# Patient Record
Sex: FEMALE | Race: WHITE | Employment: UNEMPLOYED | URBAN - METROPOLITAN AREA
[De-identification: names, ages, dates, MRNs, and addresses within clinical notes are randomized per-mention and may not be internally consistent; named-entity substitution may affect disease eponyms.]

---

## 2017-01-03 LAB
ALBUMIN SERPL-MCNC: 2.5 G/DL (ref 3.4–5)
ALP SERPL-CCNC: 83 U/L (ref 40–150)
ALT SERPL W P-5'-P-CCNC: 28 U/L (ref 0–50)
ANION GAP SERPL CALCULATED.3IONS-SCNC: 8 MMOL/L (ref 3–14)
ANISOCYTOSIS BLD QL SMEAR: SLIGHT
AST SERPL W P-5'-P-CCNC: 17 U/L (ref 0–45)
BASOPHILS # BLD AUTO: 0 10E9/L (ref 0–0.2)
BASOPHILS NFR BLD AUTO: 0 %
BILIRUB SERPL-MCNC: 0.2 MG/DL (ref 0.2–1.3)
BUN SERPL-MCNC: 21 MG/DL (ref 7–30)
BURR CELLS BLD QL SMEAR: SLIGHT
CALCIUM SERPL-MCNC: 8.7 MG/DL (ref 8.5–10.1)
CHLORIDE SERPL-SCNC: 108 MMOL/L (ref 94–109)
CO2 SERPL-SCNC: 27 MMOL/L (ref 20–32)
CREAT SERPL-MCNC: 0.94 MG/DL (ref 0.52–1.04)
DIFFERENTIAL METHOD BLD: ABNORMAL
EOSINOPHIL # BLD AUTO: 0.1 10E9/L (ref 0–0.7)
EOSINOPHIL NFR BLD AUTO: 0.9 %
ERYTHROCYTE [DISTWIDTH] IN BLOOD BY AUTOMATED COUNT: 17.8 % (ref 10–15)
GFR SERPL CREATININE-BSD FRML MDRD: 69 ML/MIN/1.7M2
GLUCOSE SERPL-MCNC: 63 MG/DL (ref 70–99)
HCT VFR BLD AUTO: 28.4 % (ref 35–47)
HGB BLD-MCNC: 8.9 G/DL (ref 11.7–15.7)
LYMPHOCYTES # BLD AUTO: 1.3 10E9/L (ref 0.8–5.3)
LYMPHOCYTES NFR BLD AUTO: 19.1 %
MACROCYTES BLD QL SMEAR: PRESENT
MCH RBC QN AUTO: 29.4 PG (ref 26.5–33)
MCHC RBC AUTO-ENTMCNC: 31.3 G/DL (ref 31.5–36.5)
MCV RBC AUTO: 94 FL (ref 78–100)
MICROCYTES BLD QL SMEAR: PRESENT
MONOCYTES # BLD AUTO: 0.7 10E9/L (ref 0–1.3)
MONOCYTES NFR BLD AUTO: 10.4 %
NEUTROPHILS # BLD AUTO: 4.7 10E9/L (ref 1.6–8.3)
NEUTROPHILS NFR BLD AUTO: 69.6 %
PLATELET # BLD AUTO: 480 10E9/L (ref 150–450)
PLATELET # BLD EST: ABNORMAL 10*3/UL
POIKILOCYTOSIS BLD QL SMEAR: SLIGHT
POTASSIUM SERPL-SCNC: 4.1 MMOL/L (ref 3.4–5.3)
PROT SERPL-MCNC: 6 G/DL (ref 6.8–8.8)
RBC # BLD AUTO: 3.03 10E12/L (ref 3.8–5.2)
RBC INCLUSIONS BLD: SLIGHT
SODIUM SERPL-SCNC: 143 MMOL/L (ref 133–144)
WBC # BLD AUTO: 6.7 10E9/L (ref 4–11)

## 2017-01-03 PROCEDURE — 80053 COMPREHEN METABOLIC PANEL: CPT | Performed by: INTERNAL MEDICINE

## 2017-01-03 PROCEDURE — 85025 COMPLETE CBC W/AUTO DIFF WBC: CPT | Performed by: INTERNAL MEDICINE

## 2017-01-04 ENCOUNTER — CARE COORDINATION (OUTPATIENT)
Dept: GASTROENTEROLOGY | Facility: CLINIC | Age: 32
End: 2017-01-04

## 2017-01-04 LAB
CMV DNA SPEC NAA+PROBE-ACNC: ABNORMAL [IU]/ML
CMV DNA SPEC NAA+PROBE-LOG#: <2.1 {LOG_IU}/ML
SPECIMEN SOURCE: ABNORMAL

## 2017-01-04 NOTE — PROGRESS NOTES
Thank you for the update. I am glad she is doing well. After three days at 50 mg she can decrease down to 40 mg and stay on that until I see her next week.    Fuentes Johnson MD    [facility]  Division of Gastroenterology, Hepatology and Nutrition

## 2017-01-04 NOTE — PROGRESS NOTES
Called patient in follow up per Dr. Johnson request.  Patient taking Prednisone 50 mg for 3 days then will go to Prednisone 40 and taper.  Had 1 formed stool yesterday and today. No blood noted. No abdominal pain.  Eating and drinking.  No nausea. Has appt with Dr. Johnson on January 9.   Will call if symptoms change.

## 2017-01-05 NOTE — PROGRESS NOTES
Called patient to confirm that Dr. Johnson said she can go down to 40 mg as previously discussed.

## 2017-01-09 ENCOUNTER — OFFICE VISIT (OUTPATIENT)
Dept: GASTROENTEROLOGY | Facility: CLINIC | Age: 32
End: 2017-01-09

## 2017-01-09 VITALS
BODY MASS INDEX: 22.08 KG/M2 | OXYGEN SATURATION: 98 % | HEART RATE: 96 BPM | WEIGHT: 120 LBS | DIASTOLIC BLOOD PRESSURE: 98 MMHG | SYSTOLIC BLOOD PRESSURE: 127 MMHG | HEIGHT: 62 IN | TEMPERATURE: 97.4 F

## 2017-01-09 DIAGNOSIS — K51.011 ULCERATIVE PANCOLITIS WITH RECTAL BLEEDING (H): Primary | ICD-10-CM

## 2017-01-09 DIAGNOSIS — R05.9 COUGH: ICD-10-CM

## 2017-01-09 DIAGNOSIS — F51.01 PRIMARY INSOMNIA: ICD-10-CM

## 2017-01-09 RX ORDER — ZOLPIDEM TARTRATE 5 MG/1
5 TABLET ORAL
Qty: 30 TABLET | Refills: 5 | Status: SHIPPED | OUTPATIENT
Start: 2017-01-09 | End: 2017-03-17

## 2017-01-09 RX ORDER — PREDNISONE 5 MG/1
TABLET ORAL
Qty: 235 TABLET | Refills: 0 | Status: SHIPPED
Start: 2017-01-09 | End: 2017-01-09 | Stop reason: DRUGHIGH

## 2017-01-09 RX ORDER — PREDNISONE 5 MG/1
TABLET ORAL
Qty: 250 TABLET | Refills: 0 | Status: SHIPPED
Start: 2017-01-09 | End: 2017-02-13 | Stop reason: DRUGHIGH

## 2017-01-09 ASSESSMENT — PAIN SCALES - GENERAL: PAINLEVEL: NO PAIN (0)

## 2017-01-09 NOTE — PROGRESS NOTES
OUTPATIENT GI FOLLOWUP VISIT       REASON FOR FOLLOWUP:  PSC-associated inflammatory bowel disease.      IBD HISTORY:      DRUG MONITORING:    -TPMT enzyme activity was 18.5 on 12/06/2016.    -6TGN/6MMPN  levels on 12/06/2016 is 203/257.   -Biologic concentrations have not been done yet.      HISTORY OF PRESENT ILLNESS:  Yarelis is a pleasant 31-year-old female who is here today to follow up for PSC-associated IBD.  I direct you to Cresencio Sierra's and my previous notes, as well as discharge summaries.  The patient has a very complicated history.  She is status post living donor transplant in 2015, and had been maintained on low-dose prednisone, tacrolimus and azathioprine at 50 mg.  She began having bloody diarrhea in early 2016, and had CMV colitis and enteritis x2 that was treated with IV ganciclovir.  She again had a flare of her colitis symptoms in November, and was admitted with severe colitis.  Biopsies were this time negative for CMV, and had no significant EBV components.  She did have associated mild CMV viremia and EBV viremia, but after extensive discussion with Pathology and Infectious Disease, her flare was felt not to be related to these viruses or PTLD.  The patient was treated with IV steroids, and then transitioned to oral steroids. She initially did well but in early December failed transition to PO steroids. She was re-hospitalized and given IV steroids again. She was transitioned to a higher dose of steroids (60 mg) and was discharged with vedolizumab infusion the day after discharge.  She was seen on 12/30 by Cresencio Sierra and was doing well, though her CRP did go from being normal back up into the 40s.  She is currently on prednisone 40 mg, and she has received her second injection of vedolizumab.      Today, the patient states that she continues to do well from a colitis standpoint.  She is having 1 formed bowel movement a day.  There is no blood.  She has no urgency.  She has no tenesmus.  She has  no nocturnal stools.  She has no abdominal pain whatsoever.  She has no fevers, chills or sweats.  She has no rashes, mouth sores or joint pains.  She has no changes in urinary symptoms.  She believes that her PICC line site is clean without any evidence of redness, purulent discharge or any evidence of infection.      The patient did have a cough over the holidays, and this was evaluated by Cresencio Sierra.  Her cough had occasionally been productive of sputum.  Since then, this cough has gotten much better.  She still has a residual mild cough, mainly in the mornings.  Occasionally, she does cough things up.  She did have a 2-view chest x-ray, which showed mainly clear lungs, but there was some interval increase in left retrocardiac patchy opacities that could represent atelectasis or infection.  As her cough is improving, no treatment was recommended.  Today, she believes the cough is again much better, but there is still a mild residual cough in the morning.  There is no dyspnea on exertion.  No fevers, chills or sweats.      REVIEW OF SYSTEMS:  A complete review of systems is performed.  Pertinent positives and negatives are as stated above in the HPI.  The remainder of a complete review of systems is unremarkable.      PAST MEDICAL HISTORY:     1.  PSC associated inflammatory bowel disease as discussed above.   2.  She has PSC and autoimmune hepatitis.  She is status post liver transplant.   3.  CMV colitis.   4.  Primary EBV.  For the EBV and CMV, she has followed with Dr. Nails of Infectious Disease.  She is not currently any antiretroviral therapy.   5.  History of C. diff. She has had a C. diff checked recently, and this was negative.      PAST SURGICAL HISTORY:  Status post living donor transplant in 11/2015.  She is also status post multiple ERCPs.      SOCIAL HISTORY:  She denies illicit drug use.  She denies tobacco.  No alcohol.      FAMILY HISTORY:  Mother with a history of dyslipidemia and  hypertension.  Maternal grandmother had hypertension.  Maternal grandmother with Alzheimer's.  Father with dyslipidemia.      PHYSICAL EXAMINATION:   VITAL SIGNS:  Pulse 96, blood pressure 127/98, weight is stable at 120.8, satting 98% on room air, height is 5 feet 2 inches.   GENERAL:  She is pleasant, in no acute distress.  She is smiling and is very pleasant upon interaction.   HEENT:  Head is atraumatic, normocephalic.  Sclerae are anicteric without injection.  Oropharynx is clear with moist mucous membranes, no lesions.   NECK:  Supple.  There is no lymphadenopathy, no thyromegaly.   HEART:  Normal rate, no murmurs.   LUNGS:  Mainly clear.  In the left lower posterior lung field, she has some mild inspiratory crackles with a pop.  No changes of egophony.   ABDOMEN:  Soft, nontender and nondistended, no rebound or guarding.  She has a well-healed surgical scar from her transplant.   EXTREMITIES:  No clubbing, cyanosis or edema.   SKIN:  No evidence of rash.   JOINTS:  No evidence of synovitis.   NEUROLOGIC:  Awake, alert and oriented x3 with no focal deficits.      LABORATORY DATA:  Reviewed in Epic.  Her creatinine is normal at 0.94.  Sodium, potassium and bicarb are all normal.  Her albumin remains mildly decreased at 2.5.  Her bilirubin 0.2, alkaline phosphatase is 83, ALT is 28 and AST is 17.  Glucose in the 60s.  White count is 6.7, hemoglobin 8.9, platelets are mildly increased at 480.  Her CRP was 47.8 on 12/30, and her ESR was 90.  Her CMV quant is positive under the detectable limit.        IMAGING:  Chest x-ray as reviewed above.      ASSESSMENT AND PLAN:  Yarelis Penny is a 31-year-old female with a very complicated past medical history including PSC with associated IBD, who developed cirrhosis, and is now status post living donor transplant in 2015.  She is seen in this following her severe flare of her probable UC that is associated with her PSC.     1.  Primary sclerosing cholangitis-associated IBD.   She likely has PSC-associated ulcerative colitis with a severe recent flare.  I am very pleased with how she is doing symptomatically on the prednisone and vedolizumab.  She is status post 2 infusions of vedolizumab.  She is having 1 bowel movement per day that is non-bloody, and there is absolutely no abdominal pain.  We could not be doing better symptomatically.  I am a little concerned that her CRP after discharge increased from normal up to 48.  However, this is not really congruent with her symptoms.  We do need to be diligent to make sure that there is no other infection or other source of inflammation going on.  I will repeat the CRP tomorrow with her regular maintenance labs that she gets while on the IV ganciclovir.  Last week, we did have her go up on the prednisone to 50 mg for a couple of days, but she is back down to 40 mg.  Starting this Thursday, we will have her taper slowly down by 5 mg every week.  Hopefully, this will give time for the vedolizumab to kick in.  The azathioprine has been held given the high-dose steroids, and the fact that she has an EBV viremia.  I will discuss with Dr. Enriquez what she would like to do regarding additional immunosuppression for her liver transplant and history of autoimmune hepatitis as we come down off the steroids.  Restarting the azathioprine could be considered, but, again, she has had a recent primary EBV episode, and has had persistent EBV levels.  We will taper the prednisone slowly back down to her chronic 5 mg per day dosing.     2.  Cough.  She still has a residual cough.  I am very happy with how much it has improved.  Because she did have some crackles in the left lower lung fields, we will get a repeat chest x-ray today just to keep an eye on that infiltrate to see whether it has progressed in any way.  It may just be related to atelectasis, but I want to make sure that there is not a developing pneumonia there.     3.  PCP prophylaxis.  The patient  will be continued on PCP prophylaxis as long as she is on these high-dose steroids, typically keeping her on the PCP prophylaxis a total of 3 months after the steroids are done.  She can take the double-strength Bactrim Monday, Wednesday and Friday.     4.  EBV viremia.  She will follow up with Dr. Nails in a couple of weeks.  She is status post 1 dose of rituximab earlier this year.  We are just monitoring this for now.     5.  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  No evidence of recent CMV colitis.  She has had low levels of CMV viremia, and she has continued on the IV ganciclovir.     6.  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  The high-dose steroids are helping with immunosuppression in the transplanted liver, as well as for the autoimmune hepatitis.  I will discuss with Dr. Enriquez what to do about an additional immunosuppressant as we taper off the steroids.  She will continue tacrolimus as she is supposed to.     7.  Health maintenance.  This was discussed briefly with the patient.  She is up-to-date on her immunizations.  She can take calcium and vitamin D.  She will need yearly surveillance with chromoendoscopy, and she should get yearly skin checks and a yearly Pap smear.      The patient is doing well, but if she does not continue to improve as we taper the steroids, we will have her follow up with surgery to consider colectomy.  She understands this well.      Thank you very much for the opportunity to take part in the care of this patient.  Please do not hesitate to call with questions.     ADDENDUM:    CXR unchanged - no progression or development of clear infiltrate. As she is doing well with only minimal residual cough I do not think she needs any abx. Will follow closely.    CRP normalized! Great news. Continue steroid taper. WBC elevated - may be related steroids - follow closely for symptoms of infection. I do also note plt again increased. Follow.     cc: Cresencio Sierra PA-C              Lupis Munoz MD          Colorectal Surgery                     Walker Nails MD          Infectious Disease                     Daniela Enriquez MD          Liver Transplantation         MATIAS PA MD             D: 2017 08:21   T: 2017 09:43   MT: CHOCO      Name:     GINA CABRERA   MRN:      [id number]        Account:      [id number]   :      1985           Service Date: 2017      Document: [id number]

## 2017-01-09 NOTE — PROGRESS NOTES
Note dictated. Job code [id number].    Fuentes Johnson MD    [facility]  Division of Gastroenterology, Hepatology and Nutrition    Answers for HPI/ROS submitted by the patient on 12/30/2016   General Symptoms: No  Skin Symptoms: No  HENT Symptoms: No  EYE SYMPTOMS: No  HEART SYMPTOMS: No  LUNG SYMPTOMS: No  INTESTINAL SYMPTOMS: No  URINARY SYMPTOMS: No  GYNECOLOGIC SYMPTOMS: No  BREAST SYMPTOMS: No  SKELETAL SYMPTOMS: No  BLOOD SYMPTOMS: No  NERVOUS SYSTEM SYMPTOMS: No  MENTAL HEALTH SYMPTOMS: No

## 2017-01-09 NOTE — NURSING NOTE
Printed after visit summary given to patient.  Follow up appt with Ms. Sierra.  Prescription for prednisone sent to [facility].  Hard copy of prescription given to patient for Ambien. Patient sent to imaging for chest xray.  Called [facility] to have crp drawn.  Talked to Gisella to have crp added on with labs when pt. Has infusion on the 10th.

## 2017-01-09 NOTE — NURSING NOTE
"Chief Complaint   Patient presents with     RECHECK       Filed Vitals:    01/09/17 0735   BP: 127/98   Pulse: 96   Temp: 97.4  F (36.3  C)   TempSrc: Oral   Height: 1.575 m (5' 2\")   Weight: 54.432 kg (120 lb)   SpO2: 98%       Body mass index is 21.94 kg/(m^2).                              "

## 2017-01-09 NOTE — Clinical Note
1/9/2017       RE: Yarelis Penny  [address] [facility] [place] [address]     Dear Colleague,    Thank you for referring your patient, Yarelis Penny, to the GASTROENTEROLOGY AND IBD at [facility]. Please see a copy of my visit note below.    OUTPATIENT GI FOLLOWUP VISIT       REASON FOR FOLLOWUP:  PSC-associated inflammatory bowel disease.      IBD HISTORY:      DRUG MONITORING:    -TPMT enzyme activity was 18.5 on 12/06/2016.    -6TGN/6MMPN  levels on 12/06/2016 is 203/257.   -Biologic concentrations have not been done yet.      HISTORY OF PRESENT ILLNESS:  Yarelis is a pleasant 31-year-old female who is here today to follow up for PSC-associated IBD.  I direct you to Cresencio Sierra's and my previous notes, as well as discharge summaries.  The patient has a very complicated history.  She is status post living donor transplant in 2015, and had been maintained on low-dose prednisone, tacrolimus and azathioprine at 50 mg.  She began having bloody diarrhea in early 2016, and had CMV colitis and enteritis x2 that was treated with IV ganciclovir.  She again had a flare of her colitis symptoms in November, and was admitted with severe colitis.  Biopsies were this time negative for CMV, and had no significant EBV components.  She did have associated mild CMV viremia and EBV viremia, but after extensive discussion with Pathology and Infectious Disease, her flare was felt not to be related to these viruses or PTLD.  The patient was treated with IV steroids, and then transitioned to oral steroids. She initially did well but in early December failed transition to PO steroids. She was re-hospitalized and given IV steroids again. She was transitioned to a higher dose of steroids (60 mg) and was discharged with vedolizumab infusion the day after discharge.  She was seen on 12/30 by Cresencio Sierra and was doing well, though her CRP did go from being normal back up into the 40s.  She is currently on  prednisone 40 mg, and she has received her second injection of vedolizumab.      Today, the patient states that she continues to do well from a colitis standpoint.  She is having 1 formed bowel movement a day.  There is no blood.  She has no urgency.  She has no tenesmus.  She has no nocturnal stools.  She has no abdominal pain whatsoever.  She has no fevers, chills or sweats.  She has no rashes, mouth sores or joint pains.  She has no changes in urinary symptoms.  She believes that her PICC line site is clean without any evidence of redness, purulent discharge or any evidence of infection.      The patient did have a cough over the holidays, and this was evaluated by Cresencio Sierra.  Her cough had occasionally been productive of sputum.  Since then, this cough has gotten much better.  She still has a residual mild cough, mainly in the mornings.  Occasionally, she does cough things up.  She did have a 2-view chest x-ray, which showed mainly clear lungs, but there was some interval increase in left retrocardiac patchy opacities that could represent atelectasis or infection.  As her cough is improving, no treatment was recommended.  Today, she believes the cough is again much better, but there is still a mild residual cough in the morning.  There is no dyspnea on exertion.  No fevers, chills or sweats.      REVIEW OF SYSTEMS:  A complete review of systems is performed.  Pertinent positives and negatives are as stated above in the HPI.  The remainder of a complete review of systems is unremarkable.      PAST MEDICAL HISTORY:     1.  PSC associated inflammatory bowel disease as discussed above.   2.  She has PSC and autoimmune hepatitis.  She is status post liver transplant.   3.  CMV colitis.   4.  Primary EBV.  For the EBV and CMV, she has followed with Dr. Nails of Infectious Disease.  She is not currently any antiretroviral therapy.   5.  History of C. diff. She has had a C. diff checked recently, and this was  negative.      PAST SURGICAL HISTORY:  Status post living donor transplant in 11/2015.  She is also status post multiple ERCPs.      SOCIAL HISTORY:  She denies illicit drug use.  She denies tobacco.  No alcohol.      FAMILY HISTORY:  Mother with a history of dyslipidemia and hypertension.  Maternal grandmother had hypertension.  Maternal grandmother with Alzheimer's.  Father with dyslipidemia.      PHYSICAL EXAMINATION:   VITAL SIGNS:  Pulse 96, blood pressure 127/98, weight is stable at 120.8, satting 98% on room air, height is 5 feet 2 inches.   GENERAL:  She is pleasant, in no acute distress.  She is smiling and is very pleasant upon interaction.   HEENT:  Head is atraumatic, normocephalic.  Sclerae are anicteric without injection.  Oropharynx is clear with moist mucous membranes, no lesions.   NECK:  Supple.  There is no lymphadenopathy, no thyromegaly.   HEART:  Normal rate, no murmurs.   LUNGS:  Mainly clear.  In the left lower posterior lung field, she has some mild inspiratory crackles with a pop.  No changes of egophony.   ABDOMEN:  Soft, nontender and nondistended, no rebound or guarding.  She has a well-healed surgical scar from her transplant.   EXTREMITIES:  No clubbing, cyanosis or edema.   SKIN:  No evidence of rash.   JOINTS:  No evidence of synovitis.   NEUROLOGIC:  Awake, alert and oriented x3 with no focal deficits.      LABORATORY DATA:  Reviewed in Epic.  Her creatinine is normal at 0.94.  Sodium, potassium and bicarb are all normal.  Her albumin remains mildly decreased at 2.5.  Her bilirubin 0.2, alkaline phosphatase is 83, ALT is 28 and AST is 17.  Glucose in the 60s.  White count is 6.7, hemoglobin 8.9, platelets are mildly increased at 480.  Her CRP was 47.8 on 12/30, and her ESR was 90.  Her CMV quant is positive under the detectable limit.        IMAGING:  Chest x-ray as reviewed above.      ASSESSMENT AND PLAN:  Yarelis Penny is a 31-year-old female with a very complicated past medical  history including PSC with associated IBD, who developed cirrhosis, and is now status post living donor transplant in 2015.  She is seen in this following her severe flare of her probable UC that is associated with her PSC.     1.  Primary sclerosing cholangitis-associated IBD.  She likely has PSC-associated ulcerative colitis with a severe recent flare.  I am very pleased with how she is doing symptomatically on the prednisone and vedolizumab.  She is status post 2 infusions of vedolizumab.  She is having 1 bowel movement per day that is non-bloody, and there is absolutely no abdominal pain.  We could not be doing better symptomatically.  I am a little concerned that her CRP after discharge increased from normal up to 48.  However, this is not really congruent with her symptoms.  We do need to be diligent to make sure that there is no other infection or other source of inflammation going on.  I will repeat the CRP tomorrow with her regular maintenance labs that she gets while on the IV ganciclovir.  Last week, we did have her go up on the prednisone to 50 mg for a couple of days, but she is back down to 40 mg.  Starting this Thursday, we will have her taper slowly down by 5 mg every week.  Hopefully, this will give time for the vedolizumab to kick in.  The azathioprine has been held given the high-dose steroids, and the fact that she has an EBV viremia.  I will discuss with Dr. Enriquez what she would like to do regarding additional immunosuppression for her liver transplant and history of autoimmune hepatitis as we come down off the steroids.  Restarting the azathioprine could be considered, but, again, she has had a recent primary EBV episode, and has had persistent EBV levels.  We will taper the prednisone slowly back down to her chronic 5 mg per day dosing.     2.  Cough.  She still has a residual cough.  I am very happy with how much it has improved.  Because she did have some crackles in the left lower lung  fields, we will get a repeat chest x-ray today just to keep an eye on that infiltrate to see whether it has progressed in any way.  It may just be related to atelectasis, but I want to make sure that there is not a developing pneumonia there.     3.  PCP prophylaxis.  The patient will be continued on PCP prophylaxis as long as she is on these high-dose steroids, typically keeping her on the PCP prophylaxis a total of 3 months after the steroids are done.  She can take the double-strength Bactrim Monday, Wednesday and Friday.     4.  EBV viremia.  She will follow up with Dr. Nails in a couple of weeks.  She is status post 1 dose of rituximab earlier this year.  We are just monitoring this for now.     5.  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  No evidence of recent CMV colitis.  She has had low levels of CMV viremia, and she has continued on the IV ganciclovir.     6.  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  The high-dose steroids are helping with immunosuppression in the transplanted liver, as well as for the autoimmune hepatitis.  I will discuss with Dr. Enriquez what to do about an additional immunosuppressant as we taper off the steroids.  She will continue tacrolimus as she is supposed to.     7.  Health maintenance.  This was discussed briefly with the patient.  She is up-to-date on her immunizations.  She can take calcium and vitamin D.  She will need yearly surveillance with chromoendoscopy, and she should get yearly skin checks and a yearly Pap smear.      The patient is doing well, but if she does not continue to improve as we taper the steroids, we will have her follow up with surgery to consider colectomy.  She understands this well.      Thank you very much for the opportunity to take part in the care of this patient.  Please do not hesitate to call with questions.     ADDENDUM:    CXR unchanged - no progression or development of clear infiltrate. As she is doing well with only minimal  residual cough I do not think she needs any abx. Will follow closely.    CRP normalized! Great news. Continue steroid taper. WBC elevated - may be related steroids - follow closely for symptoms of infection. I do also note plt again increased. Follow.     cc: DANISHA Love MD          Colorectal Surgery                     Walker Nails MD          Infectious Disease                     Daniela Enriquez MD          Liver Transplantation         MATIAS JOHNSON MD             D: 2017 08:21   T: 2017 09:43   MT:       Name:     GINA CABRERA   MRN:      [id number]        Account:      [id number]   :      1985           Service Date: 2017      Document: [id number]      Note dictated. Job code [id number].    Matias Johnson MD    [facility]  Division of Gastroenterology, Hepatology and Nutrition    Answers for HPI/ROS submitted by the patient on 2016   General Symptoms: No  Skin Symptoms: No  HENT Symptoms: No  EYE SYMPTOMS: No  HEART SYMPTOMS: No  LUNG SYMPTOMS: No  INTESTINAL SYMPTOMS: No  URINARY SYMPTOMS: No  GYNECOLOGIC SYMPTOMS: No  BREAST SYMPTOMS: No  SKELETAL SYMPTOMS: No  BLOOD SYMPTOMS: No  NERVOUS SYSTEM SYMPTOMS: No  MENTAL HEALTH SYMPTOMS: No

## 2017-01-09 NOTE — MR AVS SNAPSHOT
After Visit Summary   1/9/2017    Yarelis Penny    MRN: [id number]           Patient Information     Date Of Birth          1985        Visit Information        Provider Department      1/9/2017 7:30 AM Fuentes Johnson MD G[facility]ology [facility] IBD        Today's Diagnoses     Ulcerative pancolitis with rectal bleeding (H)    -  1     Primary insomnia         Cough           Care Instructions    1. Continue the vedolizumab infusions    2. Continue steroid taper. Starting this Thursday decreased by 5 mg every week. Stay at 5 mg daily when you get to this dose (for your transplant)    3. I will discuss with Dr. Enriquez and Dr. Nails what we will do with the azathioprine as we taper the steroids    4. Follow symptoms and keep us updated    5. Weekly labs while on the IV ganciclovir through infectious disease. I will add on a CRP to that lab tomorrow    6. Take ambien to help with sleeping while on prednisone    7. Take calcium 1200 mg and vitamin D 1000 units daily    8. Repeat chest X ray today    Follow up in 4 weeks with Cresencio or myself  Feb 13 check in time 845  Ms. Sierra   4th floor surgery clinic     Call with questions  For questions regarding your care Monday through Friday, contact the RN GI care coordinator,  Call Norma Walker at  [phone] option 3 and leave a voicemail. Your call will be  returned same day, or if consultation is needed with the provider, it may be following business day - or you may send a My Chart message.    For medication refills (prescribed by the GI clinic), contact your pharmacy.    For appointment rescheduling/cancellation, contact [phone]     After hours, or if you have an immediate GI concern and cannot wait for a return call, contact the GI Fellow at [phone] and select option #4.    Continue prednisone  40 mg x 7 days  Then 35 mg for 7 days   Then 30 mg  for 7 days  then 25 mg for 7 days  Then 20 mg  for 7 days  Then 15 mg  for 7  days  Then 10 mg  for 7 days  Then 5 mg for 7 days  Then 5 mg every other day then stop         Follow-ups after your visit        Your next 10 appointments already scheduled     Jan 09, 2017  8:30 AM   LAB with [facility]   [facility] ([facility])    [address] [place] [address]  1st [facility] [id number] [phone]           Patient must bring picture ID.  Patient should be prepared to give a urine specimen  Please do not eat 10-12 hours before your appointment if you are coming in fasting for labs on lipids, cholesterol, or glucose (sugar).  Pregnant women should follow their Care Team instructions. Water with medications is okay. Do not drink coffee or other fluids.   If you have concerns about taking  your medications, please ask at office or if scheduling via [facility], send a message by clicking on Secure Messaging, Message Your Care Team.            Jan 09, 2017  8:55 AM   (Arrive by 8:40 AM)   XR CHEST 2 VIEWS with UCXR1   [facility] ([facility])    [address] [place] [id number] [phone]           Please bring a list of your current medicines to your exam. (Include vitamins, minerals and over-thecounter medicines.) Leave your valuables at home.  Tell your doctor if there is a chance you may be pregnant.  You do not need to do anything special for this exam.            Jan 27, 2017  8:00 AM   Infusion 120 with UC SPEC INFUSION, UC 44 ATC   [facility] ([facility])    [address] [place] [address]  2nd Floor  [facility] [id number] [phone]            Feb 08, 2017  1:30 PM   (Arrive by 1:15 PM)   Return Visit with Walker Nails MD   [facility] ([facility])    [address] [place] [address]  3rd Floor  [facility] [id number] [phone]            Feb 08, 2017  2:30 PM    (Arrive by 2:15 PM)   Cystoscopy with Zarina Law MD   [facility]rologic [facility] ([facility])    [address] [place] [id number] [phone]            Feb 13, 2017  9:00 AM   (Arrive by 8:45 AM)   RETURN IRRITABLE BOWEL DISEASE with Cresencio Sierra PA-C   [facility]ology [facility] IBD ([facility])    [address] [place] [id number] [phone]            May 01, 2017  8:00 AM   (Arrive by 7:45 AM)   Return Visit with Fuentes Johnson MD   [facility]ology [facility] IBD ([facility])    [address] [place] [address] [id number] [phone]              Future tests that were ordered for you today     Open Future Orders        Priority Expected Expires Ordered    X-ray Chest 2 vws* Routine 1/9/2017 1/9/2018 1/9/2017    CRP inflammation Routine 1/10/2017 1/9/2018 1/9/2017            Who to contact     Please call your clinic at [phone] to:    Ask questions about your health    Make or cancel appointments    Discuss your medicines    Learn about your test results    Speak to your doctor   If you have compliments or concerns about an experience at your clinic, or if you wish to file a complaint, please contact [facility] Patient Relations at [phone] or email us at [email or web address]         Additional Information About Your Visit        [email or web address] Information     [employer] gives you secure access to your electronic health record. If you see a primary care provider, you can also send messages to your care team and make appointments. If you have questions, please call your primary care clinic.  If you do not have a primary care provider, please call [phone] and they will assist you.      [employer] is an electronic gateway that provides easy, online access to your medical records. With  "MyChart, you can request a clinic appointment, read your test results, renew a prescription or communicate with your care team.     To access your existing account, please contact your [facility] or call [phone] for assistance.        Care EveryWhere ID     This is your Care EveryWhere ID. This could be used by other organizations to access your [facility] medical records  [id number]        Your Vitals Were     Pulse Temperature Height BMI (Body Mass Index) Pulse Oximetry Last Period    96 97.4  F (36.3  C) (Oral) 1.575 m (5' 2\") 21.94 kg/m2 98% 12/14/2016       Blood Pressure from Last 3 Encounters:   01/09/17 127/98   12/30/16 122/77   12/30/16 120/94    Weight from Last 3 Encounters:   01/09/17 54.432 kg (120 lb)   12/30/16 54.613 kg (120 lb 6.4 oz)   12/16/16 54.568 kg (120 lb 4.8 oz)                 Today's Medication Changes          These changes are accurate as of: 1/9/17  8:14 AM.  If you have any questions, ask your nurse or doctor.               Start taking these medicines.        Dose/Directions    zolpidem 5 MG tablet   Commonly known as:  AMBIEN   Used for:  Primary insomnia   Started by:  Fuentes Johnson MD        Dose:  5 mg   Take 1 tablet (5 mg) by mouth nightly as needed for sleep   Quantity:  30 tablet   Refills:  5            Where to get your medicines      Some of these will need a paper prescription and others can be bought over the counter.  Ask your nurse if you have questions.     Bring a paper prescription for each of these medications    - zolpidem 5 MG tablet             Primary Care Provider Office Phone # Fax #    Jackson S Esteban [phone]       [place] [address] [address] [address]        Thank you!     Thank you for choosing GASTROENTEROLOGY AND IBD  for your care. Our goal is always to provide you with excellent care. Hearing back from our patients is one way we can continue to improve our " services. Please take a few minutes to complete the written survey that you may receive in the mail after your visit with us. Thank you!             Your Updated Medication List - Protect others around you: Learn how to safely use, store and throw away your medicines at www.disposemymeds.org.          This list is accurate as of: 1/9/17  8:14 AM.  Always use your most recent med list.                   Brand Name Dispense Instructions for use    ganciclovir 300 mg     300 mg    Inject 300 mg into the vein every 12 hours       GNP ASPIRIN LOW DOSE 81 MG EC tablet   Generic drug:  aspirin     30 tablet    Take 1 tablet (81 mg) by mouth daily       mesalamine 500 MG Cpcr CR capsule    PENTASA    240 capsule    Take 4 capsules (2,000 mg) by mouth 2 times daily       predniSONE 10 MG tablet    DELTASONE    92 tablet    Take 6 tabs (60mg) PO x 5 days  Then take 5 tabs (50mg) PO x 7 days  Then take 4 tabs (40mg) PO x 7 days Then take 3 tabs (30mg) PO x 7 days Then take 2 tabs (20mg) PO x 7 days Then take 1 tab (10mg) PO x 7 days Then take 1/2 tablet (5mg) PO x 7 days then DISCONTINUE  Then take       sulfamethoxazole-trimethoprim 800-160 MG per tablet    BACTRIM DS/SEPTRA DS    12 tablet    Take 1 tablet by mouth three times a week       * tacrolimus 1 MG capsule    PROGRAF - GENERIC EQUIVALENT    30 capsule    Take 2 capsules (2 mg) by mouth every evening       * tacrolimus 0.5 MG capsule    PROGRAF - GENERIC EQUIVALENT    150 capsule    Take 5 capsules (2.5 mg) by mouth every morning       ursodiol 250 MG tablet    ACTIGALL    270 tablet    Take 2 tabs (500 mg) in AM and 1 at night (250 mg)       zolpidem 5 MG tablet    AMBIEN    30 tablet    Take 1 tablet (5 mg) by mouth nightly as needed for sleep       * Notice:  This list has 2 medication(s) that are the same as other medications prescribed for you. Read the directions carefully, and ask your doctor or other care provider to review them with you.

## 2017-01-09 NOTE — PATIENT INSTRUCTIONS
1. Continue the vedolizumab infusions    2. Continue steroid taper. Starting this Thursday decreased by 5 mg every week. Stay at 5 mg daily when you get to this dose (for your transplant)    3. I will discuss with Dr. Enriquez and Dr. Nails what we will do with the azathioprine as we taper the steroids    4. Follow symptoms and keep us updated    5. Weekly labs while on the IV ganciclovir through infectious disease. I will add on a CRP to that lab tomorrow    6. Take ambien to help with sleeping while on prednisone    7. Take calcium 1200 mg and vitamin D 1000 units daily    8. Repeat chest X ray today    Follow up in 4 weeks with Cresencio or myself  Feb 13 check in time 845  Ms. Sierra   4th floor surgery clinic     Call with questions  For questions regarding your care Monday through Friday, contact the RN GI care coordinator,  Call Norma Walker at  [phone] option 3 and leave a voicemail. Your call will be  returned same day, or if consultation is needed with the provider, it may be following business day - or you may send a My Chart message.    For medication refills (prescribed by the GI clinic), contact your pharmacy.    For appointment rescheduling/cancellation, contact [phone]     After hours, or if you have an immediate GI concern and cannot wait for a return call, contact the GI Fellow at [phone] and select option #4.    Continue prednisone  40 mg x 7 days  Then 35 mg for 7 days   Then 30 mg  for 7 days  then 25 mg for 7 days  Then 20 mg  for 7 days  Then 15 mg  for 7 days  Then 10 mg  for 7 days  Then stay on 5 mg.

## 2017-01-10 ENCOUNTER — TELEPHONE (OUTPATIENT)
Dept: GASTROENTEROLOGY | Facility: CLINIC | Age: 32
End: 2017-01-10

## 2017-01-10 LAB
ALBUMIN SERPL-MCNC: 3 G/DL (ref 3.4–5)
ALP SERPL-CCNC: 68 U/L (ref 40–150)
ALT SERPL W P-5'-P-CCNC: 28 U/L (ref 0–50)
ANION GAP SERPL CALCULATED.3IONS-SCNC: 8 MMOL/L (ref 3–14)
ANISOCYTOSIS BLD QL SMEAR: SLIGHT
AST SERPL W P-5'-P-CCNC: 20 U/L (ref 0–45)
BASOPHILS # BLD AUTO: 0 10E9/L (ref 0–0.2)
BASOPHILS NFR BLD AUTO: 0 %
BILIRUB SERPL-MCNC: 0.3 MG/DL (ref 0.2–1.3)
BUN SERPL-MCNC: 22 MG/DL (ref 7–30)
CALCIUM SERPL-MCNC: 8.6 MG/DL (ref 8.5–10.1)
CHLORIDE SERPL-SCNC: 108 MMOL/L (ref 94–109)
CO2 SERPL-SCNC: 24 MMOL/L (ref 20–32)
CREAT SERPL-MCNC: 1.03 MG/DL (ref 0.52–1.04)
CRP SERPL-MCNC: <2.9 MG/L (ref 0–8)
DIFFERENTIAL METHOD BLD: ABNORMAL
EOSINOPHIL # BLD AUTO: 0 10E9/L (ref 0–0.7)
EOSINOPHIL NFR BLD AUTO: 0 %
ERYTHROCYTE [DISTWIDTH] IN BLOOD BY AUTOMATED COUNT: 17.3 % (ref 10–15)
GFR SERPL CREATININE-BSD FRML MDRD: 62 ML/MIN/1.7M2
GLUCOSE SERPL-MCNC: 90 MG/DL (ref 70–99)
HCT VFR BLD AUTO: 31.6 % (ref 35–47)
HGB BLD-MCNC: 9.5 G/DL (ref 11.7–15.7)
LYMPHOCYTES # BLD AUTO: 1 10E9/L (ref 0.8–5.3)
LYMPHOCYTES NFR BLD AUTO: 7.9 %
MCH RBC QN AUTO: 28.1 PG (ref 26.5–33)
MCHC RBC AUTO-ENTMCNC: 30.1 G/DL (ref 31.5–36.5)
MCV RBC AUTO: 94 FL (ref 78–100)
MONOCYTES # BLD AUTO: 0.6 10E9/L (ref 0–1.3)
MONOCYTES NFR BLD AUTO: 4.4 %
NEUTROPHILS # BLD AUTO: 11.3 10E9/L (ref 1.6–8.3)
NEUTROPHILS NFR BLD AUTO: 87.7 %
OVALOCYTES BLD QL SMEAR: SLIGHT
PLATELET # BLD AUTO: 618 10E9/L (ref 150–450)
PLATELET # BLD EST: ABNORMAL 10*3/UL
POIKILOCYTOSIS BLD QL SMEAR: ABNORMAL
POTASSIUM SERPL-SCNC: 4 MMOL/L (ref 3.4–5.3)
PROT SERPL-MCNC: 6.3 G/DL (ref 6.8–8.8)
RBC # BLD AUTO: 3.38 10E12/L (ref 3.8–5.2)
RBC INCLUSIONS BLD: SLIGHT
SODIUM SERPL-SCNC: 140 MMOL/L (ref 133–144)
WBC # BLD AUTO: 12.9 10E9/L (ref 4–11)

## 2017-01-10 PROCEDURE — 86140 C-REACTIVE PROTEIN: CPT | Performed by: INTERNAL MEDICINE

## 2017-01-10 PROCEDURE — 85025 COMPLETE CBC W/AUTO DIFF WBC: CPT | Performed by: INTERNAL MEDICINE

## 2017-01-10 PROCEDURE — 80053 COMPREHEN METABOLIC PANEL: CPT | Performed by: INTERNAL MEDICINE

## 2017-01-17 LAB
ALBUMIN SERPL-MCNC: 2.9 G/DL (ref 3.4–5)
ALP SERPL-CCNC: 57 U/L (ref 40–150)
ALT SERPL W P-5'-P-CCNC: 32 U/L (ref 0–50)
ANION GAP SERPL CALCULATED.3IONS-SCNC: 8 MMOL/L (ref 3–14)
AST SERPL W P-5'-P-CCNC: 21 U/L (ref 0–45)
BASOPHILS # BLD AUTO: 0 10E9/L (ref 0–0.2)
BASOPHILS NFR BLD AUTO: 0.2 %
BILIRUB SERPL-MCNC: 0.3 MG/DL (ref 0.2–1.3)
BUN SERPL-MCNC: 22 MG/DL (ref 7–30)
CALCIUM SERPL-MCNC: 8.6 MG/DL (ref 8.5–10.1)
CHLORIDE SERPL-SCNC: 112 MMOL/L (ref 94–109)
CO2 SERPL-SCNC: 24 MMOL/L (ref 20–32)
CREAT SERPL-MCNC: 0.86 MG/DL (ref 0.52–1.04)
DIFFERENTIAL METHOD BLD: ABNORMAL
EOSINOPHIL # BLD AUTO: 0.1 10E9/L (ref 0–0.7)
EOSINOPHIL NFR BLD AUTO: 0.5 %
ERYTHROCYTE [DISTWIDTH] IN BLOOD BY AUTOMATED COUNT: 16.8 % (ref 10–15)
GFR SERPL CREATININE-BSD FRML MDRD: 76 ML/MIN/1.7M2
GLUCOSE SERPL-MCNC: 73 MG/DL (ref 70–99)
HCT VFR BLD AUTO: 30.7 % (ref 35–47)
HGB BLD-MCNC: 9.3 G/DL (ref 11.7–15.7)
IMM GRANULOCYTES # BLD: 0.3 10E9/L (ref 0–0.4)
IMM GRANULOCYTES NFR BLD: 2.3 %
LYMPHOCYTES # BLD AUTO: 1.5 10E9/L (ref 0.8–5.3)
LYMPHOCYTES NFR BLD AUTO: 11.8 %
MCH RBC QN AUTO: 28.3 PG (ref 26.5–33)
MCHC RBC AUTO-ENTMCNC: 30.3 G/DL (ref 31.5–36.5)
MCV RBC AUTO: 93 FL (ref 78–100)
MONOCYTES # BLD AUTO: 1.2 10E9/L (ref 0–1.3)
MONOCYTES NFR BLD AUTO: 8.9 %
NEUTROPHILS # BLD AUTO: 9.9 10E9/L (ref 1.6–8.3)
NEUTROPHILS NFR BLD AUTO: 76.3 %
NRBC # BLD AUTO: 0 10*3/UL
NRBC BLD AUTO-RTO: 0 /100
PLATELET # BLD AUTO: 597 10E9/L (ref 150–450)
POTASSIUM SERPL-SCNC: 4.4 MMOL/L (ref 3.4–5.3)
PROT SERPL-MCNC: 5.8 G/DL (ref 6.8–8.8)
RBC # BLD AUTO: 3.29 10E12/L (ref 3.8–5.2)
SODIUM SERPL-SCNC: 144 MMOL/L (ref 133–144)
WBC # BLD AUTO: 13 10E9/L (ref 4–11)

## 2017-01-17 PROCEDURE — 80053 COMPREHEN METABOLIC PANEL: CPT | Performed by: INTERNAL MEDICINE

## 2017-01-17 PROCEDURE — 85025 COMPLETE CBC W/AUTO DIFF WBC: CPT | Performed by: INTERNAL MEDICINE

## 2017-01-18 ENCOUNTER — CARE COORDINATION (OUTPATIENT)
Dept: GASTROENTEROLOGY | Facility: CLINIC | Age: 32
End: 2017-01-18

## 2017-01-23 DIAGNOSIS — Z94.4 S/P LIVER TRANSPLANT (H): Primary | ICD-10-CM

## 2017-01-23 RX ORDER — TACROLIMUS 1 MG/1
2 CAPSULE ORAL 2 TIMES DAILY
Qty: 360 CAPSULE | Refills: 3 | Status: SHIPPED | OUTPATIENT
Start: 2017-01-23 | End: 2018-01-04

## 2017-01-23 RX ORDER — TACROLIMUS 0.5 MG/1
0.5 CAPSULE ORAL 2 TIMES DAILY
Qty: 180 CAPSULE | Refills: 3 | Status: SHIPPED | OUTPATIENT
Start: 2017-01-23 | End: 2018-01-04

## 2017-01-24 DIAGNOSIS — Z94.4 LIVER REPLACED BY TRANSPLANT (H): Primary | ICD-10-CM

## 2017-01-24 LAB
ALBUMIN SERPL-MCNC: 3.1 G/DL (ref 3.4–5)
ALP SERPL-CCNC: 55 U/L (ref 40–150)
ALT SERPL W P-5'-P-CCNC: 36 U/L (ref 0–50)
ANION GAP SERPL CALCULATED.3IONS-SCNC: 8 MMOL/L (ref 3–14)
AST SERPL W P-5'-P-CCNC: 27 U/L (ref 0–45)
BASOPHILS # BLD AUTO: 0 10E9/L (ref 0–0.2)
BASOPHILS NFR BLD AUTO: 0.3 %
BILIRUB SERPL-MCNC: 0.3 MG/DL (ref 0.2–1.3)
BUN SERPL-MCNC: 24 MG/DL (ref 7–30)
CALCIUM SERPL-MCNC: 8.6 MG/DL (ref 8.5–10.1)
CHLORIDE SERPL-SCNC: 108 MMOL/L (ref 94–109)
CO2 SERPL-SCNC: 25 MMOL/L (ref 20–32)
CREAT SERPL-MCNC: 1.1 MG/DL (ref 0.52–1.04)
DIFFERENTIAL METHOD BLD: ABNORMAL
EOSINOPHIL # BLD AUTO: 0.1 10E9/L (ref 0–0.7)
EOSINOPHIL NFR BLD AUTO: 1.3 %
ERYTHROCYTE [DISTWIDTH] IN BLOOD BY AUTOMATED COUNT: 16.4 % (ref 10–15)
GFR SERPL CREATININE-BSD FRML MDRD: 58 ML/MIN/1.7M2
GLUCOSE SERPL-MCNC: 65 MG/DL (ref 70–99)
HCT VFR BLD AUTO: 30.8 % (ref 35–47)
HGB BLD-MCNC: 9.3 G/DL (ref 11.7–15.7)
IMM GRANULOCYTES # BLD: 0.1 10E9/L (ref 0–0.4)
IMM GRANULOCYTES NFR BLD: 1.4 %
LYMPHOCYTES # BLD AUTO: 1.4 10E9/L (ref 0.8–5.3)
LYMPHOCYTES NFR BLD AUTO: 17.4 %
MCH RBC QN AUTO: 27.1 PG (ref 26.5–33)
MCHC RBC AUTO-ENTMCNC: 30.2 G/DL (ref 31.5–36.5)
MCV RBC AUTO: 90 FL (ref 78–100)
MONOCYTES # BLD AUTO: 0.9 10E9/L (ref 0–1.3)
MONOCYTES NFR BLD AUTO: 12 %
NEUTROPHILS # BLD AUTO: 5.3 10E9/L (ref 1.6–8.3)
NEUTROPHILS NFR BLD AUTO: 67.6 %
NRBC # BLD AUTO: 0 10*3/UL
NRBC BLD AUTO-RTO: 0 /100
PLATELET # BLD AUTO: 500 10E9/L (ref 150–450)
POTASSIUM SERPL-SCNC: 4.5 MMOL/L (ref 3.4–5.3)
PROT SERPL-MCNC: 6.2 G/DL (ref 6.8–8.8)
RBC # BLD AUTO: 3.43 10E12/L (ref 3.8–5.2)
SODIUM SERPL-SCNC: 141 MMOL/L (ref 133–144)
WBC # BLD AUTO: 7.8 10E9/L (ref 4–11)

## 2017-01-24 PROCEDURE — 85025 COMPLETE CBC W/AUTO DIFF WBC: CPT | Performed by: INTERNAL MEDICINE

## 2017-01-24 PROCEDURE — 80053 COMPREHEN METABOLIC PANEL: CPT | Performed by: INTERNAL MEDICINE

## 2017-01-24 RX ORDER — URSODIOL 250 MG/1
TABLET, FILM COATED ORAL
Qty: 270 TABLET | Refills: 3 | Status: SHIPPED | OUTPATIENT
Start: 2017-01-24 | End: 2018-02-01

## 2017-01-25 ENCOUNTER — TELEPHONE (OUTPATIENT)
Dept: INFECTIOUS DISEASES | Facility: CLINIC | Age: 32
End: 2017-01-25

## 2017-01-25 NOTE — TELEPHONE ENCOUNTER
----- Message from Walker Nails MD sent at 1/25/2017  9:23 AM CST -----  Regarding: RE: Increase Cr, reduce dose?  Mo Katz and Avis,    Let go ahead and stop the GCV treatment.  Please go back to monitoring weekly CMV PCRs.    Thank you,    Walker Nails  ----- Message -----     From: Gianna Sanchez, [facility]     Sent: 1/25/2017   9:18 AM       To: Avis Miranda RN, Walker Nails MD  Subject: Increase Cr, reduce dose?                        Yarelis Baron's Cr took a bump up to 1.1 on 1/24. This results in CLcr ~60. Per FPS renal dosing guidelines, CLcr 50-69, the recommended maintenance dose for GCV is 2.5mg/kg Q24 hours. Please let me know if you would like to make the change.      Thank you,   Gianna

## 2017-01-27 ENCOUNTER — INFUSION THERAPY VISIT (OUTPATIENT)
Dept: INFUSION THERAPY | Facility: CLINIC | Age: 32
End: 2017-01-27
Attending: INTERNAL MEDICINE
Payer: COMMERCIAL

## 2017-01-27 VITALS
SYSTOLIC BLOOD PRESSURE: 129 MMHG | TEMPERATURE: 98.1 F | DIASTOLIC BLOOD PRESSURE: 90 MMHG | OXYGEN SATURATION: 98 % | HEART RATE: 83 BPM | RESPIRATION RATE: 16 BRPM

## 2017-01-27 DIAGNOSIS — K51.00 ULCERATIVE PANCOLITIS WITHOUT COMPLICATION (H): ICD-10-CM

## 2017-01-27 DIAGNOSIS — K51.011 ULCERATIVE PANCOLITIS WITH RECTAL BLEEDING (H): Primary | ICD-10-CM

## 2017-01-27 LAB
ALBUMIN SERPL-MCNC: 3.3 G/DL (ref 3.4–5)
ALP SERPL-CCNC: 51 U/L (ref 40–150)
ALT SERPL W P-5'-P-CCNC: 42 U/L (ref 0–50)
ANISOCYTOSIS BLD QL SMEAR: SLIGHT
AST SERPL W P-5'-P-CCNC: 24 U/L (ref 0–45)
BASOPHILS # BLD AUTO: 0.1 10E9/L (ref 0–0.2)
BASOPHILS NFR BLD AUTO: 1.8 %
BILIRUB DIRECT SERPL-MCNC: <0.1 MG/DL (ref 0–0.2)
BILIRUB SERPL-MCNC: 0.4 MG/DL (ref 0.2–1.3)
BURR CELLS BLD QL SMEAR: SLIGHT
CRP SERPL-MCNC: <2.9 MG/L (ref 0–8)
DIFFERENTIAL METHOD BLD: ABNORMAL
EOSINOPHIL # BLD AUTO: 0 10E9/L (ref 0–0.7)
EOSINOPHIL NFR BLD AUTO: 0 %
ERYTHROCYTE [DISTWIDTH] IN BLOOD BY AUTOMATED COUNT: 16.2 % (ref 10–15)
ERYTHROCYTE [SEDIMENTATION RATE] IN BLOOD BY WESTERGREN METHOD: 16 MM/H (ref 0–20)
HCT VFR BLD AUTO: 30.9 % (ref 35–47)
HGB BLD-MCNC: 9.1 G/DL (ref 11.7–15.7)
LYMPHOCYTES # BLD AUTO: 1.3 10E9/L (ref 0.8–5.3)
LYMPHOCYTES NFR BLD AUTO: 21.2 %
MCH RBC QN AUTO: 26.1 PG (ref 26.5–33)
MCHC RBC AUTO-ENTMCNC: 29.4 G/DL (ref 31.5–36.5)
MCV RBC AUTO: 89 FL (ref 78–100)
METAMYELOCYTES # BLD: 0.1 10E9/L
METAMYELOCYTES NFR BLD MANUAL: 0.9 %
MONOCYTES # BLD AUTO: 0.3 10E9/L (ref 0–1.3)
MONOCYTES NFR BLD AUTO: 4.4 %
NEUTROPHILS # BLD AUTO: 4.3 10E9/L (ref 1.6–8.3)
NEUTROPHILS NFR BLD AUTO: 71.7 %
OVALOCYTES BLD QL SMEAR: SLIGHT
PLATELET # BLD AUTO: 461 10E9/L (ref 150–450)
POIKILOCYTOSIS BLD QL SMEAR: SLIGHT
PROT SERPL-MCNC: 6.2 G/DL (ref 6.8–8.8)
RBC # BLD AUTO: 3.48 10E12/L (ref 3.8–5.2)
RBC INCLUSIONS BLD: SLIGHT
WBC # BLD AUTO: 6 10E9/L (ref 4–11)

## 2017-01-27 PROCEDURE — 96365 THER/PROPH/DIAG IV INF INIT: CPT

## 2017-01-27 PROCEDURE — 80076 HEPATIC FUNCTION PANEL: CPT | Performed by: INTERNAL MEDICINE

## 2017-01-27 PROCEDURE — 86140 C-REACTIVE PROTEIN: CPT | Performed by: INTERNAL MEDICINE

## 2017-01-27 PROCEDURE — 96413 CHEMO IV INFUSION 1 HR: CPT

## 2017-01-27 PROCEDURE — 85652 RBC SED RATE AUTOMATED: CPT | Performed by: INTERNAL MEDICINE

## 2017-01-27 PROCEDURE — 25000128 H RX IP 250 OP 636: Mod: ZF | Performed by: INTERNAL MEDICINE

## 2017-01-27 PROCEDURE — 85025 COMPLETE CBC W/AUTO DIFF WBC: CPT | Performed by: INTERNAL MEDICINE

## 2017-01-27 RX ADMIN — VEDOLIZUMAB 300 MG: 300 INJECTION, POWDER, LYOPHILIZED, FOR SOLUTION INTRAVENOUS at 08:31

## 2017-01-27 NOTE — PATIENT INSTRUCTIONS
Dear Yarelis Penny    Thank you for choosing [facility] ([facility]) for your infusion.  The following information is a summary of our appointment as well as important reminders.      POST-INFUSION OF BIOLOGICAL MEDICATION:    Reviewed with patient.  Given biologic medication or medication hand-out. Inform patient if any fever, chills or signs of infection, new symptoms, abdominal pain, heart palpitations, shortness of breath, reaction, weakness, neurological changes, seek medical attention immediately and should not receive infusions. No live virus vaccines prior to or during treatment or up to 6 months post infusion. If the patient has an upcoming procedure or surgery, this should be discussed with the rheumatologist and surgeon or provider.    Additional information: you received your second dose of Entyvio today.       We look forward in seeing you on your next appointment here at [facility].  Please don t hesitate to call us at [phone] to reschedule any of your appointments or to speak with one of the [facility] registered nurses.  It was a pleasure taking care of you today.    Sincerely,  Alison Penny RN  [facility]  [address] [place] [address]  [address]  Phone:  [phone]      Vedolizumab Solution for injection  What is this medicine?  VEDOLIZUMAB (Ve cornelius JOSHUA you mab) is used to treat ulcerative colitis and Crohn's disease in adult patients.  This medicine may be used for other purposes; ask your health care provider or pharmacist if you have questions.  What should I tell my health care provider before I take this medicine?  They need to know if you have any of these conditions:    diabetes    hepatitis B or history of hepatitis B infection    HIV or AIDS    immune system problems    infection or history of infections    liver disease    recently received or scheduled to  receive a vaccine    scheduled to have surgery    tuberculosis, a positive skin test for tuberculosis or have recently been in close contact with someone who has tuberculosis    an unusual or allergic reaction to vedolizumab, other medicines, foods, dyes, or preservatives    pregnant or trying to get pregnant    breast-feeding  How should I use this medicine?  This medicine is for infusion into a vein. It is given by a health care professional in a hospital or clinic setting.  A special MedGuide will be given to you by the pharmacist with each prescription and refill. Be sure to read this information carefully each time.  Talk to your pediatrician regarding the use of this medicine in children. This medicine is not approved for use in children.  Overdosage: If you think you've taken too much of this medicine contact a poison control center or emergency room at once.  NOTE: This medicine is only for you. Do not share this medicine with others.  What if I miss a dose?  It is important not to miss your dose. Call your doctor or health care professional if you are unable to keep an appointment.  What may interact with this medicine?    steroid medicines like prednisone or cortisone    TNF-alpha inhibitors like natalizumab, adalimumab, and infliximab    vaccines  This list may not describe all possible interactions. Give your health care provider a list of all the medicines, herbs, non-prescription drugs, or dietary supplements you use. Also tell them if you smoke, drink alcohol, or use illegal drugs. Some items may interact with your medicine.  What should I watch for while using this medicine?  Your condition will be monitored carefully while you are receiving this medicine. Visit your doctor for regular check ups. Tell your doctor or healthcare professional if your symptoms do not start to get better or if they get worse.  Stay away from people who are sick. Call your doctor or health care professional for advice if  you get a fever, chills or sore throat, or other symptoms of a cold or flu. Do not treat yourself.  In some patients, this medicine may cause a serious brain infection that may cause death. If you have any problems seeing, thinking, speaking, walking, or standing, tell your doctor right away. If you cannot reach your doctor, get urgent medical care.  What side effects may I notice from receiving this medicine?  Side effects that you should report to your doctor or health care professional as soon as possible:    allergic reactions like skin rash, itching or hives, swelling of the face, lips, or tongue    breathing problems    changes in vision    chest pain    dark urine    depression, feelings of sadness    dizziness    general ill feeling or flu-like symptoms    irregular, missed, or painful menstrual periods    light-colored stools    loss of appetite, nausea    muscle weakness    problems with balance, talking, or walking    right upper belly pain    unusually weak or tired    yellowing of the eyes or skin  Side effects that usually do not require medical attention (Report these to your doctor or health care professional if they continue or are bothersome.):    aches, pains    headache    stomach upset    tiredness  This list may not describe all possible side effects. Call your doctor for medical advice about side effects. You may report side effects to FDA at 6-344-FDA-2673.  Where should I keep my medicine?  This drug is given in a hospital or clinic and will not be stored at home.  NOTE: This sheet is a summary. It may not cover all possible information. If you have questions about this medicine, talk to your doctor, pharmacist, or health care provider.  NOTE:This sheet is a summary. It may not cover all possible information. If you have questions about this medicine, talk to your doctor, pharmacist, or health care provider. Copyright  2016 Gold Standard

## 2017-01-27 NOTE — PROGRESS NOTES
"Nursing Note  Yarelis Penny [facility] for:   Chief Complaint   Patient presents with     Infusion     Entyvio     During today's [facility] appointment, orders from Dr. Johnson were completed.  Frequency: week 0, 2, 6, then every 8 weeks x 1 year; today is dose #3  Infusions to be given every 8 weeks as of today.          Progress note:  Patient identification verified by name and date of birth.  Assessment completed.  Vitals recorded in Doc Flowsheets.  Patient was provided with education regarding infusion and possible side effects.  Patient verbalized understanding.      needed: No  Premedications: were not ordered.  Infusion Rates: infusion given over approximately 30 minutes.  Approximate Infusion length:1 hours.   Labs: were drawn per orders.   Vascular access: PICC accessed today.  Treatment Conditions: Rheumatology Infusion Checklist:  PRIOR TO INFUSION OF BIOLOGICAL MEDICATIONS OR ANY OF THESE AS LISTED: Entyvio \".rheumbiologicalchecklist\"    Prior to Infusion of biological medications or any of these as listed:    1. Elevated temperature, fever, chills, productive cough or abnormal vital signs, night sweats, coughing up blood or sputum, no appetite or abnormal vital signs : NO    2. Open wounds or new incisions: NO    3. Recent hospitalization: NO    4.  Recent surgeries:  NO    5. Any upcoming surgeries or dental procedures?:NO    6. Any current or recent bouts of illness or infection? On any antibiotics? : NO    7. Any new, sudden or worsening abdominal pain :NO    8. Vaccination within 4 weeks? Patient or someone in the household is scheduled to receive vaccination? No live virus vaccines prior to or during treatment :NO    9. Any nervous system diseases [i.e. multiple sclerosis, Guillain-[place], seizures, neurological  changes]: NO    10. Pregnant or breast feeding; or plans on pregnancy in the future: NO    11. Signs " of worsening depression or suicidal ideations while taking benlysta:NO    12. New-onset medical symptoms: NO    13.  New cancer diagnosis or on chemotherapy or radiation NO    14.  Evaluate for any sign of active TB [Unexplained weight loss, Loss of appetite, Night sweats, Fever, Fatigue, Chills, Coughing for 3 weeks or longer, Hemoptysis (coughing up blood), Chest pain]: NO    **Note: If answered yes to any of the above, hold the infusion and contact ordering rheumatologist or on-call rheumatologist.     Reviewed with patient.  Given biologic medication or medication hand-out. Inform patient if any fever, chills or signs of infection, new symptoms, abdominal pain, heart palpitations, shortness of breath, reaction, weakness, neurological changes, seek medical attention immediately and should not receive infusions. No live virus vaccines prior to or during treatment or up to 6 months post infusion. If the patient has an upcoming procedure or surgery, this should be discussed with the rheumatologist and surgeon or provider.      Patient tolerated infusion: well.      Discharge Plan:   Follow up plan of care with: ongoing infusions at [facility].  Discharge instructions were reviewed with patient.  Patient/representative verbalized understanding of discharge instructions and all questions answered.  Patient discharged from [facility] in stable condition.    Alison Penny RN       Administrations This Visit     vedolizumab (ENTYVIO) 300 mg in NaCl 0.9 % 280 mL infusion     Admin Date Action Dose Route Administered By             01/27/2017 New Bag 300 mg Intravenous Alison Penny RN                              /90 mmHg  Pulse 83  Temp(Src) 98.1  F (36.7  C) (Oral)  Resp 16  SpO2 98%

## 2017-01-31 ENCOUNTER — PRE VISIT (OUTPATIENT)
Dept: UROLOGY | Facility: CLINIC | Age: 32
End: 2017-01-31

## 2017-02-07 ENCOUNTER — HOSPITAL ENCOUNTER (OUTPATIENT)
Facility: CLINIC | Age: 32
Setting detail: SPECIMEN
Discharge: HOME | End: 2017-02-07
Admitting: INTERNAL MEDICINE
Payer: COMMERCIAL

## 2017-02-08 ENCOUNTER — OFFICE VISIT (OUTPATIENT)
Dept: INFECTIOUS DISEASES | Facility: CLINIC | Age: 32
End: 2017-02-08
Attending: INTERNAL MEDICINE
Payer: COMMERCIAL

## 2017-02-08 ENCOUNTER — OFFICE VISIT (OUTPATIENT)
Dept: UROLOGY | Facility: CLINIC | Age: 32
End: 2017-02-08

## 2017-02-08 VITALS
SYSTOLIC BLOOD PRESSURE: 119 MMHG | WEIGHT: 121.7 LBS | DIASTOLIC BLOOD PRESSURE: 87 MMHG | TEMPERATURE: 98.5 F | HEART RATE: 60 BPM | BODY MASS INDEX: 22.39 KG/M2 | HEIGHT: 62 IN

## 2017-02-08 VITALS
WEIGHT: 121 LBS | HEART RATE: 96 BPM | BODY MASS INDEX: 22.13 KG/M2 | SYSTOLIC BLOOD PRESSURE: 119 MMHG | DIASTOLIC BLOOD PRESSURE: 86 MMHG

## 2017-02-08 DIAGNOSIS — D84.9 IMMUNOSUPPRESSED STATUS (H): ICD-10-CM

## 2017-02-08 DIAGNOSIS — B27.00 GAMMAHERPESVIRAL MONONUCLEOSIS WITHOUT COMPLICATION: Primary | ICD-10-CM

## 2017-02-08 DIAGNOSIS — B25.8 OTHER CYTOMEGALOVIRAL DISEASES (H): ICD-10-CM

## 2017-02-08 DIAGNOSIS — Z79.2 PROPHYLACTIC ANTIBIOTIC: ICD-10-CM

## 2017-02-08 DIAGNOSIS — Z53.9 ERRONEOUS ENCOUNTER--DISREGARD: Primary | ICD-10-CM

## 2017-02-08 LAB
CMV DNA SPEC NAA+PROBE-ACNC: NORMAL [IU]/ML
CMV DNA SPEC NAA+PROBE-LOG#: NORMAL {LOG_IU}/ML
SPECIMEN SOURCE: NORMAL

## 2017-02-08 PROCEDURE — 99215 OFFICE O/P EST HI 40 MIN: CPT | Mod: ZF

## 2017-02-08 RX ORDER — SULFAMETHOXAZOLE/TRIMETHOPRIM 800-160 MG
1 TABLET ORAL
Qty: 30 TABLET | Refills: 3 | Status: SHIPPED | OUTPATIENT
Start: 2017-02-08 | End: 2017-05-08

## 2017-02-08 ASSESSMENT — PAIN SCALES - GENERAL
PAINLEVEL: NO PAIN (0)
PAINLEVEL: NO PAIN (0)

## 2017-02-08 NOTE — NURSING NOTE
Patient left clinic prior to being seen by Dr Law. Patient was not prepped for cystoscopy. Patient had wanted to speak with provider prior to procedure     Molina HANEY

## 2017-02-08 NOTE — LETTER
2/8/2017       RE: Yarelis Penny  [address] [facility] [id number]     Dear Colleague,    Thank you for referring your patient, Yarelis Penny, to the [facility] AND INFECTIOUS DISEASES at [facility]. Please see a copy of my visit note below.      [facility]  Transplant Infectious Disease Progress Note     Patient:  Yarelis Penny, Date of birth 1985, Medical record number [id number]  Date of Visit:  02/14/2017  Consult requested by Dr. Enriquez for evaluation of EBV and CMV after liver Transplant         Assessment and Recommendations:   Recommendations:  - continue off CMV targeted thearpy  - pulled picc line today  - will continue to monitor CMV PCR and EBV PCR in blood monthly for a while.  - restarted bactrim prophylaxis, she had run out of pills    F/u as needed.      Assessment:  31 female w/ PSC/ UC, autoimmune hepatitis s/p LDRLT 6/2015 complicated by anastomotic biliary stricture (last stent removed 3/1/16) maintained on tacro/ azathioprine and pred. Hospitalized 3/29-4/2/16, then again 4/26-4/29/16.  Yarelis had syndromes that were consistent with both EBV-mononucleosis like, and CMV w/ gastritis and anal ulcerations.  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  Was easily controlled w/ IV GCV (did not want to use orals for concerns of absorption issues).  She has since completed a course of IV GCV and we stopped therapy about 2-3 weeks ago.  Seral CMV PCRs since are negative, and her stools are normal formed on IBD therapy.  We will pull the picc line today and check CMV PCR intermittently.    CMV D+/R-. 2nd episode of CMV GI disease since the transplant (1st one 12/2015 tz w/ GCV/ vGCV).  Colonosocopy  4/1/16 w/ few small ulcers at the ileocecal valve and mucosal ulcerations in a discontinuous pattern throughout the colon, most of the bx's performed were CMV immunostain positive.  Treated w/ vGCV and then IV GCV (due to swallowing problems) for a total of 5 weeks of therapy ending 5/35/16.       Last episode of CMV colitis she was not  viremic, which could be for two reasons, after the 1st CMV reactivation she is developing a partial immune response (R+ SOTs can have colitis w/out viremia, but not so much R- SOTs however at some point she should start to behave like an R+) or that she is at risk for underlying CMV GI disease given hx of PSC/ UC.       -EBV viremia:  Yarelis's initial syndrome was very consistent with mononucleosis (very severe throat pain, cervical LAD, fatigue, etc).  She has now recovered clinically but has rising viremia.  With the lack of current symptoms, we will continue to monitor the viral load.  CT c/a/p 4/2016 w/out radiographic signs for PTLD.  Some patients after transplant will have either high or low level persistent EBV viremia.  The high level group are at increased risk of developing PTLD, but not all will get PTLD.  The highest risk group is really the group where EBV is dynamically changing (ie increasing significantly).  At this point we will trend out the EBV PCR.  Immunosuppression was previously decreased, now however she is being treated for IBD.  The mononucleosis like syndrome and severe odynophagia are now a resolved issues.   1. The strength of EBV PCR monitoring in the blood is to detect PTLD at an early stage when decrease in immunosuppression might be of most benefit.  However, EBV viremia w/out PTLD can also have high level EBV viremia.  And, some patients will have high grade EBV viremia for a long time w/out PTLD, with this group being at an increase risk of PTLD.     2. Our new EBV PCR testing is 1 log more sensitive compared to the old test, thus the result of 500K  would be equivalent to 50K on the old test.  There are no exact cut off's of EBV viremia for which to pursue further tz modifications or evaluation of PTLD.  In her case it is very reasonable to further screen for PTLD with imaging as I have ordered.     3. A dose of rituximab will certainly help the EBV viremia and/or partially treat an early phase PTLD, but with the results of the CT c/a/p ordered today, I would like to try to add on some studies for EBV staining of the colon bx's to r/o GI PTLD.  At the very least, pending response to tz of CMV a repeat colonoscopy might be warranted.     -currently continuing to monitor EBV Viremia    Other ID issues:  -prophylaxis: bactrim  -serostatus: CMV D+/R-, EBV R+  -immunizations:  Up to date  -isolation:  Good had hygiene    Attestation:  I have reviewed today's vital signs, medications, labs and imaging.      Walker Nails,   Pager [phone]         History of Infectious Disease Illness:   This is a very pleasant 31 year ol female w/ PSC/ UC, autoimmune hepatitis s/p LDLT 6/2015 w/ anastomotic biliary stricture (last bili stent removed 3/1/16).  The transplant course was complicated by CMV syndrome w/ possible gastritis/ colitis (muscle aches, stomach pain and diarrhea at that time) 12/2015, CMV D+/R-, viremia peak at 7000 IU since remained <137 but detectable.  She completed a course of IV GCV (3 weeks) then course of vGCV tz.  Current immunosuppression tacro/ azathioprine and pred.      I initially saw Yarelis after hospitalization from 3/29-4/2/16, admitted w/ cough, fever, sore throat w/ nasal congestion, loose stool and prodrome of 3 weeks of fatigue, night sweats and fevers.  During that hospitalization a colonoscopy was performed on 4/1(reported few small ulcerations at the ileocecal valve and mucosal ulcerations in a discontinuous pattern), path + for CMV on immunostain on most areas throughout the colon that was biopsied, no evidence of active colitis.   Stool studies at the time were negative, including enteric PCR panel and c.diff.  EBV viremia went from 108K on 3/29 to 533K on 3/31 and she was given a dose of Rituximab.  Repeat EBV PCR of blood on 4/7/16 was 9197.      Early April Yarelis had an ongoing sore throat, feels very dry in the morning, ears aching.  Yesterday she developed diarrhea again, watery, 2-3 x's day w/ stomach pain.  No fevers, no weight loss.  She notes some sweats at night.  No cough, no mouth pain no joint pain.  Valcyte was started 4/6/2016, changed to IV GCV on 4/21/16.  SH: no pets at home, works at home, no recent known sick contacts.  On one week follow up Yarelis had  ongoing throat pain w/ trouble swallowing.  Often feels dry.  The odynophagia does not extend down her esophagus.  She has jaw and ear pain.  The diarrhea is improving, now off and on, Saturday she had formed stools followed by diarrhea two hours later.  No new rashes or joint pains.  No stomach pain.  She reports fevers up to 102.  No cough/ sob, vision changes or headaches.  Ultimately we were trying to manage Yarelis as an outpatient, escalating the oral/ topical throat analgesics and she was scheduled for ENT, but hospitalized from 4/26-4/29 failed outpt management with poor PO intake and ESTUARDO.  While in house, ENT evaluated Yarelis, flex endoscope w/ normal thick purulent drainage and crusting.  Throat swabs w/ S. Aureus and she was treated w/ 7 days of doxycycline.   Hurricane mouth spray for supportive care.    Mid-way through the hospitalization her mouth pain started to improve and has since resolved completely.  The topical analgesics dc'd a while back.  No oral pain, odynophagia.  She is eating normally, maintaining weight, good energy.  No fevers or chills.  No n/v/d or abdominal pain. Normal formed stools.  No vision changes or headaches. Since the last time I saw her she did have a cough, which is now improving, mostly dry. No sore throat or rhinorrhea. She also has  "returned from a 3 week trip to [place] traveling around, which went very well.     Yarelis was hospitalized 12/2016 w/ IBD flair treated w/ steroids and CMV Viremi up to 3000 IU.  We treated w/ IV GCV for a course that ended 2-3 weeks ago.  We wanted IV therapy in the setting of IBD flair and concern for absorption.  Since then Yarelis feels well.  No fevers or chills.  Good energy.  Her stool is formed.  No abdominal pain or cramps.  No n/v, cough/ sob, vision changes or headaches.  She feels well overall. Currently down to pred 10 mg daily and vedolizimab q 2 months, received 3 doses already.            Transplants:  6/18/2015 (Liver), Postoperative day:  607      Immunization History   Administered Date(s) Administered     HIB 04/24/2015     Hepatitis B 07/31/2000, 09/06/2000, 02/09/2001     Influenza (H1N1) 01/13/2010     Influenza (IIV3) 09/02/2010, 10/01/2010     Influenza Vaccine IM 3yrs+ 4 Valent IIV4 11/24/2016     Meningococcal (Menomune ) 06/02/2015     Pneumococcal (PCV 13) 05/11/2016     Pneumococcal 23 valent 04/24/2015     TD (ADULT, 7+) 12/01/2001     TDAP (BOOSTRIX AGES 10-64) 05/11/2016     Twinrix A/B 04/24/2015, 06/02/2015       Current Outpatient Prescriptions   Medication     sulfamethoxazole-trimethoprim (BACTRIM DS) 800-160 MG per tablet     ursodiol (ACTIGALL) 250 MG tablet     tacrolimus (PROGRAF - GENERIC EQUIVALENT) 0.5 MG capsule     tacrolimus (PROGRAF - GENERIC EQUIVALENT) 1 MG capsule     zolpidem (AMBIEN) 5 MG tablet     GNP ASPIRIN LOW DOSE 81 MG EC tablet     predniSONE (DELTASONE) 10 MG tablet     No current facility-administered medications for this visit.             Allergies:     Allergies   Allergen Reactions     Rifampin Other (See Comments)     Kidney failure     Penicillins      Hives, has tolerated amoxicillin            Physical Exam:   Vitals were reviewed.   /87  Pulse 60  Temp 98.5  F (36.9  C) (Oral)  Ht 1.575 m (5' 2\")  Wt 55.2 kg (121 lb 11.2 oz)  BMI 22.26 " kg/m2  Physical Examination:  GENERAL:  Thin female ambulating well, comfortable on room air.   HEENT:  Head is normocephalic, atraumatic   EYES:  Eyes have anicteric sclerae without conjunctival injection or stigmata of endocarditis.    ENT:  Oropharynx is moist without exudates or ulcers.  Tongue is midline.  Good dentition..   NECK:  Supple. No LAD or tenderness  LUNGS:  Clear to auscultation bilateral.   CARDIOVASCULAR:  Regular rate and rhythm with no murmurs, gallops or rubs.  ABDOMEN:  Normal bowel sounds, soft, non tender. No appreciable hepatosplenomegaly.  Surgical sites well healed.   SKIN:  No acute rashes.  No stigmata of endocarditis.  NEUROLOGIC:  Grossly nonfocal. Active x4 extremities  No CVA or spinal tenderness.         Laboratory Data:     CD4 counts    Absolute CD4   Date Value Ref Range Status   04/01/2016 386 (L) 441 - 2156 cells/uL Final     Inflammatory Markers    Recent Labs   Lab Test  02/13/17   1039  01/27/17   0812  01/10/17   0930  12/30/16   1010  12/15/16   0718  12/14/16   0700  12/13/16   0632  12/12/16   0600   12/09/16   2019  12/05/16   1232   11/23/16   0937  11/22/16   1821   03/18/09   1451   SED  67*  16   --   90*   --    --    --    --    --   84*  92*   --   78*  74*   --   >140*   CRP  29.0*  <2.9  <2.9  47.8*  6.8  5.6  12.0*  21.0*   < >  26.0*  24.0*   < >  190.0*  210.0*   < >  11.6*    < > = values in this interval not displayed.     Immune Globulin Studies  No lab results found.  Metabolic Studies       Recent Labs   Lab Test  01/24/17   1045  01/17/17   0815  01/10/17   0930  01/03/17   0830  12/30/16   1010  12/27/16   1515   12/14/16   0700  12/13/16   0632   12/05/16   1232   11/03/16   1035   NA  141  144  140  143  140  139   < >  141  142   < >  136   < >  140   POTASSIUM  4.5  4.4  4.0  4.1  3.8  4.6   < >  4.3  5.0   < >  4.5   < >  4.4   CHLORIDE  108  112*  108  108  109  104   < >  111*  112*   < >  102   < >  108   CO2  25  24  24  27  22  30   < >  24   22   < >  27   < >  25   ANIONGAP  8  8  8  8  9  5   < >  6  7   < >  6   < >  7   BUN  24  22  22  21  15  25   < >  16  15   < >  12   < >  14   CR  1.10*  0.86  1.03  0.94  0.79  0.90   < >  0.82  0.81   < >  1.12*   < >  0.94   GFRESTIMATED  58*  76  62  69  85  72   < >  81  82   < >  57*   < >  70   GLC  65*  73  90  63*  69*  136*   < >  99  126*   < >  94   < >  83   KERLINE  8.6  8.6  8.6  8.7  8.2*  8.8   < >  8.4*  8.1*   < >  9.0   < >  8.7   PHOS   --    --    --    --    --    --    --    --    --    --   2.0*   --   2.5   MAG   --    --    --    --    --    --    --   1.8  1.6   < >  1.7   < >  1.5*    < > = values in this interval not displayed.       Hepatic Studies    Recent Labs   Lab Test  02/13/17   1039  01/27/17   0812  01/24/17   1045  01/17/17   0815  01/10/17   0930  01/03/17   0830   BILITOTAL  0.3  0.4  0.3  0.3  0.3  0.2   ALKPHOS  93  51  55  57  68  83   ALBUMIN  3.2*  3.3*  3.1*  2.9*  3.0*  2.5*   AST  35  24  27  21  20  17   ALT  56*  42  36  32  28  28       Pancreatitis testing    Recent Labs   Lab Test  12/09/16 2019 11/23/16   0937  04/26/16   2223  03/01/16   0747  12/08/15   0708  09/08/15   0809  07/30/15   0815  06/26/15   0900  06/19/15   0340  06/17/15   0930   06/19/12   0748   AMYLASE   --    --   43  94  91  108  103  58  164*  104   --    --    LIPASE  224  84   --   209  188  237  223  143  818*   --    < >   --    TRIG   --    --    --    --    --    --    --    --    --    --    --   417*    < > = values in this interval not displayed.       Hematology Studies      Recent Labs   Lab Test  02/13/17   1039  01/27/17   0812  01/24/17   1045  01/17/17   0815  01/10/17   0930  01/03/17   0830   WBC  9.6  6.0  7.8  13.0*  12.9*  6.7   ANEU  4.2  4.3  5.3  9.9*  11.3*  4.7   ALYM  1.9  1.3  1.4  1.5  1.0  1.3   DEVANTE  2.2*  0.3  0.9  1.2  0.6  0.7   AEOS  0.2  0.0  0.1  0.1  0.0  0.1   HGB  9.7*  9.1*  9.3*  9.3*  9.5*  8.9*   HCT  32.4*  30.9*  30.8*  30.7*  31.6*  28.4*    MCV  84  89  90  93  94  94   PLT  675*  461*  500*  597*  618*  480*       Clotting Studies    Recent Labs   Lab Test  12/09/16   2019  11/22/16   1821  04/27/16   0602  03/29/16   1043   06/27/15   0325  06/25/15   2000  06/25/15   1558   INR  1.10  1.21*  1.28*  1.12   < >  1.96*  1.98*  2.06*   PTT   --    --   56*   --    --   39*  41*  47*    < > = values in this interval not displayed.       Microbiology:  Stool: 12/10/16: microsporidia, O&P, giardia ag, enteric panel negative, c.diff negative    BCX  3/30/16 negative   2/29/16 negative    UCX 3/29/16 negative    Virology:  CMV viral loads    2/7/17 undetectable  12/27/16-1/31/17 <137 but detectable  12/14/16 3300  12/10/16 3000  12/5/16 387  --------  7/11/16: 153  12/28/15: 7730, thereafter <137 but detectable mostly    EBV viral loads     3/29/16 108,278  3/31/16 533,618  4/7/16  9197  4/12 2609  4/14 682  5/9/16 negative  7/11/16 51,989    Hepatitis B Testing     Recent Labs   Lab Test  11/22/16   1821  06/17/15   0930  06/19/12   0748   HBSAB   --    --   0.0   HBCAB  Nonreactive  Nonreactive  Negative   HEPBANG  Nonreactive   --   Negative   HBCM   --   Nonreactive   A nonreactive result suggests lack of recent exposure to the virus in the   preceding 6 months.     --        Hepatitis C Testing     Hepatitis C Antibody   Date Value Ref Range Status   06/17/2015  NR Final    Nonreactive   Assay performance characteristics have not been established for newborns,   infants, and children     06/19/2012 Negative NEG Final       Respiratory Virus Testing    RSV Rapid Antigen Result   Date Value Ref Range Status   03/31/2016  NEG Final    Negative   Test results must be correlated with clinical data. If necessary, results   should be confirmed by a molecular assay or viral culture.         Serostatus:  CMV IgG Antibody   Date Value Ref Range Status   06/19/2012 <0.20  Negative for anti-CMV IgG U/mL Final     EBV VCA IgG Antibody   Date Value Ref Range Status    06/19/2012 >750.00  Positive, suggests immunologic exposure. U/mL Final       Imaging:  CXR: 1/9/17: Stable mild left basilar/retrocardiac linear opacities which may represent atelectasis or infection.    MR enterography: 1214/16  1. Diffuse colonic wall thickening and enhancement with loss of haustral pattern. Wall thickening and enhancement of the terminal ileum. Findings consistent with acute on chronic changes related to ulcerative colitis.  2. Stable 7.4 cm left adnexal cyst. A short interval follow-up ultrasound is recommended to help determine whether this represents a physiologic lesion.    CT c/a/p w/ contrast 4/14/16:   1. Postoperative changes of hepatic transplantation. 1a. Terminal ileum thickening and thickening of bowel wall in the sigmoid colon. PTLD could have this appearance.    2. Focal narrowing of the portal vein near the anastomosis. Further evaluation could be performed with ultrasound with Doppler.  3. Decrease in prominence of periaortic and aortocaval nodes since 7/9/2015.  4. Mild decrease in right lower lobe 6 mm nodule since 7/9/2015.    CT sinus 3/30/16: Overall mild paranasal sinus mucosal thickening. No evidence of acute sinusitis.      Again, thank you for allowing me to participate in the care of your patient.      Sincerely,    Walker Nails MD

## 2017-02-08 NOTE — MR AVS SNAPSHOT
After Visit Summary   2/8/2017    Yarelis Penny    MRN: [id number]           Patient Information     Date Of Birth          1985        Visit Information        Provider Department      2/8/2017 1:30 PM Walker Nails MD [facility] and Infectious Diseases        Today's Diagnoses     Gammaherpesviral mononucleosis without complication    -  1     Other cytomegaloviral diseases (H)         Prophylactic antibiotic           Care Instructions    You are doing great.  We will continue to check EBV in the blood every three months and one more CMV check in the blood.  Please do these tests at any [place] facility in March.  I will touch base with Dr. Johnson.  You do not need to schedule to see me again, but please call if I can be of assistance in the future or if you have any questions.         Follow-ups after your visit        Follow-up notes from your care team     Return if symptoms worsen or fail to improve.      Your next 10 appointments already scheduled     Feb 08, 2017  2:30 PM   (Arrive by 2:15 PM)   Cystoscopy with Zarina Law MD   [facility] Urology and Inst for Prostate and Urologic Cancers ([facility])    [address] [place] [address]  4th [facility] [address]   [phone]            Feb 13, 2017  9:00 AM   (Arrive by 8:45 AM)   RETURN IRRITABLE BOWEL DISEASE with Cresencio Sierra PA-C   [facility] Gastroenterology and IBD ([facility])    [address] [place] [address]   [phone]            Mar 24, 2017  8:00 AM   Infusion 120 with UC SPEC INFUSION, UC 44 ATC   [facility] Specialty and Procedure ([facility])    [address] [place] [address]   [phone]            May 01, 2017  8:00 AM   (Arrive by 7:45 AM)   Return Visit with Fuentes Johnson MD   [facility] Gastroenterology and IBD ([facility]  "Clinics and [facility] Center)    [address] [facility]  4th Floor  [facility] [address] [phone]              Future tests that were ordered for you today     Open Standing Orders        Priority Remaining Interval Expires Ordered    EBV DNA PCR Quantitative Whole Blood Routine 3/3 3 months 2/8/2018 2/8/2017          Open Future Orders        Priority Expected Expires Ordered    CMV DNA quantification Routine 3/1/2017 2/8/2018 2/8/2017            Who to contact     If you have questions or need follow up information about today's clinic visit or your schedule please contact [facility] AND INFECTIOUS DISEASES directly at [phone].  Normal or non-critical lab and imaging results will be communicated to you by [email or web address], letter or phone within 4 business days after the clinic has received the results. If you do not hear from us within 7 days, please contact the clinic through [email or web address] or phone. If you have a critical or abnormal lab result, we will notify you by phone as soon as possible.  Submit refill requests through [employer] or call your pharmacy and they will forward the refill request to us. Please allow 3 business days for your refill to be completed.          Additional Information About Your Visit        [email or web address] Information     [employer] gives you secure access to your electronic health record. If you see a primary care provider, you can also send messages to your care team and make appointments. If you have questions, please call your primary care clinic.  If you do not have a primary care provider, please call [phone] and they will assist you.        Care EveryWhere ID     This is your Care EveryWhere ID. This could be used by other organizations to access your [place] medical records  [id number]        Your Vitals Were     Pulse Temperature Height BMI (Body Mass Index)          60 98.5  F (36.9  C) (Oral) 1.575 m (5' 2\") 22.25 kg/m2         Blood Pressure from Last 3 Encounters: "   02/08/17 119/87   01/27/17 129/90   01/09/17 127/98    Weight from Last 3 Encounters:   02/08/17 55.203 kg (121 lb 11.2 oz)   01/09/17 54.432 kg (120 lb)   12/30/16 54.613 kg (120 lb 6.4 oz)                 Today's Medication Changes          These changes are accurate as of: 2/8/17  1:55 PM.  If you have any questions, ask your nurse or doctor.               Start taking these medicines.        Dose/Directions    sulfamethoxazole-trimethoprim 800-160 MG per tablet   Commonly known as:  BACTRIM DS   Used for:  Prophylactic antibiotic   Started by:  Walker Nails MD        Dose:  1 tablet   Take 1 tablet by mouth three times a week   Quantity:  30 tablet   Refills:  3         These medicines have changed or have updated prescriptions.        Dose/Directions    * predniSONE 10 MG tablet   Commonly known as:  DELTASONE   This may have changed:    - how much to take  - how to take this  - when to take this  - additional instructions   Used for:  Ulcerative colitis with other complication, unspecified location (H)   Changed by:  Agnieszka Ramires RN        Take 6 tabs (60mg) PO x 5 days  Then take 5 tabs (50mg) PO x 7 days  Then take 4 tabs (40mg) PO x 7 days Then take 3 tabs (30mg) PO x 7 days Then take 2 tabs (20mg) PO x 7 days Then take 1 tab (10mg) PO x 7 days Then take 1/2 tablet (5mg) PO x 7 days then DISCONTINUE  Then take   Quantity:  92 tablet   Refills:  0       * predniSONE 5 MG tablet   Commonly known as:  DELTASONE   This may have changed:  Another medication with the same name was changed. Make sure you understand how and when to take each.   Used for:  Ulcerative pancolitis with rectal bleeding (H)   Changed by:  Fuentes Johnson MD        Take 8 tabs daily (40 mg) for 1 week, then 7 tabs (35 mg) for 7 days, then 6 tabs (30 mg) for 7 days then 5 tabs (25 mg) for 7 days, then 4 tabs (20mg) for 7 days, then 3 tabs (15 mg) for 7 days, then 2 tabs (10mg) for 7 days then 1 tab daily    Quantity:  250 tablet   Refills:  0       * Notice:  This list has 2 medication(s) that are the same as other medications prescribed for you. Read the directions carefully, and ask your doctor or other care provider to review them with you.         Where to get your medicines      These medications were sent to [id number] [address] [facility] [address] [address]    Hours:  24-hours Phone:  [phone]    - sulfamethoxazole-trimethoprim 800-160 MG per tablet             Primary Care Provider Office Phone # Fax #    Jackson Orellana [phone]       [place] [address]  [address] [address]        Thank you!     Thank you for choosing [facility] AND INFECTIOUS DISEASES  for your care. Our goal is always to provide you with excellent care. Hearing back from our patients is one way we can continue to improve our services. Please take a few minutes to complete the written survey that you may receive in the mail after your visit with us. Thank you!             Your Updated Medication List - Protect others around you: Learn how to safely use, store and throw away your medicines at www.disposemymeds.org.          This list is accurate as of: 2/8/17  1:55 PM.  Always use your most recent med list.                   Brand Name Dispense Instructions for use    GNP ASPIRIN LOW DOSE 81 MG EC tablet   Generic drug:  aspirin     30 tablet    Take 1 tablet (81 mg) by mouth daily       * predniSONE 10 MG tablet    DELTASONE    92 tablet    Take 6 tabs (60mg) PO x 5 days  Then take 5 tabs (50mg) PO x 7 days  Then take 4 tabs (40mg) PO x 7 days Then take 3 tabs (30mg) PO x 7 days Then take 2 tabs (20mg) PO x 7 days Then take 1 tab (10mg) PO x 7 days Then take 1/2 tablet (5mg) PO x 7 days then DISCONTINUE  Then take       * predniSONE 5 MG tablet    DELTASONE    250 tablet    Take 8 tabs  daily (40 mg) for 1 week, then 7 tabs (35 mg) for 7 days, then 6 tabs (30 mg) for 7 days then 5 tabs (25 mg) for 7 days, then 4 tabs (20mg) for 7 days, then 3 tabs (15 mg) for 7 days, then 2 tabs (10mg) for 7 days then 1 tab daily       sulfamethoxazole-trimethoprim 800-160 MG per tablet    BACTRIM DS    30 tablet    Take 1 tablet by mouth three times a week       * tacrolimus 0.5 MG capsule    PROGRAF - GENERIC EQUIVALENT    180 capsule    Take 1 capsule (0.5 mg) by mouth 2 times daily Take with 1 mg capsule. Total dose 2.5 mg every 12 hours       * tacrolimus 1 MG capsule    PROGRAF - GENERIC EQUIVALENT    360 capsule    Take 2 capsules (2 mg) by mouth 2 times daily *total dose 2.5 mg BID. Take with 0.5 mg capsule       ursodiol 250 MG tablet    ACTIGALL    270 tablet    Take 2 tabs (500 mg) in AM, and 1 tab at night (250 mg)       zolpidem 5 MG tablet    AMBIEN    30 tablet    Take 1 tablet (5 mg) by mouth nightly as needed for sleep       * Notice:  This list has 4 medication(s) that are the same as other medications prescribed for you. Read the directions carefully, and ask your doctor or other care provider to review them with you.

## 2017-02-08 NOTE — LETTER
2/8/2017       RE: Yarelis Penny  [address] [facility] [address]     Dear Colleague,    Thank you for referring your patient, Yarelis Penny, to the [facility] UROLOGY AND INST FOR PROSTATE AND UROLOGIC CANCERS at [facility]. Please see a copy of my visit note below.      This encounter was opened in error. Please disregard.    Again, thank you for allowing me to participate in the care of your patient.      Sincerely,    Zarina Law MD

## 2017-02-08 NOTE — NURSING NOTE
PICC line pulled without complications, occlusive dressing and pressure wrap (coban) applied, site assessed within normal limits, no bleeding or infection observed, catheter intact, Pt left in supine position for 15 min following removal. Pt education given along with home care instructions. Pt left in care of self.

## 2017-02-08 NOTE — NURSING NOTE
"Chief Complaint   Patient presents with     RECHECK     Follow up per patient       Initial /87 mmHg  Pulse 60  Temp(Src) 98.5  F (36.9  C) (Oral)  Ht 1.575 m (5' 2\")  Wt 55.203 kg (121 lb 11.2 oz)  BMI 22.25 kg/m2 Estimated body mass index is 22.25 kg/(m^2) as calculated from the following:    Height as of this encounter: 1.575 m (5' 2\").    Weight as of this encounter: 55.203 kg (121 lb 11.2 oz).  Medication Reconciliation: complete  "

## 2017-02-08 NOTE — NURSING NOTE
Chief Complaint   Patient presents with     Cystoscopy     hematuria      Invasive Procedure Safety Checklist:    Procedure: cysto    Action: Complete sections and checkboxes as appropriate.    Pre-procedure:  1. Patient ID Verified with 2 identifiers (Nata and  or MRN) : YES    2. Procedure and site verified with patient/designee (when able) : YES    3. Accurate consent documentation in medical record : YES    4. H&P (or appropriate assessment) documented in medical record : NO  H&P must be up to 30 days prior to procedure an updated within 24 hours of                 Procedure as applicable.     5. Relevant diagnostic and radiology test results appropriately labeled and displayed as applicable : NO    6. Blood products, implants, devices, and/or special equipment available for the procedure as applicable : NO    7. Procedure site(s) marked with provider initials [Exclusions: none] : NO    8. Marking not required. Reason : Yes  Procedure does not require site marking    Time Out:     Time-Out performed immediately prior to starting procedure, including verbal and active participation of all team members addressing: YES    1. Correct patient identity.  2. Confirmed that the correct side and site are marked.  3. An accurate procedure to be done.  4. Agreement on the procedure to be done.  5. Correct patient position.  6. Relevant images and results are properly labeled and appropriately displayed.  7. The need to administer antibiotics or fluids for irrigation purposes during the procedure as applicable.  8. Safety precautions based on patient history or medication use.    During Procedure: Verification of correct person, site, and procedure occurs any time the responsibility for care of the patient is transferred to another member of the care team.    Molina HANEY

## 2017-02-08 NOTE — MR AVS SNAPSHOT
After Visit Summary   2/8/2017    Yarelis Penny    MRN: [id number]           Patient Information     Date Of Birth          1985        Visit Information        Provider Department      2/8/2017 2:30 PM Zarina Law MD [facility] Urology and Inst for Prostate and Urologic Cancers        Today's Diagnoses     ERRONEOUS ENCOUNTER--DISREGARD    -  1       Follow-ups after your visit        Your next 10 appointments already scheduled     Mar 24, 2017  8:00 AM CDT   Infusion 120 with UC SPEC INFUSION, UC 44 ATC   [facility] Specialty and Procedure ([facility])    [address] [place] [address]  2nd [facility] [address] [phone]            May 01, 2017  8:00 AM CDT   (Arrive by 7:45 AM)   Return Visit with Fuentes Johnson MD   [facility] Gastroenterology and IBD ([facility])    [address] [place] [address]  4th [facility] [address] [phone]              Who to contact     Please call your clinic at [phone] to:    Ask questions about your health    Make or cancel appointments    Discuss your medicines    Learn about your test results    Speak to your doctor   If you have compliments or concerns about an experience at your clinic, or if you wish to file a complaint, please contact [facility] Physicians Patient Relations at [phone] or email us at [email or web address]         Additional Information About Your Visit        MyChart Information     [employer]MD[employer]t gives you secure access to your electronic health record. If you see a primary care provider, you can also send messages to your care team and make appointments. If you have questions, please call your primary care clinic.  If you do not have a primary care provider, please call [phone] and they will assist you.      [employer] is an electronic gateway that provides easy, online access to your medical records. With  [employer], you can request a clinic appointment, read your test results, renew a prescription or communicate with your care team.     To access your existing account, please contact your [facility] or call [phone] for assistance.        Care EveryWhere ID     This is your Care EveryWhere ID. This could be used by other organizations to access your [place] medical records  [id number]        Your Vitals Were     Pulse BMI (Body Mass Index)                96 22.13 kg/m2           Blood Pressure from Last 3 Encounters:   02/13/17 121/86   02/08/17 119/86   02/08/17 119/87    Weight from Last 3 Encounters:   02/13/17 54.9 kg (121 lb)   02/08/17 54.9 kg (121 lb)   02/08/17 55.2 kg (121 lb 11.2 oz)              Today, you had the following     No orders found for display         Today's Medication Changes          These changes are accurate as of: 2/8/17 11:59 PM.  If you have any questions, ask your nurse or doctor.               Start taking these medicines.        Dose/Directions    sulfamethoxazole-trimethoprim 800-160 MG per tablet   Commonly known as:  BACTRIM DS   Used for:  Prophylactic antibiotic   Started by:  Walker Nails MD        Dose:  1 tablet   Take 1 tablet by mouth three times a week   Quantity:  30 tablet   Refills:  3         These medicines have changed or have updated prescriptions.        Dose/Directions    * predniSONE 10 MG tablet   Commonly known as:  DELTASONE   This may have changed:    - how much to take  - how to take this  - when to take this  - additional instructions   Used for:  Ulcerative colitis with other complication, unspecified location (H)   Changed by:  Agnieszka Ramires, JOSEFINA        Take 6 tabs (60mg) PO x 5 days  Then take 5 tabs (50mg) PO x 7 days  Then take 4 tabs (40mg) PO x 7 days Then take 3 tabs (30mg) PO x 7 days Then take 2 tabs (20mg) PO x 7 days Then take 1 tab (10mg) PO x 7 days Then take 1/2 tablet (5mg) PO x 7 days  then DISCONTINUE  Then take   Quantity:  92 tablet   Refills:  0       * predniSONE 5 MG tablet   Commonly known as:  DELTASONE   This may have changed:  Another medication with the same name was changed. Make sure you understand how and when to take each.   Used for:  Ulcerative pancolitis with rectal bleeding (H)   Changed by:  Fuentes Johnson MD        Take 8 tabs daily (40 mg) for 1 week, then 7 tabs (35 mg) for 7 days, then 6 tabs (30 mg) for 7 days then 5 tabs (25 mg) for 7 days, then 4 tabs (20mg) for 7 days, then 3 tabs (15 mg) for 7 days, then 2 tabs (10mg) for 7 days then 1 tab daily   Quantity:  250 tablet   Refills:  0       * Notice:  This list has 2 medication(s) that are the same as other medications prescribed for you. Read the directions carefully, and ask your doctor or other care provider to review them with you.         Where to get your medicines      These medications were sent to [email or web address] [address] [place] [address] AT [address] [address]    Hours:  24-hours Phone:  [phone]     sulfamethoxazole-trimethoprim 800-160 MG per tablet                Primary Care Provider Office Phone # Fax #    Jackson Orellana [phone]       [address]  [address] [address]        Thank you!     Thank you for choosing [facility] UROLOGY AND [facility] FOR PROSTATE AND UROLOGIC CANCERS  for your care. Our goal is always to provide you with excellent care. Hearing back from our patients is one way we can continue to improve our services. Please take a few minutes to complete the written survey that you may receive in the mail after your visit with us. Thank you!             Your Updated Medication List - Protect others around you: Learn how to safely use, store and throw away your medicines at www.disposemymeds.org.          This list is accurate as of: 2/8/17 11:59 PM.  Always  use your most recent med list.                   Brand Name Dispense Instructions for use    GNP ASPIRIN LOW DOSE 81 MG EC tablet   Generic drug:  aspirin     30 tablet    Take 1 tablet (81 mg) by mouth daily       * predniSONE 10 MG tablet    DELTASONE    92 tablet    Take 6 tabs (60mg) PO x 5 days  Then take 5 tabs (50mg) PO x 7 days  Then take 4 tabs (40mg) PO x 7 days Then take 3 tabs (30mg) PO x 7 days Then take 2 tabs (20mg) PO x 7 days Then take 1 tab (10mg) PO x 7 days Then take 1/2 tablet (5mg) PO x 7 days then DISCONTINUE  Then take       * predniSONE 5 MG tablet    DELTASONE    250 tablet    Take 8 tabs daily (40 mg) for 1 week, then 7 tabs (35 mg) for 7 days, then 6 tabs (30 mg) for 7 days then 5 tabs (25 mg) for 7 days, then 4 tabs (20mg) for 7 days, then 3 tabs (15 mg) for 7 days, then 2 tabs (10mg) for 7 days then 1 tab daily       sulfamethoxazole-trimethoprim 800-160 MG per tablet    BACTRIM DS    30 tablet    Take 1 tablet by mouth three times a week       * tacrolimus 0.5 MG capsule    PROGRAF - GENERIC EQUIVALENT    180 capsule    Take 1 capsule (0.5 mg) by mouth 2 times daily Take with 1 mg capsule. Total dose 2.5 mg every 12 hours       * tacrolimus 1 MG capsule    PROGRAF - GENERIC EQUIVALENT    360 capsule    Take 2 capsules (2 mg) by mouth 2 times daily *total dose 2.5 mg BID. Take with 0.5 mg capsule       ursodiol 250 MG tablet    ACTIGALL    270 tablet    Take 2 tabs (500 mg) in AM, and 1 tab at night (250 mg)       zolpidem 5 MG tablet    AMBIEN    30 tablet    Take 1 tablet (5 mg) by mouth nightly as needed for sleep       * Notice:  This list has 4 medication(s) that are the same as other medications prescribed for you. Read the directions carefully, and ask your doctor or other care provider to review them with you.

## 2017-02-08 NOTE — PATIENT INSTRUCTIONS
You are doing great.  We will continue to check EBV in the blood every three months and one more CMV check in the blood.  Please do these tests at any [place] facility in March.  I will touch base with Dr. Johnson.  You do not need to schedule to see me again, but please call if I can be of assistance in the future or if you have any questions.

## 2017-02-13 ENCOUNTER — OFFICE VISIT (OUTPATIENT)
Dept: GASTROENTEROLOGY | Facility: CLINIC | Age: 32
End: 2017-02-13

## 2017-02-13 VITALS
BODY MASS INDEX: 22.26 KG/M2 | DIASTOLIC BLOOD PRESSURE: 86 MMHG | WEIGHT: 121 LBS | SYSTOLIC BLOOD PRESSURE: 121 MMHG | HEART RATE: 93 BPM | HEIGHT: 62 IN

## 2017-02-13 DIAGNOSIS — K51.00 ULCERATIVE (CHRONIC) ENTEROCOLITIS, WITHOUT COMPLICATIONS (H): Primary | ICD-10-CM

## 2017-02-13 DIAGNOSIS — K51.00 ULCERATIVE (CHRONIC) ENTEROCOLITIS, WITHOUT COMPLICATIONS (H): ICD-10-CM

## 2017-02-13 LAB
ANISOCYTOSIS BLD QL SMEAR: SLIGHT
BASOPHILS # BLD AUTO: 0.1 10E9/L (ref 0–0.2)
BASOPHILS NFR BLD AUTO: 1 %
CRP SERPL-MCNC: 29 MG/L (ref 0–8)
DIFFERENTIAL METHOD BLD: ABNORMAL
EOSINOPHIL # BLD AUTO: 0.2 10E9/L (ref 0–0.7)
EOSINOPHIL NFR BLD AUTO: 2 %
ERYTHROCYTE [DISTWIDTH] IN BLOOD BY AUTOMATED COUNT: 16.7 % (ref 10–15)
ERYTHROCYTE [SEDIMENTATION RATE] IN BLOOD BY WESTERGREN METHOD: 67 MM/H (ref 0–20)
HCT VFR BLD AUTO: 32.4 % (ref 35–47)
HGB BLD-MCNC: 9.7 G/DL (ref 11.7–15.7)
HYPOCHROMIA BLD QL: PRESENT
LYMPHOCYTES # BLD AUTO: 1.9 10E9/L (ref 0.8–5.3)
LYMPHOCYTES NFR BLD AUTO: 20 %
MCH RBC QN AUTO: 25 PG (ref 26.5–33)
MCHC RBC AUTO-ENTMCNC: 29.9 G/DL (ref 31.5–36.5)
MCV RBC AUTO: 84 FL (ref 78–100)
MONOCYTES # BLD AUTO: 2.2 10E9/L (ref 0–1.3)
MONOCYTES NFR BLD AUTO: 23 %
NEUTROPHILS # BLD AUTO: 4.2 10E9/L (ref 1.6–8.3)
NEUTROPHILS NFR BLD AUTO: 44 %
NEUTS BAND # BLD AUTO: 1 10E9/L (ref 0–0.6)
NEUTS BAND NFR BLD MANUAL: 10 %
PLATELET # BLD AUTO: 675 10E9/L (ref 150–450)
POLYCHROMASIA BLD QL SMEAR: ABNORMAL
RBC # BLD AUTO: 3.88 10E12/L (ref 3.8–5.2)
WBC # BLD AUTO: 9.6 10E9/L (ref 4–11)

## 2017-02-13 PROCEDURE — 85652 RBC SED RATE AUTOMATED: CPT | Performed by: FAMILY MEDICINE

## 2017-02-13 PROCEDURE — 82306 VITAMIN D 25 HYDROXY: CPT | Performed by: FAMILY MEDICINE

## 2017-02-13 PROCEDURE — 85025 COMPLETE CBC W/AUTO DIFF WBC: CPT | Performed by: FAMILY MEDICINE

## 2017-02-13 PROCEDURE — 36415 COLL VENOUS BLD VENIPUNCTURE: CPT | Performed by: FAMILY MEDICINE

## 2017-02-13 PROCEDURE — 86140 C-REACTIVE PROTEIN: CPT | Performed by: FAMILY MEDICINE

## 2017-02-13 PROCEDURE — 80076 HEPATIC FUNCTION PANEL: CPT | Performed by: FAMILY MEDICINE

## 2017-02-13 ASSESSMENT — PAIN SCALES - GENERAL: PAINLEVEL: NO PAIN (0)

## 2017-02-13 NOTE — PROGRESS NOTES
IBD CLINIC VISIT     CC/REFERRING MD: Daniela Enriquez  REASON FOR FOLLOW UP: Ulcerative colitis     IBD HISTORY  Age at diagnosis: 18  Extent of disease: Colonoscopies over the years have varied with areas of involvement. Earliest colonoscopies in 2006 show right greater than left inflammation with some inflammation in the ileum as well.  Followup colonoscopy in 2007 showed mainly diffuse colonic inflammation.  Colonoscopies in 2015 again showed more chronic colitis with no significant ileal involvement.  Colonoscopies in 2016 again showed a right-sided and some ileal inflammation with ulceration, but biopsy showed CMV.  Most recently in November, she had a colonoscopy with severe inflammation from 20 cm from the anus all of the way to the ileum as far as could be evaluated.  Biopsies showed chronic active ileitis and chronic active colitis in every sample.  There was no evidence of CMV or PTLD. Rare SHENA-KACIE positive cells seen in ileum.  Current UC medications: Prednisone taper (currently taking 5 mg), Entivio, (previously on azathioprine but is currently on hold due to high doses of prednisone and EBV viremia).  Prior UC surgeries: None  Prior IBD Medications: Prednisone, Pentasa, Asacol    DRUG MONITORING  TPMT enzyme activity: (12/6/16) 18.5     6-TGN/6-MMPN levels: (12/6/16) 203/257      Biologic concentration: Has not yet been obtained. We will obtain a level just before next infusion.    DISEASE ASSESSMENT  Labs:  Lab Results   Component Value Date    ALBUMIN 3.3 01/27/2017     Recent Labs   Lab Test  01/27/17   0812  01/10/17   0930  12/30/16   1010   CRP  <2.9  <2.9  47.8*   SED  16   --   90*     Endoscopic assessment:   Enterography: (12/14/16) 1. Diffuse colonic wall thickening and enhancement with loss of  haustral pattern. Wall thickening and enhancement of the terminal  ileum. Findings consistent with acute on chronic changes related to  ulcerative colitis. 2. Stable 7.4 cm left adnexal cyst. A  short interval follow-up  ultrasound is recommended to help determine whether this represents a  physiologic lesion. Plans to have follow up with OBGYN and repeat U/S in 6 weeks.  Fecal calprotectin: --   C diff: Negative 12/10/16  Abad score: 3/9    ASSESSMENT/PLAN  Yarelis is a pleasant 31-year-old female with a complicated past medical history including primary sclerosing cholangitis associated inflammatory bowel disease, cirrhosis, status post living donor transplant in 2015 with recent severe flare and was hospitalized 12/2016.      1.  Primary sclerosing cholangitis associated inflammatory bowel disease.  She likely has PSC associated ulcerative colitis with severe recent flare.  The patient has continued to improve symptomatically on a prednisone taper 5 mg currently and a vedolizumab infusions every 8 weeks.  She has now had 3 infusions of vedolizumab.  Her next infusion is due 03/24/2017.  Her bowel habits have essentially returned to normal at this time. Laboratory studies that have been checked did show an increased bump in her CRP just prior to her visit with Dr. Johnson; however, this has normalized since this time.  The azathioprine has been held given the high-dose steroids and the fact that she has EBV viremia.  We will continue to hold this for now and continue to work with Dr. Enriquez regarding additional immunosuppression given her liver transplant and history of autoimmune hepatitis.  She has had a recent primary EBV episode and has had persistent EBV levels.    -- We will obtain labs today to include a CBC, LFTs and inflammatory markers and also check a vitamin D level.    -- Just before her next infusion on 03/24/2017, we will also obtain a vedolizumab level.  This will be her first infusion at every 8-week barbara.  We will assess dosage and drug interval at that time.     2.  Cough.  The patient has been evaluated for cough symptoms for the last 2 visits with chest x-rays showing no progression or  development of a clear infiltrate.  Symptoms have completely resolved at this time.  We will continue to monitor for any symptoms.     3.  PCP prophylaxis.  The patient will continue PCP prophylaxis given her recent high-dose steroids and keep her on this for a total of 3 months after the steroids have been completed.  This will be double strength Bactrim Monday, Wednesday, Friday.     4.  EBV viremia.  The patient follows with Dr. Nails.  Rituximab treatment has been completed.     5.  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  Her CMV levels were not detected that were last checked in 02/07/2017.  She has completed her IV ganciclovir regimen.      6.  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  The high-dose steroids were helping with immunosuppression in the transplanted liver, as well as with autoimmune hepatitis.  We will continue to work with Dr. Enriquez for additional immunosuppression and to taper off the steroids.  She will continue with tacrolimus as she is supposed to.     7.  Left adnexal mass noted on CT in 12/2016.  The patient was instructed to complete a followup ultrasound post-hospitalization but has not completed this yet.  Patient will schedule this to be completed.     8. IBD healthcare maintenance based on patients current medication:  Vedolizumab  Vaccinations:  -- Influenza (every year): Last given 2016  -- TdaP (every 10 years): Last given 2016  -- Pneumococcal Pneumonia (once then every 5 years): Last given 2016    One time confirmation of immunity or serologies:  -- Hepatitis A (serologies or immunizations): 2015  -- Hepatitis B (serologies or immunizations): 2015  -- Varicella: Not documented  -- MMR:Not documented  -- HPV (all aged 18-26): As indicated  -- Meningococcal meningitis (all patients at risk for meningitis): As indicated   -- Due to the immunosuppression in this patient, I would not advise administration of live vaccines such as varicella/VZV, intranasal influenza, MMR,  or yellow fever vaccine (if travelling).      Cancer Screening:  Colon cancer screening:  Given PSC type of colitis, colonoscopy every year is recommended.  Dysplasia screening is recommended fall 2017.    Cervical cancer screening: Annual due to immunosupression    Skin cancer screening: Annual visual exam of skin by dermatologist since patient is immunocompromised    Depression Screening:  -- Over the last month, have you felt down, depressed, or hopeless? No  -- Over the last month, have you felt little interest or pleasure doing things? No    Misc:  -- Avoid tobacco use  -- Avoid NSAIDs as there is potentially a 25% chance of causing an IBD flare      RTC 4-6 weeks    Thank you for this consultation.  It was a pleasure to participate in the care of this patient; please contact us with any further questions.      Cresencio Sierra PA-C  Division of Gastroenterology, Hepatology and Nutrition  [facility]    HPI:   Yarelis is a pleasant 31-year-old female here today for followup regarding PSC associated IBD.  Briefly, the patient's history includes history of inflammatory bowel disease, primary sclerosing cholangitis, autoimmune hepatitis and is status post living donor transplant in 2015.  Her extent of disease has varied with colonoscopies from right sided to more diffuse with variable ileal involvement.  More recent colonoscopies in 2016 have again showed more right-sided ileal involvement.  She was initially treated with mesalamine in 2007 and that required a steroid burst.  Over the years, she stopped IBD therapy and had largely quiescent disease.  She underwent a liver transplant in 2015, has been on Prograf, low-dose prednisone and azathioprine previously.  Her post-transplant course was complicated by CMV colitis x2 on 01/2016 and on 04/2016, and she was treated with antiviral therapy both times (IV ganciclovir).  Her post-transplant course was also complicated by primary EBV viremia and followed by the  Transplant Infectious Disease team.  She again had a flare of her colitis symptoms in 11/2016 and was admitted for severe colitis.  Biopsies at this time were negative for CMV and had no significant EBV component.  She did have associated mild CMV viremia and EBV viremia, but after extensive discussion with Pathology and Infectious Disease, her flare was not felt to be related to these viruses or PTLD.  The patient was treated with IV steroids and transitioned to oral steroids.  She initially did well, but in early December failed transition to p.o. steroids.  She was rehospitalized and given IV steroids again.  She was transitioned to a higher dose of steroids 60 mg and was discharged with vedolizumab infusion the day after discharge.  Since being discharged from the hospital, the patient has done quite well and continues on a prednisone taper.  She is currently on 5 mg daily and will continue on this dose.  She has completed her IV ganciclovir course.    Today, she is having 1 formed bowel movement per day.  She denies any blood in her stool or urgency.  She denies any tenesmus or nocturnal stools.  She did note that last week she began exercising more, kick boxing activities and noticed potential strain on her right side.  She notices that the side became more firm over time and is slightly sore to palpation but has not noticed any skin changes or discoloration of this area.  She does not have any abdominal pain at this time. She denies any fevers, chills or sweats.  Also of note, she has been previously evaluated over the last few visits for a cough with chest x-rays which have been stable.  Symptoms have completely resolved at this time. Next Vedolizumab infusion is 3/24/17 which will be her 4th infusion and first infusion at 8 week interval.  Patient denies nausea, vomiting, fever/chills, BRBPR, melena, tenesmus, urgency, hesitancy passing flatus, recent weight loss, join pain, skin changes, vision changes and  nightime stools    ROS:    No fevers or chills  No weight loss  No blurry vision, double vision or change in vision  No sore throat  No lymphadenopathy  No headache, paraesthesias, or weakness in a limb  No shortness of breath or wheezing  No chest pain or pressure  No arthralgias or myalgias  No rashes or skin changes  No odynophagia or dysphagia  No BRBPR, hematochezia, melena  No dysuria, frequency or urgency  No hot/cold intolerance or polyria  No anxiety or depression    Extra intestinal manifestations of IBD:  No uveitis/episcleritis  No aphthous ulcers   No arthritis   No erythema nodosum/pyoderma gangrenosum.     PERTINENT PAST MEDICAL HISTORY:  Past Medical History   Diagnosis Date     Autoimmune hepatitis (H) 2012     on steroid taper     Cholangitis      CKD (chronic kidney disease) 2012     biopsy 2012: TIN, patchy fibrosis     Esophageal varices (H) 9/08     banded     Heart murmur      History of blood transfusion      Primary sclerosing cholangitis      Ulcerative Colitis      f/b GI       PREVIOUS SURGERIES:  Past Surgical History   Procedure Laterality Date     Upper gi endoscopy       Colonoscopy  9/07     Colonoscopy N/A 2/26/2015     Procedure: COMBINED COLONOSCOPY, SINGLE OR MULTIPLE BIOPSY/POLYPECTOMY BY BIOPSY;  Surgeon: Mitchel Hoskins Chi, MD;  Location: [facility]     Esophagoscopy, gastroscopy, duodenoscopy (egd), combined N/A 2/26/2015     Procedure: COMBINED ESOPHAGOSCOPY, GASTROSCOPY, DUODENOSCOPY (EGD);  Surgeon: Mitchel Hoskins Chi, MD;  Location: [facility]     Picc insertion Right 2/27/2015     4fr SL Valved PICC, 36cm (1cm external) in the R medial brachial vein w/ tip in the low SVC.     Transplant liver recipient living unrelated N/A 6/18/2015     Procedure: TRANSPLANT LIVER RECIPIENT LIVING UNRELATED;  Surgeon: Tyree Smith MD;  Location: [facility]     Explore common bile duct N/A 6/25/2015     Procedure: EXPLORE COMMON BILE DUCT;  Surgeon: Tyree Smith MD;  Location: [facility]     Laparotomy  exploratory N/A 6/25/2015     Procedure: LAPAROTOMY EXPLORATORY;  Surgeon: Tyree Smith MD;  Location: [facility]     Endoscopic retrograde cholangiopancreatogram N/A 6/25/2015     Procedure: COMBINED ENDOSCOPIC RETROGRADE CHOLANGIOPANCREATOGRAPHY, PLACE TUBE/STENT;  Surgeon: Landon Quinones MD;  Location: [facility]     Endoscopic retrograde cholangiopancreatogram N/A 6/25/2015     Procedure: COMBINED ENDOSCOPIC RETROGRADE CHOLANGIOPANCREATOGRAPHY, PLACE TUBE/STENT;  Surgeon: Landon Quinones MD;  Location: [facility]     Endoscopic retrograde cholangiopancreatogram N/A 7/2/2015     Procedure: COMBINED ENDOSCOPIC RETROGRADE CHOLANGIOPANCREATOGRAPHY, PLACE TUBE/STENT;  Surgeon: Landon Quinones MD;  Location: [facility]     Endoscopic retrograde cholangiopancreatography, exchange tube/stent N/A 7/30/2015     Procedure: ENDOSCOPIC RETROGRADE CHOLANGIOPANCREATOGRAPHY, EXCHANGE TUBE/STENT;  Surgeon: Landon Quinones MD;  Location: [facility]     Endoscopic retrograde cholangiopancreatogram N/A 9/8/2015     Procedure: COMBINED ENDOSCOPIC RETROGRADE CHOLANGIOPANCREATOGRAPHY, REMOVE FOREIGN BODY OR STENT/TUBE;  Surgeon: Landon Quinones MD;  Location: [facility]     Endoscopic retrograde cholangiopancreatogram N/A 12/8/2015     Procedure: ENDOSCOPIC RETROGRADE CHOLANGIOPANCREATOGRAM;  Surgeon: Landon Quinones MD;  [facility]: [facility]     Colonoscopy N/A 1/12/2016     Procedure: COMBINED COLONOSCOPY, SINGLE OR MULTIPLE BIOPSY/POLYPECTOMY BY BIOPSY;  Surgeon: Rigo Valles MD;  Location: [facility]     Endoscopic retrograde cholangiopancreatogram N/A 3/1/2016     Procedure: COMBINED ENDOSCOPIC RETROGRADE CHOLANGIOPANCREATOGRAPHY, REMOVE FOREIGN BODY OR STENT/TUBE;  Surgeon: Landon Quinones MD;  [facility]     Colonoscopy N/A 4/1/2016     Procedure: COMBINED COLONOSCOPY, SINGLE OR MULTIPLE BIOPSY/POLYPECTOMY BY BIOPSY;  Surgeon: Kareem Solis MD;  Location: [facility]     Sigmoidoscopy flexible N/A  4/27/2016     Procedure: SIGMOIDOSCOPY FLEXIBLE;  Surgeon: Jose Nielson MD;  Location:  GI     ALLERGIES:     Allergies   Allergen Reactions     Rifampin Other (See Comments)     Kidney failure     Penicillins      Hives, has tolerated amoxicillin         PERTINENT MEDICATIONS:    Current Outpatient Prescriptions:      sulfamethoxazole-trimethoprim (BACTRIM DS) 800-160 MG per tablet, Take 1 tablet by mouth three times a week, Disp: 30 tablet, Rfl: 3     ursodiol (ACTIGALL) 250 MG tablet, Take 2 tabs (500 mg) in AM, and 1 tab at night (250 mg), Disp: 270 tablet, Rfl: 3     tacrolimus (PROGRAF - GENERIC EQUIVALENT) 0.5 MG capsule, Take 1 capsule (0.5 mg) by mouth 2 times daily Take with 1 mg capsule. Total dose 2.5 mg every 12 hours, Disp: 180 capsule, Rfl: 3     tacrolimus (PROGRAF - GENERIC EQUIVALENT) 1 MG capsule, Take 2 capsules (2 mg) by mouth 2 times daily *total dose 2.5 mg BID. Take with 0.5 mg capsule, Disp: 360 capsule, Rfl: 3     zolpidem (AMBIEN) 5 MG tablet, Take 1 tablet (5 mg) by mouth nightly as needed for sleep, Disp: 30 tablet, Rfl: 5     predniSONE (DELTASONE) 10 MG tablet, Take 6 tabs (60mg) PO x 5 days  Then take 5 tabs (50mg) PO x 7 days  Then take 4 tabs (40mg) PO x 7 days Then take 3 tabs (30mg) PO x 7 days Then take 2 tabs (20mg) PO x 7 days Then take 1 tab (10mg) PO x 7 days Then take 1/2 tablet (5mg) PO x 7 days then DISCONTINUE  Then take (Patient taking differently: Take 10 mg by mouth daily Take 6 tabs (60mg) PO x 5 days  Then take 5 tabs (50mg) PO x 7 days  Then take 4 tabs (40mg) PO x 7 days Then take 3 tabs (30mg) PO x 7 days Then take 2 tabs (20mg) PO x 7 days Then take 1 tab (10mg) PO x 7 days Then take 1/2 tablet (5mg) PO x 7 days then DISCONTINUE  Then take), Disp: 92 tablet, Rfl: 0     GNP ASPIRIN LOW DOSE 81 MG EC tablet, Take 1 tablet (81 mg) by mouth daily, Disp: 30 tablet, Rfl: 1    SOCIAL HISTORY:  Social History     Social History     Marital status:      Spouse  "name: N/A     Number of children: N/A     Years of education: N/A     Occupational History     Not on file.     Social History Main Topics     Smoking status: Never Smoker     Smokeless tobacco: Never Used     Alcohol use No     Drug use: No     Sexual activity: Not Currently      Comment: denies pregnancy     Other Topics Concern      Service No     Blood Transfusions No     Caffeine Concern No     Occupational Exposure No     Hobby Hazards No     Sleep Concern Yes     Stress Concern No     Weight Concern No     Special Diet No     Back Care No     Exercise No     Bike Helmet No     n/a     Seat Belt Yes     Self-Exams No     Social History Narrative       FAMILY HISTORY:  Family History   Problem Relation Age of Onset     Hypertension Mother      Lipids Mother      Hypertension Maternal Grandmother      Alzheimer Disease Maternal Grandmother      Lipids Father    Past/family/social history reviewed and no changes    PHYSICAL EXAMINATION:  Constitutional: aaox3, cooperative, pleasant, not dyspneic/diaphoretic, no acute distress  Vitals reviewed: /86 (BP Location: Left arm)  Pulse 93  Ht 1.575 m (5' 2\")  Wt 54.9 kg (121 lb)  BMI 22.13 kg/m2  Wt:   Wt Readings from Last 2 Encounters:   02/13/17 54.9 kg (121 lb)   02/08/17 54.9 kg (121 lb)      Eyes: Sclera anicteric/injected  Ears/nose/mouth/throat: Normal oropharynx without ulcers or exudate, mucus membranes moist, hearing intact  Neck: supple, thyroid normal size  CV: No edema  Respiratory: Unlabored breathing  Lymph: No axillary, submandibular, supraclavicular or inguinal lymphadenopathy  Abd: Surgical scar well healed across mid abdomen.  Able to palpate appreciable scar tissue on the right side of scar (nontender to touch and no discoloration noted), Nondistended, +bs, no hepatosplenomegaly, nontender, no peritoneal signs  Skin: warm, perfused, no jaundice  Psych: Normal affect  MSK: Normal gait      PERTINENT STUDIES:  Most recent CBC:  Recent " Labs   Lab Test  01/27/17   0812  01/24/17   1045   WBC  6.0  7.8   HGB  9.1*  9.3*   HCT  30.9*  30.8*   PLT  461*  500*     Most recent hepatic panel:  Recent Labs   Lab Test  01/27/17   0812  01/24/17   1045   ALT  42  36   AST  24  27     Most recent creatinine:  Recent Labs   Lab Test  01/24/17   1045  01/17/17   0815   CR  1.10*  0.86

## 2017-02-13 NOTE — LETTER
2/13/2017       RE: Yarelis Penny  [address] [facility] [id number]     Dear Colleague,    Thank you for referring your patient, Yarelis Penny, to the [facility] GASTROENTEROLOGY AND IBD at [facility]. Please see a copy of my visit note below.    IBD CLINIC VISIT     CC/REFERRING MD: Daniela Enriquez  REASON FOR FOLLOW UP: Ulcerative colitis     IBD HISTORY  Age at diagnosis: 18  Extent of disease: Colonoscopies over the years have varied with areas of involvement. Earliest colonoscopies in 2006 show right greater than left inflammation with some inflammation in the ileum as well.  Followup colonoscopy in 2007 showed mainly diffuse colonic inflammation.  Colonoscopies in 2015 again showed more chronic colitis with no significant ileal involvement.  Colonoscopies in 2016 again showed a right-sided and some ileal inflammation with ulceration, but biopsy showed CMV.  Most recently in November, she had a colonoscopy with severe inflammation from 20 cm from the anus all of the way to the ileum as far as could be evaluated.  Biopsies showed chronic active ileitis and chronic active colitis in every sample.  There was no evidence of CMV or PTLD. Rare SHENA-KACIE positive cells seen in ileum.  Current UC medications: Prednisone taper (currently taking 5 mg), Entivio, (previously on azathioprine but is currently on hold due to high doses of prednisone and EBV viremia).  Prior UC surgeries: None  Prior IBD Medications: Prednisone, Pentasa, Asacol    DRUG MONITORING  TPMT enzyme activity: (12/6/16) 18.5     6-TGN/6-MMPN levels: (12/6/16) 203/257      Biologic concentration: Has not yet been obtained. We will obtain a level just before next infusion.    DISEASE ASSESSMENT  Labs:  Lab Results   Component Value Date    ALBUMIN 3.3 01/27/2017     Recent Labs   Lab Test  01/27/17   0812  01/10/17   0930  12/30/16   1010   CRP  <2.9  <2.9  47.8*   SED  16   --   90*     Endoscopic  assessment:   Enterography: (12/14/16) 1. Diffuse colonic wall thickening and enhancement with loss of  haustral pattern. Wall thickening and enhancement of the terminal  ileum. Findings consistent with acute on chronic changes related to  ulcerative colitis. 2. Stable 7.4 cm left adnexal cyst. A short interval follow-up  ultrasound is recommended to help determine whether this represents a  physiologic lesion. Plans to have follow up with OBGYN and repeat U/S in 6 weeks.  Fecal calprotectin: --   C diff: Negative 12/10/16  Abad score: 3/9    ASSESSMENT/PLAN  Yarelis is a pleasant 31-year-old female with a complicated past medical history including primary sclerosing cholangitis associated inflammatory bowel disease, cirrhosis, status post living donor transplant in 2015 with recent severe flare and was hospitalized 12/2016.      1.  Primary sclerosing cholangitis associated inflammatory bowel disease.  She likely has PSC associated ulcerative colitis with severe recent flare.  The patient has continued to improve symptomatically on a prednisone taper 5 mg currently and a vedolizumab infusions every 8 weeks.  She has now had 3 infusions of vedolizumab.  Her next infusion is due 03/24/2017.  Her bowel habits have essentially returned to normal at this time. Laboratory studies that have been checked did show an increased bump in her CRP just prior to her visit with Dr. Johnson; however, this has normalized since this time.  The azathioprine has been held given the high-dose steroids and the fact that she has EBV viremia.  We will continue to hold this for now and continue to work with Dr. Enriquez regarding additional immunosuppression given her liver transplant and history of autoimmune hepatitis.  She has had a recent primary EBV episode and has had persistent EBV levels.    -- We will obtain labs today to include a CBC, LFTs and inflammatory markers and also check a vitamin D level.    -- Just before her next infusion  on 03/24/2017, we will also obtain a vedolizumab level.  This will be her first infusion at every 8-week barbara.  We will assess dosage and drug interval at that time.     2.  Cough.  The patient has been evaluated for cough symptoms for the last 2 visits with chest x-rays showing no progression or development of a clear infiltrate.  Symptoms have completely resolved at this time.  We will continue to monitor for any symptoms.     3.  PCP prophylaxis.  The patient will continue PCP prophylaxis given her recent high-dose steroids and keep her on this for a total of 3 months after the steroids have been completed.  This will be double strength Bactrim Monday, Wednesday, Friday.     4.  EBV viremia.  The patient follows with Dr. Nails.  Rituximab treatment has been completed.     5.  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  Her CMV levels were not detected that were last checked in 02/07/2017.  She has completed her IV ganciclovir regimen.      6.  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  The high-dose steroids were helping with immunosuppression in the transplanted liver, as well as with autoimmune hepatitis.  We will continue to work with Dr. Enriquez for additional immunosuppression and to taper off the steroids.  She will continue with tacrolimus as she is supposed to.     7.  Left adnexal mass noted on CT in 12/2016.  The patient was instructed to complete a followup ultrasound post-hospitalization but has not completed this yet.  Patient will schedule this to be completed.     8. IBD healthcare maintenance based on patients current medication:  Vedolizumab  Vaccinations:  -- Influenza (every year): Last given 2016  -- TdaP (every 10 years): Last given 2016  -- Pneumococcal Pneumonia (once then every 5 years): Last given 2016    One time confirmation of immunity or serologies:  -- Hepatitis A (serologies or immunizations): 2015  -- Hepatitis B (serologies or immunizations): 2015  -- Varicella: Not  documented  -- MMR:Not documented  -- HPV (all aged 18-26): As indicated  -- Meningococcal meningitis (all patients at risk for meningitis): As indicated   -- Due to the immunosuppression in this patient, I would not advise administration of live vaccines such as varicella/VZV, intranasal influenza, MMR, or yellow fever vaccine (if travelling).      Cancer Screening:  Colon cancer screening:  Given PSC type of colitis, colonoscopy every year is recommended.  Dysplasia screening is recommended fall 2017.    Cervical cancer screening: Annual due to immunosupression    Skin cancer screening: Annual visual exam of skin by dermatologist since patient is immunocompromised    Depression Screening:  -- Over the last month, have you felt down, depressed, or hopeless? No  -- Over the last month, have you felt little interest or pleasure doing things? No    Misc:  -- Avoid tobacco use  -- Avoid NSAIDs as there is potentially a 25% chance of causing an IBD flare      RTC 4-6 weeks    Thank you for this consultation.  It was a pleasure to participate in the care of this patient; please contact us with any further questions.      Cresencio Sierra PA-C  Division of Gastroenterology, Hepatology and Nutrition  [facility]    HPI:   Yarelis is a pleasant 31-year-old female here today for followup regarding PSC associated IBD.  Briefly, the patient's history includes history of inflammatory bowel disease, primary sclerosing cholangitis, autoimmune hepatitis and is status post living donor transplant in 2015.  Her extent of disease has varied with colonoscopies from right sided to more diffuse with variable ileal involvement.  More recent colonoscopies in 2016 have again showed more right-sided ileal involvement.  She was initially treated with mesalamine in 2007 and that required a steroid burst.  Over the years, she stopped IBD therapy and had largely quiescent disease.  She underwent a liver transplant in 2015, has been on  Prograf, low-dose prednisone and azathioprine previously.  Her post-transplant course was complicated by CMV colitis x2 on 01/2016 and on 04/2016, and she was treated with antiviral therapy both times (IV ganciclovir).  Her post-transplant course was also complicated by primary EBV viremia and followed by the Transplant Infectious Disease team.  She again had a flare of her colitis symptoms in 11/2016 and was admitted for severe colitis.  Biopsies at this time were negative for CMV and had no significant EBV component.  She did have associated mild CMV viremia and EBV viremia, but after extensive discussion with Pathology and Infectious Disease, her flare was not felt to be related to these viruses or PTLD.  The patient was treated with IV steroids and transitioned to oral steroids.  She initially did well, but in early December failed transition to p.o. steroids.  She was rehospitalized and given IV steroids again.  She was transitioned to a higher dose of steroids 60 mg and was discharged with vedolizumab infusion the day after discharge.  Since being discharged from the hospital, the patient has done quite well and continues on a prednisone taper.  She is currently on 5 mg daily and will continue on this dose.  She has completed her IV ganciclovir course.    Today, she is having 1 formed bowel movement per day.  She denies any blood in her stool or urgency.  She denies any tenesmus or nocturnal stools.  She did note that last week she began exercising more, kick boxing activities and noticed potential strain on her right side.  She notices that the side became more firm over time and is slightly sore to palpation but has not noticed any skin changes or discoloration of this area.  She does not have any abdominal pain at this time. She denies any fevers, chills or sweats.  Also of note, she has been previously evaluated over the last few visits for a cough with chest x-rays which have been stable.  Symptoms have  completely resolved at this time. Next Vedolizumab infusion is 3/24/17 which will be her 4th infusion and first infusion at 8 week interval.  Patient denies nausea, vomiting, fever/chills, BRBPR, melena, tenesmus, urgency, hesitancy passing flatus, recent weight loss, join pain, skin changes, vision changes and nightime stools    ROS:    No fevers or chills  No weight loss  No blurry vision, double vision or change in vision  No sore throat  No lymphadenopathy  No headache, paraesthesias, or weakness in a limb  No shortness of breath or wheezing  No chest pain or pressure  No arthralgias or myalgias  No rashes or skin changes  No odynophagia or dysphagia  No BRBPR, hematochezia, melena  No dysuria, frequency or urgency  No hot/cold intolerance or polyria  No anxiety or depression    Extra intestinal manifestations of IBD:  No uveitis/episcleritis  No aphthous ulcers   No arthritis   No erythema nodosum/pyoderma gangrenosum.     PERTINENT PAST MEDICAL HISTORY:  Past Medical History   Diagnosis Date     Autoimmune hepatitis (H) 2012     on steroid taper     Cholangitis      CKD (chronic kidney disease) 2012     biopsy 2012: TIN, patchy fibrosis     Esophageal varices (H) 9/08     banded     Heart murmur      History of blood transfusion      Primary sclerosing cholangitis      Ulcerative Colitis      f/b GI       PREVIOUS SURGERIES:  Past Surgical History   Procedure Laterality Date     Upper gi endoscopy       Colonoscopy  9/07     Colonoscopy N/A 2/26/2015     Procedure: COMBINED COLONOSCOPY, SINGLE OR MULTIPLE BIOPSY/POLYPECTOMY BY BIOPSY;  Surgeon: Mitchel Hoskins Chi, MD;  [facility]     Esophagoscopy, gastroscopy, duodenoscopy (egd), combined N/A 2/26/2015     Procedure: COMBINED ESOPHAGOSCOPY, GASTROSCOPY, DUODENOSCOPY (EGD);  Surgeon: Mitchel Hoskins Chi, MD;  [facility]     Picc insertion Right 2/27/2015     4fr SL Valved PICC, 36cm (1cm external) in the R medial brachial vein w/ tip in the low SVC.      Transplant liver recipient living unrelated N/A 6/18/2015     Procedure: TRANSPLANT LIVER RECIPIENT LIVING UNRELATED;  Surgeon: Tyree Smith MD;  [facility]     Explore common bile duct N/A 6/25/2015     Procedure: EXPLORE COMMON BILE DUCT;  Surgeon: Tyree Smith MD;  [facility]     Laparotomy exploratory N/A 6/25/2015     Procedure: LAPAROTOMY EXPLORATORY;  Surgeon: Tyree Smith MD;  [facility]     Endoscopic retrograde cholangiopancreatogram N/A 6/25/2015     Procedure: COMBINED ENDOSCOPIC RETROGRADE CHOLANGIOPANCREATOGRAPHY, PLACE TUBE/STENT;  Surgeon: Landon Quinones MD;  [facility]     Endoscopic retrograde cholangiopancreatogram N/A 6/25/2015     Procedure: COMBINED ENDOSCOPIC RETROGRADE CHOLANGIOPANCREATOGRAPHY, PLACE TUBE/STENT;  Surgeon: Landon Quinones MD;  [facility]     Endoscopic retrograde cholangiopancreatogram N/A 7/2/2015     Procedure: COMBINED ENDOSCOPIC RETROGRADE CHOLANGIOPANCREATOGRAPHY, PLACE TUBE/STENT;  Surgeon: Landon Quinones MD;  [facility]     Endoscopic retrograde cholangiopancreatography, exchange tube/stent N/A 7/30/2015     Procedure: ENDOSCOPIC RETROGRADE CHOLANGIOPANCREATOGRAPHY, EXCHANGE TUBE/STENT;  Surgeon: Landon Quinones MD;  [facility]     Endoscopic retrograde cholangiopancreatogram N/A 9/8/2015     Procedure: COMBINED ENDOSCOPIC RETROGRADE CHOLANGIOPANCREATOGRAPHY, REMOVE FOREIGN BODY OR STENT/TUBE;  Surgeon: Landon Quinones MD;  [facility]     Endoscopic retrograde cholangiopancreatogram N/A 12/8/2015     Procedure: ENDOSCOPIC RETROGRADE CHOLANGIOPANCREATOGRAM;  Surgeon: Landon Quinones MD;  [facility]     Colonoscopy N/A 1/12/2016     Procedure: COMBINED COLONOSCOPY, SINGLE OR MULTIPLE BIOPSY/POLYPECTOMY BY BIOPSY;  Surgeon: Rigo Valles MD;  [facility]     Endoscopic retrograde cholangiopancreatogram N/A 3/1/2016     Procedure: COMBINED ENDOSCOPIC  RETROGRADE CHOLANGIOPANCREATOGRAPHY, REMOVE FOREIGN BODY OR STENT/TUBE;  Surgeon: Landon Quinones MD;  [facility]     Colonoscopy N/A 4/1/2016     Procedure: COMBINED COLONOSCOPY, SINGLE OR MULTIPLE BIOPSY/POLYPECTOMY BY BIOPSY;  Surgeon: Kareem Solis MD;  [facility]     Sigmoidoscopy flexible N/A 4/27/2016     Procedure: SIGMOIDOSCOPY FLEXIBLE;  Surgeon: Jose Nielson MD;  [facility]     ALLERGIES:     Allergies   Allergen Reactions     Rifampin Other (See Comments)     Kidney failure     Penicillins      Hives, has tolerated amoxicillin         PERTINENT MEDICATIONS:    Current Outpatient Prescriptions:      sulfamethoxazole-trimethoprim (BACTRIM DS) 800-160 MG per tablet, Take 1 tablet by mouth three times a week, Disp: 30 tablet, Rfl: 3     ursodiol (ACTIGALL) 250 MG tablet, Take 2 tabs (500 mg) in AM, and 1 tab at night (250 mg), Disp: 270 tablet, Rfl: 3     tacrolimus (PROGRAF - GENERIC EQUIVALENT) 0.5 MG capsule, Take 1 capsule (0.5 mg) by mouth 2 times daily Take with 1 mg capsule. Total dose 2.5 mg every 12 hours, Disp: 180 capsule, Rfl: 3     tacrolimus (PROGRAF - GENERIC EQUIVALENT) 1 MG capsule, Take 2 capsules (2 mg) by mouth 2 times daily *total dose 2.5 mg BID. Take with 0.5 mg capsule, Disp: 360 capsule, Rfl: 3     zolpidem (AMBIEN) 5 MG tablet, Take 1 tablet (5 mg) by mouth nightly as needed for sleep, Disp: 30 tablet, Rfl: 5     predniSONE (DELTASONE) 10 MG tablet, Take 6 tabs (60mg) PO x 5 days  Then take 5 tabs (50mg) PO x 7 days  Then take 4 tabs (40mg) PO x 7 days Then take 3 tabs (30mg) PO x 7 days Then take 2 tabs (20mg) PO x 7 days Then take 1 tab (10mg) PO x 7 days Then take 1/2 tablet (5mg) PO x 7 days then DISCONTINUE  Then take (Patient taking differently: Take 10 mg by mouth daily Take 6 tabs (60mg) PO x 5 days  Then take 5 tabs (50mg) PO x 7 days  Then take 4 tabs (40mg) PO x 7 days Then take 3 tabs (30mg) PO x 7 days Then take 2 tabs (20mg) PO x 7 days  "Then take 1 tab (10mg) PO x 7 days Then take 1/2 tablet (5mg) PO x 7 days then DISCONTINUE  Then take), Disp: 92 tablet, Rfl: 0     GNP ASPIRIN LOW DOSE 81 MG EC tablet, Take 1 tablet (81 mg) by mouth daily, Disp: 30 tablet, Rfl: 1    SOCIAL HISTORY:  Social History     Social History     Marital status:      Spouse name: N/A     Number of children: N/A     Years of education: N/A     Occupational History     Not on file.     Social History Main Topics     Smoking status: Never Smoker     Smokeless tobacco: Never Used     Alcohol use No     Drug use: No     Sexual activity: Not Currently      Comment: denies pregnancy     Other Topics Concern      Service No     Blood Transfusions No     Caffeine Concern No     Occupational Exposure No     Hobby Hazards No     Sleep Concern Yes     Stress Concern No     Weight Concern No     Special Diet No     Back Care No     Exercise No     Bike Helmet No     n/a     Seat Belt Yes     Self-Exams No     Social History Narrative       FAMILY HISTORY:  Family History   Problem Relation Age of Onset     Hypertension Mother      Lipids Mother      Hypertension Maternal Grandmother      Alzheimer Disease Maternal Grandmother      Lipids Father    Past/family/social history reviewed and no changes    PHYSICAL EXAMINATION:  Constitutional: aaox3, cooperative, pleasant, not dyspneic/diaphoretic, no acute distress  Vitals reviewed: /86 (BP Location: Left arm)  Pulse 93  Ht 1.575 m (5' 2\")  Wt 54.9 kg (121 lb)  BMI 22.13 kg/m2  Wt:   Wt Readings from Last 2 Encounters:   02/13/17 54.9 kg (121 lb)   02/08/17 54.9 kg (121 lb)      Eyes: Sclera anicteric/injected  Ears/nose/mouth/throat: Normal oropharynx without ulcers or exudate, mucus membranes moist, hearing intact  Neck: supple, thyroid normal size  CV: No edema  Respiratory: Unlabored breathing  Lymph: No axillary, submandibular, supraclavicular or inguinal lymphadenopathy  Abd: Surgical scar well healed across mid " abdomen.  Able to palpate appreciable scar tissue on the right side of scar (nontender to touch and no discoloration noted), Nondistended, +bs, no hepatosplenomegaly, nontender, no peritoneal signs  Skin: warm, perfused, no jaundice  Psych: Normal affect  MSK: Normal gait      PERTINENT STUDIES:  Most recent CBC:  Recent Labs   Lab Test  01/27/17   0812  01/24/17   1045   WBC  6.0  7.8   HGB  9.1*  9.3*   HCT  30.9*  30.8*   PLT  461*  500*     Most recent hepatic panel:  Recent Labs   Lab Test  01/27/17   0812  01/24/17   1045   ALT  42  36   AST  24  27     Most recent creatinine:  Recent Labs   Lab Test  01/24/17   1045  01/17/17   0815   CR  1.10*  0.86

## 2017-02-13 NOTE — MR AVS SNAPSHOT
After Visit Summary   2/13/2017    Yarelis Penny    MRN: [id number]           Patient Information     Date Of Birth          1985        Visit Information        Provider Department      2/13/2017 9:00 AM Cresencio Sierra PA-C [facility] G[facility] and IBD        Today's Diagnoses     Ulcerative (chronic) enterocolitis, without complications (H)    -  1      Care Instructions    It was a pleasure taking care of you today.  I've included a brief summary of our discussion and care plan from today's visit below.  Please review this information with your primary care provider.  ______________________________________________________________________    My recommendations are summarized as follows:    -- Continue Prednisone 5 mg daily   -- Continue Vedolizumab infusions every 8 weeks  -- Labs today  -- Continue with PCP prophylaxis with Bactrim every Monday, Wednesday and Friday  -- Take calcium 1200 mg and vitamin D 1000 units daily  -- Need to schedule follow up repeat ultrasound to follow up on ovarian lesion noticed on CT while you were in the hospital.      Return to GI Clinic in 4 weeks to review your progress.    ______________________________________________________________________    Who do I call with any questions after my visit?  Please be in touch if there are any further questions that arise following today's visit.  There are multiple ways to contact your gastroenterology care team.        During business hours, you may reach a Gastroenterology nurse at [phone].       To schedule or reschedule an appointment, please call [phone].       You can always send a secure message through [employer].  [employer] messages are answered by your nurse or doctor typically within 24 hours.  Please allow extra time on weekends and holidays.        For urgent/emergent questions after business hours, you may reach the on-call GI Fellow by contacting the [facility] at [phone].     How will I get the results of any tests ordered?    You will receive all of your results.  If you have signed up for [employer], any tests ordered at your visit will be available to you after your physician reviews them.  Typically this takes 1-2 weeks.  If there are urgent results that require a change in your care plan, your physician or nurse will call you to discuss the next steps.      What is [employer]?  [employer] is a secure way for you to access all of your healthcare records from the [facility].  It is a web based computer program, so you can sign on to it from any location.  It also allows you to send secure messages to your care team.  I recommend signing up for [employer] access if you have not already done so and are comfortable with using a computer.      How to I schedule a follow-up visit?  If you did not schedule a follow-up visit today, please call [phone] to schedule a follow-up office visit.        Sincerely,    Cresencio Sierra PA-C  [facility]  Division of Gastroenterology              Follow-ups after your visit        Follow-up notes from your care team     Return in about 4 weeks (around 3/13/2017).      Your next 10 appointments already scheduled     Mar 24, 2017  8:00 AM CDT   Infusion 120 with UC SPEC INFUSION, UC 44 ATC   [facility] ([facility])    [address] [place] [id number] [phone]            May 01, 2017  8:00 AM CDT   (Arrive by 7:45 AM)   Return Visit with Fuentes Johnson MD   [facility] IBD ([facility])    [address] [place] [id number] [phone]              Future tests that were ordered for you today     Open Future Orders        Priority Expected Expires Ordered    CBC with platelets differential [CQA138] Routine 2/13/2017 4/14/2017 2/13/2017    Hepatic  "panel [LAB20] Routine 2/13/2017 4/14/2017 2/13/2017    CRP inflammation [NLJ7213] Routine 2/13/2017 4/14/2017 2/13/2017    Erythrocyte sedimentation rate auto [ZRV792] Routine 2/13/2017 4/14/2017 2/13/2017    Vitamin D Deficiency [XFY961] Routine 2/13/2017 4/14/2017 2/13/2017            Who to contact     Please call your clinic at [phone] to:    Ask questions about your health    Make or cancel appointments    Discuss your medicines    Learn about your test results    Speak to your doctor   If you have compliments or concerns about an experience at your clinic, or if you wish to file a complaint, please contact [facility] Patient Relations at [phone] or email us at [email or web address]         Additional Information About Your Visit        [employer] Information     [employer] gives you secure access to your electronic health record. If you see a primary care provider, you can also send messages to your care team and make appointments. If you have questions, please call your primary care clinic.  If you do not have a primary care provider, please call [phone] and they will assist you.      [employer] is an electronic gateway that provides easy, online access to your medical records. With [employer], you can request a clinic appointment, read your test results, renew a prescription or communicate with your care team.     To access your existing account, please contact your [facility] or call [phone] for assistance.        Care EveryWhere ID     This is your Care EveryWhere ID. This could be used by other organizations to access your [facility] medical records  [id number]        Your Vitals Were     Pulse Height BMI (Body Mass Index)             93 1.575 m (5' 2\") 22.13 kg/m2          Blood Pressure from Last 3 Encounters:   02/13/17 121/86   02/08/17 119/86   02/08/17 119/87    Weight from Last 3 Encounters:   02/13/17 54.9 kg (121 lb) "   02/08/17 54.9 kg (121 lb)   02/08/17 55.2 kg (121 lb 11.2 oz)                 Today's Medication Changes          These changes are accurate as of: 2/13/17 10:05 AM.  If you have any questions, ask your nurse or doctor.               These medicines have changed or have updated prescriptions.        Dose/Directions    predniSONE 10 MG tablet   Commonly known as:  DELTASONE   This may have changed:    - how much to take  - how to take this  - when to take this  - additional instructions  - Another medication with the same name was removed. Continue taking this medication, and follow the directions you see here.   Used for:  Ulcerative colitis with other complication, unspecified location (H)   Changed by:  Agnieszka Ramires RN        Take 6 tabs (60mg) PO x 5 days  Then take 5 tabs (50mg) PO x 7 days  Then take 4 tabs (40mg) PO x 7 days Then take 3 tabs (30mg) PO x 7 days Then take 2 tabs (20mg) PO x 7 days Then take 1 tab (10mg) PO x 7 days Then take 1/2 tablet (5mg) PO x 7 days then DISCONTINUE  Then take   Quantity:  92 tablet   Refills:  0                Primary Care Provider Office Phone # Fax #    [place] MARIANELA Orellana [phone]       [place] [address] [address] [address]        Thank you!     Thank you for choosing [facility]STROENTEROLOGY [facility] IBD  for your care. Our goal is always to provide you with excellent care. Hearing back from our patients is one way we can continue to improve our services. Please take a few minutes to complete the written survey that you may receive in the mail after your visit with us. Thank you!             Your Updated Medication List - Protect others around you: Learn how to safely use, store and throw away your medicines at www.disposemymeds.org.          This list is accurate as of: 2/13/17 10:05 AM.  Always use your most recent med list.                   Brand Name Dispense Instructions for use    GNP ASPIRIN LOW DOSE 81 MG EC  tablet   Generic drug:  aspirin     30 tablet    Take 1 tablet (81 mg) by mouth daily       predniSONE 10 MG tablet    DELTASONE    92 tablet    Take 6 tabs (60mg) PO x 5 days  Then take 5 tabs (50mg) PO x 7 days  Then take 4 tabs (40mg) PO x 7 days Then take 3 tabs (30mg) PO x 7 days Then take 2 tabs (20mg) PO x 7 days Then take 1 tab (10mg) PO x 7 days Then take 1/2 tablet (5mg) PO x 7 days then DISCONTINUE  Then take       sulfamethoxazole-trimethoprim 800-160 MG per tablet    BACTRIM DS    30 tablet    Take 1 tablet by mouth three times a week       * tacrolimus 0.5 MG capsule    PROGRAF - GENERIC EQUIVALENT    180 capsule    Take 1 capsule (0.5 mg) by mouth 2 times daily Take with 1 mg capsule. Total dose 2.5 mg every 12 hours       * tacrolimus 1 MG capsule    PROGRAF - GENERIC EQUIVALENT    360 capsule    Take 2 capsules (2 mg) by mouth 2 times daily *total dose 2.5 mg BID. Take with 0.5 mg capsule       ursodiol 250 MG tablet    ACTIGALL    270 tablet    Take 2 tabs (500 mg) in AM, and 1 tab at night (250 mg)       zolpidem 5 MG tablet    AMBIEN    30 tablet    Take 1 tablet (5 mg) by mouth nightly as needed for sleep       * Notice:  This list has 2 medication(s) that are the same as other medications prescribed for you. Read the directions carefully, and ask your doctor or other care provider to review them with you.

## 2017-02-13 NOTE — PATIENT INSTRUCTIONS
It was a pleasure taking care of you today.  I've included a brief summary of our discussion and care plan from today's visit below.  Please review this information with your primary care provider.  ______________________________________________________________________    My recommendations are summarized as follows:    -- Continue Prednisone 5 mg daily   -- Continue Vedolizumab infusions every 8 weeks  -- Labs today  -- Continue with PCP prophylaxis with Bactrim every Monday, Wednesday and Friday  -- Take calcium 1200 mg and vitamin D 1000 units daily  -- Need to schedule follow up repeat ultrasound to follow up on ovarian lesion noticed on CT while you were in the hospital.      Return to GI Clinic in 4 weeks to review your progress.    ______________________________________________________________________    Who do I call with any questions after my visit?  Please be in touch if there are any further questions that arise following today's visit.  There are multiple ways to contact your gastroenterology care team.        During business hours, you may reach a Gastroenterology nurse at [phone].       To schedule or reschedule an appointment, please call [phone].       You can always send a secure message through [employer].  [employer] messages are answered by your nurse or doctor typically within 24 hours.  Please allow extra time on weekends and holidays.        For urgent/emergent questions after business hours, you may reach the on-call GI Fellow by contacting the [facility]  at [phone].     How will I get the results of any tests ordered?    You will receive all of your results.  If you have signed up for [employer], any tests ordered at your visit will be available to you after your physician reviews them.  Typically this takes 1-2 weeks.  If there are urgent results that require a change in your care plan, your physician or nurse will call you to discuss the next steps.      What is  [employer]?  [employer] is a secure way for you to access all of your healthcare records from the [facility].  It is a web based computer program, so you can sign on to it from any location.  It also allows you to send secure messages to your care team.  I recommend signing up for [employer] access if you have not already done so and are comfortable with using a computer.      How to I schedule a follow-up visit?  If you did not schedule a follow-up visit today, please call [phone] to schedule a follow-up office visit.        Sincerely,    Cresencio Sierra PA-C  [facility]  Division of Gastroenterology

## 2017-02-13 NOTE — NURSING NOTE
"Chief Complaint   Patient presents with     RECHECK     feeling well , No Concerns on prednisone taper.       Vitals:    02/13/17 0901   BP: 121/86   BP Location: Left arm   Pulse: 93   Weight: 54.9 kg (121 lb)   Height: 1.575 m (5' 2\")       Body mass index is 22.13 kg/(m^2).                            "

## 2017-02-14 LAB
ALBUMIN SERPL-MCNC: 3.2 G/DL (ref 3.4–5)
ALP SERPL-CCNC: 93 U/L (ref 40–150)
ALT SERPL W P-5'-P-CCNC: 56 U/L (ref 0–50)
AST SERPL W P-5'-P-CCNC: 35 U/L (ref 0–45)
BILIRUB DIRECT SERPL-MCNC: <0.1 MG/DL (ref 0–0.2)
BILIRUB SERPL-MCNC: 0.3 MG/DL (ref 0.2–1.3)
DEPRECATED CALCIDIOL+CALCIFEROL SERPL-MC: 25 UG/L (ref 20–75)
PROT SERPL-MCNC: 7 G/DL (ref 6.8–8.8)

## 2017-02-14 NOTE — PROGRESS NOTES
[facility]  Transplant Infectious Disease Progress Note     Patient:  Yarelis Penny, Date of birth 1985, Medical record number [id number]  Date of Visit:  02/14/2017  Consult requested by Dr. Enriquez for evaluation of EBV and CMV after liver Transplant         Assessment and Recommendations:   Recommendations:  - continue off CMV targeted thearpy  - pulled picc line today  - will continue to monitor CMV PCR and EBV PCR in blood monthly for a while.  - restarted bactrim prophylaxis, she had run out of pills    F/u as needed.      Assessment:  31 female w/ PSC/ UC, autoimmune hepatitis s/p LDRLT 6/2015 complicated by anastomotic biliary stricture (last stent removed 3/1/16) maintained on tacro/ azathioprine and pred. Hospitalized 3/29-4/2/16, then again 4/26-4/29/16.  Yarelis had syndromes that were consistent with both EBV-mononucleosis like, and CMV w/ gastritis and anal ulcerations.  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  Was easily controlled w/ IV GCV (did not want to use orals for concerns of absorption issues).  She has since completed a course of IV GCV and we stopped therapy about 2-3 weeks ago.  Seral CMV PCRs since are negative, and her stools are normal formed on IBD therapy.  We will pull the picc line today and check CMV PCR intermittently.    CMV D+/R-. 2nd episode of CMV GI disease since the transplant (1st one 12/2015 tz w/ GCV/ vGCV).  Colonosocopy 4/1/16 w/ few small ulcers at the ileocecal valve and mucosal ulcerations in a discontinuous pattern throughout the colon, most of the bx's performed were CMV immunostain positive.  Treated w/ vGCV and then IV GCV (due to swallowing problems) for a total of 5 weeks of therapy ending 5/35/16.       Last  episode of CMV colitis she was not  viremic, which could be for two reasons, after the 1st CMV reactivation she is developing a partial immune response (R+ SOTs can have colitis w/out viremia, but not so much R- SOTs however at some point she should start to behave like an R+) or that she is at risk for underlying CMV GI disease given hx of PSC/ UC.       -EBV viremia:  Yarelis's initial syndrome was very consistent with mononucleosis (very severe throat pain, cervical LAD, fatigue, etc).  She has now recovered clinically but has rising viremia.  With the lack of current symptoms, we will continue to monitor the viral load.  CT c/a/p 4/2016 w/out radiographic signs for PTLD.  Some patients after transplant will have either high or low level persistent EBV viremia.  The high level group are at increased risk of developing PTLD, but not all will get PTLD.  The highest risk group is really the group where EBV is dynamically changing (ie increasing significantly).  At this point we will trend out the EBV PCR.  Immunosuppression was previously decreased, now however she is being treated for IBD.  The mononucleosis like syndrome and severe odynophagia are now a resolved issues.   1. The strength of EBV PCR monitoring in the blood is to detect PTLD at an early stage when decrease in immunosuppression might be of most benefit.  However, EBV viremia w/out PTLD can also have high level EBV viremia.  And, some patients will have high grade EBV viremia for a long time w/out PTLD, with this group being at an increase risk of PTLD.     2. Our new EBV PCR testing is 1 log more sensitive compared to the old test, thus the result of 500K would be equivalent to 50K on the old test.  There are no exact cut off's of EBV viremia for which to pursue further tz modifications or evaluation of PTLD.  In her case it is very reasonable to further screen for PTLD with imaging as I have ordered.     3. A dose of rituximab will certainly help the  EBV viremia and/or partially treat an early phase PTLD, but with the results of the CT c/a/p ordered today, I would like to try to add on some studies for EBV staining of the colon bx's to r/o GI PTLD.  At the very least, pending response to tz of CMV a repeat colonoscopy might be warranted.     -currently continuing to monitor EBV Viremia    Other ID issues:  -prophylaxis: bactrim  -serostatus: CMV D+/R-, EBV R+  -immunizations:  Up to date  -isolation:  Good had hygiene    Attestation:  I have reviewed today's vital signs, medications, labs and imaging.      Walker Nails,   Pager [phone]         History of Infectious Disease Illness:   This is a very pleasant 31 year ol female w/ PSC/ UC, autoimmune hepatitis s/p LDLT 6/2015 w/ anastomotic biliary stricture (last bili stent removed 3/1/16).  The transplant course was complicated by CMV syndrome w/ possible gastritis/ colitis (muscle aches, stomach pain and diarrhea at that time) 12/2015, CMV D+/R-, viremia peak at 7000 IU since remained <137 but detectable.  She completed a course of IV GCV (3 weeks) then course of vGCV tz.  Current immunosuppression tacro/ azathioprine and pred.      I initially saw Yarelis after hospitalization from 3/29-4/2/16, admitted w/ cough, fever, sore throat w/ nasal congestion, loose stool and prodrome of 3 weeks of fatigue, night sweats and fevers.  During that hospitalization a colonoscopy was performed on 4/1(reported few small ulcerations at the ileocecal valve and mucosal ulcerations in a discontinuous pattern), path + for CMV on immunostain on most areas throughout the colon that was biopsied, no evidence of active colitis.  Stool studies at the time were negative, including enteric PCR panel and c.diff.  EBV viremia went from 108K on 3/29 to 533K on 3/31 and she was given a dose of Rituximab.  Repeat EBV PCR of blood on 4/7/16 was 9197.      Early April Yarelis had an ongoing sore throat, feels very dry in the morning, ears  aching.  Yesterday she developed diarrhea again, watery, 2-3 x's day w/ stomach pain.  No fevers, no weight loss.  She notes some sweats at night.  No cough, no mouth pain no joint pain.  Valcyte was started 4/6/2016, changed to IV GCV on 4/21/16.  SH: no pets at home, works at home, no recent known sick contacts.  On one week follow up Yarelis had  ongoing throat pain w/ trouble swallowing.  Often feels dry.  The odynophagia does not extend down her esophagus.  She has jaw and ear pain.  The diarrhea is improving, now off and on, Saturday she had formed stools followed by diarrhea two hours later.  No new rashes or joint pains.  No stomach pain.  She reports fevers up to 102.  No cough/ sob, vision changes or headaches.  Ultimately we were trying to manage Yarelis as an outpatient, escalating the oral/ topical throat analgesics and she was scheduled for ENT, but hospitalized from 4/26-4/29 failed outpt management with poor PO intake and ESTUARDO.  While in house, ENT evaluated Yarelis, flex endoscope w/ normal thick purulent drainage and crusting.  Throat swabs w/ S. Aureus and she was treated w/ 7 days of doxycycline.   Hurricane mouth spray for supportive care.    Mid-way through the hospitalization her mouth pain started to improve and has since resolved completely.  The topical analgesics dc'd a while back.  No oral pain, odynophagia.  She is eating normally, maintaining weight, good energy.  No fevers or chills.  No n/v/d or abdominal pain. Normal formed stools.  No vision changes or headaches. Since the last time I saw her she did have a cough, which is now improving, mostly dry. No sore throat or rhinorrhea. She also has returned from a 3 week trip to [place] traveling around, which went very well.     Yarelis was hospitalized 12/2016 w/ IBD flair treated w/ steroids and CMV Viremi up to 3000 IU.  We treated w/ IV GCV for a course that ended 2-3 weeks ago.  We wanted IV therapy in the setting of IBD flair and concern for  "absorption.  Since then Yarelis feels well.  No fevers or chills.  Good energy.  Her stool is formed.  No abdominal pain or cramps.  No n/v, cough/ sob, vision changes or headaches.  She feels well overall. Currently down to pred 10 mg daily and vedolizimab q 2 months, received 3 doses already.            Transplants:  6/18/2015 (Liver), Postoperative day:  607      Immunization History   Administered Date(s) Administered     HIB 04/24/2015     Hepatitis B 07/31/2000, 09/06/2000, 02/09/2001     Influenza (H1N1) 01/13/2010     Influenza (IIV3) 09/02/2010, 10/01/2010     Influenza Vaccine IM 3yrs+ 4 Valent IIV4 11/24/2016     Meningococcal (Menomune ) 06/02/2015     Pneumococcal (PCV 13) 05/11/2016     Pneumococcal 23 valent 04/24/2015     TD (ADULT, 7+) 12/01/2001     TDAP (BOOSTRIX AGES 10-64) 05/11/2016     Twinrix A/B 04/24/2015, 06/02/2015       Current Outpatient Prescriptions   Medication     sulfamethoxazole-trimethoprim (BACTRIM DS) 800-160 MG per tablet     ursodiol (ACTIGALL) 250 MG tablet     tacrolimus (PROGRAF - GENERIC EQUIVALENT) 0.5 MG capsule     tacrolimus (PROGRAF - GENERIC EQUIVALENT) 1 MG capsule     zolpidem (AMBIEN) 5 MG tablet     GNP ASPIRIN LOW DOSE 81 MG EC tablet     predniSONE (DELTASONE) 10 MG tablet     No current facility-administered medications for this visit.             Allergies:     Allergies   Allergen Reactions     Rifampin Other (See Comments)     Kidney failure     Penicillins      Hives, has tolerated amoxicillin            Physical Exam:   Vitals were reviewed.   /87  Pulse 60  Temp 98.5  F (36.9  C) (Oral)  Ht 1.575 m (5' 2\")  Wt 55.2 kg (121 lb 11.2 oz)  BMI 22.26 kg/m2  Physical Examination:  GENERAL:  Thin female ambulating well, comfortable on room air.   HEENT:  Head is normocephalic, atraumatic   EYES:  Eyes have anicteric sclerae without conjunctival injection or stigmata of endocarditis.    ENT:  Oropharynx is moist without exudates or ulcers.  Tongue is midline. "  Good dentition..   NECK:  Supple. No LAD or tenderness  LUNGS:  Clear to auscultation bilateral.   CARDIOVASCULAR:  Regular rate and rhythm with no murmurs, gallops or rubs.  ABDOMEN:  Normal bowel sounds, soft, non tender. No appreciable hepatosplenomegaly.  Surgical sites well healed.   SKIN:  No acute rashes.  No stigmata of endocarditis.  NEUROLOGIC:  Grossly nonfocal. Active x4 extremities  No CVA or spinal tenderness.         Laboratory Data:     CD4 counts    Absolute CD4   Date Value Ref Range Status   04/01/2016 386 (L) 441 - 2156 cells/uL Final     Inflammatory Markers    Recent Labs   Lab Test  02/13/17   1039  01/27/17   0812  01/10/17   0930  12/30/16   1010  12/15/16   0718  12/14/16   0700  12/13/16   0632  12/12/16   0600   12/09/16   2019  12/05/16   1232   11/23/16   0937  11/22/16   1821   03/18/09   1451   SED  67*  16   --   90*   --    --    --    --    --   84*  92*   --   78*  74*   --   >140*   CRP  29.0*  <2.9  <2.9  47.8*  6.8  5.6  12.0*  21.0*   < >  26.0*  24.0*   < >  190.0*  210.0*   < >  11.6*    < > = values in this interval not displayed.     Immune Globulin Studies  No lab results found.  Metabolic Studies       Recent Labs   Lab Test  01/24/17   1045  01/17/17   0815  01/10/17   0930  01/03/17   0830  12/30/16   1010  12/27/16   1515   12/14/16   0700  12/13/16   0632   12/05/16   1232   11/03/16   1035   NA  141  144  140  143  140  139   < >  141  142   < >  136   < >  140   POTASSIUM  4.5  4.4  4.0  4.1  3.8  4.6   < >  4.3  5.0   < >  4.5   < >  4.4   CHLORIDE  108  112*  108  108  109  104   < >  111*  112*   < >  102   < >  108   CO2  25  24  24  27  22  30   < >  24  22   < >  27   < >  25   ANIONGAP  8  8  8  8  9  5   < >  6  7   < >  6   < >  7   BUN  24  22  22  21  15  25   < >  16  15   < >  12   < >  14   CR  1.10*  0.86  1.03  0.94  0.79  0.90   < >  0.82  0.81   < >  1.12*   < >  0.94   GFRESTIMATED  58*  76  62  69  85  72   < >  81  82   < >  57*   < >  70    GLC  65*  73  90  63*  69*  136*   < >  99  126*   < >  94   < >  83   KERLINE  8.6  8.6  8.6  8.7  8.2*  8.8   < >  8.4*  8.1*   < >  9.0   < >  8.7   PHOS   --    --    --    --    --    --    --    --    --    --   2.0*   --   2.5   MAG   --    --    --    --    --    --    --   1.8  1.6   < >  1.7   < >  1.5*    < > = values in this interval not displayed.       Hepatic Studies    Recent Labs   Lab Test  02/13/17   1039  01/27/17   0812 01/24/17   1045  01/17/17   0815  01/10/17   0930  01/03/17   0830   BILITOTAL  0.3  0.4  0.3  0.3  0.3  0.2   ALKPHOS  93  51  55  57  68  83   ALBUMIN  3.2*  3.3*  3.1*  2.9*  3.0*  2.5*   AST  35  24  27  21  20  17   ALT  56*  42  36  32  28  28       Pancreatitis testing    Recent Labs   Lab Test  12/09/16 2019 11/23/16   0937  04/26/16   2223  03/01/16   0747  12/08/15   0708  09/08/15   0809  07/30/15   0815  06/26/15   0900  06/19/15   0340  06/17/15   0930   06/19/12   0748   AMYLASE   --    --   43  94  91  108  103  58  164*  104   --    --    LIPASE  224  84   --   209  188  237  223  143  818*   --    < >   --    TRIG   --    --    --    --    --    --    --    --    --    --    --   417*    < > = values in this interval not displayed.       Hematology Studies      Recent Labs   Lab Test  02/13/17   1039  01/27/17   0812  01/24/17   1045  01/17/17   0815  01/10/17   0930  01/03/17   0830   WBC  9.6  6.0  7.8  13.0*  12.9*  6.7   ANEU  4.2  4.3  5.3  9.9*  11.3*  4.7   ALYM  1.9  1.3  1.4  1.5  1.0  1.3   DEVANTE  2.2*  0.3  0.9  1.2  0.6  0.7   AEOS  0.2  0.0  0.1  0.1  0.0  0.1   HGB  9.7*  9.1*  9.3*  9.3*  9.5*  8.9*   HCT  32.4*  30.9*  30.8*  30.7*  31.6*  28.4*   MCV  84  89  90  93  94  94   PLT  675*  461*  500*  597*  618*  480*       Clotting Studies    Recent Labs   Lab Test  12/09/16 2019 11/22/16   1821  04/27/16   0602  03/29/16   1043   06/27/15   0325  06/25/15   2000  06/25/15   1558   INR  1.10  1.21*  1.28*  1.12   < >  1.96*  1.98*  2.06*   PTT    --    --   56*   --    --   39*  41*  47*    < > = values in this interval not displayed.       Microbiology:  Stool: 12/10/16: microsporidia, O&P, giardia ag, enteric panel negative, c.diff negative    BCX  3/30/16 negative   2/29/16 negative    UCX 3/29/16 negative    Virology:  CMV viral loads    2/7/17 undetectable  12/27/16-1/31/17 <137 but detectable  12/14/16 3300  12/10/16 3000  12/5/16 387  --------  7/11/16: 153  12/28/15: 7730, thereafter <137 but detectable mostly    EBV viral loads     3/29/16 108,278  3/31/16 533,618  4/7/16  9197  4/12 2609  4/14 682  5/9/16 negative  7/11/16 51,989    Hepatitis B Testing     Recent Labs   Lab Test  11/22/16   1821  06/17/15   0930  06/19/12   0748   HBSAB   --    --   0.0   HBCAB  Nonreactive  Nonreactive  Negative   HEPBANG  Nonreactive   --   Negative   HBCM   --   Nonreactive   A nonreactive result suggests lack of recent exposure to the virus in the   preceding 6 months.     --        Hepatitis C Testing     Hepatitis C Antibody   Date Value Ref Range Status   06/17/2015  NR Final    Nonreactive   Assay performance characteristics have not been established for newborns,   infants, and children     06/19/2012 Negative NEG Final       Respiratory Virus Testing    RSV Rapid Antigen Result   Date Value Ref Range Status   03/31/2016  NEG Final    Negative   Test results must be correlated with clinical data. If necessary, results   should be confirmed by a molecular assay or viral culture.         Serostatus:  CMV IgG Antibody   Date Value Ref Range Status   06/19/2012 <0.20  Negative for anti-CMV IgG U/mL Final     EBV VCA IgG Antibody   Date Value Ref Range Status   06/19/2012 >750.00  Positive, suggests immunologic exposure. U/mL Final       Imaging:  CXR: 1/9/17: Stable mild left basilar/retrocardiac linear opacities which may represent atelectasis or infection.    MR enterography: 1214/16  1. Diffuse colonic wall thickening and enhancement with loss of haustral  pattern. Wall thickening and enhancement of the terminal ileum. Findings consistent with acute on chronic changes related to ulcerative colitis.  2. Stable 7.4 cm left adnexal cyst. A short interval follow-up ultrasound is recommended to help determine whether this represents a physiologic lesion.    CT c/a/p w/ contrast 4/14/16:   1. Postoperative changes of hepatic transplantation. 1a. Terminal ileum thickening and thickening of bowel wall in the sigmoid colon. PTLD could have this appearance.    2. Focal narrowing of the portal vein near the anastomosis. Further evaluation could be performed with ultrasound with Doppler.  3. Decrease in prominence of periaortic and aortocaval nodes since 7/9/2015.  4. Mild decrease in right lower lobe 6 mm nodule since 7/9/2015.    CT sinus 3/30/16: Overall mild paranasal sinus mucosal thickening. No evidence of acute sinusitis.

## 2017-02-15 ENCOUNTER — TELEPHONE (OUTPATIENT)
Dept: GASTROENTEROLOGY | Facility: CLINIC | Age: 32
End: 2017-02-15

## 2017-02-15 NOTE — TELEPHONE ENCOUNTER
Patient's recent lab work shows her CRP is up to 29 (previously had normalized) and ESR is 67 (also previously normalized).  Platelets continue to increase.  She is currently on 5mg prednisone, with plans to stay there. We will plan to repeat labs in one week, evaluate symptoms and possibly think about changing to q week infusions of vedolizumab.  Discussed with Dr. Johnson.      Cresencio Sierra PA-C  Division of Gastroenterology, Hepatology and Nutrition  [facility]

## 2017-02-15 NOTE — TELEPHONE ENCOUNTER
I agree. Follow symptoms and repeat labs. Consider going to q 4 week vedolizumab infusions.    Depending on labs next week will also consider a vedolizumab level 4 weeks after last infusion    Fuentes Johnson MD    [facility]  Division of Gastroenterology, Hepatology and Nutrition

## 2017-02-20 DIAGNOSIS — K51.00 ULCERATIVE (CHRONIC) ENTEROCOLITIS, WITHOUT COMPLICATIONS (H): ICD-10-CM

## 2017-02-20 DIAGNOSIS — Z94.4 LIVER REPLACED BY TRANSPLANT (H): ICD-10-CM

## 2017-02-20 LAB
ANISOCYTOSIS BLD QL SMEAR: SLIGHT
CRP SERPL-MCNC: 8.5 MG/L (ref 0–8)
DIFFERENTIAL METHOD BLD: ABNORMAL
EOSINOPHIL # BLD AUTO: 0.1 10E9/L (ref 0–0.7)
EOSINOPHIL NFR BLD AUTO: 1 %
ERYTHROCYTE [DISTWIDTH] IN BLOOD BY AUTOMATED COUNT: 16.9 % (ref 10–15)
ERYTHROCYTE [SEDIMENTATION RATE] IN BLOOD BY WESTERGREN METHOD: 41 MM/H (ref 0–20)
HCT VFR BLD AUTO: 33.8 % (ref 35–47)
HGB BLD-MCNC: 10.1 G/DL (ref 11.7–15.7)
HYPOCHROMIA BLD QL: PRESENT
LYMPHOCYTES # BLD AUTO: 1.9 10E9/L (ref 0.8–5.3)
LYMPHOCYTES NFR BLD AUTO: 22 %
MCH RBC QN AUTO: 24.5 PG (ref 26.5–33)
MCHC RBC AUTO-ENTMCNC: 29.9 G/DL (ref 31.5–36.5)
MCV RBC AUTO: 82 FL (ref 78–100)
MICROCYTES BLD QL SMEAR: PRESENT
MONOCYTES # BLD AUTO: 2.1 10E9/L (ref 0–1.3)
MONOCYTES NFR BLD AUTO: 24 %
NEUTROPHILS # BLD AUTO: 4.6 10E9/L (ref 1.6–8.3)
NEUTROPHILS NFR BLD AUTO: 53 %
PLATELET # BLD AUTO: 815 10E9/L (ref 150–450)
PLATELET # BLD EST: ABNORMAL 10*3/UL
RBC # BLD AUTO: 4.12 10E12/L (ref 3.8–5.2)
TACROLIMUS BLD-MCNC: 4.1 UG/L (ref 5–15)
TME LAST DOSE: ABNORMAL H
WBC # BLD AUTO: 8.7 10E9/L (ref 4–11)

## 2017-02-20 PROCEDURE — 86140 C-REACTIVE PROTEIN: CPT | Performed by: FAMILY MEDICINE

## 2017-02-20 PROCEDURE — 85025 COMPLETE CBC W/AUTO DIFF WBC: CPT | Performed by: FAMILY MEDICINE

## 2017-02-20 PROCEDURE — 80197 ASSAY OF TACROLIMUS: CPT | Performed by: FAMILY MEDICINE

## 2017-02-20 PROCEDURE — 36415 COLL VENOUS BLD VENIPUNCTURE: CPT | Performed by: FAMILY MEDICINE

## 2017-02-20 PROCEDURE — 80076 HEPATIC FUNCTION PANEL: CPT | Performed by: FAMILY MEDICINE

## 2017-02-20 PROCEDURE — 85652 RBC SED RATE AUTOMATED: CPT | Performed by: FAMILY MEDICINE

## 2017-02-21 LAB
ALBUMIN SERPL-MCNC: 3.4 G/DL (ref 3.4–5)
ALP SERPL-CCNC: 133 U/L (ref 40–150)
ALT SERPL W P-5'-P-CCNC: 71 U/L (ref 0–50)
AST SERPL W P-5'-P-CCNC: 50 U/L (ref 0–45)
BILIRUB DIRECT SERPL-MCNC: <0.1 MG/DL (ref 0–0.2)
BILIRUB SERPL-MCNC: 0.3 MG/DL (ref 0.2–1.3)
PROT SERPL-MCNC: 7.4 G/DL (ref 6.8–8.8)

## 2017-02-23 DIAGNOSIS — Z94.4 LIVER REPLACED BY TRANSPLANT (H): Primary | ICD-10-CM

## 2017-02-23 NOTE — PROGRESS NOTES
Pt's LFTs slightly increased. Per DR castillo pt to check CMV, EBV this week. , increase tacrolimus and repeat hepatic panel next week. Pt feels great; having some loose stools but nothing different. Pt verbalizes understanding.

## 2017-03-02 DIAGNOSIS — N94.9 ADNEXAL CYST: Primary | ICD-10-CM

## 2017-03-02 DIAGNOSIS — K51.00 ULCERATIVE PANCOLITIS WITHOUT COMPLICATION (H): ICD-10-CM

## 2017-03-02 DIAGNOSIS — Z94.4 LIVER REPLACED BY TRANSPLANT (H): ICD-10-CM

## 2017-03-02 PROCEDURE — 36415 COLL VENOUS BLD VENIPUNCTURE: CPT | Performed by: FAMILY MEDICINE

## 2017-03-02 PROCEDURE — 80076 HEPATIC FUNCTION PANEL: CPT | Performed by: FAMILY MEDICINE

## 2017-03-02 PROCEDURE — 80197 ASSAY OF TACROLIMUS: CPT | Performed by: FAMILY MEDICINE

## 2017-03-02 NOTE — PROGRESS NOTES
Pt called stating that she needed a follow up ultrasound from when she was inpatient, but nobody called to schedule. Pt transferred to transplant , but was unable to use the order. Reordered same order so patient could get scheduled.

## 2017-03-03 LAB
ALBUMIN SERPL-MCNC: 3.3 G/DL (ref 3.4–5)
ALP SERPL-CCNC: 255 U/L (ref 40–150)
ALT SERPL W P-5'-P-CCNC: 86 U/L (ref 0–50)
AST SERPL W P-5'-P-CCNC: 43 U/L (ref 0–45)
BILIRUB DIRECT SERPL-MCNC: 0.1 MG/DL (ref 0–0.2)
BILIRUB SERPL-MCNC: 0.3 MG/DL (ref 0.2–1.3)
PROT SERPL-MCNC: 7.6 G/DL (ref 6.8–8.8)
TACROLIMUS BLD-MCNC: 7 UG/L (ref 5–15)
TME LAST DOSE: NORMAL H

## 2017-03-08 ENCOUNTER — TELEPHONE (OUTPATIENT)
Dept: TRANSPLANT | Facility: CLINIC | Age: 32
End: 2017-03-08

## 2017-03-08 DIAGNOSIS — Z94.4 LIVER REPLACED BY TRANSPLANT (H): Primary | ICD-10-CM

## 2017-03-13 ENCOUNTER — HOSPITAL ENCOUNTER (OUTPATIENT)
Dept: MRI IMAGING | Facility: CLINIC | Age: 32
Discharge: HOME | End: 2017-03-13
Attending: INTERNAL MEDICINE | Admitting: INTERNAL MEDICINE
Payer: COMMERCIAL

## 2017-03-13 ENCOUNTER — TELEPHONE (OUTPATIENT)
Dept: TRANSPLANT | Facility: CLINIC | Age: 32
End: 2017-03-13

## 2017-03-13 DIAGNOSIS — Z94.4 LIVER TRANSPLANTED (H): ICD-10-CM

## 2017-03-13 DIAGNOSIS — K83.1 OBSTRUCTION OF BILE DUCT (H): Primary | ICD-10-CM

## 2017-03-13 DIAGNOSIS — Z94.4 LIVER REPLACED BY TRANSPLANT (H): ICD-10-CM

## 2017-03-13 PROCEDURE — 25500064 ZZH RX 255 OP 636: Performed by: INTERNAL MEDICINE

## 2017-03-13 PROCEDURE — A9581 GADOXETATE DISODIUM INJ: HCPCS | Performed by: INTERNAL MEDICINE

## 2017-03-13 PROCEDURE — 74183 MRI ABD W/O CNTR FLWD CNTR: CPT

## 2017-03-13 PROCEDURE — 25000128 H RX IP 250 OP 636: Performed by: INTERNAL MEDICINE

## 2017-03-13 RX ADMIN — GADOXETATE DISODIUM 6 ML: 181.43 INJECTION, SOLUTION INTRAVENOUS at 09:09

## 2017-03-13 RX ADMIN — SODIUM CHLORIDE 50 ML: 9 INJECTION, SOLUTION INTRAVENOUS at 09:09

## 2017-03-13 NOTE — TELEPHONE ENCOUNTER
Per Dr. Enriquez:    Notes Recorded by Daniela Enriquez MD on 3/13/2017 at 1:05 PM  Please arrange for ERCP ASAP. Aphos 255, Tbili 0.3  31 y F LDLT 6/18/15 for PSC/AIH and h/o biliary leak and stricture. Single anastomosis. Last ERCP 3/1/16 with Joni. MRCP now shows 1.5 cm biliary stricture and worsening intrahepatic biliary dilatation.  ------    Spoke to Yarelis, she is aware that she needs ERCP.  Order placed.

## 2017-03-14 ENCOUNTER — TELEPHONE (OUTPATIENT)
Dept: GASTROENTEROLOGY | Facility: CLINIC | Age: 32
End: 2017-03-14

## 2017-03-14 NOTE — TELEPHONE ENCOUNTER
Yarelis is informed that she is scheduled on 03/20/2017 at 3:35 PM with an arrival time of 1:35 PM.  She knows that it is taking place at the [facility] hospital.   She knows she will need a pre-op physical. She states that she had not have one recently but she goes to the doctor's at least once a month. I asked her if she can either get one done or have her PCP update one and send it to us. I gave her the fax number to send the pre-op physical to, [phone].  She knows she will need a  and someone to monitor her for 24 hours post procedure.  No procedure packet will be mailed as she was added on a few days to the procedure date.    SR 03/14/2017  [id number]

## 2017-03-20 ENCOUNTER — APPOINTMENT (OUTPATIENT)
Dept: GENERAL RADIOLOGY | Facility: CLINIC | Age: 32
End: 2017-03-20
Attending: INTERNAL MEDICINE
Payer: COMMERCIAL

## 2017-03-20 ENCOUNTER — SURGERY (OUTPATIENT)
Age: 32
End: 2017-03-20

## 2017-03-20 ENCOUNTER — ANESTHESIA (OUTPATIENT)
Dept: SURGERY | Facility: CLINIC | Age: 32
End: 2017-03-20
Payer: COMMERCIAL

## 2017-03-20 ENCOUNTER — ANESTHESIA EVENT (OUTPATIENT)
Dept: SURGERY | Facility: CLINIC | Age: 32
End: 2017-03-20
Payer: COMMERCIAL

## 2017-03-20 ENCOUNTER — HOSPITAL ENCOUNTER (OUTPATIENT)
Facility: CLINIC | Age: 32
Discharge: HOME | End: 2017-03-20
Attending: INTERNAL MEDICINE | Admitting: INTERNAL MEDICINE
Payer: COMMERCIAL

## 2017-03-20 ENCOUNTER — OFFICE VISIT (OUTPATIENT)
Dept: GASTROENTEROLOGY | Facility: CLINIC | Age: 32
End: 2017-03-20

## 2017-03-20 VITALS
OXYGEN SATURATION: 99 % | DIASTOLIC BLOOD PRESSURE: 97 MMHG | TEMPERATURE: 97.5 F | WEIGHT: 111.99 LBS | SYSTOLIC BLOOD PRESSURE: 133 MMHG | BODY MASS INDEX: 20.61 KG/M2 | RESPIRATION RATE: 16 BRPM | HEIGHT: 62 IN

## 2017-03-20 VITALS
OXYGEN SATURATION: 97 % | HEART RATE: 94 BPM | WEIGHT: 114 LBS | DIASTOLIC BLOOD PRESSURE: 84 MMHG | SYSTOLIC BLOOD PRESSURE: 120 MMHG | BODY MASS INDEX: 20.98 KG/M2 | HEIGHT: 62 IN

## 2017-03-20 DIAGNOSIS — K51.018 ULCERATIVE PANCOLITIS WITH OTHER COMPLICATION (H): Primary | ICD-10-CM

## 2017-03-20 DIAGNOSIS — K51.90 UC (ULCERATIVE COLITIS) (H): Primary | ICD-10-CM

## 2017-03-20 DIAGNOSIS — Z98.890 S/P ERCP: Primary | ICD-10-CM

## 2017-03-20 LAB
ALBUMIN SERPL-MCNC: 2.8 G/DL (ref 3.4–5)
ALP SERPL-CCNC: 328 U/L (ref 40–150)
ALT SERPL W P-5'-P-CCNC: 66 U/L (ref 0–50)
AMYLASE SERPL-CCNC: 94 U/L (ref 30–110)
ANION GAP SERPL CALCULATED.3IONS-SCNC: 9 MMOL/L (ref 3–14)
AST SERPL W P-5'-P-CCNC: 37 U/L (ref 0–45)
BILIRUB SERPL-MCNC: 0.3 MG/DL (ref 0.2–1.3)
BUN SERPL-MCNC: 16 MG/DL (ref 7–30)
CALCIUM SERPL-MCNC: 8.8 MG/DL (ref 8.5–10.1)
CHLORIDE SERPL-SCNC: 109 MMOL/L (ref 94–109)
CO2 SERPL-SCNC: 22 MMOL/L (ref 20–32)
CREAT SERPL-MCNC: 1.11 MG/DL (ref 0.52–1.04)
ERYTHROCYTE [DISTWIDTH] IN BLOOD BY AUTOMATED COUNT: 18.3 % (ref 10–15)
GFR SERPL CREATININE-BSD FRML MDRD: 57 ML/MIN/1.7M2
GLUCOSE SERPL-MCNC: 89 MG/DL (ref 70–99)
HCG UR QL: NEGATIVE
HCT VFR BLD AUTO: 33.2 % (ref 35–47)
HGB BLD-MCNC: 10.1 G/DL (ref 11.7–15.7)
INR PPP: 1.01 (ref 0.86–1.14)
LIPASE SERPL-CCNC: 139 U/L (ref 73–393)
MCH RBC QN AUTO: 22.9 PG (ref 26.5–33)
MCHC RBC AUTO-ENTMCNC: 30.4 G/DL (ref 31.5–36.5)
MCV RBC AUTO: 75 FL (ref 78–100)
PLATELET # BLD AUTO: 614 10E9/L (ref 150–450)
POTASSIUM SERPL-SCNC: 4.6 MMOL/L (ref 3.4–5.3)
PROT SERPL-MCNC: 7.9 G/DL (ref 6.8–8.8)
RBC # BLD AUTO: 4.42 10E12/L (ref 3.8–5.2)
SODIUM SERPL-SCNC: 140 MMOL/L (ref 133–144)
WBC # BLD AUTO: 15.2 10E9/L (ref 4–11)

## 2017-03-20 PROCEDURE — 36000061 ZZH SURGERY LEVEL 3 W FLUORO 1ST 30 MIN - UMMC: Performed by: INTERNAL MEDICINE

## 2017-03-20 PROCEDURE — 25000128 H RX IP 250 OP 636: Performed by: NURSE ANESTHETIST, CERTIFIED REGISTERED

## 2017-03-20 PROCEDURE — C1769 GUIDE WIRE: HCPCS | Performed by: INTERNAL MEDICINE

## 2017-03-20 PROCEDURE — 25000132 ZZH RX MED GY IP 250 OP 250 PS 637: Performed by: INTERNAL MEDICINE

## 2017-03-20 PROCEDURE — C1726 CATH, BAL DIL, NON-VASCULAR: HCPCS | Performed by: INTERNAL MEDICINE

## 2017-03-20 PROCEDURE — 37000008 ZZH ANESTHESIA TECHNICAL FEE, 1ST 30 MIN: Performed by: INTERNAL MEDICINE

## 2017-03-20 PROCEDURE — 40000277 XR SURGERY CARM FLUORO LESS THAN 5 MIN W STILLS: Mod: TC

## 2017-03-20 PROCEDURE — 83690 ASSAY OF LIPASE: CPT | Performed by: INTERNAL MEDICINE

## 2017-03-20 PROCEDURE — 25000125 ZZHC RX 250: Performed by: INTERNAL MEDICINE

## 2017-03-20 PROCEDURE — 25000125 ZZHC RX 250: Performed by: NURSE ANESTHETIST, CERTIFIED REGISTERED

## 2017-03-20 PROCEDURE — 81025 URINE PREGNANCY TEST: CPT | Performed by: ANESTHESIOLOGY

## 2017-03-20 PROCEDURE — 40000170 ZZH STATISTIC PRE-PROCEDURE ASSESSMENT II: Performed by: INTERNAL MEDICINE

## 2017-03-20 PROCEDURE — 36415 COLL VENOUS BLD VENIPUNCTURE: CPT | Performed by: INTERNAL MEDICINE

## 2017-03-20 PROCEDURE — 36000059 ZZH SURGERY LEVEL 3 EA 15 ADDTL MIN UMMC: Performed by: INTERNAL MEDICINE

## 2017-03-20 PROCEDURE — C1876 STENT, NON-COA/NON-COV W/DEL: HCPCS | Performed by: INTERNAL MEDICINE

## 2017-03-20 PROCEDURE — 71000027 ZZH RECOVERY PHASE 2 EACH 15 MINS: Performed by: INTERNAL MEDICINE

## 2017-03-20 PROCEDURE — 25800025 ZZH RX 258: Performed by: ANESTHESIOLOGY

## 2017-03-20 PROCEDURE — 85027 COMPLETE CBC AUTOMATED: CPT | Performed by: INTERNAL MEDICINE

## 2017-03-20 PROCEDURE — 27210794 ZZH OR GENERAL SUPPLY STERILE: Performed by: INTERNAL MEDICINE

## 2017-03-20 PROCEDURE — 25500064 ZZH RX 255 OP 636: Performed by: INTERNAL MEDICINE

## 2017-03-20 PROCEDURE — 25000565 ZZH ISOFLURANE, EA 15 MIN: Performed by: INTERNAL MEDICINE

## 2017-03-20 PROCEDURE — 82150 ASSAY OF AMYLASE: CPT | Performed by: INTERNAL MEDICINE

## 2017-03-20 PROCEDURE — 80053 COMPREHEN METABOLIC PANEL: CPT | Performed by: INTERNAL MEDICINE

## 2017-03-20 PROCEDURE — 37000009 ZZH ANESTHESIA TECHNICAL FEE, EACH ADDTL 15 MIN: Performed by: INTERNAL MEDICINE

## 2017-03-20 PROCEDURE — 85610 PROTHROMBIN TIME: CPT | Performed by: INTERNAL MEDICINE

## 2017-03-20 PROCEDURE — 71000012 ZZH RECOVERY PHASE 1 LEVEL 1 FIRST HR: Performed by: INTERNAL MEDICINE

## 2017-03-20 PROCEDURE — 25000128 H RX IP 250 OP 636: Performed by: ANESTHESIOLOGY

## 2017-03-20 DEVICE — STENT JOHLIN PANCREA WEDGE 10FRX20CM W/INTRO G26827
Type: IMPLANTABLE DEVICE | Site: PANCREATIC DUCT | Status: NON-FUNCTIONAL
Removed: 2017-05-15

## 2017-03-20 RX ORDER — ONDANSETRON 2 MG/ML
INJECTION INTRAMUSCULAR; INTRAVENOUS PRN
Status: DISCONTINUED | OUTPATIENT
Start: 2017-03-20 | End: 2017-03-20

## 2017-03-20 RX ORDER — ONDANSETRON 2 MG/ML
4 INJECTION INTRAMUSCULAR; INTRAVENOUS EVERY 30 MIN PRN
Status: DISCONTINUED | OUTPATIENT
Start: 2017-03-20 | End: 2017-03-20 | Stop reason: HOSPADM

## 2017-03-20 RX ORDER — DEXAMETHASONE SODIUM PHOSPHATE 4 MG/ML
INJECTION, SOLUTION INTRA-ARTICULAR; INTRALESIONAL; INTRAMUSCULAR; INTRAVENOUS; SOFT TISSUE PRN
Status: DISCONTINUED | OUTPATIENT
Start: 2017-03-20 | End: 2017-03-20

## 2017-03-20 RX ORDER — LIDOCAINE HYDROCHLORIDE 20 MG/ML
INJECTION, SOLUTION INFILTRATION; PERINEURAL PRN
Status: DISCONTINUED | OUTPATIENT
Start: 2017-03-20 | End: 2017-03-20

## 2017-03-20 RX ORDER — NALOXONE HYDROCHLORIDE 0.4 MG/ML
.1-.4 INJECTION, SOLUTION INTRAMUSCULAR; INTRAVENOUS; SUBCUTANEOUS
Status: DISCONTINUED | OUTPATIENT
Start: 2017-03-20 | End: 2017-03-20 | Stop reason: HOSPADM

## 2017-03-20 RX ORDER — ESMOLOL HYDROCHLORIDE 10 MG/ML
INJECTION INTRAVENOUS PRN
Status: DISCONTINUED | OUTPATIENT
Start: 2017-03-20 | End: 2017-03-20

## 2017-03-20 RX ORDER — PROPOFOL 10 MG/ML
INJECTION, EMULSION INTRAVENOUS PRN
Status: DISCONTINUED | OUTPATIENT
Start: 2017-03-20 | End: 2017-03-20

## 2017-03-20 RX ORDER — ONDANSETRON 4 MG/1
4 TABLET, ORALLY DISINTEGRATING ORAL EVERY 30 MIN PRN
Status: DISCONTINUED | OUTPATIENT
Start: 2017-03-20 | End: 2017-03-20 | Stop reason: HOSPADM

## 2017-03-20 RX ORDER — INDOMETHACIN 50 MG/1
SUPPOSITORY RECTAL PRN
Status: DISCONTINUED | OUTPATIENT
Start: 2017-03-20 | End: 2017-03-20 | Stop reason: HOSPADM

## 2017-03-20 RX ORDER — INDOMETHACIN 50 MG/1
100 SUPPOSITORY RECTAL
Status: DISCONTINUED | OUTPATIENT
Start: 2017-03-20 | End: 2017-03-20 | Stop reason: HOSPADM

## 2017-03-20 RX ORDER — FLUMAZENIL 0.1 MG/ML
0.2 INJECTION, SOLUTION INTRAVENOUS
Status: DISCONTINUED | OUTPATIENT
Start: 2017-03-20 | End: 2017-03-20 | Stop reason: HOSPADM

## 2017-03-20 RX ORDER — LEVOFLOXACIN 500 MG/1
500 TABLET, FILM COATED ORAL DAILY
Qty: 7 TABLET | Refills: 0 | Status: SHIPPED | OUTPATIENT
Start: 2017-03-20 | End: 2017-05-08

## 2017-03-20 RX ORDER — LEVOFLOXACIN 5 MG/ML
INJECTION, SOLUTION INTRAVENOUS PRN
Status: DISCONTINUED | OUTPATIENT
Start: 2017-03-20 | End: 2017-03-20

## 2017-03-20 RX ORDER — SODIUM CHLORIDE, SODIUM LACTATE, POTASSIUM CHLORIDE, CALCIUM CHLORIDE 600; 310; 30; 20 MG/100ML; MG/100ML; MG/100ML; MG/100ML
INJECTION, SOLUTION INTRAVENOUS CONTINUOUS
Status: DISCONTINUED | OUTPATIENT
Start: 2017-03-20 | End: 2017-03-20 | Stop reason: HOSPADM

## 2017-03-20 RX ORDER — HYDROMORPHONE HYDROCHLORIDE 1 MG/ML
.3-.5 INJECTION, SOLUTION INTRAMUSCULAR; INTRAVENOUS; SUBCUTANEOUS EVERY 5 MIN PRN
Status: DISCONTINUED | OUTPATIENT
Start: 2017-03-20 | End: 2017-03-20 | Stop reason: HOSPADM

## 2017-03-20 RX ORDER — IOPAMIDOL 510 MG/ML
INJECTION, SOLUTION INTRAVASCULAR PRN
Status: DISCONTINUED | OUTPATIENT
Start: 2017-03-20 | End: 2017-03-20 | Stop reason: HOSPADM

## 2017-03-20 RX ORDER — NEOSTIGMINE METHYLSULFATE 1 MG/ML
VIAL (ML) INJECTION PRN
Status: DISCONTINUED | OUTPATIENT
Start: 2017-03-20 | End: 2017-03-20

## 2017-03-20 RX ORDER — LIDOCAINE 40 MG/G
CREAM TOPICAL
Status: DISCONTINUED | OUTPATIENT
Start: 2017-03-20 | End: 2017-03-20 | Stop reason: HOSPADM

## 2017-03-20 RX ORDER — GLYCOPYRROLATE 0.2 MG/ML
INJECTION, SOLUTION INTRAMUSCULAR; INTRAVENOUS PRN
Status: DISCONTINUED | OUTPATIENT
Start: 2017-03-20 | End: 2017-03-20

## 2017-03-20 RX ORDER — FENTANYL CITRATE 50 UG/ML
INJECTION, SOLUTION INTRAMUSCULAR; INTRAVENOUS PRN
Status: DISCONTINUED | OUTPATIENT
Start: 2017-03-20 | End: 2017-03-20

## 2017-03-20 RX ORDER — FENTANYL CITRATE 50 UG/ML
25-50 INJECTION, SOLUTION INTRAMUSCULAR; INTRAVENOUS
Status: DISCONTINUED | OUTPATIENT
Start: 2017-03-20 | End: 2017-03-20 | Stop reason: HOSPADM

## 2017-03-20 RX ADMIN — LIDOCAINE HYDROCHLORIDE 70 MG: 20 INJECTION, SOLUTION INFILTRATION; PERINEURAL at 14:45

## 2017-03-20 RX ADMIN — FENTANYL CITRATE 50 MCG: 50 INJECTION, SOLUTION INTRAMUSCULAR; INTRAVENOUS at 14:45

## 2017-03-20 RX ADMIN — Medication 3 MG: at 15:48

## 2017-03-20 RX ADMIN — INDOMETHACIN 100 MG: 50 SUPPOSITORY RECTAL at 15:55

## 2017-03-20 RX ADMIN — GLYCOPYRROLATE 0.4 MG: 0.2 INJECTION, SOLUTION INTRAMUSCULAR; INTRAVENOUS at 15:48

## 2017-03-20 RX ADMIN — IOPAMIDOL 50 ML: 510 INJECTION, SOLUTION INTRAVASCULAR at 15:34

## 2017-03-20 RX ADMIN — HYDROMORPHONE HYDROCHLORIDE 0.3 MG: 10 INJECTION, SOLUTION INTRAMUSCULAR; INTRAVENOUS; SUBCUTANEOUS at 16:27

## 2017-03-20 RX ADMIN — MIDAZOLAM HYDROCHLORIDE 2 MG: 1 INJECTION, SOLUTION INTRAMUSCULAR; INTRAVENOUS at 14:37

## 2017-03-20 RX ADMIN — PHENYLEPHRINE HYDROCHLORIDE 100 MCG: 10 INJECTION, SOLUTION INTRAMUSCULAR; INTRAVENOUS; SUBCUTANEOUS at 15:03

## 2017-03-20 RX ADMIN — FENTANYL CITRATE 50 MCG: 50 INJECTION, SOLUTION INTRAMUSCULAR; INTRAVENOUS at 15:10

## 2017-03-20 RX ADMIN — DEXAMETHASONE SODIUM PHOSPHATE 8 MG: 4 INJECTION, SOLUTION INTRAMUSCULAR; INTRAVENOUS at 14:55

## 2017-03-20 RX ADMIN — ONDANSETRON 4 MG: 2 INJECTION INTRAMUSCULAR; INTRAVENOUS at 15:19

## 2017-03-20 RX ADMIN — ROCURONIUM BROMIDE 40 MG: 10 INJECTION INTRAVENOUS at 14:46

## 2017-03-20 RX ADMIN — ESMOLOL HYDROCHLORIDE 30 MG: 10 INJECTION, SOLUTION INTRAVENOUS at 15:32

## 2017-03-20 RX ADMIN — SIMETHICONE 2 ML: 63.3; 3.7 SOLUTION/ DROPS ORAL at 15:33

## 2017-03-20 RX ADMIN — LEVOFLOXACIN 500 MG: 5 INJECTION, SOLUTION INTRAVENOUS at 14:40

## 2017-03-20 RX ADMIN — SODIUM CHLORIDE, POTASSIUM CHLORIDE, SODIUM LACTATE AND CALCIUM CHLORIDE: 600; 310; 30; 20 INJECTION, SOLUTION INTRAVENOUS at 14:37

## 2017-03-20 RX ADMIN — PROPOFOL 120 MG: 10 INJECTION, EMULSION INTRAVENOUS at 14:45

## 2017-03-20 ASSESSMENT — PAIN SCALES - GENERAL: PAINLEVEL: NO PAIN (0)

## 2017-03-20 NOTE — PATIENT INSTRUCTIONS
It was a pleasure taking care of you today.  I've included a brief summary of our discussion and care plan from today's visit below.  Please review this information with your primary care provider.  ______________________________________________________________________    My recommendations are summarized as follows:    --  Continue with Entyvio infusions (next infusion is 3/24/17).   We will get an Entyvio level with next infusion.  -- Labs with next infusion.  -- Please call us if symptoms worsen and/or you develop blood in your stool.  -- At this time, not change in medications.    Return to GI Clinic on 5/1/17 months to review your progress.    ______________________________________________________________________    Who do I call with any questions after my visit?  Please be in touch if there are any further questions that arise following today's visit.  There are multiple ways to contact your gastroenterology care team.        During business hours, you may reach a Gastroenterology nurse at [phone].       To schedule or reschedule an appointment, please call [phone].       You can always send a secure message through [employer].  [employer] messages are answered by your nurse or doctor typically within 24 hours.  Please allow extra time on weekends and holidays.        For urgent/emergent questions after business hours, you may reach the on-call GI Fellow by contacting the [facility] at [phone].     How will I get the results of any tests ordered?    You will receive all of your results.  If you have signed up for [employer], any tests ordered at your visit will be available to you after your physician reviews them.  Typically this takes 1-2 weeks.  If there are urgent results that require a change in your care plan, your physician or nurse will call you to discuss the next steps.      What is [employer]?  [employer] is a secure way for you to access all of your healthcare records from the  [facility].  It is a web based computer program, so you can sign on to it from any location.  It also allows you to send secure messages to your care team.  I recommend signing up for [employer] access if you have not already done so and are comfortable with using a computer.      How to I schedule a follow-up visit?  If you did not schedule a follow-up visit today, please call [phone] to schedule a follow-up office visit.        Sincerely,    Cresencio Sierra PA-C  [facility]  Division of Gastroenterology

## 2017-03-20 NOTE — DISCHARGE INSTRUCTIONS
[facility], [place]  Same-Day Surgery   Adult Discharge Orders & Instructions     For 24 hours after surgery    1. Get plenty of rest.  A responsible adult must stay with you for at least 24 hours after you leave the hospital.   2. Do not drive or use heavy equipment.  If you have weakness or tingling, don't drive or use heavy equipment until this feeling goes away.  3. Do not drink alcohol.  4. Avoid strenuous or risky activities.  Ask for help when climbing stairs.   5. You may feel lightheaded.  IF so, sit for a few minutes before standing.  Have someone help you get up.   6. If you have nausea (feel sick to your stomach): Drink only clear liquids such as apple juice, ginger ale, broth or 7-Up.  Rest may also help.  Be sure to drink enough fluids.  Move to a regular diet as you feel able.  7. You may have a slight fever. Call the doctor if your fever is over 100 F (37.7 C) (taken under the tongue) or lasts longer than 24 hours.  8. You may have a dry mouth, a sore throat, muscle aches or trouble sleeping.  These should go away after 24 hours.  9. Do not make important or legal decisions.   Call your doctor for any of the followin.  Signs of infection (fever, growing tenderness at the surgery site, a large amount of drainage or bleeding, severe pain, foul-smelling drainage, redness, swelling).    2. It has been over 8 to 10 hours since surgery and you are still not able to urinate (pass water).    3.  Headache for over 24 hours.    To contact a doctor, call Carlos [phone] during clinic hours    or:        [phone] and ask for the resident on call for   GI/surgery (answered 24 hours a day)      Emergency Department:    [facility]: [phone]       (TTY for hearing impaired: [phone])    [facility]: [phone]       (TTY for hearing impaired: [phone])

## 2017-03-20 NOTE — LETTER
3/20/2017       RE: Yarelis Penny  [address] [facility] [address]     Dear Colleague,    Thank you for referring your patient, Yarelis Penny, to the [facility] GASTROENTEROLOGY AND IBD at [facility]. Please see a copy of my visit note below.    IBD CLINIC VISIT     CC/REFERRING MD: Daniela Enriquez  REASON FOR FOLLOW UP: Ulcerative colitis    IBD HISTORY  Age at diagnosis: 18  Extent of disease: Colonoscopies over the years have varied with areas of involvement. Earliest colonoscopies in 2006 show right greater than left inflammation with some inflammation in the ileum as well.  Followup colonoscopy in 2007 showed mainly diffuse colonic inflammation.  Colonoscopies in 2015 again showed more chronic colitis with no significant ileal involvement.  Colonoscopies in 2016 again showed a right-sided and some ileal inflammation with ulceration, but biopsy showed CMV.  Most recently in November, she had a colonoscopy with severe inflammation from 20 cm from the anus all of the way to the ileum as far as could be evaluated.  Biopsies showed chronic active ileitis and chronic active colitis in every sample.  There was no evidence of CMV or PTLD. Rare SHENA-KACIE positive cells seen in ileum.  Current UC medications: Prednisone 5 mg, Entivio, (previously on azathioprine but is currently on hold due to high doses of prednisone and EBV viremia).  Prior UC surgeries: None  Prior IBD Medications: Prednisone, Pentasa, Asacol     DRUG MONITORING  TPMT enzyme activity: (12/6/16) 18.5      6-TGN/6-MMPN levels: (12/6/16) 203/257       Biologic concentration: Has not yet been obtained. We will obtain a level just before next infusion     DISEASE ASSESSMENT  Labs:  Lab Results   Component Value Date    ALBUMIN 3.3 03/02/2017     Recent Labs   Lab Test  02/20/17   1129  02/13/17   1039   CRP  8.5*  29.0*   SED  41*  67*     Endoscopic assessment:   Enterography: (12/14/16) 1. Diffuse colonic  wall thickening and enhancement with loss of  haustral pattern. Wall thickening and enhancement of the terminal  ileum. Findings consistent with acute on chronic changes related to  ulcerative colitis. 2. Stable 7.4 cm left adnexal cyst. A short interval follow-up  ultrasound is recommended to help determine whether this represents a  physiologic lesion. Plans to have follow up with OBGYN and repeat U/S in 6 weeks.  Fecal calprotectin: --   C diff: Negative 12/10/16  Abad score: 3/9    ASSESSMENT/PLAN  Yarelis is a pleasant 31-year-old female with a complicated past medical history including primary sclerosing cholangitis associated inflammatory bowel disease, cirrhosis, status post living donor transplant in 2015 with recent severe flare and was hospitalized 12/2016.       1. Primary sclerosing cholangitis associated inflammatory bowel disease. She likely has PSC associated ulcerative colitis with severe flare in December 2016. The patient has continued to improve symptomatically on a prednisone 5 mg and vedolizumab infusions every 8 weeks. She has now had 3 infusions of vedolizumab. Her next infusion is due 03/24/2017. Over the last 2 weeks she has a mix of formed and loose stools (but still 1-2 per day and no blood).  The azathioprine has been held given the high-dose steroids and the fact that she has EBV viremia. We will continue to hold this for now and continue to work with Dr. Enriquez regarding additional immunosuppression given her liver transplant and history of autoimmune hepatitis. She has had a recent primary EBV episode and has had persistent EBV levels.   -- Labs with next infusion (CBC, LFTs and inflammatory markers)   -- Just before her next infusion on 03/24/2017, we will also obtain a vedolizumab level. This will be her first infusion at every 8-week barbara. We will assess dosage and drug interval at that time.      2. Cough. The patient has been evaluated for cough symptoms for the last 2 visits with  chest x-rays showing no progression or development of a clear infiltrate. Symptoms have completely resolved at this time. We will continue to monitor for any symptoms.      3. PCP prophylaxis. The patient will continue PCP prophylaxis given her recent high-dose steroids and keep her on this for a total of 3 months after the steroids have been completed. This will be double strength Bactrim Monday, Wednesday, Friday.      4. EBV viremia. The patient follows with Dr. Nails. Rituximab treatment has been completed.      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 Her CMV levels were not detected that were last checked in 02/07/2017. She has completed her IV ganciclovir regimen.      6. 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 Last ERCP 3/1/16 with Dr. Quinones.  MRP now shows 1.5 cm biliary stricture and worsening intrahepatic biliary dilation.  ERCP to be performed today per hepatology team.  We continue to work closely with Dr. Enriquez.  She continues on 5 mg prednisone and tacrolimus.      7. Left adnexal mass noted on CT in 12/2016. The patient was instructed to complete a followup ultrasound post-hospitalization. This was completed.  Significantly decreased in size, no further follow up required.     8. IBD healthcare maintenance based on patients current medication: Vedolizumab  Vaccinations:  -- Influenza (every year): Last given 2016  -- TdaP (every 10 years): Last given 2016  -- Pneumococcal Pneumonia (once then every 5 years): Last given 2016     One time confirmation of immunity or serologies:  -- Hepatitis A (serologies or immunizations): 2015  -- Hepatitis B (serologies or immunizations): 2015  -- Varicella: Not documented  -- MMR:Not documented  -- HPV (all aged 18-26): As indicated  -- Meningococcal meningitis (all patients at risk for meningitis): As indicated   -- Due to the immunosuppression in this patient, I would not advise administration of live vaccines such as varicella/VZV, intranasal  influenza, MMR, or yellow fever vaccine (if travelling).      Cancer Screening:  Colon cancer screening:  Given PSC type of colitis, colonoscopy every year is recommended. Dysplasia screening is recommended fall 2017.     Cervical cancer screening: Annual due to immunosupression     Skin cancer screening: Annual visual exam of skin by dermatologist since patient is immunocompromised     Depression Screening:  -- Over the last month, have you felt down, depressed, or hopeless? No  -- Over the last month, have you felt little interest or pleasure doing things? No     Misc:  -- Avoid tobacco use  -- Avoid NSAIDs as there is potentially a 25% chance of causing an IBD flare        RTC 5/1/17 with Dr. Johnson     Thank you for this consultation. It was a pleasure to participate in the care of this patient; please contact us with any further questions.      Cresenico Sierra PA-C  Division of Gastroenterology, Hepatology and Nutrition  [facility]    HPI:   Yarelis is a pleasant 31-year-old female here today for followup regarding PSC associated IBD. Briefly, the patient's history includes history of inflammatory bowel disease, primary sclerosing cholangitis, autoimmune hepatitis and is status post living donor transplant in 2015. Her extent of disease has varied with colonoscopies from right sided to more diffuse with variable ileal involvement. More recent colonoscopies in 2016 have again showed more right-sided ileal involvement. She was initially treated with mesalamine in 2007 and that required a steroid burst. Over the years, she stopped IBD therapy and had largely quiescent disease. She underwent a liver transplant in 2015, has been on Prograf, low-dose prednisone and azathioprine previously. Her post-transplant course was complicated by CMV colitis x2 on 01/2016 and on 04/2016, and she was treated with antiviral therapy both times (IV ganciclovir). Her post-transplant course was also complicated by primary EBV  viremia and followed by the Transplant Infectious Disease team. She again had a flare of her colitis symptoms in 11/2016 and was admitted for severe colitis. Biopsies at this time were negative for CMV and had no significant EBV component. She did have associated mild CMV viremia and EBV viremia, but after extensive discussion with Pathology and Infectious Disease, her flare was not felt to be related to these viruses or PTLD. The patient was treated with IV steroids and transitioned to oral steroids. She initially did well, but in early December failed transition to p.o. steroids. She was rehospitalized and given IV steroids again. She was transitioned to a higher dose of steroids 60 mg and was discharged with vedolizumab infusion the day after discharge. She is currently on 5 mg daily and will continue on this dose. She has completed her IV ganciclovir course.     Today, she is approaching her first 8 week interval of vedolizumab infusions (due 3/24/17).  Over the last 2 weeks she has had a mix of loose and formed stools, however still only 1-2 formed bowel movement per day. She denies any blood in her stool or urgency. She denies any tenesmus.  She has had one occurrence of a nocturnal stool (last night). She continues to slowly incorporate exercise in her daily routine.  She has some vague abdominal pain/ache, that is usually relieved by bowel movements or flatus.  She denies any fevers, chills or sweats, nausea, vomiting, BRBPR, melena, tenesmus, urgency, hesitancy passing flatus, joint pain, skin changes or vision changes.    ROS:    No fevers or chills  No weight loss  No blurry vision, double vision or change in vision  No sore throat  No lymphadenopathy  No headache, paraesthesias, or weakness in a limb  No shortness of breath or wheezing  No chest pain or pressure  No arthralgias or myalgias  No rashes or skin changes  No odynophagia or dysphagia  No BRBPR, hematochezia, melena  No dysuria, frequency or  urgency  No hot/cold intolerance or polyria  No anxiety or depression    Extra intestinal manifestations of IBD:  No uveitis/episcleritis  No aphthous ulcers   No arthritis   No erythema nodosum/pyoderma gangrenosum.     PERTINENT PAST MEDICAL HISTORY:  Past Medical History   Diagnosis Date     Autoimmune hepatitis (H) 2012     on steroid taper     Cholangitis      CKD (chronic kidney disease) 2012     biopsy 2012: TIN, patchy fibrosis     Esophageal varices (H) 9/08     banded     Heart murmur      History of blood transfusion      Primary sclerosing cholangitis      Ulcerative Colitis      f/b GI       PREVIOUS SURGERIES:  Past Surgical History   Procedure Laterality Date     Upper gi endoscopy       Colonoscopy  9/07     Colonoscopy N/A 2/26/2015     Procedure: COMBINED COLONOSCOPY, SINGLE OR MULTIPLE BIOPSY/POLYPECTOMY BY BIOPSY;  Surgeon: Mitchel Hoskins Chi, MD;  Location: [facility]     Esophagoscopy, gastroscopy, duodenoscopy (egd), combined N/A 2/26/2015     Procedure: COMBINED ESOPHAGOSCOPY, GASTROSCOPY, DUODENOSCOPY (EGD);  Surgeon: Mitchel Hoskins Chi, MD;  Location: [facility]     Picc insertion Right 2/27/2015     4fr SL Valved PICC, 36cm (1cm external) in the R medial brachial vein w/ tip in the low SVC.     Transplant liver recipient living unrelated N/A 6/18/2015     Procedure: TRANSPLANT LIVER RECIPIENT LIVING UNRELATED;  Surgeon: Tyree Smith MD;  Location: [facility]     Explore common bile duct N/A 6/25/2015     Procedure: EXPLORE COMMON BILE DUCT;  Surgeon: Tyree Smith MD;  Location: [facility]     Laparotomy exploratory N/A 6/25/2015     Procedure: LAPAROTOMY EXPLORATORY;  Surgeon: Tyree Smith MD;  Location: [facility]     Endoscopic retrograde cholangiopancreatogram N/A 6/25/2015     Procedure: COMBINED ENDOSCOPIC RETROGRADE CHOLANGIOPANCREATOGRAPHY, PLACE TUBE/STENT;  Surgeon: Landon Quinones MD;  [facility]     Endoscopic retrograde cholangiopancreatogram N/A 6/25/2015     Procedure: COMBINED  ENDOSCOPIC RETROGRADE CHOLANGIOPANCREATOGRAPHY, PLACE TUBE/STENT;  Surgeon: Landon Quinones MD;  [facility]     Endoscopic retrograde cholangiopancreatogram N/A 7/2/2015     Procedure: COMBINED ENDOSCOPIC RETROGRADE CHOLANGIOPANCREATOGRAPHY, PLACE TUBE/STENT;  Surgeon: Landon Quinones MD;  [facility]     Endoscopic retrograde cholangiopancreatography, exchange tube/stent N/A 7/30/2015     Procedure: ENDOSCOPIC RETROGRADE CHOLANGIOPANCREATOGRAPHY, EXCHANGE TUBE/STENT;  Surgeon: Landon Quinones MD;  [facility]     Endoscopic retrograde cholangiopancreatogram N/A 9/8/2015     Procedure: COMBINED ENDOSCOPIC RETROGRADE CHOLANGIOPANCREATOGRAPHY, REMOVE FOREIGN BODY OR STENT/TUBE;  Surgeon: Landon Quinones MD;  [facility]     Endoscopic retrograde cholangiopancreatogram N/A 12/8/2015     Procedure: ENDOSCOPIC RETROGRADE CHOLANGIOPANCREATOGRAM;  Surgeon: Landon Quinones MD;  [facility]     Colonoscopy N/A 1/12/2016     Procedure: COMBINED COLONOSCOPY, SINGLE OR MULTIPLE BIOPSY/POLYPECTOMY BY BIOPSY;  Surgeon: Rigo Valles MD;  Location: [facility]     Endoscopic retrograde cholangiopancreatogram N/A 3/1/2016     Procedure: COMBINED ENDOSCOPIC RETROGRADE CHOLANGIOPANCREATOGRAPHY, REMOVE FOREIGN BODY OR STENT/TUBE;  Surgeon: Landon Quinones MD;  [facility]     Colonoscopy N/A 4/1/2016     Procedure: COMBINED COLONOSCOPY, SINGLE OR MULTIPLE BIOPSY/POLYPECTOMY BY BIOPSY;  Surgeon: Kareem Solis MD;  Location: [facility]     Sigmoidoscopy flexible N/A 4/27/2016     Procedure: SIGMOIDOSCOPY FLEXIBLE;  Surgeon: Jose Nielson MD;  Location: [facility]       ALLERGIES:     Allergies   Allergen Reactions     Rifampin Other (See Comments)     Kidney failure     Penicillins      Hives, has tolerated amoxicillin         PERTINENT MEDICATIONS:    Current Outpatient Prescriptions:      sulfamethoxazole-trimethoprim (BACTRIM DS) 800-160 MG per tablet, Take 1 tablet  by mouth three times a week, Disp: 30 tablet, Rfl: 3     ursodiol (ACTIGALL) 250 MG tablet, Take 2 tabs (500 mg) in AM, and 1 tab at night (250 mg), Disp: 270 tablet, Rfl: 3     tacrolimus (PROGRAF - GENERIC EQUIVALENT) 0.5 MG capsule, Take 1 capsule (0.5 mg) by mouth 2 times daily Take with 1 mg capsule. Total dose 2.5 mg every 12 hours, Disp: 180 capsule, Rfl: 3     tacrolimus (PROGRAF - GENERIC EQUIVALENT) 1 MG capsule, Take 2 capsules (2 mg) by mouth 2 times daily *total dose 2.5 mg BID. Take with 0.5 mg capsule, Disp: 360 capsule, Rfl: 3     predniSONE (DELTASONE) 10 MG tablet, Take 6 tabs (60mg) PO x 5 days  Then take 5 tabs (50mg) PO x 7 days  Then take 4 tabs (40mg) PO x 7 days Then take 3 tabs (30mg) PO x 7 days Then take 2 tabs (20mg) PO x 7 days Then take 1 tab (10mg) PO x 7 days Then take 1/2 tablet (5mg) PO x 7 days then DISCONTINUE  Then take (Patient taking differently: Take 10 mg by mouth daily Take 6 tabs (60mg) PO x 5 days  Then take 5 tabs (50mg) PO x 7 days  Then take 4 tabs (40mg) PO x 7 days Then take 3 tabs (30mg) PO x 7 days Then take 2 tabs (20mg) PO x 7 days Then take 1 tab (10mg) PO x 7 days Then take 1/2 tablet (5mg) PO x 7 days then DISCONTINUE  Then take), Disp: 92 tablet, Rfl: 0     GNP ASPIRIN LOW DOSE 81 MG EC tablet, Take 1 tablet (81 mg) by mouth daily, Disp: 30 tablet, Rfl: 1    SOCIAL HISTORY:  Social History     Social History     Marital status:      Spouse name: N/A     Number of children: N/A     Years of education: N/A     Occupational History     Not on file.     Social History Main Topics     Smoking status: Never Smoker     Smokeless tobacco: Never Used     Alcohol use No     Drug use: No     Sexual activity: Not Currently      Comment: denies pregnancy     Other Topics Concern      Service No     Blood Transfusions No     Caffeine Concern No     Occupational Exposure No     Hobby Hazards No     Sleep Concern Yes     Stress Concern No     Weight Concern No  "    Special Diet No     Back Care No     Exercise No     Bike Helmet No     n/a     Seat Belt Yes     Self-Exams No     Social History Narrative       FAMILY HISTORY:  Family History   Problem Relation Age of Onset     Hypertension Mother      Lipids Mother      Hypertension Maternal Grandmother      Alzheimer Disease Maternal Grandmother      Lipids Father        Past/family/social history reviewed and no changes    PHYSICAL EXAMINATION:  Constitutional: aaox3, cooperative, pleasant, not dyspneic/diaphoretic, no acute distress  Vitals reviewed: /84  Pulse 94  Ht 1.575 m (5' 2\")  Wt 51.7 kg (114 lb)  LMP 02/01/2017  SpO2 97%  BMI 20.85 kg/m2  Wt:   Wt Readings from Last 2 Encounters:   02/13/17 54.9 kg (121 lb)   02/08/17 54.9 kg (121 lb)      Eyes: Sclera anicteric/injected  Ears/nose/mouth/throat: Normal oropharynx without ulcers or exudate, mucus membranes moist, hearing intact  Neck: supple, thyroid normal size  CV: No edema  Respiratory: Unlabored breathing  Lymph: No axillary, submandibular, supraclavicular or inguinal lymphadenopathy  Abd: Surgical abdominal scars well healed, nondistended, +bs, no hepatosplenomegaly, nontender, no peritoneal signs  Skin: warm, perfused, no jaundice  Psych: Normal affect  MSK: Normal gait      PERTINENT STUDIES:  Most recent CBC:  Recent Labs   Lab Test  02/20/17   1129  02/13/17   1039   WBC  8.7  9.6   HGB  10.1*  9.7*   HCT  33.8*  32.4*   PLT  815*  675*     Most recent hepatic panel:  Recent Labs   Lab Test  03/02/17   1110  02/20/17   1129   ALT  86*  71*   AST  43  50*     Most recent creatinine:  Recent Labs   Lab Test  01/24/17   1045  01/17/17   0815   CR  1.10*  0.86                 "

## 2017-03-20 NOTE — IP AVS SNAPSHOT
Same Day Surgery [address] [address]    Phone:  [phone]                                       After Visit Summary   3/20/2017    Yarelis Penny    MRN: [id number]           After Visit Summary Signature Page     I have received my discharge instructions, and my questions have been answered. I have discussed any challenges I see with this plan with the nurse or doctor.    ..........................................................................................................................................  Patient/Patient Representative Signature      ..........................................................................................................................................  Patient Representative Print Name and Relationship to Patient    ..................................................               ................................................  Date                                            Time    ..........................................................................................................................................  Reviewed by Signature/Title    ...................................................              ..............................................  Date                                                            Time

## 2017-03-20 NOTE — OR NURSING
Dr Guru Macias at bedside at 1625 she was notified that patient having some back pain and small amount of abdominal pain.  He stated he didn't expect her to have a lot of postop pain.  He stated when the pain was controlled with dilaudid and she went over to 3c to continue the pain assessment.  He stated if the pain started up again she would need to have the 2 hour postop labs drawn but if she remained comfortable she would not need to have the labs drawn.  He stated they hadn't done anything to start pancreatitis.

## 2017-03-20 NOTE — MR AVS SNAPSHOT
After Visit Summary   3/20/2017    Yarelis Penny    MRN: [id number]           Patient Information     Date Of Birth          1985        Visit Information        Provider Department      3/20/2017 9:00 AM Cresencio Sierra PA-C [facility] Gastroenterology and IBD         Follow-ups after your visit        Your next 10 appointments already scheduled     Mar 20, 2017   Procedure with Guru Brittany Macias MD   [facility], [place], Same Day Surgery (--)    [address]  [facility] [id number] [phone]            Mar 24, 2017  8:00 AM CDT   Infusion 120 with UC SPEC INFUSION, UC 44 ATC   [facility] [place] [facility] ([facility])    [address]  2nd [address]  [facility] [address] [phone]            May 01, 2017  8:00 AM CDT   (Arrive by 7:45 AM)   Return Visit with Fuentes Johnson MD   [facility] Gastroenterology and IBD ([facility])    [address]  4th [address]  [facility] [address] [phone]              Who to contact     Please call your clinic at [phone] to:    Ask questions about your health    Make or cancel appointments    Discuss your medicines    Learn about your test results    Speak to your doctor   If you have compliments or concerns about an experience at your clinic, or if you wish to file a complaint, please contact [facility] Patient Relations at [phone] or email us at [email or web address]         Additional Information About Your Visit        Ash[employer] Information     [employer] gives you secure access to your electronic health record. If you see a primary care provider, you can also send messages to your care team and make appointments. If you have questions, please call your primary care clinic.  If you do not have a primary care provider, please call [phone] and they will assist you.      [employer] is an  "electronic gateway that provides easy, online access to your medical records. With [employer], you can request a clinic appointment, read your test results, renew a prescription or communicate with your care team.     To access your existing account, please contact your [facility] or call [phone] for assistance.        Care EveryWhere ID     This is your Care EveryWhere ID. This could be used by other organizations to access your [place] medical records  [id number]        Your Vitals Were     Pulse Height Last Period Pulse Oximetry BMI (Body Mass Index)       94 1.575 m (5' 2\") 02/01/2017 97% 20.85 kg/m2        Blood Pressure from Last 3 Encounters:   03/20/17 120/84   02/13/17 121/86   02/08/17 119/86    Weight from Last 3 Encounters:   03/20/17 51.7 kg (114 lb)   02/13/17 54.9 kg (121 lb)   02/08/17 54.9 kg (121 lb)              Today, you had the following     No orders found for display         Today's Medication Changes          These changes are accurate as of: 3/20/17  9:34 AM.  If you have any questions, ask your nurse or doctor.               These medicines have changed or have updated prescriptions.        Dose/Directions    predniSONE 10 MG tablet   Commonly known as:  DELTASONE   This may have changed:    - how much to take  - how to take this  - when to take this  - additional instructions   Used for:  Ulcerative colitis with other complication, unspecified location (H)        Take 6 tabs (60mg) PO x 5 days  Then take 5 tabs (50mg) PO x 7 days  Then take 4 tabs (40mg) PO x 7 days Then take 3 tabs (30mg) PO x 7 days Then take 2 tabs (20mg) PO x 7 days Then take 1 tab (10mg) PO x 7 days Then take 1/2 tablet (5mg) PO x 7 days then DISCONTINUE  Then take   Quantity:  92 tablet   Refills:  0                Primary Care Provider Office Phone # Fax #    Jackson S Esteban [phone]       [place] [address] [address] [address]      "   Thank you!     Thank you for choosing [facility] GASTROENTEROLOGY AND IBD  for your care. Our goal is always to provide you with excellent care. Hearing back from our patients is one way we can continue to improve our services. Please take a few minutes to complete the written survey that you may receive in the mail after your visit with us. Thank you!             Your Updated Medication List - Protect others around you: Learn how to safely use, store and throw away your medicines at www.disposemymeds.org.          This list is accurate as of: 3/20/17  9:34 AM.  Always use your most recent med list.                   Brand Name Dispense Instructions for use    GNP ASPIRIN LOW DOSE 81 MG EC tablet   Generic drug:  aspirin     30 tablet    Take 1 tablet (81 mg) by mouth daily       predniSONE 10 MG tablet    DELTASONE    92 tablet    Take 6 tabs (60mg) PO x 5 days  Then take 5 tabs (50mg) PO x 7 days  Then take 4 tabs (40mg) PO x 7 days Then take 3 tabs (30mg) PO x 7 days Then take 2 tabs (20mg) PO x 7 days Then take 1 tab (10mg) PO x 7 days Then take 1/2 tablet (5mg) PO x 7 days then DISCONTINUE  Then take       sulfamethoxazole-trimethoprim 800-160 MG per tablet    BACTRIM DS    30 tablet    Take 1 tablet by mouth three times a week       * tacrolimus 0.5 MG capsule    PROGRAF - GENERIC EQUIVALENT    180 capsule    Take 1 capsule (0.5 mg) by mouth 2 times daily Take with 1 mg capsule. Total dose 2.5 mg every 12 hours       * tacrolimus 1 MG capsule    PROGRAF - GENERIC EQUIVALENT    360 capsule    Take 2 capsules (2 mg) by mouth 2 times daily *total dose 2.5 mg BID. Take with 0.5 mg capsule       ursodiol 250 MG tablet    ACTIGALL    270 tablet    Take 2 tabs (500 mg) in AM, and 1 tab at night (250 mg)       * Notice:  This list has 2 medication(s) that are the same as other medications prescribed for you. Read the directions carefully, and ask your doctor or other care provider to review them with you.

## 2017-03-20 NOTE — PROGRESS NOTES
IBD CLINIC VISIT     CC/REFERRING MD: Daniela Enriquez  REASON FOR FOLLOW UP: Ulcerative colitis    IBD HISTORY  Age at diagnosis: 18  Extent of disease: Colonoscopies over the years have varied with areas of involvement. Earliest colonoscopies in 2006 show right greater than left inflammation with some inflammation in the ileum as well.  Followup colonoscopy in 2007 showed mainly diffuse colonic inflammation.  Colonoscopies in 2015 again showed more chronic colitis with no significant ileal involvement.  Colonoscopies in 2016 again showed a right-sided and some ileal inflammation with ulceration, but biopsy showed CMV.  Most recently in November, she had a colonoscopy with severe inflammation from 20 cm from the anus all of the way to the ileum as far as could be evaluated.  Biopsies showed chronic active ileitis and chronic active colitis in every sample.  There was no evidence of CMV or PTLD. Rare SHENA-KACIE positive cells seen in ileum.  Current UC medications: Prednisone 5 mg, Entivio, (previously on azathioprine but is currently on hold due to high doses of prednisone and EBV viremia).  Prior UC surgeries: None  Prior IBD Medications: Prednisone, Pentasa, Asacol     DRUG MONITORING  TPMT enzyme activity: (12/6/16) 18.5      6-TGN/6-MMPN levels: (12/6/16) 203/257       Biologic concentration: Has not yet been obtained. We will obtain a level just before next infusion     DISEASE ASSESSMENT  Labs:  Lab Results   Component Value Date    ALBUMIN 3.3 03/02/2017     Recent Labs   Lab Test  02/20/17   1129  02/13/17   1039   CRP  8.5*  29.0*   SED  41*  67*     Endoscopic assessment:   Enterography: (12/14/16) 1. Diffuse colonic wall thickening and enhancement with loss of  haustral pattern. Wall thickening and enhancement of the terminal  ileum. Findings consistent with acute on chronic changes related to  ulcerative colitis. 2. Stable 7.4 cm left adnexal cyst. A short interval follow-up  ultrasound is recommended  to help determine whether this represents a  physiologic lesion. Plans to have follow up with OBGYN and repeat U/S in 6 weeks.  Fecal calprotectin: --   C diff: Negative 12/10/16  Abad score: 3/9    ASSESSMENT/PLAN  Yarelis is a pleasant 31-year-old female with a complicated past medical history including primary sclerosing cholangitis associated inflammatory bowel disease, cirrhosis, status post living donor transplant in 2015 with recent severe flare and was hospitalized 12/2016.       1. Primary sclerosing cholangitis associated inflammatory bowel disease. She likely has PSC associated ulcerative colitis with severe flare in December 2016. The patient has continued to improve symptomatically on a prednisone 5 mg and vedolizumab infusions every 8 weeks. She has now had 3 infusions of vedolizumab. Her next infusion is due 03/24/2017. Over the last 2 weeks she has a mix of formed and loose stools (but still 1-2 per day and no blood).  The azathioprine has been held given the high-dose steroids and the fact that she has EBV viremia. We will continue to hold this for now and continue to work with Dr. Enriquez regarding additional immunosuppression given her liver transplant and history of autoimmune hepatitis. She has had a recent primary EBV episode and has had persistent EBV levels.   -- Labs with next infusion (CBC, LFTs and inflammatory markers)   -- Just before her next infusion on 03/24/2017, we will also obtain a vedolizumab level. This will be her first infusion at every 8-week barbara. We will assess dosage and drug interval at that time.      2. Cough. The patient has been evaluated for cough symptoms for the last 2 visits with chest x-rays showing no progression or development of a clear infiltrate. Symptoms have completely resolved at this time. We will continue to monitor for any symptoms.      3. PCP prophylaxis. The patient will continue PCP prophylaxis given her recent high-dose steroids and keep her on this  for a total of 3 months after the steroids have been completed. This will be double strength Bactrim Monday, Wednesday, Friday.      4. EBV viremia. The patient follows with Dr. Nails. Rituximab treatment has been completed.      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 Her CMV levels were not detected that were last checked in 02/07/2017. She has completed her IV ganciclovir regimen.      6. 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 Last ERCP 3/1/16 with Dr. Quinones.  MRP now shows 1.5 cm biliary stricture and worsening intrahepatic biliary dilation.  ERCP to be performed today per hepatology team.  We continue to work closely with Dr. Enriquez.  She continues on 5 mg prednisone and tacrolimus.      7. Left adnexal mass noted on CT in 12/2016. The patient was instructed to complete a followup ultrasound post-hospitalization. This was completed.  Significantly decreased in size, no further follow up required.     8. IBD healthcare maintenance based on patients current medication: Vedolizumab  Vaccinations:  -- Influenza (every year): Last given 2016  -- TdaP (every 10 years): Last given 2016  -- Pneumococcal Pneumonia (once then every 5 years): Last given 2016     One time confirmation of immunity or serologies:  -- Hepatitis A (serologies or immunizations): 2015  -- Hepatitis B (serologies or immunizations): 2015  -- Varicella: Not documented  -- MMR:Not documented  -- HPV (all aged 18-26): As indicated  -- Meningococcal meningitis (all patients at risk for meningitis): As indicated   -- Due to the immunosuppression in this patient, I would not advise administration of live vaccines such as varicella/VZV, intranasal influenza, MMR, or yellow fever vaccine (if travelling).      Cancer Screening:  Colon cancer screening:  Given PSC type of colitis, colonoscopy every year is recommended. Dysplasia screening is recommended fall 2017.     Cervical cancer screening: Annual due to immunosupression     Skin  cancer screening: Annual visual exam of skin by dermatologist since patient is immunocompromised     Depression Screening:  -- Over the last month, have you felt down, depressed, or hopeless? No  -- Over the last month, have you felt little interest or pleasure doing things? No     Misc:  -- Avoid tobacco use  -- Avoid NSAIDs as there is potentially a 25% chance of causing an IBD flare        RTC 5/1/17 with Dr. Johnson     Thank you for this consultation. It was a pleasure to participate in the care of this patient; please contact us with any further questions.      Cresencio Sierra PA-C  Division of Gastroenterology, Hepatology and Nutrition  [facility]    HPI:   Yarelis is a pleasant 31-year-old female here today for followup regarding PSC associated IBD. Briefly, the patient's history includes history of inflammatory bowel disease, primary sclerosing cholangitis, autoimmune hepatitis and is status post living donor transplant in 2015. Her extent of disease has varied with colonoscopies from right sided to more diffuse with variable ileal involvement. More recent colonoscopies in 2016 have again showed more right-sided ileal involvement. She was initially treated with mesalamine in 2007 and that required a steroid burst. Over the years, she stopped IBD therapy and had largely quiescent disease. She underwent a liver transplant in 2015, has been on Prograf, low-dose prednisone and azathioprine previously. Her post-transplant course was complicated by CMV colitis x2 on 01/2016 and on 04/2016, and she was treated with antiviral therapy both times (IV ganciclovir). Her post-transplant course was also complicated by primary EBV viremia and followed by the Transplant Infectious Disease team. She again had a flare of her colitis symptoms in 11/2016 and was admitted for severe colitis. Biopsies at this time were negative for CMV and had no significant EBV component. She did have associated mild CMV viremia and EBV  viremia, but after extensive discussion with Pathology and Infectious Disease, her flare was not felt to be related to these viruses or PTLD. The patient was treated with IV steroids and transitioned to oral steroids. She initially did well, but in early December failed transition to p.o. steroids. She was rehospitalized and given IV steroids again. She was transitioned to a higher dose of steroids 60 mg and was discharged with vedolizumab infusion the day after discharge. She is currently on 5 mg daily and will continue on this dose. She has completed her IV ganciclovir course.     Today, she is approaching her first 8 week interval of vedolizumab infusions (due 3/24/17).  Over the last 2 weeks she has had a mix of loose and formed stools, however still only 1-2 formed bowel movement per day. She denies any blood in her stool or urgency. She denies any tenesmus.  She has had one occurrence of a nocturnal stool (last night). She continues to slowly incorporate exercise in her daily routine.  She has some vague abdominal pain/ache, that is usually relieved by bowel movements or flatus.  She denies any fevers, chills or sweats, nausea, vomiting, BRBPR, melena, tenesmus, urgency, hesitancy passing flatus, joint pain, skin changes or vision changes.    ROS:    No fevers or chills  No weight loss  No blurry vision, double vision or change in vision  No sore throat  No lymphadenopathy  No headache, paraesthesias, or weakness in a limb  No shortness of breath or wheezing  No chest pain or pressure  No arthralgias or myalgias  No rashes or skin changes  No odynophagia or dysphagia  No BRBPR, hematochezia, melena  No dysuria, frequency or urgency  No hot/cold intolerance or polyria  No anxiety or depression    Extra intestinal manifestations of IBD:  No uveitis/episcleritis  No aphthous ulcers   No arthritis   No erythema nodosum/pyoderma gangrenosum.     PERTINENT PAST MEDICAL HISTORY:  Past Medical History   Diagnosis Date      Autoimmune hepatitis (H) 2012     on steroid taper     Cholangitis      CKD (chronic kidney disease) 2012     biopsy 2012: TIN, patchy fibrosis     Esophageal varices (H) 9/08     banded     Heart murmur      History of blood transfusion      Primary sclerosing cholangitis      Ulcerative Colitis      f/b GI       PREVIOUS SURGERIES:  Past Surgical History   Procedure Laterality Date     Upper gi endoscopy       Colonoscopy  9/07     Colonoscopy N/A 2/26/2015     Procedure: COMBINED COLONOSCOPY, SINGLE OR MULTIPLE BIOPSY/POLYPECTOMY BY BIOPSY;  Surgeon: Mitchel Hoskins Chi, MD;  Location: [facility]     Esophagoscopy, gastroscopy, duodenoscopy (egd), combined N/A 2/26/2015     Procedure: COMBINED ESOPHAGOSCOPY, GASTROSCOPY, DUODENOSCOPY (EGD);  Surgeon: Mitchel Hoskins Chi, MD;  Location: [facility]     Picc insertion Right 2/27/2015     4fr SL Valved PICC, 36cm (1cm external) in the R medial brachial vein w/ tip in the low SVC.     Transplant liver recipient living unrelated N/A 6/18/2015     Procedure: TRANSPLANT LIVER RECIPIENT LIVING UNRELATED;  Surgeon: Tyree Smith MD;  Location: [facility]     Explore common bile duct N/A 6/25/2015     Procedure: EXPLORE COMMON BILE DUCT;  Surgeon: Tyree Smith MD;  [facility]: [facility]     Laparotomy exploratory N/A 6/25/2015     Procedure: LAPAROTOMY EXPLORATORY;  Surgeon: Tyree Smith MD;  [facility]     Endoscopic retrograde cholangiopancreatogram N/A 6/25/2015     Procedure: COMBINED ENDOSCOPIC RETROGRADE CHOLANGIOPANCREATOGRAPHY, PLACE TUBE/STENT;  Surgeon: Landon Quinones MD;  [facility]     Endoscopic retrograde cholangiopancreatogram N/A 6/25/2015     Procedure: COMBINED ENDOSCOPIC RETROGRADE CHOLANGIOPANCREATOGRAPHY, PLACE TUBE/STENT;  Surgeon: Landon Quinones MD;  [facility]     Endoscopic retrograde cholangiopancreatogram N/A 7/2/2015     Procedure: COMBINED ENDOSCOPIC RETROGRADE CHOLANGIOPANCREATOGRAPHY, PLACE TUBE/STENT;  Surgeon: Joni  Landon Rojas MD;  [facility]     Endoscopic retrograde cholangiopancreatography, exchange tube/stent N/A 7/30/2015     Procedure: ENDOSCOPIC RETROGRADE CHOLANGIOPANCREATOGRAPHY, EXCHANGE TUBE/STENT;  Surgeon: Landon Quinones MD;  [facility]     Endoscopic retrograde cholangiopancreatogram N/A 9/8/2015     Procedure: COMBINED ENDOSCOPIC RETROGRADE CHOLANGIOPANCREATOGRAPHY, REMOVE FOREIGN BODY OR STENT/TUBE;  Surgeon: Landon Quinones MD;  [facility]     Endoscopic retrograde cholangiopancreatogram N/A 12/8/2015     Procedure: ENDOSCOPIC RETROGRADE CHOLANGIOPANCREATOGRAM;  Surgeon: Landon Quinones MD;  [facility]     Colonoscopy N/A 1/12/2016     Procedure: COMBINED COLONOSCOPY, SINGLE OR MULTIPLE BIOPSY/POLYPECTOMY BY BIOPSY;  Surgeon: Rigo Valles MD;  [facility]     Endoscopic retrograde cholangiopancreatogram N/A 3/1/2016     Procedure: COMBINED ENDOSCOPIC RETROGRADE CHOLANGIOPANCREATOGRAPHY, REMOVE FOREIGN BODY OR STENT/TUBE;  Surgeon: Ladnon Quinones MD;  [facility]     Colonoscopy N/A 4/1/2016     Procedure: COMBINED COLONOSCOPY, SINGLE OR MULTIPLE BIOPSY/POLYPECTOMY BY BIOPSY;  Surgeon: Kareem Solis MD;  [facility]     Sigmoidoscopy flexible N/A 4/27/2016     Procedure: SIGMOIDOSCOPY FLEXIBLE;  Surgeon: Jose Nielson MD;  Location: [facility]       ALLERGIES:     Allergies   Allergen Reactions     Rifampin Other (See Comments)     Kidney failure     Penicillins      Hives, has tolerated amoxicillin         PERTINENT MEDICATIONS:    Current Outpatient Prescriptions:      sulfamethoxazole-trimethoprim (BACTRIM DS) 800-160 MG per tablet, Take 1 tablet by mouth three times a week, Disp: 30 tablet, Rfl: 3     ursodiol (ACTIGALL) 250 MG tablet, Take 2 tabs (500 mg) in AM, and 1 tab at night (250 mg), Disp: 270 tablet, Rfl: 3     tacrolimus (PROGRAF - GENERIC EQUIVALENT) 0.5 MG capsule, Take 1 capsule (0.5 mg) by mouth 2 times daily Take  with 1 mg capsule. Total dose 2.5 mg every 12 hours, Disp: 180 capsule, Rfl: 3     tacrolimus (PROGRAF - GENERIC EQUIVALENT) 1 MG capsule, Take 2 capsules (2 mg) by mouth 2 times daily *total dose 2.5 mg BID. Take with 0.5 mg capsule, Disp: 360 capsule, Rfl: 3     predniSONE (DELTASONE) 10 MG tablet, Take 6 tabs (60mg) PO x 5 days  Then take 5 tabs (50mg) PO x 7 days  Then take 4 tabs (40mg) PO x 7 days Then take 3 tabs (30mg) PO x 7 days Then take 2 tabs (20mg) PO x 7 days Then take 1 tab (10mg) PO x 7 days Then take 1/2 tablet (5mg) PO x 7 days then DISCONTINUE  Then take (Patient taking differently: Take 10 mg by mouth daily Take 6 tabs (60mg) PO x 5 days  Then take 5 tabs (50mg) PO x 7 days  Then take 4 tabs (40mg) PO x 7 days Then take 3 tabs (30mg) PO x 7 days Then take 2 tabs (20mg) PO x 7 days Then take 1 tab (10mg) PO x 7 days Then take 1/2 tablet (5mg) PO x 7 days then DISCONTINUE  Then take), Disp: 92 tablet, Rfl: 0     GNP ASPIRIN LOW DOSE 81 MG EC tablet, Take 1 tablet (81 mg) by mouth daily, Disp: 30 tablet, Rfl: 1    SOCIAL HISTORY:  Social History     Social History     Marital status:      Spouse name: N/A     Number of children: N/A     Years of education: N/A     Occupational History     Not on file.     Social History Main Topics     Smoking status: Never Smoker     Smokeless tobacco: Never Used     Alcohol use No     Drug use: No     Sexual activity: Not Currently      Comment: denies pregnancy     Other Topics Concern      Service No     Blood Transfusions No     Caffeine Concern No     Occupational Exposure No     Hobby Hazards No     Sleep Concern Yes     Stress Concern No     Weight Concern No     Special Diet No     Back Care No     Exercise No     Bike Helmet No     n/a     Seat Belt Yes     Self-Exams No     Social History Narrative       FAMILY HISTORY:  Family History   Problem Relation Age of Onset     Hypertension Mother      Lipids Mother      Hypertension Maternal  "Grandmother      Alzheimer Disease Maternal Grandmother      Lipids Father        Past/family/social history reviewed and no changes    PHYSICAL EXAMINATION:  Constitutional: aaox3, cooperative, pleasant, not dyspneic/diaphoretic, no acute distress  Vitals reviewed: /84  Pulse 94  Ht 1.575 m (5' 2\")  Wt 51.7 kg (114 lb)  LMP 02/01/2017  SpO2 97%  BMI 20.85 kg/m2  Wt:   Wt Readings from Last 2 Encounters:   02/13/17 54.9 kg (121 lb)   02/08/17 54.9 kg (121 lb)      Eyes: Sclera anicteric/injected  Ears/nose/mouth/throat: Normal oropharynx without ulcers or exudate, mucus membranes moist, hearing intact  Neck: supple, thyroid normal size  CV: No edema  Respiratory: Unlabored breathing  Lymph: No axillary, submandibular, supraclavicular or inguinal lymphadenopathy  Abd: Surgical abdominal scars well healed, nondistended, +bs, no hepatosplenomegaly, nontender, no peritoneal signs  Skin: warm, perfused, no jaundice  Psych: Normal affect  MSK: Normal gait      PERTINENT STUDIES:  Most recent CBC:  Recent Labs   Lab Test  02/20/17   1129  02/13/17   1039   WBC  8.7  9.6   HGB  10.1*  9.7*   HCT  33.8*  32.4*   PLT  815*  675*     Most recent hepatic panel:  Recent Labs   Lab Test  03/02/17   1110  02/20/17   1129   ALT  86*  71*   AST  43  50*     Most recent creatinine:  Recent Labs   Lab Test  01/24/17   1045  01/17/17   0815   CR  1.10*  0.86             "

## 2017-03-20 NOTE — ANESTHESIA POSTPROCEDURE EVALUATION
Patient: Yarelis Penny    Procedure(s):  Endoscopic Retrograde Cholangiopancreatogram with Ballon Dilation, Stent Placement - Wound Class: II-Clean Contaminated    Diagnosis:Bile duct leak   Diagnosis Additional Information: No value filed.    Anesthesia Type:  General    Note:  Anesthesia Post Evaluation    Patient location during evaluation: PACU  Patient participation: Able to fully participate in evaluation  Level of consciousness: awake and alert  Pain management: satisfactory to patient  Airway patency: patent  Cardiovascular status: blood pressure returned to baseline and hemodynamically stable  Respiratory status: room air  Hydration status: euvolemic  PONV: controlled     Anesthetic complications: None          Last vitals:  Vitals:    03/20/17 1630 03/20/17 1645 03/20/17 1700   BP: (!) 130/93 (!) 138/102 (!) 133/99   Resp: 14 14 14   Temp:   36.8  C (98.2  F)   SpO2: 100%           Electronically Signed By: Benjy Singh MD  March 20, 2017  5:13 PM

## 2017-03-20 NOTE — ANESTHESIA CARE TRANSFER NOTE
Patient: Yarelis Penny    Procedure(s):  Endoscopic Retrograde Cholangiopancreatogram with Ballon Dilation, Stent Placement - Wound Class: II-Clean Contaminated    Diagnosis: Bile duct leak   Diagnosis Additional Information: No value filed.    Anesthesia Type:   General     Note:  Airway :Face Mask  Patient transferred to:PACU  Comments: VSS on arrival, report to RN.       Vitals: (Last set prior to Anesthesia Care Transfer)    CRNA VITALS  3/20/2017 1524 - 3/20/2017 1603      3/20/2017             NIBP: (!)  135/100    Pulse: 95    Temp: 36.6  C (97.9  F)    SpO2: 100 %    EKG: Sinus rhythm                Electronically Signed By: KAYLENE Staley CRNA  March 20, 2017  4:03 PM

## 2017-03-20 NOTE — OR NURSING
Dr Macias is not in house to see pt before discharge. Pt has no questions to provider at this time.

## 2017-03-20 NOTE — IP AVS SNAPSHOT
MRN:[id number]                      After Visit Summary   3/20/2017    Yarelis Penny    MRN: [id number]           Thank you!     Thank you for choosing [facility] for your care. Our goal is always to provide you with excellent care. Hearing back from our patients is one way we can continue to improve our services. Please take a few minutes to complete the written survey that you may receive in the mail after you visit with us. Thank you!        Patient Information     Date Of Birth          1985        About your hospital stay     You were admitted on:  March 20, 2017 You last received care in the:  Same Day Surgery [facility]    You were discharged on:  March 20, 2017       Who to Call     For medical emergencies, please call 911.  For non-urgent questions about your medical care, please call your primary care provider or clinic, [phone]  For questions related to your surgery, please call your surgery clinic        Attending Provider     Provider Guru Brittany Wright MD Gastroenterology       Primary Care Provider Office Phone # Fax #    Jackson Orellana [phone]       [place] [address] YENIF[address]AR [address] [address]        After Care Instructions     Discharge Instructions       Resume pre procedure diet            Discharge Instructions       Restart home medications.            Discharge Instructions       Resume pre procedure diet            Discharge Instructions       Restart home medications.                  Your next 10 appointments already scheduled     Mar 24, 2017  8:00 AM CDT   Infusion 120 with UC SPEC INFUSION, UC 44 ATC   [facility] Specialty and Procedure ([facility])    [address] [address] [phone]            May 01, 2017  8:00 AM CDT   (Arrive by 7:45 AM)   Return Visit with Fuentes Johnson MD   [facility]  Gastroenterology and IBD ([facility] [place])    [address] [facility]  [address] [facility] [address] [phone]              Further instructions from your care team       [facility] [place]  Same-Day Surgery   Adult Discharge Orders & Instructions     For 24 hours after surgery    1. Get plenty of rest.  A responsible adult must stay with you for at least 24 hours after you leave the hospital.   2. Do not drive or use heavy equipment.  If you have weakness or tingling, don't drive or use heavy equipment until this feeling goes away.  3. Do not drink alcohol.  4. Avoid strenuous or risky activities.  Ask for help when climbing stairs.   5. You may feel lightheaded.  IF so, sit for a few minutes before standing.  Have someone help you get up.   6. If you have nausea (feel sick to your stomach): Drink only clear liquids such as apple juice, ginger ale, broth or 7-Up.  Rest may also help.  Be sure to drink enough fluids.  Move to a regular diet as you feel able.  7. You may have a slight fever. Call the doctor if your fever is over 100 F (37.7 C) (taken under the tongue) or lasts longer than 24 hours.  8. You may have a dry mouth, a sore throat, muscle aches or trouble sleeping.  These should go away after 24 hours.  9. Do not make important or legal decisions.   Call your doctor for any of the followin.  Signs of infection (fever, growing tenderness at the surgery site, a large amount of drainage or bleeding, severe pain, foul-smelling drainage, redness, swelling).    2. It has been over 8 to 10 hours since surgery and you are still not able to urinate (pass water).    3.  Headache for over 24 hours.    To contact a doctor, call Carlos [phone] during clinic hours    or:        [phone] and ask for the resident on call for   GI/surgery (answered 24 hours a day)      Emergency Department:    [facility]: [phone]       (TTY for  "hearing impaired: [phone])    [facility]: [phone]       (TTY for hearing impaired: [phone])            Pending Results     No orders found from 3/18/2017 to 3/21/2017.            Admission Information     Date & Time Provider Department Dept. Phone    3/20/2017 Guru Brittany Macias MD Same Day Surgery [facility] [place] [phone]      Your Vitals Were     Blood Pressure Temperature Respirations Height Weight Last Period    133/99 (BP Location: Right arm) 97.8  F (36.6  C) (Oral) 14 1.575 m (5' 2\") 50.8 kg (111 lb 15.9 oz) 02/01/2017    Pulse Oximetry BMI (Body Mass Index)                100% 20.48 kg/m2          MyChart Information     [employer] [facility] gives you secure access to your electronic health record. If you see a primary care provider, you can also send messages to your care team and make appointments. If you have questions, please call your primary care clinic.  If you do not have a primary care provider, please call [phone] and they will assist you.        Care EveryWhere ID     This is your Care EveryWhere ID. This could be used by other organizations to access your [facility] medical records  [id number]           Review of your medicines      UNREVIEWED medicines. Ask your doctor about these medicines        Dose / Directions    GNP ASPIRIN LOW DOSE 81 MG EC tablet   Used for:  Ulcerative colitis with other complication, unspecified location (H), Liver replaced by transplant (H)   Generic drug:  aspirin        Dose:  81 mg   Take 1 tablet (81 mg) by mouth daily   Quantity:  30 tablet   Refills:  1       predniSONE 10 MG tablet   Commonly known as:  DELTASONE   Used for:  Ulcerative colitis with other complication, unspecified location (H)        Take 6 tabs (60mg) PO x 5 days  Then take 5 tabs (50mg) PO x 7 days  Then take 4 tabs (40mg) PO x 7 days Then take 3 tabs (30mg) PO x 7 days Then take 2 tabs (20mg) PO x 7 days Then take 1 tab (10mg) PO x 7 days Then take 1/2 " tablet (5mg) PO x 7 days then DISCONTINUE  Then take   Quantity:  92 tablet   Refills:  0       sulfamethoxazole-trimethoprim 800-160 MG per tablet   Commonly known as:  BACTRIM DS   Used for:  Prophylactic antibiotic        Dose:  1 tablet   Take 1 tablet by mouth three times a week   Quantity:  30 tablet   Refills:  3       * tacrolimus 0.5 MG capsule   Commonly known as:  PROGRAF - GENERIC EQUIVALENT   Used for:  S/P liver transplant (H)        Dose:  0.5 mg   Take 1 capsule (0.5 mg) by mouth 2 times daily Take with 1 mg capsule. Total dose 2.5 mg every 12 hours   Quantity:  180 capsule   Refills:  3       * tacrolimus 1 MG capsule   Commonly known as:  PROGRAF - GENERIC EQUIVALENT   Used for:  S/P liver transplant (H)        Dose:  2 mg   Take 2 capsules (2 mg) by mouth 2 times daily *total dose 2.5 mg BID. Take with 0.5 mg capsule   Quantity:  360 capsule   Refills:  3       ursodiol 250 MG tablet   Commonly known as:  ACTIGALL   Used for:  Liver replaced by transplant (H)        Take 2 tabs (500 mg) in AM, and 1 tab at night (250 mg)   Quantity:  270 tablet   Refills:  3       * Notice:  This list has 2 medication(s) that are the same as other medications prescribed for you. Read the directions carefully, and ask your doctor or other care provider to review them with you.      START taking        Dose / Directions    levofloxacin 500 MG tablet   Commonly known as:  LEVAQUIN   Used for:  S/P ERCP        Dose:  500 mg   Take 1 tablet (500 mg) by mouth daily   Quantity:  7 tablet   Refills:  0            Where to get your medicines      These medications were sent to [facility] [place], [place] - [address] [facility]  [address] [facility] [address]     Phone:  [phone]     levofloxacin 500 MG tablet                Protect others around you: Learn how to safely use, store and throw away your medicines at www.disposemymeds.org.             Medication List: This is a list of all your  medications and when to take them. Check marks below indicate your daily home schedule. Keep this list as a reference.      Medications           Morning Afternoon Evening Bedtime As Needed    GNP ASPIRIN LOW DOSE 81 MG EC tablet   Take 1 tablet (81 mg) by mouth daily   Generic drug:  aspirin                                levofloxacin 500 MG tablet   Commonly known as:  LEVAQUIN   Take 1 tablet (500 mg) by mouth daily                                predniSONE 10 MG tablet   Commonly known as:  DELTASONE   Take 6 tabs (60mg) PO x 5 days  Then take 5 tabs (50mg) PO x 7 days  Then take 4 tabs (40mg) PO x 7 days Then take 3 tabs (30mg) PO x 7 days Then take 2 tabs (20mg) PO x 7 days Then take 1 tab (10mg) PO x 7 days Then take 1/2 tablet (5mg) PO x 7 days then DISCONTINUE  Then take                                sulfamethoxazole-trimethoprim 800-160 MG per tablet   Commonly known as:  BACTRIM DS   Take 1 tablet by mouth three times a week                                * tacrolimus 0.5 MG capsule   Commonly known as:  PROGRAF - GENERIC EQUIVALENT   Take 1 capsule (0.5 mg) by mouth 2 times daily Take with 1 mg capsule. Total dose 2.5 mg every 12 hours                                * tacrolimus 1 MG capsule   Commonly known as:  PROGRAF - GENERIC EQUIVALENT   Take 2 capsules (2 mg) by mouth 2 times daily *total dose 2.5 mg BID. Take with 0.5 mg capsule                                ursodiol 250 MG tablet   Commonly known as:  ACTIGALL   Take 2 tabs (500 mg) in AM, and 1 tab at night (250 mg)                                * Notice:  This list has 2 medication(s) that are the same as other medications prescribed for you. Read the directions carefully, and ask your doctor or other care provider to review them with you.

## 2017-03-20 NOTE — NURSING NOTE
Edited therapy plan for a one time order for vedo level to be drawn day of infusion prior to start.

## 2017-03-20 NOTE — H&P
[facility]  GASTROENTEROLOGY H&P NOTE  Yarelis Penny [id number]   03/20/2017    HPI  32 yo F with h/oPBC and AIH s/p L lobe live donor transplant presented with elevated ALP. Imaging done subsequently showed anastomotic stricture with dilated intrahepatics.   Currently feels fine, denies any symptoms of abdominal pain, nausea, vomiting. Appetite is good.       Past Medical History   Diagnosis Date     Autoimmune hepatitis (H) 2012     on steroid taper     Cholangitis      CKD (chronic kidney disease) 2012     biopsy 2012: TIN, patchy fibrosis     Esophageal varices (H) 9/08     banded     Heart murmur      History of blood transfusion      Primary sclerosing cholangitis      Ulcerative Colitis      f/b GI       Past Surgical History   Procedure Laterality Date     Upper gi endoscopy       Colonoscopy  9/07     Colonoscopy N/A 2/26/2015     Procedure: COMBINED COLONOSCOPY, SINGLE OR MULTIPLE BIOPSY/POLYPECTOMY BY BIOPSY;  Surgeon: Mitchel Hoskins Chi, MD;  Location: [facility]     Esophagoscopy, gastroscopy, duodenoscopy (egd), combined N/A 2/26/2015     Procedure: COMBINED ESOPHAGOSCOPY, GASTROSCOPY, DUODENOSCOPY (EGD);  Surgeon: Mitchel Hoskins Chi, MD;  Location: [facility]     Picc insertion Right 2/27/2015     4fr SL Valved PICC, 36cm (1cm external) in the R medial brachial vein w/ tip in the low SVC.     Transplant liver recipient living unrelated N/A 6/18/2015     Procedure: TRANSPLANT LIVER RECIPIENT LIVING UNRELATED;  Surgeon: Tyree Smith MD;  Location[facility]     Explore common bile duct N/A 6/25/2015     Procedure: EXPLORE COMMON BILE DUCT;  Surgeon: Tyree Smith MD;  [facility]     Laparotomy exploratory N/A 6/25/2015     Procedure: LAPAROTOMY EXPLORATORY;  Surgeon: Tyree Smith MD;  [facility]     Endoscopic retrograde cholangiopancreatogram N/A 6/25/2015     Procedure: COMBINED ENDOSCOPIC RETROGRADE CHOLANGIOPANCREATOGRAPHY, PLACE TUBE/STENT;  Surgeon: Landon Quinones MD;  Location:  [facility]     Endoscopic retrograde cholangiopancreatogram N/A 6/25/2015     Procedure: COMBINED ENDOSCOPIC RETROGRADE CHOLANGIOPANCREATOGRAPHY, PLACE TUBE/STENT;  Surgeon: Landon Quinones MD;  [facility]     Endoscopic retrograde cholangiopancreatogram N/A 7/2/2015     Procedure: COMBINED ENDOSCOPIC RETROGRADE CHOLANGIOPANCREATOGRAPHY, PLACE TUBE/STENT;  Surgeon: Landon Quinones MD;  [facility]     Endoscopic retrograde cholangiopancreatography, exchange tube/stent N/A 7/30/2015     Procedure: ENDOSCOPIC RETROGRADE CHOLANGIOPANCREATOGRAPHY, EXCHANGE TUBE/STENT;  Surgeon: Landon Quinones MD;  [facility]     Endoscopic retrograde cholangiopancreatogram N/A 9/8/2015     Procedure: COMBINED ENDOSCOPIC RETROGRADE CHOLANGIOPANCREATOGRAPHY, REMOVE FOREIGN BODY OR STENT/TUBE;  Surgeon: Landon Quinones MD;  [facility]     Endoscopic retrograde cholangiopancreatogram N/A 12/8/2015     Procedure: ENDOSCOPIC RETROGRADE CHOLANGIOPANCREATOGRAM;  Surgeon: Landon Quinones MD;  [facility]     Colonoscopy N/A 1/12/2016     Procedure: COMBINED COLONOSCOPY, SINGLE OR MULTIPLE BIOPSY/POLYPECTOMY BY BIOPSY;  Surgeon: Rigo Valles MD;  [facility]     Endoscopic retrograde cholangiopancreatogram N/A 3/1/2016     Procedure: COMBINED ENDOSCOPIC RETROGRADE CHOLANGIOPANCREATOGRAPHY, REMOVE FOREIGN BODY OR STENT/TUBE;  Surgeon: Landon Quinones MD;  [facility]     Colonoscopy N/A 4/1/2016     Procedure: COMBINED COLONOSCOPY, SINGLE OR MULTIPLE BIOPSY/POLYPECTOMY BY BIOPSY;  Surgeon: Kareem Solis MD;  [facility]     Sigmoidoscopy flexible N/A 4/27/2016     Procedure: SIGMOIDOSCOPY FLEXIBLE;  Surgeon: Jose Nielson MD;  Location: [facility]          Allergies   Allergen Reactions     Rifampin Other (See Comments)     Kidney failure     Penicillins      Hives, has tolerated amoxicillin           Current Facility-Administered Medications:      lidocaine 1 %  1 mL, 1 mL, Other, Q1H PRN, Susan Gonsalez MD     lidocaine (LMX4) kit, , Topical, Q1H PRN, Susan Gonsalez MD     sodium chloride (PF) 0.9% PF flush 3 mL, 3 mL, Intracatheter, Q1H PRN, Susan Gonsalez MD     sodium chloride (PF) 0.9% PF flush 3 mL, 3 mL, Intracatheter, Q8H, Susan Gonsalez MD     sodium chloride (PF) 0.9% PF flush 3 mL, 3 mL, Intravenous, q1 min prn, Susan Gonsalez MD     indomethacin (INDOCIN) 50 MG Suppository 100 mg, 100 mg, Rectal, Once PRN, Susan Gonsalez MD     lactated ringers infusion, , Intravenous, Continuous, Puana, Thien Lanier MD    Family History   Problem Relation Age of Onset     Hypertension Mother      Lipids Mother      Hypertension Maternal Grandmother      Alzheimer Disease Maternal Grandmother      Lipids Father        Social History     Social History     Marital status:      Spouse name: N/A     Number of children: N/A     Years of education: N/A     Occupational History     Not on file.     Social History Main Topics     Smoking status: Never Smoker     Smokeless tobacco: Never Used     Alcohol use No     Drug use: No     Sexual activity: Not Currently      Comment: denies pregnancy     Other Topics Concern      Service No     Blood Transfusions No     Caffeine Concern No     Occupational Exposure No     Hobby Hazards No     Sleep Concern Yes     Stress Concern No     Weight Concern No     Special Diet No     Back Care No     Exercise No     Bike Helmet No     n/a     Seat Belt Yes     Self-Exams No     Social History Narrative       Review Of Systems  Skin: negative  Eyes: negative  Ears/Nose/Throat: negative  Respiratory: No shortness of breath, dyspnea on exertion, cough, or hemoptysis  Cardiovascular: negative  Gastrointestinal: negative  Genitourinary: negative  Musculoskeletal: negative  Neurologic: negative  Psychiatric: negative  Hematologic/Lymphatic/Immunologic: negative  Endocrine: negative      OBJECTIVE:  VS: BP (!) 119/96  Temp  "98.3  F (36.8  C) (Oral)  Resp 16  Ht 1.575 m (5' 2\")  Wt 50.8 kg (111 lb 15.9 oz)  LMP 02/01/2017  Breastfeeding? No  BMI 20.48 kg/m2   GEN: A&Ox3, NAD, comfortable  CV:  RRR, no M/G/R  PULM:  CTA B/L  ABD: Normoactive bowel sounds, soft, ND, NT, no HSM      REVIEW OF LABORATORY, PATHOLOGY AND IMAGING RESULTS:  BMPNo lab results found in last 7 days.    CBC  Recent Labs  Lab 03/20/17  1306   WBC 15.2*   RBC 4.42   HGB 10.1*   HCT 33.2*   MCV 75*   MCH 22.9*   MCHC 30.4*   RDW 18.3*   *       INRNo lab results found in last 7 days.    LFTsNo lab results found in last 7 days.     PANCNo lab results found in last 7 days.    IMPRESSION:  Yarelis Penny is a 31 year old female w h/o PBC and AIH s/p live donor transplant presents with anastomotic stricture. Here to have ERCP.   Consent obtained.       Susan Gonsalez MD  Therapeutic Endoscopy Fellow  [phone]  "

## 2017-03-20 NOTE — NURSING NOTE
"Chief Complaint   Patient presents with     RECHECK       Vitals:    03/20/17 0910   BP: 120/84   Pulse: 94   SpO2: 97%   Weight: 51.7 kg (114 lb)   Height: 1.575 m (5' 2\")       Body mass index is 20.85 kg/(m^2).      WOUND EVALUATION:                      "

## 2017-03-20 NOTE — OR NURSING
Dr Jon notified at 1710 for signout.  He stated patient could transfer to phase 2.  Patient's  also called and instructed to call their [facility] on [place] to call the [facility] for patient's Levaquin postop prescription as her insurance won't cover it being filled here.

## 2017-03-20 NOTE — BRIEF OP NOTE
[facility] Brief Operative Note    Pre-operative diagnosis: Post transplant anastamotic stricture   Post-operative diagnosis * No post-op diagnosis entered *   Procedure: Procedure(s):  Endoscopic Retrograde Cholangiopancreatogram with Ballon Dilation, Stent Placement - Wound Class: II-Clean Contaminated   Surgeon: Guru Colleen MD       Estimated blood loss: None    Specimens: None   Findings: Selective biliary cannulation  Anastamotic stricture dilated to 6 mm  A 10 Fr by 20 cm Johlin stent and another 10 Fr by 16 cm Johlin stents were placed across the stricture    Recommendation  Repeat ERCP in 8 weeks  Will recommend levaquin 500 mg once daily for 1 week

## 2017-03-21 LAB — ERCP: NORMAL

## 2017-03-22 ENCOUNTER — CARE COORDINATION (OUTPATIENT)
Dept: GASTROENTEROLOGY | Facility: CLINIC | Age: 32
End: 2017-03-22

## 2017-03-22 DIAGNOSIS — K83.1 BILIARY ANASTOMOTIC STRICTURE (H): Primary | ICD-10-CM

## 2017-03-22 DIAGNOSIS — K91.89 BILIARY ANASTOMOTIC STRICTURE (H): Primary | ICD-10-CM

## 2017-03-22 NOTE — PROGRESS NOTES
"Post ERCP (3/20/2017) with Dr. Macias: Follow-up    Post procedure recommendations: Repeat ERCP in 8 weeks to re-evaluate anastamotic stricture for dilation and placement of stents as needed.    Patient states she is feeling \"ok\", denies N/V/fevers. Taking in PO with no issues. Amenable to repeat ERCP in 8 weeks, will expect a call with date and time.     Orders placed: ERCP and sent to scheduling.    Contact information verified for future questions/concerns.    Lyndsey HAMILTON RN Coordinator  Dr. Quinones, Dr. Huerta & Dr. Johnson  Pancreas~Biliary  [phone]          "

## 2017-03-24 ENCOUNTER — INFUSION THERAPY VISIT (OUTPATIENT)
Dept: INFUSION THERAPY | Facility: CLINIC | Age: 32
End: 2017-03-24
Attending: INTERNAL MEDICINE
Payer: COMMERCIAL

## 2017-03-24 VITALS
SYSTOLIC BLOOD PRESSURE: 102 MMHG | OXYGEN SATURATION: 100 % | HEART RATE: 100 BPM | DIASTOLIC BLOOD PRESSURE: 74 MMHG | BODY MASS INDEX: 20.01 KG/M2 | TEMPERATURE: 98 F | WEIGHT: 109.4 LBS | RESPIRATION RATE: 16 BRPM

## 2017-03-24 DIAGNOSIS — Z94.4 LIVER REPLACED BY TRANSPLANT (H): ICD-10-CM

## 2017-03-24 DIAGNOSIS — K51.018 ULCERATIVE PANCOLITIS WITH OTHER COMPLICATION (H): Primary | ICD-10-CM

## 2017-03-24 DIAGNOSIS — R19.7 DIARRHEA: ICD-10-CM

## 2017-03-24 LAB
ALBUMIN SERPL-MCNC: 2.6 G/DL (ref 3.4–5)
ALP SERPL-CCNC: 322 U/L (ref 40–150)
ALT SERPL W P-5'-P-CCNC: 72 U/L (ref 0–50)
ANION GAP SERPL CALCULATED.3IONS-SCNC: 10 MMOL/L (ref 3–14)
AST SERPL W P-5'-P-CCNC: 40 U/L (ref 0–45)
BILIRUB DIRECT SERPL-MCNC: 0.2 MG/DL (ref 0–0.2)
BILIRUB SERPL-MCNC: 0.4 MG/DL (ref 0.2–1.3)
BUN SERPL-MCNC: 13 MG/DL (ref 7–30)
CALCIUM SERPL-MCNC: 8.8 MG/DL (ref 8.5–10.1)
CHLORIDE SERPL-SCNC: 108 MMOL/L (ref 94–109)
CO2 SERPL-SCNC: 21 MMOL/L (ref 20–32)
CREAT SERPL-MCNC: 1.09 MG/DL (ref 0.52–1.04)
ERYTHROCYTE [DISTWIDTH] IN BLOOD BY AUTOMATED COUNT: 18.9 % (ref 10–15)
GFR SERPL CREATININE-BSD FRML MDRD: 58 ML/MIN/1.7M2
GLUCOSE SERPL-MCNC: 78 MG/DL (ref 70–99)
HCT VFR BLD AUTO: 35.6 % (ref 35–47)
HGB BLD-MCNC: 10.7 G/DL (ref 11.7–15.7)
MAGNESIUM SERPL-MCNC: 1.5 MG/DL (ref 1.6–2.3)
MCH RBC QN AUTO: 22.2 PG (ref 26.5–33)
MCHC RBC AUTO-ENTMCNC: 30.1 G/DL (ref 31.5–36.5)
MCV RBC AUTO: 74 FL (ref 78–100)
PHOSPHATE SERPL-MCNC: 3 MG/DL (ref 2.5–4.5)
PLATELET # BLD AUTO: 746 10E9/L (ref 150–450)
POTASSIUM SERPL-SCNC: 3.8 MMOL/L (ref 3.4–5.3)
PROT SERPL-MCNC: 7.8 G/DL (ref 6.8–8.8)
RBC # BLD AUTO: 4.82 10E12/L (ref 3.8–5.2)
SODIUM SERPL-SCNC: 139 MMOL/L (ref 133–144)
TACROLIMUS BLD-MCNC: 7 UG/L (ref 5–15)
TME LAST DOSE: NORMAL H
WBC # BLD AUTO: 15.5 10E9/L (ref 4–11)

## 2017-03-24 PROCEDURE — 80076 HEPATIC FUNCTION PANEL: CPT | Performed by: SURGERY

## 2017-03-24 PROCEDURE — 25000128 H RX IP 250 OP 636: Mod: ZF | Performed by: INTERNAL MEDICINE

## 2017-03-24 PROCEDURE — 83735 ASSAY OF MAGNESIUM: CPT | Performed by: SURGERY

## 2017-03-24 PROCEDURE — 84100 ASSAY OF PHOSPHORUS: CPT | Performed by: SURGERY

## 2017-03-24 PROCEDURE — 80048 BASIC METABOLIC PNL TOTAL CA: CPT | Performed by: SURGERY

## 2017-03-24 PROCEDURE — 80197 ASSAY OF TACROLIMUS: CPT | Performed by: SURGERY

## 2017-03-24 PROCEDURE — 96413 CHEMO IV INFUSION 1 HR: CPT

## 2017-03-24 PROCEDURE — 85027 COMPLETE CBC AUTOMATED: CPT | Performed by: SURGERY

## 2017-03-24 RX ADMIN — VEDOLIZUMAB 300 MG: 300 INJECTION, POWDER, LYOPHILIZED, FOR SOLUTION INTRAVENOUS at 08:34

## 2017-03-24 RX ADMIN — SODIUM CHLORIDE 50 ML: 9 INJECTION, SOLUTION INTRAVENOUS at 08:34

## 2017-03-24 NOTE — PATIENT INSTRUCTIONS
Dear Yarelis Penny    Thank you for choosing [facility] ([facility]) for your infusion.  The following information is a summary of our appointment as well as important reminders.      Additional information: Your infusion of Entyvio (vedolizumab).      We look forward in seeing you on your next appointment here at [facility].  Please don t hesitate to call us at [phone] to reschedule any of your appointments or to speak with one of the [facility] registered nurses.  It was a pleasure taking care of you today.    Sincerely,  Sharda Ariza, RN  [facility]  [facility]fusion [facility]  [address] [place] [address]  [address]  Phone:  [phone]    Vedolizumab Solution for injection  What is this medicine?  VEDOLIZUMAB (Ve cornelius JOSHUA you mab) is used to treat ulcerative colitis and Crohn's disease in adult patients.  This medicine may be used for other purposes; ask your health care provider or pharmacist if you have questions.  What should I tell my health care provider before I take this medicine?  They need to know if you have any of these conditions:    diabetes    hepatitis B or history of hepatitis B infection    HIV or AIDS    immune system problems    infection or history of infections    liver disease    recently received or scheduled to receive a vaccine    scheduled to have surgery    tuberculosis, a positive skin test for tuberculosis or have recently been in close contact with someone who has tuberculosis    an unusual or allergic reaction to vedolizumab, other medicines, foods, dyes, or preservatives    pregnant or trying to get pregnant    breast-feeding  How should I use this medicine?  This medicine is for infusion into a vein. It is given by a health care professional in a hospital or clinic setting.  A special MedGuide will be given to you by the pharmacist with each prescription and refill. Be sure to  read this information carefully each time.  Talk to your pediatrician regarding the use of this medicine in children. This medicine is not approved for use in children.  Overdosage: If you think you've taken too much of this medicine contact a poison control center or emergency room at once.  NOTE: This medicine is only for you. Do not share this medicine with others.  What if I miss a dose?  It is important not to miss your dose. Call your doctor or health care professional if you are unable to keep an appointment.  What may interact with this medicine?    steroid medicines like prednisone or cortisone    TNF-alpha inhibitors like natalizumab, adalimumab, and infliximab    vaccines  This list may not describe all possible interactions. Give your health care provider a list of all the medicines, herbs, non-prescription drugs, or dietary supplements you use. Also tell them if you smoke, drink alcohol, or use illegal drugs. Some items may interact with your medicine.  What should I watch for while using this medicine?  Your condition will be monitored carefully while you are receiving this medicine. Visit your doctor for regular check ups. Tell your doctor or healthcare professional if your symptoms do not start to get better or if they get worse.  Stay away from people who are sick. Call your doctor or health care professional for advice if you get a fever, chills or sore throat, or other symptoms of a cold or flu. Do not treat yourself.  In some patients, this medicine may cause a serious brain infection that may cause death. If you have any problems seeing, thinking, speaking, walking, or standing, tell your doctor right away. If you cannot reach your doctor, get urgent medical care.  What side effects may I notice from receiving this medicine?  Side effects that you should report to your doctor or health care professional as soon as possible:    allergic reactions like skin rash, itching or hives, swelling of the  face, lips, or tongue    breathing problems    changes in vision    chest pain    dark urine    depression, feelings of sadness    dizziness    general ill feeling or flu-like symptoms    irregular, missed, or painful menstrual periods    light-colored stools    loss of appetite, nausea    muscle weakness    problems with balance, talking, or walking    right upper belly pain    unusually weak or tired    yellowing of the eyes or skin  Side effects that usually do not require medical attention (Report these to your doctor or health care professional if they continue or are bothersome.):    aches, pains    headache    stomach upset    tiredness  This list may not describe all possible side effects. Call your doctor for medical advice about side effects. You may report side effects to FDA at 4-226-FDA-6150.  Where should I keep my medicine?  This drug is given in a hospital or clinic and will not be stored at home.  NOTE: This sheet is a summary. It may not cover all possible information. If you have questions about this medicine, talk to your doctor, pharmacist, or health care provider.  NOTE:This sheet is a summary. It may not cover all possible information. If you have questions about this medicine, talk to your doctor, pharmacist, or health care provider. Copyright  2016 Gold Standard

## 2017-03-24 NOTE — MR AVS SNAPSHOT
After Visit Summary   3/24/2017    Yarelis Penny    MRN: [id number]           Patient Information     Date Of Birth          1985        Visit Information        Provider Department      3/24/2017 8:00 AM [facility]; [facility] Specialty [facility] Procedure        Today's Diagnoses     Ulcerative pancolitis with other complication (H)    -  1    Liver replaced by transplant (H)        Diarrhea          Care Instructions    Dear Yarelis Penny    Thank you for choosing [facility] ([facility]) for your infusion.  The following information is a summary of our appointment as well as important reminders.      Additional information: Your infusion of Entyvio (vedolizumab).      We look forward in seeing you on your next appointment here at [facility].  Please don t hesitate to call us at [phone] to reschedule any of your appointments or to speak with one of the [facility] registered nurses.  It was a pleasure taking care of you today.    Sincerely,  Sharda Ariza RN  [facility]  [facility]fusion [facility]  [address] [place] [address]  [address]  Phone:  [phone]    Vedolizumab Solution for injection  What is this medicine?  VEDOLIZUMAB (Ve cornelius JOSHUA you mab) is used to treat ulcerative colitis and Crohn's disease in adult patients.  This medicine may be used for other purposes; ask your health care provider or pharmacist if you have questions.  What should I tell my health care provider before I take this medicine?  They need to know if you have any of these conditions:    diabetes    hepatitis B or history of hepatitis B infection    HIV or AIDS    immune system problems    infection or history of infections    liver disease    recently received or scheduled to receive a vaccine    scheduled to have surgery    tuberculosis, a positive skin test for tuberculosis or  have recently been in close contact with someone who has tuberculosis    an unusual or allergic reaction to vedolizumab, other medicines, foods, dyes, or preservatives    pregnant or trying to get pregnant    breast-feeding  How should I use this medicine?  This medicine is for infusion into a vein. It is given by a health care professional in a hospital or clinic setting.  A special MedGuide will be given to you by the pharmacist with each prescription and refill. Be sure to read this information carefully each time.  Talk to your pediatrician regarding the use of this medicine in children. This medicine is not approved for use in children.  Overdosage: If you think you've taken too much of this medicine contact a poison control center or emergency room at once.  NOTE: This medicine is only for you. Do not share this medicine with others.  What if I miss a dose?  It is important not to miss your dose. Call your doctor or health care professional if you are unable to keep an appointment.  What may interact with this medicine?    steroid medicines like prednisone or cortisone    TNF-alpha inhibitors like natalizumab, adalimumab, and infliximab    vaccines  This list may not describe all possible interactions. Give your health care provider a list of all the medicines, herbs, non-prescription drugs, or dietary supplements you use. Also tell them if you smoke, drink alcohol, or use illegal drugs. Some items may interact with your medicine.  What should I watch for while using this medicine?  Your condition will be monitored carefully while you are receiving this medicine. Visit your doctor for regular check ups. Tell your doctor or healthcare professional if your symptoms do not start to get better or if they get worse.  Stay away from people who are sick. Call your doctor or health care professional for advice if you get a fever, chills or sore throat, or other symptoms of a cold or flu. Do not treat yourself.  In  some patients, this medicine may cause a serious brain infection that may cause death. If you have any problems seeing, thinking, speaking, walking, or standing, tell your doctor right away. If you cannot reach your doctor, get urgent medical care.  What side effects may I notice from receiving this medicine?  Side effects that you should report to your doctor or health care professional as soon as possible:    allergic reactions like skin rash, itching or hives, swelling of the face, lips, or tongue    breathing problems    changes in vision    chest pain    dark urine    depression, feelings of sadness    dizziness    general ill feeling or flu-like symptoms    irregular, missed, or painful menstrual periods    light-colored stools    loss of appetite, nausea    muscle weakness    problems with balance, talking, or walking    right upper belly pain    unusually weak or tired    yellowing of the eyes or skin  Side effects that usually do not require medical attention (Report these to your doctor or health care professional if they continue or are bothersome.):    aches, pains    headache    stomach upset    tiredness  This list may not describe all possible side effects. Call your doctor for medical advice about side effects. You may report side effects to FDA at 6-734-FDA-9118.  Where should I keep my medicine?  This drug is given in a hospital or clinic and will not be stored at home.  NOTE: This sheet is a summary. It may not cover all possible information. If you have questions about this medicine, talk to your doctor, pharmacist, or health care provider.  NOTE:This sheet is a summary. It may not cover all possible information. If you have questions about this medicine, talk to your doctor, pharmacist, or health care provider. Copyright  2016 Gold Standard                  Follow-ups after your visit        Your next 10 appointments already scheduled     May 01, 2017  8:00 AM CDT   (Arrive by 7:45 AM)   Return  Visit with Fuentes Johnson MD   [facility] ([facility])    [address]  [address] [facility] [address] [phone]              Who to contact     If you have questions or need follow up information about today's clinic visit or your schedule please contact [facility] SPECIALTY AND PROCEDURE directly at [phone].  Normal or non-critical lab and imaging results will be communicated to you by [employer], letter or phone within 4 business days after the clinic has received the results. If you do not hear from us within 7 days, please contact the clinic through [employer] or phone. If you have a critical or abnormal lab result, we will notify you by phone as soon as possible.  Submit refill requests through [employer] or call your pharmacy and they will forward the refill request to us. Please allow 3 business days for your refill to be completed.          Additional Information About Your Visit        [employer]obo[employer] Information     [employer] gives you secure access to your electronic health record. If you see a primary care provider, you can also send messages to your care team and make appointments. If you have questions, please call your primary care clinic.  If you do not have a primary care provider, please call [phone] and they will assist you.        Care EveryWhere ID     This is your Care EveryWhere ID. This could be used by other organizations to access your [facility] medical records  [id number]        Your Vitals Were     Pulse Temperature Respirations Last Period Pulse Oximetry BMI (Body Mass Index)    112 98  F (36.7  C) (Oral) 16 02/01/2017 100% 20.01 kg/m2       Blood Pressure from Last 3 Encounters:   03/24/17 110/77   03/20/17 (!) 133/97   03/20/17 120/84    Weight from Last 3 Encounters:   03/24/17 49.6 kg (109 lb 6.4 oz)   03/20/17 50.8 kg (111 lb 15.9 oz)   03/20/17 51.7 kg (114 lb)              We Performed  the Following     Basic metabolic panel     CBC with platelets     CMV DNA quantification     Hepatic panel     Magnesium     Phosphorus     Tacrolimus level     Treatment Conditions          Today's Medication Changes          These changes are accurate as of: 3/24/17  9:06 AM.  If you have any questions, ask your nurse or doctor.               These medicines have changed or have updated prescriptions.        Dose/Directions    predniSONE 10 MG tablet   Commonly known as:  DELTASONE   This may have changed:    - how much to take  - how to take this  - when to take this  - additional instructions   Used for:  Ulcerative colitis with other complication, unspecified location (H)        Take 6 tabs (60mg) PO x 5 days  Then take 5 tabs (50mg) PO x 7 days  Then take 4 tabs (40mg) PO x 7 days Then take 3 tabs (30mg) PO x 7 days Then take 2 tabs (20mg) PO x 7 days Then take 1 tab (10mg) PO x 7 days Then take 1/2 tablet (5mg) PO x 7 days then DISCONTINUE  Then take   Quantity:  92 tablet   Refills:  0                Primary Care Provider Office Phone # Fax #    Jackson Orellana [phone]       [place] [address] YENIFER LAMA [address] [place] [id number]        Thank you!     Thank you for choosing [facility] SPECIALTY AND PROCEDURE  for your care. Our goal is always to provide you with excellent care. Hearing back from our patients is one way we can continue to improve our services. Please take a few minutes to complete the written survey that you may receive in the mail after your visit with us. Thank you!             Your Updated Medication List - Protect others around you: Learn how to safely use, store and throw away your medicines at www.disposemymeds.org.          This list is accurate as of: 3/24/17  9:06 AM.  Always use your most recent med list.                   Brand Name Dispense Instructions for use    GNP ASPIRIN LOW DOSE 81 MG EC tablet   Generic drug:  aspirin      30 tablet    Take 1 tablet (81 mg) by mouth daily       levofloxacin 500 MG tablet    LEVAQUIN    7 tablet    Take 1 tablet (500 mg) by mouth daily       predniSONE 10 MG tablet    DELTASONE    92 tablet    Take 6 tabs (60mg) PO x 5 days  Then take 5 tabs (50mg) PO x 7 days  Then take 4 tabs (40mg) PO x 7 days Then take 3 tabs (30mg) PO x 7 days Then take 2 tabs (20mg) PO x 7 days Then take 1 tab (10mg) PO x 7 days Then take 1/2 tablet (5mg) PO x 7 days then DISCONTINUE  Then take       sulfamethoxazole-trimethoprim 800-160 MG per tablet    BACTRIM DS    30 tablet    Take 1 tablet by mouth three times a week       * tacrolimus 0.5 MG capsule    PROGRAF - GENERIC EQUIVALENT    180 capsule    Take 1 capsule (0.5 mg) by mouth 2 times daily Take with 1 mg capsule. Total dose 2.5 mg every 12 hours       * tacrolimus 1 MG capsule    PROGRAF - GENERIC EQUIVALENT    360 capsule    Take 2 capsules (2 mg) by mouth 2 times daily *total dose 2.5 mg BID. Take with 0.5 mg capsule       ursodiol 250 MG tablet    ACTIGALL    270 tablet    Take 2 tabs (500 mg) in AM, and 1 tab at night (250 mg)       * Notice:  This list has 2 medication(s) that are the same as other medications prescribed for you. Read the directions carefully, and ask your doctor or other care provider to review them with you.

## 2017-03-24 NOTE — NURSING NOTE
"PRIOR TO INFUSION OF BIOLOGICAL MEDICATIONS OR ANY OF THESE AS LISTED: Benlysta (benlimumab) \".rheumbiologicalchecklist\"    Prior to Infusion of biological medications or any of these as listed:    1. Elevated temperature, fever, chills, productive cough or abnormal vital signs, night sweats, coughing up blood or sputum, no appetite or abnormal vital signs : NO    2. Open wounds or new incisions: NO    3. Recent hospitalization: NO    4.  Recent surgeries:  NO    5. Any upcoming surgeries or dental procedures?:NO    6. Any current or recent bouts of illness or infection? On any antibiotics? : NO    7. Any new, sudden or worsening abdominal pain :NO    8. Vaccination within 4 weeks? Patient or someone in the household is scheduled to receive vaccination? No live virus vaccines prior to or during treatment :NO    9. Any nervous system diseases [i.e. multiple sclerosis, Guillain-Newport, seizures, neurological  changes]: NO    10. Pregnant or breast feeding; or plans on pregnancy in the future: NO    11. Signs of worsening depression or suicidal ideations while taking benlysta:NO    12. New-onset medical symptoms: NO    13.  New cancer diagnosis or on chemotherapy or radiation NO    14.  Evaluate for any sign of active TB [Unexplained weight loss, Loss of appetite, Night sweats, Fever, Fatigue, Chills, Coughing for 3 weeks or longer, Hemoptysis (coughing up blood), Chest pain]: NO    **Note: If answered yes to any of the above, hold the infusion and contact ordering rheumatologist or on-call rheumatologist.   "

## 2017-03-25 LAB
CMV DNA SPEC NAA+PROBE-ACNC: 268 [IU]/ML
CMV DNA SPEC NAA+PROBE-LOG#: 2.4 {LOG_IU}/ML
SPECIMEN SOURCE: ABNORMAL

## 2017-03-28 ENCOUNTER — TELEPHONE (OUTPATIENT)
Dept: GASTROENTEROLOGY | Facility: CLINIC | Age: 32
End: 2017-03-28

## 2017-03-28 NOTE — TELEPHONE ENCOUNTER
Yarelis is informed that she is scheduled on 05/15/2017 at 7:45 AM with an arrival time of 5:45 AM.  She knows she needs a valid pre-op physical done within thirty days of the procedure.  She knows she will need a  and someone to monitor her for 24 hours post procedure.   All instructions will be mailed to her at her address listed in EPIC. It was verified during this call.    SR 03/28/2017  [id number]

## 2017-04-05 ENCOUNTER — CARE COORDINATION (OUTPATIENT)
Dept: GASTROENTEROLOGY | Facility: CLINIC | Age: 32
End: 2017-04-05

## 2017-04-05 ENCOUNTER — TELEPHONE (OUTPATIENT)
Dept: GASTROENTEROLOGY | Facility: CLINIC | Age: 32
End: 2017-04-05

## 2017-04-05 NOTE — PROGRESS NOTES
Pt have soft to mushy stools 1 to 2 stools a day.  No blood in stools.  No abdominal pain.  States she is feeling better since her clinic visit and last infusion.

## 2017-04-05 NOTE — PROGRESS NOTES
Returned pt voice message.  Pt had called [facility].  Pt wondering about her next infusion.   Pt had order for entyvio level to be drawn day of infusion prior to start of infusion. Not completed.  Will check with Dr. Johnson and Ms. Sierra to see if want to check level 4 weeks after her last infusion.  Also need update of symptoms.

## 2017-04-05 NOTE — PROGRESS NOTES
Yes. If this has not been done it should be done with upcoming infusion. Thank you for follow up.    Fuentes Johnson MD    [facility]  Division of Gastroenterology, Hepatology and Nutrition

## 2017-04-17 ENCOUNTER — CARE COORDINATION (OUTPATIENT)
Dept: GASTROENTEROLOGY | Facility: CLINIC | Age: 32
End: 2017-04-17

## 2017-04-17 DIAGNOSIS — K51.00 ULCERATIVE PANCOLITIS WITHOUT COMPLICATION (H): Primary | ICD-10-CM

## 2017-04-17 DIAGNOSIS — K51.90 UC (ULCERATIVE COLITIS) (H): Primary | ICD-10-CM

## 2017-04-17 NOTE — PROGRESS NOTES
Returned pt's voice mail in regard to follow up on stools and symptoms.  Left a message for pt to call back.

## 2017-04-17 NOTE — PROGRESS NOTES
Discussed with Ms. Sierra.  Will do labs and stools studies and vedo level this week.  Called pt and she is aware of the labs and stools studies. Form for vedo level sent to lab.

## 2017-04-18 DIAGNOSIS — K51.00 ULCERATIVE PANCOLITIS WITHOUT COMPLICATION (H): ICD-10-CM

## 2017-04-18 DIAGNOSIS — B27.00 GAMMAHERPESVIRAL MONONUCLEOSIS WITHOUT COMPLICATION: ICD-10-CM

## 2017-04-18 DIAGNOSIS — K51.90 UC (ULCERATIVE COLITIS) (H): ICD-10-CM

## 2017-04-18 DIAGNOSIS — B25.8 OTHER CYTOMEGALOVIRAL DISEASES (H): ICD-10-CM

## 2017-04-18 LAB
ALBUMIN SERPL-MCNC: 2.8 G/DL (ref 3.4–5)
ALP SERPL-CCNC: 301 U/L (ref 40–150)
ALT SERPL W P-5'-P-CCNC: 48 U/L (ref 0–50)
ANION GAP SERPL CALCULATED.3IONS-SCNC: 6 MMOL/L (ref 3–14)
AST SERPL W P-5'-P-CCNC: 34 U/L (ref 0–45)
BASOPHILS # BLD AUTO: 0.1 10E9/L (ref 0–0.2)
BASOPHILS NFR BLD AUTO: 0.8 %
BILIRUB DIRECT SERPL-MCNC: <0.1 MG/DL (ref 0–0.2)
BILIRUB SERPL-MCNC: 0.3 MG/DL (ref 0.2–1.3)
BUN SERPL-MCNC: 17 MG/DL (ref 7–30)
CALCIUM SERPL-MCNC: 9 MG/DL (ref 8.5–10.1)
CHLORIDE SERPL-SCNC: 109 MMOL/L (ref 94–109)
CMV DNA SPEC NAA+PROBE-ACNC: ABNORMAL [IU]/ML
CMV DNA SPEC NAA+PROBE-LOG#: <2.1 {LOG_IU}/ML
CO2 SERPL-SCNC: 23 MMOL/L (ref 20–32)
CREAT SERPL-MCNC: 1.13 MG/DL (ref 0.52–1.04)
CRP SERPL-MCNC: 17.7 MG/L (ref 0–8)
DIFFERENTIAL METHOD BLD: ABNORMAL
EOSINOPHIL # BLD AUTO: 1.9 10E9/L (ref 0–0.7)
EOSINOPHIL NFR BLD AUTO: 15 %
ERYTHROCYTE [DISTWIDTH] IN BLOOD BY AUTOMATED COUNT: 19.9 % (ref 10–15)
ERYTHROCYTE [SEDIMENTATION RATE] IN BLOOD BY WESTERGREN METHOD: 97 MM/H (ref 0–20)
GFR SERPL CREATININE-BSD FRML MDRD: 56 ML/MIN/1.7M2
GLUCOSE SERPL-MCNC: 83 MG/DL (ref 70–99)
HCT VFR BLD AUTO: 34.1 % (ref 35–47)
HGB BLD-MCNC: 10.4 G/DL (ref 11.7–15.7)
IMM GRANULOCYTES # BLD: 0.1 10E9/L (ref 0–0.4)
IMM GRANULOCYTES NFR BLD: 0.6 %
LYMPHOCYTES # BLD AUTO: 4.7 10E9/L (ref 0.8–5.3)
LYMPHOCYTES NFR BLD AUTO: 38.1 %
MCH RBC QN AUTO: 21.8 PG (ref 26.5–33)
MCHC RBC AUTO-ENTMCNC: 30.5 G/DL (ref 31.5–36.5)
MCV RBC AUTO: 72 FL (ref 78–100)
MISCELLANEOUS TEST: NORMAL
MONOCYTES # BLD AUTO: 1.6 10E9/L (ref 0–1.3)
MONOCYTES NFR BLD AUTO: 12.9 %
NEUTROPHILS # BLD AUTO: 4 10E9/L (ref 1.6–8.3)
NEUTROPHILS NFR BLD AUTO: 32.6 %
NRBC # BLD AUTO: 0 10*3/UL
NRBC BLD AUTO-RTO: 0 /100
PLATELET # BLD AUTO: 856 10E9/L (ref 150–450)
POTASSIUM SERPL-SCNC: 4.3 MMOL/L (ref 3.4–5.3)
PROT SERPL-MCNC: 8.7 G/DL (ref 6.8–8.8)
RBC # BLD AUTO: 4.77 10E12/L (ref 3.8–5.2)
SODIUM SERPL-SCNC: 138 MMOL/L (ref 133–144)
SPECIMEN SOURCE: ABNORMAL
WBC # BLD AUTO: 12.3 10E9/L (ref 4–11)

## 2017-04-19 DIAGNOSIS — K51.00 ULCERATIVE PANCOLITIS WITHOUT COMPLICATION (H): ICD-10-CM

## 2017-04-19 LAB
C DIFF TOX B STL QL: NORMAL
CAMPYLOBACTER GROUP BY NAT: NOT DETECTED
EBV DNA # SPEC NAA+PROBE: ABNORMAL {COPIES}/ML
EBV DNA SPEC NAA+PROBE-LOG#: 5.1 {LOG_COPIES}/ML
ENTERIC PATHOGEN COMMENT: NORMAL
NOROVIRUS I AND II BY NAT: NOT DETECTED
ROTAVIRUS A BY NAT: NOT DETECTED
SALMONELLA SPECIES BY NAT: NOT DETECTED
SHIGA TOXIN 1 GENE BY NAT: NOT DETECTED
SHIGA TOXIN 2 GENE BY NAT: NOT DETECTED
SHIGELLA SP+EIEC IPAH STL QL NAA+PROBE: NOT DETECTED
SPECIMEN SOURCE: NORMAL
VIBRIO GROUP BY NAT: NOT DETECTED
YERSINIA ENTEROCOLITICA BY NAT: NOT DETECTED

## 2017-04-20 LAB
G LAMBLIA AG STL QL IA: NORMAL
MICRO REPORT STATUS: NORMAL
MICRO REPORT STATUS: NORMAL
O+P STL MICRO: NORMAL
SPECIMEN SOURCE: NORMAL
SPECIMEN SOURCE: NORMAL

## 2017-04-21 LAB — CALPROTECTIN STL-MCNT: 870 UG/G

## 2017-04-23 LAB — LAB SCANNED RESULT: NORMAL

## 2017-04-26 ENCOUNTER — CARE COORDINATION (OUTPATIENT)
Dept: GASTROENTEROLOGY | Facility: CLINIC | Age: 32
End: 2017-04-26

## 2017-04-26 ENCOUNTER — TELEPHONE (OUTPATIENT)
Dept: GASTROENTEROLOGY | Facility: CLINIC | Age: 32
End: 2017-04-26

## 2017-04-26 NOTE — PROGRESS NOTES
Edited therapy plan to reflect interval change to every 4 weeks.  Also called speciality infusion to check on an earlier appt.  Left a message for pt with the message that switching to every 4 weeks and have called the infusion center to see if she can receive infusion this week.      Can we change Yarelis's Vedo infusion to q 4 weeks  She is at 4 weeks at this time, so could we get her scheduled for another infusion some time this week?   Vedo level is 34.1.

## 2017-04-26 NOTE — PROGRESS NOTES
Pt called back and earliest opening right now is Tuesday at 7 am for infusion. If speciality infusion has opening before this date will call her to schedule.  Pt has an opeinig with Dr. Johnson on Monday May 1.  Continues to have loose stools, no blood in stools, some cramping prior to bowel movements.

## 2017-04-30 ASSESSMENT — ENCOUNTER SYMPTOMS
RECTAL BLEEDING: 0
BLOOD IN STOOL: 0
HEARTBURN: 0
VOMITING: 0
JAUNDICE: 0
RECTAL PAIN: 0
CONSTIPATION: 0
NAUSEA: 0
BOWEL INCONTINENCE: 0
ABDOMINAL PAIN: 1
BLOATING: 0
DIARRHEA: 1

## 2017-05-01 ENCOUNTER — OFFICE VISIT (OUTPATIENT)
Dept: GASTROENTEROLOGY | Facility: CLINIC | Age: 32
End: 2017-05-01

## 2017-05-01 VITALS
OXYGEN SATURATION: 100 % | HEART RATE: 86 BPM | BODY MASS INDEX: 20.46 KG/M2 | WEIGHT: 111.2 LBS | SYSTOLIC BLOOD PRESSURE: 112 MMHG | DIASTOLIC BLOOD PRESSURE: 81 MMHG | TEMPERATURE: 98.6 F | HEIGHT: 62 IN

## 2017-05-01 DIAGNOSIS — K51.00 ULCERATIVE PANCOLITIS WITHOUT COMPLICATION (H): Primary | ICD-10-CM

## 2017-05-01 DIAGNOSIS — B27.00 EBV (EPSTEIN-BARR VIRUS) VIREMIA: ICD-10-CM

## 2017-05-01 DIAGNOSIS — D50.0 IRON DEFICIENCY ANEMIA DUE TO CHRONIC BLOOD LOSS: ICD-10-CM

## 2017-05-01 DIAGNOSIS — B25.9 CYTOMEGALOVIRUS (CMV) VIREMIA (H): ICD-10-CM

## 2017-05-01 DIAGNOSIS — K83.01 PSC (PRIMARY SCLEROSING CHOLANGITIS) (H): ICD-10-CM

## 2017-05-01 DIAGNOSIS — Z94.4 S/P LIVER TRANSPLANT (H): ICD-10-CM

## 2017-05-01 RX ORDER — ZOLPIDEM TARTRATE 5 MG/1
TABLET ORAL
Refills: 5 | COMMUNITY
Start: 2017-01-14 | End: 2017-09-13

## 2017-05-01 ASSESSMENT — PAIN SCALES - GENERAL: PAINLEVEL: NO PAIN (0)

## 2017-05-01 NOTE — LETTER
2017       RE: Yarelis Penny  [address] [facility] [address]     Dear Colleague,    Thank you for referring your patient, Yarelis Penny, to the [facility] GASTROENTEROLOGY AND IBD at [facility]. Please see a copy of my visit note below.    Note dictated. Job code [id number].    Fuentes Johnson MD    [facility]  Division of Gastroenterology, Hepatology and Nutrition    OUTPATIENT GI FOLLOWUP IBD CLINIC VISIT      PRIMARY CARE PROVIDER:  Dr. Jackson Orellana.      REASON FOR CONSULTATION:  PSC-associated IBD.      IBD HISTORY:   1.  Age at diagnosis:  18.   2.  Extend of disease:  Colonoscopies over the years have varied.  Most recently in 2017 she had extensive colitis from 20-25 cm from the anal verge all the way throughout the colon with only mild changes in the distal colon.  She also had ileal inflammation as well.  Biopsies of the ileum showed chronic active ileitis, and biopsy of the colon showed chronic active colitis.  There was no evidence of CMV or PTLD.  There were rare SHENA/KACIE-positive cells in the ileum felt to be not significant.   3.  Current UC medications:  Prednisone 5 mg, Entyvio 300 mg switching from every 2 months to every month.   4.  Prior UC surgeries:  None.   5.  Prior IBD medications:  Prednisone, Pentasa, Asacol and azathioprine (this was mainly given for her liver issues).      DRUG MONITORIN.  TPMT enzyme activity 2016 was 18.5.   2.  6TGN and 6MMPN levels 2016:  The 6TGN was 203, and the 6MMPN was 257.   3.  Biologic concentrations:  Her trough Entyvio level was 34.1 with no antibodies.      HISTORY OF PRESENT ILLNESS:  Yarelis is here today to follow up.  In general, she is feeling well.  However, over the last few weeks her stools have become more loose.  She is having about 2 loose stools per day.  There is really not much form left in them.  She is not having any blood.  She occasionally is  having some urgency, but is not having any tenesmus.  She is not having any nocturnal stools or any accidents.  She is not having any significant abdominal pain; however, she feels some rumbling in her stomach as things digest, and she does feel some cramping prior to a bowel movement that is relieved with passing a bowel movement.  She has no rashes, mouth sores or joint pain.  She has no fevers, chills or sweats.  She has lost a bit of weight since coming off of the prednisone; she was in the 120s, and now she is down to 111, which is near where she was prior to her flare last late fall.      In general, she is doing well and is pleased with how she is doing, but her energy level as a bit low.      She did have an ERCP by Dr. Guru Macias that did show an anastomotic stricture.  This was dilated, and a stent was placed.  She is due for another ERCP in a couple of weeks and needs a preop for this.  She completed a week of levofloxacin.      She is taking Bactrim 3 times a week for PCP prophylaxis.      She has not started azathioprine, and she is not sure if Dr. Enriquez wants her to do this.      REVIEW OF SYSTEMS:  A complete review of systems is performed.  Pertinent positives and negatives are as stated above.  The remainder of a complete review of systems is unremarkable.      PAST MEDICAL HISTORY:   1.  PSC-associated IBD, as above.   2.  PSC cirrhosis, status post liver transplant.   3.  History of CMV colitis.   4.  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  She has been given a dose of rituximab for this.   5.  History of C. diff.  She has had multiple C. diffs checked, and they have been negative.      PAST SURGICAL HISTORY:  Status post living donor transplant.  She is also status post multiple ERCPs, most recently on 03/20.      SOCIAL HISTORY:  She denies illicit drug use.  She is .  No alcohol or tobacco.      FAMILY HISTORY:  Mother with a history of dyslipidemia and hypertension.  Maternal  grandmother with hypertension.  Maternal grandmother with Alzheimer's.  Father with dyslipidemia.      PHYSICAL EXAMINATION:   VITAL SIGNS:  All reviewed and stable.  Weight is as outlined above.   GENERAL:  She is pleasant, in no acute distress.  She smiles and interacts appropriately.   HEENT:  Head is atraumatic, normocephalic.  Sclerae are anicteric without injection.  Oropharynx is clear with moist mucous membranes.   NECK:  Supple with no lymphadenopathy, no thyromegaly.   HEART:  Normal rate, no murmurs.   LUNGS:  Clear to auscultation.  I do not appreciate any rhonchi, rubs or wheezes.   ABDOMEN:  Soft, nontender and nondistended.  She has well-healed surgical scars.   EXTREMITIES:  No clubbing, cyanosis or edema.   SKIN:  No evidence of rash.   JOINTS:  No evidence of synovitis.   NEUROLOGIC:  Awake, alert and oriented x3 with no focal deficits.      RECOMMENDATIONS:  Reviewed in Epic.  Fecal calprotectin was elevated at 870.  Creatinine is 1.13, albumin is 2.8, total protein is 8.7, alkaline phosphatase is 301, ALT is 48 and AST is 34.  CRP is 17.7.  White blood cells 12.3, hemoglobin is 10.4, MCV is 72, and platelets are 856.  ESR is 97.  C. diff was negative.      ASSESSMENT AND PLAN:  Yarelis Penny is here today to follow up in the IBD Clinic.     1.  PSC-associated IBD.  In general, she has done much better after being on the Entyvio and finishing a steroid taper.  We have been able to taper her down to her baseline 5 mg of prednisone.  I am concerned that she continues to have smoldering inflammation.  Her stools have become looser, and she is having some urgency.  Her CRP has also bumped up, her ESR is elevated, and her platelets are rising as well.  Certainly, all those markers are nonspecific evidence of inflammation.  However, she does have a fecal calprotectin that is elevated as well indicating that a luminal source is contributing to this.  She has had recent stool studies negative for infection  (including c diff). To maximize her IBD treatment, we will move forward with changing her Entyvio to every 4 weeks.  She is due for an infusion tomorrow, and we will follow labs with that.  I am hopeful that as we maximize her vedolizumab, her inflammatory bowel disease will be optimized.  I will discuss with Dr. Enriquez about whether she would like to restart the azathioprine.  This needs to be weighed against her EBV viremia.     2.  Cough has resolved.     3.  PCP prophylaxis.  I do not think she needs the PCP prophylaxis.  She has been at her baseline 5 mg of steroids for at least 2 months.  This medication does put her at risk for recurrent C. diff, and so she will stop the Bactrim at this point.     4.  EBV viremia.  She follows with Dr. Nails for this.  Her EBV levels have been bouncing up and down.  They did increase to about 111,000 recently.  Again, this does put her at risk for PTLD (of which there has been no evidence up to this point).  We will send a message to Dr. Nails to see if she would like to do anything about this, but I anticipate she would like to continue monitoring.     5.  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  Her CMV levels were mildly detectable at the last check.  She has been treated with IV ganciclovir.  She will continue to follow with Dr. Nails. If symptoms worsen we may have to consider repeat endoscopy to look for further end-organ involvement.    6.  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  Her last ERCP was on 03/20/2017 with Dr. Guru Macias.  She is status post dilation and stent placement.  She is due for another ERCP in a couple of weeks.  We will help her arrange for a preop for that.  She is on the 5 mg of prednisone and tacrolimus b.i.d.  We will defer to Dr. Enriquez about whether she would like to restart the azathioprine.     7.  Left adnexal mass on CT in 12/2016.  She had a followup ultrasound, which showed improvement, and no  further followup is needed.     8.  IBD health care maintenance.  Please see Cresencio Sierra's note dated 2017.  I did not discuss this with her in depth today.      Thank you very much for the opportunity to take part in the care of this patient.  Please do not hesitate to call with questions.      cc: DANISHA Love MD Jaime Green, MD     D: 2017 08:52   T: 2017 09:35   MT: CHOCO      Name:     GINA CABRERA   MRN:      [id number]        Account:      [id number]   :      1985           Service Date: 2017      Document: [id number]

## 2017-05-01 NOTE — PROGRESS NOTES
OUTPATIENT GI FOLLOWUP IBD CLINIC VISIT      PRIMARY CARE PROVIDER:  Dr. Jackson Orellana.      REASON FOR CONSULTATION:  PSC-associated IBD.      IBD HISTORY:   1.  Age at diagnosis:  18.   2.  Extend of disease:  Colonoscopies over the years have varied.  Most recently in 2017 she had extensive colitis from 20-25 cm from the anal verge all the way throughout the colon with only mild changes in the distal colon.  She also had ileal inflammation as well.  Biopsies of the ileum showed chronic active ileitis, and biopsy of the colon showed chronic active colitis.  There was no evidence of CMV or PTLD.  There were rare SHENA/KACIE-positive cells in the ileum felt to be not significant.   3.  Current UC medications:  Prednisone 5 mg, Entyvio 300 mg switching from every 2 months to every month.   4.  Prior UC surgeries:  None.   5.  Prior IBD medications:  Prednisone, Pentasa, Asacol and azathioprine (this was mainly given for her liver issues).      DRUG MONITORIN.  TPMT enzyme activity 2016 was 18.5.   2.  6TGN and 6MMPN levels 2016:  The 6TGN was 203, and the 6MMPN was 257.   3.  Biologic concentrations:  Her trough Entyvio level was 34.1 with no antibodies.      HISTORY OF PRESENT ILLNESS:  Yarelis is here today to follow up.  In general, she is feeling well.  However, over the last few weeks her stools have become more loose.  She is having about 2 loose stools per day.  There is really not much form left in them.  She is not having any blood.  She occasionally is having some urgency, but is not having any tenesmus.  She is not having any nocturnal stools or any accidents.  She is not having any significant abdominal pain; however, she feels some rumbling in her stomach as things digest, and she does feel some cramping prior to a bowel movement that is relieved with passing a bowel movement.  She has no rashes, mouth sores or joint pain.  She has no fevers, chills or sweats.  She has lost a bit of weight  since coming off of the prednisone; she was in the 120s, and now she is down to 111, which is near where she was prior to her flare last late fall.      In general, she is doing well and is pleased with how she is doing, but her energy level as a bit low.      She did have an ERCP by Dr. Guru Macias that did show an anastomotic stricture.  This was dilated, and a stent was placed.  She is due for another ERCP in a couple of weeks and needs a preop for this.  She completed a week of levofloxacin.      She is taking Bactrim 3 times a week for PCP prophylaxis.      She has not started azathioprine, and she is not sure if Dr. Enriquez wants her to do this.      REVIEW OF SYSTEMS:  A complete review of systems is performed.  Pertinent positives and negatives are as stated above.  The remainder of a complete review of systems is unremarkable.      PAST MEDICAL HISTORY:   1.  PSC-associated IBD, as above.   2.  PSC cirrhosis, status post liver transplant.   3.  History of CMV colitis.   4.  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  She has been given a dose of rituximab for this.   5.  History of C. diff.  She has had multiple C. diffs checked, and they have been negative.      PAST SURGICAL HISTORY:  Status post living donor transplant.  She is also status post multiple ERCPs, most recently on 03/20.      SOCIAL HISTORY:  She denies illicit drug use.  She is .  No alcohol or tobacco.      FAMILY HISTORY:  Mother with a history of dyslipidemia and hypertension.  Maternal grandmother with hypertension.  Maternal grandmother with Alzheimer's.  Father with dyslipidemia.      PHYSICAL EXAMINATION:   VITAL SIGNS:  All reviewed and stable.  Weight is as outlined above.   GENERAL:  She is pleasant, in no acute distress.  She smiles and interacts appropriately.   HEENT:  Head is atraumatic, normocephalic.  Sclerae are anicteric without injection.  Oropharynx is clear with moist mucous membranes.   NECK:  Supple with no  lymphadenopathy, no thyromegaly.   HEART:  Normal rate, no murmurs.   LUNGS:  Clear to auscultation.  I do not appreciate any rhonchi, rubs or wheezes.   ABDOMEN:  Soft, nontender and nondistended.  She has well-healed surgical scars.   EXTREMITIES:  No clubbing, cyanosis or edema.   SKIN:  No evidence of rash.   JOINTS:  No evidence of synovitis.   NEUROLOGIC:  Awake, alert and oriented x3 with no focal deficits.      RECOMMENDATIONS:  Reviewed in Epic.  Fecal calprotectin was elevated at 870.  Creatinine is 1.13, albumin is 2.8, total protein is 8.7, alkaline phosphatase is 301, ALT is 48 and AST is 34.  CRP is 17.7.  White blood cells 12.3, hemoglobin is 10.4, MCV is 72, and platelets are 856.  ESR is 97.  C. diff was negative.      ASSESSMENT AND PLAN:  Yarelis Penny is here today to follow up in the IBD Clinic.     1.  PSC-associated IBD.  In general, she has done much better after being on the Entyvio and finishing a steroid taper.  We have been able to taper her down to her baseline 5 mg of prednisone.  I am concerned that she continues to have smoldering inflammation.  Her stools have become looser, and she is having some urgency.  Her CRP has also bumped up, her ESR is elevated, and her platelets are rising as well.  Certainly, all those markers are nonspecific evidence of inflammation.  However, she does have a fecal calprotectin that is elevated as well indicating that a luminal source is contributing to this.  She has had recent stool studies negative for infection (including c diff). To maximize her IBD treatment, we will move forward with changing her Entyvio to every 4 weeks.  She is due for an infusion tomorrow, and we will follow labs with that.  I am hopeful that as we maximize her vedolizumab, her inflammatory bowel disease will be optimized.  I will discuss with Dr. Enriquez about whether she would like to restart the azathioprine.  This needs to be weighed against her EBV viremia.     2.  Cough  has resolved.     3.  PCP prophylaxis.  I do not think she needs the PCP prophylaxis.  She has been at her baseline 5 mg of steroids for at least 2 months.  This medication does put her at risk for recurrent C. diff, and so she will stop the Bactrim at this point.     4.  EBV viremia.  She follows with Dr. Nails for this.  Her EBV levels have been bouncing up and down.  They did increase to about 111,000 recently.  Again, this does put her at risk for PTLD (of which there has been no evidence up to this point).  We will send a message to Dr. Nails to see if she would like to do anything about this, but I anticipate she would like to continue monitoring.     5.  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  Her CMV levels were mildly detectable at the last check.  She has been treated with IV ganciclovir.  She will continue to follow with Dr. Nails. If symptoms worsen we may have to consider repeat endoscopy to look for further end-organ involvement.    6.  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  Her last ERCP was on 03/20/2017 with Dr. Guru Macias.  She is status post dilation and stent placement.  She is due for another ERCP in a couple of weeks.  We will help her arrange for a preop for that.  She is on the 5 mg of prednisone and tacrolimus b.i.d.  We will defer to Dr. Enriquez about whether she would like to restart the azathioprine.     7.  Left adnexal mass on CT in 12/2016.  She had a followup ultrasound, which showed improvement, and no further followup is needed.     8.  IBD health care maintenance.  Please see Cresencio Sierra's note dated 03/20/2017.  I did not discuss this with her in depth today.      Thank you very much for the opportunity to take part in the care of this patient.  Please do not hesitate to call with questions.      cc: DANISHA Love MD Jaime Green, MD JUSTIN R. HOWARD, MD             D: 05/01/2017 08:52   T: 05/01/2017  09:35   MT:       Name:     GINA CABRERA   MRN:      [id number]        Account:      [id number]   :      1985           Service Date: 2017      Document: [id number]

## 2017-05-01 NOTE — MR AVS SNAPSHOT
After Visit Summary   5/1/2017    Yarelis Penny    MRN: [id number]           Patient Information     Date Of Birth          1985        Visit Information        Provider Department      5/1/2017 8:00 AM Fuentes Johnson MD [facility] Gastroenterology and IBD        Care Instructions    1. Keep up the great work!    2. We will help arrange pre-op appointment for your ERCP    3. You can stop the bactrim    4. We will check iron levels and if low give you iron infusions    5. We will follow labs and symptoms on monthly Entyvio (vedolizumab)    6. I will touch base with Dr. Enriquez and Dr. Nails about the EBV levels and if we are going to start azathioprine    Follow up in 2 months or sooner if needed        Follow-ups after your visit        Your next 10 appointments already scheduled     May 02, 2017  7:00 AM CDT   Infusion 120 with UC SPEC INFUSION, UC 51 ATC   [facility] ([facility])    [address]  2nd [address]  [facility] [id number] [phone]            May 15, 2017   Procedure with Guru Brittany Macias MD   [facility] [place], Same Day Surgery (--)    [address] [address] [phone]            Jun 07, 2017  8:00 AM CDT   (Arrive by 7:45 AM)   Return Visit with Walker Nails MD   [facility] Infectious Diseases ([facility])    [address]  3rd Floor  [facility] [address] [phone]            Jul 25, 2017  8:40 AM CDT   (Arrive by 8:25 AM)   RETURN IRRITABLE BOWEL DISEASE with Fuentes Johnson MD   [facility] Gastroenterology and IBD ([facility])    [address]  4th Floor  [facility] [id number] [phone]              Who to contact     Please call your clinic at [phone] to:    Ask questions about your health    Make or cancel appointments    Discuss your  "medicines    Learn about your test results    Speak to your doctor   If you have compliments or concerns about an experience at your clinic, or if you wish to file a complaint, please contact [facility] Patient Relations at [phone] or email us at [email or web address]         Additional Information About Your Visit        [employer] Information     [email or web address] gives you secure access to your electronic health record. If you see a primary care provider, you can also send messages to your care team and make appointments. If you have questions, please call your primary care clinic.  If you do not have a primary care provider, please call [phone] and they will assist you.      [employer] is an electronic gateway that provides easy, online access to your medical records. With [employer], you can request a clinic appointment, read your test results, renew a prescription or communicate with your care team.     To access your existing account, please contact your [facility] or call [phone] for assistance.        Care EveryWhere ID     This is your Care EveryWhere ID. This could be used by other organizations to access your [facility] medical records  [id number]        Your Vitals Were     Pulse Temperature Height Pulse Oximetry BMI (Body Mass Index)       86 98.6  F (37  C) (Oral) 1.575 m (5' 2\") 100% 20.34 kg/m2        Blood Pressure from Last 3 Encounters:   05/01/17 112/81   03/24/17 102/74   03/20/17 (!) 133/97    Weight from Last 3 Encounters:   05/01/17 50.4 kg (111 lb 3.2 oz)   03/24/17 49.6 kg (109 lb 6.4 oz)   03/20/17 50.8 kg (111 lb 15.9 oz)              Today, you had the following     No orders found for display         Today's Medication Changes          These changes are accurate as of: 5/1/17  8:34 AM.  If you have any questions, ask your nurse or doctor.               These medicines have changed or have updated prescriptions.        " Dose/Directions    predniSONE 10 MG tablet   Commonly known as:  DELTASONE   This may have changed:    - how much to take  - how to take this  - when to take this  - additional instructions   Used for:  Ulcerative colitis with other complication, unspecified location (H)        Take 6 tabs (60mg) PO x 5 days  Then take 5 tabs (50mg) PO x 7 days  Then take 4 tabs (40mg) PO x 7 days Then take 3 tabs (30mg) PO x 7 days Then take 2 tabs (20mg) PO x 7 days Then take 1 tab (10mg) PO x 7 days Then take 1/2 tablet (5mg) PO x 7 days then DISCONTINUE  Then take   Quantity:  92 tablet   Refills:  0                Primary Care Provider Office Phone # Fax #    Jackson Orellana [phone]       [place] [address] [address]  [facility] [address]        Thank you!     Thank you for choosing [facility] GASTROENTEROLOGY AND IBD  for your care. Our goal is always to provide you with excellent care. Hearing back from our patients is one way we can continue to improve our services. Please take a few minutes to complete the written survey that you may receive in the mail after your visit with us. Thank you!             Your Updated Medication List - Protect others around you: Learn how to safely use, store and throw away your medicines at www.disposemymeds.org.          This list is accurate as of: 5/1/17  8:34 AM.  Always use your most recent med list.                   Brand Name Dispense Instructions for use    GNP ASPIRIN LOW DOSE 81 MG EC tablet   Generic drug:  aspirin     30 tablet    Take 1 tablet (81 mg) by mouth daily       levofloxacin 500 MG tablet    LEVAQUIN    7 tablet    Take 1 tablet (500 mg) by mouth daily       predniSONE 10 MG tablet    DELTASONE    92 tablet    Take 6 tabs (60mg) PO x 5 days  Then take 5 tabs (50mg) PO x 7 days  Then take 4 tabs (40mg) PO x 7 days Then take 3 tabs (30mg) PO x 7 days Then take 2 tabs (20mg) PO x 7 days Then take 1 tab (10mg) PO x 7 days Then take 1/2 tablet  (5mg) PO x 7 days then DISCONTINUE  Then take       sulfamethoxazole-trimethoprim 800-160 MG per tablet    BACTRIM DS    30 tablet    Take 1 tablet by mouth three times a week       * tacrolimus 0.5 MG capsule    PROGRAF - GENERIC EQUIVALENT    180 capsule    Take 1 capsule (0.5 mg) by mouth 2 times daily Take with 1 mg capsule. Total dose 2.5 mg every 12 hours       * tacrolimus 1 MG capsule    PROGRAF - GENERIC EQUIVALENT    360 capsule    Take 2 capsules (2 mg) by mouth 2 times daily *total dose 2.5 mg BID. Take with 0.5 mg capsule       ursodiol 250 MG tablet    ACTIGALL    270 tablet    Take 2 tabs (500 mg) in AM, and 1 tab at night (250 mg)       zolpidem 5 MG tablet    AMBIEN     TK 1 T PO QD HS PRN FOR SLEEP       * Notice:  This list has 2 medication(s) that are the same as other medications prescribed for you. Read the directions carefully, and ask your doctor or other care provider to review them with you.

## 2017-05-01 NOTE — NURSING NOTE
"Chief Complaint   Patient presents with     Follow Up For     GI       Vitals:    05/01/17 0807   BP: 112/81   BP Location: Left arm   Patient Position: Chair   Cuff Size: Adult Small   Pulse: 86   Temp: 98.6  F (37  C)   TempSrc: Oral   SpO2: 100%   Weight: 111 lb 3.2 oz   Height: 5' 2\"       Body mass index is 20.34 kg/(m^2).    Sherri Page                          "

## 2017-05-01 NOTE — PATIENT INSTRUCTIONS
1. Keep up the great work!    2. We will help arrange pre-op appointment for your ERCP    3. You can stop the bactrim    4. We will check iron levels and if low give you iron infusions    5. We will follow labs and symptoms on monthly Entyvio (vedolizumab)    6. I will touch base with Dr. Enriquez and Dr. Nails about the EBV levels and if we are going to start azathioprine    Follow up in 2 months or sooner if needed

## 2017-05-01 NOTE — PROGRESS NOTES
Note dictated. Job code [id number].    Fuentes Johnson MD    [facility]  Division of Gastroenterology, Hepatology and Nutrition    Answers for HPI/ROS submitted by the patient on 4/30/2017   General Symptoms: No  Skin Symptoms: No  HENT Symptoms: No  EYE SYMPTOMS: No  HEART SYMPTOMS: No  LUNG SYMPTOMS: No  INTESTINAL SYMPTOMS: Yes  URINARY SYMPTOMS: No  GYNECOLOGIC SYMPTOMS: No  BREAST SYMPTOMS: No  SKELETAL SYMPTOMS: No  BLOOD SYMPTOMS: No  NERVOUS SYSTEM SYMPTOMS: No  MENTAL HEALTH SYMPTOMS: No  Heart burn or indigestion: No  Nausea: No  Vomiting: No  Abdominal pain: Yes  Bloating: No  Constipation: No  Diarrhea: Yes  Blood in stool: No  Black stools: No  Rectal or Anal pain: No  Fecal incontinence: No  Rectal bleeding: No  Yellowing of skin or eyes: No  Vomit with blood: No  Change in stools: No  Hemorrhoids: No

## 2017-05-02 ENCOUNTER — INFUSION THERAPY VISIT (OUTPATIENT)
Dept: INFUSION THERAPY | Facility: CLINIC | Age: 32
End: 2017-05-02
Attending: INTERNAL MEDICINE
Payer: COMMERCIAL

## 2017-05-02 VITALS
BODY MASS INDEX: 20.43 KG/M2 | WEIGHT: 111.7 LBS | HEART RATE: 92 BPM | DIASTOLIC BLOOD PRESSURE: 81 MMHG | RESPIRATION RATE: 16 BRPM | SYSTOLIC BLOOD PRESSURE: 123 MMHG | TEMPERATURE: 98.1 F

## 2017-05-02 DIAGNOSIS — B25.9 CMV COLITIS (H): ICD-10-CM

## 2017-05-02 DIAGNOSIS — K51.00 ULCERATIVE PANCOLITIS WITHOUT COMPLICATION (H): ICD-10-CM

## 2017-05-02 DIAGNOSIS — K52.9 COLITIS: Primary | ICD-10-CM

## 2017-05-02 DIAGNOSIS — K51.018 ULCERATIVE PANCOLITIS WITH OTHER COMPLICATION (H): ICD-10-CM

## 2017-05-02 DIAGNOSIS — B25.9 CYTOMEGALOVIRUS (CMV) VIREMIA (H): ICD-10-CM

## 2017-05-02 DIAGNOSIS — Z94.4 LIVER REPLACED BY TRANSPLANT (H): ICD-10-CM

## 2017-05-02 DIAGNOSIS — A08.39 CMV COLITIS (H): ICD-10-CM

## 2017-05-02 DIAGNOSIS — K51.90 UC (ULCERATIVE COLITIS) (H): ICD-10-CM

## 2017-05-02 LAB
ALBUMIN SERPL-MCNC: 2.4 G/DL (ref 3.4–5)
ALP SERPL-CCNC: 201 U/L (ref 40–150)
ALT SERPL W P-5'-P-CCNC: 32 U/L (ref 0–50)
ANION GAP SERPL CALCULATED.3IONS-SCNC: 8 MMOL/L (ref 3–14)
AST SERPL W P-5'-P-CCNC: 27 U/L (ref 0–45)
BASOPHILS # BLD AUTO: 0.1 10E9/L (ref 0–0.2)
BASOPHILS NFR BLD AUTO: 0.9 %
BILIRUB DIRECT SERPL-MCNC: 0.1 MG/DL (ref 0–0.2)
BILIRUB SERPL-MCNC: 0.4 MG/DL (ref 0.2–1.3)
BUN SERPL-MCNC: 17 MG/DL (ref 7–30)
CALCIUM SERPL-MCNC: 8.3 MG/DL (ref 8.5–10.1)
CHLORIDE SERPL-SCNC: 110 MMOL/L (ref 94–109)
CO2 SERPL-SCNC: 23 MMOL/L (ref 20–32)
CREAT SERPL-MCNC: 0.85 MG/DL (ref 0.52–1.04)
CRP SERPL-MCNC: 13.9 MG/L (ref 0–8)
DIFFERENTIAL METHOD BLD: ABNORMAL
EOSINOPHIL # BLD AUTO: 1.9 10E9/L (ref 0–0.7)
EOSINOPHIL NFR BLD AUTO: 14.6 %
ERYTHROCYTE [DISTWIDTH] IN BLOOD BY AUTOMATED COUNT: 19.9 % (ref 10–15)
ERYTHROCYTE [SEDIMENTATION RATE] IN BLOOD BY WESTERGREN METHOD: 110 MM/H (ref 0–20)
FERRITIN SERPL-MCNC: 6 NG/ML (ref 12–150)
GFR SERPL CREATININE-BSD FRML MDRD: 77 ML/MIN/1.7M2
GLUCOSE SERPL-MCNC: 74 MG/DL (ref 70–99)
HCT VFR BLD AUTO: 27.5 % (ref 35–47)
HGB BLD-MCNC: 8.3 G/DL (ref 11.7–15.7)
IMM GRANULOCYTES # BLD: 0.1 10E9/L (ref 0–0.4)
IMM GRANULOCYTES NFR BLD: 0.4 %
IRON SATN MFR SERPL: 4 % (ref 15–46)
IRON SERPL-MCNC: 12 UG/DL (ref 35–180)
LYMPHOCYTES # BLD AUTO: 3.9 10E9/L (ref 0.8–5.3)
LYMPHOCYTES NFR BLD AUTO: 29.6 %
MAGNESIUM SERPL-MCNC: 1.4 MG/DL (ref 1.6–2.3)
MCH RBC QN AUTO: 21.1 PG (ref 26.5–33)
MCHC RBC AUTO-ENTMCNC: 30.2 G/DL (ref 31.5–36.5)
MCV RBC AUTO: 70 FL (ref 78–100)
MONOCYTES # BLD AUTO: 1.7 10E9/L (ref 0–1.3)
MONOCYTES NFR BLD AUTO: 12.8 %
NEUTROPHILS # BLD AUTO: 5.5 10E9/L (ref 1.6–8.3)
NEUTROPHILS NFR BLD AUTO: 41.7 %
NRBC # BLD AUTO: 0 10*3/UL
NRBC BLD AUTO-RTO: 0 /100
PHOSPHATE SERPL-MCNC: 3.3 MG/DL (ref 2.5–4.5)
PLATELET # BLD AUTO: 854 10E9/L (ref 150–450)
POTASSIUM SERPL-SCNC: 4 MMOL/L (ref 3.4–5.3)
PROT SERPL-MCNC: 7.1 G/DL (ref 6.8–8.8)
RBC # BLD AUTO: 3.93 10E12/L (ref 3.8–5.2)
SODIUM SERPL-SCNC: 141 MMOL/L (ref 133–144)
TACROLIMUS BLD-MCNC: 5.5 UG/L (ref 5–15)
TIBC SERPL-MCNC: 322 UG/DL (ref 240–430)
TME LAST DOSE: NORMAL H
WBC # BLD AUTO: 13.2 10E9/L (ref 4–11)

## 2017-05-02 PROCEDURE — 85025 COMPLETE CBC W/AUTO DIFF WBC: CPT | Performed by: INTERNAL MEDICINE

## 2017-05-02 PROCEDURE — 86140 C-REACTIVE PROTEIN: CPT | Performed by: INTERNAL MEDICINE

## 2017-05-02 PROCEDURE — 84100 ASSAY OF PHOSPHORUS: CPT | Performed by: INTERNAL MEDICINE

## 2017-05-02 PROCEDURE — 80048 BASIC METABOLIC PNL TOTAL CA: CPT | Performed by: INTERNAL MEDICINE

## 2017-05-02 PROCEDURE — 82728 ASSAY OF FERRITIN: CPT | Performed by: INTERNAL MEDICINE

## 2017-05-02 PROCEDURE — 83550 IRON BINDING TEST: CPT | Performed by: INTERNAL MEDICINE

## 2017-05-02 PROCEDURE — 80076 HEPATIC FUNCTION PANEL: CPT | Performed by: INTERNAL MEDICINE

## 2017-05-02 PROCEDURE — 85652 RBC SED RATE AUTOMATED: CPT | Performed by: INTERNAL MEDICINE

## 2017-05-02 PROCEDURE — 83735 ASSAY OF MAGNESIUM: CPT | Performed by: INTERNAL MEDICINE

## 2017-05-02 PROCEDURE — 83540 ASSAY OF IRON: CPT | Performed by: INTERNAL MEDICINE

## 2017-05-02 PROCEDURE — 96413 CHEMO IV INFUSION 1 HR: CPT

## 2017-05-02 PROCEDURE — 25000128 H RX IP 250 OP 636: Mod: ZF | Performed by: PHYSICIAN ASSISTANT

## 2017-05-02 PROCEDURE — 80197 ASSAY OF TACROLIMUS: CPT | Performed by: SURGERY

## 2017-05-02 RX ADMIN — VEDOLIZUMAB 300 MG: 300 INJECTION, POWDER, LYOPHILIZED, FOR SOLUTION INTRAVENOUS at 07:39

## 2017-05-02 ASSESSMENT — PAIN SCALES - GENERAL: PAINLEVEL: NO PAIN (0)

## 2017-05-02 NOTE — MR AVS SNAPSHOT
After Visit Summary   5/2/2017    Yarelis Penny    MRN: [id number]           Patient Information     Date Of Birth          1985        Visit Information        Provider Department      5/2/2017 7:00 AM UC [facility] ATC; UC SPEC INFUSION [facility] Specialty and Procedure        Today's Diagnoses     Colitis    -  1    CMV colitis (H)        Ulcerative pancolitis with other complication (H)        Ulcerative pancolitis without complication (H)        Cytomegalovirus (CMV) viremia (H)        UC (ulcerative colitis) (H)        Liver replaced by transplant (H)          Care Instructions      Vedolizumab Solution for injection  What is this medicine?  VEDOLIZUMAB (Ve cornelius JOSHUA you mab) is used to treat ulcerative colitis and Crohn's disease in adult patients.  This medicine may be used for other purposes; ask your health care provider or pharmacist if you have questions.  What should I tell my health care provider before I take this medicine?  They need to know if you have any of these conditions:    diabetes    hepatitis B or history of hepatitis B infection    HIV or AIDS    immune system problems    infection or history of infections    liver disease    recently received or scheduled to receive a vaccine    scheduled to have surgery    tuberculosis, a positive skin test for tuberculosis or have recently been in close contact with someone who has tuberculosis    an unusual or allergic reaction to vedolizumab, other medicines, foods, dyes, or preservatives    pregnant or trying to get pregnant    breast-feeding  How should I use this medicine?  This medicine is for infusion into a vein. It is given by a health care professional in a hospital or clinic setting.  A special MedGuide will be given to you by the pharmacist with each prescription and refill. Be sure to read this information carefully each time.  Talk to your pediatrician regarding the use of this medicine in children. This  medicine is not approved for use in children.  Overdosage: If you think you've taken too much of this medicine contact a poison control center or emergency room at once.  NOTE: This medicine is only for you. Do not share this medicine with others.  What if I miss a dose?  It is important not to miss your dose. Call your doctor or health care professional if you are unable to keep an appointment.  What may interact with this medicine?    steroid medicines like prednisone or cortisone    TNF-alpha inhibitors like natalizumab, adalimumab, and infliximab    vaccines  This list may not describe all possible interactions. Give your health care provider a list of all the medicines, herbs, non-prescription drugs, or dietary supplements you use. Also tell them if you smoke, drink alcohol, or use illegal drugs. Some items may interact with your medicine.  What should I watch for while using this medicine?  Your condition will be monitored carefully while you are receiving this medicine. Visit your doctor for regular check ups. Tell your doctor or healthcare professional if your symptoms do not start to get better or if they get worse.  Stay away from people who are sick. Call your doctor or health care professional for advice if you get a fever, chills or sore throat, or other symptoms of a cold or flu. Do not treat yourself.  In some patients, this medicine may cause a serious brain infection that may cause death. If you have any problems seeing, thinking, speaking, walking, or standing, tell your doctor right away. If you cannot reach your doctor, get urgent medical care.  What side effects may I notice from receiving this medicine?  Side effects that you should report to your doctor or health care professional as soon as possible:    allergic reactions like skin rash, itching or hives, swelling of the face, lips, or tongue    breathing problems    changes in vision    chest pain    dark urine    depression, feelings of  sadness    dizziness    general ill feeling or flu-like symptoms    irregular, missed, or painful menstrual periods    light-colored stools    loss of appetite, nausea    muscle weakness    problems with balance, talking, or walking    right upper belly pain    unusually weak or tired    yellowing of the eyes or skin  Side effects that usually do not require medical attention (Report these to your doctor or health care professional if they continue or are bothersome.):    aches, pains    headache    stomach upset    tiredness  This list may not describe all possible side effects. Call your doctor for medical advice about side effects. You may report side effects to FDA at 0-509-AXC-5793.  Where should I keep my medicine?  This drug is given in a hospital or clinic and will not be stored at home.  NOTE: This sheet is a summary. It may not cover all possible information. If you have questions about this medicine, talk to your doctor, pharmacist, or health care provider.  NOTE:This sheet is a summary. It may not cover all possible information. If you have questions about this medicine, talk to your doctor, pharmacist, or health care provider. Copyright  2016 Gold Standard              Follow-ups after your visit        Your next 10 appointments already scheduled     May 08, 2017  8:30 AM CDT   (Arrive by 8:15 AM)   PAC EVALUATION with Paul Pac Nan 5   [facility] Preoperative Assessment [place] ([facility])    [address] [place] [address]   [phone]            May 08, 2017  9:30 AM CDT   (Arrive by 9:15 AM)   PAC Anesthesia Consult with Paul Pac Anesthesiologist   [facility] Preoperative Assessment [place] ([facility])    [address] [place] [address]   [phone]            May 08, 2017 10:00 AM CDT   (Arrive by 9:45 AM)   PAC RN ASSESSMENT with Paul Pac Rn   [facility] Preoperative Assessment [place] ([facility])    [address]  [address] [facility] [address]   [phone]            May 15, 2017   Procedure with Guru Brittany Macias MD   [facility] [place], Same Day Surgery (--)    [address]  [facility] [place] [address]   [phone]            Jun 07, 2017  8:00 AM CDT   (Arrive by 7:45 AM)   Return Visit with Walker Nails MD   [facility] and Infectious Diseases ([facility] [place])    [address]  [address] [facility] [address] [phone]            Jul 25, 2017  8:40 AM CDT   (Arrive by 8:25 AM)   RETURN IRRITABLE BOWEL DISEASE with Fuentes Johnson MD   [facility] Gastroenterology and IBD ([facility])    [address] [place] [address]  [address] [facility] [address] [phone]              Who to contact     If you have questions or need follow up information about today's clinic visit or your schedule please contact [facility] SPECIALTY AND PROCEDURE directly at [phone].  Normal or non-critical lab and imaging results will be communicated to you by [employer], letter or phone within 4 business days after the clinic has received the results. If you do not hear from us within 7 days, please contact the clinic through [employer] or phone. If you have a critical or abnormal lab result, we will notify you by phone as soon as possible.  Submit refill requests through "[employer]" or call your pharmacy and they will forward the refill request to us. Please allow 3 business days for your refill to be completed.          Additional Information About Your Visit        [employer] Medical Information     "[employer] gives you secure access to your electronic health record. If you see a primary care provider, you can also send messages to your care team and make appointments. If you have questions, please call your primary care clinic.  If you do not have a primary care provider, please call  [phone] and they will assist you.        Care EveryWhere ID     This is your Care EveryWhere ID. This could be used by other organizations to access your [place] medical records  [id number]        Your Vitals Were     Pulse Temperature Respirations BMI (Body Mass Index)          83 98.1  F (36.7  C) (Oral) 16 20.43 kg/m2         Blood Pressure from Last 3 Encounters:   05/02/17 112/79   05/01/17 112/81   03/24/17 102/74    Weight from Last 3 Encounters:   05/02/17 50.7 kg (111 lb 11.2 oz)   05/01/17 50.4 kg (111 lb 3.2 oz)   03/24/17 49.6 kg (109 lb 6.4 oz)              We Performed the Following     Basic metabolic panel     CBC with platelets differential     CMV DNA quantification     CRP inflammation     entyvio vedo level to be drawn day of entyvio infusion prior to start of infusion.  form delivered to [facility]: Laboratory Miscellaneous Order     Erythrocyte sedimentation rate auto     Ferritin     Hepatic panel     Iron and iron binding capacity     Magnesium     Phosphorus     Tacrolimus level     Treatment Conditions     Treatment Conditions          Today's Medication Changes          These changes are accurate as of: 5/2/17  7:41 AM.  If you have any questions, ask your nurse or doctor.               These medicines have changed or have updated prescriptions.        Dose/Directions    predniSONE 10 MG tablet   Commonly known as:  DELTASONE   This may have changed:    - how much to take  - how to take this  - when to take this  - additional instructions   Used for:  Ulcerative colitis with other complication, unspecified location (H)        Take 6 tabs (60mg) PO x 5 days  Then take 5 tabs (50mg) PO x 7 days  Then take 4 tabs (40mg) PO x 7 days Then take 3 tabs (30mg) PO x 7 days Then take 2 tabs (20mg) PO x 7 days Then take 1 tab (10mg) PO x 7 days Then take 1/2 tablet (5mg) PO x 7 days then DISCONTINUE  Then take   Quantity:  92 tablet   Refills:  0                Primary Care Provider Office Phone #  Fax #    [place] MARIANELA Orellana [phone]       [place] [address] [address] [address]        Thank you!     Thank you for choosing [facility] SPECIALTY AND PROCEDURE  for your care. Our goal is always to provide you with excellent care. Hearing back from our patients is one way we can continue to improve our services. Please take a few minutes to complete the written survey that you may receive in the mail after your visit with us. Thank you!             Your Updated Medication List - Protect others around you: Learn how to safely use, store and throw away your medicines at www.disposemymeds.org.          This list is accurate as of: 5/2/17  7:41 AM.  Always use your most recent med list.                   Brand Name Dispense Instructions for use    ENTYVIO IV      Inject 300 mg into the vein every 28 days       GNP ASPIRIN LOW DOSE 81 MG EC tablet   Generic drug:  aspirin     30 tablet    Take 1 tablet (81 mg) by mouth daily       levofloxacin 500 MG tablet    LEVAQUIN    7 tablet    Take 1 tablet (500 mg) by mouth daily       predniSONE 10 MG tablet    DELTASONE    92 tablet    Take 6 tabs (60mg) PO x 5 days  Then take 5 tabs (50mg) PO x 7 days  Then take 4 tabs (40mg) PO x 7 days Then take 3 tabs (30mg) PO x 7 days Then take 2 tabs (20mg) PO x 7 days Then take 1 tab (10mg) PO x 7 days Then take 1/2 tablet (5mg) PO x 7 days then DISCONTINUE  Then take       sulfamethoxazole-trimethoprim 800-160 MG per tablet    BACTRIM DS    30 tablet    Take 1 tablet by mouth three times a week       * tacrolimus 0.5 MG capsule    PROGRAF - GENERIC EQUIVALENT    180 capsule    Take 1 capsule (0.5 mg) by mouth 2 times daily Take with 1 mg capsule. Total dose 2.5 mg every 12 hours       * tacrolimus 1 MG capsule    PROGRAF - GENERIC EQUIVALENT    360 capsule    Take 2 capsules (2 mg) by mouth 2 times daily *total dose 2.5 mg BID. Take with 0.5 mg capsule       ursodiol  250 MG tablet    ACTIGALL    270 tablet    Take 2 tabs (500 mg) in AM, and 1 tab at night (250 mg)       zolpidem 5 MG tablet    AMBIEN     TK 1 T PO QD HS PRN FOR SLEEP       * Notice:  This list has 2 medication(s) that are the same as other medications prescribed for you. Read the directions carefully, and ask your doctor or other care provider to review them with you.

## 2017-05-02 NOTE — ANESTHESIA PREPROCEDURE EVALUATION
Anesthesia Evaluation     . Pt has had prior anesthetic. Type: General and MAC           ROS/MED HX    ENT/Pulmonary:  - neg pulmonary ROS     Neurologic:  - neg neurologic ROS     Cardiovascular:         METS/Exercise Tolerance:     Hematologic:         Musculoskeletal:  - neg musculoskeletal ROS       GI/Hepatic:         Renal/Genitourinary:         Endo:  - neg endo ROS       Psychiatric:  - neg psychiatric ROS       Infectious Disease:  - neg infectious disease ROS       Malignancy:      - no malignancy   Other:                     Physical Exam  Normal systems: cardiovascular, pulmonary and dental    Airway   Mallampati: II  TM distance: >3 FB  Neck ROM: full    Dental     Cardiovascular       Pulmonary                     Anesthesia Plan      History & Physical Review      ASA Status:  3 .    NPO Status:  > 8 hours    Plan for General with Intravenous and Propofol induction. Maintenance will be Balanced.           Postoperative Care  Postoperative pain management:  IV analgesics.      Consents  Anesthetic plan, risks, benefits and alternatives discussed with:  Patient.  Use of blood products discussed: Yes.   Use of blood products discussed with Patient.  Consented to blood products.  .                          .

## 2017-05-02 NOTE — PATIENT INSTRUCTIONS
Vedolizumab Solution for injection  What is this medicine?  VEDOLIZUMAB (Ve cornelius JOSHUA you mab) is used to treat ulcerative colitis and Crohn's disease in adult patients.  This medicine may be used for other purposes; ask your health care provider or pharmacist if you have questions.  What should I tell my health care provider before I take this medicine?  They need to know if you have any of these conditions:    diabetes    hepatitis B or history of hepatitis B infection    HIV or AIDS    immune system problems    infection or history of infections    liver disease    recently received or scheduled to receive a vaccine    scheduled to have surgery    tuberculosis, a positive skin test for tuberculosis or have recently been in close contact with someone who has tuberculosis    an unusual or allergic reaction to vedolizumab, other medicines, foods, dyes, or preservatives    pregnant or trying to get pregnant    breast-feeding  How should I use this medicine?  This medicine is for infusion into a vein. It is given by a health care professional in a hospital or clinic setting.  A special MedGuide will be given to you by the pharmacist with each prescription and refill. Be sure to read this information carefully each time.  Talk to your pediatrician regarding the use of this medicine in children. This medicine is not approved for use in children.  Overdosage: If you think you've taken too much of this medicine contact a poison control center or emergency room at once.  NOTE: This medicine is only for you. Do not share this medicine with others.  What if I miss a dose?  It is important not to miss your dose. Call your doctor or health care professional if you are unable to keep an appointment.  What may interact with this medicine?    steroid medicines like prednisone or cortisone    TNF-alpha inhibitors like natalizumab, adalimumab, and infliximab    vaccines  This list may not describe all possible interactions. Give  your health care provider a list of all the medicines, herbs, non-prescription drugs, or dietary supplements you use. Also tell them if you smoke, drink alcohol, or use illegal drugs. Some items may interact with your medicine.  What should I watch for while using this medicine?  Your condition will be monitored carefully while you are receiving this medicine. Visit your doctor for regular check ups. Tell your doctor or healthcare professional if your symptoms do not start to get better or if they get worse.  Stay away from people who are sick. Call your doctor or health care professional for advice if you get a fever, chills or sore throat, or other symptoms of a cold or flu. Do not treat yourself.  In some patients, this medicine may cause a serious brain infection that may cause death. If you have any problems seeing, thinking, speaking, walking, or standing, tell your doctor right away. If you cannot reach your doctor, get urgent medical care.  What side effects may I notice from receiving this medicine?  Side effects that you should report to your doctor or health care professional as soon as possible:    allergic reactions like skin rash, itching or hives, swelling of the face, lips, or tongue    breathing problems    changes in vision    chest pain    dark urine    depression, feelings of sadness    dizziness    general ill feeling or flu-like symptoms    irregular, missed, or painful menstrual periods    light-colored stools    loss of appetite, nausea    muscle weakness    problems with balance, talking, or walking    right upper belly pain    unusually weak or tired    yellowing of the eyes or skin  Side effects that usually do not require medical attention (Report these to your doctor or health care professional if they continue or are bothersome.):    aches, pains    headache    stomach upset    tiredness  This list may not describe all possible side effects. Call your doctor for medical advice about  side effects. You may report side effects to FDA at 4-530-OTT-0399.  Where should I keep my medicine?  This drug is given in a hospital or clinic and will not be stored at home.  NOTE: This sheet is a summary. It may not cover all possible information. If you have questions about this medicine, talk to your doctor, pharmacist, or health care provider.  NOTE:This sheet is a summary. It may not cover all possible information. If you have questions about this medicine, talk to your doctor, pharmacist, or health care provider. Copyright  2016 Gold Standard

## 2017-05-02 NOTE — PROGRESS NOTES
"Infusion nursing note:    Yarelis Penny presents today to [facility] for a entyvio infusion.  During today's [facility] appointment orders from Dr. Johnson were completed.  Dose # every 4 weeks increased from every 8 weeks.    Progress note:  ID verified by name and .  Assessment completed.  Vitals were stable throughout time in [facility].  verbal education given to patient/representative regarding infusion and possible side effects.  Patient/representative verbalized understanding.    PRIOR TO INFUSION OF BIOLOGICAL MEDICATIONS OR ANY OF THESE AS LISTED: entyvio infusion \".rheumbiologicalchecklist\"    Prior to Infusion of biological medications or any of these as listed:    1. Elevated temperature, fever, chills, productive cough or abnormal vital signs, night sweats, coughing up blood or sputum, no appetite or abnormal vital signs : NO    2. Open wounds or new incisions: NO    3. Recent hospitalization: NO    4. Any current or recent bouts of illness or infection? On any antibiotics? : NO    5. Any upcoming surgeries or dental procedures?:NO    6. Any new, sudden or worsening abdominal pain :NO    7. Vaccination within 4 weeks? Patient or someone in the household is scheduled to receive vaccination? No live virus vaccines prior to or during treatment :NO    8. Any nervous system diseases [i.e. multiple sclerosis, Guillain-[place], seizures, neurological  changes]: NO    9. New-onset medical symptoms: NO    10.  Evaluate for any sign of active TB [Unexplained weight loss, Loss of appetite, Night sweats, Fever, Fatigue, Chills, Coughing for 3 weeks or longer, Hemoptysis (coughing up blood), Chest pain]: NO    **Note: If answered yes to any of the above, hold the infusion and contact ordering rheumatologist or on-call rheumatologist.     /79  Pulse 83  Temp 98.1  F (36.7  C) (Oral)  Resp 16  Wt 50.7 kg (111 lb 11.2 oz)  BMI 20.43 kg/m2        There were no vitals taken for this visit.    Infusion given over approximately  30 " minutes     Discharge Plan:  Reviewed with patient.  Given biologic medication or medication hand-out. Inform patient if any fever, chills or signs of infection, new symptoms, abdominal pain, heart palpitations, shortness of breath, reaction, weakness, neurological changes, seek medical attention immediately and should not receive infusions. No live virus vaccines prior to or during treatment or up to 6 months post infusion. If the patient has an upcoming procedure or surgery, this should be discussed with the rheumatologist and surgeon or provider.    Discharge instructions were reviewed with patient Yes  Patient/representative verbalized understanding of discharge instructions and all questions answered Yes.    Discharged from [facility] in stable condition.    Dana Quintana

## 2017-05-03 LAB
CMV DNA SPEC NAA+PROBE-ACNC: ABNORMAL [IU]/ML
CMV DNA SPEC NAA+PROBE-LOG#: <2.1 {LOG_IU}/ML
MISCELLANEOUS TEST: NORMAL
SPECIMEN SOURCE: ABNORMAL

## 2017-05-05 ENCOUNTER — CARE COORDINATION (OUTPATIENT)
Dept: GASTROENTEROLOGY | Facility: CLINIC | Age: 32
End: 2017-05-05

## 2017-05-05 RX ORDER — ACETAMINOPHEN 325 MG/1
650 TABLET ORAL
Status: CANCELLED
Start: 2017-05-05

## 2017-05-05 RX ORDER — DIPHENHYDRAMINE HCL 25 MG
25 TABLET ORAL
Status: CANCELLED
Start: 2017-05-05

## 2017-05-08 ENCOUNTER — OFFICE VISIT (OUTPATIENT)
Dept: SURGERY | Facility: CLINIC | Age: 32
End: 2017-05-08

## 2017-05-08 ENCOUNTER — ALLIED HEALTH/NURSE VISIT (OUTPATIENT)
Dept: SURGERY | Facility: CLINIC | Age: 32
End: 2017-05-08

## 2017-05-08 ENCOUNTER — ANESTHESIA EVENT (OUTPATIENT)
Dept: SURGERY | Facility: CLINIC | Age: 32
End: 2017-05-08
Payer: COMMERCIAL

## 2017-05-08 VITALS
BODY MASS INDEX: 19.73 KG/M2 | WEIGHT: 107.2 LBS | RESPIRATION RATE: 18 BRPM | OXYGEN SATURATION: 98 % | DIASTOLIC BLOOD PRESSURE: 81 MMHG | HEART RATE: 90 BPM | SYSTOLIC BLOOD PRESSURE: 109 MMHG | TEMPERATURE: 98 F | HEIGHT: 62 IN

## 2017-05-08 DIAGNOSIS — Z01.818 PRE-OP EXAMINATION: Primary | ICD-10-CM

## 2017-05-08 NOTE — ANESTHESIA PREPROCEDURE EVALUATION
Anesthesia Evaluation     . Pt has had prior anesthetic. Type: General    No history of anesthetic complications          ROS/MED HX    ENT/Pulmonary:  - neg pulmonary ROS     Neurologic:  - neg neurologic ROS     Cardiovascular:     (+) ----. : . . . :. valvular problems/murmurs type: MR Powell Previous cardiac testing Echodate:10/2014results:date: results:ECG reviewed date:3/29/2016 results:SR 98 BPM date: results:          METS/Exercise Tolerance: Comment: Work out video at home.  Lift weights at gym.  Walks on nice days. >4 METS   Hematologic:     (+) Anemia, History of Transfusion no previous transfusion reaction -     (-) history of blood clots   Musculoskeletal:  - neg musculoskeletal ROS       GI/Hepatic:     (+) Inflammatory bowel disease (ulcerative colitis.  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   6/18/2015.   Renal/Genitourinary:     (+) chronic renal disease (CKD), Pt does not require dialysis, Pt has no history of transplant,       Endo:     (+) Chronic steroid usage for Post Transplant Immunosuppression Date most recently used: daily,.      Psychiatric:  - neg psychiatric ROS       Infectious Disease: Comment: H/o CMV viremia with h/o CMV colitis.  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        Malignancy:      - no malignancy   Other:    (+) C-spine cleared: N/A, no H/O Chronic Pain,no other significant disability                    Physical Exam  Normal systems: cardiovascular, pulmonary and dental    Airway   Mallampati: I  TM distance: >3 FB  Neck ROM: full    Dental     Cardiovascular   Rhythm and rate: regular and normal      Pulmonary    breath sounds clear to auscultation    Other findings: Lab Results      Component                Value               Date                      WBC                      13.2                05/02/2017            Lab Results       Component                Value               Date                      RBC                      3.93                05/02/2017            Lab Results      Component                Value               Date                      HGB                      8.3                 05/02/2017            Lab Results      Component                Value               Date                      HCT                      27.5                05/02/2017            Lab Results      Component                Value               Date                      MCV                      70                  05/02/2017            Lab Results      Component                Value               Date                      MCH                      21.1                05/02/2017            Lab Results      Component                Value               Date                      MCHC                     30.2                05/02/2017            Lab Results      Component                Value               Date                      RDW                      19.9                05/02/2017            Lab Results      Component                Value               Date                      PLT                      854                 05/02/2017              Last Basic Metabolic Panel:  Lab Results      Component                Value               Date                      NA                       141                 05/02/2017             Lab Results      Component                Value               Date                      POTASSIUM                4.0                 05/02/2017            Lab Results      Component                Value               Date                      CHLORIDE                 110                 05/02/2017            Lab Results      Component                Value               Date                      KERLINE                      8.3                 05/02/2017            Lab Results      Component                Value               Date                       CO2                      23                  05/02/2017            Lab Results      Component                Value               Date                      BUN                      17                  05/02/2017            Lab Results      Component                Value               Date                      CR                       0.85                05/02/2017            Lab Results      Component                Value               Date                      GLC                      74                  05/02/2017                Echocardiogram 10/2014  CONCLUSION:    1.  Normal LV size, thickness and systolic function, EF 65%.    2.  No gross regional wall motion abnormalities identified.    3.  Normal RV size and function.    4.  Mild biatrial dilatation.    5.  Multiple mitral valve regurgitant jets; probably moderate mitral     regurgitation.    6.  Moderate to severe tricuspid regurgitation.      Left Ventricular Ejection Fraction: 65 %            PAC Discussion and Assessment    ASA Classification: 3  Case is suitable for: [place]  Anesthetic techniques and relevant risks discussed: GA  Invasive monitoring and risk discussed:   Types:   Possibility and Risk of blood transfusion discussed:   NPO instructions given:   Additional anesthetic preparation and risks discussed:   Needs early admission to pre-op area:   Other:     PAC Resident/NP Anesthesia Assessment:  Yarelis Penny is a 32 year old female scheduled for Endoscopic Retrograde Cholangiopancreatogram with Dr. Macias on 5/15/2017 at [facility] under general anesthesia.  Ms. 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  The stricture was dilated with stent placement.  She presents to PAC clinic today and denies any chest pain, dyspnea, sore  throat, cough, fever, dysuria, or hematuria.  She reports she hasn't had a lot of change in her bowel habits. She would like to proceed with above procedure.    PAC referral for risk assessment and optimization of anesthesia with comorbid conditions of:  H/o murmur; s/p liver transplant for PSC and autoimmune hepatitis; Ulcerative colitis; EBV viremia; h/o CMV viremia with h/o CMV colitis; h/o anemia; h/o esophageal varices; steroid use/immunosuppression; h/o blood transfusion and CKD.     Cardiology - RCRI : No serious cardiac risks.  0.4 % risk of major adverse cardiac event. METS >4.  History of murmur>>>moderte MR, moderate-severe TR ,  EF 65% on echo from 10/2014.  Denies any cardiac symptoms.  Hold ASA for 7 days prior to procedure.  Pulmonary - no smoking history.  Denies pulmonary history or symptoms.  Hematology - Anemia, taking iron replacement and reports will be starting iron infusions on 5/12/2017.  CBC from 5/5/2017:  Hgb 8.3; WBC trending down, 13.2; Plts 854.  History of blood transfusion in the past.  GI - Followed closely in IBD clinic by Cresencio Sierra and GI clinic, Dr. Johnson for ulcerative colitis.  Patient had ulcerative colitis flare this past December.  She was treated with prednisone and vedolizumab infusions.  She continues on Prednisone 5 mg daily and vedolizumab infusions every 4 weeks.  Please consider stress dose steroids.   See Dr. Johnson's note and Cresencio Sierra's most recent note for further information.  Hepatic - s/p liver transplant 6/18/2015 for PSC and autoimmune hepatitis. Take immunosuppression as prescribed DOS. Azathioprine has been held given high-dose steroid for UC flare in December and EBV viremia.   Followed closely by Dr. Enriquez.        Esophageal varices - Grade I and small (<5 mm) esophageal varices in the lower 1/3 of esophagus on last endoscopy.  Infectious disease - Recent primary EBV episode with Rituximab treatment completed.   Followed closely by Dr. Nails.        CMV viremia and h/o CMV colitis.  CMV levels mildly detectable at last check.  She completed IV ganciclovir regimen. Dr. Nails follows closely.  Renal - history of CKD: Cr. 0.85, GFR 77      She has the following specific operative considerations:   - Anesthesia considerations:  Refer to PAC assessment in anesthesia records  - VTE risk:  0.26%  - TAVARES risk= low risk  - Risk of PONV score = 2.  If > 2, anti-emetic intervention recommended.    Arrival time, NPO, shower and medication instructions provided by nursing staff today.  Preparing For Your Surgery handout given.  Patient was discussed with Dr Castro. I spent 20 minutes face to face with patient, assessing, examining, and educating.      Reviewed and Signed by PAC Mid-Level Provider/Resident  Mid-Level Provider/Resident: Halie MAURICE CNP  Date: 5/8/2017  Time: 9:18    Attending Anesthesiologist Anesthesia Assessment:  32 year old for ERCP in ongoing management of anastomotic stricture following liver transplant tfor sclerosing cholangitis and autoimmune hepatitis. Chart reviewed, patient seen and evaluated; agree with above assessment. Patient is doing well re liver function, other than stricture. Patient has had multiple ERCPs, thinks she does best with no gas, if gas used, would opt for sevo as she had one awakening with a lot of airway irritation (most anesthetics were sevo, but one was is, so that may be the culprit). Often has pain after, depending on the size of stents required.    Patient is appropriate for the planned procedure without further workup or medical management change. The final anesthesia plan will be determined by the physician anesthesiologist caring for the patient on the day of surgery.      Reviewed and Signed by PAC Anesthesiologist  Anesthesiologist: sarthak  Date: 5/8/2017  Time:   Pass/Fail: Pass  Disposition:     PAC Pharmacist Assessment:        Pharmacist:   Date:   Time:      Anesthesia Plan      History & Physical  Review  History and physical reviewed and following examination; no interval change.    ASA Status:  3 .    NPO Status:  > 2 hours    Plan for General and ETT with Intravenous induction. Maintenance will be TIVA.    PONV prophylaxis:  Ondansetron (or other 5HT-3)  Additional equipment: 2nd IV      Postoperative Care  Postoperative pain management:  IV analgesics.      Consents  Anesthetic plan, risks, benefits and alternatives discussed with:  Patient.  Use of blood products discussed: No .   .                          .

## 2017-05-08 NOTE — PATIENT INSTRUCTIONS
Preparing for Your Surgery      Name:  Yarelis Penny   MRN:  [id number]   :  1985   Today's Date:  2017     Arriving for surgery:  Surgery date:  5/15/17  Surgery time:  7:45 am  Arrival time:  5:45 am    Please come to:   [facility] [address]  [address] [place]  [address]    -   parking is available in front of the hospital from 5:15 am to 8:00 pm    -  Stop at the Information Desk in the lobby    -   Inform the information person that you are here for surgery. An escort to 3c will be provided. If you would not like an escort, please proceed to 3C on the 3rd floor. [phone]   -  Bring your ID and insurance card.    What can I eat or drink?  -  You may have solid food or milk products until 8 hours prior to your surgery. (11:45 pm 17)  -  You may have water, apple juice, BLACK coffee (NO creamer or nondairy creamer), or 7up/Sprite until 2 hours prior to your surgery. (5:45 am)    Which medicines can I take?  -Do not bring your own medications to the hospital.        -  Follow Clinic instructions regarding Ibuprofen. If no instructions given, NO Ibuprofen the day prior to surgery.        -  Hold Aspirin 7 days prior to surgery.    -  Please take these medications the morning of surgery:  Tacrolimus, Ursodiol, Prednisone,  Acetaminophen (Tylenol) if needed    How do I prepare myself?  -  Take two showers: one the night before surgery; and one the morning of surgery.         Use Scrubcare or Hibiclens to wash from neck down.  You may use your own shampoo and conditioner. No other hair products.   -  Do NOT use lotion, powder, deodorant, or antiperspirant the day of your surgery.  -  Do NOT wear any makeup, fingernail polish or jewelry.    Questions or Concerns:  If you have questions or concerns, please call the  Preoperative Assessment Center, Monday-Friday 7AM-7PM:  [phone]      AFTER YOUR SURGERY  Breathing exercises   Breathing exercises  help you recover faster. Take deep breaths and let the air out slowly. This will:     Help you wake up after surgery.    Help prevent complications like pneumonia.  Preventing complications will help you go home sooner.   Nausea and vomiting   You may feel sick to your stomach after surgery; if so, let your nurse know.    Pain control:  After surgery, you may have pain. Our goal is to help you manage your pain. Pain medicine will help you feel comfortable enough to do activities that will help you heal.  These activities may include breathing exercises, walking and physical therapy.   To help your health care team treat your pain we will ask: 1) If you have pain  2) where it is located 3) describe your pain in your words  Methods of pain control include medications given by mouth, vein or by nerve block for some surgeries.  Sequential Compression Device (SCD) or Pneumo Boots:  You may need to wear SCD S on your legs or feet. These are wraps connected to a machine that pumps in air and releases it. The repeated pumping helps prevent blood clots from forming.

## 2017-05-08 NOTE — MR AVS SNAPSHOT
After Visit Summary   2017    Yarelis Penny    MRN: [id number]           Patient Information     Date Of Birth          1985        Visit Information        Provider Department      2017 10:00 AM Rn, [facility]        Care Instructions    Preparing for Your Surgery      Name:  Yarelis Penny   MRN:  [id number]   :  1985   Today's Date:  2017     Arriving for surgery:  Surgery date:  5/15/17  Surgery time:  7:45 am  Arrival time:  5:45 am    Please come to:   [facility] [address]  [address] [place]  [address]    -   parking is available in front of the hospital from 5:15 am to 8:00 pm    -  Stop at the Information Desk in the lobby    -   Inform the information person that you are here for surgery. An escort to 3c will be provided. If you would not like an escort, please proceed to 3C on the 3rd floor. [phone]   -  Bring your ID and insurance card.    What can I eat or drink?  -  You may have solid food or milk products until 8 hours prior to your surgery. (11:45 pm 17)  -  You may have water, apple juice, BLACK coffee (NO creamer or nondairy creamer), or 7up/Sprite until 2 hours prior to your surgery. (5:45 am)    Which medicines can I take?  -Do not bring your own medications to the hospital.        -  Follow Clinic instructions regarding Ibuprofen. If no instructions given, NO Ibuprofen the day prior to surgery.        -  Hold Aspirin 7 days prior to surgery.    -  Please take these medications the morning of surgery:  Tacrolimus, Ursodiol, Prednisone,  Acetaminophen (Tylenol) if needed    How do I prepare myself?  -  Take two showers: one the night before surgery; and one the morning of surgery.         Use Scrubcare or Hibiclens to wash from neck down.  You may use your own shampoo and conditioner. No other hair products.   -  Do NOT use lotion, powder, deodorant, or antiperspirant  the day of your surgery.  -  Do NOT wear any makeup, fingernail polish or jewelry.    Questions or Concerns:  If you have questions or concerns, please call the  Preoperative Assessment Center, Monday-Friday 7AM-7PM:  [phone]      AFTER YOUR SURGERY  Breathing exercises   Breathing exercises help you recover faster. Take deep breaths and let the air out slowly. This will:     Help you wake up after surgery.    Help prevent complications like pneumonia.  Preventing complications will help you go home sooner.   Nausea and vomiting   You may feel sick to your stomach after surgery; if so, let your nurse know.    Pain control:  After surgery, you may have pain. Our goal is to help you manage your pain. Pain medicine will help you feel comfortable enough to do activities that will help you heal.  These activities may include breathing exercises, walking and physical therapy.   To help your health care team treat your pain we will ask: 1) If you have pain  2) where it is located 3) describe your pain in your words  Methods of pain control include medications given by mouth, vein or by nerve block for some surgeries.  Sequential Compression Device (SCD) or Pneumo Boots:  You may need to wear SCD S on your legs or feet. These are wraps connected to a machine that pumps in air and releases it. The repeated pumping helps prevent blood clots from forming.                 Follow-ups after your visit        Your next 10 appointments already scheduled     May 08, 2017 10:00 AM CDT   (Arrive by 9:45 AM)   PAC RN ASSESSMENT with Paul Pac Rn   [facility] [place] [facility] Preoperative [facility] ([facility])    [address] [place] [address]  4th [facility] [id number] [phone]            May 12, 2017  1:00 PM CDT   Infusion 60 with PAUL SPEC INFUSION, UC 41 ATC   [facility] [place] [facility] Specialty and Procedure ([facility])    [address] [place] [address]  [id number] [phone]            May 15, 2017   Procedure with Guru Brittany Macias MD   [facility] [place], Same Day Surgery (--)    [address] [facility] [address] [phone]            May 17, 2017  2:00 PM CDT   Infusion 60 with UC SPEC INFUSION, UC 42 ATC   [facility] Specialty and Procedure ([facility])    [address] [facility]  2nd [facility] [id number] [phone]            May 22, 2017 11:00 AM CDT   Infusion 60 with UC SPEC INFUSION, UC 47 ATC   [facility] Specialty and Procedure ([facility])    [address] [id number] [phone]            May 25, 2017  8:00 AM CDT   Infusion 60 with UC SPEC INFUSION, UC 51 ATC   [facility] Specialty and Procedure ([facility])    [address] [address] [phone]              Who to contact     Please call your clinic at [phone] to:    Ask questions about your health    Make or cancel appointments    Discuss your medicines    Learn about your test results    Speak to your doctor   If you have compliments or concerns about an experience at your clinic, or if you wish to file a complaint, please contact [facility] Patient Relations at [phone] or email us at [email or web address]         Additional Information About Your Visit        [employer] Information     [employer] gives you secure access to your electronic health record. If you see a primary care provider, you can also send messages to your care team and make appointments. If you have questions, please call your primary care clinic.  If you do not have a primary care provider, please call [phone] and they will assist you.      [employer] is an electronic gateway that provides easy, online access to your medical  records. With [employer], you can request a clinic appointment, read your test results, renew a prescription or communicate with your care team.     To access your existing account, please contact your [facility] or call [phone] for assistance.        Care EveryWhere ID     This is your Care EveryWhere ID. This could be used by other organizations to access your [place] medical records  [id number]         Blood Pressure from Last 3 Encounters:   05/08/17 109/81   05/02/17 123/81   05/01/17 112/81    Weight from Last 3 Encounters:   05/08/17 48.6 kg (107 lb 3.2 oz)   05/02/17 50.7 kg (111 lb 11.2 oz)   05/01/17 50.4 kg (111 lb 3.2 oz)              Today, you had the following     No orders found for display         Today's Medication Changes          These changes are accurate as of: 5/8/17  9:40 AM.  If you have any questions, ask your nurse or doctor.               These medicines have changed or have updated prescriptions.        Dose/Directions    predniSONE 10 MG tablet   Commonly known as:  DELTASONE   This may have changed:    - how much to take  - how to take this  - when to take this  - additional instructions   Used for:  Ulcerative colitis with other complication, unspecified location (H)        Take 6 tabs (60mg) PO x 5 days  Then take 5 tabs (50mg) PO x 7 days  Then take 4 tabs (40mg) PO x 7 days Then take 3 tabs (30mg) PO x 7 days Then take 2 tabs (20mg) PO x 7 days Then take 1 tab (10mg) PO x 7 days Then take 1/2 tablet (5mg) PO x 7 days then DISCONTINUE  Then take   Quantity:  92 tablet   Refills:  0                Primary Care Provider Office Phone # Fax #    [place] MARIANELA Orellana [phone]       [place] [address] YENIFER SPEAR [address] [address]        Thank you!     Thank you for choosing [facility]  for your care. Our goal is always to provide you with excellent care. Hearing back from our patients is one way  we can continue to improve our services. Please take a few minutes to complete the written survey that you may receive in the mail after your visit with us. Thank you!             Your Updated Medication List - Protect others around you: Learn how to safely use, store and throw away your medicines at www.disposemymeds.org.          This list is accurate as of: 5/8/17  9:40 AM.  Always use your most recent med list.                   Brand Name Dispense Instructions for use    ENTYVIO IV      Inject 300 mg into the vein every 28 days       GNP ASPIRIN LOW DOSE 81 MG EC tablet   Generic drug:  aspirin     30 tablet    Take 1 tablet (81 mg) by mouth daily       predniSONE 10 MG tablet    DELTASONE    92 tablet    Take 6 tabs (60mg) PO x 5 days  Then take 5 tabs (50mg) PO x 7 days  Then take 4 tabs (40mg) PO x 7 days Then take 3 tabs (30mg) PO x 7 days Then take 2 tabs (20mg) PO x 7 days Then take 1 tab (10mg) PO x 7 days Then take 1/2 tablet (5mg) PO x 7 days then DISCONTINUE  Then take       * tacrolimus 0.5 MG capsule    PROGRAF - GENERIC EQUIVALENT    180 capsule    Take 1 capsule (0.5 mg) by mouth 2 times daily Take with 1 mg capsule. Total dose 2.5 mg every 12 hours       * tacrolimus 1 MG capsule    PROGRAF - GENERIC EQUIVALENT    360 capsule    Take 2 capsules (2 mg) by mouth 2 times daily *total dose 2.5 mg BID. Take with 0.5 mg capsule       ursodiol 250 MG tablet    ACTIGALL    270 tablet    Take 2 tabs (500 mg) in AM, and 1 tab at night (250 mg)       zolpidem 5 MG tablet    AMBIEN     TK 1 T PO QD HS PRN FOR SLEEP       * Notice:  This list has 2 medication(s) that are the same as other medications prescribed for you. Read the directions carefully, and ask your doctor or other care provider to review them with you.

## 2017-05-09 LAB — LAB SCANNED RESULT: NORMAL

## 2017-05-09 NOTE — H&P
Pre-Operative H & P     CC:  Preoperative exam to assess for increased cardiopulmonary risk while undergoing surgery and anesthesia.    Date of Encounter: 5/9/2017  Primary Care Physician:  Jackson Orellana  Yarelis Penny is a 32 year old female who presents for pre-operative H & P in preparation for scheduled for Endoscopic Retrograde Cholangiopancreatogram with Dr. Macias on 5/15/2017 at [facility] under general anesthesia.  Ms. 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   She has undergone multiple ERCP's with most recent 3/20/2017 and was found to have post-transplant anastomotic stricture.  The stricture was dilated with stent placement.  She presents to PAC clinic today and denies any chest pain, dyspnea, sore throat, cough, fever, dysuria, or hematuria.  She reports she hasn't had a lot of change in her bowel habits. She would like to proceed with above procedure.    PAC referral for risk assessment and optimization of anesthesia with comorbid conditions of:  H/o murmur; s/p liver transplant for PSC and autoimmune hepatitis; Ulcerative colitis; EBV viremia; h/o CMV viremia with h/o CMV colitis; h/o anemia; h/o esophageal varices; steroid use/immunosuppression; h/o blood transfusion and CKD.          History is obtained from the patient and electronic health record.     Past Medical History  Past Medical History:   Diagnosis Date     Autoimmune hepatitis (H) 2012    on steroid taper     Cholangitis      CKD (chronic kidney disease) 2012    biopsy 2012: TIN, patchy fibrosis     Esophageal varices (H) 9/08    banded     Heart murmur      History of blood transfusion      Primary sclerosing cholangitis      Ulcerative Colitis     f/b GI       Past Surgical History  Past Surgical History:   Procedure Laterality Date     COLONOSCOPY  9/07     COLONOSCOPY N/A 2/26/2015    Procedure: COMBINED COLONOSCOPY,  SINGLE OR MULTIPLE BIOPSY/POLYPECTOMY BY BIOPSY;  Surgeon: Mitchel Hoskins Chi, MD;  Location: [facility]     COLONOSCOPY N/A 1/12/2016    Procedure: COMBINED COLONOSCOPY, SINGLE OR MULTIPLE BIOPSY/POLYPECTOMY BY BIOPSY;  Surgeon: Rigo Valles MD;  Location: [facility]     COLONOSCOPY N/A 4/1/2016    Procedure: COMBINED COLONOSCOPY, SINGLE OR MULTIPLE BIOPSY/POLYPECTOMY BY BIOPSY;  Surgeon: Kareem Solis MD;  Location: [facility]     ENDOSCOPIC RETROGRADE CHOLANGIOPANCREATOGRAM N/A 6/25/2015    Procedure: COMBINED ENDOSCOPIC RETROGRADE CHOLANGIOPANCREATOGRAPHY, PLACE TUBE/STENT;  Surgeon: Landon Quinones MD;  [facility]     ENDOSCOPIC RETROGRADE CHOLANGIOPANCREATOGRAM N/A 6/25/2015    Procedure: COMBINED ENDOSCOPIC RETROGRADE CHOLANGIOPANCREATOGRAPHY, PLACE TUBE/STENT;  Surgeon: Landon Quinones MD;  [facility]     ENDOSCOPIC RETROGRADE CHOLANGIOPANCREATOGRAM N/A 7/2/2015    Procedure: COMBINED ENDOSCOPIC RETROGRADE CHOLANGIOPANCREATOGRAPHY, PLACE TUBE/STENT;  Surgeon: Landon Quinones MD;  [facility]     ENDOSCOPIC RETROGRADE CHOLANGIOPANCREATOGRAM N/A 9/8/2015    Procedure: COMBINED ENDOSCOPIC RETROGRADE CHOLANGIOPANCREATOGRAPHY, REMOVE FOREIGN BODY OR STENT/TUBE;  Surgeon: Landon Quinones MD;  [facility]     ENDOSCOPIC RETROGRADE CHOLANGIOPANCREATOGRAM N/A 12/8/2015    Procedure: ENDOSCOPIC RETROGRADE CHOLANGIOPANCREATOGRAM;  Surgeon: Landon Quinones MD;  [facility]     ENDOSCOPIC RETROGRADE CHOLANGIOPANCREATOGRAM N/A 3/1/2016    Procedure: COMBINED ENDOSCOPIC RETROGRADE CHOLANGIOPANCREATOGRAPHY, REMOVE FOREIGN BODY OR STENT/TUBE;  Surgeon: Landon Quinones MD;  [facility]     ENDOSCOPIC RETROGRADE CHOLANGIOPANCREATOGRAM N/A 3/20/2017    Procedure: COMBINED ENDOSCOPIC RETROGRADE CHOLANGIOPANCREATOGRAPHY, PLACE TUBE/STENT;  Endoscopic Retrograde Cholangiopancreatogram with Ballon Dilation, Stent Placement;  Surgeon: Guru Brittany Macias  MD Genna;  [facility]     ENDOSCOPIC RETROGRADE CHOLANGIOPANCREATOGRAPHY, EXCHANGE TUBE/STENT N/A 7/30/2015    Procedure: ENDOSCOPIC RETROGRADE CHOLANGIOPANCREATOGRAPHY, EXCHANGE TUBE/STENT;  Surgeon: Landon Quinones MD;  [facility]     ESOPHAGOSCOPY, GASTROSCOPY, DUODENOSCOPY (EGD), COMBINED N/A 2/26/2015    Procedure: COMBINED ESOPHAGOSCOPY, GASTROSCOPY, DUODENOSCOPY (EGD);  Surgeon: Mitchel Hoskins Chi, MD;  [facility]     EXPLORE COMMON BILE DUCT N/A 6/25/2015    Procedure: EXPLORE COMMON BILE DUCT;  Surgeon: Tyree Smith MD;  [facility]     LAPAROTOMY EXPLORATORY N/A 6/25/2015    Procedure: LAPAROTOMY EXPLORATORY;  Surgeon: Tyree Smith MD;  [facility]     PICC INSERTION Right 2/27/2015    4fr SL Valved PICC, 36cm (1cm external) in the R medial brachial vein w/ tip in the low SVC.     SIGMOIDOSCOPY FLEXIBLE N/A 4/27/2016    Procedure: SIGMOIDOSCOPY FLEXIBLE;  Surgeon: Jose Nielson MD;  [facility]     TRANSPLANT LIVER RECIPIENT LIVING UNRELATED N/A 6/18/2015    Procedure: TRANSPLANT LIVER RECIPIENT LIVING UNRELATED;  Surgeon: Tyree Smith MD;  [facility]     UPPER GI ENDOSCOPY         Hx of Blood transfusions/reactions: yes, without reaction      Hx of abnormal bleeding or anti-platelet use: ASA will hold for 7 days prior to procedure.    Menstrual history: No LMP recorded. Patient is not currently having periods (Reason: Irregular Periods).    Steroid use in the last year: yes, prednisone 5 mg PO QD    Personal or FH with difficulty with Anesthesia:  Denies.      Prior to Admission Medications  Current Outpatient Prescriptions   Medication Sig Dispense Refill     Vedolizumab (ENTYVIO IV) Inject 300 mg into the vein every 28 days       zolpidem (AMBIEN) 5 MG tablet TK 1 T PO QD HS PRN FOR SLEEP  5     ursodiol (ACTIGALL) 250 MG tablet Take 2 tabs (500 mg) in AM, and 1 tab at night (250 mg) 270 tablet 3     tacrolimus (PROGRAF - GENERIC EQUIVALENT) 0.5  MG capsule Take 1 capsule (0.5 mg) by mouth 2 times daily Take with 1 mg capsule. Total dose 2.5 mg every 12 hours 180 capsule 3     predniSONE (DELTASONE) 10 MG tablet Take 6 tabs (60mg) PO x 5 days   Then take 5 tabs (50mg) PO x 7 days   Then take 4 tabs (40mg) PO x 7 days  Then take 3 tabs (30mg) PO x 7 days  Then take 2 tabs (20mg) PO x 7 days  Then take 1 tab (10mg) PO x 7 days  Then take 1/2 tablet (5mg) PO x 7 days then DISCONTINUE   Then take (Patient taking differently: Take 10 mg by mouth daily Take 6 tabs (60mg) PO x 5 days   Then take 5 tabs (50mg) PO x 7 days   Then take 4 tabs (40mg) PO x 7 days  Then take 3 tabs (30mg) PO x 7 days  Then take 2 tabs (20mg) PO x 7 days  Then take 1 tab (10mg) PO x 7 days  Then take 1/2 tablet (5mg) PO x 7 days then DISCONTINUE   Then take) 92 tablet 0     GNP ASPIRIN LOW DOSE 81 MG EC tablet Take 1 tablet (81 mg) by mouth daily 30 tablet 1     tacrolimus (PROGRAF - GENERIC EQUIVALENT) 1 MG capsule Take 2 capsules (2 mg) by mouth 2 times daily *total dose 2.5 mg BID. Take with 0.5 mg capsule 360 capsule 3       Allergies  Allergies   Allergen Reactions     Rifampin Other (See Comments)     Kidney failure     Penicillins      Hives, has tolerated amoxicillin         Social History  Social History     Social History     Marital status:      Spouse name: Ceferino     Number of children: 0     Years of education: N/A     Occupational History     unemployed      Social History Main Topics     Smoking status: Never Smoker     Smokeless tobacco: Never Used     Alcohol use No      Comment: Rare     Drug use: No     Sexual activity: Not Currently     Partners: Male      Comment: denies pregnancy     Other Topics Concern      Service No     Blood Transfusions No     Caffeine Concern No     Occupational Exposure No     Hobby Hazards No     Sleep Concern Yes     Stress Concern No     Weight Concern No     Special Diet No     Back Care No     Exercise No     Bike Helmet No  "    n/a     Seat Belt Yes     Self-Exams No     Social History Narrative       Family History  Family History   Problem Relation Age of Onset     Hypertension Mother      Lipids Mother      Hypertension Maternal Grandmother      Alzheimer Disease Maternal Grandmother      Lipids Father      ROS/MED HX    ENT/Pulmonary:  - neg pulmonary ROS     Neurologic:  - neg neurologic ROS     Cardiovascular:     (+) ----. : . . . :. valvular problems/murmurs type: MR . Previous cardiac testing Echodate:10/2014results:date: results:ECG reviewed date:3/29/2016 results:SR 98 BPM date: results:          METS/Exercise Tolerance: Comment: Work out video at home.  Lift weights at gym.  Walks on nice days. >4 METS   Hematologic:     (+) Anemia, History of Transfusion no previous transfusion reaction -     (-) history of blood clots   Musculoskeletal:  - neg musculoskeletal ROS       GI/Hepatic:     (+) Inflammatory bowel disease (ulcerative colitis.  ), hepatitis (autoimmune) liver disease (s/p liver transplant for PSC and autoimmune hepatitis.   6/18/2015),       Renal/Genitourinary:     (+) chronic renal disease (CKD), Pt does not require dialysis, Pt has no history of transplant,       Endo:     (+) Chronic steroid usage for Post Transplant Immunosuppression Date most recently used: daily,.      Psychiatric:  - neg psychiatric ROS       Infectious Disease: Comment: H/o CMV viremia with h/o CMV colitis.  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        Malignancy:      - no malignancy   Other:    (+) C-spine cleared: N/A, no H/O Chronic Pain,no other significant disability      Yarelis Penny is a 32 year old female     Temp: 98  F (36.7  C) Temp src: Oral BP: 109/81 Pulse: 90   Resp: 18 SpO2: 98 %         107 lbs 3.2 oz  5' 2\"   Body mass index is 19.61 kg/(m^2).       Physical Exam  Constitutional: Awake, alert, cooperative, no apparent distress, and appears stated " age.  Eyes: Pupils equal, round and reactive to light, extra ocular muscles intact, sclera clear, conjunctiva normal.  HENT: Normocephalic, oral pharynx with moist mucus membranes, good dentition. No goiter appreciated.   Respiratory: Clear to auscultation bilaterally, no crackles or wheezing.  Cardiovascular: Regular rate and rhythm, normal S1 and S2, and no murmur noted.  Carotids +2, no bruits. No edema. Palpable pulses to radial  DP and PT arteries.   GI: Normal bowel sounds, soft, non-distended, non-tender, no masses palpated, no hepatosplenomegaly.  Surgical scars: well-healed transverse scar upper abdomen.  Well-healed umbilicus scar.  Lymph/Hematologic: No cervical lymphadenopathy and no supraclavicular lymphadenopathy.  Genitourinary:  na  Skin: Warm and dry.  No rashes at anticipated surgical site.   Musculoskeletal: Full ROM of neck. There is no redness, warmth, or swelling of the joints. Gross motor strength is normal.    Neurologic: Awake, alert, oriented to name, place and time. Cranial nerves II-XII are grossly intact. Gait is normal.   Neuropsychiatric: Calm, cooperative. Normal affect.     Labs: (personally reviewed)  Lab Results   Component Value Date    WBC 13.2 05/02/2017     Lab Results   Component Value Date    RBC 3.93 05/02/2017     Lab Results   Component Value Date    HGB 8.3 05/02/2017     Lab Results   Component Value Date    HCT 27.5 05/02/2017     Lab Results   Component Value Date    MCV 70 05/02/2017     Lab Results   Component Value Date    MCH 21.1 05/02/2017     Lab Results   Component Value Date    MCHC 30.2 05/02/2017     Lab Results   Component Value Date    RDW 19.9 05/02/2017     Lab Results   Component Value Date     05/02/2017       Last Basic Metabolic Panel:  Lab Results   Component Value Date     05/02/2017      Lab Results   Component Value Date    POTASSIUM 4.0 05/02/2017     Lab Results   Component Value Date    CHLORIDE 110 05/02/2017     Lab Results    Component Value Date    KERLINE 8.3 05/02/2017     Lab Results   Component Value Date    CO2 23 05/02/2017     Lab Results   Component Value Date    BUN 17 05/02/2017     Lab Results   Component Value Date    CR 0.85 05/02/2017     Lab Results   Component Value Date    GLC 74 05/02/2017     Procedures  EKG:  Does not meet ACC/AHA criteria.     Echocardiogram 10/2014  CONCLUSION:    1.  Normal LV size, thickness and systolic function, EF 65%.    2.  No gross regional wall motion abnormalities identified.    3.  Normal RV size and function.    4.  Mild biatrial dilatation.    5.  Multiple mitral valve regurgitant jets; probably moderate mitral     regurgitation.    6.  Moderate to severe tricuspid regurgitation.      Left Ventricular Ejection Fraction: 65 %       ASSESSMENT and PLAN  Yarelis Penny is a 32 year old female scheduled to undergo Endoscopic Retrograde Cholangiopancreatogram with Dr. Macias on 5/15/2017 at [facility] under general anesthesia.    Cardiology - RCRI : No serious cardiac risks.  0.4 % risk of major adverse cardiac event. METS >4.  History of murmur>>>moderte MR and moderate-severe TR with EF 65% on echo from 10/2014.  Denies any cardiac symptoms.  Hold ASA for 7 days prior to procedure.  Pulmonary - no smoking history.  Denies pulmonary history or symptoms.  Hematology - Anemia, taking iron replacement and reports will be starting iron infusions on 5/12/2017.  CBC from 5/5/2017:  Hgb 8.3; WBC trending down, 13.2; Plts 854.  History of blood transfusion in the past.  GI - Followed closely in IBD clinic by [facility] and GI clinic, Dr. Johnson for ulcerative colitis.  Patient had ulcerative colitis flare this past December.  She was treated with prednisone and vedolizumab infusions.  She continues on Prednisone 5 mg daily and vedolizumab infusions every 4 weeks.  Consider stress dose steroids.   See Dr. Johnson's and [facility]'s most recent notes  for further information.        Esophageal varices - Grade I and small (<5 mm) esophageal varices in the lower 1/3 of esophagus on last endoscopy.  Hepatic - s/p liver transplant 6/18/2015 for PSC and autoimmune hepatitis. Take immunosuppression as prescribed DOS. Azathioprine has been held given high-dose steroid for UC flare in December and EBV viremia.   Followed closely by Dr. Enriquez.  Infectious disease - Recent primary EBV episode with Rituximab treatment completed.   Followed closely by Dr. Nails.       CMV viremia and h/o CMV colitis.  CMV levels mildly detectable at last check.  She completed IV ganciclovir regimen. Dr. Nails follows closely.  Renal - history of CKD: Cr. 0.85, GFR 77      She has the following specific operative considerations:   - Anesthesia considerations:  Refer to PAC assessment in anesthesia records  - VTE risk:  0.26%  - TAVARES risk= low risk  - Risk of PONV score = 2.  If > 2, anti-emetic intervention recommended.    Arrival time, NPO, shower and medication instructions provided by nursing staff today.  Preparing For Your Surgery handout given.  Patient was discussed with Dr Castro. I spent 20 minutes face to face with patient, assessing, examining, and educating.        KAYLENE Crabtree CNP  Preoperative Assessment Center  [facility]  Phone: [phone]  Fax: [phone]

## 2017-05-12 ENCOUNTER — INFUSION THERAPY VISIT (OUTPATIENT)
Dept: INFUSION THERAPY | Facility: CLINIC | Age: 32
End: 2017-05-12
Attending: INTERNAL MEDICINE
Payer: COMMERCIAL

## 2017-05-12 VITALS
WEIGHT: 107.1 LBS | BODY MASS INDEX: 19.59 KG/M2 | SYSTOLIC BLOOD PRESSURE: 109 MMHG | DIASTOLIC BLOOD PRESSURE: 77 MMHG | HEART RATE: 95 BPM | TEMPERATURE: 98.8 F

## 2017-05-12 DIAGNOSIS — A08.39 CMV COLITIS (H): ICD-10-CM

## 2017-05-12 DIAGNOSIS — D50.8 OTHER IRON DEFICIENCY ANEMIA: ICD-10-CM

## 2017-05-12 DIAGNOSIS — B25.9 CMV COLITIS (H): ICD-10-CM

## 2017-05-12 DIAGNOSIS — E86.0 DEHYDRATION: Primary | ICD-10-CM

## 2017-05-12 PROCEDURE — 96365 THER/PROPH/DIAG IV INF INIT: CPT

## 2017-05-12 PROCEDURE — 25000128 H RX IP 250 OP 636: Mod: ZF | Performed by: INTERNAL MEDICINE

## 2017-05-12 RX ORDER — DIPHENHYDRAMINE HCL 25 MG
25 TABLET ORAL
Status: CANCELLED
Start: 2017-05-13

## 2017-05-12 RX ORDER — ACETAMINOPHEN 325 MG/1
650 TABLET ORAL
Status: CANCELLED
Start: 2017-05-13

## 2017-05-12 RX ADMIN — IRON SUCROSE 200 MG: 20 INJECTION, SOLUTION INTRAVENOUS at 13:23

## 2017-05-12 RX ADMIN — SODIUM CHLORIDE 50 ML: 9 INJECTION, SOLUTION INTRAVENOUS at 13:23

## 2017-05-12 NOTE — PROGRESS NOTES
Nursing Note  Yarelis Penny presents [facility] for:   Chief Complaint   Patient presents with     Infusion     venofer     During today's [facility] appointment, orders from Dr. Johnson were completed.  Frequency: 5 doses, 1st of 5 today.    Progress note:  Patient identification verified by name and date of birth.  Assessment completed.  Vitals recorded in Doc Flowsheets.  Patient was provided with education regarding infusion and possible side effects.  Patient verbalized understanding.      needed: No  Premedications: historically patient has not taken pre-medications prior to infusion.  Infusion Rates: infusion given over approximately 20 minutes.  Approximate Infusion length:1 hours.   Labs: were not ordered for this appointment.  Vascular access: peripheral IV placed today.  Treatment Conditions: patient denies fever, chills, signs of infection, recent illness, on antibiotics, productive cough or elevated temperature.  Patient tolerated infusion: well.    Discharge Plan:   Follow up plan of care with: ongoing infusions at [facility].  Discharge instructions were reviewed with patient.  Patient/representative verbalized understanding of discharge instructions and all questions answered.  Patient discharged from [facility] in stable condition.    Sharda Ariza RN    Administrations This Visit     0.9% sodium chloride BOLUS     Admin Date Action Dose Route Administered By             05/12/2017 New Bag 50 mL Intravenous Sharda Ariza RN                    iron sucrose (VENOFER) 200 mg in NaCl 0.9 % 100 mL intermittent infusion     Admin Date Action Dose Rate Route Administered By          05/12/2017 New Bag 200 mg 480 mL/hr Intravenous Sharda Ariza RN                         /80  Pulse 103  Temp 98.8  F (37.1  C)  Wt 48.6 kg (107 lb 1.6 oz)  BMI 19.59  kg/m2

## 2017-05-12 NOTE — MR AVS SNAPSHOT
After Visit Summary   5/12/2017    Yarelis Penny    MRN: [id number]           Patient Information     Date Of Birth          1985        Visit Information        Provider Department      5/12/2017 1:00 PM UC 41 ATC; UC SPEC INFUSION [facility] [place] Procedure        Today's Diagnoses     Dehydration    -  1    CMV colitis (H)        Other iron deficiency anemia          Care Instructions    Dear Yarelis Penny    Thank you for choosing [facility] ([facility]) for your infusion.  The following information is a summary of our appointment as well as important reminders.      Additional information: Your infusion of 200 mg venofer (iron sucrose) today.      We look forward in seeing you on your next appointment here at [facility].  Please don t hesitate to call us at [phone] to reschedule any of your appointments or to speak with one of the [facility] registered nurses.  It was a pleasure taking care of you today.    Sincerely,  Sharda Ariza RN  [facility]  Specialty Infusion & Procedure Center  [address] [place] [address]  [address]  Phone:  [phone]    Patient Education    Iron Sucrose Chewable tablet    Iron Sucrose Solution for injection  Iron Sucrose Solution for injection  What is this medicine?  IRON SUCROSE (AHY priyanka ASCENCION krohs) is an iron complex. Iron is used to make healthy red blood cells, which carry oxygen and nutrients throughout the body. This medicine is used to treat iron deficiency anemia in people with chronic kidney disease.  This medicine may be used for other purposes; ask your health care provider or pharmacist if you have questions.  What should I tell my health care provider before I take this medicine?  They need to know if you have any of these conditions:    anemia not caused by low iron levels    heart disease    high levels of iron in the  blood    kidney disease    liver disease    an unusual or allergic reaction to iron, other medicines, foods, dyes, or preservatives    pregnant or trying to get pregnant    breast-feeding  How should I use this medicine?  This medicine is for infusion into a vein. It is given by a health care professional in a hospital or clinic setting.  Talk to your pediatrician regarding the use of this medicine in children. While this drug may be prescribed for children as young as 2 years for selected conditions, precautions do apply.  Overdosage: If you think you have taken too much of this medicine contact a poison control center or emergency room at once.  NOTE: This medicine is only for you. Do not share this medicine with others.  What if I miss a dose?  It is important not to miss your dose. Call your doctor or health care professional if you are unable to keep an appointment.  What may interact with this medicine?  Do not take this medicine with any of the following medications:    deferoxamine    dimercaprol    other iron products  This medicine may also interact with the following medications:    chloramphenicol    deferasirox  This list may not describe all possible interactions. Give your health care provider a list of all the medicines, herbs, non-prescription drugs, or dietary supplements you use. Also tell them if you smoke, drink alcohol, or use illegal drugs. Some items may interact with your medicine.  What should I watch for while using this medicine?  Visit your doctor or healthcare professional regularly. Tell your doctor or healthcare professional if your symptoms do not start to get better or if they get worse. You may need blood work done while you are taking this medicine.  You may need to follow a special diet. Talk to your doctor. Foods that contain iron include: whole grains/cereals, dried fruits, beans, or peas, leafy green vegetables, and organ meats (liver, kidney).  What side effects may I notice  from receiving this medicine?  Side effects that you should report to your doctor or health care professional as soon as possible:    allergic reactions like skin rash, itching or hives, swelling of the face, lips, or tongue    breathing problems    changes in blood pressure    cough    fast, irregular heartbeat    feeling faint or lightheaded, falls    fever or chills    flushing, sweating, or hot feelings    joint or muscle aches/pains    seizures    swelling of the ankles or feet    unusually weak or tired  Side effects that usually do not require medical attention (report to your doctor or health care professional if they continue or are bothersome):    diarrhea    feeling achy    headache    irritation at site where injected    nausea, vomiting    stomach upset    tiredness  This list may not describe all possible side effects. Call your doctor for medical advice about side effects. You may report side effects to FDA at 2-074-FDA-6506.  Where should I keep my medicine?  This drug is given in a hospital or clinic and will not be stored at home.  NOTE:This sheet is a summary. It may not cover all possible information. If you have questions about this medicine, talk to your doctor, pharmacist, or health care provider. Copyright  2016 Gold Standard                Follow-ups after your visit        Your next 10 appointments already scheduled     May 15, 2017   Procedure with Guru Brittany Macias MD   [facility] [place], Same Day Surgery (--)    [address] [facility] [address]   [phone]            May 17, 2017  2:00 PM CDT   Infusion 60 with UC SPEC INFUSION, UC 42 ATC   [facility] Specialty and Procedure ([facility])    [address] [facility]  2nd Floor  [facility] [address] [phone]            May 22, 2017 11:00 AM CDT   Infusion 60 with UC SPEC INFUSION, UC 47 ATC   [facility] Specialty and Procedure ([facility]  [facility] [place])    [address] [place] [address]  2nd [facility] [address]   [phone]            May 25, 2017  8:00 AM CDT   Infusion 60 with UC SPEC INFUSION, UC 51 ATC   [facility] Specialty and Procedure ([facility])    [address] [place] [address]  2nd [facility] [address]   [phone]            May 30, 2017  1:00 PM CDT   Infusion 180 with UC SPEC INFUSION, UC 42 ATC   [facility] Specialty and Procedure ([facility])    [address] [place] [address]  2nd [facility] [address]   [phone]            Jun 07, 2017  8:00 AM CDT   (Arrive by 7:45 AM)   Return Visit with Walker Nails MD   [facility] and Infectious Diseases ([facility])    [address] [place] [address]  3rd [facility] [address]   [phone]              Who to contact     If you have questions or need follow up information about today's clinic visit or your schedule please contact [facility] SPECIALTY AND PROCEDURE directly at [phone].  Normal or non-critical lab and imaging results will be communicated to you by LiveOfficehart, letter or phone within 4 business days after the clinic has received the results. If you do not hear from us within 7 days, please contact the clinic through LiveOfficehart or phone. If you have a critical or abnormal lab result, we will notify you by phone as soon as possible.  Submit refill requests through kajeet or call your pharmacy and they will forward the refill request to us. Please allow 3 business days for your refill to be completed.          Additional Information About Your Visit        LiveOfficehart Information     kajeet gives you secure access to your electronic health record. If you see a primary care provider, you can also send messages to your care team and make appointments. If you have questions, please call your  primary care clinic.  If you do not have a primary care provider, please call [phone] and they will assist you.        Care EveryWhere ID     This is your Care EveryWhere ID. This could be used by other organizations to access your [place] medical records  [id number]        Your Vitals Were     Pulse Temperature BMI (Body Mass Index)             103 98.8  F (37.1  C) 19.59 kg/m2          Blood Pressure from Last 3 Encounters:   05/12/17 112/80   05/08/17 109/81   05/02/17 123/81    Weight from Last 3 Encounters:   05/12/17 48.6 kg (107 lb 1.6 oz)   05/08/17 48.6 kg (107 lb 3.2 oz)   05/02/17 50.7 kg (111 lb 11.2 oz)              Today, you had the following     No orders found for display         Today's Medication Changes          These changes are accurate as of: 5/12/17  1:33 PM.  If you have any questions, ask your nurse or doctor.               These medicines have changed or have updated prescriptions.        Dose/Directions    predniSONE 10 MG tablet   Commonly known as:  DELTASONE   This may have changed:    - how much to take  - how to take this  - when to take this  - additional instructions   Used for:  Ulcerative colitis with other complication, unspecified location (H)        Take 6 tabs (60mg) PO x 5 days  Then take 5 tabs (50mg) PO x 7 days  Then take 4 tabs (40mg) PO x 7 days Then take 3 tabs (30mg) PO x 7 days Then take 2 tabs (20mg) PO x 7 days Then take 1 tab (10mg) PO x 7 days Then take 1/2 tablet (5mg) PO x 7 days then DISCONTINUE  Then take   Quantity:  92 tablet   Refills:  0                Primary Care Provider Office Phone # Fax #    [place] MARIANELA Orellana [phone]       [place] [address] YENIFER LAMA [address] [place] [address]        Thank you!     Thank you for choosing [facility] SPECIALTY AND PROCEDURE  for your care. Our goal is always to provide you with excellent care. Hearing back from our patients is one way we can continue to  improve our services. Please take a few minutes to complete the written survey that you may receive in the mail after your visit with us. Thank you!             Your Updated Medication List - Protect others around you: Learn how to safely use, store and throw away your medicines at www.disposemymeds.org.          This list is accurate as of: 5/12/17  1:33 PM.  Always use your most recent med list.                   Brand Name Dispense Instructions for use    ENTYVIO IV      Inject 300 mg into the vein every 28 days       GNP ASPIRIN LOW DOSE 81 MG EC tablet   Generic drug:  aspirin     30 tablet    Take 1 tablet (81 mg) by mouth daily       predniSONE 10 MG tablet    DELTASONE    92 tablet    Take 6 tabs (60mg) PO x 5 days  Then take 5 tabs (50mg) PO x 7 days  Then take 4 tabs (40mg) PO x 7 days Then take 3 tabs (30mg) PO x 7 days Then take 2 tabs (20mg) PO x 7 days Then take 1 tab (10mg) PO x 7 days Then take 1/2 tablet (5mg) PO x 7 days then DISCONTINUE  Then take       * tacrolimus 0.5 MG capsule    PROGRAF - GENERIC EQUIVALENT    180 capsule    Take 1 capsule (0.5 mg) by mouth 2 times daily Take with 1 mg capsule. Total dose 2.5 mg every 12 hours       * tacrolimus 1 MG capsule    PROGRAF - GENERIC EQUIVALENT    360 capsule    Take 2 capsules (2 mg) by mouth 2 times daily *total dose 2.5 mg BID. Take with 0.5 mg capsule       ursodiol 250 MG tablet    ACTIGALL    270 tablet    Take 2 tabs (500 mg) in AM, and 1 tab at night (250 mg)       zolpidem 5 MG tablet    AMBIEN     TK 1 T PO QD HS PRN FOR SLEEP       * Notice:  This list has 2 medication(s) that are the same as other medications prescribed for you. Read the directions carefully, and ask your doctor or other care provider to review them with you.

## 2017-05-12 NOTE — PATIENT INSTRUCTIONS
Dear Yarelis Penny    Thank you for choosing [facility] ([facility]) for your infusion.  The following information is a summary of our appointment as well as important reminders.      Additional information: Your infusion of 200 mg venofer (iron sucrose) today.      We look forward in seeing you on your next appointment here at [facility].  Please don t hesitate to call us at [phone] to reschedule any of your appointments or to speak with one of the [facility] registered nurses.  It was a pleasure taking care of you today.    Sincerely,  Sharda Ariza, RN  [facility]  Specialty Infusion [facility] Procedure Center  [address] [place] [address]  [address]  Phone:  [phone]    Patient Education    Iron Sucrose Chewable tablet    Iron Sucrose Solution for injection  Iron Sucrose Solution for injection  What is this medicine?  IRON SUCROSE (AHY priyanka ASCENCION krohs) is an iron complex. Iron is used to make healthy red blood cells, which carry oxygen and nutrients throughout the body. This medicine is used to treat iron deficiency anemia in people with chronic kidney disease.  This medicine may be used for other purposes; ask your health care provider or pharmacist if you have questions.  What should I tell my health care provider before I take this medicine?  They need to know if you have any of these conditions:    anemia not caused by low iron levels    heart disease    high levels of iron in the blood    kidney disease    liver disease    an unusual or allergic reaction to iron, other medicines, foods, dyes, or preservatives    pregnant or trying to get pregnant    breast-feeding  How should I use this medicine?  This medicine is for infusion into a vein. It is given by a health care professional in a hospital or clinic setting.  Talk to your pediatrician regarding the use of this medicine in children. While this drug may be prescribed for  children as young as 2 years for selected conditions, precautions do apply.  Overdosage: If you think you have taken too much of this medicine contact a poison control center or emergency room at once.  NOTE: This medicine is only for you. Do not share this medicine with others.  What if I miss a dose?  It is important not to miss your dose. Call your doctor or health care professional if you are unable to keep an appointment.  What may interact with this medicine?  Do not take this medicine with any of the following medications:    deferoxamine    dimercaprol    other iron products  This medicine may also interact with the following medications:    chloramphenicol    deferasirox  This list may not describe all possible interactions. Give your health care provider a list of all the medicines, herbs, non-prescription drugs, or dietary supplements you use. Also tell them if you smoke, drink alcohol, or use illegal drugs. Some items may interact with your medicine.  What should I watch for while using this medicine?  Visit your doctor or healthcare professional regularly. Tell your doctor or healthcare professional if your symptoms do not start to get better or if they get worse. You may need blood work done while you are taking this medicine.  You may need to follow a special diet. Talk to your doctor. Foods that contain iron include: whole grains/cereals, dried fruits, beans, or peas, leafy green vegetables, and organ meats (liver, kidney).  What side effects may I notice from receiving this medicine?  Side effects that you should report to your doctor or health care professional as soon as possible:    allergic reactions like skin rash, itching or hives, swelling of the face, lips, or tongue    breathing problems    changes in blood pressure    cough    fast, irregular heartbeat    feeling faint or lightheaded, falls    fever or chills    flushing, sweating, or hot feelings    joint or muscle  aches/pains    seizures    swelling of the ankles or feet    unusually weak or tired  Side effects that usually do not require medical attention (report to your doctor or health care professional if they continue or are bothersome):    diarrhea    feeling achy    headache    irritation at site where injected    nausea, vomiting    stomach upset    tiredness  This list may not describe all possible side effects. Call your doctor for medical advice about side effects. You may report side effects to FDA at 2-012-GXC-5139.  Where should I keep my medicine?  This drug is given in a hospital or clinic and will not be stored at home.  NOTE:This sheet is a summary. It may not cover all possible information. If you have questions about this medicine, talk to your doctor, pharmacist, or health care provider. Copyright  2016 Gold Standard

## 2017-05-15 ENCOUNTER — ANESTHESIA (OUTPATIENT)
Dept: SURGERY | Facility: CLINIC | Age: 32
End: 2017-05-15
Payer: COMMERCIAL

## 2017-05-15 ENCOUNTER — HOSPITAL ENCOUNTER (OUTPATIENT)
Facility: CLINIC | Age: 32
Discharge: HOME | End: 2017-05-15
Attending: INTERNAL MEDICINE | Admitting: INTERNAL MEDICINE
Payer: COMMERCIAL

## 2017-05-15 ENCOUNTER — SURGERY (OUTPATIENT)
Age: 32
End: 2017-05-15

## 2017-05-15 ENCOUNTER — APPOINTMENT (OUTPATIENT)
Dept: GENERAL RADIOLOGY | Facility: CLINIC | Age: 32
End: 2017-05-15
Attending: INTERNAL MEDICINE
Payer: COMMERCIAL

## 2017-05-15 VITALS
HEART RATE: 89 BPM | OXYGEN SATURATION: 100 % | WEIGHT: 107.36 LBS | BODY MASS INDEX: 19.76 KG/M2 | DIASTOLIC BLOOD PRESSURE: 98 MMHG | RESPIRATION RATE: 16 BRPM | SYSTOLIC BLOOD PRESSURE: 129 MMHG | TEMPERATURE: 98.4 F | HEIGHT: 62 IN

## 2017-05-15 LAB
ALBUMIN SERPL-MCNC: 2.7 G/DL (ref 3.4–5)
ALP SERPL-CCNC: 186 U/L (ref 40–150)
ALT SERPL W P-5'-P-CCNC: 25 U/L (ref 0–50)
AMYLASE SERPL-CCNC: 90 U/L (ref 30–110)
ANION GAP SERPL CALCULATED.3IONS-SCNC: 6 MMOL/L (ref 3–14)
AST SERPL W P-5'-P-CCNC: 20 U/L (ref 0–45)
BILIRUB SERPL-MCNC: 0.3 MG/DL (ref 0.2–1.3)
BUN SERPL-MCNC: 14 MG/DL (ref 7–30)
CALCIUM SERPL-MCNC: 8.9 MG/DL (ref 8.5–10.1)
CHLORIDE SERPL-SCNC: 109 MMOL/L (ref 94–109)
CO2 SERPL-SCNC: 24 MMOL/L (ref 20–32)
CREAT SERPL-MCNC: 0.98 MG/DL (ref 0.52–1.04)
ERCP: NORMAL
ERYTHROCYTE [DISTWIDTH] IN BLOOD BY AUTOMATED COUNT: 27.1 % (ref 10–15)
GFR SERPL CREATININE-BSD FRML MDRD: 66 ML/MIN/1.7M2
GLUCOSE SERPL-MCNC: 78 MG/DL (ref 70–99)
HCG UR QL: NEGATIVE
HCT VFR BLD AUTO: 33.2 % (ref 35–47)
HGB BLD-MCNC: 9.7 G/DL (ref 11.7–15.7)
INR PPP: 0.96 (ref 0.86–1.14)
LIPASE SERPL-CCNC: 204 U/L (ref 73–393)
MCH RBC QN AUTO: 22.7 PG (ref 26.5–33)
MCHC RBC AUTO-ENTMCNC: 29.2 G/DL (ref 31.5–36.5)
MCV RBC AUTO: 78 FL (ref 78–100)
PLATELET # BLD AUTO: 679 10E9/L (ref 150–450)
POTASSIUM SERPL-SCNC: 4.3 MMOL/L (ref 3.4–5.3)
PROT SERPL-MCNC: 7.7 G/DL (ref 6.8–8.8)
RBC # BLD AUTO: 4.27 10E12/L (ref 3.8–5.2)
SODIUM SERPL-SCNC: 140 MMOL/L (ref 133–144)
WBC # BLD AUTO: 11.5 10E9/L (ref 4–11)

## 2017-05-15 PROCEDURE — 37000009 ZZH ANESTHESIA TECHNICAL FEE, EACH ADDTL 15 MIN: Performed by: INTERNAL MEDICINE

## 2017-05-15 PROCEDURE — 25000128 H RX IP 250 OP 636: Performed by: NURSE ANESTHETIST, CERTIFIED REGISTERED

## 2017-05-15 PROCEDURE — 25000132 ZZH RX MED GY IP 250 OP 250 PS 637: Performed by: INTERNAL MEDICINE

## 2017-05-15 PROCEDURE — 25000566 ZZH SEVOFLURANE, EA 15 MIN: Performed by: INTERNAL MEDICINE

## 2017-05-15 PROCEDURE — P9041 ALBUMIN (HUMAN),5%, 50ML: HCPCS | Performed by: NURSE ANESTHETIST, CERTIFIED REGISTERED

## 2017-05-15 PROCEDURE — 25000128 H RX IP 250 OP 636

## 2017-05-15 PROCEDURE — 36000053 ZZH SURGERY LEVEL 2 EA 15 ADDTL MIN - UMMC: Performed by: INTERNAL MEDICINE

## 2017-05-15 PROCEDURE — 83690 ASSAY OF LIPASE: CPT | Performed by: INTERNAL MEDICINE

## 2017-05-15 PROCEDURE — 37000008 ZZH ANESTHESIA TECHNICAL FEE, 1ST 30 MIN: Performed by: INTERNAL MEDICINE

## 2017-05-15 PROCEDURE — 71000027 ZZH RECOVERY PHASE 2 EACH 15 MINS: Performed by: INTERNAL MEDICINE

## 2017-05-15 PROCEDURE — 25800025 ZZH RX 258: Performed by: NURSE ANESTHETIST, CERTIFIED REGISTERED

## 2017-05-15 PROCEDURE — 82150 ASSAY OF AMYLASE: CPT | Performed by: INTERNAL MEDICINE

## 2017-05-15 PROCEDURE — 85027 COMPLETE CBC AUTOMATED: CPT | Performed by: INTERNAL MEDICINE

## 2017-05-15 PROCEDURE — 27210995 ZZH RX 272: Performed by: INTERNAL MEDICINE

## 2017-05-15 PROCEDURE — 80053 COMPREHEN METABOLIC PANEL: CPT | Performed by: INTERNAL MEDICINE

## 2017-05-15 PROCEDURE — 40000280 XR SURGERY CARM FLUORO GREATER THAN 5 MIN: Mod: TC

## 2017-05-15 PROCEDURE — 25000125 ZZHC RX 250: Performed by: NURSE ANESTHETIST, CERTIFIED REGISTERED

## 2017-05-15 PROCEDURE — 27210794 ZZH OR GENERAL SUPPLY STERILE: Performed by: INTERNAL MEDICINE

## 2017-05-15 PROCEDURE — C1769 GUIDE WIRE: HCPCS | Performed by: INTERNAL MEDICINE

## 2017-05-15 PROCEDURE — C1726 CATH, BAL DIL, NON-VASCULAR: HCPCS | Performed by: INTERNAL MEDICINE

## 2017-05-15 PROCEDURE — 71000014 ZZH RECOVERY PHASE 1 LEVEL 2 FIRST HR: Performed by: INTERNAL MEDICINE

## 2017-05-15 PROCEDURE — 85610 PROTHROMBIN TIME: CPT | Performed by: INTERNAL MEDICINE

## 2017-05-15 PROCEDURE — 36415 COLL VENOUS BLD VENIPUNCTURE: CPT | Performed by: INTERNAL MEDICINE

## 2017-05-15 PROCEDURE — C9399 UNCLASSIFIED DRUGS OR BIOLOG: HCPCS | Performed by: NURSE ANESTHETIST, CERTIFIED REGISTERED

## 2017-05-15 PROCEDURE — 40000170 ZZH STATISTIC PRE-PROCEDURE ASSESSMENT II: Performed by: INTERNAL MEDICINE

## 2017-05-15 PROCEDURE — 36000055 ZZH SURGERY LEVEL 2 W FLUORO 1ST 30 MIN - UMMC: Performed by: INTERNAL MEDICINE

## 2017-05-15 PROCEDURE — C1877 STENT, NON-COAT/COV W/O DEL: HCPCS | Performed by: INTERNAL MEDICINE

## 2017-05-15 PROCEDURE — 25000125 ZZHC RX 250: Performed by: INTERNAL MEDICINE

## 2017-05-15 PROCEDURE — 81025 URINE PREGNANCY TEST: CPT | Performed by: ANESTHESIOLOGY

## 2017-05-15 DEVICE — STENT FREEMAN PANCREA FLEX 7FRX7CM SGL PIGTAIL
Type: IMPLANTABLE DEVICE | Site: BILE DUCT | Status: NON-FUNCTIONAL
Removed: 2018-05-07

## 2017-05-15 RX ORDER — LIDOCAINE 40 MG/G
CREAM TOPICAL
Status: DISCONTINUED | OUTPATIENT
Start: 2017-05-15 | End: 2017-05-15 | Stop reason: HOSPADM

## 2017-05-15 RX ORDER — ONDANSETRON 2 MG/ML
4 INJECTION INTRAMUSCULAR; INTRAVENOUS EVERY 30 MIN PRN
Status: DISCONTINUED | OUTPATIENT
Start: 2017-05-15 | End: 2017-05-15 | Stop reason: HOSPADM

## 2017-05-15 RX ORDER — SODIUM CHLORIDE, SODIUM LACTATE, POTASSIUM CHLORIDE, CALCIUM CHLORIDE 600; 310; 30; 20 MG/100ML; MG/100ML; MG/100ML; MG/100ML
INJECTION, SOLUTION INTRAVENOUS CONTINUOUS PRN
Status: DISCONTINUED | OUTPATIENT
Start: 2017-05-15 | End: 2017-05-15

## 2017-05-15 RX ORDER — PROPOFOL 10 MG/ML
INJECTION, EMULSION INTRAVENOUS PRN
Status: DISCONTINUED | OUTPATIENT
Start: 2017-05-15 | End: 2017-05-15

## 2017-05-15 RX ORDER — ALBUMIN HUMAN 5 %
INTRAVENOUS SOLUTION INTRAVENOUS PRN
Status: DISCONTINUED | OUTPATIENT
Start: 2017-05-15 | End: 2017-05-15

## 2017-05-15 RX ORDER — SODIUM CHLORIDE, SODIUM LACTATE, POTASSIUM CHLORIDE, CALCIUM CHLORIDE 600; 310; 30; 20 MG/100ML; MG/100ML; MG/100ML; MG/100ML
INJECTION, SOLUTION INTRAVENOUS CONTINUOUS
Status: DISCONTINUED | OUTPATIENT
Start: 2017-05-15 | End: 2017-05-15 | Stop reason: HOSPADM

## 2017-05-15 RX ORDER — ONDANSETRON 4 MG/1
4 TABLET, ORALLY DISINTEGRATING ORAL EVERY 30 MIN PRN
Status: DISCONTINUED | OUTPATIENT
Start: 2017-05-15 | End: 2017-05-15 | Stop reason: HOSPADM

## 2017-05-15 RX ORDER — MEPERIDINE HYDROCHLORIDE 25 MG/ML
12.5 INJECTION INTRAMUSCULAR; INTRAVENOUS; SUBCUTANEOUS
Status: DISCONTINUED | OUTPATIENT
Start: 2017-05-15 | End: 2017-05-15 | Stop reason: HOSPADM

## 2017-05-15 RX ORDER — LEVOFLOXACIN 5 MG/ML
INJECTION, SOLUTION INTRAVENOUS PRN
Status: DISCONTINUED | OUTPATIENT
Start: 2017-05-15 | End: 2017-05-15

## 2017-05-15 RX ORDER — INDOMETHACIN 50 MG/1
100 SUPPOSITORY RECTAL
Status: COMPLETED | OUTPATIENT
Start: 2017-05-15 | End: 2017-05-15

## 2017-05-15 RX ORDER — FENTANYL CITRATE 50 UG/ML
25-50 INJECTION, SOLUTION INTRAMUSCULAR; INTRAVENOUS
Status: DISCONTINUED | OUTPATIENT
Start: 2017-05-15 | End: 2017-05-15 | Stop reason: HOSPADM

## 2017-05-15 RX ORDER — FLUMAZENIL 0.1 MG/ML
0.2 INJECTION, SOLUTION INTRAVENOUS
Status: DISCONTINUED | OUTPATIENT
Start: 2017-05-15 | End: 2017-05-15 | Stop reason: HOSPADM

## 2017-05-15 RX ORDER — NALOXONE HYDROCHLORIDE 0.4 MG/ML
.1-.4 INJECTION, SOLUTION INTRAMUSCULAR; INTRAVENOUS; SUBCUTANEOUS
Status: DISCONTINUED | OUTPATIENT
Start: 2017-05-15 | End: 2017-05-15 | Stop reason: HOSPADM

## 2017-05-15 RX ORDER — FENTANYL CITRATE 50 UG/ML
25-50 INJECTION, SOLUTION INTRAMUSCULAR; INTRAVENOUS EVERY 5 MIN PRN
Status: DISCONTINUED | OUTPATIENT
Start: 2017-05-15 | End: 2017-05-15 | Stop reason: HOSPADM

## 2017-05-15 RX ORDER — FENTANYL CITRATE 50 UG/ML
INJECTION, SOLUTION INTRAMUSCULAR; INTRAVENOUS PRN
Status: DISCONTINUED | OUTPATIENT
Start: 2017-05-15 | End: 2017-05-15

## 2017-05-15 RX ORDER — LIDOCAINE HYDROCHLORIDE 20 MG/ML
INJECTION, SOLUTION INFILTRATION; PERINEURAL PRN
Status: DISCONTINUED | OUTPATIENT
Start: 2017-05-15 | End: 2017-05-15

## 2017-05-15 RX ORDER — DEXAMETHASONE SODIUM PHOSPHATE 4 MG/ML
INJECTION, SOLUTION INTRA-ARTICULAR; INTRALESIONAL; INTRAMUSCULAR; INTRAVENOUS; SOFT TISSUE PRN
Status: DISCONTINUED | OUTPATIENT
Start: 2017-05-15 | End: 2017-05-15

## 2017-05-15 RX ORDER — ONDANSETRON 2 MG/ML
INJECTION INTRAMUSCULAR; INTRAVENOUS PRN
Status: DISCONTINUED | OUTPATIENT
Start: 2017-05-15 | End: 2017-05-15

## 2017-05-15 RX ADMIN — PHENYLEPHRINE HYDROCHLORIDE 100 MCG: 10 INJECTION, SOLUTION INTRAMUSCULAR; INTRAVENOUS; SUBCUTANEOUS at 08:15

## 2017-05-15 RX ADMIN — MIDAZOLAM HYDROCHLORIDE 1 MG: 1 INJECTION, SOLUTION INTRAMUSCULAR; INTRAVENOUS at 07:55

## 2017-05-15 RX ADMIN — ONDANSETRON 4 MG: 2 INJECTION INTRAMUSCULAR; INTRAVENOUS at 08:33

## 2017-05-15 RX ADMIN — LIDOCAINE HYDROCHLORIDE 100 MG: 20 INJECTION, SOLUTION INFILTRATION; PERINEURAL at 08:04

## 2017-05-15 RX ADMIN — SIMETHICONE 2 ML: 63.3; 3.7 SOLUTION/ DROPS ORAL at 08:27

## 2017-05-15 RX ADMIN — LEVOFLOXACIN 500 MG: 5 INJECTION, SOLUTION INTRAVENOUS at 08:14

## 2017-05-15 RX ADMIN — PHENYLEPHRINE HYDROCHLORIDE 100 MCG: 10 INJECTION, SOLUTION INTRAMUSCULAR; INTRAVENOUS; SUBCUTANEOUS at 08:29

## 2017-05-15 RX ADMIN — FENTANYL CITRATE 50 MCG: 50 INJECTION, SOLUTION INTRAMUSCULAR; INTRAVENOUS at 08:04

## 2017-05-15 RX ADMIN — ROCURONIUM BROMIDE 30 MG: 10 INJECTION INTRAVENOUS at 08:04

## 2017-05-15 RX ADMIN — SODIUM CHLORIDE, POTASSIUM CHLORIDE, SODIUM LACTATE AND CALCIUM CHLORIDE: 600; 310; 30; 20 INJECTION, SOLUTION INTRAVENOUS at 07:55

## 2017-05-15 RX ADMIN — INDOMETHACIN 100 MG: 50 SUPPOSITORY RECTAL at 08:53

## 2017-05-15 RX ADMIN — ALBUMIN HUMAN 250 ML: 50 SOLUTION INTRAVENOUS at 08:41

## 2017-05-15 RX ADMIN — SUGAMMADEX 100 MG: 100 INJECTION, SOLUTION INTRAVENOUS at 08:42

## 2017-05-15 RX ADMIN — DEXAMETHASONE SODIUM PHOSPHATE 4 MG: 4 INJECTION, SOLUTION INTRA-ARTICULAR; INTRALESIONAL; INTRAMUSCULAR; INTRAVENOUS; SOFT TISSUE at 08:25

## 2017-05-15 RX ADMIN — PROPOFOL 120 MG: 10 INJECTION, EMULSION INTRAVENOUS at 08:04

## 2017-05-15 RX ADMIN — WATER 100 ML: 100 IRRIGANT IRRIGATION at 08:27

## 2017-05-15 RX ADMIN — MIDAZOLAM HYDROCHLORIDE 1 MG: 1 INJECTION, SOLUTION INTRAMUSCULAR; INTRAVENOUS at 07:56

## 2017-05-15 NOTE — OR NURSING
Dr. Lucio (anesthesia) at bedside. Says he will write sign out for patient- ok to transfer to phase II.

## 2017-05-15 NOTE — ANESTHESIA CARE TRANSFER NOTE
Patient: Yarelis Penny    Procedure(s):  Endoscopic Retrograde Cholangiopancreatogram with biliary stent exchange and balloon dilation - Wound Class: II-Clean Contaminated    Diagnosis: Biliary Stricture   Diagnosis Additional Information: No value filed.    Anesthesia Type:   General, ETT     Note:  Airway :Face Mask  Patient transferred to:PACU  Comments: VSS.  Report given to RN.      Vitals: (Last set prior to Anesthesia Care Transfer)    CRNA VITALS  5/15/2017 0825 - 5/15/2017 0859      5/15/2017             Resp Rate (observed): (!)  1    Resp Rate (set): 10                Electronically Signed By: KAYLENE Rodriguez CRNA  May 15, 2017  8:59 AM

## 2017-05-15 NOTE — ANESTHESIA POSTPROCEDURE EVALUATION
Patient: Yarelis Penny    Procedure(s):  Endoscopic Retrograde Cholangiopancreatogram with biliary stent exchange and balloon dilation - Wound Class: II-Clean Contaminated    Diagnosis:Biliary Stricture   Diagnosis Additional Information: No value filed.    Anesthesia Type:  General, ETT    Note:  Anesthesia Post Evaluation    Patient location during evaluation: PACU  Patient participation: Able to fully participate in evaluation  Level of consciousness: awake and alert  Pain management: adequate  Airway patency: patent  Cardiovascular status: acceptable  Respiratory status: acceptable  Hydration status: acceptable  PONV: none     Anesthetic complications: None          Last vitals:  Vitals:    05/15/17 0950 05/15/17 1006 05/15/17 1018   BP: (!) 141/99 (!) 126/95 (!) 129/98   Pulse:      Resp: 16 16 16   Temp: 36.6  C (97.8  F) 36.4  C (97.5  F) 36.9  C (98.4  F)   SpO2: 99% 98% 100%         Electronically Signed By: Frank Lucio MD  May 15, 2017  10:32 AM

## 2017-05-15 NOTE — IP AVS SNAPSHOT
MRN:[id number]                      After Visit Summary   5/15/2017    Yarelis Penny    MRN: [id number]           Thank you!     Thank you for choosing [facility] for your care. Our goal is always to provide you with excellent care. Hearing back from our patients is one way we can continue to improve our services. Please take a few minutes to complete the written survey that you may receive in the mail after you visit with us. Thank you!        Patient Information     Date Of Birth          1985        About your hospital stay     You were admitted on:  May 15, 2017 You last received care in the:  Post Anesthesia Care Unit [facility]    You were discharged on:  May 15, 2017       Who to Call     For medical emergencies, please call 911.  For non-urgent questions about your medical care, please call your primary care provider or clinic, [phone]  For questions related to your surgery, please call your surgery clinic        Attending Provider     Provider Guru Brittany Wright MD Gastroenterology       Primary Care Provider Office Phone # Fax #    Jackson Orellana [phone]       [place] [address] YENIFER SPEAR [address] [address]        After Care Instructions     Discharge Instructions       Resume pre procedure diet            Discharge Instructions       Restart home medications.                  Your next 10 appointments already scheduled     May 17, 2017  2:00 PM CDT   Infusion 60 with UC SPEC INFUSION, UC 42 ATC   [facility] Procedure ([facility])    [address] [place] [address] [phone]            May 22, 2017 11:00 AM CDT   Infusion 60 with UC SPEC INFUSION, UC 47 ATC   [facility] ([facility])    [address] [place] [address]    [phone]            May 25, 2017  8:00 AM CDT   Infusion 60 with UC SPEC INFUSION, UC 51 ATC   [facility] Specialty and Procedure ([facility])    [address]  [address] [facility] [address]   [phone]            May 30, 2017  1:00 PM CDT   Infusion 180 with UC SPEC INFUSION, UC 42 ATC   [facility] Specialty [facility] Procedure ([facility])    [address] [place] [address]  [address] [facility] [address]   [phone]            Jun 07, 2017  8:00 AM CDT   (Arrive by 7:45 AM)   Return Visit with Walker Nails MD   [facility] and Infectious Diseases ([facility])    [address] [place] [address]  [address] [facility] [address]   [phone]            Jul 25, 2017  8:40 AM CDT   (Arrive by 8:25 AM)   RETURN IRRITABLE BOWEL DISEASE with Fuentes Johnson MD   [facility] Gastroenterology and IBD ([facility])    [address] [place] [address]  [address] [facility] [address]   [phone]              Further instructions from your care team         *Sips of clear liquids if tolerated; advance to low-fat liquids until AM. Then advance as tolerated.  NO CAFFEINE for 48 hours  *Take it easy when you get home.  Remember, same day surgery DOES NOT MEAN SAME DAY RECOVERY!  Healing is a gradual process.  You will need some time to recover - you may be more tired than you realize at first.  Rest and relax for at least the first 24 hours at home.  You'll feel better and heal faster if you take good care of yourself.  [facility] [place]  Same-Day Surgery   Adult Discharge Orders & Instructions     For 24 hours after surgery    1. Get plenty of rest.  A responsible adult must stay with you for at least 24 hours after you leave the hospital.   2. Do not drive or use heavy equipment.  If you have weakness or tingling, don't  drive or use heavy equipment until this feeling goes away.  3. Do not drink alcohol.  4. Avoid strenuous or risky activities.  Ask for help when climbing stairs.   5. You may feel lightheaded.  IF so, sit for a few minutes before standing.  Have someone help you get up.   6. If you have nausea (feel sick to your stomach): Drink only clear liquids such as apple juice, ginger ale, broth or 7-Up.  Rest may also help.  Be sure to drink enough fluids.  Move to a regular diet as you feel able.  7. You may have a slight fever. Call the doctor if your fever is over 100.5  F (37.7 C) (taken under the tongue) or lasts longer than 24 hours.  8. You may have a dry mouth, a sore throat, muscle aches or trouble sleeping.  These should go away after 24 hours.  9. Do not make important or legal decisions.   Call your doctor for any of the followin.  Signs of infection (fever, growing tenderness at the surgery site, a large amount of drainage or bleeding, severe pain, foul-smelling drainage, redness, swelling).    2. It has been over 8 to 10 hours since surgery and you are still not able to urinate (pass water).    3.  Headache for over 24 hours.    To contact a doctor, call Dr. Macias at the Gastroenterology Clinic @ [phone] or:        [phone] and ask for the resident on call for Gastroenterology (answered 24 hours a day)      Emergency Department:    [facility]: [phone]       (TTY for hearing impaired: [phone])          Additional Information     If you use hormonal birth control (such as the pill, patch, ring or implants): You'll need a second form of birth control for 7 days (condoms, a diaphragm or contraceptive foam). While in the hospital, you received a medicine called Bridion. Your normal birth control will not work as well for a week after taking this medicine.          Pending Results     No orders found from 2017 to 2017.            Admission Information     Date & Time  "Provider Department Dept. Phone    5/15/2017 Guru Brittany Macias MD Post Anesthesia Care Unit [facility] [phone]      Your Vitals Were     Blood Pressure Pulse Temperature Respirations Height Weight    141/99 89 97.8  F (36.6  C) 16 1.575 m (5' 2.01\") 48.7 kg (107 lb 5.8 oz)    Pulse Oximetry BMI (Body Mass Index)                99% 19.63 kg/m2          anchor.travel Information     anchor.travel gives you secure access to your electronic health record. If you see a primary care provider, you can also send messages to your care team and make appointments. If you have questions, please call your primary care clinic.  If you do not have a primary care provider, please call [phone] and they will assist you.        Care EveryWhere ID     This is your Care EveryWhere ID. This could be used by other organizations to access your [facility] medical records  [id number]           Review of your medicines      UNREVIEWED medicines. Ask your doctor about these medicines        Dose / Directions    ENTYVIO IV        Dose:  300 mg   Inject 300 mg into the vein every 28 days   Refills:  0       GNP ASPIRIN LOW DOSE 81 MG EC tablet   Used for:  Ulcerative colitis with other complication, unspecified location (H), Liver replaced by transplant (H)   Generic drug:  aspirin        Dose:  81 mg   Take 1 tablet (81 mg) by mouth daily   Quantity:  30 tablet   Refills:  1       predniSONE 10 MG tablet   Commonly known as:  DELTASONE   Used for:  Ulcerative colitis with other complication, unspecified location (H)        Take 6 tabs (60mg) PO x 5 days  Then take 5 tabs (50mg) PO x 7 days  Then take 4 tabs (40mg) PO x 7 days Then take 3 tabs (30mg) PO x 7 days Then take 2 tabs (20mg) PO x 7 days Then take 1 tab (10mg) PO x 7 days Then take 1/2 tablet (5mg) PO x 7 days then DISCONTINUE  Then take   Quantity:  92 tablet   Refills:  0       * tacrolimus 0.5 MG capsule   Commonly known as:  PROGRAF - GENERIC EQUIVALENT   Used " for:  S/P liver transplant (H)        Dose:  0.5 mg   Take 1 capsule (0.5 mg) by mouth 2 times daily Take with 1 mg capsule. Total dose 2.5 mg every 12 hours   Quantity:  180 capsule   Refills:  3       * tacrolimus 1 MG capsule   Commonly known as:  PROGRAF - GENERIC EQUIVALENT   Used for:  S/P liver transplant (H)        Dose:  2 mg   Take 2 capsules (2 mg) by mouth 2 times daily *total dose 2.5 mg BID. Take with 0.5 mg capsule   Quantity:  360 capsule   Refills:  3       ursodiol 250 MG tablet   Commonly known as:  ACTIGALL   Used for:  Liver replaced by transplant (H)        Take 2 tabs (500 mg) in AM, and 1 tab at night (250 mg)   Quantity:  270 tablet   Refills:  3       zolpidem 5 MG tablet   Commonly known as:  AMBIEN        TK 1 T PO QD HS PRN FOR SLEEP   Refills:  5       * Notice:  This list has 2 medication(s) that are the same as other medications prescribed for you. Read the directions carefully, and ask your doctor or other care provider to review them with you.             Protect others around you: Learn how to safely use, store and throw away your medicines at www.disposemymeds.org.             Medication List: This is a list of all your medications and when to take them. Check marks below indicate your daily home schedule. Keep this list as a reference.      Medications           Morning Afternoon Evening Bedtime As Needed    ENTYVIO IV   Inject 300 mg into the vein every 28 days                                GNP ASPIRIN LOW DOSE 81 MG EC tablet   Take 1 tablet (81 mg) by mouth daily   Generic drug:  aspirin                                predniSONE 10 MG tablet   Commonly known as:  DELTASONE   Take 6 tabs (60mg) PO x 5 days  Then take 5 tabs (50mg) PO x 7 days  Then take 4 tabs (40mg) PO x 7 days Then take 3 tabs (30mg) PO x 7 days Then take 2 tabs (20mg) PO x 7 days Then take 1 tab (10mg) PO x 7 days Then take 1/2 tablet (5mg) PO x 7 days then DISCONTINUE  Then take                                 * tacrolimus 0.5 MG capsule   Commonly known as:  PROGRAF - GENERIC EQUIVALENT   Take 1 capsule (0.5 mg) by mouth 2 times daily Take with 1 mg capsule. Total dose 2.5 mg every 12 hours                                * tacrolimus 1 MG capsule   Commonly known as:  PROGRAF - GENERIC EQUIVALENT   Take 2 capsules (2 mg) by mouth 2 times daily *total dose 2.5 mg BID. Take with 0.5 mg capsule                                ursodiol 250 MG tablet   Commonly known as:  ACTIGALL   Take 2 tabs (500 mg) in AM, and 1 tab at night (250 mg)                                zolpidem 5 MG tablet   Commonly known as:  AMBIEN   TK 1 T PO QD HS PRN FOR SLEEP                                * Notice:  This list has 2 medication(s) that are the same as other medications prescribed for you. Read the directions carefully, and ask your doctor or other care provider to review them with you.

## 2017-05-15 NOTE — DISCHARGE INSTRUCTIONS
*Sips of clear liquids if tolerated; advance to low-fat liquids until AM. Then advance as tolerated.  NO CAFFEINE for 48 hours  *Take it easy when you get home.  Remember, same day surgery DOES NOT MEAN SAME DAY RECOVERY!  Healing is a gradual process.  You will need some time to recover - you may be more tired than you realize at first.  Rest and relax for at least the first 24 hours at home.  You'll feel better and heal faster if you take good care of yourself.  [facility], [place]  Same-Day Surgery   Adult Discharge Orders & Instructions     For 24 hours after surgery    1. Get plenty of rest.  A responsible adult must stay with you for at least 24 hours after you leave the hospital.   2. Do not drive or use heavy equipment.  If you have weakness or tingling, don't drive or use heavy equipment until this feeling goes away.  3. Do not drink alcohol.  4. Avoid strenuous or risky activities.  Ask for help when climbing stairs.   5. You may feel lightheaded.  IF so, sit for a few minutes before standing.  Have someone help you get up.   6. If you have nausea (feel sick to your stomach): Drink only clear liquids such as apple juice, ginger ale, broth or 7-Up.  Rest may also help.  Be sure to drink enough fluids.  Move to a regular diet as you feel able.  7. You may have a slight fever. Call the doctor if your fever is over 100.5  F (37.7 C) (taken under the tongue) or lasts longer than 24 hours.  8. You may have a dry mouth, a sore throat, muscle aches or trouble sleeping.  These should go away after 24 hours.  9. Do not make important or legal decisions.   Call your doctor for any of the followin.  Signs of infection (fever, growing tenderness at the surgery site, a large amount of drainage or bleeding, severe pain, foul-smelling drainage, redness, swelling).    2. It has been over 8 to 10 hours since surgery and you are still not able to urinate (pass water).    3.  Headache for  over 24 hours.    To contact a doctor, call Dr. Macias at the Gastroenterology Clinic @ [phone] or:        [phone] and ask for the resident on call for Gastroenterology (answered 24 hours a day)      Emergency Department:    [facility]: [phone]       (TTY for hearing impaired: [phone])

## 2017-05-15 NOTE — BRIEF OP NOTE
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   Post-operative diagnosis * No post-op diagnosis entered *   Procedure: Procedure(s):  Endoscopic Retrograde Cholangiopancreatogram with biliary stent exchange and balloon dilation - Wound Class: II-Clean Contaminated   Surgeon: Guru Colleen MD       Estimated blood loss: None    Specimens: None   Findings: Previous biliary stents were removed  Selective biliary cannulation and stenmt related sludge was removed  Anastamosis dilated to 8 mm  No further stricture seen and 7 fr by 7 cm self ejecting stent was placed in bile duct    Recommendations  AXR in 4 weeks  LFTs in 4 weeks  Will anticipate no further ERCPs

## 2017-05-15 NOTE — IP AVS SNAPSHOT
Post Anesthesia Care Unit [address] [address]    Phone:  [phone]                                       After Visit Summary   5/15/2017    Yarelis Penny    MRN: [id number]           After Visit Summary Signature Page     I have received my discharge instructions, and my questions have been answered. I have discussed any challenges I see with this plan with the nurse or doctor.    ..........................................................................................................................................  Patient/Patient Representative Signature      ..........................................................................................................................................  Patient Representative Print Name and Relationship to Patient    ..................................................               ................................................  Date                                            Time    ..........................................................................................................................................  Reviewed by Signature/Title    ...................................................              ..............................................  Date                                                            Time

## 2017-05-15 NOTE — OR NURSING
Patient remains with elevated dystolic blood pressures, discussed with Dr. Lucio, no intervention, okay for discharge.

## 2017-05-17 ENCOUNTER — CARE COORDINATION (OUTPATIENT)
Dept: GASTROENTEROLOGY | Facility: CLINIC | Age: 32
End: 2017-05-17

## 2017-05-17 ENCOUNTER — INFUSION THERAPY VISIT (OUTPATIENT)
Dept: INFUSION THERAPY | Facility: CLINIC | Age: 32
End: 2017-05-17
Attending: INTERNAL MEDICINE
Payer: COMMERCIAL

## 2017-05-17 VITALS — TEMPERATURE: 98.1 F | SYSTOLIC BLOOD PRESSURE: 108 MMHG | OXYGEN SATURATION: 96 % | DIASTOLIC BLOOD PRESSURE: 80 MMHG

## 2017-05-17 DIAGNOSIS — A08.39 CMV COLITIS (H): ICD-10-CM

## 2017-05-17 DIAGNOSIS — D50.8 OTHER IRON DEFICIENCY ANEMIA: ICD-10-CM

## 2017-05-17 DIAGNOSIS — B25.9 CMV COLITIS (H): ICD-10-CM

## 2017-05-17 DIAGNOSIS — E86.0 DEHYDRATION: Primary | ICD-10-CM

## 2017-05-17 DIAGNOSIS — Z46.89 ENCOUNTER FOR REMOVAL OF BILIARY STENT: Primary | ICD-10-CM

## 2017-05-17 PROCEDURE — 96365 THER/PROPH/DIAG IV INF INIT: CPT

## 2017-05-17 PROCEDURE — 25000128 H RX IP 250 OP 636: Mod: ZF | Performed by: INTERNAL MEDICINE

## 2017-05-17 RX ORDER — DIPHENHYDRAMINE HCL 25 MG
25 TABLET ORAL
Status: CANCELLED
Start: 2017-05-18

## 2017-05-17 RX ORDER — ACETAMINOPHEN 325 MG/1
650 TABLET ORAL
Status: CANCELLED
Start: 2017-05-18

## 2017-05-17 RX ADMIN — IRON SUCROSE 200 MG: 20 INJECTION, SOLUTION INTRAVENOUS at 14:39

## 2017-05-17 NOTE — PATIENT INSTRUCTIONS
Dear Yarelis Penny    Thank you for choosing [facility] ([facility] for your infusion.  The following information is a summary of our appointment as well as important reminders.        Patient Education    Iron Sucrose Chewable tablet    Iron Sucrose Solution for injection  Iron Sucrose Solution for injection  What is this medicine?  IRON SUCROSE (RITO pierreohs) is an iron complex. Iron is used to make healthy red blood cells, which carry oxygen and nutrients throughout the body. This medicine is used to treat iron deficiency anemia in people with chronic kidney disease.  This medicine may be used for other purposes; ask your health care provider or pharmacist if you have questions.  What should I tell my health care provider before I take this medicine?  They need to know if you have any of these conditions:    anemia not caused by low iron levels    heart disease    high levels of iron in the blood    kidney disease    liver disease    an unusual or allergic reaction to iron, other medicines, foods, dyes, or preservatives    pregnant or trying to get pregnant    breast-feeding  How should I use this medicine?  This medicine is for infusion into a vein. It is given by a health care professional in a hospital or clinic setting.  Talk to your pediatrician regarding the use of this medicine in children. While this drug may be prescribed for children as young as 2 years for selected conditions, precautions do apply.  Overdosage: If you think you have taken too much of this medicine contact a poison control center or emergency room at once.  NOTE: This medicine is only for you. Do not share this medicine with others.  What if I miss a dose?  It is important not to miss your dose. Call your doctor or health care professional if you are unable to keep an appointment.  What may interact with this medicine?  Do not take this medicine with any of the following  medications:    deferoxamine    dimercaprol    other iron products  This medicine may also interact with the following medications:    chloramphenicol    deferasirox  This list may not describe all possible interactions. Give your health care provider a list of all the medicines, herbs, non-prescription drugs, or dietary supplements you use. Also tell them if you smoke, drink alcohol, or use illegal drugs. Some items may interact with your medicine.  What should I watch for while using this medicine?  Visit your doctor or healthcare professional regularly. Tell your doctor or healthcare professional if your symptoms do not start to get better or if they get worse. You may need blood work done while you are taking this medicine.  You may need to follow a special diet. Talk to your doctor. Foods that contain iron include: whole grains/cereals, dried fruits, beans, or peas, leafy green vegetables, and organ meats (liver, kidney).  What side effects may I notice from receiving this medicine?  Side effects that you should report to your doctor or health care professional as soon as possible:    allergic reactions like skin rash, itching or hives, swelling of the face, lips, or tongue    breathing problems    changes in blood pressure    cough    fast, irregular heartbeat    feeling faint or lightheaded, falls    fever or chills    flushing, sweating, or hot feelings    joint or muscle aches/pains    seizures    swelling of the ankles or feet    unusually weak or tired  Side effects that usually do not require medical attention (report to your doctor or health care professional if they continue or are bothersome):    diarrhea    feeling achy    headache    irritation at site where injected    nausea, vomiting    stomach upset    tiredness  This list may not describe all possible side effects. Call your doctor for medical advice about side effects. You may report side effects to FDA at 5-584-FDA-5175.  Where should I keep  my medicine?  This drug is given in a hospital or clinic and will not be stored at home.  NOTE:This sheet is a summary. It may not cover all possible information. If you have questions about this medicine, talk to your doctor, pharmacist, or health care provider. Copyright  2016 Gold Standard          Additional information: you received your infusion of Venofer 200 mg via IV today.       We look forward in seeing you on your next appointment here at [facility].  Please don t hesitate to call us at [phone] to reschedule any of your appointments or to speak with one of the [facility] registered nurses.  It was a pleasure taking care of you today.    Sincerely,  Alison Penny RN  [facility]  [facility]  [address] [place] [address]  [id number]  Phone:  [phone]

## 2017-05-17 NOTE — MR AVS SNAPSHOT
After Visit Summary   5/17/2017    Yarelis Penny    MRN: [id number]           Patient Information     Date Of Birth          1985        Visit Information        Provider Department      5/17/2017 2:00 PM UC 42 ATC; UC SPEC INFUSION [facility] Specialty [facility] Procedure        Today's Diagnoses     Dehydration    -  1    CMV colitis (H)        Other iron deficiency anemia          Care Instructions    Dear Yarelis Penny    Thank you for choosing [facility] ([facility]) for your infusion.  The following information is a summary of our appointment as well as important reminders.        Patient Education    Iron Sucrose Chewable tablet    Iron Sucrose Solution for injection  Iron Sucrose Solution for injection  What is this medicine?  IRON SUCROSE (AHY priyanka ASCENCION krohs) is an iron complex. Iron is used to make healthy red blood cells, which carry oxygen and nutrients throughout the body. This medicine is used to treat iron deficiency anemia in people with chronic kidney disease.  This medicine may be used for other purposes; ask your health care provider or pharmacist if you have questions.  What should I tell my health care provider before I take this medicine?  They need to know if you have any of these conditions:    anemia not caused by low iron levels    heart disease    high levels of iron in the blood    kidney disease    liver disease    an unusual or allergic reaction to iron, other medicines, foods, dyes, or preservatives    pregnant or trying to get pregnant    breast-feeding  How should I use this medicine?  This medicine is for infusion into a vein. It is given by a health care professional in a hospital or clinic setting.  Talk to your pediatrician regarding the use of this medicine in children. While this drug may be prescribed for children as young as 2 years for selected conditions, precautions do  apply.  Overdosage: If you think you have taken too much of this medicine contact a poison control center or emergency room at once.  NOTE: This medicine is only for you. Do not share this medicine with others.  What if I miss a dose?  It is important not to miss your dose. Call your doctor or health care professional if you are unable to keep an appointment.  What may interact with this medicine?  Do not take this medicine with any of the following medications:    deferoxamine    dimercaprol    other iron products  This medicine may also interact with the following medications:    chloramphenicol    deferasirox  This list may not describe all possible interactions. Give your health care provider a list of all the medicines, herbs, non-prescription drugs, or dietary supplements you use. Also tell them if you smoke, drink alcohol, or use illegal drugs. Some items may interact with your medicine.  What should I watch for while using this medicine?  Visit your doctor or healthcare professional regularly. Tell your doctor or healthcare professional if your symptoms do not start to get better or if they get worse. You may need blood work done while you are taking this medicine.  You may need to follow a special diet. Talk to your doctor. Foods that contain iron include: whole grains/cereals, dried fruits, beans, or peas, leafy green vegetables, and organ meats (liver, kidney).  What side effects may I notice from receiving this medicine?  Side effects that you should report to your doctor or health care professional as soon as possible:    allergic reactions like skin rash, itching or hives, swelling of the face, lips, or tongue    breathing problems    changes in blood pressure    cough    fast, irregular heartbeat    feeling faint or lightheaded, falls    fever or chills    flushing, sweating, or hot feelings    joint or muscle aches/pains    seizures    swelling of the ankles or feet    unusually weak or tired  Side  effects that usually do not require medical attention (report to your doctor or health care professional if they continue or are bothersome):    diarrhea    feeling achy    headache    irritation at site where injected    nausea, vomiting    stomach upset    tiredness  This list may not describe all possible side effects. Call your doctor for medical advice about side effects. You may report side effects to FDA at 1-077-OUA-8271.  Where should I keep my medicine?  This drug is given in a hospital or clinic and will not be stored at home.  NOTE:This sheet is a summary. It may not cover all possible information. If you have questions about this medicine, talk to your doctor, pharmacist, or health care provider. Copyright  2016 Gold Standard          Additional information: you received your infusion of Venofer 200 mg via IV today.       We look forward in seeing you on your next appointment here at [facility].  Please don t hesitate to call us at [phone] to reschedule any of your appointments or to speak with one of the [facility] registered nurses.  It was a pleasure taking care of you today.    Sincerely,  Alison Penny RN  [facility]  Specialty Infusion [facility] Procedure Center  [address] [place] [address]  [address]  Phone:  [phone]          Follow-ups after your visit        Your next 10 appointments already scheduled     May 22, 2017 11:00 AM CDT   Infusion 60 with UC SPEC INFUSION, UC 47 ATC   [facility] [place] Specialty and Procedure ([facility])    [address] [place] [address] [phone]            May 25, 2017  8:00 AM CDT   Infusion 60 with UC SPEC INFUSION, UC 51 ATC   [facility] [place] Specialty and Procedure ([facility])    [address] [place] [address] [phone]            May 30, 2017  1:00 PM CDT   Infusion 180 with UC  SPEC INFUSION, UC 42 ATC   [facility] Specialty and Procedure ([facility])    [address]  2nd Floor  [facility] [address] [phone]            Jun 07, 2017  8:00 AM CDT   (Arrive by 7:45 AM)   Return Visit with Walker Nails MD   [facility] ([facility])    [address]  3rd Floor  [facility] [address] [phone]            Jul 25, 2017  8:40 AM CDT   (Arrive by 8:25 AM)   RETURN IRRITABLE BOWEL DISEASE with Fuentes Johnson MD   [facility] ([facility])    [address]  4th Floor  [facility] [id number] [phone]              Future tests that were ordered for you today     Open Future Orders        Priority Expected Expires Ordered    XR Abdomen 2 Views Routine 6/3/2017 8/15/2017 5/17/2017            Who to contact     If you have questions or need follow up information about today's clinic visit or your schedule please contact [facility] SPECIALTY AND PROCEDURE directly at [phone].  Normal or non-critical lab and imaging results will be communicated to you by [employer], letter or phone within 4 business days after the clinic has received the results. If you do not hear from us within 7 days, please contact the clinic through [employer] or phone. If you have a critical or abnormal lab result, we will notify you by phone as soon as possible.  Submit refill requests through [employer] or call your pharmacy and they will forward the refill request to us. Please allow 3 business days for your refill to be completed.          Additional Information About Your Visit        [employer] Information     [employer] gives you secure access to your electronic health record. If you see a primary care provider, you can also send messages to your care team and make appointments. If you have  questions, please call your primary care clinic.  If you do not have a primary care provider, please call [phone] and they will assist you.        Care EveryWhere ID     This is your Care EveryWhere ID. This could be used by other organizations to access your [facility] medical records  [id number]        Your Vitals Were     Temperature Pulse Oximetry                98.1  F (36.7  C) (Tympanic) 96%           Blood Pressure from Last 3 Encounters:   05/17/17 101/67   05/15/17 (!) 129/98   05/12/17 109/77    Weight from Last 3 Encounters:   05/15/17 48.7 kg (107 lb 5.8 oz)   05/12/17 48.6 kg (107 lb 1.6 oz)   05/08/17 48.6 kg (107 lb 3.2 oz)              Today, you had the following     No orders found for display         Today's Medication Changes          These changes are accurate as of: 5/17/17  2:53 PM.  If you have any questions, ask your nurse or doctor.               These medicines have changed or have updated prescriptions.        Dose/Directions    predniSONE 10 MG tablet   Commonly known as:  DELTASONE   This may have changed:    - how much to take  - how to take this  - when to take this  - additional instructions   Used for:  Ulcerative colitis with other complication, unspecified location (H)        Take 6 tabs (60mg) PO x 5 days  Then take 5 tabs (50mg) PO x 7 days  Then take 4 tabs (40mg) PO x 7 days Then take 3 tabs (30mg) PO x 7 days Then take 2 tabs (20mg) PO x 7 days Then take 1 tab (10mg) PO x 7 days Then take 1/2 tablet (5mg) PO x 7 days then DISCONTINUE  Then take   Quantity:  92 tablet   Refills:  0                Primary Care Provider Office Phone # Fax #    [place] MARIANELA Orellana [phone]       [place] [address] YENIFER LAMA [address] [place] [id number]        Thank you!     Thank you for choosing [facility] SPECIALTY AND PROCEDURE  for your care. Our goal is always to provide you with excellent care. Hearing back from our patients is one way we  can continue to improve our services. Please take a few minutes to complete the written survey that you may receive in the mail after your visit with us. Thank you!             Your Updated Medication List - Protect others around you: Learn how to safely use, store and throw away your medicines at www.disposemymeds.org.          This list is accurate as of: 5/17/17  2:53 PM.  Always use your most recent med list.                   Brand Name Dispense Instructions for use    ENTYVIO IV      Inject 300 mg into the vein every 28 days       GNP ASPIRIN LOW DOSE 81 MG EC tablet   Generic drug:  aspirin     30 tablet    Take 1 tablet (81 mg) by mouth daily       predniSONE 10 MG tablet    DELTASONE    92 tablet    Take 6 tabs (60mg) PO x 5 days  Then take 5 tabs (50mg) PO x 7 days  Then take 4 tabs (40mg) PO x 7 days Then take 3 tabs (30mg) PO x 7 days Then take 2 tabs (20mg) PO x 7 days Then take 1 tab (10mg) PO x 7 days Then take 1/2 tablet (5mg) PO x 7 days then DISCONTINUE  Then take       * tacrolimus 0.5 MG capsule    PROGRAF - GENERIC EQUIVALENT    180 capsule    Take 1 capsule (0.5 mg) by mouth 2 times daily Take with 1 mg capsule. Total dose 2.5 mg every 12 hours       * tacrolimus 1 MG capsule    PROGRAF - GENERIC EQUIVALENT    360 capsule    Take 2 capsules (2 mg) by mouth 2 times daily *total dose 2.5 mg BID. Take with 0.5 mg capsule       ursodiol 250 MG tablet    ACTIGALL    270 tablet    Take 2 tabs (500 mg) in AM, and 1 tab at night (250 mg)       zolpidem 5 MG tablet    AMBIEN     TK 1 T PO QD HS PRN FOR SLEEP       * Notice:  This list has 2 medication(s) that are the same as other medications prescribed for you. Read the directions carefully, and ask your doctor or other care provider to review them with you.

## 2017-05-17 NOTE — PROGRESS NOTES
"Post ERCP (5/15/2017) with Dr. Macias: Follow-up    Post procedure recommendations: Confirm spontaneous stent passage by performing a flat and upright abdominal x-ray in 4 weeks.     Patient states she is feeling \"good\". Denies nausea, vomiting and fevers. Denies pain, able to take in PO with no issues. She is amenable to x-ray in 4 weeks and will call to schedule it.     Orders placed: x-ray due June 12th     Contact information verified for future questions/concerns.    Lyndsey HAMILTON RN Coordinator  Dr. Quinones, Dr. Huerta & Dr. Johnson  Pancreas~Biliary  [phone]          "

## 2017-05-17 NOTE — PROGRESS NOTES
Nursing Note  Yarelis Penny [facility] for:   Chief Complaint   Patient presents with     Infusion     venofer     During today's [facility] appointment, orders from Dr. Johnson were completed.  Frequency: today is dose 2 of 5 total.    Progress note:  Patient identification verified by name and date of birth.  Assessment completed.  Vitals recorded in Doc Flowsheets.  Patient was provided with education regarding infusion and possible side effects.  Patient verbalized understanding.      needed: No  Premedications: pt declined.  Infusion Rates: infusion given over approximately 20 minutes.  Approximate Infusion length:20 minutes.   Labs: were not ordered for this appointment.  Vascular access: peripheral IV placed today.  Treatment Conditions: non-applicable.  Patient tolerated infusion: well.          Discharge Plan:   Follow up plan of care with: ongoing infusions at [facility].  Discharge instructions were reviewed with patient.  Patient/representative verbalized understanding of discharge instructions and all questions answered.  Patient discharged from [facility] in stable condition.    Alison Penny RN    Administrations This Visit     iron sucrose (VENOFER) 200 mg in NaCl 0.9 % 100 mL intermittent infusion     Admin Date Action Dose Rate Route Administered By          05/17/2017 New Bag 200 mg 480 mL/hr Intravenous Alison Penny RN                         /67  Temp 98.1  F (36.7  C) (Tympanic)  SpO2 96%

## 2017-05-22 ENCOUNTER — INFUSION THERAPY VISIT (OUTPATIENT)
Dept: INFUSION THERAPY | Facility: CLINIC | Age: 32
End: 2017-05-22
Attending: INTERNAL MEDICINE
Payer: COMMERCIAL

## 2017-05-22 VITALS
TEMPERATURE: 98.4 F | SYSTOLIC BLOOD PRESSURE: 108 MMHG | OXYGEN SATURATION: 97 % | DIASTOLIC BLOOD PRESSURE: 76 MMHG | RESPIRATION RATE: 16 BRPM

## 2017-05-22 DIAGNOSIS — D50.8 OTHER IRON DEFICIENCY ANEMIA: ICD-10-CM

## 2017-05-22 DIAGNOSIS — B25.9 CMV COLITIS (H): ICD-10-CM

## 2017-05-22 DIAGNOSIS — E86.0 DEHYDRATION: Primary | ICD-10-CM

## 2017-05-22 DIAGNOSIS — A08.39 CMV COLITIS (H): ICD-10-CM

## 2017-05-22 PROCEDURE — 25000128 H RX IP 250 OP 636: Mod: ZF | Performed by: INTERNAL MEDICINE

## 2017-05-22 PROCEDURE — 96365 THER/PROPH/DIAG IV INF INIT: CPT

## 2017-05-22 RX ORDER — ACETAMINOPHEN 325 MG/1
650 TABLET ORAL
Status: CANCELLED
Start: 2017-05-23

## 2017-05-22 RX ORDER — DIPHENHYDRAMINE HCL 25 MG
25 TABLET ORAL
Status: CANCELLED
Start: 2017-05-23

## 2017-05-22 RX ADMIN — IRON SUCROSE 200 MG: 20 INJECTION, SOLUTION INTRAVENOUS at 11:38

## 2017-05-22 NOTE — PATIENT INSTRUCTIONS
Dear Yarelis Penny    Thank you for choosing [facility] ([facility]) for your infusion.  The following information is a summary of our appointment as well as important reminders.        Patient Education    Iron Sucrose Chewable tablet    Iron Sucrose Solution for injection  Iron Sucrose Solution for injection  What is this medicine?  IRON SUCROSE (RITO pierreohs) is an iron complex. Iron is used to make healthy red blood cells, which carry oxygen and nutrients throughout the body. This medicine is used to treat iron deficiency anemia in people with chronic kidney disease.  This medicine may be used for other purposes; ask your health care provider or pharmacist if you have questions.  What should I tell my health care provider before I take this medicine?  They need to know if you have any of these conditions:    anemia not caused by low iron levels    heart disease    high levels of iron in the blood    kidney disease    liver disease    an unusual or allergic reaction to iron, other medicines, foods, dyes, or preservatives    pregnant or trying to get pregnant    breast-feeding  How should I use this medicine?  This medicine is for infusion into a vein. It is given by a health care professional in a hospital or clinic setting.  Talk to your pediatrician regarding the use of this medicine in children. While this drug may be prescribed for children as young as 2 years for selected conditions, precautions do apply.  Overdosage: If you think you have taken too much of this medicine contact a poison control center or emergency room at once.  NOTE: This medicine is only for you. Do not share this medicine with others.  What if I miss a dose?  It is important not to miss your dose. Call your doctor or health care professional if you are unable to keep an appointment.  What may interact with this medicine?  Do not take this medicine with any of the following  medications:    deferoxamine    dimercaprol    other iron products  This medicine may also interact with the following medications:    chloramphenicol    deferasirox  This list may not describe all possible interactions. Give your health care provider a list of all the medicines, herbs, non-prescription drugs, or dietary supplements you use. Also tell them if you smoke, drink alcohol, or use illegal drugs. Some items may interact with your medicine.  What should I watch for while using this medicine?  Visit your doctor or healthcare professional regularly. Tell your doctor or healthcare professional if your symptoms do not start to get better or if they get worse. You may need blood work done while you are taking this medicine.  You may need to follow a special diet. Talk to your doctor. Foods that contain iron include: whole grains/cereals, dried fruits, beans, or peas, leafy green vegetables, and organ meats (liver, kidney).  What side effects may I notice from receiving this medicine?  Side effects that you should report to your doctor or health care professional as soon as possible:    allergic reactions like skin rash, itching or hives, swelling of the face, lips, or tongue    breathing problems    changes in blood pressure    cough    fast, irregular heartbeat    feeling faint or lightheaded, falls    fever or chills    flushing, sweating, or hot feelings    joint or muscle aches/pains    seizures    swelling of the ankles or feet    unusually weak or tired  Side effects that usually do not require medical attention (report to your doctor or health care professional if they continue or are bothersome):    diarrhea    feeling achy    headache    irritation at site where injected    nausea, vomiting    stomach upset    tiredness  This list may not describe all possible side effects. Call your doctor for medical advice about side effects. You may report side effects to FDA at 3-468-FDA-5142.  Where should I keep  my medicine?  This drug is given in a hospital or clinic and will not be stored at home.  NOTE:This sheet is a summary. It may not cover all possible information. If you have questions about this medicine, talk to your doctor, pharmacist, or health care provider. Copyright  2016 Gold Standard          Additional information: you received your infusion of Venofer 200 mg via IV today.       We look forward in seeing you on your next appointment here at [facility].  Please don t hesitate to call us at [phone] to reschedule any of your appointments or to speak with one of the [facility] registered nurses.  It was a pleasure taking care of you today.    Sincerely,  Alison Penny RN  [facility]  Specialty Infusion & Procedure Center  [address] [place] [address]  [address]  Phone:  [phone]

## 2017-05-22 NOTE — PROGRESS NOTES
Nursing Note  Yarelis Penny presents today to [facility] for:   Chief Complaint   Patient presents with     Infusion     venofer     During today's [facility] appointment, orders from Dr. Johnson were completed.  Frequency: today is dose 3 of 5 total.    Progress note:  Patient identification verified by name and date of birth.  Assessment completed.  Vitals recorded in Doc Flowsheets.  Patient was provided with education regarding infusion and possible side effects.  Patient verbalized understanding.      needed: No  Premedications: pt declined.  Infusion Rates: infusion given over approximately 20 minutes.  Approximate Infusion length:20 minutes.   Labs: were not ordered for this appointment.  Vascular access: peripheral IV placed today.  Treatment Conditions: non-applicable.  Patient tolerated infusion: well.          Discharge Plan:   Follow up plan of care with: ongoing infusions at [facility].  Discharge instructions were reviewed with patient.  Patient/representative verbalized understanding of discharge instructions and all questions answered.  Patient discharged from [facility] in stable condition.    Alison Penny RN    Administrations This Visit     iron sucrose (VENOFER) 200 mg in NaCl 0.9 % 100 mL intermittent infusion     Admin Date Action Dose Rate Route Administered By          05/22/2017 New Bag 200 mg 480 mL/hr Intravenous Alison Penny RN                         /74 (BP Location: Left arm)  Temp 98.4  F (36.9  C) (Oral)  Resp 16  SpO2 97%

## 2017-05-22 NOTE — MR AVS SNAPSHOT
After Visit Summary   5/22/2017    Yarelis Penny    MRN: [id number]           Patient Information     Date Of Birth          1985        Visit Information        Provider Department      5/22/2017 11:00 AM UC 47 ATC; UC SPEC INFUSION [facility] Specialty and Procedure        Today's Diagnoses     Dehydration    -  1    CMV colitis (H)        Other iron deficiency anemia          Care Instructions    Dear Yarelis Penny    Thank you for choosing [facility] Special[facility] ([facility]) for your infusion.  The following information is a summary of our appointment as well as important reminders.        Patient Education    Iron Sucrose Chewable tablet    Iron Sucrose Solution for injection  Iron Sucrose Solution for injection  What is this medicine?  IRON SUCROSE (AHY priyanka ASCENCION krohs) is an iron complex. Iron is used to make healthy red blood cells, which carry oxygen and nutrients throughout the body. This medicine is used to treat iron deficiency anemia in people with chronic kidney disease.  This medicine may be used for other purposes; ask your health care provider or pharmacist if you have questions.  What should I tell my health care provider before I take this medicine?  They need to know if you have any of these conditions:    anemia not caused by low iron levels    heart disease    high levels of iron in the blood    kidney disease    liver disease    an unusual or allergic reaction to iron, other medicines, foods, dyes, or preservatives    pregnant or trying to get pregnant    breast-feeding  How should I use this medicine?  This medicine is for infusion into a vein. It is given by a health care professional in a hospital or clinic setting.  Talk to your pediatrician regarding the use of this medicine in children. While this drug may be prescribed for children as young as 2 years for selected conditions, precautions do  apply.  Overdosage: If you think you have taken too much of this medicine contact a poison control center or emergency room at once.  NOTE: This medicine is only for you. Do not share this medicine with others.  What if I miss a dose?  It is important not to miss your dose. Call your doctor or health care professional if you are unable to keep an appointment.  What may interact with this medicine?  Do not take this medicine with any of the following medications:    deferoxamine    dimercaprol    other iron products  This medicine may also interact with the following medications:    chloramphenicol    deferasirox  This list may not describe all possible interactions. Give your health care provider a list of all the medicines, herbs, non-prescription drugs, or dietary supplements you use. Also tell them if you smoke, drink alcohol, or use illegal drugs. Some items may interact with your medicine.  What should I watch for while using this medicine?  Visit your doctor or healthcare professional regularly. Tell your doctor or healthcare professional if your symptoms do not start to get better or if they get worse. You may need blood work done while you are taking this medicine.  You may need to follow a special diet. Talk to your doctor. Foods that contain iron include: whole grains/cereals, dried fruits, beans, or peas, leafy green vegetables, and organ meats (liver, kidney).  What side effects may I notice from receiving this medicine?  Side effects that you should report to your doctor or health care professional as soon as possible:    allergic reactions like skin rash, itching or hives, swelling of the face, lips, or tongue    breathing problems    changes in blood pressure    cough    fast, irregular heartbeat    feeling faint or lightheaded, falls    fever or chills    flushing, sweating, or hot feelings    joint or muscle aches/pains    seizures    swelling of the ankles or feet    unusually weak or tired  Side  effects that usually do not require medical attention (report to your doctor or health care professional if they continue or are bothersome):    diarrhea    feeling achy    headache    irritation at site where injected    nausea, vomiting    stomach upset    tiredness  This list may not describe all possible side effects. Call your doctor for medical advice about side effects. You may report side effects to FDA at 9-017-IWJ-9746.  Where should I keep my medicine?  This drug is given in a hospital or clinic and will not be stored at home.  NOTE:This sheet is a summary. It may not cover all possible information. If you have questions about this medicine, talk to your doctor, pharmacist, or health care provider. Copyright  2016 Gold Standard          Additional information: you received your infusion of Venofer 200 mg via IV today.       We look forward in seeing you on your next appointment here at [facility] [place].  Please don t hesitate to call us at [phone] to reschedule any of your appointments or to speak with one of the [facility] [place] registered nurses.  It was a pleasure taking care of you today.    Sincerely,  Alison Penny RN  [facility]  Specialty Infusion & Procedure Center  [address] [place] [address]  Phone:  [phone]          Follow-ups after your visit        Your next 10 appointments already scheduled     May 25, 2017  8:00 AM CDT   Infusion 60 with UC SPEC INFUSION, UC 51 ATC   [facility] [place] Specialty and Procedure ([facility])    [address] [place] [address] [phone]            May 30, 2017  1:00 PM CDT   Infusion 180 with UC SPEC INFUSION, UC 42 ATC   [facility] [place] Specialty and Procedure ([facility])    [address] [place] [address] [phone]            Jun 07, 2017  8:00 AM CDT   (Arrive by 7:45 AM)    Return Visit with Walker Nails MD   [facility] and Infectious Diseases ([email or web address] [place])    [address]  3rd Floor  [facility] [address] [phone]            Aug 28, 2017  7:30 AM CDT   (Arrive by 7:15 AM)   RETURN IRRITABLE BOWEL DISEASE with Fuentes Johnson MD   [facility]astroenterology and IBD ([place] [facility])    [address]  4th Floor  [facility] [address] [phone]              Who to contact     If you have questions or need follow up information about today's clinic visit or your schedule please contact [facility] SPECIALTY AND PROCEDURE directly at [phone].  Normal or non-critical lab and imaging results will be communicated to you by 46elkshart, letter or phone within 4 business days after the clinic has received the results. If you do not hear from us within 7 days, please contact the clinic through MyChart or phone. If you have a critical or abnormal lab result, we will notify you by phone as soon as possible.  Submit refill requests through Iceotope or call your pharmacy and they will forward the refill request to us. Please allow 3 business days for your refill to be completed.          Additional Information About Your Visit        [employer] Information     Iceotope gives you secure access to your electronic health record. If you see a primary care provider, you can also send messages to your care team and make appointments. If you have questions, please call your primary care clinic.  If you do not have a primary care provider, please call [phone] and they will assist you.        Care EveryWhere ID     This is your Care EveryWhere ID. This could be used by other organizations to access your [place] medical records  [id number]        Your Vitals Were     Temperature Respirations Pulse Oximetry             98.4  F (36.9  C) (Oral) 16 97%          Blood Pressure  from Last 3 Encounters:   05/22/17 108/76   05/17/17 108/80   05/15/17 (!) 129/98    Weight from Last 3 Encounters:   05/15/17 48.7 kg (107 lb 5.8 oz)   05/12/17 48.6 kg (107 lb 1.6 oz)   05/08/17 48.6 kg (107 lb 3.2 oz)              Today, you had the following     No orders found for display         Today's Medication Changes          These changes are accurate as of: 5/22/17 12:23 PM.  If you have any questions, ask your nurse or doctor.               These medicines have changed or have updated prescriptions.        Dose/Directions    predniSONE 10 MG tablet   Commonly known as:  DELTASONE   This may have changed:    - how much to take  - how to take this  - when to take this  - additional instructions   Used for:  Ulcerative colitis with other complication, unspecified location (H)        Take 6 tabs (60mg) PO x 5 days  Then take 5 tabs (50mg) PO x 7 days  Then take 4 tabs (40mg) PO x 7 days Then take 3 tabs (30mg) PO x 7 days Then take 2 tabs (20mg) PO x 7 days Then take 1 tab (10mg) PO x 7 days Then take 1/2 tablet (5mg) PO x 7 days then DISCONTINUE  Then take   Quantity:  92 tablet   Refills:  0                Primary Care Provider Office Phone # Fax #    Jackson S Esteban [phone]       [place] [address]  [address] [address]        Thank you!     Thank you for choosing [facility] SPECIALTY AND PROCEDURE  for your care. Our goal is always to provide you with excellent care. Hearing back from our patients is one way we can continue to improve our services. Please take a few minutes to complete the written survey that you may receive in the mail after your visit with us. Thank you!             Your Updated Medication List - Protect others around you: Learn how to safely use, store and throw away your medicines at www.disposemymeds.org.          This list is accurate as of: 5/22/17 12:23 PM.  Always use your most recent med list.                    Brand Name Dispense Instructions for use    ENTYVIO IV      Inject 300 mg into the vein every 28 days       GNP ASPIRIN LOW DOSE 81 MG EC tablet   Generic drug:  aspirin     30 tablet    Take 1 tablet (81 mg) by mouth daily       predniSONE 10 MG tablet    DELTASONE    92 tablet    Take 6 tabs (60mg) PO x 5 days  Then take 5 tabs (50mg) PO x 7 days  Then take 4 tabs (40mg) PO x 7 days Then take 3 tabs (30mg) PO x 7 days Then take 2 tabs (20mg) PO x 7 days Then take 1 tab (10mg) PO x 7 days Then take 1/2 tablet (5mg) PO x 7 days then DISCONTINUE  Then take       * tacrolimus 0.5 MG capsule    PROGRAF - GENERIC EQUIVALENT    180 capsule    Take 1 capsule (0.5 mg) by mouth 2 times daily Take with 1 mg capsule. Total dose 2.5 mg every 12 hours       * tacrolimus 1 MG capsule    PROGRAF - GENERIC EQUIVALENT    360 capsule    Take 2 capsules (2 mg) by mouth 2 times daily *total dose 2.5 mg BID. Take with 0.5 mg capsule       ursodiol 250 MG tablet    ACTIGALL    270 tablet    Take 2 tabs (500 mg) in AM, and 1 tab at night (250 mg)       zolpidem 5 MG tablet    AMBIEN     TK 1 T PO QD HS PRN FOR SLEEP       * Notice:  This list has 2 medication(s) that are the same as other medications prescribed for you. Read the directions carefully, and ask your doctor or other care provider to review them with you.

## 2017-05-25 ENCOUNTER — INFUSION THERAPY VISIT (OUTPATIENT)
Dept: INFUSION THERAPY | Facility: CLINIC | Age: 32
End: 2017-05-25
Attending: INTERNAL MEDICINE
Payer: COMMERCIAL

## 2017-05-25 VITALS — TEMPERATURE: 97.5 F | SYSTOLIC BLOOD PRESSURE: 104 MMHG | DIASTOLIC BLOOD PRESSURE: 74 MMHG | OXYGEN SATURATION: 100 %

## 2017-05-25 DIAGNOSIS — D50.8 OTHER IRON DEFICIENCY ANEMIA: ICD-10-CM

## 2017-05-25 DIAGNOSIS — B25.9 CMV COLITIS (H): ICD-10-CM

## 2017-05-25 DIAGNOSIS — E86.0 DEHYDRATION: Primary | ICD-10-CM

## 2017-05-25 DIAGNOSIS — A08.39 CMV COLITIS (H): ICD-10-CM

## 2017-05-25 PROCEDURE — 96365 THER/PROPH/DIAG IV INF INIT: CPT

## 2017-05-25 PROCEDURE — 25000128 H RX IP 250 OP 636: Mod: ZF | Performed by: INTERNAL MEDICINE

## 2017-05-25 RX ORDER — DIPHENHYDRAMINE HCL 25 MG
25 TABLET ORAL
Status: CANCELLED
Start: 2017-05-25

## 2017-05-25 RX ORDER — ACETAMINOPHEN 325 MG/1
650 TABLET ORAL
Status: CANCELLED
Start: 2017-05-25

## 2017-05-25 RX ADMIN — SODIUM CHLORIDE 50 ML: 9 INJECTION, SOLUTION INTRAVENOUS at 08:09

## 2017-05-25 RX ADMIN — IRON SUCROSE 200 MG: 20 INJECTION, SOLUTION INTRAVENOUS at 08:09

## 2017-05-25 NOTE — PROGRESS NOTES
Nursing Note  Yarelis Penny presents today to [facility] Infusion and Procedure Center for: No chief complaint on file.    During today's [facility] Infusion [facility] appointment, orders from Dr. Fuentes Johnson were completed.  Frequency: today is dose 4 of 5 total.    Progress note:  Patient identification verified by name and date of birth.  Assessment completed.  Vitals recorded in Doc Flowsheets.  Patient was provided with education regarding infusion and possible side effects.  Patient verbalized understanding.      needed: No  Premedications: historically patient has not taken pre-medications prior to infusion.  Infusion Rates: infusion given over approximately 20 minutes.  Approximate Infusion length:1 hours.   Labs: were not ordered for this appointment.  Vascular access: peripheral IV placed today.  Treatment Conditions: patient denies fever, chills, signs of infection, recent illness, on antibiotics, productive cough or elevated temperature.  Patient tolerated infusion: well.    Discharge Plan:   Follow up plan of care with: ongoing infusions at [facility] Infusion [facility] Procedure [facility].  Discharge instructions were reviewed with patient.  Patient/representative verbalized understanding of discharge instructions and all questions answered.  Patient discharged from [facility] Infusion [facility] Procedure Center in stable condition.    Sharda Ariza RN    Administrations This Visit     0.9% sodium chloride BOLUS     Admin Date Action Dose Route Administered By             05/25/2017 New Bag 50 mL Intravenous Sharda Ariza RN                    iron sucrose (VENOFER) 200 mg in NaCl 0.9 % 100 mL intermittent infusion     Admin Date Action Dose Rate Route Administered By          05/25/2017 New Bag 200 mg 480 mL/hr Intravenous Sharda Ariza RN                         /74  Temp 97.5  F (36.4  C) (Tympanic)  SpO2 100%

## 2017-05-25 NOTE — MR AVS SNAPSHOT
After Visit Summary   5/25/2017    Yarelis Penny    MRN: [id number]           Patient Information     Date Of Birth          1985        Visit Information        Provider Department      5/25/2017 8:00 AM UC 51 ATC; UC SPEC INFUSION [facility] Specialty and Procedure        Today's Diagnoses     Dehydration    -  1    CMV colitis (H)        Other iron deficiency anemia           Follow-ups after your visit        Your next 10 appointments already scheduled     May 30, 2017  1:00 PM CDT   Infusion 180 with UC SPEC INFUSION, UC 42 ATC   [facility] Specialty and Procedure ([facility])    [address] [place] [address]  2nd Floor  [facility] [id number] [phone]            Jun 07, 2017  8:00 AM CDT   (Arrive by 7:45 AM)   Return Visit with Walker Nails MD   [facility] and Infectious Diseases ([facility])    [address] [place] [address]  3rd Floor  [facility] [id number] [phone]            Aug 28, 2017  7:30 AM CDT   (Arrive by 7:15 AM)   RETURN IRRITABLE BOWEL DISEASE with Fuentes Johnson MD   [facility] Gastroenterology and IBD ([facility])    [address] [place] [address]  4th Floor  [facility] [id number] [phone]              Who to contact     If you have questions or need follow up information about today's clinic visit or your schedule please contact [facility] SPECIALTY AND PROCEDURE directly at [phone].  Normal or non-critical lab and imaging results will be communicated to you by MyChart, letter or phone within 4 business days after the clinic has received the results. If you do not hear from us within 7 days, please contact the clinic through MyChart or phone. If you have a critical or abnormal lab result, we will notify you by phone as soon as possible.  Submit refill requests through Agenda or call your pharmacy  and they will forward the refill request to us. Please allow 3 business days for your refill to be completed.          Additional Information About Your Visit        [employer] Information     [employer] gives you secure access to your electronic health record. If you see a primary care provider, you can also send messages to your care team and make appointments. If you have questions, please call your primary care clinic.  If you do not have a primary care provider, please call [phone] and they will assist you.        Care EveryWhere ID     This is your Care EveryWhere ID. This could be used by other organizations to access your [place] medical records  [id number]        Your Vitals Were     Temperature Pulse Oximetry                97.5  F (36.4  C) (Tympanic) 100%           Blood Pressure from Last 3 Encounters:   05/25/17 104/74   05/22/17 108/76   05/17/17 108/80    Weight from Last 3 Encounters:   05/15/17 48.7 kg (107 lb 5.8 oz)   05/12/17 48.6 kg (107 lb 1.6 oz)   05/08/17 48.6 kg (107 lb 3.2 oz)              Today, you had the following     No orders found for display         Today's Medication Changes          These changes are accurate as of: 5/25/17  8:31 AM.  If you have any questions, ask your nurse or doctor.               These medicines have changed or have updated prescriptions.        Dose/Directions    predniSONE 10 MG tablet   Commonly known as:  DELTASONE   This may have changed:    - how much to take  - how to take this  - when to take this  - additional instructions   Used for:  Ulcerative colitis with other complication, unspecified location (H)        Take 6 tabs (60mg) PO x 5 days  Then take 5 tabs (50mg) PO x 7 days  Then take 4 tabs (40mg) PO x 7 days Then take 3 tabs (30mg) PO x 7 days Then take 2 tabs (20mg) PO x 7 days Then take 1 tab (10mg) PO x 7 days Then take 1/2 tablet (5mg) PO x 7 days then DISCONTINUE  Then take   Quantity:  92 tablet   Refills:  0                Primary Care  Provider Office Phone # Fax #    Jacksonmira Orellana [phone]       [place] [address] YENIF[address]AMA [address] [place] [address]        Thank you!     Thank you for choosing [facility] SPECIALTY AND PROCEDURE  for your care. Our goal is always to provide you with excellent care. Hearing back from our patients is one way we can continue to improve our services. Please take a few minutes to complete the written survey that you may receive in the mail after your visit with us. Thank you!             Your Updated Medication List - Protect others around you: Learn how to safely use, store and throw away your medicines at www.disposemymeds.org.          This list is accurate as of: 5/25/17  8:31 AM.  Always use your most recent med list.                   Brand Name Dispense Instructions for use    ENTYVIO IV      Inject 300 mg into the vein every 28 days       GNP ASPIRIN LOW DOSE 81 MG EC tablet   Generic drug:  aspirin     30 tablet    Take 1 tablet (81 mg) by mouth daily       predniSONE 10 MG tablet    DELTASONE    92 tablet    Take 6 tabs (60mg) PO x 5 days  Then take 5 tabs (50mg) PO x 7 days  Then take 4 tabs (40mg) PO x 7 days Then take 3 tabs (30mg) PO x 7 days Then take 2 tabs (20mg) PO x 7 days Then take 1 tab (10mg) PO x 7 days Then take 1/2 tablet (5mg) PO x 7 days then DISCONTINUE  Then take       * tacrolimus 0.5 MG capsule    PROGRAF - GENERIC EQUIVALENT    180 capsule    Take 1 capsule (0.5 mg) by mouth 2 times daily Take with 1 mg capsule. Total dose 2.5 mg every 12 hours       * tacrolimus 1 MG capsule    PROGRAF - GENERIC EQUIVALENT    360 capsule    Take 2 capsules (2 mg) by mouth 2 times daily *total dose 2.5 mg BID. Take with 0.5 mg capsule       ursodiol 250 MG tablet    ACTIGALL    270 tablet    Take 2 tabs (500 mg) in AM, and 1 tab at night (250 mg)       zolpidem 5 MG tablet    AMBIEN     TK 1 T PO QD HS PRN FOR SLEEP       * Notice:  This list has 2  medication(s) that are the same as other medications prescribed for you. Read the directions carefully, and ask your doctor or other care provider to review them with you.

## 2017-05-30 ENCOUNTER — INFUSION THERAPY VISIT (OUTPATIENT)
Dept: INFUSION THERAPY | Facility: CLINIC | Age: 32
End: 2017-05-30
Attending: INTERNAL MEDICINE
Payer: COMMERCIAL

## 2017-05-30 VITALS
DIASTOLIC BLOOD PRESSURE: 73 MMHG | RESPIRATION RATE: 16 BRPM | BODY MASS INDEX: 19.36 KG/M2 | SYSTOLIC BLOOD PRESSURE: 101 MMHG | OXYGEN SATURATION: 100 % | TEMPERATURE: 97.7 F | WEIGHT: 105.9 LBS | HEART RATE: 77 BPM

## 2017-05-30 DIAGNOSIS — D50.8 OTHER IRON DEFICIENCY ANEMIA: ICD-10-CM

## 2017-05-30 DIAGNOSIS — E86.0 DEHYDRATION: ICD-10-CM

## 2017-05-30 DIAGNOSIS — B27.09 GAMMAHERPESVIRAL MONONUCLEOSIS WITH OTHER COMPLICATIONS: ICD-10-CM

## 2017-05-30 DIAGNOSIS — K52.9 COLITIS: Primary | ICD-10-CM

## 2017-05-30 DIAGNOSIS — A08.39 CMV COLITIS (H): ICD-10-CM

## 2017-05-30 DIAGNOSIS — K51.018 ULCERATIVE PANCOLITIS WITH OTHER COMPLICATION (H): ICD-10-CM

## 2017-05-30 DIAGNOSIS — B25.9 CMV COLITIS (H): ICD-10-CM

## 2017-05-30 DIAGNOSIS — Z94.4 LIVER REPLACED BY TRANSPLANT (H): ICD-10-CM

## 2017-05-30 DIAGNOSIS — B25.8 OTHER CYTOMEGALOVIRAL DISEASES (H): ICD-10-CM

## 2017-05-30 LAB
ALBUMIN SERPL-MCNC: 2.9 G/DL (ref 3.4–5)
ALP SERPL-CCNC: 170 U/L (ref 40–150)
ALT SERPL W P-5'-P-CCNC: 25 U/L (ref 0–50)
ANION GAP SERPL CALCULATED.3IONS-SCNC: 7 MMOL/L (ref 3–14)
AST SERPL W P-5'-P-CCNC: 24 U/L (ref 0–45)
BILIRUB DIRECT SERPL-MCNC: <0.1 MG/DL (ref 0–0.2)
BILIRUB SERPL-MCNC: 0.3 MG/DL (ref 0.2–1.3)
BUN SERPL-MCNC: 13 MG/DL (ref 7–30)
CALCIUM SERPL-MCNC: 8.6 MG/DL (ref 8.5–10.1)
CHLORIDE SERPL-SCNC: 106 MMOL/L (ref 94–109)
CO2 SERPL-SCNC: 24 MMOL/L (ref 20–32)
CREAT SERPL-MCNC: 0.99 MG/DL (ref 0.52–1.04)
ERYTHROCYTE [DISTWIDTH] IN BLOOD BY AUTOMATED COUNT: 27.9 % (ref 10–15)
GFR SERPL CREATININE-BSD FRML MDRD: 65 ML/MIN/1.7M2
GLUCOSE SERPL-MCNC: 83 MG/DL (ref 70–99)
HCT VFR BLD AUTO: 35.9 % (ref 35–47)
HGB BLD-MCNC: 10.9 G/DL (ref 11.7–15.7)
MAGNESIUM SERPL-MCNC: 1.8 MG/DL (ref 1.6–2.3)
MCH RBC QN AUTO: 24.7 PG (ref 26.5–33)
MCHC RBC AUTO-ENTMCNC: 30.4 G/DL (ref 31.5–36.5)
MCV RBC AUTO: 81 FL (ref 78–100)
PHOSPHATE SERPL-MCNC: 2.1 MG/DL (ref 2.5–4.5)
PLATELET # BLD AUTO: 745 10E9/L (ref 150–450)
POTASSIUM SERPL-SCNC: 4.2 MMOL/L (ref 3.4–5.3)
PROT SERPL-MCNC: 7.8 G/DL (ref 6.8–8.8)
RBC # BLD AUTO: 4.42 10E12/L (ref 3.8–5.2)
SODIUM SERPL-SCNC: 138 MMOL/L (ref 133–144)
WBC # BLD AUTO: 13.4 10E9/L (ref 4–11)

## 2017-05-30 PROCEDURE — 85027 COMPLETE CBC AUTOMATED: CPT | Performed by: SURGERY

## 2017-05-30 PROCEDURE — 80076 HEPATIC FUNCTION PANEL: CPT | Performed by: SURGERY

## 2017-05-30 PROCEDURE — 96413 CHEMO IV INFUSION 1 HR: CPT

## 2017-05-30 PROCEDURE — 25000128 H RX IP 250 OP 636: Mod: ZF | Performed by: PHYSICIAN ASSISTANT

## 2017-05-30 PROCEDURE — 96367 TX/PROPH/DG ADDL SEQ IV INF: CPT

## 2017-05-30 PROCEDURE — 87799 DETECT AGENT NOS DNA QUANT: CPT | Performed by: SURGERY

## 2017-05-30 PROCEDURE — 80048 BASIC METABOLIC PNL TOTAL CA: CPT | Performed by: SURGERY

## 2017-05-30 PROCEDURE — 84100 ASSAY OF PHOSPHORUS: CPT | Performed by: SURGERY

## 2017-05-30 PROCEDURE — 83735 ASSAY OF MAGNESIUM: CPT | Performed by: SURGERY

## 2017-05-30 PROCEDURE — 25000128 H RX IP 250 OP 636: Mod: ZF | Performed by: INTERNAL MEDICINE

## 2017-05-30 RX ORDER — ACETAMINOPHEN 325 MG/1
650 TABLET ORAL
Status: CANCELLED
Start: 2017-05-30

## 2017-05-30 RX ORDER — DIPHENHYDRAMINE HCL 25 MG
25 TABLET ORAL
Status: CANCELLED
Start: 2017-05-30

## 2017-05-30 RX ADMIN — VEDOLIZUMAB 300 MG: 300 INJECTION, POWDER, LYOPHILIZED, FOR SOLUTION INTRAVENOUS at 13:44

## 2017-05-30 RX ADMIN — IRON SUCROSE 200 MG: 20 INJECTION, SOLUTION INTRAVENOUS at 13:20

## 2017-05-30 NOTE — MR AVS SNAPSHOT
After Visit Summary   5/30/2017    Yarelis Penny    MRN: [id number]           Patient Information     Date Of Birth          1985        Visit Information        Provider Department      5/30/2017 1:00 PM [facility]; [facility] [place] [facility]        Today's Diagnoses     Colitis    -  1    CMV colitis (H)        Ulcerative pancolitis with other complication (H)        Dehydration        Other iron deficiency anemia        Liver replaced by transplant (H)        Gammaherpesviral mononucleosis with other complications        Other cytomegaloviral diseases (H)          Care Instructions    Dear Yarelis Penny    Thank you for choosing [facility] ([facility]) for your infusion.  The following information is a summary of our appointment as well as important reminders.          Additional information: POST-INFUSION OF BIOLOGICAL MEDICATION:    Reviewed with patient.  Given biologic medication or medication hand-out. Inform patient if any fever, chills or signs of infection, new symptoms, abdominal pain, heart palpitations, shortness of breath, reaction, weakness, neurological changes, seek medical attention immediately and should not receive infusions. No live virus vaccines prior to or during treatment or up to 6 months post infusion. If the patient has an upcoming procedure or surgery, this should be discussed with the rheumatologist and surgeon or provider.        We look forward in seeing you on your next appointment here at [facility].  Please don t hesitate to call us at [phone] to reschedule any of your appointments or to speak with one of the [facility] registered nurses.  It was a pleasure taking care of you today.    Sincerely,  Cecy Esteves RN  [facility]  [facility]  [address] [place] [address]  [address]  Phone:  [phone]            Follow-ups after your visit        Your next 10 appointments already scheduled     Jun 07, 2017  8:00 AM CDT   (Arrive by 7:45 AM)   Return Visit with Walker Nails MD   [facility] and Infectious Diseases ([facility])    [address]  3rd [facility] [id number] [phone]            Aug 28, 2017  7:30 AM CDT   (Arrive by 7:15 AM)   RETURN IRRITABLE BOWEL DISEASE with Fuentes Johnson MD   [facility] and IBD ([facility])    [address] [place] [address]  4th [facility] [address] [phone]              Who to contact     If you have questions or need follow up information about today's clinic visit or your schedule please contact [facility] SPECIAL[facility] AND PROCEDURE directly at [phone].  Normal or non-critical lab and imaging results will be communicated to you by MyChart, letter or phone within 4 business days after the clinic has received the results. If you do not hear from us within 7 days, please contact the clinic through [employer] or phone. If you have a critical or abnormal lab result, we will notify you by phone as soon as possible.  Submit refill requests through [employer] or call your pharmacy and they will forward the refill request to us. Please allow 3 business days for your refill to be completed.          Additional Information About Your Visit        MyChart Information     [employer] gives you secure access to your electronic health record. If you see a primary care provider, you can also send messages to your care team and make appointments. If you have questions, please call your primary care clinic.  If you do not have a primary care provider, please call [phone] and they will assist you.        Care EveryWhere ID     This is your Care EveryWhere ID. This could be used by other organizations to access your [facility]  records  [id number]        Your Vitals Were     Pulse Temperature Respirations Pulse Oximetry BMI (Body Mass Index)       94 97.7  F (36.5  C) (Oral) 16 100% 19.36 kg/m2        Blood Pressure from Last 3 Encounters:   05/30/17 111/81   05/25/17 104/74   05/22/17 108/76    Weight from Last 3 Encounters:   05/30/17 48 kg (105 lb 14.4 oz)   05/15/17 48.7 kg (107 lb 5.8 oz)   05/12/17 48.6 kg (107 lb 1.6 oz)              We Performed the Following     Basic metabolic panel     CBC with platelets     CMV DNA quantification     EBV DNA PCR Quantitative Whole Blood     Hepatic panel     Magnesium     Phosphorus          Today's Medication Changes          These changes are accurate as of: 5/30/17  2:00 PM.  If you have any questions, ask your nurse or doctor.               These medicines have changed or have updated prescriptions.        Dose/Directions    predniSONE 10 MG tablet   Commonly known as:  DELTASONE   This may have changed:    - how much to take  - how to take this  - when to take this  - additional instructions   Used for:  Ulcerative colitis with other complication, unspecified location (H)        Take 6 tabs (60mg) PO x 5 days  Then take 5 tabs (50mg) PO x 7 days  Then take 4 tabs (40mg) PO x 7 days Then take 3 tabs (30mg) PO x 7 days Then take 2 tabs (20mg) PO x 7 days Then take 1 tab (10mg) PO x 7 days Then take 1/2 tablet (5mg) PO x 7 days then DISCONTINUE  Then take   Quantity:  92 tablet   Refills:  0                Primary Care Provider Office Phone # Fax #    [place] MARIANELA Orellana [phone]       [facility] [address] [place] [id number]        Thank you!     Thank you for choosing [facility] SPECIAL[facility] PROCEDURE  for your care. Our goal is always to provide you with excellent care. Hearing back from our patients is one way we can continue to improve our services. Please take a few minutes to complete the written survey that you may  receive in the mail after your visit with us. Thank you!             Your Updated Medication List - Protect others around you: Learn how to safely use, store and throw away your medicines at www.disposemymeds.org.          This list is accurate as of: 5/30/17  2:00 PM.  Always use your most recent med list.                   Brand Name Dispense Instructions for use    ENTYVIO IV      Inject 300 mg into the vein every 28 days       GNP ASPIRIN LOW DOSE 81 MG EC tablet   Generic drug:  aspirin     30 tablet    Take 1 tablet (81 mg) by mouth daily       predniSONE 10 MG tablet    DELTASONE    92 tablet    Take 6 tabs (60mg) PO x 5 days  Then take 5 tabs (50mg) PO x 7 days  Then take 4 tabs (40mg) PO x 7 days Then take 3 tabs (30mg) PO x 7 days Then take 2 tabs (20mg) PO x 7 days Then take 1 tab (10mg) PO x 7 days Then take 1/2 tablet (5mg) PO x 7 days then DISCONTINUE  Then take       * tacrolimus 0.5 MG capsule    PROGRAF - GENERIC EQUIVALENT    180 capsule    Take 1 capsule (0.5 mg) by mouth 2 times daily Take with 1 mg capsule. Total dose 2.5 mg every 12 hours       * tacrolimus 1 MG capsule    PROGRAF - GENERIC EQUIVALENT    360 capsule    Take 2 capsules (2 mg) by mouth 2 times daily *total dose 2.5 mg BID. Take with 0.5 mg capsule       ursodiol 250 MG tablet    ACTIGALL    270 tablet    Take 2 tabs (500 mg) in AM, and 1 tab at night (250 mg)       zolpidem 5 MG tablet    AMBIEN     TK 1 T PO QD HS PRN FOR SLEEP       * Notice:  This list has 2 medication(s) that are the same as other medications prescribed for you. Read the directions carefully, and ask your doctor or other care provider to review them with you.

## 2017-05-30 NOTE — PROGRESS NOTES
Nursing Note  Yarelis Penny [facility] for:   Chief Complaint   Patient presents with     Infusion     Entyvio/ venofer     During today's [facility] appointment, orders from Dr. Johnson were completed.  Frequency: Venofer today is dose 5 of 5 total. Entyvio every 4 weeks.    Progress note:  Patient identification verified by name and date of birth.  Assessment completed.  Vitals recorded in Doc Flowsheets.  Patient was provided with education regarding infusion and possible side effects.  Patient verbalized understanding.      needed: No  Premedications: pt declined.  Infusion Rates of Venofer: infusion given over approximately 20 minutes.  Entyvio over 30 minutes.   Labs: were not ordered for this appointment.  Vascular access: peripheral IV placed today.  Treatment Conditions: non-applicable.  Patient tolerated infusion: well.          Discharge Plan:   Follow up plan of care with: ongoing infusions at [facility].  Discharge instructions were reviewed with patient.  Patient/representative verbalized understanding of discharge instructions and all questions answered.  Patient discharged from [facility] in stable condition.        Cecy Esteves RN    Administrations This Visit     iron sucrose (VENOFER) 200 mg in NaCl 0.9 % 100 mL intermittent infusion     Admin Date Action Dose Rate Route Administered By          05/30/2017 New Bag 200 mg 480 mL/hr Intravenous Cecy Esteves RN                   vedolizumab (ENTYVIO) 300 mg in NaCl 0.9 % 280 mL infusion     Admin Date Action Dose Rate Route Administered By          05/30/2017 New Bag 300 mg 560 mL/hr Intravenous Cecy Esteves RN                         /81  Pulse 94  Temp 97.7  F (36.5  C) (Oral)  Resp 16  Wt 48 kg (105 lb 14.4 oz)  SpO2 100%  BMI 19.36 kg/m2

## 2017-05-30 NOTE — PATIENT INSTRUCTIONS
Dear Yarelis Penny    Thank you for choosing [facility] ([facility]) for your infusion.  The following information is a summary of our appointment as well as important reminders.          Additional information: POST-INFUSION OF BIOLOGICAL MEDICATION:    Reviewed with patient.  Given biologic medication or medication hand-out. Inform patient if any fever, chills or signs of infection, new symptoms, abdominal pain, heart palpitations, shortness of breath, reaction, weakness, neurological changes, seek medical attention immediately and should not receive infusions. No live virus vaccines prior to or during treatment or up to 6 months post infusion. If the patient has an upcoming procedure or surgery, this should be discussed with the rheumatologist and surgeon or provider.        We look forward in seeing you on your next appointment here at [facility].  Please don t hesitate to call us at [phone] to reschedule any of your appointments or to speak with one of the [facility] registered nurses.  It was a pleasure taking care of you today.    Sincerely,  Cecy Esteves RN  [facility]  [facility]  [address] [place] [address]  [address]  Phone:  [phone]

## 2017-06-02 LAB
EBV DNA # SPEC NAA+PROBE: ABNORMAL {COPIES}/ML
EBV DNA SPEC NAA+PROBE-LOG#: 5.2 {LOG_COPIES}/ML

## 2017-06-06 ASSESSMENT — ENCOUNTER SYMPTOMS
BOWEL INCONTINENCE: 0
RECTAL PAIN: 0
NAUSEA: 0
ABDOMINAL PAIN: 1
JAUNDICE: 0
CONSTIPATION: 0
BLOATING: 1
VOMITING: 0
RECTAL BLEEDING: 0
DIARRHEA: 1
HEARTBURN: 0
BLOOD IN STOOL: 1

## 2017-06-07 ENCOUNTER — OFFICE VISIT (OUTPATIENT)
Dept: INFECTIOUS DISEASES | Facility: CLINIC | Age: 32
End: 2017-06-07
Attending: INTERNAL MEDICINE
Payer: COMMERCIAL

## 2017-06-07 ENCOUNTER — CARE COORDINATION (OUTPATIENT)
Dept: GASTROENTEROLOGY | Facility: CLINIC | Age: 32
End: 2017-06-07

## 2017-06-07 VITALS
DIASTOLIC BLOOD PRESSURE: 76 MMHG | WEIGHT: 101.6 LBS | OXYGEN SATURATION: 98 % | SYSTOLIC BLOOD PRESSURE: 109 MMHG | BODY MASS INDEX: 18.58 KG/M2 | TEMPERATURE: 98 F | HEART RATE: 80 BPM

## 2017-06-07 DIAGNOSIS — K51.00 ULCERATIVE PANCOLITIS (H): Primary | ICD-10-CM

## 2017-06-07 DIAGNOSIS — B27.00 GAMMAHERPESVIRAL MONONUCLEOSIS WITHOUT COMPLICATION: Primary | ICD-10-CM

## 2017-06-07 DIAGNOSIS — B25.8 OTHER CYTOMEGALOVIRAL DISEASES (H): ICD-10-CM

## 2017-06-07 DIAGNOSIS — Z79.2 PROPHYLACTIC ANTIBIOTIC: ICD-10-CM

## 2017-06-07 DIAGNOSIS — D84.9 IMMUNOSUPPRESSION (H): ICD-10-CM

## 2017-06-07 DIAGNOSIS — Z94.4 LIVER TRANSPLANT RECIPIENT (H): ICD-10-CM

## 2017-06-07 PROCEDURE — 99212 OFFICE O/P EST SF 10 MIN: CPT | Mod: ZF

## 2017-06-07 ASSESSMENT — PAIN SCALES - GENERAL: PAINLEVEL: NO PAIN (0)

## 2017-06-07 NOTE — PROGRESS NOTES
Thank you for the update. Let's get labs and stool studies. I'd like to give her more time on the vedolizumab every to see if this helps. We may need to increase prednisone back up again.    Let's start with labs and stool studies.    Will then decide if we need to change anything else and if we need a flexible sigmoidoscopy (I note her EBV has crept up as well).    Fuentes Johnson MD    [facility]  Division of Gastroenterology, Hepatology and Nutrition

## 2017-06-07 NOTE — LETTER
6/7/2017      RE: Yarelis Penny  [address] [facility] [address]         [facility]  Transplant Infectious Disease Progress Note     Patient:  Yarelis Penny, Date of birth 1985, Medical record number [id number]  Date of Visit:  06/07/2017  Consult requested by Dr. Enriquez for evaluation of EBV and CMV after liver Transplant         Assessment and Recommendations:   Recommendations:  - with rising EBV viremia, likely 2/2 ongoing immunosuppression and IBD therapy, recommend CT c/a/p screening every 6 months for now.  Next scan due then in August-September.   - CMV PCRs can be checked every 1-2 months, and EBV PCR every 2 months (ordered).   - continues on bactrim propylaxis.     F/U in 6 months and will plan to repeat CT scans just prior to that visit (ordered).       Assessment:  31 female w/ PSC/ UC, autoimmune hepatitis s/p LDRLT 6/2015 complicated by anastomotic biliary stricture (last stent removed 3/1/16) maintained on tacro/ azathioprine and pred. Hospitalized 3/29-4/2/16, then again 4/26-4/29/16.  Yarelis had syndromes that were consistent with both EBV-mononucleosis like, and CMV w/ gastritis and anal ulcerations.  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  New medications started for the IBD, vedolizumab, which is improving the IBD symptoms.     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  Was easily controlled w/ IV GCV (did not want to use orals for concerns of absorption issues).  She has since completed a course of IV GCV and we stopped therapy the end of January 2017.  Seral CMV PCRs since are negative, and her stools are normal formed on IBD therapy.     CMV D+/R-. 2nd episode of CMV GI disease since the transplant (1st one 12/2015 tz w/ GCV/ vGCV).  Colonosocopy 4/1/16 w/ few small ulcers at  the ileocecal valve and mucosal ulcerations in a discontinuous pattern throughout the colon, most of the bx's performed were CMV immunostain positive.  Treated w/ vGCV and then IV GCV (due to swallowing problems) for a total of 5 weeks of therapy ending 5/35/16.       Last episode of CMV colitis she was not  viremic, which could be for two reasons, after the 1st CMV reactivation she is developing a partial immune response (R+ SOTs can have colitis w/out viremia, but not so much R- SOTs however at some point she should start to behave like an R+) or that she is at risk for underlying CMV GI disease given hx of PSC/ UC.       -EBV viremia:  -currently continuing to monitor EBV Viremia and as it continues to rise in the setting of immunosuppression, which is likely to be ongoing, will scan q6 months to screen for PTLD.  Will possibly consider rituxan in the meantime.  Yarelis does not have any B symptoms currently to suggest PTLD.  The last round of GI Bx's 11/2016 were not suggestive of PTLD.     Yarelis's initial syndrome was very consistent with mononucleosis (very severe throat pain, cervical LAD, fatigue, etc).  She has now recovered clinically but has rising viremia.  With the lack of current symptoms, we will continue to monitor the viral load.  CT c/a/p 4/2016 w/out radiographic signs for PTLD.  Some patients after transplant will have either high or low level persistent EBV viremia.  The high level group are at increased risk of developing PTLD, but not all will get PTLD.  The highest risk group is really the group where EBV is dynamically changing (ie increasing significantly).  At this point we will trend out the EBV PCR.  Immunosuppression was previously decreased, now however she is being treated for IBD.  The mononucleosis like syndrome and severe odynophagia are now a resolved issues.   1. The strength of EBV PCR monitoring in the blood is to detect PTLD at an early stage when decrease in immunosuppression might  be of most benefit.  However, EBV viremia w/out PTLD can also have high level EBV viremia.  And, some patients will have high grade EBV viremia for a long time w/out PTLD, with this group being at an increase risk of PTLD.     2. Our new EBV PCR testing is 1 log more sensitive compared to the old test, thus the result of 500K would be equivalent to 50K on the old test.  There are no exact cut off's of EBV viremia for which to pursue further tz modifications or evaluation of PTLD.  In her case it is very reasonable to further screen for PTLD with imaging as I have ordered.     3. A dose of rituximab will certainly help the EBV viremia and/or partially treat an early phase PTLD, but with the results of the CT c/a/p ordered today, I would like to try to add on some studies for EBV staining of the colon bx's to r/o GI PTLD.  At the very least, pending response to tz of CMV a repeat colonoscopy might be warranted.      Other ID issues:  -prophylaxis: bactrim  -serostatus: CMV D+/R-, EBV R+  -immunizations:  Up to date  -isolation:  Good had hygiene    Attestation:  I have reviewed today's vital signs, medications, labs and imaging.      Walker Nails,   Pager [phone]         History of Infectious Disease Illness:   This is a very pleasant 31 year ol female w/ PSC/ UC, autoimmune hepatitis s/p LDLT 6/2015 w/ anastomotic biliary stricture (last bili stent removed 3/1/16).  The transplant course was complicated by CMV syndrome w/ possible gastritis/ colitis (muscle aches, stomach pain and diarrhea at that time) 12/2015, CMV D+/R-, viremia peak at 7000 IU since remained <137 but detectable.  She completed a course of IV GCV (3 weeks) then course of vGCV tz.  Current immunosuppression tacro/ azathioprine and pred.      I initially saw Yarelis after hospitalization from 3/29-4/2/16, admitted w/ cough, fever, sore throat w/ nasal congestion, loose stool and prodrome of 3 weeks of fatigue, night sweats and fevers.  During  that hospitalization a colonoscopy was performed on 4/1(reported few small ulcerations at the ileocecal valve and mucosal ulcerations in a discontinuous pattern), path + for CMV on immunostain on most areas throughout the colon that was biopsied, no evidence of active colitis.  Stool studies at the time were negative, including enteric PCR panel and c.diff.  EBV viremia went from 108K on 3/29 to 533K on 3/31 and she was given a dose of Rituximab.  Repeat EBV PCR of blood on 4/7/16 was 9197.      Early April Yarelis had an ongoing sore throat, feels very dry in the morning, ears aching.  Yesterday she developed diarrhea again, watery, 2-3 x's day w/ stomach pain.  No fevers, no weight loss.  She notes some sweats at night.  No cough, no mouth pain no joint pain.  Valcyte was started 4/6/2016, changed to IV GCV on 4/21/16.  SH: no pets at home, works at home, no recent known sick contacts.  On one week follow up Yarelis had  ongoing throat pain w/ trouble swallowing.  Often feels dry.  The odynophagia does not extend down her esophagus.  She has jaw and ear pain.  The diarrhea is improving, now off and on, Saturday she had formed stools followed by diarrhea two hours later.  No new rashes or joint pains.  No stomach pain.  She reports fevers up to 102.  No cough/ sob, vision changes or headaches.  Ultimately we were trying to manage Yarelis as an outpatient, escalating the oral/ topical throat analgesics and she was scheduled for ENT, but hospitalized from 4/26-4/29 failed outpt management with poor PO intake and ESTUARDO.  While in house, ENT evaluated Yarelis, flex endoscope w/ normal thick purulent drainage and crusting.  Throat swabs w/ S. Aureus and she was treated w/ 7 days of doxycycline.   Hurricane mouth spray for supportive care.    Mid-way through the hospitalization her mouth pain started to improve and has since resolved completely.  The topical analgesics dc'd a while back.  No oral pain, odynophagia.  She is eating  normally, maintaining weight, good energy.  No fevers or chills.  No n/v/d or abdominal pain. Normal formed stools.  No vision changes or headaches. Since the last time I saw her she did have a cough, which is now improving, mostly dry. No sore throat or rhinorrhea. She also has returned from a 3 week trip to [place] traveling around, which went very well.     Yarelis was hospitalized 12/2016 w/ IBD flair treated w/ steroids and CMV Viremi up to 3000 IU.  We treated w/ IV GCV for a course that ended Jan 2017..  We wanted IV therapy in the setting of IBD flair and concern for absorption.  Since my last visit, Yarelis seems to be doing well overall.  ERCP 5/5/17, currently on entyvio every two weeks then monthly for the past few months.  CT c/a/p w/ contrast w/ cyst like lesions in the left adenxa and u/s performed 3/2017.  Clinically the diarrhea improved after initiation of entyvio was stooling 6x's per day.  Pair prior to Bm w/ a little blood.  No n/v, fevers chills, cough / sob, night sweats.  She has good energy that improves w/ Iron infusions. No new rashes, vision changes or headaches.             Transplants:  6/18/2015 (Liver), Postoperative day:  720      Immunization History   Administered Date(s) Administered     HIB 04/24/2015     Hepatitis B 07/31/2000, 09/06/2000, 02/09/2001     Influenza (H1N1) 01/13/2010     Influenza (IIV3) 09/02/2010, 10/01/2010     Influenza Vaccine IM 3yrs+ 4 Valent IIV4 11/24/2016     Meningococcal (Menomune ) 06/02/2015     Pneumococcal (PCV 13) 05/11/2016     Pneumococcal 23 valent 04/24/2015     TD (ADULT, 7+) 12/01/2001     TDAP Vaccine (Boostrix) 05/11/2016     Twinrix A/B 04/24/2015, 06/02/2015       Current Outpatient Prescriptions   Medication     Vedolizumab (ENTYVIO IV)     zolpidem (AMBIEN) 5 MG tablet     ursodiol (ACTIGALL) 250 MG tablet     tacrolimus (PROGRAF - GENERIC EQUIVALENT) 0.5 MG capsule     tacrolimus (PROGRAF - GENERIC EQUIVALENT) 1 MG capsule     predniSONE  (DELTASONE) 10 MG tablet     GNP ASPIRIN LOW DOSE 81 MG EC tablet     No current facility-administered medications for this visit.             Allergies:     Allergies   Allergen Reactions     Rifampin Other (See Comments)     Kidney failure     Penicillins      Hives, has tolerated amoxicillin            Physical Exam:   Vitals were reviewed.   /76 (BP Location: Left arm, Patient Position: Chair, Cuff Size: Adult Regular)  Pulse 80  Temp 98  F (36.7  C) (Oral)  Wt 46.1 kg (101 lb 9.6 oz)  SpO2 98%  BMI 18.58 kg/m2  Physical Examination:  GENERAL:  Thin female ambulating well, comfortable on room air.   HEENT:  Head is normocephalic, atraumatic   EYES:  Eyes have anicteric sclerae without conjunctival injection or stigmata of endocarditis.    ENT:  Oropharynx is moist without exudates or ulcers.  Tongue is midline.  Good dentition..   NECK:  Supple. No LAD or tenderness  LUNGS:  Clear to auscultation bilateral.   CARDIOVASCULAR:  Regular rate and rhythm with no murmurs, gallops or rubs.  ABDOMEN:  Normal bowel sounds, soft, non tender. No appreciable hepatosplenomegaly.  Surgical sites well healed.   SKIN:  No acute rashes.  No stigmata of endocarditis.  NEUROLOGIC:  Grossly nonfocal. Active x4 extremities  No CVA or spinal tenderness.         Laboratory Data:     CD4 counts    Absolute CD4   Date Value Ref Range Status   04/01/2016 386 (L) 441 - 2156 cells/uL Final     Inflammatory Markers    Recent Labs   Lab Test  05/02/17   0730  04/18/17   0748  02/20/17   1129  02/13/17   1039  01/27/17   0812  01/10/17   0930  12/30/16   1010  12/15/16   0718   12/09/16   2019  12/05/16   1232   SED  110*  97*  41*  67*  16   --   90*   --    --   84*  92*   CRP  13.9*  17.7*  8.5*  29.0*  <2.9  <2.9  47.8*  6.8   < >  26.0*  24.0*    < > = values in this interval not displayed.     Immune Globulin Studies  No lab results found.  Metabolic Studies       Recent Labs   Lab Test  05/30/17   1315  05/15/17   0718   05/02/17   0730  04/18/17   0748  03/24/17   0824  03/20/17   1306   NA  138  140  141  138  139  140   POTASSIUM  4.2  4.3  4.0  4.3  3.8  4.6   CHLORIDE  106  109  110*  109  108  109   CO2  24  24  23  23  21  22   ANIONGAP  7  6  8  6  10  9   BUN  13  14  17  17  13  16   CR  0.99  0.98  0.85  1.13*  1.09*  1.11*   GFRESTIMATED  65  66  77  56*  58*  57*   GLC  83  78  74  83  78  89   KERLINE  8.6  8.9  8.3*  9.0  8.8  8.8   PHOS  2.1*   --   3.3   --   3.0   --    MAG  1.8   --   1.4*   --   1.5*   --        Hepatic Studies    Recent Labs   Lab Test  05/30/17   1315  05/15/17   0718  05/02/17   0730  04/18/17   0748  03/24/17   0824  03/20/17   1306   BILITOTAL  0.3  0.3  0.4  0.3  0.4  0.3   ALKPHOS  170*  186*  201*  301*  322*  328*   ALBUMIN  2.9*  2.7*  2.4*  2.8*  2.6*  2.8*   AST  24  20  27  34  40  37   ALT  25  25  32  48  72*  66*       Pancreatitis testing    Recent Labs   Lab Test  05/15/17   0718  03/20/17   1306  12/09/16   2019  11/23/16   0937  04/26/16   2223  03/01/16   0747  12/08/15   0708  09/08/15   0809  07/30/15   0815  06/26/15   0900   06/19/12   0748   AMYLASE  90  94   --    --   43  94  91  108  103  58   < >   --    LIPASE  204  139  224  84   --   209  188  237  223  143   < >   --    TRIG   --    --    --    --    --    --    --    --    --    --    --   417*    < > = values in this interval not displayed.       Hematology Studies      Recent Labs   Lab Test  05/30/17   1315  05/15/17   0718  05/02/17   0730  04/18/17   0748  03/24/17   0824  03/20/17   1306  02/20/17   1129  02/13/17   1039  01/27/17   0812  01/24/17   1045   WBC  13.4*  11.5*  13.2*  12.3*  15.5*  15.2*  8.7  9.6  6.0  7.8   ANEU   --    --   5.5  4.0   --    --   4.6  4.2  4.3  5.3   ALYM   --    --   3.9  4.7   --    --   1.9  1.9  1.3  1.4   DEVANTE   --    --   1.7*  1.6*   --    --   2.1*  2.2*  0.3  0.9   AEOS   --    --   1.9*  1.9*   --    --   0.1  0.2  0.0  0.1   HGB  10.9*  9.7*  8.3*  10.4*  10.7*   10.1*  10.1*  9.7*  9.1*  9.3*   HCT  35.9  33.2*  27.5*  34.1*  35.6  33.2*  33.8*  32.4*  30.9*  30.8*   MCV  81  78  70*  72*  74*  75*  82  84  89  90   PLT  745*  679*  854*  856*  746*  614*  815*  675*  461*  500*       Clotting Studies    Recent Labs   Lab Test  05/15/17   0718  03/20/17   1306  12/09/16 2019 11/22/16   1821  04/27/16   0602   06/27/15   0325  06/25/15   2000  06/25/15   1558   INR  0.96  1.01  1.10  1.21*  1.28*   < >  1.96*  1.98*  2.06*   PTT   --    --    --    --   56*   --   39*  41*  47*    < > = values in this interval not displayed.       Microbiology:  Stool: 6/8/16 c.diff negative, enteric panel negative   4/19/17: giardia ag negative, O&P negative, c.diff negative enteric panel negative.    12/10/16: microsporidia, O&P, giardia ag, enteric panel negative, c.diff negative    BCX  12/11/16 blood AFB Cx negative   3/30/16 negative   2/29/16 negative    UCX 3/29/16 negative    Virology:  CMV viral loads    5/20/17: <137 but detectable  5/2/17: <137 but detectable  4/18/17: <137 but detectable  3/24/17: 268  2/7/17 undetectable  12/27/16-1/31/17 <137 but detectable  12/14/16 3300  12/10/16 3000  12/5/16 387  --------  7/11/16: 153  12/28/15: 7730, thereafter <137 but detectable mostly    EBV viral loads     5/30/17: 1.5 million  4/18/17: 752834  12/10/16: 11213  12/5/16: 68064  11/3/16: 84714  9/7/16: 63925  8/9/16: 29011  7/18/16: 11241  5/9/16 undetected  4/2016: 3000  3/29/16 108,278  3/31/16 533,618  4/7/16  9197  4/12 2609  4/14 682  5/9/16 negative  7/11/16 51,989    Hepatitis B Testing     Recent Labs   Lab Test  11/22/16   1821  06/17/15   0930  06/19/12   0748   HBSAB   --    --   0.0   HBCAB  Nonreactive  Nonreactive  Negative   HEPBANG  Nonreactive   --   Negative   HBCM   --   Nonreactive   A nonreactive result suggests lack of recent exposure to the virus in the   preceding 6 months.     --        Hepatitis C Testing     Hepatitis C Antibody   Date Value Ref Range  Status   06/17/2015  NR Final    Nonreactive   Assay performance characteristics have not been established for newborns,   infants, and children     06/19/2012 Negative NEG Final       Respiratory Virus Testing    RSV Rapid Antigen Result   Date Value Ref Range Status   03/31/2016  NEG Final    Negative   Test results must be correlated with clinical data. If necessary, results   should be confirmed by a molecular assay or viral culture.         Serostatus:  CMV IgG Antibody   Date Value Ref Range Status   06/19/2012 <0.20  Negative for anti-CMV IgG U/mL Final     EBV VCA IgG Antibody   Date Value Ref Range Status   06/19/2012 >750.00  Positive, suggests immunologic exposure. U/mL Final       Imaging:  AXR: 6/22/17:   1. No biliary stent is visualized.  2. Nonobstructive bowel gas pattern.    MRCP: 3/13/17:   1. Postoperative changes of partial liver transplantation with a 1.5 cm biliary anastomotic stricture and worsening intrahepatic biliary dilatation.  2. Heterogeneous enhancement of the liver suggesting hepatic inflammation.  3. Small cystic lesions in the pancreas measuring up to 6 mm, not substantially changed. Continued attention on follow-up is recommended.    MR enterography: 12/14/16  1. Diffuse colonic wall thickening and enhancement with loss of haustral pattern. Wall thickening and enhancement of the terminal ileum. Findings consistent with acute on chronic changes related to ulcerative colitis.  2. Stable 7.4 cm left adnexal cyst. A short interval follow-up ultrasound is recommended to help determine whether this represents a physiologic lesion.    CT c/a/p w/ contrast 4/14/16:   1. Postoperative changes of hepatic transplantation. 1a. Terminal ileum thickening and thickening of bowel wall in the sigmoid colon. PTLD could have this appearance.    2. Focal narrowing of the portal vein near the anastomosis. Further evaluation could be performed with ultrasound with Doppler.  3. Decrease in prominence of  periaortic and aortocaval nodes since 7/9/2015.  4. Mild decrease in right lower lobe 6 mm nodule since 7/9/2015.    CT sinus 3/30/16: Overall mild paranasal sinus mucosal thickening. No evidence of acute sinusitis.      Walker Nails MD

## 2017-06-07 NOTE — MR AVS SNAPSHOT
After Visit Summary   6/7/2017    Yarelis Penny    MRN: [id number]           Patient Information     Date Of Birth          1985        Visit Information        Provider Department      6/7/2017 8:00 AM Walker Nails MD [facility] and Infectious Diseases        Today's Diagnoses     Gammaherpesviral mononucleosis without complication    -  1      Care Instructions    We will monitor the EBV virus in the blood monthly with your blood work.  If you develop fevers, chills, weight loss, fatigue or swelling such as your neck or abdomen please give us a call.  I will see you again in 6 months and we will likely order repeat CT scans for that time too.           Follow-ups after your visit        Follow-up notes from your care team     Return in about 6 months (around 12/7/2017).      Your next 10 appointments already scheduled     Jun 27, 2017  8:00 AM CDT   Infusion 180 with UC SPEC INFUSION, UC 51 ATC   [facility] Special[facility] ([facility])    [address] [place] [address]  2nd Floor  [facility] [address]   [phone]            Aug 28, 2017  7:30 AM CDT   (Arrive by 7:15 AM)   RETURN IRRITABLE BOWEL DISEASE with Fuentes Johnson MD   [facility] Gastroenterology and IBD ([facility])    [address] [place] [address]  4th Floor  [facility] [address]   [phone]              Future tests that were ordered for you today     Open Standing Orders        Priority Remaining Interval Expires Ordered    EBV DNA PCR Quantitative Whole Blood Routine 12/12 monthly 6/7/2018 6/7/2017          Open Future Orders        Priority Expected Expires Ordered    CT Chest w/o contrast Routine 12/1/2017 6/7/2018 6/7/2017    CT Abdomen Pelvis w/o & w Contrast Routine 12/1/2017 6/7/2018 6/7/2017            Who to contact     If you have questions or need follow up information about today's clinic visit or your schedule  please contact [facility] AND INFECTIOUS DISEASES directly at [phone].  Normal or non-critical lab and imaging results will be communicated to you by MyChart, letter or phone within 4 business days after the clinic has received the results. If you do not hear from us within 7 days, please contact the clinic through RemoteRealityhart or phone. If you have a critical or abnormal lab result, we will notify you by phone as soon as possible.  Submit refill requests through Brainly or call your pharmacy and they will forward the refill request to us. Please allow 3 business days for your refill to be completed.          Additional Information About Your Visit        RemoteRealityharHard Candy Cases Information     Brainly gives you secure access to your electronic health record. If you see a primary care provider, you can also send messages to your care team and make appointments. If you have questions, please call your primary care clinic.  If you do not have a primary care provider, please call [phone] and they will assist you.        Care EveryWhere ID     This is your Care EveryWhere ID. This could be used by other organizations to access your [place] medical records  [id number]        Your Vitals Were     Pulse Temperature Pulse Oximetry BMI (Body Mass Index)          80 98  F (36.7  C) (Oral) 98% 18.58 kg/m2         Blood Pressure from Last 3 Encounters:   06/07/17 109/76   05/30/17 101/73   05/25/17 104/74    Weight from Last 3 Encounters:   06/07/17 46.1 kg (101 lb 9.6 oz)   05/30/17 48 kg (105 lb 14.4 oz)   05/15/17 48.7 kg (107 lb 5.8 oz)                 Today's Medication Changes          These changes are accurate as of: 6/7/17  8:27 AM.  If you have any questions, ask your nurse or doctor.               These medicines have changed or have updated prescriptions.        Dose/Directions    predniSONE 10 MG tablet   Commonly known as:  DELTASONE   This may have changed:    - how much to take  - how to take this  - when  to take this  - additional instructions   Used for:  Ulcerative colitis with other complication, unspecified location (H)        Take 6 tabs (60mg) PO x 5 days  Then take 5 tabs (50mg) PO x 7 days  Then take 4 tabs (40mg) PO x 7 days Then take 3 tabs (30mg) PO x 7 days Then take 2 tabs (20mg) PO x 7 days Then take 1 tab (10mg) PO x 7 days Then take 1/2 tablet (5mg) PO x 7 days then DISCONTINUE  Then take   Quantity:  92 tablet   Refills:  0                Primary Care Provider Office Phone # Fax #    Jackson Orellana [phone]       [place] [address] [address] [address]        Thank you!     Thank you for choosing [facility] AND INFECTIOUS DISEASES  for your care. Our goal is always to provide you with excellent care. Hearing back from our patients is one way we can continue to improve our services. Please take a few minutes to complete the written survey that you may receive in the mail after your visit with us. Thank you!             Your Updated Medication List - Protect others around you: Learn how to safely use, store and throw away your medicines at www.disposemymeds.org.          This list is accurate as of: 6/7/17  8:27 AM.  Always use your most recent med list.                   Brand Name Dispense Instructions for use    ENTYVIO IV      Inject 300 mg into the vein every 28 days       GNP ASPIRIN LOW DOSE 81 MG EC tablet   Generic drug:  aspirin     30 tablet    Take 1 tablet (81 mg) by mouth daily       predniSONE 10 MG tablet    DELTASONE    92 tablet    Take 6 tabs (60mg) PO x 5 days  Then take 5 tabs (50mg) PO x 7 days  Then take 4 tabs (40mg) PO x 7 days Then take 3 tabs (30mg) PO x 7 days Then take 2 tabs (20mg) PO x 7 days Then take 1 tab (10mg) PO x 7 days Then take 1/2 tablet (5mg) PO x 7 days then DISCONTINUE  Then take       * tacrolimus 0.5 MG capsule    PROGRAF - GENERIC EQUIVALENT    180 capsule    Take 1 capsule (0.5 mg) by mouth 2  times daily Take with 1 mg capsule. Total dose 2.5 mg every 12 hours       * tacrolimus 1 MG capsule    PROGRAF - GENERIC EQUIVALENT    360 capsule    Take 2 capsules (2 mg) by mouth 2 times daily *total dose 2.5 mg BID. Take with 0.5 mg capsule       ursodiol 250 MG tablet    ACTIGALL    270 tablet    Take 2 tabs (500 mg) in AM, and 1 tab at night (250 mg)       zolpidem 5 MG tablet    AMBIEN     TK 1 T PO QD HS PRN FOR SLEEP       * Notice:  This list has 2 medication(s) that are the same as other medications prescribed for you. Read the directions carefully, and ask your doctor or other care provider to review them with you.

## 2017-06-07 NOTE — NURSING NOTE
"Chief Complaint   Patient presents with     RECHECK     Infection follow up       Initial /76 (BP Location: Left arm, Patient Position: Chair, Cuff Size: Adult Regular)  Pulse 80  Temp 98  F (36.7  C) (Oral)  Wt 46.1 kg (101 lb 9.6 oz)  SpO2 98%  BMI 18.58 kg/m2 Estimated body mass index is 18.58 kg/(m^2) as calculated from the following:    Height as of 5/15/17: 1.575 m (5' 2.01\").    Weight as of this encounter: 46.1 kg (101 lb 9.6 oz).  Medication Reconciliation: complete   HARDIK BROWNING CMA      "

## 2017-06-07 NOTE — PATIENT INSTRUCTIONS
We will monitor the EBV virus in the blood monthly with your blood work.  If you develop fevers, chills, weight loss, fatigue or swelling such as your neck or abdomen please give us a call.  I will see you again in 6 months and we will likely order repeat CT scans for that time too.

## 2017-06-07 NOTE — PROGRESS NOTES
Called pt back and let he know Dr. Johnson wants labs and stool studies.  Will send her a list on my chart.

## 2017-06-07 NOTE — PROGRESS NOTES
Pt called to update us on her symptoms.  Has had an increase of stools to  6 a day and this has been going on for 2 weeks or so.  Is diarrhea to loose stools.  Notes some blood in stools in am and about 4 stools in am and then 2 in pm. Some urgency and cramping that is relieved after bowel movement.  Denies any abd paon.  No nausea. Able to eat and drink.  Completed the ercp.  Let her know I would route her note to Dr. Johnson for further recommendations. Seen by Dr. Nails today.

## 2017-06-08 DIAGNOSIS — K51.00 ULCERATIVE PANCOLITIS (H): ICD-10-CM

## 2017-06-08 DIAGNOSIS — Z94.4 LIVER REPLACED BY TRANSPLANT (H): Primary | ICD-10-CM

## 2017-06-08 DIAGNOSIS — Z94.4 LIVER REPLACED BY TRANSPLANT (H): ICD-10-CM

## 2017-06-08 LAB
ALBUMIN SERPL-MCNC: 2.8 G/DL (ref 3.4–5)
ALP SERPL-CCNC: 147 U/L (ref 40–150)
ALT SERPL W P-5'-P-CCNC: 18 U/L (ref 0–50)
AST SERPL W P-5'-P-CCNC: 14 U/L (ref 0–45)
BASOPHILS # BLD AUTO: 0.1 10E9/L (ref 0–0.2)
BASOPHILS NFR BLD AUTO: 0.9 %
BILIRUB DIRECT SERPL-MCNC: <0.1 MG/DL (ref 0–0.2)
BILIRUB SERPL-MCNC: 0.2 MG/DL (ref 0.2–1.3)
C DIFF TOX B STL QL: NORMAL
CRP SERPL-MCNC: 9.9 MG/L (ref 0–8)
DIFFERENTIAL METHOD BLD: ABNORMAL
EOSINOPHIL # BLD AUTO: 1.3 10E9/L (ref 0–0.7)
EOSINOPHIL NFR BLD AUTO: 13.8 %
ERYTHROCYTE [DISTWIDTH] IN BLOOD BY AUTOMATED COUNT: 26.1 % (ref 10–15)
ERYTHROCYTE [SEDIMENTATION RATE] IN BLOOD BY WESTERGREN METHOD: 43 MM/H (ref 0–20)
HCT VFR BLD AUTO: 36 % (ref 35–47)
HGB BLD-MCNC: 11.3 G/DL (ref 11.7–15.7)
LYMPHOCYTES # BLD AUTO: 3.5 10E9/L (ref 0.8–5.3)
LYMPHOCYTES NFR BLD AUTO: 37.6 %
MCH RBC QN AUTO: 26 PG (ref 26.5–33)
MCHC RBC AUTO-ENTMCNC: 31.4 G/DL (ref 31.5–36.5)
MCV RBC AUTO: 83 FL (ref 78–100)
MONOCYTES # BLD AUTO: 1.2 10E9/L (ref 0–1.3)
MONOCYTES NFR BLD AUTO: 12.8 %
NEUTROPHILS # BLD AUTO: 3.3 10E9/L (ref 1.6–8.3)
NEUTROPHILS NFR BLD AUTO: 34.9 %
PLATELET # BLD AUTO: 698 10E9/L (ref 150–450)
PROT SERPL-MCNC: 7.5 G/DL (ref 6.8–8.8)
RBC # BLD AUTO: 4.35 10E12/L (ref 3.8–5.2)
SPECIMEN SOURCE: NORMAL
TACROLIMUS BLD-MCNC: 5.6 UG/L (ref 5–15)
TME LAST DOSE: NORMAL H
WBC # BLD AUTO: 9.4 10E9/L (ref 4–11)

## 2017-06-08 PROCEDURE — 87493 C DIFF AMPLIFIED PROBE: CPT | Mod: 59 | Performed by: FAMILY MEDICINE

## 2017-06-08 PROCEDURE — 87506 IADNA-DNA/RNA PROBE TQ 6-11: CPT | Performed by: FAMILY MEDICINE

## 2017-06-08 PROCEDURE — 36415 COLL VENOUS BLD VENIPUNCTURE: CPT | Performed by: FAMILY MEDICINE

## 2017-06-08 PROCEDURE — 85025 COMPLETE CBC W/AUTO DIFF WBC: CPT | Performed by: FAMILY MEDICINE

## 2017-06-08 PROCEDURE — 86140 C-REACTIVE PROTEIN: CPT | Performed by: FAMILY MEDICINE

## 2017-06-08 PROCEDURE — 80197 ASSAY OF TACROLIMUS: CPT | Performed by: FAMILY MEDICINE

## 2017-06-08 PROCEDURE — 85652 RBC SED RATE AUTOMATED: CPT | Performed by: FAMILY MEDICINE

## 2017-06-08 PROCEDURE — 80076 HEPATIC FUNCTION PANEL: CPT | Performed by: FAMILY MEDICINE

## 2017-06-09 LAB
CAMPYLOBACTER GROUP BY NAT: NOT DETECTED
ENTERIC PATHOGEN COMMENT: NORMAL
NOROVIRUS I AND II BY NAT: NOT DETECTED
ROTAVIRUS A BY NAT: NOT DETECTED
SALMONELLA SPECIES BY NAT: NOT DETECTED
SHIGA TOXIN 1 GENE BY NAT: NOT DETECTED
SHIGA TOXIN 2 GENE BY NAT: NOT DETECTED
SHIGELLA SP+EIEC IPAH STL QL NAA+PROBE: NOT DETECTED
VIBRIO GROUP BY NAT: NOT DETECTED
YERSINIA ENTEROCOLITICA BY NAT: NOT DETECTED

## 2017-06-16 ENCOUNTER — CARE COORDINATION (OUTPATIENT)
Dept: GASTROENTEROLOGY | Facility: CLINIC | Age: 32
End: 2017-06-16

## 2017-06-16 NOTE — PROGRESS NOTES
Called Yarelis with reminder that she is due for her xrays for stent placement verification.   Verbalized understanding.  Contact information given to [facility] radiology to schedule.    Luz Marina Manuel LPN  Advanced Endoscopy~ Panc/Bili  Dr. Quinones, Dr. Macias & Dr. Huerta  [phone]

## 2017-06-20 ENCOUNTER — CARE COORDINATION (OUTPATIENT)
Dept: GASTROENTEROLOGY | Facility: CLINIC | Age: 32
End: 2017-06-20

## 2017-06-20 NOTE — PROGRESS NOTES
Pt and called said that one of her labs the dna was not covered by her insurance.  Wanted  a letter to explain why the lab was necessary.  Called pt back to let her know the labs were not ordered by our clinic and were ordered by Dr. Bansal.  Pt will contact that clinic.

## 2017-06-27 ENCOUNTER — INFUSION THERAPY VISIT (OUTPATIENT)
Dept: INFUSION THERAPY | Facility: CLINIC | Age: 32
End: 2017-06-27
Attending: INTERNAL MEDICINE
Payer: COMMERCIAL

## 2017-06-27 VITALS
TEMPERATURE: 98.6 F | SYSTOLIC BLOOD PRESSURE: 106 MMHG | OXYGEN SATURATION: 100 % | DIASTOLIC BLOOD PRESSURE: 73 MMHG | BODY MASS INDEX: 19.18 KG/M2 | WEIGHT: 104.9 LBS

## 2017-06-27 DIAGNOSIS — B25.9 CMV COLITIS (H): ICD-10-CM

## 2017-06-27 DIAGNOSIS — E86.0 DEHYDRATION: Primary | ICD-10-CM

## 2017-06-27 DIAGNOSIS — Z94.4 LIVER REPLACED BY TRANSPLANT (H): ICD-10-CM

## 2017-06-27 DIAGNOSIS — A08.39 CMV COLITIS (H): ICD-10-CM

## 2017-06-27 DIAGNOSIS — K51.00 ULCERATIVE PANCOLITIS WITHOUT COMPLICATION (H): ICD-10-CM

## 2017-06-27 LAB
ALBUMIN SERPL-MCNC: 2.4 G/DL (ref 3.4–5)
ALP SERPL-CCNC: 108 U/L (ref 40–150)
ALT SERPL W P-5'-P-CCNC: 22 U/L (ref 0–50)
AST SERPL W P-5'-P-CCNC: 12 U/L (ref 0–45)
BASOPHILS # BLD AUTO: 0.1 10E9/L (ref 0–0.2)
BASOPHILS NFR BLD AUTO: 0.5 %
BILIRUB DIRECT SERPL-MCNC: <0.1 MG/DL (ref 0–0.2)
BILIRUB SERPL-MCNC: 0.2 MG/DL (ref 0.2–1.3)
CRP SERPL-MCNC: 8.2 MG/L (ref 0–8)
DIFFERENTIAL METHOD BLD: ABNORMAL
EOSINOPHIL # BLD AUTO: 1.2 10E9/L (ref 0–0.7)
EOSINOPHIL NFR BLD AUTO: 9.8 %
ERYTHROCYTE [DISTWIDTH] IN BLOOD BY AUTOMATED COUNT: 22.4 % (ref 10–15)
ERYTHROCYTE [SEDIMENTATION RATE] IN BLOOD BY WESTERGREN METHOD: 86 MM/H (ref 0–20)
HCT VFR BLD AUTO: 31.6 % (ref 35–47)
HGB BLD-MCNC: 9.7 G/DL (ref 11.7–15.7)
IMM GRANULOCYTES # BLD: 0.1 10E9/L (ref 0–0.4)
IMM GRANULOCYTES NFR BLD: 0.6 %
LYMPHOCYTES # BLD AUTO: 3.2 10E9/L (ref 0.8–5.3)
LYMPHOCYTES NFR BLD AUTO: 26.1 %
MCH RBC QN AUTO: 25.9 PG (ref 26.5–33)
MCHC RBC AUTO-ENTMCNC: 30.7 G/DL (ref 31.5–36.5)
MCV RBC AUTO: 84 FL (ref 78–100)
MONOCYTES # BLD AUTO: 1.4 10E9/L (ref 0–1.3)
MONOCYTES NFR BLD AUTO: 11.1 %
NEUTROPHILS # BLD AUTO: 6.4 10E9/L (ref 1.6–8.3)
NEUTROPHILS NFR BLD AUTO: 51.9 %
NRBC # BLD AUTO: 0 10*3/UL
NRBC BLD AUTO-RTO: 0 /100
PLATELET # BLD AUTO: 697 10E9/L (ref 150–450)
PROT SERPL-MCNC: 6.8 G/DL (ref 6.8–8.8)
RBC # BLD AUTO: 3.75 10E12/L (ref 3.8–5.2)
WBC # BLD AUTO: 12.4 10E9/L (ref 4–11)

## 2017-06-27 PROCEDURE — 85652 RBC SED RATE AUTOMATED: CPT | Performed by: SURGERY

## 2017-06-27 PROCEDURE — 25000128 H RX IP 250 OP 636: Mod: ZF | Performed by: INTERNAL MEDICINE

## 2017-06-27 PROCEDURE — 36415 COLL VENOUS BLD VENIPUNCTURE: CPT | Performed by: SURGERY

## 2017-06-27 PROCEDURE — 96413 CHEMO IV INFUSION 1 HR: CPT

## 2017-06-27 PROCEDURE — 80076 HEPATIC FUNCTION PANEL: CPT | Performed by: SURGERY

## 2017-06-27 PROCEDURE — 86140 C-REACTIVE PROTEIN: CPT | Performed by: SURGERY

## 2017-06-27 PROCEDURE — 85025 COMPLETE CBC W/AUTO DIFF WBC: CPT | Performed by: SURGERY

## 2017-06-27 RX ADMIN — VEDOLIZUMAB 300 MG: 300 INJECTION, POWDER, LYOPHILIZED, FOR SOLUTION INTRAVENOUS at 08:35

## 2017-06-27 NOTE — MR AVS SNAPSHOT
After Visit Summary   6/27/2017    Yarelis Penny    MRN: [id number]           Patient Information     Date Of Birth          1985        Visit Information        Provider Department      6/27/2017 8:00 AM UC 51 ATC; UC SPEC INFUSION [facility] Specialty and Procedure        Today's Diagnoses     Dehydration    -  1    CMV colitis (H)        Liver replaced by transplant (H)        Ulcerative pancolitis without complication (H)          Care Instructions      Vedolizumab Solution for injection  What is this medicine?  VEDOLIZUMAB (Ve ashli LEWIS you mab) is used to treat ulcerative colitis and Crohn's disease in adult patients.  This medicine may be used for other purposes; ask your health care provider or pharmacist if you have questions.  What should I tell my health care provider before I take this medicine?  They need to know if you have any of these conditions:    diabetes    hepatitis B or history of hepatitis B infection    HIV or AIDS    immune system problems    infection or history of infections    liver disease    recently received or scheduled to receive a vaccine    scheduled to have surgery    tuberculosis, a positive skin test for tuberculosis or have recently been in close contact with someone who has tuberculosis    an unusual or allergic reaction to vedolizumab, other medicines, foods, dyes, or preservatives    pregnant or trying to get pregnant    breast-feeding  How should I use this medicine?  This medicine is for infusion into a vein. It is given by a health care professional in a hospital or clinic setting.  A special MedGuide will be given to you by the pharmacist with each prescription and refill. Be sure to read this information carefully each time.  Talk to your pediatrician regarding the use of this medicine in children. This medicine is not approved for use in children.  Overdosage: If you think you've taken too much of this medicine contact a poison  control center or emergency room at once.  NOTE: This medicine is only for you. Do not share this medicine with others.  What if I miss a dose?  It is important not to miss your dose. Call your doctor or health care professional if you are unable to keep an appointment.  What may interact with this medicine?    steroid medicines like prednisone or cortisone    TNF-alpha inhibitors like natalizumab, adalimumab, and infliximab    vaccines  This list may not describe all possible interactions. Give your health care provider a list of all the medicines, herbs, non-prescription drugs, or dietary supplements you use. Also tell them if you smoke, drink alcohol, or use illegal drugs. Some items may interact with your medicine.  What should I watch for while using this medicine?  Your condition will be monitored carefully while you are receiving this medicine. Visit your doctor for regular check ups. Tell your doctor or healthcare professional if your symptoms do not start to get better or if they get worse.  Stay away from people who are sick. Call your doctor or health care professional for advice if you get a fever, chills or sore throat, or other symptoms of a cold or flu. Do not treat yourself.  In some patients, this medicine may cause a serious brain infection that may cause death. If you have any problems seeing, thinking, speaking, walking, or standing, tell your doctor right away. If you cannot reach your doctor, get urgent medical care.  What side effects may I notice from receiving this medicine?  Side effects that you should report to your doctor or health care professional as soon as possible:    allergic reactions like skin rash, itching or hives, swelling of the face, lips, or tongue    breathing problems    changes in vision    chest pain    dark urine    depression, feelings of sadness    dizziness    general ill feeling or flu-like symptoms    irregular, missed, or painful menstrual  periods    light-colored stools    loss of appetite, nausea    muscle weakness    problems with balance, talking, or walking    right upper belly pain    unusually weak or tired    yellowing of the eyes or skin  Side effects that usually do not require medical attention (Report these to your doctor or health care professional if they continue or are bothersome.):    aches, pains    headache    stomach upset    tiredness  This list may not describe all possible side effects. Call your doctor for medical advice about side effects. You may report side effects to FDA at 8-101-OYS-5063.  Where should I keep my medicine?  This drug is given in a hospital or clinic and will not be stored at home.  NOTE: This sheet is a summary. It may not cover all possible information. If you have questions about this medicine, talk to your doctor, pharmacist, or health care provider.  NOTE:This sheet is a summary. It may not cover all possible information. If you have questions about this medicine, talk to your doctor, pharmacist, or health care provider. Copyright  2016 Gold Standard                Follow-ups after your visit        Your next 10 appointments already scheduled     Jul 25, 2017  8:00 AM CDT   Infusion 180 with UC SPEC INFUSION, UC 50 ATC   [facility] Specialty and Procedure ([facility])    [address] [place] [address] [phone]            Aug 28, 2017  7:30 AM CDT   (Arrive by 7:15 AM)   RETURN IRRITABLE BOWEL DISEASE with Fuentes Johnson MD   [facility] Gastroenterology and IBD ([facility])    [address] [place] [address] [phone]              Who to contact     If you have questions or need follow up information about today's clinic visit or your schedule please contact [facility] SPECIALTY AND PROCEDURE directly at [phone].  Normal or  non-critical lab and imaging results will be communicated to you by Surge Performance Traininghart, letter or phone within 4 business days after the clinic has received the results. If you do not hear from us within 7 days, please contact the clinic through Vyatta or phone. If you have a critical or abnormal lab result, we will notify you by phone as soon as possible.  Submit refill requests through Vyatta or call your pharmacy and they will forward the refill request to us. Please allow 3 business days for your refill to be completed.          Additional Information About Your Visit        Vyatta Information     Vyatta gives you secure access to your electronic health record. If you see a primary care provider, you can also send messages to your care team and make appointments. If you have questions, please call your primary care clinic.  If you do not have a primary care provider, please call [phone] and they will assist you.        Care EveryWhere ID     This is your Care EveryWhere ID. This could be used by other organizations to access your [place] medical records  [id number]        Your Vitals Were     Temperature Pulse Oximetry BMI (Body Mass Index)             98.6  F (37  C) (Oral) 100% 19.18 kg/m2          Blood Pressure from Last 3 Encounters:   06/27/17 106/73   06/07/17 109/76   05/30/17 101/73    Weight from Last 3 Encounters:   06/27/17 47.6 kg (104 lb 14.4 oz)   06/07/17 46.1 kg (101 lb 9.6 oz)   05/30/17 48 kg (105 lb 14.4 oz)              We Performed the Following     CBC with platelets differential     CRP inflammation     Erythrocyte sedimentation rate auto     Hepatic panel          Today's Medication Changes          These changes are accurate as of: 6/27/17  9:59 AM.  If you have any questions, ask your nurse or doctor.               These medicines have changed or have updated prescriptions.        Dose/Directions    predniSONE 10 MG tablet   Commonly known as:  DELTASONE   This may have changed:    -  how much to take  - how to take this  - when to take this  - additional instructions   Used for:  Ulcerative colitis with other complication, unspecified location (H)        Take 6 tabs (60mg) PO x 5 days  Then take 5 tabs (50mg) PO x 7 days  Then take 4 tabs (40mg) PO x 7 days Then take 3 tabs (30mg) PO x 7 days Then take 2 tabs (20mg) PO x 7 days Then take 1 tab (10mg) PO x 7 days Then take 1/2 tablet (5mg) PO x 7 days then DISCONTINUE  Then take   Quantity:  92 tablet   Refills:  0                Primary Care Provider Office Phone # Fax #    Jackson Orellana [phone]       [facility] [address] [address]        Equal Access to Services     ERI CHAKRABORTY : Hadii venancio esquivelo Soyari, waaxda luqadaha, qaybta kaalmada adeegyada, mani melo . So [facility] [phone].    ATENCIÓN: Si habla español, tiene a borja disposición servicios gratuitos de asistencia lingüística. [facility] [phone].    We comply with applicable federal civil rights laws and [place] laws. We do not discriminate on the basis of race, color, national origin, age, disability sex, sexual orientation or gender identity.            Thank you!     Thank you for choosing [facility] SPECIALTY AND PROCEDURE  for your care. Our goal is always to provide you with excellent care. Hearing back from our patients is one way we can continue to improve our services. Please take a few minutes to complete the written survey that you may receive in the mail after your visit with us. Thank you!             Your Updated Medication List - Protect others around you: Learn how to safely use, store and throw away your medicines at www.disposemymeds.org.          This list is accurate as of: 6/27/17  9:59 AM.  Always use your most recent med list.                   Brand Name Dispense Instructions for use Diagnosis    ENTYVIO IV      Inject 300 mg into the vein every 28  days        GNP ASPIRIN LOW DOSE 81 MG EC tablet   Generic drug:  aspirin     30 tablet    Take 1 tablet (81 mg) by mouth daily    Ulcerative colitis with other complication, unspecified location (H), Liver replaced by transplant (H)       predniSONE 10 MG tablet    DELTASONE    92 tablet    Take 6 tabs (60mg) PO x 5 days  Then take 5 tabs (50mg) PO x 7 days  Then take 4 tabs (40mg) PO x 7 days Then take 3 tabs (30mg) PO x 7 days Then take 2 tabs (20mg) PO x 7 days Then take 1 tab (10mg) PO x 7 days Then take 1/2 tablet (5mg) PO x 7 days then DISCONTINUE  Then take    Ulcerative colitis with other complication, unspecified location (H)       * tacrolimus 0.5 MG capsule    PROGRAF - GENERIC EQUIVALENT    180 capsule    Take 1 capsule (0.5 mg) by mouth 2 times daily Take with 1 mg capsule. Total dose 2.5 mg every 12 hours    S/P liver transplant (H)       * tacrolimus 1 MG capsule    PROGRAF - GENERIC EQUIVALENT    360 capsule    Take 2 capsules (2 mg) by mouth 2 times daily *total dose 2.5 mg BID. Take with 0.5 mg capsule    S/P liver transplant (H)       ursodiol 250 MG tablet    ACTIGALL    270 tablet    Take 2 tabs (500 mg) in AM, and 1 tab at night (250 mg)    Liver replaced by transplant (H)       zolpidem 5 MG tablet    AMBIEN     TK 1 T PO QD HS PRN FOR SLEEP        * Notice:  This list has 2 medication(s) that are the same as other medications prescribed for you. Read the directions carefully, and ask your doctor or other care provider to review them with you.

## 2017-06-27 NOTE — PATIENT INSTRUCTIONS
Vedolizumab Solution for injection  What is this medicine?  VEDOLIZUMAB (Ve cornelius JOSHUA you mab) is used to treat ulcerative colitis and Crohn's disease in adult patients.  This medicine may be used for other purposes; ask your health care provider or pharmacist if you have questions.  What should I tell my health care provider before I take this medicine?  They need to know if you have any of these conditions:    diabetes    hepatitis B or history of hepatitis B infection    HIV or AIDS    immune system problems    infection or history of infections    liver disease    recently received or scheduled to receive a vaccine    scheduled to have surgery    tuberculosis, a positive skin test for tuberculosis or have recently been in close contact with someone who has tuberculosis    an unusual or allergic reaction to vedolizumab, other medicines, foods, dyes, or preservatives    pregnant or trying to get pregnant    breast-feeding  How should I use this medicine?  This medicine is for infusion into a vein. It is given by a health care professional in a hospital or clinic setting.  A special MedGuide will be given to you by the pharmacist with each prescription and refill. Be sure to read this information carefully each time.  Talk to your pediatrician regarding the use of this medicine in children. This medicine is not approved for use in children.  Overdosage: If you think you've taken too much of this medicine contact a poison control center or emergency room at once.  NOTE: This medicine is only for you. Do not share this medicine with others.  What if I miss a dose?  It is important not to miss your dose. Call your doctor or health care professional if you are unable to keep an appointment.  What may interact with this medicine?    steroid medicines like prednisone or cortisone    TNF-alpha inhibitors like natalizumab, adalimumab, and infliximab    vaccines  This list may not describe all possible interactions. Give  your health care provider a list of all the medicines, herbs, non-prescription drugs, or dietary supplements you use. Also tell them if you smoke, drink alcohol, or use illegal drugs. Some items may interact with your medicine.  What should I watch for while using this medicine?  Your condition will be monitored carefully while you are receiving this medicine. Visit your doctor for regular check ups. Tell your doctor or healthcare professional if your symptoms do not start to get better or if they get worse.  Stay away from people who are sick. Call your doctor or health care professional for advice if you get a fever, chills or sore throat, or other symptoms of a cold or flu. Do not treat yourself.  In some patients, this medicine may cause a serious brain infection that may cause death. If you have any problems seeing, thinking, speaking, walking, or standing, tell your doctor right away. If you cannot reach your doctor, get urgent medical care.  What side effects may I notice from receiving this medicine?  Side effects that you should report to your doctor or health care professional as soon as possible:    allergic reactions like skin rash, itching or hives, swelling of the face, lips, or tongue    breathing problems    changes in vision    chest pain    dark urine    depression, feelings of sadness    dizziness    general ill feeling or flu-like symptoms    irregular, missed, or painful menstrual periods    light-colored stools    loss of appetite, nausea    muscle weakness    problems with balance, talking, or walking    right upper belly pain    unusually weak or tired    yellowing of the eyes or skin  Side effects that usually do not require medical attention (Report these to your doctor or health care professional if they continue or are bothersome.):    aches, pains    headache    stomach upset    tiredness  This list may not describe all possible side effects. Call your doctor for medical advice about  side effects. You may report side effects to FDA at 7-575-XIY-1230.  Where should I keep my medicine?  This drug is given in a hospital or clinic and will not be stored at home.  NOTE: This sheet is a summary. It may not cover all possible information. If you have questions about this medicine, talk to your doctor, pharmacist, or health care provider.  NOTE:This sheet is a summary. It may not cover all possible information. If you have questions about this medicine, talk to your doctor, pharmacist, or health care provider. Copyright  2016 [employer] Standard

## 2017-06-27 NOTE — PROGRESS NOTES
"Infusion Nursing Note  Yarelis Penny presents today to [facility] for:   Chief Complaint   Patient presents with     Infusion     Entyvio     During today's [facility] appointment, orders from Dr. Johnson were completed.  Frequency: q 4 weeks    Progress note:   Patient identification verified by name and date of birth.  Assessment completed.  Vitals recorded in Doc Flowsheets.  Patient was provided with education regarding infusion and possible side effects.  Patient verbalized understanding.     Premedications: were not ordered.   Infusion Rates: infusion given over approximately 30 mins.  Labs: were drawn per orders. Pt declined drawing viral labs due to \"issues with insurance coverage.\"  Vascular access: peripheral IV placed today.  Treatment Conditions: ~~~ NOTE: If the patient answers yes to any of the questions below, hold the infusion and contact ordering provider or on-call provider.    1. Have you recently had an elevated temperature, fever, chills, productive cough, coughing for 3 weeks or longer or hemoptysis,  abnormal vital signs, night sweats,  chest pain or have you noticed a decrease in your appetite, unexplained weight loss or fatigue? No  2. Do you have any open wounds or new incisions? No  3. Do you have any recent or upcoming hospitalizations, surgeries or dental procedures? No  4. Do you currently have or recently have had any signs of illness or infection or are you on any antibiotics? No  5. Have you had any new, sudden or worsening abdominal pain? No  6. Have you or anyone in your household received a live vaccination in the past 4 weeks? Please note:  No live vaccines while on biologic/chemotherapy until 6 months after the last treatment.  Patient can receive the flu vaccine (shot only) and the pneumovax.  It is optimal for the patient to get these vaccines mid cycle, but they can be given at any time as long as it is not on the day of " the infusion. No  7. Have you recently been diagnosed with any new nervous system diseases (ie. Multiple sclerosis, Guillain [place], seizures, neurological changes) or cancer diagnosis? Are you on any form of radiation or chemotherapy? No  8. Are you pregnant or breast feeding or do you have plans of pregnancy in the future? No  9. Have you been having any signs of worsening depression or suicidal ideations?  (benlysta only) N/A  10. Have there been any other new onset medical symptoms? No    Patient tolerated infusion: well    Discharge Plan:   Follow up plan of care with: ongoing infusions at [facility]. and primary medical doctor.  Discharge instructions were reviewed with patient.  Patient/representative verbalized understanding of discharge instructions and all questions answered.  Patient discharged from [facility] in stable condition.    Melanie Coon RN       Administrations This Visit     vedolizumab (ENTYVIO) 300 mg in NaCl 0.9 % 280 mL infusion     Admin Date Action Dose Rate Route Administered By          06/27/2017 New Bag 300 mg 560 mL/hr Intravenous Melanie Coon RN                           /65  Temp 98.6  F (37  C) (Oral)  Wt 47.6 kg (104 lb 14.4 oz)  SpO2 100%  BMI 19.18 kg/m2

## 2017-06-28 ENCOUNTER — CARE COORDINATION (OUTPATIENT)
Dept: GASTROENTEROLOGY | Facility: CLINIC | Age: 32
End: 2017-06-28

## 2017-06-28 DIAGNOSIS — K52.9 INFLAMMATORY BOWEL DISEASE: ICD-10-CM

## 2017-06-28 DIAGNOSIS — K83.01 PSC (PRIMARY SCLEROSING CHOLANGITIS) (H): ICD-10-CM

## 2017-06-28 DIAGNOSIS — D84.9 IMMUNOSUPPRESSION (H): Primary | ICD-10-CM

## 2017-06-28 NOTE — PROGRESS NOTES
Returned voice mail. Diarrhea stools 6 times a day.  This has not changed since March. Feels this has been going on since the vedo was every 8 weeks.     No blood.  Cramping until after bowel movement. Taking 5 mg prednisone for awhile. Pt okay with her prednisone increasing.  Will route Dr. Johnson.

## 2017-06-29 ENCOUNTER — CARE COORDINATION (OUTPATIENT)
Dept: GASTROENTEROLOGY | Facility: CLINIC | Age: 32
End: 2017-06-29

## 2017-06-29 DIAGNOSIS — K52.9 COLITIS: Primary | ICD-10-CM

## 2017-06-29 RX ORDER — SULFAMETHOXAZOLE/TRIMETHOPRIM 800-160 MG
1 TABLET ORAL
Qty: 90 TABLET | Refills: 3 | Status: SHIPPED | OUTPATIENT
Start: 2017-06-30 | End: 2018-11-07

## 2017-06-29 RX ORDER — PREDNISONE 5 MG/1
TABLET ORAL
Qty: 228 TABLET | Refills: 0 | Status: SHIPPED | OUTPATIENT
Start: 2017-06-29 | End: 2017-09-13

## 2017-06-29 NOTE — PROGRESS NOTES
Called pt to discuss the plan for prednisone, colonoscopy and bactrim.  Left her a voice mail message.  Also sent a my chart message.  Faxed the prednisone order to pharmacy      Prednisone 40 mg for 1 week, then 30 mg for 1 week, then 20 mg for 1 week, then 15 mg for 1 week, then 10 mg for one week, then down to 5 mg daily     I'd also like to schedule a colonoscopy with myself at [facility] sometime in mid August.     She will also need to restart the bactrim DS tablet three times weekly (Monday, Wednesday, Friday). I have ordered this and sent to pharmacy on [address]. This is for PCP prophylaxis (to help prevent infection).     Fuentes Johnson MD    [facility]  Division of Gastroenterology, Hepatology and Nutrition

## 2017-06-29 NOTE — PROGRESS NOTES
Thank you for the update. Let's give her some prednisone to see if this can help the newer more frequent (q 4 week) vedolizumab schedule get her into remission.    Prednisone 40 mg for 1 week, then 30 mg for 1 week, then 20 mg for 1 week, then 15 mg for 1 week, then 10 mg for one week, then down to 5 mg daily    I'd also like to schedule a colonoscopy with myself at [facility] sometime in mid August.    She will also need to restart the bactrim DS tablet three times weekly (Monday, Wednesday, Friday). I have ordered this and sent to pharmacy on [address]. This is for PCP prophylaxis (to help prevent infection).    Fuentes Johnson MD    [facility]  Division of Gastroenterology, Hepatology and Nutrition

## 2017-07-06 ENCOUNTER — CARE COORDINATION (OUTPATIENT)
Dept: GASTROENTEROLOGY | Facility: CLINIC | Age: 32
End: 2017-07-06

## 2017-07-06 NOTE — PROGRESS NOTES
Stent follow-up for ERCP 5/15/2017 with Dr. Macias    Images reviewed by provider:     No stent is seen. No further endoscopy     Called patient and advise of above. Verbalized understanding.     Lyndsey HAMILTON RN Coordinator  Dr. Quinones, Dr. Huerta & Dr. Macias   [facility]  [phone] #4

## 2017-07-20 ENCOUNTER — CARE COORDINATION (OUTPATIENT)
Dept: GASTROENTEROLOGY | Facility: CLINIC | Age: 32
End: 2017-07-20

## 2017-07-20 NOTE — PROGRESS NOTES
Called infusion prior to push for urgent appeal.  Stressing that infusion is due on the 25th.  Not to best interest of pt that infusion appt is delayed.  Letter of necessity and asked to check for a peer to peer be requested.

## 2017-07-25 ENCOUNTER — INFUSION THERAPY VISIT (OUTPATIENT)
Dept: INFUSION THERAPY | Facility: CLINIC | Age: 32
End: 2017-07-25
Attending: INTERNAL MEDICINE
Payer: COMMERCIAL

## 2017-07-25 VITALS
TEMPERATURE: 98.5 F | BODY MASS INDEX: 19.6 KG/M2 | HEART RATE: 73 BPM | SYSTOLIC BLOOD PRESSURE: 105 MMHG | OXYGEN SATURATION: 100 % | WEIGHT: 107.2 LBS | DIASTOLIC BLOOD PRESSURE: 78 MMHG

## 2017-07-25 DIAGNOSIS — A08.39 CMV COLITIS (H): ICD-10-CM

## 2017-07-25 DIAGNOSIS — E86.0 DEHYDRATION: Primary | ICD-10-CM

## 2017-07-25 DIAGNOSIS — K51.00 ULCERATIVE PANCOLITIS WITHOUT COMPLICATION (H): ICD-10-CM

## 2017-07-25 DIAGNOSIS — B27.00 GAMMAHERPESVIRAL MONONUCLEOSIS WITHOUT COMPLICATION: ICD-10-CM

## 2017-07-25 DIAGNOSIS — Z94.4 LIVER REPLACED BY TRANSPLANT (H): ICD-10-CM

## 2017-07-25 DIAGNOSIS — B25.9 CMV COLITIS (H): ICD-10-CM

## 2017-07-25 DIAGNOSIS — B25.9 CYTOMEGALOVIRUS (CMV) VIREMIA (H): ICD-10-CM

## 2017-07-25 LAB
ALBUMIN SERPL-MCNC: 2.9 G/DL (ref 3.4–5)
ALP SERPL-CCNC: 110 U/L (ref 40–150)
ALT SERPL W P-5'-P-CCNC: 28 U/L (ref 0–50)
ANION GAP SERPL CALCULATED.3IONS-SCNC: 7 MMOL/L (ref 3–14)
AST SERPL W P-5'-P-CCNC: 16 U/L (ref 0–45)
BASOPHILS # BLD AUTO: 0 10E9/L (ref 0–0.2)
BASOPHILS NFR BLD AUTO: 0.3 %
BILIRUB DIRECT SERPL-MCNC: <0.1 MG/DL (ref 0–0.2)
BILIRUB SERPL-MCNC: 0.3 MG/DL (ref 0.2–1.3)
BUN SERPL-MCNC: 22 MG/DL (ref 7–30)
CALCIUM SERPL-MCNC: 8.8 MG/DL (ref 8.5–10.1)
CHLORIDE SERPL-SCNC: 110 MMOL/L (ref 94–109)
CO2 SERPL-SCNC: 22 MMOL/L (ref 20–32)
CREAT SERPL-MCNC: 1.04 MG/DL (ref 0.52–1.04)
CRP SERPL-MCNC: <2.9 MG/L (ref 0–8)
DIFFERENTIAL METHOD BLD: ABNORMAL
EOSINOPHIL # BLD AUTO: 0.4 10E9/L (ref 0–0.7)
EOSINOPHIL NFR BLD AUTO: 3 %
ERYTHROCYTE [DISTWIDTH] IN BLOOD BY AUTOMATED COUNT: 16.9 % (ref 10–15)
ERYTHROCYTE [SEDIMENTATION RATE] IN BLOOD BY WESTERGREN METHOD: 74 MM/H (ref 0–20)
GFR SERPL CREATININE-BSD FRML MDRD: 61 ML/MIN/1.7M2
GLUCOSE SERPL-MCNC: 78 MG/DL (ref 70–99)
HCT VFR BLD AUTO: 28.2 % (ref 35–47)
HGB BLD-MCNC: 8.6 G/DL (ref 11.7–15.7)
IMM GRANULOCYTES # BLD: 0.1 10E9/L (ref 0–0.4)
IMM GRANULOCYTES NFR BLD: 0.5 %
LYMPHOCYTES # BLD AUTO: 3.9 10E9/L (ref 0.8–5.3)
LYMPHOCYTES NFR BLD AUTO: 29.2 %
MCH RBC QN AUTO: 24.4 PG (ref 26.5–33)
MCHC RBC AUTO-ENTMCNC: 30.5 G/DL (ref 31.5–36.5)
MCV RBC AUTO: 80 FL (ref 78–100)
MONOCYTES # BLD AUTO: 1 10E9/L (ref 0–1.3)
MONOCYTES NFR BLD AUTO: 7.7 %
NEUTROPHILS # BLD AUTO: 7.9 10E9/L (ref 1.6–8.3)
NEUTROPHILS NFR BLD AUTO: 59.3 %
NRBC # BLD AUTO: 0 10*3/UL
NRBC BLD AUTO-RTO: 0 /100
PLATELET # BLD AUTO: 787 10E9/L (ref 150–450)
POTASSIUM SERPL-SCNC: 4.1 MMOL/L (ref 3.4–5.3)
PROT SERPL-MCNC: 7.1 G/DL (ref 6.8–8.8)
RBC # BLD AUTO: 3.53 10E12/L (ref 3.8–5.2)
SODIUM SERPL-SCNC: 139 MMOL/L (ref 133–144)
TACROLIMUS BLD-MCNC: 4.6 UG/L (ref 5–15)
TME LAST DOSE: ABNORMAL H
WBC # BLD AUTO: 13.3 10E9/L (ref 4–11)

## 2017-07-25 PROCEDURE — 87799 DETECT AGENT NOS DNA QUANT: CPT | Performed by: INTERNAL MEDICINE

## 2017-07-25 PROCEDURE — 96413 CHEMO IV INFUSION 1 HR: CPT

## 2017-07-25 PROCEDURE — 25000128 H RX IP 250 OP 636: Mod: ZF | Performed by: INTERNAL MEDICINE

## 2017-07-25 PROCEDURE — 80048 BASIC METABOLIC PNL TOTAL CA: CPT | Performed by: INTERNAL MEDICINE

## 2017-07-25 PROCEDURE — 85652 RBC SED RATE AUTOMATED: CPT | Performed by: INTERNAL MEDICINE

## 2017-07-25 PROCEDURE — 85025 COMPLETE CBC W/AUTO DIFF WBC: CPT | Performed by: INTERNAL MEDICINE

## 2017-07-25 PROCEDURE — 86140 C-REACTIVE PROTEIN: CPT | Performed by: INTERNAL MEDICINE

## 2017-07-25 PROCEDURE — 80076 HEPATIC FUNCTION PANEL: CPT | Performed by: INTERNAL MEDICINE

## 2017-07-25 PROCEDURE — 80197 ASSAY OF TACROLIMUS: CPT | Performed by: SURGERY

## 2017-07-25 RX ADMIN — VEDOLIZUMAB 300 MG: 300 INJECTION, POWDER, LYOPHILIZED, FOR SOLUTION INTRAVENOUS at 08:23

## 2017-07-25 NOTE — PATIENT INSTRUCTIONS
Vedolizumab Solution for injection  What is this medicine?  VEDOLIZUMAB (Ve cornelius JOSHUA you mab) is used to treat ulcerative colitis and Crohn's disease in adult patients.  This medicine may be used for other purposes; ask your health care provider or pharmacist if you have questions.  What should I tell my health care provider before I take this medicine?  They need to know if you have any of these conditions:    diabetes    hepatitis B or history of hepatitis B infection    HIV or AIDS    immune system problems    infection or history of infections    liver disease    recently received or scheduled to receive a vaccine    scheduled to have surgery    tuberculosis, a positive skin test for tuberculosis or have recently been in close contact with someone who has tuberculosis    an unusual or allergic reaction to vedolizumab, other medicines, foods, dyes, or preservatives    pregnant or trying to get pregnant    breast-feeding  How should I use this medicine?  This medicine is for infusion into a vein. It is given by a health care professional in a hospital or clinic setting.  A special MedGuide will be given to you by the pharmacist with each prescription and refill. Be sure to read this information carefully each time.  Talk to your pediatrician regarding the use of this medicine in children. This medicine is not approved for use in children.  Overdosage: If you think you've taken too much of this medicine contact a poison control center or emergency room at once.  NOTE: This medicine is only for you. Do not share this medicine with others.  What if I miss a dose?  It is important not to miss your dose. Call your doctor or health care professional if you are unable to keep an appointment.  What may interact with this medicine?    steroid medicines like prednisone or cortisone    TNF-alpha inhibitors like natalizumab, adalimumab, and infliximab    vaccines  This list may not describe all possible interactions. Give  your health care provider a list of all the medicines, herbs, non-prescription drugs, or dietary supplements you use. Also tell them if you smoke, drink alcohol, or use illegal drugs. Some items may interact with your medicine.  What should I watch for while using this medicine?  Your condition will be monitored carefully while you are receiving this medicine. Visit your doctor for regular check ups. Tell your doctor or healthcare professional if your symptoms do not start to get better or if they get worse.  Stay away from people who are sick. Call your doctor or health care professional for advice if you get a fever, chills or sore throat, or other symptoms of a cold or flu. Do not treat yourself.  In some patients, this medicine may cause a serious brain infection that may cause death. If you have any problems seeing, thinking, speaking, walking, or standing, tell your doctor right away. If you cannot reach your doctor, get urgent medical care.  What side effects may I notice from receiving this medicine?  Side effects that you should report to your doctor or health care professional as soon as possible:    allergic reactions like skin rash, itching or hives, swelling of the face, lips, or tongue    breathing problems    changes in vision    chest pain    dark urine    depression, feelings of sadness    dizziness    general ill feeling or flu-like symptoms    irregular, missed, or painful menstrual periods    light-colored stools    loss of appetite, nausea    muscle weakness    problems with balance, talking, or walking    right upper belly pain    unusually weak or tired    yellowing of the eyes or skin  Side effects that usually do not require medical attention (Report these to your doctor or health care professional if they continue or are bothersome.):    aches, pains    headache    stomach upset    tiredness  This list may not describe all possible side effects. Call your doctor for medical advice about  side effects. You may report side effects to FDA at 2-619-IUT-8235.  Where should I keep my medicine?  This drug is given in a hospital or clinic and will not be stored at home.  NOTE: This sheet is a summary. It may not cover all possible information. If you have questions about this medicine, talk to your doctor, pharmacist, or health care provider.  NOTE:This sheet is a summary. It may not cover all possible information. If you have questions about this medicine, talk to your doctor, pharmacist, or health care provider. Copyright  2016 [employer] Standard

## 2017-07-25 NOTE — PROGRESS NOTES
Infusion Nursing Note  Yarelis Penny presents today to [facility] for:   Chief Complaint   Patient presents with     Infusion     Entyvio     During today's [facility] appointment, orders from Dr. Johnson were completed.  Frequency: every 4 weeks    Progress note:  Patient identification verified by name and date of birth.  Assessment completed.  Vitals recorded in Doc Flowsheets.  Patient was provided with education regarding infusion and possible side effects.  Patient verbalized understanding.     Premedications: were not ordered.  Infusion Rates: infusion given over approximately 30 mins.  Labs: were drawn per orders.   Vascular access: peripheral IV placed today.  Treatment Conditions: ~~~ NOTE: If the patient answers yes to any of the questions below, hold the infusion and contact ordering provider or on-call provider.    1. Have you recently had an elevated temperature, fever, chills, productive cough, coughing for 3 weeks or longer or hemoptysis,  abnormal vital signs, night sweats,  chest pain or have you noticed a decrease in your appetite, unexplained weight loss or fatigue? No  2. Do you have any open wounds or new incisions? No  3. Do you have any recent or upcoming hospitalizations, surgeries or dental procedures? No  4. Do you currently have or recently have had any signs of illness or infection or are you on any antibiotics? No  5. Have you had any new, sudden or worsening abdominal pain? No  6. Have you or anyone in your household received a live vaccination in the past 4 weeks? Please note:  No live vaccines while on biologic/chemotherapy until 6 months after the last treatment.  Patient can receive the flu vaccine (shot only) and the pneumovax.  It is optimal for the patient to get these vaccines mid cycle, but they can be given at any time as long as it is not on the day of the infusion. No  7. Have you recently been diagnosed with any new  nervous system diseases (ie. Multiple sclerosis, Guillain [place], seizures, neurological changes) or cancer diagnosis? Are you on any form of radiation or chemotherapy? No  8. Are you pregnant or breast feeding or do you have plans of pregnancy in the future? No  9. Have you been having any signs of worsening depression or suicidal ideations?  (benlysta only) No  10. Have there been any other new onset medical symptoms? No    Patient tolerated infusion: well    Discharge Plan:   Follow up plan of care with: ongoing infusions at [facility]. and primary medical doctor.  Discharge instructions were reviewed with patient.  Patient/representative verbalized understanding of discharge instructions and all questions answered.  Patient discharged from [facility] in stable condition.    Melanie Coon RN     Administrations This Visit     vedolizumab (ENTYVIO) 300 mg in NaCl 0.9 % 280 mL infusion     Admin Date Action Dose Rate Route Administered By          07/25/2017 New Bag 300 mg 560 mL/hr Intravenous Melanie Coon, RN                           /78  Pulse 73  Temp 98.5  F (36.9  C) (Oral)  Wt 48.6 kg (107 lb 3.2 oz)  SpO2 100%  BMI 19.6 kg/m2

## 2017-07-25 NOTE — MR AVS SNAPSHOT
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  VEDOLIZUMAB (Ve cornelius JOSHUA you mab) is used to treat ulcerative colitis and Crohn's disease in adult patients.  This medicine may be used for other purposes; ask your health care provider or pharmacist if you have questions.  What should I tell my health care provider before I take this medicine?  They need to know if you have any of these conditions:    diabetes    hepatitis B or history of hepatitis B infection    HIV or AIDS    immune system problems    infection or history of infections    liver disease    recently received or scheduled to receive a vaccine    scheduled to have surgery    tuberculosis, a positive skin test for tuberculosis or have recently been in close contact with someone who has tuberculosis    an unusual or allergic reaction to vedolizumab, other medicines, foods, dyes, or preservatives    pregnant or trying to get pregnant    breast-feeding  How should I use this medicine?  This medicine is for infusion into a vein. It is given by a health care professional in a hospital or clinic setting.  A special MedGuide will be given to you by the pharmacist with each prescription and refill. Be sure to read this information carefully each time.  Talk to your pediatrician regarding the use of this medicine in children. This medicine is not approved for  use in children.  Overdosage: If you think you've taken too much of this medicine contact a poison control center or emergency room at once.  NOTE: This medicine is only for you. Do not share this medicine with others.  What if I miss a dose?  It is important not to miss your dose. Call your doctor or health care professional if you are unable to keep an appointment.  What may interact with this medicine?    steroid medicines like prednisone or cortisone    TNF-alpha inhibitors like natalizumab, adalimumab, and infliximab    vaccines  This list may not describe all possible interactions. Give your health care provider a list of all the medicines, herbs, non-prescription drugs, or dietary supplements you use. Also tell them if you smoke, drink alcohol, or use illegal drugs. Some items may interact with your medicine.  What should I watch for while using this medicine?  Your condition will be monitored carefully while you are receiving this medicine. Visit your doctor for regular check ups. Tell your doctor or healthcare professional if your symptoms do not start to get better or if they get worse.  Stay away from people who are sick. Call your doctor or health care professional for advice if you get a fever, chills or sore throat, or other symptoms of a cold or flu. Do not treat yourself.  In some patients, this medicine may cause a serious brain infection that may cause death. If you have any problems seeing, thinking, speaking, walking, or standing, tell your doctor right away. If you cannot reach your doctor, get urgent medical care.  What side effects may I notice from receiving this medicine?  Side effects that you should report to your doctor or health care professional as soon as possible:    allergic reactions like skin rash, itching or hives, swelling of the face, lips, or tongue    breathing problems    changes in vision    chest pain    dark urine    depression, feelings of sadness    dizziness    general  ill feeling or flu-like symptoms    irregular, missed, or painful menstrual periods    light-colored stools    loss of appetite, nausea    muscle weakness    problems with balance, talking, or walking    right upper belly pain    unusually weak or tired    yellowing of the eyes or skin  Side effects that usually do not require medical attention (Report these to your doctor or health care professional if they continue or are bothersome.):    aches, pains    headache    stomach upset    tiredness  This list may not describe all possible side effects. Call your doctor for medical advice about side effects. You may report side effects to FDA at 6-002-YJC-8674.  Where should I keep my medicine?  This drug is given in a hospital or clinic and will not be stored at home.  NOTE: This sheet is a summary. It may not cover all possible information. If you have questions about this medicine, talk to your doctor, pharmacist, or health care provider.  NOTE:This sheet is a summary. It may not cover all possible information. If you have questions about this medicine, talk to your doctor, pharmacist, or health care provider. Copyright  2016 [employer] Standard                Follow-ups after your visit        Your next 10 appointments already scheduled     Aug 28, 2017  7:30 AM CDT   (Arrive by 7:15 AM)   RETURN INFLAMMATORY BOWEL DISEASE with Feuntes Johnson MD   [facility] ([facility])    [address] [place] [address]  4th Floor  [facility] [address]   [phone]            Aug 29, 2017  7:00 AM CDT   Infusion 180 with UC SPEC INFUSION, UC 51 ATC   [facility] Special[facility] Procedure ([facility])    [address]  2nd Floor  [facility] [address]   [phone]            Sep 05, 2017   Procedure with Fuentes Johnson MD   [facility] [place], Endoscopy ([facility])    [address] [address]   [phone]           The [place] campus is located on the corner of [address] and [address] on the [address]. It is easily accessible from virtually any point in the [place] [place] area, via [address] and [address].            Dec 06, 2017  8:00 AM CST   (Arrive by 7:45 AM)   Return Visit with Walker Nails MD   [facility] and Infectious Diseases ([facility])    [address]  3rd Floor  [facility] [id number] [phone]              Who to contact     If you have questions or need follow up information about today's clinic visit or your schedule please contact [facility] [place] SPECIALTY AND PROCEDURE directly at [phone].  Normal or non-critical lab and imaging results will be communicated to you by AppSlingrhart, letter or phone within 4 business days after the clinic has received the results. If you do not hear from us within 7 days, please contact the clinic through AppSlingrhart or phone. If you have a critical or abnormal lab result, we will notify you by phone as soon as possible.  Submit refill requests through SlideShare or call your pharmacy and they will forward the refill request to us. Please allow 3 business days for your refill to be completed.          Additional Information About Your Visit        SlideShare Information     SlideShare gives you secure access to your electronic health record. If you see a primary care provider, you can also send messages to your care team and make appointments. If you have questions, please call your primary care clinic.  If you do not have a primary care provider, please call [phone] and they will assist you.        Care EveryWhere ID     This is your Care EveryWhere ID. This could be used by other organizations to access your [place] medical records  [id number]        Your Vitals Were     Pulse Temperature Pulse Oximetry BMI (Body  Mass Index)          73 98.5  F (36.9  C) (Oral) 100% 19.6 kg/m2         Blood Pressure from Last 3 Encounters:   07/25/17 105/78   06/27/17 106/73   06/07/17 109/76    Weight from Last 3 Encounters:   07/25/17 48.6 kg (107 lb 3.2 oz)   06/27/17 47.6 kg (104 lb 14.4 oz)   06/07/17 46.1 kg (101 lb 9.6 oz)              We Performed the Following     Basic metabolic panel     CBC with platelets differential     CMV DNA quantification     CRP inflammation     EBV DNA PCR Quantitative Whole Blood     Erythrocyte sedimentation rate auto     Hepatic panel     Tacrolimus level          Today's Medication Changes          These changes are accurate as of: 7/25/17  9:01 AM.  If you have any questions, ask your nurse or doctor.               These medicines have changed or have updated prescriptions.        Dose/Directions    * predniSONE 10 MG tablet   Commonly known as:  DELTASONE   This may have changed:    - how much to take  - how to take this  - when to take this  - additional instructions   Used for:  Ulcerative colitis with other complication, unspecified location (H)        Take 6 tabs (60mg) PO x 5 days  Then take 5 tabs (50mg) PO x 7 days  Then take 4 tabs (40mg) PO x 7 days Then take 3 tabs (30mg) PO x 7 days Then take 2 tabs (20mg) PO x 7 days Then take 1 tab (10mg) PO x 7 days Then take 1/2 tablet (5mg) PO x 7 days then DISCONTINUE  Then take   Quantity:  92 tablet   Refills:  0       * predniSONE 5 MG tablet   Commonly known as:  DELTASONE   This may have changed:  Another medication with the same name was changed. Make sure you understand how and when to take each.   Used for:  Colitis        Take 8 tabs daily (40 mg) for 1 week, then 6 tabs  (30 mg) daily for 1 week, then 4  Tabs (20mg) daily for 1 week ,then 3 tabs (15mg) daily  for 1 week, 2 tabs (10 mg) daily   for one week then stay on 1 tab ( 5 mg ) daily   Quantity:  228 tablet   Refills:  0       * Notice:  This list has 2 medication(s) that are the  same as other medications prescribed for you. Read the directions carefully, and ask your doctor or other care provider to review them with you.             Primary Care Provider Office Phone # Fax #    Jackson Orellana [phone]       [facility] [address]  [facility] [address]        Equal Access to Services     OSITO CHAKRABORTY : Hadii aad ku hadasho Soomaali, waaxda luqadaha, qaybta kaalmada adeegyada, waxay idiin hayaan adeeg kharash lajoseen . So [facility] [phone].    ATENCIÓN: Si habla español, tiene a borja disposición servicios gratuitos de asistencia lingüística. Clyde al [phone].    We comply with applicable federal civil rights laws and [place] laws. We do not discriminate on the basis of race, color, national origin, age, disability sex, sexual orientation or gender identity.            Thank you!     Thank you for choosing [facility] SPECIALTY AND PROCEDURE  for your care. Our goal is always to provide you with excellent care. Hearing back from our patients is one way we can continue to improve our services. Please take a few minutes to complete the written survey that you may receive in the mail after your visit with us. Thank you!             Your Updated Medication List - Protect others around you: Learn how to safely use, store and throw away your medicines at www.disposemymeds.org.          This list is accurate as of: 7/25/17  9:01 AM.  Always use your most recent med list.                   Brand Name Dispense Instructions for use Diagnosis    ENTYVIO IV      Inject 300 mg into the vein every 28 days        GNP ASPIRIN LOW DOSE 81 MG EC tablet   Generic drug:  aspirin     30 tablet    Take 1 tablet (81 mg) by mouth daily    Ulcerative colitis with other complication, unspecified location (H), Liver replaced by transplant (H)       * predniSONE 10 MG tablet    DELTASONE    92 tablet    Take 6 tabs (60mg) PO x 5 days  Then take 5 tabs  (50mg) PO x 7 days  Then take 4 tabs (40mg) PO x 7 days Then take 3 tabs (30mg) PO x 7 days Then take 2 tabs (20mg) PO x 7 days Then take 1 tab (10mg) PO x 7 days Then take 1/2 tablet (5mg) PO x 7 days then DISCONTINUE  Then take    Ulcerative colitis with other complication, unspecified location (H)       * predniSONE 5 MG tablet    DELTASONE    228 tablet    Take 8 tabs daily (40 mg) for 1 week, then 6 tabs  (30 mg) daily for 1 week, then 4  Tabs (20mg) daily for 1 week ,then 3 tabs (15mg) daily  for 1 week, 2 tabs (10 mg) daily   for one week then stay on 1 tab ( 5 mg ) daily    Colitis       sulfamethoxazole-trimethoprim 800-160 MG per tablet    BACTRIM DS/SEPTRA DS    90 tablet    Take 1 tablet by mouth three times a week Monday, Wednesday, Friday    Immunosuppression (H), Inflammatory bowel disease, PSC (primary sclerosing cholangitis)       * tacrolimus 0.5 MG capsule    PROGRAF - GENERIC EQUIVALENT    180 capsule    Take 1 capsule (0.5 mg) by mouth 2 times daily Take with 1 mg capsule. Total dose 2.5 mg every 12 hours    S/P liver transplant (H)       * tacrolimus 1 MG capsule    PROGRAF - GENERIC EQUIVALENT    360 capsule    Take 2 capsules (2 mg) by mouth 2 times daily *total dose 2.5 mg BID. Take with 0.5 mg capsule    S/P liver transplant (H)       ursodiol 250 MG tablet    ACTIGALL    270 tablet    Take 2 tabs (500 mg) in AM, and 1 tab at night (250 mg)    Liver replaced by transplant (H)       zolpidem 5 MG tablet    AMBIEN     TK 1 T PO QD HS PRN FOR SLEEP        * Notice:  This list has 4 medication(s) that are the same as other medications prescribed for you. Read the directions carefully, and ask your doctor or other care provider to review them with you.

## 2017-07-26 LAB
CMV DNA SPEC NAA+PROBE-ACNC: 196 [IU]/ML
CMV DNA SPEC NAA+PROBE-LOG#: 2.3 {LOG_IU}/ML
EBV DNA # SPEC NAA+PROBE: ABNORMAL {COPIES}/ML
EBV DNA SPEC NAA+PROBE-LOG#: 5.5 {LOG_COPIES}/ML
SPECIMEN SOURCE: ABNORMAL

## 2017-07-27 ENCOUNTER — CARE COORDINATION (OUTPATIENT)
Dept: GASTROENTEROLOGY | Facility: CLINIC | Age: 32
End: 2017-07-27

## 2017-07-27 NOTE — PROGRESS NOTES
Called pt per request from Dr. Johnson to discuss symptoms and also to see if pt would like to move up her colonoscopy. Pt has tapered to 20 mg daily.  States that she is doing great.  Having one to two formed stools.  No abdominal pain.  Pt is not interested in moving up her colonoscopy.  Will change her clinic visit to after her colonoscopy appt. Pt will keep us posted if symptoms increase as she tapers off her prednisone.

## 2017-07-29 ENCOUNTER — HEALTH MAINTENANCE LETTER (OUTPATIENT)
Age: 32
End: 2017-07-29

## 2017-08-28 ENCOUNTER — TELEPHONE (OUTPATIENT)
Dept: GASTROENTEROLOGY | Facility: CLINIC | Age: 32
End: 2017-08-28

## 2017-08-28 DIAGNOSIS — Z12.11 ENCOUNTER FOR SCREENING COLONOSCOPY: Primary | ICD-10-CM

## 2017-08-28 NOTE — TELEPHONE ENCOUNTER
Patient scheduled for Colonoscopy    Indication for procedure. Colitis    Referring Provider. Fuentes Johnson MD    ? Not Needed    Arrival time verified? Yes, 1400    Facility location verified? Yes, [address]    Instructions given regarding prep and procedure    Prep Type Golytely    Are you taking any anticoagulants or blood thinners? Aspirin    Instructions given? Stopped    Electronic implanted devices? No    Pre procedure teaching completed? Yes    Transportation from procedure?     H&P / Pre op physical completed? N/A

## 2017-08-29 ENCOUNTER — INFUSION THERAPY VISIT (OUTPATIENT)
Dept: INFUSION THERAPY | Facility: CLINIC | Age: 32
End: 2017-08-29
Attending: INTERNAL MEDICINE
Payer: COMMERCIAL

## 2017-08-29 ENCOUNTER — CARE COORDINATION (OUTPATIENT)
Dept: GASTROENTEROLOGY | Facility: CLINIC | Age: 32
End: 2017-08-29

## 2017-08-29 ENCOUNTER — MYC MEDICAL ADVICE (OUTPATIENT)
Dept: GASTROENTEROLOGY | Facility: CLINIC | Age: 32
End: 2017-08-29

## 2017-08-29 VITALS
WEIGHT: 108.2 LBS | HEART RATE: 69 BPM | DIASTOLIC BLOOD PRESSURE: 77 MMHG | SYSTOLIC BLOOD PRESSURE: 111 MMHG | RESPIRATION RATE: 16 BRPM | TEMPERATURE: 98.5 F | BODY MASS INDEX: 19.78 KG/M2

## 2017-08-29 DIAGNOSIS — K51.00 ULCERATIVE PANCOLITIS WITHOUT COMPLICATION (H): ICD-10-CM

## 2017-08-29 DIAGNOSIS — B25.9 CMV COLITIS (H): ICD-10-CM

## 2017-08-29 DIAGNOSIS — B25.9 CYTOMEGALOVIRUS (CMV) VIREMIA (H): ICD-10-CM

## 2017-08-29 DIAGNOSIS — E86.0 DEHYDRATION: Primary | ICD-10-CM

## 2017-08-29 DIAGNOSIS — A08.39 CMV COLITIS (H): ICD-10-CM

## 2017-08-29 PROBLEM — D50.9 ANEMIA, IRON DEFICIENCY: Status: ACTIVE | Noted: 2017-08-29

## 2017-08-29 LAB
ALBUMIN SERPL-MCNC: 3 G/DL (ref 3.4–5)
ALP SERPL-CCNC: 89 U/L (ref 40–150)
ALT SERPL W P-5'-P-CCNC: 36 U/L (ref 0–50)
AST SERPL W P-5'-P-CCNC: 17 U/L (ref 0–45)
BASOPHILS # BLD AUTO: 0.1 10E9/L (ref 0–0.2)
BASOPHILS NFR BLD AUTO: 0.7 %
BILIRUB DIRECT SERPL-MCNC: 0.1 MG/DL (ref 0–0.2)
BILIRUB SERPL-MCNC: 0.3 MG/DL (ref 0.2–1.3)
CMV DNA SPEC NAA+PROBE-ACNC: NORMAL [IU]/ML
CMV DNA SPEC NAA+PROBE-LOG#: NORMAL {LOG_IU}/ML
CRP SERPL-MCNC: <2.9 MG/L (ref 0–8)
DIFFERENTIAL METHOD BLD: ABNORMAL
EOSINOPHIL # BLD AUTO: 0.8 10E9/L (ref 0–0.7)
EOSINOPHIL NFR BLD AUTO: 6.7 %
ERYTHROCYTE [DISTWIDTH] IN BLOOD BY AUTOMATED COUNT: 16.7 % (ref 10–15)
ERYTHROCYTE [SEDIMENTATION RATE] IN BLOOD BY WESTERGREN METHOD: 49 MM/H (ref 0–20)
HCT VFR BLD AUTO: 26.9 % (ref 35–47)
HGB BLD-MCNC: 7.8 G/DL (ref 11.7–15.7)
IMM GRANULOCYTES # BLD: 0 10E9/L (ref 0–0.4)
IMM GRANULOCYTES NFR BLD: 0.3 %
LYMPHOCYTES # BLD AUTO: 3.6 10E9/L (ref 0.8–5.3)
LYMPHOCYTES NFR BLD AUTO: 29.5 %
MCH RBC QN AUTO: 20.9 PG (ref 26.5–33)
MCHC RBC AUTO-ENTMCNC: 29 G/DL (ref 31.5–36.5)
MCV RBC AUTO: 72 FL (ref 78–100)
MONOCYTES # BLD AUTO: 1.3 10E9/L (ref 0–1.3)
MONOCYTES NFR BLD AUTO: 10.8 %
NEUTROPHILS # BLD AUTO: 6.3 10E9/L (ref 1.6–8.3)
NEUTROPHILS NFR BLD AUTO: 52 %
NRBC # BLD AUTO: 0 10*3/UL
NRBC BLD AUTO-RTO: 0 /100
PLATELET # BLD AUTO: 738 10E9/L (ref 150–450)
PROT SERPL-MCNC: 7.4 G/DL (ref 6.8–8.8)
RBC # BLD AUTO: 3.73 10E12/L (ref 3.8–5.2)
SPECIMEN SOURCE: NORMAL
WBC # BLD AUTO: 12.1 10E9/L (ref 4–11)

## 2017-08-29 PROCEDURE — 85025 COMPLETE CBC W/AUTO DIFF WBC: CPT | Performed by: INTERNAL MEDICINE

## 2017-08-29 PROCEDURE — 25000128 H RX IP 250 OP 636: Mod: ZF | Performed by: INTERNAL MEDICINE

## 2017-08-29 PROCEDURE — 85652 RBC SED RATE AUTOMATED: CPT | Performed by: INTERNAL MEDICINE

## 2017-08-29 PROCEDURE — 86140 C-REACTIVE PROTEIN: CPT | Performed by: INTERNAL MEDICINE

## 2017-08-29 PROCEDURE — 96413 CHEMO IV INFUSION 1 HR: CPT

## 2017-08-29 PROCEDURE — 80076 HEPATIC FUNCTION PANEL: CPT | Performed by: INTERNAL MEDICINE

## 2017-08-29 RX ORDER — DIPHENHYDRAMINE HCL 25 MG
25 TABLET ORAL
Status: CANCELLED
Start: 2017-08-29

## 2017-08-29 RX ORDER — ACETAMINOPHEN 325 MG/1
650 TABLET ORAL
Status: CANCELLED
Start: 2017-08-29

## 2017-08-29 RX ADMIN — VEDOLIZUMAB 300 MG: 300 INJECTION, POWDER, LYOPHILIZED, FOR SOLUTION INTRAVENOUS at 07:51

## 2017-08-29 ASSESSMENT — PAIN SCALES - GENERAL: PAINLEVEL: NO PAIN (0)

## 2017-08-29 NOTE — PROGRESS NOTES
Returned voice mail to discuss colonoscopy alternatives.  Let her know she can do the miralax  and gatorade prep.  Left my name and number.

## 2017-08-29 NOTE — PROGRESS NOTES

## 2017-08-29 NOTE — MR AVS SNAPSHOT
After Visit Summary   8/29/2017    Yarelis Penny    MRN: [id number]           Patient Information     Date Of Birth          1985        Visit Information        Provider Department      8/29/2017 7:00 AM UC 49 ATC; UC SPEC INFUSION [facility] Specialty and Procedure        Today's Diagnoses     Dehydration    -  1    CMV colitis (H)        Cytomegalovirus (CMV) viremia (H)        Ulcerative pancolitis without complication (H)           Follow-ups after your visit        Your next 10 appointments already scheduled     Sep 20, 2017  8:00 AM CDT   Infusion 60 with UC SPEC INFUSION, UC 44 ATC   [facility] Specialty and Procedure ([facility])    [address]  2nd Floor  [facility] [address]   [phone]            Sep 22, 2017 10:00 AM CDT   Infusion 60 with UC SPEC INFUSION, UC 51 ATC   [facility] Specialty and Procedure ([facility])    [address]  2nd Floor  [facility] [address]   [phone]            Sep 25, 2017  9:00 AM CDT   Infusion 180 with UC SPEC INFUSION, UC 48 ATC   [facility] Specialty and Procedure ([facility])    [address]  2nd Floor  [facility] [address]   [phone]            Sep 27, 2017 10:00 AM CDT   Infusion 60 with UC SPEC INFUSION, UC 51 ATC   [facility] Special[facility] Procedure ([facility])    [address]  2nd Floor  [facility] [address]   [phone]            Dec 06, 2017  8:00 AM CST   (Arrive by 7:45 AM)   Return Visit with Walker Nials MD   [facility] and Infectious Diseases ([facility])    [address]  3rd Floor  [facility] [address]   [phone]            Dec 18, 2017  7:30 AM CST   (Arrive by 7:15 AM)   RETURN INFLAMMATORY BOWEL  DISEASE with Cresencio Sierra PA-C   [facility] ([facility])    [address]  4th Floor  [facility] [address] [phone]              Who to contact     If you have questions or need follow up information about today's clinic visit or your schedule please contact [facility] SPECIALTY AND PROCEDURE directly at [phone].  Normal or non-critical lab and imaging results will be communicated to you by [employer], letter or phone within 4 business days after the clinic has received the results. If you do not hear from us within 7 days, please contact the clinic through [employer] or phone. If you have a critical or abnormal lab result, we will notify you by phone as soon as possible.  Submit refill requests through [email or web address] or call your pharmacy and they will forward the refill request to us. Please allow 3 business days for your refill to be completed.          Additional Information About Your Visit        [email or web address] Information     [email or web address] gives you secure access to your electronic health record. If you see a primary care provider, you can also send messages to your care team and make appointments. If you have questions, please call your primary care clinic.  If you do not have a primary care provider, please call [phone] and they will assist you.        Care EveryWhere ID     This is your Care EveryWhere ID. This could be used by other organizations to access your [facility] medical records  [id number]        Your Vitals Were     Pulse Temperature Respirations BMI (Body Mass Index)          69 98.5  F (36.9  C) (Oral) 16 19.78 kg/m2         Blood Pressure from Last 3 Encounters:   09/18/17 116/88   09/13/17 111/52   09/13/17 116/81    Weight from Last 3 Encounters:   09/18/17 49 kg (108 lb 1.6 oz)   09/13/17 49.2 kg (108 lb 8 oz)   08/29/17 49.1 kg (108 lb 3.2 oz)              We Performed the Following     CBC with  platelets differential     CMV DNA quantification     CRP inflammation     Erythrocyte sedimentation rate auto     Hepatic panel          Today's Medication Changes          These changes are accurate as of: 8/29/17 11:59 PM.  If you have any questions, ask your nurse or doctor.               These medicines have changed or have updated prescriptions.        Dose/Directions    * predniSONE 10 MG tablet   Commonly known as:  DELTASONE   This may have changed:    - how much to take  - how to take this  - when to take this  - additional instructions   Used for:  Ulcerative colitis with other complication, unspecified location (H)   Changed by:  Agnieszka Ramires RN        Take 6 tabs (60mg) PO x 5 days  Then take 5 tabs (50mg) PO x 7 days  Then take 4 tabs (40mg) PO x 7 days Then take 3 tabs (30mg) PO x 7 days Then take 2 tabs (20mg) PO x 7 days Then take 1 tab (10mg) PO x 7 days Then take 1/2 tablet (5mg) PO x 7 days then DISCONTINUE  Then take   Quantity:  92 tablet   Refills:  0       * predniSONE 5 MG tablet   Commonly known as:  DELTASONE   This may have changed:  Another medication with the same name was changed. Make sure you understand how and when to take each.   Used for:  Colitis   Changed by:  Norma Walker, RN        Take 8 tabs daily (40 mg) for 1 week, then 6 tabs  (30 mg) daily for 1 week, then 4  Tabs (20mg) daily for 1 week ,then 3 tabs (15mg) daily  for 1 week, 2 tabs (10 mg) daily   for one week then stay on 1 tab ( 5 mg ) daily   Quantity:  228 tablet   Refills:  0       * Notice:  This list has 2 medication(s) that are the same as other medications prescribed for you. Read the directions carefully, and ask your doctor or other care provider to review them with you.             Primary Care Provider Office Phone # Fax #    Jackson Orellana [phone]       [place] [address] YENIFER LAMA [address] [place] [address]        Equal Access to Services     ERI CHAKRABORTY AH: Yandel  venancio Palma, waaxda luqadaha, qaybta kaalmada pantera, mani josin hayaavanessa sotoshannon marytriny laAbrahamlupe blake. So [facility] [phone].    ATENCIÓN: Si habla lyndon, tiene a borja disposición servicios gratuitos de asistencia lingüística. Llame al [phone].    We comply with applicable federal civil rights laws and [place] laws. We do not discriminate on the basis of race, color, national origin, age, disability sex, sexual orientation or gender identity.            Thank you!     Thank you for choosing [facility] SPECIALTY AND PROCEDURE  for your care. Our goal is always to provide you with excellent care. Hearing back from our patients is one way we can continue to improve our services. Please take a few minutes to complete the written survey that you may receive in the mail after your visit with us. Thank you!             Your Updated Medication List - Protect others around you: Learn how to safely use, store and throw away your medicines at www.disposemymeds.org.          This list is accurate as of: 8/29/17 11:59 PM.  Always use your most recent med list.                   Brand Name Dispense Instructions for use Diagnosis    ENTYVIO IV      Inject 300 mg into the vein every 28 days        GNP ASPIRIN LOW DOSE 81 MG EC tablet   Generic drug:  aspirin     30 tablet    Take 1 tablet (81 mg) by mouth daily    Ulcerative colitis with other complication, unspecified location (H), Liver replaced by transplant (H)       * predniSONE 10 MG tablet    DELTASONE    92 tablet    Take 6 tabs (60mg) PO x 5 days  Then take 5 tabs (50mg) PO x 7 days  Then take 4 tabs (40mg) PO x 7 days Then take 3 tabs (30mg) PO x 7 days Then take 2 tabs (20mg) PO x 7 days Then take 1 tab (10mg) PO x 7 days Then take 1/2 tablet (5mg) PO x 7 days then DISCONTINUE  Then take    Ulcerative colitis with other complication, unspecified location (H)       * predniSONE 5 MG tablet    DELTASONE    228 tablet    Take 8 tabs  daily (40 mg) for 1 week, then 6 tabs  (30 mg) daily for 1 week, then 4  Tabs (20mg) daily for 1 week ,then 3 tabs (15mg) daily  for 1 week, 2 tabs (10 mg) daily   for one week then stay on 1 tab ( 5 mg ) daily    Colitis       sulfamethoxazole-trimethoprim 800-160 MG per tablet    BACTRIM DS/SEPTRA DS    90 tablet    Take 1 tablet by mouth three times a week Monday, Wednesday, Friday    Immunosuppression (H), Inflammatory bowel disease, PSC (primary sclerosing cholangitis)       * tacrolimus 0.5 MG capsule    GENERIC EQUIVALENT    180 capsule    Take 1 capsule (0.5 mg) by mouth 2 times daily Take with 1 mg capsule. Total dose 2.5 mg every 12 hours    S/P liver transplant (H)       * tacrolimus 1 MG capsule    GENERIC EQUIVALENT    360 capsule    Take 2 capsules (2 mg) by mouth 2 times daily *total dose 2.5 mg BID. Take with 0.5 mg capsule    S/P liver transplant (H)       ursodiol 250 MG tablet    ACTIGALL    270 tablet    Take 2 tabs (500 mg) in AM, and 1 tab at night (250 mg)    Liver replaced by transplant (H)       * Notice:  This list has 4 medication(s) that are the same as other medications prescribed for you. Read the directions carefully, and ask your doctor or other care provider to review them with you.

## 2017-09-05 ENCOUNTER — SURGERY (OUTPATIENT)
Age: 32
End: 2017-09-05

## 2017-09-05 ENCOUNTER — HOSPITAL ENCOUNTER (OUTPATIENT)
Facility: CLINIC | Age: 32
Discharge: HOME | End: 2017-09-05
Attending: INTERNAL MEDICINE | Admitting: INTERNAL MEDICINE
Payer: COMMERCIAL

## 2017-09-05 VITALS
RESPIRATION RATE: 22 BRPM | OXYGEN SATURATION: 100 % | SYSTOLIC BLOOD PRESSURE: 101 MMHG | HEART RATE: 97 BPM | DIASTOLIC BLOOD PRESSURE: 76 MMHG

## 2017-09-05 LAB — COLONOSCOPY: NORMAL

## 2017-09-05 PROCEDURE — 99152 MOD SED SAME PHYS/QHP 5/>YRS: CPT | Performed by: INTERNAL MEDICINE

## 2017-09-05 PROCEDURE — 45380 COLONOSCOPY AND BIOPSY: CPT | Performed by: INTERNAL MEDICINE

## 2017-09-05 PROCEDURE — 88184 FLOWCYTOMETRY/ TC 1 MARKER: CPT | Mod: 91 | Performed by: INTERNAL MEDICINE

## 2017-09-05 PROCEDURE — 88305 TISSUE EXAM BY PATHOLOGIST: CPT | Performed by: INTERNAL MEDICINE

## 2017-09-05 PROCEDURE — 99153 MOD SED SAME PHYS/QHP EA: CPT | Performed by: INTERNAL MEDICINE

## 2017-09-05 PROCEDURE — 40001004 ZZHCL STATISTIC FLOW INT 9-15 ABY TC 88188: Performed by: INTERNAL MEDICINE

## 2017-09-05 PROCEDURE — G0500 MOD SEDAT ENDO SERVICE >5YRS: HCPCS | Performed by: INTERNAL MEDICINE

## 2017-09-05 PROCEDURE — 88365 INSITU HYBRIDIZATION (FISH): CPT | Performed by: INTERNAL MEDICINE

## 2017-09-05 PROCEDURE — 25000128 H RX IP 250 OP 636: Performed by: INTERNAL MEDICINE

## 2017-09-05 PROCEDURE — 88185 FLOWCYTOMETRY/TC ADD-ON: CPT | Performed by: INTERNAL MEDICINE

## 2017-09-05 PROCEDURE — 88342 IMHCHEM/IMCYTCHM 1ST ANTB: CPT | Performed by: INTERNAL MEDICINE

## 2017-09-05 RX ORDER — DIPHENHYDRAMINE HYDROCHLORIDE 50 MG/ML
INJECTION INTRAMUSCULAR; INTRAVENOUS PRN
Status: DISCONTINUED | OUTPATIENT
Start: 2017-09-05 | End: 2017-09-05 | Stop reason: HOSPADM

## 2017-09-05 RX ORDER — FENTANYL CITRATE 50 UG/ML
INJECTION, SOLUTION INTRAMUSCULAR; INTRAVENOUS PRN
Status: DISCONTINUED | OUTPATIENT
Start: 2017-09-05 | End: 2017-09-05 | Stop reason: HOSPADM

## 2017-09-05 RX ADMIN — FENTANYL CITRATE 50 MCG: 50 INJECTION, SOLUTION INTRAMUSCULAR; INTRAVENOUS at 14:41

## 2017-09-05 RX ADMIN — FENTANYL CITRATE 50 MCG: 50 INJECTION, SOLUTION INTRAMUSCULAR; INTRAVENOUS at 14:40

## 2017-09-05 RX ADMIN — DIPHENHYDRAMINE HYDROCHLORIDE 50 MG: 50 INJECTION, SOLUTION INTRAMUSCULAR; INTRAVENOUS at 14:37

## 2017-09-05 RX ADMIN — MIDAZOLAM HYDROCHLORIDE 2 MG: 1 INJECTION, SOLUTION INTRAMUSCULAR; INTRAVENOUS at 14:40

## 2017-09-05 NOTE — IP AVS SNAPSHOT
[facility] [place], Endoscopy    [address] [facility] [address]    Phone:  [phone]                                       After Visit Summary   9/5/2017    Yarelis Penny    MRN: [id number]           After Visit Summary Signature Page     I have received my discharge instructions, and my questions have been answered. I have discussed any challenges I see with this plan with the nurse or doctor.    ..........................................................................................................................................  Patient/Patient Representative Signature      ..........................................................................................................................................  Patient Representative Print Name and Relationship to Patient    ..................................................               ................................................  Date                                            Time    ..........................................................................................................................................  Reviewed by Signature/Title    ...................................................              ..............................................  Date                                                            Time

## 2017-09-05 NOTE — OR NURSING
Procedure: Colonoscopy with biopsies   Sedation: Conscious  O2: 2L  Tolerated Procedure: Well  Patient returned to recovery room in stable condition with JOSEFINA Saba RN

## 2017-09-05 NOTE — IP AVS SNAPSHOT
MRN:[id number]                      After Visit Summary   9/5/2017    Yarelis Penny    MRN: [id number]           Thank you!     Thank you for choosing [facility] for your care. Our goal is always to provide you with excellent care. Hearing back from our patients is one way we can continue to improve our services. Please take a few minutes to complete the written survey that you may receive in the mail after you visit with us. Thank you!        Patient Information     Date Of Birth          1985        About your hospital stay     You were admitted on:  September 5, 2017 You last received care in the:  [facility], Endoscopy    You were discharged on:  September 5, 2017       Who to Call     For medical emergencies, please call 911.  For non-urgent questions about your medical care, please call your primary care provider or clinic, [phone]  For questions related to your surgery, please call your surgery clinic        Attending Provider     Provider Specialty    Fuentes Johnson MD Gastroenterology       Primary Care Provider Office Phone # Fax #    Jackson Orellana [phone]      Your next 10 appointments already scheduled     Sep 13, 2017  7:30 AM CDT   (Arrive by 7:15 AM)   RETURN INFLAMMATORY BOWEL DISEASE with Fuentes Johnson MD   [facility] ([facility])    [address] [place] [address]  [facility] [id number] [phone]            Sep 18, 2017  8:00 AM CDT   Infusion 60 with UC SPEC INFUSION, UC 44 ATC   [facility] Specialty and Procedure ([facility])    [address] [place] [id number] [phone]            Sep 20, 2017  8:00 AM CDT   Infusion 60 with UC SPEC INFUSION, UC 44 ATC   [facility] Specialty and Procedure ([facility])    [address] [place] [address]  [facility] [address]   [phone]            Sep 22, 2017 10:00 AM CDT   Infusion 60 with UC SPEC INFUSION, UC 51 ATC   [facility] Specialty [facility] Procedure ([facility])    [address] [address]   [phone]            Sep 25, 2017  9:00 AM CDT   Infusion 60 with UC SPEC INFUSION, UC 51 ATC   [facility] Specialty [facility] Procedure ([facility])    [address] [address]   [phone]            Sep 26, 2017  2:00 PM CDT   Infusion 180 with UC SPEC INFUSION   [facility] Special[facility] Procedure ([facility])    [address] [place] [address]   [phone]              Further instructions from your care team       Discharge Instructions after Colonoscopy  or Sigmoidoscopy    Today you had a __x__ Colonoscopy ____ Sigmoidoscopy    Activity and Diet  You were given medicine for pain. You may be dizzy or sleepy.  For 24 hours:    Do not drive or use heavy equipment.    Do not make important decisions.    Do not drink any alcohol.  You may return to your normal diet and medicines.    Discomfort    Air was placed in your colon during the exam in order to see it. Walking helps to pass the air.    You may take Tylenol (acetaminophen) for pain unless your doctor has told you not to.  Do not take aspirin or ibuprofen (Advil, Motrin, or other anti-inflammatory  drugs) for _3____ days.    Follow-up  __x__ We took small tissue samples or polyps to study. Your doctor will call you with the results  within two weeks.    When to call:    Call right away if you have:    Unusual pain in belly or chest pain not relieved with passing air.    More than 1 to 2 Tablespoons of bleeding from your rectum.    Fever above 100.6  F (37.5  C).    If you have severe pain, bleeding, or shortness  of breath, go to an emergency room.    If you have questions, call:  Monday to Friday, 7 a.m. to 4:30 p.m.  Endoscopy: [phone] (We may have to call you back)    After hours  Hospital: [phone] (Ask for the GI fellow on call)    Pending Results     No orders found from 9/3/2017 to 9/6/2017.            Admission Information     Date & Time Provider Department Dept. Phone    9/5/2017 Fuentes Johnson MD [facility] [place], Endoscopy [phone]      Your Vitals Were     Blood Pressure Pulse Respirations Last Period Pulse Oximetry       103/78 97 18 08/30/2017 (Approximate) 100%       MyChart Information     Intent gives you secure access to your electronic health record. If you see a primary care provider, you can also send messages to your care team and make appointments. If you have questions, please call your primary care clinic.  If you do not have a primary care provider, please call [phone] and they will assist you.        Care EveryWhere ID     This is your Care EveryWhere ID. This could be used by other organizations to access your [place] medical records  [id number]        Equal Access to Services     ERI CHAKRABORTY : Hadii venancio hart Soyari, waaxda luqadaha, qaybta kaalmada adefarhanda, mani lozano. So [facility] [phone].    ATENCIÓN: Si habla español, tiene a borja disposición servicios gratuitos de asistencia lingüística. Llame al [phone].    We comply with applicable federal civil rights laws and [place] laws. We do not discriminate on the basis of race, color, national origin, age, disability sex, sexual orientation or gender identity.               Review of your medicines      UNREVIEWED medicines. Ask your doctor about these medicines        Dose / Directions    ENTYVIO IV        Dose:  300 mg   Inject 300 mg into the vein every 28 days   Refills:  0       GNP ASPIRIN LOW DOSE 81 MG EC tablet   Used for:  Ulcerative colitis with other  complication, unspecified location (H), Liver replaced by transplant (H)   Generic drug:  aspirin        Dose:  81 mg   Take 1 tablet (81 mg) by mouth daily   Quantity:  30 tablet   Refills:  1       * predniSONE 10 MG tablet   Commonly known as:  DELTASONE   Used for:  Ulcerative colitis with other complication, unspecified location (H)        Take 6 tabs (60mg) PO x 5 days  Then take 5 tabs (50mg) PO x 7 days  Then take 4 tabs (40mg) PO x 7 days Then take 3 tabs (30mg) PO x 7 days Then take 2 tabs (20mg) PO x 7 days Then take 1 tab (10mg) PO x 7 days Then take 1/2 tablet (5mg) PO x 7 days then DISCONTINUE  Then take   Quantity:  92 tablet   Refills:  0       * predniSONE 5 MG tablet   Commonly known as:  DELTASONE   Used for:  Colitis        Take 8 tabs daily (40 mg) for 1 week, then 6 tabs  (30 mg) daily for 1 week, then 4  Tabs (20mg) daily for 1 week ,then 3 tabs (15mg) daily  for 1 week, 2 tabs (10 mg) daily   for one week then stay on 1 tab ( 5 mg ) daily   Quantity:  228 tablet   Refills:  0       sulfamethoxazole-trimethoprim 800-160 MG per tablet   Commonly known as:  BACTRIM DS/SEPTRA DS   Used for:  Immunosuppression (H), Inflammatory bowel disease, PSC (primary sclerosing cholangitis)        Dose:  1 tablet   Take 1 tablet by mouth three times a week Monday, Wednesday, Friday   Quantity:  90 tablet   Refills:  3       * tacrolimus 0.5 MG capsule   Commonly known as:  GENERIC EQUIVALENT   Used for:  S/P liver transplant (H)        Dose:  0.5 mg   Take 1 capsule (0.5 mg) by mouth 2 times daily Take with 1 mg capsule. Total dose 2.5 mg every 12 hours   Quantity:  180 capsule   Refills:  3       * tacrolimus 1 MG capsule   Commonly known as:  GENERIC EQUIVALENT   Used for:  S/P liver transplant (H)        Dose:  2 mg   Take 2 capsules (2 mg) by mouth 2 times daily *total dose 2.5 mg BID. Take with 0.5 mg capsule   Quantity:  360 capsule   Refills:  3       ursodiol 250 MG tablet   Commonly known as:   ACTIGALL   Used for:  Liver replaced by transplant (H)        Take 2 tabs (500 mg) in AM, and 1 tab at night (250 mg)   Quantity:  270 tablet   Refills:  3       zolpidem 5 MG tablet   Commonly known as:  AMBIEN        TK 1 T PO QD HS PRN FOR SLEEP   Refills:  5       * Notice:  This list has 4 medication(s) that are the same as other medications prescribed for you. Read the directions carefully, and ask your doctor or other care provider to review them with you.             Protect others around you: Learn how to safely use, store and throw away your medicines at www.disposemymeds.org.             Medication List: This is a list of all your medications and when to take them. Check marks below indicate your daily home schedule. Keep this list as a reference.      Medications           Morning Afternoon Evening Bedtime As Needed    ENTYVIO IV   Inject 300 mg into the vein every 28 days                                GNP ASPIRIN LOW DOSE 81 MG EC tablet   Take 1 tablet (81 mg) by mouth daily   Generic drug:  aspirin                                * predniSONE 10 MG tablet   Commonly known as:  DELTASONE   Take 6 tabs (60mg) PO x 5 days  Then take 5 tabs (50mg) PO x 7 days  Then take 4 tabs (40mg) PO x 7 days Then take 3 tabs (30mg) PO x 7 days Then take 2 tabs (20mg) PO x 7 days Then take 1 tab (10mg) PO x 7 days Then take 1/2 tablet (5mg) PO x 7 days then DISCONTINUE  Then take                                * predniSONE 5 MG tablet   Commonly known as:  DELTASONE   Take 8 tabs daily (40 mg) for 1 week, then 6 tabs  (30 mg) daily for 1 week, then 4  Tabs (20mg) daily for 1 week ,then 3 tabs (15mg) daily  for 1 week, 2 tabs (10 mg) daily   for one week then stay on 1 tab ( 5 mg ) daily                                sulfamethoxazole-trimethoprim 800-160 MG per tablet   Commonly known as:  BACTRIM DS/SEPTRA DS   Take 1 tablet by mouth three times a week Monday, Wednesday, Friday                                *  tacrolimus 0.5 MG capsule   Commonly known as:  GENERIC EQUIVALENT   Take 1 capsule (0.5 mg) by mouth 2 times daily Take with 1 mg capsule. Total dose 2.5 mg every 12 hours                                * tacrolimus 1 MG capsule   Commonly known as:  GENERIC EQUIVALENT   Take 2 capsules (2 mg) by mouth 2 times daily *total dose 2.5 mg BID. Take with 0.5 mg capsule                                ursodiol 250 MG tablet   Commonly known as:  ACTIGALL   Take 2 tabs (500 mg) in AM, and 1 tab at night (250 mg)                                zolpidem 5 MG tablet   Commonly known as:  AMBIEN   TK 1 T PO QD HS PRN FOR SLEEP                                * Notice:  This list has 4 medication(s) that are the same as other medications prescribed for you. Read the directions carefully, and ask your doctor or other care provider to review them with you.

## 2017-09-05 NOTE — DISCHARGE INSTRUCTIONS
Discharge Instructions after Colonoscopy  or Sigmoidoscopy    Today you had a __x__ Colonoscopy ____ Sigmoidoscopy    Activity and Diet  You were given medicine for pain. You may be dizzy or sleepy.  For 24 hours:    Do not drive or use heavy equipment.    Do not make important decisions.    Do not drink any alcohol.  You may return to your normal diet and medicines.    Discomfort    Air was placed in your colon during the exam in order to see it. Walking helps to pass the air.    You may take Tylenol (acetaminophen) for pain unless your doctor has told you not to.  Do not take aspirin or ibuprofen (Advil, Motrin, or other anti-inflammatory  drugs) for _3____ days.    Follow-up  __x__ We took small tissue samples or polyps to study. Your doctor will call you with the results  within two weeks.    When to call:    Call right away if you have:    Unusual pain in belly or chest pain not relieved with passing air.    More than 1 to 2 Tablespoons of bleeding from your rectum.    Fever above 100.6  F (37.5  C).    If you have severe pain, bleeding, or shortness of breath, go to an emergency room.    If you have questions, call:  Monday to Friday, 7 a.m. to 4:30 p.m.  Endoscopy: [phone] (We may have to call you back)    After hours  Hospital: [phone] (Ask for the GI fellow on call)

## 2017-09-07 LAB
COPATH REPORT: NORMAL
COPATH REPORT: NORMAL

## 2017-09-08 LAB — COPATH REPORT: NORMAL

## 2017-09-12 ASSESSMENT — ENCOUNTER SYMPTOMS
BLOATING: 0
DIARRHEA: 1
HEARTBURN: 0
JAUNDICE: 0
VOMITING: 0
NAUSEA: 0
RECTAL BLEEDING: 0
RECTAL PAIN: 0
ABDOMINAL PAIN: 0
CONSTIPATION: 0
BOWEL INCONTINENCE: 0
BLOOD IN STOOL: 0

## 2017-09-13 ENCOUNTER — OFFICE VISIT (OUTPATIENT)
Dept: GASTROENTEROLOGY | Facility: CLINIC | Age: 32
End: 2017-09-13

## 2017-09-13 ENCOUNTER — INFUSION THERAPY VISIT (OUTPATIENT)
Dept: INFUSION THERAPY | Facility: CLINIC | Age: 32
End: 2017-09-13
Attending: FAMILY MEDICINE
Payer: COMMERCIAL

## 2017-09-13 VITALS
SYSTOLIC BLOOD PRESSURE: 116 MMHG | DIASTOLIC BLOOD PRESSURE: 81 MMHG | TEMPERATURE: 97.5 F | BODY MASS INDEX: 19.84 KG/M2 | HEART RATE: 78 BPM | WEIGHT: 108.5 LBS | OXYGEN SATURATION: 100 %

## 2017-09-13 VITALS
SYSTOLIC BLOOD PRESSURE: 111 MMHG | HEART RATE: 76 BPM | RESPIRATION RATE: 14 BRPM | DIASTOLIC BLOOD PRESSURE: 52 MMHG | TEMPERATURE: 98.9 F

## 2017-09-13 DIAGNOSIS — K83.01 PSC (PRIMARY SCLEROSING CHOLANGITIS) (H): ICD-10-CM

## 2017-09-13 DIAGNOSIS — Z94.4 S/P LIVER TRANSPLANT (H): ICD-10-CM

## 2017-09-13 DIAGNOSIS — D50.9 ANEMIA, IRON DEFICIENCY: Primary | ICD-10-CM

## 2017-09-13 DIAGNOSIS — K51.018 ULCERATIVE PANCOLITIS WITH OTHER COMPLICATION (H): Primary | ICD-10-CM

## 2017-09-13 PROCEDURE — 96365 THER/PROPH/DIAG IV INF INIT: CPT

## 2017-09-13 PROCEDURE — 25000128 H RX IP 250 OP 636: Performed by: INTERNAL MEDICINE

## 2017-09-13 RX ORDER — ACETAMINOPHEN 325 MG/1
650 TABLET ORAL
Status: CANCELLED
Start: 2017-09-13

## 2017-09-13 RX ORDER — PREDNISONE 5 MG/1
TABLET ORAL
Qty: 225 TABLET | Refills: 11 | Status: SHIPPED | OUTPATIENT
Start: 2017-09-13 | End: 2017-12-05

## 2017-09-13 RX ORDER — DIPHENHYDRAMINE HCL 25 MG
25 TABLET ORAL
Status: CANCELLED
Start: 2017-09-13

## 2017-09-13 RX ORDER — ZOLPIDEM TARTRATE 5 MG/1
5 TABLET ORAL
Qty: 60 TABLET | Refills: 5 | Status: SHIPPED | OUTPATIENT
Start: 2017-09-13 | End: 2018-03-29

## 2017-09-13 RX ADMIN — IRON SUCROSE 200 MG: 20 INJECTION, SOLUTION INTRAVENOUS at 14:19

## 2017-09-13 RX ADMIN — SODIUM CHLORIDE 250 ML: 9 INJECTION, SOLUTION INTRAVENOUS at 14:18

## 2017-09-13 ASSESSMENT — PAIN SCALES - GENERAL
PAINLEVEL: NO PAIN (0)
PAINLEVEL: NO PAIN (0)

## 2017-09-13 NOTE — PROGRESS NOTES
OUTPATIENT GI FOLLOWUP IBD CLINIC VISIT       PRIMARY CARE PROVIDER:  Dr. Jackson Orellana.       REASON FOR CONSULTATION:  PSC-associated IBD.       IBD HISTORY:   1.  Age at diagnosis:  18.   2.  Extend of disease:  Colonoscopies over the years have varied.    -Colonoscopy in 2017 she had extensive colitis from 20-25 cm from the anal verge all the way throughout the colon with only mild changes in the distal colon.  She also had ileal inflammation as well.  Biopsies of the ileum showed chronic active ileitis, and biopsy of the colon showed chronic active colitis.  There was no evidence of CMV or PTLD.  There were rare SHENA/KACIE-positive cells in the ileum felt to be not significant.   -Colonoscopy 2017 showed harper 2 inflammation in right colon and mao 1 in left colon with relative rectal sparing. Patulous IC valve with some backwash ileitis. Biopsies negative for lymphoma, CMV and EBV. Biopsies with mild chronic active ileitis and mild chronic active colitis throughout (including rectum).  3.  Current UC medications:  Prednisone 10 mg, Entyvio 300 mg IV q month.   4.  Prior UC surgeries:  None.   5.  Prior IBD medications:    - multiple rounds of prednisone  - Pentasa  - Asacol   - Azathioprine (this was mainly given for her liver issues at a lower dose).       DRUG MONITORIN.  TPMT enzyme activity 2016 was 18.5.   2.  6TGN and 6MMPN levels 2016:  The 6TGN was 203, and the 6MMPN was 257.   3.  Biologic concentrations:  Her trough Entyvio level was 34.1 with no antibodies.       HISTORY OF PRESENT ILLNESS:    Yarelis is here today to follow up recent colonoscopy (see above).    Overall she has done well. She did have another steroid taper over the summer and is down to 10 mg of prednisone (baseline is 5 mg for liver transplant). Prior to prep for the colonoscopy she was having 1 formed stool per day.     Since the colonoscopy she has had 2-3 mushy stools per day. No blood and no tenesmus or abdominal  pain. No fevers. No mouth sores, eye pain, eye redness, joint pains. No changes in urination      REVIEW OF SYSTEMS:  A complete review of systems is performed.  Pertinent positives and negatives are as stated above.  The remainder of a complete review of systems is unremarkable.       PAST MEDICAL HISTORY:   1.  PSC-associated IBD, as above.   2.  PSC cirrhosis, status post liver transplant.   3.  History of CMV colitis.   4.  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  She has been given a dose of rituximab for this.   5.  History of C. diff.  She has had multiple C. diffs checked, and they have been negative.       PAST SURGICAL HISTORY:  Status post living donor transplant.  She is also status post multiple ERCPs, most recently 5/2017.       SOCIAL HISTORY:  Twin denies illicit drug use.  She is .  No alcohol or tobacco.       FAMILY HISTORY:  Reviewed. Mother with a history of dyslipidemia and hypertension.  Maternal grandmother with hypertension.  Maternal grandmother with Alzheimer's.  Father with dyslipidemia.       PHYSICAL EXAMINATION:   /81  Pulse 78  Temp 97.5  F (36.4  C) (Oral)  Wt 49.2 kg (108 lb 8 oz)  LMP 08/30/2017 (Approximate)  SpO2 100%  BMI 19.84 kg/m2  GENERAL:  She is pleasant, in no acute distress.  She smiles and interacts appropriately.   HEENT:  Head is atraumatic, normocephalic.  Sclerae are anicteric without injection.  Oropharynx is clear with moist mucous membranes.   NECK:  Supple with no lymphadenopathy, no thyromegaly.   HEART:  Normal rate, no murmurs.   LUNGS:  Clear to auscultation.  I do not appreciate any rhonchi, rubs or wheezes.   ABDOMEN:  Soft, nontender and nondistended.  She has well-healed surgical scars.   EXTREMITIES:  No clubbing, cyanosis or edema.   SKIN:  No evidence of rash.   JOINTS:  No evidence of synovitis.   NEUROLOGIC:  Awake, alert and oriented x3 with no focal deficits.       RECOMMENDATIONS:  Reviewed in Epic.       ASSESSMENT AND  PLAN:  Yarelis Penny is here today to follow up in the IBD Clinic.      1.  PSC-associated IBD.  She has classic distrubution fo IBS associated with PSC with patulous IC valve and backwash ileitis and relative rectal sparing. She is definitely doing better since December on the vedolizumab but she still has mucosal inflammation and we have not been able to reliably keep the prednisone at her 5 mg. vedolizumab can take longer to reach its full effect but she has now been on it for 9 months (3-4 months at q monthly dosing). We have a couple options. 1) give the vedolizumab a maximum of 3 more months to work. We can try Uceris in the meantime if we need some more bridging - vedolizumab may take a little more time to kick in but I think it is unlikely to get us to mucosal healing if it has not already. 2) Switch the vedolizumab to infliximab. Infliximab is one of the best medications to treat refractory ulcerative colitis. However, this is an anti-TNF and will provide additional systemic immunosuppression. She is already on tacrolimus and low dose prednisone and has continuous EBV viremia. Risk of immunosuppression will need to be balanced with benefits of the medication. 3) Colectomy - this will treat the IBD and also remove the risk of colon cancer. She would like to avoid surgery for now. I will discuss the options with Dr. Nails and Dr. Enriquez and present her case at our IBD conference. A plan will then be decided on. In the meantime she can taper her prednisone down to 5 mg. She will let us know if her symptoms flare.     2.  PCP prophylaxis.  Continue bactrim until her prednisone is at 5 mg.     3.  EBV viremia.  Have increased to 388961. No evidence of PTLD on biopsies. She did receive Rituxan last year. Will review with Dr. Nails.     4.  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  CMV levels most recently undetectable and biopsies from last 2 colonoscopies show no CMV.     5.  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  Her last ERCP was in 5/2017 with Dr. Guru Macias.  She is status post dilation and self ejecting stent placement.  AXR confirmed stent passed. LFTs normal.    6. Iron deficiency anemia - repeat iron infusions. Maximize treatment of IBD.     7.  IBD health care maintenance.  Please see Cresencio Sierra's note dated 03/20/2017.  I did not discuss this with her in depth today.       Thank you very much for the opportunity to take part in the care of this patient.  Please do not hesitate to call with questions.     Fuentes Johnson MD    [facility]  Division of Gastroenterology, Hepatology and Nutrition    Answers for HPI/ROS submitted by the patient on 9/12/2017   General Symptoms: No  Skin Symptoms: No  HENT Symptoms: No  EYE SYMPTOMS: No  HEART SYMPTOMS: No  LUNG SYMPTOMS: No  INTESTINAL SYMPTOMS: Yes  URINARY SYMPTOMS: No  GYNECOLOGIC SYMPTOMS: No  BREAST SYMPTOMS: No  SKELETAL SYMPTOMS: No  BLOOD SYMPTOMS: No  NERVOUS SYSTEM SYMPTOMS: No  MENTAL HEALTH SYMPTOMS: No  Heart burn or indigestion: No  Nausea: No  Vomiting: No  Abdominal pain: No  Bloating: No  Constipation: No  Diarrhea: Yes  Blood in stool: No  Black stools: No  Rectal or Anal pain: No  Fecal incontinence: No  Rectal bleeding: No  Yellowing of skin or eyes: No  Vomit with blood: No  Change in stools: No  Hemorrhoids: No

## 2017-09-13 NOTE — NURSING NOTE
Chief Complaint   Patient presents with     RECHECK     f/u       Vitals:    09/13/17 0736   BP: 116/81   Pulse: 78   Temp: 97.5  F (36.4  C)   TempSrc: Oral   SpO2: 100%   Weight: 108 lb 8 oz       Body mass index is 19.84 kg/(m^2).      Fuentes RUSSELL

## 2017-09-13 NOTE — PATIENT INSTRUCTIONS
Good to see you again today    I will discuss the option of infliximab (Remicade) with Dr. Nails and Dr. Enriquez. I will also discuss your case at our monthly IBD conference. I will update you on our recommendations.    Continue prednisone 10 mg x 5 more days and then go down to 5 mg. If your loose stools get worse let us know and we can either bump up the prednisone or give a trial of the Uceris (localized steroid)    Continue the bactrim for now    Continue vedolizumab infusions for now    Follow up with Cresencio Sierra in 3 months      Call with questions    For questions regarding your care Monday through Friday, contact the RN GI care coordinator,  Call   [phone] option 3 . Your call will be  returned same day, or if consultation is needed with the provider, it may be following business day - or you may send a My Chart message.    For medication refills (prescribed by the GI clinic), contact your pharmacy.    For appointment rescheduling/cancellation, contact [phone]     After hours, or if you have an immediate GI concern and cannot wait for a return call, contact the GI Fellow at [phone] and select option #4.     Thanks Norma Walker RN Care Coordinator for Dr. Johnson   Phone   [phone]

## 2017-09-13 NOTE — LETTER
2017       RE: Yarelis Penny  [address] [facility] [address]     Dear Colleague,    Thank you for referring your patient, Yarelis Penny, to the [facility]INIC at [facility]. Please see a copy of my visit note below.    OUTPATIENT GI FOLLOWUP IBD CLINIC VISIT       PRIMARY CARE PROVIDER:  Dr. Jackson Orellana.       REASON FOR CONSULTATION:  PSC-associated IBD.       IBD HISTORY:   1.  Age at diagnosis:  18.   2.  Extend of disease:  Colonoscopies over the years have varied.    -Colonoscopy in 2017 she had extensive colitis from 20-25 cm from the anal verge all the way throughout the colon with only mild changes in the distal colon.  She also had ileal inflammation as well.  Biopsies of the ileum showed chronic active ileitis, and biopsy of the colon showed chronic active colitis.  There was no evidence of CMV or PTLD.  There were rare SHENA/KACIE-positive cells in the ileum felt to be not significant.   -Colonoscopy 2017 showed harper 2 inflammation in right colon and mao 1 in left colon with relative rectal sparing. Patulous IC valve with some backwash ileitis. Biopsies negative for lymphoma, CMV and EBV. Biopsies with mild chronic active ileitis and mild chronic active colitis throughout (including rectum).  3.  Current UC medications:  Prednisone  10 mg, Entyvio 300 mg IV q month.   4.  Prior UC surgeries:  None.   5.  Prior IBD medications:    - multiple rounds of prednisone  - Pentasa  - Asacol   - Azathioprine (this was mainly given for her liver issues at a lower dose).       DRUG MONITORIN.  TPMT enzyme activity 2016 was 18.5.   2.  6TGN and 6MMPN levels 2016:  The 6TGN was 203, and the 6MMPN was 257.   3.  Biologic concentrations:  Her trough Entyvio level was 34.1 with no antibodies.       HISTORY OF PRESENT ILLNESS:    Yarelis is here today to follow up recent colonoscopy (see above).    Overall she has done well. She did  have another steroid taper over the summer and is down to 10 mg of prednisone (baseline is 5 mg for liver transplant). Prior to prep for the colonoscopy she was having 1 formed stool per day.     Since the colonoscopy she has had 2-3 mushy stools per day. No blood and no tenesmus or abdominal pain. No fevers. No mouth sores, eye pain, eye redness, joint pains. No changes in urination      REVIEW OF SYSTEMS:  A complete review of systems is performed.  Pertinent positives and negatives are as stated above.  The remainder of a complete review of systems is unremarkable.       PAST MEDICAL HISTORY:   1.  PSC-associated IBD, as above.   2.  PSC cirrhosis, status post liver transplant.   3.  History of CMV colitis.   4.  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  She has been given a dose of rituximab for this.   5.  History of C. diff.  She has had multiple C. diffs checked, and they have been negative.       PAST SURGICAL HISTORY:  Status post living donor transplant.  She is also status post multiple ERCPs, most recently 5/2017.       SOCIAL HISTORY:  S Edwardhe denies illicit drug use.  She is .  No alcohol or tobacco.       FAMILY HISTORY:   Reviewed. Mother with a history of dyslipidemia and hypertension.  Maternal grandmother with hypertension.  Maternal grandmother with Alzheimer's.  Father with dyslipidemia.       PHYSICAL EXAMINATION:   /81  Pulse 78  Temp 97.5  F (36.4  C) (Oral)  Wt 49.2 kg (108 lb 8 oz)  LMP 08/30/2017 (Approximate)  SpO2 100%  BMI 19.84 kg/m2  GENERAL:  She is pleasant, in no acute distress.  She smiles and interacts appropriately.   HEENT:  Head is atraumatic, normocephalic.  Sclerae are anicteric without injection.  Oropharynx is clear with moist mucous membranes.   NECK:  Supple with no lymphadenopathy, no thyromegaly.   HEART:  Normal rate, no murmurs.   LUNGS:  Clear to auscultation.  I do not appreciate any rhonchi, rubs or wheezes.   ABDOMEN:  Soft, nontender and  nondistended.  She has well-healed surgical scars.   EXTREMITIES:  No clubbing, cyanosis or edema.   SKIN:  No evidence of rash.   JOINTS:  No evidence of synovitis.   NEUROLOGIC:  Awake, alert and oriented x3 with no focal deficits.       RECOMMENDATIONS:  Reviewed in Epic.       ASSESSMENT AND PLAN:  Yarelis Penny is here today to follow up in the IBD Clinic.      1.  PSC-associated IBD.   She has classic distrubution fo IBS associated with PSC with patulous IC valve and backwash ileitis and relative rectal sparing. She is definitely doing better since December on the vedolizumab but she still has mucosal inflammation and we have not been able to reliably keep the prednisone at her 5 mg. vedolizumab can take longer to reach its full effect but she has now been on it for 9 months (3-4 months at q monthly dosing). We have a couple options. 1) give the vedolizumab a maximum of 3 more months to work. We can try Uceris in the meantime if we need some more bridging - vedolizumab may take a little more time to kick in but I think it is unlikely to get us to mucosal healing if it has not already. 2) Switch the vedolizumab to infliximab. Infliximab is one of the best medications to treat refractory ulcerative colitis. However, this is an anti-TNF and will provide additional systemic immunosuppression. She is already on tacrolimus and low dose prednisone and has continuous EBV viremia. Risk of immunosuppression will need to be balanced with benefits of the medication. 3) Colectomy - this will treat the IBD and also remove the risk of colon cancer. She would like to avoid surgery for now. I will discuss the options with Dr. Nails and Dr. Enriquez and present her case at our IBD conference. A plan will then be decided on. In the meantime she can taper her prednisone down to 5 mg. She will let us know if her symptoms flare.     2.  PCP prophylaxis.   Continue bactrim until her prednisone is at 5 mg.     3.  EBV viremia.   Have  increased to [id number]. No evidence of PTLD on biopsies. She did receive Rituxan last year. Will review with Dr. Nails.     4.  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   CMV levels most recently undetectable and biopsies from last 2 colonoscopies show no CMV.     5.  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  Her last ERCP was in 5/2017 with Dr. Guru Macias.  She is status post dilation and self ejecting stent placement.   AXR confirmed stent passed. LFTs normal.    6. Iron deficiency anemia - repeat iron infusions. Maximize treatment of IBD.     7.  IBD health care maintenance.  Please see Cresencio Sierra's note dated 03/20/2017.  I did not discuss this with her in depth today.       Thank you very much for the opportunity to take part in the care of this patient.  Please do not hesitate to call with questions.       Fuentes Johnson MD    [facility]  Division of Gastroenterology, Hepatology and Nutrition

## 2017-09-13 NOTE — NURSING NOTE
Printed after visit summary given to pt along with verbal instructions.  Refill of Prednisone and Ambien. Pt given literature for Remicade. Has follow up appt.

## 2017-09-13 NOTE — MR AVS SNAPSHOT
After Visit Summary   9/13/2017    Yarelis Penny    MRN: [id number]           Patient Information     Date Of Birth          1985        Visit Information        Provider Department      9/13/2017 2:00 PM  INFUSION CHAIR 13 [facility]        Today's Diagnoses     Anemia, iron deficiency    -  1       Follow-ups after your visit        Follow-up notes from your care team     Return if symptoms worsen or fail to improve.      Your next 10 appointments already scheduled     Sep 18, 2017  8:00 AM CDT   Infusion 60 with UC SPEC INFUSION, UC 44 ATC   [facility] ([facility])    [address] [address]   [phone]            Sep 20, 2017  8:00 AM CDT   Infusion 60 with UC SPEC INFUSION, UC 44 ATC   [facility] ([facility])    [address] [place] [address]   [phone]            Sep 22, 2017 10:00 AM CDT   Infusion 60 with UC SPEC INFUSION, UC 51 ATC   [facility] ([facility])    [address] [address]   [phone]            Sep 25, 2017  9:00 AM CDT   Infusion 60 with UC SPEC INFUSION, UC 51 ATC   [facility] ([facility])    [address] [address]   [phone]            Sep 26, 2017  2:00 PM CDT   Infusion 180 with UC SPEC INFUSION, UC 46 ATC   [facility] ([facility])    [address] [place] [address]   [phone]              Who to contact     If you have questions or need follow up information about today's clinic visit or your schedule  please contact [facility] directly at [phone].  Normal or non-critical lab and imaging results will be communicated to you by [employer], letter or phone within 4 business days after the clinic has received the results. If you do not hear from us within 7 days, please contact the clinic through [employer] or phone. If you have a critical or abnormal lab result, we will notify you by phone as soon as possible.  Submit refill requests through [employer] or call your pharmacy and they will forward the refill request to us. Please allow 3 business days for your refill to be completed.          Additional Information About Your Visit        [employer] Information     [employer] gives you secure access to your electronic health record. If you see a primary care provider, you can also send messages to your care team and make appointments. If you have questions, please call your primary care clinic.  If you do not have a primary care provider, please call [phone] and they will assist you.        Care EveryWhere ID     This is your Care EveryWhere ID. This could be used by other organizations to access your [facility] medical records  [id number]        Your Vitals Were     Pulse Temperature Respirations Last Period          76 98.9  F (37.2  C) (Oral) 14 08/30/2017 (Approximate)         Blood Pressure from Last 3 Encounters:   09/13/17 111/52   09/13/17 116/81   09/05/17 101/76    Weight from Last 3 Encounters:   09/13/17 49.2 kg (108 lb 8 oz)   08/29/17 49.1 kg (108 lb 3.2 oz)   07/25/17 48.6 kg (107 lb 3.2 oz)              Today, you had the following     No orders found for display         Today's Medication Changes          These changes are accurate as of: 9/13/17  2:51 PM.  If you have any questions, ask your nurse or doctor.               These medicines have changed or have updated prescriptions.        Dose/Directions    predniSONE 5 MG tablet   Commonly known as:  DELTASONE   This may  have changed:    - medication strength  - additional instructions  - Another medication with the same name was removed. Continue taking this medication, and follow the directions you see here.   Changed by:  Fuentes Johnson MD        Take 2 tab (10mg) daily  for 5 days then take one tab (5mg) daily   Quantity:  225 tablet   Refills:  11       zolpidem 5 MG tablet   Commonly known as:  AMBIEN   This may have changed:  See the new instructions.   Changed by:  Fuentes Johnson MD        Dose:  5 mg   Take 1 tablet (5 mg) by mouth nightly as needed for sleep   Quantity:  60 tablet   Refills:  5            Where to get your medicines      These medications were sent to [facility] [address] [place] [address]    Hours:  24-hours Phone:  [phone]     predniSONE 5 MG tablet         Some of these will need a paper prescription and others can be bought over the counter.  Ask your nurse if you have questions.     Bring a paper prescription for each of these medications     zolpidem 5 MG tablet                Primary Care Provider Office Phone # Fax #    Jacksonmira Orellana [phone]       [place] [address] YENIFER SPEAR [address] [address]        Equal Access to Services     ERI CHAKRABORTY AH: Hadii venancio hsieh hadasho Soomaali, waaxda luqadaha, qaybta kaalmada adeegyada, mani lopez haylupe lozano. So [facility] [phone].    ATENCIÓN: Si habla español, tiene a borja disposición servicios gratuitos de asistencia lingüística. [facility] [phone].    We comply with applicable federal civil rights laws and [place] laws. We do not discriminate on the basis of race, color, national origin, age, disability sex, sexual orientation or gender identity.            Thank you!     Thank you for choosing [facility]  for your care. Our goal is always to  provide you with excellent care. Hearing back from our patients is one way we can continue to improve our services. Please take a few minutes to complete the written survey that you may receive in the mail after your visit with us. Thank you!             Your Updated Medication List - Protect others around you: Learn how to safely use, store and throw away your medicines at www.disposemymeds.org.          This list is accurate as of: 9/13/17  2:51 PM.  Always use your most recent med list.                   Brand Name Dispense Instructions for use Diagnosis    ENTYVIO IV      Inject 300 mg into the vein every 28 days        GNP ASPIRIN LOW DOSE 81 MG EC tablet   Generic drug:  aspirin     30 tablet    Take 1 tablet (81 mg) by mouth daily    Ulcerative colitis with other complication, unspecified location (H), Liver replaced by transplant (H)       predniSONE 5 MG tablet    DELTASONE    225 tablet    Take 2 tab (10mg) daily  for 5 days then take one tab (5mg) daily        sulfamethoxazole-trimethoprim 800-160 MG per tablet    BACTRIM DS/SEPTRA DS    90 tablet    Take 1 tablet by mouth three times a week Monday, Wednesday, Friday    Immunosuppression (H), Inflammatory bowel disease, PSC (primary sclerosing cholangitis)       * tacrolimus 0.5 MG capsule    GENERIC EQUIVALENT    180 capsule    Take 1 capsule (0.5 mg) by mouth 2 times daily Take with 1 mg capsule. Total dose 2.5 mg every 12 hours    S/P liver transplant (H)       * tacrolimus 1 MG capsule    GENERIC EQUIVALENT    360 capsule    Take 2 capsules (2 mg) by mouth 2 times daily *total dose 2.5 mg BID. Take with 0.5 mg capsule    S/P liver transplant (H)       ursodiol 250 MG tablet    ACTIGALL    270 tablet    Take 2 tabs (500 mg) in AM, and 1 tab at night (250 mg)    Liver replaced by transplant (H)       zolpidem 5 MG tablet    AMBIEN    60 tablet    Take 1 tablet (5 mg) by mouth nightly as needed for sleep        * Notice:  This list has 2 medication(s)  that are the same as other medications prescribed for you. Read the directions carefully, and ask your doctor or other care provider to review them with you.

## 2017-09-13 NOTE — MR AVS SNAPSHOT
After Visit Summary   9/13/2017    Yarelis Penny    MRN: [id number]           Patient Information     Date Of Birth          1985        Visit Information        Provider Department      9/13/2017 7:30 AM Fuentes Johnson MD [facility]        Today's Diagnoses     C. difficile colitis    -  1      Care Instructions    Good to see you again today    I will discuss the option of infliximab (Remicade) with Dr. Nails and Dr. Enriquez. I will also discuss your case at our monthly IBD conference. I will update you on our recommendations.    Continue prednisone 10 mg x 5 more days and then go down to 5 mg. If your loose stools get worse let us know and we can either bump up the prednisone or give a trial of the Uceris (localized steroid)    Continue the bactrim for now    Continue vedolizumab infusions for now    Follow up with Cresencio Sierra in 3 months      Call with questions    For questions regarding your care Monday through Friday, contact the RN GI care coordinator,  Call   [phone] option 3 . Your call will be  returned same day, or if consultation is needed with the provider, it may be following business day - or you may send a My Chart message.    For medication refills (prescribed by the GI clinic), contact your pharmacy.    For appointment rescheduling/cancellation, contact [phone]     After hours, or if you have an immediate GI concern and cannot wait for a return call, contact the GI Fellow at [phone] and select option #4.     Thanks Norma Walker RN Care Coordinator for Dr. Johnson   Phone   [phone]               Follow-ups after your visit        Your next 10 appointments already scheduled     Sep 13, 2017  2:00 PM CDT   Level 1 with  INFUSION CHAIR 13   [facility] ([facility])    [facility]  [address]  [facility] [address] [phone]             Sep 18, 2017  8:00 AM CDT   Infusion 60 with UC SPEC INFUSION, UC 44 ATC   [facility] ([facility])    [address]  2nd [address]  [facility] [address] [phone]            Sep 20, 2017  8:00 AM CDT   Infusion 60 with UC SPEC INFUSION, UC 44 ATC   [facility] ([facility])    [address]  2nd Floor  [facility] [address] [phone]            Sep 22, 2017 10:00 AM CDT   Infusion 60 with UC SPEC INFUSION, UC 51 ATC   [facility] ([facility])    [address]  [address] [facility] [address] [phone]            Sep 25, 2017  9:00 AM CDT   Infusion 60 with UC SPEC INFUSION, UC 51 ATC   [facility] ([facility])    [address]  [address] [facility] [address] [phone]            Sep 26, 2017  2:00 PM CDT   Infusion 180 with UC SPEC INFUSION   [facility] ([facility])    [address]  [address] [facility] [id number] [phone]              Who to contact     Please call your clinic at [phone] to:    Ask questions about your health    Make or cancel appointments    Discuss your medicines    Learn about your test results    Speak to your doctor   If you have compliments or concerns about an experience at your clinic, or if you wish to file a complaint, please contact [facility] Patient Relations at [phone] or email us at [email or web address]         Additional Information About Your Visit        [employer] Information     [employer] gives you secure access to your electronic health record. If you see a primary care provider, you can also send  messages to your care team and make appointments. If you have questions, please call your primary care clinic.  If you do not have a primary care provider, please call [phone] and they will assist you.      [email or web address] is an electronic gateway that provides easy, online access to your medical records. With [email or web address], you can request a clinic appointment, read your test results, renew a prescription or communicate with your care team.     To access your existing account, please contact your [facility] or call [phone] for assistance.        Care EveryWhere ID     This is your Care EveryWhere ID. This could be used by other organizations to access your [place] medical records  [id number]        Your Vitals Were     Pulse Temperature Last Period Pulse Oximetry BMI (Body Mass Index)       78 97.5  F (36.4  C) (Oral) 08/30/2017 (Approximate) 100% 19.84 kg/m2        Blood Pressure from Last 3 Encounters:   09/13/17 116/81   09/05/17 101/76   08/29/17 111/77    Weight from Last 3 Encounters:   09/13/17 49.2 kg (108 lb 8 oz)   08/29/17 49.1 kg (108 lb 3.2 oz)   07/25/17 48.6 kg (107 lb 3.2 oz)              Today, you had the following     No orders found for display         Today's Medication Changes          These changes are accurate as of: 9/13/17  8:21 AM.  If you have any questions, ask your nurse or doctor.               These medicines have changed or have updated prescriptions.        Dose/Directions    predniSONE 5 MG tablet   Commonly known as:  DELTASONE   This may have changed:    - medication strength  - additional instructions  - Another medication with the same name was removed. Continue taking this medication, and follow the directions you see here.   Used for:  C. difficile colitis   Changed by:  Fuentes Johnson MD        Take 2 tab (10mg) daily  for 5 days then take one tab (5mg) daily   Quantity:  225 tablet   Refills:  11       zolpidem 5 MG tablet    Commonly known as:  AMBIEN   This may have changed:  See the new instructions.   Used for:  C. difficile colitis   Changed by:  Fuentes Johnson MD        Dose:  5 mg   Take 1 tablet (5 mg) by mouth nightly as needed for sleep   Quantity:  60 tablet   Refills:  5            Where to get your medicines      These medications were sent to [facility] [address] AT [address] [address]    Hours:  24-hours Phone:  [phone]     predniSONE 5 MG tablet         Some of these will need a paper prescription and others can be bought over the counter.  Ask your nurse if you have questions.     Bring a paper prescription for each of these medications     zolpidem 5 MG tablet                Primary Care Provider Office Phone # Fax #    Jackson Orellana [phone]       [place] [address] [address] [address]        Equal Access to Services     [facility]: Hadii aad ku hadasho Soomaali, waaxda luqadaha, qaybta kaalmada adeegyada, waxay idiin hayaan adeeg bossman melo . So [facility] [phone].    ATENCIÓN: Si habla español, tiene a borja disposición servicios gratuitos de asistencia lingüística. [facility] [phone].    We comply with applicable federal civil rights laws and [place] laws. We do not discriminate on the basis of race, color, national origin, age, disability sex, sexual orientation or gender identity.            Thank you!     Thank you for choosing [facility]  for your care. Our goal is always to provide you with excellent care. Hearing back from our patients is one way we can continue to improve our services. Please take a few minutes to complete the written survey that you may receive in the mail after your visit with us. Thank you!             Your Updated Medication List - Protect others around you: Learn how to safely use, store  and throw away your medicines at www.disposemymeds.org.          This list is accurate as of: 9/13/17  8:21 AM.  Always use your most recent med list.                   Brand Name Dispense Instructions for use Diagnosis    ENTYVIO IV      Inject 300 mg into the vein every 28 days        GNP ASPIRIN LOW DOSE 81 MG EC tablet   Generic drug:  aspirin     30 tablet    Take 1 tablet (81 mg) by mouth daily    Ulcerative colitis with other complication, unspecified location (H), Liver replaced by transplant (H)       predniSONE 5 MG tablet    DELTASONE    225 tablet    Take 2 tab (10mg) daily  for 5 days then take one tab (5mg) daily    C. difficile colitis       sulfamethoxazole-trimethoprim 800-160 MG per tablet    BACTRIM DS/SEPTRA DS    90 tablet    Take 1 tablet by mouth three times a week Monday, Wednesday, Friday    Immunosuppression (H), Inflammatory bowel disease, PSC (primary sclerosing cholangitis)       * tacrolimus 0.5 MG capsule    GENERIC EQUIVALENT    180 capsule    Take 1 capsule (0.5 mg) by mouth 2 times daily Take with 1 mg capsule. Total dose 2.5 mg every 12 hours    S/P liver transplant (H)       * tacrolimus 1 MG capsule    GENERIC EQUIVALENT    360 capsule    Take 2 capsules (2 mg) by mouth 2 times daily *total dose 2.5 mg BID. Take with 0.5 mg capsule    S/P liver transplant (H)       ursodiol 250 MG tablet    ACTIGALL    270 tablet    Take 2 tabs (500 mg) in AM, and 1 tab at night (250 mg)    Liver replaced by transplant (H)       zolpidem 5 MG tablet    AMBIEN    60 tablet    Take 1 tablet (5 mg) by mouth nightly as needed for sleep    C. difficile colitis       * Notice:  This list has 2 medication(s) that are the same as other medications prescribed for you. Read the directions carefully, and ask your doctor or other care provider to review them with you.

## 2017-09-13 NOTE — PROGRESS NOTES
Infusion Nursing Note:  Yarelis Penny presents today for Venofer.    Patient seen by provider today: No   present during visit today: Not Applicable.    Note: N/A.    Intravenous Access:  Peripheral IV placed.    Treatment Conditions:  Not Applicable.      Post Infusion Assessment:  Patient tolerated infusion without incident.  Blood return noted pre and post infusion.  Site patent and intact, free from redness, edema or discomfort.  No evidence of extravasations.  Access discontinued per protocol.    Discharge Plan:   Discharge instructions reviewed with: Patient.  Patient and/or family verbalized understanding of discharge instructions and all questions answered.  Copy of AVS reviewed with patient and/or family.  Patient will return prn for next appointment.  Patient discharged in stable condition accompanied by: self.  Departure Mode: Ambulatory.    Justina Romero RN

## 2017-09-18 ENCOUNTER — INFUSION THERAPY VISIT (OUTPATIENT)
Dept: INFUSION THERAPY | Facility: CLINIC | Age: 32
End: 2017-09-18
Attending: INTERNAL MEDICINE
Payer: COMMERCIAL

## 2017-09-18 VITALS
DIASTOLIC BLOOD PRESSURE: 88 MMHG | OXYGEN SATURATION: 100 % | HEART RATE: 86 BPM | TEMPERATURE: 98.5 F | BODY MASS INDEX: 19.77 KG/M2 | SYSTOLIC BLOOD PRESSURE: 116 MMHG | WEIGHT: 108.1 LBS

## 2017-09-18 DIAGNOSIS — D50.9 ANEMIA, IRON DEFICIENCY: Primary | ICD-10-CM

## 2017-09-18 PROCEDURE — 96365 THER/PROPH/DIAG IV INF INIT: CPT

## 2017-09-18 PROCEDURE — 25000128 H RX IP 250 OP 636: Mod: ZF | Performed by: INTERNAL MEDICINE

## 2017-09-18 RX ORDER — ACETAMINOPHEN 325 MG/1
650 TABLET ORAL
Status: CANCELLED
Start: 2017-09-18

## 2017-09-18 RX ORDER — DIPHENHYDRAMINE HCL 25 MG
25 TABLET ORAL
Status: CANCELLED
Start: 2017-09-18

## 2017-09-18 RX ADMIN — IRON SUCROSE 200 MG: 20 INJECTION, SOLUTION INTRAVENOUS at 08:15

## 2017-09-18 NOTE — MR AVS SNAPSHOT
After Visit Summary   9/18/2017    Yarelis Penny    MRN: [id number]           Patient Information     Date Of Birth          1985        Visit Information        Provider Department      9/18/2017 8:00 AM UC 44 ATC; UC SPEC INFUSION [facility]        Today's Diagnoses     Anemia, iron deficiency    -  1       Follow-ups after your visit        Your next 10 appointments already scheduled     Sep 20, 2017  8:00 AM CDT   Infusion 60 with UC SPEC INFUSION, UC 44 ATC   [facility] ([facility])    [address] [address]   [phone]            Sep 22, 2017 10:00 AM CDT   Infusion 60 with UC SPEC INFUSION, UC 51 ATC   [facility] ([facility])    [address] [place] [address] [phone]            Sep 25, 2017  9:00 AM CDT   Infusion 60 with UC SPEC INFUSION, UC 51 ATC   [facility] ([facility])    [address] [address] [phone]            Sep 26, 2017  2:00 PM CDT   Infusion 180 with UC SPEC INFUSION, UC 46 ATC   [facility] ([facility])    [address] [address] [phone]            Sep 27, 2017 10:00 AM CDT   Infusion 60 with UC SPEC INFUSION, UC 51 ATC   [facility] ([facility])    [address] [place] [address] [phone]              Who to contact     If you have questions or need follow up information about today's clinic visit or your schedule please contact [facility] SPECIALTY AND  PROCEDURE directly at [phone].  Normal or non-critical lab and imaging results will be communicated to you by MyChart, letter or phone within 4 business days after the clinic has received the results. If you do not hear from us within 7 days, please contact the clinic through [employer] or phone. If you have a critical or abnormal lab result, we will notify you by phone as soon as possible.  Submit refill requests through [employer] or call your pharmacy and they will forward the refill request to us. Please allow 3 business days for your refill to be completed.          Additional Information About Your Visit        [employer] Information     [employer] gives you secure access to your electronic health record. If you see a primary care provider, you can also send messages to your care team and make appointments. If you have questions, please call your primary care clinic.  If you do not have a primary care provider, please call [phone] and they will assist you.        Care EveryWhere ID     This is your Care EveryWhere ID. This could be used by other organizations to access your [place] medical records  [id number]        Your Vitals Were     Pulse Temperature Last Period Pulse Oximetry BMI (Body Mass Index)       86 98.5  F (36.9  C) (Oral) 08/30/2017 (Approximate) 100% 19.77 kg/m2        Blood Pressure from Last 3 Encounters:   09/18/17 116/88   09/13/17 111/52   09/13/17 116/81    Weight from Last 3 Encounters:   09/18/17 49 kg (108 lb 1.6 oz)   09/13/17 49.2 kg (108 lb 8 oz)   08/29/17 49.1 kg (108 lb 3.2 oz)              Today, you had the following     No orders found for display       Primary Care Provider Office Phone # Fax #    Jackson S Esteban [phone]       [facility] [address] [place] [address]        Equal Access to Services     ERI CHAKRABORTY : Yandel hart Soyari, waaxda luqadaha, qaybta kaalmada adexiomara, mani lozano.  So [facility] [phone].    ATENCIÓN: Si lilibeth haney, tiene a borja disposición servicios gratuitos de asistencia lingüística. [facility]de grewal [phone].    We comply with applicable federal civil rights laws and [place] laws. We do not discriminate on the basis of race, color, national origin, age, disability sex, sexual orientation or gender identity.            Thank you!     Thank you for choosing [facility] SPECIALTY AND PROCEDURE  for your care. Our goal is always to provide you with excellent care. Hearing back from our patients is one way we can continue to improve our services. Please take a few minutes to complete the written survey that you may receive in the mail after your visit with us. Thank you!             Your Updated Medication List - Protect others around you: Learn how to safely use, store and throw away your medicines at www.disposemymeds.org.          This list is accurate as of: 9/18/17  8:47 AM.  Always use your most recent med list.                   Brand Name Dispense Instructions for use Diagnosis    ENTYVIO IV      Inject 300 mg into the vein every 28 days        GNP ASPIRIN LOW DOSE 81 MG EC tablet   Generic drug:  aspirin     30 tablet    Take 1 tablet (81 mg) by mouth daily    Ulcerative colitis with other complication, unspecified location (H), Liver replaced by transplant (H)       predniSONE 5 MG tablet    DELTASONE    225 tablet    Take 2 tab (10mg) daily  for 5 days then take one tab (5mg) daily        sulfamethoxazole-trimethoprim 800-160 MG per tablet    BACTRIM DS/SEPTRA DS    90 tablet    Take 1 tablet by mouth three times a week Monday, Wednesday, Friday    Immunosuppression (H), Inflammatory bowel disease, PSC (primary sclerosing cholangitis)       * tacrolimus 0.5 MG capsule    GENERIC EQUIVALENT    180 capsule    Take 1 capsule (0.5 mg) by mouth 2 times daily Take with 1 mg capsule. Total dose 2.5 mg every 12 hours    S/P liver transplant (H)       *  tacrolimus 1 MG capsule    GENERIC EQUIVALENT    360 capsule    Take 2 capsules (2 mg) by mouth 2 times daily *total dose 2.5 mg BID. Take with 0.5 mg capsule    S/P liver transplant (H)       ursodiol 250 MG tablet    ACTIGALL    270 tablet    Take 2 tabs (500 mg) in AM, and 1 tab at night (250 mg)    Liver replaced by transplant (H)       zolpidem 5 MG tablet    AMBIEN    60 tablet    Take 1 tablet (5 mg) by mouth nightly as needed for sleep        * Notice:  This list has 2 medication(s) that are the same as other medications prescribed for you. Read the directions carefully, and ask your doctor or other care provider to review them with you.

## 2017-09-18 NOTE — PROGRESS NOTES
Nursing Note  [facility]is [facility]:   Chief Complaint   Patient presents with     Infusion     venofer     During [facility]'s [facility] appointment, orders from Fuentes Johnson were completed.  Frequency: today is dose 2 of 5 total.    Progress note:  Patient identification verified by name and date of birth.  Assessment completed.  Vitals recorded in Doc Flowsheets.  Patient was provided with education regarding infusion and possible side effects.  Patient verbalized understanding.      needed: No  Premedications: were not ordered.  Infusion Rates: infusion given over approximately 15 minutes.  Vascular access: peripheral IV placed today.  Treatment Conditions: non-applicable.  Patient tolerated infusion: well.      Discharge Plan:   Follow up plan of care with: ongoing infusions at [facility].  Discharge instructions were reviewed with patient.  Patient/representative verbalized understanding of discharge instructions and all questions answered.  Patient discharged from [facility] in stable condition.    Virgie Weems RN    Administrations This Visit     iron sucrose (VENOFER) 200 mg in NaCl 0.9 % 100 mL intermittent infusion     Admin Date Action Dose Rate Route Administered By          09/18/2017 New Bag 200 mg 480 mL/hr Intravenous Virgie Weems RN                         /88  Pulse 86  Temp 98.5  F (36.9  C) (Oral)  Wt 49 kg (108 lb 1.6 oz)  LMP 08/30/2017 (Approximate)  SpO2 100%  BMI 19.77 kg/m2

## 2017-09-20 ENCOUNTER — INFUSION THERAPY VISIT (OUTPATIENT)
Dept: INFUSION THERAPY | Facility: CLINIC | Age: 32
End: 2017-09-20
Attending: INTERNAL MEDICINE
Payer: COMMERCIAL

## 2017-09-20 VITALS
SYSTOLIC BLOOD PRESSURE: 104 MMHG | OXYGEN SATURATION: 99 % | DIASTOLIC BLOOD PRESSURE: 79 MMHG | TEMPERATURE: 98.8 F | RESPIRATION RATE: 16 BRPM

## 2017-09-20 DIAGNOSIS — D50.9 ANEMIA, IRON DEFICIENCY: Primary | ICD-10-CM

## 2017-09-20 PROCEDURE — 25000128 H RX IP 250 OP 636: Mod: ZF | Performed by: INTERNAL MEDICINE

## 2017-09-20 PROCEDURE — 96365 THER/PROPH/DIAG IV INF INIT: CPT

## 2017-09-20 RX ORDER — ACETAMINOPHEN 325 MG/1
650 TABLET ORAL
Status: CANCELLED
Start: 2017-09-20

## 2017-09-20 RX ORDER — DIPHENHYDRAMINE HCL 25 MG
25 TABLET ORAL
Status: CANCELLED
Start: 2017-09-20

## 2017-09-20 RX ADMIN — IRON SUCROSE 200 MG: 20 INJECTION, SOLUTION INTRAVENOUS at 08:09

## 2017-09-20 NOTE — PROGRESS NOTES
Nursing Note  Yarelis Penny [facility] for: No chief complaint on file.    During today's [facility] appointment, orders from Fuentes Johnson were completed.  Frequency: today is dose 3 of 5 total.    Progress note:  Patient identification verified by name and date of birth.  Assessment completed.  Vitals recorded in Doc Flowsheets.  Patient was provided with education regarding infusion and possible side effects.  Patient verbalized understanding.      needed: No  Premedications: were not ordered.  Infusion Rates: infusion given over approximately 15 minutes.  Labs: were not ordered for this appointment.  Vascular access: peripheral IV placed today.  Treatment Conditions: non-applicable.  Patient tolerated infusion: well.    Discharge Plan:   Follow up plan of care with: ongoing infusions at [facility].  Discharge instructions were reviewed with patient.  Patient/representative verbalized understanding of discharge instructions and all questions answered.  Patient discharged from [facility] in stable condition.    Virgie Weems RN    Administrations This Visit     iron sucrose (VENOFER) 200 mg in NaCl 0.9 % 100 mL intermittent infusion     Admin Date Action Dose Rate Route Administered By          09/20/2017 New Bag 200 mg 480 mL/hr Intravenous Virgie Weems RN                         /79 (BP Location: Left arm)  Temp 98.8  F (37.1  C) (Oral)  Resp 16  LMP 08/30/2017 (Approximate)  SpO2 99%

## 2017-09-20 NOTE — MR AVS SNAPSHOT
After Visit Summary   9/20/2017    Yarelis Penny    MRN: [id number]           Patient Information     Date Of Birth          1985        Visit Information        Provider Department      9/20/2017 8:00 AM UC 44 ATC; UC SPEC INFUSION [facility] Procedure        Today's Diagnoses     Anemia, iron deficiency    -  1       Follow-ups after your visit        Your next 10 appointments already scheduled     Sep 25, 2017  9:00 AM CDT   Infusion 180 with UC SPEC INFUSION, UC 48 ATC   [facility] Procedure ([facility])    [address] [place] [address]  [address] [facility] [id number] [phone]            Sep 27, 2017 10:00 AM CDT   Infusion 60 with UC SPEC INFUSION, UC 51 ATC   [facility] Procedure ([facility])    [address] [place] [address]  [address] [facility] [id number] [phone]            Dec 06, 2017  8:00 AM CST   (Arrive by 7:45 AM)   Return Visit with Walker Nails MD   [facility] and Infectious Diseases ([facility])    [address] [place] [address]  [address] [facility] [id number] [phone]            Dec 18, 2017  7:30 AM CST   (Arrive by 7:15 AM)   RETURN INFLAMMATORY BOWEL DISEASE with Cresencio Sierra PA-C   [facility] ([facility])    [address] [place] [address]  [address] [facility] [id number] [phone]              Who to contact     If you have questions or need follow up information about today's clinic visit or your schedule please contact [facility] SPECIALTY AND PROCEDURE directly at [phone].  Normal or non-critical lab and imaging results will be communicated to you by MyChart, letter or phone within 4 business days after the clinic has received the results. If you do not hear from us within 7  days, please contact the clinic through Flinto or phone. If you have a critical or abnormal lab result, we will notify you by phone as soon as possible.  Submit refill requests through Flinto or call your pharmacy and they will forward the refill request to us. Please allow 3 business days for your refill to be completed.          Additional Information About Your Visit        HappyBoxharAppwiz Information     Flinto gives you secure access to your electronic health record. If you see a primary care provider, you can also send messages to your care team and make appointments. If you have questions, please call your primary care clinic.  If you do not have a primary care provider, please call [phone] and they will assist you.        Care EveryWhere ID     This is your Care EveryWhere ID. This could be used by other organizations to access your [place] medical records  [id number]        Your Vitals Were     Temperature Respirations Last Period Pulse Oximetry          98.8  F (37.1  C) (Oral) 16 08/30/2017 (Approximate) 99%         Blood Pressure from Last 3 Encounters:   09/20/17 104/79   09/18/17 116/88   09/13/17 111/52    Weight from Last 3 Encounters:   09/18/17 49 kg (108 lb 1.6 oz)   09/13/17 49.2 kg (108 lb 8 oz)   08/29/17 49.1 kg (108 lb 3.2 oz)              Today, you had the following     No orders found for display       Primary Care Provider Office Phone # Fax #    Jackson S Esteban [phone]       [place] [address] [address] [facility] [address]  [facility] [address]        Equal Access to Services     ERI CHAKRABORTY : Hadii aad ku hadasho Soomaali, waaxda luqadaha, qaybta kaalmada adeegyada, mani lozano. So [facility] [phone].    ATENCIÓN: Si habla español, tiene a borja disposición servicios gratuitos de asistencia lingüística. Llame al [phone].    We comply with applicable federal civil rights laws and [place] laws. We do not discriminate on the basis of race,  color, national origin, age, disability sex, sexual orientation or gender identity.            Thank you!     Thank you for choosing [facility] SPECIALTY AND PROCEDURE  for your care. Our goal is always to provide you with excellent care. Hearing back from our patients is one way we can continue to improve our services. Please take a few minutes to complete the written survey that you may receive in the mail after your visit with us. Thank you!             Your Updated Medication List - Protect others around you: Learn how to safely use, store and throw away your medicines at www.disposemymeds.org.          This list is accurate as of: 9/20/17  4:44 PM.  Always use your most recent med list.                   Brand Name Dispense Instructions for use Diagnosis    ENTYVIO IV      Inject 300 mg into the vein every 28 days        GNP ASPIRIN LOW DOSE 81 MG EC tablet   Generic drug:  aspirin     30 tablet    Take 1 tablet (81 mg) by mouth daily    Ulcerative colitis with other complication, unspecified location (H), Liver replaced by transplant (H)       predniSONE 5 MG tablet    DELTASONE    225 tablet    Take 2 tab (10mg) daily  for 5 days then take one tab (5mg) daily        sulfamethoxazole-trimethoprim 800-160 MG per tablet    BACTRIM DS/SEPTRA DS    90 tablet    Take 1 tablet by mouth three times a week Monday, Wednesday, Friday    Immunosuppression (H), Inflammatory bowel disease, PSC (primary sclerosing cholangitis)       * tacrolimus 0.5 MG capsule    GENERIC EQUIVALENT    180 capsule    Take 1 capsule (0.5 mg) by mouth 2 times daily Take with 1 mg capsule. Total dose 2.5 mg every 12 hours    S/P liver transplant (H)       * tacrolimus 1 MG capsule    GENERIC EQUIVALENT    360 capsule    Take 2 capsules (2 mg) by mouth 2 times daily *total dose 2.5 mg BID. Take with 0.5 mg capsule    S/P liver transplant (H)       ursodiol 250 MG tablet    ACTIGALL    270 tablet    Take 2 tabs (500 mg) in  AM, and 1 tab at night (250 mg)    Liver replaced by transplant (H)       zolpidem 5 MG tablet    AMBIEN    60 tablet    Take 1 tablet (5 mg) by mouth nightly as needed for sleep        * Notice:  This list has 2 medication(s) that are the same as other medications prescribed for you. Read the directions carefully, and ask your doctor or other care provider to review them with you.

## 2017-09-25 ENCOUNTER — INFUSION THERAPY VISIT (OUTPATIENT)
Dept: INFUSION THERAPY | Facility: CLINIC | Age: 32
End: 2017-09-25
Attending: INTERNAL MEDICINE
Payer: COMMERCIAL

## 2017-09-25 VITALS
HEART RATE: 94 BPM | SYSTOLIC BLOOD PRESSURE: 104 MMHG | RESPIRATION RATE: 16 BRPM | TEMPERATURE: 98.2 F | OXYGEN SATURATION: 96 % | DIASTOLIC BLOOD PRESSURE: 73 MMHG

## 2017-09-25 DIAGNOSIS — B27.00 GAMMAHERPESVIRAL MONONUCLEOSIS WITHOUT COMPLICATION: ICD-10-CM

## 2017-09-25 DIAGNOSIS — K51.00 ULCERATIVE PANCOLITIS WITHOUT COMPLICATION (H): ICD-10-CM

## 2017-09-25 DIAGNOSIS — A08.39 CMV COLITIS (H): ICD-10-CM

## 2017-09-25 DIAGNOSIS — E86.0 DEHYDRATION: ICD-10-CM

## 2017-09-25 DIAGNOSIS — Z94.4 LIVER REPLACED BY TRANSPLANT (H): ICD-10-CM

## 2017-09-25 DIAGNOSIS — D50.9 ANEMIA, IRON DEFICIENCY: Primary | ICD-10-CM

## 2017-09-25 DIAGNOSIS — B25.9 CMV COLITIS (H): ICD-10-CM

## 2017-09-25 LAB
ALBUMIN SERPL-MCNC: 3 G/DL (ref 3.4–5)
ALP SERPL-CCNC: 195 U/L (ref 40–150)
ALT SERPL W P-5'-P-CCNC: 28 U/L (ref 0–50)
ANION GAP SERPL CALCULATED.3IONS-SCNC: 6 MMOL/L (ref 3–14)
AST SERPL W P-5'-P-CCNC: 20 U/L (ref 0–45)
BASOPHILS # BLD AUTO: 0.1 10E9/L (ref 0–0.2)
BASOPHILS NFR BLD AUTO: 1 %
BILIRUB DIRECT SERPL-MCNC: <0.1 MG/DL (ref 0–0.2)
BILIRUB SERPL-MCNC: 0.3 MG/DL (ref 0.2–1.3)
BUN SERPL-MCNC: 20 MG/DL (ref 7–30)
CALCIUM SERPL-MCNC: 8.4 MG/DL (ref 8.5–10.1)
CHLORIDE SERPL-SCNC: 108 MMOL/L (ref 94–109)
CO2 SERPL-SCNC: 25 MMOL/L (ref 20–32)
CREAT SERPL-MCNC: 1.11 MG/DL (ref 0.52–1.04)
CRP SERPL-MCNC: 14.1 MG/L (ref 0–8)
DIFFERENTIAL METHOD BLD: ABNORMAL
EOSINOPHIL # BLD AUTO: 1 10E9/L (ref 0–0.7)
EOSINOPHIL NFR BLD AUTO: 8.5 %
ERYTHROCYTE [DISTWIDTH] IN BLOOD BY AUTOMATED COUNT: 26.2 % (ref 10–15)
ERYTHROCYTE [SEDIMENTATION RATE] IN BLOOD BY WESTERGREN METHOD: 97 MM/H (ref 0–20)
GFR SERPL CREATININE-BSD FRML MDRD: 57 ML/MIN/1.7M2
GLUCOSE SERPL-MCNC: 82 MG/DL (ref 70–99)
HCT VFR BLD AUTO: 34.3 % (ref 35–47)
HGB BLD-MCNC: 10 G/DL (ref 11.7–15.7)
IMM GRANULOCYTES # BLD: 0.1 10E9/L (ref 0–0.4)
IMM GRANULOCYTES NFR BLD: 0.4 %
LYMPHOCYTES # BLD AUTO: 3.6 10E9/L (ref 0.8–5.3)
LYMPHOCYTES NFR BLD AUTO: 29.5 %
MAGNESIUM SERPL-MCNC: 1.7 MG/DL (ref 1.6–2.3)
MCH RBC QN AUTO: 21.2 PG (ref 26.5–33)
MCHC RBC AUTO-ENTMCNC: 29.2 G/DL (ref 31.5–36.5)
MCV RBC AUTO: 73 FL (ref 78–100)
MONOCYTES # BLD AUTO: 2 10E9/L (ref 0–1.3)
MONOCYTES NFR BLD AUTO: 16.1 %
NEUTROPHILS # BLD AUTO: 5.5 10E9/L (ref 1.6–8.3)
NEUTROPHILS NFR BLD AUTO: 44.5 %
NRBC # BLD AUTO: 0 10*3/UL
NRBC BLD AUTO-RTO: 0 /100
PHOSPHATE SERPL-MCNC: 2.8 MG/DL (ref 2.5–4.5)
PLATELET # BLD AUTO: 851 10E9/L (ref 150–450)
POTASSIUM SERPL-SCNC: 3.6 MMOL/L (ref 3.4–5.3)
PROT SERPL-MCNC: 8.2 G/DL (ref 6.8–8.8)
RBC # BLD AUTO: 4.72 10E12/L (ref 3.8–5.2)
SODIUM SERPL-SCNC: 138 MMOL/L (ref 133–144)
TACROLIMUS BLD-MCNC: 5.1 UG/L (ref 5–15)
TME LAST DOSE: NORMAL H
WBC # BLD AUTO: 12.3 10E9/L (ref 4–11)

## 2017-09-25 PROCEDURE — 85652 RBC SED RATE AUTOMATED: CPT | Performed by: INTERNAL MEDICINE

## 2017-09-25 PROCEDURE — 83735 ASSAY OF MAGNESIUM: CPT | Performed by: INTERNAL MEDICINE

## 2017-09-25 PROCEDURE — 80048 BASIC METABOLIC PNL TOTAL CA: CPT | Performed by: INTERNAL MEDICINE

## 2017-09-25 PROCEDURE — 86140 C-REACTIVE PROTEIN: CPT | Performed by: INTERNAL MEDICINE

## 2017-09-25 PROCEDURE — 80076 HEPATIC FUNCTION PANEL: CPT | Performed by: INTERNAL MEDICINE

## 2017-09-25 PROCEDURE — 96367 TX/PROPH/DG ADDL SEQ IV INF: CPT

## 2017-09-25 PROCEDURE — 84100 ASSAY OF PHOSPHORUS: CPT | Performed by: INTERNAL MEDICINE

## 2017-09-25 PROCEDURE — 85025 COMPLETE CBC W/AUTO DIFF WBC: CPT | Performed by: INTERNAL MEDICINE

## 2017-09-25 PROCEDURE — 96413 CHEMO IV INFUSION 1 HR: CPT

## 2017-09-25 PROCEDURE — 25000128 H RX IP 250 OP 636: Mod: ZF | Performed by: INTERNAL MEDICINE

## 2017-09-25 PROCEDURE — 80197 ASSAY OF TACROLIMUS: CPT | Performed by: SURGERY

## 2017-09-25 PROCEDURE — 87799 DETECT AGENT NOS DNA QUANT: CPT | Performed by: INTERNAL MEDICINE

## 2017-09-25 RX ORDER — DIPHENHYDRAMINE HCL 25 MG
25 TABLET ORAL
Status: CANCELLED
Start: 2017-09-25

## 2017-09-25 RX ORDER — ACETAMINOPHEN 325 MG/1
650 TABLET ORAL
Status: CANCELLED
Start: 2017-09-25

## 2017-09-25 RX ADMIN — VEDOLIZUMAB 300 MG: 300 INJECTION, POWDER, LYOPHILIZED, FOR SOLUTION INTRAVENOUS at 09:52

## 2017-09-25 RX ADMIN — IRON SUCROSE 200 MG: 20 INJECTION, SOLUTION INTRAVENOUS at 09:17

## 2017-09-25 NOTE — PROGRESS NOTES
Nursing Note  [facility]:   Chief Complaint   Patient presents with     Infusion     Venofer and Entyvio     During [facility]'s [facility] appointment, orders from Fuentes Johnson were completed.  Frequency: today is dose 1 of 5 total for Venofer and monthly for Entyvio    Progress note:  Patient identification verified by name and date of birth.  Assessment completed.  Vitals recorded in Doc Flowsheets.  Patient was provided with education regarding infusion and possible side effects.  Patient verbalized understanding.      needed: No  Premedications: were not ordered.  Infusion Rates: infusion given over approximately 15 minutes. Then 30 minutes for Entyvio  Labs: were ordered for this appointment.  Vascular access: peripheral IV placed today.  Treatment Conditions: non-applicable.  Patient tolerated infusion: well.    Discharge Plan:   Follow up plan of care with: ongoing infusions at [facility].  Discharge instructions were reviewed with patient.  Patient/representative verbalized understanding of discharge instructions and all questions answered.  Patient discharged from [facility] in stable condition.    Vilma Kent RN    Administrations This Visit     iron sucrose (VENOFER) 200 mg in NaCl 0.9 % 100 mL intermittent infusion     Admin Date Action Dose Rate Route Administered By          09/25/2017 New Bag 200 mg 480 mL/hr Intravenous Vilma Kent RN                   vedolizumab (ENTYVIO) 300 mg in NaCl 0.9 % 280 mL infusion     Admin Date Action Dose Rate Route Administered By          09/25/2017 New Bag 300 mg 560 mL/hr Intravenous Vilma Kent RN                         /73  Pulse 94  Temp 98.2  F (36.8  C) (Oral)  Resp 16  LMP 08/30/2017 (Approximate)  SpO2 96%

## 2017-09-25 NOTE — MR AVS SNAPSHOT
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        Liver replaced by transplant (H)           Follow-ups after your visit        Your next 10 appointments already scheduled     Sep 27, 2017 10:00 AM CDT   Infusion 60 with UC SPEC INFUSION, UC 51 ATC   [facility] Specialty and Procedure ([facility])    [address]  2nd Floor  [facility] [address] [phone]            Dec 06, 2017  8:00 AM CST   (Arrive by 7:45 AM)   Return Visit with Walker Nails MD   [facility] and Infectious Diseases ([facility])    [address] [place] [address]  3rd Floor  [facility] [address] [phone]            Dec 18, 2017  7:30 AM CST   (Arrive by 7:15 AM)   RETURN INFLAMMATORY BOWEL DISEASE with Cresencio Sierra PA-C   [facility] ([facility])    [address] [place] [address]  4th Floor  [facility] [address] [phone]              Who to contact     If you have questions or need follow up information about today's clinic visit or your schedule please contact [facility] SPECIALTY AND PROCEDURE directly at [phone].  Normal or non-critical lab and imaging results will be communicated to you by MyChart, letter or phone within 4 business days after the clinic has received the results. If you do not hear from us within 7 days, please contact the clinic through MyChart or phone. If you  have a critical or abnormal lab result, we will notify you by phone as soon as possible.  Submit refill requests through Hometapper or call your pharmacy and they will forward the refill request to us. Please allow 3 business days for your refill to be completed.          Additional Information About Your Visit        Compressushart Information     Hometapper gives you secure access to your electronic health record. If you see a primary care provider, you can also send messages to your care team and make appointments. If you have questions, please call your primary care clinic.  If you do not have a primary care provider, please call [phone] and they will assist you.        Care EveryWhere ID     This is your Care EveryWhere ID. This could be used by other organizations to access your [place] medical records  [id number]        Your Vitals Were     Pulse Temperature Respirations Last Period Pulse Oximetry       94 98.2  F (36.8  C) (Oral) 16 08/30/2017 (Approximate) 96%        Blood Pressure from Last 3 Encounters:   09/25/17 104/73   09/20/17 104/79   09/18/17 116/88    Weight from Last 3 Encounters:   09/18/17 49 kg (108 lb 1.6 oz)   09/13/17 49.2 kg (108 lb 8 oz)   08/29/17 49.1 kg (108 lb 3.2 oz)              We Performed the Following     Basic metabolic panel     CBC with platelets differential     CRP inflammation     EBV DNA PCR Quantitative Whole Blood     Erythrocyte sedimentation rate auto     Hepatic panel     Magnesium     Phosphorus     Tacrolimus level        Primary Care Provider Office Phone # Fax #    Jacksonvanessa Orellana [phone]       [place] [address] [address] [address]        Equal Access to Services     [facility]: Hadii aad jori hadasho Sotamiaali, waaxda luqadaha, qaybta kaalmada adexiomara, mani melo . So [facility] [phone].    ATENCIÓN: Si habla español, tiene a borja disposición servicios gratuitos de asistencia lingüística.  Clyde grewal [phone].    We comply with applicable federal civil rights laws and [place] laws. We do not discriminate on the basis of race, color, national origin, age, disability sex, sexual orientation or gender identity.            Thank you!     Thank you for choosing [facility] SPECIALTY AND PROCEDURE  for your care. Our goal is always to provide you with excellent care. Hearing back from our patients is one way we can continue to improve our services. Please take a few minutes to complete the written survey that you may receive in the mail after your visit with us. Thank you!             Your Updated Medication List - Protect others around you: Learn how to safely use, store and throw away your medicines at www.disposemymeds.org.          This list is accurate as of: 9/25/17  2:55 PM.  Always use your most recent med list.                   Brand Name Dispense Instructions for use Diagnosis    ENTYVIO IV      Inject 300 mg into the vein every 28 days        GNP ASPIRIN LOW DOSE 81 MG EC tablet   Generic drug:  aspirin     30 tablet    Take 1 tablet (81 mg) by mouth daily    Ulcerative colitis with other complication, unspecified location (H), Liver replaced by transplant (H)       predniSONE 5 MG tablet    DELTASONE    225 tablet    Take 2 tab (10mg) daily  for 5 days then take one tab (5mg) daily        sulfamethoxazole-trimethoprim 800-160 MG per tablet    BACTRIM DS/SEPTRA DS    90 tablet    Take 1 tablet by mouth three times a week Monday, Wednesday, Friday    Immunosuppression (H), Inflammatory bowel disease, PSC (primary sclerosing cholangitis)       * tacrolimus 0.5 MG capsule    GENERIC EQUIVALENT    180 capsule    Take 1 capsule (0.5 mg) by mouth 2 times daily Take with 1 mg capsule. Total dose 2.5 mg every 12 hours    S/P liver transplant (H)       * tacrolimus 1 MG capsule    GENERIC EQUIVALENT    360 capsule    Take 2 capsules (2 mg) by mouth 2 times daily *total dose 2.5  mg BID. Take with 0.5 mg capsule    S/P liver transplant (H)       ursodiol 250 MG tablet    ACTIGALL    270 tablet    Take 2 tabs (500 mg) in AM, and 1 tab at night (250 mg)    Liver replaced by transplant (H)       zolpidem 5 MG tablet    AMBIEN    60 tablet    Take 1 tablet (5 mg) by mouth nightly as needed for sleep        * Notice:  This list has 2 medication(s) that are the same as other medications prescribed for you. Read the directions carefully, and ask your doctor or other care provider to review them with you.

## 2017-09-26 ENCOUNTER — CARE COORDINATION (OUTPATIENT)
Dept: GASTROENTEROLOGY | Facility: CLINIC | Age: 32
End: 2017-09-26

## 2017-09-26 DIAGNOSIS — K51.90 UC (ULCERATIVE COLITIS) (H): Primary | ICD-10-CM

## 2017-09-26 LAB
EBV DNA # SPEC NAA+PROBE: ABNORMAL {COPIES}/ML
EBV DNA SPEC NAA+PROBE-LOG#: 5 {LOG_COPIES}/ML

## 2017-09-26 RX ORDER — PREDNISONE 5 MG/1
TABLET ORAL
Qty: 225 TABLET | Refills: 0 | Status: SHIPPED | OUTPATIENT
Start: 2017-09-26 | End: 2017-12-05

## 2017-09-26 NOTE — PROGRESS NOTES
Returned voice mail from patient. Pt has been having 4 to 6 loose stools since Thursday and some urgency. Denies any blood in stools or abdominal pain. Pt stated that Dr. Johnson had discussed at  last clinic visit that if symptoms increase with the decrease in prednisone tat another medication could be added.  Discussed with Dr. Johnson. Pt will go back up to Prednisone 30 mg for 7 days, and then taper.  Prescription sent. Also will try to prior uceris.  Dr. Johnson will confer with  about possibility of switching to Remicade. Pt aware of the new plan and in agreement.

## 2017-09-27 ENCOUNTER — INFUSION THERAPY VISIT (OUTPATIENT)
Dept: INFUSION THERAPY | Facility: CLINIC | Age: 32
End: 2017-09-27
Attending: INTERNAL MEDICINE
Payer: COMMERCIAL

## 2017-09-27 VITALS
DIASTOLIC BLOOD PRESSURE: 73 MMHG | OXYGEN SATURATION: 99 % | TEMPERATURE: 97.7 F | RESPIRATION RATE: 16 BRPM | SYSTOLIC BLOOD PRESSURE: 104 MMHG | HEART RATE: 96 BPM

## 2017-09-27 DIAGNOSIS — D50.9 ANEMIA, IRON DEFICIENCY: Primary | ICD-10-CM

## 2017-09-27 PROCEDURE — 25000128 H RX IP 250 OP 636: Mod: ZF | Performed by: INTERNAL MEDICINE

## 2017-09-27 PROCEDURE — 96365 THER/PROPH/DIAG IV INF INIT: CPT

## 2017-09-27 RX ORDER — ACETAMINOPHEN 325 MG/1
650 TABLET ORAL
Status: CANCELLED
Start: 2017-09-27

## 2017-09-27 RX ORDER — DIPHENHYDRAMINE HCL 25 MG
25 TABLET ORAL
Status: CANCELLED
Start: 2017-09-27

## 2017-09-27 RX ADMIN — IRON SUCROSE 200 MG: 20 INJECTION, SOLUTION INTRAVENOUS at 10:22

## 2017-09-27 ASSESSMENT — PAIN SCALES - GENERAL: PAINLEVEL: NO PAIN (0)

## 2017-09-27 NOTE — PATIENT INSTRUCTIONS
Patient Education    Iron Sucrose Chewable tablet    Iron Sucrose Solution for injection  Iron Sucrose Solution for injection  What is this medicine?  IRON SUCROSE (RITO fry) is an iron complex. Iron is used to make healthy red blood cells, which carry oxygen and nutrients throughout the body. This medicine is used to treat iron deficiency anemia in people with chronic kidney disease.  This medicine may be used for other purposes; ask your health care provider or pharmacist if you have questions.  What should I tell my health care provider before I take this medicine?  They need to know if you have any of these conditions:    anemia not caused by low iron levels    heart disease    high levels of iron in the blood    kidney disease    liver disease    an unusual or allergic reaction to iron, other medicines, foods, dyes, or preservatives    pregnant or trying to get pregnant    breast-feeding  How should I use this medicine?  This medicine is for infusion into a vein. It is given by a health care professional in a hospital or clinic setting.  Talk to your pediatrician regarding the use of this medicine in children. While this drug may be prescribed for children as young as 2 years for selected conditions, precautions do apply.  Overdosage: If you think you have taken too much of this medicine contact a poison control center or emergency room at once.  NOTE: This medicine is only for you. Do not share this medicine with others.  What if I miss a dose?  It is important not to miss your dose. Call your doctor or health care professional if you are unable to keep an appointment.  What may interact with this medicine?  Do not take this medicine with any of the following medications:    deferoxamine    dimercaprol    other iron products  This medicine may also interact with the following medications:    chloramphenicol    deferasirox  This list may not describe all possible interactions. Give your health care  provider a list of all the medicines, herbs, non-prescription drugs, or dietary supplements you use. Also tell them if you smoke, drink alcohol, or use illegal drugs. Some items may interact with your medicine.  What should I watch for while using this medicine?  Visit your doctor or healthcare professional regularly. Tell your doctor or healthcare professional if your symptoms do not start to get better or if they get worse. You may need blood work done while you are taking this medicine.  You may need to follow a special diet. Talk to your doctor. Foods that contain iron include: whole grains/cereals, dried fruits, beans, or peas, leafy green vegetables, and organ meats (liver, kidney).  What side effects may I notice from receiving this medicine?  Side effects that you should report to your doctor or health care professional as soon as possible:    allergic reactions like skin rash, itching or hives, swelling of the face, lips, or tongue    breathing problems    changes in blood pressure    cough    fast, irregular heartbeat    feeling faint or lightheaded, falls    fever or chills    flushing, sweating, or hot feelings    joint or muscle aches/pains    seizures    swelling of the ankles or feet    unusually weak or tired  Side effects that usually do not require medical attention (report to your doctor or health care professional if they continue or are bothersome):    diarrhea    feeling achy    headache    irritation at site where injected    nausea, vomiting    stomach upset    tiredness  This list may not describe all possible side effects. Call your doctor for medical advice about side effects. You may report side effects to FDA at 4-109-FDA-6743.  Where should I keep my medicine?  This drug is given in a hospital or clinic and will not be stored at home.  NOTE:This sheet is a summary. It may not cover all possible information. If you have questions about this medicine, talk to your doctor, pharmacist, or  health care provider. Copyright  2016 Gold Standard

## 2017-09-27 NOTE — PROGRESS NOTES
Infusion Nursing Note:  Yarelis Penny presents today to [facility] for a venofer infusion.  During today's [facility] appointment orders from Dr. Johnson were completed.  Frequency: 5 of 5 treatments every other day    Progress note:  ID verified by name and .  Assessment completed. Vitals were stable throughout time in [facility]. Patient education regarding infusion and possible side effects provided.  Patient verbalized understanding. yes    Premedications: were not ordered.    Infusion Rates: Infusion given over approximately 20 minutes.     Labs were not ordered for this appointment.    Vascular access: Peripheral IV placed today.    Patient tolerated infusion well.    Discharge Plan:   Follow up plan of care with: MD messaged to let pt know when to get labs drawn again.  Discharge instructions were reviewed with patient.  Patient/representative verbalized understanding of discharge instructions and all questions answered.    [facility] in [facility] condition.    Dana Quintana RN    Administrations This Visit     iron sucrose (VENOFER) 200 mg in NaCl 0.9 % 100 mL intermittent infusion     Admin Date Action Dose Rate Route Administered By          2017 New Bag 200 mg 480 mL/hr Intravenous Dana Quintana RN                         /75  Pulse 97  Temp 97.6  F (36.4  C) (Oral)  Resp 16  LMP 2017 (Approximate)

## 2017-09-27 NOTE — MR AVS SNAPSHOT
After Visit Summary   9/27/2017    Yarelis Penny    MRN: [id number]           Patient Information     Date Of Birth          1985        Visit Information        Provider Department      9/27/2017 10:00 AM  51 ATC;  [facility]ty [facility] Procedure        Today's Diagnoses     Anemia, iron deficiency    -  1      Care Instructions      Patient Education    Iron Sucrose Chewable tablet    Iron Sucrose Solution for injection  Iron Sucrose Solution for injection  What is this medicine?  IRON SUCROSE (AHY priyanka ASCENCION krohs) is an iron complex. Iron is used to make healthy red blood cells, which carry oxygen and nutrients throughout the body. This medicine is used to treat iron deficiency anemia in people with chronic kidney disease.  This medicine may be used for other purposes; ask your health care provider or pharmacist if you have questions.  What should I tell my health care provider before I take this medicine?  They need to know if you have any of these conditions:    anemia not caused by low iron levels    heart disease    high levels of iron in the blood    kidney disease    liver disease    an unusual or allergic reaction to iron, other medicines, foods, dyes, or preservatives    pregnant or trying to get pregnant    breast-feeding  How should I use this medicine?  This medicine is for infusion into a vein. It is given by a health care professional in a hospital or clinic setting.  Talk to your pediatrician regarding the use of this medicine in children. While this drug may be prescribed for children as young as 2 years for selected conditions, precautions do apply.  Overdosage: If you think you have taken too much of this medicine contact a poison control center or emergency room at once.  NOTE: This medicine is only for you. Do not share this medicine with others.  What if I miss a dose?  It is important not to miss your dose. Call your doctor or health  care professional if you are unable to keep an appointment.  What may interact with this medicine?  Do not take this medicine with any of the following medications:    deferoxamine    dimercaprol    other iron products  This medicine may also interact with the following medications:    chloramphenicol    deferasirox  This list may not describe all possible interactions. Give your health care provider a list of all the medicines, herbs, non-prescription drugs, or dietary supplements you use. Also tell them if you smoke, drink alcohol, or use illegal drugs. Some items may interact with your medicine.  What should I watch for while using this medicine?  Visit your doctor or healthcare professional regularly. Tell your doctor or healthcare professional if your symptoms do not start to get better or if they get worse. You may need blood work done while you are taking this medicine.  You may need to follow a special diet. Talk to your doctor. Foods that contain iron include: whole grains/cereals, dried fruits, beans, or peas, leafy green vegetables, and organ meats (liver, kidney).  What side effects may I notice from receiving this medicine?  Side effects that you should report to your doctor or health care professional as soon as possible:    allergic reactions like skin rash, itching or hives, swelling of the face, lips, or tongue    breathing problems    changes in blood pressure    cough    fast, irregular heartbeat    feeling faint or lightheaded, falls    fever or chills    flushing, sweating, or hot feelings    joint or muscle aches/pains    seizures    swelling of the ankles or feet    unusually weak or tired  Side effects that usually do not require medical attention (report to your doctor or health care professional if they continue or are bothersome):    diarrhea    feeling achy    headache    irritation at site where injected    nausea, vomiting    stomach upset    tiredness  This list may not describe all  possible side effects. Call your doctor for medical advice about side effects. You may report side effects to FDA at 5-895-RPU-4145.  Where should I keep my medicine?  This drug is given in a hospital or clinic and will not be stored at home.  NOTE:This sheet is a summary. It may not cover all possible information. If you have questions about this medicine, talk to your doctor, pharmacist, or health care provider. Copyright  2016 Gold Standard                Follow-ups after your visit        Your next 10 appointments already scheduled     Dec 06, 2017  8:00 AM CST   (Arrive by 7:45 AM)   Return Visit with Walker Nails MD   [facility] and Infectious Diseases ([facility])    [address] [place] [address]  3rd [facility] [address] [phone]            Dec 18, 2017  7:30 AM CST   (Arrive by 7:15 AM)   RETURN INFLAMMATORY BOWEL DISEASE with Cresencio Sierra PA-C   [facility] ([facility])    [address] [place] [address]  4th [facility] [address] [phone]              Who to contact     If you have questions or need follow up information about today's clinic visit or your schedule please contact [facility] SPECIALTY AND PROCEDURE directly at [phone].  Normal or non-critical lab and imaging results will be communicated to you by Sokolinhart, letter or phone within 4 business days after the clinic has received the results. If you do not hear from us within 7 days, please contact the clinic through Sokolinhart or phone. If you have a critical or abnormal lab result, we will notify you by phone as soon as possible.  Submit refill requests through Pipefish or call your pharmacy and they will forward the refill request to us. Please allow 3 business days for your refill to be completed.          Additional Information About Your Visit        Pipe[employer] Information     [employer] gives you secure  access to your electronic health record. If you see a primary care provider, you can also send messages to your care team and make appointments. If you have questions, please call your primary care clinic.  If you do not have a primary care provider, please call [phone] and they will assist you.        Care EveryWhere ID     This is your Care EveryWhere ID. This could be used by other organizations to access your [place] medical records  [id number]        Your Vitals Were     Pulse Temperature Respirations Last Period          97 97.6  F (36.4  C) (Oral) 16 08/30/2017 (Approximate)         Blood Pressure from Last 3 Encounters:   09/27/17 105/75   09/25/17 104/73   09/20/17 104/79    Weight from Last 3 Encounters:   09/18/17 49 kg (108 lb 1.6 oz)   09/13/17 49.2 kg (108 lb 8 oz)   08/29/17 49.1 kg (108 lb 3.2 oz)              Today, you had the following     No orders found for display       Primary Care Provider Office Phone # Fax #    Jackson Orellana [phone]       [facility] [address]  [facility] [address]        Equal Access to Services     ERI CHAKRABORTY AH: Hadii aad ku hadasho Soomaali, waaxda luqadaha, qaybta kaalmada adeegyada, mani lozano. So [facility] [phone].    ATENCIÓN: Si habla español, tiene a borja disposición servicios gratuitos de asistencia lingüística. Llame al [phone].    We comply with applicable federal civil rights laws and [place] laws. We do not discriminate on the basis of race, color, national origin, age, disability sex, sexual orientation or gender identity.            Thank you!     Thank you for choosing [facility] SPECIALTY AND PROCEDURE  for your care. Our goal is always to provide you with excellent care. Hearing back from our patients is one way we can continue to improve our services. Please take a few minutes to complete the written survey that you may receive in the mail after  your visit with us. Thank you!             Your Updated Medication List - Protect others around you: Learn how to safely use, store and throw away your medicines at www.disposemymeds.org.          This list is accurate as of: 9/27/17 10:25 AM.  Always use your most recent med list.                   Brand Name Dispense Instructions for use Diagnosis    budesonide 9 MG 24 hr tablet    UCERIS    30 tablet    Take 1 tablet (9 mg) by mouth every morning    UC (ulcerative colitis) (H)       ENTYVIO IV      Inject 300 mg into the vein every 28 days        GNP ASPIRIN LOW DOSE 81 MG EC tablet   Generic drug:  aspirin     30 tablet    Take 1 tablet (81 mg) by mouth daily    Ulcerative colitis with other complication, unspecified location (H), Liver replaced by transplant (H)       * predniSONE 5 MG tablet    DELTASONE    225 tablet    Take 2 tab (10mg) daily  for 5 days then take one tab (5mg) daily        * predniSONE 5 MG tablet    DELTASONE    225 tablet    Take 6 tabs ( 30mg) daily for 7 days, take 5 tabs (25mg) daily for 7 days, then 4 tab (20mg) daily  for 7 days, then 3 tabs (15mg) daily  for 7 days the 2 tabs (10mg) daily for 7 days then stay at 5 mg daily    UC (ulcerative colitis) (H)       sulfamethoxazole-trimethoprim 800-160 MG per tablet    BACTRIM DS/SEPTRA DS    90 tablet    Take 1 tablet by mouth three times a week Monday, Wednesday, Friday    Immunosuppression (H), Inflammatory bowel disease, PSC (primary sclerosing cholangitis)       * tacrolimus 0.5 MG capsule    GENERIC EQUIVALENT    180 capsule    Take 1 capsule (0.5 mg) by mouth 2 times daily Take with 1 mg capsule. Total dose 2.5 mg every 12 hours    S/P liver transplant (H)       * tacrolimus 1 MG capsule    GENERIC EQUIVALENT    360 capsule    Take 2 capsules (2 mg) by mouth 2 times daily *total dose 2.5 mg BID. Take with 0.5 mg capsule    S/P liver transplant (H)       ursodiol 250 MG tablet    ACTIGALL    270 tablet    Take 2 tabs (500 mg) in AM,  and 1 tab at night (250 mg)    Liver replaced by transplant (H)       zolpidem 5 MG tablet    AMBIEN    60 tablet    Take 1 tablet (5 mg) by mouth nightly as needed for sleep        * Notice:  This list has 4 medication(s) that are the same as other medications prescribed for you. Read the directions carefully, and ask your doctor or other care provider to review them with you.

## 2017-09-28 ENCOUNTER — CARE COORDINATION (OUTPATIENT)
Dept: GASTROENTEROLOGY | Facility: CLINIC | Age: 32
End: 2017-09-28

## 2017-10-03 ENCOUNTER — CARE COORDINATION (OUTPATIENT)
Dept: GASTROENTEROLOGY | Facility: CLINIC | Age: 32
End: 2017-10-03

## 2017-10-03 DIAGNOSIS — K52.9 COLITIS: Primary | ICD-10-CM

## 2017-10-03 NOTE — PROGRESS NOTES
Edited therapy plan to add entyvio level to be drawn with next infusion around October 23.  Form completed and tubed [place] [facility] to be sent to [facility] send outs.

## 2017-10-04 ENCOUNTER — CARE COORDINATION (OUTPATIENT)
Dept: GASTROENTEROLOGY | Facility: CLINIC | Age: 32
End: 2017-10-04

## 2017-10-04 DIAGNOSIS — K52.9 COLITIS: Primary | ICD-10-CM

## 2017-10-04 DIAGNOSIS — D50.9 ANEMIA, IRON DEFICIENCY: ICD-10-CM

## 2017-10-04 NOTE — PROGRESS NOTES
Added iron labs with the next entyvio infusion to be drawn day of entyvio prior to start of infusion.

## 2017-10-25 ENCOUNTER — HOSPITAL ENCOUNTER (OUTPATIENT)
Facility: CLINIC | Age: 32
Setting detail: SPECIMEN
Discharge: HOME | End: 2017-10-25
Attending: INTERNAL MEDICINE | Admitting: INTERNAL MEDICINE
Payer: COMMERCIAL

## 2017-10-25 ENCOUNTER — INFUSION THERAPY VISIT (OUTPATIENT)
Dept: INFUSION THERAPY | Facility: CLINIC | Age: 32
End: 2017-10-25
Attending: FAMILY MEDICINE
Payer: COMMERCIAL

## 2017-10-25 VITALS
OXYGEN SATURATION: 98 % | SYSTOLIC BLOOD PRESSURE: 101 MMHG | HEART RATE: 86 BPM | TEMPERATURE: 99.1 F | DIASTOLIC BLOOD PRESSURE: 70 MMHG | RESPIRATION RATE: 20 BRPM

## 2017-10-25 DIAGNOSIS — D50.9 ANEMIA, IRON DEFICIENCY: ICD-10-CM

## 2017-10-25 DIAGNOSIS — E86.0 DEHYDRATION: Primary | ICD-10-CM

## 2017-10-25 DIAGNOSIS — A08.39 CMV COLITIS (H): ICD-10-CM

## 2017-10-25 DIAGNOSIS — B25.9 CMV COLITIS (H): ICD-10-CM

## 2017-10-25 DIAGNOSIS — K51.00 ULCERATIVE PANCOLITIS WITHOUT COMPLICATION (H): ICD-10-CM

## 2017-10-25 DIAGNOSIS — B27.00 GAMMAHERPESVIRAL MONONUCLEOSIS WITHOUT COMPLICATION: ICD-10-CM

## 2017-10-25 DIAGNOSIS — K52.9 COLITIS: ICD-10-CM

## 2017-10-25 LAB
ALBUMIN SERPL-MCNC: 2.9 G/DL (ref 3.4–5)
ALP SERPL-CCNC: 112 U/L (ref 40–150)
ALT SERPL W P-5'-P-CCNC: 34 U/L (ref 0–50)
AST SERPL W P-5'-P-CCNC: 14 U/L (ref 0–45)
BASOPHILS # BLD AUTO: 0 10E9/L (ref 0–0.2)
BASOPHILS NFR BLD AUTO: 0.1 %
BILIRUB DIRECT SERPL-MCNC: <0.1 MG/DL (ref 0–0.2)
BILIRUB SERPL-MCNC: 0.3 MG/DL (ref 0.2–1.3)
CRP SERPL-MCNC: 4 MG/L (ref 0–8)
DIFFERENTIAL METHOD BLD: ABNORMAL
EOSINOPHIL # BLD AUTO: 0 10E9/L (ref 0–0.7)
EOSINOPHIL NFR BLD AUTO: 0.3 %
ERYTHROCYTE [DISTWIDTH] IN BLOOD BY AUTOMATED COUNT: ABNORMAL % (ref 10–15)
ERYTHROCYTE [SEDIMENTATION RATE] IN BLOOD BY WESTERGREN METHOD: 50 MM/H (ref 0–20)
FERRITIN SERPL-MCNC: 28 NG/ML (ref 12–150)
HCT VFR BLD AUTO: 32.6 % (ref 35–47)
HGB BLD-MCNC: 10 G/DL (ref 11.7–15.7)
IMM GRANULOCYTES # BLD: 0.1 10E9/L (ref 0–0.4)
IMM GRANULOCYTES NFR BLD: 0.5 %
IRON SATN MFR SERPL: 6 % (ref 15–46)
IRON SERPL-MCNC: 19 UG/DL (ref 35–180)
LYMPHOCYTES # BLD AUTO: 0.8 10E9/L (ref 0.8–5.3)
LYMPHOCYTES NFR BLD AUTO: 8.1 %
MCH RBC QN AUTO: 23.8 PG (ref 26.5–33)
MCHC RBC AUTO-ENTMCNC: 30.7 G/DL (ref 31.5–36.5)
MCV RBC AUTO: 77 FL (ref 78–100)
MISCELLANEOUS TEST: NORMAL
MONOCYTES # BLD AUTO: 0.1 10E9/L (ref 0–1.3)
MONOCYTES NFR BLD AUTO: 1.4 %
NEUTROPHILS # BLD AUTO: 8.8 10E9/L (ref 1.6–8.3)
NEUTROPHILS NFR BLD AUTO: 89.6 %
NRBC # BLD AUTO: 0 10*3/UL
NRBC BLD AUTO-RTO: 0 /100
PLATELET # BLD AUTO: 669 10E9/L (ref 150–450)
PROT SERPL-MCNC: 7.5 G/DL (ref 6.8–8.8)
RBC # BLD AUTO: 4.21 10E12/L (ref 3.8–5.2)
TIBC SERPL-MCNC: 294 UG/DL (ref 240–430)
WBC # BLD AUTO: 9.9 10E9/L (ref 4–11)

## 2017-10-25 PROCEDURE — 85652 RBC SED RATE AUTOMATED: CPT | Performed by: INTERNAL MEDICINE

## 2017-10-25 PROCEDURE — 83540 ASSAY OF IRON: CPT | Performed by: INTERNAL MEDICINE

## 2017-10-25 PROCEDURE — 83550 IRON BINDING TEST: CPT | Performed by: INTERNAL MEDICINE

## 2017-10-25 PROCEDURE — 80076 HEPATIC FUNCTION PANEL: CPT | Performed by: INTERNAL MEDICINE

## 2017-10-25 PROCEDURE — 85025 COMPLETE CBC W/AUTO DIFF WBC: CPT | Performed by: INTERNAL MEDICINE

## 2017-10-25 PROCEDURE — 25000128 H RX IP 250 OP 636: Performed by: INTERNAL MEDICINE

## 2017-10-25 PROCEDURE — 96413 CHEMO IV INFUSION 1 HR: CPT

## 2017-10-25 PROCEDURE — 82728 ASSAY OF FERRITIN: CPT | Performed by: INTERNAL MEDICINE

## 2017-10-25 PROCEDURE — 86140 C-REACTIVE PROTEIN: CPT | Performed by: INTERNAL MEDICINE

## 2017-10-25 PROCEDURE — 87799 DETECT AGENT NOS DNA QUANT: CPT | Performed by: INTERNAL MEDICINE

## 2017-10-25 RX ADMIN — SODIUM CHLORIDE 250 ML: 9 INJECTION, SOLUTION INTRAVENOUS at 14:04

## 2017-10-25 RX ADMIN — VEDOLIZUMAB 300 MG: 300 INJECTION, POWDER, LYOPHILIZED, FOR SOLUTION INTRAVENOUS at 14:03

## 2017-10-25 ASSESSMENT — PAIN SCALES - GENERAL: PAINLEVEL: NO PAIN (0)

## 2017-10-25 NOTE — PROGRESS NOTES
Infusion Nursing Note:  Yarelis Penny presents today for [employer].    Patient seen by provider today: No   present during visit today: Not Applicable.    Note: N/A.    Intravenous Access:  Peripheral IV placed.    Treatment Conditions:  Not Applicable.      Post Infusion Assessment:  Patient tolerated infusion without incident.  Blood return noted pre and post infusion.  Site patent and intact, free from redness, edema or discomfort.  No evidence of extravasations.  Access discontinued per protocol.    Discharge Plan:   Patient declined prescription refills.  Discharge instructions reviewed with: Patient.  Patient verbalized understanding of discharge instructions and all questions answered.  AVS to patient via OWM.  Patient will return 12/18/17 for next appointment.   Patient discharged in stable condition accompanied by: self.  Departure Mode: Ambulatory.    Yelena Lew RN

## 2017-10-25 NOTE — MR AVS SNAPSHOT
After Visit Summary   10/25/2017    Yarelis Penny    MRN: [id number]           Patient Information     Date Of Birth          1985        Visit Information        Provider Department      10/25/2017 1:30 PM  INFUSION CHAIR 18 [facility]        Today's Diagnoses     Dehydration    -  1    CMV colitis (H)        Colitis        Anemia, iron deficiency        Ulcerative pancolitis without complication (H)        Gammaherpesviral mononucleosis without complication           Follow-ups after your visit        Your next 10 appointments already scheduled     Dec 06, 2017  8:00 AM CST   (Arrive by 7:45 AM)   Return Visit with Walker Nails MD   [facility] and Infectious Diseases ([facility])    [address]  3rd Floor  [facility] [address] [phone]            Dec 18, 2017  7:30 AM CST   (Arrive by 7:15 AM)   RETURN INFLAMMATORY BOWEL DISEASE with Cresencio Sierra PA-C   [facility] ([facility])    [address] [place] [address]  4th [facility] [address] [phone]              Who to contact     If you have questions or need follow up information about today's clinic visit or your schedule please contact [facility] directly at [phone].  Normal or non-critical lab and imaging results will be communicated to you by MyChart, letter or phone within 4 business days after the clinic has received the results. If you do not hear from us within 7 days, please contact the clinic through MyChart or phone. If you have a critical or abnormal lab result, we will notify you by phone as soon as possible.  Submit refill requests through Kyruus or call your pharmacy and they will forward the refill request to us. Please allow 3 business days for your refill to be completed.          Additional Information About Your Visit         [employer] Information     [employer] gives you secure access to your electronic health record. If you see a primary care provider, you can also send messages to your care team and make appointments. If you have questions, please call your primary care clinic.  If you do not have a primary care provider, please call [phone] and they will assist you.        Care EveryWhere ID     This is your Care EveryWhere ID. This could be used by other organizations to access your [place] medical records  [id number]        Your Vitals Were     Pulse Temperature Respirations Pulse Oximetry          86 99.1  F (37.3  C) (Oral) 20 98%         Blood Pressure from Last 3 Encounters:   10/25/17 101/70   09/27/17 104/73   09/25/17 104/73    Weight from Last 3 Encounters:   09/18/17 49 kg (108 lb 1.6 oz)   09/13/17 49.2 kg (108 lb 8 oz)   08/29/17 49.1 kg (108 lb 3.2 oz)              We Performed the Following     CBC with platelets differential     CRP inflammation     CT Abdomen Pelvis w/o & w Contrast     EBV DNA PCR Quantitative Whole Blood     entyvio level to [facility] lab: Laboratory Miscellaneous Order     Erythrocyte sedimentation rate auto     Ferritin     Hepatic panel     Iron and iron binding capacity     Send outs misc test        Primary Care Provider Office Phone # Fax #    Jackson Orellana [phone]       [place] [address]  [facility] [address]        Equal Access to Services     ERI CHAKRABORTY : Hadii aad ku hadasho Soomaali, waaxda luqadaha, qaybta kaalmada adeegyada, mani melo . So [facility] [phone].    ATENCIÓN: Si habla español, tiene a borja disposición servicios gratuitos de asistencia lingüística. Llame al [phone].    We comply with applicable federal civil rights laws and [place] laws. We do not discriminate on the basis of race, color, national origin, age, disability, sex, sexual orientation, or gender identity.            Thank you!      Thank you for choosing [facility] C[facility]  for your care. Our goal is always to provide you with excellent care. Hearing back from our patients is one way we can continue to improve our services. Please take a few minutes to complete the written survey that you may receive in the mail after your visit with us. Thank you!             Your Updated Medication List - Protect others around you: Learn how to safely use, store and throw away your medicines at www.disposemymeds.org.          This list is accurate as of: 10/25/17  2:54 PM.  Always use your most recent med list.                   Brand Name Dispense Instructions for use Diagnosis    budesonide 9 MG 24 hr tablet    UCERIS    30 tablet    Take 1 tablet (9 mg) by mouth every morning    UC (ulcerative colitis) (H)       ENTYVIO IV      Inject 300 mg into the vein every 28 days        GNP ASPIRIN LOW DOSE 81 MG EC tablet   Generic drug:  aspirin     30 tablet    Take 1 tablet (81 mg) by mouth daily    Ulcerative colitis with other complication, unspecified location (H), Liver replaced by transplant (H)       * predniSONE 5 MG tablet    DELTASONE    225 tablet    Take 2 tab (10mg) daily  for 5 days then take one tab (5mg) daily        * predniSONE 5 MG tablet    DELTASONE    225 tablet    Take 6 tabs ( 30mg) daily for 7 days, take 5 tabs (25mg) daily for 7 days, then 4 tab (20mg) daily  for 7 days, then 3 tabs (15mg) daily  for 7 days the 2 tabs (10mg) daily for 7 days then stay at 5 mg daily    UC (ulcerative colitis) (H)       sulfamethoxazole-trimethoprim 800-160 MG per tablet    BACTRIM DS/SEPTRA DS    90 tablet    Take 1 tablet by mouth three times a week Monday, Wednesday, Friday    Immunosuppression (H), Inflammatory bowel disease, PSC (primary sclerosing cholangitis)       * tacrolimus 0.5 MG capsule    GENERIC EQUIVALENT    180 capsule    Take 1 capsule (0.5 mg) by mouth 2 times daily Take with 1 mg capsule. Total dose 2.5 mg  every 12 hours    S/P liver transplant (H)       * tacrolimus 1 MG capsule    GENERIC EQUIVALENT    360 capsule    Take 2 capsules (2 mg) by mouth 2 times daily *total dose 2.5 mg BID. Take with 0.5 mg capsule    S/P liver transplant (H)       ursodiol 250 MG tablet    ACTIGALL    270 tablet    Take 2 tabs (500 mg) in AM, and 1 tab at night (250 mg)    Liver replaced by transplant (H)       zolpidem 5 MG tablet    AMBIEN    60 tablet    Take 1 tablet (5 mg) by mouth nightly as needed for sleep        * Notice:  This list has 4 medication(s) that are the same as other medications prescribed for you. Read the directions carefully, and ask your doctor or other care provider to review them with you.

## 2017-10-27 LAB
EBV DNA # SPEC NAA+PROBE: ABNORMAL {COPIES}/ML
EBV DNA SPEC NAA+PROBE-LOG#: 4.9 {LOG_COPIES}/ML

## 2017-11-01 LAB — LAB SCANNED RESULT: NORMAL

## 2017-11-07 ENCOUNTER — CARE COORDINATION (OUTPATIENT)
Dept: GASTROENTEROLOGY | Facility: CLINIC | Age: 32
End: 2017-11-07

## 2017-11-07 DIAGNOSIS — K51.90 UC (ULCERATIVE COLITIS) (H): Primary | ICD-10-CM

## 2017-11-07 DIAGNOSIS — Z94.4 LIVER REPLACED BY TRANSPLANT (H): ICD-10-CM

## 2017-11-07 RX ORDER — PREDNISONE 5 MG/1
5 TABLET ORAL DAILY
Qty: 225 TABLET | Refills: 3 | Status: SHIPPED | OUTPATIENT
Start: 2017-11-07 | End: 2018-11-26

## 2017-11-07 NOTE — PROGRESS NOTES
Called pt and offered her an appt on November 28.  Pt would like her  to be at clinic visit and he is out of town for work.  Now scheduled with Dr. Carney on December 5 at 8 am  to discuss surgical options if necessary. Refilled prednisone 5 mg.   Pt had discussed her entyvio level with Dr. Johnson and the need to at least discuss surgical options earlier today.   Pt is wondering if she will continue on entyvio every 4 weeks.  Said I thought so but will double check with Dr. Johnson and get back to her.  Pt will continue on prednisone 5mg daily and new rx sent.

## 2017-11-22 ENCOUNTER — INFUSION THERAPY VISIT (OUTPATIENT)
Dept: INFUSION THERAPY | Facility: CLINIC | Age: 32
End: 2017-11-22
Attending: INTERNAL MEDICINE
Payer: COMMERCIAL

## 2017-11-22 VITALS
DIASTOLIC BLOOD PRESSURE: 75 MMHG | BODY MASS INDEX: 19.86 KG/M2 | OXYGEN SATURATION: 98 % | WEIGHT: 108.6 LBS | TEMPERATURE: 98.5 F | SYSTOLIC BLOOD PRESSURE: 108 MMHG

## 2017-11-22 DIAGNOSIS — B25.9 CYTOMEGALOVIRUS (CMV) VIREMIA (H): ICD-10-CM

## 2017-11-22 DIAGNOSIS — B25.9 CMV COLITIS (H): ICD-10-CM

## 2017-11-22 DIAGNOSIS — E86.0 DEHYDRATION: ICD-10-CM

## 2017-11-22 DIAGNOSIS — K52.9 COLITIS: Primary | ICD-10-CM

## 2017-11-22 DIAGNOSIS — B27.00 GAMMAHERPESVIRAL MONONUCLEOSIS WITHOUT COMPLICATION: ICD-10-CM

## 2017-11-22 DIAGNOSIS — Z94.4 LIVER REPLACED BY TRANSPLANT (H): ICD-10-CM

## 2017-11-22 DIAGNOSIS — A08.39 CMV COLITIS (H): ICD-10-CM

## 2017-11-22 LAB
ALBUMIN SERPL-MCNC: 3.1 G/DL (ref 3.4–5)
ALP SERPL-CCNC: 122 U/L (ref 40–150)
ALT SERPL W P-5'-P-CCNC: 28 U/L (ref 0–50)
ANION GAP SERPL CALCULATED.3IONS-SCNC: 8 MMOL/L (ref 3–14)
AST SERPL W P-5'-P-CCNC: 18 U/L (ref 0–45)
BILIRUB DIRECT SERPL-MCNC: <0.1 MG/DL (ref 0–0.2)
BILIRUB SERPL-MCNC: 0.1 MG/DL (ref 0.2–1.3)
BUN SERPL-MCNC: 27 MG/DL (ref 7–30)
CALCIUM SERPL-MCNC: 8.9 MG/DL (ref 8.5–10.1)
CHLORIDE SERPL-SCNC: 104 MMOL/L (ref 94–109)
CO2 SERPL-SCNC: 23 MMOL/L (ref 20–32)
CREAT SERPL-MCNC: 0.9 MG/DL (ref 0.52–1.04)
ERYTHROCYTE [DISTWIDTH] IN BLOOD BY AUTOMATED COUNT: 20.9 % (ref 10–15)
GFR SERPL CREATININE-BSD FRML MDRD: 72 ML/MIN/1.7M2
GLUCOSE SERPL-MCNC: 172 MG/DL (ref 70–99)
HCT VFR BLD AUTO: 31.9 % (ref 35–47)
HGB BLD-MCNC: 9.4 G/DL (ref 11.7–15.7)
MAGNESIUM SERPL-MCNC: 1.6 MG/DL (ref 1.6–2.3)
MCH RBC QN AUTO: 22.1 PG (ref 26.5–33)
MCHC RBC AUTO-ENTMCNC: 29.5 G/DL (ref 31.5–36.5)
MCV RBC AUTO: 75 FL (ref 78–100)
PHOSPHATE SERPL-MCNC: 2.3 MG/DL (ref 2.5–4.5)
PLATELET # BLD AUTO: 837 10E9/L (ref 150–450)
POTASSIUM SERPL-SCNC: 4.3 MMOL/L (ref 3.4–5.3)
PROT SERPL-MCNC: 7.9 G/DL (ref 6.8–8.8)
RBC # BLD AUTO: 4.26 10E12/L (ref 3.8–5.2)
SODIUM SERPL-SCNC: 135 MMOL/L (ref 133–144)
WBC # BLD AUTO: 14.4 10E9/L (ref 4–11)

## 2017-11-22 PROCEDURE — 85027 COMPLETE CBC AUTOMATED: CPT | Performed by: INTERNAL MEDICINE

## 2017-11-22 PROCEDURE — 80076 HEPATIC FUNCTION PANEL: CPT | Performed by: INTERNAL MEDICINE

## 2017-11-22 PROCEDURE — 80048 BASIC METABOLIC PNL TOTAL CA: CPT | Performed by: INTERNAL MEDICINE

## 2017-11-22 PROCEDURE — 96413 CHEMO IV INFUSION 1 HR: CPT

## 2017-11-22 PROCEDURE — 87799 DETECT AGENT NOS DNA QUANT: CPT | Performed by: INTERNAL MEDICINE

## 2017-11-22 PROCEDURE — 83735 ASSAY OF MAGNESIUM: CPT | Performed by: INTERNAL MEDICINE

## 2017-11-22 PROCEDURE — 84100 ASSAY OF PHOSPHORUS: CPT | Performed by: INTERNAL MEDICINE

## 2017-11-22 PROCEDURE — 25000128 H RX IP 250 OP 636: Mod: ZF | Performed by: INTERNAL MEDICINE

## 2017-11-22 RX ADMIN — VEDOLIZUMAB 300 MG: 300 INJECTION, POWDER, LYOPHILIZED, FOR SOLUTION INTRAVENOUS at 16:30

## 2017-11-22 NOTE — PATIENT INSTRUCTIONS
Vedolizumab Solution for injection  What is this medicine?  VEDOLIZUMAB (Ve cornelius JOSHUA you mab) is used to treat ulcerative colitis and Crohn's disease in adult patients.  This medicine may be used for other purposes; ask your health care provider or pharmacist if you have questions.  What should I tell my health care provider before I take this medicine?  They need to know if you have any of these conditions:    diabetes    hepatitis B or history of hepatitis B infection    HIV or AIDS    immune system problems    infection or history of infections    liver disease    recently received or scheduled to receive a vaccine    scheduled to have surgery    tuberculosis, a positive skin test for tuberculosis or have recently been in close contact with someone who has tuberculosis    an unusual or allergic reaction to vedolizumab, other medicines, foods, dyes, or preservatives    pregnant or trying to get pregnant    breast-feeding  How should I use this medicine?  This medicine is for infusion into a vein. It is given by a health care professional in a hospital or clinic setting.  A special MedGuide will be given to you by the pharmacist with each prescription and refill. Be sure to read this information carefully each time.  Talk to your pediatrician regarding the use of this medicine in children. This medicine is not approved for use in children.  Overdosage: If you think you've taken too much of this medicine contact a poison control center or emergency room at once.  NOTE: This medicine is only for you. Do not share this medicine with others.  What if I miss a dose?  It is important not to miss your dose. Call your doctor or health care professional if you are unable to keep an appointment.  What may interact with this medicine?    steroid medicines like prednisone or cortisone    TNF-alpha inhibitors like natalizumab, adalimumab, and infliximab    vaccines  This list may not describe all possible interactions. Give  your health care provider a list of all the medicines, herbs, non-prescription drugs, or dietary supplements you use. Also tell them if you smoke, drink alcohol, or use illegal drugs. Some items may interact with your medicine.  What should I watch for while using this medicine?  Your condition will be monitored carefully while you are receiving this medicine. Visit your doctor for regular check ups. Tell your doctor or healthcare professional if your symptoms do not start to get better or if they get worse.  Stay away from people who are sick. Call your doctor or health care professional for advice if you get a fever, chills or sore throat, or other symptoms of a cold or flu. Do not treat yourself.  In some patients, this medicine may cause a serious brain infection that may cause death. If you have any problems seeing, thinking, speaking, walking, or standing, tell your doctor right away. If you cannot reach your doctor, get urgent medical care.  What side effects may I notice from receiving this medicine?  Side effects that you should report to your doctor or health care professional as soon as possible:    allergic reactions like skin rash, itching or hives, swelling of the face, lips, or tongue    breathing problems    changes in vision    chest pain    dark urine    depression, feelings of sadness    dizziness    general ill feeling or flu-like symptoms    irregular, missed, or painful menstrual periods    light-colored stools    loss of appetite, nausea    muscle weakness    problems with balance, talking, or walking    right upper belly pain    unusually weak or tired    yellowing of the eyes or skin  Side effects that usually do not require medical attention (Report these to your doctor or health care professional if they continue or are bothersome.):    aches, pains    headache    stomach upset    tiredness  This list may not describe all possible side effects. Call your doctor for medical advice about  side effects. You may report side effects to FDA at 8-313-ZZV-6771.  Where should I keep my medicine?  This drug is given in a hospital or clinic and will not be stored at home.  NOTE: This sheet is a summary. It may not cover all possible information. If you have questions about this medicine, talk to your doctor, pharmacist, or health care provider.  NOTE:This sheet is a summary. It may not cover all possible information. If you have questions about this medicine, talk to your doctor, pharmacist, or health care provider. Copyright  2016 Gold Standard

## 2017-11-22 NOTE — PROGRESS NOTES
Infusion Nursing Note  Yarelis Penny presents [facility] for:   Chief Complaint   Patient presents with     Infusion     Entyvio     During today's [facility] appointment, orders from Dr. Johnson were completed.  Frequency: every 4 weeks    Progress note:  Patient identification verified by name and date of birth.  Assessment completed.  Vitals recorded in Doc Flowsheets.  Patient was provided with education regarding infusion and possible side effects.  Patient verbalized understanding.     Premedications: were not ordered.  Infusion Rates: infusion given over approximately 30 mins  .  Labs: were drawn per orders.   Vascular access: peripheral IV placed today.  Treatment Conditions: ~~~ NOTE: If the patient answers yes to any of the questions below, hold the infusion and contact ordering provider or on-call provider.    1. Have you recently had an elevated temperature, fever, chills, productive cough, coughing for 3 weeks or longer or hemoptysis,  abnormal vital signs, night sweats,  chest pain or have you noticed a decrease in your appetite, unexplained weight loss or fatigue? No  2. Do you have any open wounds or new incisions? No  3. Do you have any recent or upcoming hospitalizations, surgeries or dental procedures? No  4. Do you currently have or recently have had any signs of illness or infection or are you on any antibiotics? No  5. Have you had any new, sudden or worsening abdominal pain? No  6. Have you or anyone in your household received a live vaccination in the past 4 weeks? Please note:  No live vaccines while on biologic/chemotherapy until 6 months after the last treatment.  Patient can receive the flu vaccine (shot only) and the pneumovax.  It is optimal for the patient to get these vaccines mid cycle, but they can be given at any time as long as it is not on the day of the infusion. No  7. Have you recently been diagnosed with any new  nervous system diseases (ie. Multiple sclerosis, Guillain [place], seizures, neurological changes) or cancer diagnosis? Are you on any form of radiation or chemotherapy? No  8. Are you pregnant or breast feeding or do you have plans of pregnancy in the future? No  9. Have you been having any signs of worsening depression or suicidal ideations?  (benlysta only) n/a  10. Have there been any other new onset medical symptoms? No    Patient tolerated infusion: well    Discharge Plan:   Follow up plan of care with: ongoing infusions at [facility].  Discharge instructions were reviewed with patient.  Patient/representative verbalized understanding of discharge instructions and all questions answered.  Patient discharged from [facility] in stable condition.    Melanie Coon RN          Administrations This Visit     vedolizumab (ENTYVIO) 300 mg in NaCl 0.9 % 280 mL infusion     Admin Date Action Dose Rate Route Administered By          11/22/2017 New Bag 300 mg 560 mL/hr Intravenous Melanie Coon, JOSEFINA                           /75  Temp 98.5  F (36.9  C) (Oral)  Wt 49.3 kg (108 lb 9.6 oz)  SpO2 98%  BMI 19.86 kg/m2

## 2017-11-22 NOTE — MR AVS SNAPSHOT
After Visit Summary   11/22/2017    Yarelis Penny    MRN: [id number]           Patient Information     Date Of Birth          1985        Visit Information        Provider Department      11/22/2017 4:00 PM UC 41 ATC; UC SPEC INFUSION [facility]        Today's Diagnoses     Colitis    -  1    Dehydration        CMV colitis (H)        Gammaherpesviral mononucleosis without complication        Cytomegalovirus (CMV) viremia (H)        Liver replaced by transplant (H)          Care Instructions      Vedolizumab Solution for injection  What is this medicine?  VEDOLIZUMAB (Ve cornelius JOSHUA you mab) is used to treat ulcerative colitis and Crohn's disease in adult patients.  This medicine may be used for other purposes; ask your health care provider or pharmacist if you have questions.  What should I tell my health care provider before I take this medicine?  They need to know if you have any of these conditions:    diabetes    hepatitis B or history of hepatitis B infection    HIV or AIDS    immune system problems    infection or history of infections    liver disease    recently received or scheduled to receive a vaccine    scheduled to have surgery    tuberculosis, a positive skin test for tuberculosis or have recently been in close contact with someone who has tuberculosis    an unusual or allergic reaction to vedolizumab, other medicines, foods, dyes, or preservatives    pregnant or trying to get pregnant    breast-feeding  How should I use this medicine?  This medicine is for infusion into a vein. It is given by a health care professional in a hospital or clinic setting.  A special MedGuide will be given to you by the pharmacist with each prescription and refill. Be sure to read this information carefully each time.  Talk to your pediatrician regarding the use of this medicine in children. This medicine is not approved for use in children.  Overdosage: If you  think you've taken too much of this medicine contact a poison control center or emergency room at once.  NOTE: This medicine is only for you. Do not share this medicine with others.  What if I miss a dose?  It is important not to miss your dose. Call your doctor or health care professional if you are unable to keep an appointment.  What may interact with this medicine?    steroid medicines like prednisone or cortisone    TNF-alpha inhibitors like natalizumab, adalimumab, and infliximab    vaccines  This list may not describe all possible interactions. Give your health care provider a list of all the medicines, herbs, non-prescription drugs, or dietary supplements you use. Also tell them if you smoke, drink alcohol, or use illegal drugs. Some items may interact with your medicine.  What should I watch for while using this medicine?  Your condition will be monitored carefully while you are receiving this medicine. Visit your doctor for regular check ups. Tell your doctor or healthcare professional if your symptoms do not start to get better or if they get worse.  Stay away from people who are sick. Call your doctor or health care professional for advice if you get a fever, chills or sore throat, or other symptoms of a cold or flu. Do not treat yourself.  In some patients, this medicine may cause a serious brain infection that may cause death. If you have any problems seeing, thinking, speaking, walking, or standing, tell your doctor right away. If you cannot reach your doctor, get urgent medical care.  What side effects may I notice from receiving this medicine?  Side effects that you should report to your doctor or health care professional as soon as possible:    allergic reactions like skin rash, itching or hives, swelling of the face, lips, or tongue    breathing problems    changes in vision    chest pain    dark urine    depression, feelings of sadness    dizziness    general ill feeling or flu-like  symptoms    irregular, missed, or painful menstrual periods    light-colored stools    loss of appetite, nausea    muscle weakness    problems with balance, talking, or walking    right upper belly pain    unusually weak or tired    yellowing of the eyes or skin  Side effects that usually do not require medical attention (Report these to your doctor or health care professional if they continue or are bothersome.):    aches, pains    headache    stomach upset    tiredness  This list may not describe all possible side effects. Call your doctor for medical advice about side effects. You may report side effects to FDA at 8-204-GPS-5628.  Where should I keep my medicine?  This drug is given in a hospital or clinic and will not be stored at home.  NOTE: This sheet is a summary. It may not cover all possible information. If you have questions about this medicine, talk to your doctor, pharmacist, or health care provider.  NOTE:This sheet is a summary. It may not cover all possible information. If you have questions about this medicine, talk to your doctor, pharmacist, or health care provider. Copyright  2016 [employer] Standard                Follow-ups after your visit        Your next 10 appointments already scheduled     Dec 05, 2017  8:00 AM CST   (Arrive by 7:45 AM)   New Patient Visit with Krzysztof Carney MD   [facility] ([facility])    [address]  [address] [facility] [address] [phone]            Dec 06, 2017  8:00 AM CST   (Arrive by 7:45 AM)   Return Visit with Walker Nails MD   [facility] ([facility])    [address]  [address] [facility] [address]   [phone]            Dec 18, 2017  7:30 AM CST   (Arrive by 7:15 AM)   RETURN INFLAMMATORY BOWEL DISEASE with Cresencio Sierra PA-C   [facility] ([facility])    [address]  4th [address]  [facility] [id number] [phone]            Dec 20, 2017  1:00 PM CST   Infusion 180 with UC SPEC INFUSION, UC 41 ATC   [facility] ([facility])    [address]  2nd [address]  [facility] [id number] [phone]              Who to contact     If you have questions or need follow up information about today's clinic visit or your schedule please contact [facility] directly at [phone].  Normal or non-critical lab and imaging results will be communicated to you by Osmopurehart, letter or phone within 4 business days after the clinic has received the results. If you do not hear from us within 7 days, please contact the clinic through [employer] or phone. If you have a critical or abnormal lab result, we will notify you by phone as soon as possible.  Submit refill requests through TapBookAuthor or call your pharmacy and they will forward the refill request to us. Please allow 3 business days for your refill to be completed.          Additional Information About Your Visit        Osmopurehart Information     TapBookAuthor gives you secure access to your electronic health record. If you see a primary care provider, you can also send messages to your care team and make appointments. If you have questions, please call your primary care clinic.  If you do not have a primary care provider, please call [phone] and they will assist you.        Care EveryWhere ID     This is your Care EveryWhere ID. This could be used by other organizations to access your [place] medical records  [id number]        Your Vitals Were     Temperature Pulse Oximetry BMI (Body Mass Index)             98.5  F (36.9  C) (Oral) 98% 19.86 kg/m2          Blood Pressure from Last 3 Encounters:   11/22/17 108/75   10/25/17 101/70   09/27/17 104/73    Weight from Last 3 Encounters:   11/22/17 49.3 kg (108 lb  9.6 oz)   09/18/17 49 kg (108 lb 1.6 oz)   09/13/17 49.2 kg (108 lb 8 oz)              We Performed the Following     Basic metabolic panel     CBC with platelets     CMV DNA quantification     EBV DNA PCR Quantitative Whole Blood     Hepatic panel     Magnesium     Phosphorus        Primary Care Provider Office Phone # Fax #    Jackson Orellana [phone]       [facility] [address]  [facility] [address]        Equal Access to Services     OSITO CHAKRABORTY : Hadii aad ku hadasho Soomaali, waaxda luqadaha, qaybta kaalmada adeegyada, waxay idiin hayaan adeeg kharash la'aan . So [facility] [phone].    ATENCIÓN: Si habla español, tiene a borja disposición servicios gratuitos de asistencia lingüística. [facility] [phone].    We comply with applicable federal civil rights laws and [place] laws. We do not discriminate on the basis of race, color, national origin, age, disability, sex, sexual orientation, or gender identity.            Thank you!     Thank you for choosing [facility]  for your care. Our goal is always to provide you with excellent care. Hearing back from our patients is one way we can continue to improve our services. Please take a few minutes to complete the written survey that you may receive in the mail after your visit with us. Thank you!             Your Updated Medication List - Protect others around you: Learn how to safely use, store and throw away your medicines at www.disposemymeds.org.          This list is accurate as of: 11/22/17  5:09 PM.  Always use your most recent med list.                   Brand Name Dispense Instructions for use Diagnosis    budesonide 9 MG 24 hr tablet    UCERIS    30 tablet    Take 1 tablet (9 mg) by mouth every morning    UC (ulcerative colitis) (H)       ENTYVIO IV      Inject 300 mg into the vein every 28 days        GNP ASPIRIN LOW DOSE 81 MG EC tablet   Generic drug:  aspirin     30  tablet    Take 1 tablet (81 mg) by mouth daily    Ulcerative colitis with other complication, unspecified location (H), Liver replaced by transplant (H)       * predniSONE 5 MG tablet    DELTASONE    225 tablet    Take 2 tab (10mg) daily  for 5 days then take one tab (5mg) daily        * predniSONE 5 MG tablet    DELTASONE    225 tablet    Take 6 tabs ( 30mg) daily for 7 days, take 5 tabs (25mg) daily for 7 days, then 4 tab (20mg) daily  for 7 days, then 3 tabs (15mg) daily  for 7 days the 2 tabs (10mg) daily for 7 days then stay at 5 mg daily    UC (ulcerative colitis) (H)       * predniSONE 5 MG tablet    DELTASONE    225 tablet    Take 1 tablet (5 mg) by mouth daily    UC (ulcerative colitis) (H), Liver replaced by transplant (H)       sulfamethoxazole-trimethoprim 800-160 MG per tablet    BACTRIM DS/SEPTRA DS    90 tablet    Take 1 tablet by mouth three times a week Monday, Wednesday, Friday    Immunosuppression (H), Inflammatory bowel disease, PSC (primary sclerosing cholangitis)       * tacrolimus 0.5 MG capsule    GENERIC EQUIVALENT    180 capsule    Take 1 capsule (0.5 mg) by mouth 2 times daily Take with 1 mg capsule. Total dose 2.5 mg every 12 hours    S/P liver transplant (H)       * tacrolimus 1 MG capsule    GENERIC EQUIVALENT    360 capsule    Take 2 capsules (2 mg) by mouth 2 times daily *total dose 2.5 mg BID. Take with 0.5 mg capsule    S/P liver transplant (H)       ursodiol 250 MG tablet    ACTIGALL    270 tablet    Take 2 tabs (500 mg) in AM, and 1 tab at night (250 mg)    Liver replaced by transplant (H)       zolpidem 5 MG tablet    AMBIEN    60 tablet    Take 1 tablet (5 mg) by mouth nightly as needed for sleep        * Notice:  This list has 5 medication(s) that are the same as other medications prescribed for you. Read the directions carefully, and ask your doctor or other care provider to review them with you.

## 2017-11-28 LAB
EBV DNA # SPEC NAA+PROBE: ABNORMAL {COPIES}/ML
EBV DNA SPEC NAA+PROBE-LOG#: 5.1 {LOG_COPIES}/ML

## 2017-11-30 NOTE — TELEPHONE ENCOUNTER
APPT INFO    Date /Time: 12/05/17   Reason for Appt: UC-Surgery Consult   Ref Provider/Clinic: Dr Johnson [facility]   Are there internal records? If yes, list: Yes  See Above   Patient Contact (Y/N) & Call Details: No referred   Action: Reviewed records; Records are in EPIC     OUTSIDE RECORDS CHECKLIST     CLINIC NAME COMMENTS REC (x) IMG (x)

## 2017-12-04 ASSESSMENT — ENCOUNTER SYMPTOMS
NAUSEA: 0
CONSTIPATION: 0
BLOATING: 0
BLOATING: 0
ABDOMINAL PAIN: 0
JAUNDICE: 0
DIARRHEA: 1
BOWEL INCONTINENCE: 0
RECTAL PAIN: 0
NAUSEA: 0
NAUSEA: 0
HEARTBURN: 0
BLOATING: 0
DIARRHEA: 1
HEARTBURN: 0
JAUNDICE: 0
ABDOMINAL PAIN: 0
RECTAL PAIN: 0
CONSTIPATION: 0
JAUNDICE: 0
VOMITING: 0
DIARRHEA: 1
BOWEL INCONTINENCE: 0
HEARTBURN: 0
BLOOD IN STOOL: 0
RECTAL PAIN: 0
VOMITING: 0
BLOOD IN STOOL: 0
CONSTIPATION: 0
BLOOD IN STOOL: 0
ABDOMINAL PAIN: 0
VOMITING: 0
BOWEL INCONTINENCE: 0

## 2017-12-05 ENCOUNTER — PRE VISIT (OUTPATIENT)
Dept: SURGERY | Facility: CLINIC | Age: 32
End: 2017-12-05

## 2017-12-05 ENCOUNTER — OFFICE VISIT (OUTPATIENT)
Dept: SURGERY | Facility: CLINIC | Age: 32
End: 2017-12-05

## 2017-12-05 ENCOUNTER — TELEPHONE (OUTPATIENT)
Dept: INFECTIOUS DISEASES | Facility: CLINIC | Age: 32
End: 2017-12-05

## 2017-12-05 VITALS
BODY MASS INDEX: 20 KG/M2 | TEMPERATURE: 98.5 F | HEIGHT: 62 IN | HEART RATE: 61 BPM | SYSTOLIC BLOOD PRESSURE: 121 MMHG | OXYGEN SATURATION: 93 % | DIASTOLIC BLOOD PRESSURE: 83 MMHG | WEIGHT: 108.7 LBS

## 2017-12-05 DIAGNOSIS — Z94.4 LIVER REPLACED BY TRANSPLANT (H): ICD-10-CM

## 2017-12-05 DIAGNOSIS — K83.09 SCLEROSING CHOLANGITIS (H): ICD-10-CM

## 2017-12-05 DIAGNOSIS — K51.00 ULCERATIVE PANCOLITIS WITHOUT COMPLICATION (H): Primary | ICD-10-CM

## 2017-12-05 ASSESSMENT — PAIN SCALES - GENERAL: PAINLEVEL: NO PAIN (0)

## 2017-12-05 NOTE — TELEPHONE ENCOUNTER
Pt asking to speak w/ ID nurse. Saying her insurance telling her appeal for denied CMV test not received. Please advise at [phone].

## 2017-12-05 NOTE — NURSING NOTE
"Chief Complaint   Patient presents with     Clinic Care Coordination - Initial     Pre op discussion       Vitals:    12/05/17 0808   BP: 121/83   Pulse: 61   Temp: 98.5  F (36.9  C)   SpO2: 93%   Weight: 49.3 kg (108 lb 11.2 oz)   Height: 1.575 m (5' 2\")       Body mass index is 19.88 kg/(m^2).      Maureen Boyce, CMA                          "

## 2017-12-05 NOTE — LETTER
2017       RE: Yarelis Penny  [address] [facility] [address]     Dear Colleague,    Thank you for referring your patient, Yarelis Penny, to the [facility] COLON AND RECTAL SURGERY at [facility]. Please see a copy of my visit note below.    Colon and Rectal Surgery Clinic Note      RE: Yarelis Penny  : 1985  BLAKE: 2017      Yarelis is a very pleasant 32-year-old woman who presents today to discuss possible surgery for her ulcerative colitis.  She is seen today with her  Ceferino.  Her IBD treatment is under the care of Dr. Fuentes Johnson, and her hepatologist is Dr. Daniela Enriquez.  She was diagnosed with colitis 14 years ago, at which time she was noted to have chronic active ileitis.  She has sclerosing cholangitis and is status post living donor liver transplant.  Ceferino was the liver donor.  She is maintained on tacrolimus and 5 mg daily of prednisone for her liver, which is reportedly the planned baseline immunosuppression for rejection.  Yarelis is currently asymptomatic and has 1 bowel movement daily, though this has required being on 10 mg of prednisone daily.  She is also maintained on vedolizumab.  Her most recent colonoscopy in 2017 showed Abad class II inflammation of the right colon, Abad class I inflammation in the left colon, and relative rectal sparing.  Biopsies showed mild chronic active ileitis and colitis.  There was no evidence of lymphoma, CMV or EBV.  A single small granuloma was noted in the terminal ileum.  Yarelis is maintained on iron infusions for iron deficiency anemia.    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    We had a detailed discussion regarding surgery for colitis and the various surgical options.  It appears from the chart that she is felt to have ulcerative colitis, though with the presence of terminal ileitis, possibly  backwash, and a solitary small granuloma, the possibility of Crohn's disease certainly exists.  I explained the implications of this including that we would not plan to construct an ileal pouch if Crohn's disease was highly suspected, and the possibility that a diagnosis of unsuspected Crohn's disease could emerge with attendant small bowel complications.  I reviewed our ileoanal pouch booklet in detail and we discussed the options of total proctocolectomy with and without J-pouch.  We discussed the rationale for surgery with respect to minimizing her medications, which would include getting her prednisone down to the 5 mg level for her transplant.  We also discussed the risk of developing cancer in the face of PSC and the rationale to remove the colon as a prophylactic measure.  We had a detailed discussion about the full range of potential functional outcomes related to the ileoanal pouch, the need for a temporary diverting stoma with the pouch, and potential stomal complications with both temporary and permanent stomas.  We discussed specific approach related complications such as anastomotic leak and pouch vaginal fistula, and we discussed the risk of being unable to create a pouch despite an intention to do so in a small percentage of patients and the fact that a percentage of patients give up their approaches due to functional issues or complications.  We discussed the presence of a small residual rim of rectum that is still at risk for developing cancer, though in general this risk is considered to be small given the very small amount of retained rectal mucosa.  We discussed the full range of medical and surgical complications associated with major abdominopelvic surgery.  I explained that I would plan a laparoscopic approach with an open pelvic dissection, though there are at least potentially anatomic difficulties related to her liver transplant that would require conversion to an open procedure.  We  discussed the risks of pelvic surgery with respect to bladder or sexual dysfunction due to nerve injury or altered anatomy, and we specifically discussed the risk of diminished fertility following pelvic dissection and colitis patients.    At the present time, Yarelis states that she is asymptomatic and she is not interested in immediate surgery.  She will continue her medical therapy under the care of Dr. Johnson, and will see me again if she is getting closer to a point where she wants to have or need surgery.      If we are getting closer to surgery, her case should certainly be re-discussed at IBD conference and her path reviewed, and I will certainly keep Juanis Johnson and Mina in the loop.    I answered all of Yarelis and Ceferino's questions to their stated satisfaction.  They expressed her understanding and agreement with the proposed plan.    Total time 45 minutes.  Greater than half the time was spent counseling.    Social history: Yarelis is  and seen today with Ceferino.  She is a homemaker.  She is  but is potentially interested in childbearing in the future.      For details of past medical history, surgical history, family history, medications, allergies, and review of systems, please see details below.    Medical history:  Past Medical History:   Diagnosis Date     Autoimmune hepatitis (H) 2012    on steroid taper     Cholangitis      CKD (chronic kidney disease) 2012    biopsy 2012: TIN, patchy fibrosis     Esophageal varices (H)     banded     Heart murmur      History of blood transfusion      Primary sclerosing cholangitis      Ulcerative Colitis     f/b GI       Surgical history:  Past Surgical History:   Procedure Laterality Date     COLONOSCOPY       COLONOSCOPY N/A 2015    Procedure: COMBINED COLONOSCOPY, SINGLE OR MULTIPLE BIOPSY/POLYPECTOMY BY BIOPSY;  Surgeon: Mitchel Hoskins Chi, MD;  Location:  GI     COLONOSCOPY N/A 2016    Procedure: COMBINED COLONOSCOPY, SINGLE OR MULTIPLE  BIOPSY/POLYPECTOMY BY BIOPSY;  Surgeon: Rigo Valles MD;  [facility]     COLONOSCOPY N/A 4/1/2016    Procedure: COMBINED COLONOSCOPY, SINGLE OR MULTIPLE BIOPSY/POLYPECTOMY BY BIOPSY;  Surgeon: Kareem Solis MD;  [facility]     COLONOSCOPY N/A 9/5/2017    Procedure: COMBINED COLONOSCOPY, SINGLE OR MULTIPLE BIOPSY/POLYPECTOMY BY BIOPSY;  Colonoscopy;  Surgeon: Fuentes Johnson MD;  [facility]     ENDOSCOPIC RETROGRADE CHOLANGIOPANCREATOGRAM N/A 6/25/2015    Procedure: COMBINED ENDOSCOPIC RETROGRADE CHOLANGIOPANCREATOGRAPHY, PLACE TUBE/STENT;  Surgeon: Landon Quinones MD;  [facility]     ENDOSCOPIC RETROGRADE CHOLANGIOPANCREATOGRAM N/A 6/25/2015    Procedure: COMBINED ENDOSCOPIC RETROGRADE CHOLANGIOPANCREATOGRAPHY, PLACE TUBE/STENT;  Surgeon: Landon Quinones MD;  [facility]     ENDOSCOPIC RETROGRADE CHOLANGIOPANCREATOGRAM N/A 7/2/2015    Procedure: COMBINED ENDOSCOPIC RETROGRADE CHOLANGIOPANCREATOGRAPHY, PLACE TUBE/STENT;  Surgeon: Landon Quinones MD;  [facility]     ENDOSCOPIC RETROGRADE CHOLANGIOPANCREATOGRAM N/A 9/8/2015    Procedure: COMBINED ENDOSCOPIC RETROGRADE CHOLANGIOPANCREATOGRAPHY, REMOVE FOREIGN BODY OR STENT/TUBE;  Surgeon: Landon Quinones MD;  [facility]     ENDOSCOPIC RETROGRADE CHOLANGIOPANCREATOGRAM N/A 12/8/2015    Procedure: ENDOSCOPIC RETROGRADE CHOLANGIOPANCREATOGRAM;  Surgeon: Landon Quinones MD;  [facility]     ENDOSCOPIC RETROGRADE CHOLANGIOPANCREATOGRAM N/A 3/1/2016    Procedure: COMBINED ENDOSCOPIC RETROGRADE CHOLANGIOPANCREATOGRAPHY, REMOVE FOREIGN BODY OR STENT/TUBE;  Surgeon: Landon Quinones MD;  [facility]     ENDOSCOPIC RETROGRADE CHOLANGIOPANCREATOGRAM N/A 3/20/2017    Procedure: COMBINED ENDOSCOPIC RETROGRADE CHOLANGIOPANCREATOGRAPHY, PLACE TUBE/STENT;  Endoscopic Retrograde Cholangiopancreatogram with Ballon Dilation, Stent Placement;  Surgeon: Guru Brtitany Macias  MD Genna;  [facility]     ENDOSCOPIC RETROGRADE CHOLANGIOPANCREATOGRAPHY, EXCHANGE TUBE/STENT N/A 7/30/2015    Procedure: ENDOSCOPIC RETROGRADE CHOLANGIOPANCREATOGRAPHY, EXCHANGE TUBE/STENT;  Surgeon: Landon Quinones MD;  [facility]     ENDOSCOPIC RETROGRADE CHOLANGIOPANCREATOGRAPHY, EXCHANGE TUBE/STENT N/A 5/15/2017    Procedure: ENDOSCOPIC RETROGRADE CHOLANGIOPANCREATOGRAPHY, EXCHANGE TUBE/STENT;  Endoscopic Retrograde Cholangiopancreatogram with biliary stent exchange and balloon dilation;  Surgeon: Guru Brittany Macias MD;  [facility]     ESOPHAGOSCOPY, GASTROSCOPY, DUODENOSCOPY (EGD), COMBINED N/A 2/26/2015    Procedure: COMBINED ESOPHAGOSCOPY, GASTROSCOPY, DUODENOSCOPY (EGD);  Surgeon: Mitchel Hoskins Chi, MD;  [facility]     EXPLORE COMMON BILE DUCT N/A 6/25/2015    Procedure: EXPLORE COMMON BILE DUCT;  Surgeon: Tyree Smith MD;  [facility]     LAPAROTOMY EXPLORATORY N/A 6/25/2015    Procedure: LAPAROTOMY EXPLORATORY;  Surgeon: Tyree Smith MD;  [facility]     PICC INSERTION Right 2/27/2015    4fr SL Valved PICC, 36cm (1cm external) in the R medial brachial vein w/ tip in the low SVC.     SIGMOIDOSCOPY FLEXIBLE N/A 4/27/2016    Procedure: SIGMOIDOSCOPY FLEXIBLE;  Surgeon: Jose Nielson MD;  [facility]     TRANSPLANT LIVER RECIPIENT LIVING UNRELATED N/A 6/18/2015    Procedure: TRANSPLANT LIVER RECIPIENT LIVING UNRELATED;  Surgeon: Tyree Smith MD;  [facility]     UPPER GI ENDOSCOPY         Family history:  Family History   Problem Relation Age of Onset     Hypertension Mother      Lipids Mother      Hypertension Maternal Grandmother      Alzheimer Disease Maternal Grandmother      Lipids Father        Medications:  Current Outpatient Prescriptions   Medication Sig Dispense Refill     predniSONE (DELTASONE) 5 MG tablet Take 1 tablet (5 mg) by mouth daily 225 tablet 3     budesonide (UCERIS) 9 MG 24 hr tablet Take 1 tablet (9 mg) by  "mouth every morning 30 tablet 3     zolpidem (AMBIEN) 5 MG tablet Take 1 tablet (5 mg) by mouth nightly as needed for sleep 60 tablet 5     sulfamethoxazole-trimethoprim (BACTRIM DS/SEPTRA DS) 800-160 MG per tablet Take 1 tablet by mouth three times a week Monday, Wednesday, Friday 90 tablet 3     Vedolizumab (ENTYVIO IV) Inject 300 mg into the vein every 28 days       ursodiol (ACTIGALL) 250 MG tablet Take 2 tabs (500 mg) in AM, and 1 tab at night (250 mg) 270 tablet 3     tacrolimus (PROGRAF - GENERIC EQUIVALENT) 0.5 MG capsule Take 1 capsule (0.5 mg) by mouth 2 times daily Take with 1 mg capsule. Total dose 2.5 mg every 12 hours 180 capsule 3     tacrolimus (PROGRAF - GENERIC EQUIVALENT) 1 MG capsule Take 2 capsules (2 mg) by mouth 2 times daily *total dose 2.5 mg BID. Take with 0.5 mg capsule 360 capsule 3     GNP ASPIRIN LOW DOSE 81 MG EC tablet Take 1 tablet (81 mg) by mouth daily 30 tablet 1     Allergies:  The patientis allergic to rifampin and penicillins.    Social history:  Social History   Substance Use Topics     Smoking status: Never Smoker     Smokeless tobacco: Never Used     Alcohol use No      Comment: Rare     Marital status: .    Review of Systems:  Nursing Notes:   Sheila Boyce CMA  12/5/2017  8:11 AM  Signed  Chief Complaint   Patient presents with     Clinic Care Coordination - Initial     Pre op discussion       Vitals:    12/05/17 0808   BP: 121/83   Pulse: 61   Temp: 98.5  F (36.9  C)   SpO2: 93%   Weight: 49.3 kg (108 lb 11.2 oz)   Height: 1.575 m (5' 2\")       Body mass index is 19.88 kg/(m^2).      SAGE Bernstein MD   Professor and Chief  Division of Colon and Rectal Surgery  [facility]      Referring Provider:  Fuentes Johnson MD  [address] [place] [id number]     Primary Care Provider:  Jackson Orellana    Again, thank you for allowing me to participate in the care " of your patient.      Sincerely,    Krzysztof Carney MD

## 2017-12-05 NOTE — PROGRESS NOTES
Colon and Rectal Surgery Clinic Note      RE: Yarelis Penny  : 1985  BLAKE: 2017      Yarelis is a very pleasant 32-year-old woman who presents today to discuss possible surgery for her ulcerative colitis.  She is seen today with her  Ceferino.  Her IBD treatment is under the care of Dr. Fuentes Johnson, and her hepatologist is Dr. Daniela Enriquez.  She was diagnosed with colitis 14 years ago, at which time she was noted to have chronic active ileitis.  She has sclerosing cholangitis and is status post living donor liver transplant.  Ceferino was the liver donor.  She is maintained on tacrolimus and 5 mg daily of prednisone for her liver, which is reportedly the planned baseline immunosuppression for rejection.  Yarelis is currently asymptomatic and has 1 bowel movement daily, though this has required being on 10 mg of prednisone daily.  She is also maintained on vedolizumab.  Her most recent colonoscopy in 2017 showed Abad class II inflammation of the right colon, Abad class I inflammation in the left colon, and relative rectal sparing.  Biopsies showed mild chronic active ileitis and colitis.  There was no evidence of lymphoma, CMV or EBV.  A single small granuloma was noted in the terminal ileum.  Yarelis is maintained on iron infusions for iron deficiency anemia.    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    We had a detailed discussion regarding surgery for colitis and the various surgical options.  It appears from the chart that she is felt to have ulcerative colitis, though with the presence of terminal ileitis, possibly backwash, and a solitary small granuloma, the possibility of Crohn's disease certainly exists.  I explained the implications of this including that we would not plan to construct an ileal pouch if Crohn's disease was highly suspected, and the possibility that a diagnosis of unsuspected Crohn's  disease could emerge with attendant small bowel complications.  I reviewed our ileoanal pouch booklet in detail and we discussed the options of total proctocolectomy with and without J-pouch.  We discussed the rationale for surgery with respect to minimizing her medications, which would include getting her prednisone down to the 5 mg level for her transplant.  We also discussed the risk of developing cancer in the face of PSC and the rationale to remove the colon as a prophylactic measure.  We had a detailed discussion about the full range of potential functional outcomes related to the ileoanal pouch, the need for a temporary diverting stoma with the pouch, and potential stomal complications with both temporary and permanent stomas.  We discussed specific approach related complications such as anastomotic leak and pouch vaginal fistula, and we discussed the risk of being unable to create a pouch despite an intention to do so in a small percentage of patients and the fact that a percentage of patients give up their approaches due to functional issues or complications.  We discussed the presence of a small residual rim of rectum that is still at risk for developing cancer, though in general this risk is considered to be small given the very small amount of retained rectal mucosa.  We discussed the full range of medical and surgical complications associated with major abdominopelvic surgery.  I explained that I would plan a laparoscopic approach with an open pelvic dissection, though there are at least potentially anatomic difficulties related to her liver transplant that would require conversion to an open procedure.  We discussed the risks of pelvic surgery with respect to bladder or sexual dysfunction due to nerve injury or altered anatomy, and we specifically discussed the risk of diminished fertility following pelvic dissection and colitis patients.    At the present time, Yarelis states that she is asymptomatic and  she is not interested in immediate surgery.  She will continue her medical therapy under the care of Dr. Johnson, and will see me again if she is getting closer to a point where she wants to have or need surgery.      If we are getting closer to surgery, her case should certainly be re-discussed at IBD conference and her path reviewed, and I will certainly keep Juanis Johnson and Mina in the loop.    I answered all of Yarelis and Ceferino's questions to their stated satisfaction.  They expressed her understanding and agreement with the proposed plan.    Total time 45 minutes.  Greater than half the time was spent counseling.    Social history: Yarelis is  and seen today with Ceferino.  She is a homemaker.  She is  but is potentially interested in childbearing in the future.      For details of past medical history, surgical history, family history, medications, allergies, and review of systems, please see details below.    Medical history:  Past Medical History:   Diagnosis Date     Autoimmune hepatitis (H)     on steroid taper     Cholangitis      CKD (chronic kidney disease)     biopsy : TIN, patchy fibrosis     Esophageal varices (H)     banded     Heart murmur      History of blood transfusion      Primary sclerosing cholangitis      Ulcerative Colitis     f/b GI       Surgical history:  Past Surgical History:   Procedure Laterality Date     COLONOSCOPY       COLONOSCOPY N/A 2015    Procedure: COMBINED COLONOSCOPY, SINGLE OR MULTIPLE BIOPSY/POLYPECTOMY BY BIOPSY;  Surgeon: Mitchel Hoskins Chi, MD;  [facility]     COLONOSCOPY N/A 2016    Procedure: COMBINED COLONOSCOPY, SINGLE OR MULTIPLE BIOPSY/POLYPECTOMY BY BIOPSY;  Surgeon: Rigo Valles MD;  [facility]     COLONOSCOPY N/A 2016    Procedure: COMBINED COLONOSCOPY, SINGLE OR MULTIPLE BIOPSY/POLYPECTOMY BY BIOPSY;  Surgeon: Kareem Solis MD;  [facility]     COLONOSCOPY N/A 2017    Procedure: COMBINED  COLONOSCOPY, SINGLE OR MULTIPLE BIOPSY/POLYPECTOMY BY BIOPSY;  Colonoscopy;  Surgeon: Fuentes Johnson MD;  [facility]     ENDOSCOPIC RETROGRADE CHOLANGIOPANCREATOGRAM N/A 6/25/2015    Procedure: COMBINED ENDOSCOPIC RETROGRADE CHOLANGIOPANCREATOGRAPHY, PLACE TUBE/STENT;  Surgeon: Landon Quinones MD;  [facility]     ENDOSCOPIC RETROGRADE CHOLANGIOPANCREATOGRAM N/A 6/25/2015    Procedure: COMBINED ENDOSCOPIC RETROGRADE CHOLANGIOPANCREATOGRAPHY, PLACE TUBE/STENT;  Surgeon: Landon Quinones MD;  [facility]     ENDOSCOPIC RETROGRADE CHOLANGIOPANCREATOGRAM N/A 7/2/2015    Procedure: COMBINED ENDOSCOPIC RETROGRADE CHOLANGIOPANCREATOGRAPHY, PLACE TUBE/STENT;  Surgeon: Landon Quinones MD;  [facility]     ENDOSCOPIC RETROGRADE CHOLANGIOPANCREATOGRAM N/A 9/8/2015    Procedure: COMBINED ENDOSCOPIC RETROGRADE CHOLANGIOPANCREATOGRAPHY, REMOVE FOREIGN BODY OR STENT/TUBE;  Surgeon: Landon Quinones MD;  [facility]     ENDOSCOPIC RETROGRADE CHOLANGIOPANCREATOGRAM N/A 12/8/2015    Procedure: ENDOSCOPIC RETROGRADE CHOLANGIOPANCREATOGRAM;  Surgeon: Landon Quinones MD;  [facility]     ENDOSCOPIC RETROGRADE CHOLANGIOPANCREATOGRAM N/A 3/1/2016    Procedure: COMBINED ENDOSCOPIC RETROGRADE CHOLANGIOPANCREATOGRAPHY, REMOVE FOREIGN BODY OR STENT/TUBE;  Surgeon: Landon Quinones MD;  [facility]     ENDOSCOPIC RETROGRADE CHOLANGIOPANCREATOGRAM N/A 3/20/2017    Procedure: COMBINED ENDOSCOPIC RETROGRADE CHOLANGIOPANCREATOGRAPHY, PLACE TUBE/STENT;  Endoscopic Retrograde Cholangiopancreatogram with Ballon Dilation, Stent Placement;  Surgeon: Guru Brittany Macias MD;  [facility]     ENDOSCOPIC RETROGRADE CHOLANGIOPANCREATOGRAPHY, EXCHANGE TUBE/STENT N/A 7/30/2015    Procedure: ENDOSCOPIC RETROGRADE CHOLANGIOPANCREATOGRAPHY, EXCHANGE TUBE/STENT;  Surgeon: Landon Quinones MD;  [facility]     ENDOSCOPIC RETROGRADE  CHOLANGIOPANCREATOGRAPHY, EXCHANGE TUBE/STENT N/A 5/15/2017    Procedure: ENDOSCOPIC RETROGRADE CHOLANGIOPANCREATOGRAPHY, EXCHANGE TUBE/STENT;  Endoscopic Retrograde Cholangiopancreatogram with biliary stent exchange and balloon dilation;  Surgeon: Guru Brittany Macias MD;  [facility]     ESOPHAGOSCOPY, GASTROSCOPY, DUODENOSCOPY (EGD), COMBINED N/A 2/26/2015    Procedure: COMBINED ESOPHAGOSCOPY, GASTROSCOPY, DUODENOSCOPY (EGD);  Surgeon: Mitchel Hoskins Chi, MD;  [facility]     EXPLORE COMMON BILE DUCT N/A 6/25/2015    Procedure: EXPLORE COMMON BILE DUCT;  Surgeon: Tyree Smith MD;  [facility]     LAPAROTOMY EXPLORATORY N/A 6/25/2015    Procedure: LAPAROTOMY EXPLORATORY;  Surgeon: Tyree Smith MD;  [facility]     PICC INSERTION Right 2/27/2015    4fr SL Valved PICC, 36cm (1cm external) in the R medial brachial vein w/ tip in the low SVC.     SIGMOIDOSCOPY FLEXIBLE N/A 4/27/2016    Procedure: SIGMOIDOSCOPY FLEXIBLE;  Surgeon: Jose Nielson MD;  [facility]     TRANSPLANT LIVER RECIPIENT LIVING UNRELATED N/A 6/18/2015    Procedure: TRANSPLANT LIVER RECIPIENT LIVING UNRELATED;  Surgeon: Tyree Smith MD;  [facility]     UPPER GI ENDOSCOPY         Family history:  Family History   Problem Relation Age of Onset     Hypertension Mother      Lipids Mother      Hypertension Maternal Grandmother      Alzheimer Disease Maternal Grandmother      Lipids Father        Medications:  Current Outpatient Prescriptions   Medication Sig Dispense Refill     predniSONE (DELTASONE) 5 MG tablet Take 1 tablet (5 mg) by mouth daily 225 tablet 3     budesonide (UCERIS) 9 MG 24 hr tablet Take 1 tablet (9 mg) by mouth every morning 30 tablet 3     zolpidem (AMBIEN) 5 MG tablet Take 1 tablet (5 mg) by mouth nightly as needed for sleep 60 tablet 5     sulfamethoxazole-trimethoprim (BACTRIM DS/SEPTRA DS) 800-160 MG per tablet Take 1 tablet by mouth three times a week Monday, Wednesday,  "Friday 90 tablet 3     Vedolizumab (ENTYVIO IV) Inject 300 mg into the vein every 28 days       ursodiol (ACTIGALL) 250 MG tablet Take 2 tabs (500 mg) in AM, and 1 tab at night (250 mg) 270 tablet 3     tacrolimus (PROGRAF - GENERIC EQUIVALENT) 0.5 MG capsule Take 1 capsule (0.5 mg) by mouth 2 times daily Take with 1 mg capsule. Total dose 2.5 mg every 12 hours 180 capsule 3     tacrolimus (PROGRAF - GENERIC EQUIVALENT) 1 MG capsule Take 2 capsules (2 mg) by mouth 2 times daily *total dose 2.5 mg BID. Take with 0.5 mg capsule 360 capsule 3     GNP ASPIRIN LOW DOSE 81 MG EC tablet Take 1 tablet (81 mg) by mouth daily 30 tablet 1     Allergies:  The patientis allergic to rifampin and penicillins.    Social history:  Social History   Substance Use Topics     Smoking status: Never Smoker     Smokeless tobacco: Never Used     Alcohol use No      Comment: Rare     Marital status: .    Review of Systems:  Nursing Notes:   Sheila Boyce CMA  12/5/2017  8:11 AM  Signed  Chief Complaint   Patient presents with     Clinic Care Coordination - Initial     Pre op discussion       Vitals:    12/05/17 0808   BP: 121/83   Pulse: 61   Temp: 98.5  F (36.9  C)   SpO2: 93%   Weight: 49.3 kg (108 lb 11.2 oz)   Height: 1.575 m (5' 2\")       Body mass index is 19.88 kg/(m^2).      SAGE Bernstein MD   Professor and Chief  Division of Colon and Rectal Surgery  [facility]      Referring Provider:  Fuentes oJhnson MD  [address] [place] [address] [address]  [place] [address]     Primary Care Provider:  Jackson Orellana  "

## 2017-12-05 NOTE — MR AVS SNAPSHOT
After Visit Summary   12/5/2017    Yarelis Penny    MRN: [id number]           Patient Information     Date Of Birth          1985        Visit Information        Provider Department      12/5/2017 8:00 AM Krzysztof Carney MD [facility]        Today's Diagnoses     Ulcerative pancolitis without complication (H)    -  1    Sclerosing cholangitis        Liver replaced by transplant (H)           Follow-ups after your visit        Your next 10 appointments already scheduled     Dec 06, 2017  8:00 AM CST   (Arrive by 7:45 AM)   Return Visit with Walker Nails MD   [facility] ([facility])    [address]  3rd Floor  [facility] [id number] [phone]            Dec 18, 2017  7:30 AM CST   (Arrive by 7:15 AM)   RETURN INFLAMMATORY BOWEL DISEASE with Cresencio Sierra PA-C   [facility] ([facility])    [address] [place] [address]  [address] [facility] [id number] [phone]            Dec 20, 2017  1:00 PM CST   Infusion 180 with UC SPEC INFUSION, UC 41 ATC   [facility] ([facility])    [address]  2nd Floor  [facility] [id number] [phone]              Who to contact     Please call your clinic at [phone] to:    Ask questions about your health    Make or cancel appointments    Discuss your medicines    Learn about your test results    Speak to your doctor   If you have compliments or concerns about an experience at your clinic, or if you wish to file a complaint, please contact [facility] Patient Relations at [phone] or email us at [email or web address]         Additional Information About Your Visit        MyChart Information     [employer] gives you secure access to your electronic health record. If you see a primary  "care provider, you can also send messages to your care team and make appointments. If you have questions, please call your primary care clinic.  If you do not have a primary care provider, please call [phone] and they will assist you.      [employer] is an electronic gateway that provides easy, online access to your medical records. With [employer], you can request a clinic appointment, read your test results, renew a prescription or communicate with your care team.     To access your existing account, please contact your [facility] or call [phone] for assistance.        Care EveryWhere ID     This is your Care EveryWhere ID. This could be used by other organizations to access your Jean medical records  [id number]        Your Vitals Were     Pulse Temperature Height Pulse Oximetry BMI (Body Mass Index)       61 98.5  F (36.9  C) 5' 2\" 93% 19.88 kg/m2        Blood Pressure from Last 3 Encounters:   12/05/17 121/83   11/22/17 108/75   10/25/17 101/70    Weight from Last 3 Encounters:   12/05/17 108 lb 11.2 oz   11/22/17 108 lb 9.6 oz   09/18/17 108 lb 1.6 oz              Today, you had the following     No orders found for display       Primary Care Provider Office Phone # Fax #    Jackson Orellana [phone]       [place] [address] [address] [facility] [address]  [facility] [address]        Equal Access to Services     [facility]: Hadii aad ku hadasho Soomaali, waaxda luqadaha, qaybta kaalmada adeegyada, mani melo . So [facility] [place] [phone].    ATENCIÓN: Si habla español, tiene a borja disposición servicios gratuitos de asistencia lingüística. Clyde al [phone].    We comply with applicable federal civil rights laws and [place] laws. We do not discriminate on the basis of race, color, national origin, age, disability, sex, sexual orientation, or gender identity.            Thank you!     Thank you for choosing [facility]  for your care. Our goal is always to provide you with excellent care. Hearing back from our patients is one way we can continue to improve our services. Please take a few minutes to complete the written survey that you may receive in the mail after your visit with us. Thank you!             Your Updated Medication List - Protect others around you: Learn how to safely use, store and throw away your medicines at www.disposemymeds.org.          This list is accurate as of: 12/5/17 12:41 PM.  Always use your most recent med list.                   Brand Name Dispense Instructions for use Diagnosis    budesonide 9 MG 24 hr tablet    UCERIS    30 tablet    Take 1 tablet (9 mg) by mouth every morning    UC (ulcerative colitis) (H)       ENTYVIO IV      Inject 300 mg into the vein every 28 days        GNP ASPIRIN LOW DOSE 81 MG EC tablet   Generic drug:  aspirin     30 tablet    Take 1 tablet (81 mg) by mouth daily    Ulcerative colitis with other complication, unspecified location (H), Liver replaced by transplant (H)       predniSONE 5 MG tablet    DELTASONE    225 tablet    Take 1 tablet (5 mg) by mouth daily    UC (ulcerative colitis) (H), Liver replaced by transplant (H)       sulfamethoxazole-trimethoprim 800-160 MG per tablet    BACTRIM DS/SEPTRA DS    90 tablet    Take 1 tablet by mouth three times a week Monday, Wednesday, Friday    Immunosuppression (H), Inflammatory bowel disease, PSC (primary sclerosing cholangitis)       * tacrolimus 0.5 MG capsule    GENERIC EQUIVALENT    180 capsule    Take 1 capsule (0.5 mg) by mouth 2 times daily Take with 1 mg capsule. Total dose 2.5 mg every 12 hours    S/P liver transplant (H)       * tacrolimus 1 MG capsule    GENERIC EQUIVALENT    360 capsule    Take 2 capsules (2 mg) by mouth 2 times daily *total dose 2.5 mg BID. Take with 0.5 mg capsule    S/P liver transplant (H)       ursodiol 250 MG tablet    ACTIGALL    270 tablet    Take 2 tabs (500 mg) in AM, and 1 tab  at night (250 mg)    Liver replaced by transplant (H)       zolpidem 5 MG tablet    AMBIEN    60 tablet    Take 1 tablet (5 mg) by mouth nightly as needed for sleep        * Notice:  This list has 2 medication(s) that are the same as other medications prescribed for you. Read the directions carefully, and ask your doctor or other care provider to review them with you.

## 2017-12-06 ENCOUNTER — OFFICE VISIT (OUTPATIENT)
Dept: INFECTIOUS DISEASES | Facility: CLINIC | Age: 32
End: 2017-12-06
Attending: INTERNAL MEDICINE
Payer: COMMERCIAL

## 2017-12-06 VITALS
OXYGEN SATURATION: 100 % | RESPIRATION RATE: 16 BRPM | TEMPERATURE: 98.6 F | HEIGHT: 62 IN | WEIGHT: 107.3 LBS | DIASTOLIC BLOOD PRESSURE: 73 MMHG | SYSTOLIC BLOOD PRESSURE: 111 MMHG | BODY MASS INDEX: 19.75 KG/M2 | HEART RATE: 91 BPM

## 2017-12-06 DIAGNOSIS — Z79.2 PROPHYLACTIC ANTIBIOTIC: ICD-10-CM

## 2017-12-06 DIAGNOSIS — B27.00 EPSTEIN-BARR VIRUS VIREMIA: Primary | ICD-10-CM

## 2017-12-06 DIAGNOSIS — B25.8 OTHER CYTOMEGALOVIRAL DISEASES (H): ICD-10-CM

## 2017-12-06 DIAGNOSIS — Z94.4 LIVER TRANSPLANT RECIPIENT (H): ICD-10-CM

## 2017-12-06 DIAGNOSIS — D84.9 IMMUNOCOMPROMISED PATIENT (H): ICD-10-CM

## 2017-12-06 PROCEDURE — 99213 OFFICE O/P EST LOW 20 MIN: CPT | Mod: ZF

## 2017-12-06 ASSESSMENT — PAIN SCALES - GENERAL: PAINLEVEL: NO PAIN (0)

## 2017-12-06 NOTE — NURSING NOTE
"Chief Complaint   Patient presents with     RECHECK     EBV and CMV after liver transplant       Initial /73 (BP Location: Right arm, Patient Position: Sitting, Cuff Size: Adult Small)  Pulse 91  Temp 98.6  F (37  C) (Oral)  Resp 16  Ht 1.575 m (5' 2.01\")  Wt 48.7 kg (107 lb 4.8 oz)  SpO2 100%  BMI 19.62 kg/m2 Estimated body mass index is 19.62 kg/(m^2) as calculated from the following:    Height as of this encounter: 1.575 m (5' 2.01\").    Weight as of this encounter: 48.7 kg (107 lb 4.8 oz).  Medication Reconciliation: complete     Shana Rothman [facility]  12/6/2017 8:09 AM        "

## 2017-12-06 NOTE — PATIENT INSTRUCTIONS
We will arrange for a CT chest abdomen and pelvis within the next few weeks.  We will plan to continue to check the EBV virus in your blood but space it out to checking it every three months.  I will be in touch with Dr. Johnson.  I would like to see you again in one year.

## 2017-12-06 NOTE — LETTER
12/6/2017      RE: Yarelis Penny  [address] [facility] [id number]         [facility]  Transplant Infectious Disease Progress Note     Patient:  Yarelis Penny, Date of birth 1985, Medical record number [id number]  Date of Visit:  12/06/2017  Consult requested by Dr. Enriquez for evaluation of EBV and CMV after liver Transplant         Assessment and Recommendations:   Recommendations:  - EBV PCR blood q 3 months  - CT c/a/p w/ contrast screen for PLTD in the next 1-2 weeks.   - continues on bactrim propylaxis.     F/u 1 year.   Please do not hesitate to contact me with questions.     Assessment:  32 female w/ PSC/ UC, autoimmune hepatitis s/p LDRLT 6/2015 complicated by anastomotic biliary stricture (last stent removed 3/1/16) maintained on tacro/ azathioprine and pred. Hospitalized 3/29-4/2/16, then again 4/26-4/29/16.  Yarelis had syndromes that were consistent with both EBV-mononucleosis like, and CMV w/ gastritis and anal ulcerations.  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  New medications started for the IBD, vedolizumab, which is improving the IBD symptoms.  I have had discussions w/ Dr. Johnson re the possible need for total colectomy in the future.     ID issues:   -EBV viremia: The viremia remains elevated but at a stable level of around 200,000 or about 4.9-5.5 log.  She is at high risk for PTLD with immunosuppression and IBD as above, but currently does not have any B symptoms.  Her stools are now controlled on Vedolizmab.  There is probably some mild fluctuation of the EBV levels depending on how far away from the Vedolizmab infusion the blood is drawn.  Not to mention that the internal variation of the test itself is up to 0.5 log viral load.  With that said, I we need to screen further for PTLD with CT c/a/p at least yearly, which she would be due now.     Yarelis's initial syndrome was very consistent with  mononucleosis (very severe throat pain, cervical LAD, fatigue, etc).  She has now recovered clinically but has rising viremia.  With the lack of current symptoms, we will continue to monitor the viral load.  CT c/a/p 4/2016 w/out radiographic signs for PTLD.  Some patients after transplant will have either high or low level persistent EBV viremia.  The high level group are at increased risk of developing PTLD, but not all will get PTLD.  The highest risk group is really the group where EBV is dynamically changing (ie increasing significantly).  At this point we will trend out the EBV PCR.  Immunosuppression was previously decreased, now however she is being treated for IBD.  The mononucleosis like syndrome and severe odynophagia are now a resolved issues.   1. The strength of EBV PCR monitoring in the blood is to detect PTLD at an early stage when decrease in immunosuppression might be of most benefit.  However, EBV viremia w/out PTLD can also have high level EBV viremia.  And, some patients will have high grade EBV viremia for a long time w/out PTLD, with this group being at an increase risk of PTLD.     2. A dose of rituximab will certainly help the EBV viremia and/or partially treat an early phase PTLD.  Prior ongoing discussions b/w teams we all favor not adding this to the mix of the above immunosuppression.  Rather a colectomy would probably be the next step if needed to control the IBD.     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 This last episode (12/2016) was a viremia up to 3000 IU in the setting of steroid burst.  Was easily controlled w/ IV GCV (did not want to use orals for concerns of absorption issues).  She has since completed a course of IV GCV and we stopped therapy the end of January 2017.  Seral CMV PCRs since are negative, and her stools are normal formed on IBD therapy.     CMV D+/R-. 2nd episode of CMV GI disease since the transplant (1st  one 12/2015 tz w/ GCV/ vGCV).  Colonosocopy 4/1/16 w/ few small ulcers at the ileocecal valve and mucosal ulcerations in a discontinuous pattern throughout the colon, most of the bx's performed were CMV immunostain positive.  Treated w/ vGCV and then IV GCV (due to swallowing problems) for a total of 5 weeks of therapy ending 5/35/16.       Last episode of CMV colitis she was not  viremic, which could be for two reasons, after the 1st CMV reactivation she is developing a partial immune response (R+ SOTs can have colitis w/out viremia, but not so much R- SOTs however at some point she should start to behave like an R+) or that she is at risk for underlying CMV GI disease given hx of PSC/ UC.       Other ID issues:  -prophylaxis: bactrim  -serostatus: CMV D+/R-, EBV R+  -immunizations:  Up to date  -isolation:  Good had hygiene    Attestation:  I have reviewed today's vital signs, medications, labs and imaging.      Walker Nails,   Pager [phone]         History of Infectious Disease Illness:   This is a very pleasant 32 year ol female w/ PSC/ UC, autoimmune hepatitis s/p LDLT 6/2015 w/ anastomotic biliary stricture (last bili stent removed 3/1/16).  The transplant course was complicated by CMV syndrome w/ possible gastritis/ colitis (muscle aches, stomach pain and diarrhea at that time) 12/2015, CMV D+/R-, viremia peak at 7000 IU .  She completed a course of IV GCV (3 weeks) then course of vGCV tz.      I initially saw Yarelis after hospitalization from 3/29-4/2/16, admitted w/ cough, fever, sore throat w/ nasal congestion, loose stool and prodrome of 3 weeks of fatigue, night sweats and fevers.  During that hospitalization a colonoscopy was performed on 4/1(reported few small ulcerations at the ileocecal valve and mucosal ulcerations in a discontinuous pattern), path + for CMV on immunostain on most areas throughout the colon that was biopsied, no evidence of active colitis.  Stool studies at the time were  negative, including enteric PCR panel and c.diff.  EBV viremia went from 108K on 3/29 to 533K on 3/31 and she was given a dose of Rituximab.  Repeat EBV PCR of blood on 4/7/16 was 9197.      Early April 2016 Yarelis had an ongoing sore throat, feels very dry in the morning, ears aching.  Yesterday she developed diarrhea again, watery, 2-3 x's day w/ stomach pain.  No fevers, no weight loss.  She notes some sweats at night.  No cough, no mouth pain no joint pain.  Valcyte was started 4/6/2016, changed to IV GCV on 4/21/16.  SH: no pets at home, works at home, no recent known sick contacts.  On one week follow up Yarelis had  ongoing throat pain w/ trouble swallowing.  Often feels dry.  The odynophagia does not extend down her esophagus.  She has jaw and ear pain.  She reports fevers up to 102.  No cough/ sob, vision changes or headaches.  Ultimately we were trying to manage Yarelis as an outpatient, escalating the oral/ topical throat analgesics and she was scheduled for ENT, but hospitalized from 4/26-4/29 failed outpt management with poor PO intake and ESTUARDO.  While in house, ENT evaluated Yarelis, flex endoscope w/ normal thick purulent drainage and crusting.  Throat swabs w/ S. Aureus and she was treated w/ 7 days of doxycycline.   Hurricane mouth spray for supportive care.  Mid-way through the hospitalization her mouth pain started to improve and has since resolved completely.  The topical analgesics dc'd a while back.  No oral pain, odynophagia.  She is eating normally, maintaining weight, good energy.      Yarelis was hospitalized 12/2016 w/ IBD flair treated w/ steroids and CMV Viremia up to 3000 IU.  We treated w/ IV GCV for a course that ended Jan 2017. We wanted IV therapy in the setting of IBD flair and concern for absorption.      Since I last saw Yarelis, I have been in communication with Fuentes Johnson, her GI physician re the ongoing EBV viremia and the next steps for immunosuppression to control the IBD.  Dr. Johnson outlined  three options in his note, and currently is following through with the 1st option of giving the vedolizumab some more time to work along with Ucenis and pred 5 mg.  One of the options we discussed was rituximab or total colectomy.  Dr. Johnson is trying to leave the colectomy as a last needed resort for obvious reasons.  Currently Yarelis report only having one BM per day and is formed.  No n/v or abdominal pain.  No fevers, chills, night sweats, cough / sob, mouth pain, sinus pain, vision changes or headaches.  No chest pain.  Good energy.  After Thanksgiving she developed a mild URI which is now almost resolved. Sometimes she wakes up with a sore / dry throat.         Transplants:  6/18/2015 (Liver), Postoperative day:  902      Immunization History   Administered Date(s) Administered     HIB 04/24/2015     HepB 07/31/2000, 09/06/2000, 02/09/2001     Influenza (H1N1) 01/13/2010     Influenza (IIV3) PF 09/02/2010, 10/01/2010     Influenza Vaccine IM 3yrs+ 4 Valent IIV4 11/24/2016     Meningococcal (Menomune ) 06/02/2015     Pneumococcal (PCV 13) 05/11/2016     Pneumococcal 23 valent 04/24/2015     TD (ADULT, 7+) 12/01/2001     TDAP Vaccine (Boostrix) 05/11/2016     Twinrix A/B 04/24/2015, 06/02/2015       Current Outpatient Prescriptions   Medication     predniSONE (DELTASONE) 5 MG tablet     budesonide (UCERIS) 9 MG 24 hr tablet     zolpidem (AMBIEN) 5 MG tablet     sulfamethoxazole-trimethoprim (BACTRIM DS/SEPTRA DS) 800-160 MG per tablet     Vedolizumab (ENTYVIO IV)     ursodiol (ACTIGALL) 250 MG tablet     tacrolimus (PROGRAF - GENERIC EQUIVALENT) 0.5 MG capsule     tacrolimus (PROGRAF - GENERIC EQUIVALENT) 1 MG capsule     GNP ASPIRIN LOW DOSE 81 MG EC tablet     No current facility-administered medications for this visit.             Allergies:     Allergies   Allergen Reactions     Rifampin Other (See Comments)     Kidney failure     Penicillins      Hives, has tolerated amoxicillin            Physical Exam:  "  Vitals were reviewed.   /73 (BP Location: Right arm, Patient Position: Sitting, Cuff Size: Adult Small)  Pulse 91  Temp 98.6  F (37  C) (Oral)  Resp 16  Ht 1.575 m (5' 2.01\")  Wt 48.7 kg (107 lb 4.8 oz)  SpO2 100%  BMI 19.62 kg/m2  Physical Examination:  GENERAL:  Thin female ambulating well, comfortable on room air.   HEENT:  Head is normocephalic, atraumatic   EYES:  Eyes have anicteric sclerae without conjunctival injection or stigmata of endocarditis.    ENT:  Oropharynx is moist without exudates or ulcers.  Tongue is midline.  Good dentition..   NECK:  Supple. No LAD or tenderness  LUNGS:  Clear to auscultation bilateral.   CARDIOVASCULAR:  Regular rate and rhythm with no murmurs, gallops or rubs.  ABDOMEN:  Normal bowel sounds, soft, non tender. No appreciable hepatosplenomegaly.  Surgical sites well healed.   SKIN:  No acute rashes.  No stigmata of endocarditis.  NEUROLOGIC:  Grossly nonfocal. Active x4 extremities  No CVA or spinal tenderness.         Laboratory Data:     CD4 counts    Absolute CD4   Date Value Ref Range Status   04/01/2016 386 (L) 441 - 2156 cells/uL Final     Inflammatory Markers    Recent Labs   Lab Test  10/25/17   1400  09/25/17   0900  08/29/17   0735  07/25/17   0805  06/27/17   0818  06/08/17   0912  05/02/17   0730  04/18/17   0748   SED  50*  97*  49*  74*  86*  43*  110*  97*   CRP  4.0  14.1*  <2.9  <2.9  8.2*  9.9*  13.9*  17.7*     Immune Globulin Studies  No lab results found.  Metabolic Studies       Recent Labs   Lab Test  11/22/17   1612  09/25/17   0900  07/25/17   0805  05/30/17   1315  05/15/17   0718  05/02/17   0730   NA  135  138  139  138  140  141   POTASSIUM  4.3  3.6  4.1  4.2  4.3  4.0   CHLORIDE  104  108  110*  106  109  110*   CO2  23  25  22  24  24  23   ANIONGAP  8  6  7  7  6  8   BUN  27  20  22  13  14  17   CR  0.90  1.11*  1.04  0.99  0.98  0.85   GFRESTIMATED  72  57*  61  65  66  77   GLC  172*  82  78  83  78  74   KERLINE  8.9  8.4*  8.8 "  8.6  8.9  8.3*   PHOS  2.3*  2.8   --   2.1*   --   3.3   MAG  1.6  1.7   --   1.8   --   1.4*       Hepatic Studies    Recent Labs   Lab Test  11/22/17   1612  10/25/17   1400  09/25/17   0900 08/29/17   0735  07/25/17   0805  06/27/17   0818   BILITOTAL  0.1*  0.3  0.3  0.3  0.3  0.2   ALKPHOS  122  112  195*  89  110  108   ALBUMIN  3.1*  2.9*  3.0*  3.0*  2.9*  2.4*   AST  18  14  20  17  16  12   ALT  28  34  28  36  28  22       Pancreatitis testing    Recent Labs   Lab Test  05/15/17   0718  03/20/17   1306  12/09/16   2019  11/23/16   0937  04/26/16   2223  03/01/16   0747  12/08/15   0708  09/08/15   0809  07/30/15   0815  06/26/15   0900   06/19/12   0748   AMYLASE  90  94   --    --   43  94  91  108  103  58   < >   --    LIPASE  204  139  224  84   --   209  188  237  223  143   < >   --    TRIG   --    --    --    --    --    --    --    --    --    --    --   417*    < > = values in this interval not displayed.       Hematology Studies      Recent Labs   Lab Test  11/22/17   1612  10/25/17   1400  09/25/17   0900  08/29/17   0735  07/25/17   0805  06/27/17   0818  06/08/17   0912   WBC  14.4*  9.9  12.3*  12.1*  13.3*  12.4*  9.4   ANEU   --   8.8*  5.5  6.3  7.9  6.4  3.3   ALYM   --   0.8  3.6  3.6  3.9  3.2  3.5   DEVANTE   --   0.1  2.0*  1.3  1.0  1.4*  1.2   AEOS   --   0.0  1.0*  0.8*  0.4  1.2*  1.3*   HGB  9.4*  10.0*  10.0*  7.8*  8.6*  9.7*  11.3*   HCT  31.9*  32.6*  34.3*  26.9*  28.2*  31.6*  36.0   MCV  75*  77*  73*  72*  80  84  83   PLT  837*  669*  851*  738*  787*  697*  698*       Clotting Studies    Recent Labs   Lab Test  05/15/17   0718  03/20/17   1306  12/09/16 2019 11/22/16   1821  04/27/16   0602   06/27/15   0325  06/25/15   2000  06/25/15   1558   INR  0.96  1.01  1.10  1.21*  1.28*   < >  1.96*  1.98*  2.06*   PTT   --    --    --    --   56*   --   39*  41*  47*    < > = values in this interval not displayed.       Microbiology:  Stool: 6/8/16 c.diff negative, enteric  panel negative   4/19/17: giardia ag negative, O&P negative, c.diff negative enteric panel negative.    12/10/16: microsporidia, O&P, giardia ag, enteric panel negative, c.diff negative    BCX  12/11/16 blood AFB Cx negative   3/30/16 negative   2/29/16 negative    UCX 3/29/16 negative    Virology:  CMV viral loads    11/22/17 not detected  8/29/17: not detected  7/25/17: 196  5/30/17: <137 but detectable  5/2/17: <137 but detectable  4/18/17: <137 but detectable  3/24/17: 268  2/7/17 undetectable  12/27/16-1/31/17 <137 but detectable  12/14/16 3300  12/10/16 3000  12/5/16 387  --------  7/11/16: 153  12/28/15: 7730, thereafter <137 but detectable mostly    EBV viral loads     11/22/17: 776590 5.1 log  10/25/17: 80294  4.9 log  9/25/17: 590517  5.0 log  7/25/17: 483775  5.5 log  5/30/17: 814157  5.2 log  4/18/17: 326856  5.1 log  12/10/16: 49697  12/5/16: 99415  11/3/16: 31589  9/7/16: 67539  8/9/16: 58074  7/18/16: 66503  5/9/16 undetected  4/2016: 3000  3/29/16 108,278  3/31/16 533,618  4/7/16  9197  4/12 2609  4/14 682  5/9/16 negative  7/11/16 51,989    Hepatitis B Testing     Recent Labs   Lab Test  11/22/16   1821  06/17/15   0930  06/19/12   0748   HBSAB   --    --   0.0   HBCAB  Nonreactive  Nonreactive  Negative   HEPBANG  Nonreactive   --   Negative   HBCM   --   Nonreactive   A nonreactive result suggests lack of recent exposure to the virus in the   preceding 6 months.     --        Hepatitis C Testing     Hepatitis C Antibody   Date Value Ref Range Status   06/17/2015  NR Final    Nonreactive   Assay performance characteristics have not been established for newborns,   infants, and children     06/19/2012 Negative NEG Final       Respiratory Virus Testing    RSV Rapid Antigen Result   Date Value Ref Range Status   03/31/2016  NEG Final    Negative   Test results must be correlated with clinical data. If necessary, results   should be confirmed by a molecular assay or viral culture.          Serostatus:  CMV IgG Antibody   Date Value Ref Range Status   06/19/2012 <0.20  Negative for anti-CMV IgG U/mL Final     EBV VCA IgG Antibody   Date Value Ref Range Status   06/19/2012 >750.00  Positive, suggests immunologic exposure. U/mL Final       Imaging:  AXR: 6/22/17:   1. No biliary stent is visualized.  2. Nonobstructive bowel gas pattern.    MRCP: 3/13/17:   1. Postoperative changes of partial liver transplantation with a 1.5 cm biliary anastomotic stricture and worsening intrahepatic biliary dilatation.  2. Heterogeneous enhancement of the liver suggesting hepatic inflammation.  3. Small cystic lesions in the pancreas measuring up to 6 mm, not substantially changed. Continued attention on follow-up is recommended.    MR enterography: 12/14/16  1. Diffuse colonic wall thickening and enhancement with loss of haustral pattern. Wall thickening and enhancement of the terminal ileum. Findings consistent with acute on chronic changes related to ulcerative colitis.  2. Stable 7.4 cm left adnexal cyst. A short interval follow-up ultrasound is recommended to help determine whether this represents a physiologic lesion.    CT c/a/p w/ contrast 4/14/16:   1. Postoperative changes of hepatic transplantation. 1a. Terminal ileum thickening and thickening of bowel wall in the sigmoid colon. PTLD could have this appearance.    2. Focal narrowing of the portal vein near the anastomosis. Further evaluation could be performed with ultrasound with Doppler.  3. Decrease in prominence of periaortic and aortocaval nodes since 7/9/2015.  4. Mild decrease in right lower lobe 6 mm nodule since 7/9/2015.    CT sinus 3/30/16: Overall mild paranasal sinus mucosal thickening. No evidence of acute sinusitis.    Walker Nails MD

## 2017-12-06 NOTE — MR AVS SNAPSHOT
After Visit Summary   12/6/2017    Yarelis Penny    MRN: [id number]           Patient Information     Date Of Birth          1985        Visit Information        Provider Department      12/6/2017 8:00 AM Walker Nails MD [facility]        Today's Diagnoses     Jun-Barr virus viremia    -  1      Care Instructions    We will arrange for a CT chest abdomen and pelvis within the next few weeks.  We will plan to continue to check the EBV virus in your blood but space it out to checking it every three months.  I will be in touch with Dr. Johnson.  I would like to see you again in one year.           Follow-ups after your visit        Follow-up notes from your care team     Return in about 1 year (around 12/6/2018).      Your next 10 appointments already scheduled     Dec 18, 2017  7:30 AM CST   (Arrive by 7:15 AM)   RETURN INFLAMMATORY BOWEL DISEASE with Cresencio Sierra PA-C   [facility] ([facility])    [address] [place] [address]  4th Floor  [facility] [id number] [phone]            Dec 20, 2017  1:00 PM CST   Infusion 180 with UC SPEC INFUSION, UC 41 ATC   [facility] ([facility])    [address] [place] [address]  2nd Floor  [facility] [id number] [phone]              Future tests that were ordered for you today     Open Standing Orders        Priority Remaining Interval Expires Ordered    EBV DNA PCR Quantitative Whole Blood Routine 5/5 q3 months 12/6/2018 12/6/2017          Open Future Orders        Priority Expected Expires Ordered    CT Chest w/o contrast Routine 12/6/2017 12/6/2018 12/6/2017    CT Abdomen w & w/o contrast Routine  1/5/2018 12/6/2017            Who to contact     If you have questions or need follow up information about today's clinic visit or your schedule please contact [facility] AND  "INFECTIOUS DISEASES directly at [phone].  Normal or non-critical lab and imaging results will be communicated to you by MyChart, letter or phone within 4 business days after the clinic has received the results. If you do not hear from us within 7 days, please contact the clinic through Signicathart or phone. If you have a critical or abnormal lab result, we will notify you by phone as soon as possible.  Submit refill requests through Zapya or call your pharmacy and they will forward the refill request to us. Please allow 3 business days for your refill to be completed.          Additional Information About Your Visit        SignicatharMy Digital Shield Information     [employer] gives you secure access to your electronic health record. If you see a primary care provider, you can also send messages to your care team and make appointments. If you have questions, please call your primary care clinic.  If you do not have a primary care provider, please call [phone] and they will assist you.        Care EveryWhere ID     This is your Care EveryWhere ID. This could be used by other organizations to access your [place] medical records  [id number]        Your Vitals Were     Pulse Temperature Respirations Height Pulse Oximetry BMI (Body Mass Index)    91 98.6  F (37  C) (Oral) 16 1.575 m (5' 2.01\") 100% 19.62 kg/m2       Blood Pressure from Last 3 Encounters:   12/06/17 111/73   12/05/17 121/83   11/22/17 108/75    Weight from Last 3 Encounters:   12/06/17 48.7 kg (107 lb 4.8 oz)   12/05/17 49.3 kg (108 lb 11.2 oz)   11/22/17 49.3 kg (108 lb 9.6 oz)               Primary Care Provider Office Phone # Fax #    [place] MARIANELA Orellana [phone]       [place] [address] YENIFER SPEAR [address] [address]        Equal Access to Services     OSITO CHAKRABORTY : Yandel hart Soyari, waaxda luqadaha, qaybta kaalmada adeshannonyajackie, mani lozano. [facility] [phone].    ATENCIÓN: Si lilibeth haney, " tiene a borja disposición servicios gratuitos de asistencia lingüística. Clyde grewal [phone].    We comply with applicable federal civil rights laws and [place] laws. We do not discriminate on the basis of race, color, national origin, age, disability, sex, sexual orientation, or gender identity.            Thank you!     Thank you for choosing [facility] INFECTIOUS DISEASES  for your care. Our goal is always to provide you with excellent care. Hearing back from our patients is one way we can continue to improve our services. Please take a few minutes to complete the written survey that you may receive in the mail after your visit with us. Thank you!             Your Updated Medication List - Protect others around you: Learn how to safely use, store and throw away your medicines at www.disposemymeds.org.          This list is accurate as of: 12/6/17  8:34 AM.  Always use your most recent med list.                   Brand Name Dispense Instructions for use Diagnosis    budesonide 9 MG 24 hr tablet    UCERIS    30 tablet    Take 1 tablet (9 mg) by mouth every morning    UC (ulcerative colitis) (H)       ENTYVIO IV      Inject 300 mg into the vein every 28 days        GNP ASPIRIN LOW DOSE 81 MG EC tablet   Generic drug:  aspirin     30 tablet    Take 1 tablet (81 mg) by mouth daily    Ulcerative colitis with other complication, unspecified location (H), Liver replaced by transplant (H)       predniSONE 5 MG tablet    DELTASONE    225 tablet    Take 1 tablet (5 mg) by mouth daily    UC (ulcerative colitis) (H), Liver replaced by transplant (H)       sulfamethoxazole-trimethoprim 800-160 MG per tablet    BACTRIM DS/SEPTRA DS    90 tablet    Take 1 tablet by mouth three times a week Monday, Wednesday, Friday    Immunosuppression (H), Inflammatory bowel disease, PSC (primary sclerosing cholangitis)       * tacrolimus 0.5 MG capsule    GENERIC EQUIVALENT    180 capsule    Take 1 capsule (0.5 mg) by mouth  2 times daily Take with 1 mg capsule. Total dose 2.5 mg every 12 hours    S/P liver transplant (H)       * tacrolimus 1 MG capsule    GENERIC EQUIVALENT    360 capsule    Take 2 capsules (2 mg) by mouth 2 times daily *total dose 2.5 mg BID. Take with 0.5 mg capsule    S/P liver transplant (H)       ursodiol 250 MG tablet    ACTIGALL    270 tablet    Take 2 tabs (500 mg) in AM, and 1 tab at night (250 mg)    Liver replaced by transplant (H)       zolpidem 5 MG tablet    AMBIEN    60 tablet    Take 1 tablet (5 mg) by mouth nightly as needed for sleep        * Notice:  This list has 2 medication(s) that are the same as other medications prescribed for you. Read the directions carefully, and ask your doctor or other care provider to review them with you.

## 2017-12-06 NOTE — PROGRESS NOTES
[facility]  Transplant Infectious Disease Progress Note     Patient:  Yarelis Penny, Date of birth 1985, Medical record number [id number]  Date of Visit:  12/06/2017  Consult requested by Dr. Enriquez for evaluation of EBV and CMV after liver Transplant         Assessment and Recommendations:   Recommendations:  - EBV PCR blood q 3 months  - CT c/a/p w/ contrast screen for PLTD in the next 1-2 weeks.   - continues on bactrim propylaxis.     F/u 1 year.   Please do not hesitate to contact me with questions.     Assessment:  32 female w/ PSC/ UC, autoimmune hepatitis s/p LDRLT 6/2015 complicated by anastomotic biliary stricture (last stent removed 3/1/16) maintained on tacro/ azathioprine and pred. Hospitalized 3/29-4/2/16, then again 4/26-4/29/16.  Yarelis had syndromes that were consistent with both EBV-mononucleosis like, and CMV w/ gastritis and anal ulcerations.  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  New medications started for the IBD, vedolizumab, which is improving the IBD symptoms.  I have had discussions w/ Dr. Johnson re the possible need for total colectomy in the future.     ID issues:   -EBV viremia: The viremia remains elevated but at a stable level of around 200,000 or about 4.9-5.5 log.  She is at high risk for PTLD with immunosuppression and IBD as above, but currently does not have any B symptoms.  Her stools are now controlled on Vedolizmab.  There is probably some mild fluctuation of the EBV levels depending on how far away from the Vedolizmab infusion the blood is drawn.  Not to mention that the internal variation of the test itself is up to 0.5 log viral load.  With that said, I we need to screen further for PTLD with CT c/a/p at least yearly, which she would be due now.     Yarelis's initial syndrome was very consistent with mononucleosis (very severe throat pain, cervical LAD, fatigue, etc).  She has now  recovered clinically but has rising viremia.  With the lack of current symptoms, we will continue to monitor the viral load.  CT c/a/p 4/2016 w/out radiographic signs for PTLD.  Some patients after transplant will have either high or low level persistent EBV viremia.  The high level group are at increased risk of developing PTLD, but not all will get PTLD.  The highest risk group is really the group where EBV is dynamically changing (ie increasing significantly).  At this point we will trend out the EBV PCR.  Immunosuppression was previously decreased, now however she is being treated for IBD.  The mononucleosis like syndrome and severe odynophagia are now a resolved issues.   1. The strength of EBV PCR monitoring in the blood is to detect PTLD at an early stage when decrease in immunosuppression might be of most benefit.  However, EBV viremia w/out PTLD can also have high level EBV viremia.  And, some patients will have high grade EBV viremia for a long time w/out PTLD, with this group being at an increase risk of PTLD.     2. A dose of rituximab will certainly help the EBV viremia and/or partially treat an early phase PTLD.  Prior ongoing discussions b/w teams we all favor not adding this to the mix of the above immunosuppression.  Rather a colectomy would probably be the next step if needed to control the IBD.     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 This last episode (12/2016) was a viremia up to 3000 IU in the setting of steroid burst.  Was easily controlled w/ IV GCV (did not want to use orals for concerns of absorption issues).  She has since completed a course of IV GCV and we stopped therapy the end of January 2017.  Seral CMV PCRs since are negative, and her stools are normal formed on IBD therapy.     CMV D+/R-. 2nd episode of CMV GI disease since the transplant (1st one 12/2015 tz w/ GCV/ vGCV).  Colonosocopy 4/1/16 w/ few small ulcers at the  ileocecal valve and mucosal ulcerations in a discontinuous pattern throughout the colon, most of the bx's performed were CMV immunostain positive.  Treated w/ vGCV and then IV GCV (due to swallowing problems) for a total of 5 weeks of therapy ending 5/35/16.       Last episode of CMV colitis she was not  viremic, which could be for two reasons, after the 1st CMV reactivation she is developing a partial immune response (R+ SOTs can have colitis w/out viremia, but not so much R- SOTs however at some point she should start to behave like an R+) or that she is at risk for underlying CMV GI disease given hx of PSC/ UC.       Other ID issues:  -prophylaxis: bactrim  -serostatus: CMV D+/R-, EBV R+  -immunizations:  Up to date  -isolation:  Good had hygiene    Attestation:  I have reviewed today's vital signs, medications, labs and imaging.      Walker Nails,   Pager [phone]         History of Infectious Disease Illness:   This is a very pleasant 32 year ol female w/ PSC/ UC, autoimmune hepatitis s/p LDLT 6/2015 w/ anastomotic biliary stricture (last bili stent removed 3/1/16).  The transplant course was complicated by CMV syndrome w/ possible gastritis/ colitis (muscle aches, stomach pain and diarrhea at that time) 12/2015, CMV D+/R-, viremia peak at 7000 IU .  She completed a course of IV GCV (3 weeks) then course of vGCV tz.      I initially saw Yarelis after hospitalization from 3/29-4/2/16, admitted w/ cough, fever, sore throat w/ nasal congestion, loose stool and prodrome of 3 weeks of fatigue, night sweats and fevers.  During that hospitalization a colonoscopy was performed on 4/1(reported few small ulcerations at the ileocecal valve and mucosal ulcerations in a discontinuous pattern), path + for CMV on immunostain on most areas throughout the colon that was biopsied, no evidence of active colitis.  Stool studies at the time were negative, including enteric PCR panel and c.diff.  EBV viremia went from 108K on  3/29 to 533K on 3/31 and she was given a dose of Rituximab.  Repeat EBV PCR of blood on 4/7/16 was 9197.      Early April 2016 Yarelis had an ongoing sore throat, feels very dry in the morning, ears aching.  Yesterday she developed diarrhea again, watery, 2-3 x's day w/ stomach pain.  No fevers, no weight loss.  She notes some sweats at night.  No cough, no mouth pain no joint pain.  Valcyte was started 4/6/2016, changed to IV GCV on 4/21/16.  SH: no pets at home, works at home, no recent known sick contacts.  On one week follow up Yarelis had  ongoing throat pain w/ trouble swallowing.  Often feels dry.  The odynophagia does not extend down her esophagus.  She has jaw and ear pain.  She reports fevers up to 102.  No cough/ sob, vision changes or headaches.  Ultimately we were trying to manage Yarelis as an outpatient, escalating the oral/ topical throat analgesics and she was scheduled for ENT, but hospitalized from 4/26-4/29 failed outpt management with poor PO intake and ESTUARDO.  While in house, ENT evaluated Yarelis, flex endoscope w/ normal thick purulent drainage and crusting.  Throat swabs w/ S. Aureus and she was treated w/ 7 days of doxycycline.   Hurricane mouth spray for supportive care.  Mid-way through the hospitalization her mouth pain started to improve and has since resolved completely.  The topical analgesics dc'd a while back.  No oral pain, odynophagia.  She is eating normally, maintaining weight, good energy.      Yarelis was hospitalized 12/2016 w/ IBD flair treated w/ steroids and CMV Viremia up to 3000 IU.  We treated w/ IV GCV for a course that ended Jan 2017. We wanted IV therapy in the setting of IBD flair and concern for absorption.      Since I last saw Yarelis, I have been in communication with Fuentes Johnson, her GI physician re the ongoing EBV viremia and the next steps for immunosuppression to control the IBD.  Dr. Johnson outlined three options in his note, and currently is following through with the 1st option  of giving the vedolizumab some more time to work along with Ucenis and pred 5 mg.  One of the options we discussed was rituximab or total colectomy.  Dr. Johnson is trying to leave the colectomy as a last needed resort for obvious reasons.  Currently Yarelis report only having one BM per day and is formed.  No n/v or abdominal pain.  No fevers, chills, night sweats, cough / sob, mouth pain, sinus pain, vision changes or headaches.  No chest pain.  Good energy.  After Thanksgiving she developed a mild URI which is now almost resolved. Sometimes she wakes up with a sore / dry throat.         Transplants:  6/18/2015 (Liver), Postoperative day:  902      Immunization History   Administered Date(s) Administered     HIB 04/24/2015     HepB 07/31/2000, 09/06/2000, 02/09/2001     Influenza (H1N1) 01/13/2010     Influenza (IIV3) PF 09/02/2010, 10/01/2010     Influenza Vaccine IM 3yrs+ 4 Valent IIV4 11/24/2016     Meningococcal (Menomune ) 06/02/2015     Pneumococcal (PCV 13) 05/11/2016     Pneumococcal 23 valent 04/24/2015     TD (ADULT, 7+) 12/01/2001     TDAP Vaccine (Boostrix) 05/11/2016     Twinrix A/B 04/24/2015, 06/02/2015       Current Outpatient Prescriptions   Medication     predniSONE (DELTASONE) 5 MG tablet     budesonide (UCERIS) 9 MG 24 hr tablet     zolpidem (AMBIEN) 5 MG tablet     sulfamethoxazole-trimethoprim (BACTRIM DS/SEPTRA DS) 800-160 MG per tablet     Vedolizumab (ENTYVIO IV)     ursodiol (ACTIGALL) 250 MG tablet     tacrolimus (PROGRAF - GENERIC EQUIVALENT) 0.5 MG capsule     tacrolimus (PROGRAF - GENERIC EQUIVALENT) 1 MG capsule     GNP ASPIRIN LOW DOSE 81 MG EC tablet     No current facility-administered medications for this visit.             Allergies:     Allergies   Allergen Reactions     Rifampin Other (See Comments)     Kidney failure     Penicillins      Hives, has tolerated amoxicillin            Physical Exam:   Vitals were reviewed.   /73 (BP Location: Right arm, Patient Position:  "Sitting, Cuff Size: Adult Small)  Pulse 91  Temp 98.6  F (37  C) (Oral)  Resp 16  Ht 1.575 m (5' 2.01\")  Wt 48.7 kg (107 lb 4.8 oz)  SpO2 100%  BMI 19.62 kg/m2  Physical Examination:  GENERAL:  Thin female ambulating well, comfortable on room air.   HEENT:  Head is normocephalic, atraumatic   EYES:  Eyes have anicteric sclerae without conjunctival injection or stigmata of endocarditis.    ENT:  Oropharynx is moist without exudates or ulcers.  Tongue is midline.  Good dentition..   NECK:  Supple. No LAD or tenderness  LUNGS:  Clear to auscultation bilateral.   CARDIOVASCULAR:  Regular rate and rhythm with no murmurs, gallops or rubs.  ABDOMEN:  Normal bowel sounds, soft, non tender. No appreciable hepatosplenomegaly.  Surgical sites well healed.   SKIN:  No acute rashes.  No stigmata of endocarditis.  NEUROLOGIC:  Grossly nonfocal. Active x4 extremities  No CVA or spinal tenderness.         Laboratory Data:     CD4 counts    Absolute CD4   Date Value Ref Range Status   04/01/2016 386 (L) 441 - 2156 cells/uL Final     Inflammatory Markers    Recent Labs   Lab Test  10/25/17   1400  09/25/17   0900  08/29/17   0735  07/25/17   0805  06/27/17   0818  06/08/17   0912  05/02/17   0730  04/18/17   0748   SED  50*  97*  49*  74*  86*  43*  110*  97*   CRP  4.0  14.1*  <2.9  <2.9  8.2*  9.9*  13.9*  17.7*     Immune Globulin Studies  No lab results found.  Metabolic Studies       Recent Labs   Lab Test  11/22/17   1612  09/25/17   0900  07/25/17   0805  05/30/17   1315  05/15/17   0718  05/02/17   0730   NA  135  138  139  138  140  141   POTASSIUM  4.3  3.6  4.1  4.2  4.3  4.0   CHLORIDE  104  108  110*  106  109  110*   CO2  23  25  22  24  24  23   ANIONGAP  8  6  7  7  6  8   BUN  27  20  22  13  14  17   CR  0.90  1.11*  1.04  0.99  0.98  0.85   GFRESTIMATED  72  57*  61  65  66  77   GLC  172*  82  78  83  78  74   KERLINE  8.9  8.4*  8.8  8.6  8.9  8.3*   PHOS  2.3*  2.8   --   2.1*   --   3.3   MAG  1.6  1.7   --  "  1.8   --   1.4*       Hepatic Studies    Recent Labs   Lab Test  11/22/17   1612  10/25/17   1400  09/25/17   0900 08/29/17   0735  07/25/17   0805  06/27/17   0818   BILITOTAL  0.1*  0.3  0.3  0.3  0.3  0.2   ALKPHOS  122  112  195*  89  110  108   ALBUMIN  3.1*  2.9*  3.0*  3.0*  2.9*  2.4*   AST  18  14  20  17  16  12   ALT  28  34  28  36  28  22       Pancreatitis testing    Recent Labs   Lab Test  05/15/17   0718  03/20/17   1306  12/09/16   2019  11/23/16   0937  04/26/16   2223  03/01/16   0747  12/08/15   0708  09/08/15   0809  07/30/15   0815  06/26/15   0900   06/19/12   0748   AMYLASE  90  94   --    --   43  94  91  108  103  58   < >   --    LIPASE  204  139  224  84   --   209  188  237  223  143   < >   --    TRIG   --    --    --    --    --    --    --    --    --    --    --   417*    < > = values in this interval not displayed.       Hematology Studies      Recent Labs   Lab Test  11/22/17   1612  10/25/17   1400  09/25/17   0900 08/29/17   0735  07/25/17   0805  06/27/17   0818  06/08/17   0912   WBC  14.4*  9.9  12.3*  12.1*  13.3*  12.4*  9.4   ANEU   --   8.8*  5.5  6.3  7.9  6.4  3.3   ALYM   --   0.8  3.6  3.6  3.9  3.2  3.5   DEVANTE   --   0.1  2.0*  1.3  1.0  1.4*  1.2   AEOS   --   0.0  1.0*  0.8*  0.4  1.2*  1.3*   HGB  9.4*  10.0*  10.0*  7.8*  8.6*  9.7*  11.3*   HCT  31.9*  32.6*  34.3*  26.9*  28.2*  31.6*  36.0   MCV  75*  77*  73*  72*  80  84  83   PLT  837*  669*  851*  738*  787*  697*  698*       Clotting Studies    Recent Labs   Lab Test  05/15/17   0718  03/20/17   1306  12/09/16 2019 11/22/16   1821  04/27/16   0602   06/27/15   0325  06/25/15   2000  06/25/15   1558   INR  0.96  1.01  1.10  1.21*  1.28*   < >  1.96*  1.98*  2.06*   PTT   --    --    --    --   56*   --   39*  41*  47*    < > = values in this interval not displayed.       Microbiology:  Stool: 6/8/16 c.diff negative, enteric panel negative   4/19/17: giardia ag negative, O&P negative, c.diff negative  enteric panel negative.    12/10/16: microsporidia, O&P, giardia ag, enteric panel negative, c.diff negative    BCX  12/11/16 blood AFB Cx negative   3/30/16 negative   2/29/16 negative    UCX 3/29/16 negative    Virology:  CMV viral loads    11/22/17 not detected  8/29/17: not detected  7/25/17: 196  5/30/17: <137 but detectable  5/2/17: <137 but detectable  4/18/17: <137 but detectable  3/24/17: 268  2/7/17 undetectable  12/27/16-1/31/17 <137 but detectable  12/14/16 3300  12/10/16 3000  12/5/16 387  --------  7/11/16: 153  12/28/15: 7730, thereafter <137 but detectable mostly    EBV viral loads     11/22/17: 042740 5.1 log  10/25/17: 77943  4.9 log  9/25/17: 144953  5.0 log  7/25/17: 353248  5.5 log  5/30/17: 927410  5.2 log  4/18/17: 975531  5.1 log  12/10/16: 40733  12/5/16: 48877  11/3/16: 50487  9/7/16: 05760  8/9/16: 46606  7/18/16: 82320  5/9/16 undetected  4/2016: 3000  3/29/16 108,278  3/31/16 533,618  4/7/16  9197  4/12 2609  4/14 682  5/9/16 negative  7/11/16 51,989    Hepatitis B Testing     Recent Labs   Lab Test  11/22/16   1821  06/17/15   0930  06/19/12   0748   HBSAB   --    --   0.0   HBCAB  Nonreactive  Nonreactive  Negative   HEPBANG  Nonreactive   --   Negative   HBCM   --   Nonreactive   A nonreactive result suggests lack of recent exposure to the virus in the   preceding 6 months.     --        Hepatitis C Testing     Hepatitis C Antibody   Date Value Ref Range Status   06/17/2015  NR Final    Nonreactive   Assay performance characteristics have not been established for newborns,   infants, and children     06/19/2012 Negative NEG Final       Respiratory Virus Testing    RSV Rapid Antigen Result   Date Value Ref Range Status   03/31/2016  NEG Final    Negative   Test results must be correlated with clinical data. If necessary, results   should be confirmed by a molecular assay or viral culture.         Serostatus:  CMV IgG Antibody   Date Value Ref Range Status   06/19/2012 <0.20  Negative  for anti-CMV IgG U/mL Final     EBV VCA IgG Antibody   Date Value Ref Range Status   06/19/2012 >750.00  Positive, suggests immunologic exposure. U/mL Final       Imaging:  AXR: 6/22/17:   1. No biliary stent is visualized.  2. Nonobstructive bowel gas pattern.    MRCP: 3/13/17:   1. Postoperative changes of partial liver transplantation with a 1.5 cm biliary anastomotic stricture and worsening intrahepatic biliary dilatation.  2. Heterogeneous enhancement of the liver suggesting hepatic inflammation.  3. Small cystic lesions in the pancreas measuring up to 6 mm, not substantially changed. Continued attention on follow-up is recommended.    MR enterography: 12/14/16  1. Diffuse colonic wall thickening and enhancement with loss of haustral pattern. Wall thickening and enhancement of the terminal ileum. Findings consistent with acute on chronic changes related to ulcerative colitis.  2. Stable 7.4 cm left adnexal cyst. A short interval follow-up ultrasound is recommended to help determine whether this represents a physiologic lesion.    CT c/a/p w/ contrast 4/14/16:   1. Postoperative changes of hepatic transplantation. 1a. Terminal ileum thickening and thickening of bowel wall in the sigmoid colon. PTLD could have this appearance.    2. Focal narrowing of the portal vein near the anastomosis. Further evaluation could be performed with ultrasound with Doppler.  3. Decrease in prominence of periaortic and aortocaval nodes since 7/9/2015.  4. Mild decrease in right lower lobe 6 mm nodule since 7/9/2015.    CT sinus 3/30/16: Overall mild paranasal sinus mucosal thickening. No evidence of acute sinusitis.

## 2017-12-15 ENCOUNTER — TELEPHONE (OUTPATIENT)
Dept: GASTROENTEROLOGY | Facility: CLINIC | Age: 32
End: 2017-12-15

## 2017-12-18 ENCOUNTER — OFFICE VISIT (OUTPATIENT)
Dept: GASTROENTEROLOGY | Facility: CLINIC | Age: 32
End: 2017-12-18
Payer: COMMERCIAL

## 2017-12-18 VITALS
HEART RATE: 72 BPM | SYSTOLIC BLOOD PRESSURE: 116 MMHG | BODY MASS INDEX: 20.3 KG/M2 | OXYGEN SATURATION: 100 % | WEIGHT: 110.3 LBS | HEIGHT: 62 IN | TEMPERATURE: 98.6 F | DIASTOLIC BLOOD PRESSURE: 83 MMHG

## 2017-12-18 DIAGNOSIS — K52.9 INFLAMMATORY BOWEL DISEASE: Primary | ICD-10-CM

## 2017-12-18 ASSESSMENT — PAIN SCALES - GENERAL: PAINLEVEL: NO PAIN (0)

## 2017-12-18 NOTE — PROGRESS NOTES
IBD CLINIC VISIT     CC/REFERRING MD: Daniela Enriquez  REASON FOR FOLLOW UP: PSC associated Ulcerative colitis    IBD HISTORY  Age at diagnosis: 18    Extent of disease: Colonoscopies over the years have varied with areas of involvement. Earliest colonoscopies in 2006 show right greater than left inflammation with some inflammation in the ileum as well.    Followup colonoscopy in 2007 showed mainly diffuse colonic inflammation.    Colonoscopies in 2015 again showed more chronic colitis with no significant ileal involvement.    Colonoscopies in 3/2016 again showed a right-sided and some ileal inflammation with ulceration, but biopsy showed CMV.    Colonoscopy 11/2016 showed severe inflammation from 20 cm from the anus all of the way to the ileum as far as could be evaluated.  Biopsies showed chronic active ileitis and chronic active colitis in every sample.  There was no evidence of CMV or PTLD. Rare SHENA-KACIE positive cells seen in ileum.  Colonoscopy 9/2017 showed Abad 2 inflammation in the right colon and abad one in the left colon with relative rectal sparing.  Patulous IC valve with some backwash ileitis.  Biopsies negative for lymphoma, CMP and EBV biopsies with mild  chronic active ileitis and mild chronic active colitis throughout, including rectum.    Current UC medications: Vedolizumab 400mg pq4 weeks, prednisone 5 mg  Prior UC surgeries: None  Prior IBD Medications:   Multiple rounds of prednisone  Pentasa   Asacol  Azathioprine (mainly given for liver issues at lower dose)     DRUG MONITORING  TPMT enzyme activity: (12/6/16) 18.5      6-TGN/6-MMPN levels: (12/6/16) 203/257       Biologic concentration:   4/18/17 Vedolizumab level 34.1, no ATI  5/2/17 Vedolizumab level 3.9, no ATI (decreased interval to q4 week dosing)  10/25/17 Vedolizumab level 11, no ATI (no change in therapy-- met with surgeon to discuss what surgical options might look like)       DISEASE ASSESSMENT  Labs:  Lab Results   Component  Value Date    ALBUMIN 3.3 03/02/2017     Recent Labs   Lab Test  10/25/17   1400  09/25/17   0900   CRP  4.0  14.1*   SED  50*  97*     Endoscopic assessment: Colonoscopy 9/2017 showed Abad 2 inflammation in the right colon and abad one in the left colon with relative rectal sparing.  Patulous IC valve with some backwash ileitis.  Biopsies negative for lymphoma, CMP and EBV biopsies with mild  chronic active ileitis and mild chronic active colitis throughout, including rectum.  Enterography: (12/14/16) 1. Diffuse colonic wall thickening and enhancement with loss of  haustral pattern. Wall thickening and enhancement of the terminal  ileum. Findings consistent with acute on chronic changes related to  ulcerative colitis. 2. Stable 7.4 cm left adnexal cyst. A short interval follow-up  ultrasound is recommended to help determine whether this represents a  physiologic lesion. Plans to have follow up with OBGYN and repeat U/S in 6 weeks.  Fecal calprotectin: --   C diff: Negative 12/10/16  Abad score: 3/9 (12/18/17)    ASSESSMENT/PLAN  Yarelis is a pleasant 32-year-old female with a complicated past medical history including primary sclerosing cholangitis associated inflammatory bowel disease, cirrhosis, status post living donor transplant in 2015 with recent severe flare and was hospitalized 12/2016.       1. Primary sclerosing cholangitis associated inflammatory bowel disease.  Pattern of disease is very consistent with PSC associated ulcerative colitis with most recent severe flare in December 2016.  Since this time we have worked hard to get patient into adequate remission.  Despite maximum dose of vedolizumab, she still has active inflammation as noted above.  Since this time she has been on both low-dose prednisone and Uceris.  Patient feels that since September she has had improvement in symptoms with number of bowel movements and improved energy in general.  Since patient had some return of symptoms after having a  colonoscopy I think it would be reasonable to track a fecal calprotectin for now to see if this correlates with symptomatic improvement.  There is of course concern given persistent inflammation despite maximum dose of vedolizumab, and patient has met with our colorectal team to discuss surgical options, as this would likely be our next step if needed.  Since patient has had such great symptomatic improvement she is hesitant to pursue this route.    -- Continue Vedolizumab every 4 weeks  -- Continue Uceris x 1 more month for total of 3 months, then every other day for one week then stop  -- Fecal calprotectin to be obtained and trend (last one was in April and was >800 while disease was not under good control). Pending this level, consider repeating endoscopic assessment for mucosal healing given improvement in symptoms. If we are unable to achieve mucosal healing with this regimen, likely will need to explore surgical options.     2. PCP prophylaxis. Continue at this time.  Patient also follows with ID. Consider discontinuation after discontinuing Uceris.      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 The patient follows with Dr. Nails. Rituximab treatment has been completed.       4. 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 Her CMV levels were not detected that were last checked in 02/07/2017. She has completed her IV ganciclovir regimen.      5. 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 Last ERCP 5/2017 with Dr. Guru Macias. She is status post dilation and self injecting stent placement.  AXR confirmed stent passed. LFTs normal.       6. IBD healthcare maintenance based on patients current medication: Vedolizumab  Vaccinations:  -- Influenza (every year): Last given 2016  -- TdaP (every 10 years): Last given 2016  -- Pneumococcal Pneumonia (once then every 5 years): Last given 2016     One time confirmation of immunity or serologies:  -- Hepatitis A (serologies or immunizations): 2015  --  Hepatitis B (serologies or immunizations): 2015  -- Varicella: Not documented  -- MMR:Not documented  -- HPV (all aged 18-26): As indicated  -- Meningococcal meningitis (all patients at risk for meningitis): As indicated   -- Due to the immunosuppression in this patient, I would not advise administration of live vaccines such as varicella/VZV, intranasal influenza, MMR, or yellow fever vaccine (if travelling).      Cancer Screening:  Colon cancer screening:  Given PSC type of colitis, colonoscopy every year is recommended. Dysplasia screening is recommended fall 2017.     Cervical cancer screening: Annual due to immunosupression     Skin cancer screening: Annual visual exam of skin by dermatologist since patient is immunocompromised     Depression Screening:  -- Over the last month, have you felt down, depressed, or hopeless? No  -- Over the last month, have you felt little interest or pleasure doing things? No     Misc:  -- Avoid tobacco use  -- Avoid NSAIDs as there is potentially a 25% chance of causing an IBD flare     RTC 3 months with Dr. Johnson     Thank you for this consultation. It was a pleasure to participate in the care of this patient; please contact us with any further questions.      Cresencio Sierra PA-C  Division of Gastroenterology, Hepatology and Nutrition  [facility]    HPI:   Yarelis is a pleasant 31-year-old female here today for followup regarding PSC associated IBD. Briefly, the patient's history includes history of inflammatory bowel disease, primary sclerosing cholangitis, autoimmune hepatitis and is status post living donor transplant in 2015. Her extent of disease has varied with colonoscopies from right sided to more diffuse with variable ileal involvement. More recent colonoscopies in 2016 have again showed more right-sided ileal involvement. She was initially treated with mesalamine in 2007 and that required a steroid burst. Over the years, she stopped IBD therapy and had largely  quiescent disease. She underwent a liver transplant in 2015, has been on Prograf, low-dose prednisone and azathioprine previously. Her post-transplant course was complicated by CMV colitis x2 on 01/2016 and on 04/2016, and she was treated with antiviral therapy both times (IV ganciclovir). Her post-transplant course was also complicated by primary EBV viremia and followed by the Transplant Infectious Disease team. She again had a flare of her colitis symptoms in 11/2016 and was admitted for severe colitis. Biopsies at this time were negative for CMV and had no significant EBV component. She did have associated mild CMV viremia and EBV viremia, but after extensive discussion with Pathology and Infectious Disease, her flare was not felt to be related to these viruses or PTLD. The patient was treated with IV steroids and transitioned to oral steroids. She initially did well, but in early December failed transition to p.o. steroids. She was rehospitalized and given IV steroids again. She was transitioned to a higher dose of steroids 60 mg and was discharged with vedolizumab infusion the day after discharge. She completed her IV ganciclovir course.     Today, continues with treatment with vedolizumab now every 4 weeks.  Her interval for infusions was changed due to low level as documented above.  Even with the every 4 week dosing, she still shows low drug levels, but no antibodies. She is down to 5mg of prednisone now.  Due to persisent inflammation on colonoscopy in 9/2017, she was also started on Uceris which she also continues at this time. She had a brief period of more soft mushy stools after this colonoscopy, but this resolved.    She feels that even since 9/2017, this is the best she has ever felt.  She has 1 BM per day, formed without blood.  No urgency or tenesmus. No rashes, joint pain, eye redness/pain, or mouth sores.  She feels she has identified a few trigger foods to include red meat, spicy foods and  popcorn.  Energy is much improved.    ROS:    No fevers or chills  No weight loss  No blurry vision, double vision or change in vision  No sore throat  No lymphadenopathy  No headache, paraesthesias, or weakness in a limb  No shortness of breath or wheezing  No chest pain or pressure  No arthralgias or myalgias  No rashes or skin changes  No odynophagia or dysphagia  No BRBPR, hematochezia, melena  No dysuria, frequency or urgency  No hot/cold intolerance or polyria  No anxiety or depression    Extra intestinal manifestations of IBD:  No uveitis/episcleritis  No aphthous ulcers   No arthritis   No erythema nodosum/pyoderma gangrenosum.     PERTINENT PAST MEDICAL HISTORY:  Past Medical History:   Diagnosis Date     Autoimmune hepatitis (H) 2012    on steroid taper     Cholangitis      CKD (chronic kidney disease) 2012    biopsy 2012: TIN, patchy fibrosis     Esophageal varices (H) 9/08    banded     Heart murmur      History of blood transfusion      Primary sclerosing cholangitis      Ulcerative Colitis     f/b GI       PREVIOUS SURGERIES:  Past Surgical History:   Procedure Laterality Date     COLONOSCOPY  9/07     COLONOSCOPY N/A 2/26/2015    Procedure: COMBINED COLONOSCOPY, SINGLE OR MULTIPLE BIOPSY/POLYPECTOMY BY BIOPSY;  Surgeon: Mitchel Hoskins Chi, MD;  Location:  GI     COLONOSCOPY N/A 1/12/2016    Procedure: COMBINED COLONOSCOPY, SINGLE OR MULTIPLE BIOPSY/POLYPECTOMY BY BIOPSY;  Surgeon: Rigo Valles MD;  [facility]     COLONOSCOPY N/A 4/1/2016    Procedure: COMBINED COLONOSCOPY, SINGLE OR MULTIPLE BIOPSY/POLYPECTOMY BY BIOPSY;  Surgeon: Kareem Solis MD;  Location:  GI     COLONOSCOPY N/A 9/5/2017    Procedure: COMBINED COLONOSCOPY, SINGLE OR MULTIPLE BIOPSY/POLYPECTOMY BY BIOPSY;  Colonoscopy;  Surgeon: Fuentes Johnson MD;  Location:  GI     ENDOSCOPIC RETROGRADE CHOLANGIOPANCREATOGRAM N/A 6/25/2015    Procedure: COMBINED ENDOSCOPIC RETROGRADE CHOLANGIOPANCREATOGRAPHY,  PLACE TUBE/STENT;  Surgeon: Landon Quinones MD;  [facility]     ENDOSCOPIC RETROGRADE CHOLANGIOPANCREATOGRAM N/A 6/25/2015    Procedure: COMBINED ENDOSCOPIC RETROGRADE CHOLANGIOPANCREATOGRAPHY, PLACE TUBE/STENT;  Surgeon: Landon Quinones MD;  [facility]     ENDOSCOPIC RETROGRADE CHOLANGIOPANCREATOGRAM N/A 7/2/2015    Procedure: COMBINED ENDOSCOPIC RETROGRADE CHOLANGIOPANCREATOGRAPHY, PLACE TUBE/STENT;  Surgeon: Landon Quinones MD;  [facility]     ENDOSCOPIC RETROGRADE CHOLANGIOPANCREATOGRAM N/A 9/8/2015    Procedure: COMBINED ENDOSCOPIC RETROGRADE CHOLANGIOPANCREATOGRAPHY, REMOVE FOREIGN BODY OR STENT/TUBE;  Surgeon: Landon Quinones MD;  [facility]     ENDOSCOPIC RETROGRADE CHOLANGIOPANCREATOGRAM N/A 12/8/2015    Procedure: ENDOSCOPIC RETROGRADE CHOLANGIOPANCREATOGRAM;  Surgeon: Landon Quinones MD;  [facility]     ENDOSCOPIC RETROGRADE CHOLANGIOPANCREATOGRAM N/A 3/1/2016    Procedure: COMBINED ENDOSCOPIC RETROGRADE CHOLANGIOPANCREATOGRAPHY, REMOVE FOREIGN BODY OR STENT/TUBE;  Surgeon: Landon Quinones MD;  [facility]     ENDOSCOPIC RETROGRADE CHOLANGIOPANCREATOGRAM N/A 3/20/2017    Procedure: COMBINED ENDOSCOPIC RETROGRADE CHOLANGIOPANCREATOGRAPHY, PLACE TUBE/STENT;  Endoscopic Retrograde Cholangiopancreatogram with Ballon Dilation, Stent Placement;  Surgeon: Guru Brittany Macias MD;  [facility]     ENDOSCOPIC RETROGRADE CHOLANGIOPANCREATOGRAPHY, EXCHANGE TUBE/STENT N/A 7/30/2015    Procedure: ENDOSCOPIC RETROGRADE CHOLANGIOPANCREATOGRAPHY, EXCHANGE TUBE/STENT;  Surgeon: Landon Quinones MD;  [facility]     ENDOSCOPIC RETROGRADE CHOLANGIOPANCREATOGRAPHY, EXCHANGE TUBE/STENT N/A 5/15/2017    Procedure: ENDOSCOPIC RETROGRADE CHOLANGIOPANCREATOGRAPHY, EXCHANGE TUBE/STENT;  Endoscopic Retrograde Cholangiopancreatogram with biliary stent exchange and balloon dilation;  Surgeon: Guru Brittany Macias MD;   [facility]     ESOPHAGOSCOPY, GASTROSCOPY, DUODENOSCOPY (EGD), COMBINED N/A 2/26/2015    Procedure: COMBINED ESOPHAGOSCOPY, GASTROSCOPY, DUODENOSCOPY (EGD);  Surgeon: Mitchel Hoskins Chi, MD;  [facility]     EXPLORE COMMON BILE DUCT N/A 6/25/2015    Procedure: EXPLORE COMMON BILE DUCT;  Surgeon: Tyree Smith MD;  [facility]     LAPAROTOMY EXPLORATORY N/A 6/25/2015    Procedure: LAPAROTOMY EXPLORATORY;  Surgeon: Tyree Smith MD;  [facility]     PICC INSERTION Right 2/27/2015    4fr SL Valved PICC, 36cm (1cm external) in the R medial brachial vein w/ tip in the low SVC.     SIGMOIDOSCOPY FLEXIBLE N/A 4/27/2016    Procedure: SIGMOIDOSCOPY FLEXIBLE;  Surgeon: Jose Nielson MD;  [facility]     TRANSPLANT LIVER RECIPIENT LIVING UNRELATED N/A 6/18/2015    Procedure: TRANSPLANT LIVER RECIPIENT LIVING UNRELATED;  Surgeon: Tyree Smith MD;  [facility]     UPPER GI ENDOSCOPY         ALLERGIES:     Allergies   Allergen Reactions     Rifampin Other (See Comments)     Kidney failure     Penicillins      Hives, has tolerated amoxicillin         PERTINENT MEDICATIONS:    Current Outpatient Prescriptions:      predniSONE (DELTASONE) 5 MG tablet, Take 1 tablet (5 mg) by mouth daily, Disp: 225 tablet, Rfl: 3     budesonide (UCERIS) 9 MG 24 hr tablet, Take 1 tablet (9 mg) by mouth every morning, Disp: 30 tablet, Rfl: 3     zolpidem (AMBIEN) 5 MG tablet, Take 1 tablet (5 mg) by mouth nightly as needed for sleep, Disp: 60 tablet, Rfl: 5     sulfamethoxazole-trimethoprim (BACTRIM DS/SEPTRA DS) 800-160 MG per tablet, Take 1 tablet by mouth three times a week Monday, Wednesday, Friday, Disp: 90 tablet, Rfl: 3     Vedolizumab (ENTYVIO IV), Inject 300 mg into the vein every 28 days, Disp: , Rfl:      ursodiol (ACTIGALL) 250 MG tablet, Take 2 tabs (500 mg) in AM, and 1 tab at night (250 mg), Disp: 270 tablet, Rfl: 3     tacrolimus (PROGRAF - GENERIC EQUIVALENT) 0.5 MG capsule, Take 1 capsule (0.5 mg)  "by mouth 2 times daily Take with 1 mg capsule. Total dose 2.5 mg every 12 hours, Disp: 180 capsule, Rfl: 3     tacrolimus (PROGRAF - GENERIC EQUIVALENT) 1 MG capsule, Take 2 capsules (2 mg) by mouth 2 times daily *total dose 2.5 mg BID. Take with 0.5 mg capsule, Disp: 360 capsule, Rfl: 3     GNP ASPIRIN LOW DOSE 81 MG EC tablet, Take 1 tablet (81 mg) by mouth daily, Disp: 30 tablet, Rfl: 1    SOCIAL HISTORY:  Social History     Social History     Marital status:      Spouse name: Ceferino     Number of children: 0     Years of education: N/A     Occupational History     unemployed      Social History Main Topics     Smoking status: Never Smoker     Smokeless tobacco: Never Used     Alcohol use 0.0 oz/week     0 Standard drinks or equivalent per week      Comment: Rare     Drug use: No     Sexual activity: Not Currently     Partners: Male      Comment: denies pregnancy     Other Topics Concern      Service No     Blood Transfusions No     Caffeine Concern No     Occupational Exposure No     Hobby Hazards No     Sleep Concern Yes     Stress Concern No     Weight Concern No     Special Diet No     Back Care No     Exercise No     Bike Helmet No     n/a     Seat Belt Yes     Self-Exams No     Social History Narrative       FAMILY HISTORY:  Family History   Problem Relation Age of Onset     Hypertension Mother      Lipids Mother      Hypertension Maternal Grandmother      Alzheimer Disease Maternal Grandmother      Lipids Father        Past/family/social history reviewed and no changes    PHYSICAL EXAMINATION:  Constitutional: aaox3, cooperative, pleasant, not dyspneic/diaphoretic, no acute distress  Vitals reviewed: /83 (BP Location: Left arm, Patient Position: Chair, Cuff Size: Adult Regular)  Pulse 72  Temp 98.6  F (37  C)  Ht 1.575 m (5' 2.01\")  Wt 50 kg (110 lb 4.8 oz)  SpO2 100%  BMI 20.17 kg/m2  Wt:   Wt Readings from Last 2 Encounters:   12/18/17 50 kg (110 lb 4.8 oz)   12/06/17 48.7 kg " (107 lb 4.8 oz)      Eyes: Sclera anicteric/injected  Ears/nose/mouth/throat: Normal oropharynx without ulcers or exudate, mucus membranes moist, hearing intact  Neck: supple, thyroid normal size  CV: No edema  Respiratory: Unlabored breathing  Lymph: No axillary, submandibular, supraclavicular or inguinal lymphadenopathy  Abd: Surgical abdominal scars well healed, nondistended, +bs, no hepatosplenomegaly, nontender, no peritoneal signs  Skin: warm, perfused, no jaundice  Psych: Normal affect  MSK: Normal gait      PERTINENT STUDIES:  Most recent CBC:  Recent Labs   Lab Test  11/22/17   1612  10/25/17   1400   WBC  14.4*  9.9   HGB  9.4*  10.0*   HCT  31.9*  32.6*   PLT  837*  669*     Most recent hepatic panel:  Recent Labs   Lab Test  11/22/17   1612  10/25/17   1400   ALT  28  34   AST  18  14     Most recent creatinine:  Recent Labs   Lab Test  11/22/17 1612 09/25/17   0900   CR  0.90  1.11*               Answers for HPI/ROS submitted by the patient on 12/4/2017   General Symptoms: No  Skin Symptoms: No  HENT Symptoms: No  EYE SYMPTOMS: No  HEART SYMPTOMS: No  LUNG SYMPTOMS: No  INTESTINAL SYMPTOMS: Yes  URINARY SYMPTOMS: No  GYNECOLOGIC SYMPTOMS: No  BREAST SYMPTOMS: No  SKELETAL SYMPTOMS: No  BLOOD SYMPTOMS: No  NERVOUS SYSTEM SYMPTOMS: No  MENTAL HEALTH SYMPTOMS: No  Heart burn or indigestion: No  Nausea: No  Vomiting: No  Abdominal pain: No  Bloating: No  Constipation: No  Diarrhea: Yes  Blood in stool: No  Black stools: No  Rectal or Anal pain: No  Fecal incontinence: No  Yellowing of skin or eyes: No  Vomit with blood: No  Change in stools: Yes

## 2017-12-18 NOTE — NURSING NOTE
"No chief complaint on file.      Vitals:    12/18/17 0732   BP: 116/83   BP Location: Left arm   Patient Position: Chair   Cuff Size: Adult Regular   Pulse: 72   Temp: 98.6  F (37  C)   SpO2: 100%   Weight: 110 lb 4.8 oz   Height: 5' 2.01\"       Body mass index is 20.17 kg/(m^2).      Kamini Jewell                          "

## 2017-12-18 NOTE — LETTER
12/18/2017       RE: Yarelis Penny  [address] [facility] [address]     Dear Colleague,    Thank you for referring your patient, Yarelis Penny, to the [facility] GASTROENTEROLOGY AND IBD CLINIC at [facility]. Please see a copy of my visit note below.    IBD CLINIC VISIT     CC/REFERRING MD: Daniela Enriquez  REASON FOR FOLLOW UP: PSC associated Ulcerative colitis    IBD HISTORY  Age at diagnosis: 18    Extent of disease: Colonoscopies over the years have varied with areas of involvement. Earliest colonoscopies in 2006 show right greater than left inflammation with some inflammation in the ileum as well.    Followup colonoscopy in 2007 showed mainly diffuse colonic inflammation.    Colonoscopies in 2015 again showed more chronic colitis with no significant ileal involvement.    Colonoscopies in 3/2016 again showed a right-sided and some ileal inflammation with ulceration, but biopsy showed CMV.    Colonoscopy 11/2016 showed severe inflammation from 20 cm from the anus all of the way to the ileum as far as could be evaluated.  Biopsies showed chronic active ileitis and chronic active colitis in every sample.  There was no evidence of CMV or PTLD. Rare SHENA-KACIE positive cells seen in ileum.  Colonoscopy 9/2017 showed Abad 2 inflammation in the right colon and abad one in the left colon with relative rectal sparing.  Patulous IC valve with some backwash ileitis.  Biopsies negative for lymphoma, CMP and EBV biopsies with mild  chronic active ileitis and mild chronic active colitis throughout, including rectum.    Current UC medications: Vedolizumab 400mg pq4 weeks, prednisone 5 mg  Prior UC surgeries: None  Prior IBD Medications:   Multiple rounds of prednisone  Pentasa   Asacol  Azathioprine (mainly given for liver issues at lower dose)     DRUG MONITORING  TPMT enzyme activity: (12/6/16) 18.5      6-TGN/6-MMPN levels: (12/6/16) 203/257       Biologic concentration:   4/18/17  Vedolizumab level 34.1, no ATI  5/2/17 Vedolizumab level 3.9, no ATI (decreased interval to q4 week dosing)  10/25/17 Vedolizumab level 11, no ATI (no change in therapy-- met with surgeon to discuss what surgical options might look like)       DISEASE ASSESSMENT  Labs:  Lab Results   Component Value Date    ALBUMIN 3.3 03/02/2017     Recent Labs   Lab Test  10/25/17   1400  09/25/17   0900   CRP  4.0  14.1*   SED  50*  97*     Endoscopic assessment: Colonoscopy 9/2017 showed Abad 2 inflammation in the right colon and abad one in the left colon with relative rectal sparing.  Patulous IC valve with some backwash ileitis.  Biopsies negative for lymphoma, CMP and EBV biopsies with mild  chronic active ileitis and mild chronic active colitis throughout, including rectum.  Enterography: (12/14/16) 1. Diffuse colonic wall thickening and enhancement with loss of  haustral pattern. Wall thickening and enhancement of the terminal  ileum. Findings consistent with acute on chronic changes related to  ulcerative colitis. 2. Stable 7.4 cm left adnexal cyst. A short interval follow-up  ultrasound is recommended to help determine whether this represents a  physiologic lesion. Plans to have follow up with OBGYN and repeat U/S in 6 weeks.  Fecal calprotectin: --   C diff: Negative 12/10/16  Abad score: 3/9 (12/18/17)    ASSESSMENT/PLAN  Yarelis is a pleasant 32-year-old female with a complicated past medical history including primary sclerosing cholangitis associated inflammatory bowel disease, cirrhosis, status post living donor transplant in 2015 with recent severe flare and was hospitalized 12/2016.       1. Primary sclerosing cholangitis associated inflammatory bowel disease.  Pattern of disease is very consistent with PSC associated ulcerative colitis with most recent severe flare in December 2016.  Since this time we have worked hard to get patient into adequate remission.  Despite maximum dose of vedolizumab, she still has active  inflammation as noted above.  Since this time she has been on both low-dose prednisone and Uceris.  Patient feels that since September she has had improvement in symptoms with number of bowel movements and improved energy in general.  Since patient had some return of symptoms after having a colonoscopy I think it would be reasonable to track a fecal calprotectin for now to see if this correlates with symptomatic improvement.  There is of course concern given persistent inflammation despite maximum dose of vedolizumab, and patient has met with our colorectal team to discuss surgical options, as this would likely be our next step if needed.  Since patient has had such great symptomatic improvement she is hesitant to pursue this route.    -- Continue Vedolizumab every 4 weeks  -- Continue Uceris x 1 more month for total of 3 months, then every other day for one week then stop  -- Fecal calprotectin to be obtained and trend (last one was in April and was >800 while disease was not under good control). Pending this level, consider repeating endoscopic assessment for mucosal healing given improvement in symptoms. If we are unable to achieve mucosal healing with this regimen, likely will need to explore surgical options.     2. PCP prophylaxis. Continue at this time.  Patient also follows with ID. Consider discontinuation after discontinuing Uceris.      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 The patient follows with Dr. Nails. Rituximab treatment has been completed.       4. 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 Her CMV levels were not detected that were last checked in 02/07/2017. She has completed her IV ganciclovir regimen.      5. 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 Last ERCP 5/2017 with Dr. Guru Macias. She is status post dilation and self injecting stent placement.  AXR confirmed stent passed. LFTs normal.       6. IBD healthcare maintenance based on patients current medication:  Vedolizumab  Vaccinations:  -- Influenza (every year): Last given 2016  -- TdaP (every 10 years): Last given 2016  -- Pneumococcal Pneumonia (once then every 5 years): Last given 2016     One time confirmation of immunity or serologies:  -- Hepatitis A (serologies or immunizations): 2015  -- Hepatitis B (serologies or immunizations): 2015  -- Varicella: Not documented  -- MMR:Not documented  -- HPV (all aged 18-26): As indicated  -- Meningococcal meningitis (all patients at risk for meningitis): As indicated   -- Due to the immunosuppression in this patient, I would not advise administration of live vaccines such as varicella/VZV, intranasal influenza, MMR, or yellow fever vaccine (if travelling).      Cancer Screening:  Colon cancer screening:  Given PSC type of colitis, colonoscopy every year is recommended. Dysplasia screening is recommended fall 2017.     Cervical cancer screening: Annual due to immunosupression     Skin cancer screening: Annual visual exam of skin by dermatologist since patient is immunocompromised     Depression Screening:  -- Over the last month, have you felt down, depressed, or hopeless? No  -- Over the last month, have you felt little interest or pleasure doing things? No     Misc:  -- Avoid tobacco use  -- Avoid NSAIDs as there is potentially a 25% chance of causing an IBD flare     RTC 3 months with Dr. Johnson     Thank you for this consultation. It was a pleasure to participate in the care of this patient; please contact us with any further questions.      Cresencio Sierra PA-C  Division of Gastroenterology, Hepatology and Nutrition  [facility]    HPI:   Yarelis is a pleasant 31-year-old female here today for followup regarding PSC associated IBD. Briefly, the patient's history includes history of inflammatory bowel disease, primary sclerosing cholangitis, autoimmune hepatitis and is status post living donor transplant in 2015. Her extent of disease has varied with colonoscopies  from right sided to more diffuse with variable ileal involvement. More recent colonoscopies in 2016 have again showed more right-sided ileal involvement. She was initially treated with mesalamine in 2007 and that required a steroid burst. Over the years, she stopped IBD therapy and had largely quiescent disease. She underwent a liver transplant in 2015, has been on Prograf, low-dose prednisone and azathioprine previously. Her post-transplant course was complicated by CMV colitis x2 on 01/2016 and on 04/2016, and she was treated with antiviral therapy both times (IV ganciclovir). Her post-transplant course was also complicated by primary EBV viremia and followed by the Transplant Infectious Disease team. She again had a flare of her colitis symptoms in 11/2016 and was admitted for severe colitis. Biopsies at this time were negative for CMV and had no significant EBV component. She did have associated mild CMV viremia and EBV viremia, but after extensive discussion with Pathology and Infectious Disease, her flare was not felt to be related to these viruses or PTLD. The patient was treated with IV steroids and transitioned to oral steroids. She initially did well, but in early December failed transition to p.o. steroids. She was rehospitalized and given IV steroids again. She was transitioned to a higher dose of steroids 60 mg and was discharged with vedolizumab infusion the day after discharge. She completed her IV ganciclovir course.     Today, continues with treatment with vedolizumab now every 4 weeks.  Her interval for infusions was changed due to low level as documented above.  Even with the every 4 week dosing, she still shows low drug levels, but no antibodies. She is down to 5mg of prednisone now.  Due to persisent inflammation on colonoscopy in 9/2017, she was also started on Uceris which she also continues at this time. She had a brief period of more soft mushy stools after this colonoscopy, but this  resolved.    She feels that even since 9/2017, this is the best she has ever felt.  She has 1 BM per day, formed without blood.  No urgency or tenesmus. No rashes, joint pain, eye redness/pain, or mouth sores.  She feels she has identified a few trigger foods to include red meat, spicy foods and popcorn.  Energy is much improved.    ROS:    No fevers or chills  No weight loss  No blurry vision, double vision or change in vision  No sore throat  No lymphadenopathy  No headache, paraesthesias, or weakness in a limb  No shortness of breath or wheezing  No chest pain or pressure  No arthralgias or myalgias  No rashes or skin changes  No odynophagia or dysphagia  No BRBPR, hematochezia, melena  No dysuria, frequency or urgency  No hot/cold intolerance or polyria  No anxiety or depression    Extra intestinal manifestations of IBD:  No uveitis/episcleritis  No aphthous ulcers   No arthritis   No erythema nodosum/pyoderma gangrenosum.     PERTINENT PAST MEDICAL HISTORY:  Past Medical History:   Diagnosis Date     Autoimmune hepatitis (H) 2012    on steroid taper     Cholangitis      CKD (chronic kidney disease) 2012    biopsy 2012: TIN, patchy fibrosis     Esophageal varices (H) 9/08    banded     Heart murmur      History of blood transfusion      Primary sclerosing cholangitis      Ulcerative Colitis     f/b GI       PREVIOUS SURGERIES:  Past Surgical History:   Procedure Laterality Date     COLONOSCOPY  9/07     COLONOSCOPY N/A 2/26/2015    Procedure: COMBINED COLONOSCOPY, SINGLE OR MULTIPLE BIOPSY/POLYPECTOMY BY BIOPSY;  Surgeon: Mitchel Hoskins Chi, MD;  Location:  GI     COLONOSCOPY N/A 1/12/2016    Procedure: COMBINED COLONOSCOPY, SINGLE OR MULTIPLE BIOPSY/POLYPECTOMY BY BIOPSY;  Surgeon: Rigo Valles MD;  [facility]: [facility]     COLONOSCOPY N/A 4/1/2016    Procedure: COMBINED COLONOSCOPY, SINGLE OR MULTIPLE BIOPSY/POLYPECTOMY BY BIOPSY;  Surgeon: Kareem Solis MD;  Location:  GI     COLONOSCOPY N/A  9/5/2017    Procedure: COMBINED COLONOSCOPY, SINGLE OR MULTIPLE BIOPSY/POLYPECTOMY BY BIOPSY;  Colonoscopy;  Surgeon: Fuentes Johnson MD;  [facility]     ENDOSCOPIC RETROGRADE CHOLANGIOPANCREATOGRAM N/A 6/25/2015    Procedure: COMBINED ENDOSCOPIC RETROGRADE CHOLANGIOPANCREATOGRAPHY, PLACE TUBE/STENT;  Surgeon: Landon Quinones MD;  [facility]     ENDOSCOPIC RETROGRADE CHOLANGIOPANCREATOGRAM N/A 6/25/2015    Procedure: COMBINED ENDOSCOPIC RETROGRADE CHOLANGIOPANCREATOGRAPHY, PLACE TUBE/STENT;  Surgeon: Landon Quinones MD;  [facility]     ENDOSCOPIC RETROGRADE CHOLANGIOPANCREATOGRAM N/A 7/2/2015    Procedure: COMBINED ENDOSCOPIC RETROGRADE CHOLANGIOPANCREATOGRAPHY, PLACE TUBE/STENT;  Surgeon: Landon Quinones MD;  [facility]     ENDOSCOPIC RETROGRADE CHOLANGIOPANCREATOGRAM N/A 9/8/2015    Procedure: COMBINED ENDOSCOPIC RETROGRADE CHOLANGIOPANCREATOGRAPHY, REMOVE FOREIGN BODY OR STENT/TUBE;  Surgeon: Landon Qiunones MD;  [facility]     ENDOSCOPIC RETROGRADE CHOLANGIOPANCREATOGRAM N/A 12/8/2015    Procedure: ENDOSCOPIC RETROGRADE CHOLANGIOPANCREATOGRAM;  Surgeon: Landon Quinones MD;  [facility]     ENDOSCOPIC RETROGRADE CHOLANGIOPANCREATOGRAM N/A 3/1/2016    Procedure: COMBINED ENDOSCOPIC RETROGRADE CHOLANGIOPANCREATOGRAPHY, REMOVE FOREIGN BODY OR STENT/TUBE;  Surgeon: Landon Quinones MD;  [facility]     ENDOSCOPIC RETROGRADE CHOLANGIOPANCREATOGRAM N/A 3/20/2017    Procedure: COMBINED ENDOSCOPIC RETROGRADE CHOLANGIOPANCREATOGRAPHY, PLACE TUBE/STENT;  Endoscopic Retrograde Cholangiopancreatogram with Ballon Dilation, Stent Placement;  Surgeon: Guru Brittany Macias MD;  [facility]     ENDOSCOPIC RETROGRADE CHOLANGIOPANCREATOGRAPHY, EXCHANGE TUBE/STENT N/A 7/30/2015    Procedure: ENDOSCOPIC RETROGRADE CHOLANGIOPANCREATOGRAPHY, EXCHANGE TUBE/STENT;  Surgeon: Landon Quinones MD;  [facility]     ENDOSCOPIC  RETROGRADE CHOLANGIOPANCREATOGRAPHY, EXCHANGE TUBE/STENT N/A 5/15/2017    Procedure: ENDOSCOPIC RETROGRADE CHOLANGIOPANCREATOGRAPHY, EXCHANGE TUBE/STENT;  Endoscopic Retrograde Cholangiopancreatogram with biliary stent exchange and balloon dilation;  Surgeon: Guru Brittany Macias MD;  [facility]     ESOPHAGOSCOPY, GASTROSCOPY, DUODENOSCOPY (EGD), COMBINED N/A 2/26/2015    Procedure: COMBINED ESOPHAGOSCOPY, GASTROSCOPY, DUODENOSCOPY (EGD);  Surgeon: Mitchel Hoskins Chi, MD;  [facility]     EXPLORE COMMON BILE DUCT N/A 6/25/2015    Procedure: EXPLORE COMMON BILE DUCT;  Surgeon: Tyree Smith MD;  [facility]     LAPAROTOMY EXPLORATORY N/A 6/25/2015    Procedure: LAPAROTOMY EXPLORATORY;  Surgeon: Tyree Smith MD;  [facility]     PICC INSERTION Right 2/27/2015    4fr SL Valved PICC, 36cm (1cm external) in the R medial brachial vein w/ tip in the low SVC.     SIGMOIDOSCOPY FLEXIBLE N/A 4/27/2016    Procedure: SIGMOIDOSCOPY FLEXIBLE;  Surgeon: Jose Nielson MD;  [facility]     TRANSPLANT LIVER RECIPIENT LIVING UNRELATED N/A 6/18/2015    Procedure: TRANSPLANT LIVER RECIPIENT LIVING UNRELATED;  Surgeon: Tyree Smith MD;  [facility]     UPPER GI ENDOSCOPY         ALLERGIES:     Allergies   Allergen Reactions     Rifampin Other (See Comments)     Kidney failure     Penicillins      Hives, has tolerated amoxicillin         PERTINENT MEDICATIONS:    Current Outpatient Prescriptions:      predniSONE (DELTASONE) 5 MG tablet, Take 1 tablet (5 mg) by mouth daily, Disp: 225 tablet, Rfl: 3     budesonide (UCERIS) 9 MG 24 hr tablet, Take 1 tablet (9 mg) by mouth every morning, Disp: 30 tablet, Rfl: 3     zolpidem (AMBIEN) 5 MG tablet, Take 1 tablet (5 mg) by mouth nightly as needed for sleep, Disp: 60 tablet, Rfl: 5     sulfamethoxazole-trimethoprim (BACTRIM DS/SEPTRA DS) 800-160 MG per tablet, Take 1 tablet by mouth three times a week Monday, Wednesday, Friday, Disp: 90 tablet,  Rfl: 3     Vedolizumab (ENTYVIO IV), Inject 300 mg into the vein every 28 days, Disp: , Rfl:      ursodiol (ACTIGALL) 250 MG tablet, Take 2 tabs (500 mg) in AM, and 1 tab at night (250 mg), Disp: 270 tablet, Rfl: 3     tacrolimus (PROGRAF - GENERIC EQUIVALENT) 0.5 MG capsule, Take 1 capsule (0.5 mg) by mouth 2 times daily Take with 1 mg capsule. Total dose 2.5 mg every 12 hours, Disp: 180 capsule, Rfl: 3     tacrolimus (PROGRAF - GENERIC EQUIVALENT) 1 MG capsule, Take 2 capsules (2 mg) by mouth 2 times daily *total dose 2.5 mg BID. Take with 0.5 mg capsule, Disp: 360 capsule, Rfl: 3     GNP ASPIRIN LOW DOSE 81 MG EC tablet, Take 1 tablet (81 mg) by mouth daily, Disp: 30 tablet, Rfl: 1    SOCIAL HISTORY:  Social History     Social History     Marital status:      Spouse name: Ceferino     Number of children: 0     Years of education: N/A     Occupational History     unemployed      Social History Main Topics     Smoking status: Never Smoker     Smokeless tobacco: Never Used     Alcohol use 0.0 oz/week     0 Standard drinks or equivalent per week      Comment: Rare     Drug use: No     Sexual activity: Not Currently     Partners: Male      Comment: denies pregnancy     Other Topics Concern      Service No     Blood Transfusions No     Caffeine Concern No     Occupational Exposure No     Hobby Hazards No     Sleep Concern Yes     Stress Concern No     Weight Concern No     Special Diet No     Back Care No     Exercise No     Bike Helmet No     n/a     Seat Belt Yes     Self-Exams No     Social History Narrative       FAMILY HISTORY:  Family History   Problem Relation Age of Onset     Hypertension Mother      Lipids Mother      Hypertension Maternal Grandmother      Alzheimer Disease Maternal Grandmother      Lipids Father        Past/family/social history reviewed and no changes    PHYSICAL EXAMINATION:  Constitutional: aaox3, cooperative, pleasant, not dyspneic/diaphoretic, no acute distress  Vitals  "reviewed: /83 (BP Location: Left arm, Patient Position: Chair, Cuff Size: Adult Regular)  Pulse 72  Temp 98.6  F (37  C)  Ht 1.575 m (5' 2.01\")  Wt 50 kg (110 lb 4.8 oz)  SpO2 100%  BMI 20.17 kg/m2  Wt:   Wt Readings from Last 2 Encounters:   12/18/17 50 kg (110 lb 4.8 oz)   12/06/17 48.7 kg (107 lb 4.8 oz)      Eyes: Sclera anicteric/injected  Ears/nose/mouth/throat: Normal oropharynx without ulcers or exudate, mucus membranes moist, hearing intact  Neck: supple, thyroid normal size  CV: No edema  Respiratory: Unlabored breathing  Lymph: No axillary, submandibular, supraclavicular or inguinal lymphadenopathy  Abd: Surgical abdominal scars well healed, nondistended, +bs, no hepatosplenomegaly, nontender, no peritoneal signs  Skin: warm, perfused, no jaundice  Psych: Normal affect  MSK: Normal gait      PERTINENT STUDIES:  Most recent CBC:  Recent Labs   Lab Test  11/22/17   1612  10/25/17   1400   WBC  14.4*  9.9   HGB  9.4*  10.0*   HCT  31.9*  32.6*   PLT  837*  669*     Most recent hepatic panel:  Recent Labs   Lab Test  11/22/17   1612  10/25/17   1400   ALT  28  34   AST  18  14     Most recent creatinine:  Recent Labs   Lab Test  11/22/17 1612 09/25/17   0900   CR  0.90  1.11*               Answers for HPI/ROS submitted by the patient on 12/4/2017   General Symptoms: No  Skin Symptoms: No  HENT Symptoms: No  EYE SYMPTOMS: No  HEART SYMPTOMS: No  LUNG SYMPTOMS: No  INTESTINAL SYMPTOMS: Yes  URINARY SYMPTOMS: No  GYNECOLOGIC SYMPTOMS: No  BREAST SYMPTOMS: No  SKELETAL SYMPTOMS: No  BLOOD SYMPTOMS: No  NERVOUS SYSTEM SYMPTOMS: No  MENTAL HEALTH SYMPTOMS: No  Heart burn or indigestion: No  Nausea: No  Vomiting: No  Abdominal pain: No  Bloating: No  Constipation: No  Diarrhea: Yes  Blood in stool: No  Black stools: No  Rectal or Anal pain: No  Fecal incontinence: No  Yellowing of skin or eyes: No  Vomit with blood: No  Change in stools: Yes      Again, thank you for allowing me to participate in the " care of your patient.      Sincerely,    Cresencio Sierra PA-C

## 2017-12-18 NOTE — PATIENT INSTRUCTIONS
It was a pleasure taking care of you today.  I've included a brief summary of our discussion and care plan from today's visit below.  Please review this information with your primary care provider.  ______________________________________________________________________    My recommendations are summarized as follows:    -- Continue Entyvio every 4 weeks  -- Labs with every infusion   -- Next endoscopic assessment: TBD  -- Patient with IBD we recommend supplementation vitamin D 1000 units daily and calcium 500 mg twice daily.  -- Vaccines/immunizations to be updated: up to date  -- Yearly Dermatology visit for skin check while on immunosuppressive therapy  -- Yearly pap smear while on immunosuppressive therapy    Return to GI Clinic in 3 months to review your progress.    ______________________________________________________________________    Who do I call with any questions after my visit?  Please be in touch if there are any further questions that arise following today's visit.  There are multiple ways to contact your gastroenterology care team.        During business hours, you may reach a Gastroenterology nurse at [phone], option [phone].       To schedule or reschedule an appointment, please call [phone].       You can always send a secure message through [employer].  [employer] messages are answered by your nurse or doctor typically within 24 hours.  Please allow extra time on weekends and holidays.        For urgent/emergent questions after business hours, you may reach the on-call GI Fellow by contacting the [facility] at [phone].     How will I get the results of any tests ordered?    You will receive all of your results.  If you have signed up for [employer], any tests ordered at your visit will be available to you after your physician reviews them.  Typically this takes 1-2 weeks.  If there are urgent results that require a change in your care plan, your physician or nurse will call  you to discuss the next steps.      What is [employer]?  [employer] is a secure way for you to access all of your healthcare records from the [facility].  It is a web based computer program, so you can sign on to it from any location.  It also allows you to send secure messages to your care team.  I recommend signing up for [employer] access if you have not already done so and are comfortable with using a computer.      How to I schedule a follow-up visit?  If you did not schedule a follow-up visit today, please call [phone] to schedule a follow-up office visit.        Sincerely,    Cresencio Sierra PA-C  [facility]  Division of Gastroenterology

## 2017-12-20 ENCOUNTER — INFUSION THERAPY VISIT (OUTPATIENT)
Dept: INFUSION THERAPY | Facility: CLINIC | Age: 32
End: 2017-12-20
Attending: INTERNAL MEDICINE
Payer: COMMERCIAL

## 2017-12-20 VITALS
DIASTOLIC BLOOD PRESSURE: 81 MMHG | OXYGEN SATURATION: 99 % | WEIGHT: 111.7 LBS | RESPIRATION RATE: 18 BRPM | BODY MASS INDEX: 20.42 KG/M2 | SYSTOLIC BLOOD PRESSURE: 116 MMHG | HEART RATE: 88 BPM | TEMPERATURE: 98.8 F

## 2017-12-20 DIAGNOSIS — A08.39 CMV COLITIS (H): ICD-10-CM

## 2017-12-20 DIAGNOSIS — E86.0 DEHYDRATION: Primary | ICD-10-CM

## 2017-12-20 DIAGNOSIS — Z94.4 LIVER REPLACED BY TRANSPLANT (H): ICD-10-CM

## 2017-12-20 DIAGNOSIS — B25.9 CMV COLITIS (H): ICD-10-CM

## 2017-12-20 LAB
ALBUMIN SERPL-MCNC: 3 G/DL (ref 3.4–5)
ALP SERPL-CCNC: 102 U/L (ref 40–150)
ALT SERPL W P-5'-P-CCNC: 38 U/L (ref 0–50)
ANION GAP SERPL CALCULATED.3IONS-SCNC: 9 MMOL/L (ref 3–14)
AST SERPL W P-5'-P-CCNC: 20 U/L (ref 0–45)
BILIRUB DIRECT SERPL-MCNC: <0.1 MG/DL (ref 0–0.2)
BILIRUB SERPL-MCNC: 0.2 MG/DL (ref 0.2–1.3)
BUN SERPL-MCNC: 27 MG/DL (ref 7–30)
CALCIUM SERPL-MCNC: 8.5 MG/DL (ref 8.5–10.1)
CHLORIDE SERPL-SCNC: 107 MMOL/L (ref 94–109)
CO2 SERPL-SCNC: 22 MMOL/L (ref 20–32)
CREAT SERPL-MCNC: 0.9 MG/DL (ref 0.52–1.04)
ERYTHROCYTE [DISTWIDTH] IN BLOOD BY AUTOMATED COUNT: 17.5 % (ref 10–15)
GFR SERPL CREATININE-BSD FRML MDRD: 72 ML/MIN/1.7M2
GLUCOSE SERPL-MCNC: 108 MG/DL (ref 70–99)
HCT VFR BLD AUTO: 28.1 % (ref 35–47)
HGB BLD-MCNC: 8.2 G/DL (ref 11.7–15.7)
MAGNESIUM SERPL-MCNC: 1.6 MG/DL (ref 1.6–2.3)
MCH RBC QN AUTO: 21 PG (ref 26.5–33)
MCHC RBC AUTO-ENTMCNC: 29.2 G/DL (ref 31.5–36.5)
MCV RBC AUTO: 72 FL (ref 78–100)
PHOSPHATE SERPL-MCNC: 1.9 MG/DL (ref 2.5–4.5)
PLATELET # BLD AUTO: 949 10E9/L (ref 150–450)
POTASSIUM SERPL-SCNC: 4.5 MMOL/L (ref 3.4–5.3)
PROT SERPL-MCNC: 7.8 G/DL (ref 6.8–8.8)
RBC # BLD AUTO: 3.9 10E12/L (ref 3.8–5.2)
SODIUM SERPL-SCNC: 138 MMOL/L (ref 133–144)
WBC # BLD AUTO: 13.4 10E9/L (ref 4–11)

## 2017-12-20 PROCEDURE — 85027 COMPLETE CBC AUTOMATED: CPT | Performed by: SURGERY

## 2017-12-20 PROCEDURE — 96413 CHEMO IV INFUSION 1 HR: CPT

## 2017-12-20 PROCEDURE — 83735 ASSAY OF MAGNESIUM: CPT | Performed by: SURGERY

## 2017-12-20 PROCEDURE — 80076 HEPATIC FUNCTION PANEL: CPT | Performed by: SURGERY

## 2017-12-20 PROCEDURE — 80048 BASIC METABOLIC PNL TOTAL CA: CPT | Performed by: SURGERY

## 2017-12-20 PROCEDURE — 84100 ASSAY OF PHOSPHORUS: CPT | Performed by: SURGERY

## 2017-12-20 PROCEDURE — 25000128 H RX IP 250 OP 636: Mod: ZF | Performed by: INTERNAL MEDICINE

## 2017-12-20 RX ADMIN — VEDOLIZUMAB 300 MG: 300 INJECTION, POWDER, LYOPHILIZED, FOR SOLUTION INTRAVENOUS at 13:38

## 2017-12-20 NOTE — MR AVS SNAPSHOT
After Visit Summary   12/20/2017    Yarelis Penny    MRN: [id number]           Patient Information     Date Of Birth          1985        Visit Information        Provider Department      12/20/2017 1:00 PM UC 41 ATC; UC SPEC INFUSION [facility]        Today's Diagnoses     Dehydration    -  1    CMV colitis (H)        Liver replaced by transplant (H)          Care Instructions    Dear Yarelis Penny    Thank you for choosing [facility] ([facility]) for your infusion.  The following information is a summary of our appointment as well as important reminders.      Additional information: Today you had Entyvio 300 mg.    EDUCATION POST BIOLOGICAL/CHEMOTHERAPY INFUSION  Call the triage nurse at your clinic or seek medical attention if you have chills and/or temperature greater than or equal to 100.5, uncontrolled nausea/vomiting, diarrhea, constipation, dizziness, shortness of breath, chest pain, heart palpitations, weakness or any other new or concerning symptoms, questions or concerns.  You can not have any live virus vaccines prior to or during treatment or up to 6 months post infusion.  If you have an upcoming surgery, medical procedure or dental procedure during treatment, this should be discussed with your ordering physician and your surgeon/dentist.  If you are having any concerning symptom, if you are unsure if you should get your next infusion or wish to speak to a provider before your next infusion, please call your care coordinator or triage nurse at your clinic to notify them so we can adequately serve you.    We look forward in seeing you on your next appointment here at [facility].  Please don t hesitate to call us at [phone] to reschedule any of your appointments or to speak with one of the [facility] registered nurses.  It was a pleasure taking care of you today.    Sincerely,    [facility]  [place] [facility]  [facility]  [address] [place] [address]  [address]  Phone:  [phone]          Follow-ups after your visit        Your next 10 appointments already scheduled     Dec 22, 2017 10:20 AM CST   (Arrive by 10:05 AM)   CT CHEST W/O CONTRAST with UCCT2   [facility] CT ([facility])    [address] [place] [address] [phone]           Please bring any scans or X-rays taken at other hospitals, if similar tests were done. Also bring a list of your medicines, including vitamins, minerals and over-the-counter drugs. It is safest to leave personal items at home.  Be sure to tell your doctor:   If you have any allergies.   If there s any chance you are pregnant.   If you are breastfeeding.   If you have any special needs.  You do not need to do anything special to prepare.  Please wear loose clothing, such as a sweat suit or jogging clothes. Avoid snaps, zippers and other metal. We may ask you to undress and put on a hospital gown.            Dec 22, 2017 10:40 AM CST   (Arrive by 10:25 AM)   CT ABDOMEN PELVIS W/O & W CONTRAST with UCCT2   [facility] CT ([facility])    [address] [address] [phone]           Please bring any scans or X-rays taken at other hospitals, if similar tests were done. Also bring a list of your medicines, including vitamins, minerals and over-the-counter drugs. It is safest to leave personal items at home.  Be sure to tell your doctor:   If you have any allergies.   If there s any chance you are pregnant.   If you are breastfeeding.   If you have any special needs.  You may have contrast for this exam. To prepare:   Do not eat or drink for 2 hours before your exam. If you need to take medicine, you may take it with small sips of water. (We may ask you to take liquid medicine as well.)   The  day before your exam, drink extra fluids at least six 8-ounce glasses (unless your doctor tells you to restrict your fluids).  Patients over 70 or patients with diabetes or kidney problems:   If you haven t had a blood test (creatinine test) within the last 30 days, go to your clinic or Diagnostic Imaging Department for this test.  If you have diabetes:   If your kidney function is normal, continue taking your metformin (Avandamet, Glucophage, Glucovance, Metaglip) on the day of your exam.   If your kidney function is abnormal, wait 48 hours before restarting this medicine.  You will have oral contrast for this exam:   You will drink the contrast at home. Get this from your clinic or Diagnostic Imaging Department. Please follow the directions given.  Please wear loose clothing, such as a sweat suit or jogging clothes. Avoid snaps, zippers and other metal. We may ask you to undress and put on a hospital gown.  If you have any questions, please call the Imaging Department where you will have your exam.            Jan 18, 2018  9:00 AM CST   Infusion 180 with UC SPEC INFUSION, UC 47 ATC   [facility] Specialty and Procedure ([facility])    [address] [place] [address]   [phone]            Feb 15, 2018  9:00 AM CST   Infusion 180 with UC SPEC INFUSION, UC 47 ATC   [facility] Specialty and Procedure ([facility])    [address] [place] [address]   [phone]            Mar 15, 2018  9:00 AM CDT   Infusion 180 with UC SPEC INFUSION, UC 47 ATC   [facility] Specialty and Procedure ([facility])    [address] [place] [address]   [phone]            Dec 05, 2018  8:00 AM CST   (Arrive by 7:45 AM)   Return Visit with Joya Lopez MD   [facility] and Infectious  Diseases ([facility])    [address] [facility]  3rd Floor  [facility] [address] [phone]              Who to contact     If you have questions or need follow up information about today's clinic visit or your schedule please contact [facility] [place] PROCED[facility] directly at [phone].  Normal or non-critical lab and imaging results will be communicated to you by MyChart, letter or phone within 4 business days after the clinic has received the results. If you do not hear from us within 7 days, please contact the clinic through [employer] or phone. If you have a critical or abnormal lab result, we will notify you by phone as soon as possible.  Submit refill requests through [employer] or call your pharmacy and they will forward the refill request to us. Please allow 3 business days for your refill to be completed.          Additional Information About Your Visit        [employer] Information     [employer] gives you secure access to your electronic health record. If you see a primary care provider, you can also send messages to your care team and make appointments. If you have questions, please call your primary care clinic.  If you do not have a primary care provider, please call [phone] and they will assist you.        Care EveryWhere ID     This is your Care EveryWhere ID. This could be used by other organizations to access your [place] medical records  [id number]        Your Vitals Were     Pulse Temperature Respirations Pulse Oximetry BMI (Body Mass Index)       88 98.8  F (37.1  C) 18 99% 20.42 kg/m2        Blood Pressure from Last 3 Encounters:   12/20/17 116/81   12/18/17 116/83   12/06/17 111/73    Weight from Last 3 Encounters:   12/20/17 50.7 kg (111 lb 11.2 oz)   12/18/17 50 kg (110 lb 4.8 oz)   12/06/17 48.7 kg (107 lb 4.8 oz)              We Performed the Following     Basic metabolic panel     CBC with platelets     Hepatic panel      Magnesium     Phosphorus        Primary Care Provider Office Phone # Fax #    Jacksonmira Orellana [phone]       [place] [address] YENIFER LAMA [address] [place] [address]        Equal Access to Services     ERI CHAKRABORTY : Yandel hsieh hailey Soyari, waaxda luqadaha, qaybta kaalmada adeegyada, mani muñoz brittanyshannon keita kameron lozano. So [facility] [phone].    ATENCIÓN: Si habla español, tiene a borja disposición servicios gratuitos de asistencia lingüística. [facility] [phone].    We comply with applicable federal civil rights laws and [place] laws. We do not discriminate on the basis of race, color, national origin, age, disability, sex, sexual orientation, or gender identity.            Thank you!     Thank you for choosing [facility] SPECIALTY AND PROCEDURE  for your care. Our goal is always to provide you with excellent care. Hearing back from our patients is one way we can continue to improve our services. Please take a few minutes to complete the written survey that you may receive in the mail after your visit with us. Thank you!             Your Updated Medication List - Protect others around you: Learn how to safely use, store and throw away your medicines at www.disposemymeds.org.          This list is accurate as of: 12/20/17  2:15 PM.  Always use your most recent med list.                   Brand Name Dispense Instructions for use Diagnosis    budesonide 9 MG 24 hr tablet    UCERIS    30 tablet    Take 1 tablet (9 mg) by mouth every morning    UC (ulcerative colitis) (H)       ENTYVIO IV      Inject 300 mg into the vein every 28 days        GNP ASPIRIN LOW DOSE 81 MG EC tablet   Generic drug:  aspirin     30 tablet    Take 1 tablet (81 mg) by mouth daily    Ulcerative colitis with other complication, unspecified location (H), Liver replaced by transplant (H)       predniSONE 5 MG tablet    DELTASONE    225 tablet    Take 1 tablet (5 mg) by mouth daily     UC (ulcerative colitis) (H), Liver replaced by transplant (H)       sulfamethoxazole-trimethoprim 800-160 MG per tablet    BACTRIM DS/SEPTRA DS    90 tablet    Take 1 tablet by mouth three times a week Monday, Wednesday, Friday    Immunosuppression (H), Inflammatory bowel disease, PSC (primary sclerosing cholangitis)       * tacrolimus 0.5 MG capsule    GENERIC EQUIVALENT    180 capsule    Take 1 capsule (0.5 mg) by mouth 2 times daily Take with 1 mg capsule. Total dose 2.5 mg every 12 hours    S/P liver transplant (H)       * tacrolimus 1 MG capsule    GENERIC EQUIVALENT    360 capsule    Take 2 capsules (2 mg) by mouth 2 times daily *total dose 2.5 mg BID. Take with 0.5 mg capsule    S/P liver transplant (H)       ursodiol 250 MG tablet    ACTIGALL    270 tablet    Take 2 tabs (500 mg) in AM, and 1 tab at night (250 mg)    Liver replaced by transplant (H)       zolpidem 5 MG tablet    AMBIEN    60 tablet    Take 1 tablet (5 mg) by mouth nightly as needed for sleep        * Notice:  This list has 2 medication(s) that are the same as other medications prescribed for you. Read the directions carefully, and ask your doctor or other care provider to review them with you.

## 2017-12-20 NOTE — PROGRESS NOTES
Nursing Note  [facility]arelis Penny [facility] for:   Chief Complaint   Patient presents with     Infusion     Entyvio     During today's [facility] appointment, orders from Dr. Johnson were completed.  Frequency:  Every 4 weeks    Progress note:  Patient identification verified by name and date of birth.  Assessment completed.  Vitals recorded in Doc Flowsheets.  Patient was provided with education regarding infusion and possible side effects.  Patient verbalized understanding.      needed: No  Premedications: were not ordered.  Infusion Rates: infusion given over approximately 30 mins.  Approximate Infusion length:1 hours.   Labs: were drawn per orders.   Vascular access: peripheral IV placed today.  Treatment Conditions: see below  Patient tolerated infusion: well.    ~~~ NOTE: If the patient answers yes to any of the questions below, hold the infusion and contact ordering provider or on-call provider.    1. Have you recently had an elevated temperature, fever, chills, productive cough, coughing for 3 weeks or longer or hemoptysis,  abnormal vital signs, night sweats,  chest pain or have you noticed a decrease in your appetite, unexplained weight loss or fatigue? No  2. Do you have any open wounds or new incisions? No  3. Do you have any recent or upcoming hospitalizations, surgeries or dental procedures? No  4. Do you currently have or recently have had any signs of illness or infection or are you on any antibiotics? No  5. Have you had any new, sudden or worsening abdominal pain? No  6. Have you or anyone in your household received a live vaccination in the past 4 weeks? Please note:  No live vaccines while on biologic/chemotherapy until 6 months after the last treatment.  Patient can receive the flu vaccine (shot only) and the pneumovax.  It is optimal for the patient to get these vaccines mid cycle, but they can be given at any time  as long as it is not on the day of the infusion. No  7. Have you recently been diagnosed with any new nervous system diseases (ie. Multiple sclerosis, Guillain [place], seizures, neurological changes) or cancer diagnosis? Are you on any form of radiation or chemotherapy? No  8. Are you pregnant or breast feeding or do you have plans of pregnancy in the future? No  9. Have you been having any signs of worsening depression or suicidal ideations?  (benlysta only) No  10. Have there been any other new onset medical symptoms? No    Discharge Plan:   Follow up plan of care with: ongoing infusions at [facility].  Discharge instructions were reviewed with patient.  Patient/representative verbalized understanding of discharge instructions and all questions answered.  Patient discharged from [facility] in stable condition.    GAUTAM BLAIR RN        /76  Pulse 90  Temp 98.8  F (37.1  C)  Resp 18  Wt 50.7 kg (111 lb 11.2 oz)  SpO2 99%  BMI 20.42 kg/m2

## 2017-12-20 NOTE — PATIENT INSTRUCTIONS
Dear Yarelis Penny    Thank you for choosing [facility] ([facility]) for your infusion.  The following information is a summary of our appointment as well as important reminders.      Additional information: Today you had Entyvio 300 mg.    EDUCATION POST BIOLOGICAL/CHEMOTHERAPY INFUSION  Call the triage nurse at your clinic or seek medical attention if you have chills and/or temperature greater than or equal to 100.5, uncontrolled nausea/vomiting, diarrhea, constipation, dizziness, shortness of breath, chest pain, heart palpitations, weakness or any other new or concerning symptoms, questions or concerns.  You can not have any live virus vaccines prior to or during treatment or up to 6 months post infusion.  If you have an upcoming surgery, medical procedure or dental procedure during treatment, this should be discussed with your ordering physician and your surgeon/dentist.  If you are having any concerning symptom, if you are unsure if you should get your next infusion or wish to speak to a provider before your next infusion, please call your care coordinator or triage nurse at your clinic to notify them so we can adequately serve you.    We look forward in seeing you on your next appointment here at [facility].  Please don t hesitate to call us at [phone] to reschedule any of your appointments or to speak with one of the [facility] registered nurses.  It was a pleasure taking care of you today.    Sincerely,    [facility]  Specialty Infusion [facility] Procedure [facility]  [address] [place] [address]  [address]  Phone:  [phone]

## 2018-01-04 ENCOUNTER — TELEPHONE (OUTPATIENT)
Dept: TRANSPLANT | Facility: CLINIC | Age: 33
End: 2018-01-04

## 2018-01-04 DIAGNOSIS — Z94.4 S/P LIVER TRANSPLANT (H): ICD-10-CM

## 2018-01-04 RX ORDER — TACROLIMUS 0.5 MG/1
0.5 CAPSULE ORAL 2 TIMES DAILY
Qty: 180 CAPSULE | Refills: 3 | Status: SHIPPED | OUTPATIENT
Start: 2018-01-04 | End: 2018-02-28

## 2018-01-04 RX ORDER — TACROLIMUS 0.5 MG/1
0.5 CAPSULE ORAL 2 TIMES DAILY
Qty: 60 CAPSULE | Refills: 0 | Status: SHIPPED | OUTPATIENT
Start: 2018-01-04 | End: 2018-01-04

## 2018-01-04 RX ORDER — TACROLIMUS 1 MG/1
2 CAPSULE ORAL 2 TIMES DAILY
Qty: 120 CAPSULE | Refills: 0 | Status: SHIPPED | OUTPATIENT
Start: 2018-01-04 | End: 2018-01-04

## 2018-01-04 RX ORDER — TACROLIMUS 1 MG/1
2 CAPSULE ORAL 2 TIMES DAILY
Qty: 360 CAPSULE | Refills: 3 | Status: SHIPPED | OUTPATIENT
Start: 2018-01-04 | End: 2018-02-28

## 2018-01-04 NOTE — TELEPHONE ENCOUNTER
Pt called to say that she needs tacrolimus tonight, but patient's insurance will not fill with past pharmacy and [facility], but will not be able to get med. Patient prefers to fill at our [place] pharmacy for one month and then [facility] scripts [facility].

## 2018-01-18 ENCOUNTER — INFUSION THERAPY VISIT (OUTPATIENT)
Dept: INFUSION THERAPY | Facility: CLINIC | Age: 33
End: 2018-01-18
Attending: INTERNAL MEDICINE
Payer: COMMERCIAL

## 2018-01-18 VITALS
SYSTOLIC BLOOD PRESSURE: 120 MMHG | OXYGEN SATURATION: 98 % | TEMPERATURE: 98.5 F | RESPIRATION RATE: 16 BRPM | DIASTOLIC BLOOD PRESSURE: 82 MMHG | HEART RATE: 85 BPM

## 2018-01-18 DIAGNOSIS — Z94.4 LIVER REPLACED BY TRANSPLANT (H): ICD-10-CM

## 2018-01-18 DIAGNOSIS — B25.9 CMV COLITIS (H): ICD-10-CM

## 2018-01-18 DIAGNOSIS — E86.0 DEHYDRATION: Primary | ICD-10-CM

## 2018-01-18 DIAGNOSIS — A08.39 CMV COLITIS (H): ICD-10-CM

## 2018-01-18 LAB
ALBUMIN SERPL-MCNC: 2.6 G/DL (ref 3.4–5)
ALP SERPL-CCNC: 88 U/L (ref 40–150)
ALT SERPL W P-5'-P-CCNC: 30 U/L (ref 0–50)
ANION GAP SERPL CALCULATED.3IONS-SCNC: 8 MMOL/L (ref 3–14)
AST SERPL W P-5'-P-CCNC: 14 U/L (ref 0–45)
BILIRUB DIRECT SERPL-MCNC: <0.1 MG/DL (ref 0–0.2)
BILIRUB SERPL-MCNC: 0.3 MG/DL (ref 0.2–1.3)
BUN SERPL-MCNC: 18 MG/DL (ref 7–30)
CALCIUM SERPL-MCNC: 7.9 MG/DL (ref 8.5–10.1)
CHLORIDE SERPL-SCNC: 111 MMOL/L (ref 94–109)
CO2 SERPL-SCNC: 22 MMOL/L (ref 20–32)
CREAT SERPL-MCNC: 0.95 MG/DL (ref 0.52–1.04)
ERYTHROCYTE [DISTWIDTH] IN BLOOD BY AUTOMATED COUNT: 21.6 % (ref 10–15)
GFR SERPL CREATININE-BSD FRML MDRD: 68 ML/MIN/1.7M2
GLUCOSE SERPL-MCNC: 78 MG/DL (ref 70–99)
HCT VFR BLD AUTO: 30 % (ref 35–47)
HGB BLD-MCNC: 8.6 G/DL (ref 11.7–15.7)
MAGNESIUM SERPL-MCNC: 1.2 MG/DL (ref 1.6–2.3)
MCH RBC QN AUTO: 20.4 PG (ref 26.5–33)
MCHC RBC AUTO-ENTMCNC: 28.7 G/DL (ref 31.5–36.5)
MCV RBC AUTO: 71 FL (ref 78–100)
PHOSPHATE SERPL-MCNC: 3 MG/DL (ref 2.5–4.5)
PLATELET # BLD AUTO: 1220 10E9/L (ref 150–450)
POTASSIUM SERPL-SCNC: 3.8 MMOL/L (ref 3.4–5.3)
PROT SERPL-MCNC: 6.7 G/DL (ref 6.8–8.8)
RBC # BLD AUTO: 4.21 10E12/L (ref 3.8–5.2)
SODIUM SERPL-SCNC: 141 MMOL/L (ref 133–144)
TACROLIMUS BLD-MCNC: 5.5 UG/L (ref 5–15)
TME LAST DOSE: NORMAL H
WBC # BLD AUTO: 15.4 10E9/L (ref 4–11)

## 2018-01-18 PROCEDURE — 80048 BASIC METABOLIC PNL TOTAL CA: CPT | Performed by: SURGERY

## 2018-01-18 PROCEDURE — 83735 ASSAY OF MAGNESIUM: CPT | Performed by: SURGERY

## 2018-01-18 PROCEDURE — 80197 ASSAY OF TACROLIMUS: CPT | Performed by: SURGERY

## 2018-01-18 PROCEDURE — 84100 ASSAY OF PHOSPHORUS: CPT | Performed by: SURGERY

## 2018-01-18 PROCEDURE — 96413 CHEMO IV INFUSION 1 HR: CPT

## 2018-01-18 PROCEDURE — 85027 COMPLETE CBC AUTOMATED: CPT | Performed by: SURGERY

## 2018-01-18 PROCEDURE — 25000128 H RX IP 250 OP 636: Mod: ZF | Performed by: INTERNAL MEDICINE

## 2018-01-18 PROCEDURE — 80076 HEPATIC FUNCTION PANEL: CPT | Performed by: SURGERY

## 2018-01-18 RX ADMIN — VEDOLIZUMAB 300 MG: 300 INJECTION, POWDER, LYOPHILIZED, FOR SOLUTION INTRAVENOUS at 09:55

## 2018-01-18 NOTE — MR AVS SNAPSHOT
After Visit Summary   1/18/2018    Yarelis Penny    MRN: [id number]           Patient Information     Date Of Birth          1985        Visit Information        Provider Department      1/18/2018 9:00 AM UC 47 ATC; UC SPEC INFUSION [facility] Special[facility] Procedure        Today's Diagnoses     Dehydration    -  1    CMV colitis (H)        Liver replaced by transplant (H)          Care Instructions    Dear Yarelis Penny    Thank you for choosing [facility] ([facility]) for your infusion.  The following information is a summary of our appointment as well as important reminders.      EDUCATION POST BIOLOGICAL/CHEMOTHERAPY INFUSION  Call the triage nurse at your clinic or seek medical attention if you have chills and/or temperature greater than or equal to 100.5, uncontrolled nausea/vomiting, diarrhea, constipation, dizziness, shortness of breath, chest pain, heart palpitations, weakness or any other new or concerning symptoms, questions or concerns.  You can not have any live virus vaccines prior to or during treatment or up to 6 months post infusion.  If you have an upcoming surgery, medical procedure or dental procedure during treatment, this should be discussed with your ordering physician and your surgeon/dentist.  If you are having any concerning symptom, if you are unsure if you should get your next infusion or wish to speak to a provider before your next infusion, please call your care coordinator or triage nurse at your clinic to notify them so we can adequately serve you.      Additional information: you received an infusion of Entyvio via IV today.       We look forward in seeing you on your next appointment here at [facility].  Please don t hesitate to call us at [phone] to reschedule any of your appointments or to speak with one of the [facility] registered nurses.  It was a pleasure taking care of you  today.    Sincerely,    [facility]  Specialty Infusion & Procedure Center  [address] [place] [address]  [address]  Phone:  [phone]      Vedolizumab Solution for injection  What is this medicine?  VEDOLIZUMAB (Ve cornelius JOSHUA you mab) is used to treat ulcerative colitis and Crohn's disease in adult patients.  This medicine may be used for other purposes; ask your health care provider or pharmacist if you have questions.  What should I tell my health care provider before I take this medicine?  They need to know if you have any of these conditions:    diabetes    hepatitis B or history of hepatitis B infection    HIV or AIDS    immune system problems    infection or history of infections    liver disease    recently received or scheduled to receive a vaccine    scheduled to have surgery    tuberculosis, a positive skin test for tuberculosis or have recently been in close contact with someone who has tuberculosis    an unusual or allergic reaction to vedolizumab, other medicines, foods, dyes, or preservatives    pregnant or trying to get pregnant    breast-feeding  How should I use this medicine?  This medicine is for infusion into a vein. It is given by a health care professional in a hospital or clinic setting.  A special MedGuide will be given to you by the pharmacist with each prescription and refill. Be sure to read this information carefully each time.  Talk to your pediatrician regarding the use of this medicine in children. This medicine is not approved for use in children.  Overdosage: If you think you've taken too much of this medicine contact a poison control center or emergency room at once.  NOTE: This medicine is only for you. Do not share this medicine with others.  What if I miss a dose?  It is important not to miss your dose. Call your doctor or health care professional if you are unable to keep an appointment.  What may interact with this medicine?    steroid  medicines like prednisone or cortisone    TNF-alpha inhibitors like natalizumab, adalimumab, and infliximab    vaccines  This list may not describe all possible interactions. Give your health care provider a list of all the medicines, herbs, non-prescription drugs, or dietary supplements you use. Also tell them if you smoke, drink alcohol, or use illegal drugs. Some items may interact with your medicine.  What should I watch for while using this medicine?  Your condition will be monitored carefully while you are receiving this medicine. Visit your doctor for regular check ups. Tell your doctor or healthcare professional if your symptoms do not start to get better or if they get worse.  Stay away from people who are sick. Call your doctor or health care professional for advice if you get a fever, chills or sore throat, or other symptoms of a cold or flu. Do not treat yourself.  In some patients, this medicine may cause a serious brain infection that may cause death. If you have any problems seeing, thinking, speaking, walking, or standing, tell your doctor right away. If you cannot reach your doctor, get urgent medical care.  What side effects may I notice from receiving this medicine?  Side effects that you should report to your doctor or health care professional as soon as possible:    allergic reactions like skin rash, itching or hives, swelling of the face, lips, or tongue    breathing problems    changes in vision    chest pain    dark urine    depression, feelings of sadness    dizziness    general ill feeling or flu-like symptoms    irregular, missed, or painful menstrual periods    light-colored stools    loss of appetite, nausea    muscle weakness    problems with balance, talking, or walking    right upper belly pain    unusually weak or tired    yellowing of the eyes or skin  Side effects that usually do not require medical attention (Report these to your doctor or health care professional if they continue  or are bothersome.):    aches, pains    headache    stomach upset    tiredness  This list may not describe all possible side effects. Call your doctor for medical advice about side effects. You may report side effects to FDA at 1-402-CSL-0907.  Where should I keep my medicine?  This drug is given in a hospital or clinic and will not be stored at home.  NOTE: This sheet is a summary. It may not cover all possible information. If you have questions about this medicine, talk to your doctor, pharmacist, or health care provider.  NOTE:This sheet is a summary. It may not cover all possible information. If you have questions about this medicine, talk to your doctor, pharmacist, or health care provider. Copyright  2016 [employer] Standard                  Follow-ups after your visit        Your next 10 appointments already scheduled     Feb 15, 2018  9:00 AM CST   Infusion 180 with UC SPEC INFUSION, UC 47 ATC   [facility] Specialty and Procedure ([facility])    [address]  [address]  [facility] [address] [phone]            Mar 15, 2018  9:00 AM CDT   Infusion 180 with UC SPEC INFUSION, UC 47 ATC   [facility] Specialty and Procedure ([facility])    [address]  [address]  [facility] [address] [phone]            Dec 05, 2018  8:00 AM CST   (Arrive by 7:45 AM)   Return Visit with Joya Lopez MD   [facility] and Infectious Diseases ([facility])    [address]  [address]  [facility] [id number] [phone]              Who to contact     If you have questions or need follow up information about today's clinic visit or your schedule please contact [facility] SPECIALTY AND PROCEDURE directly at [phone].  Normal or non-critical lab and imaging results will be communicated to you by MyChart, letter or phone  within 4 business days after the clinic has received the results. If you do not hear from us within 7 days, please contact the clinic through [employer] or phone. If you have a critical or abnormal lab result, we will notify you by phone as soon as possible.  Submit refill requests through [employer] or call your pharmacy and they will forward the refill request to us. Please allow 3 business days for your refill to be completed.          Additional Information About Your Visit        [employer] Information     [employer] gives you secure access to your electronic health record. If you see a primary care provider, you can also send messages to your care team and make appointments. If you have questions, please call your primary care clinic.  If you do not have a primary care provider, please call [phone] and they will assist you.        Care EveryWhere ID     This is your Care EveryWhere ID. This could be used by other organizations to access your [facility] medical records  [id number]        Your Vitals Were     Pulse Temperature Respirations Pulse Oximetry          85 98.5  F (36.9  C) (Oral) 16 98%         Blood Pressure from Last 3 Encounters:   01/18/18 120/82   12/20/17 116/81   12/18/17 116/83    Weight from Last 3 Encounters:   12/20/17 50.7 kg (111 lb 11.2 oz)   12/18/17 50 kg (110 lb 4.8 oz)   12/06/17 48.7 kg (107 lb 4.8 oz)              We Performed the Following     Basic metabolic panel     CBC with platelets     Hepatic panel     Magnesium     Phosphorus     Tacrolimus level        Primary Care Provider Office Phone # Fax #    Jacksonmira Orellana [phone]       [facility] [address] [facility] [address]  [facility] [address]        Equal Access to Services     [facility]: Hadii venancio Palma, waaxda luqadaha, qaybta kaalmada pantera, mani lozano. So [facility] [phone].    ATENCIÓN: Si habla español, tiene a borja disposición servicios gratuitos de  asistencia lingüística. Clyde al [phone].    We comply with applicable federal civil rights laws and [place] laws. We do not discriminate on the basis of race, color, national origin, age, disability, sex, sexual orientation, or gender identity.            Thank you!     Thank you for choosing [facility] SPECIALTY AND PROCEDURE  for your care. Our goal is always to provide you with excellent care. Hearing back from our patients is one way we can continue to improve our services. Please take a few minutes to complete the written survey that you may receive in the mail after your visit with us. Thank you!             Your Updated Medication List - Protect others around you: Learn how to safely use, store and throw away your medicines at www.disposemymeds.org.          This list is accurate as of: 1/18/18 10:40 AM.  Always use your most recent med list.                   Brand Name Dispense Instructions for use Diagnosis    budesonide 9 MG 24 hr tablet    UCERIS    30 tablet    Take 1 tablet (9 mg) by mouth every morning    UC (ulcerative colitis) (H)       ENTYVIO IV      Inject 300 mg into the vein every 28 days        GNP ASPIRIN LOW DOSE 81 MG EC tablet   Generic drug:  aspirin     30 tablet    Take 1 tablet (81 mg) by mouth daily    Ulcerative colitis with other complication, unspecified location (H), Liver replaced by transplant (H)       predniSONE 5 MG tablet    DELTASONE    225 tablet    Take 1 tablet (5 mg) by mouth daily    UC (ulcerative colitis) (H), Liver replaced by transplant (H)       sulfamethoxazole-trimethoprim 800-160 MG per tablet    BACTRIM DS/SEPTRA DS    90 tablet    Take 1 tablet by mouth three times a week Monday, Wednesday, Friday    Immunosuppression (H), Inflammatory bowel disease, PSC (primary sclerosing cholangitis)       * tacrolimus 1 MG capsule    GENERIC EQUIVALENT    360 capsule    Take 2 capsules (2 mg) by mouth 2 times daily *total dose 2.5 mg BID.  Take with 0.5 mg capsule    S/P liver transplant (H)       * tacrolimus 0.5 MG capsule    GENERIC EQUIVALENT    180 capsule    Take 1 capsule (0.5 mg) by mouth 2 times daily Take with 1 mg capsule. Total dose 2.5 mg every 12 hours    S/P liver transplant (H)       ursodiol 250 MG tablet    ACTIGALL    270 tablet    Take 2 tabs (500 mg) in AM, and 1 tab at night (250 mg)    Liver replaced by transplant (H)       zolpidem 5 MG tablet    AMBIEN    60 tablet    Take 1 tablet (5 mg) by mouth nightly as needed for sleep        * Notice:  This list has 2 medication(s) that are the same as other medications prescribed for you. Read the directions carefully, and ask your doctor or other care provider to review them with you.

## 2018-01-18 NOTE — PATIENT INSTRUCTIONS
Dear Yarelis Penny    Thank you for choosing [facility] Specialty Infusion and Procedure Center ([facility]) for your infusion.  The following information is a summary of our appointment as well as important reminders.      EDUCATION POST BIOLOGICAL/CHEMOTHERAPY INFUSION  Call the triage nurse at your clinic or seek medical attention if you have chills and/or temperature greater than or equal to 100.5, uncontrolled nausea/vomiting, diarrhea, constipation, dizziness, shortness of breath, chest pain, heart palpitations, weakness or any other new or concerning symptoms, questions or concerns.  You can not have any live virus vaccines prior to or during treatment or up to 6 months post infusion.  If you have an upcoming surgery, medical procedure or dental procedure during treatment, this should be discussed with your ordering physician and your surgeon/dentist.  If you are having any concerning symptom, if you are unsure if you should get your next infusion or wish to speak to a provider before your next infusion, please call your care coordinator or triage nurse at your clinic to notify them so we can adequately serve you.      Additional information: you received an infusion of Entyvio via IV today.       We look forward in seeing you on your next appointment here at [facility].  Please don t hesitate to call us at [phone] to reschedule any of your appointments or to speak with one of the [facility] registered nurses.  It was a pleasure taking care of you today.    Sincerely,    [facility]  Specialty Infusion & Procedure Center  [address] [place] [address]  [address]  Phone:  [phone]      Vedolizumab Solution for injection  What is this medicine?  VEDOLIZUMAB (Ve cornelius JOSHUA you mab) is used to treat ulcerative colitis and Crohn's disease in adult patients.  This medicine may be used for other purposes; ask your health care provider or pharmacist if you have  questions.  What should I tell my health care provider before I take this medicine?  They need to know if you have any of these conditions:    diabetes    hepatitis B or history of hepatitis B infection    HIV or AIDS    immune system problems    infection or history of infections    liver disease    recently received or scheduled to receive a vaccine    scheduled to have surgery    tuberculosis, a positive skin test for tuberculosis or have recently been in close contact with someone who has tuberculosis    an unusual or allergic reaction to vedolizumab, other medicines, foods, dyes, or preservatives    pregnant or trying to get pregnant    breast-feeding  How should I use this medicine?  This medicine is for infusion into a vein. It is given by a health care professional in a hospital or clinic setting.  A special MedGuide will be given to you by the pharmacist with each prescription and refill. Be sure to read this information carefully each time.  Talk to your pediatrician regarding the use of this medicine in children. This medicine is not approved for use in children.  Overdosage: If you think you've taken too much of this medicine contact a poison control center or emergency room at once.  NOTE: This medicine is only for you. Do not share this medicine with others.  What if I miss a dose?  It is important not to miss your dose. Call your doctor or health care professional if you are unable to keep an appointment.  What may interact with this medicine?    steroid medicines like prednisone or cortisone    TNF-alpha inhibitors like natalizumab, adalimumab, and infliximab    vaccines  This list may not describe all possible interactions. Give your health care provider a list of all the medicines, herbs, non-prescription drugs, or dietary supplements you use. Also tell them if you smoke, drink alcohol, or use illegal drugs. Some items may interact with your medicine.  What should I watch for while using this  medicine?  Your condition will be monitored carefully while you are receiving this medicine. Visit your doctor for regular check ups. Tell your doctor or healthcare professional if your symptoms do not start to get better or if they get worse.  Stay away from people who are sick. Call your doctor or health care professional for advice if you get a fever, chills or sore throat, or other symptoms of a cold or flu. Do not treat yourself.  In some patients, this medicine may cause a serious brain infection that may cause death. If you have any problems seeing, thinking, speaking, walking, or standing, tell your doctor right away. If you cannot reach your doctor, get urgent medical care.  What side effects may I notice from receiving this medicine?  Side effects that you should report to your doctor or health care professional as soon as possible:    allergic reactions like skin rash, itching or hives, swelling of the face, lips, or tongue    breathing problems    changes in vision    chest pain    dark urine    depression, feelings of sadness    dizziness    general ill feeling or flu-like symptoms    irregular, missed, or painful menstrual periods    light-colored stools    loss of appetite, nausea    muscle weakness    problems with balance, talking, or walking    right upper belly pain    unusually weak or tired    yellowing of the eyes or skin  Side effects that usually do not require medical attention (Report these to your doctor or health care professional if they continue or are bothersome.):    aches, pains    headache    stomach upset    tiredness  This list may not describe all possible side effects. Call your doctor for medical advice about side effects. You may report side effects to FDA at 7-487-FDA-2558.  Where should I keep my medicine?  This drug is given in a hospital or clinic and will not be stored at home.  NOTE: This sheet is a summary. It may not cover all possible information. If you have  questions about this medicine, talk to your doctor, pharmacist, or health care provider.  NOTE:This sheet is a summary. It may not cover all possible information. If you have questions about this medicine, talk to your doctor, pharmacist, or health care provider. Copyright  2016 Gold Standard

## 2018-01-18 NOTE — PROGRESS NOTES
~~~ NOTE: If the patient answers yes to any of the questions below, hold the infusion and contact ordering provider or on-call provider.    1. Have you recently had an elevated temperature, fever, chills, productive cough, coughing for 3 weeks or longer or hemoptysis,  abnormal vital signs, night sweats,  chest pain or have you noticed a decrease in your appetite, unexplained weight loss or fatigue? No  2. Do you have any open wounds or new incisions? No  3. Do you have any recent or upcoming hospitalizations, surgeries or dental procedures? No  4. Do you currently have or recently have had any signs of illness or infection or are you on any antibiotics? No  5. Have you had any new, sudden or worsening abdominal pain? No  6. Have you or anyone in your household received a live vaccination in the past 4 weeks? Please note:  No live vaccines while on biologic/chemotherapy until 6 months after the last treatment.  Patient can receive the flu vaccine (shot only) and the pneumovax.  It is optimal for the patient to get these vaccines mid cycle, but they can be given at any time as long as it is not on the day of the infusion. No  7. Have you recently been diagnosed with any new nervous system diseases (ie. Multiple sclerosis, Guillain [place], seizures, neurological changes) or cancer diagnosis? Are you on any form of radiation or chemotherapy? No  8. Are you pregnant or breast feeding or do you have plans of pregnancy in the future? No  9. Have you been having any signs of worsening depression or suicidal ideations?  (benlysta only) No  10. Have there been any other new onset medical symptoms? No      Nursing Note  [facility]is Penny [facility]:   Chief Complaint   Patient presents with     Infusion     Entyvio     During today's [facility] appointment, orders from Dr. Johnson were completed.  Frequency: monthly    Lab called with critical platelet  value of 1220. Dr. Johnson paged. Authorization received to proceed with infusion. Dr. Johnson will continue to monitor lab values. Confirmed pt taking daily ASA. Verbal education provided r/t emergent s/sx of blood clots/DVT. Pt verbalized understanding.     Progress note:  Patient identification verified by name and date of birth.  Assessment completed.  Vitals recorded in Doc Flowsheets.  Patient was provided with education regarding infusion and possible side effects.  Patient verbalized understanding.      needed: No  Premedications: were not ordered.  Infusion Rates: infusion given over approximately 30 minutes.  Approximate Infusion length:30 minutes.   Labs: were drawn per orders.   Vascular access: peripheral IV placed today.  Treatment Conditions: patient denies fever, chills, signs of infection, recent illness, on antibiotics, productive cough or elevated temperature.  Patient tolerated infusion: well.        Discharge Plan:   Follow up plan of care with: ongoing infusions at [facility].  Discharge instructions were reviewed with patient.  Patient/representative verbalized understanding of discharge instructions and all questions answered.  Patient discharged from [facility] in stable condition.    Alison Penny RN       Administrations This Visit     vedolizumab (ENTYVIO) 300 mg in NaCl 0.9 % 280 mL infusion     Admin Date Action Dose Rate Route Administered By          01/18/2018 New Bag 300 mg 560 mL/hr Intravenous Alison Penny RN                           /82  Pulse 85  Temp 98.5  F (36.9  C) (Oral)  Resp 16  SpO2 98%

## 2018-01-28 ENCOUNTER — OFFICE VISIT (OUTPATIENT)
Dept: URGENT CARE | Facility: URGENT CARE | Age: 33
End: 2018-01-28
Payer: COMMERCIAL

## 2018-01-28 VITALS
DIASTOLIC BLOOD PRESSURE: 72 MMHG | HEIGHT: 63 IN | BODY MASS INDEX: 19.63 KG/M2 | WEIGHT: 110.8 LBS | SYSTOLIC BLOOD PRESSURE: 115 MMHG | TEMPERATURE: 98.3 F | HEART RATE: 87 BPM | OXYGEN SATURATION: 98 %

## 2018-01-28 DIAGNOSIS — B02.9 HERPES ZOSTER WITHOUT COMPLICATION: Primary | ICD-10-CM

## 2018-01-28 PROCEDURE — 99213 OFFICE O/P EST LOW 20 MIN: CPT | Performed by: STUDENT IN AN ORGANIZED HEALTH CARE EDUCATION/TRAINING PROGRAM

## 2018-01-28 RX ORDER — DIAPER,BRIEF,INFANT-TODD,DISP
EACH MISCELLANEOUS
Qty: 30 G | Refills: 0 | Status: SHIPPED | OUTPATIENT
Start: 2018-01-28 | End: 2018-02-11

## 2018-01-28 RX ORDER — VALACYCLOVIR HYDROCHLORIDE 1 G/1
1000 TABLET, FILM COATED ORAL 3 TIMES DAILY
Qty: 21 TABLET | Refills: 0 | Status: SHIPPED | OUTPATIENT
Start: 2018-01-28 | End: 2018-03-29

## 2018-01-28 NOTE — MR AVS SNAPSHOT
After Visit Summary   1/28/2018    Yarelis Penny    MRN: [id number]           Patient Information     Date Of Birth          1985        Visit Information        Provider Department      1/28/2018 3:05 PM Emma Frankel DO [facility]        Today's Diagnoses     Herpes zoster without complication    -  1      Care Instructions    Valtrex 1000 mg three times a day for the next 7 days  Hydrocortisone cream to the area as needed.  Ibuprofen for pain    See Primary provider if this is not improving, or worsening.    Shingles  Shingles is a viral infection caused by the same virus as chicken pox. Anyone who has had chicken pox may get shingles later in life. The virus stays in the body, but remains dormant (asleep). Shingles often occurs in older persons or persons with lowered immunity. But it can affect anyone at any age.  Shingles starts as a tingling patch of skin on one side of the body. Small, painful blisters may then appear. The rash does not spread to the rest of the body.  Exposure to shingles cannot cause shingles. However, it can cause chicken pox in anyone who has not had chicken pox or has not been vaccinated. The contagious period ends when all blisters have crusted over (generally about 2 weeks after the illness begins).  After the blisters heal, the affected skin may be sensitive or painful for months (neuralgia). This often gradually goes away.  A shingles vaccine is available. This can help prevent shingles or make it less painful. It is generally recommended for adults over the age of 60 who have had chicken pox in the past, but who have never had shingles. Adults over 60 who have had neither chicken pox nor shingles can prevent both diseases with the chicken pox vaccine. Ask your healthcare provider about these vaccines.  Home care    Medicines may be prescribed to help relieve pain. Take these medicines as directed. Ask your healthcare provider or  pharmacist before using over-the-counter medicines for helping treat pain and itching.    In certain cases, antiviral medicines may be prescribed to reduce pain, shorten the illness, and prevent neuralgia. Take these medicines as directed.    Compresses made from a solution of cool water mixed with cornstarch or baking soda may help relieve pain and itching.     Gently wash skin daily with soap and water to help prevent infection.  Be certain to rinse off all of the soap, which can be irritating.    Trim fingernails and try not to scratch. Scratching the sores may leave scars.    Stay home from work or school until all blisters have formed a crust and you are no longer contagious.  Follow-up care  Follow up with your healthcare provider or as directed by our staff.  When to seek medical advice    Fever of 100.4 F (38 C) or higher, or as directed by your healthcare provider    Affected skin is on the face or neck and any of the following occur:    Headache    Eye pain    Changes in vision    Sores near the eye    Weakness of facial muscles    Pain, redness, or swelling of a joint    Signs of skin infection: colored drainage from the sores, warmth, increasing redness, or increasing pain  Date Last Reviewed: 9/25/2015 2000-2017 [employer] [address] [place] [address]. All rights reserved. This information is not intended as a substitute for professional medical care. Always follow your healthcare professional's instructions.                Follow-ups after your visit        Your next 10 appointments already scheduled     Feb 15, 2018  9:00 AM CST   Infusion 180 with UC SPEC INFUSION, UC [facility] Specialty and Procedure ([facility])    [address]  [address]  [facility] [address] [phone]            Mar 15, 2018  9:00 AM CDT   Infusion 180 with UC SPEC INFUSION, UC 47 [facility]  "Specialty and Procedure ([facility])    [address]  [address]  [facility] [id number] [phone]            Dec 05, 2018  8:00 AM CST   (Arrive by 7:45 AM)   Return Visit with Joya Lopez MD   [facility] and Infectious Diseases ([facility])    [address]  [address]  [facility] [id number] [phone]              Who to contact     If you have questions or need follow up information about today's clinic visit or your schedule please contact [facility] directly at [phone].  Normal or non-critical lab and imaging results will be communicated to you by [employer]oom[employer]art, letter or phone within 4 business days after the clinic has received the results. If you do not hear from us within 7 days, please contact the clinic through [employer] or phone. If you have a critical or abnormal lab result, we will notify you by phone as soon as possible.  Submit refill requests through [employer] or call your pharmacy and they will forward the refill request to us. Please allow 3 business days for your refill to be completed.          Additional Information About Your Visit        MyChart Information     [employer] gives you secure access to your electronic health record. If you see a primary care provider, you can also send messages to your care team and make appointments. If you have questions, please call your primary care clinic.  If you do not have a primary care provider, please call [phone] and they will assist you.        Care EveryWhere ID     This is your Care EveryWhere ID. This could be used by other organizations to access your [place] medical records  [id number]        Your Vitals Were     Pulse Temperature Height Last Period Pulse Oximetry Breastfeeding?    87 98.3  F (36.8  C) (Oral) 5' 3\" (1.6 m) 01/09/2018 98% No    BMI (Body Mass Index)                   19.63 kg/m2            Blood Pressure " from Last 3 Encounters:   01/28/18 115/72   01/18/18 120/82   12/20/17 116/81    Weight from Last 3 Encounters:   01/28/18 110 lb 12.8 oz (50.3 kg)   12/20/17 111 lb 11.2 oz (50.7 kg)   12/18/17 110 lb 4.8 oz (50 kg)              Today, you had the following     No orders found for display         Today's Medication Changes          These changes are accurate as of 1/28/18  4:24 PM.  If you have any questions, ask your nurse or doctor.               Start taking these medicines.        Dose/Directions    hydrocortisone 1 % ointment   Used for:  Herpes zoster without complication   Started by:  Emma Frankel DO        Apply sparingly to affected area three times daily for 14 days.   Quantity:  30 g   Refills:  0       valACYclovir 1000 mg tablet   Commonly known as:  VALTREX   Used for:  Herpes zoster without complication   Started by:  Emma rFankel DO        Dose:  1000 mg   Take 1 tablet (1,000 mg) by mouth 3 times daily for 7 days   Quantity:  21 tablet   Refills:  0            Where to get your medicines      These medications were sent to [facility] [address] [place] [address] AT [address] [address]     Phone:  [phone]     hydrocortisone 1 % ointment    valACYclovir 1000 mg tablet                Primary Care Provider Office Phone # Fax #    Jackson Orellana [phone]       [place] [address] YENIFER LAMA [address] [place] [address]        Equal Access to Services     [facility]: Hadii venancio hart Soyari, waaxda luqadaha, qaybta kaalmada adeegadenike, mani lozano. So [facility] [phone].    ATENCIÓN: Si habla español, tiene a borja disposición servicios gratuitos de asistencia lingüística. Clyde al [phone].    We comply with applicable federal civil rights laws and [place] laws. We do not discriminate on the basis of race, color, national origin, age,  disability, sex, sexual orientation, or gender identity.            Thank you!     Thank you for choosing [facility]  for your care. Our goal is always to provide you with excellent care. Hearing back from our patients is one way we can continue to improve our services. Please take a few minutes to complete the written survey that you may receive in the mail after your visit with us. Thank you!             Your Updated Medication List - Protect others around you: Learn how to safely use, store and throw away your medicines at www.disposemymeds.org.          This list is accurate as of 1/28/18  4:24 PM.  Always use your most recent med list.                   Brand Name Dispense Instructions for use Diagnosis    budesonide 9 MG 24 hr tablet    UCERIS    30 tablet    Take 1 tablet (9 mg) by mouth every morning    UC (ulcerative colitis) (H)       ENTYVIO IV      Inject 300 mg into the vein every 28 days        GNP ASPIRIN LOW DOSE 81 MG EC tablet   Generic drug:  aspirin     30 tablet    Take 1 tablet (81 mg) by mouth daily    Ulcerative colitis with other complication, unspecified location (H), Liver replaced by transplant (H)       hydrocortisone 1 % ointment     30 g    Apply sparingly to affected area three times daily for 14 days.    Herpes zoster without complication       predniSONE 5 MG tablet    DELTASONE    225 tablet    Take 1 tablet (5 mg) by mouth daily    UC (ulcerative colitis) (H), Liver replaced by transplant (H)       sulfamethoxazole-trimethoprim 800-160 MG per tablet    BACTRIM DS/SEPTRA DS    90 tablet    Take 1 tablet by mouth three times a week Monday, Wednesday, Friday    Immunosuppression (H), Inflammatory bowel disease, PSC (primary sclerosing cholangitis)       * tacrolimus 1 MG capsule    GENERIC EQUIVALENT    360 capsule    Take 2 capsules (2 mg) by mouth 2 times daily *total dose 2.5 mg BID. Take with 0.5 mg capsule    S/P liver transplant (H)       * tacrolimus  0.5 MG capsule    GENERIC EQUIVALENT    180 capsule    Take 1 capsule (0.5 mg) by mouth 2 times daily Take with 1 mg capsule. Total dose 2.5 mg every 12 hours    S/P liver transplant (H)       ursodiol 250 MG tablet    ACTIGALL    270 tablet    Take 2 tabs (500 mg) in AM, and 1 tab at night (250 mg)    Liver replaced by transplant (H)       valACYclovir 1000 mg tablet    VALTREX    21 tablet    Take 1 tablet (1,000 mg) by mouth 3 times daily for 7 days    Herpes zoster without complication       zolpidem 5 MG tablet    AMBIEN    60 tablet    Take 1 tablet (5 mg) by mouth nightly as needed for sleep        * Notice:  This list has 2 medication(s) that are the same as other medications prescribed for you. Read the directions carefully, and ask your doctor or other care provider to review them with you.

## 2018-01-28 NOTE — PATIENT INSTRUCTIONS
Valtrex 1000 mg three times a day for the next 7 days  Hydrocortisone cream to the area as needed.  Ibuprofen for pain    See Primary provider if this is not improving, or worsening.    Shingles  Shingles is a viral infection caused by the same virus as chicken pox. Anyone who has had chicken pox may get shingles later in life. The virus stays in the body, but remains dormant (asleep). Shingles often occurs in older persons or persons with lowered immunity. But it can affect anyone at any age.  Shingles starts as a tingling patch of skin on one side of the body. Small, painful blisters may then appear. The rash does not spread to the rest of the body.  Exposure to shingles cannot cause shingles. However, it can cause chicken pox in anyone who has not had chicken pox or has not been vaccinated. The contagious period ends when all blisters have crusted over (generally about 2 weeks after the illness begins).  After the blisters heal, the affected skin may be sensitive or painful for months (neuralgia). This often gradually goes away.  A shingles vaccine is available. This can help prevent shingles or make it less painful. It is generally recommended for adults over the age of 60 who have had chicken pox in the past, but who have never had shingles. Adults over 60 who have had neither chicken pox nor shingles can prevent both diseases with the chicken pox vaccine. Ask your healthcare provider about these vaccines.  Home care    Medicines may be prescribed to help relieve pain. Take these medicines as directed. Ask your healthcare provider or pharmacist before using over-the-counter medicines for helping treat pain and itching.    In certain cases, antiviral medicines may be prescribed to reduce pain, shorten the illness, and prevent neuralgia. Take these medicines as directed.    Compresses made from a solution of cool water mixed with cornstarch or baking soda may help relieve pain and itching.     Gently wash  skin daily with soap and water to help prevent infection.  Be certain to rinse off all of the soap, which can be irritating.    Trim fingernails and try not to scratch. Scratching the sores may leave scars.    Stay home from work or school until all blisters have formed a crust and you are no longer contagious.  Follow-up care  Follow up with your healthcare provider or as directed by our staff.  When to seek medical advice    Fever of 100.4 F (38 C) or higher, or as directed by your healthcare provider    Affected skin is on the face or neck and any of the following occur:    Headache    Eye pain    Changes in vision    Sores near the eye    Weakness of facial muscles    Pain, redness, or swelling of a joint    Signs of skin infection: colored drainage from the sores, warmth, increasing redness, or increasing pain  Date Last Reviewed: 9/25/2015 2000-2017 [employer]. [address] [place] [address], [place] [address]. All rights reserved. This information is not intended as a substitute for professional medical care. Always follow your healthcare professional's instructions.

## 2018-01-28 NOTE — PROGRESS NOTES
Subjective:   Yarelis Penny is a 32 year old female who presents for   Chief Complaint   Patient presents with     Urgent Care     Derm Problem     pt states that there is rash on the bottom area (butt) on the left side and it been going for five days. pt said it hurts when she touchs the rash.      Patient presents with a rash that started on the left side of her lower back.  She first noticed a red spot on Wednesday, and has noted that it is red around her low back since that time she notes that it is painful, but does not itch.  She has not noticed any blisters, but noticed a red spot had come to a head like a pimple.  She has not no new lotions, creams, ointments or detergents.  There are no other family members who have similar rash.  She has tried cortisone cream and lavender oil without much improvement or relief.  She does not believe that she has had a rash like this in the past before.  She denies any fever, chills, nausea, vomiting or diarrhea.  No known sick contacts.    Of note, she is 31 months out from a liver transplant, and a long-leg medication changes in her normal regimen.  She recently started a probiotic, but this could be she stopped taking his probiotic and noted no improvement in her rash.  She does have a history of having shingles over 10 years ago, but notes that it did not present in this way.    PMHX/PSHX/MEDS/ALLERGIES/SHX/FHX reviewed and updated in Epic.    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 Possible due to medication. Diflucan discontinued.       C. difficile diarrhea 07/09/2015     Priority: Medium     Immunosuppressed status (H) 07/09/2015     Priority: Medium     Anemia 07/09/2015     Priority: Medium     Liver transplant recipient (H) 06/25/2015     Priority: Medium     S/P liver transplant (H) 06/18/2015     Priority: Medium     SBP (spontaneous bacterial peritonitis) (H) 02/24/2015     Priority: Medium     Autoimmune hepatitis (H)      Priority: Medium     on steroid taper       CKD (chronic kidney disease)      Priority: Medium     biopsy 2012: TIN, patchy fibrosis       Abnormal INR 05/24/2012     Priority: Medium     CARDIOVASCULAR SCREENING; LDL GOAL LESS THAN 160 10/31/2010     Priority: Medium     Ulcerative colitis (H) 03/26/2005     Priority: Medium     Problem list name updated by automated process. Provider to review       Current Outpatient Prescriptions   Medication     valACYclovir (VALTREX) 1000 mg tablet     hydrocortisone 1 % ointment     tacrolimus (GENERIC EQUIVALENT) 1 MG capsule     tacrolimus (GENERIC EQUIVALENT) 0.5 MG capsule     predniSONE (DELTASONE) 5 MG tablet     budesonide (UCERIS) 9 MG 24 hr tablet     zolpidem (AMBIEN) 5 MG tablet     sulfamethoxazole-trimethoprim (BACTRIM DS/SEPTRA DS) 800-160 MG per tablet     Vedolizumab (ENTYVIO IV)     ursodiol (ACTIGALL) 250 MG tablet     GNP ASPIRIN LOW DOSE 81 MG EC tablet     No current facility-administered medications for this visit.      ROS:  As above per HPI    Objective:   /72 (BP Location: Left arm, Patient Position: Chair, Cuff Size: Adult  "Regular)  Pulse 87  Temp 98.3  F (36.8  C) (Oral)  Ht 5' 3\" (1.6 m)  Wt 110 lb 12.8 oz (50.3 kg)  LMP 01/09/2018  SpO2 98%  Breastfeeding? No  BMI 19.63 kg/m2, Body mass index is 19.63 kg/(m^2).  Gen:  NAD, well-nourished, sitting in chair comfortably  HEENT: EOMI, PERRL sclera anicteric, Head normocephalic, PERRL; nares patent; oropharynx pink and moist  Neck: trachea midline, supple  CV:  RRR  - no murmurs, rubs, or gallups  Pulm:  CTAB, no wheezes/rales/rhonchi, no increased work of breathing   Skin: erythematous and raised rash on the left lower side that is distributed in the L1 dermatome; 1 healing scabbed papule that is not draining, no other blisters noted; no other skin lesions or rashes   Psych: Euthymic, linear thoughts, normal rate of speech    Results for orders placed or performed in visit on 01/18/18   CBC with platelets   Result Value Ref Range    WBC 15.4 (H) 4.0 - 11.0 10e9/L    RBC Count 4.21 3.8 - 5.2 10e12/L    Hemoglobin 8.6 (L) 11.7 - 15.7 g/dL    Hematocrit 30.0 (L) 35.0 - 47.0 %    MCV 71 (L) 78 - 100 fl    MCH 20.4 (L) 26.5 - 33.0 pg    MCHC 28.7 (L) 31.5 - 36.5 g/dL    RDW 21.6 (H) 10.0 - 15.0 %    Platelet Count 1220 (HH) 150 - 450 10e9/L   Basic metabolic panel   Result Value Ref Range    Sodium 141 133 - 144 mmol/L    Potassium 3.8 3.4 - 5.3 mmol/L    Chloride 111 (H) 94 - 109 mmol/L    Carbon Dioxide 22 20 - 32 mmol/L    Anion Gap 8 3 - 14 mmol/L    Glucose 78 70 - 99 mg/dL    Urea Nitrogen 18 7 - 30 mg/dL    Creatinine 0.95 0.52 - 1.04 mg/dL    GFR Estimate 68 >60 mL/min/1.7m2    GFR Estimate If Black 82 >60 mL/min/1.7m2    Calcium 7.9 (L) 8.5 - 10.1 mg/dL   Hepatic panel   Result Value Ref Range    Bilirubin Direct <0.1 0.0 - 0.2 mg/dL    Bilirubin Total 0.3 0.2 - 1.3 mg/dL    Albumin 2.6 (L) 3.4 - 5.0 g/dL    Protein Total 6.7 (L) 6.8 - 8.8 g/dL    Alkaline Phosphatase 88 40 - 150 U/L    ALT 30 0 - 50 U/L    AST 14 0 - 45 U/L   Phosphorus   Result Value Ref Range    " Phosphorus 3.0 2.5 - 4.5 mg/dL   Magnesium   Result Value Ref Range    Magnesium 1.2 (L) 1.6 - 2.3 mg/dL   Tacrolimus level   Result Value Ref Range    Tacrolimus Last Dose Not Provided     Tacrolimus Level 5.5 5.0 - 15.0 ug/L     Assessment & Plan:   Yarelis Penny, 32 year old female who presents with:  Yarelis was seen today for urgent care and derm problem.    Diagnoses and all orders for this visit:    Herpes zoster without complication  -     valACYclovir (VALTREX) 1000 mg tablet; Take 1 tablet (1,000 mg) by mouth 3 times daily for 7 days  -     hydrocortisone 1 % ointment; Apply sparingly to affected area three times daily for 14 days.    Given her immunosuppression and distribution of her rash in a single dermatome unilaterally, treated for Shingles.  Also given hydrocortisone cream to use as needed.  Declined any pain medications are this time, as she feel this is tolerable.  Discussed that she should see her PCP if this rash worsens, or is not improving with valtrex.     Emma Frankel DO PGY2   URGENT CARE [place]    Options for treatment and/or follow-up care were reviewed with the patient. Yarelis Penny and/or legal guardian was engaged and actively involved in the decision making process. Patient/guardian verbalized understanding of the options discussed and was satisfied with the final plan.

## 2018-01-28 NOTE — NURSING NOTE
"Chief Complaint   Patient presents with     Urgent Care     Derm Problem     pt states that there is rash on the bottom area (butt) on the left side and it been going for five days. pt said it hurts when she touchs the rash.       Initial /72 (BP Location: Left arm, Patient Position: Chair, Cuff Size: Adult Regular)  Pulse 87  Temp 98.3  F (36.8  C) (Oral)  Ht 5' 3\" (1.6 m)  Wt 110 lb 12.8 oz (50.3 kg)  LMP 01/09/2018  SpO2 98%  Breastfeeding? No  BMI 19.63 kg/m2 Estimated body mass index is 19.63 kg/(m^2) as calculated from the following:    Height as of this encounter: 5' 3\" (1.6 m).    Weight as of this encounter: 110 lb 12.8 oz (50.3 kg).  Medication Reconciliation: complete   SMA Mukul    "

## 2018-02-01 DIAGNOSIS — Z94.4 LIVER REPLACED BY TRANSPLANT (H): ICD-10-CM

## 2018-02-01 RX ORDER — URSODIOL 250 MG/1
TABLET, FILM COATED ORAL
Qty: 270 TABLET | Refills: 3 | Status: SHIPPED | OUTPATIENT
Start: 2018-02-01 | End: 2019-01-24

## 2018-02-15 ENCOUNTER — INFUSION THERAPY VISIT (OUTPATIENT)
Dept: INFUSION THERAPY | Facility: CLINIC | Age: 33
End: 2018-02-15
Attending: INTERNAL MEDICINE
Payer: COMMERCIAL

## 2018-02-15 VITALS
SYSTOLIC BLOOD PRESSURE: 117 MMHG | RESPIRATION RATE: 17 BRPM | TEMPERATURE: 98.6 F | DIASTOLIC BLOOD PRESSURE: 80 MMHG | BODY MASS INDEX: 19.66 KG/M2 | OXYGEN SATURATION: 100 % | WEIGHT: 111 LBS | HEART RATE: 86 BPM

## 2018-02-15 DIAGNOSIS — Z94.4 LIVER REPLACED BY TRANSPLANT (H): ICD-10-CM

## 2018-02-15 DIAGNOSIS — B25.9 CMV COLITIS (H): ICD-10-CM

## 2018-02-15 DIAGNOSIS — A08.39 CMV COLITIS (H): ICD-10-CM

## 2018-02-15 DIAGNOSIS — B27.00 EPSTEIN-BARR VIRUS VIREMIA: ICD-10-CM

## 2018-02-15 DIAGNOSIS — K51.00 ULCERATIVE PANCOLITIS WITHOUT COMPLICATION (H): Primary | ICD-10-CM

## 2018-02-15 DIAGNOSIS — E86.0 DEHYDRATION: ICD-10-CM

## 2018-02-15 LAB
BASOPHILS # BLD AUTO: 0.1 10E9/L (ref 0–0.2)
BASOPHILS NFR BLD AUTO: 1.1 %
CRP SERPL-MCNC: <2.9 MG/L (ref 0–8)
DIFFERENTIAL METHOD BLD: ABNORMAL
EOSINOPHIL # BLD AUTO: 0.6 10E9/L (ref 0–0.7)
EOSINOPHIL NFR BLD AUTO: 5.6 %
ERYTHROCYTE [DISTWIDTH] IN BLOOD BY AUTOMATED COUNT: 31.4 % (ref 10–15)
ERYTHROCYTE [SEDIMENTATION RATE] IN BLOOD BY WESTERGREN METHOD: 23 MM/H (ref 0–20)
HCT VFR BLD AUTO: 40.3 % (ref 35–47)
HGB BLD-MCNC: 12.3 G/DL (ref 11.7–15.7)
IMM GRANULOCYTES # BLD: 0 10E9/L (ref 0–0.4)
IMM GRANULOCYTES NFR BLD: 0.4 %
LYMPHOCYTES # BLD AUTO: 3.4 10E9/L (ref 0.8–5.3)
LYMPHOCYTES NFR BLD AUTO: 30.7 %
MCH RBC QN AUTO: 24.1 PG (ref 26.5–33)
MCHC RBC AUTO-ENTMCNC: 30.5 G/DL (ref 31.5–36.5)
MCV RBC AUTO: 79 FL (ref 78–100)
MONOCYTES # BLD AUTO: 0.9 10E9/L (ref 0–1.3)
MONOCYTES NFR BLD AUTO: 8.3 %
NEUTROPHILS # BLD AUTO: 6 10E9/L (ref 1.6–8.3)
NEUTROPHILS NFR BLD AUTO: 53.9 %
NRBC # BLD AUTO: 0 10*3/UL
NRBC BLD AUTO-RTO: 0 /100
PLATELET # BLD AUTO: 600 10E9/L (ref 150–450)
RBC # BLD AUTO: 5.1 10E12/L (ref 3.8–5.2)
WBC # BLD AUTO: 11.1 10E9/L (ref 4–11)

## 2018-02-15 PROCEDURE — 85652 RBC SED RATE AUTOMATED: CPT | Performed by: INTERNAL MEDICINE

## 2018-02-15 PROCEDURE — 85025 COMPLETE CBC W/AUTO DIFF WBC: CPT | Performed by: INTERNAL MEDICINE

## 2018-02-15 PROCEDURE — 25000128 H RX IP 250 OP 636: Mod: ZF | Performed by: INTERNAL MEDICINE

## 2018-02-15 PROCEDURE — 96413 CHEMO IV INFUSION 1 HR: CPT

## 2018-02-15 PROCEDURE — 86140 C-REACTIVE PROTEIN: CPT | Performed by: INTERNAL MEDICINE

## 2018-02-15 PROCEDURE — 87799 DETECT AGENT NOS DNA QUANT: CPT | Performed by: INTERNAL MEDICINE

## 2018-02-15 RX ADMIN — VEDOLIZUMAB 300 MG: 300 INJECTION, POWDER, LYOPHILIZED, FOR SOLUTION INTRAVENOUS at 09:44

## 2018-02-15 ASSESSMENT — PAIN SCALES - GENERAL: PAINLEVEL: NO PAIN (0)

## 2018-02-15 NOTE — PROGRESS NOTES
Nursing Note  Yarelis Penny [facility] for:   Chief Complaint   Patient presents with     Infusion     Entyvio     During today's [facility] appointment, orders from Dr Johnson were completed.  Frequency: monthly    Progress note:  Patient identification verified by name and date of birth.  Assessment completed.  Vitals recorded in Doc Flowsheets.  Patient was provided with education regarding infusion and possible side effects.  Patient verbalized understanding.      needed: No  Premedications: were not ordered.  Infusion Rates: 560 ml/hr.  Approximate Infusion length:30 minutes.   Labs: were drawn per orders.   Vascular access: peripheral IV placed today.  Treatment Conditions:   ~~~ NOTE: If the patient answers yes to any of the questions below, hold the infusion and contact ordering provider or on-call provider.    1. Have you recently had an elevated temperature, fever, chills, productive cough, coughing for 3 weeks or longer or hemoptysis,  abnormal vital signs, night sweats,  chest pain or have you noticed a decrease in your appetite, unexplained weight loss or fatigue? No  2. Do you have any open wounds or new incisions? No  3. Do you have any recent or upcoming hospitalizations, surgeries or dental procedures? No  4. Do you currently have or recently have had any signs of illness or infection or are you on any antibiotics? No  5. Have you had any new, sudden or worsening abdominal pain? No  6. Have you or anyone in your household received a live vaccination in the past 4 weeks? Please note:  No live vaccines while on biologic/chemotherapy until 6 months after the last treatment.  Patient can receive the flu vaccine (shot only) and the pneumovax.  It is optimal for the patient to get these vaccines mid cycle, but they can be given at any time as long as it is not on the day of the infusion. No  7. Have you recently been  diagnosed with any new nervous system diseases (ie. Multiple sclerosis, Guillain [place], seizures, neurological changes) or cancer diagnosis? Are you on any form of radiation or chemotherapy? No  8. Are you pregnant or breast feeding or do you have plans of pregnancy in the future? No  9. Have you been having any signs of worsening depression or suicidal ideations?  (benlysta only) No  10. Have there been any other new onset medical symptoms? No    Patient tolerated infusion: well.    Drug Waste Record? No     Discharge Plan:   Follow up plan of care with: ongoing infusions at [facility].  Discharge instructions were reviewed with patient.  Patient/representative verbalized understanding of discharge instructions and all questions answered.  Patient discharged from [facility] in stable condition.    TOM QUIGLEY RN        /80 (BP Location: Right arm, Patient Position: Semi-Smith's, Cuff Size: Adult Regular)  Pulse 86  Temp 98.6  F (37  C) (Oral)  Resp 17  Wt 50.3 kg (111 lb)  SpO2 100%  BMI 19.66 kg/m2

## 2018-02-15 NOTE — PATIENT INSTRUCTIONS
Dear Yarelis Penny    Thank you for choosing [facility] ([facility]) for your infusion.  The following information is a summary of our appointment as well as important reminders.      Please refer to your hospital discharge instructions for details on home care services, future appointments, phone numbers, and diet/activity levels.    Additional information: You received Entyvio today. You will return in 1 month for your next infusion.      We look forward in seeing you on your next appointment here at [facility].  Please don t hesitate to call us at [phone] to reschedule any of your appointments or to speak with one of the [facility] registered nurses.  It was a pleasure taking care of you today.    Sincerely,    [facility]  [facility]  [address] [place] [address]  [address]  Phone:  [phone]    EDUCATION POST BIOLOGICAL/CHEMOTHERAPY INFUSION  Call the triage nurse at your clinic or seek medical attention if you have chills and/or temperature greater than or equal to 100.5, uncontrolled nausea/vomiting, diarrhea, constipation, dizziness, shortness of breath, chest pain, heart palpitations, weakness or any other new or concerning symptoms, questions or concerns.  You can not have any live virus vaccines prior to or during treatment or up to 6 months post infusion.  If you have an upcoming surgery, medical procedure or dental procedure during treatment, this should be discussed with your ordering physician and your surgeon/dentist.  If you are having any concerning symptom, if you are unsure if you should get your next infusion or wish to speak to a provider before your next infusion, please call your care coordinator or triage nurse at your clinic to notify them so we can adequately serve you.

## 2018-02-15 NOTE — MR AVS SNAPSHOT
After Visit Summary   2/15/2018    Yarelis Penny    MRN: [id number]           Patient Information     Date Of Birth          1985        Visit Information        Provider Department      2/15/2018 9:00 AM UC 47 ATC; UC SPEC [facility] Special[facility] Procedure        Today's Diagnoses     Ulcerative pancolitis without complication (H)    -  1    Dehydration        CMV colitis (H)        Jun-Barr virus viremia        Liver replaced by transplant (H)          Care Instructions    Dear Yarelis Penny    Thank you for choosing [facility] ([facility]) for your infusion.  The following information is a summary of our appointment as well as important reminders.      Please refer to your hospital discharge instructions for details on home care services, future appointments, phone numbers, and diet/activity levels.    Additional information: You received Entyvio today. You will return in 1 month for your next infusion.      We look forward in seeing you on your next appointment here at [facility].  Please don t hesitate to call us at [phone] to reschedule any of your appointments or to speak with one of the [facility] registered nurses.  It was a pleasure taking care of you today.    Sincerely,    [facility]  [facility]  [address] [place] [address]  [address]  Phone:  [phone]    EDUCATION POST BIOLOGICAL/CHEMOTHERAPY INFUSION  Call the triage nurse at your clinic or seek medical attention if you have chills and/or temperature greater than or equal to 100.5, uncontrolled nausea/vomiting, diarrhea, constipation, dizziness, shortness of breath, chest pain, heart palpitations, weakness or any other new or concerning symptoms, questions or concerns.  You can not have any live virus vaccines prior to or during treatment or up to 6 months post infusion.  If you have  an upcoming surgery, medical procedure or dental procedure during treatment, this should be discussed with your ordering physician and your surgeon/dentist.  If you are having any concerning symptom, if you are unsure if you should get your next infusion or wish to speak to a provider before your next infusion, please call your care coordinator or triage nurse at your clinic to notify them so we can adequately serve you.          Follow-ups after your visit        Your next 10 appointments already scheduled     Mar 15, 2018  9:00 AM CDT   Infusion 180 with UC SPEC INFUSION, UC 47 ATC   [facility] Specialty and Procedure ([facility])    [address]  [address]  [facility] [address]   [phone]            Apr 12, 2018  8:00 AM CDT   Infusion 180 with UC SPEC INFUSION, UC 44 ATC   [facility] Specialty and Procedure ([facility])    [address]  [address]  [place] [facility] [address]   [phone]            May 10, 2018  9:00 AM CDT   Infusion 180 with UC SPEC INFUSION, UC 47 ATC   [facility] ([facility])    [address]  [address]  [facility] [address]   [phone]            Jun 07, 2018  9:00 AM CDT   Infusion 180 with UC SPEC INFUSION, UC 47 ATC   [facility] ([facility])    [address]  [address]  [place] [facility] [address]   [phone]            Dec 05, 2018  8:00 AM CST   (Arrive by 7:45 AM)   Return Visit with Joya Lopez MD   [facility] ([facility])    [address]  [address]  [facility] [address] [phone]              Who to contact     If you have questions or need follow up information about today's clinic visit or your schedule  please contact [facility] directly at [phone].  Normal or non-critical lab and imaging results will be communicated to you by MyChart, letter or phone within 4 business days after the clinic has received the results. If you do not hear from us within 7 days, please contact the clinic through Teliportmehart or phone. If you have a critical or abnormal lab result, we will notify you by phone as soon as possible.  Submit refill requests through Next Points or call your pharmacy and they will forward the refill request to us. Please allow 3 business days for your refill to be completed.          Additional Information About Your Visit        Next Points Information     Next Points gives you secure access to your electronic health record. If you see a primary care provider, you can also send messages to your care team and make appointments. If you have questions, please call your primary care clinic.  If you do not have a primary care provider, please call [phone] and they will assist you.        Care EveryWhere ID     This is your Care EveryWhere ID. This could be used by other organizations to access your [place] medical records  [id number]        Your Vitals Were     Pulse Temperature Respirations Pulse Oximetry BMI (Body Mass Index)       86 98.6  F (37  C) (Oral) 17 100% 19.66 kg/m2        Blood Pressure from Last 3 Encounters:   02/15/18 117/80   01/28/18 115/72   01/18/18 120/82    Weight from Last 3 Encounters:   02/15/18 50.3 kg (111 lb)   01/28/18 50.3 kg (110 lb 12.8 oz)   12/20/17 50.7 kg (111 lb 11.2 oz)              We Performed the Following     CBC with platelets differential     CBC with platelets     CRP inflammation     EBV DNA PCR Quantitative Whole Blood     Erythrocyte sedimentation rate auto        Primary Care Provider Office Phone # Fax #    [place] MARIANELA Packerdarius [phone]       [facility] [address] [place] [address]         Equal Access to Services     [facility] : Hadii aad ku hadrenettayves Avivaali, wamableda luqadaha, qaybta kadiamondmani begum . So [facility] [phone].    ATENCIÓN: Si habla lyndon, tiene a borja disposición servicios gratuitos de asistencia lingüística. Antonyame al [phone].    We comply with applicable federal civil rights laws and [place] laws. We do not discriminate on the basis of race, color, national origin, age, disability, sex, sexual orientation, or gender identity.            Thank you!     Thank you for choosing [facility] SPECIAL[facility] PROCEDURE  for your care. Our goal is always to provide you with excellent care. Hearing back from our patients is one way we can continue to improve our services. Please take a few minutes to complete the written survey that you may receive in the mail after your visit with us. Thank you!             Your Updated Medication List - Protect others around you: Learn how to safely use, store and throw away your medicines at www.disposemymeds.org.          This list is accurate as of 2/15/18 10:26 AM.  Always use your most recent med list.                   Brand Name Dispense Instructions for use Diagnosis    budesonide 9 MG 24 hr tablet    UCERIS    30 tablet    Take 1 tablet (9 mg) by mouth every morning    UC (ulcerative colitis) (H)       ENTYVIO IV      Inject 300 mg into the vein every 28 days        GNP ASPIRIN LOW DOSE 81 MG EC tablet   Generic drug:  aspirin     30 tablet    Take 1 tablet (81 mg) by mouth daily    Ulcerative colitis with other complication, unspecified location (H), Liver replaced by transplant (H)       predniSONE 5 MG tablet    DELTASONE    225 tablet    Take 1 tablet (5 mg) by mouth daily    UC (ulcerative colitis) (H), Liver replaced by transplant (H)       sulfamethoxazole-trimethoprim 800-160 MG per tablet    BACTRIM DS/SEPTRA DS    90 tablet    Take 1 tablet by mouth three times a  week Monday, Wednesday, Friday    Immunosuppression (H), Inflammatory bowel disease, PSC (primary sclerosing cholangitis)       * tacrolimus 1 MG capsule    GENERIC EQUIVALENT    360 capsule    Take 2 capsules (2 mg) by mouth 2 times daily *total dose 2.5 mg BID. Take with 0.5 mg capsule    S/P liver transplant (H)       * tacrolimus 0.5 MG capsule    GENERIC EQUIVALENT    180 capsule    Take 1 capsule (0.5 mg) by mouth 2 times daily Take with 1 mg capsule. Total dose 2.5 mg every 12 hours    S/P liver transplant (H)       ursodiol 250 MG tablet    ACTIGALL    270 tablet    Take 2 tabs (500 mg) in AM, and 1 tab at night (250 mg)    Liver replaced by transplant (H)       valACYclovir 1000 mg tablet    VALTREX    21 tablet    Take 1 tablet (1,000 mg) by mouth 3 times daily for 7 days    Herpes zoster without complication       zolpidem 5 MG tablet    AMBIEN    60 tablet    Take 1 tablet (5 mg) by mouth nightly as needed for sleep        * Notice:  This list has 2 medication(s) that are the same as other medications prescribed for you. Read the directions carefully, and ask your doctor or other care provider to review them with you.

## 2018-02-16 LAB
CMV DNA SPEC NAA+PROBE-ACNC: NORMAL [IU]/ML
CMV DNA SPEC NAA+PROBE-LOG#: NORMAL {LOG_IU}/ML
EBV DNA # SPEC NAA+PROBE: ABNORMAL {COPIES}/ML
EBV DNA SPEC NAA+PROBE-LOG#: 5 {LOG_COPIES}/ML
SPECIMEN SOURCE: NORMAL

## 2018-02-20 DIAGNOSIS — K51.90 UC (ULCERATIVE COLITIS) (H): ICD-10-CM

## 2018-02-28 DIAGNOSIS — Z94.4 S/P LIVER TRANSPLANT (H): ICD-10-CM

## 2018-02-28 RX ORDER — TACROLIMUS 0.5 MG/1
0.5 CAPSULE ORAL 2 TIMES DAILY
Qty: 180 CAPSULE | Refills: 3 | Status: SHIPPED | OUTPATIENT
Start: 2018-02-28 | End: 2019-02-20

## 2018-02-28 RX ORDER — TACROLIMUS 1 MG/1
2 CAPSULE ORAL 2 TIMES DAILY
Qty: 120 CAPSULE | Refills: 11 | Status: SHIPPED | OUTPATIENT
Start: 2018-02-28 | End: 2019-02-20

## 2018-03-15 ENCOUNTER — INFUSION THERAPY VISIT (OUTPATIENT)
Dept: INFUSION THERAPY | Facility: CLINIC | Age: 33
End: 2018-03-15
Attending: INTERNAL MEDICINE
Payer: COMMERCIAL

## 2018-03-15 ENCOUNTER — TELEPHONE (OUTPATIENT)
Dept: TRANSPLANT | Facility: CLINIC | Age: 33
End: 2018-03-15

## 2018-03-15 VITALS
OXYGEN SATURATION: 98 % | TEMPERATURE: 97.5 F | DIASTOLIC BLOOD PRESSURE: 81 MMHG | RESPIRATION RATE: 18 BRPM | WEIGHT: 108.2 LBS | SYSTOLIC BLOOD PRESSURE: 124 MMHG | BODY MASS INDEX: 19.17 KG/M2

## 2018-03-15 DIAGNOSIS — E86.0 DEHYDRATION: ICD-10-CM

## 2018-03-15 DIAGNOSIS — A08.39 CMV COLITIS (H): ICD-10-CM

## 2018-03-15 DIAGNOSIS — K52.9 COLITIS: Primary | ICD-10-CM

## 2018-03-15 DIAGNOSIS — B25.9 CMV COLITIS (H): ICD-10-CM

## 2018-03-15 DIAGNOSIS — Z94.4 LIVER REPLACED BY TRANSPLANT (H): Primary | ICD-10-CM

## 2018-03-15 DIAGNOSIS — Z94.4 LIVER REPLACED BY TRANSPLANT (H): ICD-10-CM

## 2018-03-15 LAB
ALBUMIN SERPL-MCNC: 3.2 G/DL (ref 3.4–5)
ALP SERPL-CCNC: 226 U/L (ref 40–150)
ALT SERPL W P-5'-P-CCNC: 85 U/L (ref 0–50)
ANION GAP SERPL CALCULATED.3IONS-SCNC: 6 MMOL/L (ref 3–14)
AST SERPL W P-5'-P-CCNC: 40 U/L (ref 0–45)
BILIRUB DIRECT SERPL-MCNC: 0.1 MG/DL (ref 0–0.2)
BILIRUB SERPL-MCNC: 0.2 MG/DL (ref 0.2–1.3)
BUN SERPL-MCNC: 33 MG/DL (ref 7–30)
CALCIUM SERPL-MCNC: 9.1 MG/DL (ref 8.5–10.1)
CHLORIDE SERPL-SCNC: 107 MMOL/L (ref 94–109)
CO2 SERPL-SCNC: 22 MMOL/L (ref 20–32)
CREAT SERPL-MCNC: 0.9 MG/DL (ref 0.52–1.04)
CRP SERPL-MCNC: 12 MG/L (ref 0–8)
ERYTHROCYTE [DISTWIDTH] IN BLOOD BY AUTOMATED COUNT: 25.8 % (ref 10–15)
ERYTHROCYTE [SEDIMENTATION RATE] IN BLOOD BY WESTERGREN METHOD: 52 MM/H (ref 0–20)
GFR SERPL CREATININE-BSD FRML MDRD: 72 ML/MIN/1.7M2
GLUCOSE SERPL-MCNC: 119 MG/DL (ref 70–99)
HCT VFR BLD AUTO: 43.8 % (ref 35–47)
HGB BLD-MCNC: 13.8 G/DL (ref 11.7–15.7)
MAGNESIUM SERPL-MCNC: 1.6 MG/DL (ref 1.6–2.3)
MCH RBC QN AUTO: 25.6 PG (ref 26.5–33)
MCHC RBC AUTO-ENTMCNC: 31.5 G/DL (ref 31.5–36.5)
MCV RBC AUTO: 81 FL (ref 78–100)
PHOSPHATE SERPL-MCNC: 2.6 MG/DL (ref 2.5–4.5)
PLATELET # BLD AUTO: 580 10E9/L (ref 150–450)
POTASSIUM SERPL-SCNC: 4.2 MMOL/L (ref 3.4–5.3)
PROT SERPL-MCNC: 8.1 G/DL (ref 6.8–8.8)
RBC # BLD AUTO: 5.39 10E12/L (ref 3.8–5.2)
SODIUM SERPL-SCNC: 136 MMOL/L (ref 133–144)
TACROLIMUS BLD-MCNC: 6.3 UG/L (ref 5–15)
TME LAST DOSE: NORMAL H
WBC # BLD AUTO: 9.2 10E9/L (ref 4–11)

## 2018-03-15 PROCEDURE — 80076 HEPATIC FUNCTION PANEL: CPT | Performed by: SURGERY

## 2018-03-15 PROCEDURE — 85027 COMPLETE CBC AUTOMATED: CPT | Performed by: SURGERY

## 2018-03-15 PROCEDURE — 86140 C-REACTIVE PROTEIN: CPT | Performed by: SURGERY

## 2018-03-15 PROCEDURE — 85652 RBC SED RATE AUTOMATED: CPT | Performed by: SURGERY

## 2018-03-15 PROCEDURE — 84100 ASSAY OF PHOSPHORUS: CPT | Performed by: SURGERY

## 2018-03-15 PROCEDURE — 80048 BASIC METABOLIC PNL TOTAL CA: CPT | Performed by: SURGERY

## 2018-03-15 PROCEDURE — 83735 ASSAY OF MAGNESIUM: CPT | Performed by: SURGERY

## 2018-03-15 PROCEDURE — 25000128 H RX IP 250 OP 636: Mod: ZF | Performed by: INTERNAL MEDICINE

## 2018-03-15 PROCEDURE — 80197 ASSAY OF TACROLIMUS: CPT | Performed by: SURGERY

## 2018-03-15 PROCEDURE — 96413 CHEMO IV INFUSION 1 HR: CPT

## 2018-03-15 RX ADMIN — VEDOLIZUMAB 300 MG: 300 INJECTION, POWDER, LYOPHILIZED, FOR SOLUTION INTRAVENOUS at 09:45

## 2018-03-15 NOTE — PATIENT INSTRUCTIONS
Dear Yarelis Penny    Thank you for choosing [facility] ([facility]) for your infusion.  The following information is a summary of our appointment as well as important reminders.      Please refer to your hospital discharge instructions for details on home care services, future appointments, phone numbers, and diet/activity levels.    Additional information: You received Entyvio today. You will return in 1 month for your next infusion.      EDUCATION POST BIOLOGICAL/CHEMOTHERAPY INFUSION  Call the triage nurse at your clinic or seek medical attention if you have chills and/or temperature greater than or equal to 100.5, uncontrolled nausea/vomiting, diarrhea, constipation, dizziness, shortness of breath, chest pain, heart palpitations, weakness or any other new or concerning symptoms, questions or concerns.  You can not have any live virus vaccines prior to or during treatment or up to 6 months post infusion.  If you have an upcoming surgery, medical procedure or dental procedure during treatment, this should be discussed with your ordering physician and your surgeon/dentist.  If you are having any concerning symptom, if you are unsure if you should get your next infusion or wish to speak to a provider before your next infusion, please call your care coordinator or triage nurse at your clinic to notify them so we can adequately serve you.    We look forward in seeing you on your next appointment here at [facility].  Please don t hesitate to call us at [phone] to reschedule any of your appointments or to speak with one of the [facility] registered nurses.  It was a pleasure taking care of you today.    Sincerely,    [facility]  [facility]  [address] [place] [address]  [address]  Phone:  [phone]    EDUCATION POST BIOLOGICAL/CHEMOTHERAPY INFUSION  Call the triage nurse at your clinic or seek medical  attention if you have chills and/or temperature greater than or equal to 100.5, uncontrolled nausea/vomiting, diarrhea, constipation, dizziness, shortness of breath, chest pain, heart palpitations, weakness or any other new or concerning symptoms, questions or concerns.  You can not have any live virus vaccines prior to or during treatment or up to 6 months post infusion.  If you have an upcoming surgery, medical procedure or dental procedure during treatment, this should be discussed with your ordering physician and your surgeon/dentist.  If you are having any concerning symptom, if you are unsure if you should get your next infusion or wish to speak to a provider before your next infusion, please call your care coordinator or triage nurse at your clinic to notify them so we can adequately serve you.

## 2018-03-15 NOTE — MR AVS SNAPSHOT
After Visit Summary   3/15/2018    Yarelis Penny    MRN: [id number]           Patient Information     Date Of Birth          1985        Visit Information        Provider Department      3/15/2018 9:00 AM UC 47 ATC; UC SPEC INFUSION [facility] Specialty [facility] Procedure        Today's Diagnoses     Colitis    -  1    Liver replaced by transplant (H)        Dehydration        CMV colitis (H)          Care Instructions    Dear Yarelis Penny    Thank you for choosing [facility] ([facility]) for your infusion.  The following information is a summary of our appointment as well as important reminders.      Please refer to your hospital discharge instructions for details on home care services, future appointments, phone numbers, and diet/activity levels.    Additional information: You received Entyvio today. You will return in 1 month for your next infusion.      EDUCATION POST BIOLOGICAL/CHEMOTHERAPY INFUSION  Call the triage nurse at your clinic or seek medical attention if you have chills and/or temperature greater than or equal to 100.5, uncontrolled nausea/vomiting, diarrhea, constipation, dizziness, shortness of breath, chest pain, heart palpitations, weakness or any other new or concerning symptoms, questions or concerns.  You can not have any live virus vaccines prior to or during treatment or up to 6 months post infusion.  If you have an upcoming surgery, medical procedure or dental procedure during treatment, this should be discussed with your ordering physician and your surgeon/dentist.  If you are having any concerning symptom, if you are unsure if you should get your next infusion or wish to speak to a provider before your next infusion, please call your care coordinator or triage nurse at your clinic to notify them so we can adequately serve you.    We look forward in seeing you on your next appointment here at [facility].   Please don t hesitate to call us at [phone] to reschedule any of your appointments or to speak with one of the [facility] registered nurses.  It was a pleasure taking care of you today.    Sincerely,    [facility]  Specialty Infusion & Procedure Center  [address] [place] [address]  [id number]  Phone:  [phone]    EDUCATION POST BIOLOGICAL/CHEMOTHERAPY INFUSION  Call the triage nurse at your clinic or seek medical attention if you have chills and/or temperature greater than or equal to 100.5, uncontrolled nausea/vomiting, diarrhea, constipation, dizziness, shortness of breath, chest pain, heart palpitations, weakness or any other new or concerning symptoms, questions or concerns.  You can not have any live virus vaccines prior to or during treatment or up to 6 months post infusion.  If you have an upcoming surgery, medical procedure or dental procedure during treatment, this should be discussed with your ordering physician and your surgeon/dentist.  If you are having any concerning symptom, if you are unsure if you should get your next infusion or wish to speak to a provider before your next infusion, please call your care coordinator or triage nurse at your clinic to notify them so we can adequately serve you.          Follow-ups after your visit        Your next 10 appointments already scheduled     Apr 12, 2018  8:00 AM CDT   Infusion 180 with UC SPEC INFUSION, UC 44 ATC   [facility] Specialty and Procedure ([facility])    [address] [place] [address] [phone]            May 10, 2018  9:00 AM CDT   Infusion 180 with UC SPEC INFUSION, UC 47 ATC   [facility] Specialty and Procedure ([facility])    [address] [place] [address]  [address] [place] [address] [phone]            Jun 07, 2018  9:00 AM CDT   Infusion 180 with UC SPEC INFUSION, UC  47 ATC   [facility] Specialty and Procedure ([facility])    [address]  [address]  [facility] [address] [phone]            Dec 05, 2018  8:00 AM CST   (Arrive by 7:45 AM)   Return Visit with Joya Lopez MD   [facility] and Infectious Diseases ([facility])    [address]  [address]  [facility] [address] [phone]              Who to contact     If you have questions or need follow up information about today's clinic visit or your schedule please contact [facility] SPECIALTY AND PROCEDURE directly at [phone].  Normal or non-critical lab and imaging results will be communicated to you by [employer], letter or phone within 4 business days after the clinic has received the results. If you do not hear from us within 7 days, please contact the clinic through [employer] or phone. If you have a critical or abnormal lab result, we will notify you by phone as soon as possible.  Submit refill requests through [employer] or call your pharmacy and they will forward the refill request to us. Please allow 3 business days for your refill to be completed.          Additional Information About Your Visit        [employer] Information     [employer] gives you secure access to your electronic health record. If you see a primary care provider, you can also send messages to your care team and make appointments. If you have questions, please call your primary care clinic.  If you do not have a primary care provider, please call [phone] and they will assist you.        Care EveryWhere ID     This is your Care EveryWhere ID. This could be used by other organizations to access your [place] medical records  [id number]        Your Vitals Were     Temperature Respirations Pulse Oximetry BMI (Body Mass Index)          97.5  F (36.4  C) (Oral) 18 98% 19.17 kg/m2         Blood Pressure from  Last 3 Encounters:   03/15/18 122/77   02/15/18 117/80   01/28/18 115/72    Weight from Last 3 Encounters:   03/15/18 49.1 kg (108 lb 3.2 oz)   02/15/18 50.3 kg (111 lb)   01/28/18 50.3 kg (110 lb 12.8 oz)              We Performed the Following     Basic metabolic panel     CBC with platelets     Hepatic panel     Magnesium     Phosphorus     Tacrolimus level        Primary Care Provider Office Phone # Fax #    Jackson Orellana [phone]       [facility] [address]  [facility] [address]        Equal Access to Services     [facility]: Hadii venancio Palma, waaxda jeanine, qaybta kaalmada pantera, mani melo . So [facility] [phone].    ATENCIÓN: Si habla español, tiene a borja disposición servicios gratuitos de asistencia lingüística. [facility] [phone].    We comply with applicable federal civil rights laws and [place] laws. We do not discriminate on the basis of race, color, national origin, age, disability, sex, sexual orientation, or gender identity.            Thank you!     Thank you for choosing [facility] SPECIALTY AND PROCEDURE  for your care. Our goal is always to provide you with excellent care. Hearing back from our patients is one way we can continue to improve our services. Please take a few minutes to complete the written survey that you may receive in the mail after your visit with us. Thank you!             Your Updated Medication List - Protect others around you: Learn how to safely use, store and throw away your medicines at www.disposemymeds.org.          This list is accurate as of 3/15/18  9:18 AM.  Always use your most recent med list.                   Brand Name Dispense Instructions for use Diagnosis    budesonide 9 MG 24 hr tablet    UCERIS    30 tablet    Take 1 tablet (9 mg) by mouth every morning    UC (ulcerative colitis) (H)       ENTYVIO IV      Inject 300 mg into the vein every  28 days        GNP ASPIRIN LOW DOSE 81 MG EC tablet   Generic drug:  aspirin     30 tablet    Take 1 tablet (81 mg) by mouth daily    Ulcerative colitis with other complication, unspecified location (H), Liver replaced by transplant (H)       predniSONE 5 MG tablet    DELTASONE    225 tablet    Take 1 tablet (5 mg) by mouth daily    UC (ulcerative colitis) (H), Liver replaced by transplant (H)       sulfamethoxazole-trimethoprim 800-160 MG per tablet    BACTRIM DS/SEPTRA DS    90 tablet    Take 1 tablet by mouth three times a week Monday, Wednesday, Friday    Immunosuppression (H), Inflammatory bowel disease, PSC (primary sclerosing cholangitis)       * tacrolimus 1 MG capsule    GENERIC EQUIVALENT    120 capsule    Take 2 capsules (2 mg) by mouth 2 times daily *total dose 2.5 mg twice daily    S/P liver transplant (H)       * tacrolimus 0.5 MG capsule    GENERIC EQUIVALENT    180 capsule    Take 1 capsule (0.5 mg) by mouth 2 times daily Take with 1 mg capsule. Total dose 2.5 mg every 12 hours    S/P liver transplant (H)       ursodiol 250 MG tablet    ACTIGALL    270 tablet    Take 2 tabs (500 mg) in AM, and 1 tab at night (250 mg)    Liver replaced by transplant (H)       valACYclovir 1000 mg tablet    VALTREX    21 tablet    Take 1 tablet (1,000 mg) by mouth 3 times daily for 7 days    Herpes zoster without complication       zolpidem 5 MG tablet    AMBIEN    60 tablet    Take 1 tablet (5 mg) by mouth nightly as needed for sleep        * Notice:  This list has 2 medication(s) that are the same as other medications prescribed for you. Read the directions carefully, and ask your doctor or other care provider to review them with you.

## 2018-03-15 NOTE — PROGRESS NOTES
Nursing Note  [facility]is Penny [facility] for:   Chief Complaint   Patient presents with     Infusion     entyvio for colitis     During today's [facility] appointment, orders from Dr Johnson were completed.  Frequency: monthly    Progress note:  Patient identification verified by name and date of birth.  Assessment completed.  Vitals recorded in Doc Flowsheets.  Patient was provided with education regarding infusion and possible side effects.  Patient verbalized understanding.      needed: No  Premedications: were not ordered.  Infusion Rates: 560 ml/hr.  Approximate Infusion length:30 minutes.   Labs: were drawn per orders + transplant labs. Message sent to coordinator letting them know transplant labs were drawn. Patient given a copy of her labs.   Vascular access: peripheral IV placed today.  Treatment Conditions:   ~~~ NOTE: If the patient answers yes to any of the questions below, hold the infusion and contact ordering provider or on-call provider.    1. Have you recently had an elevated temperature, fever, chills, productive cough, coughing for 3 weeks or longer or hemoptysis,  abnormal vital signs, night sweats,  chest pain or have you noticed a decrease in your appetite, unexplained weight loss or fatigue? No  2. Do you have any open wounds or new incisions? No  3. Do you have any recent or upcoming hospitalizations, surgeries or dental procedures? No  4. Do you currently have or recently have had any signs of illness or infection or are you on any antibiotics? No  5. Have you had any new, sudden or worsening abdominal pain? No  6. Have you or anyone in your household received a live vaccination in the past 4 weeks? Please note:  No live vaccines while on biologic/chemotherapy until 6 months after the last treatment.  Patient can receive the flu vaccine (shot only) and the pneumovax.  It is optimal for the patient to get these  vaccines mid cycle, but they can be given at any time as long as it is not on the day of the infusion. No  7. Have you recently been diagnosed with any new nervous system diseases (ie. Multiple sclerosis, Guillain [place], seizures, neurological changes) or cancer diagnosis? Are you on any form of radiation or chemotherapy? No  8. Are you pregnant or breast feeding or do you have plans of pregnancy in the future? No  9. Have you been having any signs of worsening depression or suicidal ideations?  (benlysta only) No  10. Have there been any other new onset medical symptoms? No    Patient tolerated infusion: well.    Drug Waste Record? No     Discharge Plan:   Follow up plan of care with: ongoing infusions at [facility].  Discharge instructions were reviewed with patient.  Patient/representative verbalized understanding of discharge instructions and all questions answered.  Patient discharged from [facility] in stable condition.    Adelia Mae RN        /81  Temp 97.5  F (36.4  C) (Oral)  Resp 18  Wt 49.1 kg (108 lb 3.2 oz)  SpO2 98%  BMI 19.17 kg/m2

## 2018-03-16 ENCOUNTER — TELEPHONE (OUTPATIENT)
Dept: TRANSPLANT | Facility: CLINIC | Age: 33
End: 2018-03-16

## 2018-03-16 NOTE — TELEPHONE ENCOUNTER
March 16, 2018: I spoke with Yarelis who is willing to come for her MRCP on Thursday, 3/22 at 3pm (at the Hospital). I reminded her of the six-hour fasting requirement.    Milka Martina  Transplant   [phone]    Message  Received: Today       Agnieszka Ramires, RN  [email or web address]       Can you please set Yarelis up with MRCP sooner rather than later.           Telephone  3/15/2018       Agnieszka Ramires, RN [facility] Solid Organ Transplant Encounter Summary       Diagnosis       Liver Replaced By Transplant (h) (Primary)              Orders Signed This Encounter (2)      Hepatic panel        MRI Abdomen w & w/o contrast mrcp

## 2018-03-16 NOTE — TELEPHONE ENCOUNTER
Spoke with patient. Patient will repeat labs next week and understands need to schedule for MRCP.  to contact patient.

## 2018-03-19 ENCOUNTER — TELEPHONE (OUTPATIENT)
Dept: TRANSPLANT | Facility: CLINIC | Age: 33
End: 2018-03-19

## 2018-03-19 NOTE — TELEPHONE ENCOUNTER
Discussed with patient that I will discuss with Dr Johnson as she prefers, but we usually will await MRCP results. She may not need an ERCP.

## 2018-03-19 NOTE — TELEPHONE ENCOUNTER
Yarelis called in regards to her MRI scheduled this Thursday 3/22 at 3:00pm. She would like to also have an ERCP scheduled that day if possible. Please call back to discuss.

## 2018-03-21 ENCOUNTER — RADIANT APPOINTMENT (OUTPATIENT)
Dept: MRI IMAGING | Facility: CLINIC | Age: 33
End: 2018-03-21
Attending: INTERNAL MEDICINE
Payer: COMMERCIAL

## 2018-03-21 DIAGNOSIS — Z94.4 LIVER REPLACED BY TRANSPLANT (H): ICD-10-CM

## 2018-03-21 RX ORDER — GADOBUTROL 604.72 MG/ML
7.5 INJECTION INTRAVENOUS ONCE
Status: DISCONTINUED | OUTPATIENT
Start: 2018-03-21 | End: 2018-03-21 | Stop reason: CLARIF

## 2018-03-21 NOTE — DISCHARGE INSTRUCTIONS
MRI Contrast Discharge Instructions    The IV contrast you received today will pass out of your body in your  urine. This will happen in the next 24 hours. You will not feel this process.  Your urine will not change color.    Drink at least 4 extra glasses of water or juice today (unless your doctor  has restricted your fluids). This reduces the stress on your kidneys.  You may take your regular medicines.    If you are on dialysis: It is best to have dialysis today.    If you have a reaction: Most reactions happen right away. If you have  any new symptoms after leaving the hospital (such as hives or swelling),  call your hospital at the correct number below. Or call your family doctor.  If you have breathing distress or wheezing, call 911.    Special instructions: ***    I have read and understand the above information.    Signature:______________________________________ Date:___________    Staff:__________________________________________ Date:___________     Time:__________    [place] [facility] Departments:    ___[place]: [phone]  ___[facility]: [phone]  ___[place]: [phone] ___[facility]: [phone]  ___[facility]: [phone]  ___[facility]: [phone]  ___[phone]  ___[facility]: [phone]  ___[phone]

## 2018-03-28 ENCOUNTER — TELEPHONE (OUTPATIENT)
Dept: TRANSPLANT | Facility: CLINIC | Age: 33
End: 2018-03-28

## 2018-03-29 ENCOUNTER — ALLIED HEALTH/NURSE VISIT (OUTPATIENT)
Dept: SURGERY | Facility: CLINIC | Age: 33
End: 2018-03-29
Payer: COMMERCIAL

## 2018-03-29 ENCOUNTER — APPOINTMENT (OUTPATIENT)
Dept: SURGERY | Facility: CLINIC | Age: 33
End: 2018-03-29
Payer: COMMERCIAL

## 2018-03-29 ENCOUNTER — ANESTHESIA EVENT (OUTPATIENT)
Dept: SURGERY | Facility: CLINIC | Age: 33
End: 2018-03-29
Payer: COMMERCIAL

## 2018-03-29 ENCOUNTER — OFFICE VISIT (OUTPATIENT)
Dept: SURGERY | Facility: CLINIC | Age: 33
End: 2018-03-29
Payer: COMMERCIAL

## 2018-03-29 VITALS
DIASTOLIC BLOOD PRESSURE: 83 MMHG | SYSTOLIC BLOOD PRESSURE: 126 MMHG | HEART RATE: 70 BPM | TEMPERATURE: 98.1 F | OXYGEN SATURATION: 98 % | RESPIRATION RATE: 16 BRPM | WEIGHT: 110.6 LBS | HEIGHT: 63 IN | BODY MASS INDEX: 19.6 KG/M2

## 2018-03-29 DIAGNOSIS — Z01.818 PRE-OPERATIVE GENERAL PHYSICAL EXAMINATION: Primary | ICD-10-CM

## 2018-03-29 DIAGNOSIS — R89.9 ABNORMAL LABORATORY TEST: ICD-10-CM

## 2018-03-29 RX ORDER — CALCIUM CARBONATE 500(1250)
1 TABLET ORAL EVERY EVENING
COMMUNITY

## 2018-03-29 RX ORDER — MULTIPLE VITAMINS W/ MINERALS TAB 9MG-400MCG
1 TAB ORAL EVERY MORNING
COMMUNITY
End: 2021-05-21

## 2018-03-29 NOTE — MR AVS SNAPSHOT
After Visit Summary   3/29/2018    Yarelis Penny    MRN: [id number]           Patient Information     Date Of Birth          1985        Visit Information        Provider Department      3/29/2018 3:30 PM Rn, [facility]        Care Instructions    Preparing for Your Surgery      Name:  Yarelis Penny   MRN:  [id number]   :  1985   Today's Date:  3/29/2018     Arriving for surgery:  Surgery date:    Arrival time:  7:55AM  Please come to:       [facility] [address]  [address] [place]  [address]    -  [employer] parking is available in front of the hospital from 5:15 am to 8:00 pm    -  Stop at the Information Desk in the lobby    -   Inform the information person that you are here for surgery. An escort to 3c will be provided. If you would not like an escort, please proceed to 3C on the 3rd floor. [phone]     What can I eat or drink?  -  You may have solid food or milk products until 8 hours prior to your surgery--do not eat food after midnight on the night before surgery   -  You may have water, apple juice or 7up/Sprite until 2 hours prior to your surgery--OK to have CLEAR liquids until 7:55AM on the day of surgery     Which medicines can I take?  -  Do NOT take these medications in the morning, the day of surgery:  Do not take aspirin for 5 days prior to procedure     -  Please take these medications the day of surgery:  Do take you morning medications on the day of this procedure     How do I prepare myself?  -  Take two showers: one the night before surgery; and one the morning of surgery.         Use Scrubcare or Hibiclens to wash from neck down.  You may use your own shampoo and conditioner. No other hair products.   -  Do NOT use lotion, powder, deodorant, or antiperspirant the day of your surgery.  -  Do NOT wear any makeup, fingernail polish or jewelry.  -Do not bring your own medications to the  hospital, except for inhalers and eye drops.  -  Bring your ID and insurance card.    Questions or Concerns:  If you have questions or concerns regarding the day of surgery, please call the Preoperative Assessment Center (PAC), Monday-Friday 7AM-7PM:  [phone].  After surgery please call your surgeons office.             AFTER YOUR SURGERY  Breathing exercises   Breathing exercises help you recover faster. Take deep breaths and let the air out slowly. This will:     Help you wake up after surgery.    Help prevent complications like pneumonia.  Preventing complications will help you go home sooner.   We may give you a breathing device (incentive spirometer) to encourage you to breathe deeply.   Nausea and vomiting   You may feel sick to your stomach after surgery; if so, let your nurse know.    Pain control:  After surgery, you may have pain. Our goal is to help you manage your pain. Pain medicine will help you feel comfortable enough to do activities that will help you heal.  These activities may include breathing exercises, walking and physical therapy.   To help your health care team treat your pain we will ask: 1) If you have pain  2) where it is located 3) describe your pain in your words  Methods of pain control include medications given by mouth, vein or by nerve block for some surgeries.  We may give you a pain control pump that will:  1) Deliver the medicine through a tube placed in your vein  2) Control the amount of medicine you receive  3) Allow you to push a button to deliver a dose of pain medicine  Sequential Compression Device (SCD) or Pneumo Boots:  You may need to wear SCD S on your legs or feet. These are wraps connected to a machine that pumps in air and releases it. The repeated pumping helps prevent blood clots from forming.           Follow-ups after your visit        Your next 10 appointments already scheduled     Mar 29, 2018  3:50 PM CDT   (Arrive by 3:35 PM)   PAC Anesthesia Consult  with Uc Pac Anesthesiologist   [facility] ([facility])    [address]  4th Floor  [facility] [id number] [phone]            Mar 29, 2018  4:00 PM CDT   LAB with UC LAB   [facility] Lab ([facility])    [address]  1st Floor  [facility] [id number] [phone]           Please do not eat 10-12 hours before your appointment if you are coming in fasting for labs on lipids, cholesterol, or glucose (sugar). This does not apply to pregnant women. Water, hot tea and black coffee (with nothing added) are okay. Do not drink other fluids, diet soda or chew gum.            Apr 02, 2018   Procedure with Guru Brittany Macias MD   [facility], [place], Same Day Surgery (--)    [address] [address] [phone]            Apr 12, 2018  8:00 AM CDT   Infusion 180 with UC SPEC INFUSION, UC 44 ATC   [facility] Specialty [facility] Procedure ([facility])    [address]  [address]  [facility] [id number] [phone]            May 10, 2018  9:00 AM CDT   Infusion 180 with UC SPEC INFUSION, UC 47 ATC   [facility] ([facility])    [address]  [address]  [facility] [id number] [phone]            Jun 07, 2018  9:00 AM CDT   Infusion 180 with UC SPEC INFUSION, UC 47 ATC   [facility] Procedure ([facility])    [address]  [address]  [facility] [address] [phone]            Dec 05, 2018  8:00 AM CST   (Arrive by 7:45 AM)   Return Visit with Joya Lopez MD   [facility] and Infectious Diseases ([facility])    [address]  [address]  [facility] [id number] [phone]              Who to contact     Please call your  clinic at [phone] to:    Ask questions about your health    Make or cancel appointments    Discuss your medicines    Learn about your test results    Speak to your doctor            Additional Information About Your Visit        [employer] Information     [employer] gives you secure access to your electronic health record. If you see a primary care provider, you can also send messages to your care team and make appointments. If you have questions, please call your primary care clinic.  If you do not have a primary care provider, please call [phone] and they will assist you.      [employer] is an electronic gateway that provides easy, online access to your medical records. With [employer], you can request a clinic appointment, read your test results, renew a prescription or communicate with your care team.     To access your existing account, please contact your [facility] or call [phone] for assistance.        Care EveryWhere ID     This is your Care EveryWhere ID. This could be used by other organizations to access your [place] medical records  [id number]         Blood Pressure from Last 3 Encounters:   03/29/18 126/83   03/15/18 124/81   02/15/18 117/80    Weight from Last 3 Encounters:   03/29/18 50.2 kg (110 lb 9.6 oz)   03/15/18 49.1 kg (108 lb 3.2 oz)   02/15/18 50.3 kg (111 lb)              Today, you had the following     No orders found for display         Today's Medication Changes          These changes are accurate as of 3/29/18  3:38 PM.  If you have any questions, ask your nurse or doctor.               These medicines have changed or have updated prescriptions.        Dose/Directions    GNP ASPIRIN LOW DOSE 81 MG EC tablet   This may have changed:  when to take this   Used for:  Ulcerative colitis with other complication, unspecified location (H), Liver replaced by transplant (H)   Generic drug:  aspirin        Dose:  81 mg   Take 1 tablet (81 mg) by mouth daily    Quantity:  30 tablet   Refills:  1       predniSONE 5 MG tablet   Commonly known as:  DELTASONE   This may have changed:  when to take this   Used for:  UC (ulcerative colitis) (H), Liver replaced by transplant (H)        Dose:  5 mg   Take 1 tablet (5 mg) by mouth daily   Quantity:  225 tablet   Refills:  3       * tacrolimus 1 MG capsule   Commonly known as:  GENERIC EQUIVALENT   This may have changed:    - how much to take  - additional instructions   Used for:  S/P liver transplant (H)        Dose:  2 mg   Take 2 capsules (2 mg) by mouth 2 times daily *total dose 2.5 mg twice daily   Quantity:  120 capsule   Refills:  11       * tacrolimus 0.5 MG capsule   Commonly known as:  GENERIC EQUIVALENT   This may have changed:    - how much to take  - additional instructions   Used for:  S/P liver transplant (H)        Dose:  0.5 mg   Take 1 capsule (0.5 mg) by mouth 2 times daily Take with 1 mg capsule. Total dose 2.5 mg every 12 hours   Quantity:  180 capsule   Refills:  3       * Notice:  This list has 2 medication(s) that are the same as other medications prescribed for you. Read the directions carefully, and ask your doctor or other care provider to review them with you.             Primary Care Provider Office Phone # Fax #    Jackson Orellana [phone]       [place] [address] [address]AR [address] [address]        Equal Access to Services     ERI CHAKRABORTY AH: Hadii venancio hart Soyari, waaxda luqadaha, qaybta kaalmada adeegyada, mani melo . So [facility] [phone].    ATENCIÓN: Si habla español, tiene a borja disposición servicios gratuitos de asistencia lingüística. Llame al [phone].    We comply with applicable federal civil rights laws and [place] laws. We do not discriminate on the basis of race, color, national origin, age, disability, sex, sexual orientation, or gender identity.            Thank you!     Thank you for choosing [facility]  for your care. Our goal is always to provide you with excellent care. Hearing back from our patients is one way we can continue to improve our services. Please take a few minutes to complete the written survey that you may receive in the mail after your visit with us. Thank you!             Your Updated Medication List - Protect others around you: Learn how to safely use, store and throw away your medicines at www.disposemymeds.org.          This list is accurate as of 3/29/18  3:38 PM.  Always use your most recent med list.                   Brand Name Dispense Instructions for use Diagnosis    budesonide 9 MG 24 hr tablet    UCERIS    30 tablet    Take 1 tablet (9 mg) by mouth every morning    UC (ulcerative colitis) (H)       calcium carbonate 1250 MG tablet    OS-KERLINE 500 mg [place]. Ca     Take 1 tablet by mouth every evening        ENTYVIO IV      Inject 300 mg into the vein every 28 days        GNP ASPIRIN LOW DOSE 81 MG EC tablet   Generic drug:  aspirin     30 tablet    Take 1 tablet (81 mg) by mouth daily    Ulcerative colitis with other complication, unspecified location (H), Liver replaced by transplant (H)       Multi-vitamin Tabs tablet      Take 1 tablet by mouth every morning        predniSONE 5 MG tablet    DELTASONE    225 tablet    Take 1 tablet (5 mg) by mouth daily    UC (ulcerative colitis) (H), Liver replaced by transplant (H)       sulfamethoxazole-trimethoprim 800-160 MG per tablet    BACTRIM DS/SEPTRA DS    90 tablet    Take 1 tablet by mouth three times a week Monday, Wednesday, Friday    Immunosuppression (H), Inflammatory bowel disease, PSC (primary sclerosing cholangitis)       * tacrolimus 1 MG capsule    GENERIC EQUIVALENT    120 capsule    Take 2 capsules (2 mg) by mouth 2 times daily *total dose 2.5 mg twice daily    S/P liver transplant (H)       * tacrolimus 0.5 MG capsule    GENERIC EQUIVALENT    180 capsule    Take 1 capsule (0.5 mg) by mouth 2 times  daily Take with 1 mg capsule. Total dose 2.5 mg every 12 hours    S/P liver transplant (H)       ursodiol 250 MG tablet    ACTIGALL    270 tablet    Take 2 tabs (500 mg) in AM, and 1 tab at night (250 mg)    Liver replaced by transplant (H)       VITAMIN D (CHOLECALCIFEROL) PO      Take by mouth every evening        * Notice:  This list has 2 medication(s) that are the same as other medications prescribed for you. Read the directions carefully, and ask your doctor or other care provider to review them with you.

## 2018-03-29 NOTE — PATIENT INSTRUCTIONS
Preparing for Your Surgery      Name:  Yarelis Penny   MRN:  [id number]   :  1985   Today's Date:  3/29/2018     Arriving for surgery:  Surgery date:    Arrival time:  7:55AM  Please come to:       [facility] [address]  [address] [place]  [address]    -   parking is available in front of the hospital from 5:15 am to 8:00 pm    -  Stop at the Information Desk in the lobby    -   Inform the information person that you are here for surgery. An escort to 3c will be provided. If you would not like an escort, please proceed to 3C on the 3rd floor. [phone]     What can I eat or drink?  -  You may have solid food or milk products until 8 hours prior to your surgery--do not eat food after midnight on the night before surgery   -  You may have water, apple juice or 7up/Sprite until 2 hours prior to your surgery--OK to have CLEAR liquids until 7:55AM on the day of surgery     Which medicines can I take?  -  Do NOT take these medications in the morning, the day of surgery:  Do not take aspirin for 5 days prior to procedure     -  Please take these medications the day of surgery:  Do take your morning medications on the day of this procedure     How do I prepare myself?  -  Take two showers: one the night before surgery; and one the morning of surgery.         Use Scrubcare or Hibiclens to wash from neck down.  You may use your own shampoo and conditioner. No other hair products.   -  Do NOT use lotion, powder, deodorant, or antiperspirant the day of your surgery.  -  Do NOT wear any makeup, fingernail polish or jewelry.  -Do not bring your own medications to the hospital, except for inhalers and eye drops.  -  Bring your ID and insurance card.    Questions or Concerns:  If you have questions or concerns regarding the day of surgery, please call the Preoperative Assessment Center (PAC), Monday-Friday 7AM-7PM:  [phone].  After surgery please call your surgeons  office.             AFTER YOUR SURGERY  Breathing exercises   Breathing exercises help you recover faster. Take deep breaths and let the air out slowly. This will:     Help you wake up after surgery.    Help prevent complications like pneumonia.  Preventing complications will help you go home sooner.   We may give you a breathing device (incentive spirometer) to encourage you to breathe deeply.   Nausea and vomiting   You may feel sick to your stomach after surgery; if so, let your nurse know.    Pain control:  After surgery, you may have pain. Our goal is to help you manage your pain. Pain medicine will help you feel comfortable enough to do activities that will help you heal.  These activities may include breathing exercises, walking and physical therapy.   To help your health care team treat your pain we will ask: 1) If you have pain  2) where it is located 3) describe your pain in your words  Methods of pain control include medications given by mouth, vein or by nerve block for some surgeries.  We may give you a pain control pump that will:  1) Deliver the medicine through a tube placed in your vein  2) Control the amount of medicine you receive  3) Allow you to push a button to deliver a dose of pain medicine  Sequential Compression Device (SCD) or Pneumo Boots:  You may need to wear SCD S on your legs or feet. These are wraps connected to a machine that pumps in air and releases it. The repeated pumping helps prevent blood clots from forming.

## 2018-03-29 NOTE — ANESTHESIA PREPROCEDURE EVALUATION
Anesthesia Evaluation     . Pt has had prior anesthetic. Type: General and MAC    No history of anesthetic complications          ROS/MED HX    ENT/Pulmonary: Comment: 05/15/17; 0806; Mask Ventilation: Easy; Ease of Intubation: Easy; Airway Size: 6.5;  Cuffed;  Oral;  Blade Type: Cohen;  Blade Size: 2;  Place by: kale;  Insertion Attempts: 1;  Secured at (cm)to lip: 20 cm;  Breath Sounds: Equal, clear and bilateral;  End Tidal CO2: Present;  Dentition: Intact;  Grade View of Cords: 1 - neg pulmonary ROS    (-) asthma   Neurologic:  - neg neurologic ROS     Cardiovascular: Comment: TTE 10/2014  Nl LVEF 65%  RV nl  Mild bilateral atrial dilation  Mod MR  Mod to severe TR    (+) Dyslipidemia, ----. Taking blood thinners Pt has not received instructions: . . . :. valvular problems/murmurs type: MR . Previous cardiac testing Echodate:10/2014results:date: results:ECG reviewed date:3/29/2016 results:SR 98 BPM date: results:          METS/Exercise Tolerance: Comment: Work out video at home.  Lift weights at gym.  Walks on nice days. 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      Psychiatric:  - neg psychiatric ROS       Infectious Disease: Comment: H/o CMV viremia with h/o CMV colitis.    Shingles 2-3 months ago; resolving after course of acyclovir        Malignancy:      - no malignancy   Other:    (+) C-spine cleared: N/A, no H/O Chronic Pain,no other  significant disability                              PAC Discussion and Assessment    ASA Classification: 3  Case is suitable for:   Anesthetic techniques and relevant risks discussed: GA  Invasive monitoring and risk discussed: No  Types:   Possibility and Risk of blood transfusion discussed:   NPO instructions given:   Additional anesthetic preparation and risks discussed:   Needs early admission to pre-op area:   Other:     PAC Resident/NP Anesthesia Assessment:  Yarelis Penny is a 32 year old female scheduled for Endoscopic Retrograde Cholangiopancreatogram with Dr. Macias on 4/2/18, in diagnosis of elevated alk phos, ALT and CRP as well as abnormal findings on MRCP.  Ms. 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  PAC referral for risk assessment and optimization of anesthesia with comorbid conditions of:   liver transplant recipient, Ulcerative colitis; h/o CMV viremia with h/o CMV colitis; h/o anemia; steroid use/immunosuppression; h/o blood transfusion and CKD.   Pre-operative considerations include:  1.) Cardiac: Functional status independent. Exercise tolerance > 4 METS.   RCRI : No serious cardiac risks.  0.4 % risk of major adverse cardiac event.   History of murmur>>10/2014 ECHO: moderate MR ; Mild biatrial dilatation; Multiple mitral valve regurgitant jets; probably moderate MR;  EF 65% No RWMA  Denies any cardiac symptoms.   Hold ASA for 5 days prior to procedure.  2.) Pulmonary - no smoking history.  Denies pulmonary hx, sx or meds.  TAVARES risks: low  3.) Hematology - Anemia, taking iron replacement -CBC 3/15/18 HGB 13. History of blood transfusion > 5 years ago.  4.) GI - Ulcerative colitis (UC) associated with primary sclerosing cholangitis (PSC). ulcerative colitis flare this past December 2016, treated with prednisone and vedolizumab infusions.   Liver transplant 6/2015 for PSC and autoimmune hepatitis. Hx esophageal varices/ banding.  "Tacrolimus and  Prednisone 5 mg for post-transplant immunosuppression.  Hx CMV viremia and CMV colitis-  -followed by Dr. Nails.    Followed by Cresencio Sierra and Dr. Johnson for IBD and Dr. Nails for ID. There are notes from 12/2018 in the  EMR  Consider stress dose steroids.     5.) Renal - history of CKD related to medication: Cr 0.90, GFR 72.  ANesthesia: Patient has had multiple ERCPs, please see Dr. Castro's note from last ERCP....which reflects: \" thinks she does best with no gas, if gas used, would opt for sevo as she had one awakening with a lot of airway irritation (most anesthetics were sevo, but one was is, so that may be the culprit). Often has pain after, depending on the size of stents required\".  Discussed with but not seen by Dr. Castro.    Reviewed and Signed by PAC Mid-Level Provider/Resident  Mid-Level Provider/Resident: DANISHA Mccall  Date: 3/30/18  Time: 4:25 PM    Attending Anesthesiologist Anesthesia Assessment:  32 year old for ERCP in diagnosis of elevated alk phos, CRP in the setting of liver tranplant 2015 (sclerosing cholangitis, hepatitis). As noted, moderate MR.     Patient/case discussed with DOTTY. No need to see patient. Patient is appropriate for the planned procedure without further work-up or medical management.      Reviewed and Signed by PAC Anesthesiologist  Anesthesiologist: sarthak  Date: 3/29/2018  Time:   Pass/Fail: Pass  Disposition:     PAC Pharmacist Assessment:        Pharmacist:   Date:   Time:      Anesthesia Plan      History & Physical Review  History and physical reviewed and following examination; no interval change.    ASA Status:  3 .        Plan for General and ETT with Intravenous and Propofol induction. Maintenance will be Balanced.    PONV prophylaxis:  Ondansetron (or other 5HT-3) and Dexamethasone or Solumedrol       Postoperative Care  Postoperative pain management:  Multi-modal analgesia.      Consents                          .  "

## 2018-03-29 NOTE — H&P
Pre-Operative H & P     CC:  Preoperative exam to assess for increased cardiopulmonary risk while undergoing surgery and anesthesia.    Date of Encounter: 3/29/2018  Primary Care Physician:  Jackson Orellana  Yarelis Penny is a 32 year old female who presents for pre-operative H & P in preparation for Endoscopic Retrograde Cholangiopancreatogram with Dr. Macias on 4/2/18, at [facility]. Ms. 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 She is followed in [facility] GI and ID clinic.   She has had an increase in labs: elevated alk phos, ALT and CRP as well as abnormal findings on MRCP.    History is obtained from the patient. She is in her usual state of health and denies fever, chills, rash, dysuria, diarrhea, joint pain, abdominal pain.    Past Medical History  Past Medical History:   Diagnosis Date     Autoimmune hepatitis (H) 2012     Primary sclerosing cholangitis      CKD (chronic kidney disease) 2012    biopsy 2012: TIN, patchy fibrosis     Esophageal varices (H) prior to liver transplant 9/08    banded    Liver transplant recipient     Mitral regurgitation     Tricuspid regurgitation      Heart murmur      History of blood transfusion      Ulcerative Colitis     f/b GI       Past Surgical History  Past Surgical History:   Procedure Laterality Date     COLONOSCOPY  9/07     COLONOSCOPY N/A 2/26/2015    Procedure: COMBINED COLONOSCOPY, SINGLE OR MULTIPLE BIOPSY/POLYPECTOMY BY BIOPSY;  Surgeon: Mitchel Hoskins Chi, MD;  Location:  GI     COLONOSCOPY N/A 1/12/2016    Procedure: COMBINED COLONOSCOPY, SINGLE OR MULTIPLE BIOPSY/POLYPECTOMY BY BIOPSY;  Surgeon: Rigo Valles MD;  Location:  GI     COLONOSCOPY N/A 4/1/2016    Procedure: COMBINED COLONOSCOPY, SINGLE OR MULTIPLE BIOPSY/POLYPECTOMY BY BIOPSY;  Surgeon: Kareem Solis MD;  Location:  GI     COLONOSCOPY N/A 9/5/2017    Procedure:  COMBINED COLONOSCOPY, SINGLE OR MULTIPLE BIOPSY/POLYPECTOMY BY BIOPSY;  Colonoscopy;  Surgeon: Fuentes Johnson MD;  Location: [facility]     ENDOSCOPIC RETROGRADE CHOLANGIOPANCREATOGRAM N/A 6/25/2015    Procedure: COMBINED ENDOSCOPIC RETROGRADE CHOLANGIOPANCREATOGRAPHY, PLACE TUBE/STENT;  Surgeon: Landon Quinones MD;  Location: [facility] OR     ENDOSCOPIC RETROGRADE CHOLANGIOPANCREATOGRAM N/A 6/25/2015    Procedure: COMBINED ENDOSCOPIC RETROGRADE CHOLANGIOPANCREATOGRAPHY, PLACE TUBE/STENT;  Surgeon: Landon Quinones MD;  Location: [facility]     ENDOSCOPIC RETROGRADE CHOLANGIOPANCREATOGRAM N/A 7/2/2015    Procedure: COMBINED ENDOSCOPIC RETROGRADE CHOLANGIOPANCREATOGRAPHY, PLACE TUBE/STENT;  Surgeon: Landon Quinones MD;  [facility]     ENDOSCOPIC RETROGRADE CHOLANGIOPANCREATOGRAM N/A 9/8/2015    Procedure: COMBINED ENDOSCOPIC RETROGRADE CHOLANGIOPANCREATOGRAPHY, REMOVE FOREIGN BODY OR STENT/TUBE;  Surgeon: Landon Quinones MD;  [facility]     ENDOSCOPIC RETROGRADE CHOLANGIOPANCREATOGRAM N/A 12/8/2015    Procedure: ENDOSCOPIC RETROGRADE CHOLANGIOPANCREATOGRAM;  Surgeon: Landon Quinones MD;  [facility]     ENDOSCOPIC RETROGRADE CHOLANGIOPANCREATOGRAM N/A 3/1/2016    Procedure: COMBINED ENDOSCOPIC RETROGRADE CHOLANGIOPANCREATOGRAPHY, REMOVE FOREIGN BODY OR STENT/TUBE;  Surgeon: Landon Quinones MD;  [facility]     ENDOSCOPIC RETROGRADE CHOLANGIOPANCREATOGRAM N/A 3/20/2017    Procedure: COMBINED ENDOSCOPIC RETROGRADE CHOLANGIOPANCREATOGRAPHY, PLACE TUBE/STENT;  Endoscopic Retrograde Cholangiopancreatogram with Ballon Dilation, Stent Placement;  Surgeon: Guru Brittany Macias MD;  [facility]     ENDOSCOPIC RETROGRADE CHOLANGIOPANCREATOGRAPHY, EXCHANGE TUBE/STENT N/A 7/30/2015    Procedure: ENDOSCOPIC RETROGRADE CHOLANGIOPANCREATOGRAPHY, EXCHANGE TUBE/STENT;  Surgeon: Landon Quinones MD;  [facility]     ENDOSCOPIC RETROGRADE  CHOLANGIOPANCREATOGRAPHY, EXCHANGE TUBE/STENT N/A 5/15/2017    Procedure: ENDOSCOPIC RETROGRADE CHOLANGIOPANCREATOGRAPHY, EXCHANGE TUBE/STENT;  Endoscopic Retrograde Cholangiopancreatogram with biliary stent exchange and balloon dilation;  Surgeon: Guru Brittany Macias MD;  [facility]     ESOPHAGOSCOPY, GASTROSCOPY, DUODENOSCOPY (EGD), COMBINED N/A 2/26/2015    Procedure: COMBINED ESOPHAGOSCOPY, GASTROSCOPY, DUODENOSCOPY (EGD);  Surgeon: Mitchel Hoskins Chi, MD;  Location: [facility]     EXPLORE COMMON BILE DUCT N/A 6/25/2015    Procedure: EXPLORE COMMON BILE DUCT;  Surgeon: Tyree Smith MD;  [facility]     LAPAROTOMY EXPLORATORY N/A 6/25/2015    Procedure: LAPAROTOMY EXPLORATORY;  Surgeon: Tyree Smith MD;  [facility]     PICC INSERTION Right 2/27/2015    4fr SL Valved PICC, 36cm (1cm external) in the R medial brachial vein w/ tip in the low SVC.     SIGMOIDOSCOPY FLEXIBLE N/A 4/27/2016    Procedure: SIGMOIDOSCOPY FLEXIBLE;  Surgeon: Jose Nielson MD;  Location: [facility]     TRANSPLANT LIVER RECIPIENT LIVING UNRELATED N/A 6/18/2015    Procedure: TRANSPLANT LIVER RECIPIENT LIVING UNRELATED;  Surgeon: Tyree Smith MD;  Location: [facility] OR     UPPER GI ENDOSCOPY         Hx of Blood transfusions/reactions: yes, several times, none in past 5 years     Hx of abnormal bleeding or anti-platelet use: on ASA    Menstrual history: No LMP recorded. Patient is not currently having periods (Reason: Irregular Periods).: LMP 2/17/18    Steroid use in the last year: yes chronic use    Personal or FH with difficulty with Anesthesia:  no    Prior to Admission Medications  Current Outpatient Prescriptions   Medication Sig Dispense Refill     calcium carbonate (OS-KERLINE 500 MG [place]. [place]) 1250 MG tablet Take 1 tablet by mouth every evening       VITAMIN D, CHOLECALCIFEROL, PO Take by mouth every evening       multivitamin, therapeutic with minerals (MULTI-VITAMIN) TABS tablet Take 1 tablet by mouth every  morning       tacrolimus (GENERIC EQUIVALENT) 1 MG capsule Take 2 capsules (2 mg) by mouth 2 times daily *total dose 2.5 mg twice daily (Patient taking differently: Take 2.5 mg by mouth 2 times daily *total dose 2.5 mg twice daily) 120 capsule 11     tacrolimus (GENERIC EQUIVALENT) 0.5 MG capsule Take 1 capsule (0.5 mg) by mouth 2 times daily Take with 1 mg capsule. Total dose 2.5 mg every 12 hours (Patient taking differently: Take 2.5 mg by mouth 2 times daily Take with 1 mg capsule. Total dose 2.5 mg every 12 hours) 180 capsule 3     budesonide (UCERIS) 9 MG 24 hr tablet Take 1 tablet (9 mg) by mouth every morning 30 tablet 3     ursodiol (ACTIGALL) 250 MG tablet Take 2 tabs (500 mg) in AM, and 1 tab at night (250 mg) 270 tablet 3     predniSONE (DELTASONE) 5 MG tablet Take 1 tablet (5 mg) by mouth daily (Patient taking differently: Take 5 mg by mouth every morning ) 225 tablet 3     sulfamethoxazole-trimethoprim (BACTRIM DS/SEPTRA DS) 800-160 MG per tablet Take 1 tablet by mouth three times a week Monday, Wednesday, Friday 90 tablet 3     Vedolizumab (ENTYVIO IV) Inject 300 mg into the vein every 28 days       GNP ASPIRIN LOW DOSE 81 MG EC tablet Take 1 tablet (81 mg) by mouth daily (Patient taking differently: Take 81 mg by mouth every morning ) 30 tablet 1       Allergies  Allergies   Allergen Reactions     Rifampin Other (See Comments)     Kidney failure     Penicillins      Hives, has tolerated amoxicillin         Social History  Social History     Social History     Marital status:      Spouse name: Ceferino     Number of children: 0     Occupational History     unemployed      Social History Main Topics     Smoking status: Never Smoker     Smokeless tobacco: Never Used     Alcohol use 0.0 oz/week     0 Standard drinks or equivalent per week      Comment: Rare     Drug use: No     Sexual activity: Not Currently     Partners: Male      Comment: denies pregnancy       Social History Narrative   Lives with  "her     Family History  Family History   Problem Relation Age of Onset     Hypertension, atrial fibrillation, TAVARES Mother      Lipids Mother      Hypertension Maternal Grandmother      Alzheimer Disease Maternal Grandmother      Lipids Father          ROS/MED HX    ENT/Pulmonary:  - neg pulmonary ROS     Neurologic:  - neg neurologic ROS     Cardiovascular:     (+) Dyslipidemia  Taking blood thinners Pt has not received instructions   valvular problems/murmurs type: MR , TR   Previous cardiac testing Echo 2012 and 2014   METS/Exercise Tolerance:  >4 METS   Hematologic:     (+) Anemia, History of Transfusion no previous transfusion reaction -      Musculoskeletal:  - neg musculoskeletal ROS       GI/Hepatic: Comment: PSC and autoimmune hepatits    (+) hepatitis type Other, liver disease, Other GI/Hepatic Liver transplant June 2015      Renal/Genitourinary: Comment: Drug related    (+) Pt has no history of transplant,       Endo:     (+) Chronic steroid usage for Post Transplant Immunosuppression .      Psychiatric:  - neg psychiatric ROS       Infectious Disease:   (+) Other Infectious Disease C. 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   Temp: 98.1  F (36.7  C) Temp src: Oral BP: 126/83 Pulse: 70   Resp: 16 SpO2: 98 %         110 lbs 9.6 oz  5' 3\"   Body mass index is 19.59 kg/(m^2).       Physical Exam  Constitutional: Awake, alert, cooperative, no apparent distress, and appears stated age.  Eyes: Pupils equal, round and reactive to light, extra ocular muscles intact, sclera clear, conjunctiva normal.  HENT: Normocephalic, oral pharynx with moist mucus membranes, good dentition. No goiter appreciated.   Respiratory: Clear to auscultation bilaterally, no crackles or " wheezing.  Cardiovascular: Regular rate and rhythm, normal S1 and S2. Murmur is not appreciate on exam today.  Carotids +2, no bruits. No LE edema. Palpable pulses to radial arteries.   GI: Normal bowel sounds, large well healed scar over the entire upper abdomen. soft, non-distended, non-tender, no masses palpated.   Lymph/Hematologic: No cervical lymphadenopathy and no supraclavicular lymphadenopathy.  Genitourinary:  deferred  Skin: Warm and dry.     Musculoskeletal: Full ROM of neck. There is no redness, warmth, or swelling of the joints.    Neurologic: Awake, alert, oriented to name, place and time. Cranial nerves II-XII are grossly intact. Gait is normal.   Neuropsychiatric: Calm, cooperative. Normal affect.     Labs: (personally reviewed)  CBC RESULTS:   Recent Labs   Lab Test  03/15/18   0910   WBC  9.2   RBC  5.39*   HGB  13.8   HCT  43.8   MCV  81   MCH  25.6*   MCHC  31.5   RDW  25.8*   PLT  580*     Last Basic Metabolic Panel:  Lab Results   Component Value Date     03/15/2018      Lab Results   Component Value Date    POTASSIUM 4.2 03/15/2018     Lab Results   Component Value Date    CHLORIDE 107 03/15/2018     Lab Results   Component Value Date    KERLINE 9.1 03/15/2018     Lab Results   Component Value Date    CO2 22 03/15/2018     Lab Results   Component Value Date    BUN 33 03/15/2018     Lab Results   Component Value Date    CR 0.90 03/15/2018     Lab Results   Component Value Date     03/15/2018       EKG:  3/29/16 SR @ 98  Cardiac echo reviewed from: 10/2014 ECHO: moderate MR ; Mild biatrial dilatation; Multiple mitral valve regurgitant jets; probably moderate MR;  EF 65% No RWMA      ASSESSMENT and PLAN  Yarelis ePnny is a 32 year old female scheduled to undergo Endoscopic Retrograde Cholangiopancreatogram with Dr. Macias on 4/2/18, in diagnosis of elevated alk phos, ALT and CRP as well as abnormal findings on MRCP.  Ms. Penny is status post liver transplant for primary  sclerosing cholangitis and autoimmune hepatitis.    Pre-operative considerations include:  1.) Cardiac: Functional status independent. Exercise tolerance > 4 METS.   RCRI : No serious cardiac risks.  0.4 % risk of major adverse cardiac event.   History of murmur>>Denies any cardiac symptoms.   Hold ASA for 5 days prior to procedure.    2.) Pulmonary - no smoking history.  Denies pulmonary hx, sx or meds.  TAVARES risks: low    3.) Hematology - Anemia, taking iron replacement -CBC 3/15/18 HGB 13. History of blood transfusion > 5 years ago.    4.) GI - Ulcerative colitis (UC) associated with primary sclerosing cholangitis (PSC). ulcerative colitis flare this past December 2016, treated with prednisone and vedolizumab infusions.   Liver transplant 6/2015 for PSC and autoimmune hepatitis. Hx esophageal varices/ banding. Tacrolimus and  Prednisone 5 mg for post-transplant immunosuppression.  Hx CMV viremia and CMV colitis-  -followed by Dr. Nails.    Followed by Cresencio Sierra and Dr. Johnson for IBD and Dr. Nails for ID. There are notes from 12/2018 in the  EMR  Consider stress dose steroids.       5.) Renal - history of CKD related to medication: Cr 0.90, GFR 72.    Patient was discussed with Dr Castro. Patient is optimized and is acceptable candidate for the proposed procedure.  No further diagnostic evaluation is needed.     CHINO Dennis  Preoperative Assessment Center  [facility]  Clinic and [facility] Center  Phone: [phone]  Fax: [phone]

## 2018-04-02 ENCOUNTER — APPOINTMENT (OUTPATIENT)
Dept: GENERAL RADIOLOGY | Facility: CLINIC | Age: 33
End: 2018-04-02
Attending: INTERNAL MEDICINE
Payer: COMMERCIAL

## 2018-04-02 ENCOUNTER — ANESTHESIA (OUTPATIENT)
Dept: SURGERY | Facility: CLINIC | Age: 33
End: 2018-04-02
Payer: COMMERCIAL

## 2018-04-02 ENCOUNTER — HOSPITAL ENCOUNTER (OUTPATIENT)
Facility: CLINIC | Age: 33
Discharge: HOME | End: 2018-04-02
Attending: INTERNAL MEDICINE | Admitting: INTERNAL MEDICINE
Payer: COMMERCIAL

## 2018-04-02 VITALS
TEMPERATURE: 98 F | HEIGHT: 63 IN | OXYGEN SATURATION: 99 % | HEART RATE: 66 BPM | DIASTOLIC BLOOD PRESSURE: 97 MMHG | BODY MASS INDEX: 19.3 KG/M2 | WEIGHT: 108.91 LBS | SYSTOLIC BLOOD PRESSURE: 133 MMHG | RESPIRATION RATE: 16 BRPM

## 2018-04-02 DIAGNOSIS — K83.1 BILIARY ANASTOMOTIC STRICTURE (H): Primary | ICD-10-CM

## 2018-04-02 DIAGNOSIS — K91.89 BILIARY ANASTOMOTIC STRICTURE (H): Primary | ICD-10-CM

## 2018-04-02 LAB
ALBUMIN SERPL-MCNC: 3 G/DL (ref 3.4–5)
ALP SERPL-CCNC: 238 U/L (ref 40–150)
ALT SERPL W P-5'-P-CCNC: 78 U/L (ref 0–50)
AMYLASE SERPL-CCNC: 143 U/L (ref 30–110)
ANION GAP SERPL CALCULATED.3IONS-SCNC: 6 MMOL/L (ref 3–14)
AST SERPL W P-5'-P-CCNC: 39 U/L (ref 0–45)
B-HCG SERPL-ACNC: 3 IU/L (ref 0–5)
BILIRUB SERPL-MCNC: 0.2 MG/DL (ref 0.2–1.3)
BUN SERPL-MCNC: 21 MG/DL (ref 7–30)
CALCIUM SERPL-MCNC: 8.9 MG/DL (ref 8.5–10.1)
CHLORIDE SERPL-SCNC: 108 MMOL/L (ref 94–109)
CO2 SERPL-SCNC: 24 MMOL/L (ref 20–32)
CREAT SERPL-MCNC: 1 MG/DL (ref 0.52–1.04)
ERCP: NORMAL
ERYTHROCYTE [DISTWIDTH] IN BLOOD BY AUTOMATED COUNT: 23.1 % (ref 10–15)
GFR SERPL CREATININE-BSD FRML MDRD: 64 ML/MIN/1.7M2
GLUCOSE BLDC GLUCOMTR-MCNC: 84 MG/DL (ref 70–99)
GLUCOSE SERPL-MCNC: 79 MG/DL (ref 70–99)
HCT VFR BLD AUTO: 46 % (ref 35–47)
HGB BLD-MCNC: 14.6 G/DL (ref 11.7–15.7)
LIPASE SERPL-CCNC: 255 U/L (ref 73–393)
MCH RBC QN AUTO: 27.1 PG (ref 26.5–33)
MCHC RBC AUTO-ENTMCNC: 31.7 G/DL (ref 31.5–36.5)
MCV RBC AUTO: 86 FL (ref 78–100)
PLATELET # BLD AUTO: 510 10E9/L (ref 150–450)
POTASSIUM SERPL-SCNC: 4.2 MMOL/L (ref 3.4–5.3)
PROT SERPL-MCNC: 8.2 G/DL (ref 6.8–8.8)
RBC # BLD AUTO: 5.38 10E12/L (ref 3.8–5.2)
SODIUM SERPL-SCNC: 138 MMOL/L (ref 133–144)
WBC # BLD AUTO: 13.4 10E9/L (ref 4–11)

## 2018-04-02 PROCEDURE — 40000169 ZZH STATISTIC PRE-PROCEDURE ASSESSMENT I: Performed by: INTERNAL MEDICINE

## 2018-04-02 PROCEDURE — 36415 COLL VENOUS BLD VENIPUNCTURE: CPT | Performed by: INTERNAL MEDICINE

## 2018-04-02 PROCEDURE — 80053 COMPREHEN METABOLIC PANEL: CPT | Performed by: INTERNAL MEDICINE

## 2018-04-02 PROCEDURE — 25000128 H RX IP 250 OP 636: Performed by: NURSE ANESTHETIST, CERTIFIED REGISTERED

## 2018-04-02 PROCEDURE — C1726 CATH, BAL DIL, NON-VASCULAR: HCPCS | Performed by: INTERNAL MEDICINE

## 2018-04-02 PROCEDURE — 83690 ASSAY OF LIPASE: CPT | Performed by: INTERNAL MEDICINE

## 2018-04-02 PROCEDURE — 36000061 ZZH SURGERY LEVEL 3 W FLUORO 1ST 30 MIN - UMMC: Performed by: INTERNAL MEDICINE

## 2018-04-02 PROCEDURE — 84702 CHORIONIC GONADOTROPIN TEST: CPT | Performed by: INTERNAL MEDICINE

## 2018-04-02 PROCEDURE — 25000125 ZZHC RX 250: Performed by: INTERNAL MEDICINE

## 2018-04-02 PROCEDURE — C1876 STENT, NON-COA/NON-COV W/DEL: HCPCS | Performed by: INTERNAL MEDICINE

## 2018-04-02 PROCEDURE — 25000566 ZZH SEVOFLURANE, EA 15 MIN: Performed by: INTERNAL MEDICINE

## 2018-04-02 PROCEDURE — 25000128 H RX IP 250 OP 636: Performed by: STUDENT IN AN ORGANIZED HEALTH CARE EDUCATION/TRAINING PROGRAM

## 2018-04-02 PROCEDURE — 27210995 ZZH RX 272: Performed by: INTERNAL MEDICINE

## 2018-04-02 PROCEDURE — 82962 GLUCOSE BLOOD TEST: CPT

## 2018-04-02 PROCEDURE — C9399 UNCLASSIFIED DRUGS OR BIOLOG: HCPCS | Performed by: STUDENT IN AN ORGANIZED HEALTH CARE EDUCATION/TRAINING PROGRAM

## 2018-04-02 PROCEDURE — 25500064 ZZH RX 255 OP 636: Performed by: INTERNAL MEDICINE

## 2018-04-02 PROCEDURE — 71000027 ZZH RECOVERY PHASE 2 EACH 15 MINS: Performed by: INTERNAL MEDICINE

## 2018-04-02 PROCEDURE — 40000277 XR SURGERY CARM FLUORO LESS THAN 5 MIN W STILLS: Mod: TC

## 2018-04-02 PROCEDURE — 82150 ASSAY OF AMYLASE: CPT | Performed by: INTERNAL MEDICINE

## 2018-04-02 PROCEDURE — 37000009 ZZH ANESTHESIA TECHNICAL FEE, EACH ADDTL 15 MIN: Performed by: INTERNAL MEDICINE

## 2018-04-02 PROCEDURE — 37000008 ZZH ANESTHESIA TECHNICAL FEE, 1ST 30 MIN: Performed by: INTERNAL MEDICINE

## 2018-04-02 PROCEDURE — 27210794 ZZH OR GENERAL SUPPLY STERILE: Performed by: INTERNAL MEDICINE

## 2018-04-02 PROCEDURE — C1769 GUIDE WIRE: HCPCS | Performed by: INTERNAL MEDICINE

## 2018-04-02 PROCEDURE — 85027 COMPLETE CBC AUTOMATED: CPT | Performed by: INTERNAL MEDICINE

## 2018-04-02 PROCEDURE — 36000059 ZZH SURGERY LEVEL 3 EA 15 ADDTL MIN UMMC: Performed by: INTERNAL MEDICINE

## 2018-04-02 PROCEDURE — 71000012 ZZH RECOVERY PHASE 1 LEVEL 1 FIRST HR: Performed by: INTERNAL MEDICINE

## 2018-04-02 DEVICE — STENT JOHLIN PANCREA WEDGE 10FRX20CM W/INTRO G26827
Type: IMPLANTABLE DEVICE | Site: BILE DUCT | Status: NON-FUNCTIONAL
Removed: 2018-05-07

## 2018-04-02 RX ORDER — IOPAMIDOL 510 MG/ML
INJECTION, SOLUTION INTRAVASCULAR PRN
Status: DISCONTINUED | OUTPATIENT
Start: 2018-04-02 | End: 2018-04-02 | Stop reason: HOSPADM

## 2018-04-02 RX ORDER — ONDANSETRON 2 MG/ML
4 INJECTION INTRAMUSCULAR; INTRAVENOUS EVERY 30 MIN PRN
Status: DISCONTINUED | OUTPATIENT
Start: 2018-04-02 | End: 2018-04-02 | Stop reason: HOSPADM

## 2018-04-02 RX ORDER — PROPOFOL 10 MG/ML
INJECTION, EMULSION INTRAVENOUS PRN
Status: DISCONTINUED | OUTPATIENT
Start: 2018-04-02 | End: 2018-04-02

## 2018-04-02 RX ORDER — FENTANYL CITRATE 50 UG/ML
INJECTION, SOLUTION INTRAMUSCULAR; INTRAVENOUS PRN
Status: DISCONTINUED | OUTPATIENT
Start: 2018-04-02 | End: 2018-04-02

## 2018-04-02 RX ORDER — LEVOFLOXACIN 500 MG/1
500 TABLET, FILM COATED ORAL DAILY
Qty: 7 TABLET | Refills: 0 | Status: SHIPPED | OUTPATIENT
Start: 2018-04-02 | End: 2018-04-02

## 2018-04-02 RX ORDER — INDOMETHACIN 50 MG/1
100 SUPPOSITORY RECTAL
Status: DISCONTINUED | OUTPATIENT
Start: 2018-04-02 | End: 2018-04-02 | Stop reason: HOSPADM

## 2018-04-02 RX ORDER — SODIUM CHLORIDE, SODIUM LACTATE, POTASSIUM CHLORIDE, CALCIUM CHLORIDE 600; 310; 30; 20 MG/100ML; MG/100ML; MG/100ML; MG/100ML
INJECTION, SOLUTION INTRAVENOUS CONTINUOUS
Status: DISCONTINUED | OUTPATIENT
Start: 2018-04-02 | End: 2018-04-02 | Stop reason: HOSPADM

## 2018-04-02 RX ORDER — NALOXONE HYDROCHLORIDE 0.4 MG/ML
.1-.4 INJECTION, SOLUTION INTRAMUSCULAR; INTRAVENOUS; SUBCUTANEOUS
Status: DISCONTINUED | OUTPATIENT
Start: 2018-04-02 | End: 2018-04-02 | Stop reason: HOSPADM

## 2018-04-02 RX ORDER — FENTANYL CITRATE 50 UG/ML
25-50 INJECTION, SOLUTION INTRAMUSCULAR; INTRAVENOUS
Status: DISCONTINUED | OUTPATIENT
Start: 2018-04-02 | End: 2018-04-02 | Stop reason: HOSPADM

## 2018-04-02 RX ORDER — LEVOFLOXACIN 500 MG/1
500 TABLET, FILM COATED ORAL DAILY
Qty: 7 TABLET | Refills: 0 | Status: SHIPPED | OUTPATIENT
Start: 2018-04-02 | End: 2018-05-08

## 2018-04-02 RX ORDER — MEPERIDINE HYDROCHLORIDE 50 MG/ML
12.5 INJECTION INTRAMUSCULAR; INTRAVENOUS; SUBCUTANEOUS
Status: DISCONTINUED | OUTPATIENT
Start: 2018-04-02 | End: 2018-04-02 | Stop reason: HOSPADM

## 2018-04-02 RX ORDER — ONDANSETRON 4 MG/1
4 TABLET, ORALLY DISINTEGRATING ORAL EVERY 30 MIN PRN
Status: DISCONTINUED | OUTPATIENT
Start: 2018-04-02 | End: 2018-04-02 | Stop reason: HOSPADM

## 2018-04-02 RX ORDER — LIDOCAINE 40 MG/G
CREAM TOPICAL
Status: DISCONTINUED | OUTPATIENT
Start: 2018-04-02 | End: 2018-04-02 | Stop reason: HOSPADM

## 2018-04-02 RX ORDER — SODIUM CHLORIDE, SODIUM LACTATE, POTASSIUM CHLORIDE, CALCIUM CHLORIDE 600; 310; 30; 20 MG/100ML; MG/100ML; MG/100ML; MG/100ML
INJECTION, SOLUTION INTRAVENOUS CONTINUOUS PRN
Status: DISCONTINUED | OUTPATIENT
Start: 2018-04-02 | End: 2018-04-02

## 2018-04-02 RX ORDER — DEXAMETHASONE SODIUM PHOSPHATE 4 MG/ML
INJECTION, SOLUTION INTRA-ARTICULAR; INTRALESIONAL; INTRAMUSCULAR; INTRAVENOUS; SOFT TISSUE PRN
Status: DISCONTINUED | OUTPATIENT
Start: 2018-04-02 | End: 2018-04-02

## 2018-04-02 RX ORDER — FLUMAZENIL 0.1 MG/ML
0.2 INJECTION, SOLUTION INTRAVENOUS
Status: DISCONTINUED | OUTPATIENT
Start: 2018-04-02 | End: 2018-04-02 | Stop reason: HOSPADM

## 2018-04-02 RX ORDER — LEVOFLOXACIN 5 MG/ML
INJECTION, SOLUTION INTRAVENOUS PRN
Status: DISCONTINUED | OUTPATIENT
Start: 2018-04-02 | End: 2018-04-02

## 2018-04-02 RX ORDER — ONDANSETRON 2 MG/ML
INJECTION INTRAMUSCULAR; INTRAVENOUS PRN
Status: DISCONTINUED | OUTPATIENT
Start: 2018-04-02 | End: 2018-04-02

## 2018-04-02 RX ADMIN — SODIUM CHLORIDE, POTASSIUM CHLORIDE, SODIUM LACTATE AND CALCIUM CHLORIDE: 600; 310; 30; 20 INJECTION, SOLUTION INTRAVENOUS at 09:20

## 2018-04-02 RX ADMIN — PROPOFOL 160 MG: 10 INJECTION, EMULSION INTRAVENOUS at 09:28

## 2018-04-02 RX ADMIN — SUGAMMADEX 200 MG: 100 INJECTION, SOLUTION INTRAVENOUS at 10:18

## 2018-04-02 RX ADMIN — LEVOFLOXACIN 500 MG: 5 INJECTION, SOLUTION INTRAVENOUS at 09:47

## 2018-04-02 RX ADMIN — MIDAZOLAM 2 MG: 1 INJECTION INTRAMUSCULAR; INTRAVENOUS at 09:23

## 2018-04-02 RX ADMIN — DEXAMETHASONE SODIUM PHOSPHATE 4 MG: 4 INJECTION, SOLUTION INTRA-ARTICULAR; INTRALESIONAL; INTRAMUSCULAR; INTRAVENOUS; SOFT TISSUE at 09:40

## 2018-04-02 RX ADMIN — ROCURONIUM BROMIDE 50 MG: 10 INJECTION INTRAVENOUS at 09:28

## 2018-04-02 RX ADMIN — ONDANSETRON 4 MG: 2 INJECTION INTRAMUSCULAR; INTRAVENOUS at 10:17

## 2018-04-02 RX ADMIN — FENTANYL CITRATE 50 MCG: 50 INJECTION, SOLUTION INTRAMUSCULAR; INTRAVENOUS at 09:54

## 2018-04-02 RX ADMIN — FENTANYL CITRATE 100 MCG: 50 INJECTION, SOLUTION INTRAMUSCULAR; INTRAVENOUS at 09:28

## 2018-04-02 NOTE — OR NURSING
Writer spoke with Dr. Cueto, he stated he will come to PACU to see pt, and then will place sign out.

## 2018-04-02 NOTE — BRIEF OP NOTE
[facility], [place]  Gastroenterology Brief Operative Note    Pre-operative diagnosis: Biliary stricture   Post-operative diagnosis Same   Procedure: ERCP   Surgeon: Guru Colleen MD   Assistants(s): Bradford Sánchez MD   Anesthesia: General endotracheal anesthesia   Estimated blood loss: Minimal    Total IV fluids: (See anesthesia record)   Blood transfusion: No transfusion was given during surgery   Total urine output: (See anesthesia record)   Drains: None   Specimens: None   Implants: 10 Fr x 17.5 cm Johlin in right posterior bile duct   Findings: Cholangiogram showed an anastomotic stricture. Balloon sweeping did not yielding anything. Stricture was dilated with a 6 mm balloon. A 10 Fr Johlin was placed across the stricture into the right posterior bile duct.   Complications: None   Condition: Stable   Comments:      Recommendations:         See dictated procedure report for full details (found in chart review under 'Procedures')    - Observe in Same Day for possible discharge  - Discharge home if minimal or no pain   - Levofloxacin 500 mg x 7 days  - Repeat ERCP in 4-6 weeks   Bradford Sánchez MD  [phone]

## 2018-04-02 NOTE — DISCHARGE INSTRUCTIONS
..[facility], [place]  Same-Day Surgery   Adult Discharge Orders & Instructions     For 24 hours after surgery    1. Get plenty of rest.  A responsible adult must stay with you for at least 24 hours after you leave the hospital.   2. Do not drive or use heavy equipment.  If you have weakness or tingling, don't drive or use heavy equipment until this feeling goes away.  3. Do not drink alcohol.  4. Avoid strenuous or risky activities.  Ask for help when climbing stairs.   5. You may feel lightheaded.  IF so, sit for a few minutes before standing.  Have someone help you get up.   6. If you have nausea (feel sick to your stomach): Drink only clear liquids such as apple juice, ginger ale, broth or 7-Up.  Rest may also help.  Be sure to drink enough fluids.  Move to a regular diet as you feel able.  7. You may have a slight fever. Call the doctor if your fever is over 100 F (37.7 C) (taken under the tongue) or lasts longer than 24 hours.  8. You may have a dry mouth, a sore throat, muscle aches or trouble sleeping.  These should go away after 24 hours.  9. Do not make important or legal decisions.   Call your doctor for any of the followin.  Signs of infection (fever, growing tenderness at the surgery site, a large amount of drainage or bleeding, severe pain, foul-smelling drainage, redness, swelling).    2. It has been over 8 to 10 hours since surgery and you are still not able to urinate (pass water).    3.  Headache for over 24 hours.    4.  Numbness, tingling or weakness the day after surgery (if you had spinal anesthesia).  To contact a doctor, call ________________________________________ or:        [phone] and ask for the resident on call for   ______________________________________________ (answered 24 hours a day)      Emergency Department:    [facility]: [phone]       (TTY for hearing impaired: [phone])    [facility]: [phone]       (TTY for  hearing impaired: [phone])

## 2018-04-02 NOTE — IP AVS SNAPSHOT
Post Anesthesia Care Unit [address] [address]    Phone:  [phone]                                       After Visit Summary   4/2/2018    Yarelis Penny    MRN: [id number]           After Visit Summary Signature Page     I have received my discharge instructions, and my questions have been answered. I have discussed any challenges I see with this plan with the nurse or doctor.    ..........................................................................................................................................  Patient/Patient Representative Signature      ..........................................................................................................................................  Patient Representative Print Name and Relationship to Patient    ..................................................               ................................................  Date                                            Time    ..........................................................................................................................................  Reviewed by Signature/Title    ...................................................              ..............................................  Date                                                            Time

## 2018-04-02 NOTE — ANESTHESIA POSTPROCEDURE EVALUATION
Patient: Yarelis Penny    Procedure(s):  Endoscopic Retrograde Cholangiopancreatogram with biliary dilation and stent placement - Wound Class: II-Clean Contaminated    Diagnosis:Abnormal MRCP Test   Diagnosis Additional Information: No value filed.    Anesthesia Type:  General, ETT    Note:  Anesthesia Post Evaluation    Patient location during evaluation: PACU  Patient participation: Able to fully participate in evaluation  Level of consciousness: awake and alert  Pain management: adequate  Airway patency: patent  Cardiovascular status: acceptable  Respiratory status: acceptable  Hydration status: acceptable  PONV: none     Anesthetic complications: None          Last vitals:  Vitals:    04/02/18 1115 04/02/18 1129 04/02/18 1151   BP: (!) 133/98 (!) 124/94 (!) 133/97   Pulse:  66    Resp: 16 16 16   Temp: 36.4  C (97.5  F) 36.5  C (97.7  F) 36.7  C (98  F)   SpO2: 99% 100%          Electronically Signed By: Hung Cueto MD  April 2, 2018  12:03 PM

## 2018-04-02 NOTE — IP AVS SNAPSHOT
MRN:[id number]                      After Visit Summary   4/2/2018    Yarelis Penny    MRN: [id number]           Thank you!     Thank you for choosing [facility] for your care. Our goal is always to provide you with excellent care. Hearing back from our patients is one way we can continue to improve our services. Please take a few minutes to complete the written survey that you may receive in the mail after you visit with us. Thank you!        Patient Information     Date Of Birth          1985        About your hospital stay     You were admitted on:  April 2, 2018 You last received care in the:  Post Anesthesia Care Unit [facility]    You were discharged on:  April 2, 2018       Who to Call     For medical emergencies, please call 911.  For non-urgent questions about your medical care, please call your primary care provider or clinic, [phone]  For questions related to your surgery, please call your surgery clinic        Attending Provider     Provider Guru Brittany Wright MD Gastroenterology       Primary Care Provider Office Phone # Fax #    Jackson Orellana [phone]      After Care Instructions     Discharge Instructions       Resume pre procedure diet            Discharge Instructions       Restart home medications.                  Your next 10 appointments already scheduled     Apr 12, 2018  8:00 AM CDT   Infusion 180 with UC SPEC INFUSION, UC 44 ATC   [facility] ([facility])    [address] [place] [address]  [address] [place] [address] [id number] [phone]            May 10, 2018  9:00 AM CDT   Infusion 180 with UC SPEC INFUSION, UC 47 ATC   [facility] ([facility])    [address] [place] [address]  [address] [place] [id number] [phone]            Jun 07, 2018  9:00 AM CDT   Infusion 180 with UC  SPEC INFUSION, UC 47 ATC   [facility] [place] [facility] ([facility] [place])    [address]  [address]  [facility] [id number] [phone]            Dec 05, 2018  8:00 AM CST   (Arrive by 7:45 AM)   Return Visit with Joya Lopez MD   [facility] ([facility] [place])    [address]  [address]  [facility] [id number] [phone]              Further instructions from your care team       ..[facility]  Same-Day Surgery   Adult Discharge Orders & Instructions     For 24 hours after surgery    1. Get plenty of rest.  A responsible adult must stay with you for at least 24 hours after you leave the hospital.   2. Do not drive or use heavy equipment.  If you have weakness or tingling, don't drive or use heavy equipment until this feeling goes away.  3. Do not drink alcohol.  4. Avoid strenuous or risky activities.  Ask for help when climbing stairs.   5. You may feel lightheaded.  IF so, sit for a few minutes before standing.  Have someone help you get up.   6. If you have nausea (feel sick to your stomach): Drink only clear liquids such as apple juice, ginger ale, broth or 7-Up.  Rest may also help.  Be sure to drink enough fluids.  Move to a regular diet as you feel able.  7. You may have a slight fever. Call the doctor if your fever is over 100 F (37.7 C) (taken under the tongue) or lasts longer than 24 hours.  8. You may have a dry mouth, a sore throat, muscle aches or trouble sleeping.  These should go away after 24 hours.  9. Do not make important or legal decisions.   Call your doctor for any of the followin.  Signs of infection (fever, growing tenderness at the surgery site, a large amount of drainage or bleeding, severe pain, foul-smelling drainage, redness, swelling).    2. It has been over 8 to 10 hours since surgery and you are  "still not able to urinate (pass water).    3.  Headache for over 24 hours.    4.  Numbness, tingling or weakness the day after surgery (if you had spinal anesthesia).  To contact a doctor, call ________________________________________ or:        [phone] and ask for the resident on call for   ______________________________________________ (answered 24 hours a day)      Emergency Department:    [facility]: [phone]       (TTY for hearing impaired: [phone])    [facility]: [phone]       (TTY for hearing impaired: [phone])        Additional Information     If you use hormonal birth control (such as the pill, patch, ring or implants): You'll need a second form of birth control for 7 days (condoms, a diaphragm or contraceptive foam). While in the hospital, you received a medicine called Bridion. Your normal birth control will not work as well for a week after taking this medicine.          Pending Results     No orders found from 3/31/2018 to 4/3/2018.            Admission Information     Date & Time Provider Department Dept. Phone    4/2/2018 Guru Brittany Macias MD Post Anesthesia Care Unit [facility] [phone]      Your Vitals Were     Blood Pressure Temperature Respirations Height Weight Pulse Oximetry    133/98 97.5  F (36.4  C) 16 1.6 m (5' 3\") 49.4 kg (108 lb 14.5 oz) 99%    BMI (Body Mass Index)                   19.29 kg/m2           MyChart Information     [facility] gives you secure access to your electronic health record. If you see a primary care provider, you can also send messages to your care team and make appointments. If you have questions, please call your primary care clinic.  If you do not have a primary care provider, please call [phone] and they will assist you.        Care EveryWhere ID     This is your Care EveryWhere ID. This could be used by other organizations to access your [facility] medical records  [id number]        Equal Access " to Services     [facility] : Hadii venancio Palma, wanitin solorzano, qarodrigo mott, mani lozano. So [facility] [phone].    ATENCIÓN: Si habla lyndon, tiene a borja disposición servicios gratuitos de asistencia lingüística. Llame al [phone].    We comply with applicable federal civil rights laws and [place] laws. We do not discriminate on the basis of race, color, national origin, age, disability, sex, sexual orientation, or gender identity.               Review of your medicines      START taking        Dose / Directions    levofloxacin 500 MG tablet   Commonly known as:  LEVAQUIN   Used for:  Biliary anastomotic stricture        Dose:  500 mg   Take 1 tablet (500 mg) by mouth daily   Quantity:  7 tablet   Refills:  0         CONTINUE these medicines which may have CHANGED, or have new prescriptions. If we are uncertain of the size of tablets/capsules you have at home, strength may be listed as something that might have changed.        Dose / Directions    GNP ASPIRIN LOW DOSE 81 MG EC tablet   This may have changed:  when to take this   Used for:  Ulcerative colitis with other complication, unspecified location (H), Liver replaced by transplant (H)   Generic drug:  aspirin        Dose:  81 mg   Take 1 tablet (81 mg) by mouth daily   Quantity:  30 tablet   Refills:  1       predniSONE 5 MG tablet   Commonly known as:  DELTASONE   This may have changed:  when to take this   Used for:  UC (ulcerative colitis) (H), Liver replaced by transplant (H)        Dose:  5 mg   Take 1 tablet (5 mg) by mouth daily   Quantity:  225 tablet   Refills:  3       * tacrolimus 1 MG capsule   Commonly known as:  GENERIC EQUIVALENT   This may have changed:    - how much to take  - additional instructions   Used for:  S/P liver transplant (H)        Dose:  2 mg   Take 2 capsules (2 mg) by mouth 2 times daily *total dose 2.5 mg twice daily   Quantity:  120 capsule   Refills:  11        * tacrolimus 0.5 MG capsule   Commonly known as:  GENERIC EQUIVALENT   This may have changed:    - how much to take  - additional instructions   Used for:  S/P liver transplant (H)        Dose:  0.5 mg   Take 1 capsule (0.5 mg) by mouth 2 times daily Take with 1 mg capsule. Total dose 2.5 mg every 12 hours   Quantity:  180 capsule   Refills:  3       * Notice:  This list has 2 medication(s) that are the same as other medications prescribed for you. Read the directions carefully, and ask your doctor or other care provider to review them with you.      CONTINUE these medicines which have NOT CHANGED        Dose / Directions    budesonide 9 MG 24 hr tablet   Commonly known as:  UCERIS   Used for:  UC (ulcerative colitis) (H)        Dose:  9 mg   Take 1 tablet (9 mg) by mouth every morning   Quantity:  30 tablet   Refills:  3       calcium carbonate 1250 MG tablet   Commonly known as:  OS-KERLINE 500 mg [place]. Ca        Dose:  1 tablet   Take 1 tablet by mouth every evening   Refills:  0       ENTYVIO IV        Dose:  300 mg   Inject 300 mg into the vein every 28 days   Refills:  0       Multi-vitamin Tabs tablet        Dose:  1 tablet   Take 1 tablet by mouth every morning   Refills:  0       sulfamethoxazole-trimethoprim 800-160 MG per tablet   Commonly known as:  BACTRIM DS/SEPTRA DS   Used for:  Immunosuppression (H), Inflammatory bowel disease, PSC (primary sclerosing cholangitis)        Dose:  1 tablet   Take 1 tablet by mouth three times a week Monday, Wednesday, Friday   Quantity:  90 tablet   Refills:  3       ursodiol 250 MG tablet   Commonly known as:  ACTIGALL   Used for:  Liver replaced by transplant (H)        Take 2 tabs (500 mg) in AM, and 1 tab at night (250 mg)   Quantity:  270 tablet   Refills:  3       VITAMIN D (CHOLECALCIFEROL) PO        Take by mouth every evening   Refills:  0            Where to get your medicines      These medications were sent to [facility] [address] [place], [place] - [address]  [address], [facility] [address]     Phone:  [phone]     levofloxacin 500 MG tablet                Protect others around you: Learn how to safely use, store and throw away your medicines at www.disposemymeds.org.        ANTIBIOTIC INSTRUCTION     You've Been Prescribed an Antibiotic - Now What?  Your healthcare team thinks that you or your loved one might have an infection. Some infections can be treated with antibiotics, which are powerful, life-saving drugs. Like all medications, antibiotics have side effects and should only be used when necessary. There are some important things you should know about your antibiotic treatment.      Your healthcare team may run tests before you start taking an antibiotic.    Your team may take samples (e.g., from your blood, urine or other areas) to run tests to look for bacteria. These test can be important to determine if you need an antibiotic at all and, if you do, which antibiotic will work best.      Within a few days, your healthcare team might change or even stop your antibiotic.    Your team may start you on an antibiotic while they are working to find out what is making you sick.    Your team might change your antibiotic because test results show that a different antibiotic would be better to treat your infection.    In some cases, once your team has more information, they learn that you do not need an antibiotic at all. They may find out that you don't have an infection, or that the antibiotic you're taking won't work against your infection. For example, an infection caused by a virus can't be treated with antibiotics. Staying on an antibiotic when you don't need it is more likely to be harmful than helpful.      You may experience side effects from your antibiotic.    Like all medications, antibiotics have side effects. Some of these can be serious.    Let you healthcare team know if you have any known  allergies when you are admitted to the hospital.    One significant side effect of nearly all antibiotics is the risk of severe and sometimes deadly diarrhea caused by Clostridium difficile (C. Difficile). This occurs when a person takes antibiotics because some good germs are destroyed. Antibiotic use allows C. diificile to take over, putting patients at high risk for this serious infection.    As a patient or caregiver, it is important to understand your or your loved one's antibiotic treatment. It is especially important for caregivers to speak up when patients can't speak for themselves. Here are some important questions to ask your healthcare team.    What infection is this antibiotic treating and how do you know I have that infection?    What side effects might occur from this antibiotic?    How long will I need to take this antibiotic?    Is it safe to take this antibiotic with other medications or supplements (e.g., vitamins) that I am taking?     Are there any special directions I need to know about taking this antibiotic? For example, should I take it with food?    How will I be monitored to know whether my infection is responding to the antibiotic?    What tests may help to make sure the right antibiotic is prescribed for me?      Information provided by:  www.cdc.gov/getsmart  U.S. Department of Health and Human Services  Centers for disease Control and Prevention  National Center for Emerging and Zoonotic Infectious Diseases  Division of Healthcare Quality Promotion             Medication List: This is a list of all your medications and when to take them. Check marks below indicate your daily home schedule. Keep this list as a reference.      Medications           Morning Afternoon Evening Bedtime As Needed    budesonide 9 MG 24 hr tablet   Commonly known as:  UCERIS   Take 1 tablet (9 mg) by mouth every morning                                calcium carbonate 1250 MG tablet   Commonly known as:  OS-KERLINE  500 mg [place]. Ca   Take 1 tablet by mouth every evening                                ENTYVIO IV   Inject 300 mg into the vein every 28 days                                GNP ASPIRIN LOW DOSE 81 MG EC tablet   Take 1 tablet (81 mg) by mouth daily   Generic drug:  aspirin                                levofloxacin 500 MG tablet   Commonly known as:  LEVAQUIN   Take 1 tablet (500 mg) by mouth daily                                Multi-vitamin Tabs tablet   Take 1 tablet by mouth every morning                                predniSONE 5 MG tablet   Commonly known as:  DELTASONE   Take 1 tablet (5 mg) by mouth daily                                sulfamethoxazole-trimethoprim 800-160 MG per tablet   Commonly known as:  BACTRIM DS/SEPTRA DS   Take 1 tablet by mouth three times a week Monday, Wednesday, Friday                                * tacrolimus 1 MG capsule   Commonly known as:  GENERIC EQUIVALENT   Take 2 capsules (2 mg) by mouth 2 times daily *total dose 2.5 mg twice daily                                * tacrolimus 0.5 MG capsule   Commonly known as:  GENERIC EQUIVALENT   Take 1 capsule (0.5 mg) by mouth 2 times daily Take with 1 mg capsule. Total dose 2.5 mg every 12 hours                                ursodiol 250 MG tablet   Commonly known as:  ACTIGALL   Take 2 tabs (500 mg) in AM, and 1 tab at night (250 mg)                                VITAMIN D (CHOLECALCIFEROL) PO   Take by mouth every evening                                * Notice:  This list has 2 medication(s) that are the same as other medications prescribed for you. Read the directions carefully, and ask your doctor or other care provider to review them with you.

## 2018-04-02 NOTE — ANESTHESIA CARE TRANSFER NOTE
Patient: Yarelis Penny    Procedure(s):  Endoscopic Retrograde Cholangiopancreatogram with biliary dilation and stent placement - Wound Class: II-Clean Contaminated    Diagnosis: Abnormal MRCP Test   Diagnosis Additional Information: No value filed.    Anesthesia Type:   General, ETT     Note:  Airway :Face Mask  Patient transferred to:PACU  Comments: Transferred to PACU, report given to RN.  VSS.  Comfortable.  Handoff Report: Identifed the Patient, Identified the Reponsible Provider, Reviewed the pertinent medical history, Discussed the surgical course, Reviewed Intra-OP anesthesia mangement and issues during anesthesia, Set expectations for post-procedure period and Allowed opportunity for questions and acknowledgement of understanding      Vitals: (Last set prior to Anesthesia Care Transfer)    CRNA VITALS  4/2/2018 1000 - 4/2/2018 1036      4/2/2018             Pulse: 98    Ht Rate: 98    SpO2: 99 %    Resp Rate (observed): (!)  2                Electronically Signed By: Edmund Saxena MD  April 2, 2018  10:36 AM

## 2018-04-05 ENCOUNTER — CARE COORDINATION (OUTPATIENT)
Dept: GASTROENTEROLOGY | Facility: CLINIC | Age: 33
End: 2018-04-05

## 2018-04-05 DIAGNOSIS — K83.1 BILIARY STRICTURE (H): Primary | ICD-10-CM

## 2018-04-05 NOTE — PROGRESS NOTES
Post ERCP (4/2/2018) with Dr. Macias: Follow-up    Post procedure recommendations:  - Repeat ERCP in 4-6 weeks to re-evaluate anastomotic biliary stricture and for possible placement of more stents     Patient states: Patient states she has mild abdominal aches but no real pain. Denies pain, nausea or emesis, fever or chills, or jaundice. Patient states she is back to tolerating a regular diet with no difficulties.     Orders placed: ERCP    Contact information verified for future questions/concerns.    Em ANTHONY RN Coordinator  Dr. Macias  [facility]  [phone]

## 2018-04-12 ENCOUNTER — CARE COORDINATION (OUTPATIENT)
Dept: GASTROENTEROLOGY | Facility: CLINIC | Age: 33
End: 2018-04-12

## 2018-04-12 ENCOUNTER — INFUSION THERAPY VISIT (OUTPATIENT)
Dept: INFUSION THERAPY | Facility: CLINIC | Age: 33
End: 2018-04-12
Attending: INTERNAL MEDICINE
Payer: COMMERCIAL

## 2018-04-12 VITALS
SYSTOLIC BLOOD PRESSURE: 108 MMHG | DIASTOLIC BLOOD PRESSURE: 74 MMHG | WEIGHT: 105.7 LBS | TEMPERATURE: 98.2 F | RESPIRATION RATE: 16 BRPM | OXYGEN SATURATION: 100 % | BODY MASS INDEX: 18.72 KG/M2 | HEART RATE: 81 BPM

## 2018-04-12 DIAGNOSIS — A08.39 CMV COLITIS (H): ICD-10-CM

## 2018-04-12 DIAGNOSIS — B25.9 CMV COLITIS (H): ICD-10-CM

## 2018-04-12 DIAGNOSIS — E86.0 DEHYDRATION: Primary | ICD-10-CM

## 2018-04-12 DIAGNOSIS — Z94.4 LIVER REPLACED BY TRANSPLANT (H): ICD-10-CM

## 2018-04-12 LAB
ALBUMIN SERPL-MCNC: 2.8 G/DL (ref 3.4–5)
ALP SERPL-CCNC: 290 U/L (ref 40–150)
ALT SERPL W P-5'-P-CCNC: 76 U/L (ref 0–50)
ANION GAP SERPL CALCULATED.3IONS-SCNC: 8 MMOL/L (ref 3–14)
AST SERPL W P-5'-P-CCNC: 32 U/L (ref 0–45)
BASOPHILS # BLD AUTO: 0.1 10E9/L (ref 0–0.2)
BASOPHILS NFR BLD AUTO: 0.7 %
BILIRUB DIRECT SERPL-MCNC: <0.1 MG/DL (ref 0–0.2)
BILIRUB SERPL-MCNC: 0.2 MG/DL (ref 0.2–1.3)
BUN SERPL-MCNC: 23 MG/DL (ref 7–30)
CALCIUM SERPL-MCNC: 9.3 MG/DL (ref 8.5–10.1)
CHLORIDE SERPL-SCNC: 107 MMOL/L (ref 94–109)
CO2 SERPL-SCNC: 24 MMOL/L (ref 20–32)
CREAT SERPL-MCNC: 0.87 MG/DL (ref 0.52–1.04)
CRP SERPL-MCNC: 10.6 MG/L (ref 0–8)
DIFFERENTIAL METHOD BLD: ABNORMAL
EOSINOPHIL # BLD AUTO: 0.7 10E9/L (ref 0–0.7)
EOSINOPHIL NFR BLD AUTO: 5 %
ERYTHROCYTE [DISTWIDTH] IN BLOOD BY AUTOMATED COUNT: 20.3 % (ref 10–15)
ERYTHROCYTE [SEDIMENTATION RATE] IN BLOOD BY WESTERGREN METHOD: 41 MM/H (ref 0–20)
GFR SERPL CREATININE-BSD FRML MDRD: 75 ML/MIN/1.7M2
GLUCOSE SERPL-MCNC: 78 MG/DL (ref 70–99)
HCT VFR BLD AUTO: 42.2 % (ref 35–47)
HGB BLD-MCNC: 14.1 G/DL (ref 11.7–15.7)
IMM GRANULOCYTES # BLD: 0.1 10E9/L (ref 0–0.4)
IMM GRANULOCYTES NFR BLD: 0.5 %
LYMPHOCYTES # BLD AUTO: 4 10E9/L (ref 0.8–5.3)
LYMPHOCYTES NFR BLD AUTO: 27.2 %
MAGNESIUM SERPL-MCNC: 1.2 MG/DL (ref 1.6–2.3)
MCH RBC QN AUTO: 27.3 PG (ref 26.5–33)
MCHC RBC AUTO-ENTMCNC: 33.4 G/DL (ref 31.5–36.5)
MCV RBC AUTO: 82 FL (ref 78–100)
MONOCYTES # BLD AUTO: 1.2 10E9/L (ref 0–1.3)
MONOCYTES NFR BLD AUTO: 8 %
NEUTROPHILS # BLD AUTO: 8.7 10E9/L (ref 1.6–8.3)
NEUTROPHILS NFR BLD AUTO: 58.6 %
NRBC # BLD AUTO: 0 10*3/UL
NRBC BLD AUTO-RTO: 0 /100
PHOSPHATE SERPL-MCNC: 3.9 MG/DL (ref 2.5–4.5)
PLATELET # BLD AUTO: 590 10E9/L (ref 150–450)
POTASSIUM SERPL-SCNC: 4 MMOL/L (ref 3.4–5.3)
PROT SERPL-MCNC: 7.8 G/DL (ref 6.8–8.8)
RBC # BLD AUTO: 5.16 10E12/L (ref 3.8–5.2)
SODIUM SERPL-SCNC: 140 MMOL/L (ref 133–144)
TACROLIMUS BLD-MCNC: 6.2 UG/L (ref 5–15)
TME LAST DOSE: NORMAL H
WBC # BLD AUTO: 14.8 10E9/L (ref 4–11)

## 2018-04-12 PROCEDURE — 80076 HEPATIC FUNCTION PANEL: CPT | Performed by: INTERNAL MEDICINE

## 2018-04-12 PROCEDURE — 40000141 ZZH STATISTIC PERIPHERAL IV START W/O US GUIDANCE: Mod: ZF

## 2018-04-12 PROCEDURE — 80197 ASSAY OF TACROLIMUS: CPT | Performed by: SURGERY

## 2018-04-12 PROCEDURE — 85025 COMPLETE CBC W/AUTO DIFF WBC: CPT | Performed by: INTERNAL MEDICINE

## 2018-04-12 PROCEDURE — 80048 BASIC METABOLIC PNL TOTAL CA: CPT | Performed by: INTERNAL MEDICINE

## 2018-04-12 PROCEDURE — 83735 ASSAY OF MAGNESIUM: CPT | Performed by: INTERNAL MEDICINE

## 2018-04-12 PROCEDURE — 96413 CHEMO IV INFUSION 1 HR: CPT

## 2018-04-12 PROCEDURE — 85652 RBC SED RATE AUTOMATED: CPT | Performed by: INTERNAL MEDICINE

## 2018-04-12 PROCEDURE — 84100 ASSAY OF PHOSPHORUS: CPT | Performed by: INTERNAL MEDICINE

## 2018-04-12 PROCEDURE — 86140 C-REACTIVE PROTEIN: CPT | Performed by: INTERNAL MEDICINE

## 2018-04-12 PROCEDURE — 25000128 H RX IP 250 OP 636: Mod: ZF | Performed by: INTERNAL MEDICINE

## 2018-04-12 RX ADMIN — VEDOLIZUMAB 300 MG: 300 INJECTION, POWDER, LYOPHILIZED, FOR SOLUTION INTRAVENOUS at 08:49

## 2018-04-12 NOTE — PROGRESS NOTES
Called pt her in basket request.  Pt now scheduled with Dr. Johnson On June 5 at 730.  Pt will call to schedule an appt with Dr. Enriquez.        Yarelis does not have an appt scheduled for the future. She needs to see me or Cresencio.     She should also see Dr. Enriquez of hepatology in the next 2-3 months

## 2018-04-12 NOTE — MR AVS SNAPSHOT
After Visit Summary   4/12/2018    Yarelis Penny    MRN: [id number]           Patient Information     Date Of Birth          1985        Visit Information        Provider Department      4/12/2018 8:00 AM UC 44 ATC; UC SPEC INFUSION [facility]        Today's Diagnoses     Dehydration    -  1    CMV colitis (H)        Liver replaced by transplant (H)          Care Instructions    Dear Yarelis Penny    Thank you for choosing [facility] ([facility]) for your infusion.  The following information is a summary of our appointment as well as important reminders.      POST-INFUSION OF BIOLOGICAL MEDICATION:    Reviewed with patient.  Given biologic medication or medication hand-out. Inform patient if any fever, chills or signs of infection, new symptoms, abdominal pain, heart palpitations, shortness of breath, reaction, weakness, neurological changes, seek medical attention immediately and should not receive infusions. No live virus vaccines prior to or during treatment or up to 6 months post infusion. If the patient has an upcoming procedure or surgery, this should be discussed with the rheumatologist and surgeon or provider.    We look forward in seeing you on your next appointment here at [facility].  Please don t hesitate to call us at [phone] to reschedule any of your appointments or to speak with one of the [facility] registered nurses.  It was a pleasure taking care of you today.    Sincerely,    [facility]  [address] [place] [address]  [id number]  Phone:  [phone]            Follow-ups after your visit        Your next 10 appointments already scheduled     May 10, 2018  9:00 AM CDT   Infusion 180 with UC SPEC INFUSION, UC 47 ATC   [facility] ([facility])     [address]  [address]  [facility] [address] [phone]            Jun 07, 2018  9:00 AM CDT   Infusion 180 with UC SPEC INFUSION, UC 47 ATC   [facility] ([facility])    [address]  [address]  [facility] [address] [phone]            Dec 05, 2018  8:00 AM CST   (Arrive by 7:45 AM)   Return Visit with Joya Lopez MD   [facility] ([facility])    [address]  [address]  [facility] [address] [phone]              Who to contact     If you have questions or need follow up information about today's clinic visit or your schedule please contact [facility] directly at [phone].  Normal or non-critical lab and imaging results will be communicated to you by MyChart, letter or phone within 4 business days after the clinic has received the results. If you do not hear from us within 7 days, please contact the clinic through [employer] or phone. If you have a critical or abnormal lab result, we will notify you by phone as soon as possible.  Submit refill requests through [employer] or call your pharmacy and they will forward the refill request to us. Please allow 3 business days for your refill to be completed.          Additional Information About Your Visit        MyChart Information     [employer] gives you secure access to your electronic health record. If you see a primary care provider, you can also send messages to your care team and make appointments. If you have questions, please call your primary care clinic.  If you do not have a primary care provider, please call [phone] and they will assist you.        Care EveryWhere ID     This is your Care EveryWhere ID. This could be used by other organizations to access your [facility] medical records  [id number]        Your Vitals  Were     Pulse Temperature Respirations Pulse Oximetry BMI (Body Mass Index)       81 98.2  F (36.8  C) (Oral) 16 100% 18.72 kg/m2        Blood Pressure from Last 3 Encounters:   04/12/18 108/74   04/02/18 (!) 133/97   03/29/18 126/83    Weight from Last 3 Encounters:   04/12/18 47.9 kg (105 lb 11.2 oz)   04/02/18 49.4 kg (108 lb 14.5 oz)   03/29/18 50.2 kg (110 lb 9.6 oz)              We Performed the Following     Basic metabolic panel     CBC with platelets differential     CRP inflammation     Erythrocyte sedimentation rate auto     Hepatic panel     Magnesium     Phosphorus     Tacrolimus level          Today's Medication Changes          These changes are accurate as of 4/12/18  9:55 AM.  If you have any questions, ask your nurse or doctor.               These medicines have changed or have updated prescriptions.        Dose/Directions    GNP ASPIRIN LOW DOSE 81 MG EC tablet   This may have changed:  when to take this   Used for:  Ulcerative colitis with other complication, unspecified location (H), Liver replaced by transplant (H)   Generic drug:  aspirin        Dose:  81 mg   Take 1 tablet (81 mg) by mouth daily   Quantity:  30 tablet   Refills:  1       predniSONE 5 MG tablet   Commonly known as:  DELTASONE   This may have changed:  when to take this   Used for:  UC (ulcerative colitis) (H), Liver replaced by transplant (H)        Dose:  5 mg   Take 1 tablet (5 mg) by mouth daily   Quantity:  225 tablet   Refills:  3       * tacrolimus 1 MG capsule   Commonly known as:  GENERIC EQUIVALENT   This may have changed:    - how much to take  - additional instructions   Used for:  S/P liver transplant (H)        Dose:  2 mg   Take 2 capsules (2 mg) by mouth 2 times daily *total dose 2.5 mg twice daily   Quantity:  120 capsule   Refills:  11       * tacrolimus 0.5 MG capsule   Commonly known as:  GENERIC EQUIVALENT   This may have changed:    - how much to take  - additional instructions   Used for:  S/P liver  transplant (H)        Dose:  0.5 mg   Take 1 capsule (0.5 mg) by mouth 2 times daily Take with 1 mg capsule. Total dose 2.5 mg every 12 hours   Quantity:  180 capsule   Refills:  3       * Notice:  This list has 2 medication(s) that are the same as other medications prescribed for you. Read the directions carefully, and ask your doctor or other care provider to review them with you.             Primary Care Provider Office Phone # Fax #    Jackson Orellana [phone]       [facility] [address]  [facility] [address]        Equal Access to Services     [facility]: Hadii venancio hsieh hadasho Soyari, waaxda luqadaha, qaybta kaalmada pantera, mani melo . So [facility] [phone].    ATENCIÓN: Si habla español, tiene a borja disposición servicios gratuitos de asistencia lingüística. [facility] [phone].    We comply with applicable federal civil rights laws and [place] laws. We do not discriminate on the basis of race, color, national origin, age, disability, sex, sexual orientation, or gender identity.            Thank you!     Thank you for choosing [facility] SPECIAL[facility] PROCEDURE  for your care. Our goal is always to provide you with excellent care. Hearing back from our patients is one way we can continue to improve our services. Please take a few minutes to complete the written survey that you may receive in the mail after your visit with us. Thank you!             Your Updated Medication List - Protect others around you: Learn how to safely use, store and throw away your medicines at www.disposemymeds.org.          This list is accurate as of 4/12/18  9:55 AM.  Always use your most recent med list.                   Brand Name Dispense Instructions for use Diagnosis    budesonide 9 MG 24 hr tablet    UCERIS    30 tablet    Take 1 tablet (9 mg) by mouth every morning    UC (ulcerative colitis) (H)       calcium carbonate 1250  MG tablet    OS-KERLINE 500 mg [place]. Ca     Take 1 tablet by mouth every evening        ENTYVIO IV      Inject 300 mg into the vein every 28 days        GNP ASPIRIN LOW DOSE 81 MG EC tablet   Generic drug:  aspirin     30 tablet    Take 1 tablet (81 mg) by mouth daily    Ulcerative colitis with other complication, unspecified location (H), Liver replaced by transplant (H)       levofloxacin 500 MG tablet    LEVAQUIN    7 tablet    Take 1 tablet (500 mg) by mouth daily    Biliary anastomotic stricture       Multi-vitamin Tabs tablet      Take 1 tablet by mouth every morning        predniSONE 5 MG tablet    DELTASONE    225 tablet    Take 1 tablet (5 mg) by mouth daily    UC (ulcerative colitis) (H), Liver replaced by transplant (H)       sulfamethoxazole-trimethoprim 800-160 MG per tablet    BACTRIM DS/SEPTRA DS    90 tablet    Take 1 tablet by mouth three times a week Monday, Wednesday, Friday    Immunosuppression (H), Inflammatory bowel disease, PSC (primary sclerosing cholangitis)       * tacrolimus 1 MG capsule    GENERIC EQUIVALENT    120 capsule    Take 2 capsules (2 mg) by mouth 2 times daily *total dose 2.5 mg twice daily    S/P liver transplant (H)       * tacrolimus 0.5 MG capsule    GENERIC EQUIVALENT    180 capsule    Take 1 capsule (0.5 mg) by mouth 2 times daily Take with 1 mg capsule. Total dose 2.5 mg every 12 hours    S/P liver transplant (H)       ursodiol 250 MG tablet    ACTIGALL    270 tablet    Take 2 tabs (500 mg) in AM, and 1 tab at night (250 mg)    Liver replaced by transplant (H)       VITAMIN D (CHOLECALCIFEROL) PO      Take by mouth every evening        * Notice:  This list has 2 medication(s) that are the same as other medications prescribed for you. Read the directions carefully, and ask your doctor or other care provider to review them with you.

## 2018-04-12 NOTE — PROGRESS NOTES

## 2018-04-12 NOTE — PROGRESS NOTES
Nursing Note  [facility]is Penny [facility] for:   Chief Complaint   Patient presents with     Infusion     Entyvio     During today's [facility] appointment, orders from Dr. Fuentes Johnson were completed.  Frequency: monthly    Progress note:  Patient identification verified by name and date of birth.  Assessment completed.  Vitals recorded in Doc Flowsheets.  Patient was provided with education regarding infusion and possible side effects.  Patient verbalized understanding.      needed: No  Premedications: were not ordered.  Infusion Rates: infusion given over approximately 30 minutes.  Labs: were drawn per orders.   Vascular access: peripheral IV was placed at vascular access in [facility].  Treatment Conditions: Rheumatology checklist completed by Ashely Mckenna RN   Patient tolerated infusion: well.        Discharge Plan:   Follow up plan of care with: ongoing infusions at [facility].  Discharge instructions were reviewed with patient. Declined printed AVS.   Patient/representative verbalized understanding of discharge instructions and all questions answered.  Patient discharged from [facility] in stable condition.    Priti Recio RN    Administrations This Visit     vedolizumab (ENTYVIO) 300 mg in sodium chloride 0.9 % 280 mL infusion     Admin Date Action Dose Rate Route Administered By          04/12/2018 New Bag 300 mg 560 mL/hr Intravenous Sharda Ariza RN                         /67  Pulse 81  Temp 98.2  F (36.8  C) (Oral)  Resp 16  Wt 47.9 kg (105 lb 11.2 oz)  SpO2 100%  BMI 18.72 kg/m2

## 2018-04-16 ENCOUNTER — TELEPHONE (OUTPATIENT)
Dept: TRANSPLANT | Facility: CLINIC | Age: 33
End: 2018-04-16

## 2018-04-16 DIAGNOSIS — Z94.4 LIVER REPLACED BY TRANSPLANT (H): Primary | ICD-10-CM

## 2018-04-16 NOTE — TELEPHONE ENCOUNTER
Left message for patient that per Dr Johnson that he would like her to have an abd xray to confirm stent placement. If pt's labs remain elevated and stents are in good position patient will need a liver biopsy. Will await pt to call back.

## 2018-04-16 NOTE — TELEPHONE ENCOUNTER
Spoke with patient regarding plan for repeat hepatic panel and abd xray. Patient is leaving for vacation on Saturday for one week. Pt will call to try and schedule sooner.

## 2018-04-17 ENCOUNTER — TELEPHONE (OUTPATIENT)
Dept: GASTROENTEROLOGY | Facility: CLINIC | Age: 33
End: 2018-04-17

## 2018-04-17 NOTE — TELEPHONE ENCOUNTER
Yarelis is informed that she is scheduled on 05/07/2018 at 1005 AM with an arrival time of 805 AM for an ERCP procedure with Dr. Johnson.  Patient instructed to get a pre-op done prior to her procedure, she knows to arrange for a  and also someone to monitor her post procedure for at least 24 hrs.  All instructions will be sent to pt via zhiwo per her request.    SR 04/17/2018 @ 208 p

## 2018-04-18 ENCOUNTER — RADIANT APPOINTMENT (OUTPATIENT)
Dept: GENERAL RADIOLOGY | Facility: CLINIC | Age: 33
End: 2018-04-18
Attending: INTERNAL MEDICINE
Payer: COMMERCIAL

## 2018-04-18 DIAGNOSIS — Z94.4 LIVER REPLACED BY TRANSPLANT (H): ICD-10-CM

## 2018-04-19 DIAGNOSIS — Z94.4 LIVER REPLACED BY TRANSPLANT (H): ICD-10-CM

## 2018-04-19 PROCEDURE — 36415 COLL VENOUS BLD VENIPUNCTURE: CPT | Performed by: FAMILY MEDICINE

## 2018-04-19 PROCEDURE — 80076 HEPATIC FUNCTION PANEL: CPT | Performed by: FAMILY MEDICINE

## 2018-04-20 ENCOUNTER — TELEPHONE (OUTPATIENT)
Dept: TRANSPLANT | Facility: CLINIC | Age: 33
End: 2018-04-20

## 2018-04-20 DIAGNOSIS — Z94.4 LIVER REPLACED BY TRANSPLANT (H): Primary | ICD-10-CM

## 2018-04-20 LAB
ALBUMIN SERPL-MCNC: 2.9 G/DL (ref 3.4–5)
ALP SERPL-CCNC: 203 U/L (ref 40–150)
ALT SERPL W P-5'-P-CCNC: 52 U/L (ref 0–50)
AST SERPL W P-5'-P-CCNC: 33 U/L (ref 0–45)
BILIRUB DIRECT SERPL-MCNC: 0.1 MG/DL (ref 0–0.2)
BILIRUB SERPL-MCNC: 0.2 MG/DL (ref 0.2–1.3)
PROT SERPL-MCNC: 7.6 G/DL (ref 6.8–8.8)

## 2018-04-20 NOTE — TELEPHONE ENCOUNTER
Reviewed labs with DR Enriquez. Pt does not need a liver biopsy at this time. Pt to repeat labs when she returns from [place].

## 2018-05-02 DIAGNOSIS — Z94.4 LIVER REPLACED BY TRANSPLANT (H): ICD-10-CM

## 2018-05-02 LAB
ALBUMIN SERPL-MCNC: 2.7 G/DL (ref 3.4–5)
ALP SERPL-CCNC: 224 U/L (ref 40–150)
ALT SERPL W P-5'-P-CCNC: 50 U/L (ref 0–50)
AST SERPL W P-5'-P-CCNC: 28 U/L (ref 0–45)
BILIRUB DIRECT SERPL-MCNC: <0.1 MG/DL (ref 0–0.2)
BILIRUB SERPL-MCNC: 0.3 MG/DL (ref 0.2–1.3)
PROT SERPL-MCNC: 7.7 G/DL (ref 6.8–8.8)

## 2018-05-02 PROCEDURE — 36415 COLL VENOUS BLD VENIPUNCTURE: CPT | Performed by: FAMILY MEDICINE

## 2018-05-02 PROCEDURE — 80076 HEPATIC FUNCTION PANEL: CPT | Performed by: FAMILY MEDICINE

## 2018-05-07 ENCOUNTER — HOSPITAL ENCOUNTER (OUTPATIENT)
Facility: CLINIC | Age: 33
Discharge: HOME | End: 2018-05-07
Attending: INTERNAL MEDICINE | Admitting: INTERNAL MEDICINE
Payer: COMMERCIAL

## 2018-05-07 ENCOUNTER — ANESTHESIA (OUTPATIENT)
Dept: SURGERY | Facility: CLINIC | Age: 33
End: 2018-05-07
Payer: COMMERCIAL

## 2018-05-07 ENCOUNTER — APPOINTMENT (OUTPATIENT)
Dept: GENERAL RADIOLOGY | Facility: CLINIC | Age: 33
End: 2018-05-07
Attending: INTERNAL MEDICINE
Payer: COMMERCIAL

## 2018-05-07 ENCOUNTER — ANESTHESIA EVENT (OUTPATIENT)
Dept: SURGERY | Facility: CLINIC | Age: 33
End: 2018-05-07
Payer: COMMERCIAL

## 2018-05-07 VITALS
OXYGEN SATURATION: 98 % | TEMPERATURE: 97.7 F | WEIGHT: 107.36 LBS | SYSTOLIC BLOOD PRESSURE: 127 MMHG | RESPIRATION RATE: 16 BRPM | HEIGHT: 63 IN | DIASTOLIC BLOOD PRESSURE: 89 MMHG | BODY MASS INDEX: 19.02 KG/M2 | HEART RATE: 60 BPM

## 2018-05-07 DIAGNOSIS — K91.89 BILIARY ANASTOMOTIC STRICTURE (H): Primary | ICD-10-CM

## 2018-05-07 DIAGNOSIS — K83.1 BILIARY ANASTOMOTIC STRICTURE (H): Primary | ICD-10-CM

## 2018-05-07 LAB
ALBUMIN SERPL-MCNC: 3.1 G/DL (ref 3.4–5)
ALP SERPL-CCNC: 225 U/L (ref 40–150)
ALT SERPL W P-5'-P-CCNC: 44 U/L (ref 0–50)
AMYLASE SERPL-CCNC: 131 U/L (ref 30–110)
ANION GAP SERPL CALCULATED.3IONS-SCNC: 7 MMOL/L (ref 3–14)
AST SERPL W P-5'-P-CCNC: 26 U/L (ref 0–45)
BILIRUB SERPL-MCNC: 0.3 MG/DL (ref 0.2–1.3)
BUN SERPL-MCNC: 22 MG/DL (ref 7–30)
CALCIUM SERPL-MCNC: 9 MG/DL (ref 8.5–10.1)
CHLORIDE SERPL-SCNC: 105 MMOL/L (ref 94–109)
CO2 SERPL-SCNC: 25 MMOL/L (ref 20–32)
CREAT SERPL-MCNC: 0.99 MG/DL (ref 0.52–1.04)
ERCP: NORMAL
ERYTHROCYTE [DISTWIDTH] IN BLOOD BY AUTOMATED COUNT: 18.5 % (ref 10–15)
GFR SERPL CREATININE-BSD FRML MDRD: 65 ML/MIN/1.7M2
GLUCOSE BLDC GLUCOMTR-MCNC: 81 MG/DL (ref 70–99)
GLUCOSE SERPL-MCNC: 80 MG/DL (ref 70–99)
HCG SERPL QL: NEGATIVE
HCT VFR BLD AUTO: 44.3 % (ref 35–47)
HGB BLD-MCNC: 14.2 G/DL (ref 11.7–15.7)
LIPASE SERPL-CCNC: 244 U/L (ref 73–393)
MCH RBC QN AUTO: 28.1 PG (ref 26.5–33)
MCHC RBC AUTO-ENTMCNC: 32.1 G/DL (ref 31.5–36.5)
MCV RBC AUTO: 88 FL (ref 78–100)
PLATELET # BLD AUTO: 560 10E9/L (ref 150–450)
POTASSIUM SERPL-SCNC: 4 MMOL/L (ref 3.4–5.3)
PROT SERPL-MCNC: 8.5 G/DL (ref 6.8–8.8)
RBC # BLD AUTO: 5.05 10E12/L (ref 3.8–5.2)
SODIUM SERPL-SCNC: 137 MMOL/L (ref 133–144)
WBC # BLD AUTO: 12.6 10E9/L (ref 4–11)

## 2018-05-07 PROCEDURE — C1876 STENT, NON-COA/NON-COV W/DEL: HCPCS | Performed by: INTERNAL MEDICINE

## 2018-05-07 PROCEDURE — C1726 CATH, BAL DIL, NON-VASCULAR: HCPCS | Performed by: INTERNAL MEDICINE

## 2018-05-07 PROCEDURE — 82962 GLUCOSE BLOOD TEST: CPT

## 2018-05-07 PROCEDURE — 27210995 ZZH RX 272: Performed by: INTERNAL MEDICINE

## 2018-05-07 PROCEDURE — 80053 COMPREHEN METABOLIC PANEL: CPT | Performed by: INTERNAL MEDICINE

## 2018-05-07 PROCEDURE — 36000059 ZZH SURGERY LEVEL 3 EA 15 ADDTL MIN UMMC: Performed by: INTERNAL MEDICINE

## 2018-05-07 PROCEDURE — 40000170 ZZH STATISTIC PRE-PROCEDURE ASSESSMENT II: Performed by: INTERNAL MEDICINE

## 2018-05-07 PROCEDURE — 25000125 ZZHC RX 250: Performed by: INTERNAL MEDICINE

## 2018-05-07 PROCEDURE — 25000128 H RX IP 250 OP 636: Performed by: NURSE ANESTHETIST, CERTIFIED REGISTERED

## 2018-05-07 PROCEDURE — 85027 COMPLETE CBC AUTOMATED: CPT | Performed by: INTERNAL MEDICINE

## 2018-05-07 PROCEDURE — 82150 ASSAY OF AMYLASE: CPT | Performed by: INTERNAL MEDICINE

## 2018-05-07 PROCEDURE — C1769 GUIDE WIRE: HCPCS | Performed by: INTERNAL MEDICINE

## 2018-05-07 PROCEDURE — 37000009 ZZH ANESTHESIA TECHNICAL FEE, EACH ADDTL 15 MIN: Performed by: INTERNAL MEDICINE

## 2018-05-07 PROCEDURE — 25000565 ZZH ISOFLURANE, EA 15 MIN: Performed by: INTERNAL MEDICINE

## 2018-05-07 PROCEDURE — 37000008 ZZH ANESTHESIA TECHNICAL FEE, 1ST 30 MIN: Performed by: INTERNAL MEDICINE

## 2018-05-07 PROCEDURE — 36000061 ZZH SURGERY LEVEL 3 W FLUORO 1ST 30 MIN - UMMC: Performed by: INTERNAL MEDICINE

## 2018-05-07 PROCEDURE — 71000027 ZZH RECOVERY PHASE 2 EACH 15 MINS: Performed by: INTERNAL MEDICINE

## 2018-05-07 PROCEDURE — C9399 UNCLASSIFIED DRUGS OR BIOLOG: HCPCS | Performed by: NURSE ANESTHETIST, CERTIFIED REGISTERED

## 2018-05-07 PROCEDURE — 84703 CHORIONIC GONADOTROPIN ASSAY: CPT | Performed by: INTERNAL MEDICINE

## 2018-05-07 PROCEDURE — 71000014 ZZH RECOVERY PHASE 1 LEVEL 2 FIRST HR: Performed by: INTERNAL MEDICINE

## 2018-05-07 PROCEDURE — 27210794 ZZH OR GENERAL SUPPLY STERILE: Performed by: INTERNAL MEDICINE

## 2018-05-07 PROCEDURE — 36415 COLL VENOUS BLD VENIPUNCTURE: CPT | Performed by: INTERNAL MEDICINE

## 2018-05-07 PROCEDURE — 40000279 XR SURGERY CARM FLUORO GREATER THAN 5 MIN W STILLS: Mod: TC

## 2018-05-07 PROCEDURE — 25500064 ZZH RX 255 OP 636: Performed by: INTERNAL MEDICINE

## 2018-05-07 PROCEDURE — 83690 ASSAY OF LIPASE: CPT | Performed by: INTERNAL MEDICINE

## 2018-05-07 DEVICE — STENT JOHLIN PANCREA WEDGE 08.5FRX20CM WINTRO G26831
Type: IMPLANTABLE DEVICE | Site: BILE DUCT | Status: NON-FUNCTIONAL
Removed: 2018-06-25

## 2018-05-07 RX ORDER — SODIUM CHLORIDE, SODIUM LACTATE, POTASSIUM CHLORIDE, CALCIUM CHLORIDE 600; 310; 30; 20 MG/100ML; MG/100ML; MG/100ML; MG/100ML
INJECTION, SOLUTION INTRAVENOUS CONTINUOUS
Status: DISCONTINUED | OUTPATIENT
Start: 2018-05-07 | End: 2018-05-07 | Stop reason: HOSPADM

## 2018-05-07 RX ORDER — HYDRALAZINE HYDROCHLORIDE 20 MG/ML
2.5-5 INJECTION INTRAMUSCULAR; INTRAVENOUS EVERY 10 MIN PRN
Status: DISCONTINUED | OUTPATIENT
Start: 2018-05-07 | End: 2018-05-07 | Stop reason: HOSPADM

## 2018-05-07 RX ORDER — LEVOFLOXACIN 5 MG/ML
INJECTION, SOLUTION INTRAVENOUS PRN
Status: DISCONTINUED | OUTPATIENT
Start: 2018-05-07 | End: 2018-05-07

## 2018-05-07 RX ORDER — IOPAMIDOL 510 MG/ML
INJECTION, SOLUTION INTRAVASCULAR PRN
Status: DISCONTINUED | OUTPATIENT
Start: 2018-05-07 | End: 2018-05-07 | Stop reason: HOSPADM

## 2018-05-07 RX ORDER — LABETALOL HYDROCHLORIDE 5 MG/ML
10 INJECTION, SOLUTION INTRAVENOUS
Status: DISCONTINUED | OUTPATIENT
Start: 2018-05-07 | End: 2018-05-07 | Stop reason: HOSPADM

## 2018-05-07 RX ORDER — LIDOCAINE 40 MG/G
CREAM TOPICAL
Status: DISCONTINUED | OUTPATIENT
Start: 2018-05-07 | End: 2018-05-07 | Stop reason: HOSPADM

## 2018-05-07 RX ORDER — SODIUM CHLORIDE, SODIUM LACTATE, POTASSIUM CHLORIDE, CALCIUM CHLORIDE 600; 310; 30; 20 MG/100ML; MG/100ML; MG/100ML; MG/100ML
INJECTION, SOLUTION INTRAVENOUS CONTINUOUS PRN
Status: DISCONTINUED | OUTPATIENT
Start: 2018-05-07 | End: 2018-05-07

## 2018-05-07 RX ORDER — FENTANYL CITRATE 50 UG/ML
INJECTION, SOLUTION INTRAMUSCULAR; INTRAVENOUS PRN
Status: DISCONTINUED | OUTPATIENT
Start: 2018-05-07 | End: 2018-05-07

## 2018-05-07 RX ORDER — FLUMAZENIL 0.1 MG/ML
0.2 INJECTION, SOLUTION INTRAVENOUS
Status: DISCONTINUED | OUTPATIENT
Start: 2018-05-07 | End: 2018-05-07 | Stop reason: HOSPADM

## 2018-05-07 RX ORDER — HYDROMORPHONE HYDROCHLORIDE 1 MG/ML
.3-.5 INJECTION, SOLUTION INTRAMUSCULAR; INTRAVENOUS; SUBCUTANEOUS EVERY 5 MIN PRN
Status: DISCONTINUED | OUTPATIENT
Start: 2018-05-07 | End: 2018-05-07 | Stop reason: HOSPADM

## 2018-05-07 RX ORDER — NALOXONE HYDROCHLORIDE 0.4 MG/ML
.1-.4 INJECTION, SOLUTION INTRAMUSCULAR; INTRAVENOUS; SUBCUTANEOUS
Status: DISCONTINUED | OUTPATIENT
Start: 2018-05-07 | End: 2018-05-07

## 2018-05-07 RX ORDER — FENTANYL CITRATE 50 UG/ML
25-50 INJECTION, SOLUTION INTRAMUSCULAR; INTRAVENOUS
Status: DISCONTINUED | OUTPATIENT
Start: 2018-05-07 | End: 2018-05-07 | Stop reason: HOSPADM

## 2018-05-07 RX ORDER — LEVOFLOXACIN 500 MG/1
500 TABLET, FILM COATED ORAL DAILY
Qty: 7 TABLET | Refills: 0 | Status: SHIPPED | OUTPATIENT
Start: 2018-05-07 | End: 2018-05-07

## 2018-05-07 RX ORDER — PROPOFOL 10 MG/ML
INJECTION, EMULSION INTRAVENOUS PRN
Status: DISCONTINUED | OUTPATIENT
Start: 2018-05-07 | End: 2018-05-07

## 2018-05-07 RX ORDER — NALOXONE HYDROCHLORIDE 0.4 MG/ML
.1-.4 INJECTION, SOLUTION INTRAMUSCULAR; INTRAVENOUS; SUBCUTANEOUS
Status: DISCONTINUED | OUTPATIENT
Start: 2018-05-07 | End: 2018-05-07 | Stop reason: HOSPADM

## 2018-05-07 RX ORDER — DEXAMETHASONE SODIUM PHOSPHATE 4 MG/ML
INJECTION, SOLUTION INTRA-ARTICULAR; INTRALESIONAL; INTRAMUSCULAR; INTRAVENOUS; SOFT TISSUE PRN
Status: DISCONTINUED | OUTPATIENT
Start: 2018-05-07 | End: 2018-05-07

## 2018-05-07 RX ORDER — ONDANSETRON 2 MG/ML
INJECTION INTRAMUSCULAR; INTRAVENOUS PRN
Status: DISCONTINUED | OUTPATIENT
Start: 2018-05-07 | End: 2018-05-07

## 2018-05-07 RX ORDER — INDOMETHACIN 50 MG/1
100 SUPPOSITORY RECTAL
Status: COMPLETED | OUTPATIENT
Start: 2018-05-07 | End: 2018-05-07

## 2018-05-07 RX ORDER — ONDANSETRON 4 MG/1
4 TABLET, ORALLY DISINTEGRATING ORAL EVERY 30 MIN PRN
Status: DISCONTINUED | OUTPATIENT
Start: 2018-05-07 | End: 2018-05-07 | Stop reason: HOSPADM

## 2018-05-07 RX ORDER — ONDANSETRON 2 MG/ML
4 INJECTION INTRAMUSCULAR; INTRAVENOUS EVERY 30 MIN PRN
Status: DISCONTINUED | OUTPATIENT
Start: 2018-05-07 | End: 2018-05-07 | Stop reason: HOSPADM

## 2018-05-07 RX ADMIN — FENTANYL CITRATE 50 MCG: 50 INJECTION, SOLUTION INTRAMUSCULAR; INTRAVENOUS at 11:36

## 2018-05-07 RX ADMIN — SUGAMMADEX 120 MG: 100 INJECTION, SOLUTION INTRAVENOUS at 11:48

## 2018-05-07 RX ADMIN — DEXAMETHASONE SODIUM PHOSPHATE 6 MG: 4 INJECTION, SOLUTION INTRA-ARTICULAR; INTRALESIONAL; INTRAMUSCULAR; INTRAVENOUS; SOFT TISSUE at 11:21

## 2018-05-07 RX ADMIN — PROPOFOL 130 MG: 10 INJECTION, EMULSION INTRAVENOUS at 10:59

## 2018-05-07 RX ADMIN — SODIUM CHLORIDE, POTASSIUM CHLORIDE, SODIUM LACTATE AND CALCIUM CHLORIDE: 600; 310; 30; 20 INJECTION, SOLUTION INTRAVENOUS at 10:48

## 2018-05-07 RX ADMIN — ONDANSETRON 4 MG: 2 INJECTION INTRAMUSCULAR; INTRAVENOUS at 11:21

## 2018-05-07 RX ADMIN — MIDAZOLAM 1 MG: 1 INJECTION INTRAMUSCULAR; INTRAVENOUS at 10:57

## 2018-05-07 RX ADMIN — MIDAZOLAM 2 MG: 1 INJECTION INTRAMUSCULAR; INTRAVENOUS at 10:48

## 2018-05-07 RX ADMIN — FENTANYL CITRATE 50 MCG: 50 INJECTION, SOLUTION INTRAMUSCULAR; INTRAVENOUS at 10:59

## 2018-05-07 RX ADMIN — ROCURONIUM BROMIDE 30 MG: 10 INJECTION INTRAVENOUS at 10:59

## 2018-05-07 RX ADMIN — LEVOFLOXACIN 500 MG: 5 INJECTION, SOLUTION INTRAVENOUS at 11:10

## 2018-05-07 NOTE — ANESTHESIA CARE TRANSFER NOTE
Patient: Yarelis Penny    Procedure(s):  Endoscopic Retrograde Cholangiopancreatogram ballon dilation stent exchange - Wound Class: II-Clean Contaminated    Diagnosis: Biliary Stricture   Diagnosis Additional Information: No value filed.    Anesthesia Type:   General, ETT     Note:  Airway :Room Air  Patient transferred to:PACU  Comments: Patient oxygenating and ventilating well on room air.  Patient GUEVARA, following commands, and denies pain.  Report given to RN and VSS. Handoff Report: Identifed the Patient, Identified the Reponsible Provider, Reviewed the pertinent medical history, Discussed the surgical course, Reviewed Intra-OP anesthesia mangement and issues during anesthesia, Set expectations for post-procedure period and Allowed opportunity for questions and acknowledgement of understanding      Vitals: (Last set prior to Anesthesia Care Transfer)    CRNA VITALS  5/7/2018 1127 - 5/7/2018 1203      5/7/2018             NIBP: 113/86    Ht Rate: 76    Temp: 36.6  C (97.8  F)    SpO2: 95 %    Resp Rate (observed): 16    EKG: Sinus rhythm                Electronically Signed By: KAYLENE Ayala CRNA  May 7, 2018  12:03 PM

## 2018-05-07 NOTE — ANESTHESIA PREPROCEDURE EVALUATION
Anesthesia Evaluation     . Pt has had prior anesthetic. Type: General and MAC    No history of anesthetic complications          ROS/MED HX    ENT/Pulmonary: Comment: 05/15/17; 0806; Mask Ventilation: Easy; Ease of Intubation: Easy; Airway Size: 6.5;  Cuffed;  Oral;  Blade Type: Cohen;  Blade Size: 2;  Place by: kale;  Insertion Attempts: 1;  Secured at (cm)to lip: 20 cm;  Breath Sounds: Equal, clear and bilateral;  End Tidal CO2: Present;  Dentition: Intact;  Grade View of Cords: 1 - neg pulmonary ROS    (-) asthma   Neurologic:  - neg neurologic ROS     Cardiovascular:     (+) Dyslipidemia, ----. Pt has not received instructions: . . . :. valvular problems/murmurs type: MR . Previous cardiac testing Echodate:10/2014results:date: results:ECG reviewed date:3/29/2016 results:SR 98 BPM date: results:          METS/Exercise Tolerance: Comment: Work out video at home.  Lift weights at gym.  Walks on nice days. 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      Psychiatric:  - neg psychiatric ROS       Infectious Disease: Comment: H/o CMV viremia with h/o CMV colitis.            Malignancy:      - no malignancy   Other:    (+) C-spine cleared: N/A, no H/O Chronic Pain,no other significant disability                    Physical Exam  Normal systems: cardiovascular, pulmonary and dental    Airway   Mallampati: I  TM distance: >3 FB  Neck ROM: full    Dental      Cardiovascular   Rhythm and rate: regular and normal      Pulmonary                     Anesthesia Plan      History & Physical Review  History and physical reviewed and following examination; no interval change.    ASA Status:  3 .    NPO Status:  > 8 hours    Plan for General and ETT with Intravenous induction. Maintenance will be Balanced.    PONV prophylaxis:  Ondansetron (or other 5HT-3) and Dexamethasone or Solumedrol       Postoperative Care  Postoperative pain management:  IV analgesics.      Consents  Anesthetic plan, risks, benefits and alternatives discussed with:  Patient..                          .

## 2018-05-07 NOTE — OR NURSING
Dr. Sánchez at the bedside to speak to patient and family.  Patient was approved for discharge to home.

## 2018-05-07 NOTE — IP AVS SNAPSHOT
MRN:[id number]                      After Visit Summary   5/7/2018    Yarelis Penny    MRN: [id number]           Thank you!     Thank you for choosing [facility] for your care. Our goal is always to provide you with excellent care. Hearing back from our patients is one way we can continue to improve our services. Please take a few minutes to complete the written survey that you may receive in the mail after you visit with us. Thank you!        Patient Information     Date Of Birth          1985        About your hospital stay     You were admitted on:  May 7, 2018 You last received care in the:  Post Anesthesia Care Unit [facility]    You were discharged on:  May 7, 2018       Who to Call     For medical emergencies, please call 911.  For non-urgent questions about your medical care, please call your primary care provider or clinic, None  For questions related to your surgery, please call your surgery clinic        Attending Provider     Provider Specialty    Guru Brittany Macias MD Gastroenterology       Primary Care Provider Fax #    Physician No Ref-Primary [phone]      After Care Instructions     Discharge Instructions       Resume pre procedure diet            Discharge Instructions       Restart home medications.                  Your next 10 appointments already scheduled     May 10, 2018  9:00 AM CDT   Infusion 180 with UC SPEC INFUSION, UC 47 ATC   [facility] [place] [facility] ([facility])    [address]  [address]  [place] [address] [phone]            Jun 05, 2018  7:30 AM CDT   (Arrive by 7:15 AM)   Return Visit with Fuentes Johnson MD   [facility] ([facility])    [address] [place] [address]  4th Floor  [facility] [address]   [phone]            Jun 07, 2018  9:00 AM CDT   Infusion 180 with UC SPEC INFUSION, UC 47 ATC    [facility] Special[facility] ([facility])    [address]  [address]  [facility] [place] [id number] [phone]            Dec 05, 2018  8:00 AM CST   (Arrive by 7:45 AM)   Return Visit with Joya Lopez MD   [facility] ([place] [facility])    [address]  [address]  [facility] [place] [id number] [phone]              Further instructions from your care team       [facility]  Same-Day Surgery   Adult Discharge Orders & Instructions     For 24 hours after surgery    1. Get plenty of rest.  A responsible adult must stay with you for at least 24 hours after you leave the hospital.   2. Do not drive or use heavy equipment.  If you have weakness or tingling, don't drive or use heavy equipment until this feeling goes away.  3. Do not drink alcohol.  4. Avoid strenuous or risky activities.  Ask for help when climbing stairs.   5. You may feel lightheaded.  IF so, sit for a few minutes before standing.  Have someone help you get up.   6. If you have nausea (feel sick to your stomach): Drink only clear liquids such as apple juice, ginger ale, broth or 7-Up.  Rest may also help.  Be sure to drink enough fluids.  Move to a regular diet as you feel able.  7. You may have a slight fever. Call the doctor if your fever is over 100 F (37.7 C) (taken under the tongue) or lasts longer than 24 hours.  8. You may have a dry mouth, a sore throat, muscle aches or trouble sleeping.  These should go away after 24 hours.  9. Do not make important or legal decisions.   Call your doctor for any of the followin.  Signs of infection (fever, growing tenderness at the surgery site, a large amount of drainage or bleeding, severe pain, foul-smelling drainage, redness, swelling).    2. It has been over 8 to 10 hours since surgery and you are still not able to urinate  "(pass water).    3.  Headache for over 24 hours.           To contact a doctor, call Dr. Guru Macias @ [phone] or:    x   [phone] and ask for the resident on call for Gastroenterology (answered 24 hours a day)  x   Emergency Department:    [facility]: [phone]       (TTY for hearing impaired: [phone])          Additional Information     If you use hormonal birth control (such as the pill, patch, ring or implants): You'll need a second form of birth control for 7 days (condoms, a diaphragm or contraceptive foam). While in the hospital, you received a medicine called Bridion. Your normal birth control will not work as well for a week after taking this medicine.          Pending Results     No orders found from 5/5/2018 to 5/8/2018.            Admission Information     Date & Time Provider Department Dept. Phone    5/7/2018 Guru Brittany Macias MD Post Anesthesia Care Unit [facility] [phone]      Your Vitals Were     Blood Pressure Pulse Temperature Respirations Height Weight    126/99 75 98  F (36.7  C) (Oral) 16 1.6 m (5' 3\") 48.7 kg (107 lb 5.8 oz)    Pulse Oximetry BMI (Body Mass Index)                97% 19.02 kg/m2          [facility] Information     [employer] gives you secure access to your electronic health record. If you see a primary care provider, you can also send messages to your care team and make appointments. If you have questions, please call your primary care clinic.  If you do not have a primary care provider, please call [phone] and they will assist you.        Care EveryWhere ID     This is your Care EveryWhere ID. This could be used by other organizations to access your [place] medical records  [id number]        Equal Access to Services     ERI CHAKRABORTY : Yandel Palma, ann solorzano, mani moreira. So [facility] [phone].    ATENCIÓN: Si sawyerla español, tiene a borja " disposición servicios gratuitos de asistencia lingüística. Clyde grewal [phone].    We comply with applicable federal civil rights laws and [place] laws. We do not discriminate on the basis of race, color, national origin, age, disability, sex, sexual orientation, or gender identity.               Review of your medicines      CONTINUE these medicines which may have CHANGED, or have new prescriptions. If we are uncertain of the size of tablets/capsules you have at home, strength may be listed as something that might have changed.        Dose / Directions    GNP ASPIRIN LOW DOSE 81 MG EC tablet   This may have changed:  when to take this   Used for:  Ulcerative colitis with other complication, unspecified location (H), Liver replaced by transplant (H)   Generic drug:  aspirin        Dose:  81 mg   Take 1 tablet (81 mg) by mouth daily   Quantity:  30 tablet   Refills:  1       * levofloxacin 500 MG tablet   Commonly known as:  LEVAQUIN   This may have changed:  Another medication with the same name was added. Make sure you understand how and when to take each.   Used for:  Biliary anastomotic stricture        Dose:  500 mg   Take 1 tablet (500 mg) by mouth daily   Quantity:  7 tablet   Refills:  0       * levofloxacin 500 MG tablet   Commonly known as:  LEVAQUIN   This may have changed:  You were already taking a medication with the same name, and this prescription was added. Make sure you understand how and when to take each.   Used for:  Biliary anastomotic stricture        Dose:  500 mg   Take 1 tablet (500 mg) by mouth daily   Quantity:  7 tablet   Refills:  0       predniSONE 5 MG tablet   Commonly known as:  DELTASONE   This may have changed:  when to take this   Used for:  UC (ulcerative colitis) (H), Liver replaced by transplant (H)        Dose:  5 mg   Take 1 tablet (5 mg) by mouth daily   Quantity:  225 tablet   Refills:  3       * tacrolimus 1 MG capsule   Commonly known as:  GENERIC EQUIVALENT   This  may have changed:    - how much to take  - additional instructions   Used for:  S/P liver transplant (H)        Dose:  2 mg   Take 2 capsules (2 mg) by mouth 2 times daily *total dose 2.5 mg twice daily   Quantity:  120 capsule   Refills:  11       * tacrolimus 0.5 MG capsule   Commonly known as:  GENERIC EQUIVALENT   This may have changed:    - how much to take  - additional instructions   Used for:  S/P liver transplant (H)        Dose:  0.5 mg   Take 1 capsule (0.5 mg) by mouth 2 times daily Take with 1 mg capsule. Total dose 2.5 mg every 12 hours   Quantity:  180 capsule   Refills:  3       * Notice:  This list has 4 medication(s) that are the same as other medications prescribed for you. Read the directions carefully, and ask your doctor or other care provider to review them with you.      CONTINUE these medicines which have NOT CHANGED        Dose / Directions    budesonide 9 MG 24 hr tablet   Commonly known as:  UCERIS   Used for:  UC (ulcerative colitis) (H)        Dose:  9 mg   Take 1 tablet (9 mg) by mouth every morning   Quantity:  30 tablet   Refills:  3       calcium carbonate 500 tablet   Commonly known as:  OS-KERLINE 500 mg [place]. Ca        Dose:  1 tablet   Take 1 tablet by mouth every evening   Refills:  0       ENTYVIO IV        Dose:  300 mg   Inject 300 mg into the vein every 28 days   Refills:  0       Multi-vitamin Tabs tablet        Dose:  1 tablet   Take 1 tablet by mouth every morning   Refills:  0       sulfamethoxazole-trimethoprim 800-160 MG per tablet   Commonly known as:  BACTRIM DS/SEPTRA DS   Used for:  Immunosuppression (H), Inflammatory bowel disease, PSC (primary sclerosing cholangitis)        Dose:  1 tablet   Take 1 tablet by mouth three times a week Monday, Wednesday, Friday   Quantity:  90 tablet   Refills:  3       ursodiol 250 MG tablet   Commonly known as:  ACTIGALL   Used for:  Liver replaced by transplant (H)        Take 2 tabs (500 mg) in AM, and 1 tab at night (250 mg)    Quantity:  270 tablet   Refills:  3       VITAMIN D (CHOLECALCIFEROL) PO        Take by mouth every evening   Refills:  0            Where to get your medicines      These medications were sent to [facility] - [place], [place] - [address]  [address] [address]     Phone:  [phone]     levofloxacin 500 MG tablet                Protect others around you: Learn how to safely use, store and throw away your medicines at www.disposemymeds.org.        ANTIBIOTIC INSTRUCTION     You've Been Prescribed an Antibiotic - Now What?  Your healthcare team thinks that you or your loved one might have an infection. Some infections can be treated with antibiotics, which are powerful, life-saving drugs. Like all medications, antibiotics have side effects and should only be used when necessary. There are some important things you should know about your antibiotic treatment.      Your healthcare team may run tests before you start taking an antibiotic.    Your team may take samples (e.g., from your blood, urine or other areas) to run tests to look for bacteria. These test can be important to determine if you need an antibiotic at all and, if you do, which antibiotic will work best.      Within a few days, your healthcare team might change or even stop your antibiotic.    Your team may start you on an antibiotic while they are working to find out what is making you sick.    Your team might change your antibiotic because test results show that a different antibiotic would be better to treat your infection.    In some cases, once your team has more information, they learn that you do not need an antibiotic at all. They may find out that you don't have an infection, or that the antibiotic you're taking won't work against your infection. For example, an infection caused by a virus can't be treated with antibiotics. Staying on an antibiotic when you don't need it is more likely to be  harmful than helpful.      You may experience side effects from your antibiotic.    Like all medications, antibiotics have side effects. Some of these can be serious.    Let you healthcare team know if you have any known allergies when you are admitted to the hospital.    One significant side effect of nearly all antibiotics is the risk of severe and sometimes deadly diarrhea caused by Clostridium difficile (C. Difficile). This occurs when a person takes antibiotics because some good germs are destroyed. Antibiotic use allows C. diificile to take over, putting patients at high risk for this serious infection.    As a patient or caregiver, it is important to understand your or your loved one's antibiotic treatment. It is especially important for caregivers to speak up when patients can't speak for themselves. Here are some important questions to ask your healthcare team.    What infection is this antibiotic treating and how do you know I have that infection?    What side effects might occur from this antibiotic?    How long will I need to take this antibiotic?    Is it safe to take this antibiotic with other medications or supplements (e.g., vitamins) that I am taking?     Are there any special directions I need to know about taking this antibiotic? For example, should I take it with food?    How will I be monitored to know whether my infection is responding to the antibiotic?    What tests may help to make sure the right antibiotic is prescribed for me?      Information provided by:  www.cdc.gov/getsmart  U.S. Department of Health and Human Services  Centers for disease Control and Prevention  National Center for Emerging and Zoonotic Infectious Diseases  Division of Healthcare Quality Promotion             Medication List: This is a list of all your medications and when to take them. Check marks below indicate your daily home schedule. Keep this list as a reference.      Medications           Morning Afternoon  Evening Bedtime As Needed    budesonide 9 MG 24 hr tablet   Commonly known as:  UCERIS   Take 1 tablet (9 mg) by mouth every morning                                calcium carbonate 500 tablet   Commonly known as:  OS-KERLINE 500 mg [place]. Ca   Take 1 tablet by mouth every evening                                ENTYVIO IV   Inject 300 mg into the vein every 28 days                                GNP ASPIRIN LOW DOSE 81 MG EC tablet   Take 1 tablet (81 mg) by mouth daily   Generic drug:  aspirin                                * levofloxacin 500 MG tablet   Commonly known as:  LEVAQUIN   Take 1 tablet (500 mg) by mouth daily                                * levofloxacin 500 MG tablet   Commonly known as:  LEVAQUIN   Take 1 tablet (500 mg) by mouth daily                                Multi-vitamin Tabs tablet   Take 1 tablet by mouth every morning                                predniSONE 5 MG tablet   Commonly known as:  DELTASONE   Take 1 tablet (5 mg) by mouth daily                                sulfamethoxazole-trimethoprim 800-160 MG per tablet   Commonly known as:  BACTRIM DS/SEPTRA DS   Take 1 tablet by mouth three times a week Monday, Wednesday, Friday                                * tacrolimus 1 MG capsule   Commonly known as:  GENERIC EQUIVALENT   Take 2 capsules (2 mg) by mouth 2 times daily *total dose 2.5 mg twice daily                                * tacrolimus 0.5 MG capsule   Commonly known as:  GENERIC EQUIVALENT   Take 1 capsule (0.5 mg) by mouth 2 times daily Take with 1 mg capsule. Total dose 2.5 mg every 12 hours                                ursodiol 250 MG tablet   Commonly known as:  ACTIGALL   Take 2 tabs (500 mg) in AM, and 1 tab at night (250 mg)                                VITAMIN D (CHOLECALCIFEROL) PO   Take by mouth every evening                                * Notice:  This list has 4 medication(s) that are the same as other medications prescribed for you. Read the directions  carefully, and ask your doctor or other care provider to review them with you.

## 2018-05-07 NOTE — IP AVS SNAPSHOT
Post Anesthesia Care Unit [address] [address]    Phone:  [phone]                                       After Visit Summary   5/7/2018    Yarelis Penny    MRN: [id number]           After Visit Summary Signature Page     I have received my discharge instructions, and my questions have been answered. I have discussed any challenges I see with this plan with the nurse or doctor.    ..........................................................................................................................................  Patient/Patient Representative Signature      ..........................................................................................................................................  Patient Representative Print Name and Relationship to Patient    ..................................................               ................................................  Date                                            Time    ..........................................................................................................................................  Reviewed by Signature/Title    ...................................................              ..............................................  Date                                                            Time

## 2018-05-07 NOTE — OR NURSING
Attempted IV access per pt instructions, unable to access IV at site pt requested pt became angry and asked for another staff member to start IV, requested JOSEFINA Moscoso assist with pt IV access

## 2018-05-07 NOTE — DISCHARGE INSTRUCTIONS
[facility]  Same-Day Surgery   Adult Discharge Orders & Instructions     For 24 hours after surgery    1. Get plenty of rest.  A responsible adult must stay with you for at least 24 hours after you leave the hospital.   2. Do not drive or use heavy equipment.  If you have weakness or tingling, don't drive or use heavy equipment until this feeling goes away.  3. Do not drink alcohol.  4. Avoid strenuous or risky activities.  Ask for help when climbing stairs.   5. You may feel lightheaded.  IF so, sit for a few minutes before standing.  Have someone help you get up.   6. If you have nausea (feel sick to your stomach): Drink only clear liquids such as apple juice, ginger ale, broth or 7-Up.  Rest may also help.  Be sure to drink enough fluids.  Move to a regular diet as you feel able.  7. You may have a slight fever. Call the doctor if your fever is over 100 F (37.7 C) (taken under the tongue) or lasts longer than 24 hours.  8. You may have a dry mouth, a sore throat, muscle aches or trouble sleeping.  These should go away after 24 hours.  9. Do not make important or legal decisions.   Call your doctor for any of the followin.  Signs of infection (fever, growing tenderness at the surgery site, a large amount of drainage or bleeding, severe pain, foul-smelling drainage, redness, swelling).    2. It has been over 8 to 10 hours since surgery and you are still not able to urinate (pass water).    3.  Headache for over 24 hours.           To contact a doctor, call Dr. Guru Macias @ [phone] or:    x   [phone] and ask for the resident on call for Gastroenterology (answered 24 hours a day)  x   Emergency Department:    [facility]: [phone]       (TTY for hearing impaired: [phone])

## 2018-05-07 NOTE — ANESTHESIA POSTPROCEDURE EVALUATION
Patient: Yarelis Penny    Procedure(s):  Endoscopic Retrograde Cholangiopancreatogram ballon dilation stent exchange - Wound Class: II-Clean Contaminated    Diagnosis:Biliary Stricture   Diagnosis Additional Information: No value filed.    Anesthesia Type:  General, ETT    Note:  Anesthesia Post Evaluation    Patient location during evaluation: PACU  Patient participation: Able to fully participate in evaluation  Level of consciousness: awake and alert  Pain management: adequate  Airway patency: patent  Cardiovascular status: acceptable  Respiratory status: acceptable  Hydration status: acceptable  PONV: none     Anesthetic complications: None          Last vitals:  Vitals:    05/07/18 0945 05/07/18 1200   BP: 120/88 113/86   Pulse: 75    Resp: 16 16   Temp: 36.6  C (97.9  F) 36.6  C (97.8  F)   SpO2: 100% 96%         Electronically Signed By: Huma Vidal MD  May 7, 2018  12:28 PM

## 2018-05-07 NOTE — BRIEF OP NOTE
[facility] [place]  Gastroenterology Brief Operative Note    Pre-operative diagnosis: Biliary stricture   Post-operative diagnosis Same   Procedure: ERCP   Surgeon: Guru Colleen MD   Assistants(s): Bradford Sánchez MD   Anesthesia: General endotracheal anesthesia   Estimated blood loss: Minimal    Total IV fluids: (See anesthesia record)   Blood transfusion: No transfusion was given during surgery   Total urine output: (See anesthesia record)   Drains: None   Specimens: None   Implants: Two 8.5 Fr (17 cm and 18 cm) Johlin in right main bile duct and left main   Findings: Cholangiogram showed an anastomotic stricture. Balloon sweeping did not yielding anything. Stricture was dilated with a 6 mm balloon. Two 8.5 Fr Johlin was placed across the stricture into the right and left bile ducts.   Complications: None   Condition: Stable   Comments:      Recommendations:         See dictated procedure report for full details (found in chart review under 'Procedures')    - Observe in Same Day for possible discharge  - Discharge home if minimal or no pain   - Levofloxacin 500 mg x 7 days  - Repeat ERCP in 6 weeks   Bradford Sánchez MD  [phone]

## 2018-05-07 NOTE — H&P
[facility]  GASTROENTEROLOGY H&P NOTE  Yarelis Penny [id number]   05/07/2018    HPI  32 yo F with h/oPBC and AIH s/p L lobe live donor transplant presented with elevated ALP. Imaging done subsequently showed anastomotic stricture with dilated intrahepatics. S/p ERCP with biliary stent placement.  Currently feels fine, denies any symptoms of abdominal pain, nausea, vomiting. Appetite is good.       Past Medical History:   Diagnosis Date     Autoimmune hepatitis (H) 2012    on steroid taper     Cholangitis      CKD (chronic kidney disease) 2012    biopsy 2012: TIN, patchy fibrosis     Esophageal varices (H) 9/08    banded     Heart murmur      History of blood transfusion      Primary sclerosing cholangitis      Ulcerative Colitis     f/b GI       Past Surgical History:   Procedure Laterality Date     COLONOSCOPY  9/07     COLONOSCOPY N/A 2/26/2015    Procedure: COMBINED COLONOSCOPY, SINGLE OR MULTIPLE BIOPSY/POLYPECTOMY BY BIOPSY;  Surgeon: Mitchel Hoskins Chi, MD;  Location:  GI     COLONOSCOPY N/A 1/12/2016    Procedure: COMBINED COLONOSCOPY, SINGLE OR MULTIPLE BIOPSY/POLYPECTOMY BY BIOPSY;  Surgeon: Rigo Valles MD;  Location:  GI     COLONOSCOPY N/A 4/1/2016    Procedure: COMBINED COLONOSCOPY, SINGLE OR MULTIPLE BIOPSY/POLYPECTOMY BY BIOPSY;  Surgeon: Kareem Solis MD;  Location:  GI     COLONOSCOPY N/A 9/5/2017    Procedure: COMBINED COLONOSCOPY, SINGLE OR MULTIPLE BIOPSY/POLYPECTOMY BY BIOPSY;  Colonoscopy;  Surgeon: Fuentes Johnson MD;  Location:  GI     ENDOSCOPIC RETROGRADE CHOLANGIOPANCREATOGRAM N/A 6/25/2015    Procedure: COMBINED ENDOSCOPIC RETROGRADE CHOLANGIOPANCREATOGRAPHY, PLACE TUBE/STENT;  Surgeon: Landon Quinones MD;  Location:  OR     ENDOSCOPIC RETROGRADE CHOLANGIOPANCREATOGRAM N/A 6/25/2015    Procedure: COMBINED ENDOSCOPIC RETROGRADE CHOLANGIOPANCREATOGRAPHY, PLACE TUBE/STENT;  Surgeon: Landon Quinones MD;  Location:  OR     ENDOSCOPIC  RETROGRADE CHOLANGIOPANCREATOGRAM N/A 7/2/2015    Procedure: COMBINED ENDOSCOPIC RETROGRADE CHOLANGIOPANCREATOGRAPHY, PLACE TUBE/STENT;  Surgeon: Landon Quinones MD;  [facility]     ENDOSCOPIC RETROGRADE CHOLANGIOPANCREATOGRAM N/A 9/8/2015    Procedure: COMBINED ENDOSCOPIC RETROGRADE CHOLANGIOPANCREATOGRAPHY, REMOVE FOREIGN BODY OR STENT/TUBE;  Surgeon: Landon Quinones MD;  [facility]     ENDOSCOPIC RETROGRADE CHOLANGIOPANCREATOGRAM N/A 12/8/2015    Procedure: ENDOSCOPIC RETROGRADE CHOLANGIOPANCREATOGRAM;  Surgeon: Landon Quinones MD;  [facility]     ENDOSCOPIC RETROGRADE CHOLANGIOPANCREATOGRAM N/A 3/1/2016    Procedure: COMBINED ENDOSCOPIC RETROGRADE CHOLANGIOPANCREATOGRAPHY, REMOVE FOREIGN BODY OR STENT/TUBE;  Surgeon: Landon Quinones MD;  [facility]     ENDOSCOPIC RETROGRADE CHOLANGIOPANCREATOGRAM N/A 3/20/2017    Procedure: COMBINED ENDOSCOPIC RETROGRADE CHOLANGIOPANCREATOGRAPHY, PLACE TUBE/STENT;  Endoscopic Retrograde Cholangiopancreatogram with Ballon Dilation, Stent Placement;  Surgeon: Guru Brittany Macias MD;  [facility]     ENDOSCOPIC RETROGRADE CHOLANGIOPANCREATOGRAM N/A 4/2/2018    Procedure: COMBINED ENDOSCOPIC RETROGRADE CHOLANGIOPANCREATOGRAPHY, PLACE TUBE/STENT;  Endoscopic Retrograde Cholangiopancreatogram with biliary dilation and stent placement;  Surgeon: Guru Brittany Macias MD;  [facility]     ENDOSCOPIC RETROGRADE CHOLANGIOPANCREATOGRAPHY, EXCHANGE TUBE/STENT N/A 7/30/2015    Procedure: ENDOSCOPIC RETROGRADE CHOLANGIOPANCREATOGRAPHY, EXCHANGE TUBE/STENT;  Surgeon: Landon Quinones MD;  [facility]     ENDOSCOPIC RETROGRADE CHOLANGIOPANCREATOGRAPHY, EXCHANGE TUBE/STENT N/A 5/15/2017    Procedure: ENDOSCOPIC RETROGRADE CHOLANGIOPANCREATOGRAPHY, EXCHANGE TUBE/STENT;  Endoscopic Retrograde Cholangiopancreatogram with biliary stent exchange and balloon dilation;  Surgeon: Guru Brittany Macias  MD Genna;  [facility]     ESOPHAGOSCOPY, GASTROSCOPY, DUODENOSCOPY (EGD), COMBINED N/A 2/26/2015    Procedure: COMBINED ESOPHAGOSCOPY, GASTROSCOPY, DUODENOSCOPY (EGD);  Surgeon: Mitchel Hoskins Chi, MD;  [facility]     EXPLORE COMMON BILE DUCT N/A 6/25/2015    Procedure: EXPLORE COMMON BILE DUCT;  Surgeon: Tyree Smith MD;  [facility]     LAPAROTOMY EXPLORATORY N/A 6/25/2015    Procedure: LAPAROTOMY EXPLORATORY;  Surgeon: Tyree Smith MD;  [facility]     PICC INSERTION Right 2/27/2015    4fr SL Valved PICC, 36cm (1cm external) in the R medial brachial vein w/ tip in the low SVC.     SIGMOIDOSCOPY FLEXIBLE N/A 4/27/2016    Procedure: SIGMOIDOSCOPY FLEXIBLE;  Surgeon: Jose Nielson MD;  [facility]: [facility]     TRANSPLANT LIVER RECIPIENT LIVING UNRELATED N/A 6/18/2015    Procedure: TRANSPLANT LIVER RECIPIENT LIVING UNRELATED;  Surgeon: Tyree Smith MD;  [facility]     UPPER GI ENDOSCOPY            Allergies   Allergen Reactions     Rifampin Other (See Comments)     Kidney failure     Penicillins      Hives, has tolerated amoxicillin           Current Facility-Administered Medications:      indomethacin (INDOCIN) 50 MG Suppository 100 mg, 100 mg, Rectal, Once PRN, Bradford Sánchez MD     iopamidol (ISOVUE-250) solution, , , PRN, Guru Brtitany Macias MD, 30 mL at 05/07/18 1109     lidocaine (LMX4) kit, , Topical, Q1H PRN, Bradford Sánchez MD     lidocaine 1 % 1 mL, 1 mL, Other, Q1H PRN, Bradford Sánchez MD     simethicone 133mg/2mL oral suspension, , , PRN, Guru Brittany Macias MD, 133 mg at 05/07/18 1108     sodium chloride (PF) 0.9% PF flush 3 mL, 3 mL, Intracatheter, Q1H PRN, Bradford Sánchez MD     sodium chloride (PF) 0.9% PF flush 3 mL, 3 mL, Intracatheter, Q8H, Bradford Sánchez MD     sodium chloride (PF) 0.9% PF flush 3 mL, 3 mL, Intravenous, q1 min prn, Bradford Sánchez MD     sterile water (bottle) irrigation, , , PRN, Guru Brittany Macias  "MD Genna, 100 mL at 05/07/18 1109    Family History   Problem Relation Age of Onset     Hypertension Mother      Lipids Mother      Hypertension Maternal Grandmother      Alzheimer Disease Maternal Grandmother      Lipids Father        Social History     Social History     Marital status:      Spouse name: Ceferino     Number of children: 0     Years of education: N/A     Occupational History     unemployed      Social History Main Topics     Smoking status: Never Smoker     Smokeless tobacco: Never Used     Alcohol use 0.0 oz/week     0 Standard drinks or equivalent per week      Comment: Rare     Drug use: No     Sexual activity: Not Currently     Partners: Male      Comment: denies pregnancy     Other Topics Concern      Service No     Blood Transfusions No     Caffeine Concern No     Occupational Exposure No     Hobby Hazards No     Sleep Concern Yes     Stress Concern No     Weight Concern No     Special Diet No     Back Care No     Exercise No     Bike Helmet No     n/a     Seat Belt Yes     Self-Exams No     Social History Narrative       Review Of Systems  Skin: negative  Eyes: negative  Ears/Nose/Throat: negative  Respiratory: No shortness of breath, dyspnea on exertion, cough, or hemoptysis  Cardiovascular: negative  Gastrointestinal: negative  Genitourinary: negative  Musculoskeletal: negative  Neurologic: negative  Psychiatric: negative  Hematologic/Lymphatic/Immunologic: negative  Endocrine: negative      OBJECTIVE:  VS: /88 (BP Location: Right arm)  Pulse 75  Temp 97.9  F (36.6  C) (Oral)  Resp 16  Ht 1.6 m (5' 3\")  Wt 48.7 kg (107 lb 5.8 oz)  LMP   SpO2 100%  BMI 19.02 kg/m2   GEN: A&Ox3, NAD, comfortable  CV:  RRR, no M/G/R  PULM:  CTA B/L  ABD: Normoactive bowel sounds, soft, ND, NT, no HSM      REVIEW OF LABORATORY, PATHOLOGY AND IMAGING RESULTS:  BMPNo lab results found in last 7 days.    CBC    Recent Labs  Lab 05/07/18  1002   WBC 12.6*   RBC 5.05   HGB 14.2   HCT " 44.3   MCV 88   MCH 28.1   MCHC 32.1   RDW 18.5*   *       INRNo lab results found in last 7 days.    LFTs    Recent Labs  Lab 05/02/18  0918   ALKPHOS 224*   AST 28   ALT 50   BILITOTAL 0.3   PROTTOTAL 7.7   ALBUMIN 2.7*        PANCNo lab results found in last 7 days.    IMPRESSION:  Yarelis Penny is a 33 year old female w h/o PBC and AIH s/p live donor transplant presents with anastomotic stricture. Here to have repeat ERCP.       Bradford Sánchez MD  Therapeutic Endoscopy Fellow  [phone]

## 2018-05-08 ENCOUNTER — TELEPHONE (OUTPATIENT)
Dept: TRANSPLANT | Facility: CLINIC | Age: 33
End: 2018-05-08

## 2018-05-08 DIAGNOSIS — K91.89 BILIARY ANASTOMOTIC STRICTURE (H): ICD-10-CM

## 2018-05-08 DIAGNOSIS — K83.1 BILIARY ANASTOMOTIC STRICTURE (H): ICD-10-CM

## 2018-05-08 RX ORDER — LEVOFLOXACIN 500 MG/1
500 TABLET, FILM COATED ORAL DAILY
Qty: 7 TABLET | Refills: 0 | Status: ON HOLD | OUTPATIENT
Start: 2018-05-08 | End: 2018-05-31

## 2018-05-08 NOTE — TELEPHONE ENCOUNTER
Pt called to state that her script never made it for levaquin. Resent script to yesi as she requested.   Pt is feeling well, denies any fevers and will repeat labs in one week.

## 2018-05-09 ENCOUNTER — CARE COORDINATION (OUTPATIENT)
Dept: GASTROENTEROLOGY | Facility: CLINIC | Age: 33
End: 2018-05-09

## 2018-05-09 DIAGNOSIS — K83.1 BILIARY STRICTURE (H): Primary | ICD-10-CM

## 2018-05-09 DIAGNOSIS — A08.39 CMV COLITIS (H): Primary | ICD-10-CM

## 2018-05-09 DIAGNOSIS — B25.9 CMV COLITIS (H): Primary | ICD-10-CM

## 2018-05-09 NOTE — PROGRESS NOTES
Post ERCp (5/7/2018) with Dr. Macias: Follow-up    Post procedure recommendations: - Discharge patient to home (ambulatory).   - Will recommend to start Levaquin (levofloxacin) 500 mg PO daily for 1 week.   - Will recommend to recheck LFTs in 1 week at the liver transplant clinic. If LFTs are persistently elevated, will consider non-biliary causes as well   - Repeat ERCP in 6 weeks to reevaluate biliary stricture and potentially upsize stents if need be.     Patient states: Patient states she is feeling good. Patient denies any severe abdominal pain, nausea or emesis, fever or chills. Patient is tolerating a regular diet. Patient was instructed to call with any fever or chills, severe abdominal pain, nausea or emesis, or if she notices any jaundice color to her skin.     Orders placed: ERCP and sent to scheduling. LFT's patient was encouraged to go into [facility] closest [facility] to have her labs check next week, patient stated understanding of this plan.    Contact information verified for future questions/concerns. Patent's questions and concerns were addressed to their stated satisfaction. Patient is in agreement with this new plan of care.     Em ANTHONY RN Coordinator  Dr. Macias  [facility]  [phone]

## 2018-05-09 NOTE — PROGRESS NOTES
Entyvio therapy plan . New order for entyvio  and standing lab orders To be drawn every other infusion prior to start of infusion

## 2018-05-10 ENCOUNTER — INFUSION THERAPY VISIT (OUTPATIENT)
Dept: INFUSION THERAPY | Facility: CLINIC | Age: 33
End: 2018-05-10
Attending: INTERNAL MEDICINE
Payer: COMMERCIAL

## 2018-05-10 VITALS
SYSTOLIC BLOOD PRESSURE: 113 MMHG | BODY MASS INDEX: 18.87 KG/M2 | WEIGHT: 106.5 LBS | DIASTOLIC BLOOD PRESSURE: 72 MMHG | TEMPERATURE: 96.1 F | OXYGEN SATURATION: 98 % | HEART RATE: 71 BPM | RESPIRATION RATE: 16 BRPM

## 2018-05-10 DIAGNOSIS — B25.9 CMV COLITIS (H): ICD-10-CM

## 2018-05-10 DIAGNOSIS — E86.0 DEHYDRATION: Primary | ICD-10-CM

## 2018-05-10 DIAGNOSIS — D50.8 OTHER IRON DEFICIENCY ANEMIA: ICD-10-CM

## 2018-05-10 DIAGNOSIS — A08.39 CMV COLITIS (H): ICD-10-CM

## 2018-05-10 LAB
ALBUMIN SERPL-MCNC: 2.8 G/DL (ref 3.4–5)
ALP SERPL-CCNC: 196 U/L (ref 40–150)
ALT SERPL W P-5'-P-CCNC: 49 U/L (ref 0–50)
AST SERPL W P-5'-P-CCNC: 38 U/L (ref 0–45)
BASOPHILS # BLD AUTO: 0.1 10E9/L (ref 0–0.2)
BASOPHILS NFR BLD AUTO: 1 %
BILIRUB DIRECT SERPL-MCNC: <0.1 MG/DL (ref 0–0.2)
BILIRUB SERPL-MCNC: 0.4 MG/DL (ref 0.2–1.3)
CRP SERPL-MCNC: 6.5 MG/L (ref 0–8)
DIFFERENTIAL METHOD BLD: ABNORMAL
EOSINOPHIL # BLD AUTO: 1.4 10E9/L (ref 0–0.7)
EOSINOPHIL NFR BLD AUTO: 11.4 %
ERYTHROCYTE [DISTWIDTH] IN BLOOD BY AUTOMATED COUNT: 17.7 % (ref 10–15)
ERYTHROCYTE [SEDIMENTATION RATE] IN BLOOD BY WESTERGREN METHOD: 50 MM/H (ref 0–20)
HCT VFR BLD AUTO: 42.2 % (ref 35–47)
HGB BLD-MCNC: 13.7 G/DL (ref 11.7–15.7)
IMM GRANULOCYTES # BLD: 0.1 10E9/L (ref 0–0.4)
IMM GRANULOCYTES NFR BLD: 0.4 %
LYMPHOCYTES # BLD AUTO: 4.9 10E9/L (ref 0.8–5.3)
LYMPHOCYTES NFR BLD AUTO: 38.5 %
MCH RBC QN AUTO: 27.7 PG (ref 26.5–33)
MCHC RBC AUTO-ENTMCNC: 32.5 G/DL (ref 31.5–36.5)
MCV RBC AUTO: 85 FL (ref 78–100)
MONOCYTES # BLD AUTO: 1.4 10E9/L (ref 0–1.3)
MONOCYTES NFR BLD AUTO: 11.1 %
NEUTROPHILS # BLD AUTO: 4.8 10E9/L (ref 1.6–8.3)
NEUTROPHILS NFR BLD AUTO: 37.6 %
NRBC # BLD AUTO: 0 10*3/UL
NRBC BLD AUTO-RTO: 0 /100
PLATELET # BLD AUTO: 610 10E9/L (ref 150–450)
PROT SERPL-MCNC: 7.8 G/DL (ref 6.8–8.8)
RBC # BLD AUTO: 4.94 10E12/L (ref 3.8–5.2)
WBC # BLD AUTO: 12.7 10E9/L (ref 4–11)

## 2018-05-10 PROCEDURE — 85025 COMPLETE CBC W/AUTO DIFF WBC: CPT | Performed by: INTERNAL MEDICINE

## 2018-05-10 PROCEDURE — 96413 CHEMO IV INFUSION 1 HR: CPT

## 2018-05-10 PROCEDURE — 86140 C-REACTIVE PROTEIN: CPT | Performed by: INTERNAL MEDICINE

## 2018-05-10 PROCEDURE — 80076 HEPATIC FUNCTION PANEL: CPT | Performed by: INTERNAL MEDICINE

## 2018-05-10 PROCEDURE — 85652 RBC SED RATE AUTOMATED: CPT | Performed by: INTERNAL MEDICINE

## 2018-05-10 PROCEDURE — 25000128 H RX IP 250 OP 636: Mod: ZF | Performed by: INTERNAL MEDICINE

## 2018-05-10 RX ADMIN — SODIUM CHLORIDE 50 ML: 9 INJECTION, SOLUTION INTRAVENOUS at 10:03

## 2018-05-10 RX ADMIN — VEDOLIZUMAB 300 MG: 300 INJECTION, POWDER, LYOPHILIZED, FOR SOLUTION INTRAVENOUS at 10:03

## 2018-05-10 NOTE — PROGRESS NOTES
Nursing Note  Yarelis Penny [facility]:   Chief Complaint   Patient presents with     Infusion     Entyvio (vedolizumab)     During today's [facility] appointment, orders from Dr. Johnson were completed.  Frequency: monthly    Progress note:  Patient identification verified by name and date of birth.  Assessment completed.  Vitals recorded in Doc Flowsheets.  Patient was provided with education regarding infusion and possible side effects.  Patient verbalized understanding.      needed: No  Premedications: were not ordered.  Infusion Rates: 560 ml/hr for 30 minutes  Approximate appointment length:2 hours. (med took 1 hour to make)  Labs: were drawn per orders.   Vascular access: peripheral IV placed today.  Treatment Conditions: patient denies fever, chills, signs of infection, recent illness, on antibiotics, productive cough or elevated temperature.  Patient tolerated infusion: well.    Drug Waste Record? No     Discharge Plan:   Follow up plan of care with: ongoing infusions at [facility].  Discharge instructions were reviewed with patient.  Patient/representative verbalized understanding of discharge instructions and all questions answered.  Patient discharged from [facility] in stable condition.    Sharda Ariza RN    Administrations This Visit     0.9% sodium chloride BOLUS     Admin Date Action Dose Route Administered By             05/10/2018 New Bag 50 mL Intravenous Sharda Ariza RN                    vedolizumab (ENTYVIO) 300 mg in sodium chloride 0.9 % 280 mL infusion     Admin Date Action Dose Rate Route Administered By          05/10/2018 New Bag 300 mg 560 mL/hr Intravenous Sharda Ariza RN                         /73  Pulse 75  Temp 96.1  F (35.6  C) (Oral)  Resp 16  Wt 48.3 kg (106 lb 8 oz)  SpO2 98%  BMI 18.87  "kg/m2            PRIOR TO INFUSION OF BIOLOGICAL MEDICATIONS OR ANY OF THESE AS LISTED:  \".rheumbiologicalchecklist\"    Prior to Infusion of biological medications or any of these as listed:    1. Elevated temperature, fever, chills, productive cough or abnormal vital signs, night sweats, coughing up blood or sputum, no appetite or abnormal vital signs : NO    2. Open wounds or new incisions: NO    3. Recent hospitalization: NO    4.  Recent surgeries:  NO    5. Any upcoming surgeries or dental procedures?:NO    6. Any current or recent bouts of illness or infection? On any antibiotics? : YES, Dr. Johnson aware and ok to infuse entyvio.    7. Any new, sudden or worsening abdominal pain :NO    8. Vaccination within 4 weeks? Patient or someone in the household is scheduled to receive vaccination? No live virus vaccines prior to or during treatment :NO    9. Any nervous system diseases [i.e. multiple sclerosis, Guillain-[place], seizures, neurological  changes]: NO    10. Pregnant or breast feeding; or plans on pregnancy in the future: NO    11. Signs of worsening depression or suicidal ideations while taking benlysta:NO    12. New-onset medical symptoms: NO    13.  New cancer diagnosis or on chemotherapy or radiation NO    14.  Evaluate for any sign of active TB [Unexplained weight loss, Loss of appetite, Night sweats, Fever, Fatigue, Chills, Coughing for 3 weeks or longer, Hemoptysis (coughing up blood), Chest pain]: NO    **Note: If answered yes to any of the above, hold the infusion and contact ordering rheumatologist or on-call rheumatologist.   "

## 2018-05-10 NOTE — MR AVS SNAPSHOT
After Visit Summary   5/10/2018    Yarelis Penny    MRN: [id number]           Patient Information     Date Of Birth          1985        Visit Information        Provider Department      5/10/2018 9:00 AM UC 47 ATC; UC SPEC INFUSION [facility]        Today's Diagnoses     Dehydration    -  1    CMV colitis (H)        Other iron deficiency anemia           Follow-ups after your visit        Your next 10 appointments already scheduled     Jun 05, 2018  7:30 AM CDT   (Arrive by 7:15 AM)   Return Visit with Fuentes Johnson MD   [facility] ([facility])    [address]  4th Floor  [place] [address]   [phone]            Jun 07, 2018  9:00 AM CDT   Infusion 180 with UC SPEC INFUSION, UC 47 ATC   [facility] ([facility])    [address]  [address]  [place] [address] [phone]            Jul 09, 2018 10:30 AM CDT   Infusion 180 with UC SPEC INFUSION, UC 48 ATC   [facility] ([facility])    [address]  [address]  [place] [address] [phone]            Aug 06, 2018  9:00 AM CDT   Infusion 180 with UC SPEC INFUSION, UC 47 ATC   [facility] ([facility])    [address]  [address]  [place] [address] [phone]            Dec 05, 2018  8:00 AM CST   (Arrive by 7:45 AM)   Return Visit with Joya Lopez MD   [facility] ([facility])    [address]  [address]  [place] [address] [phone]              Future tests that were ordered for you today     Open Standing Orders        Priority Remaining Interval Expires Ordered     Notify Physician Routine [id number] PRN  5/10/2018    CBC with platelets differential Routine 8/8 5/9/2019 5/9/2018    CRP inflammation Routine 8/8 5/9/2019 5/9/2018    Erythrocyte sedimentation rate auto Routine 8/8 5/9/2019 5/9/2018    Hepatic panel Routine 8/8 5/9/2019 5/9/2018          Open Future Orders        Priority Expected Expires Ordered    Hepatic panel Routine  5/9/2019 5/9/2018            Who to contact     If you have questions or need follow up information about today's clinic visit or your schedule please contact [facility] directly at [phone].  Normal or non-critical lab and imaging results will be communicated to you by vogogohart, letter or phone within 4 business days after the clinic has received the results. If you do not hear from us within 7 days, please contact the clinic through Misticomt or phone. If you have a critical or abnormal lab result, we will notify you by phone as soon as possible.  Submit refill requests through Beatrobo or call your pharmacy and they will forward the refill request to us. Please allow 3 business days for your refill to be completed.          Additional Information About Your Visit        vogogoharTier 1 Performance Information     Beatrobo gives you secure access to your electronic health record. If you see a primary care provider, you can also send messages to your care team and make appointments. If you have questions, please call your primary care clinic.  If you do not have a primary care provider, please call [phone] and they will assist you.        Care EveryWhere ID     This is your Care EveryWhere ID. This could be used by other organizations to access your [place] medical records  [id number]        Your Vitals Were     Pulse Temperature Respirations Pulse Oximetry BMI (Body Mass Index)       71 96.1  F (35.6  C) (Oral) 16 98% 18.87 kg/m2        Blood Pressure from Last 3 Encounters:   05/10/18 113/72   05/07/18  127/89   04/12/18 108/74    Weight from Last 3 Encounters:   05/10/18 48.3 kg (106 lb 8 oz)   05/07/18 48.7 kg (107 lb 5.8 oz)   04/12/18 47.9 kg (105 lb 11.2 oz)              We Performed the Following     CBC with platelets differential     CRP inflammation     Erythrocyte sedimentation rate auto     Hepatic panel          Today's Medication Changes          These changes are accurate as of 5/10/18  5:08 PM.  If you have any questions, ask your nurse or doctor.               These medicines have changed or have updated prescriptions.        Dose/Directions    GNP ASPIRIN LOW DOSE 81 MG EC tablet   This may have changed:  when to take this   Used for:  Ulcerative colitis with other complication, unspecified location (H), Liver replaced by transplant (H)   Generic drug:  aspirin        Dose:  81 mg   Take 1 tablet (81 mg) by mouth daily   Quantity:  30 tablet   Refills:  1       predniSONE 5 MG tablet   Commonly known as:  DELTASONE   This may have changed:  when to take this   Used for:  UC (ulcerative colitis) (H), Liver replaced by transplant (H)        Dose:  5 mg   Take 1 tablet (5 mg) by mouth daily   Quantity:  225 tablet   Refills:  3       * tacrolimus 1 MG capsule   Commonly known as:  GENERIC EQUIVALENT   This may have changed:    - how much to take  - additional instructions   Used for:  S/P liver transplant (H)        Dose:  2 mg   Take 2 capsules (2 mg) by mouth 2 times daily *total dose 2.5 mg twice daily   Quantity:  120 capsule   Refills:  11       * tacrolimus 0.5 MG capsule   Commonly known as:  GENERIC EQUIVALENT   This may have changed:    - how much to take  - additional instructions   Used for:  S/P liver transplant (H)        Dose:  0.5 mg   Take 1 capsule (0.5 mg) by mouth 2 times daily Take with 1 mg capsule. Total dose 2.5 mg every 12 hours   Quantity:  180 capsule   Refills:  3       * Notice:  This list has 2 medication(s) that are the same as other medications prescribed for you. Read  the directions carefully, and ask your doctor or other care provider to review them with you.             Primary Care Provider Fax #    Physician No Ref-Primary [phone]       No address on file        Equal Access to Services     ERI CHAKRABORTY : Hadii aad ku hadrenettayves Palma, josiejackie contrerasmarileeha, malu mott, mani muñoz brittanyshannon keita lajoseevanessa lozano. So [facility] [phone].    ATENCIÓN: Si habla español, tiene a borja disposición servicios gratuitos de asistencia lingüística. Llame al [phone].    We comply with applicable federal civil rights laws and [place] laws. We do not discriminate on the basis of race, color, national origin, age, disability, sex, sexual orientation, or gender identity.            Thank you!     Thank you for choosing [facility]  for your care. Our goal is always to provide you with excellent care. Hearing back from our patients is one way we can continue to improve our services. Please take a few minutes to complete the written survey that you may receive in the mail after your visit with us. Thank you!             Your Updated Medication List - Protect others around you: Learn how to safely use, store and throw away your medicines at www.disposemymeds.org.          This list is accurate as of 5/10/18  5:08 PM.  Always use your most recent med list.                   Brand Name Dispense Instructions for use Diagnosis    budesonide 9 MG 24 hr tablet    UCERIS    30 tablet    Take 1 tablet (9 mg) by mouth every morning    UC (ulcerative colitis) (H)       calcium carbonate 500 MG tablet    OS-KERLINE 500 mg [place]     Take 1 tablet by mouth every evening        ENTYVIO IV      Inject 300 mg into the vein every 28 days        GNP ASPIRIN LOW DOSE 81 MG EC tablet   Generic drug:  aspirin     30 tablet    Take 1 tablet (81 mg) by mouth daily    Ulcerative colitis with other complication, unspecified location (H), Liver replaced by  transplant (H)       levofloxacin 500 MG tablet    LEVAQUIN    7 tablet    Take 1 tablet (500 mg) by mouth daily    Biliary anastomotic stricture       Multi-vitamin Tabs tablet      Take 1 tablet by mouth every morning        predniSONE 5 MG tablet    DELTASONE    225 tablet    Take 1 tablet (5 mg) by mouth daily    UC (ulcerative colitis) (H), Liver replaced by transplant (H)       sulfamethoxazole-trimethoprim 800-160 MG per tablet    BACTRIM DS/SEPTRA DS    90 tablet    Take 1 tablet by mouth three times a week Monday, Wednesday, Friday    Immunosuppression (H), Inflammatory bowel disease, PSC (primary sclerosing cholangitis)       * tacrolimus 1 MG capsule    GENERIC EQUIVALENT    120 capsule    Take 2 capsules (2 mg) by mouth 2 times daily *total dose 2.5 mg twice daily    S/P liver transplant (H)       * tacrolimus 0.5 MG capsule    GENERIC EQUIVALENT    180 capsule    Take 1 capsule (0.5 mg) by mouth 2 times daily Take with 1 mg capsule. Total dose 2.5 mg every 12 hours    S/P liver transplant (H)       ursodiol 250 MG tablet    ACTIGALL    270 tablet    Take 2 tabs (500 mg) in AM, and 1 tab at night (250 mg)    Liver replaced by transplant (H)       VITAMIN D (CHOLECALCIFEROL) PO      Take by mouth every evening        * Notice:  This list has 2 medication(s) that are the same as other medications prescribed for you. Read the directions carefully, and ask your doctor or other care provider to review them with you.

## 2018-05-18 DIAGNOSIS — K83.1 BILIARY STRICTURE (H): ICD-10-CM

## 2018-05-18 LAB
ALBUMIN SERPL-MCNC: 2.8 G/DL (ref 3.4–5)
ALP SERPL-CCNC: 203 U/L (ref 40–150)
ALT SERPL W P-5'-P-CCNC: 45 U/L (ref 0–50)
AST SERPL W P-5'-P-CCNC: 23 U/L (ref 0–45)
BILIRUB DIRECT SERPL-MCNC: 0.1 MG/DL (ref 0–0.2)
BILIRUB SERPL-MCNC: 0.3 MG/DL (ref 0.2–1.3)
PROT SERPL-MCNC: 7.8 G/DL (ref 6.8–8.8)

## 2018-05-18 PROCEDURE — 36415 COLL VENOUS BLD VENIPUNCTURE: CPT | Performed by: FAMILY MEDICINE

## 2018-05-18 PROCEDURE — 80076 HEPATIC FUNCTION PANEL: CPT | Performed by: FAMILY MEDICINE

## 2018-05-22 ENCOUNTER — TELEPHONE (OUTPATIENT)
Dept: TRANSPLANT | Facility: CLINIC | Age: 33
End: 2018-05-22

## 2018-05-22 DIAGNOSIS — R74.8 ELEVATED ALKALINE PHOSPHATASE LEVEL: ICD-10-CM

## 2018-05-22 DIAGNOSIS — Z94.4 LIVER REPLACED BY TRANSPLANT (H): Primary | ICD-10-CM

## 2018-05-22 NOTE — TELEPHONE ENCOUNTER
Discussed with patient that per Dr castillo after reviewing labs that she recommends a liver biopsy. Yarelis is agreeable with plan. Once set up liver biopsy discussed stopping aspirin.

## 2018-05-23 ENCOUNTER — TELEPHONE (OUTPATIENT)
Dept: GASTROENTEROLOGY | Facility: CLINIC | Age: 33
End: 2018-05-23

## 2018-05-23 ENCOUNTER — HOSPITAL ENCOUNTER (OUTPATIENT)
Facility: AMBULATORY SURGERY CENTER | Age: 33
End: 2018-05-23
Attending: PHYSICIAN ASSISTANT
Payer: COMMERCIAL

## 2018-05-23 NOTE — TELEPHONE ENCOUNTER
Yarelis is scheduled for ERCP with Dr. Johnson, all instructions sent to her Mychart.   Patient was instructed that she needs a pre-op physical.  I offered to get her scheduled with PAC but pt declined, she will get this scheduled herself.   All instructions sent to [facility].    SR 05/23/2018 @ 953 A

## 2018-05-29 ENCOUNTER — TELEPHONE (OUTPATIENT)
Dept: INTERVENTIONAL RADIOLOGY/VASCULAR | Facility: CLINIC | Age: 33
End: 2018-05-29

## 2018-05-31 ENCOUNTER — APPOINTMENT (OUTPATIENT)
Dept: MEDSURG UNIT | Facility: CLINIC | Age: 33
End: 2018-05-31
Attending: INTERNAL MEDICINE
Payer: COMMERCIAL

## 2018-05-31 ENCOUNTER — APPOINTMENT (OUTPATIENT)
Dept: INTERVENTIONAL RADIOLOGY/VASCULAR | Facility: CLINIC | Age: 33
End: 2018-05-31
Attending: INTERNAL MEDICINE
Payer: COMMERCIAL

## 2018-05-31 ENCOUNTER — HOSPITAL ENCOUNTER (OUTPATIENT)
Facility: CLINIC | Age: 33
Discharge: HOME | End: 2018-05-31
Attending: INTERNAL MEDICINE | Admitting: INTERNAL MEDICINE
Payer: COMMERCIAL

## 2018-05-31 VITALS
SYSTOLIC BLOOD PRESSURE: 96 MMHG | RESPIRATION RATE: 16 BRPM | WEIGHT: 106 LBS | DIASTOLIC BLOOD PRESSURE: 57 MMHG | BODY MASS INDEX: 18.78 KG/M2 | HEART RATE: 90 BPM | OXYGEN SATURATION: 96 % | HEIGHT: 63 IN | TEMPERATURE: 98.1 F

## 2018-05-31 DIAGNOSIS — R74.8 ELEVATED ALKALINE PHOSPHATASE LEVEL: ICD-10-CM

## 2018-05-31 DIAGNOSIS — Z94.4 LIVER REPLACED BY TRANSPLANT (H): ICD-10-CM

## 2018-05-31 LAB
B-HCG SERPL-ACNC: 2 IU/L (ref 0–5)
BASOPHILS # BLD AUTO: 0.1 10E9/L (ref 0–0.2)
BASOPHILS NFR BLD AUTO: 0.7 %
DIFFERENTIAL METHOD BLD: ABNORMAL
EOSINOPHIL # BLD AUTO: 1.7 10E9/L (ref 0–0.7)
EOSINOPHIL NFR BLD AUTO: 12.4 %
ERYTHROCYTE [DISTWIDTH] IN BLOOD BY AUTOMATED COUNT: 16.3 % (ref 10–15)
HCT VFR BLD AUTO: 37.1 % (ref 35–47)
HGB BLD-MCNC: 12.2 G/DL (ref 11.7–15.7)
IMM GRANULOCYTES # BLD: 0.1 10E9/L (ref 0–0.4)
IMM GRANULOCYTES NFR BLD: 0.4 %
INR PPP: 0.95 (ref 0.86–1.14)
LYMPHOCYTES # BLD AUTO: 5.3 10E9/L (ref 0.8–5.3)
LYMPHOCYTES NFR BLD AUTO: 38.5 %
MCH RBC QN AUTO: 28.6 PG (ref 26.5–33)
MCHC RBC AUTO-ENTMCNC: 32.9 G/DL (ref 31.5–36.5)
MCV RBC AUTO: 87 FL (ref 78–100)
MONOCYTES # BLD AUTO: 1.8 10E9/L (ref 0–1.3)
MONOCYTES NFR BLD AUTO: 13 %
NEUTROPHILS # BLD AUTO: 4.8 10E9/L (ref 1.6–8.3)
NEUTROPHILS NFR BLD AUTO: 35 %
NRBC # BLD AUTO: 0 10*3/UL
NRBC BLD AUTO-RTO: 0 /100
PLATELET # BLD AUTO: 573 10E9/L (ref 150–450)
PLATELET # BLD EST: ABNORMAL 10*3/UL
RBC # BLD AUTO: 4.27 10E12/L (ref 3.8–5.2)
WBC # BLD AUTO: 13.7 10E9/L (ref 4–11)

## 2018-05-31 PROCEDURE — 88313 SPECIAL STAINS GROUP 2: CPT | Performed by: INTERNAL MEDICINE

## 2018-05-31 PROCEDURE — 88307 TISSUE EXAM BY PATHOLOGIST: CPT | Performed by: INTERNAL MEDICINE

## 2018-05-31 PROCEDURE — 99152 MOD SED SAME PHYS/QHP 5/>YRS: CPT

## 2018-05-31 PROCEDURE — 85610 PROTHROMBIN TIME: CPT | Performed by: RADIOLOGY

## 2018-05-31 PROCEDURE — 40000168 ZZH STATISTIC PP CARE STAGE 3

## 2018-05-31 PROCEDURE — 25000128 H RX IP 250 OP 636: Performed by: RADIOLOGY

## 2018-05-31 PROCEDURE — 85025 COMPLETE CBC W/AUTO DIFF WBC: CPT | Performed by: RADIOLOGY

## 2018-05-31 PROCEDURE — 25000125 ZZHC RX 250: Performed by: RADIOLOGY

## 2018-05-31 PROCEDURE — 25000128 H RX IP 250 OP 636: Performed by: PHYSICIAN ASSISTANT

## 2018-05-31 PROCEDURE — 27210903 IR LIVER BIOPSY PERCUTANEOUS

## 2018-05-31 PROCEDURE — 84702 CHORIONIC GONADOTROPIN TEST: CPT | Performed by: INTERNAL MEDICINE

## 2018-05-31 RX ORDER — SODIUM CHLORIDE 9 MG/ML
INJECTION, SOLUTION INTRAVENOUS CONTINUOUS
Status: DISCONTINUED | OUTPATIENT
Start: 2018-05-31 | End: 2018-05-31 | Stop reason: HOSPADM

## 2018-05-31 RX ORDER — LIDOCAINE 40 MG/G
CREAM TOPICAL
Status: DISCONTINUED | OUTPATIENT
Start: 2018-05-31 | End: 2018-05-31 | Stop reason: HOSPADM

## 2018-05-31 RX ORDER — FENTANYL CITRATE 50 UG/ML
25-50 INJECTION, SOLUTION INTRAMUSCULAR; INTRAVENOUS EVERY 5 MIN PRN
Status: DISCONTINUED | OUTPATIENT
Start: 2018-05-31 | End: 2018-05-31 | Stop reason: HOSPADM

## 2018-05-31 RX ORDER — NALOXONE HYDROCHLORIDE 0.4 MG/ML
.1-.4 INJECTION, SOLUTION INTRAMUSCULAR; INTRAVENOUS; SUBCUTANEOUS
Status: DISCONTINUED | OUTPATIENT
Start: 2018-05-31 | End: 2018-05-31 | Stop reason: HOSPADM

## 2018-05-31 RX ORDER — FLUMAZENIL 0.1 MG/ML
0.2 INJECTION, SOLUTION INTRAVENOUS
Status: DISCONTINUED | OUTPATIENT
Start: 2018-05-31 | End: 2018-05-31 | Stop reason: HOSPADM

## 2018-05-31 RX ADMIN — FENTANYL CITRATE 100 MCG: 50 INJECTION INTRAMUSCULAR; INTRAVENOUS at 11:05

## 2018-05-31 RX ADMIN — SODIUM CHLORIDE: 9 INJECTION, SOLUTION INTRAVENOUS at 08:40

## 2018-05-31 RX ADMIN — LIDOCAINE HYDROCHLORIDE 30 ML: 10 INJECTION, SOLUTION EPIDURAL; INFILTRATION; INTRACAUDAL; PERINEURAL at 11:06

## 2018-05-31 RX ADMIN — MIDAZOLAM 2 MG: 1 INJECTION INTRAMUSCULAR; INTRAVENOUS at 11:06

## 2018-05-31 NOTE — PROGRESS NOTES
Arrived from home for a liver biopsy.  VSS.  Denies pain.  PIV placed and labs sent.  Needs consent.  Denies pregnancy secondary to no sexual activity.

## 2018-05-31 NOTE — PROGRESS NOTES
Interventional Radiology Pre-Procedure Sedation Assessment   Time of Assessment: 10:03 AM    Expected Level: Moderate Sedation    Indication: Sedation is required for the following type of Procedure: Biopsy    Sedation and procedural consent: Risks, benefits and alternatives were discussed with Patient    PO Intake: Appropriately NPO for procedure    ASA Class: Class 2 - MILD SYSTEMIC DISEASE, NO ACUTE PROBLEMS, NO FUNCTIONAL LIMITATIONS.    Mallampati: Grade 1:  Soft palate, uvula, tonsillar pillars, and posterior pharyngeal wall visible    Lungs: Lungs Clear with good breath sounds bilaterally    Heart: Normal heart sounds and rate    History and physical reviewed and no updates needed. I have reviewed the lab findings, diagnostic data, medications, and the plan for sedation. I have determined this patient to be an appropriate candidate for the planned sedation and procedure and have reassessed the patient IMMEDIATELY PRIOR to sedation and procedure.    Osmar Boyce MD

## 2018-05-31 NOTE — PROCEDURES
Interventional Radiology Brief Post Procedure Note    Procedure: US guided liver Tx biopsy    Proceduralist: Be Barker MD    Assistant: Fady Neal MD    Time Out: Prior to the start of the procedure and with procedural staff participation, I verbally confirmed the patient s identity using two indicators, relevant allergies, that the procedure was appropriate and matched the consent or emergent situation, and that the correct equipment/implants were available. Immediately prior to starting the procedure I conducted the Time Out with the procedural staff and re-confirmed the patient s name, procedure, and site/side. (The Joint Commission universal protocol was followed.)  Yes        Sedation: IR Nurse Monitored Care   Post Procedure Summary:  Prior to the start of the procedure and with procedural staff participation, I verbally confirmed the patient s identity using two indicators, relevant allergies, that the procedure was appropriate and matched the consent or emergent situation, and that the correct equipment/implants were available. Immediately prior to starting the procedure I conducted the Time Out with the procedural staff and re-confirmed the patient s name, procedure, and site/side. (The Joint Commission universal protocol was followed.)  Yes       Sedatives: Fentanyl and Midazolam (Versed)    Vital signs, airway and pulse oximetry were monitored and remained stable throughout the procedure and sedation was maintained until the procedure was complete.  The patient was monitored by staff until sedation discharge criteria were met.    Patient tolerance: Patient tolerated the procedure well with no immediate complications.    Time of sedation in minutes: 20 minutes from beginning to end of physician one to one monitoring.          Findings: 4 cores taken    Estimated Blood Loss: Minimal    Fluoroscopy Time:  minute(s)    SPECIMENS: Core needle biopsy specimens sent for pathological  analysis    Complications: 1. None     Condition: Stable    Plan: Bedrest for 4 hours. Results pending.     Comments: See dictated procedure note for full details.    Fady Neal MD

## 2018-05-31 NOTE — IR NOTE
Patient Name: Yarelis Penny  Medical Record Number: [id number]  Today's Date: 5/31/2018    Procedure: percutaneous liver biopsy  Proceduralist: Osmar Boyce MD; Fady Neal MD; Be Barker MD    Sedation start time: 1035  Sedation end time: 1050  Sedation medications administered: Fent 100mcg, versed 2mg   Total sedation time: 15 minutes    Procedure start time: 1035  Puncture time: 1040  Procedure end time: 111    Report given to: JOSEFINA Dyson       Other Notes: Pt arrived to IR room 7 from . Consent reviewed. Pt denies any questions or concerns regarding procedure. Pt positioned supine and monitored per protocol. Target tissue identified. (4) tissue samples placed in formulin and sent to pathology. Dressing applied. Dressing to RUQ clean, dry, flat. Upon departure- alert, oriented, calm. Respirations eupneic on room air.     Pt tolerated procedure without any noted complications. Pt transferred back to .

## 2018-05-31 NOTE — DISCHARGE INSTRUCTIONS
[facility]    Interventional Radiology  Patient Instructions Following Biopsy of Liver    AFTER YOU GO HOME  ? If you were given sedation DO NOT drive or operate machinery at home or at work for at least 24 hours  ? DO relax and take it easy for 48 hours, no strenuous activity for 24 hours  ? DO drink plenty of fluids  ? DO resume your regular diet, unless otherwise instructed by your Primary Physician  ? Keep the dressing dry and in place for 24 hours.  ? DO NOT SMOKE FOR AT LEAST 24 HOURS, if you have been given any medications that were to help you relax or sedate you during your procedure  ? DO NOT drink alcoholic beverages the day of your procedure  ? DO NOT do any strenuous exercise or lifting (> 10 lbs) for at least 7 days following your procedure  ? DO NOT take a bath or shower for at least 12 hours following your procedure  ? Remove dressing after shower the next day. Replace with Band aid for 2 days.  Never leave a wet dressing in place.  ? DO NOT make any important or legal decisions for 24 hours following your procedure  ? There should be minimum drainage from the biopsy site    CALL THE PHYSICIAN IF:  ? You start bleeding from the procedure site.  If you do start to bleed from that site, lie down flat and hold pressure on the site for a minimum of 10 minutes.  Your physician will tell you if you need to return to the hospital  ? You develop nausea or vomiting  ? You have excessive swelling, redness, or tenderness at the site  ? You have drainage that looks like it is infected.  ? You experience severe pain  ? You develop hives or a rash or unexplained itching  ? You develop shortness of breath  ? You develop a temperature of 101 degrees F or greater  ? You develop chest pain or cough up blood, lightheadedness or fainting        [facility] INTERVENTIONAL RADIOLOGY DEPARTMENT  Procedure Physician:  Dr. Barker                                 Date of procedure: May 31, 2018  Telephone  Numbers: [phone] Monday-Friday 8:00 am to 4:30 pm  [phone] After 4:30 pm Monday-Friday, Weekends & Holidays.   Ask for the Interventional Radiologist on call.  Someone is on call 24 hrs/day  [facility] toll free number: [phone] Monday-Friday 8:00 am to 4:30 pm  [facility] Emergency Dept: [phone]

## 2018-05-31 NOTE — PROGRESS NOTES
Pt has returned to unit 2a s/p transplanted liver biopsy. RUQ site CDI, site soft, flat, scant amount of bloody drainage on bandage. Pt alert, VSS. Denies pain. Family at bedside.

## 2018-05-31 NOTE — IP AVS SNAPSHOT
MRN:[id number]                      After Visit Summary   5/31/2018    Yarelis Penny    MRN: [id number]           Visit Information        Department      5/31/2018  8:00 AM [address] [facility] [place]          Review of your medicines      UNREVIEWED medicines. Ask your doctor about these medicines        Dose / Directions    budesonide 9 MG 24 hr tablet   Commonly known as:  UCERIS   Used for:  UC (ulcerative colitis) (H)        Dose:  9 mg   Take 1 tablet (9 mg) by mouth every morning   Quantity:  30 tablet   Refills:  3       calcium carbonate 500 MG tablet   Commonly known as:  OS-KERLINE 500 mg [place]. Ca        Dose:  1 tablet   Take 1 tablet by mouth every evening   Refills:  0       ENTYVIO IV        Dose:  300 mg   Inject 300 mg into the vein every 28 days   Refills:  0       GNP ASPIRIN LOW DOSE 81 MG EC tablet   Used for:  Ulcerative colitis with other complication, unspecified location (H), Liver replaced by transplant (H)   Generic drug:  aspirin        Dose:  81 mg   Take 1 tablet (81 mg) by mouth daily   Quantity:  30 tablet   Refills:  1       Multi-vitamin Tabs tablet        Dose:  1 tablet   Take 1 tablet by mouth every morning   Refills:  0       predniSONE 5 MG tablet   Commonly known as:  DELTASONE   Used for:  UC (ulcerative colitis) (H), Liver replaced by transplant (H)        Dose:  5 mg   Take 1 tablet (5 mg) by mouth daily   Quantity:  225 tablet   Refills:  3       sulfamethoxazole-trimethoprim 800-160 MG per tablet   Commonly known as:  BACTRIM DS/SEPTRA DS   Used for:  Immunosuppression (H), Inflammatory bowel disease, PSC (primary sclerosing cholangitis)        Dose:  1 tablet   Take 1 tablet by mouth three times a week Monday, Wednesday, Friday   Quantity:  90 tablet   Refills:  3       * tacrolimus 1 MG capsule   Commonly known as:  GENERIC EQUIVALENT   Used for:  S/P liver transplant (H)        Dose:  2 mg   Take 2 capsules (2 mg) by mouth 2 times daily *total dose 2.5 mg twice  daily   Quantity:  120 capsule   Refills:  11       * tacrolimus 0.5 MG capsule   Commonly known as:  GENERIC EQUIVALENT   Used for:  S/P liver transplant (H)        Dose:  0.5 mg   Take 1 capsule (0.5 mg) by mouth 2 times daily Take with 1 mg capsule. Total dose 2.5 mg every 12 hours   Quantity:  180 capsule   Refills:  3       ursodiol 250 MG tablet   Commonly known as:  ACTIGALL   Used for:  Liver replaced by transplant (H)        Take 2 tabs (500 mg) in AM, and 1 tab at night (250 mg)   Quantity:  270 tablet   Refills:  3       VITAMIN D (CHOLECALCIFEROL) PO        Take by mouth every evening   Refills:  0       * Notice:  This list has 2 medication(s) that are the same as other medications prescribed for you. Read the directions carefully, and ask your doctor or other care provider to review them with you.             Protect others around you: Learn how to safely use, store and throw away your medicines at www.disposemymeds.org.         Follow-ups after your visit        Your next 10 appointments already scheduled     Jun 05, 2018  7:30 AM CDT   (Arrive by 7:15 AM)   Return Visit with Fuentes Johnson MD   [facility] ([facility])    [address]  4th Floor  [place] [address] [phone]            Jun 07, 2018  9:00 AM CDT   Infusion 180 with UC SPEC INFUSION, UC 47 ATC   [facility] Specialty and Procedure ([facility])    [address]  [address]  [place] [address] [phone]            Jun 25, 2018   Procedure with Guru Brittany Macias MD   [facility] [place], Same Day Surgery (--)    [address] [address]   [phone]            Jul 09, 2018 10:30 AM CDT   Infusion 180 with UC SPEC INFUSION, UC 48 ATC   [facility] Procedure ([facility])    [address]  [address]  [place] [facility] [address]   [phone]            Aug 06, 2018  9:00 AM CDT   Infusion 180 with UC SPEC INFUSION, UC 47 ATC   [facility] Specialty and Procedure ([facility])    [address]  [address]  [place] [facility] [place] [address]   [phone]            Dec 05, 2018  8:00 AM CST   (Arrive by 7:45 AM)   Return Visit with Joya Lopez MD   [facility] and Infectious Diseases ([facility])    [address]  [address]  [place] [address]   [phone]               Care Instructions        Further instructions from your care team       [facility]    Interventional Radiology  Patient Instructions Following Biopsy of Liver    AFTER YOU GO HOME  ? If you were given sedation DO NOT drive or operate machinery at home or at work for at least 24 hours  ? DO relax and take it easy for 48 hours, no strenuous activity for 24 hours  ? DO drink plenty of fluids  ? DO resume your regular diet, unless otherwise instructed by your Primary Physician  ? Keep the dressing dry and in place for 24 hours.  ? DO NOT SMOKE FOR AT LEAST 24 HOURS, if you have been given any medications that were to help you relax or sedate you during your procedure  ? DO NOT drink alcoholic beverages the day of your procedure  ? DO NOT do any strenuous exercise or lifting (> 10 lbs) for at least 7 days following your procedure  ? DO NOT take a bath or shower for at least 12 hours following your procedure  ? Remove dressing after shower the next day. Replace with Band aid for 2 days.  Never leave a wet dressing in place.  ? DO NOT make any important or legal decisions for 24 hours following your procedure  ? There should be minimum drainage from the biopsy site    CALL THE PHYSICIAN IF:  ? You start bleeding from the procedure site.  If you do start to bleed from that site, lie down flat and hold pressure on the site  "for a minimum of 10 minutes.  Your physician will tell you if you need to return to the hospital  ? You develop nausea or vomiting  ? You have excessive swelling, redness, or tenderness at the site  ? You have drainage that looks like it is infected.  ? You experience severe pain  ? You develop hives or a rash or unexplained itching  ? You develop shortness of breath  ? You develop a temperature of 101 degrees F or greater  ? You develop chest pain or cough up blood, lightheadedness or fainting        [facility] INTERVENTIONAL RADIOLOGY DEPARTMENT  Procedure Physician:  Dr. Barker                                 Date of procedure: May 31, 2018  Telephone Numbers: [phone] Monday-Friday 8:00 am to 4:30 pm  [phone] After 4:30 pm Monday-Friday, Weekends & Holidays.   Ask for the Interventional Radiologist on call.  Someone is on call 24 hrs/day  [facility] toll free number: [phone] Monday-Friday 8:00 am to 4:30 pm  [facility] Emergency Dept: [phone]           Additional Information About Your Visit        [facility] Information     [facility] gives you secure access to your electronic health record. If you see a primary care provider, you can also send messages to your care team and make appointments. If you have questions, please call your primary care clinic.  If you do not have a primary care provider, please call [phone] and they will assist you.        Care EveryWhere ID     This is your Care EveryWhere ID. This could be used by other organizations to access your [facility] medical records  [id number]        Your Vitals Were     Blood Pressure Pulse Temperature Respirations Height Weight    97/61 (BP Location: Left arm) 90 98.1  F (36.7  C) (Oral) 16 1.6 m (5' 3\") 48.1 kg (106 lb)    Pulse Oximetry BMI (Body Mass Index)                98% 18.78 kg/m2           Primary Care Provider Fax #    Physician No Ref-Primary [phone]      Equal Access to Services     ERI CHAKRABORTY AH: Yandel Palma, " wamableda dianeadaha, qaybta kaarian mott, mani josjovani baughaavanessa ah. So [facility] [phone].    ATENCIÓN: Si lilibeth haney, tiene a borja disposición servicios gratuitos de asistencia lingüística. Clyde al [phone].    We comply with applicable federal civil rights laws and [place] laws. We do not discriminate on the basis of race, color, national origin, age, disability, sex, sexual orientation, or gender identity.            Thank you!     Thank you for choosing [facility] for your care. Our goal is always to provide you with excellent care. Hearing back from our patients is one way we can continue to improve our services. Please take a few minutes to complete the written survey that you may receive in the mail after you visit with us. Thank you!             Medication List: This is a list of all your medications and when to take them. Check marks below indicate your daily home schedule. Keep this list as a reference.      Medications           Morning Afternoon Evening Bedtime As Needed    budesonide 9 MG 24 hr tablet   Commonly known as:  UCERIS   Take 1 tablet (9 mg) by mouth every morning                                calcium carbonate 500 MG tablet   Commonly known as:  OS-KERLINE 500 mg [place]. Ca   Take 1 tablet by mouth every evening                                ENTYVIO IV   Inject 300 mg into the vein every 28 days                                GNP ASPIRIN LOW DOSE 81 MG EC tablet   Take 1 tablet (81 mg) by mouth daily   Generic drug:  aspirin                                Multi-vitamin Tabs tablet   Take 1 tablet by mouth every morning                                predniSONE 5 MG tablet   Commonly known as:  DELTASONE   Take 1 tablet (5 mg) by mouth daily                                sulfamethoxazole-trimethoprim 800-160 MG per tablet   Commonly known as:  BACTRIM DS/SEPTRA DS   Take 1 tablet by mouth three times a week Monday, Wednesday, Friday                                *  tacrolimus 1 MG capsule   Commonly known as:  GENERIC EQUIVALENT   Take 2 capsules (2 mg) by mouth 2 times daily *total dose 2.5 mg twice daily                                * tacrolimus 0.5 MG capsule   Commonly known as:  GENERIC EQUIVALENT   Take 1 capsule (0.5 mg) by mouth 2 times daily Take with 1 mg capsule. Total dose 2.5 mg every 12 hours                                ursodiol 250 MG tablet   Commonly known as:  ACTIGALL   Take 2 tabs (500 mg) in AM, and 1 tab at night (250 mg)                                VITAMIN D (CHOLECALCIFEROL) PO   Take by mouth every evening                                * Notice:  This list has 2 medication(s) that are the same as other medications prescribed for you. Read the directions carefully, and ask your doctor or other care provider to review them with you.

## 2018-05-31 NOTE — PROGRESS NOTES
Tolerated bedrest without problems.  Tolerated food, fluids, urination and ambulation all without issues.  Since return from IR, has denied pain.  RUQ biopsy site CDI.  Reviewed discharge instructions with patient.  PIV removed.  Will discharge to home with .

## 2018-05-31 NOTE — IP AVS SNAPSHOT
[address] [address]                                       After Visit Summary   5/31/2018    Yarelis Penny    MRN: [id number]           After Visit Summary Signature Page     I have received my discharge instructions, and my questions have been answered. I have discussed any challenges I see with this plan with the nurse or doctor.    ..........................................................................................................................................  Patient/Patient Representative Signature      ..........................................................................................................................................  Patient Representative Print Name and Relationship to Patient    ..................................................               ................................................  Date                                            Time    ..........................................................................................................................................  Reviewed by Signature/Title    ...................................................              ..............................................  Date                                                            Time

## 2018-06-01 LAB — COPATH REPORT: NORMAL

## 2018-06-04 ENCOUNTER — CARE COORDINATION (OUTPATIENT)
Dept: GASTROENTEROLOGY | Facility: CLINIC | Age: 33
End: 2018-06-04

## 2018-06-04 ENCOUNTER — TELEPHONE (OUTPATIENT)
Dept: GASTROENTEROLOGY | Facility: CLINIC | Age: 33
End: 2018-06-04

## 2018-06-04 NOTE — PROGRESS NOTES
Called pt to let her know that need to  her appt on June 5 at 845 due to Dr. Johnson has previous appt.  Pt in agreement.

## 2018-06-05 ENCOUNTER — CARE COORDINATION (OUTPATIENT)
Dept: GASTROENTEROLOGY | Facility: CLINIC | Age: 33
End: 2018-06-05

## 2018-06-05 ENCOUNTER — OFFICE VISIT (OUTPATIENT)
Dept: GASTROENTEROLOGY | Facility: CLINIC | Age: 33
End: 2018-06-05
Payer: COMMERCIAL

## 2018-06-05 VITALS
HEIGHT: 60 IN | SYSTOLIC BLOOD PRESSURE: 110 MMHG | WEIGHT: 106.5 LBS | OXYGEN SATURATION: 99 % | HEART RATE: 80 BPM | TEMPERATURE: 98.7 F | BODY MASS INDEX: 20.91 KG/M2 | DIASTOLIC BLOOD PRESSURE: 86 MMHG

## 2018-06-05 DIAGNOSIS — K51.00 ULCERATIVE PANCOLITIS WITHOUT COMPLICATION (H): Primary | ICD-10-CM

## 2018-06-05 DIAGNOSIS — K83.01 PSC (PRIMARY SCLEROSING CHOLANGITIS) (H): ICD-10-CM

## 2018-06-05 ASSESSMENT — PAIN SCALES - GENERAL: PAINLEVEL: NO PAIN (0)

## 2018-06-05 NOTE — NURSING NOTE
Chief Complaint   Patient presents with     Consult     per pt follow up       Vitals:    06/05/18 0847   BP: 110/86   Pulse: 80   Temp: 98.7  F (37.1  C)   TempSrc: Oral   SpO2: 99%   Weight: 48.3 kg (106 lb 8 oz)   Height: 1.524 m (5')       Body mass index is 20.8 kg/(m^2).      Lupis ZHU LPN

## 2018-06-05 NOTE — MR AVS SNAPSHOT
After Visit Summary   6/5/2018    Yarelis Penny    MRN: [id number]           Patient Information     Date Of Birth          1985        Visit Information        Provider Department      6/5/2018 8:45 AM Fuentes Johnson MD [facility]        Today's Diagnoses     Ulcerative pancolitis without complication (H)    -  1    PSC (primary sclerosing cholangitis)          Care Instructions    Keep up the good work!    Continue current vedolizumab every 4 weeks    Stop Uceris altogether    Schedule chromoendoscopy in September - needs 1 hour visit and needs MAC    Follow up after colonoscopy    Call with questions          Follow-ups after your visit        Your next 10 appointments already scheduled     Jun 07, 2018  9:00 AM CDT   Infusion 180 with UC SPEC INFUSION, UC 47 ATC   [facility] ([facility])    [address]  [address]  [facility] [address] [phone]            Jun 25, 2018   Procedure with Guru Brittany Macias MD   [facility] [place], Same Day Surgery (--)    [address] [address] [phone]            Jul 09, 2018 10:30 AM CDT   Infusion 180 with UC SPEC INFUSION, UC 48 ATC   [facility] ([facility])    [address]  [address]  [facility] [address] [phone]            Aug 06, 2018  9:00 AM CDT   Infusion 180 with UC SPEC INFUSION, UC 47 ATC   [facility] ([facility])    [address]  [address]  [facility] [address] [phone]            Dec 05, 2018  8:00 AM CST   (Arrive by 7:45 AM)   Return Visit with Joya Lopez MD   [facility] Infect[facility] Diseases ([facility])    [address] [place] [address]  [address]  [facility]  [id number] [phone]              Who to contact     Please call your clinic at [phone] to:    Ask questions about your health    Make or cancel appointments    Discuss your medicines    Learn about your test results    Speak to your doctor            Additional Information About Your Visit        [employer] Information     [employer] gives you secure access to your electronic health record. If you see a primary care provider, you can also send messages to your care team and make appointments. If you have questions, please call your primary care clinic.  If you do not have a primary care provider, please call [phone] and they will assist you.      [employer] is an electronic gateway that provides easy, online access to your medical records. With [employer], you can request a clinic appointment, read your test results, renew a prescription or communicate with your care team.     To access your existing account, please contact your [facility] or call [phone] for assistance.        Care EveryWhere ID     This is your Care EveryWhere ID. This could be used by other organizations to access your [facility] [place] medical records  [id number]        Your Vitals Were     Pulse Temperature Height Pulse Oximetry BMI (Body Mass Index)       80 98.7  F (37.1  C) (Oral) 1.524 m (5') 99% 20.8 kg/m2        Blood Pressure from Last 3 Encounters:   06/05/18 110/86   05/31/18 96/57   05/10/18 113/72    Weight from Last 3 Encounters:   06/05/18 48.3 kg (106 lb 8 oz)   05/31/18 48.1 kg (106 lb)   05/10/18 48.3 kg (106 lb 8 oz)              Today, you had the following     No orders found for display         Today's Medication Changes          These changes are accurate as of 6/5/18  9:31 AM.  If you have any questions, ask your nurse or doctor.               These medicines have changed or have updated prescriptions.        Dose/Directions    GNP ASPIRIN LOW DOSE 81 MG EC tablet   This may have changed:   when to take this   Used for:  Ulcerative colitis with other complication, unspecified location (H), Liver replaced by transplant (H)   Generic drug:  aspirin        Dose:  81 mg   Take 1 tablet (81 mg) by mouth daily   Quantity:  30 tablet   Refills:  1       predniSONE 5 MG tablet   Commonly known as:  DELTASONE   This may have changed:  when to take this   Used for:  UC (ulcerative colitis) (H), Liver replaced by transplant (H)        Dose:  5 mg   Take 1 tablet (5 mg) by mouth daily   Quantity:  225 tablet   Refills:  3       * tacrolimus 1 MG capsule   Commonly known as:  GENERIC EQUIVALENT   This may have changed:    - how much to take  - additional instructions   Used for:  S/P liver transplant (H)        Dose:  2 mg   Take 2 capsules (2 mg) by mouth 2 times daily *total dose 2.5 mg twice daily   Quantity:  120 capsule   Refills:  11       * tacrolimus 0.5 MG capsule   Commonly known as:  GENERIC EQUIVALENT   This may have changed:    - how much to take  - additional instructions   Used for:  S/P liver transplant (H)        Dose:  0.5 mg   Take 1 capsule (0.5 mg) by mouth 2 times daily Take with 1 mg capsule. Total dose 2.5 mg every 12 hours   Quantity:  180 capsule   Refills:  3       * Notice:  This list has 2 medication(s) that are the same as other medications prescribed for you. Read the directions carefully, and ask your doctor or other care provider to review them with you.             Primary Care Provider Fax #    Physician No Ref-Primary [phone]       No address on file        Equal Access to Services     ERI CHAKRABORTY AH: Hadii venancio Palma, ann solorzano, qaybta kaalmamani begum . [facility] [phone].    ATENCIÓN: Si habla español, tiene a borja disposición servicios gratuitos de asistencia lingüística. Llame al [phone].    We comply with applicable federal civil rights laws and [place] laws. We do not discriminate on the basis of  race, color, national origin, age, disability, sex, sexual orientation, or gender identity.            Thank you!     Thank you for choosing [facility]  for your care. Our goal is always to provide you with excellent care. Hearing back from our patients is one way we can continue to improve our services. Please take a few minutes to complete the written survey that you may receive in the mail after your visit with us. Thank you!             Your Updated Medication List - Protect others around you: Learn how to safely use, store and throw away your medicines at www.disposemymeds.org.          This list is accurate as of 6/5/18  9:31 AM.  Always use your most recent med list.                   Brand Name Dispense Instructions for use Diagnosis    budesonide 9 MG 24 hr tablet    UCERIS    30 tablet    Take 1 tablet (9 mg) by mouth every morning    UC (ulcerative colitis) (H)       calcium carbonate 500 MG tablet    OS-KERLINE 500 mg [place]. [place]     Take 1 tablet by mouth every evening        ENTYVIO IV      Inject 300 mg into the vein every 28 days        GNP ASPIRIN LOW DOSE 81 MG EC tablet   Generic drug:  aspirin     30 tablet    Take 1 tablet (81 mg) by mouth daily    Ulcerative colitis with other complication, unspecified location (H), Liver replaced by transplant (H)       Multi-vitamin Tabs tablet      Take 1 tablet by mouth every morning        predniSONE 5 MG tablet    DELTASONE    225 tablet    Take 1 tablet (5 mg) by mouth daily    UC (ulcerative colitis) (H), Liver replaced by transplant (H)       sulfamethoxazole-trimethoprim 800-160 MG per tablet    BACTRIM DS/SEPTRA DS    90 tablet    Take 1 tablet by mouth three times a week Monday, Wednesday, Friday    Immunosuppression (H), Inflammatory bowel disease, PSC (primary sclerosing cholangitis)       * tacrolimus 1 MG capsule    GENERIC EQUIVALENT    120 capsule    Take 2 capsules (2 mg) by mouth 2 times daily *total dose 2.5 mg twice  daily    S/P liver transplant (H)       * tacrolimus 0.5 MG capsule    GENERIC EQUIVALENT    180 capsule    Take 1 capsule (0.5 mg) by mouth 2 times daily Take with 1 mg capsule. Total dose 2.5 mg every 12 hours    S/P liver transplant (H)       ursodiol 250 MG tablet    ACTIGALL    270 tablet    Take 2 tabs (500 mg) in AM, and 1 tab at night (250 mg)    Liver replaced by transplant (H)       VITAMIN D (CHOLECALCIFEROL) PO      Take by mouth every evening        * Notice:  This list has 2 medication(s) that are the same as other medications prescribed for you. Read the directions carefully, and ask your doctor or other care provider to review them with you.

## 2018-06-05 NOTE — PATIENT INSTRUCTIONS
Keep up the good work!    Continue current vedolizumab every 4 weeks    Stop Uceris altogether    Schedule chromoendoscopy in September - needs 1 hour visit and needs MAC    Follow up after colonoscopy    Call with questions

## 2018-06-05 NOTE — PROGRESS NOTES
IBD CLINIC VISIT      CC/REFERRING MD: Daniela Enriquez    REASON FOR FOLLOW UP: PSC associated Ulcerative colitis     IBD HISTORY  Age at diagnosis: 18     Extent of disease: Colonoscopies over the years have varied with areas of involvement. Earliest colonoscopies in 2006 show right greater than left inflammation with some inflammation in the ileum as well.    Followup colonoscopy in 2007 showed mainly diffuse colonic inflammation.    Colonoscopies in 2015 again showed more chronic colitis with no significant ileal involvement.    Colonoscopies in 3/2016 again showed a right-sided and some ileal inflammation with ulceration, but biopsy showed CMV.    Colonoscopy 11/2016 showed severe inflammation from 20 cm from the anus all of the way to the ileum as far as could be evaluated.  Biopsies showed chronic active ileitis and chronic active colitis in every sample.  There was no evidence of CMV or PTLD. Rare SHENA-KACIE positive cells seen in ileum.  Colonoscopy 9/2017 showed Abad 2 inflammation in the right colon and abad one in the left colon with relative rectal sparing.  Patulous IC valve with some backwash ileitis.  Biopsies negative for lymphoma, CMP and EBV biopsies with mild  chronic active ileitis and mild chronic active colitis throughout, including rectum.     Current UC medications: Vedolizumab 400mg pq4 weeks, prednisone 5 mg  Prior UC surgeries: None  Prior IBD Medications:   Multiple rounds of prednisone  Pentasa   Asacol  Azathioprine (mainly given for liver issues at lower dose) '  Uceris - stopped March 2018     DRUG MONITORING  TPMT enzyme activity: (12/6/16) 18.5      6-TGN/6-MMPN levels: (12/6/16) 203/257       Biologic concentration:   4/18/17 Vedolizumab level 34.1, no ATI  5/2/17 Vedolizumab level 3.9, no ATI (decreased interval to q4 week dosing)  10/25/17 Vedolizumab level 11, no ATI (no change in therapy-- met with surgeon to discuss what surgical options might look like)        DISEASE  ASSESSMENT  Labs:CRP 6.5 and sed rate 50  Endoscopic assessment: Colonoscopy 9/2017 showed Abad 2 inflammation in the right colon and abad one in the left colon with relative rectal sparing.  Patulous IC valve with some backwash ileitis.  Biopsies negative for lymphoma, CMP and EBV biopsies with mild  chronic active ileitis and mild chronic active colitis throughout, including rectum.  Enterography: (12/14/16) 1. Diffuse colonic wall thickening and enhancement with loss of  haustral pattern. Wall thickening and enhancement of the terminal  ileum. Findings consistent with acute on chronic changes related to  ulcerative colitis. 2. Stable 7.4 cm left adnexal cyst. A short interval follow-up  ultrasound is recommended to help determine whether this represents a  physiologic lesion. Plans to have follow up with OBGYN and repeat U/S in 6 weeks.  Fecal calprotectin: --   C diff: Negative 12/10/16  Abad score: 0/9 (6/5/2018)     ASSESSMENT/PLAN  Yarelis is a pleasant 33-year-old female with a complicated past medical history including primary sclerosing cholangitis associated inflammatory bowel disease, cirrhosis, status post living donor transplant in 2015 with severe flare and was hospitalized 12/2016.       1. Primary sclerosing cholangitis associated inflammatory bowel disease. Overall doing well symptomatically but we have yet to confirm endoscopic remission. Will continue vedoliumab 300 mg q 4 weeks. Levels have been lower than desired but we are at the highest vedo dose possible. She is due for surveillance colonoscopy in September so we will see how things look then. If still significant inflammation then we need to change therapy. I still believe the best option is colectomy. However, she wants to avoid this at all costs. I believe risk of anti-TNF given her current immunosuppression, history of rituximab, and CMV/EBV. Tofacitinib can be considered.  -continue vedo q 4 weeks  -continue surveillance labs  -colonoscopy  (schedule 1 hour for chromoendoscopy in case mucosal healing is seen)   -stop Uceris completely      2. PCP prophylaxis. I think she can stop this now that she is off Uceris. Will defer to ID      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 The patient follows with Dr. Nails. Rituximab treatment has been completed.        4. 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 Her CMV levels were not detected that were last checked in 02/07/2017. She has completed her IV ganciclovir regimen.       5. 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 Last ERCP 5/2018 with Dr. Guru Macias. Another scheduled June. Has evidence of chronic bile obstruction on liver bx.      6. IBD healthcare maintenance based on patients current medication: Vedolizumab  Vaccinations:  -- Influenza (every year): UTD 9780-7081  -- TdaP (every 10 years): Last given 2016  -- Pneumococcal Pneumonia (once then every 5 years): Last given 2016      One time confirmation of immunity or serologies:  -- Hepatitis A (serologies or immunizations): 2015  -- Hepatitis B (serologies or immunizations): 2015  -- Varicella: Not documented  -- MMR:Not documented  -- HPV (all aged 18-26): As indicated  -- Meningococcal meningitis (all patients at risk for meningitis): As indicated   -- Due to the immunosuppression in this patient, I would not advise administration of live vaccines such as varicella/VZV, intranasal influenza, MMR, or yellow fever vaccine (if travelling).       Cancer Screening:  Colon cancer screening:  Given PSC type of colitis, colonoscopy every year is recommended. Dysplasia screening is recommended fall 2018.      Cervical cancer screening: Annual due to immunosupression      Skin cancer screening: Annual visual exam of skin by dermatologist since patient is immunocompromised      Depression Screening:  -- Over the last month, have you felt down, depressed, or hopeless? No  -- Over the last month, have you felt little interest or pleasure  doing things? No      Misc:  -- Avoid tobacco use  -- Avoid NSAIDs as there is potentially a 25% chance of causing an IBD flare      RTC after colonoscopy      Thank you for this consultation. It was a pleasure to participate in the care of this patient; please contact us with any further questions.       Fuentes Johnson MD    [facility]  Division of Gastroenterology, Hepatology and Nutrition       Today: Doing very well. 1BM per day. No blood. Formed. No abd pain. No EIM.    She stopped the Uceris in about March. She spread her tablets out to taper. She still takes one about every 10 days.    No fevers/chills/sweats.     Weight is stable.     ROS:    No fevers or chills  No weight loss  No blurry vision, double vision or change in vision  No sore throat  No lymphadenopathy  No headache, paraesthesias, or weakness in a limb  No shortness of breath or wheezing  No chest pain or pressure  No arthralgias or myalgias  No rashes or skin changes  No odynophagia or dysphagia  No BRBPR, hematochezia, melena  No dysuria, frequency or urgency  No hot/cold intolerance or polyria  No anxiety or depression     Extra intestinal manifestations of IBD:  No uveitis/episcleritis  No aphthous ulcers   No arthritis   No erythema nodosum/pyoderma gangrenosum.      PERTINENT PAST MEDICAL HISTORY:   Past Medical History         Past Medical History:   Diagnosis Date     Autoimmune hepatitis (H) 2012     on steroid taper     Cholangitis       CKD (chronic kidney disease) 2012     biopsy 2012: TIN, patchy fibrosis     Esophageal varices (H) 9/08     banded     Heart murmur       History of blood transfusion       Primary sclerosing cholangitis       Ulcerative Colitis       f/b GI            PREVIOUS SURGERIES:   Past Surgical History    Past Surgical History:   Procedure Laterality Date     COLONOSCOPY   9/07     COLONOSCOPY N/A 2/26/2015     Procedure: COMBINED COLONOSCOPY, SINGLE OR MULTIPLE  BIOPSY/POLYPECTOMY BY BIOPSY;  Surgeon: Mitchel Hoskins Chi, MD;  [facility]: [facility]     COLONOSCOPY N/A 1/12/2016     Procedure: COMBINED COLONOSCOPY, SINGLE OR MULTIPLE BIOPSY/POLYPECTOMY BY BIOPSY;  Surgeon: Rigo Valles MD;  [facility]     COLONOSCOPY N/A 4/1/2016     Procedure: COMBINED COLONOSCOPY, SINGLE OR MULTIPLE BIOPSY/POLYPECTOMY BY BIOPSY;  Surgeon: Kareem Solis MD;  [facility]     COLONOSCOPY N/A 9/5/2017     Procedure: COMBINED COLONOSCOPY, SINGLE OR MULTIPLE BIOPSY/POLYPECTOMY BY BIOPSY;  Colonoscopy;  Surgeon: Fuentes Johnson MD;  Location[facility]     ENDOSCOPIC RETROGRADE CHOLANGIOPANCREATOGRAM N/A 6/25/2015     Procedure: COMBINED ENDOSCOPIC RETROGRADE CHOLANGIOPANCREATOGRAPHY, PLACE TUBE/STENT;  Surgeon: Landon Quinones MD;  [facility]     ENDOSCOPIC RETROGRADE CHOLANGIOPANCREATOGRAM N/A 6/25/2015     Procedure: COMBINED ENDOSCOPIC RETROGRADE CHOLANGIOPANCREATOGRAPHY, PLACE TUBE/STENT;  Surgeon: Landon Quinones MD;  [facility]     ENDOSCOPIC RETROGRADE CHOLANGIOPANCREATOGRAM N/A 7/2/2015     Procedure: COMBINED ENDOSCOPIC RETROGRADE CHOLANGIOPANCREATOGRAPHY, PLACE TUBE/STENT;  Surgeon: Landon Quinones MD;  [facility]     ENDOSCOPIC RETROGRADE CHOLANGIOPANCREATOGRAM N/A 9/8/2015     Procedure: COMBINED ENDOSCOPIC RETROGRADE CHOLANGIOPANCREATOGRAPHY, REMOVE FOREIGN BODY OR STENT/TUBE;  Surgeon: Landon Quinones MD;  [facility]     ENDOSCOPIC RETROGRADE CHOLANGIOPANCREATOGRAM N/A 12/8/2015     Procedure: ENDOSCOPIC RETROGRADE CHOLANGIOPANCREATOGRAM;  Surgeon: Landon Quinones MD;  [facility]     ENDOSCOPIC RETROGRADE CHOLANGIOPANCREATOGRAM N/A 3/1/2016     Procedure: COMBINED ENDOSCOPIC RETROGRADE CHOLANGIOPANCREATOGRAPHY, REMOVE FOREIGN BODY OR STENT/TUBE;  Surgeon: Landon Quinones MD;  [facility]     ENDOSCOPIC RETROGRADE CHOLANGIOPANCREATOGRAM N/A 3/20/2017     Procedure: COMBINED ENDOSCOPIC  RETROGRADE CHOLANGIOPANCREATOGRAPHY, PLACE TUBE/STENT;  Endoscopic Retrograde Cholangiopancreatogram with Ballon Dilation, Stent Placement;  Surgeon: Guru Brittany Macias MD;  [facility]     ENDOSCOPIC RETROGRADE CHOLANGIOPANCREATOGRAPHY, EXCHANGE TUBE/STENT N/A 7/30/2015     Procedure: ENDOSCOPIC RETROGRADE CHOLANGIOPANCREATOGRAPHY, EXCHANGE TUBE/STENT;  Surgeon: Landon Quinones MD;  [facility]     ENDOSCOPIC RETROGRADE CHOLANGIOPANCREATOGRAPHY, EXCHANGE TUBE/STENT N/A 5/15/2017     Procedure: ENDOSCOPIC RETROGRADE CHOLANGIOPANCREATOGRAPHY, EXCHANGE TUBE/STENT;  Endoscopic Retrograde Cholangiopancreatogram with biliary stent exchange and balloon dilation;  Surgeon: Guru Brittany Macias MD;  [facility]     ESOPHAGOSCOPY, GASTROSCOPY, DUODENOSCOPY (EGD), COMBINED N/A 2/26/2015     Procedure: COMBINED ESOPHAGOSCOPY, GASTROSCOPY, DUODENOSCOPY (EGD);  Surgeon: Mitchel Hoskins Chi, MD;  [facility]     EXPLORE COMMON BILE DUCT N/A 6/25/2015     Procedure: EXPLORE COMMON BILE DUCT;  Surgeon: Tyree Smith MD;  [facility]     LAPAROTOMY EXPLORATORY N/A 6/25/2015     Procedure: LAPAROTOMY EXPLORATORY;  Surgeon: Tyree Smith MD;  [facility]     PICC INSERTION Right 2/27/2015     4fr SL Valved PICC, 36cm (1cm external) in the R medial brachial vein w/ tip in the low SVC.     SIGMOIDOSCOPY FLEXIBLE N/A 4/27/2016     Procedure: SIGMOIDOSCOPY FLEXIBLE;  Surgeon: Jose Nielson MD;  [facility]     TRANSPLANT LIVER RECIPIENT LIVING UNRELATED N/A 6/18/2015     Procedure: TRANSPLANT LIVER RECIPIENT LIVING UNRELATED;  Surgeon: Tyree Smith MD;  [facility]     UPPER GI ENDOSCOPY                ALLERGIES:            Allergies   Allergen Reactions     Rifampin Other (See Comments)       Kidney failure     Penicillins         Hives, has tolerated amoxicillin         PERTINENT MEDICATIONS:     Current Outpatient Prescriptions   Medication      budesonide (UCERIS) 9 MG 24 hr tablet     calcium carbonate (OS-KERLINE 500 MG [place]. [place]) 1250 MG tablet     GNP ASPIRIN LOW DOSE 81 MG EC tablet     multivitamin, therapeutic with minerals (MULTI-VITAMIN) TABS tablet     predniSONE (DELTASONE) 5 MG tablet     sulfamethoxazole-trimethoprim (BACTRIM DS/SEPTRA DS) 800-160 MG per tablet     tacrolimus (GENERIC EQUIVALENT) 0.5 MG capsule     tacrolimus (GENERIC EQUIVALENT) 1 MG capsule     ursodiol (ACTIGALL) 250 MG tablet     Vedolizumab (ENTYVIO IV)     VITAMIN D, CHOLECALCIFEROL, PO     No current facility-administered medications for this visit.        SOCIAL HISTORY:   Social History    Social History            Social History     Marital status:        Spouse name: Ceferino     Number of children: 0     Years of education: N/A           Occupational History     unemployed                Social History Main Topics     Smoking status: Never Smoker     Smokeless tobacco: Never Used     Alcohol use 0.0 oz/week        0 Standard drinks or equivalent per week          Comment: Rare     Drug use: No     Sexual activity: Not Currently       Partners: Male         Comment: denies pregnancy            Other Topics Concern      Service No     Blood Transfusions No     Caffeine Concern No     Occupational Exposure No     Hobby Hazards No     Sleep Concern Yes     Stress Concern No     Weight Concern No     Special Diet No     Back Care No     Exercise No     Bike Helmet No       n/a     Seat Belt Yes     Self-Exams No      Social History Narrative            FAMILY HISTORY:   Family History          Family History   Problem Relation Age of Onset     Hypertension Mother       Lipids Mother       Hypertension Maternal Grandmother       Alzheimer Disease Maternal Grandmother       Lipids Father              Past/family/social history reviewed and no changes     PHYSICAL EXAMINATION:  Constitutional: aaox3, cooperative, pleasant, not dyspneic/diaphoretic, no acute  distress  /86  Pulse 80  Temp 98.7  F (37.1  C) (Oral)  Ht 1.524 m (5')  Wt 48.3 kg (106 lb 8 oz)  SpO2 99%  BMI 20.8 kg/m2  Eyes: Sclera anicteric/injected  Ears/nose/mouth/throat: Normal oropharynx without ulcers or exudate, mucus membranes moist, hearing intact  Neck: supple, thyroid normal size  CV: No edema  Respiratory: Unlabored breathing  Lymph: No axillary, submandibular, supraclavicular or inguinal lymphadenopathy  Abd: Surgical abdominal scars well healed, nondistended, +bs, no hepatosplenomegaly, nontender, no peritoneal signs  Skin: warm, perfused, no jaundice  Psych: Normal affect  MSK: Normal gait        PERTINENT STUDIES:  Reviewed  Answers for HPI/ROS submitted by the patient on 6/5/2018   General Symptoms: No  Skin Symptoms: No  HENT Symptoms: No  EYE SYMPTOMS: No  HEART SYMPTOMS: No  LUNG SYMPTOMS: No  INTESTINAL SYMPTOMS: No  URINARY SYMPTOMS: No  GYNECOLOGIC SYMPTOMS: No  BREAST SYMPTOMS: No  SKELETAL SYMPTOMS: No  BLOOD SYMPTOMS: No  NERVOUS SYSTEM SYMPTOMS: No  MENTAL HEALTH SYMPTOMS: No

## 2018-06-05 NOTE — LETTER
6/5/2018     RE: Yarelis Penny  [address] [facility] [address]     Dear Colleague,    Thank you for referring your patient, Yarelis Penny, to the [facility] IBD [facility]INIC at [facility]. Please see a copy of my visit note below.    IBD CLINIC VISIT      CC/REFERRING MD: Daniela Enriquez    REASON FOR FOLLOW UP: PSC associated Ulcerative colitis     IBD HISTORY  Age at diagnosis: 18     Extent of disease: Colonoscopies over the years have varied with areas of involvement. Earliest colonoscopies in 2006 show right greater than left inflammation with some inflammation in the ileum as well.    Followup colonoscopy in 2007 showed mainly diffuse colonic inflammation.    Colonoscopies in 2015 again showed more chronic colitis with no significant ileal involvement.    Colonoscopies in 3/2016 again showed a right-sided and some ileal inflammation with ulceration, but biopsy showed CMV.    Colonoscopy 11/2016 showed severe inflammation from 20 cm from the anus all of the way to the ileum as far as could be evaluated.  Biopsies showed chronic active ileitis and chronic active colitis in every sample.  There was no evidence of CMV or PTLD. Rare SHENA-KACIE positive cells seen in ileum.  Colonoscopy 9/2017 showed Abad 2 inflammation in the right colon and abad one in the left colon with relative rectal sparing.  Patulous IC valve with some backwash ileitis.  Biopsies negative for lymphoma, CMP and EBV biopsies with mild  chronic active ileitis and mild chronic active colitis throughout, including rectum.     Current UC medications: Vedolizumab 400mg pq4 weeks, prednisone 5 mg  Prior UC surgeries: None  Prior IBD Medications:   Multiple rounds of prednisone  Pentasa   Asacol  Azathioprine (mainly given for liver issues at lower dose)  '  Uceris - stopped March 2018     DRUG MONITORING  TPMT enzyme activity: (12/6/16) 18.5      6-TGN/6-MMPN levels: (12/6/16) 203/257        Biologic concentration:   4/18/17 Vedolizumab level 34.1, no ATI  5/2/17 Vedolizumab level 3.9, no ATI (decreased interval to q4 week dosing)  10/25/17 Vedolizumab level 11, no ATI (no change in therapy-- met with surgeon to discuss what surgical options might look like)        DISEASE ASSESSMENT  Labs:CRP 6.5 and sed rate 50  Endoscopic assessment: Colonoscopy 9/2017 showed Abad 2 inflammation in the right colon and abad one in the left colon with relative rectal sparing.  Patulous IC valve with some backwash ileitis.  Biopsies negative for lymphoma, CMP and EBV biopsies with mild  chronic active ileitis and mild chronic active colitis throughout, including rectum.  Enterography: (12/14/16) 1. Diffuse colonic wall thickening and enhancement with loss of  haustral pattern. Wall thickening and enhancement of the terminal  ileum. Findings consistent with acute on chronic changes related to  ulcerative colitis. 2. Stable 7.4 cm left adnexal cyst. A short interval follow-up  ultrasound is recommended to help determine whether this represents a  physiologic lesion. Plans to have follow up with OBGYN and repeat U/S in 6 weeks.  Fecal calprotectin: --   C diff: Negative 12/10/16  Abad score: 0/9 (6/5/2018)     ASSESSMENT/PLAN  Yarelis is a pleasant 33-year-old female with a complicated past medical history including primary sclerosing cholangitis associated inflammatory bowel disease, cirrhosis, status post living donor transplant in 2015 with severe flare and was hospitalized 12/2016.       1. Primary sclerosing cholangitis associated inflammatory bowel disease. Overall doing well symptomatically but we have yet to confirm endoscopic remission. Will continue vedoliumab 300 mg q 4 weeks. Levels have been lower than desired but we are at the highest vedo dose possible. She is due for surveillance colonoscopy in September so we will see how things look then. If still significant inflammation then we need to change therapy.  I still believe the best option is colectomy. However, she wants to avoid this at all costs. I believe risk of anti-TNF given her current immunosuppression, history of rituximab, and CMV/EBV. Tofacitinib can be considered.  -continue vedo q 4 weeks  -continue surveillance labs  -colonoscopy (schedule 1 hour for chromoendoscopy in case mucosal healing is seen)   -stop Uceris completely      2. PCP prophylaxis. I think she can stop this now that she is off Uceris. Will defer to ID      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 The patient follows with Dr. Nails. Rituximab treatment has been completed.        4. 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 Her CMV levels were not detected that were last checked in 02/07/2017. She has completed her IV ganciclovir regimen.       5. 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 Last ERCP 5/2018 with Dr. Guru Macias. Another scheduled June. Has evidence of chronic bile obstruction on liver bx.      6. IBD healthcare maintenance based on patients current medication: Vedolizumab  Vaccinations:  -- Influenza (every year): UTD 2841-7990  -- TdaP (every 10 years): Last given 2016  -- Pneumococcal Pneumonia (once then every 5 years): Last given 2016      One time confirmation of immunity or serologies:  -- Hepatitis A (serologies or immunizations): 2015  -- Hepatitis B (serologies or immunizations): 2015  -- Varicella: Not documented  -- MMR:Not documented  -- HPV (all aged 18-26): As indicated  -- Meningococcal meningitis (all patients at risk for meningitis): As indicated   -- Due to the immunosuppression in this patient, I would not advise administration of live vaccines such as varicella/VZV, intranasal influenza, MMR, or yellow fever vaccine (if travelling).       Cancer Screening:  Colon cancer screening:  Given PSC type of colitis, colonoscopy every year is recommended. Dysplasia screening is recommended fall 2018.      Cervical cancer screening: Annual due  to immunosupression      Skin cancer screening: Annual visual exam of skin by dermatologist since patient is immunocompromised      Depression Screening:  -- Over the last month, have you felt down, depressed, or hopeless? No  -- Over the last month, have you felt little interest or pleasure doing things? No      Misc:  -- Avoid tobacco use  -- Avoid NSAIDs as there is potentially a 25% chance of causing an IBD flare      RTC after colonoscopy      Thank you for this consultation. It was a pleasure to participate in the care of this patient; please contact us with any further questions.       Fuentes Johnson MD    [facility]  Division of Gastroenterology, Hepatology and Nutrition       Today: Doing very well. 1BM per day. No blood. Formed. No abd pain. No EIM.    She stopped the Uceris in about March. She spread her tablets out to taper. She still takes one about every 10 days.    No fevers/chills/sweats.     Weight is stable.     ROS:    No fevers or chills  No weight loss  No blurry vision, double vision or change in vision  No sore throat  No lymphadenopathy  No headache, paraesthesias, or weakness in a limb  No shortness of breath or wheezing  No chest pain or pressure  No arthralgias or myalgias  No rashes or skin changes  No odynophagia or dysphagia  No BRBPR, hematochezia, melena  No dysuria, frequency or urgency  No hot/cold intolerance or polyria  No anxiety or depression     Extra intestinal manifestations of IBD:  No uveitis/episcleritis  No aphthous ulcers   No arthritis   No erythema nodosum/pyoderma gangrenosum.      PERTINENT PAST MEDICAL HISTORY:   Past Medical History         Past Medical History:   Diagnosis Date     Autoimmune hepatitis (H) 2012     on steroid taper     Cholangitis       CKD (chronic kidney disease) 2012     biopsy 2012: TIN, patchy fibrosis     Esophageal varices (H) 9/08     banded     Heart murmur       History of blood transfusion        Primary sclerosing cholangitis       Ulcerative Colitis       f/b GI            PREVIOUS SURGERIES:   Past Surgical History          Past Surgical History:   Procedure Laterality Date     COLONOSCOPY   9/07     COLONOSCOPY N/A 2/26/2015     Procedure: COMBINED COLONOSCOPY, SINGLE OR MULTIPLE BIOPSY/POLYPECTOMY BY BIOPSY;  Surgeon: Mitchel Hoskins Chi, MD;  [facility]     COLONOSCOPY N/A 1/12/2016     Procedure: COMBINED COLONOSCOPY, SINGLE OR MULTIPLE BIOPSY/POLYPECTOMY BY BIOPSY;  Surgeon: Rigo Vallse MD;  [facility]     COLONOSCOPY N/A 4/1/2016     Procedure: COMBINED COLONOSCOPY, SINGLE OR MULTIPLE BIOPSY/POLYPECTOMY BY BIOPSY;  Surgeon: Kareem Solis MD;  [facility]     COLONOSCOPY N/A 9/5/2017     Procedure: COMBINED COLONOSCOPY, SINGLE OR MULTIPLE BIOPSY/POLYPECTOMY BY BIOPSY;  Colonoscopy;  Surgeon: Fuentes Johnson MD;  [facility]     ENDOSCOPIC RETROGRADE CHOLANGIOPANCREATOGRAM N/A 6/25/2015     Procedure: COMBINED ENDOSCOPIC RETROGRADE CHOLANGIOPANCREATOGRAPHY, PLACE TUBE/STENT;  Surgeon: Landon Quinones MD;  [facility]     ENDOSCOPIC RETROGRADE CHOLANGIOPANCREATOGRAM N/A 6/25/2015     Procedure: COMBINED ENDOSCOPIC RETROGRADE CHOLANGIOPANCREATOGRAPHY, PLACE TUBE/STENT;  Surgeon: Landon Quinones MD;  [facility]     ENDOSCOPIC RETROGRADE CHOLANGIOPANCREATOGRAM N/A 7/2/2015     Procedure: COMBINED ENDOSCOPIC RETROGRADE CHOLANGIOPANCREATOGRAPHY, PLACE TUBE/STENT;  Surgeon: Landon Quinones MD;  [facility]     ENDOSCOPIC RETROGRADE CHOLANGIOPANCREATOGRAM N/A 9/8/2015     Procedure: COMBINED ENDOSCOPIC RETROGRADE CHOLANGIOPANCREATOGRAPHY, REMOVE FOREIGN BODY OR STENT/TUBE;  Surgeon: Landon Quinones MD;  [facility]     ENDOSCOPIC RETROGRADE CHOLANGIOPANCREATOGRAM N/A 12/8/2015     Procedure: ENDOSCOPIC RETROGRADE CHOLANGIOPANCREATOGRAM;  Surgeon: Landon Quinones MD;  [facility]     ENDOSCOPIC RETROGRADE  CHOLANGIOPANCREATOGRAM N/A 3/1/2016     Procedure: COMBINED ENDOSCOPIC RETROGRADE CHOLANGIOPANCREATOGRAPHY, REMOVE FOREIGN BODY OR STENT/TUBE;  Surgeon: Landon Quinones MD;  [facility]     ENDOSCOPIC RETROGRADE CHOLANGIOPANCREATOGRAM N/A 3/20/2017     Procedure: COMBINED ENDOSCOPIC RETROGRADE CHOLANGIOPANCREATOGRAPHY, PLACE TUBE/STENT;  Endoscopic Retrograde Cholangiopancreatogram with Ballon Dilation, Stent Placement;  Surgeon: Guru Brittany Macias MD;  [facility]     ENDOSCOPIC RETROGRADE CHOLANGIOPANCREATOGRAPHY, EXCHANGE TUBE/STENT N/A 7/30/2015     Procedure: ENDOSCOPIC RETROGRADE CHOLANGIOPANCREATOGRAPHY, EXCHANGE TUBE/STENT;  Surgeon: Landon Quinones MD;  [facility]     ENDOSCOPIC RETROGRADE CHOLANGIOPANCREATOGRAPHY, EXCHANGE TUBE/STENT N/A 5/15/2017     Procedure: ENDOSCOPIC RETROGRADE CHOLANGIOPANCREATOGRAPHY, EXCHANGE TUBE/STENT;  Endoscopic Retrograde Cholangiopancreatogram with biliary stent exchange and balloon dilation;  Surgeon: Guru Brittany Macias MD;  [facility]     ESOPHAGOSCOPY, GASTROSCOPY, DUODENOSCOPY (EGD), COMBINED N/A 2/26/2015     Procedure: COMBINED ESOPHAGOSCOPY, GASTROSCOPY, DUODENOSCOPY (EGD);  Surgeon: Mitchel Hoskins Chi, MD;  [facility]     EXPLORE COMMON BILE DUCT N/A 6/25/2015     Procedure: EXPLORE COMMON BILE DUCT;  Surgeon: Tyree Smith MD;  [facility]     LAPAROTOMY EXPLORATORY N/A 6/25/2015     Procedure: LAPAROTOMY EXPLORATORY;  Surgeon: Tyree Smith MD;  [facility]     PICC INSERTION Right 2/27/2015     4fr SL Valved PICC, 36cm (1cm external) in the R medial brachial vein w/ tip in the low SVC.     SIGMOIDOSCOPY FLEXIBLE N/A 4/27/2016     Procedure: SIGMOIDOSCOPY FLEXIBLE;  Surgeon: Jose Nielson MD;  [facility]     TRANSPLANT LIVER RECIPIENT LIVING UNRELATED N/A 6/18/2015     Procedure: TRANSPLANT LIVER RECIPIENT LIVING UNRELATED;  Surgeon: Tyree Smith MD;  [facility]      UPPER GI ENDOSCOPY                ALLERGIES:            Allergies   Allergen Reactions     Rifampin Other (See Comments)       Kidney failure     Penicillins         Hives, has tolerated amoxicillin         PERTINENT MEDICATIONS:     Current Outpatient Prescriptions   Medication     budesonide (UCERIS) 9 MG 24 hr tablet     calcium carbonate (OS-KERLINE 500 MG [place]. [place]) 1250 MG tablet     GNP ASPIRIN LOW DOSE 81 MG EC tablet     multivitamin, therapeutic with minerals (MULTI-VITAMIN) TABS tablet     predniSONE (DELTASONE) 5 MG tablet     sulfamethoxazole-trimethoprim (BACTRIM DS/SEPTRA DS) 800-160 MG per tablet     tacrolimus (GENERIC EQUIVALENT) 0.5 MG capsule     tacrolimus (GENERIC EQUIVALENT) 1 MG capsule     ursodiol (ACTIGALL) 250 MG tablet     Vedolizumab (ENTYVIO IV)     VITAMIN D, CHOLECALCIFEROL, PO     No current facility-administered medications for this visit.        SOCIAL HISTORY:   Social History    Social History            Social History     Marital status:        Spouse name: Ceferino     Number of children: 0     Years of education: N/A           Occupational History     unemployed                Social History Main Topics     Smoking status: Never Smoker     Smokeless tobacco: Never Used     Alcohol use 0.0 oz/week        0 Standard drinks or equivalent per week          Comment: Rare     Drug use: No     Sexual activity: Not Currently       Partners: Male         Comment: denies pregnancy            Other Topics Concern      Service No     Blood Transfusions No     Caffeine Concern No     Occupational Exposure No     Hobby Hazards No     Sleep Concern Yes     Stress Concern No     Weight Concern No     Special Diet No     Back Care No     Exercise No     Bike Helmet No       n/a     Seat Belt Yes     Self-Exams No      Social History Narrative            FAMILY HISTORY:   Family History          Family History   Problem Relation Age of Onset     Hypertension Mother       Lipids  Mother       Hypertension Maternal Grandmother       Alzheimer Disease Maternal Grandmother       Lipids Father              Past/family/social history reviewed and no changes     PHYSICAL EXAMINATION:  Constitutional: aaox3, cooperative, pleasant, not dyspneic/diaphoretic, no acute distress  /86  Pulse 80  Temp 98.7  F (37.1  C) (Oral)  Ht 1.524 m (5')  Wt 48.3 kg (106 lb 8 oz)  SpO2 99%  BMI 20.8 kg/m2  Eyes: Sclera anicteric/injected  Ears/nose/mouth/throat: Normal oropharynx without ulcers or exudate, mucus membranes moist, hearing intact  Neck: supple, thyroid normal size  CV: No edema  Respiratory: Unlabored breathing  Lymph: No axillary, submandibular, supraclavicular or inguinal lymphadenopathy  Abd: Surgical abdominal scars well healed, nondistended, +bs, no hepatosplenomegaly, nontender, no peritoneal signs  Skin: warm, perfused, no jaundice  Psych: Normal affect  MSK: Normal gait        PERTINENT STUDIES:  Reviewed  Answers for HPI/ROS submitted by the patient on 6/5/2018   General Symptoms: No  Skin Symptoms: No  HENT Symptoms: No  EYE SYMPTOMS: No  HEART SYMPTOMS: No  LUNG SYMPTOMS: No  INTESTINAL SYMPTOMS: No  URINARY SYMPTOMS: No  GYNECOLOGIC SYMPTOMS: No  BREAST SYMPTOMS: No  SKELETAL SYMPTOMS: No  BLOOD SYMPTOMS: No  NERVOUS SYSTEM SYMPTOMS: No  MENTAL HEALTH SYMPTOMS: No    Again, thank you for allowing me to participate in the care of your patient.      Sincerely,    Fuentes Johnson MD

## 2018-06-07 ENCOUNTER — INFUSION THERAPY VISIT (OUTPATIENT)
Dept: INFUSION THERAPY | Facility: CLINIC | Age: 33
End: 2018-06-07
Attending: INTERNAL MEDICINE
Payer: COMMERCIAL

## 2018-06-07 VITALS
SYSTOLIC BLOOD PRESSURE: 104 MMHG | HEART RATE: 85 BPM | OXYGEN SATURATION: 100 % | BODY MASS INDEX: 20.98 KG/M2 | RESPIRATION RATE: 16 BRPM | DIASTOLIC BLOOD PRESSURE: 79 MMHG | TEMPERATURE: 98.1 F | WEIGHT: 107.4 LBS

## 2018-06-07 DIAGNOSIS — E86.0 DEHYDRATION: Primary | ICD-10-CM

## 2018-06-07 DIAGNOSIS — K91.89 BILIARY ANASTOMOTIC STRICTURE (H): ICD-10-CM

## 2018-06-07 DIAGNOSIS — K83.1 BILIARY ANASTOMOTIC STRICTURE (H): ICD-10-CM

## 2018-06-07 DIAGNOSIS — D50.8 OTHER IRON DEFICIENCY ANEMIA: ICD-10-CM

## 2018-06-07 DIAGNOSIS — A08.39 CMV COLITIS (H): ICD-10-CM

## 2018-06-07 DIAGNOSIS — B25.9 CMV COLITIS (H): ICD-10-CM

## 2018-06-07 LAB
ALBUMIN SERPL-MCNC: 2.9 G/DL (ref 3.4–5)
ALP SERPL-CCNC: 169 U/L (ref 40–150)
ALT SERPL W P-5'-P-CCNC: 44 U/L (ref 0–50)
AST SERPL W P-5'-P-CCNC: 35 U/L (ref 0–45)
BILIRUB DIRECT SERPL-MCNC: 0.1 MG/DL (ref 0–0.2)
BILIRUB SERPL-MCNC: 0.4 MG/DL (ref 0.2–1.3)
PROT SERPL-MCNC: 7.9 G/DL (ref 6.8–8.8)

## 2018-06-07 PROCEDURE — 80076 HEPATIC FUNCTION PANEL: CPT | Performed by: INTERNAL MEDICINE

## 2018-06-07 PROCEDURE — 25000128 H RX IP 250 OP 636: Mod: ZF | Performed by: INTERNAL MEDICINE

## 2018-06-07 PROCEDURE — 96413 CHEMO IV INFUSION 1 HR: CPT

## 2018-06-07 RX ADMIN — VEDOLIZUMAB 300 MG: 300 INJECTION, POWDER, LYOPHILIZED, FOR SOLUTION INTRAVENOUS at 09:34

## 2018-06-07 NOTE — MR AVS SNAPSHOT
After Visit Summary   6/7/2018    Yarelis Penny    MRN: [id number]           Patient Information     Date Of Birth          1985        Visit Information        Provider Department      6/7/2018 9:00 AM UC 47 ATC; UC SPEC INFUSION [facility]        Today's Diagnoses     Dehydration    -  1    CMV colitis (H)        Other iron deficiency anemia        Biliary anastomotic stricture           Follow-ups after your visit        Your next 10 appointments already scheduled     Jun 25, 2018   Procedure with Guru Brittany Macias MD   [facility] [place], Same Day Surgery (--)    [address] [address]   [phone]            Jul 09, 2018 10:30 AM CDT   Infusion 180 with UC SPEC INFUSION, UC 48 ATC   [facility] ([facility] [place])    [address]  [address]  [facility] [id number] [phone]            Aug 06, 2018  9:00 AM CDT   Infusion 180 with UC SPEC INFUSION, UC 47 ATC   [facility] ([facility] [place])    [address]  [address]  [facility] [id number] [phone]            Sep 19, 2018  7:30 AM CDT   Colonoscopy with Fuentes Johnson MD   [facility] ([facility])    [address]  [address]  [facility] [id number] [phone]            Dec 05, 2018  8:00 AM CST   (Arrive by 7:45 AM)   Return Visit with Joya Lopez MD   [facility] ([facility])    [address]  [address]  [facility] [id number] [phone]              Who to contact     If you have questions or need follow up information about today's clinic visit or your schedule please contact [facility] directly at [phone].  Normal  or non-critical lab and imaging results will be communicated to you by [facility], letter or phone within 4 business days after the clinic has received the results. If you do not hear from us within 7 days, please contact the clinic through [facility] or phone. If you have a critical or abnormal lab result, we will notify you by phone as soon as possible.  Submit refill requests through [facility] or call your pharmacy and they will forward the refill request to us. Please allow 3 business days for your refill to be completed.          Additional Information About Your Visit        [facility] Information     [facility] gives you secure access to your electronic health record. If you see a primary care provider, you can also send messages to your care team and make appointments. If you have questions, please call your primary care clinic.  If you do not have a primary care provider, please call [phone] and they will assist you.        Care EveryWhere ID     This is your Care EveryWhere ID. This could be used by other organizations to access your [place] medical records  [id number]        Your Vitals Were     Pulse Temperature Respirations Pulse Oximetry BMI (Body Mass Index)       85 98.1  F (36.7  C) (Oral) 16 100% 20.98 kg/m2        Blood Pressure from Last 3 Encounters:   06/07/18 104/79   06/05/18 110/86   05/31/18 96/57    Weight from Last 3 Encounters:   06/07/18 48.7 kg (107 lb 6.4 oz)   06/05/18 48.3 kg (106 lb 8 oz)   05/31/18 48.1 kg (106 lb)              We Performed the Following     Hepatic panel          Today's Medication Changes          These changes are accurate as of 6/7/18 10:19 AM.  If you have any questions, ask your nurse or doctor.               These medicines have changed or have updated prescriptions.        Dose/Directions    GNP ASPIRIN LOW DOSE 81 MG EC tablet   This may have changed:  when to take this   Used for:  Ulcerative colitis with other complication, unspecified location (H), Liver  replaced by transplant (H)   Generic drug:  aspirin        Dose:  81 mg   Take 1 tablet (81 mg) by mouth daily   Quantity:  30 tablet   Refills:  1       predniSONE 5 MG tablet   Commonly known as:  DELTASONE   This may have changed:  when to take this   Used for:  UC (ulcerative colitis) (H), Liver replaced by transplant (H)        Dose:  5 mg   Take 1 tablet (5 mg) by mouth daily   Quantity:  225 tablet   Refills:  3       * tacrolimus 1 MG capsule   Commonly known as:  GENERIC EQUIVALENT   This may have changed:    - how much to take  - additional instructions   Used for:  S/P liver transplant (H)        Dose:  2 mg   Take 2 capsules (2 mg) by mouth 2 times daily *total dose 2.5 mg twice daily   Quantity:  120 capsule   Refills:  11       * tacrolimus 0.5 MG capsule   Commonly known as:  GENERIC EQUIVALENT   This may have changed:    - how much to take  - additional instructions   Used for:  S/P liver transplant (H)        Dose:  0.5 mg   Take 1 capsule (0.5 mg) by mouth 2 times daily Take with 1 mg capsule. Total dose 2.5 mg every 12 hours   Quantity:  180 capsule   Refills:  3       * Notice:  This list has 2 medication(s) that are the same as other medications prescribed for you. Read the directions carefully, and ask your doctor or other care provider to review them with you.             Primary Care Provider Fax #    Physician No Ref-Primary [phone]       No address on file        Equal Access to Services     ERI CHAKRABORTY : Hadii venancio Palma, wamableda luqadaha, qaybta kaalmajackie mott, mani lozano. So [facility] [phone].    ATENCIÓN: Si habla español, tiene a borja disposición servicios gratuitos de asistencia lingüística. Llame al [phone].    We comply with applicable federal civil rights laws and [place] laws. We do not discriminate on the basis of race, color, national origin, age, disability, sex, sexual orientation, or gender identity.            Thank  you!     Thank you for choosing [facility]  for your care. Our goal is always to provide you with excellent care. Hearing back from our patients is one way we can continue to improve our services. Please take a few minutes to complete the written survey that you may receive in the mail after your visit with us. Thank you!             Your Updated Medication List - Protect others around you: Learn how to safely use, store and throw away your medicines at www.disposemymeds.org.          This list is accurate as of 6/7/18 10:19 AM.  Always use your most recent med list.                   Brand Name Dispense Instructions for use Diagnosis    budesonide 9 MG 24 hr tablet    UCERIS    30 tablet    Take 1 tablet (9 mg) by mouth every morning    UC (ulcerative colitis) (H)       calcium carbonate 500 MG tablet    OS-KERLINE 500 mg [place]. Ca     Take 1 tablet by mouth every evening        ENTYVIO IV      Inject 300 mg into the vein every 28 days        GNP ASPIRIN LOW DOSE 81 MG EC tablet   Generic drug:  aspirin     30 tablet    Take 1 tablet (81 mg) by mouth daily    Ulcerative colitis with other complication, unspecified location (H), Liver replaced by transplant (H)       Multi-vitamin Tabs tablet      Take 1 tablet by mouth every morning        predniSONE 5 MG tablet    DELTASONE    225 tablet    Take 1 tablet (5 mg) by mouth daily    UC (ulcerative colitis) (H), Liver replaced by transplant (H)       sulfamethoxazole-trimethoprim 800-160 MG per tablet    BACTRIM DS/SEPTRA DS    90 tablet    Take 1 tablet by mouth three times a week Monday, Wednesday, Friday    Immunosuppression (H), Inflammatory bowel disease, PSC (primary sclerosing cholangitis)       * tacrolimus 1 MG capsule    GENERIC EQUIVALENT    120 capsule    Take 2 capsules (2 mg) by mouth 2 times daily *total dose 2.5 mg twice daily    S/P liver transplant (H)       * tacrolimus 0.5 MG capsule    GENERIC EQUIVALENT     180 capsule    Take 1 capsule (0.5 mg) by mouth 2 times daily Take with 1 mg capsule. Total dose 2.5 mg every 12 hours    S/P liver transplant (H)       ursodiol 250 MG tablet    ACTIGALL    270 tablet    Take 2 tabs (500 mg) in AM, and 1 tab at night (250 mg)    Liver replaced by transplant (H)       VITAMIN D (CHOLECALCIFEROL) PO      Take by mouth every evening        * Notice:  This list has 2 medication(s) that are the same as other medications prescribed for you. Read the directions carefully, and ask your doctor or other care provider to review them with you.

## 2018-06-07 NOTE — PROGRESS NOTES
Infusion Nursing Note  Yarelis Penny presents today to [facility] for:   Chief Complaint   Patient presents with     Infusion     Entyvio     During today's [facility] appointment, orders from Dr. Johnson were completed.  Frequency: every 4 weeks    Progress note:  Patient identification verified by name and date of birth.  Assessment completed.  Vitals recorded in Doc Flowsheets.  Patient was provided with education regarding infusion and possible side effects.  Patient verbalized understanding.     Premedications: were not ordered.  Infusion Rates: infusion given over approximately 30 mins.  Labs: labs ordered every other infusion, therefore not due today. Pt request hep panel from Dr. Enriquez to be drawn today.   Vascular access: peripheral IV placed today.  Treatment Conditions: Biologic/Chemo Checklist ~~~ NOTE: If the patient answers yes to any of the questions below, hold the infusion and contact ordering provider or on-call provider.    1. Have you recently had an elevated temperature, fever, chills, productive cough, coughing for 3 weeks or longer or hemoptysis,  abnormal vital signs, night sweats,  chest pain or have you noticed a decrease in your appetite, unexplained weight loss or fatigue? No  2. Do you have any open wounds or new incisions? No  3. Do you have any recent or upcoming hospitalizations, surgeries or dental procedures? No  4. Do you currently have or recently have had any signs of illness or infection or are you on any antibiotics? No  5. Have you had any new, sudden or worsening abdominal pain? No  6. Have you or anyone in your household received a live vaccination in the past 4 weeks? Please note:  No live vaccines while on biologic/chemotherapy until 6 months after the last treatment.  Patient can receive the flu vaccine (shot only) and the pneumovax.  It is optimal for the patient to get these vaccines mid cycle, but they can be  given at any time as long as it is not on the day of the infusion. No  7. Have you recently been diagnosed with any new nervous system diseases (ie. Multiple sclerosis, Guillain [place], seizures, neurological changes) or cancer diagnosis? Are you on any form of radiation or chemotherapy? No  8. Are you pregnant or breast feeding or do you have plans of pregnancy in the future? No  9. Have you been having any signs of worsening depression or suicidal ideations?  (benlysta only) No  10. Have there been any other new onset medical symptoms? No    Patient tolerated infusion: well    Discharge Plan:   Follow up plan of care with: ongoing infusions at [facility]. and primary medical doctor.  Discharge instructions were reviewed with patient.  Patient/representative verbalized understanding of discharge instructions and all questions answered.  Patient discharged from [facility] in stable condition.    Melanie Coon RN      Administrations This Visit     vedolizumab (ENTYVIO) 300 mg in sodium chloride 0.9 % 280 mL infusion     Admin Date Action Dose Rate Route Administered By          06/07/2018 New Bag 300 mg 560 mL/hr Intravenous Melanie Coon RN                         Wt (P) 48.7 kg (107 lb 6.4 oz)  BMI (P) 20.98 kg/m2

## 2018-06-15 ENCOUNTER — TELEPHONE (OUTPATIENT)
Dept: GASTROENTEROLOGY | Facility: CLINIC | Age: 33
End: 2018-06-15

## 2018-06-15 NOTE — TELEPHONE ENCOUNTER
Health Call Center    Phone Message    May a detailed message be left on voicemail: yes    Reason for Call: Other: Susan is [employer] from [employer]- is calling to follow up with Dr. Johnson/care team to discus patients status now of how she is doing. Please advise    Action Taken: Message routed to:  Adult Clinics: Gastroenterology (GI) p [phone]

## 2018-06-15 NOTE — TELEPHONE ENCOUNTER
Will route to the [place] GI team that is working with her.    Shana Downey RN, BSN, PHN  [facility]  GI/Gen Surg/Hepatology Care Coordinator

## 2018-06-19 ENCOUNTER — OFFICE VISIT (OUTPATIENT)
Dept: GASTROENTEROLOGY | Facility: CLINIC | Age: 33
End: 2018-06-19
Attending: INTERNAL MEDICINE
Payer: COMMERCIAL

## 2018-06-19 VITALS
SYSTOLIC BLOOD PRESSURE: 105 MMHG | BODY MASS INDEX: 20.85 KG/M2 | WEIGHT: 106.2 LBS | TEMPERATURE: 98.5 F | RESPIRATION RATE: 18 BRPM | HEART RATE: 80 BPM | DIASTOLIC BLOOD PRESSURE: 75 MMHG | HEIGHT: 60 IN

## 2018-06-19 DIAGNOSIS — Z94.4 S/P LIVER TRANSPLANT (H): Primary | ICD-10-CM

## 2018-06-19 PROCEDURE — G0463 HOSPITAL OUTPT CLINIC VISIT: HCPCS | Mod: ZF

## 2018-06-19 ASSESSMENT — PAIN SCALES - GENERAL: PAINLEVEL: NO PAIN (0)

## 2018-06-19 NOTE — NURSING NOTE
Chief Complaint   Patient presents with     RECHECK     Post liver tx follow up       /75  Pulse 80  Temp 98.5  F (36.9  C) (Oral)  Resp 18  Ht 1.524 m (5')  Wt 48.2 kg (106 lb 3.2 oz)  BMI 20.74 kg/m2    HARDIK BROWNING CMA

## 2018-06-19 NOTE — PROGRESS NOTES
[facility]  LIVER TRANSPLANT CLINIC    A/P  33 year old female s/p LDLT  6/18/15 for PCS/autoimmune hepatitis 6/18/15    Ongoing issues with biliary stricture. Recnet bx indicated iliary obstruction. ERCP Monday.    UC: Not quiescent. On vedo, pred 5, tac. Resistant to colectomy    LT: graft function is good. Continue tac    Reproductive: wants to become pregnant. We discussed this at length today. She is not really stable and I worry about her immune stability. I told her I would discuss with her other care providers.    Daniela Enriquez MD  Hepatology/ Liver Transplant  [facility]  ===================================================================  PCP:      SUBJECTIVE  33 year old female s/p LDLT  For PSC/AIH. She wants to see if she could get pregnant. She and her  are currently using a condom. She has regular menstrual periods.     She has UC and is on vedo for about 1.5 years.. She next has colonoscopy in September. Last colonoscopy was hard on her with diarrhea for about a month. She thinks the vedo is helping. She says Dr. Johnson is pushing for colectomy. She says he tells her that it is going to get worse. She says the last time she considers that she had a flare was last fall, after the colonoscopy. She is not on budesonide any longer. It was a 3 month course and she has been off for about 2 months. She is having 1 formed stool per day.    CMV colitis, EBV viremia and UC.  Biliary stricture; Last ERCP 5/7/18. Liver tests did not improve: bx showed biliary stricture. ERCP scheduled for this Monday.    IS: Prograf. Pred 5  LABS: Up to date. Aphos 169. Tbili 0.4.  REJECTION: None  BILIARY ISSUES: BIle duct leak and stricture. Last ERCP  5/7/18.  STENT:Out  KIDNEY FUNCTION:   Creatinine   Date Value Ref Range Status   05/07/2018 0.99 0.52 - 1.04 mg/dL Final     BP: Good. No meds.        SOC: She is not working by choice initially. Now cannot get enough medical stability to get a  job. She would like to be a .  ROS: 10 point ROS neg other than the symptoms noted above in the HPI.    OBJECTIVE  Vitals: /75  Pulse 80  Temp 98.5  F (36.9  C) (Oral)  Resp 18  Ht 1.524 m (5')  Wt 48.2 kg (106 lb 3.2 oz)  BMI 20.74 kg/m2  BMI= Body mass index is 20.74 kg/(m^2).   GENERAL:  Very pleasant, well-appearing, in no acute distress.    HEENT:  No icterus, no oral lesions.    LYMPH:  No supraclavicular or cervical lymphadenopathy.    CARDIOVASCULAR:  Regular rate and rhythm.    CHEST:  Lungs are clear.    ABDOMEN:  Bowel sounds are present.  Abdomen is soft, nontender, nondistended.  Scar is well healed.      EXTREMITIES:  No edema.    SKIN:  No rash.    NEUROLOGIC:  Speech is fluent and clear.  No asterixis or tremor.      Creatinine   Date Value Ref Range Status   05/07/2018 0.99 0.52 - 1.04 mg/dL Final   ]   Lab Results   Component Value Date    BILITOTAL 0.4 06/07/2018    BILITOTAL 0.3 05/18/2018    BILITOTAL 0.4 05/10/2018    BILITOTAL 0.3 05/07/2018    BILITOTAL 0.3 05/02/2018      Lab Results   Component Value Date    ALT 44 06/07/2018      Lab Results   Component Value Date    ALBUMIN 2.9 06/07/2018       Hemoglobin   Date Value Ref Range Status   05/31/2018 12.2 11.7 - 15.7 g/dL Final   ]    Current Outpatient Prescriptions   Medication     budesonide (UCERIS) 9 MG 24 hr tablet     calcium carbonate (OS-KERLINE 500 MG [place]. [place]) 1250 MG tablet     GNP ASPIRIN LOW DOSE 81 MG EC tablet     multivitamin, therapeutic with minerals (MULTI-VITAMIN) TABS tablet     predniSONE (DELTASONE) 5 MG tablet     sulfamethoxazole-trimethoprim (BACTRIM DS/SEPTRA DS) 800-160 MG per tablet     tacrolimus (GENERIC EQUIVALENT) 0.5 MG capsule     tacrolimus (GENERIC EQUIVALENT) 1 MG capsule     ursodiol (ACTIGALL) 250 MG tablet     Vedolizumab (ENTYVIO IV)     VITAMIN D, CHOLECALCIFEROL, PO     No current facility-administered medications for this visit.      .

## 2018-06-19 NOTE — MR AVS SNAPSHOT
After Visit Summary   6/19/2018    Yarelis Penny    MRN: [id number]           Patient Information     Date Of Birth          1985        Visit Information        Provider Department      6/19/2018 11:00 AM Daniela Enriquez MD [facility] Hepatology        Today's Diagnoses     S/P liver transplant (H)    -  1       Follow-ups after your visit        Follow-up notes from your care team     Return in about 6 months (around 12/19/2018).      Your next 10 appointments already scheduled     Jun 25, 2018   Procedure with Guru Brittany Macias MD   [facility], [place], Same Day Surgery (--)    [address] [address]   [phone]            Jul 09, 2018 10:30 AM CDT   Infusion 180 with UC SPEC INFUSION, UC 48 ATC   [facility] Specialty [facility] Procedure ([facility] [place])    [address]  [address]  [facility] [id number] [phone]            Aug 06, 2018  9:00 AM CDT   Infusion 180 with UC SPEC INFUSION, UC 47 ATC   [facility] Procedure ([facility] [place])    [address]  [address]  [place] [id number] [phone]            Sep 19, 2018  7:30 AM CDT   Colonoscopy with Fuentes Johnson MD   [facility] ([facility])    [address]  [address]  [facility] [id number] [phone]            Dec 05, 2018  8:00 AM CST   (Arrive by 7:45 AM)   Return Visit with Joya Lopez MD   [facility] and Infectious Diseases ([facility] [place])    [address]  [address]  [place] [id number] [phone]              Who to contact     If you have questions or need follow up information about today's clinic visit or your schedule please contact [facility] HEPATOLOGY directly at [phone].  Normal or non-critical lab and imaging results will be communicated to  you by MyChart, letter or phone within 4 business days after the clinic has received the results. If you do not hear from us within 7 days, please contact the clinic through [employer] or phone. If you have a critical or abnormal lab result, we will notify you by phone as soon as possible.  Submit refill requests through [employer] or call your pharmacy and they will forward the refill request to us. Please allow 3 business days for your refill to be completed.          Additional Information About Your Visit        [employer] Information     [employer] gives you secure access to your electronic health record. If you see a primary care provider, you can also send messages to your care team and make appointments. If you have questions, please call your primary care clinic.  If you do not have a primary care provider, please call [phone] and they will assist you.        Care EveryWhere ID     This is your Care EveryWhere ID. This could be used by other organizations to access your [place] medical records  [id number]        Your Vitals Were     Pulse Temperature Respirations Height BMI (Body Mass Index)       80 98.5  F (36.9  C) (Oral) 18 1.524 m (5') 20.74 kg/m2        Blood Pressure from Last 3 Encounters:   06/19/18 105/75   06/07/18 104/79   06/05/18 110/86    Weight from Last 3 Encounters:   06/19/18 48.2 kg (106 lb 3.2 oz)   06/07/18 48.7 kg (107 lb 6.4 oz)   06/05/18 48.3 kg (106 lb 8 oz)              Today, you had the following     No orders found for display         Today's Medication Changes          These changes are accurate as of 6/19/18  4:29 PM.  If you have any questions, ask your nurse or doctor.               These medicines have changed or have updated prescriptions.        Dose/Directions    GNP ASPIRIN LOW DOSE 81 MG EC tablet   This may have changed:  when to take this   Used for:  Ulcerative colitis with other complication, unspecified location (H), Liver replaced by transplant (H)   Generic drug:   aspirin        Dose:  81 mg   Take 1 tablet (81 mg) by mouth daily   Quantity:  30 tablet   Refills:  1       predniSONE 5 MG tablet   Commonly known as:  DELTASONE   This may have changed:  when to take this   Used for:  UC (ulcerative colitis) (H), Liver replaced by transplant (H)        Dose:  5 mg   Take 1 tablet (5 mg) by mouth daily   Quantity:  225 tablet   Refills:  3       * tacrolimus 1 MG capsule   Commonly known as:  GENERIC EQUIVALENT   This may have changed:    - how much to take  - additional instructions   Used for:  S/P liver transplant (H)        Dose:  2 mg   Take 2 capsules (2 mg) by mouth 2 times daily *total dose 2.5 mg twice daily   Quantity:  120 capsule   Refills:  11       * tacrolimus 0.5 MG capsule   Commonly known as:  GENERIC EQUIVALENT   This may have changed:    - how much to take  - additional instructions   Used for:  S/P liver transplant (H)        Dose:  0.5 mg   Take 1 capsule (0.5 mg) by mouth 2 times daily Take with 1 mg capsule. Total dose 2.5 mg every 12 hours   Quantity:  180 capsule   Refills:  3       * Notice:  This list has 2 medication(s) that are the same as other medications prescribed for you. Read the directions carefully, and ask your doctor or other care provider to review them with you.             Primary Care Provider Fax #    Physician No Ref-Primary [phone]       No address on file        Equal Access to Services     ERI CHAKRABORTY : Yandel Palma, waaxda luqadaha, qaybta kaalmada adeegyada, mani lozano. So [facility] [phone].    ATENCIÓN: Si habla español, tiene a borja disposición servicios gratuitos de asistencia lingüística. Llame al [phone].    We comply with applicable federal civil rights laws and [place] laws. We do not discriminate on the basis of race, color, national origin, age, disability, sex, sexual orientation, or gender identity.            Thank you!     Thank you for choosing [facility]  for your care. Our goal is always to provide you with excellent care. Hearing back from our patients is one way we can continue to improve our services. Please take a few minutes to complete the written survey that you may receive in the mail after your visit with us. Thank you!             Your Updated Medication List - Protect others around you: Learn how to safely use, store and throw away your medicines at www.disposemymeds.org.          This list is accurate as of 6/19/18  4:29 PM.  Always use your most recent med list.                   Brand Name Dispense Instructions for use Diagnosis    budesonide 9 MG 24 hr tablet    UCERIS    30 tablet    Take 1 tablet (9 mg) by mouth every morning    UC (ulcerative colitis) (H)       calcium carbonate 500 MG tablet    OS-KERLINE 500 mg [place]. Ca     Take 1 tablet by mouth every evening        ENTYVIO IV      Inject 300 mg into the vein every 28 days        GNP ASPIRIN LOW DOSE 81 MG EC tablet   Generic drug:  aspirin     30 tablet    Take 1 tablet (81 mg) by mouth daily    Ulcerative colitis with other complication, unspecified location (H), Liver replaced by transplant (H)       Multi-vitamin Tabs tablet      Take 1 tablet by mouth every morning        predniSONE 5 MG tablet    DELTASONE    225 tablet    Take 1 tablet (5 mg) by mouth daily    UC (ulcerative colitis) (H), Liver replaced by transplant (H)       sulfamethoxazole-trimethoprim 800-160 MG per tablet    BACTRIM DS/SEPTRA DS    90 tablet    Take 1 tablet by mouth three times a week Monday, Wednesday, Friday    Immunosuppression (H), Inflammatory bowel disease, PSC (primary sclerosing cholangitis)       * tacrolimus 1 MG capsule    GENERIC EQUIVALENT    120 capsule    Take 2 capsules (2 mg) by mouth 2 times daily *total dose 2.5 mg twice daily    S/P liver transplant (H)       * tacrolimus 0.5 MG capsule    GENERIC EQUIVALENT    180 capsule    Take 1 capsule (0.5 mg) by mouth 2 times daily Take with 1 mg  capsule. Total dose 2.5 mg every 12 hours    S/P liver transplant (H)       ursodiol 250 MG tablet    ACTIGALL    270 tablet    Take 2 tabs (500 mg) in AM, and 1 tab at night (250 mg)    Liver replaced by transplant (H)       VITAMIN D (CHOLECALCIFEROL) PO      Take by mouth every evening        * Notice:  This list has 2 medication(s) that are the same as other medications prescribed for you. Read the directions carefully, and ask your doctor or other care provider to review them with you.

## 2018-06-19 NOTE — LETTER
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 Recnet bx indicated iliary obstruction. ERCP Monday.    UC: Not quiescent. On vedo, pred 5, tac. Resistant to colectomy    LT: graft function is good. Continue tac    Reproductive: wants to become pregnant. We discussed this at length today. She is not really stable and I worry about her immune stability. I told her I would discuss with her other care providers.    Daniela Enriquez MD  Hepatology/ Liver Transplant  [facility]  ===================================================================  PCP:      SUBJECTIVE  33 year old female s/p LDLT  For PSC/AIH. She wants to see if she could get pregnant. She and her  are currently using a condom. She has regular menstrual periods.     She has UC and is on vedo for about 1.5 years.. She next has colonoscopy in September. Last colonoscopy was hard on her with diarrhea for about a month. She thinks the vedo is helping. She says Dr. Johnson is pushing for colectomy. She says he tells her that it is going to get worse. She says the last time she considers that she had a flare was last fall, after the colonoscopy. She is not on budesonide any longer. It was a 3 month course and she has been off for about 2 months. She is having 1 formed stool per day.    CMV colitis, EBV viremia and UC.  Biliary stricture; Last ERCP 5/7/18. Liver tests did not improve: bx showed biliary stricture. ERCP scheduled for this Monday.    IS: Prograf. Pred 5  LABS: Up to date. Aphos 169. Tbili 0.4.  REJECTION: None  BILIARY ISSUES: BIle duct leak and stricture. Last ERCP  5/7/18.  STENT:Out  KIDNEY FUNCTION:   Creatinine   Date Value Ref Range Status   05/07/2018 0.99 0.52 - 1.04 mg/dL Final     BP: Good. No meds.        SOC: She is  not working by choice initially. Now cannot get enough medical stability to get a job. She would like to be a .  ROS: 10 point ROS neg other than the symptoms noted above in the HPI.    OBJECTIVE  Vitals: /75  Pulse 80  Temp 98.5  F (36.9  C) (Oral)  Resp 18  Ht 1.524 m (5')  Wt 48.2 kg (106 lb 3.2 oz)  BMI 20.74 kg/m2  BMI= Body mass index is 20.74 kg/(m^2).   GENERAL:  Very pleasant, well-appearing, in no acute distress.    HEENT:  No icterus, no oral lesions.    LYMPH:  No supraclavicular or cervical lymphadenopathy.    CARDIOVASCULAR:  Regular rate and rhythm.    CHEST:  Lungs are clear.    ABDOMEN:  Bowel sounds are present.  Abdomen is soft, nontender, nondistended.  Scar is well healed.      EXTREMITIES:  No edema.    SKIN:  No rash.    NEUROLOGIC:  Speech is fluent and clear.  No asterixis or tremor.      Creatinine   Date Value Ref Range Status   05/07/2018 0.99 0.52 - 1.04 mg/dL Final   ]   Lab Results   Component Value Date    BILITOTAL 0.4 06/07/2018    BILITOTAL 0.3 05/18/2018    BILITOTAL 0.4 05/10/2018    BILITOTAL 0.3 05/07/2018    BILITOTAL 0.3 05/02/2018      Lab Results   Component Value Date    ALT 44 06/07/2018      Lab Results   Component Value Date    ALBUMIN 2.9 06/07/2018       Hemoglobin   Date Value Ref Range Status   05/31/2018 12.2 11.7 - 15.7 g/dL Final   ]    Current Outpatient Prescriptions   Medication     budesonide (UCERIS) 9 MG 24 hr tablet     calcium carbonate (OS-KERLINE 500 MG [place]. [place]) 1250 MG tablet     GNP ASPIRIN LOW DOSE 81 MG EC tablet     multivitamin, therapeutic with minerals (MULTI-VITAMIN) TABS tablet     predniSONE (DELTASONE) 5 MG tablet     sulfamethoxazole-trimethoprim (BACTRIM DS/SEPTRA DS) 800-160 MG per tablet     tacrolimus (GENERIC EQUIVALENT) 0.5 MG capsule     tacrolimus (GENERIC EQUIVALENT) 1 MG capsule     ursodiol (ACTIGALL) 250 MG tablet     Vedolizumab (ENTYVIO IV)     VITAMIN D, CHOLECALCIFEROL, PO     No current  facility-administered medications for this visit.      .       Daniela Enriquez MD

## 2018-06-25 ENCOUNTER — SURGERY (OUTPATIENT)
Age: 33
End: 2018-06-25

## 2018-06-25 ENCOUNTER — APPOINTMENT (OUTPATIENT)
Dept: GENERAL RADIOLOGY | Facility: CLINIC | Age: 33
End: 2018-06-25
Attending: INTERNAL MEDICINE
Payer: COMMERCIAL

## 2018-06-25 ENCOUNTER — ANESTHESIA (OUTPATIENT)
Dept: SURGERY | Facility: CLINIC | Age: 33
End: 2018-06-25
Payer: COMMERCIAL

## 2018-06-25 ENCOUNTER — ANESTHESIA EVENT (OUTPATIENT)
Dept: SURGERY | Facility: CLINIC | Age: 33
End: 2018-06-25
Payer: COMMERCIAL

## 2018-06-25 ENCOUNTER — HOSPITAL ENCOUNTER (OUTPATIENT)
Facility: CLINIC | Age: 33
Discharge: HOME | End: 2018-06-25
Attending: INTERNAL MEDICINE | Admitting: INTERNAL MEDICINE
Payer: COMMERCIAL

## 2018-06-25 VITALS
OXYGEN SATURATION: 99 % | WEIGHT: 108.03 LBS | BODY MASS INDEX: 19.14 KG/M2 | SYSTOLIC BLOOD PRESSURE: 131 MMHG | HEIGHT: 63 IN | RESPIRATION RATE: 18 BRPM | TEMPERATURE: 97.7 F | DIASTOLIC BLOOD PRESSURE: 99 MMHG

## 2018-06-25 LAB
ALBUMIN SERPL-MCNC: 2.8 G/DL (ref 3.4–5)
ALP SERPL-CCNC: 215 U/L (ref 40–150)
ALT SERPL W P-5'-P-CCNC: 39 U/L (ref 0–50)
AMYLASE SERPL-CCNC: 131 U/L (ref 30–110)
ANION GAP SERPL CALCULATED.3IONS-SCNC: 7 MMOL/L (ref 3–14)
AST SERPL W P-5'-P-CCNC: 30 U/L (ref 0–45)
BILIRUB SERPL-MCNC: 0.3 MG/DL (ref 0.2–1.3)
BUN SERPL-MCNC: 22 MG/DL (ref 7–30)
CALCIUM SERPL-MCNC: 8.8 MG/DL (ref 8.5–10.1)
CHLORIDE SERPL-SCNC: 107 MMOL/L (ref 94–109)
CO2 SERPL-SCNC: 23 MMOL/L (ref 20–32)
CREAT SERPL-MCNC: 1 MG/DL (ref 0.52–1.04)
ERCP: NORMAL
ERYTHROCYTE [DISTWIDTH] IN BLOOD BY AUTOMATED COUNT: 15.1 % (ref 10–15)
GFR SERPL CREATININE-BSD FRML MDRD: 64 ML/MIN/1.7M2
GLUCOSE BLDC GLUCOMTR-MCNC: 106 MG/DL (ref 70–99)
GLUCOSE BLDC GLUCOMTR-MCNC: 65 MG/DL (ref 70–99)
GLUCOSE SERPL-MCNC: 79 MG/DL (ref 70–99)
HCG UR QL: NEGATIVE
HCT VFR BLD AUTO: 38.6 % (ref 35–47)
HGB BLD-MCNC: 12.1 G/DL (ref 11.7–15.7)
LIPASE SERPL-CCNC: 289 U/L (ref 73–393)
MCH RBC QN AUTO: 29.2 PG (ref 26.5–33)
MCHC RBC AUTO-ENTMCNC: 31.3 G/DL (ref 31.5–36.5)
MCV RBC AUTO: 93 FL (ref 78–100)
PLATELET # BLD AUTO: 595 10E9/L (ref 150–450)
POTASSIUM SERPL-SCNC: 4.2 MMOL/L (ref 3.4–5.3)
PROT SERPL-MCNC: 7.6 G/DL (ref 6.8–8.8)
RBC # BLD AUTO: 4.15 10E12/L (ref 3.8–5.2)
SODIUM SERPL-SCNC: 137 MMOL/L (ref 133–144)
WBC # BLD AUTO: 11.6 10E9/L (ref 4–11)

## 2018-06-25 PROCEDURE — 25000566 ZZH SEVOFLURANE, EA 15 MIN: Performed by: INTERNAL MEDICINE

## 2018-06-25 PROCEDURE — 25500064 ZZH RX 255 OP 636: Performed by: INTERNAL MEDICINE

## 2018-06-25 PROCEDURE — 82962 GLUCOSE BLOOD TEST: CPT

## 2018-06-25 PROCEDURE — 36000053 ZZH SURGERY LEVEL 2 EA 15 ADDTL MIN - UMMC: Performed by: INTERNAL MEDICINE

## 2018-06-25 PROCEDURE — 36000055 ZZH SURGERY LEVEL 2 W FLUORO 1ST 30 MIN - UMMC: Performed by: INTERNAL MEDICINE

## 2018-06-25 PROCEDURE — 37000009 ZZH ANESTHESIA TECHNICAL FEE, EACH ADDTL 15 MIN: Performed by: INTERNAL MEDICINE

## 2018-06-25 PROCEDURE — C1877 STENT, NON-COAT/COV W/O DEL: HCPCS | Performed by: INTERNAL MEDICINE

## 2018-06-25 PROCEDURE — 25000128 H RX IP 250 OP 636: Performed by: NURSE ANESTHETIST, CERTIFIED REGISTERED

## 2018-06-25 PROCEDURE — C1769 GUIDE WIRE: HCPCS | Performed by: INTERNAL MEDICINE

## 2018-06-25 PROCEDURE — 36415 COLL VENOUS BLD VENIPUNCTURE: CPT | Performed by: INTERNAL MEDICINE

## 2018-06-25 PROCEDURE — 27210995 ZZH RX 272: Performed by: INTERNAL MEDICINE

## 2018-06-25 PROCEDURE — C1726 CATH, BAL DIL, NON-VASCULAR: HCPCS | Performed by: INTERNAL MEDICINE

## 2018-06-25 PROCEDURE — 71000014 ZZH RECOVERY PHASE 1 LEVEL 2 FIRST HR: Performed by: INTERNAL MEDICINE

## 2018-06-25 PROCEDURE — 40000170 ZZH STATISTIC PRE-PROCEDURE ASSESSMENT II: Performed by: INTERNAL MEDICINE

## 2018-06-25 PROCEDURE — C1876 STENT, NON-COA/NON-COV W/DEL: HCPCS | Performed by: INTERNAL MEDICINE

## 2018-06-25 PROCEDURE — C9399 UNCLASSIFIED DRUGS OR BIOLOG: HCPCS | Performed by: NURSE ANESTHETIST, CERTIFIED REGISTERED

## 2018-06-25 PROCEDURE — 83690 ASSAY OF LIPASE: CPT | Performed by: INTERNAL MEDICINE

## 2018-06-25 PROCEDURE — 80053 COMPREHEN METABOLIC PANEL: CPT | Performed by: INTERNAL MEDICINE

## 2018-06-25 PROCEDURE — 37000008 ZZH ANESTHESIA TECHNICAL FEE, 1ST 30 MIN: Performed by: INTERNAL MEDICINE

## 2018-06-25 PROCEDURE — 25000125 ZZHC RX 250: Performed by: INTERNAL MEDICINE

## 2018-06-25 PROCEDURE — 81025 URINE PREGNANCY TEST: CPT | Performed by: ANESTHESIOLOGY

## 2018-06-25 PROCEDURE — 40000279 XR SURGERY CARM FLUORO GREATER THAN 5 MIN W STILLS: Mod: TC

## 2018-06-25 PROCEDURE — 82150 ASSAY OF AMYLASE: CPT | Performed by: INTERNAL MEDICINE

## 2018-06-25 PROCEDURE — 85027 COMPLETE CBC AUTOMATED: CPT | Performed by: INTERNAL MEDICINE

## 2018-06-25 PROCEDURE — 71000027 ZZH RECOVERY PHASE 2 EACH 15 MINS: Performed by: INTERNAL MEDICINE

## 2018-06-25 PROCEDURE — 27210794 ZZH OR GENERAL SUPPLY STERILE: Performed by: INTERNAL MEDICINE

## 2018-06-25 DEVICE — STENT JOHLIN PANCREA WEDGE 10FRX20CM W/INTRO G26827
Type: IMPLANTABLE DEVICE | Site: BILE DUCT | Status: NON-FUNCTIONAL
Removed: 2018-07-30

## 2018-06-25 RX ORDER — LEVOFLOXACIN 5 MG/ML
INJECTION, SOLUTION INTRAVENOUS PRN
Status: DISCONTINUED | OUTPATIENT
Start: 2018-06-25 | End: 2018-06-25

## 2018-06-25 RX ORDER — PROPOFOL 10 MG/ML
INJECTION, EMULSION INTRAVENOUS PRN
Status: DISCONTINUED | OUTPATIENT
Start: 2018-06-25 | End: 2018-06-25

## 2018-06-25 RX ORDER — IOPAMIDOL 510 MG/ML
INJECTION, SOLUTION INTRAVASCULAR PRN
Status: DISCONTINUED | OUTPATIENT
Start: 2018-06-25 | End: 2018-06-25 | Stop reason: HOSPADM

## 2018-06-25 RX ORDER — SODIUM CHLORIDE, SODIUM LACTATE, POTASSIUM CHLORIDE, CALCIUM CHLORIDE 600; 310; 30; 20 MG/100ML; MG/100ML; MG/100ML; MG/100ML
INJECTION, SOLUTION INTRAVENOUS CONTINUOUS
Status: DISCONTINUED | OUTPATIENT
Start: 2018-06-25 | End: 2018-06-25 | Stop reason: HOSPADM

## 2018-06-25 RX ORDER — LIDOCAINE 40 MG/G
CREAM TOPICAL
Status: DISCONTINUED | OUTPATIENT
Start: 2018-06-25 | End: 2018-06-25 | Stop reason: HOSPADM

## 2018-06-25 RX ORDER — ONDANSETRON 2 MG/ML
INJECTION INTRAMUSCULAR; INTRAVENOUS PRN
Status: DISCONTINUED | OUTPATIENT
Start: 2018-06-25 | End: 2018-06-25

## 2018-06-25 RX ORDER — DEXAMETHASONE SODIUM PHOSPHATE 4 MG/ML
INJECTION, SOLUTION INTRA-ARTICULAR; INTRALESIONAL; INTRAMUSCULAR; INTRAVENOUS; SOFT TISSUE PRN
Status: DISCONTINUED | OUTPATIENT
Start: 2018-06-25 | End: 2018-06-25

## 2018-06-25 RX ORDER — FLUMAZENIL 0.1 MG/ML
0.2 INJECTION, SOLUTION INTRAVENOUS
Status: DISCONTINUED | OUTPATIENT
Start: 2018-06-25 | End: 2018-06-25 | Stop reason: HOSPADM

## 2018-06-25 RX ORDER — SODIUM CHLORIDE, SODIUM LACTATE, POTASSIUM CHLORIDE, CALCIUM CHLORIDE 600; 310; 30; 20 MG/100ML; MG/100ML; MG/100ML; MG/100ML
INJECTION, SOLUTION INTRAVENOUS CONTINUOUS PRN
Status: DISCONTINUED | OUTPATIENT
Start: 2018-06-25 | End: 2018-06-25

## 2018-06-25 RX ORDER — FENTANYL CITRATE 50 UG/ML
25-50 INJECTION, SOLUTION INTRAMUSCULAR; INTRAVENOUS
Status: DISCONTINUED | OUTPATIENT
Start: 2018-06-25 | End: 2018-06-25 | Stop reason: HOSPADM

## 2018-06-25 RX ORDER — HYDROMORPHONE HYDROCHLORIDE 1 MG/ML
.3-.5 INJECTION, SOLUTION INTRAMUSCULAR; INTRAVENOUS; SUBCUTANEOUS EVERY 10 MIN PRN
Status: DISCONTINUED | OUTPATIENT
Start: 2018-06-25 | End: 2018-06-25 | Stop reason: HOSPADM

## 2018-06-25 RX ORDER — ONDANSETRON 4 MG/1
4 TABLET, ORALLY DISINTEGRATING ORAL EVERY 30 MIN PRN
Status: DISCONTINUED | OUTPATIENT
Start: 2018-06-25 | End: 2018-06-25 | Stop reason: HOSPADM

## 2018-06-25 RX ORDER — ONDANSETRON 2 MG/ML
4 INJECTION INTRAMUSCULAR; INTRAVENOUS EVERY 30 MIN PRN
Status: DISCONTINUED | OUTPATIENT
Start: 2018-06-25 | End: 2018-06-25 | Stop reason: HOSPADM

## 2018-06-25 RX ORDER — INDOMETHACIN 50 MG/1
100 SUPPOSITORY RECTAL
Status: COMPLETED | OUTPATIENT
Start: 2018-06-25 | End: 2018-06-25

## 2018-06-25 RX ORDER — NALOXONE HYDROCHLORIDE 0.4 MG/ML
.1-.4 INJECTION, SOLUTION INTRAMUSCULAR; INTRAVENOUS; SUBCUTANEOUS
Status: DISCONTINUED | OUTPATIENT
Start: 2018-06-25 | End: 2018-06-25 | Stop reason: HOSPADM

## 2018-06-25 RX ORDER — FENTANYL CITRATE 50 UG/ML
INJECTION, SOLUTION INTRAMUSCULAR; INTRAVENOUS PRN
Status: DISCONTINUED | OUTPATIENT
Start: 2018-06-25 | End: 2018-06-25

## 2018-06-25 RX ORDER — MEPERIDINE HYDROCHLORIDE 50 MG/ML
12.5 INJECTION INTRAMUSCULAR; INTRAVENOUS; SUBCUTANEOUS
Status: DISCONTINUED | OUTPATIENT
Start: 2018-06-25 | End: 2018-06-25 | Stop reason: HOSPADM

## 2018-06-25 RX ADMIN — FENTANYL CITRATE 50 MCG: 50 INJECTION, SOLUTION INTRAMUSCULAR; INTRAVENOUS at 07:45

## 2018-06-25 RX ADMIN — IOPAMIDOL 40 ML: 510 INJECTION, SOLUTION INTRAVASCULAR at 08:14

## 2018-06-25 RX ADMIN — ROCURONIUM BROMIDE 40 MG: 10 INJECTION INTRAVENOUS at 07:48

## 2018-06-25 RX ADMIN — MIDAZOLAM 2 MG: 1 INJECTION INTRAMUSCULAR; INTRAVENOUS at 07:31

## 2018-06-25 RX ADMIN — WATER 100 ML: 100 IRRIGANT IRRIGATION at 08:14

## 2018-06-25 RX ADMIN — SIMETHICONE 133 MG: 63.3; 3.7 SOLUTION/ DROPS ORAL at 08:14

## 2018-06-25 RX ADMIN — Medication 40 MG: at 07:45

## 2018-06-25 RX ADMIN — PROPOFOL 100 MG: 10 INJECTION, EMULSION INTRAVENOUS at 07:45

## 2018-06-25 RX ADMIN — ONDANSETRON 4 MG: 2 INJECTION INTRAMUSCULAR; INTRAVENOUS at 07:58

## 2018-06-25 RX ADMIN — LEVOFLOXACIN 500 MG: 5 INJECTION, SOLUTION INTRAVENOUS at 07:59

## 2018-06-25 RX ADMIN — FENTANYL CITRATE 25 MCG: 50 INJECTION, SOLUTION INTRAMUSCULAR; INTRAVENOUS at 08:52

## 2018-06-25 RX ADMIN — DEXAMETHASONE SODIUM PHOSPHATE 4 MG: 4 INJECTION, SOLUTION INTRA-ARTICULAR; INTRALESIONAL; INTRAMUSCULAR; INTRAVENOUS; SOFT TISSUE at 07:58

## 2018-06-25 RX ADMIN — SUGAMMADEX 100 MG: 100 INJECTION, SOLUTION INTRAVENOUS at 09:00

## 2018-06-25 RX ADMIN — INDOMETHACIN 100 MG: 50 SUPPOSITORY RECTAL at 08:58

## 2018-06-25 RX ADMIN — SODIUM CHLORIDE, POTASSIUM CHLORIDE, SODIUM LACTATE AND CALCIUM CHLORIDE: 600; 310; 30; 20 INJECTION, SOLUTION INTRAVENOUS at 07:31

## 2018-06-25 RX ADMIN — FENTANYL CITRATE 25 MCG: 50 INJECTION, SOLUTION INTRAMUSCULAR; INTRAVENOUS at 08:10

## 2018-06-25 ASSESSMENT — PAIN DESCRIPTION - DESCRIPTORS
DESCRIPTORS: SORE
DESCRIPTORS: SORE

## 2018-06-25 NOTE — ANESTHESIA CARE TRANSFER NOTE
Patient: Yarelis Penny    Procedure(s):  Endoscopic Retrograde Cholangiopancreatogram with biliary dilation, stone removal and stent exchange - Wound Class: II-Clean Contaminated    Diagnosis: Bile Duct Stricture   Diagnosis Additional Information: No value filed.    Anesthesia Type:   General, ETT     Note:  Airway :Face Mask  Patient transferred to:PACU  Comments: VSS.  Report given to RN.Handoff Report: Identifed the Patient, Identified the Reponsible Provider, Reviewed the pertinent medical history, Discussed the surgical course, Reviewed Intra-OP anesthesia mangement and issues during anesthesia, Set expectations for post-procedure period and Allowed opportunity for questions and acknowledgement of understanding      Vitals: (Last set prior to Anesthesia Care Transfer)    CRNA VITALS  6/25/2018 0838 - 6/25/2018 0913      6/25/2018             Resp Rate (observed): (!)  3                Electronically Signed By: KAYLENE Rodriguez CRNA  June 25, 2018  9:13 AM

## 2018-06-25 NOTE — IP AVS SNAPSHOT
Post Anesthesia Care Unit [address] [address]    Phone:  [phone]                                       After Visit Summary   6/25/2018    Yarelis Penny    MRN: [id number]           After Visit Summary Signature Page     I have received my discharge instructions, and my questions have been answered. I have discussed any challenges I see with this plan with the nurse or doctor.    ..........................................................................................................................................  Patient/Patient Representative Signature      ..........................................................................................................................................  Patient Representative Print Name and Relationship to Patient    ..................................................               ................................................  Date                                            Time    ..........................................................................................................................................  Reviewed by Signature/Title    ...................................................              ..............................................  Date                                                            Time

## 2018-06-25 NOTE — ANESTHESIA PREPROCEDURE EVALUATION
ANESTHESIA PREOP EVALUATION    NPO Status: more then 6 hours    Procedure: Endoscopic Retrograde Cholangiopancreatogram     HPI: Bile Duct Stricture     PMHx/PSHx/ROS:  PAST MEDICAL HISTORY:   Past Medical History:   Diagnosis Date     Autoimmune hepatitis (H) 2012    on steroid taper     Cholangitis      CKD (chronic kidney disease) 2012    biopsy 2012: TIN, patchy fibrosis     Esophageal varices (H) 9/08    banded     Heart murmur      History of blood transfusion      Primary sclerosing cholangitis      Ulcerative Colitis     f/b GI       PAST SURGICAL HISTORY:   Past Surgical History:   Procedure Laterality Date     COLONOSCOPY  9/07     COLONOSCOPY N/A 2/26/2015    Procedure: COMBINED COLONOSCOPY, SINGLE OR MULTIPLE BIOPSY/POLYPECTOMY BY BIOPSY;  Surgeon: Mitchel Hoskins Chi, MD;  Location:  GI     COLONOSCOPY N/A 1/12/2016    Procedure: COMBINED COLONOSCOPY, SINGLE OR MULTIPLE BIOPSY/POLYPECTOMY BY BIOPSY;  Surgeon: Rigo Valles MD;  Location:  GI     COLONOSCOPY N/A 4/1/2016    Procedure: COMBINED COLONOSCOPY, SINGLE OR MULTIPLE BIOPSY/POLYPECTOMY BY BIOPSY;  Surgeon: Kareem Solis MD;  Location:  GI     COLONOSCOPY N/A 9/5/2017    Procedure: COMBINED COLONOSCOPY, SINGLE OR MULTIPLE BIOPSY/POLYPECTOMY BY BIOPSY;  Colonoscopy;  Surgeon: Fuentes Johnson MD;  Location:  GI     ENDOSCOPIC RETROGRADE CHOLANGIOPANCREATOGRAM N/A 6/25/2015    Procedure: COMBINED ENDOSCOPIC RETROGRADE CHOLANGIOPANCREATOGRAPHY, PLACE TUBE/STENT;  Surgeon: Landon Quinones MD;  Location:  OR     ENDOSCOPIC RETROGRADE CHOLANGIOPANCREATOGRAM N/A 6/25/2015    Procedure: COMBINED ENDOSCOPIC RETROGRADE CHOLANGIOPANCREATOGRAPHY, PLACE TUBE/STENT;  Surgeon: Landon Quinones MD;  Location:  OR     ENDOSCOPIC RETROGRADE CHOLANGIOPANCREATOGRAM N/A 7/2/2015    Procedure: COMBINED ENDOSCOPIC RETROGRADE CHOLANGIOPANCREATOGRAPHY, PLACE TUBE/STENT;  Surgeon: Landon Quinones MD;  Location:   OR     ENDOSCOPIC RETROGRADE CHOLANGIOPANCREATOGRAM N/A 9/8/2015    Procedure: COMBINED ENDOSCOPIC RETROGRADE CHOLANGIOPANCREATOGRAPHY, REMOVE FOREIGN BODY OR STENT/TUBE;  Surgeon: Landon Quinones MD;  [facility]     ENDOSCOPIC RETROGRADE CHOLANGIOPANCREATOGRAM N/A 12/8/2015    Procedure: ENDOSCOPIC RETROGRADE CHOLANGIOPANCREATOGRAM;  Surgeon: Landon Quinones MD;  [facility]     ENDOSCOPIC RETROGRADE CHOLANGIOPANCREATOGRAM N/A 3/1/2016    Procedure: COMBINED ENDOSCOPIC RETROGRADE CHOLANGIOPANCREATOGRAPHY, REMOVE FOREIGN BODY OR STENT/TUBE;  Surgeon: Landon Quinones MD;  [facility]     ENDOSCOPIC RETROGRADE CHOLANGIOPANCREATOGRAM N/A 3/20/2017    Procedure: COMBINED ENDOSCOPIC RETROGRADE CHOLANGIOPANCREATOGRAPHY, PLACE TUBE/STENT;  Endoscopic Retrograde Cholangiopancreatogram with Ballon Dilation, Stent Placement;  Surgeon: Guru Brittany Macias MD;  [facility]     ENDOSCOPIC RETROGRADE CHOLANGIOPANCREATOGRAM N/A 4/2/2018    Procedure: COMBINED ENDOSCOPIC RETROGRADE CHOLANGIOPANCREATOGRAPHY, PLACE TUBE/STENT;  Endoscopic Retrograde Cholangiopancreatogram with biliary dilation and stent placement;  Surgeon: Guru Brittany Macias MD;  [facility]     ENDOSCOPIC RETROGRADE CHOLANGIOPANCREATOGRAM N/A 5/7/2018    Procedure: ENDOSCOPIC RETROGRADE CHOLANGIOPANCREATOGRAM;  Endoscopic Retrograde Cholangiopancreatogram ballon dilation stent exchange;  Surgeon: Guru Brittany Macias MD;  [facility]     ENDOSCOPIC RETROGRADE CHOLANGIOPANCREATOGRAPHY, EXCHANGE TUBE/STENT N/A 7/30/2015    Procedure: ENDOSCOPIC RETROGRADE CHOLANGIOPANCREATOGRAPHY, EXCHANGE TUBE/STENT;  Surgeon: Landon Quinones MD;  [facility]     ENDOSCOPIC RETROGRADE CHOLANGIOPANCREATOGRAPHY, EXCHANGE TUBE/STENT N/A 5/15/2017    Procedure: ENDOSCOPIC RETROGRADE CHOLANGIOPANCREATOGRAPHY, EXCHANGE TUBE/STENT;  Endoscopic Retrograde Cholangiopancreatogram with  biliary stent exchange and balloon dilation;  Surgeon: Guru Brittany Macias MD;  [facility]     ESOPHAGOSCOPY, GASTROSCOPY, DUODENOSCOPY (EGD), COMBINED N/A 2/26/2015    Procedure: COMBINED ESOPHAGOSCOPY, GASTROSCOPY, DUODENOSCOPY (EGD);  Surgeon: Mitchel Hoskins Chi, MD;  [facility]     EXPLORE COMMON BILE DUCT N/A 6/25/2015    Procedure: EXPLORE COMMON BILE DUCT;  Surgeon: Tyree Smith MD;  [facility]     LAPAROTOMY EXPLORATORY N/A 6/25/2015    Procedure: LAPAROTOMY EXPLORATORY;  Surgeon: Tyree Smith MD;  [facility]     PICC INSERTION Right 2/27/2015    4fr SL Valved PICC, 36cm (1cm external) in the R medial brachial vein w/ tip in the low SVC.     SIGMOIDOSCOPY FLEXIBLE N/A 4/27/2016    Procedure: SIGMOIDOSCOPY FLEXIBLE;  Surgeon: Jose Nielson MD;  Location: [facility]     TRANSPLANT LIVER RECIPIENT LIVING UNRELATED N/A 6/18/2015    Procedure: TRANSPLANT LIVER RECIPIENT LIVING UNRELATED;  Surgeon: Tyree Smith MD;  Location[facility]     UPPER GI ENDOSCOPY         FAMILY HISTORY:   Family History   Problem Relation Age of Onset     Hypertension Mother      Lipids Mother      Hypertension Maternal Grandmother      Alzheimer Disease Maternal Grandmother      Lipids Father          Past Anes Hx: No personal or family h/o anesthesia problems      Allergies:   Allergies   Allergen Reactions     Rifampin Other (See Comments)     Kidney failure     Penicillins      Hives, has tolerated amoxicillin         Meds:   Prescriptions Prior to Admission   Medication Sig Dispense Refill Last Dose     calcium carbonate (OS-KERLINE 500 MG [place]. [place]) 1250 MG tablet Take 1 tablet by mouth every evening   6/24/2018 at 1830     GNP ASPIRIN LOW DOSE 81 MG EC tablet Take 1 tablet (81 mg) by mouth daily (Patient taking differently: Take 81 mg by mouth every morning ) 30 tablet 1 Past Week at Unknown time     multivitamin, therapeutic with minerals (MULTI-VITAMIN) TABS tablet Take 1 tablet by mouth  every morning   6/24/2018 at 1830     predniSONE (DELTASONE) 5 MG tablet Take 1 tablet (5 mg) by mouth daily (Patient taking differently: Take 5 mg by mouth every morning ) 225 tablet 3 6/24/2018 at 1000     sulfamethoxazole-trimethoprim (BACTRIM DS/SEPTRA DS) 800-160 MG per tablet Take 1 tablet by mouth three times a week Monday, Wednesday, Friday 90 tablet 3 Past Week at Unknown time     tacrolimus (GENERIC EQUIVALENT) 0.5 MG capsule Take 1 capsule (0.5 mg) by mouth 2 times daily Take with 1 mg capsule. Total dose 2.5 mg every 12 hours (Patient taking differently: Take 2.5 mg by mouth 2 times daily Take with 1 mg capsule. Total dose 2.5 mg every 12 hours) 180 capsule 3 6/24/2018 at 2200     tacrolimus (GENERIC EQUIVALENT) 1 MG capsule Take 2 capsules (2 mg) by mouth 2 times daily *total dose 2.5 mg twice daily (Patient taking differently: Take 2.5 mg by mouth 2 times daily *total dose 2.5 mg twice daily) 120 capsule 11 6/24/2018 at 2200     ursodiol (ACTIGALL) 250 MG tablet Take 2 tabs (500 mg) in AM, and 1 tab at night (250 mg) 270 tablet 3 6/24/2018 at 2200     Vedolizumab (ENTYVIO IV) Inject 300 mg into the vein every 28 days   Past Month at Unknown time     VITAMIN D, CHOLECALCIFEROL, PO Take by mouth every evening   6/24/2018 at 1830     budesonide (UCERIS) 9 MG 24 hr tablet Take 1 tablet (9 mg) by mouth every morning (Patient not taking: Reported on 6/19/2018) 30 tablet 3 Unknown at Unknown time       No current outpatient prescriptions on file.       Physical Exam:  VS: T 98.2, P Data Unavailable, /86, R 18, SpO2 100%   MP1, TM distance >3FB, mouth opening adequate, full ROM, intact dentition.        BMP:  Lab Results   Component Value Date     05/07/2018      Lab Results   Component Value Date    POTASSIUM 4.0 05/07/2018     Lab Results   Component Value Date    CHLORIDE 105 05/07/2018     Lab Results   Component Value Date    KERLINE 9.0 05/07/2018     Lab Results   Component Value Date    CO2 25  05/07/2018     Lab Results   Component Value Date    BUN 22 05/07/2018     Lab Results   Component Value Date    CR 0.99 05/07/2018     Lab Results   Component Value Date    GLC 80 05/07/2018        CBC:  Lab Results   Component Value Date    WBC 13.7 05/31/2018     Lab Results   Component Value Date    HGB 12.2 05/31/2018     Lab Results   Component Value Date    HCT 37.1 05/31/2018     Lab Results   Component Value Date     05/31/2018        Coags/Type and Screen  Lab Results   Component Value Date    INR 0.95 05/31/2018     No results found for: PT  Type and Screen:                       Anesthesia Plan      History & Physical Review  History and physical reviewed and following examination; no interval change.    ASA Status:  2 .    NPO Status:  > 8 hours    Plan for General and ETT with Intravenous induction. Maintenance will be Balanced.    PONV prophylaxis:  Ondansetron (or other 5HT-3)       Postoperative Care      Consents  Anesthetic plan, risks, benefits and alternatives discussed with:  Patient..            Nikkie Baez MD    6/25/2018  7:25 AM                .

## 2018-06-25 NOTE — IP AVS SNAPSHOT
MRN:[id number]                      After Visit Summary   6/25/2018    Yarelis Penny    MRN: [id number]           Thank you!     Thank you for choosing [facility] for your care. Our goal is always to provide you with excellent care. Hearing back from our patients is one way we can continue to improve our services. Please take a few minutes to complete the written survey that you may receive in the mail after you visit with us. Thank you!        Patient Information     Date Of Birth          1985        About your hospital stay     You were admitted on:  June 25, 2018 You last received care in the:  Post Anesthesia Care Unit [facility]    You were discharged on:  June 25, 2018       Who to Call     For medical emergencies, please call 911.  For non-urgent questions about your medical care, please call your primary care provider or clinic, None  For questions related to your surgery, please call your surgery clinic        Attending Provider     Provider Guru Brittany Wright MD Gastroenterology       Primary Care Provider Fax #    Physician No Ref-Primary [phone]      After Care Instructions     Discharge Instructions       Resume pre procedure diet            Discharge Instructions       Restart home medications.                  Your next 10 appointments already scheduled     Jul 09, 2018 10:30 AM CDT   Infusion 180 with UC SPEC INFUSION, UC 48 ATC   [facility] ([facility])    [address] [place] [address]  [address]  [facility] [address]   [phone]            Aug 06, 2018  9:00 AM CDT   Infusion 180 with UC SPEC INFUSION, UC 47 ATC   [facility] ([facility])    [address] [place] [address]  [address]  [facility] [address]   [phone]            Sep 19, 2018  7:30 AM CDT   Colonoscopy with Fuentes Johnson MD    [facility] ([facility])    [address]  [address]  [facility] [address]   [phone]            Dec 05, 2018  8:00 AM CST   (Arrive by 7:45 AM)   Return Visit with Joya Lopez MD   [facility] ([facility])    [address]  [address]  [facility] [id number] [phone]              Further instructions from your care team       [facility]  Same-Day Surgery   Adult Discharge Orders & Instructions     For 24 hours after surgery    1. Get plenty of rest.  A responsible adult must stay with you for at least 24 hours after you leave the hospital.   2. Do not drive or use heavy equipment.  If you have weakness or tingling, don't drive or use heavy equipment until this feeling goes away.  3. Do not drink alcohol.  4. Avoid strenuous or risky activities.  Ask for help when climbing stairs.   5. You may feel lightheaded.  IF so, sit for a few minutes before standing.  Have someone help you get up.   6. If you have nausea (feel sick to your stomach): Drink only clear liquids such as apple juice, ginger ale, broth or 7-Up.  Rest may also help.  Be sure to drink enough fluids.  Move to a regular diet as you feel able.  7. You may have a slight fever. Call the doctor if your fever is over 100 F (37.7 C) (taken under the tongue) or lasts longer than 24 hours.  8. You may have a dry mouth, a sore throat, muscle aches or trouble sleeping.  These should go away after 24 hours.  9. Do not make important or legal decisions.   Call your doctor for any of the followin.  Signs of infection (fever, growing tenderness at the surgery site, a large amount of drainage or bleeding, severe pain, foul-smelling drainage, redness, swelling).    2. It has been over 8 to 10 hours since surgery and you are still not able to urinate (pass water).    3.  Headache for over 24  "hours.    4.  Numbness, tingling or weakness the day after surgery (if you had spinal anesthesia).  To contact a doctor, call  Dr. Guru Macias @ [phone] or:        [phone] and ask for the resident on call for Gastroenterolgy  (answered 24 hours a day)      Emergency Department:    [facility]: [phone]            Additional Information     If you use hormonal birth control (such as the pill, patch, ring or implants): You'll need a second form of birth control for 7 days (condoms, a diaphragm or contraceptive foam). While in the hospital, you received a medicine called Bridion. Your normal birth control will not work as well for a week after taking this medicine.          Pending Results     No orders found from 6/23/2018 to 6/26/2018.            Admission Information     Date & Time Provider Department Dept. Phone    6/25/2018 Guru Brittany Macias MD Post Anesthesia Care Unit [facility] [phone]      Your Vitals Were     Blood Pressure Temperature Respirations Height Weight Last Period    126/96 96.2  F (35.7  C) (Axillary) 16 1.6 m (5' 3\") 49 kg (108 lb 0.4 oz) 06/20/2018    Pulse Oximetry BMI (Body Mass Index)                98% 19.14 kg/m2          [employer] Information     [employer] [place] gives you secure access to your electronic health record. If you see a primary care provider, you can also send messages to your care team and make appointments. If you have questions, please call your primary care clinic.  If you do not have a primary care provider, please call [phone] and they will assist you.        Care EveryWhere ID     This is your Care EveryWhere ID. This could be used by other organizations to access your [facility] medical records  [id number]        Equal Access to Services     ERI CHAKRABORTY : Yandel Palma, ann solorzano, mani moreira. So [facility] [phone].    ATENCIÓN: Si habla " español, tiene a borja disposición servicios gratuitos de asistencia lingüística. Clyde grewal [phone].    We comply with applicable federal civil rights laws and [place] laws. We do not discriminate on the basis of race, color, national origin, age, disability, sex, sexual orientation, or gender identity.               Review of your medicines      UNREVIEWED medicines. Ask your doctor about these medicines        Dose / Directions    budesonide 9 MG 24 hr tablet   Commonly known as:  UCERIS   Used for:  UC (ulcerative colitis) (H)        Dose:  9 mg   Take 1 tablet (9 mg) by mouth every morning   Quantity:  30 tablet   Refills:  3       calcium carbonate 500 MG tablet   Commonly known as:  OS-KERLINE 500 mg [place]. [place]        Dose:  1 tablet   Take 1 tablet by mouth every evening   Refills:  0       ENTYVIO IV        Dose:  300 mg   Inject 300 mg into the vein every 28 days   Refills:  0       GNP ASPIRIN LOW DOSE 81 MG EC tablet   Used for:  Ulcerative colitis with other complication, unspecified location (H), Liver replaced by transplant (H)   Generic drug:  aspirin        Dose:  81 mg   Take 1 tablet (81 mg) by mouth daily   Quantity:  30 tablet   Refills:  1       Multi-vitamin Tabs tablet        Dose:  1 tablet   Take 1 tablet by mouth every morning   Refills:  0       predniSONE 5 MG tablet   Commonly known as:  DELTASONE   Used for:  UC (ulcerative colitis) (H), Liver replaced by transplant (H)        Dose:  5 mg   Take 1 tablet (5 mg) by mouth daily   Quantity:  225 tablet   Refills:  3       sulfamethoxazole-trimethoprim 800-160 MG per tablet   Commonly known as:  BACTRIM DS/SEPTRA DS   Used for:  Immunosuppression (H), Inflammatory bowel disease, PSC (primary sclerosing cholangitis)        Dose:  1 tablet   Take 1 tablet by mouth three times a week Monday, Wednesday, Friday   Quantity:  90 tablet   Refills:  3       * tacrolimus 1 MG capsule   Commonly known as:  GENERIC EQUIVALENT   Used for:  S/P liver  transplant (H)        Dose:  2 mg   Take 2 capsules (2 mg) by mouth 2 times daily *total dose 2.5 mg twice daily   Quantity:  120 capsule   Refills:  11       * tacrolimus 0.5 MG capsule   Commonly known as:  GENERIC EQUIVALENT   Used for:  S/P liver transplant (H)        Dose:  0.5 mg   Take 1 capsule (0.5 mg) by mouth 2 times daily Take with 1 mg capsule. Total dose 2.5 mg every 12 hours   Quantity:  180 capsule   Refills:  3       ursodiol 250 MG tablet   Commonly known as:  ACTIGALL   Used for:  Liver replaced by transplant (H)        Take 2 tabs (500 mg) in AM, and 1 tab at night (250 mg)   Quantity:  270 tablet   Refills:  3       VITAMIN D (CHOLECALCIFEROL) PO        Take by mouth every evening   Refills:  0       * Notice:  This list has 2 medication(s) that are the same as other medications prescribed for you. Read the directions carefully, and ask your doctor or other care provider to review them with you.             Protect others around you: Learn how to safely use, store and throw away your medicines at www.disposemymeds.org.             Medication List: This is a list of all your medications and when to take them. Check marks below indicate your daily home schedule. Keep this list as a reference.      Medications           Morning Afternoon Evening Bedtime As Needed    budesonide 9 MG 24 hr tablet   Commonly known as:  UCERIS   Take 1 tablet (9 mg) by mouth every morning                                calcium carbonate 500 MG tablet   Commonly known as:  OS-KERLINE 500 mg [place]. Ca   Take 1 tablet by mouth every evening                                ENTYVIO IV   Inject 300 mg into the vein every 28 days                                GNP ASPIRIN LOW DOSE 81 MG EC tablet   Take 1 tablet (81 mg) by mouth daily   Generic drug:  aspirin                                Multi-vitamin Tabs tablet   Take 1 tablet by mouth every morning                                predniSONE 5 MG tablet   Commonly known as:   DELTASONE   Take 1 tablet (5 mg) by mouth daily                                sulfamethoxazole-trimethoprim 800-160 MG per tablet   Commonly known as:  BACTRIM DS/SEPTRA DS   Take 1 tablet by mouth three times a week Monday, Wednesday, Friday                                * tacrolimus 1 MG capsule   Commonly known as:  GENERIC EQUIVALENT   Take 2 capsules (2 mg) by mouth 2 times daily *total dose 2.5 mg twice daily                                * tacrolimus 0.5 MG capsule   Commonly known as:  GENERIC EQUIVALENT   Take 1 capsule (0.5 mg) by mouth 2 times daily Take with 1 mg capsule. Total dose 2.5 mg every 12 hours                                ursodiol 250 MG tablet   Commonly known as:  ACTIGALL   Take 2 tabs (500 mg) in AM, and 1 tab at night (250 mg)                                VITAMIN D (CHOLECALCIFEROL) PO   Take by mouth every evening                                * Notice:  This list has 2 medication(s) that are the same as other medications prescribed for you. Read the directions carefully, and ask your doctor or other care provider to review them with you.

## 2018-06-25 NOTE — OR NURSING
Dr. ARLEN Macias here to see Pt.  He spoke with her regarding OR procedure and findings.  He does not need to see her again prior to phase 2 discharge. Awaiting Dr. Baez to see Pt for anesthesia sign out.

## 2018-06-25 NOTE — BRIEF OP NOTE
ERCP 06/25/2018  7:37 AM [facility], operated by [facility], [address] [place] [address] [phone]     Endoscopy Department   _______________________________________________________________________________   Patient Name: Yarelis Penny             Procedure Date: 6/25/2018 7:37 AM   MRN: [id number]                       Account Number: [id number]   YOB: 1985              Admit Type: Outpatient   Age: 33                               Room: OR   Gender: Female                        Note Status: Finalized   Attending MD: Guru Macias ,     Total Sedation Time:   _______________________________________________________________________________       Procedure:           ERCP   Indications:         Stent change, Post liver transplant assessment                        33 year old white female with history of PSC and AIH s/p                        left lobe living donor liver transplant from  on                        6/18/15 with single anastomosis which was complicated                        byÂ bile leak needing re-exploration and multiple ERCPs                        until 3/2016 for anastomotic stricture. She did well for                        1 year and is currently on vedolizumab for IBD. Her LFTs                        again started increasing and MRCP showed recurrence of                        1.5 cm anastamotic stricture with intrahepatic dilation.                        She underwent ERCP again on 3/2017 with placement of two                        10 Fr Johlin stents across the anastamotic stricture                        with removal on 5/2017. She again did well for almost a                        year but started having increasing LFTs with MRCP                        showing recurrent biliary stricture. S/p ERCP on 4/2/18                        with placement of a 10 Fr Johlin stent. During last ERCP                        on 5/7, two 8.5 Fr stents were  placed across the                        anastamosis.Her alkaline phosphatase is still mildly                        elevated and liver biopsy showed low grade biliary tract                        obstruction. ERCP to re-evaluate anastamotic stricture                        with plans to place more stents as needed   Providers:           Guru Macias   Referring MD:        Daniela Enriquez MD   Medicines:           General Anesthesia, Levaquin 500 mg IV , Indomethacin                        100 mg rectal   Complications:       No immediate complications.   _______________________________________________________________________________   Procedure:           Pre-Anesthesia Assessment:                        - Prior to the procedure, a History and Physical was                        performed, and patient medications and allergies were                        reviewed. The patient is competent. The risks and                        benefits of the procedure and the sedation options and                        risks were discussed with the patient. All questions                        were answered and informed consent was obtained. Patient                        identification and proposed procedure were verified by                        the physician and the nurse in the pre-procedure area.                        Mental Status Examination: alert and oriented. Airway                        Examination: normal oropharyngeal airway and neck                        mobility. Respiratory Examination: clear to                        auscultation. CV Examination: normal. Prophylactic                        Antibiotics: The patient requires prophylactic                        antibiotics for the planned ERCP in an obstructed bile                        duct. The patient received antibiotic therapy before the                        procedure. Prior Anticoagulants: The patient has taken                        no previous  anticoagulant or antiplatelet agents. ASA                        Grade Assessment: III - A patient with severe systemic                        disease. After reviewing the risks and benefits, the                        patient was deemed in satisfactory condition to undergo                        the procedure. The anesthesia plan was to use general                        anesthesia. Immediately prior to administration of                        medications, the patient was re-assessed for adequacy to                        receive sedatives. The heart rate, respiratory rate,                        oxygen saturations, blood pressure, adequacy of                        pulmonary ventilation, and response to care were                        monitored throughout the procedure. The physical status                        of the patient was re-assessed after the procedure.                        After obtaining informed consent, the scope was passed                        under direct vision. Throughout the procedure, the                        patient's blood pressure, pulse, and oxygen saturations                        were monitored continuously. The Duodenoscope was                        introduced through the mouth, and used to inject                        contrast into and used to inject contrast into the bile                        duct. The ERCP was accomplished without difficulty. The                        patient tolerated the procedure well.                                                                                     Findings:        Two biliary stent were visible on the  film. The scope was advanced        to a normal major papilla in the descending duodenum. Examination of the        pharynx, larynx and associated structures, and upper GI tract was        normal. Two plastic biliary stents originating in the biliary tree were        emerging from the major papilla. The stents were visibly  patent. A        biliary sphincterotomy had been performed. The sphincterotomy appeared        open. Two stents were removed from the biliary tree using a rat-toothed        forceps and snare. The bile duct was deeply cannulated with the        short-nosed traction sphincterotome. Contrast was injected. I personally        interpreted the bile duct images. There was brisk flow of contrast        through the ducts. Image quality was excellent. Contrast extended to the        main bile duct. The right intrahepatic branches and post-transplant        anastomosis contained a single mild localized stenosis. A 0.025 inch x        270 cm straight Visiglide wire was passed into the biliary tree. The        biliary tree was swept with a 12 mm balloon starting at the left        intrahepatic duct(s) and right intrahepatic duct(s). All stones were        removed. Dilation of the common bile duct with an 8 mm balloon dilator        was successful. One 7 Fr by 18 cm plastic biliary stent with a single        external pigtail and a single internal flap was placed 16 cm into the        common bile duct. Clear fluid flowed through the stent. The stent was in        good position. One 10 Fr by 18 cm temporary plastic biliary stent with        no external flaps and no internal flaps was placed 18 cm into the common        bile duct. Bile flowed through the stent. The stent was in good position.                                                                                     Impression:          - Previously well positioned biliary stents were removed                        - Prior biliary endoscopic sphincterotomy appeared open                        and selective biliary cannulation was performed.                        - Persistent mild stricture was found in the                        post-transplant anastomosis at the level of the post                        transplant clips and another mild stricture was                         identfied in the right anterior duct just above the                        bifurcation                        - Both stricture was dilated to 8 mm and a stone was                        extracted                        - One 7 Fr x 18 cm single pigtailed plastic Zimmon stent                        was placed into the right anterior intrahepatic duct                        across tbe stricture in a transpapillary manner.                        - One 10 Fr x 18 cm plastic Johlin stent was placed into                        the left intrahepatic duct across the anastamotic                        stricture in a transpapillary manner.   Recommendation:      - Observe patient in same day observation unit for                        possible discharge same day.                        - Will recommend to recheck LFTs in 1 week at the liver                        transplant clinic.                        -Repeat ERCP in 4-6 weeks to reevaluate biliary                        stricture and potentially upsize stents if need be.

## 2018-06-25 NOTE — DISCHARGE INSTRUCTIONS
[facility]  Same-Day Surgery   Adult Discharge Orders & Instructions     For 24 hours after surgery    1. Get plenty of rest.  A responsible adult must stay with you for at least 24 hours after you leave the hospital.   2. Do not drive or use heavy equipment.  If you have weakness or tingling, don't drive or use heavy equipment until this feeling goes away.  3. Do not drink alcohol.  4. Avoid strenuous or risky activities.  Ask for help when climbing stairs.   5. You may feel lightheaded.  IF so, sit for a few minutes before standing.  Have someone help you get up.   6. If you have nausea (feel sick to your stomach): Drink only clear liquids such as apple juice, ginger ale, broth or 7-Up.  Rest may also help.  Be sure to drink enough fluids.  Move to a regular diet as you feel able.  7. You may have a slight fever. Call the doctor if your fever is over 100 F (37.7 C) (taken under the tongue) or lasts longer than 24 hours.  8. You may have a dry mouth, a sore throat, muscle aches or trouble sleeping.  These should go away after 24 hours.  9. Do not make important or legal decisions.   Call your doctor for any of the followin.  Signs of infection (fever, growing tenderness at the surgery site, a large amount of drainage or bleeding, severe pain, foul-smelling drainage, redness, swelling).    2. It has been over 8 to 10 hours since surgery and you are still not able to urinate (pass water).    3.  Headache for over 24 hours.    4.  Numbness, tingling or weakness the day after surgery (if you had spinal anesthesia).  To contact a doctor, call  Dr. Guru Macias @ [phone] or:        [phone] and ask for the resident on call for Gastroenterolgy  (answered 24 hours a day)      Emergency Department:    [facility]: [phone]

## 2018-06-25 NOTE — ANESTHESIA POSTPROCEDURE EVALUATION
Patient: Yarelis Penny    Procedure(s):  Endoscopic Retrograde Cholangiopancreatogram with biliary dilation, stone removal and stent exchange - Wound Class: II-Clean Contaminated    Diagnosis:Bile Duct Stricture   Diagnosis Additional Information: No value filed.    Anesthesia Type:  General, ETT    Note:  Anesthesia Post Evaluation    Patient location during evaluation: PACU  Patient participation: Able to fully participate in evaluation  Level of consciousness: awake and sleepy but conscious  Pain management: adequate  Airway patency: patent  Cardiovascular status: acceptable  Respiratory status: acceptable  Hydration status: acceptable  PONV: none     Anesthetic complications: None          Last vitals:  Vitals:    06/25/18 0632   BP: 126/86   Resp: 18   Temp: 36.8  C (98.2  F)   SpO2: 100%         Electronically Signed By: Nikkie Baez MD  June 25, 2018  8:59 AM

## 2018-06-25 NOTE — ANESTHESIA POSTPROCEDURE EVALUATION
Patient: Yarelis Penny    Procedure(s):  Endoscopic Retrograde Cholangiopancreatogram with biliary dilation, stone removal and stent exchange - Wound Class: II-Clean Contaminated    Diagnosis:Bile Duct Stricture   Diagnosis Additional Information: No value filed.    Anesthesia Type:  General, ETT    Note:  Anesthesia Post Evaluation    Patient location during evaluation: Bedside  Patient participation: Able to fully participate in evaluation  Level of consciousness: awake  Pain management: adequate  Airway patency: patent  Cardiovascular status: acceptable  Respiratory status: acceptable  Hydration status: acceptable  PONV: none     Anesthetic complications: None          Last vitals:  Vitals:    06/25/18 0930 06/25/18 0945 06/25/18 1000   BP: (!) 118/94 (!) 134/101 (!) 126/96   Resp: 16 20 16   Temp:   35.7  C (96.2  F)   SpO2: 98% 99% 98%         Electronically Signed By: Nikkie Baez MD  June 25, 2018  10:12 AM

## 2018-06-28 ENCOUNTER — CARE COORDINATION (OUTPATIENT)
Dept: GASTROENTEROLOGY | Facility: CLINIC | Age: 33
End: 2018-06-28

## 2018-06-28 DIAGNOSIS — Z46.59 ENCOUNTER FOR PANCREATIC DUCT STENT EXCHANGE: Primary | ICD-10-CM

## 2018-06-28 NOTE — PROGRESS NOTES
Post ERCP (6/25/2018) with Dr. Macias: Follow-up    Post procedure recommendations: - Observe patient in same day observation unit for possible discharge same day.   - Will recommend to recheck LFTs in 1 week at the liver transplant clinic.   -Repeat ERCP in 4-6 weeks to reevaluate biliary stricture and potentially upsize stents if need be.     Patient states: Patient states she is feeling good, has slight pain but is getting better. Patient denies any severe abdominal pain, nausea or emesis, fever or chills. Patient is tolerating a regular diet. Patient was instructed to call with any fever greater than 101 or chills, severe abdominal pain, nausea or vomiting, unable to tolerate a diet, dark urine, signs of dehydration, or if she notices any jaundice color to her skin.     Orders placed: ERCP and sent to scheduling. Labs.     Contact information verified for future questions/concerns. Patent's questions and concerns were addressed to their stated satisfaction. Patient is in agreement with this new plan of care.     Em ANTHONY RN Coordinator  Dr. Macias  Advanced Endoscopy  [phone]

## 2018-07-09 ENCOUNTER — INFUSION THERAPY VISIT (OUTPATIENT)
Dept: INFUSION THERAPY | Facility: CLINIC | Age: 33
End: 2018-07-09
Attending: INTERNAL MEDICINE
Payer: COMMERCIAL

## 2018-07-09 VITALS
TEMPERATURE: 98.6 F | OXYGEN SATURATION: 99 % | HEART RATE: 76 BPM | DIASTOLIC BLOOD PRESSURE: 86 MMHG | RESPIRATION RATE: 16 BRPM | SYSTOLIC BLOOD PRESSURE: 117 MMHG

## 2018-07-09 DIAGNOSIS — A08.39 CMV COLITIS (H): ICD-10-CM

## 2018-07-09 DIAGNOSIS — B27.00 EPSTEIN-BARR VIRUS VIREMIA: ICD-10-CM

## 2018-07-09 DIAGNOSIS — E86.0 DEHYDRATION: Primary | ICD-10-CM

## 2018-07-09 DIAGNOSIS — B25.9 CMV COLITIS (H): ICD-10-CM

## 2018-07-09 DIAGNOSIS — D50.8 OTHER IRON DEFICIENCY ANEMIA: ICD-10-CM

## 2018-07-09 LAB
ALBUMIN SERPL-MCNC: 2.8 G/DL (ref 3.4–5)
ALP SERPL-CCNC: 214 U/L (ref 40–150)
ALT SERPL W P-5'-P-CCNC: 43 U/L (ref 0–50)
AST SERPL W P-5'-P-CCNC: 33 U/L (ref 0–45)
BASOPHILS # BLD AUTO: 0.1 10E9/L (ref 0–0.2)
BASOPHILS NFR BLD AUTO: 0.8 %
BILIRUB DIRECT SERPL-MCNC: 0.1 MG/DL (ref 0–0.2)
BILIRUB SERPL-MCNC: 0.2 MG/DL (ref 0.2–1.3)
CRP SERPL-MCNC: <2.9 MG/L (ref 0–8)
DIFFERENTIAL METHOD BLD: ABNORMAL
EOSINOPHIL # BLD AUTO: 1.4 10E9/L (ref 0–0.7)
EOSINOPHIL NFR BLD AUTO: 10.8 %
ERYTHROCYTE [DISTWIDTH] IN BLOOD BY AUTOMATED COUNT: 14.1 % (ref 10–15)
ERYTHROCYTE [SEDIMENTATION RATE] IN BLOOD BY WESTERGREN METHOD: 67 MM/H (ref 0–20)
HCT VFR BLD AUTO: 37.4 % (ref 35–47)
HGB BLD-MCNC: 11.8 G/DL (ref 11.7–15.7)
IMM GRANULOCYTES # BLD: 0.1 10E9/L (ref 0–0.4)
IMM GRANULOCYTES NFR BLD: 0.5 %
LYMPHOCYTES # BLD AUTO: 3 10E9/L (ref 0.8–5.3)
LYMPHOCYTES NFR BLD AUTO: 23 %
MCH RBC QN AUTO: 28.7 PG (ref 26.5–33)
MCHC RBC AUTO-ENTMCNC: 31.6 G/DL (ref 31.5–36.5)
MCV RBC AUTO: 91 FL (ref 78–100)
MONOCYTES # BLD AUTO: 0.9 10E9/L (ref 0–1.3)
MONOCYTES NFR BLD AUTO: 7.1 %
NEUTROPHILS # BLD AUTO: 7.5 10E9/L (ref 1.6–8.3)
NEUTROPHILS NFR BLD AUTO: 57.8 %
NRBC # BLD AUTO: 0 10*3/UL
NRBC BLD AUTO-RTO: 0 /100
PLATELET # BLD AUTO: 632 10E9/L (ref 150–450)
PROT SERPL-MCNC: 7.9 G/DL (ref 6.8–8.8)
RBC # BLD AUTO: 4.11 10E12/L (ref 3.8–5.2)
WBC # BLD AUTO: 13 10E9/L (ref 4–11)

## 2018-07-09 PROCEDURE — 86140 C-REACTIVE PROTEIN: CPT | Performed by: INTERNAL MEDICINE

## 2018-07-09 PROCEDURE — 85025 COMPLETE CBC W/AUTO DIFF WBC: CPT | Performed by: INTERNAL MEDICINE

## 2018-07-09 PROCEDURE — 25000128 H RX IP 250 OP 636: Mod: ZF | Performed by: INTERNAL MEDICINE

## 2018-07-09 PROCEDURE — 96413 CHEMO IV INFUSION 1 HR: CPT

## 2018-07-09 PROCEDURE — 85652 RBC SED RATE AUTOMATED: CPT | Performed by: INTERNAL MEDICINE

## 2018-07-09 PROCEDURE — 87799 DETECT AGENT NOS DNA QUANT: CPT | Performed by: INTERNAL MEDICINE

## 2018-07-09 PROCEDURE — 80076 HEPATIC FUNCTION PANEL: CPT | Performed by: INTERNAL MEDICINE

## 2018-07-09 RX ADMIN — VEDOLIZUMAB 300 MG: 300 INJECTION, POWDER, LYOPHILIZED, FOR SOLUTION INTRAVENOUS at 11:05

## 2018-07-09 NOTE — MR AVS SNAPSHOT
After Visit Summary   7/9/2018    Yarelis Penny    MRN: [id number]           Patient Information     Date Of Birth          1985        Visit Information        Provider Department      7/9/2018 10:30 AM UC 48 ATC; UC SPEC INFUSION [facility] Specialty and Procedure        Today's Diagnoses     Dehydration    -  1    CMV colitis (H)        Other iron deficiency anemia        Jun-Barr virus viremia           Follow-ups after your visit        Your next 10 appointments already scheduled     Aug 06, 2018  9:00 AM CDT   Infusion 180 with UC SPEC INFUSION, UC 47 ATC   [facility] Specialty and Procedure ([facility])    [address]  [address]  [facility] [address]   [phone]            Sep 04, 2018  8:00 AM CDT   Infusion 180 with UC SPEC INFUSION, UC 51 ATC   [facility] Specialty and Procedure ([facility])    [address] [place] [address]  [address]  [facility] [address]   [phone]            Sep 19, 2018  7:30 AM CDT   Colonoscopy with Fuentes Johnson MD   [facility] ([facility])    [address] [place] [address]  [address]  [facility] [address]   [phone]            Oct 02, 2018  8:00 AM CDT   Infusion 180 with UC SPEC INFUSION, UC 51 ATC   [facility] Specialty and Procedure ([facility])    [address]  [address]  [facility] [address]   [phone]            Dec 05, 2018  8:00 AM CST   (Arrive by 7:45 AM)   Return Visit with Joya Lopez MD   [facility] and Infectious Diseases ([facility])    [address] [place] [address]  [address]  [facility] [address]   [phone]            Dec 18, 2018 10:30 AM CST   Lab with UC LAB   [facility] ([facility])    [address] [place] [address]  Floor  [facility] [id number] [phone]            Dec 18, 2018 11:30 AM CST   (Arrive by 11:15 AM)   Return Liver Transplant with Daniela Enriquez MD   [facility] Hepatology ([facility])    [address]  [address]  [facility] [id number] [phone]              Future tests that were ordered for you today     Open Standing Orders        Priority Remaining Interval Expires Ordered    Notify Physician Routine [id number] PRN  7/9/2018            Who to contact     If you have questions or need follow up information about today's clinic visit or your schedule please contact [facility] SPECIALTY AND PROCEDURE directly at [phone].  Normal or non-critical lab and imaging results will be communicated to you by CyberSettlehart, letter or phone within 4 business days after the clinic has received the results. If you do not hear from us within 7 days, please contact the clinic through Kybaliont or phone. If you have a critical or abnormal lab result, we will notify you by phone as soon as possible.  Submit refill requests through Topio or call your pharmacy and they will forward the refill request to us. Please allow 3 business days for your refill to be completed.          Additional Information About Your Visit        Topio Information     Topio gives you secure access to your electronic health record. If you see a primary care provider, you can also send messages to your care team and make appointments. If you have questions, please call your primary care clinic.  If you do not have a primary care provider, please call [phone] and they will assist you.        Care EveryWhere ID     This is your Care EveryWhere ID. This could be used by other organizations to access your [facility] medical records  [id number]        Your Vitals Were     Pulse Temperature Respirations Last Period Pulse Oximetry       76 98.6  F (37  C) 16 06/20/2018 99%         Blood Pressure from Last 3 Encounters:   07/09/18 117/86   06/25/18 (!) 131/99   06/19/18 105/75    Weight from Last 3 Encounters:   06/25/18 49 kg (108 lb 0.4 oz)   06/19/18 48.2 kg (106 lb 3.2 oz)   06/07/18 48.7 kg (107 lb 6.4 oz)              We Performed the Following     CBC with platelets differential     CRP inflammation     EBV DNA PCR Quantitative Whole Blood     Erythrocyte sedimentation rate auto     Hepatic panel          Today's Medication Changes          These changes are accurate as of 7/9/18 12:24 PM.  If you have any questions, ask your nurse or doctor.               These medicines have changed or have updated prescriptions.        Dose/Directions    GNP ASPIRIN LOW DOSE 81 MG EC tablet   This may have changed:  when to take this   Used for:  Ulcerative colitis with other complication, unspecified location (H), Liver replaced by transplant (H)   Generic drug:  aspirin        Dose:  81 mg   Take 1 tablet (81 mg) by mouth daily   Quantity:  30 tablet   Refills:  1       predniSONE 5 MG tablet   Commonly known as:  DELTASONE   This may have changed:  when to take this   Used for:  UC (ulcerative colitis) (H), Liver replaced by transplant (H)        Dose:  5 mg   Take 1 tablet (5 mg) by mouth daily   Quantity:  225 tablet   Refills:  3       * tacrolimus 1 MG capsule   Commonly known as:  GENERIC EQUIVALENT   This may have changed:    - how much to take  - additional instructions   Used for:  S/P liver transplant (H)        Dose:  2 mg   Take 2 capsules (2 mg) by mouth 2 times daily *total dose 2.5 mg twice daily   Quantity:  120 capsule   Refills:  11       * tacrolimus 0.5 MG capsule   Commonly known as:  GENERIC EQUIVALENT   This may have changed:    - how much to take  - additional instructions   Used for:  S/P liver transplant (H)        Dose:  0.5 mg   Take 1 capsule (0.5 mg) by mouth 2 times daily Take with 1 mg capsule. Total dose 2.5 mg every 12 hours   Quantity:  180 capsule    Refills:  3       * Notice:  This list has 2 medication(s) that are the same as other medications prescribed for you. Read the directions carefully, and ask your doctor or other care provider to review them with you.             Primary Care Provider Fax #    Physician No Ref-Primary [phone]       No address on file        Equal Access to Services     KENYATTAOSITO MYLENE : Hadii aad ku hadrenettayves Sotamiaali, waaxda luqadaha, qaybta kaalmada pantera, mani hernandezashjuan melo . So [facility] [phone].    ATENCIÓN: Si habla español, tiene a borja disposición servicios gratuitos de asistencia lingüística. [facility] [phone].    We comply with applicable federal civil rights laws and [place] laws. We do not discriminate on the basis of race, color, national origin, age, disability, sex, sexual orientation, or gender identity.            Thank you!     Thank you for choosing [facility] SPECIALTY AND PROCEDURE  for your care. Our goal is always to provide you with excellent care. Hearing back from our patients is one way we can continue to improve our services. Please take a few minutes to complete the written survey that you may receive in the mail after your visit with us. Thank you!             Your Updated Medication List - Protect others around you: Learn how to safely use, store and throw away your medicines at www.disposemymeds.org.          This list is accurate as of 7/9/18 12:24 PM.  Always use your most recent med list.                   Brand Name Dispense Instructions for use Diagnosis    budesonide 9 MG 24 hr tablet    UCERIS    30 tablet    Take 1 tablet (9 mg) by mouth every morning    UC (ulcerative colitis) (H)       calcium carbonate 500 MG tablet    OS-KERLINE 500 mg [place]. Ca     Take 1 tablet by mouth every evening        ENTYVIO IV      Inject 300 mg into the vein every 28 days        GNP ASPIRIN LOW DOSE 81 MG EC tablet   Generic drug:  aspirin     30 tablet    Take 1  tablet (81 mg) by mouth daily    Ulcerative colitis with other complication, unspecified location (H), Liver replaced by transplant (H)       Multi-vitamin Tabs tablet      Take 1 tablet by mouth every morning        predniSONE 5 MG tablet    DELTASONE    225 tablet    Take 1 tablet (5 mg) by mouth daily    UC (ulcerative colitis) (H), Liver replaced by transplant (H)       sulfamethoxazole-trimethoprim 800-160 MG per tablet    BACTRIM DS/SEPTRA DS    90 tablet    Take 1 tablet by mouth three times a week Monday, Wednesday, Friday    Immunosuppression (H), Inflammatory bowel disease, PSC (primary sclerosing cholangitis)       * tacrolimus 1 MG capsule    GENERIC EQUIVALENT    120 capsule    Take 2 capsules (2 mg) by mouth 2 times daily *total dose 2.5 mg twice daily    S/P liver transplant (H)       * tacrolimus 0.5 MG capsule    GENERIC EQUIVALENT    180 capsule    Take 1 capsule (0.5 mg) by mouth 2 times daily Take with 1 mg capsule. Total dose 2.5 mg every 12 hours    S/P liver transplant (H)       ursodiol 250 MG tablet    ACTIGALL    270 tablet    Take 2 tabs (500 mg) in AM, and 1 tab at night (250 mg)    Liver replaced by transplant (H)       VITAMIN D (CHOLECALCIFEROL) PO      Take by mouth every evening        * Notice:  This list has 2 medication(s) that are the same as other medications prescribed for you. Read the directions carefully, and ask your doctor or other care provider to review them with you.

## 2018-07-10 LAB
EBV DNA # SPEC NAA+PROBE: ABNORMAL {COPIES}/ML
EBV DNA SPEC NAA+PROBE-LOG#: 4.7 {LOG_COPIES}/ML

## 2018-07-30 ENCOUNTER — ANESTHESIA EVENT (OUTPATIENT)
Dept: SURGERY | Facility: CLINIC | Age: 33
End: 2018-07-30
Payer: COMMERCIAL

## 2018-07-30 ENCOUNTER — APPOINTMENT (OUTPATIENT)
Dept: GENERAL RADIOLOGY | Facility: CLINIC | Age: 33
End: 2018-07-30
Attending: INTERNAL MEDICINE
Payer: COMMERCIAL

## 2018-07-30 ENCOUNTER — HOSPITAL ENCOUNTER (OUTPATIENT)
Facility: CLINIC | Age: 33
Discharge: HOME | End: 2018-07-30
Attending: INTERNAL MEDICINE | Admitting: INTERNAL MEDICINE
Payer: COMMERCIAL

## 2018-07-30 ENCOUNTER — SURGERY (OUTPATIENT)
Age: 33
End: 2018-07-30

## 2018-07-30 ENCOUNTER — ANESTHESIA (OUTPATIENT)
Dept: SURGERY | Facility: CLINIC | Age: 33
End: 2018-07-30
Payer: COMMERCIAL

## 2018-07-30 VITALS
DIASTOLIC BLOOD PRESSURE: 91 MMHG | WEIGHT: 112.88 LBS | RESPIRATION RATE: 16 BRPM | HEIGHT: 62 IN | OXYGEN SATURATION: 100 % | BODY MASS INDEX: 20.77 KG/M2 | SYSTOLIC BLOOD PRESSURE: 119 MMHG | TEMPERATURE: 97.9 F | HEART RATE: 63 BPM

## 2018-07-30 LAB
ALBUMIN SERPL-MCNC: 3 G/DL (ref 3.4–5)
ALP SERPL-CCNC: 164 U/L (ref 40–150)
ALT SERPL W P-5'-P-CCNC: 40 U/L (ref 0–50)
AMYLASE SERPL-CCNC: 141 U/L (ref 30–110)
ANION GAP SERPL CALCULATED.3IONS-SCNC: 5 MMOL/L (ref 3–14)
AST SERPL W P-5'-P-CCNC: 30 U/L (ref 0–45)
BILIRUB SERPL-MCNC: 0.2 MG/DL (ref 0.2–1.3)
BUN SERPL-MCNC: 18 MG/DL (ref 7–30)
CALCIUM SERPL-MCNC: 8.7 MG/DL (ref 8.5–10.1)
CHLORIDE SERPL-SCNC: 107 MMOL/L (ref 94–109)
CO2 SERPL-SCNC: 25 MMOL/L (ref 20–32)
CREAT SERPL-MCNC: 1.01 MG/DL (ref 0.52–1.04)
ERYTHROCYTE [DISTWIDTH] IN BLOOD BY AUTOMATED COUNT: 14.1 % (ref 10–15)
GFR SERPL CREATININE-BSD FRML MDRD: 63 ML/MIN/1.7M2
GLUCOSE BLDC GLUCOMTR-MCNC: 81 MG/DL (ref 70–99)
GLUCOSE SERPL-MCNC: 77 MG/DL (ref 70–99)
HCG UR QL: NEGATIVE
HCT VFR BLD AUTO: 41.3 % (ref 35–47)
HGB BLD-MCNC: 12.9 G/DL (ref 11.7–15.7)
LIPASE SERPL-CCNC: 342 U/L (ref 73–393)
MCH RBC QN AUTO: 28.6 PG (ref 26.5–33)
MCHC RBC AUTO-ENTMCNC: 31.2 G/DL (ref 31.5–36.5)
MCV RBC AUTO: 92 FL (ref 78–100)
PLATELET # BLD AUTO: 544 10E9/L (ref 150–450)
POTASSIUM SERPL-SCNC: 4.1 MMOL/L (ref 3.4–5.3)
PROT SERPL-MCNC: 7.7 G/DL (ref 6.8–8.8)
RBC # BLD AUTO: 4.51 10E12/L (ref 3.8–5.2)
SODIUM SERPL-SCNC: 138 MMOL/L (ref 133–144)
WBC # BLD AUTO: 11.9 10E9/L (ref 4–11)

## 2018-07-30 PROCEDURE — 36000055 ZZH SURGERY LEVEL 2 W FLUORO 1ST 30 MIN - UMMC: Performed by: INTERNAL MEDICINE

## 2018-07-30 PROCEDURE — 85027 COMPLETE CBC AUTOMATED: CPT | Performed by: INTERNAL MEDICINE

## 2018-07-30 PROCEDURE — 25000128 H RX IP 250 OP 636: Performed by: ANESTHESIOLOGY

## 2018-07-30 PROCEDURE — C1876 STENT, NON-COA/NON-COV W/DEL: HCPCS | Performed by: INTERNAL MEDICINE

## 2018-07-30 PROCEDURE — 82150 ASSAY OF AMYLASE: CPT | Performed by: INTERNAL MEDICINE

## 2018-07-30 PROCEDURE — 25000125 ZZHC RX 250: Performed by: STUDENT IN AN ORGANIZED HEALTH CARE EDUCATION/TRAINING PROGRAM

## 2018-07-30 PROCEDURE — 37000008 ZZH ANESTHESIA TECHNICAL FEE, 1ST 30 MIN: Performed by: INTERNAL MEDICINE

## 2018-07-30 PROCEDURE — 36000053 ZZH SURGERY LEVEL 2 EA 15 ADDTL MIN - UMMC: Performed by: INTERNAL MEDICINE

## 2018-07-30 PROCEDURE — 27210995 ZZH RX 272: Performed by: INTERNAL MEDICINE

## 2018-07-30 PROCEDURE — C9399 UNCLASSIFIED DRUGS OR BIOLOG: HCPCS | Performed by: NURSE ANESTHETIST, CERTIFIED REGISTERED

## 2018-07-30 PROCEDURE — 25000125 ZZHC RX 250: Performed by: INTERNAL MEDICINE

## 2018-07-30 PROCEDURE — 37000009 ZZH ANESTHESIA TECHNICAL FEE, EACH ADDTL 15 MIN: Performed by: INTERNAL MEDICINE

## 2018-07-30 PROCEDURE — 82962 GLUCOSE BLOOD TEST: CPT

## 2018-07-30 PROCEDURE — 25000128 H RX IP 250 OP 636: Performed by: NURSE ANESTHETIST, CERTIFIED REGISTERED

## 2018-07-30 PROCEDURE — 81025 URINE PREGNANCY TEST: CPT | Performed by: ANESTHESIOLOGY

## 2018-07-30 PROCEDURE — 25000566 ZZH SEVOFLURANE, EA 15 MIN: Performed by: INTERNAL MEDICINE

## 2018-07-30 PROCEDURE — C1769 GUIDE WIRE: HCPCS | Performed by: INTERNAL MEDICINE

## 2018-07-30 PROCEDURE — 71000027 ZZH RECOVERY PHASE 2 EACH 15 MINS: Performed by: INTERNAL MEDICINE

## 2018-07-30 PROCEDURE — 25500064 ZZH RX 255 OP 636: Performed by: INTERNAL MEDICINE

## 2018-07-30 PROCEDURE — 83690 ASSAY OF LIPASE: CPT | Performed by: INTERNAL MEDICINE

## 2018-07-30 PROCEDURE — 40000280 XR SURGERY CARM FLUORO GREATER THAN 5 MIN: Mod: TC

## 2018-07-30 PROCEDURE — C1726 CATH, BAL DIL, NON-VASCULAR: HCPCS | Performed by: INTERNAL MEDICINE

## 2018-07-30 PROCEDURE — 25000128 H RX IP 250 OP 636: Performed by: STUDENT IN AN ORGANIZED HEALTH CARE EDUCATION/TRAINING PROGRAM

## 2018-07-30 PROCEDURE — 80053 COMPREHEN METABOLIC PANEL: CPT | Performed by: INTERNAL MEDICINE

## 2018-07-30 PROCEDURE — 27210794 ZZH OR GENERAL SUPPLY STERILE: Performed by: INTERNAL MEDICINE

## 2018-07-30 PROCEDURE — 71000014 ZZH RECOVERY PHASE 1 LEVEL 2 FIRST HR: Performed by: INTERNAL MEDICINE

## 2018-07-30 PROCEDURE — 40000170 ZZH STATISTIC PRE-PROCEDURE ASSESSMENT II: Performed by: INTERNAL MEDICINE

## 2018-07-30 PROCEDURE — 36415 COLL VENOUS BLD VENIPUNCTURE: CPT | Performed by: INTERNAL MEDICINE

## 2018-07-30 DEVICE — STENT JOHLIN PANCREA WEDGE 10FRX20CM W/INTRO G26827
Type: IMPLANTABLE DEVICE | Site: PANCREAS | Status: NON-FUNCTIONAL
Removed: 2018-09-26

## 2018-07-30 RX ORDER — DEXAMETHASONE SODIUM PHOSPHATE 4 MG/ML
INJECTION, SOLUTION INTRA-ARTICULAR; INTRALESIONAL; INTRAMUSCULAR; INTRAVENOUS; SOFT TISSUE PRN
Status: DISCONTINUED | OUTPATIENT
Start: 2018-07-30 | End: 2018-07-30

## 2018-07-30 RX ORDER — FENTANYL CITRATE 50 UG/ML
25-50 INJECTION, SOLUTION INTRAMUSCULAR; INTRAVENOUS EVERY 5 MIN PRN
Status: DISCONTINUED | OUTPATIENT
Start: 2018-07-30 | End: 2018-07-30 | Stop reason: HOSPADM

## 2018-07-30 RX ORDER — LIDOCAINE 40 MG/G
CREAM TOPICAL
Status: DISCONTINUED | OUTPATIENT
Start: 2018-07-30 | End: 2018-07-30 | Stop reason: HOSPADM

## 2018-07-30 RX ORDER — NALOXONE HYDROCHLORIDE 0.4 MG/ML
.1-.4 INJECTION, SOLUTION INTRAMUSCULAR; INTRAVENOUS; SUBCUTANEOUS
Status: DISCONTINUED | OUTPATIENT
Start: 2018-07-30 | End: 2018-07-30 | Stop reason: HOSPADM

## 2018-07-30 RX ORDER — MEPERIDINE HYDROCHLORIDE 50 MG/ML
12.5 INJECTION INTRAMUSCULAR; INTRAVENOUS; SUBCUTANEOUS
Status: DISCONTINUED | OUTPATIENT
Start: 2018-07-30 | End: 2018-07-30 | Stop reason: HOSPADM

## 2018-07-30 RX ORDER — LIDOCAINE HYDROCHLORIDE 20 MG/ML
INJECTION, SOLUTION INFILTRATION; PERINEURAL PRN
Status: DISCONTINUED | OUTPATIENT
Start: 2018-07-30 | End: 2018-07-30

## 2018-07-30 RX ORDER — IOPAMIDOL 510 MG/ML
INJECTION, SOLUTION INTRAVASCULAR PRN
Status: DISCONTINUED | OUTPATIENT
Start: 2018-07-30 | End: 2018-07-30 | Stop reason: HOSPADM

## 2018-07-30 RX ORDER — ONDANSETRON 2 MG/ML
INJECTION INTRAMUSCULAR; INTRAVENOUS PRN
Status: DISCONTINUED | OUTPATIENT
Start: 2018-07-30 | End: 2018-07-30

## 2018-07-30 RX ORDER — INDOMETHACIN 50 MG/1
100 SUPPOSITORY RECTAL
Status: COMPLETED | OUTPATIENT
Start: 2018-07-30 | End: 2018-07-30

## 2018-07-30 RX ORDER — ONDANSETRON 4 MG/1
4 TABLET, ORALLY DISINTEGRATING ORAL EVERY 30 MIN PRN
Status: DISCONTINUED | OUTPATIENT
Start: 2018-07-30 | End: 2018-07-30 | Stop reason: HOSPADM

## 2018-07-30 RX ORDER — SODIUM CHLORIDE, SODIUM LACTATE, POTASSIUM CHLORIDE, CALCIUM CHLORIDE 600; 310; 30; 20 MG/100ML; MG/100ML; MG/100ML; MG/100ML
INJECTION, SOLUTION INTRAVENOUS CONTINUOUS PRN
Status: DISCONTINUED | OUTPATIENT
Start: 2018-07-30 | End: 2018-07-30

## 2018-07-30 RX ORDER — ONDANSETRON 2 MG/ML
4 INJECTION INTRAMUSCULAR; INTRAVENOUS EVERY 30 MIN PRN
Status: DISCONTINUED | OUTPATIENT
Start: 2018-07-30 | End: 2018-07-30 | Stop reason: HOSPADM

## 2018-07-30 RX ORDER — HYDRALAZINE HYDROCHLORIDE 20 MG/ML
2.5 INJECTION INTRAMUSCULAR; INTRAVENOUS ONCE
Status: COMPLETED | OUTPATIENT
Start: 2018-07-30 | End: 2018-07-30

## 2018-07-30 RX ORDER — PROPOFOL 10 MG/ML
INJECTION, EMULSION INTRAVENOUS PRN
Status: DISCONTINUED | OUTPATIENT
Start: 2018-07-30 | End: 2018-07-30

## 2018-07-30 RX ORDER — HYDROMORPHONE HYDROCHLORIDE 1 MG/ML
.3-.5 INJECTION, SOLUTION INTRAMUSCULAR; INTRAVENOUS; SUBCUTANEOUS EVERY 10 MIN PRN
Status: DISCONTINUED | OUTPATIENT
Start: 2018-07-30 | End: 2018-07-30 | Stop reason: HOSPADM

## 2018-07-30 RX ORDER — FLUMAZENIL 0.1 MG/ML
0.2 INJECTION, SOLUTION INTRAVENOUS
Status: DISCONTINUED | OUTPATIENT
Start: 2018-07-30 | End: 2018-07-30 | Stop reason: HOSPADM

## 2018-07-30 RX ORDER — FENTANYL CITRATE 50 UG/ML
INJECTION, SOLUTION INTRAMUSCULAR; INTRAVENOUS PRN
Status: DISCONTINUED | OUTPATIENT
Start: 2018-07-30 | End: 2018-07-30

## 2018-07-30 RX ORDER — SODIUM CHLORIDE, SODIUM LACTATE, POTASSIUM CHLORIDE, CALCIUM CHLORIDE 600; 310; 30; 20 MG/100ML; MG/100ML; MG/100ML; MG/100ML
INJECTION, SOLUTION INTRAVENOUS CONTINUOUS
Status: DISCONTINUED | OUTPATIENT
Start: 2018-07-30 | End: 2018-07-30 | Stop reason: HOSPADM

## 2018-07-30 RX ORDER — LEVOFLOXACIN 5 MG/ML
INJECTION, SOLUTION INTRAVENOUS PRN
Status: DISCONTINUED | OUTPATIENT
Start: 2018-07-30 | End: 2018-07-30

## 2018-07-30 RX ADMIN — LEVOFLOXACIN 500 MG: 5 INJECTION, SOLUTION INTRAVENOUS at 08:15

## 2018-07-30 RX ADMIN — MIDAZOLAM 2 MG: 1 INJECTION INTRAMUSCULAR; INTRAVENOUS at 08:00

## 2018-07-30 RX ADMIN — FENTANYL CITRATE 100 MCG: 50 INJECTION, SOLUTION INTRAMUSCULAR; INTRAVENOUS at 08:01

## 2018-07-30 RX ADMIN — INDOMETHACIN 100 MG: 50 SUPPOSITORY RECTAL at 08:59

## 2018-07-30 RX ADMIN — IOPAMIDOL 50 ML: 510 INJECTION, SOLUTION INTRAVASCULAR at 08:37

## 2018-07-30 RX ADMIN — HYDRALAZINE HYDROCHLORIDE 2.5 MG: 20 INJECTION INTRAMUSCULAR; INTRAVENOUS at 11:10

## 2018-07-30 RX ADMIN — GLUCAGON HYDROCHLORIDE 0.4 MG: KIT at 08:52

## 2018-07-30 RX ADMIN — LIDOCAINE HYDROCHLORIDE 60 MG: 20 INJECTION, SOLUTION INFILTRATION; PERINEURAL at 08:00

## 2018-07-30 RX ADMIN — WATER 100 ML: 100 IRRIGANT IRRIGATION at 08:37

## 2018-07-30 RX ADMIN — ROCURONIUM BROMIDE 30 MG: 10 INJECTION INTRAVENOUS at 08:01

## 2018-07-30 RX ADMIN — DEXAMETHASONE SODIUM PHOSPHATE 8 MG: 4 INJECTION, SOLUTION INTRA-ARTICULAR; INTRALESIONAL; INTRAMUSCULAR; INTRAVENOUS; SOFT TISSUE at 08:00

## 2018-07-30 RX ADMIN — PROPOFOL 90 MG: 10 INJECTION, EMULSION INTRAVENOUS at 08:00

## 2018-07-30 RX ADMIN — SUGAMMADEX 200 MG: 100 INJECTION, SOLUTION INTRAVENOUS at 09:03

## 2018-07-30 RX ADMIN — ROCURONIUM BROMIDE 10 MG: 10 INJECTION INTRAVENOUS at 08:35

## 2018-07-30 RX ADMIN — ROCURONIUM BROMIDE 10 MG: 10 INJECTION INTRAVENOUS at 08:27

## 2018-07-30 RX ADMIN — SIMETHICONE 133 MG: 63.3; 3.7 SOLUTION/ DROPS ORAL at 08:37

## 2018-07-30 RX ADMIN — SODIUM CHLORIDE, POTASSIUM CHLORIDE, SODIUM LACTATE AND CALCIUM CHLORIDE: 600; 310; 30; 20 INJECTION, SOLUTION INTRAVENOUS at 07:52

## 2018-07-30 RX ADMIN — ONDANSETRON 4 MG: 2 INJECTION INTRAMUSCULAR; INTRAVENOUS at 09:00

## 2018-07-30 NOTE — PROGRESS NOTES
2.5 mg of Hydralazine given with good result. (see MAR). Dr. Haro spoke with Dr. Garrett about hypertension. If pressure gets worse, they will keep her over night. If improves, she can DC and she needs to call her transplant coordinator and her regular GI MD for an appt sooner then her scheduled appt. to eval/treat hypertension. Pt understood recommendation and it was included in her D/C instructions.

## 2018-07-30 NOTE — OR NURSING
Pre procedure care completed. Dr. Macias and Dr. Kraus saw pt at bedside. Blood drawn and sent to lab for chemistry.

## 2018-07-30 NOTE — ANESTHESIA CARE TRANSFER NOTE
Patient: Yarelis Penny    Procedure(s):  Endoscopic Retrograde Cholangiopancreatogram with Stent Exchange with dilation - Wound Class: II-Clean Contaminated    Diagnosis: Routine Stent Exchange   Diagnosis Additional Information: No value filed.    Anesthesia Type:   General     Note:    Patient transferred to:PACU  Comments: Pt remains stable, monitors on alarms in place, report to PACU RN, no complicationsHandoff Report: Identifed the Patient, Identified the Reponsible Provider, Reviewed the pertinent medical history, Discussed the surgical course, Reviewed Intra-OP anesthesia mangement and issues during anesthesia, Set expectations for post-procedure period and Allowed opportunity for questions and acknowledgement of understanding      Vitals: (Last set prior to Anesthesia Care Transfer)    CRNA VITALS  7/30/2018 0838 - 7/30/2018 0914      7/30/2018             Resp Rate (observed): 14                Electronically Signed By: KAYLENE Waterman CRNA  July 30, 2018  9:14 AM

## 2018-07-30 NOTE — BRIEF OP NOTE
[facility], [place]  Gastroenterology Brief Operative Note    Pre-operative diagnosis: Biliary stenosis   Post-operative diagnosis Same   Procedure: ERCP with stent replacement    Surgeon: Dr Macias    Assistants(s): Serafin Gibbons MD   Anesthesia: General endotracheal anesthesia   Estimated blood loss: Minimal    Total IV fluids: (See anesthesia record)   Blood transfusion: No transfusion was given during surgery   Total urine output: (See anesthesia record)   Drains: None   Specimens: None   Implants: Two 10 Fr Johlin stents    Findings:  - Previously placed plastic stents removed.   - A severe stricture at the site of site of anastomosis and extending up to the left hepatic duct.   - Dilated to 8 mm with a balloon.   - Swept small amount of sludge.   - Two 10 Fr Johlin stents were placed.     Complications: None   Condition: Stable   Comments:      Recommendations:         See dictated procedure report for full details (found in chart review under 'Procedures')    - Observe in Same Day for possible discharge  - Discharge home if minimal or no pain  - Repeat ERCP with Dr Macias in 2 months.   - Findings were discussed with the patient and the family immediately after the procedure.      Serafin Gibbons MD  Advanced Endoscopy Fellow  Pager: [phone]

## 2018-07-30 NOTE — ANESTHESIA POSTPROCEDURE EVALUATION
Patient: Yarelis Penny    Procedure(s):  Endoscopic Retrograde Cholangiopancreatogram with Stent Exchange with dilation - Wound Class: II-Clean Contaminated    Diagnosis:Routine Stent Exchange   Diagnosis Additional Information: No value filed.    Anesthesia Type:  General    Note:  Anesthesia Post Evaluation    Patient location during evaluation: PACU  Patient participation: Able to fully participate in evaluation  Level of consciousness: awake and alert  Pain management: adequate  Airway patency: patent  Cardiovascular status: acceptable  Respiratory status: acceptable  Hydration status: acceptable  PONV: none             Last vitals:  Vitals:    07/30/18 0625   BP: (!) 133/100   Pulse: 63   Resp: 14   Temp: 36.7  C (98.1  F)   SpO2: 100%         Electronically Signed By: Zuleyka Kraus MD  July 30, 2018  9:15 AM

## 2018-07-30 NOTE — DISCHARGE INSTRUCTIONS
[facility]  Same-Day Surgery   Adult Discharge Orders & Instructions     For 24 hours after surgery    1. Get plenty of rest.  A responsible adult must stay with you for at least 24 hours after you leave the hospital.   2. Do not drive or use heavy equipment.  If you have weakness or tingling, don't drive or use heavy equipment until this feeling goes away.  3. Do not drink alcohol.  4. Avoid strenuous or risky activities.  Ask for help when climbing stairs.   5. You may feel lightheaded.  IF so, sit for a few minutes before standing.  Have someone help you get up.   6. If you have nausea (feel sick to your stomach): Drink only clear liquids such as apple juice, ginger ale, broth or 7-Up.  Rest may also help.  Be sure to drink enough fluids.  Move to a regular diet as you feel able.  7. You may have a slight fever. Call the doctor if your fever is over 100 F (37.7 C) (taken under the tongue) or lasts longer than 24 hours.  8. You may have a dry mouth, a sore throat, muscle aches or trouble sleeping.  These should go away after 24 hours.  9. Do not make important or legal decisions.   Call your doctor for any of the followin.  Signs of infection (fever, growing tenderness at the surgery site, a large amount of drainage or bleeding, severe pain, foul-smelling drainage, redness, swelling).    2. It has been over 8 to 10 hours since surgery and you are still not able to urinate (pass water).        To contact a doctor, call Dr. Guru Macias @ [phone] or:    X   [phone] and ask for the resident on call for Gastroenterology (answered 24 hours a day)  X   Emergency Department:    [facility]: [phone]       (TTY for hearing impaired: [phone])

## 2018-07-30 NOTE — ANESTHESIA PREPROCEDURE EVALUATION
Anesthesia Evaluation     . Pt has had prior anesthetic.     No history of anesthetic complications          ROS/MED HX    ENT/Pulmonary:       Neurologic:  - neg neurologic ROS     Cardiovascular:     (+) ----. : . . . :. valvular problems/murmurs .       METS/Exercise Tolerance:     Hematologic:  - neg hematologic  ROS       Musculoskeletal:  - neg musculoskeletal ROS       GI/Hepatic:     (+) hiatal hernia, hepatitis liver disease, Other GI/Hepatic ulcerative colitis      Renal/Genitourinary:     (+) chronic renal disease, Pt does not require dialysis, Pt has no history of transplant,       Endo:     (+) Chronic steroid usage for IB Disorder .      Psychiatric:         Infectious Disease:  - neg infectious disease ROS       Malignancy:      - no malignancy   Other:    (+) No chance of pregnancy C-spine cleared: N/A, no H/O Chronic Pain,no other significant disability                    Physical Exam  Normal systems: pulmonary and dental    Airway   Mallampati: II  TM distance: >3 FB  Neck ROM: full    Dental     Cardiovascular   Rhythm and rate: regular and normal  (+) murmur       Pulmonary                     Anesthesia Plan      History & Physical Review      ASA Status:  3 .    NPO Status:  > 8 hours    Plan for General with Propofol induction. Maintenance will be Inhalation.      Additional equipment: Videolaryngoscope      Postoperative Care      Consents                          .

## 2018-07-30 NOTE — IP AVS SNAPSHOT
Post Anesthesia Care Unit [address] [address]    Phone:  [phone]                                       After Visit Summary   7/30/2018    Yarelis Penny    MRN: [id number]           After Visit Summary Signature Page     I have received my discharge instructions, and my questions have been answered. I have discussed any challenges I see with this plan with the nurse or doctor.    ..........................................................................................................................................  Patient/Patient Representative Signature      ..........................................................................................................................................  Patient Representative Print Name and Relationship to Patient    ..................................................               ................................................  Date                                            Time    ..........................................................................................................................................  Reviewed by Signature/Title    ...................................................              ..............................................  Date                                                            Time           10-Nov-2017 16:35

## 2018-07-30 NOTE — PROGRESS NOTES
Patient is hypertensive with Diastolic pressures above 100.  I have treated with 2.5 mg of hydralizine and called transplant surgery fellow for further management for hypertension.  They will come and see patient to determine if needs to be admitted to control blood pressure or may be sent home.

## 2018-07-30 NOTE — IP AVS SNAPSHOT
MRN:[id number]                      After Visit Summary   7/30/2018    Yarelis Penny    MRN: [id number]           Thank you!     Thank you for choosing [facility] for your care. Our goal is always to provide you with excellent care. Hearing back from our patients is one way we can continue to improve our services. Please take a few minutes to complete the written survey that you may receive in the mail after you visit with us. Thank you!        Patient Information     Date Of Birth          1985        About your hospital stay     You were admitted on:  July 30, 2018 You last received care in the:  Post Anesthesia Care Unit [facility]    You were discharged on:  July 30, 2018       Who to Call     For medical emergencies, please call 911.  For non-urgent questions about your medical care, please call your primary care provider or clinic, None  For questions related to your surgery, please call your surgery clinic        Attending Provider     Provider Specialty    Guru Brittany Macias MD Gastroenterology       Primary Care Provider Fax #    Physician No Ref-Primary [phone]      After Care Instructions     Discharge Instructions       Resume pre procedure diet            Discharge Instructions       Restart home medications.                  Your next 10 appointments already scheduled     Aug 06, 2018  9:00 AM CDT   Infusion 180 with UC SPEC INFUSION, UC 47 ATC   [facility] ([facility])    [address]  [address]  [facility] [address] [phone]            Aug 14, 2018 12:00 PM CDT   (Arrive by 11:45 AM)   New Patient Visit with Sophy Mckeon MD   [facility] ([facility])    [address] [place] [address]  4th Floor  [facility] [address]   [phone]            Sep 04, 2018  8:00 AM CDT   Infusion 180 with UC SPEC INFUSION, UC 51  ATC   [facility] Specialty [facility] Procedure ([facility])    [address]  [address]  [place] [facility] [address]   [phone]            Sep 19, 2018  7:30 AM CDT   Colonoscopy with Fuentes Johnson MD   [facility] ([facility])    [address]  [address]  [place] [facility] [address]   [phone]            Oct 02, 2018  8:00 AM CDT   Infusion 180 with UC SPEC INFUSION, UC 51 ATC   [facility] Special[facility] Procedure ([facility])    [address]  [address]  [place] [facility] [place] [place] [address]   [phone]            Dec 05, 2018  8:00 AM CST   (Arrive by 7:45 AM)   Return Visit with Joya Lopez MD   [facility] ([facility])    [address]  [address]  [place] [address]   [phone]            Dec 18, 2018 10:30 AM CST   Lab with UC LAB   [facility] ([facility])    [address]  1st Floor  [place] [facility] [place] [address]   [phone]              Further instructions from your care team       [facility]  Same-Day Surgery   Adult Discharge Orders & Instructions     For 24 hours after surgery    1. Get plenty of rest.  A responsible adult must stay with you for at least 24 hours after you leave the hospital.   2. Do not drive or use heavy equipment.  If you have weakness or tingling, don't drive or use heavy equipment until this feeling goes away.  3. Do not drink alcohol.  4. Avoid strenuous or risky activities.  Ask for help when climbing stairs.   5. You may feel lightheaded.  IF so, sit for a few minutes before standing.  Have someone help you get up.   6. If you have nausea (feel sick to your stomach): Drink only clear liquids such as apple juice, ginger ale, broth or 7-Up.  Rest may also help.  Be sure to drink  "enough fluids.  Move to a regular diet as you feel able.  7. You may have a slight fever. Call the doctor if your fever is over 100 F (37.7 C) (taken under the tongue) or lasts longer than 24 hours.  8. You may have a dry mouth, a sore throat, muscle aches or trouble sleeping.  These should go away after 24 hours.  9. Do not make important or legal decisions.   Call your doctor for any of the followin.  Signs of infection (fever, growing tenderness at the surgery site, a large amount of drainage or bleeding, severe pain, foul-smelling drainage, redness, swelling).    2. It has been over 8 to 10 hours since surgery and you are still not able to urinate (pass water).        To contact a doctor, call Dr. Guru Macias @ [phone] or:    X   [phone] and ask for the resident on call for Gastroenterology (answered 24 hours a day)  X   Emergency Department:    [facility]: [phone]       (TTY for hearing impaired: [phone])            Additional Information     If you use hormonal birth control (such as the pill, patch, ring or implants): You'll need a second form of birth control for 7 days (condoms, a diaphragm or contraceptive foam). While in the hospital, you received a medicine called Bridion. Your normal birth control will not work as well for a week after taking this medicine.          Pending Results     No orders found from 2018 to 2018.            Admission Information     Date & Time Provider Department Dept. Phone    2018 Guru Brittany Macias MD Post Anesthesia Care Unit [facility] [phone]      Your Vitals Were     Blood Pressure Pulse Temperature Respirations Height Weight    129/99 63 97.8  F (36.6  C) (Axillary) 14 1.575 m (5' 2\") 51.2 kg (112 lb 14 oz)    Last Period Pulse Oximetry BMI (Body Mass Index)             2018 99% 20.65 kg/m2         [facility] Information     [employer] gives you secure access to your electronic health " record. If you see a primary care provider, you can also send messages to your care team and make appointments. If you have questions, please call your primary care clinic.  If you do not have a primary care provider, please call [phone] and they will assist you.        Care EveryWhere ID     This is your Care EveryWhere ID. This could be used by other organizations to access your [facility] medical records  [id number]        Equal Access to Services     ERI CHAKRABORTY : Hadnabeel hart Soyari, waaxda lubeckyadaha, qaybta kaalmada pantera, mani hernandezashjuan lozano. So [facility] [phone].    ATENCIÓN: Si lilibeth lyndon, tiene a borja disposición servicios gratuitos de asistencia lingüística. Llame al [phone].    We comply with applicable federal civil rights laws and [place] laws. We do not discriminate on the basis of race, color, national origin, age, disability, sex, sexual orientation, or gender identity.               Review of your medicines      CONTINUE these medicines which may have CHANGED, or have new prescriptions. If we are uncertain of the size of tablets/capsules you have at home, strength may be listed as something that might have changed.        Dose / Directions    GNP ASPIRIN LOW DOSE 81 MG EC tablet   This may have changed:  when to take this   Used for:  Ulcerative colitis with other complication, unspecified location (H), Liver replaced by transplant (H)   Generic drug:  aspirin        Dose:  81 mg   Take 1 tablet (81 mg) by mouth daily   Quantity:  30 tablet   Refills:  1       predniSONE 5 MG tablet   Commonly known as:  DELTASONE   This may have changed:  when to take this   Used for:  UC (ulcerative colitis) (H), Liver replaced by transplant (H)        Dose:  5 mg   Take 1 tablet (5 mg) by mouth daily   Quantity:  225 tablet   Refills:  3       * tacrolimus 1 MG capsule   Commonly known as:  GENERIC EQUIVALENT   This may have changed:    - how much to take  -  additional instructions   Used for:  S/P liver transplant (H)        Dose:  2 mg   Take 2 capsules (2 mg) by mouth 2 times daily *total dose 2.5 mg twice daily   Quantity:  120 capsule   Refills:  11       * tacrolimus 0.5 MG capsule   Commonly known as:  GENERIC EQUIVALENT   This may have changed:    - how much to take  - additional instructions   Used for:  S/P liver transplant (H)        Dose:  0.5 mg   Take 1 capsule (0.5 mg) by mouth 2 times daily Take with 1 mg capsule. Total dose 2.5 mg every 12 hours   Quantity:  180 capsule   Refills:  3       * Notice:  This list has 2 medication(s) that are the same as other medications prescribed for you. Read the directions carefully, and ask your doctor or other care provider to review them with you.      CONTINUE these medicines which have NOT CHANGED        Dose / Directions    budesonide 9 MG 24 hr tablet   Commonly known as:  UCERIS   Used for:  UC (ulcerative colitis) (H)        Dose:  9 mg   Take 1 tablet (9 mg) by mouth every morning   Quantity:  30 tablet   Refills:  3       calcium carbonate 500 MG tablet   Commonly known as:  OS-KERLINE 500 mg [place]. [place]        Dose:  1 tablet   Take 1 tablet by mouth every evening   Refills:  0       ENTYVIO IV        Dose:  300 mg   Inject 300 mg into the vein every 28 days   Refills:  0       Multi-vitamin Tabs tablet        Dose:  1 tablet   Take 1 tablet by mouth every morning   Refills:  0       sulfamethoxazole-trimethoprim 800-160 MG per tablet   Commonly known as:  BACTRIM DS/SEPTRA DS   Used for:  Immunosuppression (H), Inflammatory bowel disease, PSC (primary sclerosing cholangitis)        Dose:  1 tablet   Take 1 tablet by mouth three times a week Monday, Wednesday, Friday   Quantity:  90 tablet   Refills:  3       ursodiol 250 MG tablet   Commonly known as:  ACTIGALL   Used for:  Liver replaced by transplant (H)        Take 2 tabs (500 mg) in AM, and 1 tab at night (250 mg)   Quantity:  270 tablet   Refills:  3        VITAMIN D (CHOLECALCIFEROL) PO        Take by mouth every evening   Refills:  0                Protect others around you: Learn how to safely use, store and throw away your medicines at www.disposemymeds.org.             Medication List: This is a list of all your medications and when to take them. Check marks below indicate your daily home schedule. Keep this list as a reference.      Medications           Morning Afternoon Evening Bedtime As Needed    budesonide 9 MG 24 hr tablet   Commonly known as:  UCERIS   Take 1 tablet (9 mg) by mouth every morning                                calcium carbonate 500 MG tablet   Commonly known as:  OS-KERLINE 500 mg [place]. [place]   Take 1 tablet by mouth every evening                                ENTYVIO IV   Inject 300 mg into the vein every 28 days                                GNP ASPIRIN LOW DOSE 81 MG EC tablet   Take 1 tablet (81 mg) by mouth daily   Generic drug:  aspirin                                Multi-vitamin Tabs tablet   Take 1 tablet by mouth every morning                                predniSONE 5 MG tablet   Commonly known as:  DELTASONE   Take 1 tablet (5 mg) by mouth daily                                sulfamethoxazole-trimethoprim 800-160 MG per tablet   Commonly known as:  BACTRIM DS/SEPTRA DS   Take 1 tablet by mouth three times a week Monday, Wednesday, Friday                                * tacrolimus 1 MG capsule   Commonly known as:  GENERIC EQUIVALENT   Take 2 capsules (2 mg) by mouth 2 times daily *total dose 2.5 mg twice daily                                * tacrolimus 0.5 MG capsule   Commonly known as:  GENERIC EQUIVALENT   Take 1 capsule (0.5 mg) by mouth 2 times daily Take with 1 mg capsule. Total dose 2.5 mg every 12 hours                                ursodiol 250 MG tablet   Commonly known as:  ACTIGALL   Take 2 tabs (500 mg) in AM, and 1 tab at night (250 mg)                                VITAMIN D (CHOLECALCIFEROL) PO   Take by mouth  every evening                                * Notice:  This list has 2 medication(s) that are the same as other medications prescribed for you. Read the directions carefully, and ask your doctor or other care provider to review them with you.

## 2018-07-31 ENCOUNTER — CARE COORDINATION (OUTPATIENT)
Dept: GASTROENTEROLOGY | Facility: CLINIC | Age: 33
End: 2018-07-31

## 2018-07-31 DIAGNOSIS — K83.1 COMMON BILE DUCT STRICTURE (H): Primary | ICD-10-CM

## 2018-07-31 NOTE — PROGRESS NOTES
Post ERCP (7/30/2018) with Dr. Macias: Follow-up    Post procedure recommendations: - Observe patient in same day observation unit for possible discharge same day.   - Repeat ERCP in 2 months for evaluation of the stricture and possible stent exchange with Dr Macias.   - Follow up liver chemistries in 2 weeks by liver transplant team.   - Findings were discussed with the patient and her family immediately after the procedure.     Patient states: Patient states she is feeling fine. Patient denies any severe abdominal pain, nausea or emesis, fever or chills. Patient is tolerating a regular diet. Patient was instructed to call with any fever greater than 101 or chills, severe abdominal pain, nausea or vomiting, unable to tolerate a diet, dark urine, signs of dehydration, or if she notices any jaundice color to her skin. Patient's states her blood pressure is 110/75 today.    Orders placed: ERCP and sent to scheduling.     Contact information verified for future questions/concerns. Patent's questions and concerns were addressed to their stated satisfaction. Patient is in agreement with this new plan of care.     Em ANTHONY RN Coordinator  Dr. Macias  Advanced Endoscopy  [phone]

## 2018-08-06 ENCOUNTER — INFUSION THERAPY VISIT (OUTPATIENT)
Dept: INFUSION THERAPY | Facility: CLINIC | Age: 33
End: 2018-08-06
Attending: INTERNAL MEDICINE
Payer: COMMERCIAL

## 2018-08-06 VITALS
HEART RATE: 63 BPM | RESPIRATION RATE: 16 BRPM | OXYGEN SATURATION: 99 % | SYSTOLIC BLOOD PRESSURE: 110 MMHG | TEMPERATURE: 97.9 F | DIASTOLIC BLOOD PRESSURE: 81 MMHG

## 2018-08-06 DIAGNOSIS — B25.9 CMV COLITIS (H): ICD-10-CM

## 2018-08-06 DIAGNOSIS — E86.0 DEHYDRATION: Primary | ICD-10-CM

## 2018-08-06 DIAGNOSIS — A08.39 CMV COLITIS (H): ICD-10-CM

## 2018-08-06 DIAGNOSIS — D50.8 OTHER IRON DEFICIENCY ANEMIA: ICD-10-CM

## 2018-08-06 PROCEDURE — 25000128 H RX IP 250 OP 636: Mod: ZF | Performed by: INTERNAL MEDICINE

## 2018-08-06 PROCEDURE — 96365 THER/PROPH/DIAG IV INF INIT: CPT

## 2018-08-06 RX ADMIN — VEDOLIZUMAB 300 MG: 300 INJECTION, POWDER, LYOPHILIZED, FOR SOLUTION INTRAVENOUS at 09:36

## 2018-08-06 NOTE — PROGRESS NOTES
Nursing Note  Yarelis Penny [facility] for:   Chief Complaint   Patient presents with     Infusion     Entyvio     During [facility]'s [facility] appointment, orders from Dr. Fuentes Johnson were completed.  Frequency: monthly    Progress note:  Patient identification verified by name and date of birth.  Assessment completed.  Vitals recorded in Doc Flowsheets.  Patient was provided with education regarding infusion and possible side effects.  Patient verbalized understanding.      needed: No  Premedications: were not ordered.  Infusion Rates: 560 ml/hr.  Approximate Infusion length:30 minutes.   Labs: were not ordered for this appointment. Will be drawn at next appt.  Vascular access: peripheral IV placed today.  Treatment Conditions:   ~~~ NOTE: If the patient answers yes to any of the questions below, hold the infusion and contact ordering provider or on-call provider.    1. Have you recently had an elevated temperature, fever, chills, productive cough, coughing for 3 weeks or longer or hemoptysis,  abnormal vital signs, night sweats,  chest pain or have you noticed a decrease in your appetite, unexplained weight loss or fatigue? No  2. Do you have any open wounds or new incisions? No  3. Do you have any recent or upcoming hospitalizations, surgeries or dental procedures? No  4. Do you currently have or recently have had any signs of illness or infection or are you on any antibiotics? No  5. Have you had any new, sudden or worsening abdominal pain? No  6. Have you or anyone in your household received a live vaccination in the past 4 weeks? Please note:  No live vaccines while on biologic/chemotherapy until 6 months after the last treatment.  Patient can receive the flu vaccine (shot only) and the pneumovax.  It is optimal for the patient to get these vaccines mid cycle, but they can be given at any time as long as it is not on the day  of the infusion. No  7. Have you recently been diagnosed with any new nervous system diseases (ie. Multiple sclerosis, Guillain Chapin, seizures, neurological changes) or cancer diagnosis? Are you on any form of radiation or chemotherapy? No  8. Are you pregnant or breast feeding or do you have plans of pregnancy in the future? No  9. Have you been having any signs of worsening depression or suicidal ideations?  (benlysta only) No  10. Have there been any other new onset medical symptoms? No    Patient tolerated infusion: well.    Drug Waste Record? No     Discharge Plan:   Follow up plan of care with: ongoing infusions at [facility].  Discharge instructions were reviewed with patient.  Patient/representative verbalized understanding of discharge instructions and all questions answered.  Patient discharged from [facility] in stable condition.    Pt declined printed AVS.    Avis Gregg RN       Administrations This Visit     vedolizumab (ENTYVIO) 300 mg in sodium chloride 0.9 % 280 mL infusion     Admin Date Action Dose Rate Route Administered By          08/06/2018 New Bag 300 mg 560 mL/hr Intravenous Avis Gregg RN                           /82  Pulse 77  Temp 97.9  F (36.6  C) (Oral)  Resp 16  LMP 07/28/2018  SpO2 99%    EDUCATION POST BIOLOGICAL/CHEMOTHERAPY INFUSION  Call the triage nurse at your clinic or seek medical attention if you have chills and/or temperature greater than or equal to 100.5, uncontrolled nausea/vomiting, diarrhea, constipation, dizziness, shortness of breath, chest pain, heart palpitations, weakness or any other new or concerning symptoms, questions or concerns.  You can not have any live virus vaccines prior to or during treatment or up to 6 months post infusion.  If you have an upcoming surgery, medical procedure or dental procedure during treatment, this should be discussed with your ordering physician and  your surgeon/dentist.  If you are having any concerning symptom, if you are unsure if you should get your next infusion or wish to speak to a provider before your next infusion, please call your care coordinator or triage nurse at your clinic to notify them so we can adequately serve you.

## 2018-08-06 NOTE — MR AVS SNAPSHOT
After Visit Summary   8/6/2018    Yarelis Penny    MRN: [id number]           Patient Information     Date Of Birth          1985        Visit Information        Provider Department      8/6/2018 9:00 AM UC 47 ATC; UC SPEC INFUSION [facility] Specialty [facility] Procedure        Today's Diagnoses     Dehydration    -  1    CMV colitis (H)        Other iron deficiency anemia          Care Instructions    Dear Yarelis Penny    Thank you for choosing [facility] ([facility]) for your Entyvio infusion.  The following information is a summary of our appointment as well as important reminders.      We look forward in seeing you on your next appointment here at [facility].  Please don t hesitate to call us at [phone] to reschedule any of your appointments or to speak with one of the [facility] registered nurses.  It was a pleasure taking care of you today.    Sincerely,    [facility]  [facility]  [address] [place]  [address]  Phone:  [phone]            Follow-ups after your visit        Your next 10 appointments already scheduled     Aug 14, 2018 12:00 PM CDT   (Arrive by 11:45 AM)   New Patient Visit with Sophy Mckeon MD   [facility] ([facility])    [address]  4th Floor  [facility] [place] [facility] [address] [phone]            Sep 04, 2018  8:00 AM CDT   Infusion 180 with UC SPEC INFUSION, UC 51 ATC   [facility] Specialty and Procedure ([facility])    [address]  [address]  [facility] [place] [facility] [id number] [phone]            Sep 19, 2018  7:30 AM CDT   Colonoscopy with Fuentes Johnson MD   [facility] ([facility])    [address]  [address]  [facility] [id number] [phone]             Sep 26, 2018   Procedure with Guru Brittany Macias MD   [facility] [place], Same Day Surgery (--)    [address]  [place] [address] [phone]            Oct 02, 2018  8:00 AM CDT   Infusion 180 with UC SPEC INFUSION, UC 51 ATC   [facility] Specialty and Procedure ([facility])    [address]  [address]  [facility] [place] [facility] [address] [phone]            Dec 05, 2018  8:00 AM CST   (Arrive by 7:45 AM)   Return Visit with Joya Lopez MD   [facility] and Infectious Diseases ([facility])    [address]  [address]  [facility] [place] [facility] [address] [phone]            Dec 18, 2018 10:30 AM CST   Lab with UC LAB   [facility] ([facility])    [address]  1st Floor  [facility] [place] [facility] [address] [phone]            Dec 18, 2018 11:30 AM CST   (Arrive by 11:15 AM)   Return Liver Transplant with Daniela Enriquez MD   [facility] Hepatology ([facility])    [address]  [address]  [facility] [place] [facility] [address] [phone]              Future tests that were ordered for you today     Open Standing Orders        Priority Remaining Interval Expires Ordered    Notify Physician Routine [id number] PRN  8/6/2018            Who to contact     If you have questions or need follow up information about today's clinic visit or your schedule please contact [facility] SPECIALTY AND PROCEDURE directly at [phone].  Normal or non-critical lab and imaging results will be communicated to you by MyChart, letter or phone within 4 business days after the clinic has received the results. If you do not hear from us within 7 days, please contact the clinic through MyChart or phone. If you have a critical or abnormal lab result, we will notify you by phone as soon as possible.  Submit refill requests through  [employer] or call your pharmacy and they will forward the refill request to us. Please allow 3 business days for your refill to be completed.          Additional Information About Your Visit        [employer] Information     [employer] gives you secure access to your electronic health record. If you see a primary care provider, you can also send messages to your care team and make appointments. If you have questions, please call your primary care clinic.  If you do not have a primary care provider, please call [phone] and they will assist you.        Care EveryWhere ID     This is your Care EveryWhere ID. This could be used by other organizations to access your [facility] medical records  [id number]        Your Vitals Were     Pulse Temperature Respirations Last Period Pulse Oximetry       63 97.9  F (36.6  C) (Oral) 16 07/28/2018 99%        Blood Pressure from Last 3 Encounters:   08/06/18 110/81   07/30/18 (!) 119/91   07/09/18 117/86    Weight from Last 3 Encounters:   07/30/18 51.2 kg (112 lb 14 oz)   06/25/18 49 kg (108 lb 0.4 oz)   06/19/18 48.2 kg (106 lb 3.2 oz)              Today, you had the following     No orders found for display         Today's Medication Changes          These changes are accurate as of 8/6/18 10:14 AM.  If you have any questions, ask your nurse or doctor.               These medicines have changed or have updated prescriptions.        Dose/Directions    GNP ASPIRIN LOW DOSE 81 MG EC tablet   This may have changed:  when to take this   Used for:  Ulcerative colitis with other complication, unspecified location (H), Liver replaced by transplant (H)   Generic drug:  aspirin        Dose:  81 mg   Take 1 tablet (81 mg) by mouth daily   Quantity:  30 tablet   Refills:  1       predniSONE 5 MG tablet   Commonly known as:  DELTASONE   This may have changed:  when to take this   Used for:  UC (ulcerative colitis) (H), Liver replaced by transplant (H)        Dose:  5 mg   Take 1 tablet (5 mg) by  mouth daily   Quantity:  225 tablet   Refills:  3       * tacrolimus 1 MG capsule   Commonly known as:  GENERIC EQUIVALENT   This may have changed:    - how much to take  - additional instructions   Used for:  S/P liver transplant (H)        Dose:  2 mg   Take 2 capsules (2 mg) by mouth 2 times daily *total dose 2.5 mg twice daily   Quantity:  120 capsule   Refills:  11       * tacrolimus 0.5 MG capsule   Commonly known as:  GENERIC EQUIVALENT   This may have changed:    - how much to take  - additional instructions   Used for:  S/P liver transplant (H)        Dose:  0.5 mg   Take 1 capsule (0.5 mg) by mouth 2 times daily Take with 1 mg capsule. Total dose 2.5 mg every 12 hours   Quantity:  180 capsule   Refills:  3       * Notice:  This list has 2 medication(s) that are the same as other medications prescribed for you. Read the directions carefully, and ask your doctor or other care provider to review them with you.             Primary Care Provider Fax #    Physician No Ref-Primary [phone]       No address on file        Equal Access to Services     ERI CHAKRABORTY : Yandel Palma, ann solorzano, qarodrigo mott, mani melo . So [facility] [phone].    ATENCIÓN: Si habla español, tiene a borja disposición servicios gratuitos de asistencia lingüística. Llame al [phone].    We comply with applicable federal civil rights laws and [place] laws. We do not discriminate on the basis of race, color, national origin, age, disability, sex, sexual orientation, or gender identity.            Thank you!     Thank you for choosing [facility] SPECIALTY AND PROCEDURE  for your care. Our goal is always to provide you with excellent care. Hearing back from our patients is one way we can continue to improve our services. Please take a few minutes to complete the written survey that you may receive in the mail after your visit with us. Thank you!              Your Updated Medication List - Protect others around you: Learn how to safely use, store and throw away your medicines at www.disposemymeds.org.          This list is accurate as of 8/6/18 10:14 AM.  Always use your most recent med list.                   Brand Name Dispense Instructions for use Diagnosis    budesonide 9 MG 24 hr tablet    UCERIS    30 tablet    Take 1 tablet (9 mg) by mouth every morning    UC (ulcerative colitis) (H)       calcium carbonate 500 MG tablet    OS-KERLINE 500 mg [place]. Ca     Take 1 tablet by mouth every evening        ENTYVIO IV      Inject 300 mg into the vein every 28 days        GNP ASPIRIN LOW DOSE 81 MG EC tablet   Generic drug:  aspirin     30 tablet    Take 1 tablet (81 mg) by mouth daily    Ulcerative colitis with other complication, unspecified location (H), Liver replaced by transplant (H)       Multi-vitamin Tabs tablet      Take 1 tablet by mouth every morning        predniSONE 5 MG tablet    DELTASONE    225 tablet    Take 1 tablet (5 mg) by mouth daily    UC (ulcerative colitis) (H), Liver replaced by transplant (H)       sulfamethoxazole-trimethoprim 800-160 MG per tablet    BACTRIM DS/SEPTRA DS    90 tablet    Take 1 tablet by mouth three times a week Monday, Wednesday, Friday    Immunosuppression (H), Inflammatory bowel disease, PSC (primary sclerosing cholangitis)       * tacrolimus 1 MG capsule    GENERIC EQUIVALENT    120 capsule    Take 2 capsules (2 mg) by mouth 2 times daily *total dose 2.5 mg twice daily    S/P liver transplant (H)       * tacrolimus 0.5 MG capsule    GENERIC EQUIVALENT    180 capsule    Take 1 capsule (0.5 mg) by mouth 2 times daily Take with 1 mg capsule. Total dose 2.5 mg every 12 hours    S/P liver transplant (H)       ursodiol 250 MG tablet    ACTIGALL    270 tablet    Take 2 tabs (500 mg) in AM, and 1 tab at night (250 mg)    Liver replaced by transplant (H)       VITAMIN D (CHOLECALCIFEROL) PO      Take by mouth every evening         * Notice:  This list has 2 medication(s) that are the same as other medications prescribed for you. Read the directions carefully, and ask your doctor or other care provider to review them with you.

## 2018-08-12 LAB — ERCP: NORMAL

## 2018-08-13 ENCOUNTER — TELEPHONE (OUTPATIENT)
Dept: GASTROENTEROLOGY | Facility: CLINIC | Age: 33
End: 2018-08-13

## 2018-08-14 ENCOUNTER — OFFICE VISIT (OUTPATIENT)
Dept: GASTROENTEROLOGY | Facility: CLINIC | Age: 33
End: 2018-08-14
Payer: COMMERCIAL

## 2018-08-14 DIAGNOSIS — K83.01 PSC (PRIMARY SCLEROSING CHOLANGITIS) (H): Primary | ICD-10-CM

## 2018-08-14 DIAGNOSIS — K51.018 ULCERATIVE PANCOLITIS WITH OTHER COMPLICATION (H): ICD-10-CM

## 2018-08-14 NOTE — MR AVS SNAPSHOT
After Visit Summary   8/14/2018    Yarelis Penny    MRN: [id number]           Patient Information     Date Of Birth          1985        Visit Information        Provider Department      8/14/2018 12:00 PM Sophy Mckeon MD [facility]         Follow-ups after your visit        Follow-up notes from your care team     Return if symptoms worsen or fail to improve.      Your next 10 appointments already scheduled     Sep 04, 2018  8:00 AM CDT   Infusion 180 with UC SPEC INFUSION, UC 51 ATC   [facility] Specialty and Procedure ([facility])    [address]  [address]  [facility] [facility] [address]   [phone]            Sep 19, 2018  7:30 AM CDT   Colonoscopy with Fuentes Johnson MD   [facility] ([facility])    [address]  [address]  [facility] [address] [phone]            Sep 26, 2018   Procedure with Guru Brittany Macias MD   [facility] [place], Same Day Surgery (--)    [address] [address] [phone]            Oct 02, 2018  8:00 AM CDT   Infusion 180 with UC SPEC INFUSION, UC 51 ATC   [facility] Specialty and Procedure ([facility])    [address]  [address]  [place] [facility] [address]   [phone]            Dec 05, 2018  8:00 AM CST   (Arrive by 7:45 AM)   Return Visit with Joya Lopez MD   [facility] and Infectious Diseases ([facility])    [address]  [address]  [place] [address]   [phone]            Dec 18, 2018 10:30 AM CST   Lab with UC LAB   [facility] ([facility])    [address]  1st [address]  [facility] [facility] [address]   [phone]            Dec 18, 2018 11:30 AM CST   (Arrive by 11:15 AM)   Return Liver Transplant with Daniela Enriquez MD    [facility] Hepatology ([facility])    [address]  [address]  [facility] [address] [phone]              Who to contact     Please call your clinic at [phone] to:    Ask questions about your health    Make or cancel appointments    Discuss your medicines    Learn about your test results    Speak to your doctor            Additional Information About Your Visit        [employer] Information     [employer] gives you secure access to your electronic health record. If you see a primary care provider, you can also send messages to your care team and make appointments. If you have questions, please call your primary care clinic.  If you do not have a primary care provider, please call [phone] and they will assist you.      [employer] is an electronic gateway that provides easy, online access to your medical records. With [employer], you can request a clinic appointment, read your test results, renew a prescription or communicate with your care team.     To access your existing account, please contact your [facility] or call [phone] for assistance.        Care EveryWhere ID     This is your Care EveryWhere ID. This could be used by other organizations to access your [place] medical records  [id number]        Your Vitals Were     Last Period                   07/28/2018            Blood Pressure from Last 3 Encounters:   08/06/18 110/81   07/30/18 (!) 119/91   07/09/18 117/86    Weight from Last 3 Encounters:   07/30/18 51.2 kg (112 lb 14 oz)   06/25/18 49 kg (108 lb 0.4 oz)   06/19/18 48.2 kg (106 lb 3.2 oz)              Today, you had the following     No orders found for display         Today's Medication Changes          These changes are accurate as of 8/14/18  1:10 PM.  If you have any questions, ask your nurse or doctor.               These medicines have changed or have updated prescriptions.        Dose/Directions    GNP ASPIRIN LOW  DOSE 81 MG EC tablet   This may have changed:  when to take this   Used for:  Ulcerative colitis with other complication, unspecified location (H), Liver replaced by transplant (H)   Generic drug:  aspirin        Dose:  81 mg   Take 1 tablet (81 mg) by mouth daily   Quantity:  30 tablet   Refills:  1       predniSONE 5 MG tablet   Commonly known as:  DELTASONE   This may have changed:  when to take this   Used for:  UC (ulcerative colitis) (H), Liver replaced by transplant (H)        Dose:  5 mg   Take 1 tablet (5 mg) by mouth daily   Quantity:  225 tablet   Refills:  3       * tacrolimus 1 MG capsule   Commonly known as:  GENERIC EQUIVALENT   This may have changed:    - how much to take  - additional instructions   Used for:  S/P liver transplant (H)        Dose:  2 mg   Take 2 capsules (2 mg) by mouth 2 times daily *total dose 2.5 mg twice daily   Quantity:  120 capsule   Refills:  11       * tacrolimus 0.5 MG capsule   Commonly known as:  GENERIC EQUIVALENT   This may have changed:    - how much to take  - additional instructions   Used for:  S/P liver transplant (H)        Dose:  0.5 mg   Take 1 capsule (0.5 mg) by mouth 2 times daily Take with 1 mg capsule. Total dose 2.5 mg every 12 hours   Quantity:  180 capsule   Refills:  3       * Notice:  This list has 2 medication(s) that are the same as other medications prescribed for you. Read the directions carefully, and ask your doctor or other care provider to review them with you.             Primary Care Provider Fax #    Physician No Ref-Primary [phone]       No address on file        Equal Access to Services     ERI CHAKRABORTY AH: Yandel Palma, waaxda luroxana, qaybta kaalmada pantera, mani lozano. So [facility] [phone].    ATENCIÓN: Si habla español, tiene a borja disposición servicios gratuitos de asistencia lingüística. Llame al [phone].    We comply with applicable federal civil rights laws and [place]  laws. We do not discriminate on the basis of race, color, national origin, age, disability, sex, sexual orientation, or gender identity.            Thank you!     Thank you for choosing [facility]  for your care. Our goal is always to provide you with excellent care. Hearing back from our patients is one way we can continue to improve our services. Please take a few minutes to complete the written survey that you may receive in the mail after your visit with us. Thank you!             Your Updated Medication List - Protect others around you: Learn how to safely use, store and throw away your medicines at www.disposemymeds.org.          This list is accurate as of 8/14/18  1:10 PM.  Always use your most recent med list.                   Brand Name Dispense Instructions for use Diagnosis    budesonide 9 MG 24 hr tablet    UCERIS    30 tablet    Take 1 tablet (9 mg) by mouth every morning    UC (ulcerative colitis) (H)       calcium carbonate 500 MG tablet    OS-KERLINE 500 mg [place]. Ca     Take 1 tablet by mouth every evening        ENTYVIO IV      Inject 300 mg into the vein every 28 days        GNP ASPIRIN LOW DOSE 81 MG EC tablet   Generic drug:  aspirin     30 tablet    Take 1 tablet (81 mg) by mouth daily    Ulcerative colitis with other complication, unspecified location (H), Liver replaced by transplant (H)       Multi-vitamin Tabs tablet      Take 1 tablet by mouth every morning        predniSONE 5 MG tablet    DELTASONE    225 tablet    Take 1 tablet (5 mg) by mouth daily    UC (ulcerative colitis) (H), Liver replaced by transplant (H)       sulfamethoxazole-trimethoprim 800-160 MG per tablet    BACTRIM DS/SEPTRA DS    90 tablet    Take 1 tablet by mouth three times a week Monday, Wednesday, Friday    Immunosuppression (H), Inflammatory bowel disease, PSC (primary sclerosing cholangitis)       * tacrolimus 1 MG capsule    GENERIC EQUIVALENT    120 capsule    Take 2 capsules (2 mg) by  mouth 2 times daily *total dose 2.5 mg twice daily    S/P liver transplant (H)       * tacrolimus 0.5 MG capsule    GENERIC EQUIVALENT    180 capsule    Take 1 capsule (0.5 mg) by mouth 2 times daily Take with 1 mg capsule. Total dose 2.5 mg every 12 hours    S/P liver transplant (H)       ursodiol 250 MG tablet    ACTIGALL    270 tablet    Take 2 tabs (500 mg) in AM, and 1 tab at night (250 mg)    Liver replaced by transplant (H)       VITAMIN D (CHOLECALCIFEROL) PO      Take by mouth every evening        * Notice:  This list has 2 medication(s) that are the same as other medications prescribed for you. Read the directions carefully, and ask your doctor or other care provider to review them with you.

## 2018-08-14 NOTE — LETTER
8/14/2018       RE: Yarelis Penny  [address] [facility] [address]     Dear Colleague,    Thank you for referring your patient, Yarelis Penny, to the [facility] GASTROENTEROLOG[facility] AND IBD [facility]INIC at [facility]. Please see a copy of my visit note below.    [facility] UC NEW       PATIENT: Yarelis Penny    MRN: [id number]    Date of Birth 1985    Tel: [phone] (home)     PCP: No Ref-Primary, Physician     HPI: Ms. Penny is a 33 year old year old female here for preconception counseling in the setting of PSC associated IBD treated with vedolizumab.  She also has PSC/AIH and is status post living donor liver transplant 2015, currently on tacrolimus and prednisone, complicated by anastomotic bile leak with anastomotic strictures status post multiple ERCPs.  Dr. Fuentes Johnson is managing her IBD, Dr. Daniela Enriquez is managing her liver transplant and Dr. Guru Macias is managing her biliary stricture.    Has been doing relatively well as of recently while on vedolizumab.  Required hospitalization in 2016 for a severe flare.  She has been on maximum dose of vedolizumab but still had active inflammation, somewhat discordant from clinical symptoms, and has been on Uceris until spring of this year, in addition to chronic low-dose prednisone for liver transplant.    Noteworthy diet history- healthy diet in general    UC history  Age at diagnosis: 18  Macroscopic extent of disease varied over the years, most recently ileocolonic involvement with rectal sparing    Current UC symptoms  Bowel frequency in day 1 formed brown bowel movement per day  Bowel frequency in night none  Urgency of defecation none  Blood in stool none  General well being 1 = slightly below average  Extracolonic features (multiple select) PSC/AIH    Constitutional symptoms:  Fever NO  Weight loss NO    Other GI symptoms present none    Total number of IBD surgeries (except perianal):  none    Remaining bowel: entire   Small bowel entire  Ileocecal valve present Yes-patulous at last colonoscopy with suggestion of backwash ileitis  Current Pouch  NO  Current Stoma  NO    Current IBD Medications:  -Vedolizumab 400 mg every 4 weeks, prednisone 5 mg daily    Past IBD Medications: Prednisone, Pentasa, Asacol, azathioprine (mainly for liver disease), Uceris- stopped March 2018    Past Medical History:   Diagnosis Date     Autoimmune hepatitis (H) 2012    on steroid taper     Cholangitis      CKD (chronic kidney disease) 2012    biopsy 2012: TIN, patchy fibrosis     Esophageal varices (H) 9/08    banded     Heart murmur      History of blood transfusion      Primary sclerosing cholangitis      Ulcerative Colitis     f/b GI      Past Surgical History:   Procedure Laterality Date     COLONOSCOPY  9/07     COLONOSCOPY N/A 2/26/2015    Procedure: COMBINED COLONOSCOPY, SINGLE OR MULTIPLE BIOPSY/POLYPECTOMY BY BIOPSY;  Surgeon: Mitchel Hoskins Chi, MD;  [facility]     COLONOSCOPY N/A 1/12/2016    Procedure: COMBINED COLONOSCOPY, SINGLE OR MULTIPLE BIOPSY/POLYPECTOMY BY BIOPSY;  Surgeon: Rigo Valles MD;  [facility]     COLONOSCOPY N/A 4/1/2016    Procedure: COMBINED COLONOSCOPY, SINGLE OR MULTIPLE BIOPSY/POLYPECTOMY BY BIOPSY;  Surgeon: Kareem Solis MD;  [facility]     COLONOSCOPY N/A 9/5/2017    Procedure: COMBINED COLONOSCOPY, SINGLE OR MULTIPLE BIOPSY/POLYPECTOMY BY BIOPSY;  Colonoscopy;  Surgeon: Fuentes Johnson MD;  [facility]     ENDOSCOPIC RETROGRADE CHOLANGIOPANCREATOGRAM N/A 6/25/2015    Procedure: COMBINED ENDOSCOPIC RETROGRADE CHOLANGIOPANCREATOGRAPHY, PLACE TUBE/STENT;  Surgeon: Landon Quinones MD;  [facility]     ENDOSCOPIC RETROGRADE CHOLANGIOPANCREATOGRAM N/A 6/25/2015    Procedure: COMBINED ENDOSCOPIC RETROGRADE CHOLANGIOPANCREATOGRAPHY, PLACE TUBE/STENT;  Surgeon: Landon Quinones MD;  [facility]     ENDOSCOPIC RETROGRADE  CHOLANGIOPANCREATOGRAM N/A 7/2/2015    Procedure: COMBINED ENDOSCOPIC RETROGRADE CHOLANGIOPANCREATOGRAPHY, PLACE TUBE/STENT;  Surgeon: Landon Quinones MD;  [facility]     ENDOSCOPIC RETROGRADE CHOLANGIOPANCREATOGRAM N/A 9/8/2015    Procedure: COMBINED ENDOSCOPIC RETROGRADE CHOLANGIOPANCREATOGRAPHY, REMOVE FOREIGN BODY OR STENT/TUBE;  Surgeon: Landon Quinones MD;  [facility]     ENDOSCOPIC RETROGRADE CHOLANGIOPANCREATOGRAM N/A 12/8/2015    Procedure: ENDOSCOPIC RETROGRADE CHOLANGIOPANCREATOGRAM;  Surgeon: Landon Quinones MD;  [facility]     ENDOSCOPIC RETROGRADE CHOLANGIOPANCREATOGRAM N/A 3/1/2016    Procedure: COMBINED ENDOSCOPIC RETROGRADE CHOLANGIOPANCREATOGRAPHY, REMOVE FOREIGN BODY OR STENT/TUBE;  Surgeon: Landon Quinones MD;  [facility]     ENDOSCOPIC RETROGRADE CHOLANGIOPANCREATOGRAM N/A 3/20/2017    Procedure: COMBINED ENDOSCOPIC RETROGRADE CHOLANGIOPANCREATOGRAPHY, PLACE TUBE/STENT;  Endoscopic Retrograde Cholangiopancreatogram with Ballon Dilation, Stent Placement;  Surgeon: Guru Brittany Macias MD;  [facility]     ENDOSCOPIC RETROGRADE CHOLANGIOPANCREATOGRAM N/A 4/2/2018    Procedure: COMBINED ENDOSCOPIC RETROGRADE CHOLANGIOPANCREATOGRAPHY, PLACE TUBE/STENT;  Endoscopic Retrograde Cholangiopancreatogram with biliary dilation and stent placement;  Surgeon: Guru Brittany Macias MD;  [facility]     ENDOSCOPIC RETROGRADE CHOLANGIOPANCREATOGRAM N/A 5/7/2018    Procedure: ENDOSCOPIC RETROGRADE CHOLANGIOPANCREATOGRAM;  Endoscopic Retrograde Cholangiopancreatogram ballon dilation stent exchange;  Surgeon: Guru Brittany Macias MD;  [facility]     ENDOSCOPIC RETROGRADE CHOLANGIOPANCREATOGRAPHY, EXCHANGE TUBE/STENT N/A 7/30/2015    Procedure: ENDOSCOPIC RETROGRADE CHOLANGIOPANCREATOGRAPHY, EXCHANGE TUBE/STENT;  Surgeon: Landon Quinones MD;  [facility]     ENDOSCOPIC RETROGRADE  CHOLANGIOPANCREATOGRAPHY, EXCHANGE TUBE/STENT N/A 5/15/2017    Procedure: ENDOSCOPIC RETROGRADE CHOLANGIOPANCREATOGRAPHY, EXCHANGE TUBE/STENT;  Endoscopic Retrograde Cholangiopancreatogram with biliary stent exchange and balloon dilation;  Surgeon: Guru Brittany Macias MD;  [facility]     ENDOSCOPIC RETROGRADE CHOLANGIOPANCREATOGRAPHY, EXCHANGE TUBE/STENT N/A 6/25/2018    Procedure: ENDOSCOPIC RETROGRADE CHOLANGIOPANCREATOGRAPHY, EXCHANGE TUBE/STENT;  Endoscopic Retrograde Cholangiopancreatogram with biliary dilation, stone removal and stent exchange;  Surgeon: Guru Brittany Macias MD;  [facility]     ENDOSCOPIC RETROGRADE CHOLANGIOPANCREATOGRAPHY, EXCHANGE TUBE/STENT N/A 7/30/2018    Procedure: ENDOSCOPIC RETROGRADE CHOLANGIOPANCREATOGRAPHY, EXCHANGE TUBE/STENT;  Endoscopic Retrograde Cholangiopancreatogram with Stent Exchange with dilation;  Surgeon: Guru Brittany Macias MD;  [facility]     ESOPHAGOSCOPY, GASTROSCOPY, DUODENOSCOPY (EGD), COMBINED N/A 2/26/2015    Procedure: COMBINED ESOPHAGOSCOPY, GASTROSCOPY, DUODENOSCOPY (EGD);  Surgeon: Mitchel Hoskins Chi, MD;  [facility]     EXPLORE COMMON BILE DUCT N/A 6/25/2015    Procedure: EXPLORE COMMON BILE DUCT;  Surgeon: Tyree Smith MD;  [facility]     LAPAROTOMY EXPLORATORY N/A 6/25/2015    Procedure: LAPAROTOMY EXPLORATORY;  Surgeon: Tyree Smith MD;  [facility]     PICC INSERTION Right 2/27/2015    4fr SL Valved PICC, 36cm (1cm external) in the R medial brachial vein w/ tip in the low SVC.     SIGMOIDOSCOPY FLEXIBLE N/A 4/27/2016    Procedure: SIGMOIDOSCOPY FLEXIBLE;  Surgeon: Jose Nielson MD;  [facility]     TRANSPLANT LIVER RECIPIENT LIVING UNRELATED N/A 6/18/2015    Procedure: TRANSPLANT LIVER RECIPIENT LIVING UNRELATED;  Surgeon: Tyree Smith MD;  [facility]     UPPER GI ENDOSCOPY       Social History   Substance Use Topics     Smoking status: Never Smoker      Smokeless tobacco: Never Used     Alcohol use 0.0 oz/week     0 Standard drinks or equivalent per week      Comment: Every other month has 1 drink     Family History   Problem Relation Age of Onset     Hypertension Mother      Lipids Mother      Hypertension Maternal Grandmother      Alzheimer Disease Maternal Grandmother      Lipids Father      Allergies   Allergen Reactions     Rifampin Other (See Comments)     Kidney failure     Penicillins      Hives, has tolerated amoxicillin        Outpatient Encounter Prescriptions as of 8/14/2018   Medication Sig Dispense Refill     budesonide (UCERIS) 9 MG 24 hr tablet Take 1 tablet (9 mg) by mouth every morning (Patient not taking: Reported on 6/19/2018) 30 tablet 3     calcium carbonate (OS-KERLINE 500 MG [place]. [place]) 1250 MG tablet Take 1 tablet by mouth every evening       GNP ASPIRIN LOW DOSE 81 MG EC tablet Take 1 tablet (81 mg) by mouth daily (Patient taking differently: Take 81 mg by mouth every morning ) 30 tablet 1     multivitamin, therapeutic with minerals (MULTI-VITAMIN) TABS tablet Take 1 tablet by mouth every morning       predniSONE (DELTASONE) 5 MG tablet Take 1 tablet (5 mg) by mouth daily (Patient taking differently: Take 5 mg by mouth every morning ) 225 tablet 3     sulfamethoxazole-trimethoprim (BACTRIM DS/SEPTRA DS) 800-160 MG per tablet Take 1 tablet by mouth three times a week Monday, Wednesday, Friday 90 tablet 3     tacrolimus (GENERIC EQUIVALENT) 0.5 MG capsule Take 1 capsule (0.5 mg) by mouth 2 times daily Take with 1 mg capsule. Total dose 2.5 mg every 12 hours (Patient taking differently: Take 2.5 mg by mouth 2 times daily Take with 1 mg capsule. Total dose 2.5 mg every 12 hours) 180 capsule 3     tacrolimus (GENERIC EQUIVALENT) 1 MG capsule Take 2 capsules (2 mg) by mouth 2 times daily *total dose 2.5 mg twice daily (Patient taking differently: Take 2.5 mg by mouth 2 times daily *total dose 2.5 mg twice daily) 120 capsule 11     ursodiol (ACTIGALL)  250 MG tablet Take 2 tabs (500 mg) in AM, and 1 tab at night (250 mg) 270 tablet 3     Vedolizumab (ENTYVIO IV) Inject 300 mg into the vein every 28 days       VITAMIN D, CHOLECALCIFEROL, PO Take by mouth every evening       No facility-administered encounter medications on file as of 8/14/2018.       NSAID  NO    Review of Systems  Complete 10 System ROS performed. All are negative except as documented below, in the HPI, or in patient questionnaire from today's visit.    1) Constitutional: No fevers, chills, night sweats or malaise, weight loss or gain  2) Skin: No rash  3) Pulmonary: No wheeze, SOB, cough, sputum or hemoptysis  4) Cardiovascular: No Chest pain or palpitations  5) Genitourinary: No blood in urine or dysuria  6) Endocrine: No increased sweating, hunger, thirst or thyroid problems  7) Hematologic: No bruising and easy bleeding  8) Musculoskeletal: no new pain in joints or limitation in ROM  9) Neurologic: No dizziness, paresthesias or weakness or falls  10) Psychiatric:  not depressed/anxious, no sleep problems    PHYSICAL EXAM  Vitals reviewed and stable.   Gen: NAD  HEENT: EOMI, anicteric  Neck: supple  Lymph: no cervical LAD  CV: RRR  Pulm: non labored  Abd: soft, non tender, non distended, well healed surgical scars  Skin: no rash, no jaundice  MSK: normal gait  Neuro: AAOX3  Psych: normal affect    DATA:  Reviewed in detail past documentation, medications and prior workup available in electronic health records or through outside records.    PERTINENT STUDIES:  Most recent CBC:  WBC   Date Value Ref Range Status   07/30/2018 11.9 (H) 4.0 - 11.0 10e9/L Final     Hemoglobin   Date Value Ref Range Status   07/30/2018 12.9 11.7 - 15.7 g/dL Final     Platelet Count   Date Value Ref Range Status   07/30/2018 544 (H) 150 - 450 10e9/L Final   Most recent coag:  INR   Date Value Ref Range Status   05/31/2018 0.95 0.86 - 1.14 Final   Most recent hepatic panel:  AST   Date Value Ref Range Status    07/30/2018 30 0 - 45 U/L Final     ALT   Date Value Ref Range Status   07/30/2018 40 0 - 50 U/L Final     Bilirubin Conjugated   Date Value Ref Range Status   09/22/2006 0.0 0.0 - 0.3 mg/dL Final      Bilirubin Total   Date Value Ref Range Status   07/30/2018 0.2 0.2 - 1.3 mg/dL Final     Albumin   Date Value Ref Range Status   07/30/2018 3.0 (L) 3.4 - 5.0 g/dL Final     Alkaline Phosphatase   Date Value Ref Range Status   07/30/2018 164 (H) 40 - 150 U/L Final     Creatinine   Date Value Ref Range Status   07/30/2018 1.01 0.52 - 1.04 mg/dL Final     Tuberculosis: Negative 12/5/16    DRUG MONITORING  TPMT enzyme activity: 18.5 12/6/16    6-TGN/6-MMPN levels: 203/257 12/6/16    Biologic concentration:  4/18/17 Vedolizumab level 34.1, no ATI  5/2/17 Vedolizumab level 3.9, no ATI (decreased interval to q4 week dosing)  10/25/17 Vedolizumab level 11, no ATI (no change in therapy-- met with surgeon to discuss what surgical options might look like)    Endoscopy:   Earliest colonoscopies in 2006 show right greater than left inflammation with some inflammation in the ileum as well.    Followup colonoscopy in 2007 showed mainly diffuse colonic inflammation.    Colonoscopies in 2015 again showed more chronic colitis with no significant ileal involvement.    Colonoscopies in 3/2016 again showed a right-sided and some ileal inflammation with ulceration, but biopsy showed CMV.    Colonoscopy 11/2016 showed severe inflammation from 20 cm from the anus all of the way to the ileum as far as could be evaluated.  Biopsies showed chronic active ileitis and chronic active colitis in every sample.  There was no evidence of CMV or PTLD. Rare SHENA-KACIE positive cells seen in ileum.  Colonoscopy 9/2017 showed Abad 2 inflammation in the right colon and abad one in the left colon with relative rectal sparing.  Patulous IC valve with some backwash ileitis.  Biopsies negative for lymphoma, CMP and EBV biopsies with mild  chronic active ileitis  and mild chronic active colitis throughout, including rectum.    Imaging:  No pertinent bowel imaging to review.    IMPRESSION (written by Sophy Mckeon MD):  Ms. Penny is a 33 year old year old here with PSC-associated IBD with a desire for pregnancy.     We had a long, in depth discussion about pregnancy outcomes in IBD and colectomy. I emphasized the importance of being in clinical and endoscopic remission at conception, because this is predictive of outcomes in pregnancy and baby.   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 If she has persistent endoscopic evidence of disease, then the safest therapeutic option is likely colectomy. Of course, I defer to Dr. Johnson regarding this decision. I did talk to Yarelis about the impact of colectomy on fertility and pregnancy. Rectal dissection is associated with increased infertility, most likely via pelvic adhesion formation. Therefore, a reasonable option would be to perform an abdominal colectomy and defer rectal dissection until after family planning is complete. If proctectomy is needed sooner (e.g. active inflammation, although less likely given rectal sparing in her), then a laparoscopic or robotic approach may be most protective against adhesion formation. If there is difficulty getting pregnant after rectal dissection, in vitro fertilization would be a good option.   All questions were answered. Yarelis will think about her options and will discuss with her , Ceferino.     IBD Health Care Maintenance:  Currently managed by Dr Johnson.     Return if symptoms worsen or fail to improve.     cc Dr. Alex Mckeon MD   of Medicine  Division of Gastroenterology, Hepatology and Nutrition  [facility]    August 14, 2018    Thank you for this consultation.  It was a pleasure to participate in the care of this patient;  please contact us with any further questions.  I spent a total of 60 minutes, face to face, was spent with this patient, >50% of which was counseling regarding the above delineated issues.      I performed a history and physical examination of the above patient and discussed the management with Dr. Barakat on 8/14/2018. I reviewed the note and there are no changes to the past medical, family or social history.  A complete 10 point review of systems was obtained. Please see the HPI for pertinent positives and negatives. All other systems were reviewed and were found to be negative.  I wrote the assessment and plan.      Again, thank you for allowing me to participate in the care of your patient.      Sincerely,    Sophy Mckeon MD

## 2018-08-14 NOTE — PROGRESS NOTES
[facility] NEW       PATIENT: Yarelis Penny    MRN: [id number]    Date of Birth 1985    Tel: [phone] (home)     PCP: No Ref-Primary, Physician     HPI: Ms. Penny is a 33 year old year old female here for preconception counseling in the setting of PSC associated IBD treated with vedolizumab.  She also has PSC/AIH and is status post living donor liver transplant 2015, currently on tacrolimus and prednisone, complicated by anastomotic bile leak with anastomotic strictures status post multiple ERCPs.  Dr. Fuentes Johnson is managing her IBD, Dr. Daniela Enriquez is managing her liver transplant and Dr. Guru Macias is managing her biliary stricture.    Has been doing relatively well as of recently while on vedolizumab.  Required hospitalization in 2016 for a severe flare.  She has been on maximum dose of vedolizumab but still had active inflammation, somewhat discordant from clinical symptoms, and has been on Uceris until spring of this year, in addition to chronic low-dose prednisone for liver transplant.    Noteworthy diet history- healthy diet in general    UC history  Age at diagnosis: 18  Macroscopic extent of disease varied over the years, most recently ileocolonic involvement with rectal sparing    Current UC symptoms  Bowel frequency in day 1 formed brown bowel movement per day  Bowel frequency in night none  Urgency of defecation none  Blood in stool none  General well being 1 = slightly below average  Extracolonic features (multiple select) PSC/AIH    Constitutional symptoms:  Fever NO  Weight loss NO    Other GI symptoms present none    Total number of IBD surgeries (except perianal): none    Remaining bowel: entire   Small bowel entire  Ileocecal valve present Yes-patulous at last colonoscopy with suggestion of backwash ileitis  Current Pouch  NO  Current Stoma  NO    Current IBD Medications:  -Vedolizumab 400 mg every 4 weeks, prednisone 5 mg daily    Past IBD Medications:  Prednisone, Pentasa, Asacol, azathioprine (mainly for liver disease), Uceris- stopped March 2018    Past Medical History:   Diagnosis Date     Autoimmune hepatitis (H) 2012    on steroid taper     Cholangitis      CKD (chronic kidney disease) 2012    biopsy 2012: TIN, patchy fibrosis     Esophageal varices (H) 9/08    banded     Heart murmur      History of blood transfusion      Primary sclerosing cholangitis      Ulcerative Colitis     f/b GI      Past Surgical History:   Procedure Laterality Date     COLONOSCOPY  9/07     COLONOSCOPY N/A 2/26/2015    Procedure: COMBINED COLONOSCOPY, SINGLE OR MULTIPLE BIOPSY/POLYPECTOMY BY BIOPSY;  Surgeon: Mitchel Hoskins Chi, MD;  Location: [facility]     COLONOSCOPY N/A 1/12/2016    Procedure: COMBINED COLONOSCOPY, SINGLE OR MULTIPLE BIOPSY/POLYPECTOMY BY BIOPSY;  Surgeon: Rigo Valles MD;  Location: [facility]     COLONOSCOPY N/A 4/1/2016    Procedure: COMBINED COLONOSCOPY, SINGLE OR MULTIPLE BIOPSY/POLYPECTOMY BY BIOPSY;  Surgeon: Kareem Solis MD;  Location: [facility]     COLONOSCOPY N/A 9/5/2017    Procedure: COMBINED COLONOSCOPY, SINGLE OR MULTIPLE BIOPSY/POLYPECTOMY BY BIOPSY;  Colonoscopy;  Surgeon: Fuentes Johnson MD;  Location: [facility]     ENDOSCOPIC RETROGRADE CHOLANGIOPANCREATOGRAM N/A 6/25/2015    Procedure: COMBINED ENDOSCOPIC RETROGRADE CHOLANGIOPANCREATOGRAPHY, PLACE TUBE/STENT;  Surgeon: Landon Quinones MD;  [facility]     ENDOSCOPIC RETROGRADE CHOLANGIOPANCREATOGRAM N/A 6/25/2015    Procedure: COMBINED ENDOSCOPIC RETROGRADE CHOLANGIOPANCREATOGRAPHY, PLACE TUBE/STENT;  Surgeon: Landon Quinones MD;  [facility]     ENDOSCOPIC RETROGRADE CHOLANGIOPANCREATOGRAM N/A 7/2/2015    Procedure: COMBINED ENDOSCOPIC RETROGRADE CHOLANGIOPANCREATOGRAPHY, PLACE TUBE/STENT;  Surgeon: Landon Quinones MD;  [facility]     ENDOSCOPIC RETROGRADE CHOLANGIOPANCREATOGRAM N/A 9/8/2015    Procedure: COMBINED ENDOSCOPIC RETROGRADE  CHOLANGIOPANCREATOGRAPHY, REMOVE FOREIGN BODY OR STENT/TUBE;  Surgeon: Landon Quinones MD;  [facility]     ENDOSCOPIC RETROGRADE CHOLANGIOPANCREATOGRAM N/A 12/8/2015    Procedure: ENDOSCOPIC RETROGRADE CHOLANGIOPANCREATOGRAM;  Surgeon: Landon Quinones MD;  [facility]     ENDOSCOPIC RETROGRADE CHOLANGIOPANCREATOGRAM N/A 3/1/2016    Procedure: COMBINED ENDOSCOPIC RETROGRADE CHOLANGIOPANCREATOGRAPHY, REMOVE FOREIGN BODY OR STENT/TUBE;  Surgeon: Landon Quinones MD;  [facility]     ENDOSCOPIC RETROGRADE CHOLANGIOPANCREATOGRAM N/A 3/20/2017    Procedure: COMBINED ENDOSCOPIC RETROGRADE CHOLANGIOPANCREATOGRAPHY, PLACE TUBE/STENT;  Endoscopic Retrograde Cholangiopancreatogram with Ballon Dilation, Stent Placement;  Surgeon: Guru Brittany Macias MD;  [facility]     ENDOSCOPIC RETROGRADE CHOLANGIOPANCREATOGRAM N/A 4/2/2018    Procedure: COMBINED ENDOSCOPIC RETROGRADE CHOLANGIOPANCREATOGRAPHY, PLACE TUBE/STENT;  Endoscopic Retrograde Cholangiopancreatogram with biliary dilation and stent placement;  Surgeon: Guru Brittany Macias MD;  [facility]     ENDOSCOPIC RETROGRADE CHOLANGIOPANCREATOGRAM N/A 5/7/2018    Procedure: ENDOSCOPIC RETROGRADE CHOLANGIOPANCREATOGRAM;  Endoscopic Retrograde Cholangiopancreatogram ballon dilation stent exchange;  Surgeon: Guru Brittany Macias MD;  [facility]     ENDOSCOPIC RETROGRADE CHOLANGIOPANCREATOGRAPHY, EXCHANGE TUBE/STENT N/A 7/30/2015    Procedure: ENDOSCOPIC RETROGRADE CHOLANGIOPANCREATOGRAPHY, EXCHANGE TUBE/STENT;  Surgeon: Landon Quinones MD;  [facility]     ENDOSCOPIC RETROGRADE CHOLANGIOPANCREATOGRAPHY, EXCHANGE TUBE/STENT N/A 5/15/2017    Procedure: ENDOSCOPIC RETROGRADE CHOLANGIOPANCREATOGRAPHY, EXCHANGE TUBE/STENT;  Endoscopic Retrograde Cholangiopancreatogram with biliary stent exchange and balloon dilation;  Surgeon: Guru Brittany Macias MD;  Location:   OR     ENDOSCOPIC RETROGRADE CHOLANGIOPANCREATOGRAPHY, EXCHANGE TUBE/STENT N/A 6/25/2018    Procedure: ENDOSCOPIC RETROGRADE CHOLANGIOPANCREATOGRAPHY, EXCHANGE TUBE/STENT;  Endoscopic Retrograde Cholangiopancreatogram with biliary dilation, stone removal and stent exchange;  Surgeon: Guru Brittany Macias MD;  [facility]     ENDOSCOPIC RETROGRADE CHOLANGIOPANCREATOGRAPHY, EXCHANGE TUBE/STENT N/A 7/30/2018    Procedure: ENDOSCOPIC RETROGRADE CHOLANGIOPANCREATOGRAPHY, EXCHANGE TUBE/STENT;  Endoscopic Retrograde Cholangiopancreatogram with Stent Exchange with dilation;  Surgeon: Guru Brittany Macias MD;  [facility]     ESOPHAGOSCOPY, GASTROSCOPY, DUODENOSCOPY (EGD), COMBINED N/A 2/26/2015    Procedure: COMBINED ESOPHAGOSCOPY, GASTROSCOPY, DUODENOSCOPY (EGD);  Surgeon: Mitchel Hoskins Chi, MD;  [facility]     EXPLORE COMMON BILE DUCT N/A 6/25/2015    Procedure: EXPLORE COMMON BILE DUCT;  Surgeon: Tyree Smith MD;  [facility]     LAPAROTOMY EXPLORATORY N/A 6/25/2015    Procedure: LAPAROTOMY EXPLORATORY;  Surgeon: Tyree Smith MD;  [facility]     PICC INSERTION Right 2/27/2015    4fr SL Valved PICC, 36cm (1cm external) in the R medial brachial vein w/ tip in the low SVC.     SIGMOIDOSCOPY FLEXIBLE N/A 4/27/2016    Procedure: SIGMOIDOSCOPY FLEXIBLE;  Surgeon: Jose Nielson MD;  [facility]: [facility]     TRANSPLANT LIVER RECIPIENT LIVING UNRELATED N/A 6/18/2015    Procedure: TRANSPLANT LIVER RECIPIENT LIVING UNRELATED;  Surgeon: Tyree Smith MD;  [facility]     UPPER GI ENDOSCOPY       Social History   Substance Use Topics     Smoking status: Never Smoker     Smokeless tobacco: Never Used     Alcohol use 0.0 oz/week     0 Standard drinks or equivalent per week      Comment: Every other month has 1 drink     Family History   Problem Relation Age of Onset     Hypertension Mother      Lipids Mother      Hypertension Maternal Grandmother      Alzheimer Disease  Maternal Grandmother      Lipids Father      Allergies   Allergen Reactions     Rifampin Other (See Comments)     Kidney failure     Penicillins      Hives, has tolerated amoxicillin        Outpatient Encounter Prescriptions as of 8/14/2018   Medication Sig Dispense Refill     budesonide (UCERIS) 9 MG 24 hr tablet Take 1 tablet (9 mg) by mouth every morning (Patient not taking: Reported on 6/19/2018) 30 tablet 3     calcium carbonate (OS-KERLINE 500 MG [place]. [place]) 1250 MG tablet Take 1 tablet by mouth every evening       GNP ASPIRIN LOW DOSE 81 MG EC tablet Take 1 tablet (81 mg) by mouth daily (Patient taking differently: Take 81 mg by mouth every morning ) 30 tablet 1     multivitamin, therapeutic with minerals (MULTI-VITAMIN) TABS tablet Take 1 tablet by mouth every morning       predniSONE (DELTASONE) 5 MG tablet Take 1 tablet (5 mg) by mouth daily (Patient taking differently: Take 5 mg by mouth every morning ) 225 tablet 3     sulfamethoxazole-trimethoprim (BACTRIM DS/SEPTRA DS) 800-160 MG per tablet Take 1 tablet by mouth three times a week Monday, Wednesday, Friday 90 tablet 3     tacrolimus (GENERIC EQUIVALENT) 0.5 MG capsule Take 1 capsule (0.5 mg) by mouth 2 times daily Take with 1 mg capsule. Total dose 2.5 mg every 12 hours (Patient taking differently: Take 2.5 mg by mouth 2 times daily Take with 1 mg capsule. Total dose 2.5 mg every 12 hours) 180 capsule 3     tacrolimus (GENERIC EQUIVALENT) 1 MG capsule Take 2 capsules (2 mg) by mouth 2 times daily *total dose 2.5 mg twice daily (Patient taking differently: Take 2.5 mg by mouth 2 times daily *total dose 2.5 mg twice daily) 120 capsule 11     ursodiol (ACTIGALL) 250 MG tablet Take 2 tabs (500 mg) in AM, and 1 tab at night (250 mg) 270 tablet 3     Vedolizumab (ENTYVIO IV) Inject 300 mg into the vein every 28 days       VITAMIN D, CHOLECALCIFEROL, PO Take by mouth every evening       No facility-administered encounter medications on file as of 8/14/2018.        NSAID  NO    Review of Systems  Complete 10 System ROS performed. All are negative except as documented below, in the HPI, or in patient questionnaire from today's visit.    1) Constitutional: No fevers, chills, night sweats or malaise, weight loss or gain  2) Skin: No rash  3) Pulmonary: No wheeze, SOB, cough, sputum or hemoptysis  4) Cardiovascular: No Chest pain or palpitations  5) Genitourinary: No blood in urine or dysuria  6) Endocrine: No increased sweating, hunger, thirst or thyroid problems  7) Hematologic: No bruising and easy bleeding  8) Musculoskeletal: no new pain in joints or limitation in ROM  9) Neurologic: No dizziness, paresthesias or weakness or falls  10) Psychiatric:  not depressed/anxious, no sleep problems    PHYSICAL EXAM  Vitals reviewed and stable.   Gen: NAD  HEENT: EOMI, anicteric  Neck: supple  Lymph: no cervical LAD  CV: RRR  Pulm: non labored  Abd: soft, non tender, non distended, well healed surgical scars  Skin: no rash, no jaundice  MSK: normal gait  Neuro: AAOX3  Psych: normal affect    DATA:  Reviewed in detail past documentation, medications and prior workup available in electronic health records or through outside records.    PERTINENT STUDIES:  Most recent CBC:  WBC   Date Value Ref Range Status   07/30/2018 11.9 (H) 4.0 - 11.0 10e9/L Final     Hemoglobin   Date Value Ref Range Status   07/30/2018 12.9 11.7 - 15.7 g/dL Final     Platelet Count   Date Value Ref Range Status   07/30/2018 544 (H) 150 - 450 10e9/L Final   Most recent coag:  INR   Date Value Ref Range Status   05/31/2018 0.95 0.86 - 1.14 Final   Most recent hepatic panel:  AST   Date Value Ref Range Status   07/30/2018 30 0 - 45 U/L Final     ALT   Date Value Ref Range Status   07/30/2018 40 0 - 50 U/L Final     Bilirubin Conjugated   Date Value Ref Range Status   09/22/2006 0.0 0.0 - 0.3 mg/dL Final      Bilirubin Total   Date Value Ref Range Status   07/30/2018 0.2 0.2 - 1.3 mg/dL Final     Albumin   Date Value  Ref Range Status   07/30/2018 3.0 (L) 3.4 - 5.0 g/dL Final     Alkaline Phosphatase   Date Value Ref Range Status   07/30/2018 164 (H) 40 - 150 U/L Final     Creatinine   Date Value Ref Range Status   07/30/2018 1.01 0.52 - 1.04 mg/dL Final     Tuberculosis: Negative 12/5/16    DRUG MONITORING  TPMT enzyme activity: 18.5 12/6/16    6-TGN/6-MMPN levels: 203/257 12/6/16    Biologic concentration:  4/18/17 Vedolizumab level 34.1, no ATI  5/2/17 Vedolizumab level 3.9, no ATI (decreased interval to q4 week dosing)  10/25/17 Vedolizumab level 11, no ATI (no change in therapy-- met with surgeon to discuss what surgical options might look like)    Endoscopy:   Earliest colonoscopies in 2006 show right greater than left inflammation with some inflammation in the ileum as well.    Followup colonoscopy in 2007 showed mainly diffuse colonic inflammation.    Colonoscopies in 2015 again showed more chronic colitis with no significant ileal involvement.    Colonoscopies in 3/2016 again showed a right-sided and some ileal inflammation with ulceration, but biopsy showed CMV.    Colonoscopy 11/2016 showed severe inflammation from 20 cm from the anus all of the way to the ileum as far as could be evaluated.  Biopsies showed chronic active ileitis and chronic active colitis in every sample.  There was no evidence of CMV or PTLD. Rare SHENA-KACIE positive cells seen in ileum.  Colonoscopy 9/2017 showed Abad 2 inflammation in the right colon and abad one in the left colon with relative rectal sparing.  Patulous IC valve with some backwash ileitis.  Biopsies negative for lymphoma, CMP and EBV biopsies with mild  chronic active ileitis and mild chronic active colitis throughout, including rectum.    Imaging:  No pertinent bowel imaging to review.    IMPRESSION (written by Sophy Mckeon MD):  Ms. Penny is a 33 year old year old here with PSC-associated IBD with a desire for pregnancy.     We had a long, in depth discussion about pregnancy  outcomes in IBD and colectomy. I emphasized the importance of being in clinical and endoscopic remission at conception, because this is predictive of outcomes in pregnancy and baby.   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 If she has persistent endoscopic evidence of disease, then the safest therapeutic option is likely colectomy. Of course, I defer to Dr. Johnson regarding this decision. I did talk to Yarelis about the impact of colectomy on fertility and pregnancy. Rectal dissection is associated with increased infertility, most likely via pelvic adhesion formation. Therefore, a reasonable option would be to perform an abdominal colectomy and defer rectal dissection until after family planning is complete. If proctectomy is needed sooner (e.g. active inflammation, although less likely given rectal sparing in her), then a laparoscopic or robotic approach may be most protective against adhesion formation. If there is difficulty getting pregnant after rectal dissection, in vitro fertilization would be a good option.   All questions were answered. Yarelis will think about her options and will discuss with her , Ceferino.     IBD Health Care Maintenance:  Currently managed by Dr Johnson.     Return if symptoms worsen or fail to improve.     cc Dr. Alex Mckeon MD   of Medicine  Division of Gastroenterology, Hepatology and Nutrition  [facility]    August 14, 2018    Thank you for this consultation.  It was a pleasure to participate in the care of this patient; please contact us with any further questions.  I spent a total of 60 minutes, face to face, was spent with this patient, >50% of which was counseling regarding the above delineated issues.    Answers for HPI/ROS submitted by the patient on 8/13/2018   General Symptoms: No  Skin Symptoms: No  HENT Symptoms: No  EYE  SYMPTOMS: No  HEART SYMPTOMS: No  LUNG SYMPTOMS: No  INTESTINAL SYMPTOMS: No  URINARY SYMPTOMS: No  GYNECOLOGIC SYMPTOMS: No  BREAST SYMPTOMS: No  SKELETAL SYMPTOMS: No  BLOOD SYMPTOMS: No  NERVOUS SYSTEM SYMPTOMS: No  MENTAL HEALTH SYMPTOMS: No

## 2018-08-21 NOTE — PROGRESS NOTES
I performed a history and physical examination of the above patient and discussed the management with Dr. Barakat on 8/14/2018. I reviewed the note and there are no changes to the past medical, family or social history.  A complete 10 point review of systems was obtained. Please see the HPI for pertinent positives and negatives. All other systems were reviewed and were found to be negative.  I wrote the assessment and plan.      Sophy Mckeon MD  GI Attending  Pager: [phone]

## 2018-09-04 ENCOUNTER — INFUSION THERAPY VISIT (OUTPATIENT)
Dept: INFUSION THERAPY | Facility: CLINIC | Age: 33
End: 2018-09-04
Attending: INTERNAL MEDICINE
Payer: COMMERCIAL

## 2018-09-04 VITALS
DIASTOLIC BLOOD PRESSURE: 88 MMHG | WEIGHT: 112.7 LBS | HEART RATE: 70 BPM | BODY MASS INDEX: 20.61 KG/M2 | TEMPERATURE: 97.2 F | SYSTOLIC BLOOD PRESSURE: 137 MMHG | RESPIRATION RATE: 16 BRPM | OXYGEN SATURATION: 99 %

## 2018-09-04 DIAGNOSIS — E86.0 DEHYDRATION: Primary | ICD-10-CM

## 2018-09-04 DIAGNOSIS — D50.8 OTHER IRON DEFICIENCY ANEMIA: ICD-10-CM

## 2018-09-04 DIAGNOSIS — A08.39 CMV COLITIS (H): ICD-10-CM

## 2018-09-04 DIAGNOSIS — B25.9 CMV COLITIS (H): ICD-10-CM

## 2018-09-04 LAB
ALBUMIN SERPL-MCNC: 3.4 G/DL (ref 3.4–5)
ALP SERPL-CCNC: 194 U/L (ref 40–150)
ALT SERPL W P-5'-P-CCNC: 58 U/L (ref 0–50)
AST SERPL W P-5'-P-CCNC: 40 U/L (ref 0–45)
BASOPHILS # BLD AUTO: 0.1 10E9/L (ref 0–0.2)
BASOPHILS NFR BLD AUTO: 0.7 %
BILIRUB DIRECT SERPL-MCNC: <0.1 MG/DL (ref 0–0.2)
BILIRUB SERPL-MCNC: 0.3 MG/DL (ref 0.2–1.3)
CRP SERPL-MCNC: <2.9 MG/L (ref 0–8)
DIFFERENTIAL METHOD BLD: ABNORMAL
EOSINOPHIL # BLD AUTO: 0.9 10E9/L (ref 0–0.7)
EOSINOPHIL NFR BLD AUTO: 5.4 %
ERYTHROCYTE [DISTWIDTH] IN BLOOD BY AUTOMATED COUNT: 14.1 % (ref 10–15)
ERYTHROCYTE [SEDIMENTATION RATE] IN BLOOD BY WESTERGREN METHOD: 25 MM/H (ref 0–20)
HCT VFR BLD AUTO: 45.9 % (ref 35–47)
HGB BLD-MCNC: 14.5 G/DL (ref 11.7–15.7)
IMM GRANULOCYTES # BLD: 0 10E9/L (ref 0–0.4)
IMM GRANULOCYTES NFR BLD: 0.3 %
LYMPHOCYTES # BLD AUTO: 6.1 10E9/L (ref 0.8–5.3)
LYMPHOCYTES NFR BLD AUTO: 39.1 %
MCH RBC QN AUTO: 27.7 PG (ref 26.5–33)
MCHC RBC AUTO-ENTMCNC: 31.6 G/DL (ref 31.5–36.5)
MCV RBC AUTO: 88 FL (ref 78–100)
MONOCYTES # BLD AUTO: 1.1 10E9/L (ref 0–1.3)
MONOCYTES NFR BLD AUTO: 7.2 %
NEUTROPHILS # BLD AUTO: 7.4 10E9/L (ref 1.6–8.3)
NEUTROPHILS NFR BLD AUTO: 47.3 %
NRBC # BLD AUTO: 0 10*3/UL
NRBC BLD AUTO-RTO: 0 /100
PLATELET # BLD AUTO: 566 10E9/L (ref 150–450)
PLATELET # BLD EST: ABNORMAL 10*3/UL
PROT SERPL-MCNC: 8.7 G/DL (ref 6.8–8.8)
RBC # BLD AUTO: 5.23 10E12/L (ref 3.8–5.2)
WBC # BLD AUTO: 15.6 10E9/L (ref 4–11)

## 2018-09-04 PROCEDURE — 96365 THER/PROPH/DIAG IV INF INIT: CPT

## 2018-09-04 PROCEDURE — 25000128 H RX IP 250 OP 636: Mod: ZF | Performed by: INTERNAL MEDICINE

## 2018-09-04 PROCEDURE — 85652 RBC SED RATE AUTOMATED: CPT | Performed by: INTERNAL MEDICINE

## 2018-09-04 PROCEDURE — 85025 COMPLETE CBC W/AUTO DIFF WBC: CPT | Performed by: INTERNAL MEDICINE

## 2018-09-04 PROCEDURE — 80076 HEPATIC FUNCTION PANEL: CPT | Performed by: INTERNAL MEDICINE

## 2018-09-04 PROCEDURE — 86140 C-REACTIVE PROTEIN: CPT | Performed by: INTERNAL MEDICINE

## 2018-09-04 RX ADMIN — VEDOLIZUMAB 300 MG: 300 INJECTION, POWDER, LYOPHILIZED, FOR SOLUTION INTRAVENOUS at 08:32

## 2018-09-04 NOTE — PROGRESS NOTES

## 2018-09-04 NOTE — MR AVS SNAPSHOT
After Visit Summary   9/4/2018    Yarelis Penny    MRN: [id number]           Patient Information     Date Of Birth          1985        Visit Information        Provider Department      9/4/2018 8:00 AM UC 51 ATC; UC SPEC [facility]        Today's Diagnoses     Dehydration    -  1    CMV colitis (H)        Other iron deficiency anemia          Care Instructions    Dear Yarelis Penny    Thank you for choosing [facility] ([facility]) for your Entyvio infusion.  The following information is a summary of our appointment as well as important reminders.      You received your entyvio infusion today and we ifeanyi your labs today    EDUCATION POST BIOLOGICAL/CHEMOTHERAPY INFUSION  Call the triage nurse at your clinic or seek medical attention if you have chills and/or temperature greater than or equal to 100.5, uncontrolled nausea/vomiting, diarrhea, constipation, dizziness, shortness of breath, chest pain, heart palpitations, weakness or any other new or concerning symptoms, questions or concerns.  You can not have any live virus vaccines prior to or during treatment or up to 6 months post infusion.  If you have an upcoming surgery, medical procedure or dental procedure during treatment, this should be discussed with your ordering physician and your surgeon/dentist.  If you are having any concerning symptom, if you are unsure if you should get your next infusion or wish to speak to a provider before your next infusion, please call your care coordinator or triage nurse at your clinic to notify them so we can adequately serve you.  We look forward in seeing you on your next appointment here at [facility].  Please don t hesitate to call us at [phone] to reschedule any of your appointments or to speak with one of the [facility] registered nurses.  It was a pleasure taking care of you  today.    Sincerely,    [facility]  Specialty Infusion [facility] Procedure Center  [address] [place]  [address]  Phone:  [phone]            Follow-ups after your visit        Your next 10 appointments already scheduled     Sep 12, 2018  1:00 PM CDT   Colonoscopy with Fuentes Johnson MD   [facility] ([facility])    [address]  [address]  [facility] [id number] [phone]            Sep 26, 2018   Procedure with Guru Brittany Macias MD   [facility] [place], Same Day Surgery (--)    [address] [id number] [phone]            Oct 02, 2018  8:00 AM CDT   Infusion 180 with UC SPEC INFUSION, UC 51 ATC   [facility] ([facility])    [address]  [address]  [facility] [id number] [phone]            Oct 30, 2018  9:00 AM CDT   Infusion 180 with UC SPEC INFUSION, UC 47 ATC   [facility] ([facility])    [address]  [address]  [facility] [id number] [phone]            Nov 27, 2018  8:00 AM CST   Infusion 180 with UC SPEC INFUSION, UC 51 ATC   [facility] ([facility])    [address]  [address]  [facility] [id number] [phone]            Dec 05, 2018  8:00 AM CST   (Arrive by 7:45 AM)   Return Visit with Joya Lopez MD   [facility] ([facility])    [address] [place] [address]  [address]  [facility] [id number] [phone]            Dec 18, 2018 10:30 AM CST   Lab with UC LAB   [facility] ([facility])    [address] [place] [address]  1st Floor  [facility] [id number] [phone]              Future tests that were ordered for you today     Open  Standing Orders        Priority Remaining Interval Expires Ordered    Notify Physician Routine [id number] PRN  9/4/2018            Who to contact     If you have questions or need follow up information about today's clinic visit or your schedule please contact [facility] directly at [phone].  Normal or non-critical lab and imaging results will be communicated to you by MyChart, letter or phone within 4 business days after the clinic has received the results. If you do not hear from us within 7 days, please contact the clinic through [employer] or phone. If you have a critical or abnormal lab result, we will notify you by phone as soon as possible.  Submit refill requests through [employer] or call your pharmacy and they will forward the refill request to us. Please allow 3 business days for your refill to be completed.          Additional Information About Your Visit        MyChart Information     [employer] gives you secure access to your electronic health record. If you see a primary care provider, you can also send messages to your care team and make appointments. If you have questions, please call your primary care clinic.  If you do not have a primary care provider, please call [phone] and they will assist you.        Care EveryWhere ID     This is your Care EveryWhere ID. This could be used by other organizations to access your [place] medical records  [id number]        Your Vitals Were     Pulse Temperature Respirations Pulse Oximetry BMI (Body Mass Index)       70 97.2  F (36.2  C) 16 99% 20.61 kg/m2        Blood Pressure from Last 3 Encounters:   09/04/18 137/88   08/06/18 110/81   07/30/18 (!) 119/91    Weight from Last 3 Encounters:   09/04/18 51.1 kg (112 lb 11.2 oz)   07/30/18 51.2 kg (112 lb 14 oz)   06/25/18 49 kg (108 lb 0.4 oz)              We Performed the Following     CBC with platelets differential     CRP inflammation     Erythrocyte  sedimentation rate auto     Hepatic panel          Today's Medication Changes          These changes are accurate as of 9/4/18 12:30 PM.  If you have any questions, ask your nurse or doctor.               These medicines have changed or have updated prescriptions.        Dose/Directions    GNP ASPIRIN LOW DOSE 81 MG EC tablet   This may have changed:  when to take this   Used for:  Ulcerative colitis with other complication, unspecified location (H), Liver replaced by transplant (H)   Generic drug:  aspirin        Dose:  81 mg   Take 1 tablet (81 mg) by mouth daily   Quantity:  30 tablet   Refills:  1       predniSONE 5 MG tablet   Commonly known as:  DELTASONE   This may have changed:  when to take this   Used for:  UC (ulcerative colitis) (H), Liver replaced by transplant (H)        Dose:  5 mg   Take 1 tablet (5 mg) by mouth daily   Quantity:  225 tablet   Refills:  3       * tacrolimus 1 MG capsule   Commonly known as:  GENERIC EQUIVALENT   This may have changed:    - how much to take  - additional instructions   Used for:  S/P liver transplant (H)        Dose:  2 mg   Take 2 capsules (2 mg) by mouth 2 times daily *total dose 2.5 mg twice daily   Quantity:  120 capsule   Refills:  11       * tacrolimus 0.5 MG capsule   Commonly known as:  GENERIC EQUIVALENT   This may have changed:    - how much to take  - additional instructions   Used for:  S/P liver transplant (H)        Dose:  0.5 mg   Take 1 capsule (0.5 mg) by mouth 2 times daily Take with 1 mg capsule. Total dose 2.5 mg every 12 hours   Quantity:  180 capsule   Refills:  3       * Notice:  This list has 2 medication(s) that are the same as other medications prescribed for you. Read the directions carefully, and ask your doctor or other care provider to review them with you.             Primary Care Provider Fax #    Physician No Ref-Primary [phone]       No address on file        Equal Access to Services     ERI CHAKRABORTY AH: Yandel hart  Marcelayari, josieda lubeckygonzalo, fredata kaarian mott, mani muñoz brittanyshannon marytriny laAbrahamlupe blake. So [facility] [phone].    ATENCIÓN: Si lilibeth haney, tiene a borja disposición servicios gratuitos de asistencia lingüística. Clyde al [phone].    We comply with applicable federal civil rights laws and [place] laws. We do not discriminate on the basis of race, color, national origin, age, disability, sex, sexual orientation, or gender identity.            Thank you!     Thank you for choosing [facility] PROCED[facility]  for your care. Our goal is always to provide you with excellent care. Hearing back from our patients is one way we can continue to improve our services. Please take a few minutes to complete the written survey that you may receive in the mail after your visit with us. Thank you!             Your Updated Medication List - Protect others around you: Learn how to safely use, store and throw away your medicines at www.disposemymeds.org.          This list is accurate as of 9/4/18 12:30 PM.  Always use your most recent med list.                   Brand Name Dispense Instructions for use Diagnosis    budesonide 9 MG 24 hr tablet    UCERIS    30 tablet    Take 1 tablet (9 mg) by mouth every morning    UC (ulcerative colitis) (H)       calcium carbonate 500 mg {elemental} 500 MG tablet    OS-KERLINE     Take 1 tablet by mouth every evening        ENTYVIO IV      Inject 300 mg into the vein every 28 days        GNP ASPIRIN LOW DOSE 81 MG EC tablet   Generic drug:  aspirin     30 tablet    Take 1 tablet (81 mg) by mouth daily    Ulcerative colitis with other complication, unspecified location (H), Liver replaced by transplant (H)       Multi-vitamin Tabs tablet      Take 1 tablet by mouth every morning        predniSONE 5 MG tablet    DELTASONE    225 tablet    Take 1 tablet (5 mg) by mouth daily    UC (ulcerative colitis) (H), Liver replaced by transplant (H)        sulfamethoxazole-trimethoprim 800-160 MG per tablet    BACTRIM DS/SEPTRA DS    90 tablet    Take 1 tablet by mouth three times a week Monday, Wednesday, Friday    Immunosuppression (H), Inflammatory bowel disease, PSC (primary sclerosing cholangitis)       * tacrolimus 1 MG capsule    GENERIC EQUIVALENT    120 capsule    Take 2 capsules (2 mg) by mouth 2 times daily *total dose 2.5 mg twice daily    S/P liver transplant (H)       * tacrolimus 0.5 MG capsule    GENERIC EQUIVALENT    180 capsule    Take 1 capsule (0.5 mg) by mouth 2 times daily Take with 1 mg capsule. Total dose 2.5 mg every 12 hours    S/P liver transplant (H)       ursodiol 250 MG tablet    ACTIGALL    270 tablet    Take 2 tabs (500 mg) in AM, and 1 tab at night (250 mg)    Liver replaced by transplant (H)       VITAMIN D (CHOLECALCIFEROL) PO      Take by mouth every evening        * Notice:  This list has 2 medication(s) that are the same as other medications prescribed for you. Read the directions carefully, and ask your doctor or other care provider to review them with you.

## 2018-09-04 NOTE — PATIENT INSTRUCTIONS
Dear Yarelis Penny    Thank you for choosing [facility] ([facility]) for your Entyvio infusion.  The following information is a summary of our appointment as well as important reminders.      You received your entyvio infusion today and we ifeanyi your labs today    EDUCATION POST BIOLOGICAL/CHEMOTHERAPY INFUSION  Call the triage nurse at your clinic or seek medical attention if you have chills and/or temperature greater than or equal to 100.5, uncontrolled nausea/vomiting, diarrhea, constipation, dizziness, shortness of breath, chest pain, heart palpitations, weakness or any other new or concerning symptoms, questions or concerns.  You can not have any live virus vaccines prior to or during treatment or up to 6 months post infusion.  If you have an upcoming surgery, medical procedure or dental procedure during treatment, this should be discussed with your ordering physician and your surgeon/dentist.  If you are having any concerning symptom, if you are unsure if you should get your next infusion or wish to speak to a provider before your next infusion, please call your care coordinator or triage nurse at your clinic to notify them so we can adequately serve you.  We look forward in seeing you on your next appointment here at [facility].  Please don t hesitate to call us at [phone] to reschedule any of your appointments or to speak with one of the [facility] registered nurses.  It was a pleasure taking care of you today.    Sincerely,    [facility]  Specialty Infusion & Procedure Center  [address] [place] [address]  [address]  Phone:  [phone]

## 2018-09-04 NOTE — PROGRESS NOTES
Nursing Note  Yarelis Penny [facility] for:   Chief Complaint   Patient presents with     Infusion     Entyvio      During today's [facility] appointment, orders from Dr. Fuentes Johnson were completed.  Frequency: monthly    Progress note:  Patient identification verified by name and date of birth.  Assessment completed.  Vitals recorded in Doc Flowsheets.  Patient was provided with education regarding infusion and possible side effects.  Patient verbalized understanding.      needed: No  Premedications: were not ordered.  Infusion Rates: 560 ml/hr.  Approximate Infusion length:30 minutes.   Labs: Were drawn per orders.   Vascular access: peripheral IV placed today.  Treatment Conditions: See note by Avis Gregg's RN note.   Patient tolerated infusion: well.    Administrations This Visit     vedolizumab (ENTYVIO) 300 mg in sodium chloride 0.9 % 280 mL infusion     Admin Date Action Dose Rate Route Administered By          09/04/2018 New Bag 300 mg 560 mL/hr Intravenous Avis Gregg, RN                         Drug Waste Record? No     Discharge Plan:   Follow up plan of care with: ongoing infusions at [facility].  Discharge instructions were reviewed with patient.  Patient/representative verbalized understanding of discharge instructions and all questions answered.  Patient discharged from [facility] in stable condition.    Pt declined printed AVS.    Adelia Mae RN         /86  Pulse 70  Temp 97.2  F (36.2  C)  Resp 16  Wt 51.1 kg (112 lb 11.2 oz)  SpO2 99%  BMI 20.61 kg/m2    EDUCATION POST BIOLOGICAL/CHEMOTHERAPY INFUSION  Call the triage nurse at your clinic or seek medical attention if you have chills and/or temperature greater than or equal to 100.5, uncontrolled nausea/vomiting, diarrhea, constipation, dizziness, shortness of breath, chest  pain, heart palpitations, weakness or any other new or concerning symptoms, questions or concerns.  You can not have any live virus vaccines prior to or during treatment or up to 6 months post infusion.  If you have an upcoming surgery, medical procedure or dental procedure during treatment, this should be discussed with your ordering physician and your surgeon/dentist.  If you are having any concerning symptom, if you are unsure if you should get your next infusion or wish to speak to a provider before your next infusion, please call your care coordinator or triage nurse at your clinic to notify them so we can adequately serve you.

## 2018-09-07 ENCOUNTER — TELEPHONE (OUTPATIENT)
Dept: GASTROENTEROLOGY | Facility: OUTPATIENT CENTER | Age: 33
End: 2018-09-07

## 2018-09-07 NOTE — TELEPHONE ENCOUNTER
Patient taking any blood thinners ? 81 mg aspirin    Heart disease ? Murmur    Lung disease ? denies      Sleep apnea ? denies    Diabetic ? denies    Kidney disease ? denies    Dialysis ? n/a    Electronic implanted medical devices ? denies    Are you taking any narcotic pain medication ? No   What is your daily dosage ?    PTSD ? n/a    Prep instructions reviewed with patient ? Patient declined review.  policy, MAC sedation plan reviewed. Advised patient to have someone stay with her post exam    Ph[facility] : [facility]    Indication for procedure :   Ulcerative pancolitis without complication (H) [K51.00]  - Primary          PSC (primary sclerosing cholangitis) [K83.0]             Referring provider : Self     Arrival Time : 12 PM

## 2018-09-12 ENCOUNTER — TRANSFERRED RECORDS (OUTPATIENT)
Dept: HEALTH INFORMATION MANAGEMENT | Facility: CLINIC | Age: 33
End: 2018-09-12

## 2018-09-12 ENCOUNTER — DOCUMENTATION ONLY (OUTPATIENT)
Dept: GASTROENTEROLOGY | Facility: OUTPATIENT CENTER | Age: 33
End: 2018-09-12
Payer: COMMERCIAL

## 2018-09-12 DIAGNOSIS — K51.00 ULCERATIVE PANCOLITIS (H): Primary | ICD-10-CM

## 2018-09-17 DIAGNOSIS — Z94.4 LIVER REPLACED BY TRANSPLANT (H): Primary | ICD-10-CM

## 2018-09-21 LAB — COPATH REPORT: NORMAL

## 2018-09-25 ENCOUNTER — ANESTHESIA EVENT (OUTPATIENT)
Dept: SURGERY | Facility: CLINIC | Age: 33
End: 2018-09-25
Payer: COMMERCIAL

## 2018-09-26 ENCOUNTER — HOSPITAL ENCOUNTER (OUTPATIENT)
Facility: CLINIC | Age: 33
Discharge: HOME | End: 2018-09-26
Attending: INTERNAL MEDICINE | Admitting: INTERNAL MEDICINE
Payer: COMMERCIAL

## 2018-09-26 ENCOUNTER — TELEPHONE (OUTPATIENT)
Dept: GASTROENTEROLOGY | Facility: CLINIC | Age: 33
End: 2018-09-26

## 2018-09-26 ENCOUNTER — ANESTHESIA (OUTPATIENT)
Dept: SURGERY | Facility: CLINIC | Age: 33
End: 2018-09-26
Payer: COMMERCIAL

## 2018-09-26 ENCOUNTER — APPOINTMENT (OUTPATIENT)
Dept: GENERAL RADIOLOGY | Facility: CLINIC | Age: 33
End: 2018-09-26
Attending: INTERNAL MEDICINE
Payer: COMMERCIAL

## 2018-09-26 VITALS
TEMPERATURE: 97.8 F | BODY MASS INDEX: 20.74 KG/M2 | WEIGHT: 117.06 LBS | SYSTOLIC BLOOD PRESSURE: 137 MMHG | RESPIRATION RATE: 18 BRPM | DIASTOLIC BLOOD PRESSURE: 105 MMHG | HEIGHT: 63 IN | OXYGEN SATURATION: 100 %

## 2018-09-26 LAB
ALBUMIN SERPL-MCNC: 3 G/DL (ref 3.4–5)
ALP SERPL-CCNC: 186 U/L (ref 40–150)
ALT SERPL W P-5'-P-CCNC: 68 U/L (ref 0–50)
AMYLASE SERPL-CCNC: 146 U/L (ref 30–110)
ANION GAP SERPL CALCULATED.3IONS-SCNC: 9 MMOL/L (ref 3–14)
AST SERPL W P-5'-P-CCNC: 53 U/L (ref 0–45)
BILIRUB SERPL-MCNC: 0.3 MG/DL (ref 0.2–1.3)
BUN SERPL-MCNC: 27 MG/DL (ref 7–30)
CALCIUM SERPL-MCNC: 9 MG/DL (ref 8.5–10.1)
CHLORIDE SERPL-SCNC: 106 MMOL/L (ref 94–109)
CO2 SERPL-SCNC: 24 MMOL/L (ref 20–32)
CREAT SERPL-MCNC: 0.93 MG/DL (ref 0.52–1.04)
ERCP: NORMAL
ERYTHROCYTE [DISTWIDTH] IN BLOOD BY AUTOMATED COUNT: 15 % (ref 10–15)
GFR SERPL CREATININE-BSD FRML MDRD: 69 ML/MIN/1.7M2
GLUCOSE BLDC GLUCOMTR-MCNC: 75 MG/DL (ref 70–99)
GLUCOSE SERPL-MCNC: 83 MG/DL (ref 70–99)
HCG UR QL: NEGATIVE
HCT VFR BLD AUTO: 41.2 % (ref 35–47)
HGB BLD-MCNC: 13.6 G/DL (ref 11.7–15.7)
LIPASE SERPL-CCNC: 349 U/L (ref 73–393)
MCH RBC QN AUTO: 28.5 PG (ref 26.5–33)
MCHC RBC AUTO-ENTMCNC: 33 G/DL (ref 31.5–36.5)
MCV RBC AUTO: 86 FL (ref 78–100)
PLATELET # BLD AUTO: 489 10E9/L (ref 150–450)
POTASSIUM SERPL-SCNC: 3.7 MMOL/L (ref 3.4–5.3)
PROT SERPL-MCNC: 7.6 G/DL (ref 6.8–8.8)
RBC # BLD AUTO: 4.77 10E12/L (ref 3.8–5.2)
SODIUM SERPL-SCNC: 138 MMOL/L (ref 133–144)
WBC # BLD AUTO: 11 10E9/L (ref 4–11)

## 2018-09-26 PROCEDURE — 25000125 ZZHC RX 250: Performed by: INTERNAL MEDICINE

## 2018-09-26 PROCEDURE — 27210794 ZZH OR GENERAL SUPPLY STERILE: Performed by: INTERNAL MEDICINE

## 2018-09-26 PROCEDURE — 82150 ASSAY OF AMYLASE: CPT | Performed by: INTERNAL MEDICINE

## 2018-09-26 PROCEDURE — 36415 COLL VENOUS BLD VENIPUNCTURE: CPT | Performed by: INTERNAL MEDICINE

## 2018-09-26 PROCEDURE — 25500064 ZZH RX 255 OP 636: Performed by: INTERNAL MEDICINE

## 2018-09-26 PROCEDURE — 40000277 XR SURGERY CARM FLUORO LESS THAN 5 MIN W STILLS: Mod: TC

## 2018-09-26 PROCEDURE — 71000027 ZZH RECOVERY PHASE 2 EACH 15 MINS: Performed by: INTERNAL MEDICINE

## 2018-09-26 PROCEDURE — 40000170 ZZH STATISTIC PRE-PROCEDURE ASSESSMENT II: Performed by: INTERNAL MEDICINE

## 2018-09-26 PROCEDURE — 85027 COMPLETE CBC AUTOMATED: CPT | Performed by: INTERNAL MEDICINE

## 2018-09-26 PROCEDURE — 36000053 ZZH SURGERY LEVEL 2 EA 15 ADDTL MIN - UMMC: Performed by: INTERNAL MEDICINE

## 2018-09-26 PROCEDURE — 83690 ASSAY OF LIPASE: CPT | Performed by: INTERNAL MEDICINE

## 2018-09-26 PROCEDURE — 36000055 ZZH SURGERY LEVEL 2 W FLUORO 1ST 30 MIN - UMMC: Performed by: INTERNAL MEDICINE

## 2018-09-26 PROCEDURE — 25000128 H RX IP 250 OP 636: Performed by: STUDENT IN AN ORGANIZED HEALTH CARE EDUCATION/TRAINING PROGRAM

## 2018-09-26 PROCEDURE — C1769 GUIDE WIRE: HCPCS | Performed by: INTERNAL MEDICINE

## 2018-09-26 PROCEDURE — 71000014 ZZH RECOVERY PHASE 1 LEVEL 2 FIRST HR: Performed by: INTERNAL MEDICINE

## 2018-09-26 PROCEDURE — 25000125 ZZHC RX 250: Performed by: STUDENT IN AN ORGANIZED HEALTH CARE EDUCATION/TRAINING PROGRAM

## 2018-09-26 PROCEDURE — 80053 COMPREHEN METABOLIC PANEL: CPT | Performed by: INTERNAL MEDICINE

## 2018-09-26 PROCEDURE — 81025 URINE PREGNANCY TEST: CPT | Performed by: INTERNAL MEDICINE

## 2018-09-26 PROCEDURE — 82962 GLUCOSE BLOOD TEST: CPT

## 2018-09-26 PROCEDURE — C1877 STENT, NON-COAT/COV W/O DEL: HCPCS | Performed by: INTERNAL MEDICINE

## 2018-09-26 PROCEDURE — 37000008 ZZH ANESTHESIA TECHNICAL FEE, 1ST 30 MIN: Performed by: INTERNAL MEDICINE

## 2018-09-26 PROCEDURE — C1726 CATH, BAL DIL, NON-VASCULAR: HCPCS | Performed by: INTERNAL MEDICINE

## 2018-09-26 PROCEDURE — 25000566 ZZH SEVOFLURANE, EA 15 MIN: Performed by: INTERNAL MEDICINE

## 2018-09-26 PROCEDURE — 37000009 ZZH ANESTHESIA TECHNICAL FEE, EACH ADDTL 15 MIN: Performed by: INTERNAL MEDICINE

## 2018-09-26 DEVICE — STENT FREEMAN PANCREA FLEX 7FRX7CM SGL PIGTAIL: Type: IMPLANTABLE DEVICE | Site: BILE DUCT | Status: FUNCTIONAL

## 2018-09-26 RX ORDER — LABETALOL HYDROCHLORIDE 5 MG/ML
10 INJECTION, SOLUTION INTRAVENOUS
Status: DISCONTINUED | OUTPATIENT
Start: 2018-09-26 | End: 2018-09-26 | Stop reason: HOSPADM

## 2018-09-26 RX ORDER — LIDOCAINE 40 MG/G
CREAM TOPICAL
Status: DISCONTINUED | OUTPATIENT
Start: 2018-09-26 | End: 2018-09-26 | Stop reason: HOSPADM

## 2018-09-26 RX ORDER — SODIUM CHLORIDE, SODIUM LACTATE, POTASSIUM CHLORIDE, CALCIUM CHLORIDE 600; 310; 30; 20 MG/100ML; MG/100ML; MG/100ML; MG/100ML
INJECTION, SOLUTION INTRAVENOUS CONTINUOUS PRN
Status: DISCONTINUED | OUTPATIENT
Start: 2018-09-26 | End: 2018-09-26

## 2018-09-26 RX ORDER — FENTANYL CITRATE 50 UG/ML
INJECTION, SOLUTION INTRAMUSCULAR; INTRAVENOUS PRN
Status: DISCONTINUED | OUTPATIENT
Start: 2018-09-26 | End: 2018-09-26

## 2018-09-26 RX ORDER — ONDANSETRON 2 MG/ML
INJECTION INTRAMUSCULAR; INTRAVENOUS PRN
Status: DISCONTINUED | OUTPATIENT
Start: 2018-09-26 | End: 2018-09-26

## 2018-09-26 RX ORDER — INDOMETHACIN 50 MG/1
100 SUPPOSITORY RECTAL
Status: COMPLETED | OUTPATIENT
Start: 2018-09-26 | End: 2018-09-26

## 2018-09-26 RX ORDER — SODIUM CHLORIDE, SODIUM LACTATE, POTASSIUM CHLORIDE, CALCIUM CHLORIDE 600; 310; 30; 20 MG/100ML; MG/100ML; MG/100ML; MG/100ML
INJECTION, SOLUTION INTRAVENOUS CONTINUOUS
Status: DISCONTINUED | OUTPATIENT
Start: 2018-09-26 | End: 2018-09-26 | Stop reason: HOSPADM

## 2018-09-26 RX ORDER — DEXAMETHASONE SODIUM PHOSPHATE 4 MG/ML
INJECTION, SOLUTION INTRA-ARTICULAR; INTRALESIONAL; INTRAMUSCULAR; INTRAVENOUS; SOFT TISSUE PRN
Status: DISCONTINUED | OUTPATIENT
Start: 2018-09-26 | End: 2018-09-26

## 2018-09-26 RX ORDER — IOPAMIDOL 510 MG/ML
INJECTION, SOLUTION INTRAVASCULAR PRN
Status: DISCONTINUED | OUTPATIENT
Start: 2018-09-26 | End: 2018-09-26 | Stop reason: HOSPADM

## 2018-09-26 RX ORDER — ONDANSETRON 2 MG/ML
4 INJECTION INTRAMUSCULAR; INTRAVENOUS EVERY 30 MIN PRN
Status: DISCONTINUED | OUTPATIENT
Start: 2018-09-26 | End: 2018-09-26 | Stop reason: HOSPADM

## 2018-09-26 RX ORDER — FLUMAZENIL 0.1 MG/ML
0.2 INJECTION, SOLUTION INTRAVENOUS
Status: DISCONTINUED | OUTPATIENT
Start: 2018-09-26 | End: 2018-09-26 | Stop reason: HOSPADM

## 2018-09-26 RX ORDER — GABAPENTIN 300 MG/1
300 CAPSULE ORAL ONCE
Status: DISCONTINUED | OUTPATIENT
Start: 2018-09-26 | End: 2018-09-26 | Stop reason: HOSPADM

## 2018-09-26 RX ORDER — ONDANSETRON 4 MG/1
4 TABLET, ORALLY DISINTEGRATING ORAL EVERY 30 MIN PRN
Status: DISCONTINUED | OUTPATIENT
Start: 2018-09-26 | End: 2018-09-26 | Stop reason: HOSPADM

## 2018-09-26 RX ORDER — ACETAMINOPHEN 325 MG/1
975 TABLET ORAL ONCE
Status: DISCONTINUED | OUTPATIENT
Start: 2018-09-26 | End: 2018-09-26 | Stop reason: HOSPADM

## 2018-09-26 RX ORDER — PROPOFOL 10 MG/ML
INJECTION, EMULSION INTRAVENOUS PRN
Status: DISCONTINUED | OUTPATIENT
Start: 2018-09-26 | End: 2018-09-26

## 2018-09-26 RX ORDER — NALOXONE HYDROCHLORIDE 0.4 MG/ML
.1-.4 INJECTION, SOLUTION INTRAMUSCULAR; INTRAVENOUS; SUBCUTANEOUS
Status: DISCONTINUED | OUTPATIENT
Start: 2018-09-26 | End: 2018-09-26 | Stop reason: HOSPADM

## 2018-09-26 RX ORDER — MEPERIDINE HYDROCHLORIDE 25 MG/ML
12.5 INJECTION INTRAMUSCULAR; INTRAVENOUS; SUBCUTANEOUS
Status: DISCONTINUED | OUTPATIENT
Start: 2018-09-26 | End: 2018-09-26 | Stop reason: HOSPADM

## 2018-09-26 RX ORDER — LEVOFLOXACIN 5 MG/ML
INJECTION, SOLUTION INTRAVENOUS PRN
Status: DISCONTINUED | OUTPATIENT
Start: 2018-09-26 | End: 2018-09-26

## 2018-09-26 RX ORDER — HYDROMORPHONE HYDROCHLORIDE 1 MG/ML
.3-.5 INJECTION, SOLUTION INTRAMUSCULAR; INTRAVENOUS; SUBCUTANEOUS EVERY 10 MIN PRN
Status: DISCONTINUED | OUTPATIENT
Start: 2018-09-26 | End: 2018-09-26 | Stop reason: HOSPADM

## 2018-09-26 RX ORDER — FENTANYL CITRATE 50 UG/ML
25-50 INJECTION, SOLUTION INTRAMUSCULAR; INTRAVENOUS
Status: DISCONTINUED | OUTPATIENT
Start: 2018-09-26 | End: 2018-09-26 | Stop reason: HOSPADM

## 2018-09-26 RX ORDER — LIDOCAINE HYDROCHLORIDE 20 MG/ML
INJECTION, SOLUTION INFILTRATION; PERINEURAL PRN
Status: DISCONTINUED | OUTPATIENT
Start: 2018-09-26 | End: 2018-09-26

## 2018-09-26 RX ADMIN — SODIUM CHLORIDE, POTASSIUM CHLORIDE, SODIUM LACTATE AND CALCIUM CHLORIDE: 600; 310; 30; 20 INJECTION, SOLUTION INTRAVENOUS at 07:31

## 2018-09-26 RX ADMIN — PROPOFOL 25 MG: 10 INJECTION, EMULSION INTRAVENOUS at 07:37

## 2018-09-26 RX ADMIN — ROCURONIUM BROMIDE 30 MG: 10 INJECTION INTRAVENOUS at 07:37

## 2018-09-26 RX ADMIN — FENTANYL CITRATE 25 MCG: 50 INJECTION, SOLUTION INTRAMUSCULAR; INTRAVENOUS at 08:13

## 2018-09-26 RX ADMIN — GLUCAGON HYDROCHLORIDE 0.4 MG: KIT at 08:08

## 2018-09-26 RX ADMIN — PHENYLEPHRINE HYDROCHLORIDE 50 MCG: 10 INJECTION, SOLUTION INTRAMUSCULAR; INTRAVENOUS; SUBCUTANEOUS at 08:03

## 2018-09-26 RX ADMIN — ROCURONIUM BROMIDE 10 MG: 10 INJECTION INTRAVENOUS at 08:04

## 2018-09-26 RX ADMIN — HYDROMORPHONE HYDROCHLORIDE 0.2 MG: 1 INJECTION, SOLUTION INTRAMUSCULAR; INTRAVENOUS; SUBCUTANEOUS at 08:12

## 2018-09-26 RX ADMIN — ONDANSETRON 4 MG: 2 INJECTION INTRAMUSCULAR; INTRAVENOUS at 08:28

## 2018-09-26 RX ADMIN — ROCURONIUM BROMIDE 10 MG: 10 INJECTION INTRAVENOUS at 07:52

## 2018-09-26 RX ADMIN — FENTANYL CITRATE 50 MCG: 50 INJECTION, SOLUTION INTRAMUSCULAR; INTRAVENOUS at 07:36

## 2018-09-26 RX ADMIN — DEXAMETHASONE SODIUM PHOSPHATE 4 MG: 4 INJECTION, SOLUTION INTRA-ARTICULAR; INTRALESIONAL; INTRAMUSCULAR; INTRAVENOUS; SOFT TISSUE at 07:37

## 2018-09-26 RX ADMIN — FENTANYL CITRATE 25 MCG: 50 INJECTION, SOLUTION INTRAMUSCULAR; INTRAVENOUS at 08:02

## 2018-09-26 RX ADMIN — LEVOFLOXACIN 500 MG: 5 INJECTION, SOLUTION INTRAVENOUS at 07:40

## 2018-09-26 RX ADMIN — SUGAMMADEX 200 MG: 100 INJECTION, SOLUTION INTRAVENOUS at 08:27

## 2018-09-26 RX ADMIN — PROPOFOL 50 MG: 10 INJECTION, EMULSION INTRAVENOUS at 07:40

## 2018-09-26 RX ADMIN — PROPOFOL 75 MG: 10 INJECTION, EMULSION INTRAVENOUS at 07:36

## 2018-09-26 RX ADMIN — LIDOCAINE HYDROCHLORIDE 75 MG: 20 INJECTION, SOLUTION INFILTRATION; PERINEURAL at 07:36

## 2018-09-26 NOTE — TELEPHONE ENCOUNTER
LVM for patient in regards to scheduling f/u with Dr. Johnson in 2-3 weeks. Left call back number for patient to call and schedule appointment.

## 2018-09-26 NOTE — IP AVS SNAPSHOT
Same Day Surgery [address] [address]    Phone:  [phone]                                       After Visit Summary   9/26/2018    Yarelis Penny    MRN: [id number]           After Visit Summary Signature Page     I have received my discharge instructions, and my questions have been answered. I have discussed any challenges I see with this plan with the nurse or doctor.    ..........................................................................................................................................  Patient/Patient Representative Signature      ..........................................................................................................................................  Patient Representative Print Name and Relationship to Patient    ..................................................               ................................................  Date                                   Time    ..........................................................................................................................................  Reviewed by Signature/Title    ...................................................              ..............................................  Date                                               Time          22EPIC Rev 08/18

## 2018-09-26 NOTE — ANESTHESIA CARE TRANSFER NOTE
Patient: Yarelis Penny    Procedure(s):  Endoscopic Retrograde Cholangiopancreatogram, with bile duct stent exchanged balloon dilation and balloon sweep of bile duct - Wound Class: II-Clean Contaminated    Diagnosis: Common Bile Duct Stricture   Diagnosis Additional Information: No value filed.    Anesthesia Type:   General, ETT     Note:  Airway :ETT  Patient transferred to:PACU  Comments: Patient is awake and comfortable, VSS, following commands and not endorsing pain. Care transfer report given to PACU RNHandoff Report: Identifed the Patient, Identified the Reponsible Provider, Reviewed the pertinent medical history, Discussed the surgical course, Reviewed Intra-OP anesthesia mangement and issues during anesthesia, Set expectations for post-procedure period and Allowed opportunity for questions and acknowledgement of understanding      Vitals: (Last set prior to Anesthesia Care Transfer)    CRNA VITALS  9/26/2018 0806 - 9/26/2018 0843      9/26/2018             Pulse: 87    SpO2: 100 %    Resp Rate (observed): 11                Electronically Signed By: Arnol Wills MD  September 26, 2018  8:43 AM

## 2018-09-26 NOTE — ANESTHESIA POSTPROCEDURE EVALUATION
Patient: Yarelis Penny    Procedure(s):  Endoscopic Retrograde Cholangiopancreatogram, with bile duct stent exchanged balloon dilation and balloon sweep of bile duct - Wound Class: II-Clean Contaminated    Diagnosis:Common Bile Duct Stricture   Diagnosis Additional Information: No value filed.    Anesthesia Type:  General, ETT    Note:  Anesthesia Post Evaluation    Patient location during evaluation: PACU  Patient participation: Able to fully participate in evaluation  Level of consciousness: awake and alert  Pain management: satisfactory to patient  Airway patency: patent  Cardiovascular status: blood pressure returned to baseline and hemodynamically stable  Respiratory status: room air  Hydration status: euvolemic  PONV: none     Anesthetic complications: None          Last vitals:  Vitals:    09/26/18 0600 09/26/18 0839 09/26/18 0845   BP: (!) 121/91 117/88    Resp: 16 12    Temp: 36.5  C (97.7  F) 35.6  C (96  F)    SpO2: 100% 100% 100%         Electronically Signed By: Benjy Singh MD  September 26, 2018  9:03 AM

## 2018-09-26 NOTE — ANESTHESIA PREPROCEDURE EVALUATION
Anesthesia Evaluation     . Pt has had prior anesthetic.     No history of anesthetic complications          ROS/MED HX    ENT/Pulmonary:  - neg pulmonary ROS     Neurologic:  - neg neurologic ROS     Cardiovascular:     (+) ----. : . . . :. valvular problems/murmurs . Previous cardiac testing date:results:date: results:ECG reviewed date:2016 results:NSR date: results:          METS/Exercise Tolerance:     Hematologic:  - neg hematologic  ROS       Musculoskeletal:  - neg musculoskeletal ROS       GI/Hepatic:     (+) hiatal hernia, hepatitis liver disease (s/p Liver transplant 2015, chronic steroids and immunosuppressants), Other GI/Hepatic ulcerative colitis, h/o autoimmune hepatitis, esophageal varices s/p banding      Renal/Genitourinary:     (+) chronic renal disease, Pt does not require dialysis, Pt has no history of transplant,       Endo:     (+) Chronic steroid usage for IB Disorder .      Psychiatric:  - neg psychiatric ROS       Infectious Disease:  - neg infectious disease ROS       Malignancy:      - no malignancy   Other:    (+) No chance of pregnancy C-spine cleared: N/A, no H/O Chronic Pain,no other significant disability                    Physical Exam  Normal systems: pulmonary and dental    Airway   Mallampati: II  TM distance: >3 FB  Neck ROM: full    Dental     Cardiovascular   Rhythm and rate: regular and normal  (+) murmur       Pulmonary                         Anesthesia Plan      History & Physical Review  History and physical reviewed and following examination; no interval change.    ASA Status:  2 .    NPO Status:  > 8 hours    Plan for General and ETT with Propofol induction. Maintenance will be Inhalation.    PONV prophylaxis:  Ondansetron (or other 5HT-3) and Dexamethasone or Solumedrol       Postoperative Care  Postoperative pain management:  IV analgesics and Oral pain medications.      Consents  Anesthetic plan, risks, benefits and alternatives discussed with:  Patient and  Spouse.  Use of blood products discussed: Yes.   Use of blood products discussed with Patient and Spouse.  Consented to blood products.  .                          .

## 2018-09-26 NOTE — OR NURSING
Dr. Pabon notified of patient's BP. Patient denies any nausea, pain or headache. All other vital signs WNL. MD ok with patient discharging at this time.

## 2018-09-26 NOTE — IP AVS SNAPSHOT
MRN:[id number]                      After Visit Summary   9/26/2018    Yarelis Penny    MRN: [id number]           Thank you!     Thank you for choosing [facility] for your care. Our goal is always to provide you with excellent care. Hearing back from our patients is one way we can continue to improve our services. Please take a few minutes to complete the written survey that you may receive in the mail after you visit with us. Thank you!        Patient Information     Date Of Birth          1985        About your hospital stay     You were admitted on:  September 26, 2018 You last received care in the:  Same Day Surgery [facility]    You were discharged on:  September 26, 2018       Who to Call     For medical emergencies, please call 911.  For non-urgent questions about your medical care, please call your primary care provider or clinic, [phone]  For questions related to your surgery, please call your surgery clinic        Attending Provider     Provider Guru Brittany Wright MD Gastroenterology       Primary Care Provider Office Phone # Fax #    Fuentes Johnson [phone]      After Care Instructions     Discharge Instructions       Resume pre procedure diet            Discharge Instructions       Restart home medications.                  Your next 10 appointments already scheduled     Oct 02, 2018  8:00 AM CDT   Infusion 180 with UC SPEC INFUSION, UC 51 ATC   [facility] ([facility])    [address] [place] [address]  [address] [place] [id number] [phone]            Oct 30, 2018  9:00 AM CDT   Infusion 180 with UC SPEC INFUSION, UC 47 ATC   [facility] ([facility])    [address] [place] [address]  [address] [place] [id number]   [phone]            Nov 27, 2018  8:00 AM CST    Infusion 180 with UC SPEC INFUSION, UC 51 ATC   [facility] ([facility])    [address]  [address]  [facility] [address]   [phone]            Dec 05, 2018  8:00 AM CST   (Arrive by 7:45 AM)   Return Visit with Joya Lopez MD   [facility] ([facility])    [address]  [address]  [facility] [address]   [phone]            Dec 18, 2018 10:30 AM CST   Lab with UC LAB   [facility] ([facility])    [address]  1st Floor  [facility] [address]   [phone]            Dec 18, 2018 11:30 AM CST   (Arrive by 11:15 AM)   Return Liver Transplant with Dainela Enriquez MD   [facility] ([facility])    [address]  [address]  [facility] [address]   [phone]            Dec 24, 2018  8:00 AM CST   Infusion 180 with UC SPEC INFUSION   [facility] ([facility])    [address]  [address]  [facility] [address]   [phone]              Further instructions from your care team       [facility]  Same-Day Surgery   Adult Discharge Orders & Instructions     For 24 hours after surgery    1. Get plenty of rest.  A responsible adult must stay with you for at least 24 hours after you leave the hospital.   2. Do not drive or use heavy equipment.  If you have weakness or tingling, don't drive or use heavy equipment until this feeling goes away.  3. Do not drink alcohol.  4. Avoid strenuous or risky activities.  Ask for help when climbing stairs.   5. You may feel lightheaded.  IF so, sit for a few minutes before standing.  Have someone help you get up.   6. If you have nausea (feel sick to your stomach): Drink only clear liquids such as apple  "juice, ginger ale, broth or 7-Up.  Rest may also help.  Be sure to drink enough fluids.  Move to a regular diet as you feel able.  7. You may have a slight fever. Call the doctor if your fever is over 100 F (37.7 C) (taken under the tongue) or lasts longer than 24 hours.  8. You may have a dry mouth, a sore throat, muscle aches or trouble sleeping.  These should go away after 24 hours.  9. Do not make important or legal decisions.   Call your doctor for any of the followin.  Signs of infection (fever, growing tenderness at the surgery site, a large amount of drainage or bleeding, severe pain, foul-smelling drainage, redness, swelling).    2. It has been over 8 to 10 hours since surgery and you are still not able to urinate (pass water).    3.  Headache for over 24 hours.    4.  Numbness, tingling or weakness the day after surgery (if you had spinal anesthesia).  To contact a doctor, call __Dr. Guru Macias @ [phone]__ or:        [phone] and ask for the resident on call for   Gastroenterology (answered 24 hours a day)      Emergency Department:    [facility]: [phone]       (TTY for hearing impaired: [phone])          Additional Information     If you use hormonal birth control (such as the pill, patch, ring or implants): You'll need a second form of birth control for 7 days (condoms, a diaphragm or contraceptive foam). While in the hospital, you received a medicine called Bridion. Your normal birth control will not work as well for a week after taking this medicine.          Pending Results     No orders found from 2018 to 2018.            Admission Information     Date & Time Provider Department Dept. Phone    2018 Guru Brittany Macias MD Same Day Surgery [facility] [phone]      Your Vitals Were     Blood Pressure Temperature Respirations Height Weight Last Period    122 98.1  F (36.7  C) (Oral) 14 1.59 m (5' 2.6\") 53.1 kg (117 lb 1 " oz) 09/01/2018 (Exact Date)    Pulse Oximetry BMI (Body Mass Index)                100% 21 kg/m2          [employer] Information     [employer] gives you secure access to your electronic health record. If you see a primary care provider, you can also send messages to your care team and make appointments. If you have questions, please call your primary care clinic.  If you do not have a primary care provider, please call [phone] and they will assist you.        Care EveryWhere ID     This is your Care EveryWhere ID. This could be used by other organizations to access your [place] medical records  [id number]        Equal Access to Services     [facility] : Yandel Palma, ann solorzano, malu mott, mani melo . So [facility] [phone].    ATENCIÓN: Si habla español, tiene a borja disposición servicios gratuitos de asistencia lingüística. Llame al [phone].    We comply with applicable federal civil rights laws and [place] laws. We do not discriminate on the basis of race, color, national origin, age, disability, sex, sexual orientation, or gender identity.               Review of your medicines      UNREVIEWED medicines. Ask your doctor about these medicines        Dose / Directions    budesonide 9 MG 24 hr tablet   Commonly known as:  UCERIS   Used for:  UC (ulcerative colitis) (H)        Dose:  9 mg   Take 1 tablet (9 mg) by mouth every morning   Quantity:  30 tablet   Refills:  3       calcium carbonate 500 mg {elemental} 500 MG tablet   Commonly known as:  OS-KERLINE        Dose:  1 tablet   Take 1 tablet by mouth every evening   Refills:  0       ENTYVIO IV        Dose:  300 mg   Inject 300 mg into the vein every 28 days   Refills:  0       GNP ASPIRIN LOW DOSE 81 MG EC tablet   Used for:  Ulcerative colitis with other complication, unspecified location (H), Liver replaced by transplant (H)   Generic drug:  aspirin        Dose:  81 mg   Take 1 tablet  (81 mg) by mouth daily   Quantity:  30 tablet   Refills:  1       Multi-vitamin Tabs tablet        Dose:  1 tablet   Take 1 tablet by mouth every morning   Refills:  0       predniSONE 5 MG tablet   Commonly known as:  DELTASONE   Used for:  UC (ulcerative colitis) (H), Liver replaced by transplant (H)        Dose:  5 mg   Take 1 tablet (5 mg) by mouth daily   Quantity:  225 tablet   Refills:  3       sulfamethoxazole-trimethoprim 800-160 MG per tablet   Commonly known as:  BACTRIM DS/SEPTRA DS   Used for:  Immunosuppression (H), Inflammatory bowel disease, PSC (primary sclerosing cholangitis)        Dose:  1 tablet   Take 1 tablet by mouth three times a week Monday, Wednesday, Friday   Quantity:  90 tablet   Refills:  3       * tacrolimus 1 MG capsule   Commonly known as:  GENERIC EQUIVALENT   Used for:  S/P liver transplant (H)        Dose:  2 mg   Take 2 capsules (2 mg) by mouth 2 times daily *total dose 2.5 mg twice daily   Quantity:  120 capsule   Refills:  11       * tacrolimus 0.5 MG capsule   Commonly known as:  GENERIC EQUIVALENT   Used for:  S/P liver transplant (H)        Dose:  0.5 mg   Take 1 capsule (0.5 mg) by mouth 2 times daily Take with 1 mg capsule. Total dose 2.5 mg every 12 hours   Quantity:  180 capsule   Refills:  3       ursodiol 250 MG tablet   Commonly known as:  ACTIGALL   Used for:  Liver replaced by transplant (H)        Take 2 tabs (500 mg) in AM, and 1 tab at night (250 mg)   Quantity:  270 tablet   Refills:  3       VITAMIN D (CHOLECALCIFEROL) PO        Take by mouth every evening   Refills:  0       * Notice:  This list has 2 medication(s) that are the same as other medications prescribed for you. Read the directions carefully, and ask your doctor or other care provider to review them with you.             Protect others around you: Learn how to safely use, store and throw away your medicines at www.disposemymeds.org.             Medication List: This is a list of all your  medications and when to take them. Check marks below indicate your daily home schedule. Keep this list as a reference.      Medications           Morning Afternoon Evening Bedtime As Needed    budesonide 9 MG 24 hr tablet   Commonly known as:  UCERIS   Take 1 tablet (9 mg) by mouth every morning                                calcium carbonate 500 mg {elemental} 500 MG tablet   Commonly known as:  OS-KERLINE   Take 1 tablet by mouth every evening                                ENTYVIO IV   Inject 300 mg into the vein every 28 days                                GNP ASPIRIN LOW DOSE 81 MG EC tablet   Take 1 tablet (81 mg) by mouth daily   Generic drug:  aspirin                                Multi-vitamin Tabs tablet   Take 1 tablet by mouth every morning                                predniSONE 5 MG tablet   Commonly known as:  DELTASONE   Take 1 tablet (5 mg) by mouth daily                                sulfamethoxazole-trimethoprim 800-160 MG per tablet   Commonly known as:  BACTRIM DS/SEPTRA DS   Take 1 tablet by mouth three times a week Monday, Wednesday, Friday                                * tacrolimus 1 MG capsule   Commonly known as:  GENERIC EQUIVALENT   Take 2 capsules (2 mg) by mouth 2 times daily *total dose 2.5 mg twice daily                                * tacrolimus 0.5 MG capsule   Commonly known as:  GENERIC EQUIVALENT   Take 1 capsule (0.5 mg) by mouth 2 times daily Take with 1 mg capsule. Total dose 2.5 mg every 12 hours                                ursodiol 250 MG tablet   Commonly known as:  ACTIGALL   Take 2 tabs (500 mg) in AM, and 1 tab at night (250 mg)                                VITAMIN D (CHOLECALCIFEROL) PO   Take by mouth every evening                                * Notice:  This list has 2 medication(s) that are the same as other medications prescribed for you. Read the directions carefully, and ask your doctor or other care provider to review them with you.

## 2018-09-27 ENCOUNTER — TELEPHONE (OUTPATIENT)
Dept: GASTROENTEROLOGY | Facility: CLINIC | Age: 33
End: 2018-09-27

## 2018-09-27 NOTE — TELEPHONE ENCOUNTER
Patient returned my phone call stating being seen in 2-3 weeks with Dr. Alex loving work for her because she would like her  to be present at this appointment. She stated that her  travels for work and cannot make anything work until November. I informed the patient that Dr. Johnson had openings on 11/20/18 but I would have to check with Dr. Johnson or Norma Walker because Dr. Johnson requested the patient be seen in 2-3 weeks. Informed patient I would call her back after I spoke with them. Patient stated she understood.

## 2018-10-01 ENCOUNTER — CARE COORDINATION (OUTPATIENT)
Dept: GASTROENTEROLOGY | Facility: CLINIC | Age: 33
End: 2018-10-01

## 2018-10-01 DIAGNOSIS — Z46.89 ENCOUNTER FOR REMOVAL OF PANCREATIC STENT: Primary | ICD-10-CM

## 2018-10-01 NOTE — PROGRESS NOTES
Post ERCP (09/26/2018) with Dr. Macias: Follow-up    Post procedure recommendations: Confirm spontaneous stent passage by performing a flat and upright abdominal x-ray in 4 weeks. Stent is most likely expected to pass spontaneously. If present will need an upper endoscopy for stent removal    - Will recommend to recheck LFTs in transplant clinic and contact us if it is elevated     Orders placed: x-ray due Oct 24th.     Patient states she is doing well. Denies nausea, vomiting, fever and pain. Taking in PO with no issues. [employer] message sent with date and time for x-ray.   [place] [facility]     Clinic contact and scheduling numbers verified for future questions/concerns.     Lyndsey HAMILTON, RN Coordinator  Dr. Quinones, Dr. Huerta & Dr. Macias  [facility]  [phone]

## 2018-10-02 ENCOUNTER — INFUSION THERAPY VISIT (OUTPATIENT)
Dept: INFUSION THERAPY | Facility: CLINIC | Age: 33
End: 2018-10-02
Attending: INTERNAL MEDICINE
Payer: COMMERCIAL

## 2018-10-02 VITALS
SYSTOLIC BLOOD PRESSURE: 131 MMHG | RESPIRATION RATE: 16 BRPM | BODY MASS INDEX: 20.8 KG/M2 | HEART RATE: 69 BPM | TEMPERATURE: 98.5 F | DIASTOLIC BLOOD PRESSURE: 92 MMHG | WEIGHT: 115.9 LBS

## 2018-10-02 DIAGNOSIS — E86.0 DEHYDRATION: Primary | ICD-10-CM

## 2018-10-02 DIAGNOSIS — B25.9 CMV COLITIS (H): ICD-10-CM

## 2018-10-02 DIAGNOSIS — A08.39 CMV COLITIS (H): ICD-10-CM

## 2018-10-02 DIAGNOSIS — D50.8 OTHER IRON DEFICIENCY ANEMIA: ICD-10-CM

## 2018-10-02 PROCEDURE — 25000128 H RX IP 250 OP 636: Mod: ZF | Performed by: INTERNAL MEDICINE

## 2018-10-02 PROCEDURE — 96365 THER/PROPH/DIAG IV INF INIT: CPT

## 2018-10-02 RX ADMIN — VEDOLIZUMAB 300 MG: 300 INJECTION, POWDER, LYOPHILIZED, FOR SOLUTION INTRAVENOUS at 08:46

## 2018-10-02 ASSESSMENT — PAIN SCALES - GENERAL: PAINLEVEL: NO PAIN (0)

## 2018-10-02 NOTE — PROGRESS NOTES
Infusion Nursing Note:  Yarelis Penny presents today to [facility] for a entyvio infusion.  During today's [facility] appointment orders from dr. benson were completed.  Frequency: q 4 weeks    Progress note:  ID verified by name and .  Assessment completed. Vitals were stable throughout time in [facility]. Patient education regarding infusion and possible side effects provided.  Patient verbalized understanding. yes    Premedications: were not ordered.    Infusion Rates: Infusion given over approximately 30 minutes.     Labs were not ordered for this appointment.    Vascular access: Peripheral IV placed today.    Patient tolerated infusion well.    Discharge Plan:   Follow up plan of care with: [facility]  Discharge instructions were reviewed with patient.  Patient/representative verbalized understanding of discharge instructions and all questions answered.    Patient discharged from [facility] in stable condition.    Dana Quintana RN    Administrations This Visit     vedolizumab (ENTYVIO) 300 mg in sodium chloride 0.9 % 280 mL infusion     Admin Date Action Dose Rate Route Administered By          10/02/2018 New Bag 300 mg 560 mL/hr Intravenous Dana Quintana RN                         BP (!) 131/92  Pulse 69  Temp 98.5  F (36.9  C) (Oral)  Resp 16  Wt 52.6 kg (115 lb 14.4 oz)  BMI 20.8 kg/m2      ~~~ NOTE: If the patient answers yes to any of the questions below, hold the infusion and contact ordering provider or on-call provider.    1. Have you recently had an elevated temperature, fever, chills, productive cough, coughing for 3 weeks or longer or hemoptysis,  abnormal vital signs, night sweats,  chest pain or have you noticed a decrease in your appetite, unexplained weight loss or fatigue? No  2. Do you have any open wounds or new incisions? No  3. Do you have any recent or upcoming hospitalizations, surgeries or dental procedures? No  4. Do  you currently have or recently have had any signs of illness or infection or are you on any antibiotics? No  5. Have you had any new, sudden or worsening abdominal pain? No  6. Have you or anyone in your household received a live vaccination in the past 4 weeks? Please note:  No live vaccines while on biologic/chemotherapy until 6 months after the last treatment.  Patient can receive the flu vaccine (shot only) and the pneumovax.  It is optimal for the patient to get these vaccines mid cycle, but they can be given at any time as long as it is not on the day of the infusion. No  7. Have you recently been diagnosed with any new nervous system diseases (ie. Multiple sclerosis, Guillain [place], seizures, neurological changes) or cancer diagnosis? Are you on any form of radiation or chemotherapy? No  8. Are you pregnant or breast feeding or do you have plans of pregnancy in the future? No  9. Have you been having any signs of worsening depression or suicidal ideations?  (benlysta only)n/a  10. Have there been any other new onset medical symptoms? No

## 2018-10-02 NOTE — MR AVS SNAPSHOT
After Visit Summary   10/2/2018    Yarelis Penny    MRN: [id number]           Patient Information     Date Of Birth          1985        Visit Information        Provider Department      10/2/2018 8:00 AM UC 51 ATC; UC SPEC [facility]        Today's Diagnoses     Dehydration    -  1    CMV colitis (H)        Other iron deficiency anemia          Care Instructions    Dear Yarelis Penny    Thank you for choosing [facility] ([facility]) for your infusion.  The following information is a summary of our appointment as well as important reminders.        We look forward in seeing you on your next appointment here at [facility].  Please don t hesitate to call us at [phone] to reschedule any of your appointments or to speak with one of the [facility] registered nurses.  It was a pleasure taking care of you today.    Sincerely,    [facility]  Specialty [facility]fusion [facility] Procedure Center  [address] [place]  [address]  Phone:  [phone]    Vedolizumab Solution for injection  What is this medicine?  VEDOLIZUMAB (Ve cornelius JOSHUA you mab) is used to treat ulcerative colitis and Crohn's disease in adult patients.  This medicine may be used for other purposes; ask your health care provider or pharmacist if you have questions.  What should I tell my health care provider before I take this medicine?  They need to know if you have any of these conditions:    diabetes    hepatitis B or history of hepatitis B infection    HIV or AIDS    immune system problems    infection or history of infections    liver disease    recently received or scheduled to receive a vaccine    scheduled to have surgery    tuberculosis, a positive skin test for tuberculosis or have recently been in close contact with someone who has tuberculosis    an unusual or allergic reaction to vedolizumab, other  medicines, foods, dyes, or preservatives    pregnant or trying to get pregnant    breast-feeding  How should I use this medicine?  This medicine is for infusion into a vein. It is given by a health care professional in a hospital or clinic setting.  A special MedGuide will be given to you by the pharmacist with each prescription and refill. Be sure to read this information carefully each time.  Talk to your pediatrician regarding the use of this medicine in children. This medicine is not approved for use in children.  Overdosage: If you think you've taken too much of this medicine contact a poison control center or emergency room at once.  NOTE: This medicine is only for you. Do not share this medicine with others.  What if I miss a dose?  It is important not to miss your dose. Call your doctor or health care professional if you are unable to keep an appointment.  What may interact with this medicine?    steroid medicines like prednisone or cortisone    TNF-alpha inhibitors like natalizumab, adalimumab, and infliximab    vaccines  This list may not describe all possible interactions. Give your health care provider a list of all the medicines, herbs, non-prescription drugs, or dietary supplements you use. Also tell them if you smoke, drink alcohol, or use illegal drugs. Some items may interact with your medicine.  What should I watch for while using this medicine?  Your condition will be monitored carefully while you are receiving this medicine. Visit your doctor for regular check ups. Tell your doctor or healthcare professional if your symptoms do not start to get better or if they get worse.  Stay away from people who are sick. Call your doctor or health care professional for advice if you get a fever, chills or sore throat, or other symptoms of a cold or flu. Do not treat yourself.  In some patients, this medicine may cause a serious brain infection that may cause death. If you have any problems seeing, thinking,  speaking, walking, or standing, tell your doctor right away. If you cannot reach your doctor, get urgent medical care.  What side effects may I notice from receiving this medicine?  Side effects that you should report to your doctor or health care professional as soon as possible:    allergic reactions like skin rash, itching or hives, swelling of the face, lips, or tongue    breathing problems    changes in vision    chest pain    dark urine    depression, feelings of sadness    dizziness    general ill feeling or flu-like symptoms    irregular, missed, or painful menstrual periods    light-colored stools    loss of appetite, nausea    muscle weakness    problems with balance, talking, or walking    right upper belly pain    unusually weak or tired    yellowing of the eyes or skin  Side effects that usually do not require medical attention (Report these to your doctor or health care professional if they continue or are bothersome.):    aches, pains    headache    stomach upset    tiredness  This list may not describe all possible side effects. Call your doctor for medical advice about side effects. You may report side effects to FDA at 2-140-GKV-8453.  Where should I keep my medicine?  This drug is given in a hospital or clinic and will not be stored at home.  NOTE: This sheet is a summary. It may not cover all possible information. If you have questions about this medicine, talk to your doctor, pharmacist, or health care provider.  NOTE:This sheet is a summary. It may not cover all possible information. If you have questions about this medicine, talk to your doctor, pharmacist, or health care provider. Copyright  2016 Gold Standard                Follow-ups after your visit        Your next 10 appointments already scheduled     Oct 24, 2018  9:00 AM CDT   XR ABDOMEN 2 VIEWS with UCXR1   [facility] ([facility])    [address] [address]    [phone]           How do I prepare for my exam? (Food and drink instructions) No Food and Drink Restrictions.  How do I prepare for my exam? (Other instructions) You do not need to do anything special for this exam.  What should I wear: Wear comfortable clothes.  How long does the exam take: Most scans take less than 5 minutes.  What should I bring: Bring a list of your medicines, including vitamins, minerals and over-the-counter drugs. It is safest to leave personal items at home.  Do I need a :  No  is needed.  What do I need to tell my doctor: Tell your doctor if there s any chance you are pregnant.  What should I do after the exam: No restrictions, You may resume normal activities.  What is this test: An image of a specific body part shown in shades of black and white.  Who should I call with questions: If you have any questions, please call the Imaging Department where you will have your exam. Directions, parking instructions, and other information is available on our website, [email or web address].            Oct 30, 2018  9:00 AM CDT   Infusion 180 with UC SPEC INFUSION, UC 47 ATC   [facility] Special[facility] ([facility])    [address]  [address]  [facility] [facility] [address]   [phone]            Nov 06, 2018  7:30 AM CST   (Arrive by 7:15 AM)   RETURN INFLAMMATORY BOWEL DISEASE with Fuentes Johnson MD   [facility] ([facility])    [address]  4th Floor  [place] [facility] [address]   [phone]            Nov 27, 2018  8:00 AM CST   Infusion 180 with UC SPEC INFUSION, UC 51 ATC   [facility] ([facility])    [address]  [address]  [place] [address]   [phone]            Dec 05, 2018  8:00 AM CST   (Arrive by 7:45 AM)   Return Visit with Joya Lopez MD    [facility] ([facility])    [address]  [address]  [facility] [address]   [phone]            Dec 18, 2018 10:30 AM CST   Lab with UC LAB   [facility] ([facility])    [address]  1st Floor  [facility] [address] [phone]            Dec 18, 2018 11:30 AM CST   (Arrive by 11:15 AM)   Return Liver Transplant with Daniela Enriquez MD   [facility] ([facility])    [address]  [address]  [facility] [address] [phone]            Dec 24, 2018  8:00 AM CST   Infusion 180 with UC SPEC INFUSION   [facility] Specialty and Procedure ([facility])    [address]  [address]  [facility] [id number] [phone]              Future tests that were ordered for you today     Open Future Orders        Priority Expected Expires Ordered    XR Abdomen 2 Views Routine 10/18/2018 12/30/2018 10/1/2018            Who to contact     If you have questions or need follow up information about today's clinic visit or your schedule please contact [facility] SPECIALTY AND PROCEDURE directly at [phone].  Normal or non-critical lab and imaging results will be communicated to you by [employer], letter or phone within 4 business days after the clinic has received the results. If you do not hear from us within 7 days, please contact the clinic through [employer] or phone. If you have a critical or abnormal lab result, we will notify you by phone as soon as possible.  Submit refill requests through [employer] or call your pharmacy and they will forward the refill request to us. Please allow 3 business days for your refill to be completed.          Additional Information About Your Visit        [employer] Information     [employer] gives you secure access to your electronic health record.  If you see a primary care provider, you can also send messages to your care team and make appointments. If you have questions, please call your primary care clinic.  If you do not have a primary care provider, please call [phone] and they will assist you.        Care EveryWhere ID     This is your Care EveryWhere ID. This could be used by other organizations to access your [place] medical records  [id number]        Your Vitals Were     Pulse Temperature Respirations BMI (Body Mass Index)          79 98.5  F (36.9  C) (Oral) 16 20.8 kg/m2         Blood Pressure from Last 3 Encounters:   10/02/18 124/88   09/26/18 (!) 137/105   09/04/18 137/88    Weight from Last 3 Encounters:   10/02/18 52.6 kg (115 lb 14.4 oz)   09/26/18 53.1 kg (117 lb 1 oz)   09/04/18 51.1 kg (112 lb 11.2 oz)              Today, you had the following     No orders found for display         Today's Medication Changes          These changes are accurate as of 10/2/18  8:35 AM.  If you have any questions, ask your nurse or doctor.               These medicines have changed or have updated prescriptions.        Dose/Directions    GNP ASPIRIN LOW DOSE 81 MG EC tablet   This may have changed:  when to take this   Used for:  Ulcerative colitis with other complication, unspecified location (H), Liver replaced by transplant (H)   Generic drug:  aspirin        Dose:  81 mg   Take 1 tablet (81 mg) by mouth daily   Quantity:  30 tablet   Refills:  1       predniSONE 5 MG tablet   Commonly known as:  DELTASONE   This may have changed:  when to take this   Used for:  UC (ulcerative colitis) (H), Liver replaced by transplant (H)        Dose:  5 mg   Take 1 tablet (5 mg) by mouth daily   Quantity:  225 tablet   Refills:  3       * tacrolimus 1 MG capsule   Commonly known as:  GENERIC EQUIVALENT   This may have changed:    - how much to take  - additional instructions   Used for:  S/P liver transplant (H)        Dose:  2 mg   Take 2 capsules (2 mg) by  mouth 2 times daily *total dose 2.5 mg twice daily   Quantity:  120 capsule   Refills:  11       * tacrolimus 0.5 MG capsule   Commonly known as:  GENERIC EQUIVALENT   This may have changed:    - how much to take  - additional instructions   Used for:  S/P liver transplant (H)        Dose:  0.5 mg   Take 1 capsule (0.5 mg) by mouth 2 times daily Take with 1 mg capsule. Total dose 2.5 mg every 12 hours   Quantity:  180 capsule   Refills:  3       * Notice:  This list has 2 medication(s) that are the same as other medications prescribed for you. Read the directions carefully, and ask your doctor or other care provider to review them with you.             Primary Care Provider Office Phone # Fax #    Fuentes Johnson [phone]       [address] [address]        Equal Access to Services     ERI CHAKRABORTY : Yandel hart Soyari, waaxjackie luqgonzalo, qaybta kaalmajackie mott, mani melo . So [facility] [phone].    ATENCIÓN: Si habla español, tiene a borja disposición servicios gratuitos de asistencia lingüística. [facility] [phone].    We comply with applicable federal civil rights laws and [place] laws. We do not discriminate on the basis of race, color, national origin, age, disability, sex, sexual orientation, or gender identity.            Thank you!     Thank you for choosing [facility] SPECIALTY AND PROCEDURE  for your care. Our goal is always to provide you with excellent care. Hearing back from our patients is one way we can continue to improve our services. Please take a few minutes to complete the written survey that you may receive in the mail after your visit with us. Thank you!             Your Updated Medication List - Protect others around you: Learn how to safely use, store and throw away your medicines at www.disposemymeds.org.          This list is accurate as of 10/2/18  8:35 AM.  Always use  your most recent med list.                   Brand Name Dispense Instructions for use Diagnosis    budesonide 9 MG 24 hr tablet    UCERIS    30 tablet    Take 1 tablet (9 mg) by mouth every morning    UC (ulcerative colitis) (H)       calcium carbonate 500 mg {elemental} 500 MG tablet    OS-KERLINE     Take 1 tablet by mouth every evening        ENTYVIO IV      Inject 300 mg into the vein every 28 days        GNP ASPIRIN LOW DOSE 81 MG EC tablet   Generic drug:  aspirin     30 tablet    Take 1 tablet (81 mg) by mouth daily    Ulcerative colitis with other complication, unspecified location (H), Liver replaced by transplant (H)       Multi-vitamin Tabs tablet      Take 1 tablet by mouth every morning        predniSONE 5 MG tablet    DELTASONE    225 tablet    Take 1 tablet (5 mg) by mouth daily    UC (ulcerative colitis) (H), Liver replaced by transplant (H)       sulfamethoxazole-trimethoprim 800-160 MG per tablet    BACTRIM DS/SEPTRA DS    90 tablet    Take 1 tablet by mouth three times a week Monday, Wednesday, Friday    Immunosuppression (H), Inflammatory bowel disease, PSC (primary sclerosing cholangitis)       * tacrolimus 1 MG capsule    GENERIC EQUIVALENT    120 capsule    Take 2 capsules (2 mg) by mouth 2 times daily *total dose 2.5 mg twice daily    S/P liver transplant (H)       * tacrolimus 0.5 MG capsule    GENERIC EQUIVALENT    180 capsule    Take 1 capsule (0.5 mg) by mouth 2 times daily Take with 1 mg capsule. Total dose 2.5 mg every 12 hours    S/P liver transplant (H)       ursodiol 250 MG tablet    ACTIGALL    270 tablet    Take 2 tabs (500 mg) in AM, and 1 tab at night (250 mg)    Liver replaced by transplant (H)       VITAMIN D (CHOLECALCIFEROL) PO      Take by mouth every evening        * Notice:  This list has 2 medication(s) that are the same as other medications prescribed for you. Read the directions carefully, and ask your doctor or other care provider to review them with you.

## 2018-10-02 NOTE — PATIENT INSTRUCTIONS
Dear Yarelis Penny    Thank you for choosing [facility] ([facility]) for your infusion.  The following information is a summary of our appointment as well as important reminders.        We look forward in seeing you on your next appointment here at [facility].  Please don t hesitate to call us at [phone] to reschedule any of your appointments or to speak with one of the [facility] registered nurses.  It was a pleasure taking care of you today.    Sincerely,    [facility]  Specialty Infusion & Procedure Center  [address] [place] [address]  [address]  Phone:  [phone]    Vedolizumab Solution for injection  What is this medicine?  VEDOLIZUMAB (Ve cornelius JOSHUA you mab) is used to treat ulcerative colitis and Crohn's disease in adult patients.  This medicine may be used for other purposes; ask your health care provider or pharmacist if you have questions.  What should I tell my health care provider before I take this medicine?  They need to know if you have any of these conditions:    diabetes    hepatitis B or history of hepatitis B infection    HIV or AIDS    immune system problems    infection or history of infections    liver disease    recently received or scheduled to receive a vaccine    scheduled to have surgery    tuberculosis, a positive skin test for tuberculosis or have recently been in close contact with someone who has tuberculosis    an unusual or allergic reaction to vedolizumab, other medicines, foods, dyes, or preservatives    pregnant or trying to get pregnant    breast-feeding  How should I use this medicine?  This medicine is for infusion into a vein. It is given by a health care professional in a hospital or clinic setting.  A special MedGuide will be given to you by the pharmacist with each prescription and refill. Be sure to read this information carefully each time.  Talk to your pediatrician regarding the use of  this medicine in children. This medicine is not approved for use in children.  Overdosage: If you think you've taken too much of this medicine contact a poison control center or emergency room at once.  NOTE: This medicine is only for you. Do not share this medicine with others.  What if I miss a dose?  It is important not to miss your dose. Call your doctor or health care professional if you are unable to keep an appointment.  What may interact with this medicine?    steroid medicines like prednisone or cortisone    TNF-alpha inhibitors like natalizumab, adalimumab, and infliximab    vaccines  This list may not describe all possible interactions. Give your health care provider a list of all the medicines, herbs, non-prescription drugs, or dietary supplements you use. Also tell them if you smoke, drink alcohol, or use illegal drugs. Some items may interact with your medicine.  What should I watch for while using this medicine?  Your condition will be monitored carefully while you are receiving this medicine. Visit your doctor for regular check ups. Tell your doctor or healthcare professional if your symptoms do not start to get better or if they get worse.  Stay away from people who are sick. Call your doctor or health care professional for advice if you get a fever, chills or sore throat, or other symptoms of a cold or flu. Do not treat yourself.  In some patients, this medicine may cause a serious brain infection that may cause death. If you have any problems seeing, thinking, speaking, walking, or standing, tell your doctor right away. If you cannot reach your doctor, get urgent medical care.  What side effects may I notice from receiving this medicine?  Side effects that you should report to your doctor or health care professional as soon as possible:    allergic reactions like skin rash, itching or hives, swelling of the face, lips, or tongue    breathing problems    changes in vision    chest pain    dark  urine    depression, feelings of sadness    dizziness    general ill feeling or flu-like symptoms    irregular, missed, or painful menstrual periods    light-colored stools    loss of appetite, nausea    muscle weakness    problems with balance, talking, or walking    right upper belly pain    unusually weak or tired    yellowing of the eyes or skin  Side effects that usually do not require medical attention (Report these to your doctor or health care professional if they continue or are bothersome.):    aches, pains    headache    stomach upset    tiredness  This list may not describe all possible side effects. Call your doctor for medical advice about side effects. You may report side effects to FDA at 5-107-FDA-7847.  Where should I keep my medicine?  This drug is given in a hospital or clinic and will not be stored at home.  NOTE: This sheet is a summary. It may not cover all possible information. If you have questions about this medicine, talk to your doctor, pharmacist, or health care provider.  NOTE:This sheet is a summary. It may not cover all possible information. If you have questions about this medicine, talk to your doctor, pharmacist, or health care provider. Copyright  2016 Gold Standard

## 2018-10-24 ENCOUNTER — RADIANT APPOINTMENT (OUTPATIENT)
Dept: GENERAL RADIOLOGY | Facility: CLINIC | Age: 33
End: 2018-10-24
Payer: COMMERCIAL

## 2018-10-24 DIAGNOSIS — Z46.89 ENCOUNTER FOR REMOVAL OF PANCREATIC STENT: ICD-10-CM

## 2018-10-30 ENCOUNTER — INFUSION THERAPY VISIT (OUTPATIENT)
Dept: INFUSION THERAPY | Facility: CLINIC | Age: 33
End: 2018-10-30
Attending: INTERNAL MEDICINE
Payer: COMMERCIAL

## 2018-10-30 VITALS
HEART RATE: 84 BPM | RESPIRATION RATE: 16 BRPM | SYSTOLIC BLOOD PRESSURE: 140 MMHG | TEMPERATURE: 97.9 F | OXYGEN SATURATION: 98 % | DIASTOLIC BLOOD PRESSURE: 90 MMHG

## 2018-10-30 DIAGNOSIS — B25.9 CMV COLITIS (H): ICD-10-CM

## 2018-10-30 DIAGNOSIS — D50.8 OTHER IRON DEFICIENCY ANEMIA: ICD-10-CM

## 2018-10-30 DIAGNOSIS — A08.39 CMV COLITIS (H): ICD-10-CM

## 2018-10-30 DIAGNOSIS — E86.0 DEHYDRATION: Primary | ICD-10-CM

## 2018-10-30 LAB
ALBUMIN SERPL-MCNC: 3.1 G/DL (ref 3.4–5)
ALP SERPL-CCNC: 213 U/L (ref 40–150)
ALT SERPL W P-5'-P-CCNC: 59 U/L (ref 0–50)
AST SERPL W P-5'-P-CCNC: 38 U/L (ref 0–45)
BASOPHILS # BLD AUTO: 0.1 10E9/L (ref 0–0.2)
BASOPHILS NFR BLD AUTO: 0.8 %
BILIRUB DIRECT SERPL-MCNC: 0.2 MG/DL (ref 0–0.2)
BILIRUB SERPL-MCNC: 0.4 MG/DL (ref 0.2–1.3)
CRP SERPL-MCNC: <2.9 MG/L (ref 0–8)
DIFFERENTIAL METHOD BLD: ABNORMAL
EOSINOPHIL # BLD AUTO: 1 10E9/L (ref 0–0.7)
EOSINOPHIL NFR BLD AUTO: 8.6 %
ERYTHROCYTE [DISTWIDTH] IN BLOOD BY AUTOMATED COUNT: 15.6 % (ref 10–15)
ERYTHROCYTE [SEDIMENTATION RATE] IN BLOOD BY WESTERGREN METHOD: 39 MM/H (ref 0–20)
HCT VFR BLD AUTO: 40.8 % (ref 35–47)
HGB BLD-MCNC: 13.1 G/DL (ref 11.7–15.7)
IMM GRANULOCYTES # BLD: 0 10E9/L (ref 0–0.4)
IMM GRANULOCYTES NFR BLD: 0.3 %
LYMPHOCYTES # BLD AUTO: 4.4 10E9/L (ref 0.8–5.3)
LYMPHOCYTES NFR BLD AUTO: 37.1 %
MCH RBC QN AUTO: 27.8 PG (ref 26.5–33)
MCHC RBC AUTO-ENTMCNC: 32.1 G/DL (ref 31.5–36.5)
MCV RBC AUTO: 87 FL (ref 78–100)
MONOCYTES # BLD AUTO: 1.1 10E9/L (ref 0–1.3)
MONOCYTES NFR BLD AUTO: 9.2 %
NEUTROPHILS # BLD AUTO: 5.2 10E9/L (ref 1.6–8.3)
NEUTROPHILS NFR BLD AUTO: 44 %
NRBC # BLD AUTO: 0 10*3/UL
NRBC BLD AUTO-RTO: 0 /100
PLATELET # BLD AUTO: 542 10E9/L (ref 150–450)
PROT SERPL-MCNC: 8 G/DL (ref 6.8–8.8)
RBC # BLD AUTO: 4.71 10E12/L (ref 3.8–5.2)
WBC # BLD AUTO: 11.7 10E9/L (ref 4–11)

## 2018-10-30 PROCEDURE — 96365 THER/PROPH/DIAG IV INF INIT: CPT

## 2018-10-30 PROCEDURE — 85652 RBC SED RATE AUTOMATED: CPT | Performed by: INTERNAL MEDICINE

## 2018-10-30 PROCEDURE — 85025 COMPLETE CBC W/AUTO DIFF WBC: CPT | Performed by: INTERNAL MEDICINE

## 2018-10-30 PROCEDURE — 25000128 H RX IP 250 OP 636: Mod: ZF | Performed by: INTERNAL MEDICINE

## 2018-10-30 PROCEDURE — 86140 C-REACTIVE PROTEIN: CPT | Performed by: INTERNAL MEDICINE

## 2018-10-30 PROCEDURE — 80076 HEPATIC FUNCTION PANEL: CPT | Performed by: INTERNAL MEDICINE

## 2018-10-30 RX ADMIN — VEDOLIZUMAB 300 MG: 300 INJECTION, POWDER, LYOPHILIZED, FOR SOLUTION INTRAVENOUS at 09:37

## 2018-10-30 NOTE — PROGRESS NOTES
~~~ NOTE: If the patient answers yes to any of the questions below, hold the infusion and contact ordering provider or on-call provider.    1. Have you recently had an elevated temperature, fever, chills, productive cough, coughing for 3 weeks or longer or hemoptysis,  abnormal vital signs, night sweats,  chest pain or have you noticed a decrease in your appetite, unexplained weight loss or fatigue? No  2. Do you have any open wounds or new incisions? No  3. Do you have any recent or upcoming hospitalizations, surgeries or dental procedures? No  4. Do you currently have or recently have had any signs of illness or infection or are you on any antibiotics? No  5. Have you had any new, sudden or worsening abdominal pain? No  6. Have you or anyone in your household received a live vaccination in the past 4 weeks? Please note:  No live vaccines while on biologic/chemotherapy until 6 months after the last treatment.  Patient can receive the flu vaccine (shot only) and the pneumovax.  It is optimal for the patient to get these vaccines mid cycle, but they can be given at any time as long as it is not on the day of the infusion. No  7. Have you recently been diagnosed with any new nervous system diseases (ie. Multiple sclerosis, Guillain [place], seizures, neurological changes) or cancer diagnosis? Are you on any form of radiation or chemotherapy? No  8. Are you pregnant or breast feeding or do you have plans of pregnancy in the future? No  9. Have you been having any signs of worsening depression or suicidal ideations?  (benlysta only) No  10. Have there been any other new onset medical symptoms? No      Nursing Note  [facility]is [facility]:   Chief Complaint   Patient presents with     Infusion     Entyvio     During today's [facility] appointment, orders from Dr. Johnson were completed.  Frequency: monthly    Progress note:  Patient  identification verified by name and date of birth.  Assessment completed.  Vitals recorded in Doc Flowsheets.  Patient was provided with education regarding infusion and possible side effects.  Patient verbalized understanding.      needed: No  Premedications: were not ordered.  Infusion Rates: 560 ml/hr.  Approximate Infusion length:30 minutes.   Labs: were drawn per orders.   Vascular access: peripheral IV placed today.  Treatment Conditions: Biologic checklist performed prior to infusion; patient denies fever, chills, signs of infection, recent illness, on antibiotics, productive cough or elevated temperature.  Patient tolerated infusion: well.          Discharge Plan:   Follow up plan of care with: ongoing infusions at [facility].  Discharge instructions were reviewed with patient.  Patient/representative verbalized understanding of discharge instructions and all questions answered.  Patient discharged from [facility] in stable condition.    Alison Penny RN    Administrations This Visit     vedolizumab (ENTYVIO) 300 mg in sodium chloride 0.9 % 280 mL infusion     Admin Date Action Dose Rate Route Administered By          10/30/2018 New Bag 300 mg 560 mL/hr Intravenous Alison Penny RN                         /90  Pulse 83  Temp 97.9  F (36.6  C) (Oral)  Resp 16  SpO2 98%

## 2018-10-30 NOTE — MR AVS SNAPSHOT
After Visit Summary   10/30/2018    Yarelis Penny    MRN: [id number]           Patient Information     Date Of Birth          1985        Visit Information        Provider Department      10/30/2018 9:00 AM UC 47 ATC; UC SPEC INFUSION [facility]        Today's Diagnoses     Dehydration    -  1    CMV colitis (H)        Other iron deficiency anemia          Care Instructions    Dear Yarelis Penny    Thank you for choosing [facility] ([facility]) for your infusion.  The following information is a summary of our appointment as well as important reminders.      EDUCATION POST BIOLOGICAL/CHEMOTHERAPY INFUSION  Call the triage nurse at your clinic or seek medical attention if you have chills and/or temperature greater than or equal to 100.5, uncontrolled nausea/vomiting, diarrhea, constipation, dizziness, shortness of breath, chest pain, heart palpitations, weakness or any other new or concerning symptoms, questions or concerns.  You can not have any live virus vaccines prior to or during treatment or up to 6 months post infusion.  If you have an upcoming surgery, medical procedure or dental procedure during treatment, this should be discussed with your ordering physician and your surgeon/dentist.  If you are having any concerning symptom, if you are unsure if you should get your next infusion or wish to speak to a provider before your next infusion, please call your care coordinator or triage nurse at your clinic to notify them so we can adequately serve you.      Additional information: you had an infusion of Entyvio 300 mg via IV today.       We look forward in seeing you on your next appointment here at [facility].  Please don t hesitate to call us at [phone] to reschedule any of your appointments or to speak with one of the [facility] registered nurses.  It was a pleasure taking care of you  today.    Sincerely,    [facility]  [facility]  [address] [place] [address]  [address]  Phone:  [phone]            Follow-ups after your visit        Your next 10 appointments already scheduled     Nov 06, 2018  7:30 AM CST   (Arrive by 7:15 AM)   RETURN INFLAMMATORY BOWEL DISEASE with Fuentes Johnson MD   [facility] ([facility])    [address]  4th Floor  [facility] [id number] [phone]            Nov 27, 2018  8:00 AM CST   Infusion 180 with UC SPEC INFUSION, UC 51 ATC   [facility] ([facility])    [address]  [address]  [facility] [id number] [phone]            Dec 05, 2018  8:00 AM CST   (Arrive by 7:45 AM)   Return Visit with Joya Lopez MD   [facility] and Infectious Diseases ([facility])    [address] [place] [address]  [address]  [facility] [id number] [phone]            Dec 18, 2018 10:30 AM CST   Lab with UC LAB   [facility] Lab ([facility])    [address] [place] [address]  1st Floor  [facility] [id number] [phone]            Dec 18, 2018 11:30 AM CST   (Arrive by 11:15 AM)   Return Liver Transplant with Daniela Enriquez MD   [facility] Hepatology ([facility])    [address] [place] [address]  [address]  [facility] [id number] [phone]            Dec 22, 2018  8:00 AM CST   Infusion 180 with UC SPEC INFUSION, UC 44 ATC   [facility] ([facility])    [address]  [address]  [facility] [id number] [phone]              Future tests that were ordered for you today     Open Standing Orders        Priority Remaining Interval Expires Ordered    Notify Physician Routine [id number]  PRN  10/30/2018            Who to contact     If you have questions or need follow up information about today's clinic visit or your schedule please contact [facility] directly at [phone].  Normal or non-critical lab and imaging results will be communicated to you by [employer], letter or phone within 4 business days after the clinic has received the results. If you do not hear from us within 7 days, please contact the clinic through [employer] or phone. If you have a critical or abnormal lab result, we will notify you by phone as soon as possible.  Submit refill requests through [employer] or call your pharmacy and they will forward the refill request to us. Please allow 3 business days for your refill to be completed.          Additional Information About Your Visit        [employer] Information     [employer] gives you secure access to your electronic health record. If you see a primary care provider, you can also send messages to your care team and make appointments. If you have questions, please call your primary care clinic.  If you do not have a primary care provider, please call [phone] and they will assist you.        Care EveryWhere ID     This is your Care EveryWhere ID. This could be used by other organizations to access your [facility] medical records  [id number]        Your Vitals Were     Pulse Temperature Respirations Pulse Oximetry          84 97.9  F (36.6  C) (Oral) 16 98%         Blood Pressure from Last 3 Encounters:   10/30/18 140/90   10/02/18 (!) 131/92   09/26/18 (!) 137/105    Weight from Last 3 Encounters:   10/02/18 52.6 kg (115 lb 14.4 oz)   09/26/18 53.1 kg (117 lb 1 oz)   09/04/18 51.1 kg (112 lb 11.2 oz)              We Performed the Following     CBC with platelets differential     CRP inflammation     Erythrocyte sedimentation rate auto     Hepatic panel          Today's Medication Changes          These changes are accurate as of 10/30/18  10:33 AM.  If you have any questions, ask your nurse or doctor.               These medicines have changed or have updated prescriptions.        Dose/Directions    GNP ASPIRIN LOW DOSE 81 MG EC tablet   This may have changed:  when to take this   Used for:  Ulcerative colitis with other complication, unspecified location (H), Liver replaced by transplant (H)   Generic drug:  aspirin        Dose:  81 mg   Take 1 tablet (81 mg) by mouth daily   Quantity:  30 tablet   Refills:  1       predniSONE 5 MG tablet   Commonly known as:  DELTASONE   This may have changed:  when to take this   Used for:  UC (ulcerative colitis) (H), Liver replaced by transplant (H)        Dose:  5 mg   Take 1 tablet (5 mg) by mouth daily   Quantity:  225 tablet   Refills:  3       * tacrolimus 1 MG capsule   Commonly known as:  GENERIC EQUIVALENT   This may have changed:    - how much to take  - additional instructions   Used for:  S/P liver transplant (H)        Dose:  2 mg   Take 2 capsules (2 mg) by mouth 2 times daily *total dose 2.5 mg twice daily   Quantity:  120 capsule   Refills:  11       * tacrolimus 0.5 MG capsule   Commonly known as:  GENERIC EQUIVALENT   This may have changed:    - how much to take  - additional instructions   Used for:  S/P liver transplant (H)        Dose:  0.5 mg   Take 1 capsule (0.5 mg) by mouth 2 times daily Take with 1 mg capsule. Total dose 2.5 mg every 12 hours   Quantity:  180 capsule   Refills:  3       * Notice:  This list has 2 medication(s) that are the same as other medications prescribed for you. Read the directions carefully, and ask your doctor or other care provider to review them with you.             Primary Care Provider Office Phone # Fax #    Fuentes Johnson [phone]       [address] [address]        Equal Access to Services     ERI CHAKARBORTY AH: ann De La Cruz qaybta kaalmada adeegyada, waxay idiin hayaan  cuca hernandezashjuan russo'aavanessa ah. So [facility] [phone].    ATENCIÓN: Si lilibeth haney, tiene a borja disposición servicios gratuitos de asistencia lingüística. Clyde grewal [phone].    We comply with applicable federal civil rights laws and [place] laws. We do not discriminate on the basis of race, color, national origin, age, disability, sex, sexual orientation, or gender identity.            Thank you!     Thank you for choosing [facility]  for your care. Our goal is always to provide you with excellent care. Hearing back from our patients is one way we can continue to improve our services. Please take a few minutes to complete the written survey that you may receive in the mail after your visit with us. Thank you!             Your Updated Medication List - Protect others around you: Learn how to safely use, store and throw away your medicines at www.disposemymeds.org.          This list is accurate as of 10/30/18 10:33 AM.  Always use your most recent med list.                   Brand Name Dispense Instructions for use Diagnosis    budesonide 9 MG 24 hr tablet    UCERIS    30 tablet    Take 1 tablet (9 mg) by mouth every morning    UC (ulcerative colitis) (H)       calcium carbonate 500 mg (elemental) 500 MG tablet    OS-KERLINE     Take 1 tablet by mouth every evening        ENTYVIO IV      Inject 300 mg into the vein every 28 days        GNP ASPIRIN LOW DOSE 81 MG EC tablet   Generic drug:  aspirin     30 tablet    Take 1 tablet (81 mg) by mouth daily    Ulcerative colitis with other complication, unspecified location (H), Liver replaced by transplant (H)       Multi-vitamin Tabs tablet      Take 1 tablet by mouth every morning        predniSONE 5 MG tablet    DELTASONE    225 tablet    Take 1 tablet (5 mg) by mouth daily    UC (ulcerative colitis) (H), Liver replaced by transplant (H)       sulfamethoxazole-trimethoprim 800-160 MG per tablet    BACTRIM DS/SEPTRA DS    90  tablet    Take 1 tablet by mouth three times a week Monday, Wednesday, Friday    Immunosuppression (H), Inflammatory bowel disease, PSC (primary sclerosing cholangitis)       * tacrolimus 1 MG capsule    GENERIC EQUIVALENT    120 capsule    Take 2 capsules (2 mg) by mouth 2 times daily *total dose 2.5 mg twice daily    S/P liver transplant (H)       * tacrolimus 0.5 MG capsule    GENERIC EQUIVALENT    180 capsule    Take 1 capsule (0.5 mg) by mouth 2 times daily Take with 1 mg capsule. Total dose 2.5 mg every 12 hours    S/P liver transplant (H)       ursodiol 250 MG tablet    ACTIGALL    270 tablet    Take 2 tabs (500 mg) in AM, and 1 tab at night (250 mg)    Liver replaced by transplant (H)       VITAMIN D (CHOLECALCIFEROL) PO      Take by mouth every evening        * Notice:  This list has 2 medication(s) that are the same as other medications prescribed for you. Read the directions carefully, and ask your doctor or other care provider to review them with you.

## 2018-10-30 NOTE — PATIENT INSTRUCTIONS
Dear Yarelis Penny    Thank you for choosing [facility] ([facility]) for your infusion.  The following information is a summary of our appointment as well as important reminders.      EDUCATION POST BIOLOGICAL/CHEMOTHERAPY INFUSION  Call the triage nurse at your clinic or seek medical attention if you have chills and/or temperature greater than or equal to 100.5, uncontrolled nausea/vomiting, diarrhea, constipation, dizziness, shortness of breath, chest pain, heart palpitations, weakness or any other new or concerning symptoms, questions or concerns.  You can not have any live virus vaccines prior to or during treatment or up to 6 months post infusion.  If you have an upcoming surgery, medical procedure or dental procedure during treatment, this should be discussed with your ordering physician and your surgeon/dentist.  If you are having any concerning symptom, if you are unsure if you should get your next infusion or wish to speak to a provider before your next infusion, please call your care coordinator or triage nurse at your clinic to notify them so we can adequately serve you.      Additional information: you had an infusion of Entyvio 300 mg via IV today.       We look forward in seeing you on your next appointment here at [facility].  Please don t hesitate to call us at [phone] to reschedule any of your appointments or to speak with one of the [facility] registered nurses.  It was a pleasure taking care of you today.    Sincerely,    [facility]  [facility]  [address] [place] [address]  [address]  Phone:  [phone]

## 2018-11-01 ENCOUNTER — TELEPHONE (OUTPATIENT)
Dept: GASTROENTEROLOGY | Facility: CLINIC | Age: 33
End: 2018-11-01

## 2018-11-05 ENCOUNTER — TELEPHONE (OUTPATIENT)
Dept: GASTROENTEROLOGY | Facility: CLINIC | Age: 33
End: 2018-11-05

## 2018-11-05 NOTE — TELEPHONE ENCOUNTER
Stent follow-up for ERCP on 9/26/2018 with Dr. Macias    Images reviewed by provider: No biliary stents are present on the abdominal x-ray. No need for EGD     Called patient and advised of the above. Gave clinic number for further questions/conerns.     AMARILYS [address] Dr. Huerta, & Dr. Macias  Advanced Endoscopy  [phone]

## 2018-11-06 ENCOUNTER — OFFICE VISIT (OUTPATIENT)
Dept: GASTROENTEROLOGY | Facility: CLINIC | Age: 33
End: 2018-11-06
Payer: COMMERCIAL

## 2018-11-06 VITALS
TEMPERATURE: 97.8 F | SYSTOLIC BLOOD PRESSURE: 124 MMHG | HEIGHT: 63 IN | DIASTOLIC BLOOD PRESSURE: 88 MMHG | HEART RATE: 76 BPM | WEIGHT: 119 LBS | OXYGEN SATURATION: 100 % | BODY MASS INDEX: 21.09 KG/M2

## 2018-11-06 DIAGNOSIS — K51.00 ULCERATIVE PANCOLITIS WITHOUT COMPLICATION (H): Primary | ICD-10-CM

## 2018-11-06 DIAGNOSIS — Z94.4 S/P LIVER TRANSPLANT (H): ICD-10-CM

## 2018-11-06 DIAGNOSIS — K83.01 PSC (PRIMARY SCLEROSING CHOLANGITIS) (H): ICD-10-CM

## 2018-11-06 DIAGNOSIS — D84.9 IMMUNOSUPPRESSION (H): ICD-10-CM

## 2018-11-06 ASSESSMENT — PAIN SCALES - GENERAL: PAINLEVEL: NO PAIN (0)

## 2018-11-06 NOTE — NURSING NOTE
"Chief Complaint   Patient presents with     RECHECK     return patient, routine follow up       Vitals:    11/06/18 0735   BP: 124/88   Pulse: 76   Temp: 97.8  F (36.6  C)   TempSrc: Oral   SpO2: 100%   Weight: 54 kg (119 lb)   Height: 1.59 m (5' 2.6\")       Body mass index is 21.35 kg/(m^2).    Kadie Dolan, RMA  Patient states no PCP right now.                         "

## 2018-11-06 NOTE — PROGRESS NOTES
IBD CLINIC VISIT       CC/REFERRING MD: Daniela Enriquez     REASON FOR FOLLOW UP: PSC associated Ulcerative colitis      IBD HISTORY  Age at diagnosis: 18      Extent of disease: Colonoscopies over the years have varied with areas of involvement. Earliest colonoscopies in 2006 show right greater than left inflammation with some inflammation in the ileum as well.    Followup colonoscopy in 2007 showed mainly diffuse colonic inflammation.    Colonoscopies in 2015 again showed more chronic colitis with no significant ileal involvement.    Colonoscopies in 3/2016 again showed a right-sided and some ileal inflammation with ulceration, but biopsy showed CMV.    Colonoscopy 11/2016 showed severe inflammation from 20 cm from the anus all of the way to the ileum as far as could be evaluated.  Biopsies showed chronic active ileitis and chronic active colitis in every sample.  There was no evidence of CMV or PTLD. Rare SHENA-KACIE positive cells seen in ileum.  Colonoscopy 9/2017 showed Abad 2 inflammation in the right colon and abad one in the left colon with relative rectal sparing.  Patulous IC valve with some backwash ileitis.  Biopsies negative for lymphoma, CMP and EBV biopsies with mild  chronic active ileitis and mild chronic active colitis throughout, including rectum. Colonoscopy 9/2018 with normal left colon. Mild inflammation in last 5 cm of ileum. Patches of normal mucosa in the right colon but also some small erosions/ulceration (2-8mm in diameter). Overall improvement but still some inflammation present.      Current UC medications: Vedolizumab 400mg pq4 weeks, prednisone 5 mg  Prior UC surgeries: None  Prior IBD Medications:   Multiple rounds of prednisone  Pentasa   Asacol  Azathioprine (mainly given for liver issues at lower dose) '  Uceris - stopped March 2018      DRUG MONITORING  TPMT enzyme activity: (12/6/16) 18.5      6-TGN/6-MMPN levels: (12/6/16) 203/257       Biologic concentration:   4/18/17  Vedolizumab level 34.1, no ATI  5/2/17 Vedolizumab level 3.9, no ATI (decreased interval to q4 week dosing)  10/25/17 Vedolizumab level 11, no ATI (no change in therapy-- met with surgeon to discuss what surgical options might look like)         DISEASE ASSESSMENT  Labs:CRP <2.9 and sed rate 39  Endoscopic assessment: As above  Enterography: (12/14/16) 1. Diffuse colonic wall thickening and enhancement with loss of  haustral pattern. Wall thickening and enhancement of the terminal  ileum. Findings consistent with acute on chronic changes related to  ulcerative colitis. 2. Stable 7.4 cm left adnexal cyst. A short interval follow-up  ultrasound is recommended to help determine whether this represents a  physiologic lesion. Plans to have follow up with OBGYN and repeat U/S in 6 weeks.  Fecal calprotectin: 870 (4/19/2017)   C diff: Negative last 6/8/2017  Abad score: 2/12 (11/6/2018)      ASSESSMENT/PLAN  Yarelis is a pleasant 33-year-old female with a complicated past medical history including primary sclerosing cholangitis associated inflammatory bowel disease, cirrhosis, status post living donor transplant in 2015 with severe flare and was hospitalized 12/2016. It took some time but she has done quite well now for at least the last year (the last 8 months off Uceris even). She is in symptomatic remission and has had definite improvement on her subsequent colonoscopies, though she has not quite gotten to mucosal healing. I did present her case at our monthly IBD conference. Given her complexity and her steady improvement it was agreed to continue to give her more time on her current regimen.      1. Primary sclerosing cholangitis associated inflammatory bowel disease.   -still with some small ulcerations of the right colon but things are steadily improving and she has been in symptomatic remission  -continue vedolizumab 300 mg q 4 weeks IV  -continue surveillance labs  -follow up in IBD clinic in 3-4 months with   Mike to establish care as I am leaving the [facility] (she has met Dr. Mckeon previously)    2. Desire to have a baby - we discussed again that it is best to be in deep remission (endoscopic remission) prior to conceiving - this is best for mom and baby. Yarelis and her  Ceferino understand and agree with this. We will give it more time and re-evaluate with colonoscopy in 6-9 months. They will continue to talk to Dr. Mckeon about this going forward.      3. PCP prophylaxis. Stop bactrim      4. EBV viremia. No evidence of PTLD on biopsies. Rituximab treatment has been completed.  Follow up in December as scheduled      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 Her CMV levels were not detected that were last checked in 02/07/2018. She has completed her IV ganciclovir regimen.       6. 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 Last ERCP 9/2018 with Dr. Guru Macias. Has evidence of chronic bile obstruction on liver bx.      7. IBD healthcare maintenance based on patients current medication: Vedolizumab  Vaccinations:  -- Influenza (every year): UTD [id number]  -- TdaP (every 10 years): Last given 2016  -- Pneumococcal Pneumonia (once then every 5 years): Last given 2016      One time confirmation of immunity or serologies:  -- Hepatitis A (serologies or immunizations): 2015  -- Hepatitis B (serologies or immunizations): 2015  -- Varicella: Not documented  -- MMR:Not documented  -- HPV (all aged 18-26): As indicated  -- Meningococcal meningitis (all patients at risk for meningitis): As indicated   -- Due to the immunosuppression in this patient, I would not advise administration of live vaccines such as varicella/VZV, intranasal influenza, MMR, or yellow fever vaccine (if travelling).       Cancer Screening:  Colon cancer screening:  Given PSC type of colitis, colonoscopy every year is recommended. Dysplasia screening is recommended fall 2018.      Cervical cancer screening: Annual due to  immunosupression      Skin cancer screening: Annual visual exam of skin by dermatologist since patient is immunocompromised      Depression Screening:  -- Over the last month, have you felt down, depressed, or hopeless? No  -- Over the last month, have you felt little interest or pleasure doing things? No      Misc:  -- Avoid tobacco use  -- Avoid NSAIDs as there is potentially a 25% chance of causing an IBD flare      RTC in 3-4 months with Dr. Mckeon      Thank you for this consultation. It was a pleasure to participate in the care of this patient; please contact us with any further questions.       Fuentes Johnson MD    [facility]  Division of Gastroenterology, Hepatology and Nutrition           HPI:    Today: Continues to do very well. 1-2 formed BMs daily. No pain, urgency or tenesmus. No EIM.    Continues to gain weight. UP to 119 lbs (BMI 21).    She is again accompanied by her .     She has no complaints.    They understand that it is best to have remission before attempting to get pregnant.      ROS:    No fevers or chills  No weight loss  No blurry vision, double vision or change in vision  No sore throat  No lymphadenopathy  No headache, paraesthesias, or weakness in a limb  No shortness of breath or wheezing  No chest pain or pressure  No arthralgias or myalgias  No rashes or skin changes  No odynophagia or dysphagia  No BRBPR, hematochezia, melena  No dysuria, frequency or urgency  No hot/cold intolerance or polyria  No anxiety or depression      Extra intestinal manifestations of IBD:  No uveitis/episcleritis  No aphthous ulcers   No arthritis   No erythema nodosum/pyoderma gangrenosum.       PERTINENT PAST MEDICAL HISTORY:    Past Medical History            Past Medical History:   Diagnosis Date     Autoimmune hepatitis (H) 2012      on steroid taper     Cholangitis        CKD (chronic kidney disease) 2012      biopsy 2012: TIN, patchy fibrosis     Esophageal  varices (H) 9/08      banded     Heart murmur        History of blood transfusion        Primary sclerosing cholangitis        Ulcerative Colitis         f/b GI               PREVIOUS SURGERIES:    Past Surgical History          Past Surgical History:   Procedure Laterality Date     COLONOSCOPY    9/07     COLONOSCOPY N/A 2/26/2015      Procedure: COMBINED COLONOSCOPY, SINGLE OR MULTIPLE BIOPSY/POLYPECTOMY BY BIOPSY;  Surgeon: Mitchel Hoskins Chi, MD;  Location: [facility]     COLONOSCOPY N/A 1/12/2016      Procedure: COMBINED COLONOSCOPY, SINGLE OR MULTIPLE BIOPSY/POLYPECTOMY BY BIOPSY;  Surgeon: Rigo Valles MD;  Location: [facility]     COLONOSCOPY N/A 4/1/2016      Procedure: COMBINED COLONOSCOPY, SINGLE OR MULTIPLE BIOPSY/POLYPECTOMY BY BIOPSY;  Surgeon: Kareem Solis MD;  Location: [facility]     COLONOSCOPY N/A 9/5/2017      Procedure: COMBINED COLONOSCOPY, SINGLE OR MULTIPLE BIOPSY/POLYPECTOMY BY BIOPSY;  Colonoscopy;  Surgeon: Fuentes Johnson MD;  Location: [facility]     ENDOSCOPIC RETROGRADE CHOLANGIOPANCREATOGRAM N/A 6/25/2015      Procedure: COMBINED ENDOSCOPIC RETROGRADE CHOLANGIOPANCREATOGRAPHY, PLACE TUBE/STENT;  Surgeon: Landon Quinones MD;  [facility]     ENDOSCOPIC RETROGRADE CHOLANGIOPANCREATOGRAM N/A 6/25/2015      Procedure: COMBINED ENDOSCOPIC RETROGRADE CHOLANGIOPANCREATOGRAPHY, PLACE TUBE/STENT;  Surgeon: Landon Quinones MD;  [facility]     ENDOSCOPIC RETROGRADE CHOLANGIOPANCREATOGRAM N/A 7/2/2015      Procedure: COMBINED ENDOSCOPIC RETROGRADE CHOLANGIOPANCREATOGRAPHY, PLACE TUBE/STENT;  Surgeon: Landon Quinones MD;  [facility]     ENDOSCOPIC RETROGRADE CHOLANGIOPANCREATOGRAM N/A 9/8/2015      Procedure: COMBINED ENDOSCOPIC RETROGRADE CHOLANGIOPANCREATOGRAPHY, REMOVE FOREIGN BODY OR STENT/TUBE;  Surgeon: Landon Quinones MD;  [facility]     ENDOSCOPIC RETROGRADE CHOLANGIOPANCREATOGRAM N/A 12/8/2015      Procedure: ENDOSCOPIC RETROGRADE  CHOLANGIOPANCREATOGRAM;  Surgeon: Landon Quinones MD;  [facility]     ENDOSCOPIC RETROGRADE CHOLANGIOPANCREATOGRAM N/A 3/1/2016      Procedure: COMBINED ENDOSCOPIC RETROGRADE CHOLANGIOPANCREATOGRAPHY, REMOVE FOREIGN BODY OR STENT/TUBE;  Surgeon: Landon Quinones MD;  [facility]     ENDOSCOPIC RETROGRADE CHOLANGIOPANCREATOGRAM N/A 3/20/2017      Procedure: COMBINED ENDOSCOPIC RETROGRADE CHOLANGIOPANCREATOGRAPHY, PLACE TUBE/STENT;  Endoscopic Retrograde Cholangiopancreatogram with Ballon Dilation, Stent Placement;  Surgeon: Guru Brittany Macias MD;  [facility]     ENDOSCOPIC RETROGRADE CHOLANGIOPANCREATOGRAPHY, EXCHANGE TUBE/STENT N/A 7/30/2015      Procedure: ENDOSCOPIC RETROGRADE CHOLANGIOPANCREATOGRAPHY, EXCHANGE TUBE/STENT;  Surgeon: Landon Quinones MD;  [facility]     ENDOSCOPIC RETROGRADE CHOLANGIOPANCREATOGRAPHY, EXCHANGE TUBE/STENT N/A 5/15/2017      Procedure: ENDOSCOPIC RETROGRADE CHOLANGIOPANCREATOGRAPHY, EXCHANGE TUBE/STENT;  Endoscopic Retrograde Cholangiopancreatogram with biliary stent exchange and balloon dilation;  Surgeon: Guru Brittany Macias MD;  [facility]     ESOPHAGOSCOPY, GASTROSCOPY, DUODENOSCOPY (EGD), COMBINED N/A 2/26/2015      Procedure: COMBINED ESOPHAGOSCOPY, GASTROSCOPY, DUODENOSCOPY (EGD);  Surgeon: Mitchel Hoskins Chi, MD;  [facility]     EXPLORE COMMON BILE DUCT N/A 6/25/2015      Procedure: EXPLORE COMMON BILE DUCT;  Surgeon: Tyree Smith MD;  [facility]     LAPAROTOMY EXPLORATORY N/A 6/25/2015      Procedure: LAPAROTOMY EXPLORATORY;  Surgeon: Tyree Smith MD;  [facility]     PICC INSERTION Right 2/27/2015      4fr SL Valved PICC, 36cm (1cm external) in the R medial brachial vein w/ tip in the low SVC.     SIGMOIDOSCOPY FLEXIBLE N/A 4/27/2016      Procedure: SIGMOIDOSCOPY FLEXIBLE;  Surgeon: Jose Nielson MD;  [facility]     TRANSPLANT LIVER RECIPIENT LIVING UNRELATED N/A  6/18/2015      Procedure: TRANSPLANT LIVER RECIPIENT LIVING UNRELATED;  Surgeon: Tyree Smith MD;  [facility]     UPPER GI ENDOSCOPY                     ALLERGIES:                Allergies   Allergen Reactions     Rifampin Other (See Comments)         Kidney failure     Penicillins            Hives, has tolerated amoxicillin           PERTINENT MEDICATIONS:      Current Outpatient Prescriptions   Medication     calcium carbonate (OS-KERLINE 500 MG [place]. [place]) 1250 MG tablet     GNP ASPIRIN LOW DOSE 81 MG EC tablet     multivitamin, therapeutic with minerals (MULTI-VITAMIN) TABS tablet     predniSONE (DELTASONE) 5 MG tablet     sulfamethoxazole-trimethoprim (BACTRIM DS/SEPTRA DS) 800-160 MG per tablet     tacrolimus (GENERIC EQUIVALENT) 0.5 MG capsule     tacrolimus (GENERIC EQUIVALENT) 1 MG capsule     ursodiol (ACTIGALL) 250 MG tablet     Vedolizumab (ENTYVIO IV)     VITAMIN D, CHOLECALCIFEROL, PO     budesonide (UCERIS) 9 MG 24 hr tablet     No current facility-administered medications for this visit.           SOCIAL HISTORY:    Social History    Social History                 Social History     Marital status:          Spouse name: Ceferino     Number of children: 0     Years of education: N/A               Occupational History     unemployed                        Social History Main Topics      Smoking status: Never Smoker      Smokeless tobacco: Never Used      Alcohol use 0.0 oz/week           0 Standard drinks or equivalent per week              Comment: Rare      Drug use: No      Sexual activity: Not Currently          Partners: Male             Comment: denies pregnancy                  Other Topics Concern      Service No     Blood Transfusions No     Caffeine Concern No     Occupational Exposure No     Hobby Hazards No     Sleep Concern Yes     Stress Concern No     Weight Concern No     Special Diet No     Back Care No     Exercise No     Bike Helmet No         n/a     Seat Belt Yes      "Self-Exams No       Social History Narrative              FAMILY HISTORY:    Family History              Family History   Problem Relation Age of Onset     Hypertension Mother        Lipids Mother        Hypertension Maternal Grandmother        Alzheimer Disease Maternal Grandmother        Lipids Father                  Past/family/social history reviewed and no changes      PHYSICAL EXAMINATION:  Constitutional: aaox3, cooperative, pleasant, not dyspneic/diaphoretic, no acute distress  VS: /88  Pulse 76  Temp 97.8  F (36.6  C) (Oral)  Ht 1.59 m (5' 2.6\")  Wt 54 kg (119 lb)  SpO2 100%  BMI 21.35 kg/m2    Eyes: Sclera anicteric/injected  Ears/nose/mouth/throat: Normal oropharynx without ulcers or exudate, mucus membranes moist, hearing intact  Neck: supple, thyroid normal size  CV: No edema  Respiratory: Unlabored breathing  Lymph: No axillary, submandibular, supraclavicular or inguinal lymphadenopathy  Abd: Surgical abdominal scars well healed, nondistended, +bs, no hepatosplenomegaly, nontender, no peritoneal signs  Skin: warm, perfused, no jaundice  Psych: Normal affect  MSK: Normal gait          PERTINENT STUDIES:    Reviewed in EPIC    Answers for HPI/ROS submitted by the patient on 10/27/2018   General Symptoms: No  Skin Symptoms: No  HENT Symptoms: No  EYE SYMPTOMS: No  HEART SYMPTOMS: No  LUNG SYMPTOMS: No  INTESTINAL SYMPTOMS: No  URINARY SYMPTOMS: No  GYNECOLOGIC SYMPTOMS: No  BREAST SYMPTOMS: No  SKELETAL SYMPTOMS: No  BLOOD SYMPTOMS: No  NERVOUS SYSTEM SYMPTOMS: No  MENTAL HEALTH SYMPTOMS: No    "

## 2018-11-06 NOTE — PATIENT INSTRUCTIONS
Good to see you today.    Keep up the good work!    Continue vedolizumab    You can stop the bactrim    Follow up with Dr. Mckeon in IBD clinic in 3-4 months    Consider repeat colonoscopy in 6 months  Pt dione discuss with Dr. Mckeon at next appt     Establish care with a new primary care doctor. You need to get a yearly pap smear and pelvic exam  [phone]      Dermatology visit for skin check     Thanks Norma Walker RN Care Coordinator for Dr. Johnson   Phone   [phone]     For questions regarding your care Monday through Friday, contact the RN GI care coordinator,  Call   [phone] . Your call will be  returned same day, or if consultation is needed with the provider, it may be following business day - or you may send a My Chart message.    For medication refills (prescribed by the GI clinic), contact your pharmacy.    For appointment rescheduling/cancellation, contact [phone]     After hours, or if you have an immediate GI concern and cannot wait for a return call, contact the GI Fellow at [phone] and select option #4.

## 2018-11-06 NOTE — MR AVS SNAPSHOT
After Visit Summary   11/6/2018    Yarelis Penny    MRN: [id number]           Patient Information     Date Of Birth          1985        Visit Information        Provider Department      11/6/2018 7:30 AM Fuentes Johnson MD [facility]        Today's Diagnoses     Ulcerative colitis (H)    -  1      Care Instructions    Good to see you today.    Keep up the good work!    Continue vedolizumab    You can stop the bactrim    Follow up with Dr. Mckeon in IBD clinic in 3-4 months    Consider repeat colonoscopy in 6 months  Pt dione discuss with Dr. Mckeon at next appt     Establish care with a new primary care doctor. You need to get a yearly pap smear and pelvic exam  [phone]      Dermatology visit for skin check     Thanks Norma Walker RN Care Coordinator for Dr. Johnson   Phone   [phone]     For questions regarding your care Monday through Friday, contact the RN GI care coordinator,  Call   [phone] . Your call will be  returned same day, or if consultation is needed with the provider, it may be following business day - or you may send a [employer] Chart message.    For medication refills (prescribed by the GI clinic), contact your pharmacy.    For appointment rescheduling/cancellation, contact [phone]     After hours, or if you have an immediate GI concern and cannot wait for a return call, contact the GI Fellow at [phone] and select option #4.              Follow-ups after your visit        Additional Services     DERMATOLOGY REFERRAL       Your provider has referred you to: [facility]: Dermatology Clinic [facility] [phone]   [email or web address]     Please be aware that coverage of these services is subject to the terms and limitations of your health insurance plan.  Call member services at your health plan with any benefit or coverage questions.      Please bring the following with you to your  appointment:    (1) Any X-Rays, CTs or MRIs which have been performed.  Contact the facility where they were done to arrange for  prior to your scheduled appointment.    (2) List of current medications  (3) This referral request   (4) Any documents/labs given to you for this referral                  Your next 10 appointments already scheduled     Nov 13, 2018  7:55 AM CST   (Arrive by 7:40 AM)   New Patient Visit with Hung Butler MD   [facility] Dermatology ([facility])    [address]  3rd Floor  [place] [address]   [phone]            Nov 27, 2018  8:00 AM CST   Infusion 180 with UC SPEC INFUSION, UC 51 ATC   [facility] Specialty and Procedure ([facility])    [address]  [address]  [place] [address]   [phone]            Dec 04, 2018  4:30 PM CST   (Arrive by 4:15 PM)   New Patient Visit with Alma Lara MD   [facility] Primary Care Clinic ([facility])    [address]  4th Floor  [place] [address]   [phone]            Dec 05, 2018  8:00 AM CST   (Arrive by 7:45 AM)   Return Visit with Joya Lopez MD   [facility] and Infectious Diseases ([facility])    [address]  [address]  [place] [address]   [phone]            Dec 18, 2018 10:30 AM CST   Lab with UC LAB   [facility] Lab ([facility])    [address]  1st Floor  [place] [address]   [phone]            Dec 18, 2018 11:30 AM CST   (Arrive by 11:15 AM)   Return Liver Transplant with Daniela Enriquez MD   [facility] Hepatology ([facility])    [address]  [address]  [place] [address]   [phone]            Dec 22, 2018  8:00 AM CST   Infusion 180 with UC SPEC INFUSION, UC 44 ATC   [facility]  "Specialty and Procedure ([facility])    [address]  [address]  [place] [id number] [phone]            Mar 06, 2019  8:00 AM CST   (Arrive by 7:45 AM)   RETURN INFLAMMATORY BOWEL DISEASE with Sophy Mckeon MD   [facility] Gastroenterology and IBD Clinic ([facility])    [address]  4th Floor  [place] [id number] [phone]              Who to contact     Please call your clinic at [phone] to:    Ask questions about your health    Make or cancel appointments    Discuss your medicines    Learn about your test results    Speak to your doctor            Additional Information About Your Visit        [employer] Information     [employer] gives you secure access to your electronic health record. If you see a primary care provider, you can also send messages to your care team and make appointments. If you have questions, please call your primary care clinic.  If you do not have a primary care provider, please call [phone] and they will assist you.      [employer] is an electronic gateway that provides easy, online access to your medical records. With [employer], you can request a clinic appointment, read your test results, renew a prescription or communicate with your care team.     To access your existing account, please contact your [facility] or call [phone] for assistance.        Care EveryWhere ID     This is your Care EveryWhere ID. This could be used by other organizations to access your [place] medical records  [id number]        Your Vitals Were     Pulse Temperature Height Pulse Oximetry BMI (Body Mass Index)       76 97.8  F (36.6  C) (Oral) 1.59 m (5' 2.6\") 100% 21.35 kg/m2        Blood Pressure from Last 3 Encounters:   11/06/18 124/88   10/30/18 140/90   10/02/18 (!) 131/92    Weight from Last 3 Encounters:   11/06/18 54 kg (119 lb)   10/02/18 52.6 kg (115 lb 14.4 oz)   09/26/18 53.1 " kg (117 lb 1 oz)              We Performed the Following     DERMATOLOGY REFERRAL          Today's Medication Changes          These changes are accurate as of 11/6/18  8:25 AM.  If you have any questions, ask your nurse or doctor.               These medicines have changed or have updated prescriptions.        Dose/Directions    GNP ASPIRIN LOW DOSE 81 MG EC tablet   This may have changed:  when to take this   Used for:  Ulcerative colitis with other complication, unspecified location (H), Liver replaced by transplant (H)   Generic drug:  aspirin        Dose:  81 mg   Take 1 tablet (81 mg) by mouth daily   Quantity:  30 tablet   Refills:  1       predniSONE 5 MG tablet   Commonly known as:  DELTASONE   This may have changed:  when to take this   Used for:  UC (ulcerative colitis) (H), Liver replaced by transplant (H)        Dose:  5 mg   Take 1 tablet (5 mg) by mouth daily   Quantity:  225 tablet   Refills:  3       * tacrolimus 1 MG capsule   Commonly known as:  GENERIC EQUIVALENT   This may have changed:    - how much to take  - additional instructions   Used for:  S/P liver transplant (H)        Dose:  2 mg   Take 2 capsules (2 mg) by mouth 2 times daily *total dose 2.5 mg twice daily   Quantity:  120 capsule   Refills:  11       * tacrolimus 0.5 MG capsule   Commonly known as:  GENERIC EQUIVALENT   This may have changed:    - how much to take  - additional instructions   Used for:  S/P liver transplant (H)        Dose:  0.5 mg   Take 1 capsule (0.5 mg) by mouth 2 times daily Take with 1 mg capsule. Total dose 2.5 mg every 12 hours   Quantity:  180 capsule   Refills:  3       * Notice:  This list has 2 medication(s) that are the same as other medications prescribed for you. Read the directions carefully, and ask your doctor or other care provider to review them with you.             Primary Care Provider Office Phone # Fax #    Fuentes Johnson [phone]       [address]  [address]  [place] [address]        Equal Access to Services     [facility] MYLENE : Hadii aad ku hadrenettayves Palma, wamableda jeanine, qajulisandra palominomamani begum. So [facility] [place] [phone].    ATENCIÓN: Si habla español, tiene a borja disposición servicios gratuitos de asistencia lingüística. Clyde al [phone].    We comply with applicable federal civil rights laws and [place] laws. We do not discriminate on the basis of race, color, national origin, age, disability, sex, sexual orientation, or gender identity.            Thank you!     Thank you for choosing [facility]  for your care. Our goal is always to provide you with excellent care. Hearing back from our patients is one way we can continue to improve our services. Please take a few minutes to complete the written survey that you may receive in the mail after your visit with us. Thank you!             Your Updated Medication List - Protect others around you: Learn how to safely use, store and throw away your medicines at www.disposemymeds.org.          This list is accurate as of 11/6/18  8:25 AM.  Always use your most recent med list.                   Brand Name Dispense Instructions for use Diagnosis    budesonide 9 MG 24 hr tablet    UCERIS    30 tablet    Take 1 tablet (9 mg) by mouth every morning    UC (ulcerative colitis) (H)       calcium carbonate 500 mg (elemental) 500 MG tablet    OS-KERLINE     Take 1 tablet by mouth every evening        ENTYVIO IV      Inject 300 mg into the vein every 28 days        GNP ASPIRIN LOW DOSE 81 MG EC tablet   Generic drug:  aspirin     30 tablet    Take 1 tablet (81 mg) by mouth daily    Ulcerative colitis with other complication, unspecified location (H), Liver replaced by transplant (H)       Multi-vitamin Tabs tablet      Take 1 tablet by mouth every morning        predniSONE 5 MG tablet    DELTASONE    225 tablet    Take 1 tablet (5 mg) by mouth daily    UC  (ulcerative colitis) (H), Liver replaced by transplant (H)       sulfamethoxazole-trimethoprim 800-160 MG per tablet    BACTRIM DS/SEPTRA DS    90 tablet    Take 1 tablet by mouth three times a week Monday, Wednesday, Friday    Immunosuppression (H), Inflammatory bowel disease, PSC (primary sclerosing cholangitis)       * tacrolimus 1 MG capsule    GENERIC EQUIVALENT    120 capsule    Take 2 capsules (2 mg) by mouth 2 times daily *total dose 2.5 mg twice daily    S/P liver transplant (H)       * tacrolimus 0.5 MG capsule    GENERIC EQUIVALENT    180 capsule    Take 1 capsule (0.5 mg) by mouth 2 times daily Take with 1 mg capsule. Total dose 2.5 mg every 12 hours    S/P liver transplant (H)       ursodiol 250 MG tablet    ACTIGALL    270 tablet    Take 2 tabs (500 mg) in AM, and 1 tab at night (250 mg)    Liver replaced by transplant (H)       VITAMIN D (CHOLECALCIFEROL) PO      Take by mouth every evening        * Notice:  This list has 2 medication(s) that are the same as other medications prescribed for you. Read the directions carefully, and ask your doctor or other care provider to review them with you.

## 2018-11-06 NOTE — LETTER
11/6/2018       RE: Yarelis Penny  [address] [facility] [address]     Dear Colleague,    Thank you for referring your patient, Yarelis Penny, to the [facility] at [facility]. Please see a copy of my visit note below.    IBD CLINIC VISIT       CC/REFERRING MD: Daniela Enriquez     REASON FOR FOLLOW UP: PSC associated Ulcerative colitis      IBD HISTORY  Age at diagnosis: 18      Extent of disease: Colonoscopies over the years have varied with areas of involvement. Earliest colonoscopies in 2006 show right greater than left inflammation with some inflammation in the ileum as well.    Followup colonoscopy in 2007 showed mainly diffuse colonic inflammation.    Colonoscopies in 2015 again showed more chronic colitis with no significant ileal involvement.    Colonoscopies in 3/2016 again showed a right-sided and some ileal inflammation with ulceration, but biopsy showed CMV.    Colonoscopy 11/2016 showed severe inflammation from 20 cm from the anus all of the way to the ileum as far as could be evaluated.  Biopsies showed chronic active ileitis and chronic active colitis in every sample.  There was no evidence of CMV or PTLD. Rare SHENA-KACIE positive cells seen in ileum.  Colonoscopy 9/2017 showed Abad 2 inflammation in the right colon and abad one in the left colon with relative rectal sparing.  Patulous IC valve with some backwash ileitis.  Biopsies negative for lymphoma, CMP and EBV biopsies with mild  chronic active ileitis and mild chronic active colitis throughout, including rectum. Colonoscopy 9/2018 with normal left colon. Mild inflammation in last 5 cm of ileum. Patches of normal mucosa in the right colon but also some small erosions/ulceration (2-8mm in diameter). Overall improvement but still some inflammation present.      Current UC medications: Vedolizumab 400mg pq4 weeks, prednisone 5 mg  Prior UC surgeries: None  Prior IBD  Medications:   Multiple rounds of prednisone  Pentasa   Asacol  Azathioprine (mainly given for liver issues at lower dose) '  Uceris - stopped March 2018      DRUG MONITORING  TPMT enzyme activity: (12/6/16) 18.5      6-TGN/6-MMPN levels: (12/6/16) 203/257       Biologic concentration:   4/18/17 Vedolizumab level 34.1, no ATI  5/2/17 Vedolizumab level 3.9, no ATI (decreased interval to q4 week dosing)  10/25/17 Vedolizumab level 11, no ATI (no change in therapy-- met with surgeon to discuss what surgical options might look like)         DISEASE ASSESSMENT  Labs:CRP <2.9 and sed rate 39  Endoscopic assessment: As above  Enterography: (12/14/16) 1. Diffuse colonic wall thickening and enhancement with loss of  haustral pattern. Wall thickening and enhancement of the terminal  ileum. Findings consistent with acute on chronic changes related to  ulcerative colitis. 2. Stable 7.4 cm left adnexal cyst. A short interval follow-up  ultrasound is recommended to help determine whether this represents a  physiologic lesion. Plans to have follow up with OBGYN and repeat U/S in 6 weeks.  Fecal calprotectin: 870 (4/19/2017)   C diff: Negative last 6/8/2017  Abad score: 2/12 (11/6/2018)      ASSESSMENT/PLAN  Yarelis is a pleasant 33-year-old female with a complicated past medical history including primary sclerosing cholangitis associated inflammatory bowel disease, cirrhosis, status post living donor transplant in 2015 with severe flare and was hospitalized 12/2016. It took some time but she has done quite well now for at least the last year (the last 8 months off Uceris even). She is in symptomatic remission and has had definite improvement on her subsequent colonoscopies, though she has not quite gotten to mucosal healing. I did present her case at our monthly IBD conference. Given her complexity and her steady improvement it was agreed to continue to give her more time on her current regimen.      1. Primary sclerosing cholangitis  associated inflammatory bowel disease.   -still with some small ulcerations of the right colon but things are steadily improving and she has been in symptomatic remission  -continue vedolizumab 300 mg q 4 weeks IV  -continue surveillance labs  -follow up in IBD clinic in 3-4 months with Dr. Mckeon to establish care as I am leaving the [facility] (she has met Dr. Mckeon previously)    2. Desire to have a baby - we discussed again that it is best to be in deep remission (endoscopic remission) prior to conceiving - this is best for mom and baby. Yarelis and her  Ceferino understand and agree with this. We will give it more time and re-evaluate with colonoscopy in 6-9 months. They will continue to talk to Dr. Mckeon about this going forward.      3. PCP prophylaxis. Stop bactrim      4. EBV viremia. No evidence of PTLD on biopsies. Rituximab treatment has been completed.  Follow up in December as scheduled      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 Her CMV levels were not detected that were last checked in 02/07/2018. She has completed her IV ganciclovir regimen.       6. 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 Last ERCP 9/2018 with Dr. Guru Macias. Has evidence of chronic bile obstruction on liver bx.      7. IBD healthcare maintenance based on patients current medication: Vedolizumab  Vaccinations:  -- Influenza (every year): UTD 8306-3190  -- TdaP (every 10 years): Last given 2016  -- Pneumococcal Pneumonia (once then every 5 years): Last given 2016      One time confirmation of immunity or serologies:  -- Hepatitis A (serologies or immunizations): 2015  -- Hepatitis B (serologies or immunizations): 2015  -- Varicella: Not documented  -- MMR:Not documented  -- HPV (all aged 18-26): As indicated  -- Meningococcal meningitis (all patients at risk for meningitis): As indicated   -- Due to the immunosuppression in this patient, I would not advise administration of live vaccines such as  varicella/VZV, intranasal influenza, MMR, or yellow fever vaccine (if travelling).       Cancer Screening:  Colon cancer screening:  Given PSC type of colitis, colonoscopy every year is recommended. Dysplasia screening is recommended fall 2018.      Cervical cancer screening: Annual due to immunosupression      Skin cancer screening: Annual visual exam of skin by dermatologist since patient is immunocompromised      Depression Screening:  -- Over the last month, have you felt down, depressed, or hopeless? No  -- Over the last month, have you felt little interest or pleasure doing things? No      Misc:  -- Avoid tobacco use  -- Avoid NSAIDs as there is potentially a 25% chance of causing an IBD flare      RTC in 3-4 months with Dr. Mckeon      Thank you for this consultation. It was a pleasure to participate in the care of this patient; please contact us with any further questions.       Fuentes Johnson MD    [facility]  Division of Gastroenterology, Hepatology and Nutrition           HPI:    Today:  Continues to do very well. 1-2 formed BMs daily. No pain, urgency or tenesmus. No EIM.    Continues to gain weight. UP to 119 lbs (BMI 21).    She is again accompanied by her .     She has no complaints.    They understand that it is best to have remission before attempting to get pregnant.      ROS:    No fevers or chills  No weight loss  No blurry vision, double vision or change in vision  No sore throat  No lymphadenopathy  No headache, paraesthesias, or weakness in a limb  No shortness of breath or wheezing  No chest pain or pressure  No arthralgias or myalgias  No rashes or skin changes  No odynophagia or dysphagia  No BRBPR, hematochezia, melena  No dysuria, frequency or urgency  No hot/cold intolerance or polyria  No anxiety or depression      Extra intestinal manifestations of IBD:  No uveitis/episcleritis  No aphthous ulcers   No arthritis   No erythema nodosum/pyoderma  gangrenosum.       PERTINENT PAST MEDICAL HISTORY:    Past Medical History            Past Medical History:   Diagnosis Date     Autoimmune hepatitis (H) 2012      on steroid taper     Cholangitis        CKD (chronic kidney disease) 2012      biopsy 2012: TIN, patchy fibrosis     Esophageal varices (H) 9/08      banded     Heart murmur        History of blood transfusion        Primary sclerosing cholangitis        Ulcerative Colitis         f/b GI               PREVIOUS SURGERIES:    Past Surgical History          Past Surgical History:   Procedure Laterality Date     COLONOSCOPY    9/07     COLONOSCOPY N/A 2/26/2015      Procedure: COMBINED COLONOSCOPY, SINGLE OR MULTIPLE BIOPSY/POLYPECTOMY BY BIOPSY;  Surgeon: Mitchel Hoskins Chi, MD;  Location: [facility]     COLONOSCOPY N/A 1/12/2016      Procedure: COMBINED COLONOSCOPY, SINGLE OR MULTIPLE BIOPSY/POLYPECTOMY BY BIOPSY;  Surgeon: Rigo Valles MD;  Location: [facility]     COLONOSCOPY N/A 4/1/2016      Procedure: COMBINED COLONOSCOPY, SINGLE OR MULTIPLE BIOPSY/POLYPECTOMY BY BIOPSY;  Surgeon: Kareem Solis MD;  Location: [facility]     COLONOSCOPY N/A 9/5/2017      Procedure: COMBINED COLONOSCOPY, SINGLE OR MULTIPLE BIOPSY/POLYPECTOMY BY BIOPSY;  Colonoscopy;  Surgeon: Fuentes Johnson MD;  Location: [facility]     ENDOSCOPIC RETROGRADE CHOLANGIOPANCREATOGRAM N/A 6/25/2015      Procedure: COMBINED ENDOSCOPIC RETROGRADE CHOLANGIOPANCREATOGRAPHY, PLACE TUBE/STENT;  Surgeon: Landon Quinones MD;  [facility]     ENDOSCOPIC RETROGRADE CHOLANGIOPANCREATOGRAM N/A 6/25/2015      Procedure: COMBINED ENDOSCOPIC RETROGRADE CHOLANGIOPANCREATOGRAPHY, PLACE TUBE/STENT;  Surgeon: Landon Quinones MD;  [facility]     ENDOSCOPIC RETROGRADE CHOLANGIOPANCREATOGRAM N/A 7/2/2015      Procedure: COMBINED ENDOSCOPIC RETROGRADE CHOLANGIOPANCREATOGRAPHY, PLACE TUBE/STENT;  Surgeon: Landon Quinones MD;  [facility]     ENDOSCOPIC RETROGRADE  CHOLANGIOPANCREATOGRAM N/A 9/8/2015      Procedure: COMBINED ENDOSCOPIC RETROGRADE CHOLANGIOPANCREATOGRAPHY, REMOVE FOREIGN BODY OR STENT/TUBE;  Surgeon: Landon Quinones MD;  [facility]     ENDOSCOPIC RETROGRADE CHOLANGIOPANCREATOGRAM N/A 12/8/2015      Procedure: ENDOSCOPIC RETROGRADE CHOLANGIOPANCREATOGRAM;  Surgeon: Landon Quinones MD;  [facility]     ENDOSCOPIC RETROGRADE CHOLANGIOPANCREATOGRAM N/A 3/1/2016      Procedure: COMBINED ENDOSCOPIC RETROGRADE CHOLANGIOPANCREATOGRAPHY, REMOVE FOREIGN BODY OR STENT/TUBE;  Surgeon: Landon Quinones MD;  [facility]     ENDOSCOPIC RETROGRADE CHOLANGIOPANCREATOGRAM N/A 3/20/2017      Procedure: COMBINED ENDOSCOPIC RETROGRADE CHOLANGIOPANCREATOGRAPHY, PLACE TUBE/STENT;  Endoscopic Retrograde Cholangiopancreatogram with Ballon Dilation, Stent Placement;  Surgeon: Guru Brittany Macias MD;  [facility]     ENDOSCOPIC RETROGRADE CHOLANGIOPANCREATOGRAPHY, EXCHANGE TUBE/STENT N/A 7/30/2015      Procedure: ENDOSCOPIC RETROGRADE CHOLANGIOPANCREATOGRAPHY, EXCHANGE TUBE/STENT;  Surgeon: Landon Quinones MD;  [facility]     ENDOSCOPIC RETROGRADE CHOLANGIOPANCREATOGRAPHY, EXCHANGE TUBE/STENT N/A 5/15/2017      Procedure: ENDOSCOPIC RETROGRADE CHOLANGIOPANCREATOGRAPHY, EXCHANGE TUBE/STENT;  Endoscopic Retrograde Cholangiopancreatogram with biliary stent exchange and balloon dilation;  Surgeon: Guru Brittany Macias MD;  [facility]     ESOPHAGOSCOPY, GASTROSCOPY, DUODENOSCOPY (EGD), COMBINED N/A 2/26/2015      Procedure: COMBINED ESOPHAGOSCOPY, GASTROSCOPY, DUODENOSCOPY (EGD);  Surgeon: Mitchel Hoskins Chi, MD;  [facility]     EXPLORE COMMON BILE DUCT N/A 6/25/2015      Procedure: EXPLORE COMMON BILE DUCT;  Surgeon: Tyree Smith MD;  [facility]     LAPAROTOMY EXPLORATORY N/A 6/25/2015      Procedure: LAPAROTOMY EXPLORATORY;  Surgeon: Tyree Smith MD;  [facility]     PICC INSERTION  Right 2/27/2015      4fr SL Valved PICC, 36cm (1cm external) in the R medial brachial vein w/ tip in the low SVC.     SIGMOIDOSCOPY FLEXIBLE N/A 4/27/2016      Procedure: SIGMOIDOSCOPY FLEXIBLE;  Surgeon: Jose Nielson MD;  [facility]     TRANSPLANT LIVER RECIPIENT LIVING UNRELATED N/A 6/18/2015      Procedure: TRANSPLANT LIVER RECIPIENT LIVING UNRELATED;  Surgeon: Tyree Smith MD;  [facility]     UPPER GI ENDOSCOPY                     ALLERGIES:                Allergies   Allergen Reactions     Rifampin Other (See Comments)         Kidney failure     Penicillins            Hives, has tolerated amoxicillin           PERTINENT MEDICATIONS:      Current Outpatient Prescriptions   Medication     calcium carbonate (OS-KERLINE 500 MG [place]. [place]) 1250 MG tablet     GNP ASPIRIN LOW DOSE 81 MG EC tablet     multivitamin, therapeutic with minerals (MULTI-VITAMIN) TABS tablet     predniSONE (DELTASONE) 5 MG tablet     sulfamethoxazole-trimethoprim (BACTRIM DS/SEPTRA DS) 800-160 MG per tablet     tacrolimus (GENERIC EQUIVALENT) 0.5 MG capsule     tacrolimus (GENERIC EQUIVALENT) 1 MG capsule     ursodiol (ACTIGALL) 250 MG tablet     Vedolizumab (ENTYVIO IV)     VITAMIN D, CHOLECALCIFEROL, PO     budesonide (UCERIS) 9 MG 24 hr tablet     No current facility-administered medications for this visit.           SOCIAL HISTORY:    Social History    Social History                 Social History     Marital status:          Spouse name: Ceferino     Number of children: 0     Years of education: N/A               Occupational History     unemployed                        Social History Main Topics      Smoking status: Never Smoker      Smokeless tobacco: Never Used      Alcohol use 0.0 oz/week           0 Standard drinks or equivalent per week              Comment: Rare      Drug use: No      Sexual activity: Not Currently          Partners: Male             Comment: denies pregnancy                  Other Topics Concern  "     Service No     Blood Transfusions No     Caffeine Concern No     Occupational Exposure No     Hobby Hazards No     Sleep Concern Yes     Stress Concern No     Weight Concern No     Special Diet No     Back Care No     Exercise No     Bike Helmet No         n/a     Seat Belt Yes     Self-Exams No       Social History Narrative              FAMILY HISTORY:    Family History              Family History   Problem Relation Age of Onset     Hypertension Mother        Lipids Mother        Hypertension Maternal Grandmother        Alzheimer Disease Maternal Grandmother        Lipids Father                  Past/family/social history reviewed and no changes      PHYSICAL EXAMINATION:  Constitutional: aaox3, cooperative, pleasant, not dyspneic/diaphoretic, no acute distress  VS: /88  Pulse 76  Temp 97.8  F (36.6  C) (Oral)  Ht 1.59 m (5' 2.6\")  Wt 54 kg (119 lb)  SpO2 100%  BMI 21.35 kg/m2    Eyes: Sclera anicteric/injected  Ears/nose/mouth/throat: Normal oropharynx without ulcers or exudate, mucus membranes moist, hearing intact  Neck: supple, thyroid normal size  CV: No edema  Respiratory: Unlabored breathing  Lymph: No axillary, submandibular, supraclavicular or inguinal lymphadenopathy  Abd: Surgical abdominal scars well healed, nondistended, +bs, no hepatosplenomegaly, nontender, no peritoneal signs  Skin: warm, perfused, no jaundice  Psych: Normal affect  MSK: Normal gait          PERTINENT STUDIES:    Reviewed in EPIC    Again, thank you for allowing me to participate in the care of your patient.      Sincerely,    Fuentes Johnson MD      "

## 2018-11-13 ENCOUNTER — OFFICE VISIT (OUTPATIENT)
Dept: DERMATOLOGY | Facility: CLINIC | Age: 33
End: 2018-11-13
Payer: COMMERCIAL

## 2018-11-13 DIAGNOSIS — D22.9 MULTIPLE BENIGN MELANOCYTIC NEVI: Primary | ICD-10-CM

## 2018-11-13 DIAGNOSIS — B07.0 PLANTAR WART OF BOTH FEET: ICD-10-CM

## 2018-11-13 RX ORDER — FLUOROURACIL 50 MG/G
CREAM TOPICAL DAILY
Qty: 40 G | Refills: 3 | Status: ON HOLD | OUTPATIENT
Start: 2018-11-13 | End: 2019-06-24

## 2018-11-13 ASSESSMENT — PAIN SCALES - GENERAL
PAINLEVEL: MILD PAIN (2)
PAINLEVEL: MILD PAIN (3)

## 2018-11-13 NOTE — PROGRESS NOTES
[facility] Dermatology Note      Dermatology Problem List:  1. S/p liver transplant    -current tx tacrolimus and prednisone  2. Verruca Plantaris    -current tx: Effudex and Wart Stick  3. Acral nevi on B/L hands and plantar feet and one irregularly shaped on left index finger    Encounter Date: Nov 13, 2018    CC:  Chief Complaint   Patient presents with     Derm Problem     Yarelis is here for a transplant skin check. She is concerned about warts on her feet.          History of Present Illness:  Ms. Yarelis Penny is a 33 year old female who presents for a post transplant skin check. She reports that when she was younger she was not diligent about sun protection and got a few severe sunburns, but now she typically uses ample amounts of sun protection when she is outdoors. Today, she notes warts on her feet that have persisted over the course of six years which have been painful when walking. They have been frozen in the past and she reports this past summer, one went away but then came back. She admits to freckling easily after sun exposure. She denies any family hx of skin cancer. The patient voices no other concerns.      Past Medical History:   Patient Active Problem List   Diagnosis     Ulcerative colitis (H)     CARDIOVASCULAR SCREENING; LDL GOAL LESS THAN 160     Abnormal INR     Autoimmune hepatitis (H)     CKD (chronic kidney disease)     SBP (spontaneous bacterial peritonitis) (H)     S/P liver transplant (H)     Liver transplant recipient (H)     Leakage of common bile duct of transplanted liver (H)     Cholestasis of transplanted liver (H)     C. difficile diarrhea     Immunosuppressed status (H)     Anemia     MARILYN drain, broken     Complications, liver transplant (H)     CMV colitis (H)     Dehydration     Fever     Fever and chills     Pharyngitis     Acute kidney injury (H)     Elevated alkaline phosphatase level     Colitis     Abdominal pain     Anemia, iron deficiency     Past  Medical History:   Diagnosis Date     Autoimmune hepatitis (H) 2012    on steroid taper     Cholangitis      CKD (chronic kidney disease) 2012    biopsy 2012: TIN, patchy fibrosis     Esophageal varices (H) 9/08    banded     Heart murmur      History of blood transfusion      Primary sclerosing cholangitis      Ulcerative Colitis     f/b GI     Past Surgical History:   Procedure Laterality Date     COLONOSCOPY  9/07     COLONOSCOPY N/A 2/26/2015    Procedure: COMBINED COLONOSCOPY, SINGLE OR MULTIPLE BIOPSY/POLYPECTOMY BY BIOPSY;  Surgeon: Mitchel Hoskins Chi, MD;  Location: [facility]     COLONOSCOPY N/A 1/12/2016    Procedure: COMBINED COLONOSCOPY, SINGLE OR MULTIPLE BIOPSY/POLYPECTOMY BY BIOPSY;  Surgeon: Rigo Valles MD;  Location: [facility]     COLONOSCOPY N/A 4/1/2016    Procedure: COMBINED COLONOSCOPY, SINGLE OR MULTIPLE BIOPSY/POLYPECTOMY BY BIOPSY;  Surgeon: Kareem Solis MD;  Location: [facility]     COLONOSCOPY N/A 9/5/2017    Procedure: COMBINED COLONOSCOPY, SINGLE OR MULTIPLE BIOPSY/POLYPECTOMY BY BIOPSY;  Colonoscopy;  Surgeon: Fuentes Johnson MD;  Location: [facility]     ENDOSCOPIC RETROGRADE CHOLANGIOPANCREATOGRAM N/A 6/25/2015    Procedure: COMBINED ENDOSCOPIC RETROGRADE CHOLANGIOPANCREATOGRAPHY, PLACE TUBE/STENT;  Surgeon: Landon Quinones MD;  Location: [facility]     ENDOSCOPIC RETROGRADE CHOLANGIOPANCREATOGRAM N/A 6/25/2015    Procedure: COMBINED ENDOSCOPIC RETROGRADE CHOLANGIOPANCREATOGRAPHY, PLACE TUBE/STENT;  Surgeon: Landon Quinones MD;  [facility]: [facility]     ENDOSCOPIC RETROGRADE CHOLANGIOPANCREATOGRAM N/A 7/2/2015    Procedure: COMBINED ENDOSCOPIC RETROGRADE CHOLANGIOPANCREATOGRAPHY, PLACE TUBE/STENT;  Surgeon: Landon Quinones MD;  [facility]: [facility]     ENDOSCOPIC RETROGRADE CHOLANGIOPANCREATOGRAM N/A 9/8/2015    Procedure: COMBINED ENDOSCOPIC RETROGRADE CHOLANGIOPANCREATOGRAPHY, REMOVE FOREIGN BODY OR STENT/TUBE;  Surgeon: Landon Quinones MD;  [facility]      ENDOSCOPIC RETROGRADE CHOLANGIOPANCREATOGRAM N/A 12/8/2015    Procedure: ENDOSCOPIC RETROGRADE CHOLANGIOPANCREATOGRAM;  Surgeon: Landon Quinones MD;  [facility]     ENDOSCOPIC RETROGRADE CHOLANGIOPANCREATOGRAM N/A 3/1/2016    Procedure: COMBINED ENDOSCOPIC RETROGRADE CHOLANGIOPANCREATOGRAPHY, REMOVE FOREIGN BODY OR STENT/TUBE;  Surgeon: Landon Quinones MD;  [facility]     ENDOSCOPIC RETROGRADE CHOLANGIOPANCREATOGRAM N/A 3/20/2017    Procedure: COMBINED ENDOSCOPIC RETROGRADE CHOLANGIOPANCREATOGRAPHY, PLACE TUBE/STENT;  Endoscopic Retrograde Cholangiopancreatogram with Ballon Dilation, Stent Placement;  Surgeon: Guru Brittany Macias MD;  [facility]     ENDOSCOPIC RETROGRADE CHOLANGIOPANCREATOGRAM N/A 4/2/2018    Procedure: COMBINED ENDOSCOPIC RETROGRADE CHOLANGIOPANCREATOGRAPHY, PLACE TUBE/STENT;  Endoscopic Retrograde Cholangiopancreatogram with biliary dilation and stent placement;  Surgeon: Guru Brittany Macias MD;  [facility]     ENDOSCOPIC RETROGRADE CHOLANGIOPANCREATOGRAM N/A 5/7/2018    Procedure: ENDOSCOPIC RETROGRADE CHOLANGIOPANCREATOGRAM;  Endoscopic Retrograde Cholangiopancreatogram ballon dilation stent exchange;  Surgeon: Guru Brittany Macias MD;  [facility]     ENDOSCOPIC RETROGRADE CHOLANGIOPANCREATOGRAPHY, EXCHANGE TUBE/STENT N/A 7/30/2015    Procedure: ENDOSCOPIC RETROGRADE CHOLANGIOPANCREATOGRAPHY, EXCHANGE TUBE/STENT;  Surgeon: Landon Quinones MD;  [facility]     ENDOSCOPIC RETROGRADE CHOLANGIOPANCREATOGRAPHY, EXCHANGE TUBE/STENT N/A 5/15/2017    Procedure: ENDOSCOPIC RETROGRADE CHOLANGIOPANCREATOGRAPHY, EXCHANGE TUBE/STENT;  Endoscopic Retrograde Cholangiopancreatogram with biliary stent exchange and balloon dilation;  Surgeon: Guru Brittany Macias MD;  [facility]     ENDOSCOPIC RETROGRADE CHOLANGIOPANCREATOGRAPHY, EXCHANGE TUBE/STENT N/A 6/25/2018    Procedure: ENDOSCOPIC  RETROGRADE CHOLANGIOPANCREATOGRAPHY, EXCHANGE TUBE/STENT;  Endoscopic Retrograde Cholangiopancreatogram with biliary dilation, stone removal and stent exchange;  Surgeon: Guru Brittany Macias MD;  [facility]     ENDOSCOPIC RETROGRADE CHOLANGIOPANCREATOGRAPHY, EXCHANGE TUBE/STENT N/A 7/30/2018    Procedure: ENDOSCOPIC RETROGRADE CHOLANGIOPANCREATOGRAPHY, EXCHANGE TUBE/STENT;  Endoscopic Retrograde Cholangiopancreatogram with Stent Exchange with dilation;  Surgeon: Guru Brittany Macias MD;  [facility]     ENDOSCOPIC RETROGRADE CHOLANGIOPANCREATOGRAPHY, EXCHANGE TUBE/STENT N/A 9/26/2018    Procedure: ENDOSCOPIC RETROGRADE CHOLANGIOPANCREATOGRAPHY, EXCHANGE TUBE/STENT;  Endoscopic Retrograde Cholangiopancreatogram, with bile duct stent exchanged balloon dilation and balloon sweep of bile duct;  Surgeon: Guru Brittany Macias MD;  [facility]     ESOPHAGOSCOPY, GASTROSCOPY, DUODENOSCOPY (EGD), COMBINED N/A 2/26/2015    Procedure: COMBINED ESOPHAGOSCOPY, GASTROSCOPY, DUODENOSCOPY (EGD);  Surgeon: Mitchel Hoskins Chi, MD;  [facility]     EXPLORE COMMON BILE DUCT N/A 6/25/2015    Procedure: EXPLORE COMMON BILE DUCT;  Surgeon: Tyree Smith MD;  [facility]     LAPAROTOMY EXPLORATORY N/A 6/25/2015    Procedure: LAPAROTOMY EXPLORATORY;  Surgeon: Tyree Smith MD;  [facility]     PICC INSERTION Right 2/27/2015    4fr SL Valved PICC, 36cm (1cm external) in the R medial brachial vein w/ tip in the low SVC.     SIGMOIDOSCOPY FLEXIBLE N/A 4/27/2016    Procedure: SIGMOIDOSCOPY FLEXIBLE;  Surgeon: Jose Nielson MD;  Location: [facility]     TRANSPLANT LIVER RECIPIENT LIVING UNRELATED N/A 6/18/2015    Procedure: TRANSPLANT LIVER RECIPIENT LIVING UNRELATED;  Surgeon: Tyree Smith MD;  Location: [facility]     UPPER GI ENDOSCOPY         Social History:  Patient  reports that she has never smoked. She has never used smokeless tobacco. She reports that she drinks  alcohol. She reports that she does not use illicit drugs.    Family History:  Family History   Problem Relation Age of Onset     Hypertension Mother      Lipids Mother      Hypertension Maternal Grandmother      Alzheimer Disease Maternal Grandmother      Lipids Father      Melanoma No family hx of      Skin Cancer No family hx of        Medications:  Current Outpatient Prescriptions   Medication Sig Dispense Refill     calcium carbonate (OS-KERLINE 500 MG [place]. [place]) 1250 MG tablet Take 1 tablet by mouth every evening       multivitamin, therapeutic with minerals (MULTI-VITAMIN) TABS tablet Take 1 tablet by mouth every morning       sulfamethoxazole-trimethoprim (BACTRIM DS/SEPTRA DS) 800-160 MG per tablet Take 1 tablet by mouth three times a week Monday, Wednesday, Friday 90 tablet 3     tacrolimus (GENERIC EQUIVALENT) 0.5 MG capsule Take 1 capsule (0.5 mg) by mouth 2 times daily Take with 1 mg capsule. Total dose 2.5 mg every 12 hours (Patient taking differently: Take 2.5 mg by mouth 2 times daily Take with 1 mg capsule. Total dose 2.5 mg every 12 hours) 180 capsule 3     tacrolimus (GENERIC EQUIVALENT) 1 MG capsule Take 2 capsules (2 mg) by mouth 2 times daily *total dose 2.5 mg twice daily (Patient taking differently: Take 2.5 mg by mouth 2 times daily *total dose 2.5 mg twice daily) 120 capsule 11     ursodiol (ACTIGALL) 250 MG tablet Take 2 tabs (500 mg) in AM, and 1 tab at night (250 mg) 270 tablet 3     Vedolizumab (ENTYVIO IV) Inject 300 mg into the vein every 28 days       VITAMIN D, CHOLECALCIFEROL, PO Take by mouth every evening       budesonide (UCERIS) 9 MG 24 hr tablet Take 1 tablet (9 mg) by mouth every morning (Patient not taking: Reported on 6/19/2018) 30 tablet 3     GNP ASPIRIN LOW DOSE 81 MG EC tablet Take 1 tablet (81 mg) by mouth daily (Patient not taking: Reported on 11/13/2018) 30 tablet 1     predniSONE (DELTASONE) 5 MG tablet Take 1 tablet (5 mg) by mouth daily (Patient not taking: Reported on  11/13/2018) 225 tablet 3       Allergies   Allergen Reactions     Rifampin Other (See Comments)     Kidney failure     Penicillins      Hives, has tolerated amoxicillin         Review of Systems:  -Constitutional: The patient denies fatigue, fevers, chills, unintended weight loss, and night sweats.  -HEENT: Patient denies nonhealing oral sores.  -Skin: As above in HPI. No additional skin concerns.    Physical exam:  Vitals: There were no vitals taken for this visit.  GEN: This is a well developed, well-nourished female in no acute distress, in a pleasant mood.    SKIN: Total skin excluding the undergarment areas was performed. The exam included the head/face, neck, both arms, chest, back, abdomen, both legs, digits and/or nails.   -Thick verrucous plaques on B/L plantar surfaces including  L plantar forefoot, L heel x2, R heel x2, R plantar forefoot x4, and plantar surface of R great toe x4  -Acral nevi on hands and plantar feet B/L and one irregularly shaped on left index finger  -No other lesions of concern on areas examined.       Impression/Plan:  1. Verruca plantaris    Paring with a #15 blade was performed    Cryotherapy procedure note: After verbal consent and discussion of risks and benefits including but no limited to dyspigmentation/scar, blister, and pain, 13 was(were) treated with 1-2mm freeze border for 2 cycles with liquid nitrogen. Post cryotherapy instructions were provided.    Start Effudex 5% cream and Wart Stick nightly under occlusion    2. Acral nevi on hands and plantar feet B/L and one irregularly shaped on left index finger    No further intervention required. Patient to report changes.       Follow-up in 1 month, earlier for new or changing lesions.       Staff Involved:  Scribe/Staff    Scribe Disclosure  I, Dominic Najjar, am serving as a scribe to document services personally performed by Dr. Hung Butler MD, based on data collection and the provider's statements to me.     Staff  attestation:  The documentation recorded by the scribe accurately reflects the services I personally performed and the decisions I personally made. I have made edits where needed.    Hung Butler MD  Staff Dermatologist and Dermatopathologist  , Department of Dermatology

## 2018-11-13 NOTE — NURSING NOTE
Dermatology Rooming Note    Yarelis Penny's goals for this visit include:   Chief Complaint   Patient presents with     Derm Problem     Yarelis is here for a transplant skin check. She is concerned about warts on her feet.      Bharti Perez LPN

## 2018-11-13 NOTE — LETTER
11/13/2018       RE: Yarelis Penny  [address] [facility] [place] [address]     Dear Colleague,    Thank you for referring your patient, Yarelis Penny, to the [facility] DERMATOLOGY at [facility]. Please see a copy of my visit note below.    [facility] Dermatology Note      Dermatology Problem List:  1. S/p liver transplant    -current tx tacrolimus and prednisone  2. Verruca Plantaris    -current tx: Effudex and Wart Stick  3. Acral nevi on B/L hands and plantar feet and one irregularly shaped on left index finger    Encounter Date: Nov 13, 2018    CC:  Chief Complaint   Patient presents with     Derm Problem     Yarelis is here for a transplant skin check. She is concerned about warts on her feet.          History of Present Illness:  Ms. Yarelis Penny is a 33 year old female who presents for a post transplant skin check. She reports that when she was younger she was not diligent about sun protection and got a few severe sunburns, but now she typically uses ample amounts of sun protection when she is outdoors. Today, she notes warts on her feet that have persisted over the course of six years which have been painful when walking. They have been frozen in the past and she reports this past summer, one went away but then came back. She admits to freckling easily after sun exposure. She denies any family hx of skin cancer. The patient voices no other concerns.      Past Medical History:   Patient Active Problem List   Diagnosis     Ulcerative colitis (H)     CARDIOVASCULAR SCREENING; LDL GOAL LESS THAN 160     Abnormal INR     Autoimmune hepatitis (H)     CKD (chronic kidney disease)     SBP (spontaneous bacterial peritonitis) (H)     S/P liver transplant (H)     Liver transplant recipient (H)     Leakage of common bile duct of transplanted liver (H)     Cholestasis of transplanted liver (H)     C. difficile diarrhea     Immunosuppressed status (H)     Anemia      MARILYN drain, broken     Complications, liver transplant (H)     CMV colitis (H)     Dehydration     Fever     Fever and chills     Pharyngitis     Acute kidney injury (H)     Elevated alkaline phosphatase level     Colitis     Abdominal pain     Anemia, iron deficiency     Past Medical History:   Diagnosis Date     Autoimmune hepatitis (H) 2012    on steroid taper     Cholangitis      CKD (chronic kidney disease) 2012    biopsy 2012: TIN, patchy fibrosis     Esophageal varices (H) 9/08    banded     Heart murmur      History of blood transfusion      Primary sclerosing cholangitis      Ulcerative Colitis     f/b GI     Past Surgical History:   Procedure Laterality Date     COLONOSCOPY  9/07     COLONOSCOPY N/A 2/26/2015    Procedure: COMBINED COLONOSCOPY, SINGLE OR MULTIPLE BIOPSY/POLYPECTOMY BY BIOPSY;  Surgeon: Mitchel Hoskins Chi, MD;  [facility]     COLONOSCOPY N/A 1/12/2016    Procedure: COMBINED COLONOSCOPY, SINGLE OR MULTIPLE BIOPSY/POLYPECTOMY BY BIOPSY;  Surgeon: Rigo Valles MD;  [facility]     COLONOSCOPY N/A 4/1/2016    Procedure: COMBINED COLONOSCOPY, SINGLE OR MULTIPLE BIOPSY/POLYPECTOMY BY BIOPSY;  Surgeon: Kareem Solis MD;  [facility]     COLONOSCOPY N/A 9/5/2017    Procedure: COMBINED COLONOSCOPY, SINGLE OR MULTIPLE BIOPSY/POLYPECTOMY BY BIOPSY;  Colonoscopy;  Surgeon: Fuentes Johnson MD;  [facility]     ENDOSCOPIC RETROGRADE CHOLANGIOPANCREATOGRAM N/A 6/25/2015    Procedure: COMBINED ENDOSCOPIC RETROGRADE CHOLANGIOPANCREATOGRAPHY, PLACE TUBE/STENT;  Surgeon: Landon Quinones MD;  [facility]     ENDOSCOPIC RETROGRADE CHOLANGIOPANCREATOGRAM N/A 6/25/2015    Procedure: COMBINED ENDOSCOPIC RETROGRADE CHOLANGIOPANCREATOGRAPHY, PLACE TUBE/STENT;  Surgeon: Landon Quinones MD;  [facility]     ENDOSCOPIC RETROGRADE CHOLANGIOPANCREATOGRAM N/A 7/2/2015    Procedure: COMBINED ENDOSCOPIC RETROGRADE CHOLANGIOPANCREATOGRAPHY, PLACE TUBE/STENT;   Surgeon: Landon Quinones MD;  [facility]     ENDOSCOPIC RETROGRADE CHOLANGIOPANCREATOGRAM N/A 9/8/2015    Procedure: COMBINED ENDOSCOPIC RETROGRADE CHOLANGIOPANCREATOGRAPHY, REMOVE FOREIGN BODY OR STENT/TUBE;  Surgeon: Landon Quinones MD;  [facility]     ENDOSCOPIC RETROGRADE CHOLANGIOPANCREATOGRAM N/A 12/8/2015    Procedure: ENDOSCOPIC RETROGRADE CHOLANGIOPANCREATOGRAM;  Surgeon: Landon Quinones MD;  [facility]     ENDOSCOPIC RETROGRADE CHOLANGIOPANCREATOGRAM N/A 3/1/2016    Procedure: COMBINED ENDOSCOPIC RETROGRADE CHOLANGIOPANCREATOGRAPHY, REMOVE FOREIGN BODY OR STENT/TUBE;  Surgeon: Landon Quinones MD;  [facility]     ENDOSCOPIC RETROGRADE CHOLANGIOPANCREATOGRAM N/A 3/20/2017    Procedure: COMBINED ENDOSCOPIC RETROGRADE CHOLANGIOPANCREATOGRAPHY, PLACE TUBE/STENT;  Endoscopic Retrograde Cholangiopancreatogram with Ballon Dilation, Stent Placement;  Surgeon: Guru Brittany Macias MD;  [facility]     ENDOSCOPIC RETROGRADE CHOLANGIOPANCREATOGRAM N/A 4/2/2018    Procedure: COMBINED ENDOSCOPIC RETROGRADE CHOLANGIOPANCREATOGRAPHY, PLACE TUBE/STENT;  Endoscopic Retrograde Cholangiopancreatogram with biliary dilation and stent placement;  Surgeon: Guru Brittany Macias MD;  [facility]     ENDOSCOPIC RETROGRADE CHOLANGIOPANCREATOGRAM N/A 5/7/2018    Procedure: ENDOSCOPIC RETROGRADE CHOLANGIOPANCREATOGRAM;  Endoscopic Retrograde Cholangiopancreatogram ballon dilation stent exchange;  Surgeon: Guru Brittany Macias MD;  [facility]     ENDOSCOPIC RETROGRADE CHOLANGIOPANCREATOGRAPHY, EXCHANGE TUBE/STENT N/A 7/30/2015    Procedure: ENDOSCOPIC RETROGRADE CHOLANGIOPANCREATOGRAPHY, EXCHANGE TUBE/STENT;  Surgeon: Landon Quinones MD;  [facility]     ENDOSCOPIC RETROGRADE CHOLANGIOPANCREATOGRAPHY, EXCHANGE TUBE/STENT N/A 5/15/2017    Procedure: ENDOSCOPIC RETROGRADE CHOLANGIOPANCREATOGRAPHY, EXCHANGE TUBE/STENT;   Endoscopic Retrograde Cholangiopancreatogram with biliary stent exchange and balloon dilation;  Surgeon: Guru Brittany Macias MD;  [facility]     ENDOSCOPIC RETROGRADE CHOLANGIOPANCREATOGRAPHY, EXCHANGE TUBE/STENT N/A 6/25/2018    Procedure: ENDOSCOPIC RETROGRADE CHOLANGIOPANCREATOGRAPHY, EXCHANGE TUBE/STENT;  Endoscopic Retrograde Cholangiopancreatogram with biliary dilation, stone removal and stent exchange;  Surgeon: Guru Brittany Macias MD;  [facility]     ENDOSCOPIC RETROGRADE CHOLANGIOPANCREATOGRAPHY, EXCHANGE TUBE/STENT N/A 7/30/2018    Procedure: ENDOSCOPIC RETROGRADE CHOLANGIOPANCREATOGRAPHY, EXCHANGE TUBE/STENT;  Endoscopic Retrograde Cholangiopancreatogram with Stent Exchange with dilation;  Surgeon: Guru Brittany Macias MD;  [facility]     ENDOSCOPIC RETROGRADE CHOLANGIOPANCREATOGRAPHY, EXCHANGE TUBE/STENT N/A 9/26/2018    Procedure: ENDOSCOPIC RETROGRADE CHOLANGIOPANCREATOGRAPHY, EXCHANGE TUBE/STENT;  Endoscopic Retrograde Cholangiopancreatogram, with bile duct stent exchanged balloon dilation and balloon sweep of bile duct;  Surgeon: Guru Brittany Macias MD;  [facility]     ESOPHAGOSCOPY, GASTROSCOPY, DUODENOSCOPY (EGD), COMBINED N/A 2/26/2015    Procedure: COMBINED ESOPHAGOSCOPY, GASTROSCOPY, DUODENOSCOPY (EGD);  Surgeon: Mitchel Hoskins Chi, MD;  [facility]     EXPLORE COMMON BILE DUCT N/A 6/25/2015    Procedure: EXPLORE COMMON BILE DUCT;  Surgeon: Tyree Smith MD;  [facility]     LAPAROTOMY EXPLORATORY N/A 6/25/2015    Procedure: LAPAROTOMY EXPLORATORY;  Surgeon: Tyree Smith MD;  [facility]     PICC INSERTION Right 2/27/2015    4fr SL Valved PICC, 36cm (1cm external) in the R medial brachial vein w/ tip in the low SVC.     SIGMOIDOSCOPY FLEXIBLE N/A 4/27/2016    Procedure: SIGMOIDOSCOPY FLEXIBLE;  Surgeon: Jose Nielson MD;  [facility]     TRANSPLANT LIVER RECIPIENT LIVING UNRELATED N/A  6/18/2015    Procedure: TRANSPLANT LIVER RECIPIENT LIVING UNRELATED;  Surgeon: Tyree Smith MD;  [facility]     UPPER GI ENDOSCOPY         Social History:  Patient  reports that she has never smoked. She has never used smokeless tobacco. She reports that she drinks alcohol. She reports that she does not use illicit drugs.    Family History:  Family History   Problem Relation Age of Onset     Hypertension Mother      Lipids Mother      Hypertension Maternal Grandmother      Alzheimer Disease Maternal Grandmother      Lipids Father      Melanoma No family hx of      Skin Cancer No family hx of        Medications:  Current Outpatient Prescriptions   Medication Sig Dispense Refill     calcium carbonate (OS-KERLINE 500 MG [place]. [place]) 1250 MG tablet Take 1 tablet by mouth every evening       multivitamin, therapeutic with minerals (MULTI-VITAMIN) TABS tablet Take 1 tablet by mouth every morning       sulfamethoxazole-trimethoprim (BACTRIM DS/SEPTRA DS) 800-160 MG per tablet Take 1 tablet by mouth three times a week Monday, Wednesday, Friday 90 tablet 3     tacrolimus (GENERIC EQUIVALENT) 0.5 MG capsule Take 1 capsule (0.5 mg) by mouth 2 times daily Take with 1 mg capsule. Total dose 2.5 mg every 12 hours (Patient taking differently: Take 2.5 mg by mouth 2 times daily Take with 1 mg capsule. Total dose 2.5 mg every 12 hours) 180 capsule 3     tacrolimus (GENERIC EQUIVALENT) 1 MG capsule Take 2 capsules (2 mg) by mouth 2 times daily *total dose 2.5 mg twice daily (Patient taking differently: Take 2.5 mg by mouth 2 times daily *total dose 2.5 mg twice daily) 120 capsule 11     ursodiol (ACTIGALL) 250 MG tablet Take 2 tabs (500 mg) in AM, and 1 tab at night (250 mg) 270 tablet 3     Vedolizumab (ENTYVIO IV) Inject 300 mg into the vein every 28 days       VITAMIN D, CHOLECALCIFEROL, PO Take by mouth every evening       budesonide (UCERIS) 9 MG 24 hr tablet Take 1 tablet (9 mg) by mouth every morning (Patient not taking:  Reported on 6/19/2018) 30 tablet 3     GNP ASPIRIN LOW DOSE 81 MG EC tablet Take 1 tablet (81 mg) by mouth daily (Patient not taking: Reported on 11/13/2018) 30 tablet 1     predniSONE (DELTASONE) 5 MG tablet Take 1 tablet (5 mg) by mouth daily (Patient not taking: Reported on 11/13/2018) 225 tablet 3       Allergies   Allergen Reactions     Rifampin Other (See Comments)     Kidney failure     Penicillins      Hives, has tolerated amoxicillin         Review of Systems:  -Constitutional: The patient denies fatigue, fevers, chills, unintended weight loss, and night sweats.  -HEENT: Patient denies nonhealing oral sores.  -Skin: As above in HPI. No additional skin concerns.    Physical exam:  Vitals: There were no vitals taken for this visit.  GEN: This is a well developed, well-nourished female in no acute distress, in a pleasant mood.    SKIN: Total skin excluding the undergarment areas was performed. The exam included the head/face, neck, both arms, chest, back, abdomen, both legs, digits and/or nails.   -Thick verrucous plaques on B/L plantar surfaces including  L plantar forefoot, L heel x2, R heel x2, R plantar forefoot x4, and plantar surface of R great toe x4  -Acral nevi on hands and plantar feet B/L and one irregularly shaped on left index finger  -No other lesions of concern on areas examined.       Impression/Plan:  1. Verruca plantaris    Paring with a #15 blade was performed    Cryotherapy procedure note: After verbal consent and discussion of risks and benefits including but no limited to dyspigmentation/scar, blister, and pain, 13 was(were) treated with 1-2mm freeze border for 2 cycles with liquid nitrogen. Post cryotherapy instructions were provided.    Start Effudex 5% cream and Wart Stick nightly under occlusion    2. Acral nevi on hands and plantar feet B/L and one irregularly shaped on left index finger    No further intervention required. Patient to report changes.       Follow-up in 1 month,  earlier for new or changing lesions.       Staff Involved:  Scribe/Staff    Scribe Disclosure  I, Dominic Najjar, am serving as a scribe to document services personally performed by Dr. Hung Butler MD, based on data collection and the provider's statements to me.     Staff attestation:  The documentation recorded by the scribe accurately reflects the services I personally performed and the decisions I personally made. I have made edits where needed.    Hung Butler MD  Staff Dermatologist and Dermatopathologist  , Department of Dermatology

## 2018-11-13 NOTE — MR AVS SNAPSHOT
After Visit Summary   11/13/2018    Yarelis Penny    MRN: [id number]           Patient Information     Date Of Birth          1985        Visit Information        Provider Department      11/13/2018 7:55 AM Hung Butler MD [facility] Dermatology        Today's Diagnoses     Plantar wart of both feet    -  1      Care Instructions    Recommended use of a broad spectrum sunscreen of at least SPF 30 on all sun exposed sites daily.  Apply 20 minutes prior to exposure and repeat application every two hours or after sweating or swimming.  Avoid any intentional indoor or outdoor tanning.      Cryotherapy    What is it?    Use of a very cold liquid, such as liquid nitrogen, to freeze and destroy abnormal skin cells that need to be removed    What should I expect?    Tenderness and redness    A small blister that might grow and fill with dark purple blood. There may be crusting.    More than one treatment may be needed if the lesions do not go away.    How do I care for the treated area?    Gently wash the area with your hands when bathing.    Use a thin layer of Vaseline to help with healing. You may use a Band-Aid.     The area should heal within 7-10 days and may leave behind a pink or lighter color.     Do not use an antibiotic or Neosporin ointment.     You may take acetaminophen (Tylenol) for pain.     Call your Doctor if you have:    Severe pain    Signs of infection (warmth, redness, cloudy yellow drainage, and or a bad smell)    Questions or concerns    Who should I call with questions?       [facility]: [phone]       [facility]: [phone]       For urgent needs outside of business hours call the [facility] at [phone]        and ask for the dermatology resident on call              Follow-ups after your visit        Your next 10 appointments already scheduled     Nov 27, 2018  8:00 AM CST   Infusion 180 with  UC SPEC INFUSION, UC 51 ATC   [facility] Specialty and Procedure ([facility])    [address]  [address]  [place] [facility] [address]   [phone]            Dec 04, 2018  4:30 PM CST   (Arrive by 4:15 PM)   New Patient Visit with Alma Lara MD   [facility] ([facility])    [address]  4th Floor  [place] [facility] [address] [phone]            Dec 05, 2018  8:00 AM CST   (Arrive by 7:45 AM)   Return Visit with Joya Lopez MD   [facility] and Infectious Diseases ([facility])    [address]  [address]  [place] [facility] [id number] [phone]            Dec 11, 2018  7:55 AM CST   (Arrive by 7:40 AM)   Return Visit with Hung Butler MD   [facility] Dermatology ([facility])    [address]  3rd Floor  [place] [facility] [id number] [phone]            Dec 18, 2018 10:30 AM CST   Lab with UC LAB   [facility] Lab ([facility])    [address]  1st Floor  [place] [facility] [id number] [phone]            Dec 18, 2018 11:30 AM CST   (Arrive by 11:15 AM)   Return Liver Transplant with Daniela Enriquez MD   [facility] Hepatology ([facility])    [address]  [address]  [place] [facility] [address]   [phone]            Dec 22, 2018  8:00 AM CST   Infusion 180 with UC SPEC INFUSION, UC 44 ATC   [facility] Specialty and Procedure ([facility])    [address]  [address]  [place] [facility] [address]   [phone]            Mar 06, 2019  8:00 AM CST   (Arrive by 7:45 AM)   RETURN INFLAMMATORY BOWEL DISEASE with Sophy Mckeon MD   [facility] Gastroenterology [facility] IBD Clinic ([facility])    [address]  4th Floor  [place] [facility] [address]    [phone]              Who to contact     Please call your clinic at [phone] to:    Ask questions about your health    Make or cancel appointments    Discuss your medicines    Learn about your test results    Speak to your doctor            Additional Information About Your Visit        [employer] Information     [employer] gives you secure access to your electronic health record. If you see a primary care provider, you can also send messages to your care team and make appointments. If you have questions, please call your primary care clinic.  If you do not have a primary care provider, please call [phone] and they will assist you.      [employer] is an electronic gateway that provides easy, online access to your medical records. With [employer], you can request a clinic appointment, read your test results, renew a prescription or communicate with your care team.     To access your existing account, please contact your [facility] or call [phone] for assistance.        Care EveryWhere ID     This is your Care EveryWhere ID. This could be used by other organizations to access your [facility] medical records  [id number]         Blood Pressure from Last 3 Encounters:   11/06/18 124/88   10/30/18 140/90   10/02/18 (!) 131/92    Weight from Last 3 Encounters:   11/06/18 54 kg (119 lb)   10/02/18 52.6 kg (115 lb 14.4 oz)   09/26/18 53.1 kg (117 lb 1 oz)              Today, you had the following     No orders found for display         Today's Medication Changes          These changes are accurate as of 11/13/18  8:48 AM.  If you have any questions, ask your nurse or doctor.               Start taking these medicines.        Dose/Directions    fluorouracil 5 % cream   Commonly known as:  EFUDEX   Used for:  Plantar wart of both feet   Started by:  Hung Butler MD        Apply topically daily Apply nightly to warts under occlusion alongside WartStick   Quantity:  40 g   Refills:   3         These medicines have changed or have updated prescriptions.        Dose/Directions    * tacrolimus 1 MG capsule   Commonly known as:  GENERIC EQUIVALENT   This may have changed:    - how much to take  - additional instructions   Used for:  S/P liver transplant (H)        Dose:  2 mg   Take 2 capsules (2 mg) by mouth 2 times daily *total dose 2.5 mg twice daily   Quantity:  120 capsule   Refills:  11       * tacrolimus 0.5 MG capsule   Commonly known as:  GENERIC EQUIVALENT   This may have changed:    - how much to take  - additional instructions   Used for:  S/P liver transplant (H)        Dose:  0.5 mg   Take 1 capsule (0.5 mg) by mouth 2 times daily Take with 1 mg capsule. Total dose 2.5 mg every 12 hours   Quantity:  180 capsule   Refills:  3       * Notice:  This list has 2 medication(s) that are the same as other medications prescribed for you. Read the directions carefully, and ask your doctor or other care provider to review them with you.         Where to get your medicines      These medications were sent to [facility] [address] [place] [address] AT [address] [address]     Phone:  [phone]     fluorouracil 5 % cream                Primary Care Provider Office Phone # Fax #    Fuentes Johnson [phone]       [address] [place] [address]        Equal Access to Services     ERI CHAKRABORTY AH: Yandel hart Soyari, waaxda luqgonzalo, qaybta kaalmani quan. So [facility] [phone].    ATENCIÓN: Si habla español, tiene a borja disposición servicios gratuitos de asistencia lingüística. Clyde al [phone].    We comply with applicable federal civil rights laws and [place] laws. We do not discriminate on the basis of race, color, national origin, age, disability, sex, sexual orientation, or gender identity.            Thank you!      Thank you for choosing [facility] DERMATOLOGY  for your care. Our goal is always to provide you with excellent care. Hearing back from our patients is one way we can continue to improve our services. Please take a few minutes to complete the written survey that you may receive in the mail after your visit with us. Thank you!             Your Updated Medication List - Protect others around you: Learn how to safely use, store and throw away your medicines at www.disposemymeds.org.          This list is accurate as of 11/13/18  8:48 AM.  Always use your most recent med list.                   Brand Name Dispense Instructions for use Diagnosis    budesonide 9 MG 24 hr tablet    UCERIS    30 tablet    Take 1 tablet (9 mg) by mouth every morning    UC (ulcerative colitis) (H)       calcium carbonate 500 mg (elemental) 500 MG tablet    OS-KERLINE     Take 1 tablet by mouth every evening        ENTYVIO IV      Inject 300 mg into the vein every 28 days        fluorouracil 5 % cream    EFUDEX    40 g    Apply topically daily Apply nightly to warts under occlusion alongside WartStick    Plantar wart of both feet       GNP ASPIRIN LOW DOSE 81 MG EC tablet   Generic drug:  aspirin     30 tablet    Take 1 tablet (81 mg) by mouth daily    Ulcerative colitis with other complication, unspecified location (H), Liver replaced by transplant (H)       Multi-vitamin Tabs tablet      Take 1 tablet by mouth every morning        predniSONE 5 MG tablet    DELTASONE    225 tablet    Take 1 tablet (5 mg) by mouth daily    UC (ulcerative colitis) (H), Liver replaced by transplant (H)       sulfamethoxazole-trimethoprim 800-160 MG per tablet    BACTRIM DS/SEPTRA DS    90 tablet    Take 1 tablet by mouth three times a week Monday, Wednesday, Friday    Immunosuppression (H), Inflammatory bowel disease, PSC (primary sclerosing cholangitis)       * tacrolimus 1 MG capsule    GENERIC EQUIVALENT    120 capsule    Take 2 capsules (2 mg) by mouth 2 times  daily *total dose 2.5 mg twice daily    S/P liver transplant (H)       * tacrolimus 0.5 MG capsule    GENERIC EQUIVALENT    180 capsule    Take 1 capsule (0.5 mg) by mouth 2 times daily Take with 1 mg capsule. Total dose 2.5 mg every 12 hours    S/P liver transplant (H)       ursodiol 250 MG tablet    ACTIGALL    270 tablet    Take 2 tabs (500 mg) in AM, and 1 tab at night (250 mg)    Liver replaced by transplant (H)       VITAMIN D (CHOLECALCIFEROL) PO      Take by mouth every evening        * Notice:  This list has 2 medication(s) that are the same as other medications prescribed for you. Read the directions carefully, and ask your doctor or other care provider to review them with you.

## 2018-11-18 ENCOUNTER — HEALTH MAINTENANCE LETTER (OUTPATIENT)
Age: 33
End: 2018-11-18

## 2018-11-26 ENCOUNTER — PATIENT OUTREACH (OUTPATIENT)
Dept: GASTROENTEROLOGY | Facility: CLINIC | Age: 33
End: 2018-11-26

## 2018-11-26 DIAGNOSIS — K51.90 UC (ULCERATIVE COLITIS) (H): ICD-10-CM

## 2018-11-26 DIAGNOSIS — Z94.4 LIVER REPLACED BY TRANSPLANT (H): ICD-10-CM

## 2018-11-26 RX ORDER — PREDNISONE 5 MG/1
5 TABLET ORAL DAILY
Qty: 225 TABLET | Refills: 3 | Status: SHIPPED | OUTPATIENT
Start: 2018-11-26 | End: 2019-12-02

## 2018-11-26 NOTE — PROGRESS NOTES
Pt left voice mail message asking for refill prednisone.  Asking for a call back that refill was sent.  Pt informed that sent refill.

## 2018-11-27 ENCOUNTER — INFUSION THERAPY VISIT (OUTPATIENT)
Dept: INFUSION THERAPY | Facility: CLINIC | Age: 33
End: 2018-11-27
Attending: INTERNAL MEDICINE
Payer: COMMERCIAL

## 2018-11-27 VITALS
OXYGEN SATURATION: 99 % | DIASTOLIC BLOOD PRESSURE: 87 MMHG | TEMPERATURE: 97.6 F | SYSTOLIC BLOOD PRESSURE: 125 MMHG | HEART RATE: 79 BPM

## 2018-11-27 DIAGNOSIS — D50.8 OTHER IRON DEFICIENCY ANEMIA: ICD-10-CM

## 2018-11-27 DIAGNOSIS — A08.39 CMV COLITIS (H): ICD-10-CM

## 2018-11-27 DIAGNOSIS — E86.0 DEHYDRATION: Primary | ICD-10-CM

## 2018-11-27 DIAGNOSIS — B25.9 CMV COLITIS (H): ICD-10-CM

## 2018-11-27 PROCEDURE — 96365 THER/PROPH/DIAG IV INF INIT: CPT

## 2018-11-27 PROCEDURE — 25000128 H RX IP 250 OP 636: Mod: ZF | Performed by: INTERNAL MEDICINE

## 2018-11-27 RX ADMIN — VEDOLIZUMAB 300 MG: 300 INJECTION, POWDER, LYOPHILIZED, FOR SOLUTION INTRAVENOUS at 08:51

## 2018-11-27 ASSESSMENT — PAIN SCALES - GENERAL: PAINLEVEL: NO PAIN (0)

## 2018-11-27 NOTE — PATIENT INSTRUCTIONS
Dear Yarelis Penny    Thank you for choosing [facility] ([facility]) for your Entyvio infusion.  The following information is a summary of our appointment as well as important reminders.      We look forward in seeing you on your next appointment here at [facility].  Please don t hesitate to call us at [phone] to reschedule any of your appointments or to speak with one of the [facility] registered nurses.  It was a pleasure taking care of you today.    EDUCATION POST BIOLOGICAL/CHEMOTHERAPY INFUSION  Call the triage nurse at your clinic or seek medical attention if you have chills and/or temperature greater than or equal to 100.5, uncontrolled nausea/vomiting, diarrhea, constipation, dizziness, shortness of breath, chest pain, heart palpitations, weakness or any other new or concerning symptoms, questions or concerns.  You can not have any live virus vaccines prior to or during treatment or up to 6 months post infusion.  If you have an upcoming surgery, medical procedure or dental procedure during treatment, this should be discussed with your ordering physician and your surgeon/dentist.  If you are having any concerning symptom, if you are unsure if you should get your next infusion or wish to speak to a provider before your next infusion, please call your care coordinator or triage nurse at your clinic to notify them so we can adequately serve you.      Sincerely,    [facility]  [address] [place] [address]  [address]  Phone:  [phone]

## 2018-11-27 NOTE — MR AVS SNAPSHOT
After Visit Summary   11/27/2018    Yarelis Penny    MRN: [id number]           Patient Information     Date Of Birth          1985        Visit Information        Provider Department      11/27/2018 8:00 AM UC 51 ATC; UC SPEC [facility] Specialty [facility] Procedure        Today's Diagnoses     Dehydration    -  1    CMV colitis (H)        Other iron deficiency anemia          Care Instructions    Dear Yarelis Penny    Thank you for choosing [facility] ([facility]) for your Entyvio infusion.  The following information is a summary of our appointment as well as important reminders.      We look forward in seeing you on your next appointment here at [facility].  Please don t hesitate to call us at [phone] to reschedule any of your appointments or to speak with one of the [facility] registered nurses.  It was a pleasure taking care of you today.    EDUCATION POST BIOLOGICAL/CHEMOTHERAPY INFUSION  Call the triage nurse at your clinic or seek medical attention if you have chills and/or temperature greater than or equal to 100.5, uncontrolled nausea/vomiting, diarrhea, constipation, dizziness, shortness of breath, chest pain, heart palpitations, weakness or any other new or concerning symptoms, questions or concerns.  You can not have any live virus vaccines prior to or during treatment or up to 6 months post infusion.  If you have an upcoming surgery, medical procedure or dental procedure during treatment, this should be discussed with your ordering physician and your surgeon/dentist.  If you are having any concerning symptom, if you are unsure if you should get your next infusion or wish to speak to a provider before your next infusion, please call your care coordinator or triage nurse at your clinic to notify them so we can adequately serve you.      Sincerely,    [facility]  Specialty Infusion &  Procedure Center  [address] [place] [address]  [address]  Phone:  [phone]            Follow-ups after your visit        Your next 10 appointments already scheduled     Dec 04, 2018  4:30 PM CST   (Arrive by 4:15 PM)   New Patient Visit with Alma Lara MD   [facility] ([facility])    [address] [place] [address]  4th Floor  [facility] [address] [phone]            Dec 05, 2018  8:00 AM CST   (Arrive by 7:45 AM)   Return Visit with Joya Lopez MD   [facility] ([facility])    [address] [place] [address]  [address]  [facility] [address] [phone]            Dec 11, 2018  7:55 AM CST   (Arrive by 7:40 AM)   Return Visit with Hung Butler MD   [facility] ([facility])    [address] [place] [address]  3rd Floor  [facility] [address] [phone]            Dec 18, 2018 10:30 AM CST   Lab with UC LAB   [facility] ([facility])    [address] [place] [address]  1st Floor  [facility] [address] [phone]            Dec 18, 2018 11:30 AM CST   (Arrive by 11:15 AM)   Return Liver Transplant with Daniela Enriquez MD   [facility] ([facility])    [address] [place] [address]  [address]  [facility] [address] [phone]            Dec 22, 2018  8:00 AM CST   Infusion 180 with UC SPEC INFUSION, UC 44 ATC   [facility] Specialty and Procedure ([facility])    [address] [place] [address]  [address]  [facility] [address] [phone]            Jan 21, 2019  8:00 AM CST   Infusion 180 with UC SPEC INFUSION, UC 51 ATC   [facility] Specialty and Procedure ([facility])    [address] [place] [address]  [address]  [facility] [address] [phone]            Feb 18, 2019  8:00 AM CST    Infusion 180 with UC SPEC INFUSION   [facility] Specialty and Procedure ([facility])    [address]  [address]  [facility] [address] [phone]              Future tests that were ordered for you today     Open Standing Orders        Priority Remaining Interval Expires Ordered    Notify Physician Routine [id number] PRN  11/27/2018            Who to contact     If you have questions or need follow up information about today's clinic visit or your schedule please contact [facility] SPECIALTY AND PROCEDURE directly at [phone].  Normal or non-critical lab and imaging results will be communicated to you by "[employer]hart, letter or phone within 4 business days after the clinic has received the results. If you do not hear from us within 7 days, please contact the clinic through [facility] or phone. If you have a critical or abnormal lab result, we will notify you by phone as soon as possible.  Submit refill requests through [employer] or call your pharmacy and they will forward the refill request to us. Please allow 3 business days for your refill to be completed.          Additional Information About Your Visit        "[employer]hart Information     [employer] gives you secure access to your electronic health record. If you see a primary care provider, you can also send messages to your care team and make appointments. If you have questions, please call your primary care clinic.  If you do not have a primary care provider, please call [phone] and they will assist you.        Care EveryWhere ID     This is your Care EveryWhere ID. This could be used by other organizations to access your [place] medical records  [id number]        Your Vitals Were     Pulse Temperature Pulse Oximetry             79 97.6  F (36.4  C) (Oral) 99%          Blood Pressure from Last 3 Encounters:   11/27/18 125/87   11/06/18 124/88   10/30/18 140/90    Weight from  Last 3 Encounters:   11/06/18 54 kg (119 lb)   10/02/18 52.6 kg (115 lb 14.4 oz)   09/26/18 53.1 kg (117 lb 1 oz)              Today, you had the following     No orders found for display         Today's Medication Changes          These changes are accurate as of 11/27/18  9:43 AM.  If you have any questions, ask your nurse or doctor.               These medicines have changed or have updated prescriptions.        Dose/Directions    * tacrolimus 1 MG capsule   Commonly known as:  GENERIC EQUIVALENT   This may have changed:    - how much to take  - additional instructions   Used for:  S/P liver transplant (H)        Dose:  2 mg   Take 2 capsules (2 mg) by mouth 2 times daily *total dose 2.5 mg twice daily   Quantity:  120 capsule   Refills:  11       * tacrolimus 0.5 MG capsule   Commonly known as:  GENERIC EQUIVALENT   This may have changed:    - how much to take  - additional instructions   Used for:  S/P liver transplant (H)        Dose:  0.5 mg   Take 1 capsule (0.5 mg) by mouth 2 times daily Take with 1 mg capsule. Total dose 2.5 mg every 12 hours   Quantity:  180 capsule   Refills:  3       * Notice:  This list has 2 medication(s) that are the same as other medications prescribed for you. Read the directions carefully, and ask your doctor or other care provider to review them with you.             Primary Care Provider Office Phone # Fax #    Fuentes Johnson [phone]       [address] [place] [address]        Equal Access to Services     ERI CHAKRABORTY AH: Hadii venancio Palma, waaxda luroxana, qaybta kaalmada pantera, mani lozano. So [facility] [phone].    ATENCIÓN: Si habla español, tiene a borja disposición servicios gratuitos de asistencia lingüística. Llame al [phone].    We comply with applicable federal civil rights laws and [place] laws. We do not discriminate on the basis of race, color, national origin, age,  disability, sex, sexual orientation, or gender identity.            Thank you!     Thank you for choosing [facility] SPECIALTY AND PROCEDURE  for your care. Our goal is always to provide you with excellent care. Hearing back from our patients is one way we can continue to improve our services. Please take a few minutes to complete the written survey that you may receive in the mail after your visit with us. Thank you!             Your Updated Medication List - Protect others around you: Learn how to safely use, store and throw away your medicines at www.disposemymeds.org.          This list is accurate as of 11/27/18  9:43 AM.  Always use your most recent med list.                   Brand Name Dispense Instructions for use Diagnosis    budesonide 9 MG 24 hr tablet    UCERIS    30 tablet    Take 1 tablet (9 mg) by mouth every morning    UC (ulcerative colitis) (H)       calcium carbonate 500 mg (elemental) 500 MG tablet    OS-KERLINE     Take 1 tablet by mouth every evening        ENTYVIO IV      Inject 300 mg into the vein every 28 days        fluorouracil 5 % cream    EFUDEX    40 g    Apply topically daily Apply nightly to warts under occlusion alongside WartStick    Plantar wart of both feet       GNP ASPIRIN LOW DOSE 81 MG EC tablet   Generic drug:  aspirin     30 tablet    Take 1 tablet (81 mg) by mouth daily    Ulcerative colitis with other complication, unspecified location (H), Liver replaced by transplant (H)       Multi-vitamin tablet      Take 1 tablet by mouth every morning        predniSONE 5 MG tablet    DELTASONE    225 tablet    Take 1 tablet (5 mg) by mouth daily    UC (ulcerative colitis) (H), Liver replaced by transplant (H)       sulfamethoxazole-trimethoprim 800-160 MG per tablet    BACTRIM DS/SEPTRA DS    90 tablet    Take 1 tablet by mouth three times a week Monday, Wednesday, Friday    Immunosuppression (H), Inflammatory bowel disease, PSC (primary sclerosing cholangitis)        * tacrolimus 1 MG capsule    GENERIC EQUIVALENT    120 capsule    Take 2 capsules (2 mg) by mouth 2 times daily *total dose 2.5 mg twice daily    S/P liver transplant (H)       * tacrolimus 0.5 MG capsule    GENERIC EQUIVALENT    180 capsule    Take 1 capsule (0.5 mg) by mouth 2 times daily Take with 1 mg capsule. Total dose 2.5 mg every 12 hours    S/P liver transplant (H)       ursodiol 250 MG tablet    ACTIGALL    270 tablet    Take 2 tabs (500 mg) in AM, and 1 tab at night (250 mg)    Liver replaced by transplant (H)       VITAMIN D (CHOLECALCIFEROL) PO      Take by mouth every evening        * Notice:  This list has 2 medication(s) that are the same as other medications prescribed for you. Read the directions carefully, and ask your doctor or other care provider to review them with you.

## 2018-11-28 ASSESSMENT — ENCOUNTER SYMPTOMS
POOR WOUND HEALING: 1
NAIL CHANGES: 0
NAIL CHANGES: 0
SKIN CHANGES: 0
SKIN CHANGES: 0
POOR WOUND HEALING: 1

## 2018-11-29 ENCOUNTER — OFFICE VISIT (OUTPATIENT)
Dept: URGENT CARE | Facility: URGENT CARE | Age: 33
End: 2018-11-29
Payer: COMMERCIAL

## 2018-11-29 VITALS
WEIGHT: 115 LBS | HEART RATE: 80 BPM | HEIGHT: 62 IN | DIASTOLIC BLOOD PRESSURE: 72 MMHG | RESPIRATION RATE: 15 BRPM | TEMPERATURE: 99.9 F | BODY MASS INDEX: 21.16 KG/M2 | SYSTOLIC BLOOD PRESSURE: 112 MMHG

## 2018-11-29 DIAGNOSIS — J34.89 NASAL PAIN: Primary | ICD-10-CM

## 2018-11-29 PROCEDURE — 99214 OFFICE O/P EST MOD 30 MIN: CPT | Performed by: PHYSICIAN ASSISTANT

## 2018-11-29 RX ORDER — MUPIROCIN 20 MG/G
OINTMENT TOPICAL 3 TIMES DAILY
Qty: 30 G | Refills: 1 | Status: SHIPPED | OUTPATIENT
Start: 2018-11-29 | End: 2018-12-03

## 2018-11-29 ASSESSMENT — ENCOUNTER SYMPTOMS: CONSTITUTIONAL NEGATIVE: 1

## 2018-11-29 NOTE — PROGRESS NOTES
SUBJECTIVE:   Yarelis Penny is a 33 year old female presenting with a chief complaint of   Chief Complaint   Patient presents with     Urgent Care     Derm Problem     nasal pain on left side for 6 days       She is an established patient of [place]. Liver transplant patient on immunosuppressive agents.         Onset of symptoms was 6 day(s) ago.  Course of illness is worsening.    Severity mild  Current and Associated symptoms: none  Treatment measures tried include vaseline.  Predisposing factors include immunosuppression.        Review of Systems   Constitutional: Negative.    ENT: nasal pain    Past Medical History:   Diagnosis Date     Autoimmune hepatitis (H) 2012    on steroid taper     Cholangitis      CKD (chronic kidney disease) 2012    biopsy 2012: TIN, patchy fibrosis     Esophageal varices (H) 9/08    banded     Heart murmur      History of blood transfusion      Primary sclerosing cholangitis      Ulcerative Colitis     f/b GI     Family History   Problem Relation Age of Onset     Hypertension Mother      Lipids Mother      Hypertension Maternal Grandmother      Alzheimer Disease Maternal Grandmother      Lipids Father      Melanoma No family hx of      Skin Cancer No family hx of      Current Outpatient Prescriptions   Medication Sig Dispense Refill     calcium carbonate (OS-KERLINE 500 MG [place]. [place]) 1250 MG tablet Take 1 tablet by mouth every evening       fluorouracil (EFUDEX) 5 % cream Apply topically daily Apply nightly to warts under occlusion alongside WartStick 40 g 3     multivitamin, therapeutic with minerals (MULTI-VITAMIN) TABS tablet Take 1 tablet by mouth every morning       predniSONE (DELTASONE) 5 MG tablet Take 1 tablet (5 mg) by mouth daily 225 tablet 3     tacrolimus (GENERIC EQUIVALENT) 0.5 MG capsule Take 1 capsule (0.5 mg) by mouth 2 times daily Take with 1 mg capsule. Total dose 2.5 mg every 12 hours (Patient taking differently: Take 2.5 mg by mouth 2 times daily Take with 1 mg  "capsule. Total dose 2.5 mg every 12 hours) 180 capsule 3     ursodiol (ACTIGALL) 250 MG tablet Take 2 tabs (500 mg) in AM, and 1 tab at night (250 mg) 270 tablet 3     Vedolizumab (ENTYVIO IV) Inject 300 mg into the vein every 28 days       VITAMIN D, CHOLECALCIFEROL, PO Take by mouth every evening       budesonide (UCERIS) 9 MG 24 hr tablet Take 1 tablet (9 mg) by mouth every morning (Patient not taking: Reported on 6/19/2018) 30 tablet 3     GNP ASPIRIN LOW DOSE 81 MG EC tablet Take 1 tablet (81 mg) by mouth daily (Patient not taking: Reported on 11/13/2018) 30 tablet 1     sulfamethoxazole-trimethoprim (BACTRIM DS/SEPTRA DS) 800-160 MG per tablet Take 1 tablet by mouth three times a week Monday, Wednesday, Friday (Patient not taking: Reported on 11/29/2018) 90 tablet 3     tacrolimus (GENERIC EQUIVALENT) 1 MG capsule Take 2 capsules (2 mg) by mouth 2 times daily *total dose 2.5 mg twice daily (Patient taking differently: Take 2.5 mg by mouth 2 times daily *total dose 2.5 mg twice daily) 120 capsule 11     Social History   Substance Use Topics     Smoking status: Never Smoker     Smokeless tobacco: Never Used     Alcohol use 0.0 oz/week     0 Standard drinks or equivalent per week      Comment: Every other month has 1 drink       OBJECTIVE  /72  Pulse 80  Temp 99.9  F (37.7  C) (Oral)  Resp 15  Ht 5' 2\" (1.575 m)  Wt 115 lb (52.2 kg)  LMP 10/29/2018  Breastfeeding? No  BMI 21.03 kg/m2    Physical Exam   Constitutional: She appears well-developed and well-nourished.   HENT:   Head: Normocephalic and atraumatic.   Eyes: EOM are normal. Pupils are equal, round, and reactive to light.   left nasal passage: distally in nares has some irritation and tenderness with mild erythema and edema      ASSESSMENT:    1. Nasal pain  R/O bacterial etiology  - mupirocin (BACTROBAN) 2 % external ointment; Apply topically 3 times daily for 10 days To left nares  Dispense: 30 g; Refill: 1    PLAN:    Will follow up if " not improving over the next week.

## 2018-11-29 NOTE — MR AVS SNAPSHOT
After Visit Summary   11/29/2018    Yarelis Penny    MRN: [id number]           Patient Information     Date Of Birth          1985        Visit Information        Provider Department      11/29/2018 5:00 PM Brian Merrill PA-C [facility]        Today's Diagnoses     Nasal pain    -  1       Follow-ups after your visit        Your next 10 appointments already scheduled     Dec 04, 2018  4:30 PM CST   (Arrive by 4:15 PM)   New Patient Visit with Alma Lara MD   [facility]    [address]  4th Floor  [facility] [address]   [phone]            Dec 05, 2018  8:00 AM CST   (Arrive by 7:45 AM)   Return Visit with Joya Lopez MD   [facility] ([facility]    [address] [place] [address]  [address]  [facility] [address] [phone]            Dec 11, 2018  7:55 AM CST   (Arrive by 7:40 AM)   Return Visit with Hung Butler MD   [facility]    [address]  3rd Floor  [facility] [address]   [phone]            Dec 18, 2018 10:30 AM CST   Lab with UC LAB   [facility]    [address] [place] [address]  1st Floor  [facility] [address]   [phone]            Dec 18, 2018 11:30 AM CST   (Arrive by 11:15 AM)   Return Liver Transplant with Daniela Enriquez MD   [facility]    [address]  [address]  [facility] [address]   [phone]            Dec 22, 2018  8:00 AM CST   Infusion 180 with UC SPEC INFUSION, UC 44 ATC   [facility] ([facility])    [address]  [address]  [facility] [address]   [phone]            Jan 21, 2019  8:00 AM CST   Infusion 180 with UC SPEC  "INFUSION, UC 51 ATC   [facility] ([facility])    [address]  [address]  [facility] [address] [phone]            Feb 18, 2019  8:00 AM CST   Infusion 180 with UC SPEC INFUSION   [facility] ([facility])    [address]  [address]  [facility] [address] [phone]              Who to contact     If you have questions or need follow up information about today's clinic visit or your schedule please contact [facility] directly at [phone].  Normal or non-critical lab and imaging results will be communicated to you by MyChart, letter or phone within 4 business days after the clinic has received the results. If you do not hear from us within 7 days, please contact the clinic through [employer] or phone. If you have a critical or abnormal lab result, we will notify you by phone as soon as possible.  Submit refill requests through [employer] or call your pharmacy and they will forward the refill request to us. Please allow 3 business days for your refill to be completed.          Additional Information About Your Visit        MyChart Information     [employer] gives you secure access to your electronic health record. If you see a primary care provider, you can also send messages to your care team and make appointments. If you have questions, please call your primary care clinic.  If you do not have a primary care provider, please call [phone] and they will assist you.        Care EveryWhere ID     This is your Care EveryWhere ID. This could be used by other organizations to access your [facility] medical records  [id number]        Your Vitals Were     Pulse Temperature Respirations Height Last Period Breastfeeding?    80 99.9  F (37.7  C) (Oral) 15 5' 2\" (1.575 m) 10/29/2018 No    BMI (Body Mass Index)                   " 21.03 kg/m2            Blood Pressure from Last 3 Encounters:   11/29/18 112/72   11/27/18 125/87   11/06/18 124/88    Weight from Last 3 Encounters:   11/29/18 115 lb (52.2 kg)   11/06/18 119 lb (54 kg)   10/02/18 115 lb 14.4 oz (52.6 kg)              Today, you had the following     No orders found for display         Today's Medication Changes          These changes are accurate as of 11/29/18  5:49 PM.  If you have any questions, ask your nurse or doctor.               Start taking these medicines.        Dose/Directions    mupirocin 2 % external ointment   Commonly known as:  BACTROBAN   Used for:  Nasal pain   Started by:  Brian Merrill PA-C        Apply topically 3 times daily for 10 days To left nares   Quantity:  30 g   Refills:  1         These medicines have changed or have updated prescriptions.        Dose/Directions    * tacrolimus 1 MG capsule   Commonly known as:  GENERIC EQUIVALENT   This may have changed:    - how much to take  - additional instructions   Used for:  S/P liver transplant (H)        Dose:  2 mg   Take 2 capsules (2 mg) by mouth 2 times daily *total dose 2.5 mg twice daily   Quantity:  120 capsule   Refills:  11       * tacrolimus 0.5 MG capsule   Commonly known as:  GENERIC EQUIVALENT   This may have changed:    - how much to take  - additional instructions   Used for:  S/P liver transplant (H)        Dose:  0.5 mg   Take 1 capsule (0.5 mg) by mouth 2 times daily Take with 1 mg capsule. Total dose 2.5 mg every 12 hours   Quantity:  180 capsule   Refills:  3       * Notice:  This list has 2 medication(s) that are the same as other medications prescribed for you. Read the directions carefully, and ask your doctor or other care provider to review them with you.         Where to get your medicines      These medications were sent to [id number] - [place], [place] - [address] AT [facility] [address]  [address] [place] [address]     Phone:  [phone]      mupirocin 2 % external ointment                Primary Care Provider Office Phone # Fax #    Fuentes Johnson [phone]       [address] [address]        Equal Access to Services     KENYATTAOSITO MYLENE : Hadnabeel venancio hsieh sierrao Soomaali, waaxda luqadaha, qaybta kaalmada adeegyada, mani josin hayaan brittanyshannon keita kameron lozano. So [facility] [phone].    ATENCIÓN: Si habla español, tiene a borja disposición servicios gratuitos de asistencia lingüística. Llame al [phone].    We comply with applicable federal civil rights laws and [place] laws. We do not discriminate on the basis of race, color, national origin, age, disability, sex, sexual orientation, or gender identity.            Thank you!     Thank you for choosing [facility]  for your care. Our goal is always to provide you with excellent care. Hearing back from our patients is one way we can continue to improve our services. Please take a few minutes to complete the written survey that you may receive in the mail after your visit with us. Thank you!             Your Updated Medication List - Protect others around you: Learn how to safely use, store and throw away your medicines at www.disposemymeds.org.          This list is accurate as of 11/29/18  5:49 PM.  Always use your most recent med list.                   Brand Name Dispense Instructions for use Diagnosis    budesonide 9 MG 24 hr tablet    UCERIS    30 tablet    Take 1 tablet (9 mg) by mouth every morning    UC (ulcerative colitis) (H)       calcium carbonate 500 MG tablet    OS-KERLINE     Take 1 tablet by mouth every evening        ENTYVIO IV      Inject 300 mg into the vein every 28 days        fluorouracil 5 % external cream    EFUDEX    40 g    Apply topically daily Apply nightly to warts under occlusion alongside WartStick    Plantar wart of both feet       GNP ASPIRIN LOW DOSE 81 MG EC tablet   Generic drug:  aspirin     30 tablet     Take 1 tablet (81 mg) by mouth daily    Ulcerative colitis with other complication, unspecified location (H), Liver replaced by transplant (H)       Multi-vitamin tablet      Take 1 tablet by mouth every morning        mupirocin 2 % external ointment    BACTROBAN    30 g    Apply topically 3 times daily for 10 days To left nares    Nasal pain       predniSONE 5 MG tablet    DELTASONE    225 tablet    Take 1 tablet (5 mg) by mouth daily    UC (ulcerative colitis) (H), Liver replaced by transplant (H)       sulfamethoxazole-trimethoprim 800-160 MG tablet    BACTRIM DS/SEPTRA DS    90 tablet    Take 1 tablet by mouth three times a week Monday, Wednesday, Friday    Immunosuppression (H), Inflammatory bowel disease, PSC (primary sclerosing cholangitis)       * tacrolimus 1 MG capsule    GENERIC EQUIVALENT    120 capsule    Take 2 capsules (2 mg) by mouth 2 times daily *total dose 2.5 mg twice daily    S/P liver transplant (H)       * tacrolimus 0.5 MG capsule    GENERIC EQUIVALENT    180 capsule    Take 1 capsule (0.5 mg) by mouth 2 times daily Take with 1 mg capsule. Total dose 2.5 mg every 12 hours    S/P liver transplant (H)       ursodiol 250 MG tablet    ACTIGALL    270 tablet    Take 2 tabs (500 mg) in AM, and 1 tab at night (250 mg)    Liver replaced by transplant (H)       VITAMIN D (CHOLECALCIFEROL) PO      Take by mouth every evening        * Notice:  This list has 2 medication(s) that are the same as other medications prescribed for you. Read the directions carefully, and ask your doctor or other care provider to review them with you.

## 2018-12-04 ENCOUNTER — OFFICE VISIT (OUTPATIENT)
Dept: INTERNAL MEDICINE | Facility: CLINIC | Age: 33
End: 2018-12-04
Payer: COMMERCIAL

## 2018-12-04 VITALS
SYSTOLIC BLOOD PRESSURE: 117 MMHG | BODY MASS INDEX: 20.93 KG/M2 | DIASTOLIC BLOOD PRESSURE: 85 MMHG | HEIGHT: 63 IN | TEMPERATURE: 99.3 F | OXYGEN SATURATION: 96 % | WEIGHT: 118.1 LBS | HEART RATE: 88 BPM | RESPIRATION RATE: 16 BRPM

## 2018-12-04 DIAGNOSIS — Z76.89 ENCOUNTER TO ESTABLISH CARE WITH NEW DOCTOR: Primary | ICD-10-CM

## 2018-12-04 DIAGNOSIS — Z23 NEEDS FLU SHOT: ICD-10-CM

## 2018-12-04 DIAGNOSIS — Z12.4 SCREENING FOR CERVICAL CANCER: ICD-10-CM

## 2018-12-04 ASSESSMENT — ENCOUNTER SYMPTOMS
DECREASED CONCENTRATION: 0
PALPITATIONS: 0
DYSURIA: 0
CONSTIPATION: 0
DECREASED APPETITE: 0
BRUISES/BLEEDS EASILY: 0
SWOLLEN GLANDS: 0
POLYDIPSIA: 0
POSTURAL DYSPNEA: 0
NAUSEA: 0
EXTREMITY NUMBNESS: 0
SPUTUM PRODUCTION: 0
TINGLING: 0
COUGH: 0
WEIGHT LOSS: 0
NAIL CHANGES: 0
TACHYCARDIA: 0
RESPIRATORY PAIN: 0
SYNCOPE: 0
FLANK PAIN: 0
RECTAL PAIN: 0
SORE THROAT: 0
EYE IRRITATION: 0
SMELL DISTURBANCE: 0
DIARRHEA: 0
NECK MASS: 0
JOINT SWELLING: 0
EYE WATERING: 0
BREAST PAIN: 0
INSOMNIA: 0
EYE PAIN: 0
MUSCLE WEAKNESS: 0
CHILLS: 0
CLAUDICATION: 0
NERVOUS/ANXIOUS: 0
DECREASED LIBIDO: 0
DISTURBANCES IN COORDINATION: 0
TREMORS: 0
HOARSE VOICE: 0
HEARTBURN: 0
TROUBLE SWALLOWING: 0
POLYPHAGIA: 0
SPEECH CHANGE: 0
WHEEZING: 0
LIGHT-HEADEDNESS: 0
DOUBLE VISION: 0
FATIGUE: 0
NIGHT SWEATS: 0
ORTHOPNEA: 0
ALTERED TEMPERATURE REGULATION: 0
HOT FLASHES: 0
MYALGIAS: 0
RECTAL BLEEDING: 0
DEPRESSION: 0
HALLUCINATIONS: 0
HYPERTENSION: 0
COUGH DISTURBING SLEEP: 0
NUMBNESS: 0
TASTE DISTURBANCE: 0
LEG SWELLING: 0
MUSCLE CRAMPS: 0
SLEEP DISTURBANCES DUE TO BREATHING: 0
MEMORY LOSS: 0
HYPOTENSION: 0
EXERCISE INTOLERANCE: 0
FEVER: 0
VOMITING: 0
ABDOMINAL PAIN: 0
DIFFICULTY URINATING: 0
NECK PAIN: 0
INCREASED ENERGY: 0
EYE REDNESS: 0
HEMATURIA: 0
JAUNDICE: 0
BREAST MASS: 0
WEAKNESS: 0
LOSS OF CONSCIOUSNESS: 0
SEIZURES: 0
SNORES LOUDLY: 0
BOWEL INCONTINENCE: 0
DIZZINESS: 0
LEG PAIN: 0
STIFFNESS: 0
BLOATING: 0
BACK PAIN: 0
PARALYSIS: 0
SKIN CHANGES: 0
BLOOD IN STOOL: 0
PANIC: 0
ARTHRALGIAS: 0
HEADACHES: 0
WEIGHT GAIN: 0
SINUS PAIN: 0
SHORTNESS OF BREATH: 0
SINUS CONGESTION: 0
HEMOPTYSIS: 0
DYSPNEA ON EXERTION: 0
POOR WOUND HEALING: 1

## 2018-12-04 ASSESSMENT — PAIN SCALES - GENERAL: PAINLEVEL: NO PAIN (0)

## 2018-12-04 NOTE — PROGRESS NOTES
HPI:       Yarelis Penny is a 33 year old female who presents for the following  Patient presents with: Establish Care (Patient is here to establish care)    Yarelis is a 33 yoF who is here to establish care. She is s/p living donor liver transplant for PSC in 2015 and also has UC. Largely she is doing well. She and her  are hoping to start a family next year. Still has a little inflammation in her colon, so plan is to continue meds for a bit longer and then repeat scope in April. If inflammation resolved at that time, can consider moving forward with TTC.     Soc Hx: Currently unemployed. Gets exercise 6 days/week--weights, cycle    Problem, Medication and Allergy Lists were reviewed and are current.  Patient is a new patient to this clinic and so  I reviewed/updated the Past Medical History, the Family History and the Social History.          Review of Systems:   Review of Systems     Constitutional:  Negative for fever, chills, weight loss, weight gain, fatigue, decreased appetite, night sweats, recent stressors, height gain, height loss, post-operative complications, incisional pain, hallucinations, increased energy, hyperactivity and confused.   HENT:  Negative for ear pain, hearing loss, tinnitus, nosebleeds, trouble swallowing, hoarse voice, mouth sores, sore throat, ear discharge, tooth pain, gum tenderness, taste disturbance, smell disturbance, hearing aid, bleeding gums, dry mouth, sinus pain, sinus congestion and neck mass.    Eyes:  Negative for double vision, pain, redness, eye pain, decreased vision, eye watering, eye bulging, eye dryness, flashing lights, spots, floaters, strabismus, tunnel vision, jaundice and eye irritation.   Respiratory:   Negative for cough, hemoptysis, sputum production, shortness of breath, wheezing, sleep disturbances due to breathing, snores loudly, respiratory pain, dyspnea on exertion, cough disturbing sleep and postural dyspnea.    Cardiovascular:  Negative for  chest pain, dyspnea on exertion, palpitations, orthopnea, claudication, leg swelling, fingers/toes turn blue, hypertension, hypotension, syncope, history of heart murmur, chest pain on exertion, chest pain at rest, pacemaker, few scattered varicosities, leg pain, sleep disturbances due to breathing, tachycardia, light-headedness, exercise intolerance and edema.   Gastrointestinal:  Negative for heartburn, nausea, vomiting, abdominal pain, diarrhea, constipation, blood in stool, melena, rectal pain, bloating, hemorrhoids, bowel incontinence, jaundice, rectal bleeding, coffee ground emesis and change in stool.   Genitourinary:  Negative for bladder incontinence, dysuria, urgency, hematuria, flank pain, vaginal discharge, difficulty urinating, genital sores, dyspareunia, decreased libido, nocturia, voiding less frequently, arousal difficulty, abnormal vaginal bleeding, excessive menstruation, menstrual changes, hot flashes, vaginal dryness and postmenopausal bleeding.   Musculoskeletal:  Negative for myalgias, back pain, joint swelling, arthralgias, stiffness, muscle cramps, neck pain, bone pain, muscle weakness and fracture.   Skin:  Positive for poor wound healing and poor wound healing. Negative for nail changes, itching, rash, hair changes, skin changes, acne, warts, scarring, flaky skin, Raynaud's phenomenon, sensitivity to sunlight and skin thickening.   Neurological:  Negative for dizziness, tingling, tremors, speech change, seizures, loss of consciousness, weakness, light-headedness, numbness, headaches, disturbances in coordination, extremity numbness, memory loss, difficulty walking and paralysis.   Endo/Heme:  Negative for anemia, swollen glands and bruises/bleeds easily.   Psychiatric/Behavioral:  Negative for depression, hallucinations, memory loss, decreased concentration, mood swings and panic attacks.    Breast:  Negative for breast discharge, breast mass, breast pain and nipple retraction.   Endocrine:  " Negative for altered temperature regulation, polyphagia, polydipsia, unwanted hair growth and change in facial hair.    I have personally reviewed and updated the ROS on the day of the visit.           Physical Exam:   /85 (BP Location: Right arm, Patient Position: Sitting, Cuff Size: Adult Regular)  Pulse 88  Temp 99.3  F (37.4  C) (Oral)  Resp 16  Ht 1.6 m (5' 3\")  Wt 53.6 kg (118 lb 1.6 oz)  LMP 11/04/2018 (Approximate)  SpO2 96%  Breastfeeding? No  BMI 20.92 kg/m2  Body mass index is 20.92 kg/(m^2).  Vitals were reviewed       GENERAL APPEARANCE: healthy, alert and no distress     EYES: EOMI, PERRL     HENT: ear canals and TM's normal and nose and mouth without ulcers or lesions     NECK: no adenopathy, no asymmetry, masses, or scars and thyroid normal to palpation     RESP: lungs clear to auscultation - no rales, rhonchi or wheezes     CV: regular rates and rhythm, normal S1 S2, no S3 or S4 and no murmur, click or rub     ABDOMEN:  soft, nontender, no HSM or masses and bowel sounds normal     : normal cervix, vagina, external genitalia     MS: extremities normal- no gross deformities noted, no evidence of inflammation in joints, FROM in all extremities.     SKIN: no suspicious lesions or rashes     NEURO: Normal strength and tone, sensory exam grossly normal, mentation intact and speech normal     PSYCH: mentation appears normal. and affect normal/bright     LYMPHATICS: No cervical adenopathy        Results:     Pending  Assessment and Plan     Yarelis was seen today for establish care.    Diagnoses and all orders for this visit:    Encounter to establish care with new doctor   Updated HM today with Pap and flu vaccine   Has hx of liver transplant for PSC and also UC. Colon is still in place    Needs flu shot  -     FLU VACCINE, AGE >= 3 YR    Screening for cervical cancer  -     Pap imaged thin layer screen with HPV - recommended age 30 - 65 years (select HPV order below)  -     HPV High Risk " Types DNA Cervical      Options for treatment and follow-up care were reviewed with the patient. Yarelis Penny engaged in the decision making process and verbalized understanding of the options discussed and agreed with the final plan.    Alma Lara MD  Dec 4, 2018

## 2018-12-04 NOTE — NURSING NOTE
Flu vaccine Questioners:     1.  Has the patient received the information for the influenza vaccine? YES    2.  Does the patient have any of the following contraindications?     Allergy to eggs? No     Allergic reaction to previous influenza vaccines? No     Any other problems to previous influenza vaccines? No     Paralyzed by Guillain-[place] syndrome? No     Currently pregnant? NO     Current moderate or severe illness? No     Allergy to contact lens solution? No    3.  The vaccine has been administered in the usual fashion and the patient was instructed to wait 20 minutes before leaving the building in the event of an allergic reaction: YES      Administered Influenza Fluzone Quadrivalent vaccine (see Immunizations in Chart Review). Patient tolerated well.          Emanuel Dolan CMA at 5:26 PM on 12/4/2018

## 2018-12-04 NOTE — MR AVS SNAPSHOT
After Visit Summary   12/4/2018    Yarelis Penny    MRN: [id number]           Patient Information     Date Of Birth          1985        Visit Information        Provider Department      12/4/2018 4:30 PM Alma Broderick MD [facility]        Today's Diagnoses     Needs flu shot    -  1       Follow-ups after your visit        Your next 10 appointments already scheduled     Dec 05, 2018  8:00 AM CST   (Arrive by 7:45 AM)   Return Visit with Joya Lopez MD   [facility] and Infectious Diseases ([facility])    [address] [place] [address]  [address]  [facility] [address]   [phone]            Dec 11, 2018  7:55 AM CST   (Arrive by 7:40 AM)   Return Visit with Hung Butler MD   [facility] D[facility]at[facility] ([facility])    [address] [place] [address]  3rd Floor  [facility] [address]   [phone]            Dec 18, 2018 10:30 AM CST   Lab with UC LAB   [facility] ([facility])    [address] [place] [address]  1st Floor  [facility] [address]   [phone]            Dec 18, 2018 11:30 AM CST   (Arrive by 11:15 AM)   Return Liver Transplant with Daniela Enriquez MD   [facility] Hepatology ([facility])    [address]  [address]  [facility] [address]   [phone]            Dec 22, 2018  8:00 AM CST   Infusion 180 with UC SPEC INFUSION, UC 44 ATC   [facility] Specialty and Procedure ([facility])    [address]  [address]  [facility] [address]   [phone]            Jan 21, 2019  8:00 AM CST   Infusion 180 with UC SPEC INFUSION, UC 51 ATC   [facility] Specialty [facility] Procedure ([facility])    [address]  [address]  [facility] [address]   [phone]            Feb 18, 2019  8:00 AM CST   Infusion 180 with UC  "SPEC INFUSION,  51 ATC   [facility] Specialty [facility] Procedure ([facility])    [address]  [address]  [facility] [address] [phone]              Who to contact     Please call your clinic at [phone] to:    Ask questions about your health    Make or cancel appointments    Discuss your medicines    Learn about your test results    Speak to your doctor            Additional Information About Your Visit        GameLayers[employer]ItsGoinOn Information     [employer] gives you secure access to your electronic health record. If you see a primary care provider, you can also send messages to your care team and make appointments. If you have questions, please call your primary care clinic.  If you do not have a primary care provider, please call [phone] and they will assist you.      [employer] is an electronic gateway that provides easy, online access to your medical records. With [employer], you can request a clinic appointment, read your test results, renew a prescription or communicate with your care team.     To access your existing account, please contact your [facility] or call [phone] for assistance.        Care EveryWhere ID     This is your Care EveryWhere ID. This could be used by other organizations to access your [place] medical records  [id number]        Your Vitals Were     Pulse Temperature Respirations Height Last Period Pulse Oximetry    88 99.3  F (37.4  C) (Oral) 16 1.6 m (5' 3\") 11/04/2018 (Approximate) 96%    Breastfeeding? BMI (Body Mass Index)                No 20.92 kg/m2           Blood Pressure from Last 3 Encounters:   12/04/18 117/85   11/29/18 112/72   11/27/18 125/87    Weight from Last 3 Encounters:   12/04/18 53.6 kg (118 lb 1.6 oz)   11/29/18 52.2 kg (115 lb)   11/06/18 54 kg (119 lb)              We Performed the Following     FLU VACCINE, AGE >= 3 YR          Today's Medication Changes          These " changes are accurate as of 12/4/18  5:11 PM.  If you have any questions, ask your nurse or doctor.               These medicines have changed or have updated prescriptions.        Dose/Directions    * tacrolimus 1 MG capsule   Commonly known as:  GENERIC EQUIVALENT   This may have changed:    - how much to take  - additional instructions   Used for:  S/P liver transplant (H)        Dose:  2 mg   Take 2 capsules (2 mg) by mouth 2 times daily *total dose 2.5 mg twice daily   Quantity:  120 capsule   Refills:  11       * tacrolimus 0.5 MG capsule   Commonly known as:  GENERIC EQUIVALENT   This may have changed:    - how much to take  - additional instructions   Used for:  S/P liver transplant (H)        Dose:  0.5 mg   Take 1 capsule (0.5 mg) by mouth 2 times daily Take with 1 mg capsule. Total dose 2.5 mg every 12 hours   Quantity:  180 capsule   Refills:  3       * Notice:  This list has 2 medication(s) that are the same as other medications prescribed for you. Read the directions carefully, and ask your doctor or other care provider to review them with you.             Primary Care Provider Office Phone # Fax #    Alma Lara [phone]        [facility] [address]        Equal Access to Services     ERI CHAKRABORTY AH: Yandel Palma, waaxda jeanine, qaybta kaalmada pantera, mani lozano. So [facility] [phone].    ATENCIÓN: Si habla español, tiene a borja disposición servicios gratuitos de asistencia lingüística. [facility] [phone].    We comply with applicable federal civil rights laws and [place] laws. We do not discriminate on the basis of race, color, national origin, age, disability, sex, sexual orientation, or gender identity.            Thank you!     Thank you for choosing [facility]  for your care. Our goal is always to provide you with excellent care. Hearing back from our patients is one way we  can continue to improve our services. Please take a few minutes to complete the written survey that you may receive in the mail after your visit with us. Thank you!             Your Updated Medication List - Protect others around you: Learn how to safely use, store and throw away your medicines at www.disposemymeds.org.          This list is accurate as of 12/4/18  5:11 PM.  Always use your most recent med list.                   Brand Name Dispense Instructions for use Diagnosis    budesonide 9 MG 24 hr tablet    UCERIS    30 tablet    Take 1 tablet (9 mg) by mouth every morning    UC (ulcerative colitis) (H)       calcium carbonate 500 MG tablet    OS-KERLINE     Take 1 tablet by mouth every evening        ENTYVIO IV      Inject 300 mg into the vein every 28 days        fluorouracil 5 % external cream    EFUDEX    40 g    Apply topically daily Apply nightly to warts under occlusion alongside WartStick    Plantar wart of both feet       GNP ASPIRIN LOW DOSE 81 MG EC tablet   Generic drug:  aspirin     30 tablet    Take 1 tablet (81 mg) by mouth daily    Ulcerative colitis with other complication, unspecified location (H), Liver replaced by transplant (H)       Multi-vitamin tablet      Take 1 tablet by mouth every morning        mupirocin 2 % external ointment    BACTROBAN    30 g    Apply topically 3 times daily for 10 days To left nares    Nasal pain       predniSONE 5 MG tablet    DELTASONE    225 tablet    Take 1 tablet (5 mg) by mouth daily    UC (ulcerative colitis) (H), Liver replaced by transplant (H)       sulfamethoxazole-trimethoprim 800-160 MG tablet    BACTRIM DS/SEPTRA DS    90 tablet    Take 1 tablet by mouth three times a week Monday, Wednesday, Friday    Immunosuppression (H), Inflammatory bowel disease, PSC (primary sclerosing cholangitis)       * tacrolimus 1 MG capsule    GENERIC EQUIVALENT    120 capsule    Take 2 capsules (2 mg) by mouth 2 times daily *total dose 2.5 mg twice daily    S/P liver  transplant (H)       * tacrolimus 0.5 MG capsule    GENERIC EQUIVALENT    180 capsule    Take 1 capsule (0.5 mg) by mouth 2 times daily Take with 1 mg capsule. Total dose 2.5 mg every 12 hours    S/P liver transplant (H)       ursodiol 250 MG tablet    ACTIGALL    270 tablet    Take 2 tabs (500 mg) in AM, and 1 tab at night (250 mg)    Liver replaced by transplant (H)       VITAMIN D (CHOLECALCIFEROL) PO      Take by mouth every evening        * Notice:  This list has 2 medication(s) that are the same as other medications prescribed for you. Read the directions carefully, and ask your doctor or other care provider to review them with you.

## 2018-12-04 NOTE — NURSING NOTE
Chief Complaint   Patient presents with     Establish Care     Patient is here to establish care       Emanuel Dolan CMA (AAMA) at 4:44 PM on 12/4/2018

## 2018-12-05 ENCOUNTER — OFFICE VISIT (OUTPATIENT)
Dept: INFECTIOUS DISEASES | Facility: CLINIC | Age: 33
End: 2018-12-05
Attending: INTERNAL MEDICINE
Payer: COMMERCIAL

## 2018-12-05 VITALS
DIASTOLIC BLOOD PRESSURE: 83 MMHG | BODY MASS INDEX: 20.9 KG/M2 | SYSTOLIC BLOOD PRESSURE: 116 MMHG | WEIGHT: 118 LBS | TEMPERATURE: 98.8 F | HEART RATE: 90 BPM | OXYGEN SATURATION: 97 %

## 2018-12-05 DIAGNOSIS — B27.00 EBV (EPSTEIN-BARR VIRUS) VIREMIA: Primary | ICD-10-CM

## 2018-12-05 DIAGNOSIS — Z94.4 LIVER TRANSPLANT RECIPIENT (H): ICD-10-CM

## 2018-12-05 DIAGNOSIS — Z86.19 HISTORY OF CYTOMEGALOVIRUS INFECTION: ICD-10-CM

## 2018-12-05 PROBLEM — D50.9 ANEMIA, IRON DEFICIENCY: Status: RESOLVED | Noted: 2017-08-29 | Resolved: 2018-12-05

## 2018-12-05 PROCEDURE — G0463 HOSPITAL OUTPT CLINIC VISIT: HCPCS | Mod: ZF

## 2018-12-05 ASSESSMENT — PAIN SCALES - GENERAL: PAINLEVEL: NO PAIN (0)

## 2018-12-05 NOTE — NURSING NOTE
Chief Complaint   Patient presents with     RECHECK     EBV     /83  Pulse 90  Temp 98.8  F (37.1  C) (Oral)  Wt 53.5 kg (118 lb)  SpO2 97%  BMI 20.9 kg/m2  Aileen Robles MA

## 2018-12-05 NOTE — MR AVS SNAPSHOT
After Visit Summary   12/5/2018    Yarelis Penny    MRN: [id number]           Patient Information     Date Of Birth          1985        Visit Information        Provider Department      12/5/2018 8:00 AM Joya Lopez MD [facility] Infectious Diseases        Today's Diagnoses     EBV (Jun-Barr virus) viremia    -  1    History of cytomegalovirus infection        Liver transplant recipient (H)           Follow-ups after your visit        Your next 10 appointments already scheduled     Dec 11, 2018  7:55 AM CST   (Arrive by 7:40 AM)   Return Visit with Hung Butler MD   [facility] Dermatology ([facility])    [address] [place] [address]  3rd Floor  [facility] [address]   [phone]            Dec 18, 2018 10:30 AM CST   Lab with UC LAB   [facility] Lab ([facility])    [address] [place] [address]  1st Floor  [facility] [address]   [phone]            Dec 18, 2018 11:30 AM CST   (Arrive by 11:15 AM)   Return Liver Transplant with Daniela Enriquez MD   [facility] Hepatology ([facility])    [address] [facility] [address] [facility]  [address]  [facility] [address]   [phone]            Dec 22, 2018  8:00 AM CST   Infusion 180 with UC SPEC INFUSION, UC 44 ATC   [facility] Special[facility] ([facility])    [address]  [address]  [facility] [address]   [phone]            Jan 21, 2019  8:00 AM CST   Infusion 180 with UC SPEC INFUSION, UC 51 ATC   [facility] Special[facility] ([facility])    [address]  [address]  [facility] [address]   [phone]            Feb 18, 2019  8:00 AM CST   Infusion 180 with UC SPEC INFUSION, UC 51 ATC   [facility] Special[facility] Procedure ([facility])    [address] [place] [address]  [address]  [facility] [address]   [phone]            Mar 06, 2019  8:00 AM CST   (Arrive by 7:45 AM)   RETURN INFLAMMATORY BOWEL DISEASE with Sophy Mckeon MD   [facility] ([facility])    [address]  4th Floor  [facility] [address]   [phone]              Future tests that were ordered for you today     Open Standing Orders        Priority Remaining Interval Expires Ordered    CMV DNA quantification Routine 4/4 quarterly 12/5/2019 12/5/2018    EBV DNA PCR Quantitative Whole Blood Routine 4/4 quarterly 12/5/2019 12/5/2018          Open Future Orders        Priority Expected Expires Ordered    Amylase Routine 12/18/2018 12/5/2019 12/5/2018    Lipase Routine 12/18/2018 12/5/2019 12/5/2018    T cell subset profile Routine 12/18/2018 12/5/2019 12/5/2018            Who to contact     If you have questions or need follow up information about today's clinic visit or your schedule please contact [facility] AND INFECTIOUS DISEASES directly at [phone].  Normal or non-critical lab and imaging results will be communicated to you by [employer], letter or phone within 4 business days after the clinic has received the results. If you do not hear from us within 7 days, please contact the clinic through [employer] or phone. If you have a critical or abnormal lab result, we will notify you by phone as soon as possible.  Submit refill requests through [employer] or call your pharmacy and they will forward the refill request to us. Please allow 3 business days for your refill to be completed.          Additional Information About Your Visit        [employer] Information     [employer] gives you secure access to your electronic health record. If you see a primary care provider, you can also send messages to your care team and make appointments. If you have questions, please call your primary care clinic.  If you do not have a primary care provider, please  call [phone] and they will assist you.        Care EveryWhere ID     This is your Care EveryWhere ID. This could be used by other organizations to access your [place] medical records  [id number]        Your Vitals Were     Pulse Temperature Pulse Oximetry BMI (Body Mass Index)          90 98.8  F (37.1  C) (Oral) 97% 20.9 kg/m2         Blood Pressure from Last 3 Encounters:   12/05/18 116/83   12/04/18 117/85   11/29/18 112/72    Weight from Last 3 Encounters:   12/05/18 53.5 kg (118 lb)   12/04/18 53.6 kg (118 lb 1.6 oz)   11/29/18 52.2 kg (115 lb)                 Today's Medication Changes          These changes are accurate as of 12/5/18  2:33 PM.  If you have any questions, ask your nurse or doctor.               These medicines have changed or have updated prescriptions.        Dose/Directions    * tacrolimus 1 MG capsule   Commonly known as:  GENERIC EQUIVALENT   This may have changed:    - how much to take  - additional instructions   Used for:  S/P liver transplant (H)        Dose:  2 mg   Take 2 capsules (2 mg) by mouth 2 times daily *total dose 2.5 mg twice daily   Quantity:  120 capsule   Refills:  11       * tacrolimus 0.5 MG capsule   Commonly known as:  GENERIC EQUIVALENT   This may have changed:    - how much to take  - additional instructions   Used for:  S/P liver transplant (H)        Dose:  0.5 mg   Take 1 capsule (0.5 mg) by mouth 2 times daily Take with 1 mg capsule. Total dose 2.5 mg every 12 hours   Quantity:  180 capsule   Refills:  3       * Notice:  This list has 2 medication(s) that are the same as other medications prescribed for you. Read the directions carefully, and ask your doctor or other care provider to review them with you.             Primary Care Provider Office Phone # Fax #    Alma Lara [phone]       [address] [facility] [address]        Equal Access to Services     ERI CHAKRABORTY AH: ann De La Cruz  dianegonzalo mani moreira ah. So [facility] [phone].    ATENCIÓN: Si lilibeth haney, tiene a borja disposición servicios gratuitos de asistencia lingüística. Clyde al [phone].    We comply with applicable federal civil rights laws and [place] laws. We do not discriminate on the basis of race, color, national origin, age, disability, sex, sexual orientation, or gender identity.            Thank you!     Thank you for choosing [facility] AND INFECTIOUS DISEASES  for your care. Our goal is always to provide you with excellent care. Hearing back from our patients is one way we can continue to improve our services. Please take a few minutes to complete the written survey that you may receive in the mail after your visit with us. Thank you!             Your Updated Medication List - Protect others around you: Learn how to safely use, store and throw away your medicines at www.disposemymeds.org.          This list is accurate as of 12/5/18  2:33 PM.  Always use your most recent med list.                   Brand Name Dispense Instructions for use Diagnosis    calcium carbonate 500 MG tablet    OS-KERLINE     Take 1 tablet by mouth every evening        ENTYVIO IV      Inject 300 mg into the vein every 28 days        fluorouracil 5 % external cream    EFUDEX    40 g    Apply topically daily Apply nightly to warts under occlusion alongside WartStick    Plantar wart of both feet       GNP ASPIRIN LOW DOSE 81 MG EC tablet   Generic drug:  aspirin     30 tablet    Take 1 tablet (81 mg) by mouth daily    Ulcerative colitis with other complication, unspecified location (H), Liver replaced by transplant (H)       Multi-vitamin tablet      Take 1 tablet by mouth every morning        predniSONE 5 MG tablet    DELTASONE    225 tablet    Take 1 tablet (5 mg) by mouth daily    UC (ulcerative colitis) (H), Liver replaced by transplant (H)       * tacrolimus 1 MG capsule     GENERIC EQUIVALENT    120 capsule    Take 2 capsules (2 mg) by mouth 2 times daily *total dose 2.5 mg twice daily    S/P liver transplant (H)       * tacrolimus 0.5 MG capsule    GENERIC EQUIVALENT    180 capsule    Take 1 capsule (0.5 mg) by mouth 2 times daily Take with 1 mg capsule. Total dose 2.5 mg every 12 hours    S/P liver transplant (H)       ursodiol 250 MG tablet    ACTIGALL    270 tablet    Take 2 tabs (500 mg) in AM, and 1 tab at night (250 mg)    Liver replaced by transplant (H)       VITAMIN D (CHOLECALCIFEROL) PO      Take by mouth every evening        * Notice:  This list has 2 medication(s) that are the same as other medications prescribed for you. Read the directions carefully, and ask your doctor or other care provider to review them with you.

## 2018-12-05 NOTE — PROGRESS NOTES
[facility]  Transplant Infectious Disease Clinic Note     Patient:  Yarelis Penny, Date of birth 1985, Medical record number [id number]  Date of Visit:  12/05/2018         Assessment and Recommendations:   Recommendations:  - EBV & CMV PCR blood q 3 months  - Will not schedule CT c/a/p unless EBV testing value trending up by log-fold.  - Have printed 2016 EBV labtest that she is being sent invoices for, because it had the wrong diagnosis code associated with it, and have [facility] staff clear this up.   - With transplant lab draw scheduled for 12/18/2018, I have ordered checks of EBV, CMV, CD4 count, amylase, lipase.   - Annual ID followup.     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   ID issues:   - EBV viremia. The viremia is overall trending down. She is at risk for PTLD with immunosuppression and IBD, but currently does not have any B symptoms. Her stools are controlled on Vedolizmab. 12/11/2016 CT c/a/p screen for PTLD was negative. Last check of viremia was 7/9/2018, at 47,127 copies. Will recheck with her 12/18/2018 blood draw, orders updated.  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 1st one 12/2015 w/ GCV/ vGCV). Colonosocopy 4/1/16 w/ few small ulcers at the ileocecal valve and mucosal ulcerations in a discontinuous pattern throughout the colon, most of the bx's performed were CMV immunostain positive. Treated w/ vGCV and then IV GCV (due to swallowing problems) for a total of 5 weeks of therapy ending 5/35/16. 12/2016 viremia up to 3000 IU in the setting of steroid burst, easily controlled w/ IV GCV (did not want to use orals for concerns of absorption issues). She completed a course of IV GCV 1/2017. Seral CMV PCRs since are negative, and her stools are normal formed on IBD therapy. CMV PCRs  remain negative, but she is due for a recheck. Will recheck with her 12/18/2018 blood draw, orders updated.  - Hx Throat swabs w/ S. Aureus and she was treated w/ 7 days of doxycycline in 2017.   - Hx C diff 7/2015  - Hx SBP 2/2015  - QTc interval: 423 msec on 3/29/2016 EKG.   - Pneumocystis prophylaxis: none  - Viral serostatus & prophylaxis: CMV D+/R-, EBV+  - Immunization status: up to date  - Gamma globulin status: 5560 on 4/8/2005.  - Isolation status: Good hand hygiene.    Joya Lopez MD. Pager [phone]         Interval History:   Since Yarelis was last seen by my ID colleague Dr. Walker Nails on 12/6/2017, she has been outpatient doing fairly well. This is my first day meeting with her, so her chart was reviewed to date. She had autoimmune hepatitis s/p LDLT 6/2015 from her . There was an anastomotic biliary stricture (last bili stent removed 3/1/16). 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 She completed a course of IV GCV (3 weeks) then course of vGCV. Colonoscopy 4/1/2016 (reported few small ulcerations at the ileocecal valve and mucosal ulcerations in a discontinuous pattern), path + for CMV on immunostain on most areas throughout the colon that was biopsied, no evidence of active colitis. EBV viremia went from 108K on 3/29 to 533K on 3/31 and she was given a dose of Rituximab. Repeat EBV PCR of blood on 4/7/16 was 9197. Valcyte was started 4/6/2016, changed to IV GCV on 4/21/16. Had issues with a pancreatic stent in 9/2018, passed by 10/2018, and that probably accounted for elevation of her pancreatitis labs and colon stool seen on radiographic imaging. She keeps getting an invoice for an EBV blood test done in 4/2016 from [employer], because her insurance would not pay for the test since it had a wrong associated diagnosis code, and the charge is thus  being passed on to her.      Transplants:  6/18/2015 (Liver), Postoperative day:  1266.  Coordinator Agnieszka Haro    Review of Systems:  CONSTITUTIONAL:  No fevers or chills. No night sweats.  EYES: negative for icterus or an acute change in vision  ENT:  negative for any acute hearing loss, tinnitus or sore throat  RESPIRATORY:  negative for cough, sputum, dyspnea  CARDIOVASCULAR:  negative for chest pain, heart palpitations  GASTROINTESTINAL:  negative for nausea, vomiting, diarrhea or constipation  GENITOURINARY:  negative for dysuria or hematuria  HEME:  No easy bruising or bleeding  INTEGUMENT:  negative for rash or pruritus  NEURO:  Negative for headache, tremor, or dizziness    Past Medical History:   Diagnosis Date     Autoimmune hepatitis (H) 2012    on steroid taper     Cholangitis      CKD (chronic kidney disease) 2012    biopsy 2012: TIN, patchy fibrosis     Esophageal varices (H) 9/08    banded     Heart murmur      History of blood transfusion      Primary sclerosing cholangitis      Ulcerative Colitis     f/b GI       Past Surgical History:   Procedure Laterality Date     COLONOSCOPY  9/07     COLONOSCOPY N/A 2/26/2015    Procedure: COMBINED COLONOSCOPY, SINGLE OR MULTIPLE BIOPSY/POLYPECTOMY BY BIOPSY;  Surgeon: Mitchel Hoskins Chi, MD;  Location: [facility]     COLONOSCOPY N/A 1/12/2016    Procedure: COMBINED COLONOSCOPY, SINGLE OR MULTIPLE BIOPSY/POLYPECTOMY BY BIOPSY;  Surgeon: Rigo Valles MD;  Location: [facility]     COLONOSCOPY N/A 4/1/2016    Procedure: COMBINED COLONOSCOPY, SINGLE OR MULTIPLE BIOPSY/POLYPECTOMY BY BIOPSY;  Surgeon: Kareem Solsi MD;  [facility]     COLONOSCOPY N/A 9/5/2017    Procedure: COMBINED COLONOSCOPY, SINGLE OR MULTIPLE BIOPSY/POLYPECTOMY BY BIOPSY;  Colonoscopy;  Surgeon: Fuentes Johnson MD;  Location: [facility]     ENDOSCOPIC RETROGRADE CHOLANGIOPANCREATOGRAM N/A 6/25/2015    Procedure: COMBINED ENDOSCOPIC RETROGRADE CHOLANGIOPANCREATOGRAPHY,  PLACE TUBE/STENT;  Surgeon: Landon Quinones MD;  [facility]     ENDOSCOPIC RETROGRADE CHOLANGIOPANCREATOGRAM N/A 6/25/2015    Procedure: COMBINED ENDOSCOPIC RETROGRADE CHOLANGIOPANCREATOGRAPHY, PLACE TUBE/STENT;  Surgeon: Landon Quinones MD;  [facility]     ENDOSCOPIC RETROGRADE CHOLANGIOPANCREATOGRAM N/A 7/2/2015    Procedure: COMBINED ENDOSCOPIC RETROGRADE CHOLANGIOPANCREATOGRAPHY, PLACE TUBE/STENT;  Surgeon: Landon Quinones MD;  [facility]     ENDOSCOPIC RETROGRADE CHOLANGIOPANCREATOGRAM N/A 9/8/2015    Procedure: COMBINED ENDOSCOPIC RETROGRADE CHOLANGIOPANCREATOGRAPHY, REMOVE FOREIGN BODY OR STENT/TUBE;  Surgeon: Landon Quinones MD;  [facility]     ENDOSCOPIC RETROGRADE CHOLANGIOPANCREATOGRAM N/A 12/8/2015    Procedure: ENDOSCOPIC RETROGRADE CHOLANGIOPANCREATOGRAM;  Surgeon: Landon Quinones MD;  [facility]     ENDOSCOPIC RETROGRADE CHOLANGIOPANCREATOGRAM N/A 3/1/2016    Procedure: COMBINED ENDOSCOPIC RETROGRADE CHOLANGIOPANCREATOGRAPHY, REMOVE FOREIGN BODY OR STENT/TUBE;  Surgeon: Landon Quinones MD;  [facility]     ENDOSCOPIC RETROGRADE CHOLANGIOPANCREATOGRAM N/A 3/20/2017    Procedure: COMBINED ENDOSCOPIC RETROGRADE CHOLANGIOPANCREATOGRAPHY, PLACE TUBE/STENT;  Endoscopic Retrograde Cholangiopancreatogram with Ballon Dilation, Stent Placement;  Surgeon: Guru Brittany Macias MD;  [facility]     ENDOSCOPIC RETROGRADE CHOLANGIOPANCREATOGRAM N/A 4/2/2018    Procedure: COMBINED ENDOSCOPIC RETROGRADE CHOLANGIOPANCREATOGRAPHY, PLACE TUBE/STENT;  Endoscopic Retrograde Cholangiopancreatogram with biliary dilation and stent placement;  Surgeon: Guru Brittany Macias MD;  [facility]     ENDOSCOPIC RETROGRADE CHOLANGIOPANCREATOGRAM N/A 5/7/2018    Procedure: ENDOSCOPIC RETROGRADE CHOLANGIOPANCREATOGRAM;  Endoscopic Retrograde Cholangiopancreatogram ballon dilation stent exchange;  Surgeon: Guru Colleen  Brittany Vail MD;  [facility]     ENDOSCOPIC RETROGRADE CHOLANGIOPANCREATOGRAPHY, EXCHANGE TUBE/STENT N/A 7/30/2015    Procedure: ENDOSCOPIC RETROGRADE CHOLANGIOPANCREATOGRAPHY, EXCHANGE TUBE/STENT;  Surgeon: Landon Quinones MD;  [facility]     ENDOSCOPIC RETROGRADE CHOLANGIOPANCREATOGRAPHY, EXCHANGE TUBE/STENT N/A 5/15/2017    Procedure: ENDOSCOPIC RETROGRADE CHOLANGIOPANCREATOGRAPHY, EXCHANGE TUBE/STENT;  Endoscopic Retrograde Cholangiopancreatogram with biliary stent exchange and balloon dilation;  Surgeon: Guru Brittany Macias MD;  [facility]     ENDOSCOPIC RETROGRADE CHOLANGIOPANCREATOGRAPHY, EXCHANGE TUBE/STENT N/A 6/25/2018    Procedure: ENDOSCOPIC RETROGRADE CHOLANGIOPANCREATOGRAPHY, EXCHANGE TUBE/STENT;  Endoscopic Retrograde Cholangiopancreatogram with biliary dilation, stone removal and stent exchange;  Surgeon: Guru Brittany Macias MD;  [facility]     ENDOSCOPIC RETROGRADE CHOLANGIOPANCREATOGRAPHY, EXCHANGE TUBE/STENT N/A 7/30/2018    Procedure: ENDOSCOPIC RETROGRADE CHOLANGIOPANCREATOGRAPHY, EXCHANGE TUBE/STENT;  Endoscopic Retrograde Cholangiopancreatogram with Stent Exchange with dilation;  Surgeon: Guru Brittany Macias MD;  [facility]     ENDOSCOPIC RETROGRADE CHOLANGIOPANCREATOGRAPHY, EXCHANGE TUBE/STENT N/A 9/26/2018    Procedure: ENDOSCOPIC RETROGRADE CHOLANGIOPANCREATOGRAPHY, EXCHANGE TUBE/STENT;  Endoscopic Retrograde Cholangiopancreatogram, with bile duct stent exchanged balloon dilation and balloon sweep of bile duct;  Surgeon: Guru Brittany Macias MD;  [facility]     ESOPHAGOSCOPY, GASTROSCOPY, DUODENOSCOPY (EGD), COMBINED N/A 2/26/2015    Procedure: COMBINED ESOPHAGOSCOPY, GASTROSCOPY, DUODENOSCOPY (EGD);  Surgeon: Mitchel Hoskins Chi, MD;  [facility]     EXPLORE COMMON BILE DUCT N/A 6/25/2015    Procedure: EXPLORE COMMON BILE DUCT;  Surgeon: Tyree Smith MD;  [facility]      LAPAROTOMY EXPLORATORY N/A 6/25/2015    Procedure: LAPAROTOMY EXPLORATORY;  Surgeon: Tyree Smith MD;  [facility]     PICC INSERTION Right 2/27/2015    4fr SL Valved PICC, 36cm (1cm external) in the R medial brachial vein w/ tip in the low SVC.     SIGMOIDOSCOPY FLEXIBLE N/A 4/27/2016    Procedure: SIGMOIDOSCOPY FLEXIBLE;  Surgeon: Jose Nielson MD;  [facility]     TRANSPLANT LIVER RECIPIENT LIVING UNRELATED N/A 6/18/2015    Procedure: TRANSPLANT LIVER RECIPIENT LIVING UNRELATED;  Surgeon: Tyree Smith MD;  [facility]     UPPER GI ENDOSCOPY         Family History   Problem Relation Age of Onset     Hypertension Mother      Lipids Mother      Sleep Apnea Mother      Hypertension Maternal Grandmother      Alzheimer Disease Maternal Grandmother      Lipids Father      Melanoma No family hx of      Skin Cancer No family hx of      Cancer No family hx of      Diabetes No family hx of        Social History     Social History Narrative    no pets at home, no recent known sick contacts. She spends a lot of her time at the fitness center.      Social History   Substance Use Topics     Smoking status: Never Smoker     Smokeless tobacco: Never Used     Alcohol use 0.0 oz/week     0 Standard drinks or equivalent per week      Comment: Every other month has 1 drink       Immunization History   Administered Date(s) Administered     HepB 07/31/2000, 09/06/2000, 02/09/2001     Hib (PRP-T) 04/24/2015     Influenza (H1N1) 01/13/2010     Influenza (IIV3) PF 09/02/2010, 10/01/2010     Influenza Vaccine IM 3yrs+ 4 Valent IIV4 11/24/2016, 12/04/2018     Meningococcal (Menomune ) 06/02/2015     Pneumo Conj 13-V (2010&after) 05/11/2016     Pneumococcal 23 valent 04/24/2015     TD (ADULT, 7+) 12/01/2001     TDAP Vaccine (Boostrix) 05/11/2016     Twinrix A/B 04/24/2015, 06/02/2015       Patient Active Problem List   Diagnosis     Ulcerative colitis (H)     CARDIOVASCULAR SCREENING; LDL GOAL LESS THAN 160      Autoimmune hepatitis (H)     Liver transplant recipient (H)     Leakage of common bile duct of transplanted liver (H)     Cholestasis of transplanted liver (H)     Immunosuppressed status (H)     Elevated alkaline phosphatase level     EBV (Jun-Barr virus) viremia     History of cytomegalovirus infection       Outpatient Prescriptions Marked as Taking for the 12/5/18 encounter (Office Visit) with Joya Lopez MD   Medication Sig     calcium carbonate (OS-KERLINE 500 MG [place]. [place]) 1250 MG tablet Take 1 tablet by mouth every evening     fluorouracil (EFUDEX) 5 % cream Apply topically daily Apply nightly to warts under occlusion alongside WartStick     GNP ASPIRIN LOW DOSE 81 MG EC tablet Take 1 tablet (81 mg) by mouth daily     multivitamin, therapeutic with minerals (MULTI-VITAMIN) TABS tablet Take 1 tablet by mouth every morning     predniSONE (DELTASONE) 5 MG tablet Take 1 tablet (5 mg) by mouth daily     tacrolimus (GENERIC EQUIVALENT) 1 MG capsule Take 2 capsules (2 mg) by mouth 2 times daily *total dose 2.5 mg twice daily (Patient taking differently: Take 2.5 mg by mouth 2 times daily *total dose 2.5 mg twice daily)     ursodiol (ACTIGALL) 250 MG tablet Take 2 tabs (500 mg) in AM, and 1 tab at night (250 mg)     Vedolizumab (ENTYVIO IV) Inject 300 mg into the vein every 28 days     VITAMIN D, CHOLECALCIFEROL, PO Take by mouth every evening       Allergies   Allergen Reactions     Rifampin Other (See Comments)     Kidney failure     Penicillins      Bad hives when she was in college            Physical Exam:   Vitals were reviewed.   /83  Pulse 90  Temp 98.8  F (37.1  C) (Oral)  Wt 53.5 kg (118 lb)  SpO2 97%  BMI 20.9 kg/m2   Wt Readings from Last 4 Encounters:   12/05/18 53.5 kg (118 lb)   12/04/18 53.6 kg (118 lb 1.6 oz)   11/29/18 52.2 kg (115 lb)   11/06/18 54 kg (119 lb)       Physical Examination:  GENERAL:  Thin WDWN female in no distress  HEENT:  Head is normocephalic, atraumatic    EYES:  Eyes have anicteric sclerae without conjunctival injection  ENT:  Oropharynx is moist without exudates or ulcers. Tongue is midline. Good dentition. Tonsils not enlarged.  NECK:  Supple. No LAD or tenderness  LUNGS:  Clear to auscultation bilateral.   CARDIOVASCULAR:  Regular rate and rhythm with no murmur  ABDOMEN:  Normal bowel sounds, soft, non tender. Surgical sites well healed.   SKIN:  No acute rashes. No stigmata of endocarditis.  NEUROLOGIC:  Grossly nonfocal. Active x4 extremities         Laboratory Data:     Absolute CD4   Date Value Ref Range Status   04/01/2016 386 (L) 441 - 2156 cells/uL Final       Inflammatory Markers    Recent Labs   Lab Test  10/30/18   0923  09/04/18   0805  07/09/18   1100  05/10/18   0917  04/12/18   0810  03/15/18   0910  02/15/18   0925  10/25/17   1400   SED  39*  25*  67*  50*  41*  52*  23*  50*   CRP  <2.9  <2.9  <2.9  6.5  10.6*  12.0*  <2.9  4.0       Metabolic Studies       Recent Labs   Lab Test  09/26/18   0640  07/30/18   0659  06/25/18   0702  05/07/18   1002  04/12/18   0810  04/02/18   0829  03/15/18   0910   NA  138  138  137  137  140  138  136   POTASSIUM  3.7  4.1  4.2  4.0  4.0  4.2  4.2   CHLORIDE  106  107  107  105  107  108  107   CO2  24  25  23  25  24  24  22   ANIONGAP  9  5  7  7  8  6  6   BUN  27  18  22  22  23  21  33*   CR  0.93  1.01  1.00  0.99  0.87  1.00  0.90   GFRESTIMATED  69  63  64  65  75  64  72   GLC  83  77  79  80  78  79  119*   KERLINE  9.0  8.7  8.8  9.0  9.3  8.9  9.1   PHOS   --    --    --    --   3.9   --   2.6   MAG   --    --    --    --   1.2*   --   1.6       Hepatic Studies    Recent Labs   Lab Test  10/30/18   0923  09/26/18   0640  09/04/18   0805  07/30/18   0659  07/09/18   1100  06/25/18   0702   BILITOTAL  0.4  0.3  0.3  0.2  0.2  0.3   ALKPHOS  213*  186*  194*  164*  214*  215*   ALBUMIN  3.1*  3.0*  3.4  3.0*  2.8*  2.8*   AST  38  53*  40  30  33  30   ALT  59*  68*  58*  40  43  39       Pancreatitis  testing    Recent Labs   Lab Test  09/26/18   0640  07/30/18   0659  06/25/18   0702  05/07/18   1002  04/02/18   0829  05/15/17   0718  03/20/17   1306  12/09/16   2019   04/26/16   2223   06/19/12   0748   AMYLASE  146*  141*  131*  131*  143*  90  94   --    --   43   < >   --    LIPASE  349  342  289  244  255  204  139  224   < >   --    < >   --    TRIG   --    --    --    --    --    --    --    --    --    --    --   417*    < > = values in this interval not displayed.       Hematology Studies      Recent Labs   Lab Test  10/30/18   0923  09/26/18   0640  09/04/18   0805  07/30/18   0659  07/09/18   1100  06/25/18   0702  05/31/18   0835  05/10/18   0917   04/12/18   0810   WBC  11.7*  11.0  15.6*  11.9*  13.0*  11.6*  13.7*  12.7*   < >  14.8*   ANEU  5.2   --   7.4   --   7.5   --   4.8  4.8   --   8.7*   ALYM  4.4   --   6.1*   --   3.0   --   5.3  4.9   --   4.0   DEVANTE  1.1   --   1.1   --   0.9   --   1.8*  1.4*   --   1.2   AEOS  1.0*   --   0.9*   --   1.4*   --   1.7*  1.4*   --   0.7   HGB  13.1  13.6  14.5  12.9  11.8  12.1  12.2  13.7   < >  14.1   HCT  40.8  41.2  45.9  41.3  37.4  38.6  37.1  42.2   < >  42.2   MCV  87  86  88  92  91  93  87  85   < >  82   PLT  542*  489*  566*  544*  632*  595*  573*  610*   < >  590*    < > = values in this interval not displayed.       Clotting Studies    Recent Labs   Lab Test  05/31/18   0835  05/15/17   0718  03/20/17   1306  12/09/16   2019   04/27/16   0602   06/27/15   0325  06/25/15   2000  06/25/15   1558   INR  0.95  0.96  1.01  1.10   < >  1.28*   < >  1.96*  1.98*  2.06*   PTT   --    --    --    --    --   56*   --   39*  41*  47*    < > = values in this interval not displayed.       Iron Testing    Recent Labs   Lab Test  10/30/18   0923   10/25/17   1400   05/02/17   0730   04/29/16   0838   04/07/16   1006   07/05/15   0604   IRON   --    --   19*   --   12*   --   41   --    --    --   23*   FEB   --    --   294   --   322   --   137*   --     --    --   70*   IRONSAT   --    --   6*   --   4*   --   30   --    --    --   33   CHERRI   --    --   28   --   6*   --    --    < >   --    --   432*   MCV  87   < >  77*   < >  70*   < >  96   < >  101*   < >  88   FOLIC   --    --    --    --    --    --    --    --    --    --   19.1   B12   --    --    --    --    --    --    --    --    --    --   1054*   HAPT   --    --    --    --    --    --   289*   --    --    < >  12*   RETP   --    --    --    --    --    --    --    --   0.7   < >  1.1   RETICABSCT   --    --    --    --    --    --    --    --   21.1*   < >  29.7    < > = values in this interval not displayed.       Medication levels    Recent Labs   Lab Test  04/12/18   0810  03/15/18   0910   TACROL  6.2  6.3       Microbiology:  Infection Studies to assess Diarrhea   Recent Labs   Lab Test  06/08/17   1315  04/19/17   0600  12/10/16   1620   04/13/16   2145   ADENOVIRUSAG   --    --   Negative   --   Negative   MSPORT   --    --   No Microsporidia found  Chromotrope stain reveals no Microsporidia spores in fecal specimen. Consider   other causes for symptoms. Joanie Garduno M.D., Assistant Medical Director  12/13/2016     --    --    CRYSPT   --    --    --    --   No oocysts of Cryptosporidium species, Cyclospora cayetanensis, or Isospora   mima found  A modified acid fast stain reveals no oocysts of Cryptosporidium, Cyclospora, or   Isospora.  Consider other causes for symptoms  Joanie Garduno M.D., Assistant Medical Director 4.15.2016     POPRT   --   Many PMNs seen  Routine parasitology exam negative  Cryptosporidium, Cyclospora, and Microsporidia are not readily detected by this   method. A single negative specimen does not rule out parasitic infection.     --    < >  Routine parasitology exam negative  Few PMNs seen  Cryptosporidium, Cyclospora, and Microsporidia are not readily detected by this   method. A single negative specimen does not rule out parasitic infection.     EPCAMP   Not Detected  Not Detected  Not Detected   < >   --    EPSALM  Not Detected  Not Detected  Not Detected   < >   --    EPSHGL  Not Detected  Not Detected  Not Detected   < >   --    EPVIB  Not Detected  Not Detected  Not Detected   < >   --    EPROTA  Not Detected  Not Detected  Not Detected   < >  Not Detected   EPNORO  Not Detected  Not Detected  Not Detected   < >   --    EPYER  Not Detected  Not Detected  Not Detected   < >   --    GIART   --   Negative for Giardia lamblia specific antigen by immunoassay.   --    < >  Negative for Giardia lamblia specific antigen by immunoassay.    < > = values in this interval not displayed.       Last Culture results with specimen source  Culture Micro   Date Value Ref Range Status   12/11/2016 No acid fast bacilli isolated after 6 weeks  Final   11/23/2016 (A)  Final    10,000 to 50,000 colonies/mL Beta hemolytic Streptococcus group B  Group B Streptococci are susceptible to ampicillin, penicillin, and cefazolin,   but may be erythromycin and/or clindamycin resistant. Contact Microbiology if   your patient is allergic to penicillin and you need erythromycin and/or   clindamycin testing to be performed.  Clindamycin and Erythromycin are not routinely prescribed for isolates from the   urinary tract. Susceptibility testing must be requested within 5 days.     11/22/2016 No growth  Final   11/22/2016 No growth  Final   11/22/2016 (A)  Final    >100,000 colonies/mL Beta hemolytic Streptococcus group B  Beta Hemolytic Streptococcus groups A and B are susceptible to ampicillin,   penicillin, vancomycin, and the cephalosporins.  Susceptibility testing is not   routinely done on these organisms isolated from urine.  Group B Streptococci are susceptible to ampicillin, penicillin, and cefazolin,   but may be erythromycin and/or clindamycin resistant. Contact Microbiology if   your patient is allergic to penicillin and you need erythromycin and/or   clindamycin testing to be performed.  Clindamycin and Erythromycin are not   routinely prescribed for isolates from the urinary tract. Susceptibility testing   must be requested within 5 days.     04/27/2016 (A)  Final    Heavy growth Staphylococcus aureus Susceptibility testing not routinely done  Plus normal neil  Susceptibility testing requested by .4/28/16 at 1305.TV.     04/27/2016 (A)  Final    >100,000 colonies/mL Klebsiella oxytoca  <10,000 colonies/mL urogenital neil Susceptibility testing not routinely done     04/27/2016 No yeast isolated  Final   04/26/2016 No growth  Final   04/26/2016 No growth  Final   04/26/2016 No growth  Final   04/26/2016 No growth  Final   04/18/2016 No Beta Streptococcus isolated  Final   03/30/2016 No growth  Final   03/30/2016 No growth  Final   03/29/2016 No growth  Final   03/29/2016 No Beta Streptococcus isolated  Final   03/29/2016 No growth  Final   12/17/2015   Final    No Salmonella, Shigella, Campylobacter, E. coli O157, Aeromonas, or Plesiomonas   isolated.     06/25/2015 No anaerobes isolated  Final   06/25/2015 (A)  Final    Candida albicans / dubliniensis isolated Candida albicans and Candida   dubliniensis are not routinely speciated  Penicillium species isolated  No additional fungi cultured after 4 weeks incubation     06/25/2015 (A)  Final    Light growth Candida albicans / dubliniensis Candida albicans and Candida   dubliniensis are not routinely speciated     06/25/2015 No anaerobes isolated  Final   06/25/2015 (A)  Final    Light growth Candida albicans / dubliniensis Candida albicans and Candida   dubliniensis are not routinely speciated     06/25/2015 (A)  Final    Candida albicans / dubliniensis isolated Candida albicans and Candida   dubliniensis are not routinely speciated  Penicillium species isolated  No additional fungi cultured after 4 weeks incubation     06/25/2015   Final    Cancelled by lab - Specimen never received.  Previously reported as: Culture negative monitoring  continues  Canceled, Test credited     06/25/2015 Specimen not received Canceled, Test credited  Final   06/25/2015 Canceled, Test credited Specimen not received  Final   06/25/2015 No anaerobes isolated  Final   06/25/2015 No growth  Final   06/25/2015 Culture negative after 4 weeks  Final   06/24/2015 No growth  Final   06/18/2015 No anaerobes isolated  Final   06/18/2015 No growth  Final   02/27/2015 No anaerobes isolated  Final   02/27/2015   Final    Canceled, Test credited Incorrectly ordered by PCU/Clinic REORDERED AS A FUNGAL   CULTURE     02/27/2015   Final    Culture negative for acid fast bacilli  Assayed at [facility] [address]     02/27/2015 No growth  Final   02/27/2015 Culture negative after 4 weeks  Final   02/24/2015 No growth  Final    Specimen Description   Date Value Ref Range Status   06/08/2017 Feces  Final   04/19/2017 Feces  Final   04/19/2017 Feces  Final   04/19/2017 Feces  Final   12/11/2016 Blood  Final   12/10/2016 Feces  Final   12/10/2016 Feces  Final   12/10/2016 Feces  Final   12/10/2016 Feces  Final   12/10/2016 Feces  Final   11/23/2016 Midstream Urine  Final   11/23/2016 Feces  Final   11/22/2016 Blood Left Arm  Final   11/22/2016 Blood Right Arm  Final   11/22/2016 Midstream Urine  Final   11/15/2016 Feces  Final   11/15/2016 Feces  Final   11/14/2016 Feces  Final   04/27/2016 Throat  Final   04/27/2016 Throat  Final   04/27/2016 Unspecified Urine  Final   04/27/2016 Urine  Final   04/26/2016 Throat  Final   04/26/2016 Blood Left Arm  Final   04/26/2016 Blood Right Arm  Final   04/26/2016 Blood Left Arm  Final   04/26/2016 Blood Right Arm  Final   04/18/2016 Throat  Final   04/18/2016 Throat  Final   04/13/2016 Feces  Final   04/13/2016 Feces  Final   04/13/2016 Feces  Final   04/13/2016 Feces  Final   03/30/2016 Feces  Final   03/30/2016 Blood Left Arm  Final   03/30/2016 Blood Left Hand  Final   03/29/2016 Midstream Urine  Final   03/29/2016 Throat   Final   03/29/2016 Throat  Final   03/29/2016 Blood Right Arm  Final        Last check of C difficile  C Diff Toxin B PCR   Date Value Ref Range Status   06/08/2017  NEG Final    Negative  Negative: Clostridium difficile target DNA sequences NOT detected, presumed   negative for Clostridium difficile toxin B or the number of bacteria present   may be below the limit of detection for the test.   FDA approved assay performed using [employer] GeneXpert real-time PCR.   A negative result does not exclude actual disease due to Clostridium difficile   and may be due to improper collection, handling and storage of the specimen or   the number of organisms in the specimen is below the detection limit of the   assay.         Virology:  Log IU/mL of CMVQNT   Date Value Ref Range Status   02/15/2018 Not Calculated <2.1 [Log_IU]/mL Final   11/22/2017 Not Calculated <2.1 [Log_IU]/mL Final   08/29/2017 Not Calculated <2.1 [Log_IU]/mL Final   07/25/2017 2.3 (H) <2.1 [Log_IU]/mL Final   05/30/2017 <2.1 <2.1 [Log_IU]/mL Final   05/02/2017 <2.1 <2.1 [Log_IU]/mL Final   04/18/2017 <2.1 <2.1 [Log_IU]/mL Final   03/24/2017 2.4 (H) <2.1 [Log_IU]/mL Final   02/07/2017 Not Calculated <2.1 [Log_IU]/mL Final   01/31/2017 <2.1 <2.1 [Log_IU]/mL Final   01/24/2017 <2.1 <2.1 [Log_IU]/mL Final   01/17/2017 <2.1 <2.1 [Log_IU]/mL Final   01/10/2017 <2.1 <2.1 [Log_IU]/mL Final   01/03/2017 <2.1 <2.1 [Log_IU]/mL Final   12/27/2016 <2.1 <2.1 [Log_IU]/mL Final   12/20/2016 2.5 (H) <2.1 [Log_IU]/mL Final   12/14/2016 3.5 (H) <2.1 [Log_IU]/mL Final   12/10/2016 3.3 (H) <2.1 [Log_IU]/mL Final   12/05/2016 2.6 (H) <2.1 [Log_IU]/mL Final   11/23/2016 Not Calculated <2.1 [Log_IU]/mL Final   11/15/2016 <2.1 <2.1 [Log_IU]/mL Final   11/03/2016 Not Calculated <2.1 [Log_IU]/mL Final   09/07/2016 <2.1 <2.1 [Log_IU]/mL Final   08/09/2016 <2.1 <2.1 [Log_IU]/mL Final   07/11/2016 2.2 (H) <2.1 [Log_IU]/mL Final   04/27/2016 Not Calculated <2.1 [Log_IU]/mL Final    04/26/2016  <2.1 [Log_IU]/mL Final    Lab accident - specimen processed incorrectly  REORDERED TO BE COLLECTED 4/27/16 WITH AM COLLECTION ORDERS     04/14/2016 Not Calculated <2.1 [Log_IU]/mL Final   04/07/2016 <2.1 <2.1 [Log_IU]/mL Final   03/29/2016 <2.1 <2.1 [Log_IU]/mL Final       EBV DNA Copies/mL   Date Value Ref Range Status   07/09/2018 61338 (A) EBVNEG^EBV DNA Not Detected [Copies]/mL Final   02/15/2018 33525 (A) EBVNEG^EBV DNA Not Detected [Copies]/mL Final   11/22/2017 842205 (A) EBVNEG^EBV DNA Not Detected [Copies]/mL Final   10/25/2017 20846 (A) EBVNEG^EBV DNA Not Detected [Copies]/mL Final   09/25/2017 465890 (A) EBVNEG^EBV DNA Not Detected [Copies]/mL Final   07/25/2017 300851 (A) EBVNEG [Copies]/mL Final       EBV viral loads     11/22/17: 008939 5.1 log  10/25/17: 81164  4.9 log  9/25/17: 967757  5.0 log  7/25/17: 918336  5.5 log  5/30/17: 003470  5.2 log  4/18/17: 001998  5.1 log  12/10/16: 34245  12/5/16: 38202  11/3/16: 48842  9/7/16: 01034  8/9/16: 73110  7/18/16: 19274  5/9/16 undetected  4/2016: 3000  3/29/16 108,278  3/31/16 533,618  4/7/16  9197  4/12 2609  4/14 682  5/9/16 negative  7/11/16 51,989    Hepatitis B Testing     Recent Labs   Lab Test  11/22/16   1821  06/17/15   0930  06/19/12   0748   HBSAB   --    --   0.0   HBCAB  Nonreactive  Nonreactive  Negative   HEPBANG  Nonreactive   --   Negative   HBCM   --   Nonreactive   A nonreactive result suggests lack of recent exposure to the virus in the   preceding 6 months.     --        Hepatitis C Antibody   Date Value Ref Range Status   06/17/2015  NR Final    Nonreactive   Assay performance characteristics have not been established for newborns,   infants, and children     06/19/2012 Negative NEG Final       Respiratory virus testing    Recent Labs   Lab Test  04/20/16   0913  03/31/16   1000  03/29/16   0943   FLUAH3  Negative   --    --    RS   --   Negative   Test results must be correlated with clinical data. If necessary,  results   should be confirmed by a molecular assay or viral culture.     --    HMPV  Negative   --    --    PIV3  Negative   --    --    HRVS  Negative   --    --    RVSPEC  Nasopharyngeal   --    --    SPEC   --   Nasopharyngeal   --    RSVA  Negative   --    --    RSVB  Negative   --    --    IRSV   --    --   Duplicate request   Canceled, Test credited  *       Imaging:   Exam: Abdominal x-ray, supine and upright views, 10/24/2018.  COMPARISON: 4/18/2018.  HISTORY: Removal PANCREATIC stent.  FINDINGS: Supine and upright views of the abdomen were obtained.  Postoperative changes with surgical clips particularly in the right  upper abdominal quadrant. No pancreatic stent is seen. No pneumatosis.  No portal venous gas. Moderate colonic stool burden. No free air  beneath the diaphragm. Lung bases are clear. Sclerotic focus in the  right femoral neck likely representing bone island.    Impression    IMPRESSION:  1. No pancreatic stent is seen in the present study.  2. Moderate colonic stool burden.         CT CHEST/ABDOMEN/PELVIS W CONTRAST, 12/11/2016 5:41 PM   COMPARISON: CT 11/22/2016, 4/14/2016   HISTORY: eval for PTLD   FINDINGS: Chest: Visualized thyroid is unremarkable. No abnormal thoracic  lymphadenopathy. Heart size within normal limits. No pericardial  effusion. No central pulmonary embolism. Unremarkable appearance of the thoracic esophagus.   The central tracheobronchial tree is patent. No pleural effusion.  Stable scarring/atelectasis in the right lower lobe on series 4 image 79.   Abdomen and pelvis: No abnormal retroperitoneal, pelvic, or mesenteric lymphadenopathy.   Stable postsurgical changes of hepatic transplantation with stable  mild intrahepatic biliary dilatation. Portal venous anastomosis widely  patent. The spleen, adrenals, and pancreas are unremarkable. Stable  medullary nephrocalcinosis. No hydronephrosis. No abnormally dilated  loops of large or small bowel. Small amount of free fluid in  the right  lower quadrant. Bladder mildly distended and unremarkable in  appearance. Left adnexal cyst today measures up to 9 cm on series 2  image 97, previously 3.5 cm on 11/22/2016 and 2.8 cm on 4/14/2016.  Mild colonic wall thickening and loss of haustra presumably related to  patient's ulcerative colitis, similar to prior. Metallic density in  the descending colon on coronal image 34 stable compared to  11/22/2016. Normal appendix. Normal abdominal aorta.   IMPRESSION:   1. No abnormal lymphadenopathy in the chest, abdomen, or pelvis.  2. Colonic wall thickening is presumably related to patient's  ulcerative colitis however can also be seen in PTLD. This is not  significantly changed compared to 11/22/2016.  3. Stable metallic density in the descending colon compared to  11/22/2016. Appearance is indeterminate but this is compatible with a foreign body.  4. Slowly enlarging cystlike lesion in the left adnexa today measuring  up to 9.0 cm, previously 3.5 cm. Consider further evaluation with ultrasound.    Answers for HPI/ROS submitted by the patient on 11/28/2018   General Symptoms: No  Skin Symptoms: Yes  HENT Symptoms: No  EYE SYMPTOMS: No  HEART SYMPTOMS: No  LUNG SYMPTOMS: No  INTESTINAL SYMPTOMS: No  URINARY SYMPTOMS: No  GYNECOLOGIC SYMPTOMS: No  BREAST SYMPTOMS: No  SKELETAL SYMPTOMS: No  BLOOD SYMPTOMS: No  NERVOUS SYSTEM SYMPTOMS: No  MENTAL HEALTH SYMPTOMS: No  Changes in hair: No  Changes in moles/birth marks: No  Itching: No  Rashes: No  Changes in nails: No  Acne: No  Hair in places you don't want it: No  Change in facial hair: No  Warts: No  Non-healing sores: Yes  Scarring: No  Flaking of skin: No  Color changes of hands/feet in cold : No  Sun sensitivity: No  Skin thickening: No

## 2018-12-05 NOTE — LETTER
12/5/2018       RE: Yarelis Penny  [address] [facility] [id number]     Dear Colleague,    Thank you for referring your patient, Yarelis Penny, to the [facility] AND INFECTIOUS DISEASES at [facility]. Please see a copy of my visit note below.    [facility]  Transplant Infectious Disease Clinic Note     Patient:  Yarelis Penny, Date of birth 1985, Medical record number [id number]  Date of Visit:  12/05/2018         Assessment and Recommendations:   Recommendations:  - EBV & CMV PCR blood q 3 months  - Will not schedule CT c/a/p unless EBV testing value trending up by log-fold.  - Have printed 2016 EBV labtest that she is being sent invoices for, because it had the wrong diagnosis code associated with it, and have [facility] staff clear this up.   - With transplant lab draw scheduled for 12/18/2018, I have ordered checks of EBV, CMV, CD4 count, amylase, lipase.   - Annual ID followup.     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   ID issues:   - EBV viremia. The viremia is overall trending down. She is at risk for PTLD with immunosuppression and IBD, but currently does not have any B symptoms. Her stools are controlled on Vedolizmab. 12/11/2016 CT c/a/p screen for PTLD was negative. Last check of viremia was 7/9/2018, at 47,127 copies. Will recheck with her 12/18/2018 blood draw, orders updated.  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 1st one 12/2015 w/ GCV/ vGCV). Colonosocopy 4/1/16 w/ few small ulcers at the ileocecal valve and mucosal ulcerations in a discontinuous pattern throughout the colon, most of the bx's performed were CMV immunostain positive. Treated w/ vGCV and then IV GCV (due to swallowing problems) for a total of 5 weeks of therapy  ending 5/35/16. 12/2016 viremia up to 3000 IU in the setting of steroid burst, easily controlled w/ IV GCV (did not want to use orals for concerns of absorption issues). She completed a course of IV GCV 1/2017. Seral CMV PCRs since are negative, and her stools are normal formed on IBD therapy. CMV PCRs remain negative, but she is due for a recheck. Will recheck with her 12/18/2018 blood draw, orders updated.  - Hx Throat swabs w/ S. Aureus and she was treated w/ 7 days of doxycycline in 2017.   - Hx C diff 7/2015  - Hx SBP 2/2015  - QTc interval: 423 msec on 3/29/2016 EKG.   - Pneumocystis prophylaxis: none  - Viral serostatus & prophylaxis: CMV D+/R-, EBV+  - Immunization status: up to date  - Gamma globulin status: 5560 on 4/8/2005.  - Isolation status: Good hand hygiene.    Joya Lopez MD. Pager [phone]         Interval History:   Since Yarelis was last seen by my ID colleague Dr. Walker Nails on 12/6/2017, she has been outpatient doing fairly well. This is my first day meeting with her, so her chart was reviewed to date. She had autoimmune hepatitis s/p LDLT 6/2015 from her . There was an anastomotic biliary stricture (last bili stent removed 3/1/16). 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 She completed a course of IV GCV (3 weeks) then course of vGCV. Colonoscopy 4/1/2016 (reported few small ulcerations at the ileocecal valve and mucosal ulcerations in a discontinuous pattern), path + for CMV on immunostain on most areas throughout the colon that was biopsied, no evidence of active colitis. EBV viremia went from 108K on 3/29 to 533K on 3/31 and she was given a dose of Rituximab. Repeat EBV PCR of blood on 4/7/16 was 9197. Valcyte was started 4/6/2016, changed to IV GCV on 4/21/16. Had issues with a pancreatic stent in 9/2018, passed by 10/2018, and that  probably accounted for elevation of her pancreatitis labs and colon stool seen on radiographic imaging. She keeps getting an invoice for an EBV blood test done in 4/2016 from [facility], because her insurance would not pay for the test since it had a wrong associated diagnosis code, and the charge is thus being passed on to her.      Transplants:  6/18/2015 (Liver), Postoperative day:  1266.  Coordinator Agnieszka Haro    Review of Systems:  CONSTITUTIONAL:  No fevers or chills. No night sweats.  EYES: negative for icterus or an acute change in vision  ENT:  negative for any acute hearing loss, tinnitus or sore throat  RESPIRATORY:  negative for cough, sputum, dyspnea  CARDIOVASCULAR:  negative for chest pain, heart palpitations  GASTROINTESTINAL:  negative for nausea, vomiting, diarrhea or constipation  GENITOURINARY:  negative for dysuria or hematuria  HEME:  No easy bruising or bleeding  INTEGUMENT:  negative for rash or pruritus  NEURO:  Negative for headache, tremor, or dizziness    Past Medical History:   Diagnosis Date     Autoimmune hepatitis (H) 2012    on steroid taper     Cholangitis      CKD (chronic kidney disease) 2012    biopsy 2012: TIN, patchy fibrosis     Esophageal varices (H) 9/08    banded     Heart murmur      History of blood transfusion      Primary sclerosing cholangitis      Ulcerative Colitis     f/b GI       Past Surgical History:   Procedure Laterality Date     COLONOSCOPY  9/07     COLONOSCOPY N/A 2/26/2015    Procedure: COMBINED COLONOSCOPY, SINGLE OR MULTIPLE BIOPSY/POLYPECTOMY BY BIOPSY;  Surgeon: Mitchel Hoskins Chi, MD;  Location:  GI     COLONOSCOPY N/A 1/12/2016    Procedure: COMBINED COLONOSCOPY, SINGLE OR MULTIPLE BIOPSY/POLYPECTOMY BY BIOPSY;  Surgeon: Rigo Valles MD;  Location:  GI     COLONOSCOPY N/A 4/1/2016    Procedure: COMBINED COLONOSCOPY, SINGLE OR MULTIPLE BIOPSY/POLYPECTOMY BY BIOPSY;  Surgeon: Kareem Solis MD;  Location:  GI     COLONOSCOPY  N/A 9/5/2017    Procedure: COMBINED COLONOSCOPY, SINGLE OR MULTIPLE BIOPSY/POLYPECTOMY BY BIOPSY;  Colonoscopy;  Surgeon: Fuentes Johnson MD;  Location: [facility]     ENDOSCOPIC RETROGRADE CHOLANGIOPANCREATOGRAM N/A 6/25/2015    Procedure: COMBINED ENDOSCOPIC RETROGRADE CHOLANGIOPANCREATOGRAPHY, PLACE TUBE/STENT;  Surgeon: Landon Quinones MD;  [facility]     ENDOSCOPIC RETROGRADE CHOLANGIOPANCREATOGRAM N/A 6/25/2015    Procedure: COMBINED ENDOSCOPIC RETROGRADE CHOLANGIOPANCREATOGRAPHY, PLACE TUBE/STENT;  Surgeon: Landon Quinones MD;  [facility]     ENDOSCOPIC RETROGRADE CHOLANGIOPANCREATOGRAM N/A 7/2/2015    Procedure: COMBINED ENDOSCOPIC RETROGRADE CHOLANGIOPANCREATOGRAPHY, PLACE TUBE/STENT;  Surgeon: Landon Quinones MD;  [facility]     ENDOSCOPIC RETROGRADE CHOLANGIOPANCREATOGRAM N/A 9/8/2015    Procedure: COMBINED ENDOSCOPIC RETROGRADE CHOLANGIOPANCREATOGRAPHY, REMOVE FOREIGN BODY OR STENT/TUBE;  Surgeon: Landon Quinones MD;  [facility]     ENDOSCOPIC RETROGRADE CHOLANGIOPANCREATOGRAM N/A 12/8/2015    Procedure: ENDOSCOPIC RETROGRADE CHOLANGIOPANCREATOGRAM;  Surgeon: Landon Quinones MD;  [facility]     ENDOSCOPIC RETROGRADE CHOLANGIOPANCREATOGRAM N/A 3/1/2016    Procedure: COMBINED ENDOSCOPIC RETROGRADE CHOLANGIOPANCREATOGRAPHY, REMOVE FOREIGN BODY OR STENT/TUBE;  Surgeon: Landon Quinones MD;  [facility]     ENDOSCOPIC RETROGRADE CHOLANGIOPANCREATOGRAM N/A 3/20/2017    Procedure: COMBINED ENDOSCOPIC RETROGRADE CHOLANGIOPANCREATOGRAPHY, PLACE TUBE/STENT;  Endoscopic Retrograde Cholangiopancreatogram with Ballon Dilation, Stent Placement;  Surgeon: Guru Brittany Macias MD;  [facility]     ENDOSCOPIC RETROGRADE CHOLANGIOPANCREATOGRAM N/A 4/2/2018    Procedure: COMBINED ENDOSCOPIC RETROGRADE CHOLANGIOPANCREATOGRAPHY, PLACE TUBE/STENT;  Endoscopic Retrograde Cholangiopancreatogram with biliary dilation and stent  placement;  Surgeon: Guru Brittany Macias MD;  [facility]     ENDOSCOPIC RETROGRADE CHOLANGIOPANCREATOGRAM N/A 5/7/2018    Procedure: ENDOSCOPIC RETROGRADE CHOLANGIOPANCREATOGRAM;  Endoscopic Retrograde Cholangiopancreatogram ballon dilation stent exchange;  Surgeon: Guru Brittany Macias MD;  [facility]     ENDOSCOPIC RETROGRADE CHOLANGIOPANCREATOGRAPHY, EXCHANGE TUBE/STENT N/A 7/30/2015    Procedure: ENDOSCOPIC RETROGRADE CHOLANGIOPANCREATOGRAPHY, EXCHANGE TUBE/STENT;  Surgeon: Landon Quinones MD;  [facility]     ENDOSCOPIC RETROGRADE CHOLANGIOPANCREATOGRAPHY, EXCHANGE TUBE/STENT N/A 5/15/2017    Procedure: ENDOSCOPIC RETROGRADE CHOLANGIOPANCREATOGRAPHY, EXCHANGE TUBE/STENT;  Endoscopic Retrograde Cholangiopancreatogram with biliary stent exchange and balloon dilation;  Surgeon: Guru Brittany Macias MD;  [facility]     ENDOSCOPIC RETROGRADE CHOLANGIOPANCREATOGRAPHY, EXCHANGE TUBE/STENT N/A 6/25/2018    Procedure: ENDOSCOPIC RETROGRADE CHOLANGIOPANCREATOGRAPHY, EXCHANGE TUBE/STENT;  Endoscopic Retrograde Cholangiopancreatogram with biliary dilation, stone removal and stent exchange;  Surgeon: Guru Brittany Macias MD;  [facility]     ENDOSCOPIC RETROGRADE CHOLANGIOPANCREATOGRAPHY, EXCHANGE TUBE/STENT N/A 7/30/2018    Procedure: ENDOSCOPIC RETROGRADE CHOLANGIOPANCREATOGRAPHY, EXCHANGE TUBE/STENT;  Endoscopic Retrograde Cholangiopancreatogram with Stent Exchange with dilation;  Surgeon: Guru Brittany Macias MD;  [facility]     ENDOSCOPIC RETROGRADE CHOLANGIOPANCREATOGRAPHY, EXCHANGE TUBE/STENT N/A 9/26/2018    Procedure: ENDOSCOPIC RETROGRADE CHOLANGIOPANCREATOGRAPHY, EXCHANGE TUBE/STENT;  Endoscopic Retrograde Cholangiopancreatogram, with bile duct stent exchanged balloon dilation and balloon sweep of bile duct;  Surgeon: Guru Brittany Macias MD;  [facility]     ESOPHAGOSCOPY,  GASTROSCOPY, DUODENOSCOPY (EGD), COMBINED N/A 2/26/2015    Procedure: COMBINED ESOPHAGOSCOPY, GASTROSCOPY, DUODENOSCOPY (EGD);  Surgeon: Mitchel Hoskins Chi, MD;  [facility]     EXPLORE COMMON BILE DUCT N/A 6/25/2015    Procedure: EXPLORE COMMON BILE DUCT;  Surgeon: Tyree Smith MD;  [facility]     LAPAROTOMY EXPLORATORY N/A 6/25/2015    Procedure: LAPAROTOMY EXPLORATORY;  Surgeon: Tyree Smith MD;  [facility]     PICC INSERTION Right 2/27/2015    4fr SL Valved PICC, 36cm (1cm external) in the R medial brachial vein w/ tip in the low SVC.     SIGMOIDOSCOPY FLEXIBLE N/A 4/27/2016    Procedure: SIGMOIDOSCOPY FLEXIBLE;  Surgeon: Jose Nielson MD;  [facility]     TRANSPLANT LIVER RECIPIENT LIVING UNRELATED N/A 6/18/2015    Procedure: TRANSPLANT LIVER RECIPIENT LIVING UNRELATED;  Surgeon: Tyree Smith MD;  [facility]     UPPER GI ENDOSCOPY         Family History   Problem Relation Age of Onset     Hypertension Mother      Lipids Mother      Sleep Apnea Mother      Hypertension Maternal Grandmother      Alzheimer Disease Maternal Grandmother      Lipids Father      Melanoma No family hx of      Skin Cancer No family hx of      Cancer No family hx of      Diabetes No family hx of        Social History     Social History Narrative    no pets at home, no recent known sick contacts. She spends a lot of her time at the fitness center.      Social History   Substance Use Topics     Smoking status: Never Smoker     Smokeless tobacco: Never Used     Alcohol use 0.0 oz/week     0 Standard drinks or equivalent per week      Comment: Every other month has 1 drink       Immunization History   Administered Date(s) Administered     HepB 07/31/2000, 09/06/2000, 02/09/2001     Hib (PRP-T) 04/24/2015     Influenza (H1N1) 01/13/2010     Influenza (IIV3) PF 09/02/2010, 10/01/2010     Influenza Vaccine IM 3yrs+ 4 Valent IIV4 11/24/2016, 12/04/2018     Meningococcal (Menomune ) 06/02/2015     Pneumo Conj  13-V (2010&after) 05/11/2016     Pneumococcal 23 valent 04/24/2015     TD (ADULT, 7+) 12/01/2001     TDAP Vaccine (Boostrix) 05/11/2016     Twinrix A/B 04/24/2015, 06/02/2015       Patient Active Problem List   Diagnosis     Ulcerative colitis (H)     CARDIOVASCULAR SCREENING; LDL GOAL LESS THAN 160     Autoimmune hepatitis (H)     Liver transplant recipient (H)     Leakage of common bile duct of transplanted liver (H)     Cholestasis of transplanted liver (H)     Immunosuppressed status (H)     Elevated alkaline phosphatase level     EBV (Jun-Barr virus) viremia     History of cytomegalovirus infection       Outpatient Prescriptions Marked as Taking for the 12/5/18 encounter (Office Visit) with Joya Lopez MD   Medication Sig     calcium carbonate (OS-KERLINE 500 MG [place]) 1250 MG tablet Take 1 tablet by mouth every evening     fluorouracil (EFUDEX) 5 % cream Apply topically daily Apply nightly to warts under occlusion alongside [facility]     GNP ASPIRIN LOW DOSE 81 MG EC tablet Take 1 tablet (81 mg) by mouth daily     multivitamin, therapeutic with minerals (MULTI-VITAMIN) TABS tablet Take 1 tablet by mouth every morning     predniSONE (DELTASONE) 5 MG tablet Take 1 tablet (5 mg) by mouth daily     tacrolimus (GENERIC EQUIVALENT) 1 MG capsule Take 2 capsules (2 mg) by mouth 2 times daily *total dose 2.5 mg twice daily (Patient taking differently: Take 2.5 mg by mouth 2 times daily *total dose 2.5 mg twice daily)     ursodiol (ACTIGALL) 250 MG tablet Take 2 tabs (500 mg) in AM, and 1 tab at night (250 mg)     Vedolizumab (ENTYVIO IV) Inject 300 mg into the vein every 28 days     VITAMIN D, CHOLECALCIFEROL, PO Take by mouth every evening       Allergies   Allergen Reactions     Rifampin Other (See Comments)     Kidney failure     Penicillins      Bad hives when she was in college            Physical Exam:   Vitals were reviewed.   /83  Pulse 90  Temp 98.8  F (37.1  C) (Oral)  Wt 53.5 kg (118  lb)  SpO2 97%  BMI 20.9 kg/m2   Wt Readings from Last 4 Encounters:   12/05/18 53.5 kg (118 lb)   12/04/18 53.6 kg (118 lb 1.6 oz)   11/29/18 52.2 kg (115 lb)   11/06/18 54 kg (119 lb)       Physical Examination:  GENERAL:  Thin WDWN female in no distress  HEENT:  Head is normocephalic, atraumatic   EYES:  Eyes have anicteric sclerae without conjunctival injection  ENT:  Oropharynx is moist without exudates or ulcers. Tongue is midline. Good dentition. Tonsils not enlarged.  NECK:  Supple. No LAD or tenderness  LUNGS:  Clear to auscultation bilateral.   CARDIOVASCULAR:  Regular rate and rhythm with no murmur  ABDOMEN:  Normal bowel sounds, soft, non tender. Surgical sites well healed.   SKIN:  No acute rashes. No stigmata of endocarditis.  NEUROLOGIC:  Grossly nonfocal. Active x4 extremities         Laboratory Data:     Absolute CD4   Date Value Ref Range Status   04/01/2016 386 (L) 441 - 2156 cells/uL Final       Inflammatory Markers    Recent Labs   Lab Test  10/30/18   0923  09/04/18   0805  07/09/18   1100  05/10/18   0917  04/12/18   0810  03/15/18   0910  02/15/18   0925  10/25/17   1400   SED  39*  25*  67*  50*  41*  52*  23*  50*   CRP  <2.9  <2.9  <2.9  6.5  10.6*  12.0*  <2.9  4.0       Metabolic Studies       Recent Labs   Lab Test  09/26/18   0640  07/30/18   0659  06/25/18   0702  05/07/18   1002  04/12/18   0810  04/02/18   0829  03/15/18   0910   NA  138  138  137  137  140  138  136   POTASSIUM  3.7  4.1  4.2  4.0  4.0  4.2  4.2   CHLORIDE  106  107  107  105  107  108  107   CO2  24  25  23  25  24  24  22   ANIONGAP  9  5  7  7  8  6  6   BUN  27  18  22  22  23  21  33*   CR  0.93  1.01  1.00  0.99  0.87  1.00  0.90   GFRESTIMATED  69  63  64  65  75  64  72   GLC  83  77  79  80  78  79  119*   KERLINE  9.0  8.7  8.8  9.0  9.3  8.9  9.1   PHOS   --    --    --    --   3.9   --   2.6   MAG   --    --    --    --   1.2*   --   1.6       Hepatic Studies    Recent Labs   Lab Test  10/30/18   0923   09/26/18   0640  09/04/18   0805  07/30/18   0659  07/09/18   1100  06/25/18   0702   BILITOTAL  0.4  0.3  0.3  0.2  0.2  0.3   ALKPHOS  213*  186*  194*  164*  214*  215*   ALBUMIN  3.1*  3.0*  3.4  3.0*  2.8*  2.8*   AST  38  53*  40  30  33  30   ALT  59*  68*  58*  40  43  39       Pancreatitis testing    Recent Labs   Lab Test  09/26/18   0640  07/30/18   0659  06/25/18   0702  05/07/18   1002  04/02/18   0829  05/15/17   0718  03/20/17   1306  12/09/16 2019 04/26/16   2223   06/19/12   0748   AMYLASE  146*  141*  131*  131*  143*  90  94   --    --   43   < >   --    LIPASE  349  342  289  244  255  204  139  224   < >   --    < >   --    TRIG   --    --    --    --    --    --    --    --    --    --    --   417*    < > = values in this interval not displayed.       Hematology Studies      Recent Labs   Lab Test  10/30/18   0923  09/26/18   0640  09/04/18   0805  07/30/18   0659  07/09/18 1100 06/25/18   0702  05/31/18   0835  05/10/18   0917   04/12/18   0810   WBC  11.7*  11.0  15.6*  11.9*  13.0*  11.6*  13.7*  12.7*   < >  14.8*   ANEU  5.2   --   7.4   --   7.5   --   4.8  4.8   --   8.7*   ALYM  4.4   --   6.1*   --   3.0   --   5.3  4.9   --   4.0   DEVANTE  1.1   --   1.1   --   0.9   --   1.8*  1.4*   --   1.2   AEOS  1.0*   --   0.9*   --   1.4*   --   1.7*  1.4*   --   0.7   HGB  13.1  13.6  14.5  12.9  11.8  12.1  12.2  13.7   < >  14.1   HCT  40.8  41.2  45.9  41.3  37.4  38.6  37.1  42.2   < >  42.2   MCV  87  86  88  92  91  93  87  85   < >  82   PLT  542*  489*  566*  544*  632*  595*  573*  610*   < >  590*    < > = values in this interval not displayed.       Clotting Studies    Recent Labs   Lab Test  05/31/18   0835  05/15/17   0718  03/20/17   1306  12/09/16 2019 04/27/16   0602   06/27/15   0325  06/25/15   2000  06/25/15   1558   INR  0.95  0.96  1.01  1.10   < >  1.28*   < >  1.96*  1.98*  2.06*   PTT   --    --    --    --    --   56*   --   39*  41*  47*    < > = values in  this interval not displayed.       Iron Testing    Recent Labs   Lab Test  10/30/18   0923   10/25/17   1400   05/02/17   0730   04/29/16   0838   04/07/16   1006   07/05/15   0604   IRON   --    --   19*   --   12*   --   41   --    --    --   23*   FEB   --    --   294   --   322   --   137*   --    --    --   70*   IRONSAT   --    --   6*   --   4*   --   30   --    --    --   33   CHERRI   --    --   28   --   6*   --    --    < >   --    --   432*   MCV  87   < >  77*   < >  70*   < >  96   < >  101*   < >  88   FOLIC   --    --    --    --    --    --    --    --    --    --   19.1   B12   --    --    --    --    --    --    --    --    --    --   1054*   HAPT   --    --    --    --    --    --   289*   --    --    < >  12*   RETP   --    --    --    --    --    --    --    --   0.7   < >  1.1   RETICABSCT   --    --    --    --    --    --    --    --   21.1*   < >  29.7    < > = values in this interval not displayed.       Medication levels    Recent Labs   Lab Test  04/12/18   0810  03/15/18   0910   TACROL  6.2  6.3       Microbiology:  Infection Studies to assess Diarrhea   Recent Labs   Lab Test  06/08/17   1315  04/19/17   0600  12/10/16   1620   04/13/16   2145   ADENOVIRUSAG   --    --   Negative   --   Negative   MSPORT   --    --   No Microsporidia found  Chromotrope stain reveals no Microsporidia spores in fecal specimen. Consider   other causes for symptoms. Joanie Garduno M.D., Assistant Medical Director  12/13/2016     --    --    CRYSPT   --    --    --    --   No oocysts of Cryptosporidium species, Cyclospora cayetanensis, or Isospora   mima found  A modified acid fast stain reveals no oocysts of Cryptosporidium, Cyclospora, or   Isospora.  Consider other causes for symptoms  Joanie Garduno M.D., Assistant Medical Director 4.15.2016     POPRT   --   Many PMNs seen  Routine parasitology exam negative  Cryptosporidium, Cyclospora, and Microsporidia are not readily detected by this    method. A single negative specimen does not rule out parasitic infection.     --    < >  Routine parasitology exam negative  Few PMNs seen  Cryptosporidium, Cyclospora, and Microsporidia are not readily detected by this   method. A single negative specimen does not rule out parasitic infection.     EPCAMP  Not Detected  Not Detected  Not Detected   < >   --    EPSALM  Not Detected  Not Detected  Not Detected   < >   --    EPSHGL  Not Detected  Not Detected  Not Detected   < >   --    EPVIB  Not Detected  Not Detected  Not Detected   < >   --    EPROTA  Not Detected  Not Detected  Not Detected   < >  Not Detected   EPNORO  Not Detected  Not Detected  Not Detected   < >   --    EPYER  Not Detected  Not Detected  Not Detected   < >   --    GIART   --   Negative for Giardia lamblia specific antigen by immunoassay.   --    < >  Negative for Giardia lamblia specific antigen by immunoassay.    < > = values in this interval not displayed.       Last Culture results with specimen source  Culture Micro   Date Value Ref Range Status   12/11/2016 No acid fast bacilli isolated after 6 weeks  Final   11/23/2016 (A)  Final    10,000 to 50,000 colonies/mL Beta hemolytic Streptococcus group B  Group B Streptococci are susceptible to ampicillin, penicillin, and cefazolin,   but may be erythromycin and/or clindamycin resistant. Contact Microbiology if   your patient is allergic to penicillin and you need erythromycin and/or   clindamycin testing to be performed.  Clindamycin and Erythromycin are not routinely prescribed for isolates from the   urinary tract. Susceptibility testing must be requested within 5 days.     11/22/2016 No growth  Final   11/22/2016 No growth  Final   11/22/2016 (A)  Final    >100,000 colonies/mL Beta hemolytic Streptococcus group B  Beta Hemolytic Streptococcus groups A and B are susceptible to ampicillin,   penicillin, vancomycin, and the cephalosporins.  Susceptibility testing is not   routinely done on  these organisms isolated from urine.  Group B Streptococci are susceptible to ampicillin, penicillin, and cefazolin,   but may be erythromycin and/or clindamycin resistant. Contact Microbiology if   your patient is allergic to penicillin and you need erythromycin and/or   clindamycin testing to be performed. Clindamycin and Erythromycin are not   routinely prescribed for isolates from the urinary tract. Susceptibility testing   must be requested within 5 days.     04/27/2016 (A)  Final    Heavy growth Staphylococcus aureus Susceptibility testing not routinely done  Plus normal neil  Susceptibility testing requested by .4/28/16 at 1305.TV.     04/27/2016 (A)  Final    >100,000 colonies/mL Klebsiella oxytoca  <10,000 colonies/mL urogenital neil Susceptibility testing not routinely done     04/27/2016 No yeast isolated  Final   04/26/2016 No growth  Final   04/26/2016 No growth  Final   04/26/2016 No growth  Final   04/26/2016 No growth  Final   04/18/2016 No Beta Streptococcus isolated  Final   03/30/2016 No growth  Final   03/30/2016 No growth  Final   03/29/2016 No growth  Final   03/29/2016 No Beta Streptococcus isolated  Final   03/29/2016 No growth  Final   12/17/2015   Final    No Salmonella, Shigella, Campylobacter, E. coli O157, Aeromonas, or Plesiomonas   isolated.     06/25/2015 No anaerobes isolated  Final   06/25/2015 (A)  Final    Candida albicans / dubliniensis isolated Candida albicans and Candida   dubliniensis are not routinely speciated  Penicillium species isolated  No additional fungi cultured after 4 weeks incubation     06/25/2015 (A)  Final    Light growth Candida albicans / dubliniensis Candida albicans and Candida   dubliniensis are not routinely speciated     06/25/2015 No anaerobes isolated  Final   06/25/2015 (A)  Final    Light growth Candida albicans / dubliniensis Candida albicans and Candida   dubliniensis are not routinely speciated     06/25/2015 (A)  Final    Candida  albicans / dubliniensis isolated Candida albicans and Candida   dubliniensis are not routinely speciated  Penicillium species isolated  No additional fungi cultured after 4 weeks incubation     06/25/2015   Final    Cancelled by lab - Specimen never received.  Previously reported as: Culture negative monitoring continues  Canceled, Test credited     06/25/2015 Specimen not received Canceled, Test credited  Final   06/25/2015 Canceled, Test credited Specimen not received  Final   06/25/2015 No anaerobes isolated  Final   06/25/2015 No growth  Final   06/25/2015 Culture negative after 4 weeks  Final   06/24/2015 No growth  Final   06/18/2015 No anaerobes isolated  Final   06/18/2015 No growth  Final   02/27/2015 No anaerobes isolated  Final   02/27/2015   Final    Canceled, Test credited Incorrectly ordered by PCU/Clinic REORDERED AS A FUNGAL   CULTURE     02/27/2015   Final    Culture negative for acid fast bacilli  Assayed at [facility] [address]     02/27/2015 No growth  Final   02/27/2015 Culture negative after 4 weeks  Final   02/24/2015 No growth  Final    Specimen Description   Date Value Ref Range Status   06/08/2017 Feces  Final   04/19/2017 Feces  Final   04/19/2017 Feces  Final   04/19/2017 Feces  Final   12/11/2016 Blood  Final   12/10/2016 Feces  Final   12/10/2016 Feces  Final   12/10/2016 Feces  Final   12/10/2016 Feces  Final   12/10/2016 Feces  Final   11/23/2016 Midstream Urine  Final   11/23/2016 Feces  Final   11/22/2016 Blood Left Arm  Final   11/22/2016 Blood Right Arm  Final   11/22/2016 Midstream Urine  Final   11/15/2016 Feces  Final   11/15/2016 Feces  Final   11/14/2016 Feces  Final   04/27/2016 Throat  Final   04/27/2016 Throat  Final   04/27/2016 Unspecified Urine  Final   04/27/2016 Urine  Final   04/26/2016 Throat  Final   04/26/2016 Blood Left Arm  Final   04/26/2016 Blood Right Arm  Final   04/26/2016 Blood Left Arm  Final   04/26/2016 Blood Right Arm  Final    04/18/2016 Throat  Final   04/18/2016 Throat  Final   04/13/2016 Feces  Final   04/13/2016 Feces  Final   04/13/2016 Feces  Final   04/13/2016 Feces  Final   03/30/2016 Feces  Final   03/30/2016 Blood Left Arm  Final   03/30/2016 Blood Left Hand  Final   03/29/2016 Midstream Urine  Final   03/29/2016 Throat  Final   03/29/2016 Throat  Final   03/29/2016 Blood Right Arm  Final        Last check of C difficile  C Diff Toxin B PCR   Date Value Ref Range Status   06/08/2017  NEG Final    Negative  Negative: Clostridium difficile target DNA sequences NOT detected, presumed   negative for Clostridium difficile toxin B or the number of bacteria present   may be below the limit of detection for the test.   FDA approved assay performed using [employer] real-time PCR.   A negative result does not exclude actual disease due to Clostridium difficile   and may be due to improper collection, handling and storage of the specimen or   the number of organisms in the specimen is below the detection limit of the   assay.         Virology:  Log IU/mL of CMVQNT   Date Value Ref Range Status   02/15/2018 Not Calculated <2.1 [Log_IU]/mL Final   11/22/2017 Not Calculated <2.1 [Log_IU]/mL Final   08/29/2017 Not Calculated <2.1 [Log_IU]/mL Final   07/25/2017 2.3 (H) <2.1 [Log_IU]/mL Final   05/30/2017 <2.1 <2.1 [Log_IU]/mL Final   05/02/2017 <2.1 <2.1 [Log_IU]/mL Final   04/18/2017 <2.1 <2.1 [Log_IU]/mL Final   03/24/2017 2.4 (H) <2.1 [Log_IU]/mL Final   02/07/2017 Not Calculated <2.1 [Log_IU]/mL Final   01/31/2017 <2.1 <2.1 [Log_IU]/mL Final   01/24/2017 <2.1 <2.1 [Log_IU]/mL Final   01/17/2017 <2.1 <2.1 [Log_IU]/mL Final   01/10/2017 <2.1 <2.1 [Log_IU]/mL Final   01/03/2017 <2.1 <2.1 [Log_IU]/mL Final   12/27/2016 <2.1 <2.1 [Log_IU]/mL Final   12/20/2016 2.5 (H) <2.1 [Log_IU]/mL Final   12/14/2016 3.5 (H) <2.1 [Log_IU]/mL Final   12/10/2016 3.3 (H) <2.1 [Log_IU]/mL Final   12/05/2016 2.6 (H) <2.1 [Log_IU]/mL Final   11/23/2016 Not  Calculated <2.1 [Log_IU]/mL Final   11/15/2016 <2.1 <2.1 [Log_IU]/mL Final   11/03/2016 Not Calculated <2.1 [Log_IU]/mL Final   09/07/2016 <2.1 <2.1 [Log_IU]/mL Final   08/09/2016 <2.1 <2.1 [Log_IU]/mL Final   07/11/2016 2.2 (H) <2.1 [Log_IU]/mL Final   04/27/2016 Not Calculated <2.1 [Log_IU]/mL Final   04/26/2016  <2.1 [Log_IU]/mL Final    Lab accident - specimen processed incorrectly  REORDERED TO BE COLLECTED 4/27/16 WITH AM COLLECTION ORDERS     04/14/2016 Not Calculated <2.1 [Log_IU]/mL Final   04/07/2016 <2.1 <2.1 [Log_IU]/mL Final   03/29/2016 <2.1 <2.1 [Log_IU]/mL Final       EBV DNA Copies/mL   Date Value Ref Range Status   07/09/2018 69025 (A) EBVNEG^EBV DNA Not Detected [Copies]/mL Final   02/15/2018 85087 (A) EBVNEG^EBV DNA Not Detected [Copies]/mL Final   11/22/2017 374962 (A) EBVNEG^EBV DNA Not Detected [Copies]/mL Final   10/25/2017 19059 (A) EBVNEG^EBV DNA Not Detected [Copies]/mL Final   09/25/2017 912990 (A) EBVNEG^EBV DNA Not Detected [Copies]/mL Final   07/25/2017 437871 (A) EBVNEG [Copies]/mL Final       EBV viral loads     11/22/17: 184323 5.1 log  10/25/17: 02779  4.9 log  9/25/17: 541322  5.0 log  7/25/17: 590326  5.5 log  5/30/17: 367395  5.2 log  4/18/17: 635559  5.1 log  12/10/16: 94658  12/5/16: 23415  11/3/16: 13196  9/7/16: 00235  8/9/16: 75169  7/18/16: 77493  5/9/16 undetected  4/2016: 3000  3/29/16 108,278  3/31/16 533,618  4/7/16  9197  4/12 2609  4/14 682  5/9/16 negative  7/11/16 51,989    Hepatitis B Testing     Recent Labs   Lab Test  11/22/16   1821  06/17/15   0930  06/19/12   0748   HBSAB   --    --   0.0   HBCAB  Nonreactive  Nonreactive  Negative   HEPBANG  Nonreactive   --   Negative   HBCM   --   Nonreactive   A nonreactive result suggests lack of recent exposure to the virus in the   preceding 6 months.     --        Hepatitis C Antibody   Date Value Ref Range Status   06/17/2015  NR Final    Nonreactive   Assay performance characteristics have not been established for  newborns,   infants, and children     06/19/2012 Negative NEG Final       Respiratory virus testing    Recent Labs   Lab Test  04/20/16   0913  03/31/16   1000  03/29/16   0943   FLUAH3  Negative   --    --    RS   --   Negative   Test results must be correlated with clinical data. If necessary, results   should be confirmed by a molecular assay or viral culture.     --    HMPV  Negative   --    --    PIV3  Negative   --    --    HRVS  Negative   --    --    RVSPEC  Nasopharyngeal   --    --    SPEC   --   Nasopharyngeal   --    RSVA  Negative   --    --    RSVB  Negative   --    --    IRSV   --    --   Duplicate request   Canceled, Test credited  *       Imaging:   Exam: Abdominal x-ray, supine and upright views, 10/24/2018.  COMPARISON: 4/18/2018.  HISTORY: Removal PANCREATIC stent.  FINDINGS: Supine and upright views of the abdomen were obtained.  Postoperative changes with surgical clips particularly in the right  upper abdominal quadrant. No pancreatic stent is seen. No pneumatosis.  No portal venous gas. Moderate colonic stool burden. No free air  beneath the diaphragm. Lung bases are clear. Sclerotic focus in the  right femoral neck likely representing bone island.    Impression    IMPRESSION:  1. No pancreatic stent is seen in the present study.  2. Moderate colonic stool burden.         CT CHEST/ABDOMEN/PELVIS W CONTRAST, 12/11/2016 5:41 PM   COMPARISON: CT 11/22/2016, 4/14/2016   HISTORY: eval for PTLD   FINDINGS: Chest: Visualized thyroid is unremarkable. No abnormal thoracic  lymphadenopathy. Heart size within normal limits. No pericardial  effusion. No central pulmonary embolism. Unremarkable appearance of the thoracic esophagus.   The central tracheobronchial tree is patent. No pleural effusion.  Stable scarring/atelectasis in the right lower lobe on series 4 image 79.   Abdomen and pelvis: No abnormal retroperitoneal, pelvic, or mesenteric lymphadenopathy.   Stable postsurgical changes of hepatic  transplantation with stable  mild intrahepatic biliary dilatation. Portal venous anastomosis widely  patent. The spleen, adrenals, and pancreas are unremarkable. Stable  medullary nephrocalcinosis. No hydronephrosis. No abnormally dilated  loops of large or small bowel. Small amount of free fluid in the right  lower quadrant. Bladder mildly distended and unremarkable in  appearance. Left adnexal cyst today measures up to 9 cm on series 2  image 97, previously 3.5 cm on 11/22/2016 and 2.8 cm on 4/14/2016.  Mild colonic wall thickening and loss of haustra presumably related to  patient's ulcerative colitis, similar to prior. Metallic density in  the descending colon on coronal image 34 stable compared to  11/22/2016. Normal appendix. Normal abdominal aorta.   IMPRESSION:   1. No abnormal lymphadenopathy in the chest, abdomen, or pelvis.  2. Colonic wall thickening is presumably related to patient's  ulcerative colitis however can also be seen in PTLD. This is not  significantly changed compared to 11/22/2016.  3. Stable metallic density in the descending colon compared to  11/22/2016. Appearance is indeterminate but this is compatible with a foreign body.  4. Slowly enlarging cystlike lesion in the left adnexa today measuring  up to 9.0 cm, previously 3.5 cm. Consider further evaluation with ultrasound.    Answers for HPI/ROS submitted by the patient on 11/28/2018   General Symptoms: No  Skin Symptoms: Yes  HENT Symptoms: No  EYE SYMPTOMS: No  HEART SYMPTOMS: No  LUNG SYMPTOMS: No  INTESTINAL SYMPTOMS: No  URINARY SYMPTOMS: No  GYNECOLOGIC SYMPTOMS: No  BREAST SYMPTOMS: No  SKELETAL SYMPTOMS: No  BLOOD SYMPTOMS: No  NERVOUS SYSTEM SYMPTOMS: No  MENTAL HEALTH SYMPTOMS: No  Changes in hair: No  Changes in moles/birth marks: No  Itching: No  Rashes: No  Changes in nails: No  Acne: No  Hair in places you don't want it: No  Change in facial hair: No  Warts: No  Non-healing sores: Yes  Scarring: No  Flaking of skin:  No  Color changes of hands/feet in cold : No  Sun sensitivity: No  Skin thickening: No        Again, thank you for allowing me to participate in the care of your patient.      Sincerely,    Joya Lopez MD

## 2018-12-06 LAB
COPATH REPORT: NORMAL
PAP: NORMAL

## 2018-12-11 ENCOUNTER — OFFICE VISIT (OUTPATIENT)
Dept: DERMATOLOGY | Facility: CLINIC | Age: 33
End: 2018-12-11
Payer: COMMERCIAL

## 2018-12-11 DIAGNOSIS — B07.0 PLANTAR WART: Primary | ICD-10-CM

## 2018-12-11 LAB
FINAL DIAGNOSIS: NORMAL
HPV HR 12 DNA CVX QL NAA+PROBE: NEGATIVE
HPV16 DNA SPEC QL NAA+PROBE: NEGATIVE
HPV18 DNA SPEC QL NAA+PROBE: NEGATIVE
SPECIMEN DESCRIPTION: NORMAL
SPECIMEN SOURCE CVX/VAG CYTO: NORMAL

## 2018-12-11 ASSESSMENT — PAIN SCALES - GENERAL: PAINLEVEL: NO PAIN (0)

## 2018-12-11 NOTE — PROGRESS NOTES
[facility] Dermatology Note      Dermatology Problem List:  1. S/p liver transplant for autoimmune hepatitis, PSC/UC              -current tx tacrolimus and prednisone  2. 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 Acral nevi on B/L hands and plantar feet and one irregularly shaped on left index finger       Encounter Date: Dec 11, 2018    CC:   Chief Complaint   Patient presents with     Derm Problem     Plantar warts - notes improvement since her last visit.       Impression/Plan:  1. Verruca plantaris, bilateral feet    Paring with a #15 blade was performed    Cryotherapy procedure note: After verbal consent and discussion of risks and benefits including but no limited to dyspigmentation/scar, blister, and pain, 10 was(were) treated with 1-2mm freeze border for 2 cycles with liquid nitrogen. Post cryotherapy instructions were provided.    Continue Efudex 5% cream and Wart Stick nightly under occlusion    CC Dr. Maldonado on close of this encounter.    Follow-up in 1 month, earlier for new or changing lesions.      staffed the patient.    Staff Involved:  Resident(Ev Pate)/Staff(as above)    Ev Pate MD  PGY-2 Medicine-Dermatology  Pager [phone]    Staff Physician Comments:   I saw and evaluated the patient with the resident and I agree with the assessment and plan.  I was present for the entire minor procedure and examination. I have made edits if needed.    Hung Butler MD  Staff Dermatologist and Dermatopathologist  , Department of Dermatology          History of Present Illness:  Ms. Yarelis Penny is a 33 year old female who presents as a follow-up for verruca vulgaris. The patient was last seen 11/13/18 when she had paring and treatment with LN2 of wart on the plantar feet. She was also started on Efudex 5% cream at bedtime on the plantar feet.    She reports her warts look  better. She did not have blistering after LN2 therapy. She has been using Efudex cream nightly to the bottoms of her feet. She was not able to find the wart stick at her pharmacy, and has not been using any salicylic acid product.      Review of Systems:  - As per HPI  - Skin: As above in HPI. No additional skin concerns. Patient is otherwise in usual state of health.        Physical exam:  Vitals: There were no vitals taken for this visit.  GEN: This is a well developed, well-nourished female in no acute distress, in a pleasant mood.    SKIN: Focused examination of the plantar feet was performed.  -Thick verrucous plaques on B/L plantar surfaces including  L plantar forefoot, L heel x2, R heel x2, R plantar forefoot x4, and plantar surface of R great toe x4, improved in thickness, but not size  - No other lesions of concern on areas examined.         Past Medical History:   Patient Active Problem List   Diagnosis     Ulcerative colitis (H)     CARDIOVASCULAR SCREENING; LDL GOAL LESS THAN 160     Autoimmune hepatitis (H)     Liver transplant recipient (H)     Leakage of common bile duct of transplanted liver (H)     Cholestasis of transplanted liver (H)     Immunosuppressed status (H)     Elevated alkaline phosphatase level     EBV (Jun-Barr virus) viremia     History of cytomegalovirus infection     Past Medical History:   Diagnosis Date     Acute kidney injury (H) 4/28/2016     Anemia, iron deficiency 8/29/2017     Autoimmune hepatitis (H) 2012    on steroid taper     Cholangitis      CKD (chronic kidney disease) 2012    biopsy 2012: TIN, patchy fibrosis     CKD (chronic kidney disease)     biopsy 2012: TIN, patchy fibrosis      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     Esophageal varices (H) 9/08    banded     Heart murmur      History of blood transfusion      Primary sclerosing cholangitis      SBP (spontaneous bacterial peritonitis) (H) 2/24/2015     Ulcerative Colitis     f/b GI     Past Surgical History:   Procedure  Laterality Date     COLONOSCOPY  9/07     COLONOSCOPY N/A 2/26/2015    Procedure: COMBINED COLONOSCOPY, SINGLE OR MULTIPLE BIOPSY/POLYPECTOMY BY BIOPSY;  Surgeon: Mitchel Hoskins Chi, MD;  Location: [facility]     COLONOSCOPY N/A 1/12/2016    Procedure: COMBINED COLONOSCOPY, SINGLE OR MULTIPLE BIOPSY/POLYPECTOMY BY BIOPSY;  Surgeon: Rigo Valles MD;  Location: [facility]     COLONOSCOPY N/A 4/1/2016    Procedure: COMBINED COLONOSCOPY, SINGLE OR MULTIPLE BIOPSY/POLYPECTOMY BY BIOPSY;  Surgeon: Kareem Solis MD;  Location: [facility]     COLONOSCOPY N/A 9/5/2017    Procedure: COMBINED COLONOSCOPY, SINGLE OR MULTIPLE BIOPSY/POLYPECTOMY BY BIOPSY;  Colonoscopy;  Surgeon: Fuentes Johnson MD;  Location: [facility]     ENDOSCOPIC RETROGRADE CHOLANGIOPANCREATOGRAM N/A 6/25/2015    Procedure: COMBINED ENDOSCOPIC RETROGRADE CHOLANGIOPANCREATOGRAPHY, PLACE TUBE/STENT;  Surgeon: Landon Quinones MD;  [facility]     ENDOSCOPIC RETROGRADE CHOLANGIOPANCREATOGRAM N/A 6/25/2015    Procedure: COMBINED ENDOSCOPIC RETROGRADE CHOLANGIOPANCREATOGRAPHY, PLACE TUBE/STENT;  Surgeon: Landon Quinones MD;  Location: [facility]     ENDOSCOPIC RETROGRADE CHOLANGIOPANCREATOGRAM N/A 7/2/2015    Procedure: COMBINED ENDOSCOPIC RETROGRADE CHOLANGIOPANCREATOGRAPHY, PLACE TUBE/STENT;  Surgeon: Landon Quinones MD;  [facility]     ENDOSCOPIC RETROGRADE CHOLANGIOPANCREATOGRAM N/A 9/8/2015    Procedure: COMBINED ENDOSCOPIC RETROGRADE CHOLANGIOPANCREATOGRAPHY, REMOVE FOREIGN BODY OR STENT/TUBE;  Surgeon: Landon Quinones MD;  [facility]     ENDOSCOPIC RETROGRADE CHOLANGIOPANCREATOGRAM N/A 12/8/2015    Procedure: ENDOSCOPIC RETROGRADE CHOLANGIOPANCREATOGRAM;  Surgeon: Landon Quinones MD;  [facility]     ENDOSCOPIC RETROGRADE CHOLANGIOPANCREATOGRAM N/A 3/1/2016    Procedure: COMBINED ENDOSCOPIC RETROGRADE CHOLANGIOPANCREATOGRAPHY, REMOVE FOREIGN BODY OR STENT/TUBE;  Surgeon: Landon Quinones MD;   [facility]     ENDOSCOPIC RETROGRADE CHOLANGIOPANCREATOGRAM N/A 3/20/2017    Procedure: COMBINED ENDOSCOPIC RETROGRADE CHOLANGIOPANCREATOGRAPHY, PLACE TUBE/STENT;  Endoscopic Retrograde Cholangiopancreatogram with Ballon Dilation, Stent Placement;  Surgeon: Guru Brittany Macias MD;  [facility]     ENDOSCOPIC RETROGRADE CHOLANGIOPANCREATOGRAM N/A 4/2/2018    Procedure: COMBINED ENDOSCOPIC RETROGRADE CHOLANGIOPANCREATOGRAPHY, PLACE TUBE/STENT;  Endoscopic Retrograde Cholangiopancreatogram with biliary dilation and stent placement;  Surgeon: Guru Brittany Macias MD;  [facility]     ENDOSCOPIC RETROGRADE CHOLANGIOPANCREATOGRAM N/A 5/7/2018    Procedure: ENDOSCOPIC RETROGRADE CHOLANGIOPANCREATOGRAM;  Endoscopic Retrograde Cholangiopancreatogram ballon dilation stent exchange;  Surgeon: Guru Brittany Macias MD;  [facility]     ENDOSCOPIC RETROGRADE CHOLANGIOPANCREATOGRAPHY, EXCHANGE TUBE/STENT N/A 7/30/2015    Procedure: ENDOSCOPIC RETROGRADE CHOLANGIOPANCREATOGRAPHY, EXCHANGE TUBE/STENT;  Surgeon: Landon Quinones MD;  [facility]     ENDOSCOPIC RETROGRADE CHOLANGIOPANCREATOGRAPHY, EXCHANGE TUBE/STENT N/A 5/15/2017    Procedure: ENDOSCOPIC RETROGRADE CHOLANGIOPANCREATOGRAPHY, EXCHANGE TUBE/STENT;  Endoscopic Retrograde Cholangiopancreatogram with biliary stent exchange and balloon dilation;  Surgeon: Guru Brittany Macias MD;  [facility]     ENDOSCOPIC RETROGRADE CHOLANGIOPANCREATOGRAPHY, EXCHANGE TUBE/STENT N/A 6/25/2018    Procedure: ENDOSCOPIC RETROGRADE CHOLANGIOPANCREATOGRAPHY, EXCHANGE TUBE/STENT;  Endoscopic Retrograde Cholangiopancreatogram with biliary dilation, stone removal and stent exchange;  Surgeon: Guru Brittany Macias MD;  [facility]     ENDOSCOPIC RETROGRADE CHOLANGIOPANCREATOGRAPHY, EXCHANGE TUBE/STENT N/A 7/30/2018    Procedure: ENDOSCOPIC RETROGRADE CHOLANGIOPANCREATOGRAPHY, EXCHANGE  TUBE/STENT;  Endoscopic Retrograde Cholangiopancreatogram with Stent Exchange with dilation;  Surgeon: Guru Brittany Macias MD;  [facility]     ENDOSCOPIC RETROGRADE CHOLANGIOPANCREATOGRAPHY, EXCHANGE TUBE/STENT N/A 9/26/2018    Procedure: ENDOSCOPIC RETROGRADE CHOLANGIOPANCREATOGRAPHY, EXCHANGE TUBE/STENT;  Endoscopic Retrograde Cholangiopancreatogram, with bile duct stent exchanged balloon dilation and balloon sweep of bile duct;  Surgeon: Guru Brittany Macias MD;  [facility]     ESOPHAGOSCOPY, GASTROSCOPY, DUODENOSCOPY (EGD), COMBINED N/A 2/26/2015    Procedure: COMBINED ESOPHAGOSCOPY, GASTROSCOPY, DUODENOSCOPY (EGD);  Surgeon: Mitchel Hoskins Chi, MD;  Location: [facility]     EXPLORE COMMON BILE DUCT N/A 6/25/2015    Procedure: EXPLORE COMMON BILE DUCT;  Surgeon: Tyree Smith MD;  [facility]     LAPAROTOMY EXPLORATORY N/A 6/25/2015    Procedure: LAPAROTOMY EXPLORATORY;  Surgeon: Tyree Smith MD;  [facility]     PICC INSERTION Right 2/27/2015    4fr SL Valved PICC, 36cm (1cm external) in the R medial brachial vein w/ tip in the low SVC.     SIGMOIDOSCOPY FLEXIBLE N/A 4/27/2016    Procedure: SIGMOIDOSCOPY FLEXIBLE;  Surgeon: Jose Nielson MD;  Location: [facility]     TRANSPLANT LIVER RECIPIENT LIVING UNRELATED N/A 6/18/2015    Procedure: TRANSPLANT LIVER RECIPIENT LIVING UNRELATED;  Surgeon: Tyree Smith MD;  Location: [facility]     UPPER GI ENDOSCOPY         Social History:  Patient  reports that  has never smoked. she has never used smokeless tobacco. She reports that she drinks alcohol. She reports that she does not use drugs.    Family History:  Family History   Problem Relation Age of Onset     Hypertension Mother      Lipids Mother      Sleep Apnea Mother      Hypertension Maternal Grandmother      Alzheimer Disease Maternal Grandmother      Lipids Father      Melanoma No family hx of      Skin Cancer No family hx of      Cancer No family hx of       Diabetes No family hx of        Medications:  Current Outpatient Medications   Medication Sig Dispense Refill     calcium carbonate (OS-KERLINE 500 MG [place]. [place]) 1250 MG tablet Take 1 tablet by mouth every evening       fluorouracil (EFUDEX) 5 % cream Apply topically daily Apply nightly to warts under occlusion alongside WartStick 40 g 3     GNP ASPIRIN LOW DOSE 81 MG EC tablet Take 1 tablet (81 mg) by mouth daily 30 tablet 1     multivitamin, therapeutic with minerals (MULTI-VITAMIN) TABS tablet Take 1 tablet by mouth every morning       predniSONE (DELTASONE) 5 MG tablet Take 1 tablet (5 mg) by mouth daily 225 tablet 3     ursodiol (ACTIGALL) 250 MG tablet Take 2 tabs (500 mg) in AM, and 1 tab at night (250 mg) 270 tablet 3     Vedolizumab (ENTYVIO IV) Inject 300 mg into the vein every 28 days       VITAMIN D, CHOLECALCIFEROL, PO Take by mouth every evening       tacrolimus (GENERIC EQUIVALENT) 0.5 MG capsule Take 1 capsule (0.5 mg) by mouth 2 times daily Take with 1 mg capsule. Total dose 2.5 mg every 12 hours 180 capsule 3     tacrolimus (GENERIC EQUIVALENT) 1 MG capsule Take 2 capsules (2 mg) by mouth 2 times daily *total dose 2.5 mg twice daily 120 capsule 11        Allergies   Allergen Reactions     Rifampin Other (See Comments)     Kidney failure     Penicillins      Bad hives when she was in college

## 2018-12-11 NOTE — PATIENT INSTRUCTIONS
Use Wart Stick with 40% Salicylic acid on warts every night, followed by efudex 5% cream every night. Cover with bandage.    If you can't find the wart stick, you can use strongest strength 40% salicylic acid Compound W to the warts.

## 2018-12-11 NOTE — LETTER
12/11/2018       RE: Yarelis Penny  [address] [facility] [address]     Dear Colleague,    Thank you for referring your patient, Yarelis Penny, to the [facility] DERMATOLOGY at [facility]. Please see a copy of my visit note below.    [facility] Dermatology Note      Dermatology Problem List:  1. S/p liver transplant for autoimmune hepatitis, PSC/UC              -current tx tacrolimus and prednisone  2. 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 Acral nevi on B/L hands and plantar feet and one irregularly shaped on left index finger       Encounter Date: Dec 11, 2018    CC:   Chief Complaint   Patient presents with     Derm Problem     Plantar warts - notes improvement since her last visit.       Impression/Plan:  1. Verruca plantaris, bilateral feet    Paring with a #1 5 blade was performed    Cryotherapy procedure note: After verbal consent and discussion of risks and benefits including but no limited to dyspigmentation/scar, blister, and pain, 1 0 was(were) treated with 1-2mm freeze border for 2 cycles with liquid nitrogen. Post cryotherapy instructions were provided.    Continue Efudex 5% cream and Wart Stick nightly under occlusion    CC Dr. Maldonado on close of this encounter.    Follow-up in 1 month, earlier for new or changing lesions.      staffed the patient.    Staff Involved:  Resident(Ev Pate)/Staff(as above)    Ev Pate MD  PGY-2 Medicine-Dermatology  Pager [phone]    Staff Physician Comments:   I saw and evaluated the patient with the resident and I agree with the assessment and plan.  I was present for the entire minor procedure and examination. I have made edits if needed.    Hung Butler MD  Staff Dermatologist and Dermatopathologist  , Department of Dermatology          History of Present Illness:  Ms. Yarelis Penny is  a 33 year old female who presents as a follow-up for verruca vulgaris. The patient was last seen 11/13/18 when she had paring and treatment with LN2 of wart on the plantar feet. She was also started on Efudex 5% cream at bedtime on the plantar feet.    She reports her warts look better. She did not have blistering after LN2 therapy. She has been using Efudex cream nightly to the bottoms of her feet. She was not able to find the wart stick at her pharmacy, and has not been using any salicylic acid product.      Review of Systems:  - As per HPI  - Skin: As above in HPI. No additional skin concerns. Patient is otherwise in usual state of health.        Physical exam:  Vitals: There were no vitals taken for this visit.  GEN: This is a well developed, well-nourished female in no acute distress, in a pleasant mood.    SKIN: Focused examination of the plantar feet was performed.  -Thick verrucous plaques on B/L plantar surfaces including  L plantar forefoot, L heel x2, R heel x2, R plantar forefoot x4, and plantar surface of R great toe x4, improved in thickness, but not size  - No other lesions of concern on areas examined.         Past Medical History:   Patient Active Problem List   Diagnosis     Ulcerative colitis (H)     CARDIOVASCULAR SCREENING; LDL GOAL LESS THAN 160     Autoimmune hepatitis (H)     Liver transplant recipient (H)     Leakage of common bile duct of transplanted liver (H)     Cholestasis of transplanted liver (H)     Immunosuppressed status (H)     Elevated alkaline phosphatase level     EBV (Jun-Barr virus) viremia     History of cytomegalovirus infection     Past Medical History:   Diagnosis Date     Acute kidney injury (H) 4/28/2016     Anemia, iron deficiency 8/29/2017     Autoimmune hepatitis (H) 2012    on steroid taper     Cholangitis      CKD (chronic kidney disease) 2012    biopsy 2012: TIN, patchy fibrosis     CKD (chronic kidney disease)     biopsy 2012: TIN, patchy fibrosis      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     Esophageal varices (H) 9/08    banded     Heart murmur      History of blood transfusion      Primary sclerosing cholangitis      SBP (spontaneous bacterial peritonitis) (H) 2/24/2015     Ulcerative Colitis     f/b GI     Past Surgical History:   Procedure Laterality Date     COLONOSCOPY  9/07     COLONOSCOPY N/A 2/26/2015    Procedure: COMBINED COLONOSCOPY, SINGLE OR MULTIPLE BIOPSY/POLYPECTOMY BY BIOPSY;  Surgeon: Mitchel Hoskins Chi, MD;  Location: [facility]     COLONOSCOPY N/A 1/12/2016    Procedure: COMBINED COLONOSCOPY, SINGLE OR MULTIPLE BIOPSY/POLYPECTOMY BY BIOPSY;  Surgeon: Rigo Valles MD;  [facility]     COLONOSCOPY N/A 4/1/2016    Procedure: COMBINED COLONOSCOPY, SINGLE OR MULTIPLE BIOPSY/POLYPECTOMY BY BIOPSY;  Surgeon: Kareem Solis MD;  [facility]     COLONOSCOPY N/A 9/5/2017    Procedure: COMBINED COLONOSCOPY, SINGLE OR MULTIPLE BIOPSY/POLYPECTOMY BY BIOPSY;  Colonoscopy;  Surgeon: Fuentes Johnson MD;  [facility]     ENDOSCOPIC RETROGRADE CHOLANGIOPANCREATOGRAM N/A 6/25/2015    Procedure: COMBINED ENDOSCOPIC RETROGRADE CHOLANGIOPANCREATOGRAPHY, PLACE TUBE/STENT;  Surgeon: Lanodn Quinones MD;  [facility]     ENDOSCOPIC RETROGRADE CHOLANGIOPANCREATOGRAM N/A 6/25/2015    Procedure: COMBINED ENDOSCOPIC RETROGRADE CHOLANGIOPANCREATOGRAPHY, PLACE TUBE/STENT;  Surgeon: Landon Quinones MD;  [facility]     ENDOSCOPIC RETROGRADE CHOLANGIOPANCREATOGRAM N/A 7/2/2015    Procedure: COMBINED ENDOSCOPIC RETROGRADE CHOLANGIOPANCREATOGRAPHY, PLACE TUBE/STENT;  Surgeon: Landon Quinones MD;  [facility]     ENDOSCOPIC RETROGRADE CHOLANGIOPANCREATOGRAM N/A 9/8/2015    Procedure: COMBINED ENDOSCOPIC RETROGRADE CHOLANGIOPANCREATOGRAPHY, REMOVE FOREIGN BODY OR STENT/TUBE;  Surgeon: Landon Quinones MD;  [facility]     ENDOSCOPIC RETROGRADE CHOLANGIOPANCREATOGRAM N/A 12/8/2015    Procedure: ENDOSCOPIC RETROGRADE  CHOLANGIOPANCREATOGRAM;  Surgeon: Landon Quinones MD;  [facility]     ENDOSCOPIC RETROGRADE CHOLANGIOPANCREATOGRAM N/A 3/1/2016    Procedure: COMBINED ENDOSCOPIC RETROGRADE CHOLANGIOPANCREATOGRAPHY, REMOVE FOREIGN BODY OR STENT/TUBE;  Surgeon: Landon Quinones MD;  [facility]     ENDOSCOPIC RETROGRADE CHOLANGIOPANCREATOGRAM N/A 3/20/2017    Procedure: COMBINED ENDOSCOPIC RETROGRADE CHOLANGIOPANCREATOGRAPHY, PLACE TUBE/STENT;  Endoscopic Retrograde Cholangiopancreatogram with Ballon Dilation, Stent Placement;  Surgeon: Guru Brittany Macias MD;  [facility]     ENDOSCOPIC RETROGRADE CHOLANGIOPANCREATOGRAM N/A 4/2/2018    Procedure: COMBINED ENDOSCOPIC RETROGRADE CHOLANGIOPANCREATOGRAPHY, PLACE TUBE/STENT;  Endoscopic Retrograde Cholangiopancreatogram with biliary dilation and stent placement;  Surgeon: Guru Brittany Macias MD;  [facility]     ENDOSCOPIC RETROGRADE CHOLANGIOPANCREATOGRAM N/A 5/7/2018    Procedure: ENDOSCOPIC RETROGRADE CHOLANGIOPANCREATOGRAM;  Endoscopic Retrograde Cholangiopancreatogram ballon dilation stent exchange;  Surgeon: Guru Brittany Macias MD;  [facility]     ENDOSCOPIC RETROGRADE CHOLANGIOPANCREATOGRAPHY, EXCHANGE TUBE/STENT N/A 7/30/2015    Procedure: ENDOSCOPIC RETROGRADE CHOLANGIOPANCREATOGRAPHY, EXCHANGE TUBE/STENT;  Surgeon: Landon Quinones MD;  [facility]     ENDOSCOPIC RETROGRADE CHOLANGIOPANCREATOGRAPHY, EXCHANGE TUBE/STENT N/A 5/15/2017    Procedure: ENDOSCOPIC RETROGRADE CHOLANGIOPANCREATOGRAPHY, EXCHANGE TUBE/STENT;  Endoscopic Retrograde Cholangiopancreatogram with biliary stent exchange and balloon dilation;  Surgeon: Guru Brittany Macias MD;  [facility]     ENDOSCOPIC RETROGRADE CHOLANGIOPANCREATOGRAPHY, EXCHANGE TUBE/STENT N/A 6/25/2018    Procedure: ENDOSCOPIC RETROGRADE CHOLANGIOPANCREATOGRAPHY, EXCHANGE TUBE/STENT;  Endoscopic Retrograde  Cholangiopancreatogram with biliary dilation, stone removal and stent exchange;  Surgeon: Guru Brittany Macias MD;  [facility]     ENDOSCOPIC RETROGRADE CHOLANGIOPANCREATOGRAPHY, EXCHANGE TUBE/STENT N/A 7/30/2018    Procedure: ENDOSCOPIC RETROGRADE CHOLANGIOPANCREATOGRAPHY, EXCHANGE TUBE/STENT;  Endoscopic Retrograde Cholangiopancreatogram with Stent Exchange with dilation;  Surgeon: Guru Brittany Macias MD;  [facility]     ENDOSCOPIC RETROGRADE CHOLANGIOPANCREATOGRAPHY, EXCHANGE TUBE/STENT N/A 9/26/2018    Procedure: ENDOSCOPIC RETROGRADE CHOLANGIOPANCREATOGRAPHY, EXCHANGE TUBE/STENT;  Endoscopic Retrograde Cholangiopancreatogram, with bile duct stent exchanged balloon dilation and balloon sweep of bile duct;  Surgeon: Guru Brittany Macias MD;  [facility]     ESOPHAGOSCOPY, GASTROSCOPY, DUODENOSCOPY (EGD), COMBINED N/A 2/26/2015    Procedure: COMBINED ESOPHAGOSCOPY, GASTROSCOPY, DUODENOSCOPY (EGD);  Surgeon: Mitchel Hoskins Chi, MD;  [facility]     EXPLORE COMMON BILE DUCT N/A 6/25/2015    Procedure: EXPLORE COMMON BILE DUCT;  Surgeon: Tyree Smith MD;  [facility]     LAPAROTOMY EXPLORATORY N/A 6/25/2015    Procedure: LAPAROTOMY EXPLORATORY;  Surgeon: Tyree Smith MD;  [facility]     PICC INSERTION Right 2/27/2015    4fr SL Valved PICC, 36cm (1cm external) in the R medial brachial vein w/ tip in the low SVC.     SIGMOIDOSCOPY FLEXIBLE N/A 4/27/2016    Procedure: SIGMOIDOSCOPY FLEXIBLE;  Surgeon: Jose Nielson MD;  [facility]     TRANSPLANT LIVER RECIPIENT LIVING UNRELATED N/A 6/18/2015    Procedure: TRANSPLANT LIVER RECIPIENT LIVING UNRELATED;  Surgeon: Tyree Smith MD;  [facility]     UPPER GI ENDOSCOPY         Social History:  Patient  reports that  has never smoked. she has never used smokeless tobacco. She reports that she drinks alcohol. She reports that she does not use drugs.    Family History:  Family  History   Problem Relation Age of Onset     Hypertension Mother      Lipids Mother      Sleep Apnea Mother      Hypertension Maternal Grandmother      Alzheimer Disease Maternal Grandmother      Lipids Father      Melanoma No family hx of      Skin Cancer No family hx of      Cancer No family hx of      Diabetes No family hx of        Medications:  Current Outpatient Medications   Medication Sig Dispense Refill     calcium carbonate (OS-KERLINE 500 MG [place]. [place]) 1250 MG tablet Take 1 tablet by mouth every evening       fluorouracil (EFUDEX) 5 % cream Apply topically daily Apply nightly to warts under occlusion alongside WartStick 40 g 3     GNP ASPIRIN LOW DOSE 81 MG EC tablet Take 1 tablet (81 mg) by mouth daily 30 tablet 1     multivitamin, therapeutic with minerals (MULTI-VITAMIN) TABS tablet Take 1 tablet by mouth every morning       predniSONE (DELTASONE) 5 MG tablet Take 1 tablet (5 mg) by mouth daily 225 tablet 3     ursodiol (ACTIGALL) 250 MG tablet Take 2 tabs (500 mg) in AM, and 1 tab at night (250 mg) 270 tablet 3     Vedolizumab (ENTYVIO IV) Inject 300 mg into the vein every 28 days       VITAMIN D, CHOLECALCIFEROL, PO Take by mouth every evening       tacrolimus (GENERIC EQUIVALENT) 0.5 MG capsule Take 1 capsule (0.5 mg) by mouth 2 times daily Take with 1 mg capsule. Total dose 2.5 mg every 12 hours 180 capsule 3     tacrolimus (GENERIC EQUIVALENT) 1 MG capsule Take 2 capsules (2 mg) by mouth 2 times daily *total dose 2.5 mg twice daily 120 capsule 11        Allergies   Allergen Reactions     Rifampin Other (See Comments)     Kidney failure     Penicillins      Bad hives when she was in college         Hung Butler MD

## 2018-12-11 NOTE — NURSING NOTE
Dermatology Rooming Note    Yarelis Penny's goals for this visit include:   Chief Complaint   Patient presents with     Derm Problem     Plantar warts - notes improvement since her last visit.     Sadia Isaac, CMA

## 2018-12-18 ENCOUNTER — OFFICE VISIT (OUTPATIENT)
Dept: GASTROENTEROLOGY | Facility: CLINIC | Age: 33
End: 2018-12-18
Attending: INTERNAL MEDICINE
Payer: COMMERCIAL

## 2018-12-18 VITALS
BODY MASS INDEX: 21.14 KG/M2 | RESPIRATION RATE: 16 BRPM | TEMPERATURE: 98.6 F | SYSTOLIC BLOOD PRESSURE: 125 MMHG | OXYGEN SATURATION: 98 % | HEIGHT: 63 IN | HEART RATE: 83 BPM | WEIGHT: 119.3 LBS | DIASTOLIC BLOOD PRESSURE: 89 MMHG

## 2018-12-18 DIAGNOSIS — K83.1 CHOLESTASIS OF TRANSPLANTED LIVER (H): Primary | ICD-10-CM

## 2018-12-18 DIAGNOSIS — T86.49 CHOLESTASIS OF TRANSPLANTED LIVER (H): Primary | ICD-10-CM

## 2018-12-18 PROCEDURE — G0463 HOSPITAL OUTPT CLINIC VISIT: HCPCS | Mod: ZF

## 2018-12-18 ASSESSMENT — MIFFLIN-ST. JEOR: SCORE: 1215.27

## 2018-12-18 ASSESSMENT — PAIN SCALES - GENERAL: PAINLEVEL: NO PAIN (0)

## 2018-12-18 NOTE — LETTER
12/18/2018       RE: Yarelis Penny  [address] [facility] [address]     Dear Colleague,    Thank you for referring your patient, Yarelis Penny, to the [facility] HEPATOLOGY at [facility]. Please see a copy of my visit note below.    [facility]  LIVER TRANSPLANT CLINIC     A/P  33 year old female s/p LDLT  6/18/15 for PCS/autoimmune hepatitis 6/18/15     Ongoing issues with biliary stricture. Recent ERCP. Will recheck labs this Saturday when she is at her Humira infusion.     UC:  On vedo, pred 5, tac.      LT: graft function is good. Continue tac and pred    Had the flu shot    Derm seeing regularly     Reproductive: wants to become pregnant.  She is not really stable and I worry about her immune stability.    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 Not concerning.     Daniela Enriquez MD  Hepatology/ Liver Transplant  [facility]  =================================================================  SUBJECTIVE  33 year old female s/p LDLT  for PSC/AIH 6/18/15.     She has UC and is on vedo for about 1.5 years. She was seeing Dr. Johnson,  11/6/18. He thought things were improved and she is to continue on vedolizumab q 4 weeks and see Dr. Mckeon to establish care. Dr. Johnson advised her that she should be in deep remission endoscopically before a pregnancy should be considered.     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     IS: Prograf. Pred 5. Also on conner.  LABS: Up to date. Aphos 169. Tbili 0.4.    Lab Test 10/30/18  0923   PROTTOTAL 8.0   ALBUMIN 3.1*   BILITOTAL 0.4   ALKPHOS 213*   AST 38   ALT 59*     REJECTION: None  BILIARY ISSUES: BIle duct leak and stricture. Last ERCP 9/26/18  -Two previously well positioned stents were removed from the biliary tree.   - Prior biliary endoscopic sphincterotomy appeared open and selective biliary  "cannulation was performed, stent related debris was removed   - Previous stricture appears to be completely improved. The site of the previous stricture was gently dilated using 6-8 Elation balloon   - A 7 Fr by 7 cm Loza single pigtailed stent was placed   Recommendation:       - Observe patient in same day observation unit for possible discharge same day.   - Confirm spontaneous stent passage by performing a flat and upright abdominal x-ray in 4 weeks. Stent is most likely expected to pass spontaneously. If present will need an upper endoscopy for stent removal   - Will recommend to recheck LFTs in transplant clinic and contact us if it is elevated    STENT: Original out. ERCP placed stent out on AXR 1-0/24/18  KIDNEY FUNCTION:   Creatinine   Date Value Ref Range Status   09/26/2018 0.93 0.52 - 1.04 mg/dL Final     BP: Good. No meds.     SOC: She is not working by choice initially. Now cannot get enough medical stability to get a job. She would like to be a . She works out every day.  ROS: 10 point ROS neg other than the symptoms noted above in the HPI.     OBJECTIVE  /89 (BP Location: Right arm, Patient Position: Sitting, Cuff Size: Adult Regular)   Pulse 83   Temp 98.6  F (37  C) (Oral)   Resp 16   Ht 1.6 m (5' 3\")   Wt 54.1 kg (119 lb 4.8 oz)   SpO2 98%   BMI 21.13 kg/m     GENERAL:  Very pleasant, well-appearing, in no acute distress.    HEENT:  No icterus, no oral lesions.    LYMPH:  No supraclavicular or cervical lymphadenopathy.    CARDIOVASCULAR:  Regular rate and rhythm.    CHEST:  Lungs are clear.    ABDOMEN:  Bowel sounds are present.  Abdomen is soft, nontender, nondistended.  Scar is well healed.      EXTREMITIES:  No edema.    SKIN:  No rash.    NEUROLOGIC:  Speech is fluent and clear.  No asterixis or tremor.               Again, thank you for allowing me to participate in the care of your patient.      Sincerely,    Daniela Enriquez MD      "

## 2018-12-18 NOTE — LETTER
12/18/2018      RE: Yarelis Penny  [address] [facility] [address]       [facility]  LIVER TRANSPLANT CLINIC     A/P  33 year old female s/p LDLT  6/18/15 for PCS/autoimmune hepatitis 6/18/15     Ongoing issues with biliary stricture. Recent ERCP. Will recheck labs this Saturday when she is at her Humira infusion.     UC:  On vedo, pred 5, tac.      LT: graft function is good. Continue tac and pred    Had the flu shot    Derm seeing regularly     Reproductive: wants to become pregnant.  She is not really stable and I worry about her immune stability.    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 Not concerning.     Daniela Enriquez MD  Hepatology/ Liver Transplant  [facility]  =================================================================  SUBJECTIVE  33 year old female s/p LDLT  for PSC/AIH 6/18/15.     She has UC and is on vedo for about 1.5 years. She was seeing MELANY Muñoz 11/6/18. He thought things were improved and she is to continue on vedolizumab q 4 weeks and see Dr. Mckeon to establish care. Dr. Johnson advised her that she should be in deep remission endoscopically before a pregnancy should be considered.     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     IS: Prograf. Pred 5. Also on conner.  LABS: Up to date. Aphos 169. Tbili 0.4.    Lab Test 10/30/18  0923   PROTTOTAL 8.0   ALBUMIN 3.1*   BILITOTAL 0.4   ALKPHOS 213*   AST 38   ALT 59*     REJECTION: None  BILIARY ISSUES: BIle duct leak and stricture. Last ERCP 9/26/18  -Two previously well positioned stents were removed from the biliary tree.   - Prior biliary endoscopic sphincterotomy appeared open and selective biliary cannulation was performed, stent related debris was removed   - Previous stricture appears to be completely improved. The site of the previous stricture was gently dilated using 6-8 Elation balloon   -  "A 7 Fr by 7 cm Loza single pigtailed stent was placed   Recommendation:       - Observe patient in same day observation unit for possible discharge same day.   - Confirm spontaneous stent passage by performing a flat and upright abdominal x-ray in 4 weeks. Stent is most likely expected to pass spontaneously. If present will need an upper endoscopy for stent removal   - Will recommend to recheck LFTs in transplant clinic and contact us if it is elevated    STENT: Original out. ERCP placed stent out on AXR 1-0/24/18  KIDNEY FUNCTION:   Creatinine   Date Value Ref Range Status   09/26/2018 0.93 0.52 - 1.04 mg/dL Final     BP: Good. No meds.     SOC: She is not working by choice initially. Now cannot get enough medical stability to get a job. She would like to be a . She works out every day.  ROS: 10 point ROS neg other than the symptoms noted above in the HPI.     OBJECTIVE  /89 (BP Location: Right arm, Patient Position: Sitting, Cuff Size: Adult Regular)   Pulse 83   Temp 98.6  F (37  C) (Oral)   Resp 16   Ht 1.6 m (5' 3\")   Wt 54.1 kg (119 lb 4.8 oz)   SpO2 98%   BMI 21.13 kg/m     GENERAL:  Very pleasant, well-appearing, in no acute distress.    HEENT:  No icterus, no oral lesions.    LYMPH:  No supraclavicular or cervical lymphadenopathy.    CARDIOVASCULAR:  Regular rate and rhythm.    CHEST:  Lungs are clear.    ABDOMEN:  Bowel sounds are present.  Abdomen is soft, nontender, nondistended.  Scar is well healed.      EXTREMITIES:  No edema.    SKIN:  No rash.    NEUROLOGIC:  Speech is fluent and clear.  No asterixis or tremor.               Daniela Enriquez MD      "

## 2018-12-18 NOTE — PROGRESS NOTES
[facility]  LIVER TRANSPLANT CLINIC     A/P  33 year old female s/p LDLT  6/18/15 for PCS/autoimmune hepatitis 6/18/15     Ongoing issues with biliary stricture. Recent ERCP. Will recheck labs this Saturday when she is at her Humira infusion.     UC:  On vedo, pred 5, tac.      LT: graft function is good. Continue tac and pred    Had the flu shot    Derm seeing regularly     Reproductive: wants to become pregnant.  She is not really stable and I worry about her immune stability.    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 Not concerning.     Daniela Enriquez MD  Hepatology/ Liver Transplant  [facility]  =================================================================  SUBJECTIVE  33 year old female s/p LDLT for PSC/AIH 6/18/15.     She has UC and is on vedo for about 1.5 years. She was seeing Dr. Johnson,  11/6/18. He thought things were improved and she is to continue on vedolizumab q 4 weeks and see Dr. Mckeon to establish care. Dr. Johnson advised her that she should be in deep remission endoscopically before a pregnancy should be considered.     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     IS: Prograf. Pred 5. Also on conner.  LABS: Up to date. Aphos 169. Tbili 0.4.    Lab Test 10/30/18  0923   PROTTOTAL 8.0   ALBUMIN 3.1*   BILITOTAL 0.4   ALKPHOS 213*   AST 38   ALT 59*     REJECTION: None  BILIARY ISSUES: BIle duct leak and stricture. Last ERCP 9/26/18  -Two previously well positioned stents were removed from the biliary tree.   - Prior biliary endoscopic sphincterotomy appeared open and selective biliary cannulation was performed, stent related debris was removed   - Previous stricture appears to be completely improved. The site of the previous stricture was gently dilated using 6-8 Elation balloon   - A 7 Fr by 7 cm Loza single pigtailed stent was placed   Recommendation:       -  "Observe patient in same day observation unit for possible discharge same day.   - Confirm spontaneous stent passage by performing a flat and upright abdominal x-ray in 4 weeks. Stent is most likely expected to pass spontaneously. If present will need an upper endoscopy for stent removal   - Will recommend to recheck LFTs in transplant clinic and contact us if it is elevated    STENT: Original out. ERCP placed stent out on AXR 1-0/24/18  KIDNEY FUNCTION:   Creatinine   Date Value Ref Range Status   09/26/2018 0.93 0.52 - 1.04 mg/dL Final     BP: Good. No meds.     SOC: She is not working by choice initially. Now cannot get enough medical stability to get a job. She would like to be a . She works out every day.  ROS: 10 point ROS neg other than the symptoms noted above in the HPI.     OBJECTIVE  /89 (BP Location: Right arm, Patient Position: Sitting, Cuff Size: Adult Regular)   Pulse 83   Temp 98.6  F (37  C) (Oral)   Resp 16   Ht 1.6 m (5' 3\")   Wt 54.1 kg (119 lb 4.8 oz)   SpO2 98%   BMI 21.13 kg/m    GENERAL:  Very pleasant, well-appearing, in no acute distress.    HEENT:  No icterus, no oral lesions.    LYMPH:  No supraclavicular or cervical lymphadenopathy.    CARDIOVASCULAR:  Regular rate and rhythm.    CHEST:  Lungs are clear.    ABDOMEN:  Bowel sounds are present.  Abdomen is soft, nontender, nondistended.  Scar is well healed.      EXTREMITIES:  No edema.    SKIN:  No rash.    NEUROLOGIC:  Speech is fluent and clear.  No asterixis or tremor.             "

## 2018-12-18 NOTE — NURSING NOTE
"Chief Complaint   Patient presents with     RECHECK     S/P Liver TX 6/18/2015       Vital signs:  Temp: 98.6  F (37  C) Temp src: Oral BP: 125/89 Pulse: 83   Resp: 16 SpO2: 98 %     Height: 160 cm (5' 3\") Weight: 54.1 kg (119 lb 4.8 oz)  Estimated body mass index is 21.13 kg/m  as calculated from the following:    Height as of this encounter: 1.6 m (5' 3\").    Weight as of this encounter: 54.1 kg (119 lb 4.8 oz).          Shana Rothman [facility]  12/18/2018 11:34 AM      "

## 2018-12-22 ENCOUNTER — INFUSION THERAPY VISIT (OUTPATIENT)
Dept: INFUSION THERAPY | Facility: CLINIC | Age: 33
End: 2018-12-22
Attending: INTERNAL MEDICINE
Payer: COMMERCIAL

## 2018-12-22 VITALS
WEIGHT: 116.9 LBS | HEART RATE: 83 BPM | SYSTOLIC BLOOD PRESSURE: 126 MMHG | BODY MASS INDEX: 20.71 KG/M2 | OXYGEN SATURATION: 100 % | DIASTOLIC BLOOD PRESSURE: 83 MMHG | TEMPERATURE: 97.6 F

## 2018-12-22 DIAGNOSIS — K51.90 ULCERATIVE COLITIS (H): Primary | ICD-10-CM

## 2018-12-22 DIAGNOSIS — Z86.19 HISTORY OF CYTOMEGALOVIRUS INFECTION: ICD-10-CM

## 2018-12-22 DIAGNOSIS — Z94.4 LIVER REPLACED BY TRANSPLANT (H): ICD-10-CM

## 2018-12-22 DIAGNOSIS — T86.49 CHOLESTASIS OF TRANSPLANTED LIVER (H): ICD-10-CM

## 2018-12-22 DIAGNOSIS — K83.1 CHOLESTASIS OF TRANSPLANTED LIVER (H): ICD-10-CM

## 2018-12-22 DIAGNOSIS — Z94.4 LIVER TRANSPLANT RECIPIENT (H): ICD-10-CM

## 2018-12-22 DIAGNOSIS — B27.00 EBV (EPSTEIN-BARR VIRUS) VIREMIA: ICD-10-CM

## 2018-12-22 LAB
ALBUMIN SERPL-MCNC: 3.2 G/DL (ref 3.4–5)
ALBUMIN UR-MCNC: 30 MG/DL
ALP SERPL-CCNC: 149 U/L (ref 40–150)
ALT SERPL W P-5'-P-CCNC: 47 U/L (ref 0–50)
AMYLASE SERPL-CCNC: 124 U/L (ref 30–110)
ANION GAP SERPL CALCULATED.3IONS-SCNC: 6 MMOL/L (ref 3–14)
APPEARANCE UR: ABNORMAL
AST SERPL W P-5'-P-CCNC: 35 U/L (ref 0–45)
BASOPHILS # BLD AUTO: 0.1 10E9/L (ref 0–0.2)
BASOPHILS NFR BLD AUTO: 0.9 %
BILIRUB DIRECT SERPL-MCNC: 0.1 MG/DL (ref 0–0.2)
BILIRUB SERPL-MCNC: 0.4 MG/DL (ref 0.2–1.3)
BILIRUB UR QL STRIP: NEGATIVE
BUN SERPL-MCNC: 26 MG/DL (ref 7–30)
CALCIUM SERPL-MCNC: 9 MG/DL (ref 8.5–10.1)
CD3 CELLS # BLD: 5029 CELLS/UL (ref 603–2990)
CD3 CELLS NFR BLD: 92 % (ref 49–84)
CD3+CD4+ CELLS # BLD: 1490 CELLS/UL (ref 441–2156)
CD3+CD4+ CELLS NFR BLD: 27 % (ref 28–63)
CD3+CD4+ CELLS/CD3+CD8+ CLL BLD: 0.48 % (ref 1.4–2.6)
CD3+CD8+ CELLS # BLD: 3085 CELLS/UL (ref 125–1312)
CD3+CD8+ CELLS NFR BLD: 56 % (ref 10–40)
CHLORIDE SERPL-SCNC: 106 MMOL/L (ref 94–109)
CHOLEST SERPL-MCNC: 176 MG/DL
CO2 SERPL-SCNC: 26 MMOL/L (ref 20–32)
COLOR UR AUTO: YELLOW
CREAT SERPL-MCNC: 0.98 MG/DL (ref 0.52–1.04)
CREAT UR-MCNC: 144 MG/DL
CRP SERPL-MCNC: <2.9 MG/L (ref 0–8)
DIFFERENTIAL METHOD BLD: ABNORMAL
EOSINOPHIL # BLD AUTO: 1.2 10E9/L (ref 0–0.7)
EOSINOPHIL NFR BLD AUTO: 8.7 %
ERYTHROCYTE [DISTWIDTH] IN BLOOD BY AUTOMATED COUNT: 14.4 % (ref 10–15)
ERYTHROCYTE [SEDIMENTATION RATE] IN BLOOD BY WESTERGREN METHOD: 32 MM/H (ref 0–20)
GFR SERPL CREATININE-BSD FRML MDRD: 75 ML/MIN/{1.73_M2}
GLUCOSE SERPL-MCNC: 75 MG/DL (ref 70–99)
GLUCOSE UR STRIP-MCNC: NEGATIVE MG/DL
HCT VFR BLD AUTO: 43.6 % (ref 35–47)
HDLC SERPL-MCNC: 83 MG/DL
HGB BLD-MCNC: 13.9 G/DL (ref 11.7–15.7)
HGB UR QL STRIP: ABNORMAL
IFC SPECIMEN: ABNORMAL
IMM GRANULOCYTES # BLD: 0 10E9/L (ref 0–0.4)
IMM GRANULOCYTES NFR BLD: 0.2 %
KETONES UR STRIP-MCNC: NEGATIVE MG/DL
LDLC SERPL CALC-MCNC: 72 MG/DL
LEUKOCYTE ESTERASE UR QL STRIP: NEGATIVE
LIPASE SERPL-CCNC: 270 U/L (ref 73–393)
LYMPHOCYTES # BLD AUTO: 5.4 10E9/L (ref 0.8–5.3)
LYMPHOCYTES NFR BLD AUTO: 39.1 %
MCH RBC QN AUTO: 28.2 PG (ref 26.5–33)
MCHC RBC AUTO-ENTMCNC: 31.9 G/DL (ref 31.5–36.5)
MCV RBC AUTO: 88 FL (ref 78–100)
MONOCYTES # BLD AUTO: 1.3 10E9/L (ref 0–1.3)
MONOCYTES NFR BLD AUTO: 9.5 %
MUCOUS THREADS #/AREA URNS LPF: PRESENT /LPF
NEUTROPHILS # BLD AUTO: 5.8 10E9/L (ref 1.6–8.3)
NEUTROPHILS NFR BLD AUTO: 41.6 %
NITRATE UR QL: NEGATIVE
NONHDLC SERPL-MCNC: 93 MG/DL
NRBC # BLD AUTO: 0 10*3/UL
NRBC BLD AUTO-RTO: 0 /100
PH UR STRIP: 5 PH (ref 5–7)
PLATELET # BLD AUTO: 572 10E9/L (ref 150–450)
POTASSIUM SERPL-SCNC: 4 MMOL/L (ref 3.4–5.3)
PROT SERPL-MCNC: 8 G/DL (ref 6.8–8.8)
PROT UR-MCNC: 0.49 G/L
PROT/CREAT 24H UR: 0.34 G/G CR (ref 0–0.2)
RBC # BLD AUTO: 4.93 10E12/L (ref 3.8–5.2)
RBC #/AREA URNS AUTO: 3 /HPF (ref 0–2)
SODIUM SERPL-SCNC: 137 MMOL/L (ref 133–144)
SOURCE: ABNORMAL
SP GR UR STRIP: 1.01 (ref 1–1.03)
SQUAMOUS #/AREA URNS AUTO: 3 /HPF (ref 0–1)
TACROLIMUS BLD-MCNC: 5.7 UG/L (ref 5–15)
TME LAST DOSE: NORMAL H
TRIGL SERPL-MCNC: 106 MG/DL
UROBILINOGEN UR STRIP-MCNC: 0 MG/DL (ref 0–2)
WBC # BLD AUTO: 13.9 10E9/L (ref 4–11)
WBC #/AREA URNS AUTO: 1 /HPF (ref 0–5)

## 2018-12-22 PROCEDURE — 87799 DETECT AGENT NOS DNA QUANT: CPT | Performed by: INTERNAL MEDICINE

## 2018-12-22 PROCEDURE — 81001 URINALYSIS AUTO W/SCOPE: CPT | Performed by: INTERNAL MEDICINE

## 2018-12-22 PROCEDURE — 80048 BASIC METABOLIC PNL TOTAL CA: CPT | Performed by: INTERNAL MEDICINE

## 2018-12-22 PROCEDURE — 86359 T CELLS TOTAL COUNT: CPT | Performed by: INTERNAL MEDICINE

## 2018-12-22 PROCEDURE — 85652 RBC SED RATE AUTOMATED: CPT | Performed by: INTERNAL MEDICINE

## 2018-12-22 PROCEDURE — 80197 ASSAY OF TACROLIMUS: CPT | Performed by: INTERNAL MEDICINE

## 2018-12-22 PROCEDURE — 84156 ASSAY OF PROTEIN URINE: CPT | Performed by: INTERNAL MEDICINE

## 2018-12-22 PROCEDURE — 82150 ASSAY OF AMYLASE: CPT | Performed by: INTERNAL MEDICINE

## 2018-12-22 PROCEDURE — 86140 C-REACTIVE PROTEIN: CPT | Performed by: INTERNAL MEDICINE

## 2018-12-22 PROCEDURE — 25000128 H RX IP 250 OP 636: Mod: ZF | Performed by: INTERNAL MEDICINE

## 2018-12-22 PROCEDURE — 83690 ASSAY OF LIPASE: CPT | Performed by: INTERNAL MEDICINE

## 2018-12-22 PROCEDURE — 85025 COMPLETE CBC W/AUTO DIFF WBC: CPT | Performed by: INTERNAL MEDICINE

## 2018-12-22 PROCEDURE — 96365 THER/PROPH/DIAG IV INF INIT: CPT

## 2018-12-22 PROCEDURE — 80061 LIPID PANEL: CPT | Performed by: INTERNAL MEDICINE

## 2018-12-22 PROCEDURE — 80076 HEPATIC FUNCTION PANEL: CPT | Performed by: INTERNAL MEDICINE

## 2018-12-22 PROCEDURE — 86360 T CELL ABSOLUTE COUNT/RATIO: CPT | Performed by: INTERNAL MEDICINE

## 2018-12-22 RX ADMIN — VEDOLIZUMAB 300 MG: 300 INJECTION, POWDER, LYOPHILIZED, FOR SOLUTION INTRAVENOUS at 08:49

## 2018-12-22 NOTE — PROGRESS NOTES
Nursing Note  Yarelis Penny presents today to [facility] for:   Chief Complaint   Patient presents with     Infusion     Entyvio     During today's [facility] appointment, orders from Dr. Sophy Mckeon were completed.  Frequency: monthly    Progress note:  Patient identification verified by name and date of birth.  Assessment completed.  Vitals recorded in Doc Flowsheets.  Patient was provided with education regarding infusion and possible side effects.  Patient verbalized understanding.      needed: No  Premedications: were not ordered.  Infusion Rates: 560 ml/hr.  Approximate Infusion length:30 minutes.   Labs: were ordered for this appointment.  Vascular access: peripheral IV placed today.  Treatment Conditions:   ~~~ NOTE: If the patient answers yes to any of the questions below, hold the infusion and contact ordering provider or on-call provider.    1. Have you recently had an elevated temperature, fever, chills, productive cough, coughing for 3 weeks or longer or hemoptysis,  abnormal vital signs, night sweats,  chest pain or have you noticed a decrease in your appetite, unexplained weight loss or fatigue? No  2. Do you have any open wounds or new incisions? No  3. Do you have any recent or upcoming hospitalizations, surgeries or dental procedures? No  4. Do you currently have or recently have had any signs of illness or infection or are you on any antibiotics? No  5. Have you had any new, sudden or worsening abdominal pain? No  6. Have you or anyone in your household received a live vaccination in the past 4 weeks? Please note:  No live vaccines while on biologic/chemotherapy until 6 months after the last treatment.  Patient can receive the flu vaccine (shot only) and the pneumovax.  It is optimal for the patient to get these vaccines mid cycle, but they can be given at any time as long as it is not on the day of the infusion. No  7. Have  you recently been diagnosed with any new nervous system diseases (ie. Multiple sclerosis, Guillain [place], seizures, neurological changes) or cancer diagnosis? Are you on any form of radiation or chemotherapy? No  8. Are you pregnant or breast feeding or do you have plans of pregnancy in the future? No  9. Have you been having any signs of worsening depression or suicidal ideations?  (benlysta only) No  10. Have there been any other new onset medical symptoms? No    Patient tolerated infusion: well.    Drug Waste Record? No     Discharge Plan:   Follow up plan of care with: ongoing infusions at [facility]. and primary medical doctor.  Discharge instructions were reviewed with patient.  Patient/representative verbalized understanding of discharge instructions and all questions answered.  Patient discharged from [facility] in stable condition.    Vilma Kent RN    Administrations This Visit     vedolizumab (ENTYVIO) 300 mg in sodium chloride 0.9 % 280 mL infusion     Admin Date  12/22/2018 Action  New Bag Dose  300 mg Rate  560 mL/hr Route  Intravenous Administered By  Vilma Kent RN                     /83   Pulse 83   Temp 97.6  F (36.4  C) (Oral)   Wt 53 kg (116 lb 14.4 oz)   SpO2 100%   BMI 20.71 kg/m

## 2018-12-25 LAB
EBV DNA # SPEC NAA+PROBE: ABNORMAL {COPIES}/ML
EBV DNA SPEC NAA+PROBE-LOG#: 5 {LOG_COPIES}/ML

## 2018-12-27 ENCOUNTER — PATIENT OUTREACH (OUTPATIENT)
Dept: GASTROENTEROLOGY | Facility: CLINIC | Age: 33
End: 2018-12-27

## 2018-12-27 DIAGNOSIS — K51.90 ULCERATIVE COLITIS (H): ICD-10-CM

## 2018-12-27 DIAGNOSIS — K52.9 IBD (INFLAMMATORY BOWEL DISEASE): Primary | ICD-10-CM

## 2018-12-27 NOTE — PROGRESS NOTES
Left a message for pt that Dr. Mckeon would like her to have colonoscopy end of February prior to her appt with Dr. Mckeon in March.  Order placed. Left message with  for endoscopy and my contact info.  Also that endoscopy  will contact her.           Can you please arrange for a cscope with me about 1 week before her 3/6 appointment with me?     Thanks

## 2018-12-28 ENCOUNTER — TELEPHONE (OUTPATIENT)
Dept: GASTROENTEROLOGY | Facility: CLINIC | Age: 33
End: 2018-12-28

## 2018-12-31 ENCOUNTER — TELEPHONE (OUTPATIENT)
Dept: GASTROENTEROLOGY | Facility: CLINIC | Age: 33
End: 2018-12-31

## 2019-01-03 ENCOUNTER — TELEPHONE (OUTPATIENT)
Dept: GASTROENTEROLOGY | Facility: CLINIC | Age: 34
End: 2019-01-03

## 2019-01-09 ENCOUNTER — OFFICE VISIT (OUTPATIENT)
Dept: DERMATOLOGY | Facility: CLINIC | Age: 34
End: 2019-01-09
Payer: COMMERCIAL

## 2019-01-09 DIAGNOSIS — K13.0 CHEILITIS: Primary | ICD-10-CM

## 2019-01-09 ASSESSMENT — PAIN SCALES - GENERAL: PAINLEVEL: NO PAIN (0)

## 2019-01-09 NOTE — LETTER
1/9/2019       RE: Yarelis Penny  [address] [facility] [address]     Dear Colleague,    Thank you for referring your patient, Yarelis Penny, to the [facility] DERMATOLOGY at [facility]. Please see a copy of my visit note below.    [facility] Dermatology Note      Dermatology Problem List:  1. S/p liver transplant for autoimmune hepatitis, PSC/UC              -current tx tacrolimus and prednisone  2. 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 Cheilitis  - mupirocin 2% ointment   - s/p bacterial culture 1/9/18    Encounter Date: Jan 9, 2019    CC:  Chief Complaint   Patient presents with     Derm Problem     Yarelis is here for sore on lip         History of Present Illness:  Ms. Yarelis Penny is a 33 year old female, on immunosuppression s/p liver transplant, who presents today in follow up for a lesion evaluation. The patient was last seen in the dermatology clinic on 12/11/18 during which 10 verruca on her feet were treated with cryotherapy.     Today she reports a concerning sore on her lip. About a month ago she had a pain in her nose.    She went to urgent care, where she received a mupirocin for impetigo. The sore cleared, but then reoccurred a few weeks later. Around New Years she went to [place]. Here, she noted a new dry spot and crack on the left side of her lip. She originally thought this was a sunburn, so she kept it moisturized with chapstick and vaseline. No improvement with moisturizers. About 2 days ago she started using mupirocin. It has become more scabby, but is otherwise stable. Denies pain, but describes the spot has itchy and sore. The crack is no longer on her lip.     Otherwise the patient reports no additional painful, bleeding, nonhealing or pruritic lesions and denies  any new or changing moles.    Past Medical History:   Patient Active Problem List   Diagnosis     Ulcerative colitis (H)     CARDIOVASCULAR SCREENING; LDL GOAL LESS THAN 160     Autoimmune hepatitis (H)     Liver transplant recipient (H)     Leakage of common bile duct of transplanted liver (H)     Cholestasis of transplanted liver (H)     Immunosuppressed status (H)     Elevated alkaline phosphatase level     EBV (Jun-Barr virus) viremia     History of cytomegalovirus infection     Past Medical History:   Diagnosis Date     Acute kidney injury (H) 4/28/2016     Anemia, iron deficiency 8/29/2017     Autoimmune hepatitis (H) 2012    on steroid taper     Cholangitis      CKD (chronic kidney disease) 2012    biopsy 2012: TIN, patchy fibrosis     CKD (chronic kidney disease)     biopsy 2012: TIN, patchy fibrosis      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     Esophageal varices (H) 9/08    banded     Heart murmur      History of blood transfusion      Primary sclerosing cholangitis      SBP (spontaneous bacterial peritonitis) (H) 2/24/2015     Ulcerative Colitis     f/b GI     Past Surgical History:   Procedure Laterality Date     COLONOSCOPY  9/07     COLONOSCOPY N/A 2/26/2015    Procedure: COMBINED COLONOSCOPY, SINGLE OR MULTIPLE BIOPSY/POLYPECTOMY BY BIOPSY;  Surgeon: Mitchel Hoskins Chi, MD;  Location:  GI     COLONOSCOPY N/A 1/12/2016    Procedure: COMBINED COLONOSCOPY, SINGLE OR MULTIPLE BIOPSY/POLYPECTOMY BY BIOPSY;  Surgeon: Rigo Valles MD;  Location: [facility]     COLONOSCOPY N/A 4/1/2016    Procedure: COMBINED COLONOSCOPY, SINGLE OR MULTIPLE BIOPSY/POLYPECTOMY BY BIOPSY;  Surgeon: Kareem Solis MD;  Location:  GI     COLONOSCOPY N/A 9/5/2017    Procedure: COMBINED COLONOSCOPY, SINGLE OR MULTIPLE BIOPSY/POLYPECTOMY BY BIOPSY;  Colonoscopy;  Surgeon: Fuentes Johnson MD;  Location:  GI     ENDOSCOPIC RETROGRADE CHOLANGIOPANCREATOGRAM N/A 6/25/2015    Procedure: COMBINED ENDOSCOPIC RETROGRADE  CHOLANGIOPANCREATOGRAPHY, PLACE TUBE/STENT;  Surgeon: Landon Quinones MD;  [facility]     ENDOSCOPIC RETROGRADE CHOLANGIOPANCREATOGRAM N/A 6/25/2015    Procedure: COMBINED ENDOSCOPIC RETROGRADE CHOLANGIOPANCREATOGRAPHY, PLACE TUBE/STENT;  Surgeon: Landon Quinones MD;  [facility]     ENDOSCOPIC RETROGRADE CHOLANGIOPANCREATOGRAM N/A 7/2/2015    Procedure: COMBINED ENDOSCOPIC RETROGRADE CHOLANGIOPANCREATOGRAPHY, PLACE TUBE/STENT;  Surgeon: Landon Quinones MD;  [facility]     ENDOSCOPIC RETROGRADE CHOLANGIOPANCREATOGRAM N/A 9/8/2015    Procedure: COMBINED ENDOSCOPIC RETROGRADE CHOLANGIOPANCREATOGRAPHY, REMOVE FOREIGN BODY OR STENT/TUBE;  Surgeon: Landon Quinones MD;  [facility]     ENDOSCOPIC RETROGRADE CHOLANGIOPANCREATOGRAM N/A 12/8/2015    Procedure: ENDOSCOPIC RETROGRADE CHOLANGIOPANCREATOGRAM;  Surgeon: Landon Quinones MD;  [facility]     ENDOSCOPIC RETROGRADE CHOLANGIOPANCREATOGRAM N/A 3/1/2016    Procedure: COMBINED ENDOSCOPIC RETROGRADE CHOLANGIOPANCREATOGRAPHY, REMOVE FOREIGN BODY OR STENT/TUBE;  Surgeon: Landon Quinones MD;  [facility]     ENDOSCOPIC RETROGRADE CHOLANGIOPANCREATOGRAM N/A 3/20/2017    Procedure: COMBINED ENDOSCOPIC RETROGRADE CHOLANGIOPANCREATOGRAPHY, PLACE TUBE/STENT;  Endoscopic Retrograde Cholangiopancreatogram with Ballon Dilation, Stent Placement;  Surgeon: Guru Brittany Macias MD;  [facility]     ENDOSCOPIC RETROGRADE CHOLANGIOPANCREATOGRAM N/A 4/2/2018    Procedure: COMBINED ENDOSCOPIC RETROGRADE CHOLANGIOPANCREATOGRAPHY, PLACE TUBE/STENT;  Endoscopic Retrograde Cholangiopancreatogram with biliary dilation and stent placement;  Surgeon: Guru Brittany Macias MD;  [facility]     ENDOSCOPIC RETROGRADE CHOLANGIOPANCREATOGRAM N/A 5/7/2018    Procedure: ENDOSCOPIC RETROGRADE CHOLANGIOPANCREATOGRAM;  Endoscopic Retrograde Cholangiopancreatogram ballon dilation stent exchange;   Surgeon: Guru Brittany Macias MD;  [facility]     ENDOSCOPIC RETROGRADE CHOLANGIOPANCREATOGRAPHY, EXCHANGE TUBE/STENT N/A 7/30/2015    Procedure: ENDOSCOPIC RETROGRADE CHOLANGIOPANCREATOGRAPHY, EXCHANGE TUBE/STENT;  Surgeon: Landon Quinones MD;  [facility]     ENDOSCOPIC RETROGRADE CHOLANGIOPANCREATOGRAPHY, EXCHANGE TUBE/STENT N/A 5/15/2017    Procedure: ENDOSCOPIC RETROGRADE CHOLANGIOPANCREATOGRAPHY, EXCHANGE TUBE/STENT;  Endoscopic Retrograde Cholangiopancreatogram with biliary stent exchange and balloon dilation;  Surgeon: Guru Brittany Macias MD;  [facility]     ENDOSCOPIC RETROGRADE CHOLANGIOPANCREATOGRAPHY, EXCHANGE TUBE/STENT N/A 6/25/2018    Procedure: ENDOSCOPIC RETROGRADE CHOLANGIOPANCREATOGRAPHY, EXCHANGE TUBE/STENT;  Endoscopic Retrograde Cholangiopancreatogram with biliary dilation, stone removal and stent exchange;  Surgeon: Guru Brittany Macias MD;  [facility]     ENDOSCOPIC RETROGRADE CHOLANGIOPANCREATOGRAPHY, EXCHANGE TUBE/STENT N/A 7/30/2018    Procedure: ENDOSCOPIC RETROGRADE CHOLANGIOPANCREATOGRAPHY, EXCHANGE TUBE/STENT;  Endoscopic Retrograde Cholangiopancreatogram with Stent Exchange with dilation;  Surgeon: Guru Brittany Macias MD;  [facility]     ENDOSCOPIC RETROGRADE CHOLANGIOPANCREATOGRAPHY, EXCHANGE TUBE/STENT N/A 9/26/2018    Procedure: ENDOSCOPIC RETROGRADE CHOLANGIOPANCREATOGRAPHY, EXCHANGE TUBE/STENT;  Endoscopic Retrograde Cholangiopancreatogram, with bile duct stent exchanged balloon dilation and balloon sweep of bile duct;  Surgeon: Guru Brittany Macias MD;  [facility]     ESOPHAGOSCOPY, GASTROSCOPY, DUODENOSCOPY (EGD), COMBINED N/A 2/26/2015    Procedure: COMBINED ESOPHAGOSCOPY, GASTROSCOPY, DUODENOSCOPY (EGD);  Surgeon: Mitchel Hoskins Chi, MD;  [facility]     EXPLORE COMMON BILE DUCT N/A 6/25/2015    Procedure: EXPLORE COMMON BILE DUCT;  Surgeon: Luis  MD Tyree;  [facility]     LAPAROTOMY EXPLORATORY N/A 6/25/2015    Procedure: LAPAROTOMY EXPLORATORY;  Surgeon: Tyree Smith MD;  [facility]     PICC INSERTION Right 2/27/2015    4fr SL Valved PICC, 36cm (1cm external) in the R medial brachial vein w/ tip in the low SVC.     SIGMOIDOSCOPY FLEXIBLE N/A 4/27/2016    Procedure: SIGMOIDOSCOPY FLEXIBLE;  Surgeon: Jose Nielson MD;  [facility]     TRANSPLANT LIVER RECIPIENT LIVING UNRELATED N/A 6/18/2015    Procedure: TRANSPLANT LIVER RECIPIENT LIVING UNRELATED;  Surgeon: Tyree Smith MD;  [facility]     UPPER GI ENDOSCOPY         Social History:  Patient reports that  has never smoked. she has never used smokeless tobacco. She reports that she drinks alcohol. She reports that she does not use drugs.    Family History:  Family History   Problem Relation Age of Onset     Hypertension Mother      Lipids Mother      Sleep Apnea Mother      Hypertension Maternal Grandmother      Alzheimer Disease Maternal Grandmother      Lipids Father      Melanoma No family hx of      Skin Cancer No family hx of      Cancer No family hx of      Diabetes No family hx of        Medications:  Current Outpatient Medications   Medication Sig Dispense Refill     calcium carbonate (OS-KERLINE 500 MG [place]. [place]) 1250 MG tablet Take 1 tablet by mouth every evening       fluorouracil (EFUDEX) 5 % cream Apply topically daily Apply nightly to warts under occlusion alongside WartStick 40 g 3     GNP ASPIRIN LOW DOSE 81 MG EC tablet Take 1 tablet (81 mg) by mouth daily 30 tablet 1     multivitamin, therapeutic with minerals (MULTI-VITAMIN) TABS tablet Take 1 tablet by mouth every morning       predniSONE (DELTASONE) 5 MG tablet Take 1 tablet (5 mg) by mouth daily 225 tablet 3     tacrolimus (GENERIC EQUIVALENT) 0.5 MG capsule Take 1 capsule (0.5 mg) by mouth 2 times daily Take with 1 mg capsule. Total dose 2.5 mg every 12 hours 180 capsule 3     tacrolimus (GENERIC EQUIVALENT) 1  MG capsule Take 2 capsules (2 mg) by mouth 2 times daily *total dose 2.5 mg twice daily 120 capsule 11     ursodiol (ACTIGALL) 250 MG tablet Take 2 tabs (500 mg) in AM, and 1 tab at night (250 mg) 270 tablet 3     Vedolizumab (ENTYVIO IV) Inject 300 mg into the vein every 28 days       VITAMIN D, CHOLECALCIFEROL, PO Take by mouth every evening         Allergies   Allergen Reactions     Rifampin Other (See Comments)     Kidney failure     Penicillins      Bad hives when she was in college       Review of Systems:  -Constitutional: Otherwise feeling well today, in usual state of health.  -HEENT: Patient denies nonhealing oral sores.  -Skin: As above in HPI. No additional skin concerns.    Physical exam:  Vitals: There were no vitals taken for this visit.  GEN: This is a well developed, well-nourished female in no acute distress, in a pleasant mood.    SKIN: Focused examination of the face was performed. Significant for:   - There is a pink scaly plaque with yellow crust to the left labial commissure. No fissuring today.  -No other lesions of concern on areas examined.       Impression/Plan:  1. Cheilitis, likely impeginized.    Bacterial culture obtained from left labial commissure. Pending culture results, will consider topical steroid at follow up (if negative for staph or candida)    Continue use of mupirocin 2% ointment TID    Continue regular use of emollients such as Vaseline. Avoid any multi-use products like chapstick. Apply topicals with a q-tip.    Photodocumentation obtained today    Follow-up with Dr. Butler as scheduled in 1 week    Staff Involved:  Scribe/Staff    Scribe Disclosure:   SERENA, Celi Farris, am serving as a scribe to document services personally performed by Abeba Baez PA-C, based on data collection and the provider's statements to me.    Provider Disclosure:   The documentation recorded by the scribe accurately reflects the services I personally performed and the decisions made by me.     All risks, benefits and alternatives were discussed with patient.  Patient is in agreement and understands the assessment and plan.  All questions were answered.  Sun Screen Education was given.   Return to Clinic in 1 week or sooner as needed.   Abeba Baez PA-C   [facility]

## 2019-01-09 NOTE — NURSING NOTE
Dermatology Rooming Note    Yarelis Penny's goals for this visit include:   Chief Complaint   Patient presents with     Derm Problem     Yarelis is here for sore on lip     Sylvia Mckinley, CMA

## 2019-01-09 NOTE — PROGRESS NOTES
[facility] Dermatology Note      Dermatology Problem List:  1. S/p liver transplant for autoimmune hepatitis, PSC/UC              -current tx tacrolimus and prednisone  2. 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 Cheilitis  - mupirocin 2% ointment   - s/p bacterial culture 1/9/18    Encounter Date: Jan 9, 2019    CC:  Chief Complaint   Patient presents with     Derm Problem     Yarelis is here for sore on lip         History of Present Illness:  Ms. Yarelis Penny is a 33 year old female, on immunosuppression s/p liver transplant, who presents today in follow up for a lesion evaluation. The patient was last seen in the dermatology clinic on 12/11/18 during which 10 verruca on her feet were treated with cryotherapy.     Today she reports a concerning sore on her lip. About a month ago she had a pain in her nose.    She went to urgent care, where she received a mupirocin for impetigo. The sore cleared, but then reoccurred a few weeks later. Around New Years she went to [place]. Here, she noted a new dry spot and crack on the left side of her lip. She originally thought this was a sunburn, so she kept it moisturized with chapstick and vaseline. No improvement with moisturizers. About 2 days ago she started using mupirocin. It has become more scabby, but is otherwise stable. Denies pain, but describes the spot has itchy and sore. The crack is no longer on her lip.     Otherwise the patient reports no additional painful, bleeding, nonhealing or pruritic lesions and denies any new or changing moles.    Past Medical History:   Patient Active Problem List   Diagnosis     Ulcerative colitis (H)     CARDIOVASCULAR SCREENING; LDL GOAL LESS THAN 160     Autoimmune hepatitis (H)     Liver transplant recipient (H)     Leakage of common bile duct of  transplanted liver (H)     Cholestasis of transplanted liver (H)     Immunosuppressed status (H)     Elevated alkaline phosphatase level     EBV (Jun-Barr virus) viremia     History of cytomegalovirus infection     Past Medical History:   Diagnosis Date     Acute kidney injury (H) 4/28/2016     Anemia, iron deficiency 8/29/2017     Autoimmune hepatitis (H) 2012    on steroid taper     Cholangitis      CKD (chronic kidney disease) 2012    biopsy 2012: TIN, patchy fibrosis     CKD (chronic kidney disease)     biopsy 2012: TIN, patchy fibrosis      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     Esophageal varices (H) 9/08    banded     Heart murmur      History of blood transfusion      Primary sclerosing cholangitis      SBP (spontaneous bacterial peritonitis) (H) 2/24/2015     Ulcerative Colitis     f/b GI     Past Surgical History:   Procedure Laterality Date     COLONOSCOPY  9/07     COLONOSCOPY N/A 2/26/2015    Procedure: COMBINED COLONOSCOPY, SINGLE OR MULTIPLE BIOPSY/POLYPECTOMY BY BIOPSY;  Surgeon: Mitchel Hoskins Chi, MD;  [facility]     COLONOSCOPY N/A 1/12/2016    Procedure: COMBINED COLONOSCOPY, SINGLE OR MULTIPLE BIOPSY/POLYPECTOMY BY BIOPSY;  Surgeon: Rigo Valles MD;  [facility]     COLONOSCOPY N/A 4/1/2016    Procedure: COMBINED COLONOSCOPY, SINGLE OR MULTIPLE BIOPSY/POLYPECTOMY BY BIOPSY;  Surgeon: Kareem Solis MD;  [facility]     COLONOSCOPY N/A 9/5/2017    Procedure: COMBINED COLONOSCOPY, SINGLE OR MULTIPLE BIOPSY/POLYPECTOMY BY BIOPSY;  Colonoscopy;  Surgeon: Fuentes Johnson MD;  [facility]     ENDOSCOPIC RETROGRADE CHOLANGIOPANCREATOGRAM N/A 6/25/2015    Procedure: COMBINED ENDOSCOPIC RETROGRADE CHOLANGIOPANCREATOGRAPHY, PLACE TUBE/STENT;  Surgeon: Landon Quinones MD;  [facility]     ENDOSCOPIC RETROGRADE CHOLANGIOPANCREATOGRAM N/A 6/25/2015    Procedure: COMBINED ENDOSCOPIC RETROGRADE CHOLANGIOPANCREATOGRAPHY, PLACE TUBE/STENT;  Surgeon: Landon Quinones  MD Bob;  [facility]     ENDOSCOPIC RETROGRADE CHOLANGIOPANCREATOGRAM N/A 7/2/2015    Procedure: COMBINED ENDOSCOPIC RETROGRADE CHOLANGIOPANCREATOGRAPHY, PLACE TUBE/STENT;  Surgeon: Landon Quinones MD;  [facility]     ENDOSCOPIC RETROGRADE CHOLANGIOPANCREATOGRAM N/A 9/8/2015    Procedure: COMBINED ENDOSCOPIC RETROGRADE CHOLANGIOPANCREATOGRAPHY, REMOVE FOREIGN BODY OR STENT/TUBE;  Surgeon: Landon Quinones MD;  [facility]     ENDOSCOPIC RETROGRADE CHOLANGIOPANCREATOGRAM N/A 12/8/2015    Procedure: ENDOSCOPIC RETROGRADE CHOLANGIOPANCREATOGRAM;  Surgeon: Landon Quinones MD;  [facility]     ENDOSCOPIC RETROGRADE CHOLANGIOPANCREATOGRAM N/A 3/1/2016    Procedure: COMBINED ENDOSCOPIC RETROGRADE CHOLANGIOPANCREATOGRAPHY, REMOVE FOREIGN BODY OR STENT/TUBE;  Surgeon: Landon Quinones MD;  [facility]     ENDOSCOPIC RETROGRADE CHOLANGIOPANCREATOGRAM N/A 3/20/2017    Procedure: COMBINED ENDOSCOPIC RETROGRADE CHOLANGIOPANCREATOGRAPHY, PLACE TUBE/STENT;  Endoscopic Retrograde Cholangiopancreatogram with Ballon Dilation, Stent Placement;  Surgeon: Guru Brittany Macias MD;  [facility]     ENDOSCOPIC RETROGRADE CHOLANGIOPANCREATOGRAM N/A 4/2/2018    Procedure: COMBINED ENDOSCOPIC RETROGRADE CHOLANGIOPANCREATOGRAPHY, PLACE TUBE/STENT;  Endoscopic Retrograde Cholangiopancreatogram with biliary dilation and stent placement;  Surgeon: Guru Brittany Macias MD;  [facility]     ENDOSCOPIC RETROGRADE CHOLANGIOPANCREATOGRAM N/A 5/7/2018    Procedure: ENDOSCOPIC RETROGRADE CHOLANGIOPANCREATOGRAM;  Endoscopic Retrograde Cholangiopancreatogram ballon dilation stent exchange;  Surgeon: Guru Brittany Macias MD;  [facility]     ENDOSCOPIC RETROGRADE CHOLANGIOPANCREATOGRAPHY, EXCHANGE TUBE/STENT N/A 7/30/2015    Procedure: ENDOSCOPIC RETROGRADE CHOLANGIOPANCREATOGRAPHY, EXCHANGE TUBE/STENT;  Surgeon: Landon Quinones MD;   [facility]     ENDOSCOPIC RETROGRADE CHOLANGIOPANCREATOGRAPHY, EXCHANGE TUBE/STENT N/A 5/15/2017    Procedure: ENDOSCOPIC RETROGRADE CHOLANGIOPANCREATOGRAPHY, EXCHANGE TUBE/STENT;  Endoscopic Retrograde Cholangiopancreatogram with biliary stent exchange and balloon dilation;  Surgeon: Guru Brittany Macias MD;  [facility]     ENDOSCOPIC RETROGRADE CHOLANGIOPANCREATOGRAPHY, EXCHANGE TUBE/STENT N/A 6/25/2018    Procedure: ENDOSCOPIC RETROGRADE CHOLANGIOPANCREATOGRAPHY, EXCHANGE TUBE/STENT;  Endoscopic Retrograde Cholangiopancreatogram with biliary dilation, stone removal and stent exchange;  Surgeon: Guru Brittany Macias MD;  [facility]     ENDOSCOPIC RETROGRADE CHOLANGIOPANCREATOGRAPHY, EXCHANGE TUBE/STENT N/A 7/30/2018    Procedure: ENDOSCOPIC RETROGRADE CHOLANGIOPANCREATOGRAPHY, EXCHANGE TUBE/STENT;  Endoscopic Retrograde Cholangiopancreatogram with Stent Exchange with dilation;  Surgeon: Guru Brittany Macias MD;  [facility]     ENDOSCOPIC RETROGRADE CHOLANGIOPANCREATOGRAPHY, EXCHANGE TUBE/STENT N/A 9/26/2018    Procedure: ENDOSCOPIC RETROGRADE CHOLANGIOPANCREATOGRAPHY, EXCHANGE TUBE/STENT;  Endoscopic Retrograde Cholangiopancreatogram, with bile duct stent exchanged balloon dilation and balloon sweep of bile duct;  Surgeon: Guru Brittany Macias MD;  [facility]     ESOPHAGOSCOPY, GASTROSCOPY, DUODENOSCOPY (EGD), COMBINED N/A 2/26/2015    Procedure: COMBINED ESOPHAGOSCOPY, GASTROSCOPY, DUODENOSCOPY (EGD);  Surgeon: Mitchel Hoskins Chi, MD;  [facility]     EXPLORE COMMON BILE DUCT N/A 6/25/2015    Procedure: EXPLORE COMMON BILE DUCT;  Surgeon: Tyree Smith MD;  [facility]     LAPAROTOMY EXPLORATORY N/A 6/25/2015    Procedure: LAPAROTOMY EXPLORATORY;  Surgeon: Tyree Smith MD;  [facility]     PICC INSERTION Right 2/27/2015    4fr SL Valved PICC, 36cm (1cm external) in the R medial brachial vein w/ tip in the  low SVC.     SIGMOIDOSCOPY FLEXIBLE N/A 4/27/2016    Procedure: SIGMOIDOSCOPY FLEXIBLE;  Surgeon: Jose Nielson MD;  [facility]     TRANSPLANT LIVER RECIPIENT LIVING UNRELATED N/A 6/18/2015    Procedure: TRANSPLANT LIVER RECIPIENT LIVING UNRELATED;  Surgeon: Tyree Smith MD;  [facility]     UPPER GI ENDOSCOPY         Social History:  Patient reports that  has never smoked. she has never used smokeless tobacco. She reports that she drinks alcohol. She reports that she does not use drugs.    Family History:  Family History   Problem Relation Age of Onset     Hypertension Mother      Lipids Mother      Sleep Apnea Mother      Hypertension Maternal Grandmother      Alzheimer Disease Maternal Grandmother      Lipids Father      Melanoma No family hx of      Skin Cancer No family hx of      Cancer No family hx of      Diabetes No family hx of        Medications:  Current Outpatient Medications   Medication Sig Dispense Refill     calcium carbonate (OS-KERLINE 500 MG [place]. [place]) 1250 MG tablet Take 1 tablet by mouth every evening       fluorouracil (EFUDEX) 5 % cream Apply topically daily Apply nightly to warts under occlusion alongside WartStick 40 g 3     GNP ASPIRIN LOW DOSE 81 MG EC tablet Take 1 tablet (81 mg) by mouth daily 30 tablet 1     multivitamin, therapeutic with minerals (MULTI-VITAMIN) TABS tablet Take 1 tablet by mouth every morning       predniSONE (DELTASONE) 5 MG tablet Take 1 tablet (5 mg) by mouth daily 225 tablet 3     tacrolimus (GENERIC EQUIVALENT) 0.5 MG capsule Take 1 capsule (0.5 mg) by mouth 2 times daily Take with 1 mg capsule. Total dose 2.5 mg every 12 hours 180 capsule 3     tacrolimus (GENERIC EQUIVALENT) 1 MG capsule Take 2 capsules (2 mg) by mouth 2 times daily *total dose 2.5 mg twice daily 120 capsule 11     ursodiol (ACTIGALL) 250 MG tablet Take 2 tabs (500 mg) in AM, and 1 tab at night (250 mg) 270 tablet 3     Vedolizumab (ENTYVIO IV) Inject 300 mg into the vein every 28  days       VITAMIN D, CHOLECALCIFEROL, PO Take by mouth every evening         Allergies   Allergen Reactions     Rifampin Other (See Comments)     Kidney failure     Penicillins      Bad hives when she was in college       Review of Systems:  -Constitutional: Otherwise feeling well today, in usual state of health.  -HEENT: Patient denies nonhealing oral sores.  -Skin: As above in HPI. No additional skin concerns.    Physical exam:  Vitals: There were no vitals taken for this visit.  GEN: This is a well developed, well-nourished female in no acute distress, in a pleasant mood.    SKIN: Focused examination of the face was performed. Significant for:   - There is a pink scaly plaque with yellow crust to the left labial commissure. No fissuring today.  -No other lesions of concern on areas examined.       Impression/Plan:  1. Cheilitis, likely impeginized.    Bacterial culture obtained from left labial commissure. Pending culture results, will consider topical steroid at follow up (if negative for staph or candida)    Continue use of mupirocin 2% ointment TID    Continue regular use of emollients such as Vaseline. Avoid any multi-use products like chapstick. Apply topicals with a q-tip.    Photodocumentation obtained today    Follow-up with Dr. Butler as scheduled in 1 week    Staff Involved:  Scribe/Staff    Scribe Disclosure:   SERENA, Celi Farris, am serving as a scribe to document services personally performed by Abeba Baez PA-C, based on data collection and the provider's statements to me.    Provider Disclosure:   The documentation recorded by the scribe accurately reflects the services I personally performed and the decisions made by me.    All risks, benefits and alternatives were discussed with patient.  Patient is in agreement and understands the assessment and plan.  All questions were answered.  Sun Screen Education was given.   Return to Clinic in 1 week or sooner as needed.   Abeba Baez PA-C    [facility]

## 2019-01-11 LAB
BACTERIA SPEC CULT: NORMAL
Lab: NORMAL
SPECIMEN SOURCE: NORMAL

## 2019-01-11 RX ORDER — HYDROCORTISONE VALERATE 2 MG/G
OINTMENT TOPICAL 2 TIMES DAILY
Qty: 15 G | Refills: 0 | Status: ON HOLD | OUTPATIENT
Start: 2019-01-11 | End: 2019-06-24

## 2019-01-15 ENCOUNTER — OFFICE VISIT (OUTPATIENT)
Dept: DERMATOLOGY | Facility: CLINIC | Age: 34
End: 2019-01-15
Payer: COMMERCIAL

## 2019-01-15 DIAGNOSIS — L24.9 IRRITANT DERMATITIS: ICD-10-CM

## 2019-01-15 DIAGNOSIS — B07.0 PLANTAR WART OF BOTH FEET: Primary | ICD-10-CM

## 2019-01-15 DIAGNOSIS — L85.3 XEROSIS CUTIS: ICD-10-CM

## 2019-01-15 ASSESSMENT — PAIN SCALES - GENERAL
PAINLEVEL: MILD PAIN (2)
PAINLEVEL: NO PAIN (0)

## 2019-01-15 NOTE — NURSING NOTE
Dermatology Rooming Note    Yarelis Penny's goals for this visit include:   Chief Complaint   Patient presents with     Derm Problem     Yarelis is here today for a follow up on her lower lip, and to recheck her warts.      MEDINA Jaramillo

## 2019-01-15 NOTE — LETTER
1/15/2019       RE: Yarelis Penny  [address] [facility] [address]     Dear Colleague,    Thank you for referring your patient, Yarelis Penny, to the [facility] DERMATOLOGY at [facility]. Please see a copy of my visit note below.    [facility] Dermatology Note      Dermatology Problem List:  1. S/p liver transplant for autoimmune hepatitis, PSC/UC              -current tx tacrolimus and prednisone  2. 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 Irritant dermatitis  - OTC hydrocortisone cream  - s/p bacterial culture 1/9/18    Encounter Date: Silvestre 15, 2019    CC:  Chief Complaint   Patient presents with     Derm Problem     Yarelis is here today for a follow up on her lower lip, and to recheck her warts.          History of Present Illness:  Ms. Yarelis Penny is a 33 year old female who presents as a follow-up for plantar warts. The patient was last seen 1/9/19 by Abeab Young MD, when she started mupirocin 2% ointment for Cheilitis. Today she notes that her warts have resolved for the most part, but are still mildly persistent. She denies any blistering since her last visit. She was able to find OTC salicylic acid and has been applying it regularly with relief. She also notes that the cheilitis has also resolved for the most part, and she has used hydrocortisone with relief. She also notes red lesions of concern on her hands that developed as the weather got colder. The patient voices no other concerns.       Past Medical History:   Patient Active Problem List   Diagnosis     Ulcerative colitis (H)     CARDIOVASCULAR SCREENING; LDL GOAL LESS THAN 160     Autoimmune hepatitis (H)     Liver transplant recipient (H)     Leakage of common bile duct of transplanted liver (H)     Cholestasis of  transplanted liver (H)     Immunosuppressed status (H)     Elevated alkaline phosphatase level     EBV (Jun-Barr virus) viremia     History of cytomegalovirus infection     Past Medical History:   Diagnosis Date     Acute kidney injury (H) 4/28/2016     Anemia, iron deficiency 8/29/2017     Autoimmune hepatitis (H) 2012    on steroid taper     Cholangitis      CKD (chronic kidney disease) 2012    biopsy 2012: TIN, patchy fibrosis     CKD (chronic kidney disease)     biopsy 2012: TIN, patchy fibrosis      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     Esophageal varices (H) 9/08    banded     Heart murmur      History of blood transfusion      Primary sclerosing cholangitis      SBP (spontaneous bacterial peritonitis) (H) 2/24/2015     Ulcerative Colitis     f/b GI     Past Surgical History:   Procedure Laterality Date     COLONOSCOPY  9/07     COLONOSCOPY N/A 2/26/2015    Procedure: COMBINED COLONOSCOPY, SINGLE OR MULTIPLE BIOPSY/POLYPECTOMY BY BIOPSY;  Surgeon: Mitchel Hoskins Chi, MD;  [facility]     COLONOSCOPY N/A 1/12/2016    Procedure: COMBINED COLONOSCOPY, SINGLE OR MULTIPLE BIOPSY/POLYPECTOMY BY BIOPSY;  Surgeon: Rigo Valles MD;  [facility]     COLONOSCOPY N/A 4/1/2016    Procedure: COMBINED COLONOSCOPY, SINGLE OR MULTIPLE BIOPSY/POLYPECTOMY BY BIOPSY;  Surgeon: Kareem Solis MD;  [facility]     COLONOSCOPY N/A 9/5/2017    Procedure: COMBINED COLONOSCOPY, SINGLE OR MULTIPLE BIOPSY/POLYPECTOMY BY BIOPSY;  Colonoscopy;  Surgeon: Fuentes Johnson MD;  [facility]     ENDOSCOPIC RETROGRADE CHOLANGIOPANCREATOGRAM N/A 6/25/2015    Procedure: COMBINED ENDOSCOPIC RETROGRADE CHOLANGIOPANCREATOGRAPHY, PLACE TUBE/STENT;  Surgeon: Landon Quinones MD;  Location:  OR     ENDOSCOPIC RETROGRADE CHOLANGIOPANCREATOGRAM N/A 6/25/2015    Procedure: COMBINED ENDOSCOPIC RETROGRADE CHOLANGIOPANCREATOGRAPHY, PLACE TUBE/STENT;  Surgeon: Landon Quinones MD;  Location:  OR     ENDOSCOPIC  RETROGRADE CHOLANGIOPANCREATOGRAM N/A 7/2/2015    Procedure: COMBINED ENDOSCOPIC RETROGRADE CHOLANGIOPANCREATOGRAPHY, PLACE TUBE/STENT;  Surgeon: Landon Quinones MD;  [facility]     ENDOSCOPIC RETROGRADE CHOLANGIOPANCREATOGRAM N/A 9/8/2015    Procedure: COMBINED ENDOSCOPIC RETROGRADE CHOLANGIOPANCREATOGRAPHY, REMOVE FOREIGN BODY OR STENT/TUBE;  Surgeon: Landon Quinones MD;  [facility]     ENDOSCOPIC RETROGRADE CHOLANGIOPANCREATOGRAM N/A 12/8/2015    Procedure: ENDOSCOPIC RETROGRADE CHOLANGIOPANCREATOGRAM;  Surgeon: Landon Quinones MD;  [facility]     ENDOSCOPIC RETROGRADE CHOLANGIOPANCREATOGRAM N/A 3/1/2016    Procedure: COMBINED ENDOSCOPIC RETROGRADE CHOLANGIOPANCREATOGRAPHY, REMOVE FOREIGN BODY OR STENT/TUBE;  Surgeon: Landon Quinones MD;  [facility]     ENDOSCOPIC RETROGRADE CHOLANGIOPANCREATOGRAM N/A 3/20/2017    Procedure: COMBINED ENDOSCOPIC RETROGRADE CHOLANGIOPANCREATOGRAPHY, PLACE TUBE/STENT;  Endoscopic Retrograde Cholangiopancreatogram with Ballon Dilation, Stent Placement;  Surgeon: Guru Brittany Macias MD;  [facility]     ENDOSCOPIC RETROGRADE CHOLANGIOPANCREATOGRAM N/A 4/2/2018    Procedure: COMBINED ENDOSCOPIC RETROGRADE CHOLANGIOPANCREATOGRAPHY, PLACE TUBE/STENT;  Endoscopic Retrograde Cholangiopancreatogram with biliary dilation and stent placement;  Surgeon: Guru Brittany Macias MD;  [facility]     ENDOSCOPIC RETROGRADE CHOLANGIOPANCREATOGRAM N/A 5/7/2018    Procedure: ENDOSCOPIC RETROGRADE CHOLANGIOPANCREATOGRAM;  Endoscopic Retrograde Cholangiopancreatogram ballon dilation stent exchange;  Surgeon: Guru Brittany Macias MD;  [facility]     ENDOSCOPIC RETROGRADE CHOLANGIOPANCREATOGRAPHY, EXCHANGE TUBE/STENT N/A 7/30/2015    Procedure: ENDOSCOPIC RETROGRADE CHOLANGIOPANCREATOGRAPHY, EXCHANGE TUBE/STENT;  Surgeon: Landon Quinones MD;  [facility]     ENDOSCOPIC RETROGRADE  CHOLANGIOPANCREATOGRAPHY, EXCHANGE TUBE/STENT N/A 5/15/2017    Procedure: ENDOSCOPIC RETROGRADE CHOLANGIOPANCREATOGRAPHY, EXCHANGE TUBE/STENT;  Endoscopic Retrograde Cholangiopancreatogram with biliary stent exchange and balloon dilation;  Surgeon: Guru Brittany Macias MD;  [facility]     ENDOSCOPIC RETROGRADE CHOLANGIOPANCREATOGRAPHY, EXCHANGE TUBE/STENT N/A 6/25/2018    Procedure: ENDOSCOPIC RETROGRADE CHOLANGIOPANCREATOGRAPHY, EXCHANGE TUBE/STENT;  Endoscopic Retrograde Cholangiopancreatogram with biliary dilation, stone removal and stent exchange;  Surgeon: Guru Brittany Macias MD;  [facility]     ENDOSCOPIC RETROGRADE CHOLANGIOPANCREATOGRAPHY, EXCHANGE TUBE/STENT N/A 7/30/2018    Procedure: ENDOSCOPIC RETROGRADE CHOLANGIOPANCREATOGRAPHY, EXCHANGE TUBE/STENT;  Endoscopic Retrograde Cholangiopancreatogram with Stent Exchange with dilation;  Surgeon: Guru Brittany Macias MD;  [facility]     ENDOSCOPIC RETROGRADE CHOLANGIOPANCREATOGRAPHY, EXCHANGE TUBE/STENT N/A 9/26/2018    Procedure: ENDOSCOPIC RETROGRADE CHOLANGIOPANCREATOGRAPHY, EXCHANGE TUBE/STENT;  Endoscopic Retrograde Cholangiopancreatogram, with bile duct stent exchanged balloon dilation and balloon sweep of bile duct;  Surgeon: Guru Brittany Macias MD;  [facility]     ESOPHAGOSCOPY, GASTROSCOPY, DUODENOSCOPY (EGD), COMBINED N/A 2/26/2015    Procedure: COMBINED ESOPHAGOSCOPY, GASTROSCOPY, DUODENOSCOPY (EGD);  Surgeon: Mitchel Hoskins Chi, MD;  [facility]     EXPLORE COMMON BILE DUCT N/A 6/25/2015    Procedure: EXPLORE COMMON BILE DUCT;  Surgeon: Tyree Smith MD;  [facility]     LAPAROTOMY EXPLORATORY N/A 6/25/2015    Procedure: LAPAROTOMY EXPLORATORY;  Surgeon: Tyree Smith MD;  [facility]     PICC INSERTION Right 2/27/2015    4fr SL Valved PICC, 36cm (1cm external) in the R medial brachial vein w/ tip in the low SVC.     SIGMOIDOSCOPY FLEXIBLE N/A  4/27/2016    Procedure: SIGMOIDOSCOPY FLEXIBLE;  Surgeon: Jose Nielson MD;  [facility]     TRANSPLANT LIVER RECIPIENT LIVING UNRELATED N/A 6/18/2015    Procedure: TRANSPLANT LIVER RECIPIENT LIVING UNRELATED;  Surgeon: Tyree Smith MD;  [facility]     UPPER GI ENDOSCOPY         Social History:  Patient reports that  has never smoked. she has never used smokeless tobacco. She reports that she drinks alcohol. She reports that she does not use drugs.    Family History:  Family History   Problem Relation Age of Onset     Hypertension Mother      Lipids Mother      Sleep Apnea Mother      Hypertension Maternal Grandmother      Alzheimer Disease Maternal Grandmother      Lipids Father      Melanoma No family hx of      Skin Cancer No family hx of      Cancer No family hx of      Diabetes No family hx of        Medications:  Current Outpatient Medications   Medication Sig Dispense Refill     calcium carbonate (OS-KERLINE 500 MG [place]. [place]) 1250 MG tablet Take 1 tablet by mouth every evening       fluorouracil (EFUDEX) 5 % cream Apply topically daily Apply nightly to warts under occlusion alongside WartStick 40 g 3     GNP ASPIRIN LOW DOSE 81 MG EC tablet Take 1 tablet (81 mg) by mouth daily 30 tablet 1     hydrocortisone valerate (WEST-JUAN) 0.2 % external ointment Apply topically 2 times daily To areas on the left corner of the mouth 15 g 0     multivitamin, therapeutic with minerals (MULTI-VITAMIN) TABS tablet Take 1 tablet by mouth every morning       predniSONE (DELTASONE) 5 MG tablet Take 1 tablet (5 mg) by mouth daily 225 tablet 3     tacrolimus (GENERIC EQUIVALENT) 0.5 MG capsule Take 1 capsule (0.5 mg) by mouth 2 times daily Take with 1 mg capsule. Total dose 2.5 mg every 12 hours 180 capsule 3     tacrolimus (GENERIC EQUIVALENT) 1 MG capsule Take 2 capsules (2 mg) by mouth 2 times daily *total dose 2.5 mg twice daily 120 capsule 11     ursodiol (ACTIGALL) 250 MG tablet Take 2 tabs (500 mg) in AM, and 1  tab at night (250 mg) 270 tablet 3     Vedolizumab (ENTYVIO IV) Inject 300 mg into the vein every 28 days       VITAMIN D, CHOLECALCIFEROL, PO Take by mouth every evening         Allergies   Allergen Reactions     Rifampin Other (See Comments)     Kidney failure     Penicillins      Bad hives when she was in college       Review of Systems:  -As per HPI  -Constitutional: Otherwise feeling well today, in usual state of health.  -HEENT: Patient denies nonhealing oral sores.  -Skin: As above in HPI. No additional skin concerns.    Physical exam:  Vitals: There were no vitals taken for this visit.  GEN: This is a well developed, well-nourished female in no acute distress, in a pleasant mood.    SKIN: Focused examination of the bilateral feet was performed.  -Verrucous plaques on the bilateral plantar surfaces  -Faint erythematous patch at the left angle of the mouth  -Stippled pink xerotic papules on the dorsal hands  -No other lesions of concern on areas examined.       Impression/Plan:  1.  Verruca plantaris. Interval improvement in extent and thickness    Paring was performed with a #15 blade    Cryotherapy procedure note: After verbal consent and discussion of risks and benefits including but no limited to dyspigmentation/scar, blister, and pain, 8 was(were) treated with 1-2mm freeze border for 2 cycles with liquid nitrogen. Post cryotherapy instructions were provided.    Continue Efudex 5% cream and Wart Stick nightly under occlusion    2. Xerotic dermatitis    Advised OTC hydrocortisone 1% cream BID until next visit when we will reevaluate    3. Irritant dermatitis of the cutaneous lip, likely impetiginized    Impetiginization appears resolved     Advised OTC hydrocortisone 1% cream BID until next visit when we will reevaluate    CC Hung Butler MD  [address] [place] [address] on close of this encounter.  Follow-up in 4 weeks, earlier for new or changing lesions.       Staff  Involved:  Scribe/Staff    Scribe Disclosure  I, Dominic Najjar, am serving as a scribe to document services personally performed by Dr. Hung Butler MD, based on data collection and the provider's statements to me.      Staff attestation:  The documentation recorded by the scribe accurately reflects the services I personally performed and the decisions I personally made. I have made edits where needed.    Hung Butler MD  Staff Dermatologist and Dermatopathologist  , Department of Dermatology

## 2019-01-15 NOTE — PROGRESS NOTES
[facility] Dermatology Note      Dermatology Problem List:  1. S/p liver transplant for autoimmune hepatitis, PSC/UC              -current tx tacrolimus and prednisone  2. 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 Irritant dermatitis  - OTC hydrocortisone cream  - s/p bacterial culture 1/9/18    Encounter Date: Silvestre 15, 2019    CC:  Chief Complaint   Patient presents with     Derm Problem     Yarelis is here today for a follow up on her lower lip, and to recheck her warts.          History of Present Illness:  Ms. Yarelis Penny is a 33 year old female who presents as a follow-up for plantar warts. The patient was last seen 1/9/19 by Abeba Young MD, when she started mupirocin 2% ointment for Cheilitis. Today she notes that her warts have resolved for the most part, but are still mildly persistent. She denies any blistering since her last visit. She was able to find OTC salicylic acid and has been applying it regularly with relief. She also notes that the cheilitis has also resolved for the most part, and she has used hydrocortisone with relief. She also notes red lesions of concern on her hands that developed as the weather got colder. The patient voices no other concerns.       Past Medical History:   Patient Active Problem List   Diagnosis     Ulcerative colitis (H)     CARDIOVASCULAR SCREENING; LDL GOAL LESS THAN 160     Autoimmune hepatitis (H)     Liver transplant recipient (H)     Leakage of common bile duct of transplanted liver (H)     Cholestasis of transplanted liver (H)     Immunosuppressed status (H)     Elevated alkaline phosphatase level     EBV (Jun-Barr virus) viremia     History of cytomegalovirus infection     Past Medical History:   Diagnosis Date     Acute kidney injury (H) 4/28/2016     Anemia, iron deficiency  8/29/2017     Autoimmune hepatitis (H) 2012    on steroid taper     Cholangitis      CKD (chronic kidney disease) 2012    biopsy 2012: TIN, patchy fibrosis     CKD (chronic kidney disease)     biopsy 2012: TIN, patchy fibrosis      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     Esophageal varices (H) 9/08    banded     Heart murmur      History of blood transfusion      Primary sclerosing cholangitis      SBP (spontaneous bacterial peritonitis) (H) 2/24/2015     Ulcerative Colitis     f/b GI     Past Surgical History:   Procedure Laterality Date     COLONOSCOPY  9/07     COLONOSCOPY N/A 2/26/2015    Procedure: COMBINED COLONOSCOPY, SINGLE OR MULTIPLE BIOPSY/POLYPECTOMY BY BIOPSY;  Surgeon: Mitchel Hoskins Chi, MD;  Location: [facility]     COLONOSCOPY N/A 1/12/2016    Procedure: COMBINED COLONOSCOPY, SINGLE OR MULTIPLE BIOPSY/POLYPECTOMY BY BIOPSY;  Surgeon: Rigo Valles MD;  Location: [facility]     COLONOSCOPY N/A 4/1/2016    Procedure: COMBINED COLONOSCOPY, SINGLE OR MULTIPLE BIOPSY/POLYPECTOMY BY BIOPSY;  Surgeon: Kareem Solis MD;  Location: [facility]     COLONOSCOPY N/A 9/5/2017    Procedure: COMBINED COLONOSCOPY, SINGLE OR MULTIPLE BIOPSY/POLYPECTOMY BY BIOPSY;  Colonoscopy;  Surgeon: Fuentes Johnson MD;  Location: [facility]     ENDOSCOPIC RETROGRADE CHOLANGIOPANCREATOGRAM N/A 6/25/2015    Procedure: COMBINED ENDOSCOPIC RETROGRADE CHOLANGIOPANCREATOGRAPHY, PLACE TUBE/STENT;  Surgeon: Landon Quinones MD;  [facility]     ENDOSCOPIC RETROGRADE CHOLANGIOPANCREATOGRAM N/A 6/25/2015    Procedure: COMBINED ENDOSCOPIC RETROGRADE CHOLANGIOPANCREATOGRAPHY, PLACE TUBE/STENT;  Surgeon: Landon Quinones MD;  [facility]     ENDOSCOPIC RETROGRADE CHOLANGIOPANCREATOGRAM N/A 7/2/2015    Procedure: COMBINED ENDOSCOPIC RETROGRADE CHOLANGIOPANCREATOGRAPHY, PLACE TUBE/STENT;  Surgeon: Landon Quinones MD;  [facility]     ENDOSCOPIC RETROGRADE CHOLANGIOPANCREATOGRAM N/A 9/8/2015    Procedure: COMBINED  ENDOSCOPIC RETROGRADE CHOLANGIOPANCREATOGRAPHY, REMOVE FOREIGN BODY OR STENT/TUBE;  Surgeon: Landon Quinones MD;  [facility]     ENDOSCOPIC RETROGRADE CHOLANGIOPANCREATOGRAM N/A 12/8/2015    Procedure: ENDOSCOPIC RETROGRADE CHOLANGIOPANCREATOGRAM;  Surgeon: Landon Quinones MD;  [facility]     ENDOSCOPIC RETROGRADE CHOLANGIOPANCREATOGRAM N/A 3/1/2016    Procedure: COMBINED ENDOSCOPIC RETROGRADE CHOLANGIOPANCREATOGRAPHY, REMOVE FOREIGN BODY OR STENT/TUBE;  Surgeon: Lnadon Quinones MD;  [facility]     ENDOSCOPIC RETROGRADE CHOLANGIOPANCREATOGRAM N/A 3/20/2017    Procedure: COMBINED ENDOSCOPIC RETROGRADE CHOLANGIOPANCREATOGRAPHY, PLACE TUBE/STENT;  Endoscopic Retrograde Cholangiopancreatogram with Ballon Dilation, Stent Placement;  Surgeon: Guru Brittany Macias MD;  [facility]     ENDOSCOPIC RETROGRADE CHOLANGIOPANCREATOGRAM N/A 4/2/2018    Procedure: COMBINED ENDOSCOPIC RETROGRADE CHOLANGIOPANCREATOGRAPHY, PLACE TUBE/STENT;  Endoscopic Retrograde Cholangiopancreatogram with biliary dilation and stent placement;  Surgeon: Guru Brittany Macias MD;  [facility]     ENDOSCOPIC RETROGRADE CHOLANGIOPANCREATOGRAM N/A 5/7/2018    Procedure: ENDOSCOPIC RETROGRADE CHOLANGIOPANCREATOGRAM;  Endoscopic Retrograde Cholangiopancreatogram ballon dilation stent exchange;  Surgeon: Guru Brittany Macias MD;  [facility]     ENDOSCOPIC RETROGRADE CHOLANGIOPANCREATOGRAPHY, EXCHANGE TUBE/STENT N/A 7/30/2015    Procedure: ENDOSCOPIC RETROGRADE CHOLANGIOPANCREATOGRAPHY, EXCHANGE TUBE/STENT;  Surgeon: Landon Quinones MD;  [facility]     ENDOSCOPIC RETROGRADE CHOLANGIOPANCREATOGRAPHY, EXCHANGE TUBE/STENT N/A 5/15/2017    Procedure: ENDOSCOPIC RETROGRADE CHOLANGIOPANCREATOGRAPHY, EXCHANGE TUBE/STENT;  Endoscopic Retrograde Cholangiopancreatogram with biliary stent exchange and balloon dilation;  Surgeon: Guru Brittany Macias  MD Genna;  [facility]     ENDOSCOPIC RETROGRADE CHOLANGIOPANCREATOGRAPHY, EXCHANGE TUBE/STENT N/A 6/25/2018    Procedure: ENDOSCOPIC RETROGRADE CHOLANGIOPANCREATOGRAPHY, EXCHANGE TUBE/STENT;  Endoscopic Retrograde Cholangiopancreatogram with biliary dilation, stone removal and stent exchange;  Surgeon: Guru Brittany Macias MD;  [facility]     ENDOSCOPIC RETROGRADE CHOLANGIOPANCREATOGRAPHY, EXCHANGE TUBE/STENT N/A 7/30/2018    Procedure: ENDOSCOPIC RETROGRADE CHOLANGIOPANCREATOGRAPHY, EXCHANGE TUBE/STENT;  Endoscopic Retrograde Cholangiopancreatogram with Stent Exchange with dilation;  Surgeon: Guru Brittany Macias MD;  [facility]     ENDOSCOPIC RETROGRADE CHOLANGIOPANCREATOGRAPHY, EXCHANGE TUBE/STENT N/A 9/26/2018    Procedure: ENDOSCOPIC RETROGRADE CHOLANGIOPANCREATOGRAPHY, EXCHANGE TUBE/STENT;  Endoscopic Retrograde Cholangiopancreatogram, with bile duct stent exchanged balloon dilation and balloon sweep of bile duct;  Surgeon: Guru Brittany Macias MD;  [facility]     ESOPHAGOSCOPY, GASTROSCOPY, DUODENOSCOPY (EGD), COMBINED N/A 2/26/2015    Procedure: COMBINED ESOPHAGOSCOPY, GASTROSCOPY, DUODENOSCOPY (EGD);  Surgeon: Mitchel Hoskins Chi, MD;  [facility]     EXPLORE COMMON BILE DUCT N/A 6/25/2015    Procedure: EXPLORE COMMON BILE DUCT;  Surgeon: Tyree Smith MD;  [facility]     LAPAROTOMY EXPLORATORY N/A 6/25/2015    Procedure: LAPAROTOMY EXPLORATORY;  Surgeon: Tyree Smith MD;  [facility]     PICC INSERTION Right 2/27/2015    4fr SL Valved PICC, 36cm (1cm external) in the R medial brachial vein w/ tip in the low SVC.     SIGMOIDOSCOPY FLEXIBLE N/A 4/27/2016    Procedure: SIGMOIDOSCOPY FLEXIBLE;  Surgeon: Jose Nielson MD;  [facility]     TRANSPLANT LIVER RECIPIENT LIVING UNRELATED N/A 6/18/2015    Procedure: TRANSPLANT LIVER RECIPIENT LIVING UNRELATED;  Surgeon: Tyree Smith MD;  Location[facility]     UPPER GI  ENDOSCOPY         Social History:  Patient reports that  has never smoked. she has never used smokeless tobacco. She reports that she drinks alcohol. She reports that she does not use drugs.    Family History:  Family History   Problem Relation Age of Onset     Hypertension Mother      Lipids Mother      Sleep Apnea Mother      Hypertension Maternal Grandmother      Alzheimer Disease Maternal Grandmother      Lipids Father      Melanoma No family hx of      Skin Cancer No family hx of      Cancer No family hx of      Diabetes No family hx of        Medications:  Current Outpatient Medications   Medication Sig Dispense Refill     calcium carbonate (OS-KERLINE 500 MG [place]. CA) 1250 MG tablet Take 1 tablet by mouth every evening       fluorouracil (EFUDEX) 5 % cream Apply topically daily Apply nightly to warts under occlusion alongside WartStick 40 g 3     GNP ASPIRIN LOW DOSE 81 MG EC tablet Take 1 tablet (81 mg) by mouth daily 30 tablet 1     hydrocortisone valerate (WEST-JUAN) 0.2 % external ointment Apply topically 2 times daily To areas on the left corner of the mouth 15 g 0     multivitamin, therapeutic with minerals (MULTI-VITAMIN) TABS tablet Take 1 tablet by mouth every morning       predniSONE (DELTASONE) 5 MG tablet Take 1 tablet (5 mg) by mouth daily 225 tablet 3     tacrolimus (GENERIC EQUIVALENT) 0.5 MG capsule Take 1 capsule (0.5 mg) by mouth 2 times daily Take with 1 mg capsule. Total dose 2.5 mg every 12 hours 180 capsule 3     tacrolimus (GENERIC EQUIVALENT) 1 MG capsule Take 2 capsules (2 mg) by mouth 2 times daily *total dose 2.5 mg twice daily 120 capsule 11     ursodiol (ACTIGALL) 250 MG tablet Take 2 tabs (500 mg) in AM, and 1 tab at night (250 mg) 270 tablet 3     Vedolizumab (ENTYVIO IV) Inject 300 mg into the vein every 28 days       VITAMIN D, CHOLECALCIFEROL, PO Take by mouth every evening         Allergies   Allergen Reactions     Rifampin Other (See Comments)     Kidney failure     Penicillins       Bad hives when she was in [place]       Review of Systems:  -As per HPI  -Constitutional: Otherwise feeling well today, in usual state of health.  -HEENT: Patient denies nonhealing oral sores.  -Skin: As above in HPI. No additional skin concerns.    Physical exam:  Vitals: There were no vitals taken for this visit.  GEN: This is a well developed, well-nourished female in no acute distress, in a pleasant mood.    SKIN: Focused examination of the bilateral feet was performed.  -Verrucous plaques on the bilateral plantar surfaces  -Faint erythematous patch at the left angle of the mouth  -Stippled pink xerotic papules on the dorsal hands  -No other lesions of concern on areas examined.       Impression/Plan:  1.  Verruca plantaris. Interval improvement in extent and thickness    Paring was performed with a #15 blade    Cryotherapy procedure note: After verbal consent and discussion of risks and benefits including but no limited to dyspigmentation/scar, blister, and pain, 8 was(were) treated with 1-2mm freeze border for 2 cycles with liquid nitrogen. Post cryotherapy instructions were provided.    Continue Efudex 5% cream and Wart Stick nightly under occlusion    2. Xerotic dermatitis    Advised OTC hydrocortisone 1% cream BID until next visit when we will reevaluate    3. Irritant dermatitis of the cutaneous lip, likely impetiginized    Impetiginization appears resolved     Advised OTC hydrocortisone 1% cream BID until next visit when we will reevaluate    CC Hung Butler MD  [address] [place] [address] on close of this encounter.  Follow-up in 4 weeks, earlier for new or changing lesions.       Staff Involved:  Scribe/Staff    Scribe Disclosure  I, Dominic Najjar, am serving as a scribe to document services personally performed by Dr. Hung Butler MD, based on data collection and the provider's statements to me.      Staff attestation:  The documentation recorded by the scribe accurately  reflects the services I personally performed and the decisions I personally made. I have made edits where needed.    Hung Butler MD  Staff Dermatologist and Dermatopathologist  , Department of Dermatology

## 2019-01-15 NOTE — PATIENT INSTRUCTIONS
Cryotherapy    What is it?    Use of a very cold liquid, such as liquid nitrogen, to freeze and destroy abnormal skin cells that need to be removed    What should I expect?    Tenderness and redness    A small blister that might grow and fill with dark purple blood. There may be crusting.    More than one treatment may be needed if the lesions do not go away.    How do I care for the treated area?    Gently wash the area with your hands when bathing.    Use a thin layer of Vaseline to help with healing. You may use a Band-Aid.     The area should heal within 7-10 days and may leave behind a pink or lighter color.     Do not use an antibiotic or Neosporin ointment.     You may take acetaminophen (Tylenol) for pain.     Call your Doctor if you have:    Severe pain    Signs of infection (warmth, redness, cloudy yellow drainage, and or a bad smell)    Questions or concerns    Who should I call with questions?       [facility]: [phone]       [facility]: [phone]       For urgent needs outside of business hours call the [facility] at [phone]        and ask for the dermatology resident on call

## 2019-01-21 ENCOUNTER — INFUSION THERAPY VISIT (OUTPATIENT)
Dept: INFUSION THERAPY | Facility: CLINIC | Age: 34
End: 2019-01-21
Attending: INTERNAL MEDICINE
Payer: COMMERCIAL

## 2019-01-21 VITALS
SYSTOLIC BLOOD PRESSURE: 130 MMHG | WEIGHT: 119.27 LBS | TEMPERATURE: 98.5 F | BODY MASS INDEX: 21.13 KG/M2 | HEART RATE: 67 BPM | RESPIRATION RATE: 16 BRPM | OXYGEN SATURATION: 98 % | DIASTOLIC BLOOD PRESSURE: 92 MMHG

## 2019-01-21 DIAGNOSIS — K51.00 ULCERATIVE PANCOLITIS WITHOUT COMPLICATION (H): Primary | ICD-10-CM

## 2019-01-21 PROCEDURE — 25000128 H RX IP 250 OP 636: Mod: ZF | Performed by: INTERNAL MEDICINE

## 2019-01-21 PROCEDURE — 96365 THER/PROPH/DIAG IV INF INIT: CPT

## 2019-01-21 RX ADMIN — VEDOLIZUMAB 300 MG: 300 INJECTION, POWDER, LYOPHILIZED, FOR SOLUTION INTRAVENOUS at 08:50

## 2019-01-21 NOTE — PATIENT INSTRUCTIONS
Dear Yarelis Penny    Thank you for choosing [facility] ([facility]) for your infusion.  The following information is a summary of our appointment as well as important reminders.        POST-INFUSION OF BIOLOGICAL MEDICATION:    Reviewed with patient.  Given biologic medication or medication hand-out. Inform patient if any fever, chills or signs of infection, new symptoms, abdominal pain, heart palpitations, shortness of breath, reaction, weakness, neurological changes, seek medical attention immediately and should not receive infusions. No live virus vaccines prior to or during treatment or up to 6 months post infusion. If the patient has an upcoming procedure or surgery, this should be discussed with the rheumatologist and surgeon or provider.    We look forward in seeing you on your next appointment here at [facility].  Please don t hesitate to call us at [phone] to reschedule any of your appointments or to speak with one of the [facility] registered nurses.  It was a pleasure taking care of you today.    Sincerely,    [facility]  [address] [place] [address]  [id number]  Phone:  [phone]

## 2019-01-21 NOTE — PROGRESS NOTES
Nursing Note  Yarelis Penny [facility] for:   Chief Complaint   Patient presents with     Infusion     Entyvio infusion     During today's [facility] appointment, orders from Dr. Sophy Mckeon were completed.  Frequency: every 4 weeks    Progress note:  Patient identification verified by name and date of birth.  Assessment completed.  Vitals recorded in Doc Flowsheets.  Patient was provided with education regarding infusion and possible side effects.  Patient verbalized understanding.      needed: No  Premedications: were not ordered.  Infusion Rates: 560 ml/hr.  Approximate Infusion length:30 minutes.   Labs: were not ordered for this appointment.  Vascular access: peripheral IV placed today.  Treatment Conditions: Biologic/Chemo Checklist   ~~~ NOTE: If the patient answers yes to any of the questions below, hold the infusion and contact ordering provider or on-call provider.    1. Have you recently had an elevated temperature, fever, chills, productive cough, coughing for 3 weeks or longer or hemoptysis,  abnormal vital signs, night sweats,  chest pain or have you noticed a decrease in your appetite, unexplained weight loss or fatigue? No  2. Do you have any open wounds or new incisions? No  3. Do you have any recent or upcoming hospitalizations, surgeries or dental procedures? No  4. Do you currently have or recently have had any signs of illness or infection or are you on any antibiotics? No  5. Have you had any new, sudden or worsening abdominal pain? No  6. Have you or anyone in your household received a live vaccination in the past 4 weeks? Please note:  No live vaccines while on biologic/chemotherapy until 6 months after the last treatment.  Patient can receive the flu vaccine (shot only) and the pneumovax.  It is optimal for the patient to get these vaccines mid cycle, but they can be given at any time as long as it is  not on the day of the infusion. No  7. Have you recently been diagnosed with any new nervous system diseases (ie. Multiple sclerosis, Guillain [place], seizures, neurological changes) or cancer diagnosis? Are you on any form of radiation or chemotherapy? No  8. Are you pregnant or breast feeding or do you have plans of pregnancy in the future? No  9. Have you been having any signs of worsening depression or suicidal ideations?  (benlysta only) No  10. Have there been any other new onset medical symptoms? No    Patient tolerated infusion: well.    Administrations This Visit     vedolizumab (ENTYVIO) 300 mg in sodium chloride 0.9 % 280 mL infusion     Admin Date  01/21/2019 Action  New Bag Dose  300 mg Rate  560 mL/hr Route  Intravenous Administered By  Priti Haq RN                  Discharge Plan:   Follow up plan of care with: ongoing infusions at [facility].  Discharge instructions were reviewed with patient.  Patient/representative verbalized understanding of discharge instructions and all questions answered.  Patient discharged from [facility] in stable condition.    Priti Haq RN        /80   Pulse 74   Temp 98.5  F (36.9  C) (Oral)   Wt 54.1 kg (119 lb 4.3 oz)   SpO2 98%   BMI 21.13 kg/m

## 2019-01-24 DIAGNOSIS — Z94.4 LIVER REPLACED BY TRANSPLANT (H): ICD-10-CM

## 2019-01-24 RX ORDER — URSODIOL 250 MG/1
TABLET, FILM COATED ORAL
Qty: 270 TABLET | Refills: 3 | Status: SHIPPED | OUTPATIENT
Start: 2019-01-24 | End: 2019-10-01

## 2019-02-12 ENCOUNTER — OFFICE VISIT (OUTPATIENT)
Dept: DERMATOLOGY | Facility: CLINIC | Age: 34
End: 2019-02-12
Payer: COMMERCIAL

## 2019-02-12 DIAGNOSIS — L28.1 PRURIGO NODULARIS: ICD-10-CM

## 2019-02-12 DIAGNOSIS — B07.0 PLANTAR WART: Primary | ICD-10-CM

## 2019-02-12 ASSESSMENT — PAIN SCALES - GENERAL: PAINLEVEL: NO PAIN (0)

## 2019-02-12 NOTE — PROGRESS NOTES
[facility] Dermatology Note      Dermatology Problem List:  1. S/p liver transplant for autoimmune hepatitis, PSC/UC              -current tx tacrolimus and prednisone  2. 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 Irritant dermatitis  - OTC hydrocortisone cream  - s/p bacterial culture 1/9/18    Encounter Date: Feb 12, 2019    CC:  Chief Complaint   Patient presents with     Derm Problem     Yarelis is here today for a wart follow up- Yarelis notes improvment.      Skin Check     Yarelis wouold like a spot on her hand looked at today as well.          History of Present Illness:  Ms. Yarelis Penny is a 33 year old female who presents as a follow-up for plantar warts. The patient was last seen 1/15/2018 when warts were treated with cryotherapy. Today she notes continued improvement of the warts but some persistent spots on her right foot. She reports scaling of the lesions since her last visit. She also has noticed a lesion on her left hand. She noticed it a year ago. She denies pruritis but does report picking at it occasionally. She does not think the lesion is changing. She does not have any other similar lesions. She voices no other concerns.    Past Medical History:   Patient Active Problem List   Diagnosis     Ulcerative colitis (H)     CARDIOVASCULAR SCREENING; LDL GOAL LESS THAN 160     Autoimmune hepatitis (H)     Liver transplant recipient (H)     Leakage of common bile duct of transplanted liver (H)     Cholestasis of transplanted liver (H)     Immunosuppressed status (H)     Elevated alkaline phosphatase level     EBV (Jun-Barr virus) viremia     History of cytomegalovirus infection     Past Medical History:   Diagnosis Date     Acute kidney injury (H) 4/28/2016     Anemia, iron deficiency 8/29/2017     Autoimmune hepatitis (H) 2012     on steroid taper     Cholangitis      CKD (chronic kidney disease) 2012    biopsy 2012: TIN, patchy fibrosis     CKD (chronic kidney disease)     biopsy 2012: TIN, patchy fibrosis      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     Esophageal varices (H) 9/08    banded     Heart murmur      History of blood transfusion      Primary sclerosing cholangitis      SBP (spontaneous bacterial peritonitis) (H) 2/24/2015     Ulcerative Colitis     f/b GI     Past Surgical History:   Procedure Laterality Date     COLONOSCOPY  9/07     COLONOSCOPY N/A 2/26/2015    Procedure: COMBINED COLONOSCOPY, SINGLE OR MULTIPLE BIOPSY/POLYPECTOMY BY BIOPSY;  Surgeon: Mitchel Hoskins Chi, MD;  Location: [facility]     COLONOSCOPY N/A 1/12/2016    Procedure: COMBINED COLONOSCOPY, SINGLE OR MULTIPLE BIOPSY/POLYPECTOMY BY BIOPSY;  Surgeon: Rigo Valles MD;  Location: [facility]     COLONOSCOPY N/A 4/1/2016    Procedure: COMBINED COLONOSCOPY, SINGLE OR MULTIPLE BIOPSY/POLYPECTOMY BY BIOPSY;  Surgeon: Kareem Solis MD;  Location: [facility]     COLONOSCOPY N/A 9/5/2017    Procedure: COMBINED COLONOSCOPY, SINGLE OR MULTIPLE BIOPSY/POLYPECTOMY BY BIOPSY;  Colonoscopy;  Surgeon: Fuentes Johnson MD;  Location: [facility]     ENDOSCOPIC RETROGRADE CHOLANGIOPANCREATOGRAM N/A 6/25/2015    Procedure: COMBINED ENDOSCOPIC RETROGRADE CHOLANGIOPANCREATOGRAPHY, PLACE TUBE/STENT;  Surgeon: Landon Quinones MD;  [facility]     ENDOSCOPIC RETROGRADE CHOLANGIOPANCREATOGRAM N/A 6/25/2015    Procedure: COMBINED ENDOSCOPIC RETROGRADE CHOLANGIOPANCREATOGRAPHY, PLACE TUBE/STENT;  Surgeon: Landon Quinones MD;  [facility]     ENDOSCOPIC RETROGRADE CHOLANGIOPANCREATOGRAM N/A 7/2/2015    Procedure: COMBINED ENDOSCOPIC RETROGRADE CHOLANGIOPANCREATOGRAPHY, PLACE TUBE/STENT;  Surgeon: Landon Quinones MD;  [facility]     ENDOSCOPIC RETROGRADE CHOLANGIOPANCREATOGRAM N/A 9/8/2015    Procedure: COMBINED ENDOSCOPIC RETROGRADE CHOLANGIOPANCREATOGRAPHY,  REMOVE FOREIGN BODY OR STENT/TUBE;  Surgeon: Landon Quinones MD;  [facility]     ENDOSCOPIC RETROGRADE CHOLANGIOPANCREATOGRAM N/A 12/8/2015    Procedure: ENDOSCOPIC RETROGRADE CHOLANGIOPANCREATOGRAM;  Surgeon: Landon Quinones MD;  [facility]     ENDOSCOPIC RETROGRADE CHOLANGIOPANCREATOGRAM N/A 3/1/2016    Procedure: COMBINED ENDOSCOPIC RETROGRADE CHOLANGIOPANCREATOGRAPHY, REMOVE FOREIGN BODY OR STENT/TUBE;  Surgeon: Landon Quinones MD;  [facility]     ENDOSCOPIC RETROGRADE CHOLANGIOPANCREATOGRAM N/A 3/20/2017    Procedure: COMBINED ENDOSCOPIC RETROGRADE CHOLANGIOPANCREATOGRAPHY, PLACE TUBE/STENT;  Endoscopic Retrograde Cholangiopancreatogram with Ballon Dilation, Stent Placement;  Surgeon: Guru Brittany Macias MD;  [facility]     ENDOSCOPIC RETROGRADE CHOLANGIOPANCREATOGRAM N/A 4/2/2018    Procedure: COMBINED ENDOSCOPIC RETROGRADE CHOLANGIOPANCREATOGRAPHY, PLACE TUBE/STENT;  Endoscopic Retrograde Cholangiopancreatogram with biliary dilation and stent placement;  Surgeon: Guru Brittany Macias MD;  [facility]     ENDOSCOPIC RETROGRADE CHOLANGIOPANCREATOGRAM N/A 5/7/2018    Procedure: ENDOSCOPIC RETROGRADE CHOLANGIOPANCREATOGRAM;  Endoscopic Retrograde Cholangiopancreatogram ballon dilation stent exchange;  Surgeon: Guru Brittany Macias MD;  [facility]     ENDOSCOPIC RETROGRADE CHOLANGIOPANCREATOGRAPHY, EXCHANGE TUBE/STENT N/A 7/30/2015    Procedure: ENDOSCOPIC RETROGRADE CHOLANGIOPANCREATOGRAPHY, EXCHANGE TUBE/STENT;  Surgeon: Landon Quinones MD;  [facility]     ENDOSCOPIC RETROGRADE CHOLANGIOPANCREATOGRAPHY, EXCHANGE TUBE/STENT N/A 5/15/2017    Procedure: ENDOSCOPIC RETROGRADE CHOLANGIOPANCREATOGRAPHY, EXCHANGE TUBE/STENT;  Endoscopic Retrograde Cholangiopancreatogram with biliary stent exchange and balloon dilation;  Surgeon: Guru Brittany Macias MD;  [facility]     ENDOSCOPIC  RETROGRADE CHOLANGIOPANCREATOGRAPHY, EXCHANGE TUBE/STENT N/A 6/25/2018    Procedure: ENDOSCOPIC RETROGRADE CHOLANGIOPANCREATOGRAPHY, EXCHANGE TUBE/STENT;  Endoscopic Retrograde Cholangiopancreatogram with biliary dilation, stone removal and stent exchange;  Surgeon: Guru Brittany Macias MD;  [facility]     ENDOSCOPIC RETROGRADE CHOLANGIOPANCREATOGRAPHY, EXCHANGE TUBE/STENT N/A 7/30/2018    Procedure: ENDOSCOPIC RETROGRADE CHOLANGIOPANCREATOGRAPHY, EXCHANGE TUBE/STENT;  Endoscopic Retrograde Cholangiopancreatogram with Stent Exchange with dilation;  Surgeon: Guru Brittany Macias MD;  [facility]     ENDOSCOPIC RETROGRADE CHOLANGIOPANCREATOGRAPHY, EXCHANGE TUBE/STENT N/A 9/26/2018    Procedure: ENDOSCOPIC RETROGRADE CHOLANGIOPANCREATOGRAPHY, EXCHANGE TUBE/STENT;  Endoscopic Retrograde Cholangiopancreatogram, with bile duct stent exchanged balloon dilation and balloon sweep of bile duct;  Surgeon: Guru Brittany Macias MD;  [facility]     ESOPHAGOSCOPY, GASTROSCOPY, DUODENOSCOPY (EGD), COMBINED N/A 2/26/2015    Procedure: COMBINED ESOPHAGOSCOPY, GASTROSCOPY, DUODENOSCOPY (EGD);  Surgeon: Mitchel Hoskins Chi, MD;  [facility]     EXPLORE COMMON BILE DUCT N/A 6/25/2015    Procedure: EXPLORE COMMON BILE DUCT;  Surgeon: Tyree Smith MD;  [facility]     LAPAROTOMY EXPLORATORY N/A 6/25/2015    Procedure: LAPAROTOMY EXPLORATORY;  Surgeon: Tyree Smith MD;  [facility]     PICC INSERTION Right 2/27/2015    4fr SL Valved PICC, 36cm (1cm external) in the R medial brachial vein w/ tip in the low SVC.     SIGMOIDOSCOPY FLEXIBLE N/A 4/27/2016    Procedure: SIGMOIDOSCOPY FLEXIBLE;  Surgeon: Jose Nielson MD;  Location: [facility]     TRANSPLANT LIVER RECIPIENT LIVING UNRELATED N/A 6/18/2015    Procedure: TRANSPLANT LIVER RECIPIENT LIVING UNRELATED;  Surgeon: Tyree Smith MD;  Location: [facility]     UPPER GI ENDOSCOPY         Social History:  Patient  reports that  has never smoked. she has never used smokeless tobacco. She reports that she drinks alcohol. She reports that she does not use drugs.    Family History:  Family History   Problem Relation Age of Onset     Hypertension Mother      Lipids Mother      Sleep Apnea Mother      Hypertension Maternal Grandmother      Alzheimer Disease Maternal Grandmother      Lipids Father      Melanoma No family hx of      Skin Cancer No family hx of      Cancer No family hx of      Diabetes No family hx of        Medications:  Current Outpatient Medications   Medication Sig Dispense Refill     calcium carbonate (OS-KERLINE 500 MG [place]. [place]) 1250 MG tablet Take 1 tablet by mouth every evening       fluorouracil (EFUDEX) 5 % cream Apply topically daily Apply nightly to warts under occlusion alongside WartStick 40 g 3     GNP ASPIRIN LOW DOSE 81 MG EC tablet Take 1 tablet (81 mg) by mouth daily 30 tablet 1     hydrocortisone valerate (WEST-JUAN) 0.2 % external ointment Apply topically 2 times daily To areas on the left corner of the mouth 15 g 0     multivitamin, therapeutic with minerals (MULTI-VITAMIN) TABS tablet Take 1 tablet by mouth every morning       predniSONE (DELTASONE) 5 MG tablet Take 1 tablet (5 mg) by mouth daily 225 tablet 3     tacrolimus (GENERIC EQUIVALENT) 0.5 MG capsule Take 1 capsule (0.5 mg) by mouth 2 times daily Take with 1 mg capsule. Total dose 2.5 mg every 12 hours 180 capsule 3     tacrolimus (GENERIC EQUIVALENT) 1 MG capsule Take 2 capsules (2 mg) by mouth 2 times daily *total dose 2.5 mg twice daily 120 capsule 11     ursodiol (ACTIGALL) 250 MG tablet Take 2 tablets (500 mg) by mouth every morning AND 1 tablet (250 mg) every evening. 270 tablet 3     Vedolizumab (ENTYVIO IV) Inject 300 mg into the vein every 28 days       VITAMIN D, CHOLECALCIFEROL, PO Take by mouth every evening       Allergies   Allergen Reactions     Rifampin Other (See Comments)     Kidney failure     Penicillins      Bad hives  when she was in college         Review of Systems:  -Constitutional: Otherwise feeling well today, in usual state of health.  -Skin: As above in HPI. No additional skin concerns.    Physical exam:  Vitals: There were no vitals taken for this visit.  GEN: This is a well developed, well-nourished female in no acute distress, in a pleasant mood.    SKIN: Focused examination of the feet and hands were performed.  -Verrucous patches on bilateral plantar surfaces with scale.   -3 mm well demarcated circular papule on left hand with central excoriation   -No other lesions of concern on areas examined.     Impression/Plan:  1. Verruca plantaris    Paring was performed with a #15 blade    Cryotherapy procedure note (performed by faculty): After verbal consent and discussion of risks and benefits including but no limited to dyspigmentation/scar, blister, infection, recurrence,4 was(were) treated with 1-2mm freeze border for 2 cycles with liquid nitrogen. Post cryotherapy instructions were provided.     Ok to stop Efudex and wart stick    Start treatment with Amlactin or urea 20% cream for dry skin    2. Prurigo Nodule    Cryotherapy procedure note (performed by faculty): After verbal consent and discussion of risks and benefits including but no limited to dyspigmentation/scar, blister, infection, recurrence,1 was(were) treated with 1-2mm freeze border for 2 cycles with liquid nitrogen. Post cryotherapy instructions were provided.     CC Hung Butler MD  [address] [place] [phone] on close of this encounter.  Follow-up in 9 months, earlier for new or changing lesions.     Staff Involved:  I,HALIMA Montes De Oca, saw and examined the patient in the presence of Dr. Butler.     Scribed by HALIMA Montes De Oca, for Dr. Butler.      HALIMA Montes De Oca, completed the family history, social history and ROS today. This student acted as my scribe for other portions of this encounter.  The encounter documented above was completely  performed by myself and accurately depicts my evaluation, diagnoses, decisions, treatment and follow-up plans.     Staff Physician:  I was present with the medical student who participated in the service and in the documentation of the note. I have verified the history and personally performed the physical exam and medical decision making. I agree with the assessment and plan of care as documented in the note.     Hung Butler MD  Staff Dermatologist and Dermatopathologist  , Department of Dermatology

## 2019-02-12 NOTE — NURSING NOTE
Dermatology Rooming Note    Yarelis Penny's goals for this visit include:   Chief Complaint   Patient presents with     Derm Problem     Yarelis is here today for a wart follow up- Yarelis notes improvment.      Skin Check     Yarelis wouold like a spot on her hand looked at today as well.      MEDINA Jaramillo

## 2019-02-12 NOTE — LETTER
2/12/2019       RE: Yarelis Penny  [address] [facility] [address]     Dear Colleague,    Thank you for referring your patient, Yarelis Penny, to the [facility] DERMATOLOGY at [facility]. Please see a copy of my visit note below.        [facility]      Dermatology Problem List:  1. S/p liver transplant for autoimmune hepatitis, PSC/UC              -current tx tacrolimus and prednisone  2. 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 Irritant dermatitis  - OTC hydrocortisone cream  - s/p bacterial culture 1/9/18    Encounter Date: Feb 12, 2019    CC:  Chief Complaint   Patient presents with     Derm Problem     Yarelis is here today for a wart follow up- Yarelis notes improvment.      Skin Check     Yarelis rosenuold like a spot on her hand looked at today as well.          History of Present Illness:  Ms. Yarelis Penny is a 33 year old female who presents as a follow-up for plantar warts. The patient was last seen 1/15/2018 when warts were treated with cryotherapy. Today she notes continued improvement of the warts but some persistent spots on her right foot. She reports scaling of the lesions since her last visit. She also has noticed a lesion on her left hand. She noticed it a year ago. She denies pruritis but does report picking at it occasionally. She does not think the lesion is changing. She does not have any other similar lesions. She voices no other concerns.    Past Medical History:   Patient Active Problem List   Diagnosis     Ulcerative colitis (H)     CARDIOVASCULAR SCREENING; LDL GOAL LESS THAN 160     Autoimmune hepatitis (H)     Liver transplant recipient (H)     Leakage of common bile duct of transplanted liver (H)     Cholestasis of transplanted liver (H)      Immunosuppressed status (H)     Elevated alkaline phosphatase level     EBV (Jun-Barr virus) viremia     History of cytomegalovirus infection     Past Medical History:   Diagnosis Date     Acute kidney injury (H) 4/28/2016     Anemia, iron deficiency 8/29/2017     Autoimmune hepatitis (H) 2012    on steroid taper     Cholangitis      CKD (chronic kidney disease) 2012    biopsy 2012: TIN, patchy fibrosis     CKD (chronic kidney disease)     biopsy 2012: TIN, patchy fibrosis      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     Esophageal varices (H) 9/08    banded     Heart murmur      History of blood transfusion      Primary sclerosing cholangitis      SBP (spontaneous bacterial peritonitis) (H) 2/24/2015     Ulcerative Colitis     f/b GI     Past Surgical History:   Procedure Laterality Date     COLONOSCOPY  9/07     COLONOSCOPY N/A 2/26/2015    Procedure: COMBINED COLONOSCOPY, SINGLE OR MULTIPLE BIOPSY/POLYPECTOMY BY BIOPSY;  Surgeon: Mitchel Hoskins Chi, MD;  [facility]     COLONOSCOPY N/A 1/12/2016    Procedure: COMBINED COLONOSCOPY, SINGLE OR MULTIPLE BIOPSY/POLYPECTOMY BY BIOPSY;  Surgeon: Rigo Valles MD;  [facility]     COLONOSCOPY N/A 4/1/2016    Procedure: COMBINED COLONOSCOPY, SINGLE OR MULTIPLE BIOPSY/POLYPECTOMY BY BIOPSY;  Surgeon: Kareem Solis MD;  [facility]     COLONOSCOPY N/A 9/5/2017    Procedure: COMBINED COLONOSCOPY, SINGLE OR MULTIPLE BIOPSY/POLYPECTOMY BY BIOPSY;  Colonoscopy;  Surgeon: Fuentes Johnson MD;  [facility]     ENDOSCOPIC RETROGRADE CHOLANGIOPANCREATOGRAM N/A 6/25/2015    Procedure: COMBINED ENDOSCOPIC RETROGRADE CHOLANGIOPANCREATOGRAPHY, PLACE TUBE/STENT;  Surgeon: Landon Quinones MD;  Location:  OR     ENDOSCOPIC RETROGRADE CHOLANGIOPANCREATOGRAM N/A 6/25/2015    Procedure: COMBINED ENDOSCOPIC RETROGRADE CHOLANGIOPANCREATOGRAPHY, PLACE TUBE/STENT;  Surgeon: Landon Quinones MD;  [facility]     ENDOSCOPIC RETROGRADE  CHOLANGIOPANCREATOGRAM N/A 7/2/2015    Procedure: COMBINED ENDOSCOPIC RETROGRADE CHOLANGIOPANCREATOGRAPHY, PLACE TUBE/STENT;  Surgeon: Landon Quinones MD;  [facility]     ENDOSCOPIC RETROGRADE CHOLANGIOPANCREATOGRAM N/A 9/8/2015    Procedure: COMBINED ENDOSCOPIC RETROGRADE CHOLANGIOPANCREATOGRAPHY, REMOVE FOREIGN BODY OR STENT/TUBE;  Surgeon: Landon Quinones MD;  [facility]     ENDOSCOPIC RETROGRADE CHOLANGIOPANCREATOGRAM N/A 12/8/2015    Procedure: ENDOSCOPIC RETROGRADE CHOLANGIOPANCREATOGRAM;  Surgeon: Landon Quinones MD;  [facility]     ENDOSCOPIC RETROGRADE CHOLANGIOPANCREATOGRAM N/A 3/1/2016    Procedure: COMBINED ENDOSCOPIC RETROGRADE CHOLANGIOPANCREATOGRAPHY, REMOVE FOREIGN BODY OR STENT/TUBE;  Surgeon: Landon Quinones MD;  [facility]     ENDOSCOPIC RETROGRADE CHOLANGIOPANCREATOGRAM N/A 3/20/2017    Procedure: COMBINED ENDOSCOPIC RETROGRADE CHOLANGIOPANCREATOGRAPHY, PLACE TUBE/STENT;  Endoscopic Retrograde Cholangiopancreatogram with Ballon Dilation, Stent Placement;  Surgeon: Guru Brittany Macias MD;  [facility]     ENDOSCOPIC RETROGRADE CHOLANGIOPANCREATOGRAM N/A 4/2/2018    Procedure: COMBINED ENDOSCOPIC RETROGRADE CHOLANGIOPANCREATOGRAPHY, PLACE TUBE/STENT;  Endoscopic Retrograde Cholangiopancreatogram with biliary dilation and stent placement;  Surgeon: Guru Brittany Macias MD;  [facility]     ENDOSCOPIC RETROGRADE CHOLANGIOPANCREATOGRAM N/A 5/7/2018    Procedure: ENDOSCOPIC RETROGRADE CHOLANGIOPANCREATOGRAM;  Endoscopic Retrograde Cholangiopancreatogram ballon dilation stent exchange;  Surgeon: Guru Brittany Macias MD;  [facility]     ENDOSCOPIC RETROGRADE CHOLANGIOPANCREATOGRAPHY, EXCHANGE TUBE/STENT N/A 7/30/2015    Procedure: ENDOSCOPIC RETROGRADE CHOLANGIOPANCREATOGRAPHY, EXCHANGE TUBE/STENT;  Surgeon: Landon Quinnoes MD;  [facility]     ENDOSCOPIC RETROGRADE  CHOLANGIOPANCREATOGRAPHY, EXCHANGE TUBE/STENT N/A 5/15/2017    Procedure: ENDOSCOPIC RETROGRADE CHOLANGIOPANCREATOGRAPHY, EXCHANGE TUBE/STENT;  Endoscopic Retrograde Cholangiopancreatogram with biliary stent exchange and balloon dilation;  Surgeon: Guru Brittany Macias MD;  [facility]     ENDOSCOPIC RETROGRADE CHOLANGIOPANCREATOGRAPHY, EXCHANGE TUBE/STENT N/A 6/25/2018    Procedure: ENDOSCOPIC RETROGRADE CHOLANGIOPANCREATOGRAPHY, EXCHANGE TUBE/STENT;  Endoscopic Retrograde Cholangiopancreatogram with biliary dilation, stone removal and stent exchange;  Surgeon: Guru Brittany Macias MD;  [facility]     ENDOSCOPIC RETROGRADE CHOLANGIOPANCREATOGRAPHY, EXCHANGE TUBE/STENT N/A 7/30/2018    Procedure: ENDOSCOPIC RETROGRADE CHOLANGIOPANCREATOGRAPHY, EXCHANGE TUBE/STENT;  Endoscopic Retrograde Cholangiopancreatogram with Stent Exchange with dilation;  Surgeon: Guru Brittany Macias MD;  [facility]     ENDOSCOPIC RETROGRADE CHOLANGIOPANCREATOGRAPHY, EXCHANGE TUBE/STENT N/A 9/26/2018    Procedure: ENDOSCOPIC RETROGRADE CHOLANGIOPANCREATOGRAPHY, EXCHANGE TUBE/STENT;  Endoscopic Retrograde Cholangiopancreatogram, with bile duct stent exchanged balloon dilation and balloon sweep of bile duct;  Surgeon: Guru Brittany Macias MD;  [facility]     ESOPHAGOSCOPY, GASTROSCOPY, DUODENOSCOPY (EGD), COMBINED N/A 2/26/2015    Procedure: COMBINED ESOPHAGOSCOPY, GASTROSCOPY, DUODENOSCOPY (EGD);  Surgeon: Mitchel Hoskins Chi, MD;  [facility]     EXPLORE COMMON BILE DUCT N/A 6/25/2015    Procedure: EXPLORE COMMON BILE DUCT;  Surgeon: Tyree Smith MD;  [facility]     LAPAROTOMY EXPLORATORY N/A 6/25/2015    Procedure: LAPAROTOMY EXPLORATORY;  Surgeon: Tyree Smith MD;  [facility]     PICC INSERTION Right 2/27/2015    4fr SL Valved PICC, 36cm (1cm external) in the R medial brachial vein w/ tip in the low SVC.     SIGMOIDOSCOPY FLEXIBLE N/A  4/27/2016    Procedure: SIGMOIDOSCOPY FLEXIBLE;  Surgeon: Jose Nielson MD;  [facility]     TRANSPLANT LIVER RECIPIENT LIVING UNRELATED N/A 6/18/2015    Procedure: TRANSPLANT LIVER RECIPIENT LIVING UNRELATED;  Surgeon: Tyree Smith MD;  [facility]     UPPER GI ENDOSCOPY         Social History:  Patient reports that  has never smoked. she has never used smokeless tobacco. She reports that she drinks alcohol. She reports that she does not use drugs.    Family History:  Family History   Problem Relation Age of Onset     Hypertension Mother      Lipids Mother      Sleep Apnea Mother      Hypertension Maternal Grandmother      Alzheimer Disease Maternal Grandmother      Lipids Father      Melanoma No family hx of      Skin Cancer No family hx of      Cancer No family hx of      Diabetes No family hx of        Medications:  Current Outpatient Medications   Medication Sig Dispense Refill     calcium carbonate (OS-KERLINE 500 MG [place]. [place]) 1250 MG tablet Take 1 tablet by mouth every evening       fluorouracil (EFUDEX) 5 % cream Apply topically daily Apply nightly to warts under occlusion alongside WartStick 40 g 3     GNP ASPIRIN LOW DOSE 81 MG EC tablet Take 1 tablet (81 mg) by mouth daily 30 tablet 1     hydrocortisone valerate (WEST-JUAN) 0.2 % external ointment Apply topically 2 times daily To areas on the left corner of the mouth 15 g 0     multivitamin, therapeutic with minerals (MULTI-VITAMIN) TABS tablet Take 1 tablet by mouth every morning       predniSONE (DELTASONE) 5 MG tablet Take 1 tablet (5 mg) by mouth daily 225 tablet 3     tacrolimus (GENERIC EQUIVALENT) 0.5 MG capsule Take 1 capsule (0.5 mg) by mouth 2 times daily Take with 1 mg capsule. Total dose 2.5 mg every 12 hours 180 capsule 3     tacrolimus (GENERIC EQUIVALENT) 1 MG capsule Take 2 capsules (2 mg) by mouth 2 times daily *total dose 2.5 mg twice daily 120 capsule 11     ursodiol (ACTIGALL) 250 MG tablet Take 2 tablets (500 mg) by mouth  every morning AND 1 tablet (250 mg) every evening. 270 tablet 3     Vedolizumab (ENTYVIO IV) Inject 300 mg into the vein every 28 days       VITAMIN D, CHOLECALCIFEROL, PO Take by mouth every evening       Allergies   Allergen Reactions     Rifampin Other (See Comments)     Kidney failure     Penicillins      Bad hives when she was in college         Review of Systems:  -Constitutional: Otherwise feeling well today, in usual state of health.  -Skin: As above in HPI. No additional skin concerns.    Physical exam:  Vitals: There were no vitals taken for this visit.  GEN: This is a well developed, well-nourished female in no acute distress, in a pleasant mood.    SKIN: Focused examination of the feet and hands were performed.  -Verrucous patches on bilateral plantar surfaces with scale.   -3 mm well demarcated circular papule on left hand with central excoriation   -No other lesions of concern on areas examined.     Impression/Plan:  1. Verruca plantaris    Paring was performed with a #15 blade    Cryotherapy procedure note (performed by faculty): After verbal consent and discussion of risks and benefits including but no limited to dyspigmentation/scar, blister, infection, recurrence,4 was(were) treated with 1-2mm freeze border for 2 cycles with liquid nitrogen. Post cryotherapy instructions were provided.     Ok to stop Efudex and wart stick    Start treatment with Amlactin or urea 20% cream for dry skin    2. Prurigo Nodule    Cryotherapy procedure note (performed by faculty): After verbal consent and discussion of risks and benefits including but no limited to dyspigmentation/scar, blister, infection, recurrence,1 was(were) treated with 1-2mm freeze border for 2 cycles with liquid nitrogen. Post cryotherapy instructions were provided.     CC Hung Butler MD  [address] [place] [address] on close of this encounter.  Follow-up in 9 months, earlier for new or changing lesions.     Staff  Involved:  I,Bogdan Abdi MS4, saw and examined the patient in the presence of Dr. Butler.     Scribed by Bogdan Abdi MS4, for Dr. Butler.      Bogdan Abdi, MS4, completed the family history, social history and ROS today. This student acted as my scribe for other portions of this encounter.  The encounter documented above was completely performed by myself and accurately depicts my evaluation, diagnoses, decisions, treatment and follow-up plans.     Staff Physician:  I was present with the medical student who participated in the service and in the documentation of the note. I have verified the history and personally performed the physical exam and medical decision making. I agree with the assessment and plan of care as documented in the note.     Hung Butler MD  Staff Dermatologist and Dermatopathologist  , Department of Dermatology

## 2019-02-18 ENCOUNTER — TELEPHONE (OUTPATIENT)
Dept: GASTROENTEROLOGY | Facility: CLINIC | Age: 34
End: 2019-02-18

## 2019-02-18 NOTE — TELEPHONE ENCOUNTER
: [x] N/A   [] Yes:  Language /  ID:     VM with request pt contact Endoscopy Pre-assessment RN to review upcoming procedure information.  Telephone call-back number provided.    Savita Villarreal, RN  [facility]    Additional Information regarding appointment:      Patient scheduled for:  [] EGD  [x] Colonoscopy  [] EUS  [] Flex Sig   [] Other:     Indication for procedure. [] Screening   [x] IBD (inflammatory bowel disease) / Ulcerative colitis (H);     Procedure Provider:  Mike      Referring Provider. Erica Lara    Arrival time verified: Mon; 2/25/19; 0630    Fac[facility]   [facility] [address] [facility], 1st Floor, [address]  [x][address] [facility], 5th floor       Prep Type:   [x]Golytely eRx: (not sent);  [] MoviPrep: , [] MiraLax: , [] Other:   []NPO /p MN, No solid food /p 2200 the night before    Anticoagulants or blood thinners: []None [x] ASA 81mg [] Warfarin  [] Warfarin + Lovenox bridge [] Plavix [] Effient [] Eliquis  [] Xarelto  [] Brilinta [] NSAIDS  [] Other    LAST anticoagulant dose: Date/Time: May continue ASA 81mg  INR: N/A    Electronic implanted devices: [x] No  [] IPG  []  ICD  []  LVAD  []     H&P / Pre op physical completed: [x] N/A, [] Complete, Date , [] Scheduled, Date , [] No,     Additional Information: Not at this time.    _______________________________________________

## 2019-02-19 ENCOUNTER — INFUSION THERAPY VISIT (OUTPATIENT)
Dept: INFUSION THERAPY | Facility: CLINIC | Age: 34
End: 2019-02-19
Attending: INTERNAL MEDICINE
Payer: COMMERCIAL

## 2019-02-19 VITALS
RESPIRATION RATE: 16 BRPM | SYSTOLIC BLOOD PRESSURE: 134 MMHG | OXYGEN SATURATION: 100 % | HEART RATE: 86 BPM | TEMPERATURE: 98.1 F | BODY MASS INDEX: 21.31 KG/M2 | DIASTOLIC BLOOD PRESSURE: 93 MMHG | WEIGHT: 120.3 LBS

## 2019-02-19 DIAGNOSIS — Z94.4 LIVER REPLACED BY TRANSPLANT (H): ICD-10-CM

## 2019-02-19 DIAGNOSIS — K51.00 ULCERATIVE PANCOLITIS WITHOUT COMPLICATION (H): Primary | ICD-10-CM

## 2019-02-19 LAB
ALBUMIN SERPL-MCNC: 2.8 G/DL (ref 3.4–5)
ALP SERPL-CCNC: 155 U/L (ref 40–150)
ALT SERPL W P-5'-P-CCNC: 31 U/L (ref 0–50)
AST SERPL W P-5'-P-CCNC: 25 U/L (ref 0–45)
BASOPHILS # BLD AUTO: 0 10E9/L (ref 0–0.2)
BASOPHILS NFR BLD AUTO: 0 %
BILIRUB DIRECT SERPL-MCNC: 0.1 MG/DL (ref 0–0.2)
BILIRUB SERPL-MCNC: 0.3 MG/DL (ref 0.2–1.3)
CRP SERPL-MCNC: <2.9 MG/L (ref 0–8)
DIFFERENTIAL METHOD BLD: ABNORMAL
EOSINOPHIL # BLD AUTO: 1.9 10E9/L (ref 0–0.7)
EOSINOPHIL NFR BLD AUTO: 12.3 %
ERYTHROCYTE [DISTWIDTH] IN BLOOD BY AUTOMATED COUNT: 14.8 % (ref 10–15)
ERYTHROCYTE [SEDIMENTATION RATE] IN BLOOD BY WESTERGREN METHOD: 41 MM/H (ref 0–20)
HCT VFR BLD AUTO: 39.5 % (ref 35–47)
HGB BLD-MCNC: 12.5 G/DL (ref 11.7–15.7)
LYMPHOCYTES # BLD AUTO: 4.7 10E9/L (ref 0.8–5.3)
LYMPHOCYTES NFR BLD AUTO: 30.7 %
MCH RBC QN AUTO: 27.7 PG (ref 26.5–33)
MCHC RBC AUTO-ENTMCNC: 31.6 G/DL (ref 31.5–36.5)
MCV RBC AUTO: 87 FL (ref 78–100)
MONOCYTES # BLD AUTO: 1.1 10E9/L (ref 0–1.3)
MONOCYTES NFR BLD AUTO: 7 %
NEUTROPHILS # BLD AUTO: 7.6 10E9/L (ref 1.6–8.3)
NEUTROPHILS NFR BLD AUTO: 50 %
PLATELET # BLD AUTO: 513 10E9/L (ref 150–450)
PLATELET # BLD EST: ABNORMAL 10*3/UL
POIKILOCYTOSIS BLD QL SMEAR: SLIGHT
PROT SERPL-MCNC: 7.3 G/DL (ref 6.8–8.8)
RBC # BLD AUTO: 4.52 10E12/L (ref 3.8–5.2)
TACROLIMUS BLD-MCNC: 4.6 UG/L (ref 5–15)
TME LAST DOSE: ABNORMAL H
WBC # BLD AUTO: 15.2 10E9/L (ref 4–11)

## 2019-02-19 PROCEDURE — 25000128 H RX IP 250 OP 636: Mod: ZF | Performed by: INTERNAL MEDICINE

## 2019-02-19 PROCEDURE — 80076 HEPATIC FUNCTION PANEL: CPT | Performed by: INTERNAL MEDICINE

## 2019-02-19 PROCEDURE — 85025 COMPLETE CBC W/AUTO DIFF WBC: CPT | Performed by: INTERNAL MEDICINE

## 2019-02-19 PROCEDURE — 80197 ASSAY OF TACROLIMUS: CPT | Performed by: INTERNAL MEDICINE

## 2019-02-19 PROCEDURE — 85652 RBC SED RATE AUTOMATED: CPT | Performed by: INTERNAL MEDICINE

## 2019-02-19 PROCEDURE — 86140 C-REACTIVE PROTEIN: CPT | Performed by: INTERNAL MEDICINE

## 2019-02-19 PROCEDURE — 96365 THER/PROPH/DIAG IV INF INIT: CPT

## 2019-02-19 PROCEDURE — 25800030 ZZH RX IP 258 OP 636: Mod: ZF | Performed by: INTERNAL MEDICINE

## 2019-02-19 RX ADMIN — VEDOLIZUMAB 300 MG: 300 INJECTION, POWDER, LYOPHILIZED, FOR SOLUTION INTRAVENOUS at 08:33

## 2019-02-19 NOTE — PROGRESS NOTES
Infusion nursing note:    Yarelis Penny presents today to [facility] for a Entvyio infusion.  During today's [facility] appointment orders from Fuentes Johnson were completed.  Dose # monthly    Progress note:  ID verified by name and .  Assessment completed.  Vitals were stable throughout time in [facility].  verbal education given to patient/representative regarding infusion and possible side effects.  Patient/representative verbalized understanding.    ~~~ NOTE: If the patient answers yes to any of the questions below, hold the infusion and contact ordering provider or on-call provider.    1. Have you recently had an elevated temperature, fever, chills, productive cough, coughing for 3 weeks or longer or hemoptysis,  abnormal vital signs, night sweats,  chest pain or have you noticed a decrease in your appetite, unexplained weight loss or fatigue? No  2. Do you have any open wounds or new incisions? No  3. Do you have any recent or upcoming hospitalizations, surgeries or dental procedures? No  4. Do you currently have or recently have had any signs of illness or infection or are you on any antibiotics? No  5. Have you had any new, sudden or worsening abdominal pain? No  6. Have you or anyone in your household received a live vaccination in the past 4 weeks? Please note:  No live vaccines while on biologic/chemotherapy until 6 months after the last treatment.  Patient can receive the flu vaccine (shot only) and the pneumovax.  It is optimal for the patient to get these vaccines mid cycle, but they can be given at any time as long as it is not on the day of the infusion. No  7. Have you recently been diagnosed with any new nervous system diseases (ie. Multiple sclerosis, Guillain Wickett, seizures, neurological changes) or cancer diagnosis? Are you on any form of radiation or chemotherapy? No  8. Are you pregnant or breast feeding or do you have plans of pregnancy in the future? No  9. Have you been having any signs of worsening  depression or suicidal ideations?  (benlysta only) No  10. Have there been any other new onset medical symptoms? No  Note: Pt denies any changes since last infusion.     Infusion given over approximately  30 minutes     Discharge Plan:  Reviewed with patient.  Given biologic medication or medication hand-out. Inform patient if any fever, chills or signs of infection, new symptoms, abdominal pain, heart palpitations, shortness of breath, reaction, weakness, neurological changes, seek medical attention immediately and should not receive infusions. No live virus vaccines prior to or during treatment or up to 6 months post infusion. If the patient has an upcoming procedure or surgery, this should be discussed with the rheumatologist and surgeon or provider.    Discharge instructions were reviewed with patient Yes  Patient/representative verbalized understanding of discharge instructions and all questions answered Yes.    Discharged from [facility] at 0912 with self to home.    Virgie Weems  Administrations This Visit     vedolizumab (ENTYVIO) 300 mg in sodium chloride 0.9 % 280 mL infusion     Admin Date  02/19/2019 Action  New Bag Dose  300 mg Rate  560 mL/hr Route  Intravenous Administered By  Virgie Weems, RN

## 2019-02-20 DIAGNOSIS — Z94.4 S/P LIVER TRANSPLANT (H): ICD-10-CM

## 2019-02-20 RX ORDER — TACROLIMUS 0.5 MG/1
0.5 CAPSULE ORAL 2 TIMES DAILY
Qty: 180 CAPSULE | Refills: 3 | Status: SHIPPED | OUTPATIENT
Start: 2019-02-20 | End: 2019-03-21

## 2019-02-20 RX ORDER — TACROLIMUS 1 MG/1
2 CAPSULE ORAL 2 TIMES DAILY
Qty: 120 CAPSULE | Refills: 11 | Status: SHIPPED | OUTPATIENT
Start: 2019-02-20 | End: 2019-03-21

## 2019-02-21 ENCOUNTER — TELEPHONE (OUTPATIENT)
Dept: GASTROENTEROLOGY | Facility: CLINIC | Age: 34
End: 2019-02-21

## 2019-02-21 NOTE — TELEPHONE ENCOUNTER
Patient scheduled for:  [] EGD  [x] Colonoscopy  [] EUS  [] Flex Sig   [] Other:     Indication for procedure. [] Screening   [x] IBD (inflammatory bowel disease) / Ulcerative colitis (H);     Procedure Provider:  Mike                  Referring Provider. Erica Lara    Arrival time verified: Mon; 2/25/19; 0630    Facility location verified:   [][facility] [address] [place] [address], 1st Floor, [address]  [x][address] [facility], 5th floor        Prep Type:   []Golytely eRx: (not sent);  [] MoviPrep: , [x] MiraLax: , [] Other:   []NPO /p MN, No solid food /p 2200 the night before    Anticoagulants or blood thinners: []None [x] ASA 81mg [] Warfarin  [] Warfarin + Lovenox bridge [] Plavix [] Effient [] Eliquis  [] Xarelto  [] Brilinta [] NSAIDS  [] Other    LAST anticoagulant dose: Date/Time: May continue ASA 81mg  INR: N/A    Electronic implanted devices: [x] No  [] IPG  []  ICD  []  LVAD  []     H&P / Pre op physical completed: [x] N/A, [] Complete, Date , [] Scheduled, Date , [] No,     Additional Information: Not at this time.    _______________________________________________       Instructions given: [x] Rec d & Read   [] Reviewed   [] Resent via [employer]     [] Resent via eMail (see below)     Pre procedure teaching completed: [x] Yes - Reviewed, [] No,     Transportation from procedure: [x] Yes , [] Pending , [] No     Savita Villarreal, RN  [facility]

## 2019-02-25 ENCOUNTER — PATIENT OUTREACH (OUTPATIENT)
Dept: GASTROENTEROLOGY | Facility: CLINIC | Age: 34
End: 2019-02-25

## 2019-02-25 ENCOUNTER — HOSPITAL ENCOUNTER (OUTPATIENT)
Facility: AMBULATORY SURGERY CENTER | Age: 34
End: 2019-02-25
Attending: INTERNAL MEDICINE
Payer: COMMERCIAL

## 2019-02-25 VITALS
OXYGEN SATURATION: 100 % | SYSTOLIC BLOOD PRESSURE: 109 MMHG | TEMPERATURE: 98.5 F | WEIGHT: 120 LBS | BODY MASS INDEX: 21.26 KG/M2 | HEART RATE: 80 BPM | HEIGHT: 63 IN | DIASTOLIC BLOOD PRESSURE: 79 MMHG | RESPIRATION RATE: 18 BRPM

## 2019-02-25 DIAGNOSIS — R19.7 DIARRHEA: Primary | ICD-10-CM

## 2019-02-25 DIAGNOSIS — K51.90 ULCERATIVE COLITIS (H): ICD-10-CM

## 2019-02-25 LAB — COLONOSCOPY: NORMAL

## 2019-02-25 RX ORDER — ONDANSETRON 4 MG/1
4 TABLET, ORALLY DISINTEGRATING ORAL EVERY 6 HOURS PRN
Status: DISCONTINUED | OUTPATIENT
Start: 2019-02-25 | End: 2019-02-26 | Stop reason: HOSPADM

## 2019-02-25 RX ORDER — DIPHENHYDRAMINE HYDROCHLORIDE 50 MG/ML
INJECTION INTRAMUSCULAR; INTRAVENOUS PRN
Status: DISCONTINUED | OUTPATIENT
Start: 2019-02-25 | End: 2019-02-25 | Stop reason: HOSPADM

## 2019-02-25 RX ORDER — ONDANSETRON 2 MG/ML
4 INJECTION INTRAMUSCULAR; INTRAVENOUS EVERY 6 HOURS PRN
Status: DISCONTINUED | OUTPATIENT
Start: 2019-02-25 | End: 2019-02-26 | Stop reason: HOSPADM

## 2019-02-25 RX ORDER — FENTANYL CITRATE 50 UG/ML
INJECTION, SOLUTION INTRAMUSCULAR; INTRAVENOUS PRN
Status: DISCONTINUED | OUTPATIENT
Start: 2019-02-25 | End: 2019-02-25 | Stop reason: HOSPADM

## 2019-02-25 RX ORDER — NALOXONE HYDROCHLORIDE 0.4 MG/ML
.1-.4 INJECTION, SOLUTION INTRAMUSCULAR; INTRAVENOUS; SUBCUTANEOUS
Status: DISCONTINUED | OUTPATIENT
Start: 2019-02-25 | End: 2019-02-26 | Stop reason: HOSPADM

## 2019-02-25 RX ORDER — SIMETHICONE
LIQUID (ML) MISCELLANEOUS PRN
Status: DISCONTINUED | OUTPATIENT
Start: 2019-02-25 | End: 2019-02-25 | Stop reason: HOSPADM

## 2019-02-25 RX ORDER — FLUMAZENIL 0.1 MG/ML
0.2 INJECTION, SOLUTION INTRAVENOUS
Status: ACTIVE | OUTPATIENT
Start: 2019-02-25 | End: 2019-02-25

## 2019-02-25 RX ORDER — LIDOCAINE 40 MG/G
CREAM TOPICAL
Status: DISCONTINUED | OUTPATIENT
Start: 2019-02-25 | End: 2019-02-25 | Stop reason: HOSPADM

## 2019-02-25 RX ORDER — ONDANSETRON 2 MG/ML
4 INJECTION INTRAMUSCULAR; INTRAVENOUS
Status: DISCONTINUED | OUTPATIENT
Start: 2019-02-25 | End: 2019-02-25 | Stop reason: HOSPADM

## 2019-02-25 ASSESSMENT — MIFFLIN-ST. JEOR: SCORE: 1214.48

## 2019-02-27 ENCOUNTER — PATIENT OUTREACH (OUTPATIENT)
Dept: GASTROENTEROLOGY | Facility: CLINIC | Age: 34
End: 2019-02-27

## 2019-02-27 DIAGNOSIS — K51.90 ULCERATIVE COLITIS (H): Primary | ICD-10-CM

## 2019-02-27 LAB — COPATH REPORT: NORMAL

## 2019-03-05 ENCOUNTER — TELEPHONE (OUTPATIENT)
Dept: GASTROENTEROLOGY | Facility: CLINIC | Age: 34
End: 2019-03-05

## 2019-03-06 ENCOUNTER — PRE VISIT (OUTPATIENT)
Dept: MATERNAL FETAL MEDICINE | Facility: CLINIC | Age: 34
End: 2019-03-06

## 2019-03-07 ENCOUNTER — OFFICE VISIT (OUTPATIENT)
Dept: MATERNAL FETAL MEDICINE | Facility: CLINIC | Age: 34
End: 2019-03-07
Attending: PHYSICIAN ASSISTANT
Payer: COMMERCIAL

## 2019-03-07 VITALS
BODY MASS INDEX: 20.77 KG/M2 | RESPIRATION RATE: 20 BRPM | HEART RATE: 77 BPM | OXYGEN SATURATION: 98 % | DIASTOLIC BLOOD PRESSURE: 80 MMHG | SYSTOLIC BLOOD PRESSURE: 106 MMHG | WEIGHT: 116.3 LBS

## 2019-03-07 DIAGNOSIS — Z31.69 ENCOUNTER FOR PRECONCEPTION CONSULTATION: ICD-10-CM

## 2019-03-07 DIAGNOSIS — Z31.69 ENCOUNTER FOR PRECONCEPTION CONSULTATION: Primary | ICD-10-CM

## 2019-03-07 ASSESSMENT — PAIN SCALES - GENERAL: PAINLEVEL: NO PAIN (0)

## 2019-03-07 NOTE — PROGRESS NOTES
[facility] NEW       PATIENT: Yarelis Penny    MRN: [id number]    Date of Birth 1985    Tel: [phone] (home)     PCP: No Ref-Primary, Physician     HPI: Ms. Penny is a 33 year old year old female here for preconception counseling in the setting of PSC associated IBD treated with vedolizumab.  She also has PSC/AIH and is status post living donor liver transplant 2015, currently on tacrolimus and prednisone, complicated by anastomotic bile leak with anastomotic strictures status post multiple ERCPs.  Dr. Fuentes Johnson is managing her IBD, Dr. Daniela Enriquez is managing her liver transplant and Dr. Guru Macias is managing her biliary stricture.    Has been doing relatively well as of recently while on vedolizumab.  Required hospitalization in 2016 for a severe flare.  She has been on maximum dose of vedolizumab but still had active inflammation, somewhat discordant from clinical symptoms, and has been on Uceris until spring of this year, in addition to chronic low-dose prednisone for liver transplant.    Noteworthy diet history- healthy diet in general    UC history  Age at diagnosis: 18  Macroscopic extent of disease varied over the years, most recently ileocolonic involvement with rectal sparing    Current UC symptoms  Bowel frequency in day 1 formed brown bowel movement per day  Bowel frequency in night none  Urgency of defecation none  Blood in stool none  General well being 1 = slightly below average  Extracolonic features (multiple select) PSC/AIH    Constitutional symptoms:  Fever NO  Weight loss NO    Other GI symptoms present none    Total number of IBD surgeries (except perianal): none    Remaining bowel: entire   Small bowel entire  Ileocecal valve present Yes-patulous at last colonoscopy with suggestion of backwash ileitis  Current Pouch  NO  Current Stoma  NO    Current IBD Medications:  -Vedolizumab 400 mg every 4 weeks, prednisone 5 mg daily    Past IBD Medications:  Prednisone, Pentasa, Asacol, azathioprine (mainly for liver disease), Uceris- stopped March 2018    Interval history, 3/2019 (IPREPP visit)  Overall, feeling pretty well. No specific complaints today. Presents for further recommendations regarding recent colonoscopy findings.  She is accompanied by her , Ceferino.     They are interested in conceiving a baby when the time is right from a health standpoint.     Current UC symptoms  Bowel frequency in day 1 formed brown bowel movement per day  Bowel frequency in night none  Urgency of defecation none  Blood in stool none  General well being 1 = slightly below average  Extracolonic features (multiple select) PSC/AIH    Past Medical History:   Diagnosis Date     Acute kidney injury (H) 4/28/2016     Anemia, iron deficiency 8/29/2017     Autoimmune hepatitis (H) 2012    on steroid taper     Cholangitis      CKD (chronic kidney disease) 2012    biopsy 2012: TIN, patchy fibrosis     CKD (chronic kidney disease)     biopsy 2012: TIN, patchy fibrosis      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     Esophageal varices (H) 9/08    banded     Heart murmur      History of blood transfusion      Primary sclerosing cholangitis      SBP (spontaneous bacterial peritonitis) (H) 2/24/2015     Ulcerative Colitis     f/b GI      Past Surgical History:   Procedure Laterality Date     COLONOSCOPY  9/07     COLONOSCOPY N/A 2/26/2015    Procedure: COMBINED COLONOSCOPY, SINGLE OR MULTIPLE BIOPSY/POLYPECTOMY BY BIOPSY;  Surgeon: Mitchel Hoskins Chi, MD;  Location: [facility]     COLONOSCOPY N/A 1/12/2016    Procedure: COMBINED COLONOSCOPY, SINGLE OR MULTIPLE BIOPSY/POLYPECTOMY BY BIOPSY;  Surgeon: Rigo Valles MD;  Location: [facility]     COLONOSCOPY N/A 4/1/2016    Procedure: COMBINED COLONOSCOPY, SINGLE OR MULTIPLE BIOPSY/POLYPECTOMY BY BIOPSY;  Surgeon: Kareem Solis MD;  Location: [facility]     COLONOSCOPY N/A 9/5/2017    Procedure: COMBINED COLONOSCOPY, SINGLE OR MULTIPLE BIOPSY/POLYPECTOMY BY  BIOPSY;  Colonoscopy;  Surgeon: Fuentes Johnson MD;  Location: [facility]     COLONOSCOPY N/A 2/25/2019    Procedure: COMBINED COLONOSCOPY, SINGLE OR MULTIPLE BIOPSY/POLYPECTOMY BY BIOPSY;  Surgeon: Sophy Mckeon MD;  [facility]     ENDOSCOPIC RETROGRADE CHOLANGIOPANCREATOGRAM N/A 6/25/2015    Procedure: COMBINED ENDOSCOPIC RETROGRADE CHOLANGIOPANCREATOGRAPHY, PLACE TUBE/STENT;  Surgeon: Landon Quinones MD;  [facility]     ENDOSCOPIC RETROGRADE CHOLANGIOPANCREATOGRAM N/A 6/25/2015    Procedure: COMBINED ENDOSCOPIC RETROGRADE CHOLANGIOPANCREATOGRAPHY, PLACE TUBE/STENT;  Surgeon: Landon Quinones MD;  [facility]     ENDOSCOPIC RETROGRADE CHOLANGIOPANCREATOGRAM N/A 7/2/2015    Procedure: COMBINED ENDOSCOPIC RETROGRADE CHOLANGIOPANCREATOGRAPHY, PLACE TUBE/STENT;  Surgeon: Landon Quinones MD;  [facility]     ENDOSCOPIC RETROGRADE CHOLANGIOPANCREATOGRAM N/A 9/8/2015    Procedure: COMBINED ENDOSCOPIC RETROGRADE CHOLANGIOPANCREATOGRAPHY, REMOVE FOREIGN BODY OR STENT/TUBE;  Surgeon: Landon Quinones MD;  [facility]     ENDOSCOPIC RETROGRADE CHOLANGIOPANCREATOGRAM N/A 12/8/2015    Procedure: ENDOSCOPIC RETROGRADE CHOLANGIOPANCREATOGRAM;  Surgeon: Landon Quinones MD;  [facility]     ENDOSCOPIC RETROGRADE CHOLANGIOPANCREATOGRAM N/A 3/1/2016    Procedure: COMBINED ENDOSCOPIC RETROGRADE CHOLANGIOPANCREATOGRAPHY, REMOVE FOREIGN BODY OR STENT/TUBE;  Surgeon: Landon Qiunones MD;  [facility]     ENDOSCOPIC RETROGRADE CHOLANGIOPANCREATOGRAM N/A 3/20/2017    Procedure: COMBINED ENDOSCOPIC RETROGRADE CHOLANGIOPANCREATOGRAPHY, PLACE TUBE/STENT;  Endoscopic Retrograde Cholangiopancreatogram with Ballon Dilation, Stent Placement;  Surgeon: Guru Brittany Macias MD;  [facility]     ENDOSCOPIC RETROGRADE CHOLANGIOPANCREATOGRAM N/A 4/2/2018    Procedure: COMBINED ENDOSCOPIC RETROGRADE CHOLANGIOPANCREATOGRAPHY, PLACE TUBE/STENT;  Endoscopic  Retrograde Cholangiopancreatogram with biliary dilation and stent placement;  Surgeon: Guru Brittany Macias MD;  [facility]     ENDOSCOPIC RETROGRADE CHOLANGIOPANCREATOGRAM N/A 5/7/2018    Procedure: ENDOSCOPIC RETROGRADE CHOLANGIOPANCREATOGRAM;  Endoscopic Retrograde Cholangiopancreatogram ballon dilation stent exchange;  Surgeon: Guru Brittany Macias MD;  [facility]     ENDOSCOPIC RETROGRADE CHOLANGIOPANCREATOGRAPHY, EXCHANGE TUBE/STENT N/A 7/30/2015    Procedure: ENDOSCOPIC RETROGRADE CHOLANGIOPANCREATOGRAPHY, EXCHANGE TUBE/STENT;  Surgeon: Landon Quinones MD;  [facility]     ENDOSCOPIC RETROGRADE CHOLANGIOPANCREATOGRAPHY, EXCHANGE TUBE/STENT N/A 5/15/2017    Procedure: ENDOSCOPIC RETROGRADE CHOLANGIOPANCREATOGRAPHY, EXCHANGE TUBE/STENT;  Endoscopic Retrograde Cholangiopancreatogram with biliary stent exchange and balloon dilation;  Surgeon: Guru Brittany Macias MD;  [facility]     ENDOSCOPIC RETROGRADE CHOLANGIOPANCREATOGRAPHY, EXCHANGE TUBE/STENT N/A 6/25/2018    Procedure: ENDOSCOPIC RETROGRADE CHOLANGIOPANCREATOGRAPHY, EXCHANGE TUBE/STENT;  Endoscopic Retrograde Cholangiopancreatogram with biliary dilation, stone removal and stent exchange;  Surgeon: Guru Brittany Macias MD;  [facility]     ENDOSCOPIC RETROGRADE CHOLANGIOPANCREATOGRAPHY, EXCHANGE TUBE/STENT N/A 7/30/2018    Procedure: ENDOSCOPIC RETROGRADE CHOLANGIOPANCREATOGRAPHY, EXCHANGE TUBE/STENT;  Endoscopic Retrograde Cholangiopancreatogram with Stent Exchange with dilation;  Surgeon: Guru Brittany Macias MD;  [facility]     ENDOSCOPIC RETROGRADE CHOLANGIOPANCREATOGRAPHY, EXCHANGE TUBE/STENT N/A 9/26/2018    Procedure: ENDOSCOPIC RETROGRADE CHOLANGIOPANCREATOGRAPHY, EXCHANGE TUBE/STENT;  Endoscopic Retrograde Cholangiopancreatogram, with bile duct stent exchanged balloon dilation and balloon sweep of bile duct;  Surgeon: Colleen  Guru Brittany Vail MD;  [facility]     ESOPHAGOSCOPY, GASTROSCOPY, DUODENOSCOPY (EGD), COMBINED N/A 2/26/2015    Procedure: COMBINED ESOPHAGOSCOPY, GASTROSCOPY, DUODENOSCOPY (EGD);  Surgeon: Mitchel Hoskins Chi, MD;  [facility]     EXPLORE COMMON BILE DUCT N/A 6/25/2015    Procedure: EXPLORE COMMON BILE DUCT;  Surgeon: Tyree Smith MD;  [facility]     LAPAROTOMY EXPLORATORY N/A 6/25/2015    Procedure: LAPAROTOMY EXPLORATORY;  Surgeon: Tyree Smith MD;  [facility]     PICC INSERTION Right 2/27/2015    4fr SL Valved PICC, 36cm (1cm external) in the R medial brachial vein w/ tip in the low SVC.     SIGMOIDOSCOPY FLEXIBLE N/A 4/27/2016    Procedure: SIGMOIDOSCOPY FLEXIBLE;  Surgeon: Jose Nielson MD;  [facility]     TRANSPLANT LIVER RECIPIENT LIVING UNRELATED N/A 6/18/2015    Procedure: TRANSPLANT LIVER RECIPIENT LIVING UNRELATED;  Surgeon: Tyree Smith MD;  [facility]     UPPER GI ENDOSCOPY       Social History     Tobacco Use     Smoking status: Never Smoker     Smokeless tobacco: Never Used   Substance Use Topics     Alcohol use: Yes     Alcohol/week: 0.0 oz     Comment: Every other month has 1 drink     Family History   Problem Relation Age of Onset     Hypertension Mother      Lipids Mother      Sleep Apnea Mother      Hypertension Maternal Grandmother      Alzheimer Disease Maternal Grandmother      Lipids Father      Melanoma No family hx of      Skin Cancer No family hx of      Cancer No family hx of      Diabetes No family hx of      Allergies   Allergen Reactions     Rifampin Other (See Comments)     Kidney failure     Penicillins      Bad hives when she was in college      Outpatient Encounter Medications as of 3/7/2019   Medication Sig Dispense Refill     calcium carbonate (OS-KERLINE 500 MG [place]. [place]) 1250 MG tablet Take 1 tablet by mouth every evening       fluorouracil (EFUDEX) 5 % cream Apply topically daily Apply nightly to warts under occlusion alongside  WartStick 40 g 3     GNP ASPIRIN LOW DOSE 81 MG EC tablet Take 1 tablet (81 mg) by mouth daily 30 tablet 1     hydrocortisone valerate (WEST-JUAN) 0.2 % external ointment Apply topically 2 times daily To areas on the left corner of the mouth 15 g 0     multivitamin, therapeutic with minerals (MULTI-VITAMIN) TABS tablet Take 1 tablet by mouth every morning       predniSONE (DELTASONE) 5 MG tablet Take 1 tablet (5 mg) by mouth daily 225 tablet 3     tacrolimus (GENERIC EQUIVALENT) 0.5 MG capsule Take 1 capsule (0.5 mg) by mouth 2 times daily Take with 1 mg capsule. Total dose 2.5 mg every 12 hours 180 capsule 3     tacrolimus (GENERIC EQUIVALENT) 1 MG capsule Take 2 capsules (2 mg) by mouth 2 times daily *total dose 2.5 mg twice daily 120 capsule 11     ursodiol (ACTIGALL) 250 MG tablet Take 2 tablets (500 mg) by mouth every morning AND 1 tablet (250 mg) every evening. 270 tablet 3     Vedolizumab (ENTYVIO IV) Inject 300 mg into the vein every 28 days       VITAMIN D, CHOLECALCIFEROL, PO Take by mouth every evening       No facility-administered encounter medications on file as of 3/7/2019.       NSAID  NO    Review of Systems  Complete 10 System ROS performed. All are negative except as documented below, in the HPI, or in patient questionnaire from today's visit.    1) Constitutional: No fevers, chills, night sweats or malaise, weight loss or gain  2) Skin: No rash  3) Pulmonary: No wheeze, SOB, cough, sputum or hemoptysis  4) Cardiovascular: No Chest pain or palpitations  5) Genitourinary: No blood in urine or dysuria  6) Endocrine: No increased sweating, hunger, thirst or thyroid problems  7) Hematologic: No bruising and easy bleeding  8) Musculoskeletal: no new pain in joints or limitation in ROM  9) Neurologic: No dizziness, paresthesias or weakness or falls  10) Psychiatric:  not depressed/anxious, no sleep problems    PHYSICAL EXAM  Vitals reviewed and stable.   Gen: NAD  HEENT: EOMI, anicteric  Neck:  supple  Lymph: no cervical LAD  CV: RRR  Pulm: non labored  Abd: soft, non tender, non distended, well healed surgical scars  Skin: no rash, no jaundice  MSK: normal gait  Neuro: AAOX3  Psych: normal affect    DATA:  Reviewed in detail past documentation, medications and prior workup available in electronic health records or through outside records.    PERTINENT STUDIES:  Most recent CBC:  WBC   Date Value Ref Range Status   02/19/2019 15.2 (H) 4.0 - 11.0 10e9/L Final     Hemoglobin   Date Value Ref Range Status   02/19/2019 12.5 11.7 - 15.7 g/dL Final     Platelet Count   Date Value Ref Range Status   02/19/2019 513 (H) 150 - 450 10e9/L Final   Most recent coag:  INR   Date Value Ref Range Status   05/31/2018 0.95 0.86 - 1.14 Final   Most recent hepatic panel:  AST   Date Value Ref Range Status   02/19/2019 25 0 - 45 U/L Final     ALT   Date Value Ref Range Status   02/19/2019 31 0 - 50 U/L Final     Bilirubin Conjugated   Date Value Ref Range Status   09/22/2006 0.0 0.0 - 0.3 mg/dL Final      Bilirubin Total   Date Value Ref Range Status   02/19/2019 0.3 0.2 - 1.3 mg/dL Final     Albumin   Date Value Ref Range Status   02/19/2019 2.8 (L) 3.4 - 5.0 g/dL Final     Alkaline Phosphatase   Date Value Ref Range Status   02/19/2019 155 (H) 40 - 150 U/L Final     Creatinine   Date Value Ref Range Status   12/22/2018 0.98 0.52 - 1.04 mg/dL Final     Tuberculosis: Negative 12/5/16    DRUG MONITORING  TPMT enzyme activity: 18.5 12/6/16    6-TGN/6-MMPN levels: 203/257 12/6/16    Biologic concentration:  4/18/17 Vedolizumab level 34.1, no ATI  5/2/17 Vedolizumab level 3.9, no ATI (decreased interval to q4 week dosing)  10/25/17 Vedolizumab level 11, no ATI (no change in therapy-- met with surgeon to discuss what surgical options might look like)    Endoscopy:   Earliest colonoscopies in 2006 show right greater than left inflammation with some inflammation in the ileum as well.    Followup colonoscopy in 2007 showed mainly  diffuse colonic inflammation.    Colonoscopies in 2015 again showed more chronic colitis with no significant ileal involvement.    Colonoscopies in 3/2016 again showed a right-sided and some ileal inflammation with ulceration, but biopsy showed CMV.    Colonoscopy 11/2016 showed severe inflammation from 20 cm from the anus all of the way to the ileum as far as could be evaluated.  Biopsies showed chronic active ileitis and chronic active colitis in every sample.  There was no evidence of CMV or PTLD. Rare SHENA-KACIE positive cells seen in ileum.  Colonoscopy 9/2017 showed Abad 2 inflammation in the right colon and abad one in the left colon with relative rectal sparing.  Patulous IC valve with some backwash ileitis.  Biopsies negative for lymphoma, CMP and EBV biopsies with mild  chronic active ileitis and mild chronic active colitis throughout, including rectum.  Ileocolonoscopy 2/2019 showed Abad 2/3 pan colitis with relative rectal sparing + ileitis. Biopsies showed LGD at 40 cm.     Imaging:  MRE 12/2016:    1.  Diffuse colonic wall thickening and enhancement with loss of  haustral pattern. Wall thickening and enhancement of the terminal  ileum. Findings consistent with acute on chronic changes related to  ulcerative colitis.    IMPRESSION  Ms. Penny is a 33 year old here with PSC-associated IBD with a desire for pregnancy.  Her symptoms are mild, but her mucosal disease is moderate/severe and she now has low grade dysplasia on her colon biopsies.  She is a complicated patient given her immunosuppression, history of severe systemic infections and high risk for malignancy. This is now even more worrisome, given the LGD on her colon biopsies.  For this reason, I think the best course of action is to pursue a colectomy.  She previously saw Dr. Carney and will return for another appointment to discuss her surgical options.     She is aware that her surgical options may be reduced, given a concern for recurrent  pouchitis and poor pouch function in the setting of PSC, which means that she will likely have a permanent ileostomy. On the other hand, there may be a risk for peristomal varices. I defer to Dr. Carney regarding this.      Regarding her desire to become pregnant, I think we should target remission prior to conception.  This can be accomplished surgically.  I think that rectal dissection is the biggest consideration as a possible cause of decreased fertility, although assisted reproductive technology may overcome this.  As previously mentioned, a reasonable option would be to perform an abdominal colectomy and defer rectal dissection until after family planning is complete. If proctectomy is needed sooner (e.g. active inflammation, although less likely given rectal sparing in her), then a laparoscopic or robotic approach may be most protective against adhesion formation. If there is difficulty getting pregnant after rectal dissection, in vitro fertilization would be a good option.   Yarelis understands the complexity of her case and is amenable to surgery. She will follow up with Dr. Krzysztof Carney (colorectal surgery).     Return in about 3 months (around 6/7/2019).     Sophy Mckeon MD   of Medicine  Division of Gastroenterology, Hepatology and Nutrition  [facility]    Thank you for this consultation.  It was a pleasure to participate in the care of this patient; please contact us with any further questions.  I spent a total of 40 minutes, face to face, was spent with this patient, >50% of which was counseling regarding the above delineated issues.

## 2019-03-07 NOTE — NURSING NOTE
Yarelis here with  for GI/MFM preconception consult as patient as Ulcerative colitis. See MFM and GI consult notes. Virgie Zimmer RN

## 2019-03-07 NOTE — PROGRESS NOTES
"RE: Yarelis Penny  : 1985  MRUN: [id number]    2019    Dear Dr. Sierra,    Thank you for referring your patient Ms. Penny for a Maternal-Fetal Medicine consultation today.  As you know, she is a 33 year old G0 non pregnant patient here for preconception counseling.    Yarelis came to me today to discuss recommendations as she has a history of ulcerative colitis with significant continued inflammation despite maximum doses of Vedlizumab. A recent colonoscopy in February showed moderate diffuse inflammation and biopsies showed low grade dysplasia. Due to these factors, she was seen today by Dr. Mckeon who recommended consideration of a colectomy prior to childbearing.     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 She also has cholestasis of the transplanted liver.     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     When I saw Ms. Penny today she was feeling well without major complaints. She was upset by the recent news that she should likely delay childbearing in order to undergo a colectomy, and stated that she was still just \"soaking it all in.\"     Problems Complicating Pregnancy:  Patient Active Problem List    Diagnosis Date Noted     EBV (Jun-Barr virus) viremia 2018     Priority: Medium     History of cytomegalovirus infection 2018     Priority: Medium     Elevated alkaline phosphatase level 2016     Priority: Medium     Leakage of common bile duct of transplanted liver (H) 2015     Priority: Medium     Cholestasis of transplanted liver (H) 2015     Priority: Medium     Seen on liver transplant biopsy. Possible due to medication. Diflucan discontinued.       Immunosuppressed status (H) 2015     Priority: Medium     Liver transplant recipient (H) 2015     Priority: Medium     Autoimmune hepatitis (H)      Priority: Medium     on " steroid taper       CARDIOVASCULAR SCREENING; LDL GOAL LESS THAN 160 10/31/2010     Priority: Medium     Ulcerative colitis (H) 03/26/2005     Priority: Medium     Problem list name updated by automated process. 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     Esophageal varices (H) 9/08    banded     Heart murmur      History of blood transfusion      Primary sclerosing cholangitis      SBP (spontaneous bacterial peritonitis) (H) 2/24/2015     Ulcerative Colitis     f/b GI       Surgical History:   Multiple colonoscopies and ERCPs    ESOPHAGOSCOPY, GASTROSCOPY, DUODENOSCOPY (EGD), COMBINED N/A 2/26/2015    Procedure: COMBINED ESOPHAGOSCOPY, GASTROSCOPY, DUODENOSCOPY (EGD);  Surgeon: Mitchel Hoskins Chi, MD;  Location: [facility]     EXPLORE COMMON BILE DUCT N/A 6/25/2015    Procedure: EXPLORE COMMON BILE DUCT;  Surgeon: Tyree Smith MD;  Location: [facility]     LAPAROTOMY EXPLORATORY N/A 6/25/2015    Procedure: LAPAROTOMY EXPLORATORY;  Surgeon: Tyree Smith MD;  Location: [facility]                SIGMOIDOSCOPY FLEXIBLE N/A 4/27/2016    Procedure: SIGMOIDOSCOPY FLEXIBLE;  Surgeon: Jose Nielson MD;  Location: [facility]     TRANSPLANT LIVER RECIPIENT LIVING UNRELATED N/A 6/18/2015    Procedure: TRANSPLANT LIVER RECIPIENT LIVING UNRELATED;  Surgeon: Tyree Smith MD;  Location: [facility]     UPPER GI ENDOSCOPY       Medications:     Current Outpatient Medications:      calcium carbonate (OS-KERLINE 500 MG [place]) 1250 MG tablet, Take 1 tablet  by mouth every evening, Disp: , Rfl:      fluorouracil (EFUDEX) 5 % cream, Apply topically daily Apply nightly to warts under occlusion alongside WartStick, Disp: 40 g, Rfl: 3     GNP ASPIRIN LOW DOSE 81 MG EC tablet, Take 1 tablet (81 mg) by mouth daily, Disp: 30 tablet, Rfl: 1     hydrocortisone valerate (WEST-JUAN) 0.2 % external ointment, Apply topically 2 times daily To areas on the left corner of the mouth, Disp: 15 g, Rfl: 0     multivitamin, therapeutic with minerals (MULTI-VITAMIN) TABS tablet, Take 1 tablet by mouth every morning, Disp: , Rfl:      predniSONE (DELTASONE) 5 MG tablet, Take 1 tablet (5 mg) by mouth daily, Disp: 225 tablet, Rfl: 3     tacrolimus (GENERIC EQUIVALENT) 0.5 MG capsule, Take 1 capsule (0.5 mg) by mouth 2 times daily Take with 1 mg capsule. Total dose 2.5 mg every 12 hours, Disp: 180 capsule, Rfl: 3     tacrolimus (GENERIC EQUIVALENT) 1 MG capsule, Take 2 capsules (2 mg) by mouth 2 times daily *total dose 2.5 mg twice daily, Disp: 120 capsule, Rfl: 11     ursodiol (ACTIGALL) 250 MG tablet, Take 2 tablets (500 mg) by mouth every morning AND 1 tablet (250 mg) every evening., Disp: 270 tablet, Rfl: 3     Vedolizumab (ENTYVIO IV), Inject 300 mg into the vein every 28 days, Disp: , Rfl:      VITAMIN D, CHOLECALCIFEROL, PO, Take by mouth every evening, Disp: , Rfl:      Allergies:   Allergies   Allergen Reactions     Rifampin Other (See Comments)     Kidney failure     Penicillins      Bad hives when she was in college       Social History:    She  reports that  has never smoked. she has never used smokeless tobacco. She reports that she drinks alcohol. She reports that she does not use drugs.    Family History:   Family History   Problem Relation Age of Onset     Hypertension Mother      Lipids Mother      Sleep Apnea Mother      Hypertension Maternal Grandmother      Alzheimer Disease Maternal Grandmother      Lipids Father      Melanoma No family hx of      Skin Cancer No family hx of       Cancer No family hx of      Diabetes No family hx of        Ethnicity: Caucasion    She denies a family history of motor/intellectual impairment, stillbirth, genetic or chromosome abnormalities or congenital anomalies.         Partner History:    Ethnicity: Caucasion    He denies a family history of motor/intellectual impairment, stillbirth, genetic or chromosome abnormalities or congenital anomalies.         Review of Systems:  Negative except for as noted in HPI    Physical examination was deferred at this time.    We had the following discussion:     1.  Ulcerative Colitis:  The course of ulcerative colitis (UC) during pregnancy is related to the disease status at the time of conception.  Approximately one third of women will experience a relapse.  If a patient is in remission at conception or achieves remission during pregnancy it is likely she will stay in remission.  Women who have active disease at the time of conception may have worsening disease over the course of pregnancy and are likely to have active disease throughout pregnancy. Depending on the maternal level of disease some data indicates an increased risk of low birth weight or  birth.  If the disease is in remission during pregnancy UC should not have a detrimental effect on pregnancy outcome.  If severe disease is encountered in pregnancy, the pregnancy outcome is less predictable, with potential maternal and fetal adverse events.  We discussed at length the natural history of her disease, including recent colonoscopy results and recommendations by her Gastroenterologist, Dr. Mckeon. We would support a recommendation that she postpone childbearing if possible until her disease was under control.     2.  Autoimmune hepatitis with resultant liver transplant  We also reviewed the implications of autoimmune hepatitis and resultant liver transplant. We discussed the risks of history of liver transplant in pregnancy. Successful outcomes for  pregnancy can be expected by these women, although they are at increased risk of preeclampsia,  birth, and low birth-weight and small for gestational age infants.      Pregnancy should be delayed for at least 1 year after transplantation because pregnancies occurring within that period have an increased incidence of prematurity, low birth weight, and acute cellular rejection compared with those occurring later than 1 year. (Ms. Penny is now out of this window.)  Liver transplant recipients with biopsy-proven acute rejection during pregnancy are at greater risk for poor outcomes and recurrent rejection episodes.  Pregnancy itself does not seem to impair graft function or accelerate graft rejection if the patient is adequately immunosuppressed.      Immunosuppressive therapy such as cyclosporine and tacrolimus do not appear to be teratogenic, and breasfeeding is advocated.  Careful monitoring of plasma levels is advised because of the physiologic changes in pregnancy that can alter the pharmacokinetics of immunosuppressive therapy.  Malabsorption due to hyperemesis gravidarum may decrease plasma levels of the drug.      We briefly discussed cholestasis of pregnancy as Ms. Penny is currently already taking ursodiol for this issue. There is a concern of increased  morbidity and mortality with cholestasis. There is a risk of fetal distress (33%), meconium passage, and  respiratory distress. The risk of fetal death is approximately 1.2% after 37 weeks  gestation, with increasing risk as pregnancy progresses.   Additionally, the highest risk for complication appears to be in those with total bile acid levels >40 micromol/L. There is no ideal method for  fetal surveillance and therefore delivery is recommended at 37 weeks.     3.  History of CMV viremia  Finally, we also discussed positive CMV serology as indicative of a past CMV infection. The transmission risk is low (0.2-2%) for  recurrent infection in pregnancy. However congenital CMV infection, though low risk can lead to  long term neurologic sequelae including small for gestational age, microcephaly, ventriculomegaly, chorioretinitis with visual impairment, hepatosplenomegaly, thrombocytopenia. Long-term neurologic deficits for affected children include hearing loss, vision loss and congenital impairment.    Recommendations:  - After consultation with Gastroenterology we would support a recommendation of delayed childbearing until after colectomy  - [facility]. Penny [facility] to discuss contraceptive options as well as to establish gynecologic care  - When Ms. Penny is ready to conceive or if she becomes pregnant prior to surgery we would recommend her to have a follow up consult with us  - If she becomes pregnant she will need baseline preeclampsia labs and early glucose testing due to disease management with prednisone. She should also have a detailed anatomy ultrasound, serial growth ultrasounds,  surveillance starting at 32 weeks and delivery by 36-37 weeks due to known cholestasis.  -  delivery for obstetric indications only, unless the patient does have a J pouch, then consider primary  for protection of the pouch  - We would also suggest starting a low dose 81mg ASA at 12 weeks    At the end of our discussion, Ms. Penny indicated that her questions were answered and she seemed satisfied with our discussion.  Thank you for the opportunity to participate in your patient s care.  If I can be of any further assistance, please do not hesitate to contact me.    I acted as a scribe for Dr. Brower during today's visit    Lulú Lopez MD  M Fellow    MFM Attending Attestation    I have seen and evaluated the patient myself. I spent a total of 15 minutes face-to-face with Yarelis Penny during today's office visit. Over 50% of this time was spent counseling the patient and/or  coordinating care regarding preconception counseling for maternal ulcerative colitis. See note for details; I have made the necessary edits/additions.    Cyn Brower MD  Maternal Fetal Medicine

## 2019-03-19 ENCOUNTER — INFUSION THERAPY VISIT (OUTPATIENT)
Dept: INFUSION THERAPY | Facility: CLINIC | Age: 34
End: 2019-03-19
Attending: INTERNAL MEDICINE
Payer: COMMERCIAL

## 2019-03-19 VITALS
DIASTOLIC BLOOD PRESSURE: 88 MMHG | BODY MASS INDEX: 20.84 KG/M2 | WEIGHT: 116.7 LBS | TEMPERATURE: 98.6 F | RESPIRATION RATE: 16 BRPM | OXYGEN SATURATION: 96 % | SYSTOLIC BLOOD PRESSURE: 126 MMHG

## 2019-03-19 DIAGNOSIS — K51.00 ULCERATIVE PANCOLITIS WITHOUT COMPLICATION (H): Primary | ICD-10-CM

## 2019-03-19 PROCEDURE — 25800030 ZZH RX IP 258 OP 636: Mod: ZF | Performed by: INTERNAL MEDICINE

## 2019-03-19 PROCEDURE — 25000128 H RX IP 250 OP 636: Mod: ZF | Performed by: INTERNAL MEDICINE

## 2019-03-19 PROCEDURE — 96365 THER/PROPH/DIAG IV INF INIT: CPT

## 2019-03-19 RX ADMIN — VEDOLIZUMAB 300 MG: 300 INJECTION, POWDER, LYOPHILIZED, FOR SOLUTION INTRAVENOUS at 08:43

## 2019-03-19 NOTE — PROGRESS NOTES
Nursing Note  Yarelis Penny presents [facility] for:   Chief Complaint   Patient presents with     Infusion     Entyvio     During today's [facility] appointment, orders from Dr. Dimas Mcgee were completed.  Frequency: monthly    Progress note:  Patient identification verified by name and date of birth.  Assessment completed.  Vitals recorded in Doc Flowsheets.  Patient was provided with education regarding infusion and possible side effects.  Patient verbalized understanding.      needed: No  Premedications: were not ordered.  Infusion Rates: 560 ml/hr.  Approximate Infusion length:30 minutes.   Labs: were not ordered for this appointment.  Vascular access: peripheral IV placed today.  Treatment Conditions:   ~~~ NOTE: If the patient answers yes to any of the questions below, hold the infusion and contact ordering provider or on-call provider.    1. Have you recently had an elevated temperature, fever, chills, productive cough, coughing for 3 weeks or longer or hemoptysis,  abnormal vital signs, night sweats,  chest pain or have you noticed a decrease in your appetite, unexplained weight loss or fatigue? No  2. Do you have any open wounds or new incisions? No  3. Do you have any recent or upcoming hospitalizations, surgeries or dental procedures? No  4. Do you currently have or recently have had any signs of illness or infection or are you on any antibiotics? No  5. Have you had any new, sudden or worsening abdominal pain? No  6. Have you or anyone in your household received a live vaccination in the past 4 weeks? Please note:  No live vaccines while on biologic/chemotherapy until 6 months after the last treatment.  Patient can receive the flu vaccine (shot only) and the pneumovax.  It is optimal for the patient to get these vaccines mid cycle, but they can be given at any time as long as it is not on the day of the infusion.  No  7. Have you recently been diagnosed with any new nervous system diseases (ie. Multiple sclerosis, Guillain [place], seizures, neurological changes) or cancer diagnosis? Are you on any form of radiation or chemotherapy? No  8. Are you pregnant or breast feeding or do you have plans of pregnancy in the future? No  9. Have you been having any signs of worsening depression or suicidal ideations?  (benlysta only) No  10. Have there been any other new onset medical symptoms? No    Patient tolerated infusion: well.     Discharge Plan:   Follow up plan of care with: ongoing infusions at [facility]. and primary medical doctor.  Discharge instructions were reviewed with patient.  Patient/representative verbalized understanding of discharge instructions and all questions answered.  Patient discharged from [facility] in stable condition.    Avis Gregg RN       Administrations This Visit     vedolizumab (ENTYVIO) 300 mg in sodium chloride 0.9 % 280 mL infusion     Admin Date  03/19/2019 Action  New Bag Dose  300 mg Rate  560 mL/hr Route  Intravenous Administered By  Avis Gregg RN                  /88   Temp 98.6  F (37  C) (Oral)   Resp 16   Wt 52.9 kg (116 lb 11.2 oz)   LMP 02/24/2019   SpO2 96%   BMI 20.84 kg/m

## 2019-03-21 DIAGNOSIS — Z94.4 S/P LIVER TRANSPLANT (H): ICD-10-CM

## 2019-03-21 RX ORDER — TACROLIMUS 0.5 MG/1
2.5 CAPSULE ORAL EVERY 12 HOURS
Qty: 900 CAPSULE | Refills: 3 | Status: SHIPPED | OUTPATIENT
Start: 2019-03-21 | End: 2020-02-18

## 2019-04-16 ENCOUNTER — PATIENT OUTREACH (OUTPATIENT)
Dept: GASTROENTEROLOGY | Facility: CLINIC | Age: 34
End: 2019-04-16

## 2019-04-16 ENCOUNTER — INFUSION THERAPY VISIT (OUTPATIENT)
Dept: INFUSION THERAPY | Facility: CLINIC | Age: 34
End: 2019-04-16
Attending: INTERNAL MEDICINE
Payer: COMMERCIAL

## 2019-04-16 VITALS
DIASTOLIC BLOOD PRESSURE: 75 MMHG | HEART RATE: 82 BPM | WEIGHT: 114.2 LBS | RESPIRATION RATE: 16 BRPM | TEMPERATURE: 98.3 F | BODY MASS INDEX: 20.39 KG/M2 | SYSTOLIC BLOOD PRESSURE: 113 MMHG

## 2019-04-16 DIAGNOSIS — K51.00 ULCERATIVE PANCOLITIS WITHOUT COMPLICATION (H): Primary | ICD-10-CM

## 2019-04-16 LAB
ALBUMIN SERPL-MCNC: 2.8 G/DL (ref 3.4–5)
ALP SERPL-CCNC: 127 U/L (ref 40–150)
ALT SERPL W P-5'-P-CCNC: 24 U/L (ref 0–50)
AST SERPL W P-5'-P-CCNC: 23 U/L (ref 0–45)
BASOPHILS # BLD AUTO: 0.2 10E9/L (ref 0–0.2)
BASOPHILS NFR BLD AUTO: 0.9 %
BILIRUB DIRECT SERPL-MCNC: 0.1 MG/DL (ref 0–0.2)
BILIRUB SERPL-MCNC: 0.3 MG/DL (ref 0.2–1.3)
CRP SERPL-MCNC: 5.7 MG/L (ref 0–8)
DIFFERENTIAL METHOD BLD: ABNORMAL
EOSINOPHIL # BLD AUTO: 2.1 10E9/L (ref 0–0.7)
EOSINOPHIL NFR BLD AUTO: 12.6 %
ERYTHROCYTE [DISTWIDTH] IN BLOOD BY AUTOMATED COUNT: 15.5 % (ref 10–15)
ERYTHROCYTE [SEDIMENTATION RATE] IN BLOOD BY WESTERGREN METHOD: 46 MM/H (ref 0–20)
HCT VFR BLD AUTO: 39.1 % (ref 35–47)
HGB BLD-MCNC: 12.5 G/DL (ref 11.7–15.7)
IMM GRANULOCYTES # BLD: 0 10E9/L (ref 0–0.4)
IMM GRANULOCYTES NFR BLD: 0.2 %
LYMPHOCYTES # BLD AUTO: 6 10E9/L (ref 0.8–5.3)
LYMPHOCYTES NFR BLD AUTO: 35.5 %
MCH RBC QN AUTO: 28.7 PG (ref 26.5–33)
MCHC RBC AUTO-ENTMCNC: 32 G/DL (ref 31.5–36.5)
MCV RBC AUTO: 90 FL (ref 78–100)
MONOCYTES # BLD AUTO: 1.9 10E9/L (ref 0–1.3)
MONOCYTES NFR BLD AUTO: 11.1 %
NEUTROPHILS # BLD AUTO: 6.6 10E9/L (ref 1.6–8.3)
NEUTROPHILS NFR BLD AUTO: 39.7 %
NRBC # BLD AUTO: 0 10*3/UL
NRBC BLD AUTO-RTO: 0 /100
PLATELET # BLD AUTO: 602 10E9/L (ref 150–450)
PLATELET # BLD EST: ABNORMAL 10*3/UL
PROT SERPL-MCNC: 7.5 G/DL (ref 6.8–8.8)
RBC # BLD AUTO: 4.35 10E12/L (ref 3.8–5.2)
WBC # BLD AUTO: 16.8 10E9/L (ref 4–11)

## 2019-04-16 PROCEDURE — 25800030 ZZH RX IP 258 OP 636: Mod: ZF | Performed by: INTERNAL MEDICINE

## 2019-04-16 PROCEDURE — 85025 COMPLETE CBC W/AUTO DIFF WBC: CPT | Performed by: INTERNAL MEDICINE

## 2019-04-16 PROCEDURE — 25000128 H RX IP 250 OP 636: Mod: ZF | Performed by: INTERNAL MEDICINE

## 2019-04-16 PROCEDURE — 80076 HEPATIC FUNCTION PANEL: CPT | Performed by: INTERNAL MEDICINE

## 2019-04-16 PROCEDURE — 85652 RBC SED RATE AUTOMATED: CPT | Performed by: INTERNAL MEDICINE

## 2019-04-16 PROCEDURE — 96365 THER/PROPH/DIAG IV INF INIT: CPT

## 2019-04-16 PROCEDURE — 86140 C-REACTIVE PROTEIN: CPT | Performed by: INTERNAL MEDICINE

## 2019-04-16 RX ADMIN — VEDOLIZUMAB 300 MG: 300 INJECTION, POWDER, LYOPHILIZED, FOR SOLUTION INTRAVENOUS at 08:38

## 2019-04-16 NOTE — PATIENT INSTRUCTIONS
Dear Yarelis Penny    Thank you for choosing [facility] ([facility]) for your infusion.  The following information is a summary of our appointment as well as important reminders.      EDUCATION POST BIOLOGICAL/CHEMOTHERAPY INFUSION  Call the triage nurse at your clinic or seek medical attention if you have chills and/or temperature greater than or equal to 100.5, uncontrolled nausea/vomiting, diarrhea, constipation, dizziness, shortness of breath, chest pain, heart palpitations, weakness or any other new or concerning symptoms, questions or concerns.  You can not have any live virus vaccines prior to or during treatment or up to 6 months post infusion.  If you have an upcoming surgery, medical procedure or dental procedure during treatment, this should be discussed with your ordering physician and your surgeon/dentist.  If you are having any concerning symptom, if you are unsure if you should get your next infusion or wish to speak to a provider before your next infusion, please call your care coordinator or triage nurse at your clinic to notify them so we can adequately serve you.    We look forward in seeing you on your next appointment here at [facility].  Please don t hesitate to call us at [phone] to reschedule any of your appointments or to speak with one of the [facility] registered nurses.  It was a pleasure taking care of you today.    Sincerely,    [facility]  [facility]  [address] [place] [address]  [id number]  Phone:  [phone]

## 2019-04-16 NOTE — PROGRESS NOTES
Nursing Note  Yarelis Penny presents today to [facility] for:   Chief Complaint   Patient presents with     Infusion     Entyvio infusion     During today's [facility] appointment, orders from Dr. BRIAN Mckeon were completed.  Frequency: every 4 weeks (every 28 days)    Progress note:  Patient identification verified by name and date of birth.  Assessment completed.  Vitals recorded in Doc Flowsheets.  Patient was provided with education regarding infusion and possible side effects.  Patient verbalized understanding.     present during visit today: Not Applicable.    Premedications: were not ordered.    Infusion length and rate:  560 ml/hr.  (30 minute infusion)    Labs: were drawn per orders.     Vascular access: peripheral IV placed today.    Treatment Conditions: Biologic/Chemo Checklist   ~~~ NOTE: If the patient answers yes to any of the questions below, hold the infusion and contact ordering provider or on-call provider.    1. Have you recently had an elevated temperature, fever, chills, productive cough, coughing for 3 weeks or longer or hemoptysis,  abnormal vital signs, night sweats,  chest pain or have you noticed a decrease in your appetite, unexplained weight loss or fatigue? No  2. Do you have any open wounds or new incisions? No  3. Do you have any recent or upcoming hospitalizations, surgeries or dental procedures? No  4. Do you currently have or recently have had any signs of illness or infection or are you on any antibiotics? No  5. Have you had any new, sudden or worsening abdominal pain? No  6. Have you or anyone in your household received a live vaccination in the past 4 weeks? Please note:  No live vaccines while on biologic/chemotherapy until 6 months after the last treatment.  Patient can receive the flu vaccine (shot only) and the pneumovax.  It is optimal for the patient to get these vaccines mid cycle, but they can be given  at any time as long as it is not on the day of the infusion. No  7. Have you recently been diagnosed with any new nervous system diseases (ie. Multiple sclerosis, Guillain [place], seizures, neurological changes) or cancer diagnosis? Are you on any form of radiation or chemotherapy? No  8. Are you pregnant or breast feeding or do you have plans of pregnancy in the future? No  9. Have you been having any signs of worsening depression or suicidal ideations?  (benlysta only) No  10. Have there been any other new onset medical symptoms? No     Administrations This Visit     vedolizumab (ENTYVIO) 300 mg in sodium chloride 0.9 % 280 mL infusion     Admin Date  04/16/2019 Action  New Bag Dose  300 mg Rate  560 mL/hr Route  Intravenous Administered By  Priti Haq RN                    Post Infusion Assessment:  Patient tolerated infusion without incident.  Site patent and intact, free from redness, edema or discomfort.         POST-INFUSION OF BIOLOGICAL MEDICATION:    Reviewed with patient.  Given biologic medication or medication hand-out. Inform patient if any fever, chills or signs of infection, new symptoms, abdominal pain, heart palpitations, shortness of breath, reaction, weakness, neurological changes, seek medical attention immediately and should not receive infusions. No live virus vaccines prior to or during treatment or up to 6 months post infusion. If the patient has an upcoming procedure or surgery, this should be discussed with the rheumatologist and surgeon or provider.    Discharge Plan:   Follow up plan of care with: ongoing infusions at [facility].  Discharge instructions were reviewed with patient.  Patient/representative verbalized understanding of discharge instructions and all questions answered.  Patient discharged from [facility] in stable condition.    Priti Haq RN        BP (!) 129/91   Pulse 76   Temp 98.3  F (36.8  C) (Oral)    Wt 51.8 kg (114 lb 3.2 oz)   BMI 20.39 kg/m

## 2019-04-17 ENCOUNTER — PATIENT OUTREACH (OUTPATIENT)
Dept: GASTROENTEROLOGY | Facility: CLINIC | Age: 34
End: 2019-04-17

## 2019-04-17 DIAGNOSIS — R19.7 DIARRHEA: ICD-10-CM

## 2019-04-17 DIAGNOSIS — K51.90 ULCERATIVE COLITIS (H): Primary | ICD-10-CM

## 2019-04-17 NOTE — PROGRESS NOTES
Returned call to patient and updated her on the plan of care.    Plan:  1. Patient will submit stool samples to rule out infectious causes.   2.  IF stool samples negative will start oral ucerus.     Patient agreeable with the plan of care

## 2019-04-18 ENCOUNTER — APPOINTMENT (OUTPATIENT)
Dept: LAB | Facility: CLINIC | Age: 34
End: 2019-04-18
Payer: COMMERCIAL

## 2019-04-19 DIAGNOSIS — R19.7 DIARRHEA: ICD-10-CM

## 2019-04-19 DIAGNOSIS — K51.90 ULCERATIVE COLITIS (H): ICD-10-CM

## 2019-04-22 LAB
G LAMBLIA AG STL QL IA: NORMAL
O+P STL MICRO: NORMAL
SPECIMEN SOURCE: NORMAL
SPECIMEN SOURCE: NORMAL

## 2019-04-23 DIAGNOSIS — K51.90 ULCERATIVE COLITIS (H): Primary | ICD-10-CM

## 2019-04-23 RX ORDER — BUDESONIDE 9 MG/1
9 TABLET, FILM COATED, EXTENDED RELEASE ORAL EVERY MORNING
Qty: 30 TABLET | Refills: 0 | Status: SHIPPED | OUTPATIENT
Start: 2019-04-23 | End: 2019-05-23

## 2019-04-23 NOTE — PROGRESS NOTES
Called patient and informed her that the Ucerus tablets were sent to her pharmacy for filling.  Patient will call the clinic if symptoms worsen or do not improve with the medication.

## 2019-04-29 ENCOUNTER — TELEPHONE (OUTPATIENT)
Dept: GASTROENTEROLOGY | Facility: CLINIC | Age: 34
End: 2019-04-29

## 2019-04-29 NOTE — PROGRESS NOTES
Colon and Rectal Surgery Clinic Note      RE: Yarelis Penny  : 1985  BLAKE: 2019    Yarelis is a very pleasant 32-year-old woman who presents today to rediscuss possible surgery for her ulcerative colitis.  She is seen today with her , Ceferino.      HPI:     Her IBD treatment was previously under the care of Dr. Fuentes Johnson, and her hepatologist is Dr. Daniela Enriquez.  She was diagnosed with colitis 14 years ago, at which time she was noted to have chronic active ileitis.  She has sclerosing cholangitis and is status post living donor liver transplant.  Ceferino was the liver donor.      She is maintained on tacrolimus and 5 mg daily of prednisone for her liver, which is reportedly the planned baseline immunosuppression for rejection.      Her most recent colonoscopy 2019 showed Abad 2/3 pan colitis with relative rectal sparing and ileitis. Biopsies showed low grade dysplasia at 40 cm.    Prior colonoscopy in 2017 with a single small grauloma in the terminal ileum.    She is currently on vedolizumab and just started Uceris for her IBD and now follows with Dr Sophy Mckeon. She met with Dr. Mckeon to discuss her desire for pregnancy and she recommended Yarelis consider colectomy with deferred rectal dissection until after family planning is complete.    Interval History: Yarelis is having 3-4 loose bowel movements a day.  She has had some increased pain and a decreased appetite.  Her weight has been fairly stable and she is only lost about 3 pounds over the past few months but does not feel that she is eating as well.  Given the new low-grade dysplasia as well as her desire to become pregnant, she would like to talk about colectomy today.    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  No family history of any colorectal cancers.    Physical examination:  Examination was chaperoned by Lulú Serrato NP.     Vitals: /79  "(BP Location: Left arm, Patient Position: Sitting, Cuff Size: Adult Regular)   Ht 5' 2\"   Wt 113 lb 6.4 oz   SpO2 98%   BMI 20.74 kg/m    BMI= Body mass index is 20.74 kg/m .    Alert, oriented, in no acute distress, sitting comfortably    Laboratory data:    Recent Labs   Lab Test 04/16/19  0817  12/22/18  0825  05/31/18  0835   WBC 16.8*   < > 13.9*   < > 13.7*   HGB 12.5   < > 13.9   < > 12.2   *   < > 572*   < > 573*   CR  --   --  0.98   < >  --    ALBUMIN 2.8*   < > 3.2*   < >  --    BILITOTAL 0.3   < > 0.4   < >  --    ALKPHOS 127   < > 149   < >  --    ALT 24   < > 47   < >  --    AST 23   < > 35   < >  --    INR  --   --   --   --  0.95    < > = values in this interval not displayed.       Assessment/plan: Had a long discussion with Yarelis and Ceferino regarding surgical intervention for her ulcerative colitis.  Given the low-grade dysplasia, would recommend a colectomy.  Albumin on 4/16/2019 was 2.8.  Because of her low albumin and her desire for pregnancy, would recommend leaving her rectum and at this time and removing at a future date.  Given her granuloma on prior colonoscopy we had previously discussed the possibility of Crohn's disease that would contraindicate J-pouch in the future.  Yarelis states that she does not think she would want one anyway.  We will have more information on the colon pathology when available that can in any case help guide this decision.  We discussed risks of colectomy including fertility risks, healing risks especially on prednisone, although this is a low dose, stoma complications, parastomal hernia, and the need for continued surveillance of her rectum.  Discussed general risks of surgery including bleeding, blood clots, infection, and pneumonia.  She states an understanding of these risks and wishes to proceed with surgery sometime in the mid to end of June as that is when Ceferino's work as a  slows down.  We will have her meet with our wound ostomy nurse, " Deidra Newell, for ileostomy teaching.  Closer to surgery we will have her meet with Deidra again before stoma marking and with our perioperative assessment center.  Yarelis's questions were answered to their stated satisfaction and they are in agreement with this plan.  We will schedule her for hand-assisted laparoscopic, possible open, total abdominal colectomy with ileostomy.  We discussed the added difficulty and low threshold for an open procedure related to her liver transplant.    Total face to face time was 25 minutes, >50% counseling.    For details of past medical history, surgical history, family history, medications, allergies, and review of systems, please see details below.    Medical history:  Past Medical History:   Diagnosis Date     Acute kidney injury (H) 4/28/2016     Anemia, iron deficiency 8/29/2017     Autoimmune hepatitis (H) 2012    on steroid taper     Cholangitis      CKD (chronic kidney disease) 2012    biopsy 2012: TIN, patchy fibrosis     CKD (chronic kidney disease)     biopsy 2012: TIN, patchy fibrosis      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     Esophageal varices (H) 9/08    banded     Heart murmur      History of blood transfusion      Primary sclerosing cholangitis      SBP (spontaneous bacterial peritonitis) (H) 2/24/2015     Ulcerative Colitis     f/b GI       Surgical history:  Past Surgical History:   Procedure Laterality Date     COLONOSCOPY  9/07     COLONOSCOPY N/A 2/26/2015    Procedure: COMBINED COLONOSCOPY, SINGLE OR MULTIPLE BIOPSY/POLYPECTOMY BY BIOPSY;  Surgeon: Mitchel Hoskins Chi, MD;  [facility]     COLONOSCOPY N/A 1/12/2016    Procedure: COMBINED COLONOSCOPY, SINGLE OR MULTIPLE BIOPSY/POLYPECTOMY BY BIOPSY;  Surgeon: Rigo Valles MD;  [facility]     COLONOSCOPY N/A 4/1/2016    Procedure: COMBINED COLONOSCOPY, SINGLE OR MULTIPLE BIOPSY/POLYPECTOMY BY BIOPSY;  Surgeon: Kareem Solis MD;  Location:  [facility]     COLONOSCOPY N/A 9/5/2017    Procedure:  COMBINED COLONOSCOPY, SINGLE OR MULTIPLE BIOPSY/POLYPECTOMY BY BIOPSY;  Colonoscopy;  Surgeon: Fuentes Johnson MD;  [facility]     COLONOSCOPY N/A 2/25/2019    Procedure: COMBINED COLONOSCOPY, SINGLE OR MULTIPLE BIOPSY/POLYPECTOMY BY BIOPSY;  Surgeon: Sophy Mckeon MD;  [facility]     ENDOSCOPIC RETROGRADE CHOLANGIOPANCREATOGRAM N/A 6/25/2015    Procedure: COMBINED ENDOSCOPIC RETROGRADE CHOLANGIOPANCREATOGRAPHY, PLACE TUBE/STENT;  Surgeon: Landon Quinones MD;  [facility]     ENDOSCOPIC RETROGRADE CHOLANGIOPANCREATOGRAM N/A 6/25/2015    Procedure: COMBINED ENDOSCOPIC RETROGRADE CHOLANGIOPANCREATOGRAPHY, PLACE TUBE/STENT;  Surgeon: Landon Quinones MD;  [facility]     ENDOSCOPIC RETROGRADE CHOLANGIOPANCREATOGRAM N/A 7/2/2015    Procedure: COMBINED ENDOSCOPIC RETROGRADE CHOLANGIOPANCREATOGRAPHY, PLACE TUBE/STENT;  Surgeon: Landon Quinones MD;  [facility]     ENDOSCOPIC RETROGRADE CHOLANGIOPANCREATOGRAM N/A 9/8/2015    Procedure: COMBINED ENDOSCOPIC RETROGRADE CHOLANGIOPANCREATOGRAPHY, REMOVE FOREIGN BODY OR STENT/TUBE;  Surgeon: Landon Quinones MD;  [facility]     ENDOSCOPIC RETROGRADE CHOLANGIOPANCREATOGRAM N/A 12/8/2015    Procedure: ENDOSCOPIC RETROGRADE CHOLANGIOPANCREATOGRAM;  Surgeon: Landon Quinones MD;  [facility]     ENDOSCOPIC RETROGRADE CHOLANGIOPANCREATOGRAM N/A 3/1/2016    Procedure: COMBINED ENDOSCOPIC RETROGRADE CHOLANGIOPANCREATOGRAPHY, REMOVE FOREIGN BODY OR STENT/TUBE;  Surgeon: Landon Quinones MD;  [facility]     ENDOSCOPIC RETROGRADE CHOLANGIOPANCREATOGRAM N/A 3/20/2017    Procedure: COMBINED ENDOSCOPIC RETROGRADE CHOLANGIOPANCREATOGRAPHY, PLACE TUBE/STENT;  Endoscopic Retrograde Cholangiopancreatogram with Ballon Dilation, Stent Placement;  Surgeon: Guru Brittany Macias MD;  [facility]     ENDOSCOPIC RETROGRADE CHOLANGIOPANCREATOGRAM N/A 4/2/2018    Procedure: COMBINED ENDOSCOPIC  RETROGRADE CHOLANGIOPANCREATOGRAPHY, PLACE TUBE/STENT;  Endoscopic Retrograde Cholangiopancreatogram with biliary dilation and stent placement;  Surgeon: Guru Brittany Macias MD;  [facility]     ENDOSCOPIC RETROGRADE CHOLANGIOPANCREATOGRAM N/A 5/7/2018    Procedure: ENDOSCOPIC RETROGRADE CHOLANGIOPANCREATOGRAM;  Endoscopic Retrograde Cholangiopancreatogram ballon dilation stent exchange;  Surgeon: Guru Brittany Macias MD;  [facility]     ENDOSCOPIC RETROGRADE CHOLANGIOPANCREATOGRAPHY, EXCHANGE TUBE/STENT N/A 7/30/2015    Procedure: ENDOSCOPIC RETROGRADE CHOLANGIOPANCREATOGRAPHY, EXCHANGE TUBE/STENT;  Surgeon: Landon Quinones MD;  [facility]     ENDOSCOPIC RETROGRADE CHOLANGIOPANCREATOGRAPHY, EXCHANGE TUBE/STENT N/A 5/15/2017    Procedure: ENDOSCOPIC RETROGRADE CHOLANGIOPANCREATOGRAPHY, EXCHANGE TUBE/STENT;  Endoscopic Retrograde Cholangiopancreatogram with biliary stent exchange and balloon dilation;  Surgeon: Guru Brittany Macias MD;  [facility]     ENDOSCOPIC RETROGRADE CHOLANGIOPANCREATOGRAPHY, EXCHANGE TUBE/STENT N/A 6/25/2018    Procedure: ENDOSCOPIC RETROGRADE CHOLANGIOPANCREATOGRAPHY, EXCHANGE TUBE/STENT;  Endoscopic Retrograde Cholangiopancreatogram with biliary dilation, stone removal and stent exchange;  Surgeon: Guru Brittany Macias MD;  [facility]     ENDOSCOPIC RETROGRADE CHOLANGIOPANCREATOGRAPHY, EXCHANGE TUBE/STENT N/A 7/30/2018    Procedure: ENDOSCOPIC RETROGRADE CHOLANGIOPANCREATOGRAPHY, EXCHANGE TUBE/STENT;  Endoscopic Retrograde Cholangiopancreatogram with Stent Exchange with dilation;  Surgeon: Guru Brittany Macias MD;  [facility]     ENDOSCOPIC RETROGRADE CHOLANGIOPANCREATOGRAPHY, EXCHANGE TUBE/STENT N/A 9/26/2018    Procedure: ENDOSCOPIC RETROGRADE CHOLANGIOPANCREATOGRAPHY, EXCHANGE TUBE/STENT;  Endoscopic Retrograde Cholangiopancreatogram, with bile duct stent exchanged balloon  "dilation and balloon sweep of bile duct;  Surgeon: Guru Brittany Macias MD;  [facility]     ESOPHAGOSCOPY, GASTROSCOPY, DUODENOSCOPY (EGD), COMBINED N/A 2/26/2015    Procedure: COMBINED ESOPHAGOSCOPY, GASTROSCOPY, DUODENOSCOPY (EGD);  Surgeon: Mitchel Hoskins Chi, MD;  [facility]     EXPLORE COMMON BILE DUCT N/A 6/25/2015    Procedure: EXPLORE COMMON BILE DUCT;  Surgeon: Tyree Smith MD;  [facility]     LAPAROTOMY EXPLORATORY N/A 6/25/2015    Procedure: LAPAROTOMY EXPLORATORY;  Surgeon: Tyree Smith MD;  [facility]     PICC INSERTION Right 2/27/2015    4fr SL Valved PICC, 36cm (1cm external) in the R medial brachial vein w/ tip in the low SVC.     SIGMOIDOSCOPY FLEXIBLE N/A 4/27/2016    Procedure: SIGMOIDOSCOPY FLEXIBLE;  Surgeon: Jose Nielson MD;  [facility]     TRANSPLANT LIVER RECIPIENT LIVING UNRELATED N/A 6/18/2015    Procedure: TRANSPLANT LIVER RECIPIENT LIVING UNRELATED;  Surgeon: Tyree Smith MD;  [facility]     UPPER GI ENDOSCOPY         Family history:  Family History   Problem Relation Age of Onset     Hypertension Mother      Lipids Mother      Sleep Apnea Mother      Hypertension Maternal Grandmother      Alzheimer Disease Maternal Grandmother      Lipids Father      Melanoma No family hx of      Skin Cancer No family hx of      Cancer No family hx of      Diabetes No family hx of        Medications:  Current Outpatient Medications   Medication Sig Dispense Refill     budesonide (UCERIS) 9 MG 24 hr tablet Take 1 tablet (9 mg) by mouth every morning 30 tablet 0     calcium carbonate (OS-KERLINE 500 MG [place]) 1250 MG tablet Take 1 tablet by mouth every evening       GNP ASPIRIN LOW DOSE 81 MG EC tablet Take 1 tablet (81 mg) by mouth daily 30 tablet 1     magnesium citrate solution Take 296 mLs by mouth once for 1 dose Start at 8 pm night before surgery, unless otherwise stated in \"Getting Ready for Surgery\" instruction 296 mL 0     metroNIDAZOLE " "(FLAGYL) 500 MG tablet Take 1 tablet (500 mg) by mouth every 6 hours At 8:00 am, 2:00 pm, 8:00 pm the day prior to your surgery with neomycin and zofran. 3 tablet 0     multivitamin, therapeutic with minerals (MULTI-VITAMIN) TABS tablet Take 1 tablet by mouth every morning       neomycin (MYCIFRADIN) 500 MG tablet Take 2 tablets (1,000 mg) by mouth every 6 hours At 8:00 am, 2:00 pm, 8:00 pm the day prior to your surgery with flagyl and zofran. 6 tablet 0     ondansetron (ZOFRAN) 4 MG tablet Take 1 tablet (4 mg) by mouth every 6 hours At 8:00 am, 2:00 pm, 8:00 pm the day prior to your surgery with neomycin and flagyl. 3 tablet 0     polyethylene glycol (MIRALAX) packet Take 238 g by mouth See Admin Instructions Starting at 4 pm night prior to surgery. Refer to \"Getting Ready for Surgery\" instructions. 238 g 0     predniSONE (DELTASONE) 5 MG tablet Take 1 tablet (5 mg) by mouth daily 225 tablet 3     tacrolimus (GENERIC EQUIVALENT) 0.5 MG capsule Take 5 capsules (2.5 mg) by mouth every 12 hours 900 capsule 3     ursodiol (ACTIGALL) 250 MG tablet Take 2 tablets (500 mg) by mouth every morning AND 1 tablet (250 mg) every evening. 270 tablet 3     Vedolizumab (ENTYVIO IV) Inject 300 mg into the vein every 28 days       VITAMIN D, CHOLECALCIFEROL, PO Take by mouth every evening       fluorouracil (EFUDEX) 5 % cream Apply topically daily Apply nightly to warts under occlusion alongside WartStick (Patient not taking: Reported on 4/30/2019) 40 g 3     hydrocortisone valerate (WEST-JUAN) 0.2 % external ointment Apply topically 2 times daily To areas on the left corner of the mouth (Patient not taking: Reported on 4/30/2019) 15 g 0       Allergies:  The patientis allergic to rifampin and penicillins.    Social history:  Social History     Tobacco Use     Smoking status: Never Smoker     Smokeless tobacco: Never Used   Substance Use Topics     Alcohol use: Yes     Alcohol/week: 0.0 oz     Comment: Every other month has 1 drink " "    Marital status: .    Review of Systems:  Nursing Notes:   Barbi Anderson, EMT  4/30/2019  8:22 AM  Signed  Chief Complaint   Patient presents with     Consult     Discuss colectomy.       Vitals:    04/30/19 0818   BP: 123/79   BP Location: Left arm   Patient Position: Sitting   Cuff Size: Adult Regular   SpO2: 98%   Weight: 113 lb 6.4 oz   Height: 5' 2\"       Body mass index is 20.74 kg/m .      Barbi Anderson, EMT                             Krzysztof Carney MD   Professor and Chief  Division of Colon and Rectal Surgery  [facility]      Referring Provider:  [place] [address]     Primary Care Provider:  Alma Broderick    This note was created using speech recognition software and may contain unintended word substitutions.  "

## 2019-04-30 ENCOUNTER — OFFICE VISIT (OUTPATIENT)
Dept: SURGERY | Facility: CLINIC | Age: 34
End: 2019-04-30
Payer: COMMERCIAL

## 2019-04-30 VITALS
HEIGHT: 62 IN | DIASTOLIC BLOOD PRESSURE: 79 MMHG | BODY MASS INDEX: 20.87 KG/M2 | SYSTOLIC BLOOD PRESSURE: 123 MMHG | OXYGEN SATURATION: 98 % | WEIGHT: 113.4 LBS

## 2019-04-30 DIAGNOSIS — K52.9 COLITIS: Primary | ICD-10-CM

## 2019-04-30 RX ORDER — ONDANSETRON 4 MG/1
4 TABLET, FILM COATED ORAL EVERY 6 HOURS
Qty: 3 TABLET | Refills: 0 | Status: ON HOLD | OUTPATIENT
Start: 2019-04-30 | End: 2019-06-24

## 2019-04-30 RX ORDER — POLYETHYLENE GLYCOL 3350 17 G/17G
238 POWDER, FOR SOLUTION ORAL SEE ADMIN INSTRUCTIONS
Qty: 238 G | Refills: 0 | Status: ON HOLD | OUTPATIENT
Start: 2019-04-30 | End: 2019-06-20

## 2019-04-30 RX ORDER — METRONIDAZOLE 500 MG/1
500 TABLET ORAL EVERY 6 HOURS
Qty: 3 TABLET | Refills: 0 | Status: ON HOLD | OUTPATIENT
Start: 2019-04-30 | End: 2019-06-20

## 2019-04-30 RX ORDER — NEOMYCIN SULFATE 500 MG/1
1000 TABLET ORAL EVERY 6 HOURS
Qty: 6 TABLET | Refills: 0 | Status: ON HOLD | OUTPATIENT
Start: 2019-04-30 | End: 2019-06-20

## 2019-04-30 ASSESSMENT — PAIN SCALES - GENERAL: PAINLEVEL: NO PAIN (0)

## 2019-04-30 ASSESSMENT — MIFFLIN-ST. JEOR: SCORE: 1167.63

## 2019-04-30 NOTE — NURSING NOTE
"Chief Complaint   Patient presents with     Consult     Discuss colectomy.       Vitals:    04/30/19 0818   BP: 123/79   BP Location: Left arm   Patient Position: Sitting   Cuff Size: Adult Regular   SpO2: 98%   Weight: 113 lb 6.4 oz   Height: 5' 2\"       Body mass index is 20.74 kg/m .      Barbi Anderson, EMT                      "

## 2019-04-30 NOTE — LETTER
2019       RE: Yarelis Penny  [address] [facility] [address]     Dear Colleague,    Thank you for referring your patient, Yarelis Penny, to the [facility] COLON AND RECTAL SURGERY at [facility]. Please see a copy of my visit note below.    Colon and Rectal Surgery Clinic Note      RE: Yarelis Penny  : 1985  BLAKE: 2019    Yarelis is a very pleasant 32-year-old woman who presents today to [facility] possible surgery for her ulcerative colitis.  She is seen today with her , Ceferino.      HPI:     Her IBD treatment was previously under the care of Dr. Fuentes Johnson, and her hepatologist is Dr. Daniela Enriquez.  She was diagnosed with colitis 14 years ago, at which time she was noted to have chronic active ileitis.  She has sclerosing cholangitis and is status post living donor liver transplant.  Ceferino was the liver donor.      She is maintained on tacrolimus and 5 mg daily of prednisone for her liver, which is reportedly the planned baseline immunosuppression for rejection.      Her most recent colonoscopy 2019 showed Abad 2/3 pan colitis with relative rectal sparing and ileitis. Biopsies showed low grade dysplasia at 40 cm.    Prior colonoscopy in 2017 with a single small grauloma in the terminal ileum.    She is currently on vedolizumab and just started Uceris for her IBD and now follows with Dr Sophy Mckeon. She met with Dr. Mckeon to discuss her desire for pregnancy and she recommended Yarelis consider colectomy with deferred rectal dissection until after family planning is complete.    Interval History: Yarelis is having 3-4 loose bowel movements a day.  She has had some increased pain and a decreased appetite.  Her weight has been fairly stable and she is only lost about 3 pounds over the past few months but does not feel that she is eating as well.  Given the new low-grade dysplasia as well as her desire to become pregnant, she would like to talk about  "colectomy today.    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  No family history of any colorectal cancers.    Physical examination:  Examination was chaperoned by Lulú Serrato NP.     Vitals: /79 (BP Location: Left arm, Patient Position: Sitting, Cuff Size: Adult Regular)   Ht 5' 2\"   Wt 113 lb 6.4 oz   SpO2 98%   BMI 20.74 kg/m     BMI= Body mass index is 20.74 kg/m .    Alert, oriented, in no acute distress, sitting comfortably    Laboratory data:    Recent Labs   Lab Test 04/16/19  0817  12/22/18  0825  05/31/18  0835   WBC 16.8*   < > 13.9*   < > 13.7*   HGB 12.5   < > 13.9   < > 12.2   *   < > 572*   < > 573*   CR  --   --  0.98   < >  --    ALBUMIN 2.8*   < > 3.2*   < >  --    BILITOTAL 0.3   < > 0.4   < >  --    ALKPHOS 127   < > 149   < >  --    ALT 24   < > 47   < >  --    AST 23   < > 35   < >  --    INR  --   --   --   --  0.95    < > = values in this interval not displayed.       Assessment/plan: Had a long discussion with Yarelis and Ceferino regarding surgical intervention for her ulcerative colitis.  Given the low-grade dysplasia, would recommend a colectomy.  Albumin on 4/16/2019 was 2.8.  Because of her low albumin and her desire for pregnancy, would recommend leaving her rectum and at this time and removing at a future date.  Given her granuloma on prior colonoscopy we had previously discussed the possibility of Crohn's disease that would contraindicate J-pouch in the future.  Yarelis states that she does not think she would want one anyway.  We will have more information on the colon pathology when available that can in any case help guide this decision.  We discussed risks of colectomy including fertility risks, healing risks especially on prednisone, although this is a low dose, stoma complications, parastomal hernia, and the need for continued surveillance of her rectum.  Discussed general risks of " surgery including bleeding, blood clots, infection, and pneumonia.  She states an understanding of these risks and wishes to proceed with surgery sometime in the mid to end of June as that is when Ceferino's work as a  slows down.  We will have her meet with our wound ostomy nurse, Deidra Newell, for ileostomy teaching.  Closer to surgery we will have her meet with Deidra again before stoma marking and with our perioperative assessment center.  Yarelis's questions were answered to their stated satisfaction and they are in agreement with this plan.  We will schedule her for hand-assisted laparoscopic, possible open, total abdominal colectomy with ileostomy.  We discussed the added difficulty and low threshold for an open procedure related to her liver transplant.    Total face to face time was 25 minutes, >50% counseling.    For details of past medical history, surgical history, family history, medications, allergies, and review of systems, please see details below.    Medical history:  Past Medical History:   Diagnosis Date     Acute kidney injury (H) 4/28/2016     Anemia, iron deficiency 8/29/2017     Autoimmune hepatitis (H) 2012    on steroid taper     Cholangitis      CKD (chronic kidney disease) 2012    biopsy 2012: TIN, patchy fibrosis     CKD (chronic kidney disease)     biopsy 2012: TIN, patchy fibrosis      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     Esophageal varices (H) 9/08    banded     Heart murmur      History of blood transfusion      Primary sclerosing cholangitis      SBP (spontaneous bacterial peritonitis) (H) 2/24/2015     Ulcerative Colitis     f/b GI       Surgical history:  Past Surgical History:   Procedure Laterality Date     COLONOSCOPY  9/07     COLONOSCOPY N/A 2/26/2015    Procedure: COMBINED COLONOSCOPY, SINGLE OR MULTIPLE BIOPSY/POLYPECTOMY BY BIOPSY;  Surgeon: Mitchel Hoskins Chi, MD;  Location:  GI     COLONOSCOPY N/A 1/12/2016    Procedure: COMBINED COLONOSCOPY, SINGLE OR MULTIPLE  BIOPSY/POLYPECTOMY BY BIOPSY;  Surgeon: Rigo Valles MD;  [facility]: [facility]     COLONOSCOPY N/A 4/1/2016    Procedure: COMBINED COLONOSCOPY, SINGLE OR MULTIPLE BIOPSY/POLYPECTOMY BY BIOPSY;  Surgeon: Kareem Solis MD;  Location: [facility]     COLONOSCOPY N/A 9/5/2017    Procedure: COMBINED COLONOSCOPY, SINGLE OR MULTIPLE BIOPSY/POLYPECTOMY BY BIOPSY;  Colonoscopy;  Surgeon: Fuentes Johnson MD;  Location: [facility]     COLONOSCOPY N/A 2/25/2019    Procedure: COMBINED COLONOSCOPY, SINGLE OR MULTIPLE BIOPSY/POLYPECTOMY BY BIOPSY;  Surgeon: Sophy Mckeon MD;  [facility]     ENDOSCOPIC RETROGRADE CHOLANGIOPANCREATOGRAM N/A 6/25/2015    Procedure: COMBINED ENDOSCOPIC RETROGRADE CHOLANGIOPANCREATOGRAPHY, PLACE TUBE/STENT;  Surgeon: Landon Quinones MD;  [facility]     ENDOSCOPIC RETROGRADE CHOLANGIOPANCREATOGRAM N/A 6/25/2015    Procedure: COMBINED ENDOSCOPIC RETROGRADE CHOLANGIOPANCREATOGRAPHY, PLACE TUBE/STENT;  Surgeon: Landon Quinones MD;  [facility]     ENDOSCOPIC RETROGRADE CHOLANGIOPANCREATOGRAM N/A 7/2/2015    Procedure: COMBINED ENDOSCOPIC RETROGRADE CHOLANGIOPANCREATOGRAPHY, PLACE TUBE/STENT;  Surgeon: Landon Quinones MD;  [facility]     ENDOSCOPIC RETROGRADE CHOLANGIOPANCREATOGRAM N/A 9/8/2015    Procedure: COMBINED ENDOSCOPIC RETROGRADE CHOLANGIOPANCREATOGRAPHY, REMOVE FOREIGN BODY OR STENT/TUBE;  Surgeon: Landon Quinones MD;  [facility]     ENDOSCOPIC RETROGRADE CHOLANGIOPANCREATOGRAM N/A 12/8/2015    Procedure: ENDOSCOPIC RETROGRADE CHOLANGIOPANCREATOGRAM;  Surgeon: Landon Quinones MD;  [facility]     ENDOSCOPIC RETROGRADE CHOLANGIOPANCREATOGRAM N/A 3/1/2016    Procedure: COMBINED ENDOSCOPIC RETROGRADE CHOLANGIOPANCREATOGRAPHY, REMOVE FOREIGN BODY OR STENT/TUBE;  Surgeon: Landon Quinones MD;  [facility]     ENDOSCOPIC RETROGRADE CHOLANGIOPANCREATOGRAM N/A 3/20/2017    Procedure: COMBINED ENDOSCOPIC RETROGRADE  CHOLANGIOPANCREATOGRAPHY, PLACE TUBE/STENT;  Endoscopic Retrograde Cholangiopancreatogram with Ballon Dilation, Stent Placement;  Surgeon: Guru Brittany Macias MD;  [facility]     ENDOSCOPIC RETROGRADE CHOLANGIOPANCREATOGRAM N/A 4/2/2018    Procedure: COMBINED ENDOSCOPIC RETROGRADE CHOLANGIOPANCREATOGRAPHY, PLACE TUBE/STENT;  Endoscopic Retrograde Cholangiopancreatogram with biliary dilation and stent placement;  Surgeon: Guru Brittany Macias MD;  [facility]     ENDOSCOPIC RETROGRADE CHOLANGIOPANCREATOGRAM N/A 5/7/2018    Procedure: ENDOSCOPIC RETROGRADE CHOLANGIOPANCREATOGRAM;  Endoscopic Retrograde Cholangiopancreatogram ballon dilation stent exchange;  Surgeon: Guru Brittany Macias MD;  [facility]     ENDOSCOPIC RETROGRADE CHOLANGIOPANCREATOGRAPHY, EXCHANGE TUBE/STENT N/A 7/30/2015    Procedure: ENDOSCOPIC RETROGRADE CHOLANGIOPANCREATOGRAPHY, EXCHANGE TUBE/STENT;  Surgeon: Landon Quinones MD;  [facility]     ENDOSCOPIC RETROGRADE CHOLANGIOPANCREATOGRAPHY, EXCHANGE TUBE/STENT N/A 5/15/2017    Procedure: ENDOSCOPIC RETROGRADE CHOLANGIOPANCREATOGRAPHY, EXCHANGE TUBE/STENT;  Endoscopic Retrograde Cholangiopancreatogram with biliary stent exchange and balloon dilation;  Surgeon: Guru Brittany Macias MD;  [facility]     ENDOSCOPIC RETROGRADE CHOLANGIOPANCREATOGRAPHY, EXCHANGE TUBE/STENT N/A 6/25/2018    Procedure: ENDOSCOPIC RETROGRADE CHOLANGIOPANCREATOGRAPHY, EXCHANGE TUBE/STENT;  Endoscopic Retrograde Cholangiopancreatogram with biliary dilation, stone removal and stent exchange;  Surgeon: Guru Brittany Macias MD;  [facility]     ENDOSCOPIC RETROGRADE CHOLANGIOPANCREATOGRAPHY, EXCHANGE TUBE/STENT N/A 7/30/2018    Procedure: ENDOSCOPIC RETROGRADE CHOLANGIOPANCREATOGRAPHY, EXCHANGE TUBE/STENT;  Endoscopic Retrograde Cholangiopancreatogram with Stent Exchange with dilation;  Surgeon: Guru Colleen  Brittany Vail MD;  [facility]     ENDOSCOPIC RETROGRADE CHOLANGIOPANCREATOGRAPHY, EXCHANGE TUBE/STENT N/A 9/26/2018    Procedure: ENDOSCOPIC RETROGRADE CHOLANGIOPANCREATOGRAPHY, EXCHANGE TUBE/STENT;  Endoscopic Retrograde Cholangiopancreatogram, with bile duct stent exchanged balloon dilation and balloon sweep of bile duct;  Surgeon: Guru Brittany Macias MD;  [facility]     ESOPHAGOSCOPY, GASTROSCOPY, DUODENOSCOPY (EGD), COMBINED N/A 2/26/2015    Procedure: COMBINED ESOPHAGOSCOPY, GASTROSCOPY, DUODENOSCOPY (EGD);  Surgeon: Mitchel Hoskins Chi, MD;  [facility]     EXPLORE COMMON BILE DUCT N/A 6/25/2015    Procedure: EXPLORE COMMON BILE DUCT;  Surgeon: Tyree Smith MD;  [facility]     LAPAROTOMY EXPLORATORY N/A 6/25/2015    Procedure: LAPAROTOMY EXPLORATORY;  Surgeon: Tyree Smith MD;  [facility]     PICC INSERTION Right 2/27/2015    4fr SL Valved PICC, 36cm (1cm external) in the R medial brachial vein w/ tip in the low SVC.     SIGMOIDOSCOPY FLEXIBLE N/A 4/27/2016    Procedure: SIGMOIDOSCOPY FLEXIBLE;  Surgeon: Jose Nielson MD;  [facility]: [facility]     TRANSPLANT LIVER RECIPIENT LIVING UNRELATED N/A 6/18/2015    Procedure: TRANSPLANT LIVER RECIPIENT LIVING UNRELATED;  Surgeon: Tyree Smith MD;  [facility]     UPPER GI ENDOSCOPY         Family history:  Family History   Problem Relation Age of Onset     Hypertension Mother      Lipids Mother      Sleep Apnea Mother      Hypertension Maternal Grandmother      Alzheimer Disease Maternal Grandmother      Lipids Father      Melanoma No family hx of      Skin Cancer No family hx of      Cancer No family hx of      Diabetes No family hx of        Medications:  Current Outpatient Medications   Medication Sig Dispense Refill     budesonide (UCERIS) 9 MG 24 hr tablet Take 1 tablet (9 mg) by mouth every morning 30 tablet 0     calcium carbonate (OS-KERLINE 500 MG [place]) 1250 MG tablet Take 1 tablet by mouth every evening  "      GNP ASPIRIN LOW DOSE 81 MG EC tablet Take 1 tablet (81 mg) by mouth daily 30 tablet 1     magnesium citrate solution Take 296 mLs by mouth once for 1 dose Start at 8 pm night before surgery, unless otherwise stated in \"Getting Ready for Surgery\" instruction 296 mL 0     metroNIDAZOLE (FLAGYL) 500 MG tablet Take 1 tablet (500 mg) by mouth every 6 hours At 8:00 am, 2:00 pm, 8:00 pm the day prior to your surgery with neomycin and zofran. 3 tablet 0     multivitamin, therapeutic with minerals (MULTI-VITAMIN) TABS tablet Take 1 tablet by mouth every morning       neomycin (MYCIFRADIN) 500 MG tablet Take 2 tablets (1,000 mg) by mouth every 6 hours At 8:00 am, 2:00 pm, 8:00 pm the day prior to your surgery with flagyl and zofran. 6 tablet 0     ondansetron (ZOFRAN) 4 MG tablet Take 1 tablet (4 mg) by mouth every 6 hours At 8:00 am, 2:00 pm, 8:00 pm the day prior to your surgery with neomycin and flagyl. 3 tablet 0     polyethylene glycol (MIRALAX) packet Take 238 g by mouth See Admin Instructions Starting at 4 pm night prior to surgery. Refer to \"Getting Ready for Surgery\" instructions. 238 g 0     predniSONE (DELTASONE) 5 MG tablet Take 1 tablet (5 mg) by mouth daily 225 tablet 3     tacrolimus (GENERIC EQUIVALENT) 0.5 MG capsule Take 5 capsules (2.5 mg) by mouth every 12 hours 900 capsule 3     ursodiol (ACTIGALL) 250 MG tablet Take 2 tablets (500 mg) by mouth every morning AND 1 tablet (250 mg) every evening. 270 tablet 3     Vedolizumab (ENTYVIO IV) Inject 300 mg into the vein every 28 days       VITAMIN D, CHOLECALCIFEROL, PO Take by mouth every evening       fluorouracil (EFUDEX) 5 % cream Apply topically daily Apply nightly to warts under occlusion alongside WartStick (Patient not taking: Reported on 4/30/2019) 40 g 3     hydrocortisone valerate (WEST-JUAN) 0.2 % external ointment Apply topically 2 times daily To areas on the left corner of the mouth (Patient not taking: Reported on 4/30/2019) 15 g 0 " "      Allergies:  The patientis allergic to rifampin and penicillins.    Social history:  Social History     Tobacco Use     Smoking status: Never Smoker     Smokeless tobacco: Never Used   Substance Use Topics     Alcohol use: Yes     Alcohol/week: 0.0 oz     Comment: Every other month has 1 drink     Marital status: .    Review of Systems:  Nursing Notes:   Barbi Anderson EMT  4/30/2019  8:22 AM  Signed  Chief Complaint   Patient presents with     Consult     Discuss colectomy.       Vitals:    04/30/19 0818   BP: 123/79   BP Location: Left arm   Patient Position: Sitting   Cuff Size: Adult Regular   SpO2: 98%   Weight: 113 lb 6.4 oz   Height: 5' 2\"       Body mass index is 20.74 kg/m .      PRASAD Whitman     Referring [place] [address]     Primary Care Provider:  Alma Broderick    This note was created using speech recognition software and may contain unintended word substitutions.    Again, thank you for allowing me to participate in the care of your patient.      Sincerely,    Krzysztof Carney MD      "

## 2019-04-30 NOTE — PATIENT INSTRUCTIONS
Follow up:  1. Schedule Surgery      - Will need PAC, Miralax Bowel prep with Abx    Please call with any questions or concerns regarding your clinic visit today.    It is a pleasure being involved in your health care.    Contacts post-consultation depending on your need:    Radiology Appointments [phone]    Schedule Clinic Appointments [phone] # 1   M-F 7:30 - 5 pm    JOSEFINA Strickland [phone]    Clinic Fax Number [phone]    Surgery Scheduling [phone]    My Chart is available 24 hours a day and is a secure way to access your records and communicate with your care team.  I strongly recommend signing up if you haven't already done so, if you are comfortable with computers.  If you would like to inquire about this or are having problems with My Chart access, you may call [phone] or go online at [email or web address].  Please allow at least 24 hours for a response and extra time on weekends and Holidays.    BOWEL PREP: MIRALAX/GATORADE    Please go to your local pharmacy or store and purchase:    - 8.3 oz bottle of  Miralax (Powder)  - 10 oz bottle of Magnesium Citrate  - 64 oz of Gatorade (no red or purple)    FIVE DAYS BEFORE YOUR SURGERY  - Stop taking any of the following over-the-counter medications: Ibuprofen, Asprin,Iron supplements.    - If you are taking a blood thinner medication such as Coumadin, Plavix, or Warfarin, you MUST contact the prescribing physician regarding clearance for this procedure, or instructions for stopping/changing this medication before your procedure.     -Please contact the nurse line at [phone] option #3 for any questions regarding other medications.     THREE DAYS BEFORE YOUR SURGERY  - Begin restricted low-residue diet.  Suggested foods include: white bread or rolls, plain crackers, white rice, skinless potatoes, low fiber cereals, chicken, turkey, fish or seafood, and eggs.  You may also have applesauce, pear sauce, soft honeydew, cantaloupe,  ripe banana, canned fruit without seeds or skins.      - Do not eat: Corn (any form), raw vegetables, foods with seeds, whole wheat or grain breads and cereals, brown or wild rice, granola, raisins and dried fruit, berries, popcorn, all varieties of nuts, peanuts, and seeds.     THE DAY BEFORE YOUR SURGERY    - Begin allowed clear liquid diet at breakfast time.    - Mix Miralax and 64 oz. of Gatorade in a pitcher, and place in the fridge.      - Begin the allowed clear liquid diet at breakfast time.  Drink at least 1 (one) gallon of water or allowed clear liquids throughout the day.      4:00 P.M.  Start drinking one 8-ounce glass of the solution every 15-30 minutes. If you start to feel nauseous, you may space out your drinking intervals.     8:00 P.M. Drink the bottle of Magnesium Citrate.    You may have clear liquids until 2 hours prior to your procedure.    ALLOWED CLEAR LIQUIDS  - Water  - Regular or decaf coffee (no cream)  - Jell-O or popsicles (no red or purple)  - Plain hard candy (no red or purple)  - Clear juices or Gatorade (no red or purple)  - Chicken broth (no vegetables or noodles)    DO NOT DRINK  - Milk or mild products such as ice cream, malts, or shakes  - Red or purple juices of any kind  - Steve-aid, cranberry, cherry, or grape juice  - Juice with pulp (orange, grapefruit, pineapple, or tomato)  - Cream soups of any kind  - Alcoholic beverages    ANTIBIOTICS    8:00 A.M.- Take one tab of Metronidazole (flagyle) and two tabs of Neomycin with one tab of ondansetron (zofran). Please take the ondansetron with the antibiotics as they can cause nausea.    2:00 P.M.- Take one tab of Metronidazole (flagyle) and two tabs of Neomycin with one tab of ondansetron (zofran). Please take the ondansetron with the antibiotics as they can cause nausea.    8:00 P.M.- Take one tab of Metronidazole (flagyle) and two tabs of Neomycin with one tab of ondansetron (zofran). Please take the ondansetron with the  antibiotics as they can cause nausea.

## 2019-05-01 ENCOUNTER — TELEPHONE (OUTPATIENT)
Dept: SURGERY | Facility: CLINIC | Age: 34
End: 2019-05-01

## 2019-05-01 NOTE — TELEPHONE ENCOUNTER
Patient is scheduled for surgery with Dr. Carney      Spoke or left message with: Yarelis    Date of Surgery: 6/20/2019    Location: [facility]    H&P: Scheduled with PAC on 6/10/2019    Additional imaging/appointments: Post ops made with Lulú Rodrigez and Dr. Carney    Surgery packet: Will be mailed

## 2019-05-10 ENCOUNTER — DOCUMENTATION ONLY (OUTPATIENT)
Dept: SURGERY | Facility: CLINIC | Age: 34
End: 2019-05-10

## 2019-05-10 NOTE — PROGRESS NOTES
Received FMLA form to fill out for spouse for patient's upcoming surgery on 6/20/19. Filled out form and faxed back to [phone].    Time off starting 6/20/19 - 2 weeks (July 4, 2019).    Page Dolan LPN

## 2019-05-13 NOTE — TELEPHONE ENCOUNTER
Called and spoke with patient reminding her of appointment for 3/6/19 at 8AM. Patient requested having appointment canceled as will be seeing provider in [facility] GI clinic on 3/7/19. Have canceled appointment.  
Speaking Coherently

## 2019-05-14 ENCOUNTER — INFUSION THERAPY VISIT (OUTPATIENT)
Dept: INFUSION THERAPY | Facility: CLINIC | Age: 34
End: 2019-05-14
Attending: INTERNAL MEDICINE
Payer: COMMERCIAL

## 2019-05-14 VITALS
RESPIRATION RATE: 16 BRPM | SYSTOLIC BLOOD PRESSURE: 126 MMHG | WEIGHT: 113.54 LBS | BODY MASS INDEX: 20.77 KG/M2 | TEMPERATURE: 98.3 F | HEART RATE: 68 BPM | DIASTOLIC BLOOD PRESSURE: 91 MMHG | OXYGEN SATURATION: 98 %

## 2019-05-14 DIAGNOSIS — K51.00 ULCERATIVE PANCOLITIS WITHOUT COMPLICATION (H): Primary | ICD-10-CM

## 2019-05-14 PROCEDURE — 25000128 H RX IP 250 OP 636: Mod: ZF | Performed by: INTERNAL MEDICINE

## 2019-05-14 PROCEDURE — 25800030 ZZH RX IP 258 OP 636: Mod: ZF | Performed by: INTERNAL MEDICINE

## 2019-05-14 PROCEDURE — 96365 THER/PROPH/DIAG IV INF INIT: CPT

## 2019-05-14 RX ADMIN — VEDOLIZUMAB 300 MG: 300 INJECTION, POWDER, LYOPHILIZED, FOR SOLUTION INTRAVENOUS at 07:37

## 2019-05-14 ASSESSMENT — PAIN SCALES - GENERAL: PAINLEVEL: NO PAIN (0)

## 2019-05-14 NOTE — PROGRESS NOTES
Nursing Note  Yarelis Penny [facility]:   Chief Complaint   Patient presents with     Infusion     Entyvio infusion     During today's [facility] appointment, orders from Dr. BRIAN Mckeon were completed.  Frequency: every 4 weeks    Progress note:  Patient identification verified by name and date of birth.  Assessment completed.  Vitals recorded in Doc Flowsheets.  Patient was provided with education regarding infusion and possible side effects.  Patient verbalized understanding.     present during visit today: Not Applicable.    Pre Infusion Conditions: Biologic/Chemo Checklist   ~~~ NOTE: If the patient answers yes to any of the questions below, hold the infusion and contact ordering provider or on-call provider.    1. Have you recently had an elevated temperature, fever, chills, productive cough, coughing for 3 weeks or longer or hemoptysis,  abnormal vital signs, night sweats,  chest pain or have you noticed a decrease in your appetite, unexplained weight loss or fatigue? No  2. Do you have any open wounds or new incisions? No  3. Do you have any recent or upcoming hospitalizations, surgeries or dental procedures? No  4. Do you currently have or recently have had any signs of illness or infection or are you on any antibiotics? No  5. Have you had any new, sudden or worsening abdominal pain? No  6. Have you or anyone in your household received a live vaccination in the past 4 weeks? Please note:  No live vaccines while on biologic/chemotherapy until 6 months after the last treatment.  Patient can receive the flu vaccine (shot only) and the pneumovax.  It is optimal for the patient to get these vaccines mid cycle, but they can be given at any time as long as it is not on the day of the infusion. No  7. Have you recently been diagnosed with any new nervous system diseases (ie. Multiple sclerosis, Guillain Fenton, seizures,  neurological changes) or cancer diagnosis? Are you on any form of radiation or chemotherapy? No  8. Are you pregnant or breast feeding or do you have plans of pregnancy in the future? No  9. Have you been having any signs of worsening depression or suicidal ideations?  (benlysta only) No  10. Have there been any other new onset medical symptoms? No      Premedications: were not ordered.    Infusion length and rate:  560 ml/hr. (30 minutes)    Labs: ordered every other infusion, drawn at previous visit, not due today.    Vascular access: peripheral IV placed today.  Administrations This Visit     vedolizumab (ENTYVIO) 300 mg in sodium chloride 0.9 % 280 mL infusion     Admin Date  05/14/2019 Action  New Bag Dose  300 mg Rate  560 mL/hr Route  Intravenous Administered By  Dana Quintana RN                  Post Infusion Assessment:  Patient tolerated infusion without incident.  Site patent and intact, free from redness, edema or discomfort.     Discharge Plan:   Follow up plan of care with: ongoing infusions at [facility].  Discharge instructions were reviewed with patient.  Patient/representative verbalized understanding of discharge instructions and all questions answered.  Patient discharged from [facility] in stable condition.    Priti Haq RN        /80 (BP Location: Left arm)   Pulse 68   Temp 98.3  F (36.8  C) (Oral)   Resp 16   Wt 51.5 kg (113 lb 8.6 oz)   SpO2 98%   BMI 20.77 kg/m

## 2019-05-14 NOTE — PATIENT INSTRUCTIONS
Dear Yarelis Penny    Thank you for choosing [facility] ([facility]) for your infusion.  The following information is a summary of our appointment as well as important reminders.      EDUCATION POST BIOLOGICAL/CHEMOTHERAPY INFUSION  Call the triage nurse at your clinic or seek medical attention if you have chills and/or temperature greater than or equal to 100.5, uncontrolled nausea/vomiting, diarrhea, constipation, dizziness, shortness of breath, chest pain, heart palpitations, weakness or any other new or concerning symptoms, questions or concerns.  You can not have any live virus vaccines prior to or during treatment or up to 6 months post infusion.  If you have an upcoming surgery, medical procedure or dental procedure during treatment, this should be discussed with your ordering physician and your surgeon/dentist.  If you are having any concerning symptom, if you are unsure if you should get your next infusion or wish to speak to a provider before your next infusion, please call your care coordinator or triage nurse at your clinic to notify them so we can adequately serve you.    We look forward in seeing you on your next appointment here at [facility] ([facility]).  Please don t hesitate to call us at [phone] to reschedule any of your appointments or to speak with one of the [facility] registered nurses.  It was a pleasure taking care of you today.    Sincerely,    [facility]  [address] [place] [address]  [id number]  Phone:  [phone]

## 2019-05-23 DIAGNOSIS — K51.90 ULCERATIVE COLITIS (H): ICD-10-CM

## 2019-05-23 RX ORDER — BUDESONIDE 9 MG/1
9 TABLET, FILM COATED, EXTENDED RELEASE ORAL EVERY MORNING
Qty: 30 TABLET | Refills: 0 | Status: SHIPPED | OUTPATIENT
Start: 2019-05-23 | End: 2019-08-24

## 2019-05-31 ENCOUNTER — PRE VISIT (OUTPATIENT)
Dept: SURGERY | Facility: CLINIC | Age: 34
End: 2019-05-31

## 2019-05-31 NOTE — TELEPHONE ENCOUNTER
FUTURE VISIT INFORMATION      SURGERY INFORMATION:    Date: 6/20/19    Location: [facility] OR    Surgeon:  Krzysztof Carney    Anesthesia Type:  General    RECORDS REQUESTED FROM:       Primary Care Provider: Alma Broderick MD- [facility]    Pertinent Medical History: Liver transplant    Most recent EKG+ Tracing: 3/29/16    Most recent ECHO: 10/25/14- [facility]

## 2019-06-10 ENCOUNTER — OFFICE VISIT (OUTPATIENT)
Dept: WOUND CARE | Facility: CLINIC | Age: 34
End: 2019-06-10
Payer: COMMERCIAL

## 2019-06-10 ENCOUNTER — OFFICE VISIT (OUTPATIENT)
Dept: SURGERY | Facility: CLINIC | Age: 34
End: 2019-06-10
Payer: COMMERCIAL

## 2019-06-10 ENCOUNTER — ANESTHESIA EVENT (OUTPATIENT)
Dept: SURGERY | Facility: CLINIC | Age: 34
End: 2019-06-10
Payer: COMMERCIAL

## 2019-06-10 VITALS
HEART RATE: 80 BPM | BODY MASS INDEX: 20.74 KG/M2 | OXYGEN SATURATION: 99 % | SYSTOLIC BLOOD PRESSURE: 127 MMHG | TEMPERATURE: 97.8 F | DIASTOLIC BLOOD PRESSURE: 88 MMHG | HEIGHT: 62 IN | WEIGHT: 112.7 LBS | RESPIRATION RATE: 20 BRPM

## 2019-06-10 DIAGNOSIS — Z01.818 PRE-OP EXAMINATION: Primary | ICD-10-CM

## 2019-06-10 DIAGNOSIS — K51.90 ULCERATIVE COLITIS WITHOUT COMPLICATIONS, UNSPECIFIED LOCATION (H): ICD-10-CM

## 2019-06-10 DIAGNOSIS — K51.00 ULCERATIVE PANCOLITIS WITHOUT COMPLICATION (H): Primary | ICD-10-CM

## 2019-06-10 DIAGNOSIS — Z71.89 OSTOMY NURSE CONSULTATION: ICD-10-CM

## 2019-06-10 ASSESSMENT — ENCOUNTER SYMPTOMS: SEIZURES: 0

## 2019-06-10 ASSESSMENT — MIFFLIN-ST. JEOR: SCORE: 1164.45

## 2019-06-10 ASSESSMENT — LIFESTYLE VARIABLES: TOBACCO_USE: 0

## 2019-06-10 NOTE — H&P
Pre-Operative H & P     CC:  Preoperative exam to assess for increased cardiopulmonary risk while undergoing surgery and anesthesia.    Date of Encounter: 6/10/2019  Primary Care Physician:  Alma Broderick     Reason for visit: pre operative examination, ulcerative colitis    HPI  Yarelis Penny is a 34 year old female who presents for pre-operative H & P in preparation for hand assisted total abdominal colectomy, possible open, ileostomy with Dr. Carney on 6/20/19 at [facility].     The patient is a 34 year old woman who has a history of ulcerative colitis with primary sclerosing cholangitis and is s/p liver transplant on 6/18/15. The patient has been on immunosuppressive therapy and most recently on colonoscopy was found to have low grade dysplasia on biopsy. She continues on medical therapy for her ulcerative colitis but also was like to start a family and met with Dr. Mckeon who recommended considering a colectomy prior to pregnancy. She met with Dr. Carney on 4/30/19 and they discussed her surgical options. The patient is now scheduled for the procedure as above.     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     Esophageal varices (H) 9/08    banded     Heart murmur      History of blood transfusion      Primary sclerosing cholangitis      SBP (spontaneous bacterial peritonitis) (H) 2/24/2015     Ulcerative Colitis     f/b GI       Past Surgical History  Past Surgical History:   Procedure Laterality Date     COLONOSCOPY  9/07     COLONOSCOPY N/A 2/26/2015    Procedure: COMBINED COLONOSCOPY, SINGLE OR MULTIPLE  BIOPSY/POLYPECTOMY BY BIOPSY;  Surgeon: Mitchel Hoskins Chi, MD;  Location: [facility]     COLONOSCOPY N/A 1/12/2016    Procedure: COMBINED COLONOSCOPY, SINGLE OR MULTIPLE BIOPSY/POLYPECTOMY BY BIOPSY;  Surgeon: Rigo Valles MD;  [facility]: [facility]     COLONOSCOPY N/A 4/1/2016    Procedure: COMBINED COLONOSCOPY, SINGLE OR MULTIPLE BIOPSY/POLYPECTOMY BY BIOPSY;  Surgeon: Kareem Solis MD;  [facility]: [facility]     COLONOSCOPY N/A 9/5/2017    Procedure: COMBINED COLONOSCOPY, SINGLE OR MULTIPLE BIOPSY/POLYPECTOMY BY BIOPSY;  Colonoscopy;  Surgeon: Fuentes Johnson MD;  [facility]: [facility]     COLONOSCOPY N/A 2/25/2019    Procedure: COMBINED COLONOSCOPY, SINGLE OR MULTIPLE BIOPSY/POLYPECTOMY BY BIOPSY;  Surgeon: Sophy Mckeon MD;  [facility]     ENDOSCOPIC RETROGRADE CHOLANGIOPANCREATOGRAM N/A 6/25/2015    Procedure: COMBINED ENDOSCOPIC RETROGRADE CHOLANGIOPANCREATOGRAPHY, PLACE TUBE/STENT;  Surgeon: Landon Quinones MD;  [facility]     ENDOSCOPIC RETROGRADE CHOLANGIOPANCREATOGRAM N/A 6/25/2015    Procedure: COMBINED ENDOSCOPIC RETROGRADE CHOLANGIOPANCREATOGRAPHY, PLACE TUBE/STENT;  Surgeon: Landon Quinones MD;  [facility]     ENDOSCOPIC RETROGRADE CHOLANGIOPANCREATOGRAM N/A 7/2/2015    Procedure: COMBINED ENDOSCOPIC RETROGRADE CHOLANGIOPANCREATOGRAPHY, PLACE TUBE/STENT;  Surgeon: Landon Quinones MD;  [facility]     ENDOSCOPIC RETROGRADE CHOLANGIOPANCREATOGRAM N/A 9/8/2015    Procedure: COMBINED ENDOSCOPIC RETROGRADE CHOLANGIOPANCREATOGRAPHY, REMOVE FOREIGN BODY OR STENT/TUBE;  Surgeon: Landon Quinones MD;  [facility]     ENDOSCOPIC RETROGRADE CHOLANGIOPANCREATOGRAM N/A 12/8/2015    Procedure: ENDOSCOPIC RETROGRADE CHOLANGIOPANCREATOGRAM;  Surgeon: Landon Quinones MD;  [facility]     ENDOSCOPIC RETROGRADE CHOLANGIOPANCREATOGRAM N/A 3/1/2016    Procedure: COMBINED ENDOSCOPIC RETROGRADE CHOLANGIOPANCREATOGRAPHY, REMOVE FOREIGN BODY OR STENT/TUBE;   Surgeon: Landon Quinones MD;  [facility]     ENDOSCOPIC RETROGRADE CHOLANGIOPANCREATOGRAM N/A 3/20/2017    Procedure: COMBINED ENDOSCOPIC RETROGRADE CHOLANGIOPANCREATOGRAPHY, PLACE TUBE/STENT;  Endoscopic Retrograde Cholangiopancreatogram with Ballon Dilation, Stent Placement;  Surgeon: Guru Brittany Macias MD;  [facility]     ENDOSCOPIC RETROGRADE CHOLANGIOPANCREATOGRAM N/A 4/2/2018    Procedure: COMBINED ENDOSCOPIC RETROGRADE CHOLANGIOPANCREATOGRAPHY, PLACE TUBE/STENT;  Endoscopic Retrograde Cholangiopancreatogram with biliary dilation and stent placement;  Surgeon: Guru Brittany Macias MD;  [facility]     ENDOSCOPIC RETROGRADE CHOLANGIOPANCREATOGRAM N/A 5/7/2018    Procedure: ENDOSCOPIC RETROGRADE CHOLANGIOPANCREATOGRAM;  Endoscopic Retrograde Cholangiopancreatogram ballon dilation stent exchange;  Surgeon: Guru Brittany Macias MD;  [facility]     ENDOSCOPIC RETROGRADE CHOLANGIOPANCREATOGRAPHY, EXCHANGE TUBE/STENT N/A 7/30/2015    Procedure: ENDOSCOPIC RETROGRADE CHOLANGIOPANCREATOGRAPHY, EXCHANGE TUBE/STENT;  Surgeon: Landon Quinones MD;  [facility]     ENDOSCOPIC RETROGRADE CHOLANGIOPANCREATOGRAPHY, EXCHANGE TUBE/STENT N/A 5/15/2017    Procedure: ENDOSCOPIC RETROGRADE CHOLANGIOPANCREATOGRAPHY, EXCHANGE TUBE/STENT;  Endoscopic Retrograde Cholangiopancreatogram with biliary stent exchange and balloon dilation;  Surgeon: Guru Brittany Macias MD;  [facility]     ENDOSCOPIC RETROGRADE CHOLANGIOPANCREATOGRAPHY, EXCHANGE TUBE/STENT N/A 6/25/2018    Procedure: ENDOSCOPIC RETROGRADE CHOLANGIOPANCREATOGRAPHY, EXCHANGE TUBE/STENT;  Endoscopic Retrograde Cholangiopancreatogram with biliary dilation, stone removal and stent exchange;  Surgeon: Guru Brittany Macias MD;  [facility]     ENDOSCOPIC RETROGRADE CHOLANGIOPANCREATOGRAPHY, EXCHANGE TUBE/STENT N/A 7/30/2018    Procedure: ENDOSCOPIC  RETROGRADE CHOLANGIOPANCREATOGRAPHY, EXCHANGE TUBE/STENT;  Endoscopic Retrograde Cholangiopancreatogram with Stent Exchange with dilation;  Surgeon: Guru Brittany Macias MD;  [facility]     ENDOSCOPIC RETROGRADE CHOLANGIOPANCREATOGRAPHY, EXCHANGE TUBE/STENT N/A 9/26/2018    Procedure: ENDOSCOPIC RETROGRADE CHOLANGIOPANCREATOGRAPHY, EXCHANGE TUBE/STENT;  Endoscopic Retrograde Cholangiopancreatogram, with bile duct stent exchanged balloon dilation and balloon sweep of bile duct;  Surgeon: Guru Brittany Macias MD;  [facility]     ESOPHAGOSCOPY, GASTROSCOPY, DUODENOSCOPY (EGD), COMBINED N/A 2/26/2015    Procedure: COMBINED ESOPHAGOSCOPY, GASTROSCOPY, DUODENOSCOPY (EGD);  Surgeon: Mitchel Hoskins Chi, MD;  [facility]     EXPLORE COMMON BILE DUCT N/A 6/25/2015    Procedure: EXPLORE COMMON BILE DUCT;  Surgeon: Tyree Smith MD;  [facility]     LAPAROTOMY EXPLORATORY N/A 6/25/2015    Procedure: LAPAROTOMY EXPLORATORY;  Surgeon: Tyree Smith MD;  [facility]     PICC INSERTION Right 2/27/2015    4fr SL Valved PICC, 36cm (1cm external) in the R medial brachial vein w/ tip in the low SVC.     SIGMOIDOSCOPY FLEXIBLE N/A 4/27/2016    Procedure: SIGMOIDOSCOPY FLEXIBLE;  Surgeon: Jose Nielson MD;  [facility]     TRANSPLANT LIVER RECIPIENT LIVING UNRELATED N/A 6/18/2015    Procedure: TRANSPLANT LIVER RECIPIENT LIVING UNRELATED;  Surgeon: Tyree Smith MD;  [facility]     UPPER GI ENDOSCOPY         Hx of Blood transfusions/reactions: yes, none     Hx of abnormal bleeding or anti-platelet use: ASA - will hold 7 days prior to surgery    Menstrual history: Patient's last menstrual period was 06/10/2019 (exact date).:     Steroid use in the last year: Prednisone daily    Personal or FH with difficulty with Anesthesia:  denies    Prior to Admission Medications  Current Outpatient Medications   Medication Sig Dispense Refill     calcium carbonate (OS-KERLINE 500 MG  "ARPIT. CA) 1250 MG tablet Take 1 tablet by mouth every evening       multivitamin, therapeutic with minerals (MULTI-VITAMIN) TABS tablet Take 1 tablet by mouth every morning       budesonide (UCERIS) 9 MG 24 hr tablet Take 1 tablet (9 mg) by mouth every morning 30 tablet 0     fluorouracil (EFUDEX) 5 % cream Apply topically daily Apply nightly to warts under occlusion alongside WartStick (Patient not taking: Reported on 4/30/2019) 40 g 3     GNP ASPIRIN LOW DOSE 81 MG EC tablet Take 1 tablet (81 mg) by mouth daily 30 tablet 1     hydrocortisone valerate (WEST-JUAN) 0.2 % external ointment Apply topically 2 times daily To areas on the left corner of the mouth (Patient not taking: Reported on 4/30/2019) 15 g 0     metroNIDAZOLE (FLAGYL) 500 MG tablet Take 1 tablet (500 mg) by mouth every 6 hours At 8:00 am, 2:00 pm, 8:00 pm the day prior to your surgery with neomycin and zofran. 3 tablet 0     neomycin (MYCIFRADIN) 500 MG tablet Take 2 tablets (1,000 mg) by mouth every 6 hours At 8:00 am, 2:00 pm, 8:00 pm the day prior to your surgery with flagyl and zofran. 6 tablet 0     ondansetron (ZOFRAN) 4 MG tablet Take 1 tablet (4 mg) by mouth every 6 hours At 8:00 am, 2:00 pm, 8:00 pm the day prior to your surgery with neomycin and flagyl. 3 tablet 0     polyethylene glycol (MIRALAX) packet Take 238 g by mouth See Admin Instructions Starting at 4 pm night prior to surgery. Refer to \"Getting Ready for Surgery\" instructions. 238 g 0     predniSONE (DELTASONE) 5 MG tablet Take 1 tablet (5 mg) by mouth daily 225 tablet 3     tacrolimus (GENERIC EQUIVALENT) 0.5 MG capsule Take 5 capsules (2.5 mg) by mouth every 12 hours 900 capsule 3     ursodiol (ACTIGALL) 250 MG tablet Take 2 tablets (500 mg) by mouth every morning AND 1 tablet (250 mg) every evening. 270 tablet 3     Vedolizumab (ENTYVIO IV) Inject 300 mg into the vein every 28 days       VITAMIN D, CHOLECALCIFEROL, PO Take by mouth every evening         Allergies  Allergies "   Allergen Reactions     Rifampin Other (See Comments)     Kidney failure     Penicillins      Bad hives when she was in college       Social History  Social History     Socioeconomic History     Marital status:      Spouse name: Ceferino     Number of children: 0     Years of education: Not on file     Highest education level: Not on file   Occupational History     Occupation: unemployed   Social Needs     Financial resource strain: Not on file     Food insecurity:     Worry: Not on file     Inability: Not on file     Transportation needs:     Medical: Not on file     Non-medical: Not on file   Tobacco Use     Smoking status: Never Smoker     Smokeless tobacco: Never Used   Substance and Sexual Activity     Alcohol use: Yes     Alcohol/week: 0.0 oz     Comment: Every other month has 1 drink     Drug use: No     Sexual activity: Yes     Partners: Male     Comment: denies pregnancy   Lifestyle     Physical activity:     Days per week: Not on file     Minutes per session: Not on file     Stress: Not on file   Relationships     Social connections:     Talks on phone: Not on file     Gets together: Not on file     Attends Baptism service: Not on file     Active member of club or organization: Not on file     Attends meetings of clubs or organizations: Not on file     Relationship status: Not on file     Intimate partner violence:     Fear of current or ex partner: Not on file     Emotionally abused: Not on file     Physically abused: Not on file     Forced sexual activity: Not on file   Other Topics Concern      Service No     Blood Transfusions No     Caffeine Concern No     Occupational Exposure No     Hobby Hazards No     Sleep Concern Yes     Stress Concern No     Weight Concern No     Special Diet No     Back Care No     Exercise No     Bike Helmet No     Comment: n/a     Seat Belt Yes     Self-Exams No     Parent/sibling w/ CABG, MI or angioplasty before 65F 55M? No   Social History Narrative    no  pets at home, no recent known sick contacts. She spends a lot of her time at the fitness center.        Family History  Family History   Problem Relation Age of Onset     Hypertension Mother      Lipids Mother      Sleep Apnea Mother      Hypertension Maternal Grandmother      Alzheimer Disease Maternal Grandmother      Lipids Father      Melanoma No family hx of      Skin Cancer No family hx of      Cancer No family hx of      Diabetes No family hx of        Review of Systems    ROS/MED HX    ENT/Pulmonary:  - neg pulmonary ROS   (+), recent URI resolved 6/3/19: . .   (-) tobacco use and asthma   Neurologic:  - neg neurologic ROS    (-) seizures, CVA and TIA   Cardiovascular:     (+) ----. : . . . :. valvular problems/murmurs type: MR tricuspid regurgitation :. Previous cardiac testing date:10/25/14results:date: results:ECG reviewed date:3/29/16 results:NSR date: results:          METS/Exercise Tolerance: Comment: Patient lifts weight, walks and does aerobics classes >4 METS   Hematologic:     (+) History of Transfusion no previous transfusion reaction -     (-) history of blood clots   Musculoskeletal:  - neg musculoskeletal ROS       GI/Hepatic:     (+) hiatal hernia, hepatitis liver disease (s/p Liver transplant 2015, chronic steroids and immunosuppressants), Other GI/Hepatic ulcerative colitis, h/o autoimmune hepatitis, esophageal varices s/p banding      Renal/Genitourinary: Comment: History of CKD requiring dialysis in 2012 secondary to medication reaction. Normal creatinine in the past few years.         Endo:     (+) Chronic steroid usage for Post Transplant Immunosuppression .      Psychiatric:  - neg psychiatric ROS       Infectious Disease:  - neg infectious disease ROS       Malignancy:      - no malignancy   Other:    (+) Possibly pregnant LMP: 6/10/19, C-spine cleared: N/A, no H/O Chronic Pain,no other significant disability          The complete review of systems is negative other than noted in the  "HPI or here.   Temp: 97.8  F (36.6  C) Temp src: Oral BP: 127/88 Pulse: 80   Resp: 20 SpO2: 99 %(RA)         112 lbs 11.2 oz  5' 2\"   Body mass index is 20.61 kg/m .       Physical Exam  Constitutional: Awake, alert, cooperative, no apparent distress, and appears stated age.   Eyes: Pupils equal, round and reactive to light, extra ocular muscles intact, sclera clear, conjunctiva normal.  HENT: Normocephalic, oral pharynx with moist mucus membranes, good dentition. No goiter appreciated.   Respiratory: Clear to auscultation bilaterally, no crackles or wheezing.  Cardiovascular: Regular rate and rhythm, normal S1 and S2, and murmur noted.  Carotids +2, no bruits. No edema. Palpable pulses to radial  DP and PT arteries.   GI: Normal bowel sounds, soft, non-distended, non-tender, no masses palpated, no hepatosplenomegaly.  Surgical scars: well healed.   Lymph/Hematologic: No cervical lymphadenopathy and no supraclavicular lymphadenopathy.  Genitourinary:  defer  Skin: Warm and dry.  No rashes at anticipated surgical site.   Musculoskeletal: Full ROM of neck. There is no redness, warmth, or swelling of the joints. Gross motor strength is normal.    Neurologic: Awake, alert, oriented to name, place and time. Cranial nerves II-XII are grossly intact. Gait is normal.   Neuropsychiatric: Calm, cooperative. Normal affect.     Labs: (personally reviewed)      EKG: 3/29/19  Sinus rhythm    Echo 10/25/14  CONCLUSION:    1.  Normal LV size, thickness and systolic function, EF 65%.    2.  No gross regional wall motion abnormalities identified.    3.  Normal RV size and function.    4.  Mild biatrial dilatation.    5.  Multiple mitral valve regurgitant jets; probably moderate mitral     regurgitation.    6.  Moderate to severe tricuspid regurgitation.      Left Ventricular Ejection Fraction: 65 %  Colonoscopy 2/25/19  Impression:          - Abad 2/3 pan colitis (worse on the right with ulcers                        and spontaneous " bleeding in the cecum and ascending                        colon) with long-segment ileitis and relative rectal                        sparing. Not improved from last examination.                        - Preparation of the colon was fair.    Exam: Abdominal x-ray, supine and upright views, 10/24/2018.  IMPRESSION:  1. No pancreatic stent is seen in the present study.  2. Moderate colonic stool burden.    The patient's records and results personally reviewed by this provider.     Outside records reviewed from: care everywhere    ASSESSMENT and PLAN  Yarelis is a 34 year old woman who is scheduled for hand assisted laparoscopic total abdominal colectomy, possible open, ileostomy on 6/20/19 by Dr. Carney in treatment of ulcerative colitis.  PAC referral for risk assessment and optimization for anesthesia with comorbid conditions of mitral valve and tricuspid valve regurgitation, recent URI, autoimmune hepatitis s/p liver transplant, history of CKD, history of CMV colitis/EBV, history of C diff and history of transfusions:    Pre-operative considerations:  1.  Cardiac:  Functional status- METS >4.  High risk surgery with 0.9% (RCRI #) risk of major adverse cardiac event.   ~ Patient with previous EKG in 2016 with normal sinus rhythm and echo in 2014 with EF 65% and found to have mitral valve regurgitation and tricuspid regurgitation. Good exercise tolerance with lifting weights, walking and aerobics classes. Denies symptoms of dyspnea, shortness of breath or chest pain. No further cardiac testing indicated.      2.  Pulm:  Airway feasible.  TAVARES risk: low  ~ Patient reports URI secondary to allergies last week 6/3/19. Symptoms resolved.     3. GI:  Risk of PONV score = 3.  If > 2, anti-emetic intervention recommended.  ~ Autoimmune hepatitis (primary sclerosing cholangitis) - s/p liver transplant 6/18/15. History of stricture s/p stent placement. Most recent ERCP on 9/26/18 and doing well. LFTs on 4/16/19 were normal.  Continue on all transplant medications. Patient is on chronic prednisone. Will need consideration for stress dose steroids.   ~ Ulcerative colitis - continue on budesonide and entyvio (Infusion on 5/14/19 with last infusion planned for tomorrow 6/11/19). Found to have low grade dysplasia on last cscope - procedure as above.      4. : History of CKD in 2012 secondary to medications requiring dialysis for 1 month. Creatinine have been normal in the last few years. No current urinary symtpoms.     5. Heme: History of transfusion without reaction.     6. ID: History of CMV colitis/EBV and C diff. Not currently having issues.     VTE risk: 0.26%    Patient was discussed with Dr Asif. The patient is ERAS and will plan to get labs drawn on 6/12/19.     The patient is optimized for their procedure. AVS with information on surgery time/arrival time, meds and NPO status given by nursing staff.        Margie Valentin PA-C  Preoperative Assessment Center  [facility]  Phone: [phone]  Fax: [phone]

## 2019-06-10 NOTE — ANESTHESIA PREPROCEDURE EVALUATION
Anesthesia Pre-Procedure Evaluation    Patient: Yarelis Penny   MRN:     [id number] Gender:   female   Age:    34 year old :      1985        Preoperative Diagnosis: Colitis   Procedure(s):  Hand Assisted Laparoscopic Total Abdominal Colectomy  Possible Open  Ileostomy     Past Medical History:   Diagnosis Date     Acute kidney injury (H) 2016     Anemia, iron deficiency 2017     Autoimmune hepatitis (H)     on steroid taper     Cholangitis      CKD (chronic kidney disease) 2012    biopsy 2012: TIN, patchy fibrosis     CKD (chronic kidney disease)     biopsy 2012: TIN, patchy fibrosis      CMV colitis (H) 2016     Esophageal varices (H)     banded     Heart murmur      History of blood transfusion      Primary sclerosing cholangitis      SBP (spontaneous bacterial peritonitis) (H) 2015     Ulcerative Colitis     f/b GI      Past Surgical History:   Procedure Laterality Date     COLONOSCOPY       COLONOSCOPY N/A 2015    Procedure: COMBINED COLONOSCOPY, SINGLE OR MULTIPLE BIOPSY/POLYPECTOMY BY BIOPSY;  Surgeon: Mitchel Hoskins Chi, MD;  Location: [facility]     COLONOSCOPY N/A 2016    Procedure: COMBINED COLONOSCOPY, SINGLE OR MULTIPLE BIOPSY/POLYPECTOMY BY BIOPSY;  Surgeon: Rigo Valles MD;  [facility]: [facility]     COLONOSCOPY N/A 2016    Procedure: COMBINED COLONOSCOPY, SINGLE OR MULTIPLE BIOPSY/POLYPECTOMY BY BIOPSY;  Surgeon: Kareem Solis MD;  [facility]     COLONOSCOPY N/A 2017    Procedure: COMBINED COLONOSCOPY, SINGLE OR MULTIPLE BIOPSY/POLYPECTOMY BY BIOPSY;  Colonoscopy;  Surgeon: uFentes Johnson MD;  Location: [facility]     COLONOSCOPY N/A 2019    Procedure: COMBINED COLONOSCOPY, SINGLE OR MULTIPLE BIOPSY/POLYPECTOMY BY BIOPSY;  Surgeon: Sophy Mckeon MD;  [facility]     ENDOSCOPIC RETROGRADE CHOLANGIOPANCREATOGRAM N/A 2015    Procedure: COMBINED ENDOSCOPIC RETROGRADE CHOLANGIOPANCREATOGRAPHY, PLACE TUBE/STENT;   Surgeon: Landon Quinones MD;  [facility]     ENDOSCOPIC RETROGRADE CHOLANGIOPANCREATOGRAM N/A 6/25/2015    Procedure: COMBINED ENDOSCOPIC RETROGRADE CHOLANGIOPANCREATOGRAPHY, PLACE TUBE/STENT;  Surgeon: Landon Quinones MD;  [facility]     ENDOSCOPIC RETROGRADE CHOLANGIOPANCREATOGRAM N/A 7/2/2015    Procedure: COMBINED ENDOSCOPIC RETROGRADE CHOLANGIOPANCREATOGRAPHY, PLACE TUBE/STENT;  Surgeon: Landon Quinones MD;  [facility]     ENDOSCOPIC RETROGRADE CHOLANGIOPANCREATOGRAM N/A 9/8/2015    Procedure: COMBINED ENDOSCOPIC RETROGRADE CHOLANGIOPANCREATOGRAPHY, REMOVE FOREIGN BODY OR STENT/TUBE;  Surgeon: Landon Quinones MD;  [facility]     ENDOSCOPIC RETROGRADE CHOLANGIOPANCREATOGRAM N/A 12/8/2015    Procedure: ENDOSCOPIC RETROGRADE CHOLANGIOPANCREATOGRAM;  Surgeon: Landon Quinones MD;  [facility]     ENDOSCOPIC RETROGRADE CHOLANGIOPANCREATOGRAM N/A 3/1/2016    Procedure: COMBINED ENDOSCOPIC RETROGRADE CHOLANGIOPANCREATOGRAPHY, REMOVE FOREIGN BODY OR STENT/TUBE;  Surgeon: Landon Quinones MD;  [facility]     ENDOSCOPIC RETROGRADE CHOLANGIOPANCREATOGRAM N/A 3/20/2017    Procedure: COMBINED ENDOSCOPIC RETROGRADE CHOLANGIOPANCREATOGRAPHY, PLACE TUBE/STENT;  Endoscopic Retrograde Cholangiopancreatogram with Ballon Dilation, Stent Placement;  Surgeon: Guru Brittany Macias MD;  [facility]     ENDOSCOPIC RETROGRADE CHOLANGIOPANCREATOGRAM N/A 4/2/2018    Procedure: COMBINED ENDOSCOPIC RETROGRADE CHOLANGIOPANCREATOGRAPHY, PLACE TUBE/STENT;  Endoscopic Retrograde Cholangiopancreatogram with biliary dilation and stent placement;  Surgeon: Guru Brittany Macias MD;  [facility]     ENDOSCOPIC RETROGRADE CHOLANGIOPANCREATOGRAM N/A 5/7/2018    Procedure: ENDOSCOPIC RETROGRADE CHOLANGIOPANCREATOGRAM;  Endoscopic Retrograde Cholangiopancreatogram ballon dilation stent exchange;  Surgeon: Guru Brittany Macias MD;   [facility]     ENDOSCOPIC RETROGRADE CHOLANGIOPANCREATOGRAPHY, EXCHANGE TUBE/STENT N/A 7/30/2015    Procedure: ENDOSCOPIC RETROGRADE CHOLANGIOPANCREATOGRAPHY, EXCHANGE TUBE/STENT;  Surgeon: Landon Quinones MD;  [facility]     ENDOSCOPIC RETROGRADE CHOLANGIOPANCREATOGRAPHY, EXCHANGE TUBE/STENT N/A 5/15/2017    Procedure: ENDOSCOPIC RETROGRADE CHOLANGIOPANCREATOGRAPHY, EXCHANGE TUBE/STENT;  Endoscopic Retrograde Cholangiopancreatogram with biliary stent exchange and balloon dilation;  Surgeon: Guru Brittany Macias MD;  [facility]     ENDOSCOPIC RETROGRADE CHOLANGIOPANCREATOGRAPHY, EXCHANGE TUBE/STENT N/A 6/25/2018    Procedure: ENDOSCOPIC RETROGRADE CHOLANGIOPANCREATOGRAPHY, EXCHANGE TUBE/STENT;  Endoscopic Retrograde Cholangiopancreatogram with biliary dilation, stone removal and stent exchange;  Surgeon: Guru Brittany Macias MD;  [facility]     ENDOSCOPIC RETROGRADE CHOLANGIOPANCREATOGRAPHY, EXCHANGE TUBE/STENT N/A 7/30/2018    Procedure: ENDOSCOPIC RETROGRADE CHOLANGIOPANCREATOGRAPHY, EXCHANGE TUBE/STENT;  Endoscopic Retrograde Cholangiopancreatogram with Stent Exchange with dilation;  Surgeon: Guru Brittany Macias MD;  [facility]     ENDOSCOPIC RETROGRADE CHOLANGIOPANCREATOGRAPHY, EXCHANGE TUBE/STENT N/A 9/26/2018    Procedure: ENDOSCOPIC RETROGRADE CHOLANGIOPANCREATOGRAPHY, EXCHANGE TUBE/STENT;  Endoscopic Retrograde Cholangiopancreatogram, with bile duct stent exchanged balloon dilation and balloon sweep of bile duct;  Surgeon: Guru Brittany Macias MD;  [facility]     ESOPHAGOSCOPY, GASTROSCOPY, DUODENOSCOPY (EGD), COMBINED N/A 2/26/2015    Procedure: COMBINED ESOPHAGOSCOPY, GASTROSCOPY, DUODENOSCOPY (EGD);  Surgeon: Mitchel Hoskins Chi, MD;  [facility]     EXPLORE COMMON BILE DUCT N/A 6/25/2015    Procedure: EXPLORE COMMON BILE DUCT;  Surgeon: Tyree Smith MD;  [facility]     LAPAROTOMY EXPLORATORY  N/A 6/25/2015    Procedure: LAPAROTOMY EXPLORATORY;  Surgeon: Tyree Smith MD;  [facility]     PICC INSERTION Right 2/27/2015    4fr SL Valved PICC, 36cm (1cm external) in the R medial brachial vein w/ tip in the low SVC.     SIGMOIDOSCOPY FLEXIBLE N/A 4/27/2016    Procedure: SIGMOIDOSCOPY FLEXIBLE;  Surgeon: Jose Nielson MD;  [facility]     TRANSPLANT LIVER RECIPIENT LIVING UNRELATED N/A 6/18/2015    Procedure: TRANSPLANT LIVER RECIPIENT LIVING UNRELATED;  Surgeon: Tyree Smith MD;  [facility]     UPPER GI ENDOSCOPY            Anesthesia Evaluation     . Pt has had prior anesthetic. Type: General    No history of anesthetic complications          ROS/MED HX    ENT/Pulmonary:  - neg pulmonary ROS   (+), recent URI resolved 6/3/19: . .   (-) tobacco use and asthma   Neurologic:  - neg neurologic ROS    (-) seizures, CVA and TIA   Cardiovascular:     (+) ----. : . . . :. valvular problems/murmurs type: MR tricuspid regurgitation :. Previous cardiac testing date:10/25/14results:date: results:ECG reviewed date:3/29/16 results:NSR date: results:          METS/Exercise Tolerance: Comment: Patient lifts weight, walks and does aerobics classes >4 METS   Hematologic:     (+) History of Transfusion no previous transfusion reaction -     (-) history of blood clots   Musculoskeletal:  - neg musculoskeletal ROS       GI/Hepatic:     (+) hiatal hernia, hepatitis liver disease (s/p Liver transplant 2015, chronic steroids and immunosuppressants), Other GI/Hepatic ulcerative colitis, h/o autoimmune hepatitis, esophageal varices s/p banding      Renal/Genitourinary: Comment: History of CKD requiring dialysis in 2012 secondary to medication reaction. Normal creatinine in the past few years.         Endo:     (+) Chronic steroid usage for Post Transplant Immunosuppression .      Psychiatric:  - neg psychiatric ROS       Infectious Disease:  - neg infectious disease ROS       Malignancy:      - no malignancy  "  Other:    (+) Possibly pregnant LMP: 6/10/19, C-spine cleared: N/A, no H/O Chronic Pain,no other significant disability                        PHYSICAL EXAM:   Mental Status/Neuro: A/A/O; Age Appropriate   Airway: Facies: Feasible  Mallampati: I  Mouth/Opening: Full  TM distance: > 6 cm  Neck ROM: Full   Respiratory: Auscultation: CTAB     Resp. Rate: Normal     Resp. Effort: Normal      CV: Rhythm: Regular  Heart: Murmur  Pulses: Normal   Comments:      Dental: Normal                  Lab Results   Component Value Date    WBC 16.8 (H) 04/16/2019    HGB 12.5 04/16/2019    HCT 39.1 04/16/2019     (H) 04/16/2019    CRP 5.7 04/16/2019    SED 46 (H) 04/16/2019     12/22/2018    POTASSIUM 4.0 12/22/2018    CHLORIDE 106 12/22/2018    CO2 26 12/22/2018    BUN 26 12/22/2018    CR 0.98 12/22/2018    GLC 75 12/22/2018    KERLINE 9.0 12/22/2018    PHOS 3.9 04/12/2018    MAG 1.2 (L) 04/12/2018    ALBUMIN 2.8 (L) 04/16/2019    PROTTOTAL 7.5 04/16/2019    ALT 24 04/16/2019    AST 23 04/16/2019     (H) 04/27/2016    ALKPHOS 127 04/16/2019    BILITOTAL 0.3 04/16/2019    LIPASE 270 12/22/2018    AMYLASE 124 (H) 12/22/2018    TAWNYA 40 (H) 06/23/2012    PTT 56 (H) 04/27/2016    INR 0.95 05/31/2018    FIBR 196 (L) 06/25/2015    TSH 3.69 06/19/2012    HCG Negative 09/26/2018    HCGS Negative 05/07/2018       Preop Vitals  BP Readings from Last 3 Encounters:   05/14/19 (!) 126/91   04/30/19 123/79   04/16/19 113/75    Pulse Readings from Last 3 Encounters:   05/14/19 68   04/16/19 82   03/07/19 77      Resp Readings from Last 3 Encounters:   05/14/19 16   04/16/19 16   03/19/19 16    SpO2 Readings from Last 3 Encounters:   05/14/19 98%   04/30/19 98%   03/19/19 96%      Temp Readings from Last 1 Encounters:   05/14/19 98.3  F (36.8  C) (Oral)    Ht Readings from Last 1 Encounters:   04/30/19 1.575 m (5' 2\")      Wt Readings from Last 1 Encounters:   05/14/19 51.5 kg (113 lb 8.6 oz)    Estimated body mass index is 20.77 " "kg/m  as calculated from the following:    Height as of 4/30/19: 1.575 m (5' 2\").    Weight as of 5/14/19: 51.5 kg (113 lb 8.6 oz).     LDA:            Assessment:   ASA SCORE: 2    NPO Status: > 2 hours since completed Clear Liquids; > 6 hours since completed Solid Foods   Documentation: H&P complete; Preop Testing complete; Consents complete   Proceeding: Proceed without further delay  Tobacco Use:  NO Active use of Tobacco/UNKNOWN Tobacco use status     Plan:   Anes. Type:  General   Pre-Induction: Acetaminophen PO   Induction:  IV (Standard)   Airway: Oral ETT   Access/Monitoring: PIV; A-Line; 2nd PIV   Maintenance: Balanced   Emergence: Procedure Site   Logistics: Observation/Admission     Postop Pain/Sedation Strategy:  Standard-Options: Opioids PRN     PONV Management:  Adult Risk Factors: Female, Non-Smoker, Postop Opioids  Prevention: Ondansetron; Dexamethasone     CONSENT: Direct conversation   Plan and risks discussed with: Patient                     PAC Discussion and Assessment    ASA Classification: 3  Case is suitable for: [place]  Anesthetic techniques and relevant risks discussed: GA  Invasive monitoring and risk discussed:   Types:   Possibility and Risk of blood transfusion discussed:   NPO instructions given:   Additional anesthetic preparation and risks discussed:   Needs early admission to pre-op area:   Other:     PAC Resident/NP Anesthesia Assessment:  Yarelis is a 34 year old woman who is scheduled for hand assisted laparoscopic total abdominal colectomy, possible open, ileostomy on 6/20/19 by Dr. Carney in treatment of ulcerative colitis.  PAC referral for risk assessment and optimization for anesthesia with comorbid conditions of mitral valve and tricuspid valve regurgitation, recent URI, autoimmune hepatitis s/p liver transplant, history of CKD, history of CMV colitis/EBV, history of C diff and history of transfusions:    Pre-operative considerations:  1.  Cardiac:  Functional status- METS " >4.  High risk surgery with 0.9% (RCRI #) risk of major adverse cardiac event.   ~ Patient with previous EKG in 2016 with normal sinus rhythm and echo in 2014 with EF 65% and found to have mitral valve regurgitation and tricuspid regurgitation. Good exercise tolerance with lifting weights, walking and aerobics classes. Denies symptoms of dyspnea, shortness of breath or chest pain. No further cardiac testing indicated.      2.  Pulm:  Airway feasible.  TAVARES risk: low  ~ Patient reports URI secondary to allergies last week 6/3/19. Symptoms resolved.     3. GI:  Risk of PONV score = 3.  If > 2, anti-emetic intervention recommended.  ~ Autoimmune hepatitis (primary sclerosing cholangitis) - s/p liver transplant 6/18/15. History of stricture s/p stent placement. Most recent ERCP on 9/26/18 and doing well. LFTs on 4/16/19 were normal. Continue on all transplant medications. Patient is on chronic prednisone. Will need consideration for stress dose steroids.   ~ Ulcerative colitis - continue on budesonide and entyvio (Infusion on 5/14/19 with last infusion planned for tomorrow 6/11/19). Found to have low grade dysplasia on last cscope - procedure as above.      4. : History of CKD in 2012 secondary to medications requiring dialysis for 1 month. Creatinine have been normal in the last few years. No current urinary symtpoms.     5. Heme: History of transfusion without reaction.     6. ID: History of CMV colitis/EBV and C diff. Not currently having issues.     VTE risk: 0.26%    Patient is optimized and is acceptable candidate for the proposed procedure.  No further diagnostic evaluation is needed.     Patient discussed with Dr. Asif.     For further details of assessment, testing, and physical exam please see H and P completed on same date.    Margie Valentin PA-C        Mid-Level Provider/Resident: Margie Valentin PA-C  Date: 6/10/19  Time:     Attending Anesthesiologist Anesthesia Assessment:        Anesthesiologist:    Date:   Time:   Pass/Fail:   Disposition:     PAC Pharmacist Assessment:        Pharmacist:   Date:   Time:        Margie Valentin PA-C

## 2019-06-10 NOTE — PROGRESS NOTES
[facility] Preoperative Ostomy Consult    Referral from Dr. Carney  Consult attended by patient and spouse  Diagnosis: UC Date of Surgery: 06/20/2019   Type of Surgery:   Hand Assisted Laparoscopic Total Abdominal Colectomy N/A General   Possible Open N/A General   Ileostomy       Emotional readiness for surgery: no acute distress  Physical Limitations: Without limitations  Abdomen assessed for site by: Patient's ability to visiualize area, avoidance of skin creases and scars, palpating for rectus abdominus muscle and clothing fit  Teaching: Anatomy/picture of stoma, stoma/bowel function postop, intro to pouches, diet, precautions with dehydration/blockage, returning to work/lifting, written/media offered and role of WOC/postop followup explained.  Stoma site marking:  Marking pen and tegaderm   Location chosen: [facility]  [employer]'s Ostomy packet given to patient  Dr. Carney was available for supervision of care if needed or if questions should arise and regarding plan of care.  Deidra Newell RN CWON

## 2019-06-10 NOTE — PATIENT INSTRUCTIONS
Preparing for Your Surgery      Name:  Yarelis Penny   MRN:  [id number]   :  1985   Today's Date:  6/10/2019     Arriving for surgery:  Surgery date:  19  Arrival time:  5:30 am    Please come to:       [facility] [address]  [address] [place]  [address]    - ? parking is available in front of the hospital      -    Please proceed to [address] on the 3rd floor. [phone]?     - ?If you are in need of directions, wheelchair or escort please stop at the Information Desk in the lobby.  Inform the information person that you are here for surgery; a wheelchair and escort to [address] will be provided.?     -  Bring your ID and insurance card.    Enhanced Recovery After Surgery     This is a team effort, including you, to get you back on your feet, eating and drinking normally and out of the hospital as quickly as possible.  The goals are: 1) NO INFECTIONS and   2) RETURN TO NORMAL DIET    How can we achieve these goals?  1) STAY ACTIVE: Walk every day before your surgery; try to increase the amount every day.  Walk after surgery as much as you can-the nurses will help you.  Walking speeds healing and gets you home quicker, you heal better at home and have less risk of infection.     2) INCENTIVE SPIROMETER: Practice your incentive spirometer 4 times per day with 5-10 breath each time.  Using the incentive spirometer can strengthen your muscles between your ribs and help you have a strong cough after surgery.  A more effective cough can help prevent problems with your lungs.    3) STAY HYDRATED: Drink clear liquids up until 2 hours before your surgery. We would like you to purchase a drink such as Gatorade or Ensure Clear (not the milkshake type).  Drink this before bedtime and on the way into the hospital, drink between 8-10 ounces or until you feel hydrated.  Keeping well hydrated leads to your veins being plump, you wake up faster, and you are less likely to  be nauseated. Start drinking water as soon as you can after surgery and advance to clear liquids and food as tolerated.  IV fluids contain salt, drinking fluids will minimize the amount of IV fluids you need and decrease the amount of salt you get.    The most common reason for the patient to be readmitted is dehydration. Staying hydrated after you go home from the hospital is very important.  Ensure or Ensure Clear are good options to keep you hydrated.     4) PAIN MANAGEMENT: If we minimize the amount of opioids and narcotics, and use regional blocks (which numb the area where your surgery is) along with oral pain medications; you will have less side effects of nausea and constipation. Narcotics can slow down your bowels and cause you to stay in the hospital longer.     Our goal is to keep you comfortable; eating and drinking normally and back home safely.     What can I eat or drink?  -  Follow [facility] instructions for Bowel pre and Clear Liquid diet.  -  You may have clear liquids (water, apple juice or 7up/Sprite) until 2 hours prior to your surgery. (Until 5:30 am )    Which medicines can I take?       -  Do not bring your own medications to the hospital, except for inhalers and eye drops.        -  Follow [facility] instructions regarding Ibuprofen. If no instructions given, NO Ibuprofen the day prior to surgery.         -  Hold Aspirin and Multivitamin 7 days prior to surgery. Last dose 6-12-19.        -  Hold Naproxen (Aleve) 2 days prior to surgery.       -  Do NOT take these medications in the morning, the day of surgery:  No creams or ointments on the skin.    -  Please take these medications the morning of surgery:  Budesonide, Prednisone, Tacrolimus, Ursodiol,   Acetaminophen (Tylenol) if needed    How do I prepare myself?  -  Take two showers: one the night before surgery; and one the morning of surgery.         Use Scrubcare or Hibiclens to wash from neck down.  You may use your own  shampoo and conditioner. No other hair products.   -  Do NOT use lotion, powder, colognes, deodorant, or antiperspirant the day of your surgery.  -  Do NOT wear any makeup, fingernail polish or jewelry.    -  Begin using Incentive Spirometer 1 week prior to surgery.  Use 4 times per day, up to 5-10 breaths each time.  Bring Incentive Spirometer to hospital.    Questions or Concerns:  If you have questions or concerns prior to your surgery, call [phone]. (Mon - Fri   8 am- 5:30 pm)  Questions about surgery, contact your Surgeons office.      AFTER YOUR SURGERY  Breathing exercises   Breathing exercises help you recover faster. Take deep breaths and let the air out slowly. This will:     Help you wake up after surgery.    Help prevent complications like pneumonia.  Preventing complications will help you go home sooner.   Nausea and vomiting   You may feel sick to your stomach after surgery; if so, let your nurse know.    Pain control:  After surgery, you may have pain. Our goal is to help you manage your pain. Pain medicine will help you feel comfortable enough to do activities that will help you heal.  These activities may include breathing exercises, walking and physical therapy.   To help your health care team treat your pain we will ask: 1) If you have pain  2) where it is located 3) describe your pain in your words  Methods of pain control include medications given by mouth, vein or by nerve block for some surgeries.  We may give you a pain control pump that will:  1) Deliver the medicine through a tube placed in your vein  2) Control the amount of medicine you receive  3) Allow you to push a button to deliver a dose of pain medicine  Sequential Compression Device (SCD) or Pneumo Boots:  You may need to wear SCD S on your legs or feet. These are wraps connected to a machine that pumps in air and releases it. The repeated pumping helps prevent blood clots from forming.   Using an Incentive Spirometer    An  incentive spirometer is a device that helps you do deep breathing exercises. These exercises expand your lungs, aid in circulation, and help prevent pneumonia. Deep breathing exercises also help you breathe better and improve the function of your lungs by:    Keeping your lungs clear    Strengthening your breathing muscles    Helping prevent respiratory complications or problems  The incentive spirometer gives you a way to take an active part in your care. A nurse or therapist will teach you breathing exercises. To do these exercises, you will breathe in through your mouth and not your nose. The incentive spirometer only works correctly if you breathe in through your mouth.    Steps to clear lungs  Step 1. Exhale normally. Then, inhale normally.    Relax and breathe out.  Step 2. Place your lips tightly around the mouthpiece.    Make sure the device is upright and not tilted.  Step 3. Inhale as much air as you can through the mouthpiece (don't breath through your nose).    Inhale slowly and deeply.    Hold your breath long enough to keep the balls or disk raised for at least 3 to 5 seconds, or as instructed by your healthcare provider.  Step 4. Repeat the exercise regularly.

## 2019-06-12 ENCOUNTER — TELEPHONE (OUTPATIENT)
Dept: SURGERY | Facility: CLINIC | Age: 34
End: 2019-06-12

## 2019-06-12 DIAGNOSIS — K51.90 ULCERATIVE COLITIS WITHOUT COMPLICATIONS, UNSPECIFIED LOCATION (H): ICD-10-CM

## 2019-06-12 LAB
ABO + RH BLD: NORMAL
ABO + RH BLD: NORMAL
BLD GP AB SCN SERPL QL: NORMAL
BLOOD BANK CMNT PATIENT-IMP: NORMAL
ERYTHROCYTE [DISTWIDTH] IN BLOOD BY AUTOMATED COUNT: 13.7 % (ref 10–15)
HCT VFR BLD AUTO: 37 % (ref 35–47)
HGB BLD-MCNC: 12 G/DL (ref 11.7–15.7)
INR PPP: 0.99 (ref 0.86–1.14)
MCH RBC QN AUTO: 28.8 PG (ref 26.5–33)
MCHC RBC AUTO-ENTMCNC: 32.4 G/DL (ref 31.5–36.5)
MCV RBC AUTO: 89 FL (ref 78–100)
PLATELET # BLD AUTO: 882 10E9/L (ref 150–450)
RBC # BLD AUTO: 4.17 10E12/L (ref 3.8–5.2)
SPECIMEN EXP DATE BLD: NORMAL
WBC # BLD AUTO: 15.1 10E9/L (ref 4–11)

## 2019-06-12 PROCEDURE — 86850 RBC ANTIBODY SCREEN: CPT | Performed by: PHYSICIAN ASSISTANT

## 2019-06-12 PROCEDURE — 80048 BASIC METABOLIC PNL TOTAL CA: CPT | Performed by: PHYSICIAN ASSISTANT

## 2019-06-12 PROCEDURE — 86901 BLOOD TYPING SEROLOGIC RH(D): CPT | Performed by: PHYSICIAN ASSISTANT

## 2019-06-12 PROCEDURE — 85027 COMPLETE CBC AUTOMATED: CPT | Performed by: PHYSICIAN ASSISTANT

## 2019-06-12 PROCEDURE — 36415 COLL VENOUS BLD VENIPUNCTURE: CPT | Performed by: PHYSICIAN ASSISTANT

## 2019-06-12 PROCEDURE — 85610 PROTHROMBIN TIME: CPT | Performed by: PHYSICIAN ASSISTANT

## 2019-06-12 PROCEDURE — 86900 BLOOD TYPING SEROLOGIC ABO: CPT | Performed by: PHYSICIAN ASSISTANT

## 2019-06-12 NOTE — ADDENDUM NOTE
Addendum  created 06/12/19 1407 by Margie Valentin PA-C    Diagnosis association updated, Order list changed, Sign clinical note

## 2019-06-12 NOTE — TELEPHONE ENCOUNTER
The patient had her labs drawn at the [facility] as her [facility] appointment yesterday for infusion was canceled. She was called as one of the labs she had drawn was a type and screen which needed to be done in the [facility] system. We discussed that she needs to return for another type and screen prior to her surgery so that the procedure isn't delayed. She verbalized agreement and understanding of the plan.

## 2019-06-13 LAB
ANION GAP SERPL CALCULATED.3IONS-SCNC: 13 MMOL/L (ref 3–14)
BASOPHILS # BLD AUTO: 0.2 10E9/L (ref 0–0.2)
BASOPHILS NFR BLD AUTO: 1 %
BUN SERPL-MCNC: 23 MG/DL (ref 7–30)
CALCIUM SERPL-MCNC: 8.9 MG/DL (ref 8.5–10.1)
CHLORIDE SERPL-SCNC: 109 MMOL/L (ref 94–109)
CO2 SERPL-SCNC: 20 MMOL/L (ref 20–32)
CREAT SERPL-MCNC: 0.85 MG/DL (ref 0.52–1.04)
DIFFERENTIAL METHOD BLD: ABNORMAL
EOSINOPHIL # BLD AUTO: 0.5 10E9/L (ref 0–0.7)
EOSINOPHIL NFR BLD AUTO: 3 %
GFR SERPL CREATININE-BSD FRML MDRD: 90 ML/MIN/{1.73_M2}
GLUCOSE SERPL-MCNC: 70 MG/DL (ref 70–99)
LYMPHOCYTES # BLD AUTO: 6 10E9/L (ref 0.8–5.3)
LYMPHOCYTES NFR BLD AUTO: 40 %
MONOCYTES # BLD AUTO: 1.5 10E9/L (ref 0–1.3)
MONOCYTES NFR BLD AUTO: 10 %
NEUTROPHILS # BLD AUTO: 6.9 10E9/L (ref 1.6–8.3)
NEUTROPHILS NFR BLD AUTO: 46 %
PLATELET # BLD EST: ABNORMAL 10*3/UL
POTASSIUM SERPL-SCNC: 4 MMOL/L (ref 3.4–5.3)
RBC MORPH BLD: ABNORMAL
SODIUM SERPL-SCNC: 140 MMOL/L (ref 133–144)
VARIANT LYMPHS BLD QL SMEAR: PRESENT

## 2019-06-17 DIAGNOSIS — K51.90 ULCERATIVE COLITIS WITHOUT COMPLICATIONS, UNSPECIFIED LOCATION (H): ICD-10-CM

## 2019-06-20 ENCOUNTER — ANESTHESIA (OUTPATIENT)
Dept: SURGERY | Facility: CLINIC | Age: 34
End: 2019-06-20
Payer: COMMERCIAL

## 2019-06-20 ENCOUNTER — HOSPITAL ENCOUNTER (INPATIENT)
Facility: CLINIC | Age: 34
LOS: 4 days | Discharge: HOME HEALTH | End: 2019-06-24
Attending: COLON & RECTAL SURGERY | Admitting: COLON & RECTAL SURGERY
Payer: COMMERCIAL

## 2019-06-20 DIAGNOSIS — K51.018 ULCERATIVE PANCOLITIS WITH OTHER COMPLICATION (H): Primary | ICD-10-CM

## 2019-06-20 DIAGNOSIS — G89.18 ACUTE POST-OPERATIVE PAIN: ICD-10-CM

## 2019-06-20 LAB
ABO + RH BLD: NORMAL
ABO + RH BLD: NORMAL
ANGLE RATE OF CLOT STRENGTH: 76.7 DEGREES (ref 53–72)
BLD GP AB SCN SERPL QL: NORMAL
BLOOD BANK CMNT PATIENT-IMP: NORMAL
BLOOD BANK CMNT PATIENT-IMP: NORMAL
CI HYPERCOAGULATION INDEX: 3.8 RATIO (ref 0–3)
FIBRINOGEN PPP-MCNC: 66 MG/DL (ref 200–420)
G ACTUAL CLOT STRENGTH: 14 KD/SC (ref 4.5–11)
GLUCOSE BLDC GLUCOMTR-MCNC: 114 MG/DL (ref 70–99)
HCG UR QL: NEGATIVE
HCT VFR BLD AUTO: 27.7 % (ref 35–47)
HGB BLD-MCNC: 8.3 G/DL (ref 11.7–15.7)
HGB BLD-MCNC: 8.7 G/DL (ref 11.7–15.7)
INR PPP: 1.66 (ref 0.86–1.14)
K TIME TO SPEC CLOT STRENGTH: 0.9 MINUTE (ref 1–3)
LY30 LYSIS AT 30 MINUTES: 0 % (ref 0–8)
LY60 LYSIS AT 60 MINUTES: 1.3 % (ref 0–15)
MA MAXIMUM CLOT STRENGTH: 73.7 MM (ref 50–70)
PLATELET # BLD AUTO: 469 10E9/L (ref 150–450)
R TIME UNTIL CLOT FORMS: 4.5 MINUTE (ref 5–10)
SPECIMEN EXP DATE BLD: NORMAL

## 2019-06-20 PROCEDURE — 37000009 ZZH ANESTHESIA TECHNICAL FEE, EACH ADDTL 15 MIN: Performed by: COLON & RECTAL SURGERY

## 2019-06-20 PROCEDURE — 25800030 ZZH RX IP 258 OP 636

## 2019-06-20 PROCEDURE — 36415 COLL VENOUS BLD VENIPUNCTURE: CPT | Performed by: COLON & RECTAL SURGERY

## 2019-06-20 PROCEDURE — 12000001 ZZH R&B MED SURG/OB UMMC

## 2019-06-20 PROCEDURE — 25000128 H RX IP 250 OP 636: Performed by: STUDENT IN AN ORGANIZED HEALTH CARE EDUCATION/TRAINING PROGRAM

## 2019-06-20 PROCEDURE — 40000014 ZZH STATISTIC ARTERIAL MONITORING DAILY

## 2019-06-20 PROCEDURE — 85384 FIBRINOGEN ACTIVITY: CPT | Performed by: COLON & RECTAL SURGERY

## 2019-06-20 PROCEDURE — 40000275 ZZH STATISTIC RCP TIME EA 10 MIN

## 2019-06-20 PROCEDURE — 85014 HEMATOCRIT: CPT | Performed by: COLON & RECTAL SURGERY

## 2019-06-20 PROCEDURE — 36000064 ZZH SURGERY LEVEL 4 EA 15 ADDTL MIN - UMMC: Performed by: COLON & RECTAL SURGERY

## 2019-06-20 PROCEDURE — 88309 TISSUE EXAM BY PATHOLOGIST: CPT | Performed by: COLON & RECTAL SURGERY

## 2019-06-20 PROCEDURE — 81025 URINE PREGNANCY TEST: CPT | Performed by: ANESTHESIOLOGY

## 2019-06-20 PROCEDURE — 25000128 H RX IP 250 OP 636: Performed by: COLON & RECTAL SURGERY

## 2019-06-20 PROCEDURE — 25000125 ZZHC RX 250: Performed by: COLON & RECTAL SURGERY

## 2019-06-20 PROCEDURE — 25000128 H RX IP 250 OP 636

## 2019-06-20 PROCEDURE — 40000171 ZZH STATISTIC PRE-PROCEDURE ASSESSMENT III: Performed by: COLON & RECTAL SURGERY

## 2019-06-20 PROCEDURE — 85610 PROTHROMBIN TIME: CPT | Performed by: COLON & RECTAL SURGERY

## 2019-06-20 PROCEDURE — 37000008 ZZH ANESTHESIA TECHNICAL FEE, 1ST 30 MIN: Performed by: COLON & RECTAL SURGERY

## 2019-06-20 PROCEDURE — 71000015 ZZH RECOVERY PHASE 1 LEVEL 2 EA ADDTL HR: Performed by: COLON & RECTAL SURGERY

## 2019-06-20 PROCEDURE — 36000062 ZZH SURGERY LEVEL 4 1ST 30 MIN - UMMC: Performed by: COLON & RECTAL SURGERY

## 2019-06-20 PROCEDURE — 00000146 ZZHCL STATISTIC GLUCOSE BY METER IP

## 2019-06-20 PROCEDURE — 85396 CLOTTING ASSAY WHOLE BLOOD: CPT

## 2019-06-20 PROCEDURE — 0DTE0ZZ RESECTION OF LARGE INTESTINE, OPEN APPROACH: ICD-10-PCS | Performed by: COLON & RECTAL SURGERY

## 2019-06-20 PROCEDURE — 25000132 ZZH RX MED GY IP 250 OP 250 PS 637: Performed by: COLON & RECTAL SURGERY

## 2019-06-20 PROCEDURE — 27210794 ZZH OR GENERAL SUPPLY STERILE: Performed by: COLON & RECTAL SURGERY

## 2019-06-20 PROCEDURE — 27211024 ZZHC OR SUPPLY OTHER OPNP: Performed by: COLON & RECTAL SURGERY

## 2019-06-20 PROCEDURE — 25800030 ZZH RX IP 258 OP 636: Performed by: SURGERY

## 2019-06-20 PROCEDURE — 25800030 ZZH RX IP 258 OP 636: Performed by: COLON & RECTAL SURGERY

## 2019-06-20 PROCEDURE — 0DNL4ZZ RELEASE TRANSVERSE COLON, PERCUTANEOUS ENDOSCOPIC APPROACH: ICD-10-PCS | Performed by: COLON & RECTAL SURGERY

## 2019-06-20 PROCEDURE — C9290 INJ, BUPIVACAINE LIPOSOME: HCPCS | Performed by: STUDENT IN AN ORGANIZED HEALTH CARE EDUCATION/TRAINING PROGRAM

## 2019-06-20 PROCEDURE — 25000566 ZZH SEVOFLURANE, EA 15 MIN: Performed by: COLON & RECTAL SURGERY

## 2019-06-20 PROCEDURE — 0D1B0Z4 BYPASS ILEUM TO CUTANEOUS, OPEN APPROACH: ICD-10-PCS | Performed by: COLON & RECTAL SURGERY

## 2019-06-20 PROCEDURE — 0DNW4ZZ RELEASE PERITONEUM, PERCUTANEOUS ENDOSCOPIC APPROACH: ICD-10-PCS | Performed by: COLON & RECTAL SURGERY

## 2019-06-20 PROCEDURE — 25000125 ZZHC RX 250

## 2019-06-20 PROCEDURE — 85018 HEMOGLOBIN: CPT | Performed by: COLON & RECTAL SURGERY

## 2019-06-20 PROCEDURE — 71000014 ZZH RECOVERY PHASE 1 LEVEL 2 FIRST HR: Performed by: COLON & RECTAL SURGERY

## 2019-06-20 PROCEDURE — 25000131 ZZH RX MED GY IP 250 OP 636 PS 637: Performed by: COLON & RECTAL SURGERY

## 2019-06-20 PROCEDURE — 85049 AUTOMATED PLATELET COUNT: CPT | Performed by: COLON & RECTAL SURGERY

## 2019-06-20 RX ORDER — NALOXONE HYDROCHLORIDE 0.4 MG/ML
.1-.4 INJECTION, SOLUTION INTRAMUSCULAR; INTRAVENOUS; SUBCUTANEOUS
Status: DISCONTINUED | OUTPATIENT
Start: 2019-06-20 | End: 2019-06-24 | Stop reason: HOSPADM

## 2019-06-20 RX ORDER — SODIUM CHLORIDE, SODIUM LACTATE, POTASSIUM CHLORIDE, CALCIUM CHLORIDE 600; 310; 30; 20 MG/100ML; MG/100ML; MG/100ML; MG/100ML
INJECTION, SOLUTION INTRAVENOUS CONTINUOUS
Status: DISCONTINUED | OUTPATIENT
Start: 2019-06-20 | End: 2019-06-20 | Stop reason: HOSPADM

## 2019-06-20 RX ORDER — LIDOCAINE HYDROCHLORIDE 20 MG/ML
INJECTION, SOLUTION INFILTRATION; PERINEURAL PRN
Status: DISCONTINUED | OUTPATIENT
Start: 2019-06-20 | End: 2019-06-20

## 2019-06-20 RX ORDER — DEXAMETHASONE SODIUM PHOSPHATE 4 MG/ML
INJECTION, SOLUTION INTRA-ARTICULAR; INTRALESIONAL; INTRAMUSCULAR; INTRAVENOUS; SOFT TISSUE PRN
Status: DISCONTINUED | OUTPATIENT
Start: 2019-06-20 | End: 2019-06-20

## 2019-06-20 RX ORDER — ACETAMINOPHEN 325 MG/1
975 TABLET ORAL ONCE
Status: COMPLETED | OUTPATIENT
Start: 2019-06-20 | End: 2019-06-20

## 2019-06-20 RX ORDER — FENTANYL CITRATE 50 UG/ML
25-50 INJECTION, SOLUTION INTRAMUSCULAR; INTRAVENOUS
Status: DISCONTINUED | OUTPATIENT
Start: 2019-06-20 | End: 2019-06-20 | Stop reason: HOSPADM

## 2019-06-20 RX ORDER — BUPIVACAINE HYDROCHLORIDE 2.5 MG/ML
INJECTION, SOLUTION EPIDURAL; INFILTRATION; INTRACAUDAL PRN
Status: DISCONTINUED | OUTPATIENT
Start: 2019-06-20 | End: 2019-06-20

## 2019-06-20 RX ORDER — ACETAMINOPHEN 325 MG/1
975 TABLET ORAL ONCE
Status: DISCONTINUED | OUTPATIENT
Start: 2019-06-20 | End: 2019-06-20 | Stop reason: HOSPADM

## 2019-06-20 RX ORDER — SODIUM CHLORIDE, SODIUM LACTATE, POTASSIUM CHLORIDE, CALCIUM CHLORIDE 600; 310; 30; 20 MG/100ML; MG/100ML; MG/100ML; MG/100ML
INJECTION, SOLUTION INTRAVENOUS CONTINUOUS PRN
Status: DISCONTINUED | OUTPATIENT
Start: 2019-06-20 | End: 2019-06-20

## 2019-06-20 RX ORDER — FLUMAZENIL 0.1 MG/ML
0.2 INJECTION, SOLUTION INTRAVENOUS
Status: DISCONTINUED | OUTPATIENT
Start: 2019-06-20 | End: 2019-06-20 | Stop reason: HOSPADM

## 2019-06-20 RX ORDER — URSODIOL 250 MG/1
250 TABLET, FILM COATED ORAL EVERY EVENING
Status: DISCONTINUED | OUTPATIENT
Start: 2019-06-20 | End: 2019-06-24 | Stop reason: HOSPADM

## 2019-06-20 RX ORDER — ONDANSETRON 2 MG/ML
4 INJECTION INTRAMUSCULAR; INTRAVENOUS EVERY 30 MIN PRN
Status: DISCONTINUED | OUTPATIENT
Start: 2019-06-20 | End: 2019-06-20 | Stop reason: HOSPADM

## 2019-06-20 RX ORDER — URSODIOL 250 MG/1
500 TABLET, FILM COATED ORAL EVERY MORNING
Status: DISCONTINUED | OUTPATIENT
Start: 2019-06-21 | End: 2019-06-24 | Stop reason: HOSPADM

## 2019-06-20 RX ORDER — GABAPENTIN 300 MG/1
300 CAPSULE ORAL ONCE
Status: DISCONTINUED | OUTPATIENT
Start: 2019-06-20 | End: 2019-06-20 | Stop reason: HOSPADM

## 2019-06-20 RX ORDER — BUDESONIDE 9 MG/1
9 TABLET, FILM COATED, EXTENDED RELEASE ORAL EVERY MORNING
Status: DISCONTINUED | OUTPATIENT
Start: 2019-06-21 | End: 2019-06-24 | Stop reason: HOSPADM

## 2019-06-20 RX ORDER — ONDANSETRON 2 MG/ML
INJECTION INTRAMUSCULAR; INTRAVENOUS PRN
Status: DISCONTINUED | OUTPATIENT
Start: 2019-06-20 | End: 2019-06-20

## 2019-06-20 RX ORDER — ACETAMINOPHEN 325 MG/1
975 TABLET ORAL EVERY 8 HOURS
Status: DISCONTINUED | OUTPATIENT
Start: 2019-06-20 | End: 2019-06-22

## 2019-06-20 RX ORDER — LIDOCAINE 40 MG/G
CREAM TOPICAL
Status: DISCONTINUED | OUTPATIENT
Start: 2019-06-20 | End: 2019-06-24 | Stop reason: HOSPADM

## 2019-06-20 RX ORDER — CEFOTETAN DISODIUM 1 G/10ML
1 INJECTION, POWDER, FOR SOLUTION INTRAMUSCULAR; INTRAVENOUS
Status: COMPLETED | OUTPATIENT
Start: 2019-06-20 | End: 2019-06-20

## 2019-06-20 RX ORDER — CEFOTETAN DISODIUM 1 G/10ML
1 INJECTION, POWDER, FOR SOLUTION INTRAMUSCULAR; INTRAVENOUS SEE ADMIN INSTRUCTIONS
Status: DISCONTINUED | OUTPATIENT
Start: 2019-06-20 | End: 2019-06-20 | Stop reason: HOSPADM

## 2019-06-20 RX ORDER — SODIUM CHLORIDE, SODIUM LACTATE, POTASSIUM CHLORIDE, CALCIUM CHLORIDE 600; 310; 30; 20 MG/100ML; MG/100ML; MG/100ML; MG/100ML
INJECTION, SOLUTION INTRAVENOUS CONTINUOUS
Status: DISCONTINUED | OUTPATIENT
Start: 2019-06-20 | End: 2019-06-22

## 2019-06-20 RX ORDER — PREDNISONE 5 MG/1
5 TABLET ORAL DAILY
Status: DISCONTINUED | OUTPATIENT
Start: 2019-06-21 | End: 2019-06-24 | Stop reason: HOSPADM

## 2019-06-20 RX ORDER — LIDOCAINE 40 MG/G
CREAM TOPICAL
Status: DISCONTINUED | OUTPATIENT
Start: 2019-06-20 | End: 2019-06-20 | Stop reason: HOSPADM

## 2019-06-20 RX ORDER — PROPOFOL 10 MG/ML
INJECTION, EMULSION INTRAVENOUS PRN
Status: DISCONTINUED | OUTPATIENT
Start: 2019-06-20 | End: 2019-06-20

## 2019-06-20 RX ORDER — NALOXONE HYDROCHLORIDE 0.4 MG/ML
.1-.4 INJECTION, SOLUTION INTRAMUSCULAR; INTRAVENOUS; SUBCUTANEOUS
Status: DISCONTINUED | OUTPATIENT
Start: 2019-06-20 | End: 2019-06-20 | Stop reason: HOSPADM

## 2019-06-20 RX ORDER — ONDANSETRON 2 MG/ML
4 INJECTION INTRAMUSCULAR; INTRAVENOUS EVERY 6 HOURS PRN
Status: DISCONTINUED | OUTPATIENT
Start: 2019-06-20 | End: 2019-06-24 | Stop reason: HOSPADM

## 2019-06-20 RX ORDER — HYDROMORPHONE HYDROCHLORIDE 1 MG/ML
.3-.5 INJECTION, SOLUTION INTRAMUSCULAR; INTRAVENOUS; SUBCUTANEOUS EVERY 5 MIN PRN
Status: DISCONTINUED | OUTPATIENT
Start: 2019-06-20 | End: 2019-06-20 | Stop reason: HOSPADM

## 2019-06-20 RX ORDER — NALOXONE HYDROCHLORIDE 0.4 MG/ML
.1-.4 INJECTION, SOLUTION INTRAMUSCULAR; INTRAVENOUS; SUBCUTANEOUS
Status: ACTIVE | OUTPATIENT
Start: 2019-06-20 | End: 2019-06-21

## 2019-06-20 RX ORDER — ONDANSETRON 4 MG/1
4 TABLET, ORALLY DISINTEGRATING ORAL EVERY 30 MIN PRN
Status: DISCONTINUED | OUTPATIENT
Start: 2019-06-20 | End: 2019-06-20 | Stop reason: HOSPADM

## 2019-06-20 RX ORDER — LABETALOL 20 MG/4 ML (5 MG/ML) INTRAVENOUS SYRINGE
5-10
Status: DISCONTINUED | OUTPATIENT
Start: 2019-06-20 | End: 2019-06-20 | Stop reason: HOSPADM

## 2019-06-20 RX ADMIN — TACROLIMUS 2.5 MG: 1 CAPSULE ORAL at 20:32

## 2019-06-20 RX ADMIN — SODIUM CHLORIDE, POTASSIUM CHLORIDE, SODIUM LACTATE AND CALCIUM CHLORIDE 1000 ML: 600; 310; 30; 20 INJECTION, SOLUTION INTRAVENOUS at 12:21

## 2019-06-20 RX ADMIN — ACETAMINOPHEN 975 MG: 325 TABLET, FILM COATED ORAL at 06:09

## 2019-06-20 RX ADMIN — FENTANYL CITRATE 50 MCG: 50 INJECTION INTRAMUSCULAR; INTRAVENOUS at 13:15

## 2019-06-20 RX ADMIN — HYDROMORPHONE HYDROCHLORIDE 0.5 MG: 1 INJECTION, SOLUTION INTRAMUSCULAR; INTRAVENOUS; SUBCUTANEOUS at 12:00

## 2019-06-20 RX ADMIN — PHENYLEPHRINE HYDROCHLORIDE 100 MCG: 10 INJECTION INTRAVENOUS at 11:01

## 2019-06-20 RX ADMIN — ONDANSETRON 4 MG: 2 INJECTION INTRAMUSCULAR; INTRAVENOUS at 12:01

## 2019-06-20 RX ADMIN — FENTANYL CITRATE 50 MCG: 50 INJECTION, SOLUTION INTRAMUSCULAR; INTRAVENOUS at 10:02

## 2019-06-20 RX ADMIN — FENTANYL CITRATE 50 MCG: 50 INJECTION, SOLUTION INTRAMUSCULAR; INTRAVENOUS at 10:33

## 2019-06-20 RX ADMIN — ROCURONIUM BROMIDE 25 MG: 10 INJECTION INTRAVENOUS at 08:33

## 2019-06-20 RX ADMIN — SODIUM CHLORIDE, POTASSIUM CHLORIDE, SODIUM LACTATE AND CALCIUM CHLORIDE: 600; 310; 30; 20 INJECTION, SOLUTION INTRAVENOUS at 07:44

## 2019-06-20 RX ADMIN — FENTANYL CITRATE 100 MCG: 50 INJECTION, SOLUTION INTRAMUSCULAR; INTRAVENOUS at 08:36

## 2019-06-20 RX ADMIN — ENOXAPARIN SODIUM 40 MG: 40 INJECTION SUBCUTANEOUS at 06:40

## 2019-06-20 RX ADMIN — ROCURONIUM BROMIDE 25 MG: 10 INJECTION INTRAVENOUS at 10:45

## 2019-06-20 RX ADMIN — FENTANYL CITRATE 50 MCG: 50 INJECTION, SOLUTION INTRAMUSCULAR; INTRAVENOUS at 07:47

## 2019-06-20 RX ADMIN — ROCURONIUM BROMIDE 25 MG: 10 INJECTION INTRAVENOUS at 09:52

## 2019-06-20 RX ADMIN — SODIUM CHLORIDE, POTASSIUM CHLORIDE, SODIUM LACTATE AND CALCIUM CHLORIDE: 600; 310; 30; 20 INJECTION, SOLUTION INTRAVENOUS at 08:03

## 2019-06-20 RX ADMIN — ACETAMINOPHEN 975 MG: 325 TABLET, FILM COATED ORAL at 18:22

## 2019-06-20 RX ADMIN — HYDROCORTISONE SODIUM SUCCINATE 100 MG: 100 INJECTION, POWDER, FOR SOLUTION INTRAMUSCULAR; INTRAVENOUS at 06:44

## 2019-06-20 RX ADMIN — HYDROMORPHONE HYDROCHLORIDE 0.5 MG: 1 INJECTION, SOLUTION INTRAMUSCULAR; INTRAVENOUS; SUBCUTANEOUS at 12:07

## 2019-06-20 RX ADMIN — PHENYLEPHRINE HYDROCHLORIDE 100 MCG: 10 INJECTION INTRAVENOUS at 10:45

## 2019-06-20 RX ADMIN — MIDAZOLAM 1 MG: 1 INJECTION INTRAMUSCULAR; INTRAVENOUS at 06:37

## 2019-06-20 RX ADMIN — PHENYLEPHRINE HYDROCHLORIDE 100 MCG: 10 INJECTION INTRAVENOUS at 10:39

## 2019-06-20 RX ADMIN — HYDROMORPHONE HYDROCHLORIDE 0.5 MG: 1 INJECTION, SOLUTION INTRAMUSCULAR; INTRAVENOUS; SUBCUTANEOUS at 13:45

## 2019-06-20 RX ADMIN — FENTANYL CITRATE 50 MCG: 50 INJECTION INTRAMUSCULAR; INTRAVENOUS at 12:59

## 2019-06-20 RX ADMIN — PROPOFOL 150 MG: 10 INJECTION, EMULSION INTRAVENOUS at 07:49

## 2019-06-20 RX ADMIN — Medication: at 13:58

## 2019-06-20 RX ADMIN — DEXAMETHASONE SODIUM PHOSPHATE 6 MG: 4 INJECTION, SOLUTION INTRA-ARTICULAR; INTRALESIONAL; INTRAMUSCULAR; INTRAVENOUS; SOFT TISSUE at 07:49

## 2019-06-20 RX ADMIN — SUGAMMADEX 200 MG: 100 INJECTION, SOLUTION INTRAVENOUS at 12:04

## 2019-06-20 RX ADMIN — ROCURONIUM BROMIDE 50 MG: 10 INJECTION INTRAVENOUS at 07:50

## 2019-06-20 RX ADMIN — ROCURONIUM BROMIDE 25 MG: 10 INJECTION INTRAVENOUS at 09:14

## 2019-06-20 RX ADMIN — URSODIOL 250 MG: 250 TABLET, FILM COATED ORAL at 20:31

## 2019-06-20 RX ADMIN — MIDAZOLAM 2 MG: 1 INJECTION INTRAMUSCULAR; INTRAVENOUS at 07:43

## 2019-06-20 RX ADMIN — SODIUM CHLORIDE, POTASSIUM CHLORIDE, SODIUM LACTATE AND CALCIUM CHLORIDE 1000 ML: 600; 310; 30; 20 INJECTION, SOLUTION INTRAVENOUS at 12:25

## 2019-06-20 RX ADMIN — LIDOCAINE HYDROCHLORIDE 40 MG: 20 INJECTION, SOLUTION INFILTRATION; PERINEURAL at 07:47

## 2019-06-20 RX ADMIN — BUPIVACAINE 20 ML: 13.3 INJECTION, SUSPENSION, LIPOSOMAL INFILTRATION at 06:41

## 2019-06-20 RX ADMIN — BUPIVACAINE HYDROCHLORIDE 20 ML: 2.5 INJECTION, SOLUTION EPIDURAL; INFILTRATION; INTRACAUDAL; PERINEURAL at 06:41

## 2019-06-20 RX ADMIN — CEFOTETAN DISODIUM 1 G: 1 INJECTION, POWDER, FOR SOLUTION INTRAMUSCULAR; INTRAVENOUS at 08:03

## 2019-06-20 ASSESSMENT — MIFFLIN-ST. JEOR: SCORE: 1175.25

## 2019-06-20 ASSESSMENT — ACTIVITIES OF DAILY LIVING (ADL): ADLS_ACUITY_SCORE: 18

## 2019-06-20 NOTE — PROGRESS NOTES
Pt. Seen in PACU, labs reviewed. Fibrinogen 66, INR 1.66. TEG sent. Will follow and consider optimal treatment when results back. Discussed with Gen Surg fellow.

## 2019-06-20 NOTE — ANESTHESIA CARE TRANSFER NOTE
Patient: Yarelis Penny    Procedure(s):  Laparoscopic Total Abdominal Colectomy, lysis of adhesions  Possible Open  Ileostomy    Diagnosis: Colitis  Diagnosis Additional Information: No value filed.    Anesthesia Type:   No value filed.     Note:  Airway :Face Mask  Patient transferred to:PACU  Comments: Airway :Face Mask  Patient transferred to:PACU  Comments: Prior to extubation, patient was reversed with 200 mg of Sugammadex and shortly afterwards observed with spontaneous breathing with regular pattern and proper tidal volumes. Patient woke up, opened her eyes to verbal stimuli and followed basic commands. Patient was suctioned and  extubated without complication.   Transported to PACU on 6L O2 via face mask.   VSS upon arrival to PACU.  Patient denies nausea or pain at this time.   Care transfer plan communicated and patient care transferred to PACU JOSEFINA Spear  Anesthesiology Resident, PGY-2   [facility]   [phone]  6/20/2019 12:38 PM  Handoff Report: Identifed the Patient, Identified the Reponsible Provider, Reviewed the pertinent medical history, Discussed the surgical course, Reviewed Intra-OP anesthesia mangement and issues during anesthesia, Set expectations for post-procedure period and Allowed opportunity for questions and acknowledgement of understanding      Vitals: (Last set prior to Anesthesia Care Transfer)    CRNA VITALS  6/20/2019 1156 - 6/20/2019 1238      6/20/2019             Resp Rate (observed):  4  (Abnormal)                 Electronically Signed By: Brian Spear MD  June 20, 2019  12:38 PM

## 2019-06-20 NOTE — ANESTHESIA PROCEDURE NOTES
Peripheral Nerve Block Procedure Note    Staff:     Anesthesiologist:  Oliver Martínez MD    Resident/CRNA:  Brendon Black DO    Block performed by resident/CRNA in the presence of a teaching physician    Location: Pre-op  Procedure Start/Stop TImes:      6/20/2019 6:32 AM     6/20/2019 6:41 AM    patient identified, IV checked, site marked, risks and benefits discussed, informed consent, monitors and equipment checked, pre-op evaluation, at physician/surgeon's request and post-op pain management      Correct Patient: Yes      Correct Position: Yes      Correct Site: Yes      Correct Procedure: Yes      Correct Laterality:  Yes    Site Marked:  Yes  Procedure details:     Procedure:  TAP    ASA:  2    Diagnosis:  Post op pain control    Laterality:  Bilateral    Position:  Supine    Sterile Prep: chloraprep, patient draped, mask and sterile gloves      Local skin infiltration:  None    Needle:  Short bevel    Needle gauge:  21    Needle length (inches):  3.1    Ultrasound: Yes      Ultrasound used to identify targeted nerve, plexus, or vascular structure and placed a needle adjacent to it      Permanent Image entered into patiient's record      Abnormal pain on injection: No      Blood Aspirated: No      Paresthesias:  No    Bleeding at site: No      Bolus via:  Needle    Infusion Method:  Single Shot    Complications:  None  Assessment/Narrative:     Injection made incrementally with aspirations every (mL):  5

## 2019-06-20 NOTE — BRIEF OP NOTE
[facility] [place]    Brief Operative Note    Pre-operative diagnosis: Ulcerative Colitis  Post-operative diagnosis Same  Procedure: Procedure(s):  Laparoscopic Total Abdominal Colectomy with End Ileostomy  Surgeon: Surgeon(s) and Role:     * Krzysztof Carney MD - Primary     * Efrem Banda MD - Fellow - Assisting  Anesthesia: Combined General with Block   Estimated blood loss: 500 ml  Drains: None  Specimens:   ID Type Source Tests Collected by Time Destination   A : Colon Tissue Colon SURGICAL PATHOLOGY EXAM Krzysztof Carney MD 6/20/2019 10:51 AM      Findings:   Dense pelvic adhesions, friable tissue with diffuse oozing.  Complications: None.  Implants:  * No implants in log *

## 2019-06-20 NOTE — OR NURSING
Dr Carney requested that Dr Banda be notified of lab results.  hbg okay per Dr Carney.  Awaiting bed assignment from

## 2019-06-20 NOTE — PROGRESS NOTES
Block procedure complete.  Patient responding appropriately.  Monitors to remain on until transferred to OR.

## 2019-06-20 NOTE — LETTER
Transition Communication Hand-off for Care Transitions to Next Level of Care Provider  Name: Yarelis Penny  : 1985  MRN #: [id number]  Primary Care Provider: Alma Lara  Primary Clinic: [address] [place] [id number]   Reason for Hospitalization:  Colitis  Ulcerative colitis (H)  Admit Date/Time: 2019  5:16 AM  Discharge Date: 19  Payor Source: Payor: BCBS / Plan: BCBS OUT OF STATE / Product Type: Indemnity /        Reason for Communication Hand-off Referral: Multiple providers/specialties  Other known patient  Discharge Plan: Home with [facility] to follow for new ileostomy  Concern for non-adherence with plan of care: no  Follow-up specialty is recommended: Yes    Follow-up plan:    Future Appointments   Date Time Provider Department Center   2019  7:00 AM Lulú Serrato APRN [facility]   2019 11:00 AM Krzysztof Carney MD [facility]   2019  7:30 AM Joya Lopez MD [facility]       Referrals     Future Labs/Procedures    Home care nursing referral     Comments:    [facility]  Phone  [phone]  Fax  [phone]    RN skilled nursing visit. RN to assess vital signs and weight, respiratory and cardiac status, pain level and activity tolerance, incision for signs/symptoms of infection, hydration, nutrition and bowel status and home safety.  RN to teach medication management.  RN to reinforce ostomy education and assist with ordering supplies.  [facility] RN to see as needed.    Your provider has ordered home care nursing services. If you have not been contacted within 2 days of your discharge please call the inpatient department phone number at [phone] .        Simeon Recommendations:    FOLLOW-UP  1.  You will need to follow-up with Lulú Serrato NP in the Colon and Rectal Surgery clinic in 1 week(s) and then with colorectal in 2-3 weeks after.  Please contact our clinic scheduler (phone # [phone])  if you have not heard from our clinic in 3 business days afer discharge to schedule a follow-up appointment. Please bring your log of daily ileostomy outputs to your follow up clinic appointment.   2.  Follow up with your primary care provider in 1-2 weeks after discharge from the hospital to review this hospitalization.      Raul Bush RN Care Coordinator [phone]

## 2019-06-20 NOTE — OR NURSING
Dr Carney at bedside.  Updated on pt urine output.  Collected 60 ml.  75 ml of watery stool from illeostomy.  Family updated.  New orders received.

## 2019-06-20 NOTE — ANESTHESIA POSTPROCEDURE EVALUATION
Anesthesia POST Procedure Evaluation    Patient: Yarelis Penny   MRN:     [id number] Gender:   female   Age:    34 year old :      1985        Preoperative Diagnosis: Colitis   Procedure(s):  Laparoscopic Total Abdominal Colectomy, lysis of adhesions  Possible Open  Ileostomy   Postop Comments: No value filed.       Anesthesia Type:  General  No value filed.    Reportable Event: NO     PAIN: Uncomplicated   Sign Out status: Comfortable, Well controlled pain     PONV: No PONV   Sign Out status:  No Nausea or Vomiting     Neuro/Psych: Uneventful perioperative course   Sign Out Status: Preoperative baseline; Age appropriate mentation     Airway/Resp.: Uneventful perioperative course   Sign Out Status: Non labored breathing, age appropriate RR; Resp. Status within EXPECTED Parameters     CV: Uneventful perioperative course   Sign Out status: Appropriate BP and perfusion indices; Appropriate HR/Rhythm     Disposition:   Sign Out in:  PACU  Disposition:  Phase II; Home  Recovery Course: Uneventful  Follow-Up: Not required           Last Anesthesia Record Vitals:  CRNA VITALS  2019 1156 - 2019 1256      2019             Resp Rate (observed):  4  (Abnormal)           Last PACU Vitals:  Vitals Value Taken Time   /71 2019  2:20 PM   Temp     Pulse 74 2019  2:30 PM   Resp     SpO2 97 % 2019  2:58 PM   Temp src     NIBP     Pulse     SpO2     Resp     Temp     Ht Rate     Temp 2     Vitals shown include unvalidated device data.      Electronically Signed By: Cyn Burton MD, 2019, 2:59 PM

## 2019-06-20 NOTE — OR NURSING
Dr Burton at bedside, ok to send to floor. Pt stable.  7C contacted for bed assignment.  Pt requested  be called for her phone.

## 2019-06-21 LAB
ALBUMIN SERPL-MCNC: 2.2 G/DL (ref 3.4–5)
ALP SERPL-CCNC: 96 U/L (ref 40–150)
ALT SERPL W P-5'-P-CCNC: 33 U/L (ref 0–50)
ANION GAP SERPL CALCULATED.3IONS-SCNC: 5 MMOL/L (ref 3–14)
AST SERPL W P-5'-P-CCNC: 34 U/L (ref 0–45)
BILIRUB DIRECT SERPL-MCNC: <0.1 MG/DL (ref 0–0.2)
BILIRUB SERPL-MCNC: 0.2 MG/DL (ref 0.2–1.3)
BUN SERPL-MCNC: 18 MG/DL (ref 7–30)
CALCIUM SERPL-MCNC: 8 MG/DL (ref 8.5–10.1)
CHLORIDE SERPL-SCNC: 107 MMOL/L (ref 94–109)
CO2 SERPL-SCNC: 27 MMOL/L (ref 20–32)
CREAT SERPL-MCNC: 1 MG/DL (ref 0.52–1.04)
ERYTHROCYTE [DISTWIDTH] IN BLOOD BY AUTOMATED COUNT: 14.5 % (ref 10–15)
GFR SERPL CREATININE-BSD FRML MDRD: 74 ML/MIN/{1.73_M2}
GLUCOSE SERPL-MCNC: 89 MG/DL (ref 70–99)
HCT VFR BLD AUTO: 28.1 % (ref 35–47)
HGB BLD-MCNC: 8.2 G/DL (ref 11.7–15.7)
INR PPP: 1.15 (ref 0.86–1.14)
MAGNESIUM SERPL-MCNC: 1.6 MG/DL (ref 1.6–2.3)
MCH RBC QN AUTO: 27.8 PG (ref 26.5–33)
MCHC RBC AUTO-ENTMCNC: 29.2 G/DL (ref 31.5–36.5)
MCV RBC AUTO: 95 FL (ref 78–100)
PLATELET # BLD AUTO: 537 10E9/L (ref 150–450)
POTASSIUM SERPL-SCNC: 4.2 MMOL/L (ref 3.4–5.3)
PROT SERPL-MCNC: 5.8 G/DL (ref 6.8–8.8)
RBC # BLD AUTO: 2.95 10E12/L (ref 3.8–5.2)
SODIUM SERPL-SCNC: 139 MMOL/L (ref 133–144)
TACROLIMUS BLD-MCNC: 5.7 UG/L (ref 5–15)
TME LAST DOSE: NORMAL H
WBC # BLD AUTO: 18.2 10E9/L (ref 4–11)

## 2019-06-21 PROCEDURE — 83735 ASSAY OF MAGNESIUM: CPT | Performed by: COLON & RECTAL SURGERY

## 2019-06-21 PROCEDURE — 40000556 ZZH STATISTIC PERIPHERAL IV START W US GUIDANCE

## 2019-06-21 PROCEDURE — 25000128 H RX IP 250 OP 636: Performed by: COLON & RECTAL SURGERY

## 2019-06-21 PROCEDURE — 12000001 ZZH R&B MED SURG/OB UMMC

## 2019-06-21 PROCEDURE — 25000131 ZZH RX MED GY IP 250 OP 636 PS 637: Performed by: COLON & RECTAL SURGERY

## 2019-06-21 PROCEDURE — 25800030 ZZH RX IP 258 OP 636: Performed by: COLON & RECTAL SURGERY

## 2019-06-21 PROCEDURE — 80197 ASSAY OF TACROLIMUS: CPT | Performed by: COLON & RECTAL SURGERY

## 2019-06-21 PROCEDURE — 36415 COLL VENOUS BLD VENIPUNCTURE: CPT | Performed by: COLON & RECTAL SURGERY

## 2019-06-21 PROCEDURE — 80076 HEPATIC FUNCTION PANEL: CPT | Performed by: COLON & RECTAL SURGERY

## 2019-06-21 PROCEDURE — 85610 PROTHROMBIN TIME: CPT | Performed by: COLON & RECTAL SURGERY

## 2019-06-21 PROCEDURE — G0463 HOSPITAL OUTPT CLINIC VISIT: HCPCS

## 2019-06-21 PROCEDURE — 80048 BASIC METABOLIC PNL TOTAL CA: CPT | Performed by: COLON & RECTAL SURGERY

## 2019-06-21 PROCEDURE — 25800030 ZZH RX IP 258 OP 636: Performed by: SURGERY

## 2019-06-21 PROCEDURE — 99231 SBSQ HOSP IP/OBS SF/LOW 25: CPT | Performed by: NURSE PRACTITIONER

## 2019-06-21 PROCEDURE — 85027 COMPLETE CBC AUTOMATED: CPT | Performed by: COLON & RECTAL SURGERY

## 2019-06-21 PROCEDURE — 25000132 ZZH RX MED GY IP 250 OP 250 PS 637: Performed by: COLON & RECTAL SURGERY

## 2019-06-21 PROCEDURE — 25000125 ZZHC RX 250: Performed by: SURGERY

## 2019-06-21 RX ADMIN — SODIUM CHLORIDE, POTASSIUM CHLORIDE, SODIUM LACTATE AND CALCIUM CHLORIDE: 600; 310; 30; 20 INJECTION, SOLUTION INTRAVENOUS at 22:09

## 2019-06-21 RX ADMIN — URSODIOL 250 MG: 250 TABLET, FILM COATED ORAL at 19:05

## 2019-06-21 RX ADMIN — URSODIOL 500 MG: 250 TABLET, FILM COATED ORAL at 08:08

## 2019-06-21 RX ADMIN — SODIUM CHLORIDE, POTASSIUM CHLORIDE, SODIUM LACTATE AND CALCIUM CHLORIDE: 600; 310; 30; 20 INJECTION, SOLUTION INTRAVENOUS at 02:33

## 2019-06-21 RX ADMIN — ACETAMINOPHEN 975 MG: 325 TABLET, FILM COATED ORAL at 19:03

## 2019-06-21 RX ADMIN — Medication 2 G: at 09:50

## 2019-06-21 RX ADMIN — ENOXAPARIN SODIUM 40 MG: 40 INJECTION SUBCUTANEOUS at 08:09

## 2019-06-21 RX ADMIN — PREDNISONE 5 MG: 5 TABLET ORAL at 08:09

## 2019-06-21 RX ADMIN — TACROLIMUS 2.5 MG: 1 CAPSULE ORAL at 08:08

## 2019-06-21 RX ADMIN — ACETAMINOPHEN 975 MG: 325 TABLET, FILM COATED ORAL at 09:49

## 2019-06-21 RX ADMIN — TACROLIMUS 2.5 MG: 1 CAPSULE ORAL at 19:04

## 2019-06-21 RX ADMIN — BUDESONIDE 9 MG: 9 TABLET, EXTENDED RELEASE ORAL at 08:09

## 2019-06-21 RX ADMIN — ACETAMINOPHEN 975 MG: 325 TABLET, FILM COATED ORAL at 03:19

## 2019-06-21 ASSESSMENT — PAIN DESCRIPTION - DESCRIPTORS
DESCRIPTORS: DISCOMFORT
DESCRIPTORS: DISCOMFORT
DESCRIPTORS: DISCOMFORT;SORE
DESCRIPTORS: DISCOMFORT
DESCRIPTORS: DISCOMFORT

## 2019-06-21 ASSESSMENT — ACTIVITIES OF DAILY LIVING (ADL)
ADLS_ACUITY_SCORE: 17
ADLS_ACUITY_SCORE: 18
COGNITION: 0 - NO COGNITION ISSUES REPORTED
ADLS_ACUITY_SCORE: 17
ADLS_ACUITY_SCORE: 17
TOILETING: 0-->INDEPENDENT
ADLS_ACUITY_SCORE: 15
AMBULATION: 0-->INDEPENDENT
FALL_HISTORY_WITHIN_LAST_SIX_MONTHS: NO
ADLS_ACUITY_SCORE: 17
RETIRED_COMMUNICATION: 0-->UNDERSTANDS/COMMUNICATES WITHOUT DIFFICULTY
SWALLOWING: 0-->SWALLOWS FOODS/LIQUIDS WITHOUT DIFFICULTY
BATHING: 0-->INDEPENDENT
TRANSFERRING: 0-->INDEPENDENT
DRESS: 0-->INDEPENDENT
RETIRED_EATING: 0-->INDEPENDENT

## 2019-06-21 ASSESSMENT — MIFFLIN-ST. JEOR: SCORE: 1185.32

## 2019-06-21 NOTE — PROGRESS NOTES
"POST OP CHECK     S: Patient seen at the bedside sleeping comfortably, easily aroused. While awake, alert, and interactive. Pain manageable. No nausea, no flatus.    O:   /78 (BP Location: Right arm)   Pulse 84   Temp 98  F (36.7  C) (Oral)   Resp 13   Ht 1.575 m (5' 2\")   Wt 52.2 kg (115 lb 1.3 oz)   LMP 06/10/2019 (Exact Date)   SpO2 98%   BMI 21.05 kg/m      GEN: NAD  CV: Non-cyanotic, DP pulses in tact bilaterally  RESP: Nonlabored breathing   ABD: soft, appropriately tender, without guarding or rebound tenderness, incisions c/d/i. Ostomy bag in place with small non-bloody OP.  : Walter in place with clear yellow UOP  Ext: No edema. No calf tenderness. SCD's    PSYCH: cooperative     A/P: Yarelis Penny is a 34 year old woman with history of UC and PSC s/p liver transplant 6/18/2015 who is now POD 0 from total abdominal colectomy with end ileostomy.     Dilaudid PCA  PRN zofran       Remainder of cares per primary team.     Dnaa Rodriguez MD   Surgery PGY-1           "

## 2019-06-21 NOTE — PROGRESS NOTES
"Colorectal Surgery Progress Note    POD #1    Subjective:  No acute issues overnight. Pain well controlled. Tolerating CLD. Having liquid ostomy output.    Vitals:  /73 (BP Location: Left arm)   Pulse 84   Temp 98.4  F (36.9  C) (Oral)   Resp 12   Ht 1.575 m (5' 2\")   Wt 52.2 kg (115 lb 1.3 oz)   LMP 06/10/2019 (Exact Date)   SpO2 99%   BMI 21.05 kg/m      I/O:  I/O last 3 completed shifts:  In: 3583.33 [P.O.:320; I.V.:3263.33]  Out: 2045 [Urine:1895; Stool:150]    Physical Exam:  Gen:  Awake, alert, NAD  Resp:  non-labored breathing  Abd:  Soft, non-distended, appropriately TTP   Incisions CDI. No erythema   Stoma pink and viable, small amount of liquid output and gas in bag  Ext:  warm and well perfused    Labs pending    Assessment: This is a 34 year old female with UC and PSC s/p liver transplant. She is now s/p total abdominal colectomy with end ileostomy on 6/20. Recovering well.    Plan:  Neuro: S. Tylenol, dilaudid PCA  Resp: IS, ambulate  CV: LIZZY  FEN/GI: FLD, IVF 70. Budesonide, ursodiol.  : remove suarez  ID: PTA liver tx immunosuppression- prednisone, tacro  Heme: Hgb stable  Activity: Up ad cassius  Ppx: Lovenox  Dispo: 2-3 days pending bowel function, tolerance of PO, and pain control    Patient seen and discussed with fellow    Xiomara Sands MD  General Surgery, PGY-3  Pg [phone]    Attestation:  This patient has been seen and evaluated by me.  Discussed with the house staff team or resident(s) and agree with the findings and plan in this note.  Krzysztof Carney MD  Professor of Surgery  Chief, Division of Colon and Rectal Surgery  [phone]          "

## 2019-06-21 NOTE — PROGRESS NOTES
"[facility]   WO Nurse Inpatient [facility] Nurse Inpatient Adult Ostomy Assessment    Initial Assessment   Assessment of new end Ileostomy Stoma complication(s) none   Mucocutaneous junction; intact   Peristomal complication(s) none   Pouch wear time:less than 24 hours- removed pouching system that was placed during surgery for teaching  Following today's visit:Patient /  is  able to demonstrate;       1. How to empty their pouch? No- demonstrated today      2. How to change their pouch?  No- demonstrated today      3. How to read and record intake and output correctly? no    Objective data:  Patient history according to medical record: This is a 34 year old female with UC and PSC s/p liver transplant. She is now s/p total abdominal colectomy with end ileostomy on 6/20. Recovering well.  Current Diet/Nutrition: Orders Placed This Encounter      Full Liquid Diet     TPN no   I/O last 3 completed shifts:  In: 3583.33 [P.O.:320; I.V.:3263.33]  Out: 2045 [Urine:1895; Stool:150]  Labs:    Recent Labs   Lab 06/21/19  0734   ALBUMIN 2.2*   HGB 8.2*   INR 1.15*   WBC 18.2*        Physical Exam:  Current pouching system:Pharr and 1pc flat CTF with 2\" barrier ring   Reason for pouch change today: ostomy education  Stoma appearance: viable, healthy, normal-appearing, pink, round, moist, good turgor, edematous and protruberant  Stoma size; 13/4\"   Peristomal skin: intact  Stoma output :pasty green stool- 25 cc in the pouch  Abdominal  Assessment  soft , NG still in place? No  Surgical Site: open to air  Pain: Dull ache  Is patient still on a PCA Yes    Interventions:  Patient's chart evaluated.  Focus of today's visit: initial fitting   Participant of teaching session today patient  and spouse  Orders: Written  Change made with ostomy management today: Yes- changed to Ana 1 pc flat pouch with barrier ring  Patient/family: observing  Supplies:Gathered and at " bedside    Plan:  Learning needs: pouch change return demonstration, verbal instruction , diet and hydration , pouch emptying, output measurement, odor/flatus management, lifestyle adjustments and discharge instructions  Preparation for discharge: No discharge preparations started, (needs- Supplies ordered, Completed supply list, Registered for samples from , Prescriptions or note left on chart for MD to sign/complete, Prepared for discharge to home with homecare, Discussed making a WOC Nurse outpatient appointment upon discharge, Discussed when to follow up with a WOC Nurse in the future and Discussed how/ where to order supplies)  Recommend home care? yes    Discussed plan of care with Patient and Family  Nursing to notify the Provider(s) and re-consult the WOC Nurse if new ostomy concerns or discharge planned before next planned WOC visit.    WOC Nurse will return: Monday  Face to face time: 50 minutes    Kylie Mosqueda

## 2019-06-21 NOTE — PLAN OF CARE
A&O. Vitals stable. CAPNO in place. Mg level of 1.6. One time Mg order administered.  Ambulated in hallway with SBA. Pain managed with pca dilaudid pump and scheduled tylenol. Tolerating FLD w/ no complaints of N&V. Walter catheter removed and patient able to spontaneously void adequate amount of urine.  Lap sites and lower abdominal incision KEVIN. Colostomy WDL with..  Family at bedside supportive of cares.

## 2019-06-21 NOTE — PROGRESS NOTES
Care Coordinator Progress Note    Admission Date/Time:  6/20/2019  Attending MD:  Krzysztof Carney MD    Data  Chart reviewed, discussed with interdisciplinary team.   Patient was admitted for: Ulcerative pancolitis with other complication (H).  Patient has a new ileostomy.    Concerns with insurance coverage for discharge needs: None.  Current Living Situation: Patient lives with spouse.  Support System: Supportive and Involved  Services Involved: None  Transportation at Discharge: Family or friend will provide  Transportation to Medical Appointments:   - Name of caregiver: Ceferino Penny, spouse  Barriers to Discharge: Medical Stability    Coordination of Care and Referrals: Provided patient/family with options for Home Care.       Per the team, patient will likely discharge early next week. WOC is following for new ostomy.    Writer met with patient and her  Ceferino to introduce the role of the care coordinator and assess discharge needs. Per patient request, referral made to [facility] for RN visits. Patient and  had no further questions or concerns regarding discharge at the time of our conversation.      Plan  Anticipated Discharge Date: 6/24/2019   Anticipated Discharge Plan:  Home with home care visits and clinic follow up as recommended by the team    Blanka Palacios RNCC

## 2019-06-21 NOTE — OP NOTE
Procedure Date: 06/20/2019      PREOPERATIVE DIAGNOSES:     1.  Ulcerative colitis with low-grade dysplasia.    2.  Status post living donor liver transplant for sclerosing cholangitis.        POSTOPERATIVE DIAGNOSIS:      PROCEDURES PERFORMED:  Laparoscopic total abdominal colectomy with extensive lysis of adhesions, end ileostomy.        HISTORY OF PRESENT ILLNESS:  This 32-year-old woman has a 14-year history of chronic ulcerative colitis with sclerosing cholangitis.  She was noted to have active ileitis at the time of her diagnosis.  She underwent living donor liver transplant from her  and has been doing well since then.  Colonoscopy in 02/2019 showed Abad 2/3 pancolitis with relative rectal sparing.  Biopsies showed low-grade dysplasia at 40 cm.  The patient was previously followed for her colitis by Dr. Fuentes Johnson and she now sees Dr. Sophy Mckeon.  The patient wishes to avoid pelvic dissection at the present time to limit her risks of infertility and Dr. Mckeon advised a total abdominal colectomy.  I met with the patient and discussed the surgical options and agreed to proceed with total abdominal colectomy.  I reviewed the risks and alternatives to surgery.  I answered all the patient's questions to her stated satisfaction.  She expressed her understanding and provided written informed consent.      SURGEON:  Krzysztof Carney MD      ASSISTANTS:  Dr. Banda and Villa Flannery MS4      DESCRIPTION OF PROCEDURE:  After induction of adequate endotracheal anesthesia, the patient was placed in the modified lithotomy position using Yellofin stirrups and pneumatic compression stockings.  The abdomen was prepped and draped in sterile fashion.  A timeout was performed and the patient and procedure confirmed.  I initially planned a hand-assisted laparotomy, but the patient had a tiny abdominal cavity and I felt that my hand would likely be a detriment to optimal visualization, so I chose to proceed with  conventional laparoscopy.  We entered the abdomen just below the umbilicus using a Emanuel technique and placed a 12 mm balloon port, 5 mm ports were placed in each of the abdominal quadrants.  Abdominal exploration demonstrated numerous adhesions, but no other evident pathology.  There were numerous adhesions to the abdominal wall, including adhesions to the full length of the transverse colon and also, rather surprisingly, fairly dense adhesions in the pelvis.  We spent in total in excess of 1 hour lysing adhesions.  I chose a lateral to medial dissection based on the patient's numerous adhesions and altered anatomy, especially in the right upper quadrant.  The right and left colons were both fully medialized.  The splenic flexure was fully mobilized, initially retrograde and the anterograde by entering the lesser sac.  The apex of the splenic flexure lay quite high near the splenic hilum.  We noted during the dissection that hemostasis seemed suboptimal with slow persistent oozing vessels at the sites of our dissection, which were performed using electrocautery and the LigaSure.  We found that after we had dissected in one quadrant of the abdomen and worked elsewhere that there was persisting oozing that needed to be addressed.  We did check coags, which demonstrated a slightly elevated INR and a low fibrinogen.  The patient remained hemodynamically entirely stable throughout the entire case.  We took particular care in our dissection of the right upper quadrant and carefully visualized the duodenum and stayed well below the transplant liver.  We began the mesenteric harvest in the left lower quadrant, dividing the ileocolic vessels in their mid-portion with multiple applications of the LigaSure.  We continued working right to left relatively close to the colon, dividing the sigmoidal branches, but not taking the inferior mesenteric artery.  As we worked down to the pelvis, we encountered the notably dense pelvic  adhesions.  At this point, I felt that these would be better managed through a Pfannenstiel incision, so we released our pneumoperitoneum and performed one.  An Valdez wound retractor was placed.  We freed up the distal sigmoid and upper rectum, which we divided at the level of the sacral promontory using the green load Contour stapler.  This was tagged with 2 long Prolene sutures.  The remaining mesorectal attachments were divided using the LigaSure.  We copiously irrigated the abdomen and then reestablished our pneumoperitoneum and carefully reexplored it to be certain that there were no bleeding spots.  Small oozing vessels were dressed and irrigated until I was confident that hemostasis was completely intact.  We again released our pneumoperitoneum.  We excised the previously marked ileostomy site and created 2 fingerbreadths apertures through the rectus muscle.  We delivered the ileum through this opening, taking care to ensure that the mesentery was perfectly straight and oriented superiorly.  We then closed the balloon port site with 2-0 Vicryl figure-of-eight sutures.  The posterior peritoneum of the Pfannenstiel incision was closed with 2-0 Vicryl.  The anterior sheath was closed with running 0 PDS.  Subcutaneous tissues were irrigated.  The skin was closed with 4-0 Monocryl and Dermabond.  The ileostomy was next matured with interrupted 3-0 Vicryls.  This resulted in a well-vascularized and nicely everted stoma.  The stoma appliance was placed and procedure terminated.      Estimated blood loss was 500 mL.  The patient tolerated the procedure well and without evident complications.  Sponge, needle and instrument counts were reported as correct at the conclusion of the case x2.         ZAC LENNON MD             D: 2019   T: 2019   MT: BEST      Name:     GINA CABRERA   MRN:      [id number]        Account:        [id number]   :      1985           Procedure Date: 2019       Document: [id number]       cc: MYLENE Johnson MD       P Surgery Billing

## 2019-06-21 NOTE — PROGRESS NOTES
CLINICAL NUTRITION SERVICES  Reason for Assessment:  Nutrition education regarding low fiber/low residue diet education  Diet History:  Patient has no history of low fiber diet education (is familiar with it though).  Nutrition Diagnosis:  Food- and nutrition-related knowledge deficit r/t no previous knowledge of low fiber diet as evidenced by patient report  Interventions:  Provided instruction on low fiber diet. Discussed each food group and foods to eat and avoid. Answered patient's questions regarding diet.   Provided handouts : Fiber Restricted Nutrition Therapy and Low Fiber Diet Hospital Menu  Goals:   Patient will verbalize at least 5 low fiber foods acceptable on diet and 5 high fiber foods to avoid.  Follow-up:    Patient to ask any further nutrition-related questions before discharge.  In addition, pt may request outpatient RD appointment.    Priti Appiah RD, LD  7C RD pager: [phone]

## 2019-06-21 NOTE — PLAN OF CARE
POD 1 total abdominal colectomy with end ileostomy. VSS, Capno on and WDL. Pain controlled with Dilaudid PCA and scheduled Tylenol. Denies nausea. Tolerating clears. Ileostomy with small amount of stool and gas. Walter with adequate urine output. Lap sites/incision closed with Dermabond and KEVIN. Ambulates with assist of 1. Continue with POC.

## 2019-06-21 NOTE — PLAN OF CARE
Pt transferred to  from PACU at 1700. AVSS, on RA. Capno in place. Denies nausea. Tolerating CL diet. Denies LAST symptoms. Lap site and transverse incision near umbilicus KEVIN, zoe, approximated. Ostomy with liquid brown output, stoma red. Walter with good UOP. Pt walked from stretcher to bed upon arrival and sat in chair this shift. ERAS. Family at bedside, supportive. Pt tearful at beginning of shift, emotional support given.

## 2019-06-21 NOTE — PROGRESS NOTES
"REGIONAL ANESTHESIA PAIN SERVICE  SUBJECTIVE  Interval history: Patient reports pain adequate controlled with nerve block and current analgesics (see below).  Currently rating pain 2/10 at rest and 7/10 with activity.  Tolerating a diet, deniesnausea.  Denies any weakness, paresthesias, circumoral numbness, metallic taste or tinnitus.       Clinically Aligned Pain Assessment (CAPA):  Comfort (How is your pain?): Tolerable with discomfort  Change in Pain (Since your last medication/intervention?): About the same  Pain Control (How are your pain treatments working?):  Partially effective pain control    Medications related to Pain Management (From now, onward)    Start     Dose/Rate Route Frequency Ordered Stop    06/24/19 0000  lidocaine 1 % 0.1-1 mL      0.1-1 mL Other EVERY 1 HOUR PRN 06/20/19 1726      06/20/19 1730  acetaminophen (TYLENOL) tablet 975 mg      975 mg Oral EVERY 8 HOURS 06/20/19 1726 06/23/19 1829    06/20/19 1726  lidocaine (LMX4) cream       Topical EVERY 1 HOUR PRN 06/20/19 1726      06/20/19 1245  HYDROmorphone (DILAUDID) PCA 1 mg/mL OPIOID NAIVE       Intravenous CONTINUOUS 06/20/19 1239      06/20/19 0538  bupivacaine liposome (EXPAREL) LONG ACTING injection was administered into the infiltration site to produce postsurgical analgesia. Duration of action is up to 72 hours, and other \"martir\" medications should not be given for 96 hours with the exception of the lidocaine 5% patch (LIDODERM) and the lidocaine 10mg in potassium infusions. This entry is for INFORMATION ONLY.       Does not apply CONTINUOUS PRN 06/20/19 0538 06/24/19 0537          OBJECTIVE:    /73 (BP Location: Left arm)   Pulse 84   Temp 98.4  F (36.9  C) (Oral)   Resp 12   Ht 1.575 m (5' 2\")   Wt 53.2 kg (117 lb 4.8 oz)   LMP 06/10/2019 (Exact Date)   SpO2 99%   BMI 21.45 kg/m    Exam:  General: alert and no distress  Neuro: Strength 5/5 in LE    ASSESSMENT/PLAN:    Yarelis Penny is a 34 year old female with " ulcerative colitis, now POD #1 s/p COLECTOMY, LAPAROSCOPY-ASSISTED   COLECTOMY, PARTIALCREATION, ILEOSTOMY with single shot injection bilateral transversus abdominis plane (TAP) nerve block.  Total bupivacaine 0.25% 20mL total and liposomal (long-acting) bupivacaine (Exparel) 1.3% 20mL total administered 6/20/19 for postop pain control.  Patient is ambulating without difficulty.  No evidence of adverse side effects associated with nerve block injection.  Acheiving adequate pain control, with nerve block,oral and IV analgesics.  Anticipate 48-72 hours of pain control with long-acting local anesthetic. Patient will continue to require multimodal analgesia for pain not controlled with long-acting local anesthetic.      - NO other local anesthetic use within 96 hours of liposomal bupivacaine (Exparel), unless approved by [facility]  - patient received verbal and written instructions about liposomal bupivacaine and counseling about pharmacologic and nonpharmacologic measures for acute postoperative pain management  - please call [facility] if questions or concerns    KAYLENE Conroy CNP  Regional Anesthesia Pain Service ([facility])  6/21/2019 11:42 AM    [facility] Contact Info (for in-house use only):  Job code ID: [id number]   [place] Contact At Once! [phone]  [facility] 0602  [place] phone: dial [phone], enter jobcode ID, then enter call-back number.    Text: Use [employer] on the Intranet <Paging/Directory> tab and enter Jobcode ID.   If no call back at any time, contact the hospital  and ask for RAPS attending or backup

## 2019-06-22 LAB
ALBUMIN SERPL-MCNC: 2.1 G/DL (ref 3.4–5)
ALP SERPL-CCNC: 81 U/L (ref 40–150)
ALT SERPL W P-5'-P-CCNC: 26 U/L (ref 0–50)
AST SERPL W P-5'-P-CCNC: 24 U/L (ref 0–45)
BILIRUB DIRECT SERPL-MCNC: <0.1 MG/DL (ref 0–0.2)
BILIRUB SERPL-MCNC: 0.2 MG/DL (ref 0.2–1.3)
PROT SERPL-MCNC: 5.5 G/DL (ref 6.8–8.8)
TACROLIMUS BLD-MCNC: 4.2 UG/L (ref 5–15)
TME LAST DOSE: ABNORMAL H

## 2019-06-22 PROCEDURE — 25000132 ZZH RX MED GY IP 250 OP 250 PS 637: Performed by: COLON & RECTAL SURGERY

## 2019-06-22 PROCEDURE — 25000131 ZZH RX MED GY IP 250 OP 636 PS 637: Performed by: COLON & RECTAL SURGERY

## 2019-06-22 PROCEDURE — 36415 COLL VENOUS BLD VENIPUNCTURE: CPT | Performed by: NURSE PRACTITIONER

## 2019-06-22 PROCEDURE — 80076 HEPATIC FUNCTION PANEL: CPT | Performed by: NURSE PRACTITIONER

## 2019-06-22 PROCEDURE — 80197 ASSAY OF TACROLIMUS: CPT | Performed by: NURSE PRACTITIONER

## 2019-06-22 PROCEDURE — 25000128 H RX IP 250 OP 636: Performed by: COLON & RECTAL SURGERY

## 2019-06-22 PROCEDURE — 12000001 ZZH R&B MED SURG/OB UMMC

## 2019-06-22 RX ORDER — POTASSIUM CL/LIDO/0.9 % NACL 10MEQ/0.1L
10 INTRAVENOUS SOLUTION, PIGGYBACK (ML) INTRAVENOUS
Status: DISCONTINUED | OUTPATIENT
Start: 2019-06-22 | End: 2019-06-24 | Stop reason: HOSPADM

## 2019-06-22 RX ORDER — POTASSIUM CHLORIDE 7.45 MG/ML
10 INJECTION INTRAVENOUS
Status: DISCONTINUED | OUTPATIENT
Start: 2019-06-22 | End: 2019-06-24 | Stop reason: HOSPADM

## 2019-06-22 RX ORDER — POTASSIUM CHLORIDE 29.8 MG/ML
20 INJECTION INTRAVENOUS
Status: DISCONTINUED | OUTPATIENT
Start: 2019-06-22 | End: 2019-06-24 | Stop reason: HOSPADM

## 2019-06-22 RX ORDER — MAGNESIUM SULFATE HEPTAHYDRATE 40 MG/ML
4 INJECTION, SOLUTION INTRAVENOUS EVERY 4 HOURS PRN
Status: DISCONTINUED | OUTPATIENT
Start: 2019-06-22 | End: 2019-06-24 | Stop reason: HOSPADM

## 2019-06-22 RX ORDER — ACETAMINOPHEN 500 MG
1000 TABLET ORAL 3 TIMES DAILY
Status: DISCONTINUED | OUTPATIENT
Start: 2019-06-22 | End: 2019-06-23

## 2019-06-22 RX ORDER — HYDROMORPHONE HCL/0.9% NACL/PF 0.2MG/0.2
0.2 SYRINGE (ML) INTRAVENOUS
Status: DISCONTINUED | OUTPATIENT
Start: 2019-06-22 | End: 2019-06-24 | Stop reason: HOSPADM

## 2019-06-22 RX ORDER — OXYCODONE HYDROCHLORIDE 5 MG/1
5-10 TABLET ORAL EVERY 4 HOURS PRN
Status: DISCONTINUED | OUTPATIENT
Start: 2019-06-22 | End: 2019-06-23

## 2019-06-22 RX ADMIN — ACETAMINOPHEN 975 MG: 325 TABLET, FILM COATED ORAL at 12:13

## 2019-06-22 RX ADMIN — TACROLIMUS 2.5 MG: 1 CAPSULE ORAL at 20:10

## 2019-06-22 RX ADMIN — ACETAMINOPHEN 1000 MG: 500 TABLET, FILM COATED ORAL at 20:11

## 2019-06-22 RX ADMIN — OXYCODONE HYDROCHLORIDE 5 MG: 5 TABLET ORAL at 16:04

## 2019-06-22 RX ADMIN — ENOXAPARIN SODIUM 40 MG: 40 INJECTION SUBCUTANEOUS at 08:09

## 2019-06-22 RX ADMIN — PREDNISONE 5 MG: 5 TABLET ORAL at 08:09

## 2019-06-22 RX ADMIN — OXYCODONE HYDROCHLORIDE 5 MG: 5 TABLET ORAL at 08:08

## 2019-06-22 RX ADMIN — TACROLIMUS 2.5 MG: 1 CAPSULE ORAL at 09:24

## 2019-06-22 RX ADMIN — URSODIOL 500 MG: 250 TABLET, FILM COATED ORAL at 08:09

## 2019-06-22 RX ADMIN — URSODIOL 250 MG: 250 TABLET, FILM COATED ORAL at 20:11

## 2019-06-22 RX ADMIN — ACETAMINOPHEN 975 MG: 325 TABLET, FILM COATED ORAL at 03:29

## 2019-06-22 RX ADMIN — OXYCODONE HYDROCHLORIDE 5 MG: 5 TABLET ORAL at 12:13

## 2019-06-22 RX ADMIN — OXYCODONE HYDROCHLORIDE 5 MG: 5 TABLET ORAL at 09:31

## 2019-06-22 RX ADMIN — OXYCODONE HYDROCHLORIDE 5 MG: 5 TABLET ORAL at 20:10

## 2019-06-22 ASSESSMENT — ACTIVITIES OF DAILY LIVING (ADL)
ADLS_ACUITY_SCORE: 15

## 2019-06-22 ASSESSMENT — PAIN DESCRIPTION - DESCRIPTORS
DESCRIPTORS: DISCOMFORT
DESCRIPTORS: DISCOMFORT
DESCRIPTORS: TENDER;DISCOMFORT
DESCRIPTORS: DISCOMFORT

## 2019-06-22 NOTE — PLAN OF CARE
AVSS.  100% RA.  Pt resting well between cares.  PCA Dilaudid + scheduled tylenol with adequate control.  Denies nausea, tolerating diet.  Up with SBA, voiding good vols.  New ileostomy, stoma pink, edematous - small amt stool and gas.  Lap sites c/d/intact.  MIV@70.  Cont with POC.

## 2019-06-22 NOTE — PLAN OF CARE
AVSS. Pt reports abdominal pain. Dilaudid PCA discontinued and pt switched to oxycodone 5-10mg q 4 hrs. Pt is also on scheduled tylenol, reports adequate pain control with current regimen. Denies nausea, ileostomy with gas and stool. Tolerating a low fiber diet. Voiding adequate amounts. Abdominal incisions are CDI. Up ab cassius. Ambulating in halls. Continue to monitor pain, bowel function and provide teaching on ostomy cares.

## 2019-06-22 NOTE — PLAN OF CARE
AVSS on RA. A+OX4. Pain well controlled with PCA dilaudid [id number], scheduled tylenol. Denies nausea. Full liquid diet with good appetite. Ileostomy patent, stoma edematous and pink, stool and gas present. Lap sites liquid bandage, c/d/I. PIV X2, one infusing, one SL'd. Voiding good amounts. Up with SBA, steady on feet. Ambulated in halls and sat up in chair. Family at bedside, supportive.     Continue POC.

## 2019-06-22 NOTE — PROGRESS NOTES
Colorectal Surgery Progress Note  POD#2      Subjective:  No overnight events. Pain controlled. Tolerating clears. Stoma functioning. No nausea. Voiding.    Vitals:  Vitals:    06/21/19 1436 06/21/19 1937 06/21/19 2214 06/22/19 0732   BP: 103/73 116/76 111/72 111/71   BP Location: Left arm Left arm Left arm Left arm   Pulse: 70 88     Resp: 15 16 14 16   Temp: 97.8  F (36.6  C) 97  F (36.1  C) 97.8  F (36.6  C) 98.5  F (36.9  C)   TempSrc: Oral Oral Oral Oral   SpO2: 99% 100% 100% 99%   Weight:       Height:         I/O:  I/O last 3 completed shifts:  In: 2470 [P.O.:760; I.V.:1710]  Out: 2350 [Urine:2300; Stool:50]    Physical Exam:  Gen: AAOx3, NAD  Pulm: Non-labored breathing  Abd: Soft, non-distended, appropriately tender, no guarding   Incision C/D/I with dermabond   Stoma edematous, pink and viable with dark liquid in bag  Ext:  Warm and well-perfused    BMP  Recent Labs   Lab 06/21/19  0734      POTASSIUM 4.2   CHLORIDE 107   CO2 27   BUN 18   CR 1.00   GLC 89   MAG 1.6     CBC  Recent Labs   Lab 06/21/19  0734 06/20/19  1527 06/20/19  1150 06/20/19  1105   WBC 18.2*  --   --   --    HGB 8.2* 8.7*  --  8.3*   HCT 28.1*  --   --  27.7*   *  --  469*  --          ASSESSMENT: This is a 34 year old female with UC and PSC s/p liver transplant. She is now POD #2 s/p total abdominal colectomy with end ileostomy on 6/20. Recovering well.    - FLD, advance as tolerated  - Transition from PCA to PO  - Cont home transplant meds, appreciate transplant assistance  - SCDs, lovenox, will need at discharge  - OOB, ambulate  - WOCN education  - Plan home Monday    Dr. Talley updated    Stewart Banda MD  Colon and Rectal Surgery Fellow

## 2019-06-23 LAB
ALBUMIN SERPL-MCNC: 2.1 G/DL (ref 3.4–5)
ALP SERPL-CCNC: 85 U/L (ref 40–150)
ALT SERPL W P-5'-P-CCNC: 26 U/L (ref 0–50)
ANION GAP SERPL CALCULATED.3IONS-SCNC: 4 MMOL/L (ref 3–14)
AST SERPL W P-5'-P-CCNC: 19 U/L (ref 0–45)
BILIRUB SERPL-MCNC: 0.2 MG/DL (ref 0.2–1.3)
BUN SERPL-MCNC: 8 MG/DL (ref 7–30)
CALCIUM SERPL-MCNC: 8.1 MG/DL (ref 8.5–10.1)
CHLORIDE SERPL-SCNC: 111 MMOL/L (ref 94–109)
CO2 SERPL-SCNC: 28 MMOL/L (ref 20–32)
CREAT SERPL-MCNC: 0.81 MG/DL (ref 0.52–1.04)
GFR SERPL CREATININE-BSD FRML MDRD: >90 ML/MIN/{1.73_M2}
GLUCOSE SERPL-MCNC: 87 MG/DL (ref 70–99)
HGB BLD-MCNC: 7.4 G/DL (ref 11.7–15.7)
HGB BLD-MCNC: 7.8 G/DL (ref 11.7–15.7)
INR PPP: 1.02 (ref 0.86–1.14)
POTASSIUM SERPL-SCNC: 3.9 MMOL/L (ref 3.4–5.3)
PROT SERPL-MCNC: 5.8 G/DL (ref 6.8–8.8)
SODIUM SERPL-SCNC: 142 MMOL/L (ref 133–144)

## 2019-06-23 PROCEDURE — 85610 PROTHROMBIN TIME: CPT | Performed by: COLON & RECTAL SURGERY

## 2019-06-23 PROCEDURE — 80053 COMPREHEN METABOLIC PANEL: CPT | Performed by: COLON & RECTAL SURGERY

## 2019-06-23 PROCEDURE — 12000001 ZZH R&B MED SURG/OB UMMC

## 2019-06-23 PROCEDURE — 36415 COLL VENOUS BLD VENIPUNCTURE: CPT | Performed by: COLON & RECTAL SURGERY

## 2019-06-23 PROCEDURE — 25000128 H RX IP 250 OP 636: Performed by: COLON & RECTAL SURGERY

## 2019-06-23 PROCEDURE — 25000131 ZZH RX MED GY IP 250 OP 636 PS 637: Performed by: COLON & RECTAL SURGERY

## 2019-06-23 PROCEDURE — 25000132 ZZH RX MED GY IP 250 OP 250 PS 637: Performed by: COLON & RECTAL SURGERY

## 2019-06-23 PROCEDURE — 25800030 ZZH RX IP 258 OP 636: Performed by: COLON & RECTAL SURGERY

## 2019-06-23 PROCEDURE — 85018 HEMOGLOBIN: CPT | Performed by: COLON & RECTAL SURGERY

## 2019-06-23 RX ORDER — IBUPROFEN 600 MG/1
600 TABLET, FILM COATED ORAL 3 TIMES DAILY
Status: DISCONTINUED | OUTPATIENT
Start: 2019-06-23 | End: 2019-06-23

## 2019-06-23 RX ORDER — BETA-CAROTENE 7500 MCG
25000 CAPSULE ORAL 2 TIMES DAILY
Status: DISCONTINUED | OUTPATIENT
Start: 2019-06-23 | End: 2019-06-24 | Stop reason: HOSPADM

## 2019-06-23 RX ORDER — OXYCODONE HYDROCHLORIDE 5 MG/1
5-10 TABLET ORAL
Status: DISCONTINUED | OUTPATIENT
Start: 2019-06-23 | End: 2019-06-24 | Stop reason: HOSPADM

## 2019-06-23 RX ORDER — ACETAMINOPHEN 500 MG
1000 TABLET ORAL 4 TIMES DAILY
Status: DISCONTINUED | OUTPATIENT
Start: 2019-06-23 | End: 2019-06-24 | Stop reason: HOSPADM

## 2019-06-23 RX ADMIN — TACROLIMUS 2.5 MG: 1 CAPSULE ORAL at 19:52

## 2019-06-23 RX ADMIN — OXYCODONE HYDROCHLORIDE 5 MG: 5 TABLET ORAL at 21:21

## 2019-06-23 RX ADMIN — OXYCODONE HYDROCHLORIDE 10 MG: 5 TABLET ORAL at 04:53

## 2019-06-23 RX ADMIN — TACROLIMUS 2.5 MG: 1 CAPSULE ORAL at 07:44

## 2019-06-23 RX ADMIN — OXYCODONE HYDROCHLORIDE 10 MG: 5 TABLET ORAL at 12:52

## 2019-06-23 RX ADMIN — ENOXAPARIN SODIUM 40 MG: 40 INJECTION SUBCUTANEOUS at 07:45

## 2019-06-23 RX ADMIN — OXYCODONE HYDROCHLORIDE 5 MG: 5 TABLET ORAL at 19:52

## 2019-06-23 RX ADMIN — ACETAMINOPHEN 1000 MG: 500 TABLET, FILM COATED ORAL at 19:52

## 2019-06-23 RX ADMIN — IBUPROFEN 600 MG: 600 TABLET ORAL at 10:54

## 2019-06-23 RX ADMIN — Medication 25000 UNITS: at 19:52

## 2019-06-23 RX ADMIN — PREDNISONE 5 MG: 5 TABLET ORAL at 07:43

## 2019-06-23 RX ADMIN — CALCIUM GLUCONATE 2 G: 98 INJECTION, SOLUTION INTRAVENOUS at 10:50

## 2019-06-23 RX ADMIN — ACETAMINOPHEN 1000 MG: 500 TABLET, FILM COATED ORAL at 12:52

## 2019-06-23 RX ADMIN — ACETAMINOPHEN 1000 MG: 500 TABLET, FILM COATED ORAL at 15:51

## 2019-06-23 RX ADMIN — OXYCODONE HYDROCHLORIDE 5 MG: 5 TABLET ORAL at 00:01

## 2019-06-23 RX ADMIN — Medication 25000 UNITS: at 12:52

## 2019-06-23 RX ADMIN — URSODIOL 500 MG: 250 TABLET, FILM COATED ORAL at 07:44

## 2019-06-23 RX ADMIN — ACETAMINOPHEN 1000 MG: 500 TABLET, FILM COATED ORAL at 07:44

## 2019-06-23 RX ADMIN — URSODIOL 250 MG: 250 TABLET, FILM COATED ORAL at 19:52

## 2019-06-23 RX ADMIN — OXYCODONE HYDROCHLORIDE 5 MG: 5 TABLET ORAL at 16:01

## 2019-06-23 RX ADMIN — OXYCODONE HYDROCHLORIDE 10 MG: 5 TABLET ORAL at 09:07

## 2019-06-23 ASSESSMENT — ACTIVITIES OF DAILY LIVING (ADL)
ADLS_ACUITY_SCORE: 15
ADLS_ACUITY_SCORE: 13
ADLS_ACUITY_SCORE: 15

## 2019-06-23 ASSESSMENT — PAIN DESCRIPTION - DESCRIPTORS
DESCRIPTORS: SORE

## 2019-06-23 NOTE — PLAN OF CARE
AVSS.  BP soft, but WNL.  Pt resting well between cares.  Pain adequately controlled with PRN oxycodone.    Up independently in room, voiding not saving.  SL.  Ileostomy intact, soft stool.  Lap sites c/d/intact.  Denies nausea, tolerating LF diet.  Cont with POC.

## 2019-06-23 NOTE — PLAN OF CARE
AVSS. Pt reports abdominal pain. Pain controlled with oxycodone 5-10mg q 3 hrs. Pt is also on scheduled tylenol, reports adequate pain control with current regimen. Denies nausea, ileostomy with gas and stool. Tolerating a low fiber diet. Voiding adequate amounts. Abdominal incisions are CDI. Up ab cassius. Ambulating in halls. Waiting for WOC teaching tomorrow. Continue to monitor pain, bowel function and provide teaching on ostomy cares.

## 2019-06-23 NOTE — PROGRESS NOTES
"Colon & Rectal Surgery Progress Note    Subjective:   Pain tolerable. [place] diet well. Good UOP.    Objective: /77 (BP Location: Left arm)   Pulse 88   Temp 97.6  F (36.4  C) (Oral)   Resp 14   Ht 5' 2\"   Wt 117 lb 4.8 oz   LMP 06/10/2019 (Exact Date)   SpO2 99%   BMI 21.45 kg/m       Recent Labs   Lab Test 06/23/19  0732 06/21/19  0734   WBC  --  18.2*   RBC  --  2.95*   HGB 7.4* 8.2*   HCT  --  28.1*   MCV  --  95   MCH  --  27.8   MCHC  --  29.2*   RDW  --  14.5   PLT  --  537*       Intake/Output Summary (Last 24 hours) at 6/23/2019 0930  Last data filed at 6/22/2019 2016  Gross per 24 hour   Intake 640 ml   Output 700 ml   Net -60 ml       Abdomen: soft, appr tender. Incisions clean. EI viable and functioning (350mL/24h).    Assessment:  POD#3. Hgb 8.7->8.2->7.4    Plan:  - LR diet.  - PO pain meds.  - DVT proph. Recheck Hgb later today and in AM. Ca+ IV x1.  - WOC cares/teaching.  - TX meds.  - Ambulate QID.    Claude Talley M.D., M.Sc.    Colon and Rectal Surgery  Pager: [phone].    "

## 2019-06-23 NOTE — PLAN OF CARE
AVSS. Pain managed with scheduled Tylenol and PRN oxycodone. Denies nausea. Good appetite on low fiber diet. Medium ileostomy output, with patient participation/teachback of cares. Voiding spontaneously. PIV saline locked. Up ad cassius. Incisions intact. Family present and supportive.

## 2019-06-24 VITALS
BODY MASS INDEX: 21.59 KG/M2 | SYSTOLIC BLOOD PRESSURE: 117 MMHG | HEART RATE: 88 BPM | TEMPERATURE: 97.5 F | WEIGHT: 117.3 LBS | DIASTOLIC BLOOD PRESSURE: 82 MMHG | OXYGEN SATURATION: 96 % | RESPIRATION RATE: 16 BRPM | HEIGHT: 62 IN

## 2019-06-24 LAB
COPATH REPORT: NORMAL
FIBRINOGEN PPP-MCNC: 325 MG/DL (ref 200–420)
HGB BLD-MCNC: 8.1 G/DL (ref 11.7–15.7)

## 2019-06-24 PROCEDURE — 36415 COLL VENOUS BLD VENIPUNCTURE: CPT | Performed by: COLON & RECTAL SURGERY

## 2019-06-24 PROCEDURE — 25000131 ZZH RX MED GY IP 250 OP 636 PS 637: Performed by: COLON & RECTAL SURGERY

## 2019-06-24 PROCEDURE — 85018 HEMOGLOBIN: CPT | Performed by: COLON & RECTAL SURGERY

## 2019-06-24 PROCEDURE — 25000128 H RX IP 250 OP 636: Performed by: STUDENT IN AN ORGANIZED HEALTH CARE EDUCATION/TRAINING PROGRAM

## 2019-06-24 PROCEDURE — 85384 FIBRINOGEN ACTIVITY: CPT | Performed by: COLON & RECTAL SURGERY

## 2019-06-24 PROCEDURE — G0463 HOSPITAL OUTPT CLINIC VISIT: HCPCS

## 2019-06-24 PROCEDURE — 25000132 ZZH RX MED GY IP 250 OP 250 PS 637: Performed by: COLON & RECTAL SURGERY

## 2019-06-24 RX ORDER — OXYCODONE HYDROCHLORIDE 5 MG/1
5-10 TABLET ORAL EVERY 4 HOURS PRN
Qty: 20 TABLET | Refills: 0 | Status: SHIPPED | OUTPATIENT
Start: 2019-06-24 | End: 2019-06-28

## 2019-06-24 RX ORDER — ACETAMINOPHEN 500 MG
1000 TABLET ORAL 4 TIMES DAILY
Qty: 50 TABLET | Refills: 0 | Status: ON HOLD | OUTPATIENT
Start: 2019-06-24 | End: 2021-03-12

## 2019-06-24 RX ORDER — OXYCODONE HYDROCHLORIDE 5 MG/1
5 TABLET ORAL EVERY 6 HOURS PRN
Qty: 20 TABLET | Refills: 0 | Status: SHIPPED | OUTPATIENT
Start: 2019-06-24 | End: 2019-08-24

## 2019-06-24 RX ADMIN — OXYCODONE HYDROCHLORIDE 10 MG: 5 TABLET ORAL at 04:06

## 2019-06-24 RX ADMIN — OXYCODONE HYDROCHLORIDE 10 MG: 5 TABLET ORAL at 07:02

## 2019-06-24 RX ADMIN — PREDNISONE 5 MG: 5 TABLET ORAL at 07:50

## 2019-06-24 RX ADMIN — OXYCODONE HYDROCHLORIDE 10 MG: 5 TABLET ORAL at 00:47

## 2019-06-24 RX ADMIN — ACETAMINOPHEN 1000 MG: 500 TABLET, FILM COATED ORAL at 07:52

## 2019-06-24 RX ADMIN — TACROLIMUS 2.5 MG: 1 CAPSULE ORAL at 07:51

## 2019-06-24 RX ADMIN — ENOXAPARIN SODIUM 40 MG: 40 INJECTION SUBCUTANEOUS at 10:43

## 2019-06-24 RX ADMIN — URSODIOL 500 MG: 250 TABLET, FILM COATED ORAL at 07:51

## 2019-06-24 RX ADMIN — OXYCODONE HYDROCHLORIDE 10 MG: 5 TABLET ORAL at 10:43

## 2019-06-24 RX ADMIN — Medication 25000 UNITS: at 07:51

## 2019-06-24 ASSESSMENT — PAIN DESCRIPTION - DESCRIPTORS
DESCRIPTORS: SORE

## 2019-06-24 ASSESSMENT — ACTIVITIES OF DAILY LIVING (ADL)
ADLS_ACUITY_SCORE: 13

## 2019-06-24 NOTE — PLAN OF CARE
Abdominal lap sites dry/intact, ileostomy with soft stool, tolerating low fiber diet. Voiding spont, up ad cassius,adequate pain relief with Tylenol and Oxycodone. Lovenox teaching done, discharge instructions given to patient and  and discharged home.

## 2019-06-24 NOTE — DISCHARGE INSTRUCTIONS
DIET  -Low Residue Diet for at least 4-6 weeks unless cleared by Colorectal surgery.  No raw vegetables, fruit skins, fibrous foods that require a lot of chewing, nuts, seeds, corn, popcorn.   -We recommend eating slowly, chewing thoroughly, eating small frequent meals throughout the day  -Stay well hydrated.    -Follow the food recommendations given by the Nutritionist and the Wound Ostomy Nurse.    ACTIVITY  -No lifting, pushing, pulling greater than 10 lbs and no strenuous exercise for 6 weeks   -No driving while on narcotic analgesics (i.e. Percocet, oxycodone, Vicodin)  -No driving until you are able to fully twist to both sides or slam on brakes quickly and without any pain    WOUND CLINIC  -Inspect your wounds daily for signs of infection (increased redness, drainage, pain)  -Keep your wound clean and dry  -You may shower, but do not soak in tub or pool  -If you have staples, they can be removed in 7-10 days by your PCP or in Colorectal Clinic    NOTIFY  Please contact Em Penny RN, Genie Vizcaino RN or Page Dolan LPN at [phone] for problems after discharge such as:  -Temperature > 101F, chills, rigors, dizziness  -Redness around or purulent drainage from wound  -Inability to tolerate diet, nausea or vomiting  -You stop passing gas (or your stoma stops functioning), develop significant bloating, abdominal pain  -Have blood in stools/vomit  -Have severe diarrhea/constipation  -Any other questions or concerns.  - At nights (after 4:30pm), on weekends, or if urgent, call [phone] and ask the  to speak with the on-call Colorectal Surgery resident or fellow    -Measure your total daily ileostomy output and record.  If you have LESS than 500mL or GREATER than 1500mL of ileostomy output within a 24 hour period, please notify the Colorectal Nurse.  If you have greater than 1200-1500mL in a 24 hour period or greater than 500cc for three consecutive 8 hour shifts, take Imodium 2mg once, stay hydrated  especially with Pedialyte and call the Colorectal Clinic to further discuss.      Medication Instructions  Some of your medications may have changed. Please take only prescribed and resumed medications     FOLLOW-UP  1.  You will need to follow-up with Lulú Serrato NP in the Colon and Rectal Surgery clinic in 1 week(s) and then with {[facility] Staff:[id number]} in 2-3 weeks after.  Please contact our clinic scheduler (phone # [phone]) if you have not heard from our clinic in 3 business days afer discharge to schedule a follow-up appointment. Please bring your log of daily ileostomy outputs to your follow up clinic appointment.   2.  Follow up with your primary care provider in 1-2 weeks after discharge from the hospital to review this hospitalization.           ORAL REHYDRATION RECIPE for Home Use  With your new ileostomy, you are at increased risk for dehydration,  Especially if you have high ileostomy outputs, or low appetites, you can help prevent it by drinking this mixture as directed.  Some examples of commercially prepared Oral Rehydration Solutions are: Pedialyte (solution, powder packet, Freeze Pops), or Drip Drop.     Electrolyte Water   -4 cups of water (equal to 1 quart or 32 ounces)   -  teaspoon salt   -6 teaspoons sugar   -Crystal Light if desired (or other choice Nutrasweet or Splenda flavoring - SUGAR-FREE) to taste (recommend lemonade or orange-pineapple flavors, but any flavor will work)    Add two cups of tap water to a large container. With measuring spoons (not table silverware), add the dry ingredients and stir or shake well. Add two additional cups of water. This will equal one quart of Electrolyte Water. Add sugar-free flavoring if desired. Best if chilled. Sip solution throughout the day. Discard after 24 hours.              [facility]     Name: Yarelis Penny [birth year]  Date:6/24/19    To order your ostomy supplies    The ostomy Supplier needs this supply  list  to process your order. You will need to fax/deliver this list, along with your Insurance information. Your home care nurse can assist with this process.      List of Ostomy Distributors        [facility]  Ph. [phone] Fax # [phone]  [employer]   Ph. [phone] [phone]  [employer] [facility]   Ph. [phone]  [facility]   Ph. [phone] [phone]  Or Call your insurance provider for their preferred supplier    Your Medical Supplier will need your surgeon's name, phone and fax number    Clinic:                     Phone                            Fax  Colorectal Surgery:    [phone]  Verbal Order for ostomy supplies for 1 Month per:                                           Cresencio Choudhury, BSN, RN    Authorizing MD: Dr. Carney    Change pouch : twice a week            Quantity of pouches- 20pouches/Month    Request the following supplies:                        Coloplast  1 piece flat pouch without filter #[id number]                          2pc flex pouch opaque without filter # 33084                          2pc flex barrier Flat#07561                            Accessories  2  barrier ring #[id number]     2  Lei ring #[id number]    Adapt paste #44368     Adapt powder #7906    No sting film barrier # 6055                          Adapt odor eliminator and lubricant 236ml bottle # [id number]     M-9 Spray room deodorizer #5422                           Brava elastic barrier strips curved # [id number]                          Brava elastic barrier strips Y-shaped # [id number]                                    Change your pouch twice a week, more often if leaking.    If you are cutting a hole in the wafer of your pouch, recheck stoma size and adjust pouch opening as needed every week    . Call the Ostomy Nurse at [facility]       [address] [place] [address], [place] : [phone]   Schedule a follow-up visit in 4 to 6 weeks after your  surgery, sooner if having problems Bring a complete set of pouch-changing supplies to this visit    Ostomy Resource Website- www.uoaa.org       Problems you should Report  - The stoma turns blue or darker in color.  - Cuts or sores around the stoma.  - Red, raw or painful skin around the stoma.  - Any bulging of the skin around the stoma.  - A pouch that leaks every day.  - Problems making the right size hole in the pouch wafer.    Please call with any questions or concerns.

## 2019-06-24 NOTE — PLAN OF CARE
"BP (!) 125/93 (BP Location: Left arm)   Pulse 88   Temp 96.7  F (35.9  C) (Oral)   Resp 16   Ht 1.575 m (5' 2\")   Wt 53.2 kg (117 lb 4.8 oz)   LMP 06/10/2019 (Exact Date)   SpO2 100%   BMI 21.45 kg/m      VSS. Pain comfortably controlled with scheduled Tylenol and PRN oxycodone 5-10 mg Q3h. UAL. Voids spont. Tolerating low fib diet with no N/V. Ileostomy with stool and gas. PIV SL. Waiting for WOC teaching tomorrow before discharge. Pt resting comfortably between cares. Continue POC.   "

## 2019-06-24 NOTE — PROGRESS NOTES
Immunosuppression management:    Continue current IS regimen: Pred 5mg daily,  FK 2.5mg BID. Titrate to a FK level goal of 4-6 per protocol.

## 2019-06-24 NOTE — DISCHARGE SUMMARY
[facility]  Discharge Summary  Colon and Rectal Surgery     Yarelis Penny MRN# [id number]   YOB: 1985 Age: 34 year old     Date of Admission:  6/20/2019  Date of Discharge::  6/24/2019  Admitting Physician:  Krzysztof Carney MD  Discharge Physician:  Lulú Betancourt MD  Primary Care Physician:        Alma Broderick          Admission Diagnoses:   Colitis  Ulcerative colitis (H)          Discharge Diagnosis:   Ulcerative colitis s/p hand-assisted total abdominal colectomy with end ileostomy, pathology pending         Procedures:   Hand-assisted total abdominal colectomy with end ileostomy          Consultations:   WOUND OSTOMY CONTINENCE NURSE  IP CONSULT  NUTRITION SERVICES ADULT IP CONSULT  VASCULAR ACCESS CARE ADULT IP CONSULT         Imaging Studies:     Results for orders placed or performed during the hospital encounter of 06/20/19   POC US GUIDANCE NEEDLE PLACEMENT    Impression    B/l TAP          Medications Prior to Admission:     Medications Prior to Admission   Medication Sig Dispense Refill Last Dose     budesonide (UCERIS) 9 MG 24 hr tablet Take 1 tablet (9 mg) by mouth every morning 30 tablet 0 6/20/2019 at 0445     ondansetron (ZOFRAN) 4 MG tablet Take 1 tablet (4 mg) by mouth every 6 hours At 8:00 am, 2:00 pm, 8:00 pm the day prior to your surgery with neomycin and flagyl. 3 tablet 0 6/19/2019 at 2000     predniSONE (DELTASONE) 5 MG tablet Take 1 tablet (5 mg) by mouth daily 225 tablet 3 6/20/2019 at 0445     tacrolimus (GENERIC EQUIVALENT) 0.5 MG capsule Take 5 capsules (2.5 mg) by mouth every 12 hours 900 capsule 3 6/20/2019 at 0445     ursodiol (ACTIGALL) 250 MG tablet Take 2 tablets (500 mg) by mouth every morning AND 1 tablet (250 mg) every evening. 270 tablet 3 6/20/2019 at 0445     calcium carbonate (OS-KERLINE 500 MG [place]. [place]) 1250 MG tablet Take 1 tablet by mouth every evening   6/18/2019     fluorouracil (EFUDEX) 5 % cream Apply topically  daily Apply nightly to warts under occlusion alongside WartStick (Patient not taking: Reported on 4/30/2019) 40 g 3 Unknown at Unknown time     GNP ASPIRIN LOW DOSE 81 MG EC tablet Take 1 tablet (81 mg) by mouth daily 30 tablet 1 6/11/2019     hydrocortisone valerate (WEST-JUAN) 0.2 % external ointment Apply topically 2 times daily To areas on the left corner of the mouth (Patient not taking: Reported on 4/30/2019) 15 g 0 Unknown at Unknown time     multivitamin, therapeutic with minerals (MULTI-VITAMIN) TABS tablet Take 1 tablet by mouth every morning   6/12/2019     Vedolizumab (ENTYVIO IV) Inject 300 mg into the vein every 28 days   More than a month at Unknown time     VITAMIN D, CHOLECALCIFEROL, PO Take by mouth every evening   6/13/2019          Discharge Medications:     Current Discharge Medication List      CONTINUE these medications which have NOT CHANGED    Details   budesonide (UCERIS) 9 MG 24 hr tablet Take 1 tablet (9 mg) by mouth every morning  Qty: 30 tablet, Refills: 0    Associated Diagnoses: Ulcerative colitis (H)      ondansetron (ZOFRAN) 4 MG tablet Take 1 tablet (4 mg) by mouth every 6 hours At 8:00 am, 2:00 pm, 8:00 pm the day prior to your surgery with neomycin and flagyl.  Qty: 3 tablet, Refills: 0    Associated Diagnoses: Colitis      predniSONE (DELTASONE) 5 MG tablet Take 1 tablet (5 mg) by mouth daily  Qty: 225 tablet, Refills: 3    Associated Diagnoses: UC (ulcerative colitis) (H); Liver replaced by transplant (H)      tacrolimus (GENERIC EQUIVALENT) 0.5 MG capsule Take 5 capsules (2.5 mg) by mouth every 12 hours  Qty: 900 capsule, Refills: 3    Associated Diagnoses: S/P liver transplant (H)      ursodiol (ACTIGALL) 250 MG tablet Take 2 tablets (500 mg) by mouth every morning AND 1 tablet (250 mg) every evening.  Qty: 270 tablet, Refills: 3    Associated Diagnoses: Liver replaced by transplant (H)      calcium carbonate (OS-KERLINE 500 MG [place]. [place]) 1250 MG tablet Take 1 tablet by mouth  every evening      fluorouracil (EFUDEX) 5 % cream Apply topically daily Apply nightly to warts under occlusion alongside WartStick  Qty: 40 g, Refills: 3    Associated Diagnoses: Plantar wart of both feet      GNP ASPIRIN LOW DOSE 81 MG EC tablet Take 1 tablet (81 mg) by mouth daily  Qty: 30 tablet, Refills: 1    Associated Diagnoses: Ulcerative colitis with other complication, unspecified location (H); Liver replaced by transplant (H)      hydrocortisone valerate (WEST-JUAN) 0.2 % external ointment Apply topically 2 times daily To areas on the left corner of the mouth  Qty: 15 g, Refills: 0    Associated Diagnoses: Cheilitis      multivitamin, therapeutic with minerals (MULTI-VITAMIN) TABS tablet Take 1 tablet by mouth every morning      Vedolizumab (ENTYVIO IV) Inject 300 mg into the vein every 28 days      VITAMIN D, CHOLECALCIFEROL, PO Take by mouth every evening                 Brief History of Illness:   Yarelis is a 34-year-old woman with history of ulcerative colitis and primary sclerosing cholangitis s/p living donor liver transplant from her  Cef[facility] 6/18/15. She has been on immunosuppressive therapy and most recent colonoscopy showed low grade dysplasia on biopsy. She had continued medical therapy for her colitis but wished to start a family and met with Dr. Sophy Mckeon who recommended considering a colectomy prior to pregnancy to avoid pelvic dissection to limit risk of infertility. She met with Dr. Krzysztof Carney 4/30/19 and discussed surgical options, and expressed her understanding and provided written informed consent to go ahead with her surgery.           Hospital Course:   Yarelis showed for her total abdominal colectomy on 6/20/19. Her surgery was uncomplicated except for overt bleeding, likely due to s/p liver transplant, (estimated blood loss at 500 mL). Hemoglobin decreased from 12.0 pre-op to 8.3 post-op, where it remained stable.    Her post-operative course was unremarkable. Her vitals were  "stable throughout. Her pain was well controlled with Tylenol and PCA Dilaudid, and her diet was advanced appropriately as her stoma output increased. Her PTA medications were continued throughout her admission, including her transplant immunosuppression (prednisone and tacrolimus). She was educated on stoma care, nutrition, and Lovenox shots for 30 days post-op due to s/p surgery in combination with IBD. On day of discharge she was stable and was in agreement to go home.    Patient is to follow up in the Colon and Rectal Surgery Clinic in 1 week with Lulú Serrato, ODALIS and then with Dr. Carney in 2-3 weeks after.          Day of Discharge Physical Exam:   Blood pressure 100/74, pulse 88, temperature 97.4  F (36.3  C), temperature source Oral, resp. rate 16, height 1.575 m (5' 2\"), weight 53.2 kg (117 lb 4.8 oz), last menstrual period 06/10/2019, SpO2 100 %, not currently breastfeeding.  Gen: AAO, NAD  Pulm: Non-labored breathing  Abd: Soft, appropriately tender, no guarding   Incision C/D/I, with dermabond   Stoma pink and viable with stool and gas in bag  Ext:  Warm and well-perfused, no edema         Final Pathology Result:   Pending at time of discharge         Discharge Instructions and Follow-Up:     Discharge Procedure Orders   Home care nursing referral   Referral Priority: Routine Referral Type: Home Health Therapies & Aides   Number of Visits Requested: 1          Home Health Care:   Daily Lovenox shots for DVT/PE ppx         Discharge Disposition:     Discharged to home   Condition at discharge: Good      Note prepared by [facility] medical student [facility] Flannery in conjunction with residents on service.  Pt was seen and discussed with colorectal fellow Dr. Selena Arreola.    I have reviewed the medical student note and discussed with colorectal fellow.    Lulú Betancourt, PGY1  [id number]    "

## 2019-06-24 NOTE — PROGRESS NOTES
"[facility]   WO Nurse Inpatient [facility] Nurse Inpatient Adult Ostomy Assessment    Follow up  Assessment   Assessment of new end Ileostomy Stoma complication(s) none   Mucocutaneous junction; intact   Peristomal complication(s) none   Pouch wear time:24-48 hours- removed pouching system that was placed during surgery for teaching  Following today's visit:Patient /  is  able to demonstrate;       1. How to empty their pouch? yes      2. How to change their pouch?  Yes with standby cues      3. How to read and record intake and output correctly? yes    Objective data:  Patient history according to medical record: This is a 34 year old female with UC and PSC s/p liver transplant. She is now s/p total abdominal colectomy with end ileostomy on 6/20. Recovering well.  Current Diet/Nutrition: Orders Placed This Encounter      Low Fiber Diet      Diet     TPN no   I/O last 3 completed shifts:  In: 240 [P.O.:240]  Out: 250 [Stool:250]  Labs:    Recent Labs   Lab 06/24/19  0654  06/23/19  0732  06/21/19  0734   ALBUMIN  --   --  2.1*   < > 2.2*   HGB 8.1*   < > 7.4*  --  8.2*   INR  --   --  1.02  --  1.15*   WBC  --   --   --   --  18.2*    < > = values in this interval not displayed.        Physical Exam:  Current pouching system:Coloplast and 1pc flat CTF with 2\" barrier ring   Reason for pouch change today: ostomy education  Stoma appearance: viable, healthy, normal-appearing, pink, round, moist, good turgor, edematous and protruberant  Stoma size; 1 5/8\"   Peristomal skin: intact  Stoma output :pasty brown stool  Abdominal  Assessment  soft , NG still in place? No  Surgical Site: open to air  Pain: Dull ache  Is patient still on a PCA No    Interventions:  Patient's chart evaluated.  Focus of today's visit: refitting of appliance, pouch change return demonstration, verbal instruction , diet and hydration , pouch emptying, output measurement, odor/flatus management, lifestyle " adjustments and discharge instructions   Participant of teaching session today patient  and spouse  Orders: Written  Change made with ostomy management today: Yes- Coloplast 1pc with barrier ring-Pouch from preop marking apt  Patient/family: active participation and performed with verbal cues  Supplies:Gathered and at bedside    Plan:  Learning needs: pouch change return demonstration, verbal instruction , diet and hydration , pouch emptying, output measurement, odor/flatus management, lifestyle adjustments and discharge instructions  Preparation for discharge: Completed: Supplies have been ordered, supply list Complete , Registered for samples from Coloplast , Prescriptions or note left on chart for MD to sign/complete, Prepared for discharge to home with homecare, Discussed making a WOC Nurse outpatient appointment upon discharge, Discussed when to follow up with a WOC Nurse in the future and Discussed how/ where to order supplies)  Recommend home care? yes    Discussed plan of care with Patient and Family  Nursing to notify the Provider(s) and re-consult the WOC Nurse if new ostomy concerns or discharge planned before next planned WOC visit.    WOC Nurse will return: Daily M-F  Face to face time: 60 minutes    [facility]     Name: Yarelis Penny [birth year]  Date:6/24/19    To order your ostomy supplies    The ostomy Supplier needs this supply list  to process your order. You will need to fax/deliver this list, along with your Insurance information. Your home care nurse can assist with this process.      List of Ostomy Distributors        [facility]  Ph. [phone] Fax # [phone]  [facility]   Ph. [phone] [phone]  [employer] [facility]   Ph. [phone]  [facility]   Ph. [phone] [phone]  Or Call your insurance provider for their preferred supplier    Your Medical Supplier will need your  surgeon's name, phone and fax number    Clinic:                     Phone                            Fax  Colorectal Surgery:    [phone]  Verbal Order for ostomy supplies for 1 Month per:                                           Cresencio Choudhury, BSN, RN    Authorizing MD: Dr. Carney    Change pouch : twice a week            Quantity of pouches- 20pouches/Month    Request the following supplies:                        Coloplast  1 piece flat pouch without filter #93239                          2pc flex pouch opaque without filter # [id number]                          2pc flex barrier Flat#00069                            Accessories  2  barrier ring #[id number]     2  Lei ring #[id number]    Adapt paste #75698     Adapt powder #7906    No sting film barrier # 3345                          Adapt odor eliminator and lubricant 236ml bottle # [id number]     M-9 Spray room deodorizer #6332                           Brava elastic barrier strips curved # 995006                          Brava elastic barrier strips Y-shaped # [id number]                                    Change your pouch twice a week, more often if leaking.    If you are cutting a hole in the wafer of your pouch, recheck stoma size and adjust pouch opening as needed every week    . Call the Ostomy Nurse at [facility]       [address] [place] [address], [place] : [phone]   Schedule a follow-up visit in 4 to 6 weeks after your surgery, sooner if having problems Bring a complete set of pouch-changing supplies to this visit    Ostomy Resource Website- www.[facility].org       Problems you should Report  - The stoma turns blue or darker in color.  - Cuts or sores around the stoma.  - Red, raw or painful skin around the stoma.  - Any bulging of the skin around the stoma.  - A pouch that leaks every day.  - Problems making the right size hole in the pouch wafer.    Please call with any questions or concerns.

## 2019-06-24 NOTE — PLAN OF CARE
AVSS.  Pt resting well between cares.    Oxycodone Q3hrs with relief.  Denies nausea, tolerating diet.    Up independently in room, voiding not saving.  SL.  Lap sites with some bruising.  New ileostomy intact, +gas, + stool in bag.  Cont with POC.  Plan for discharge after WOC visit.

## 2019-06-25 ENCOUNTER — MEDICAL CORRESPONDENCE (OUTPATIENT)
Dept: HEALTH INFORMATION MANAGEMENT | Facility: CLINIC | Age: 34
End: 2019-06-25

## 2019-06-27 ENCOUNTER — PATIENT OUTREACH (OUTPATIENT)
Dept: SURGERY | Facility: CLINIC | Age: 34
End: 2019-06-27

## 2019-06-27 NOTE — PROGRESS NOTES
Post Op Note     Called to check on patient postoperatively after hospital discharge.       Patient is s/p Laparoscopic total abdominal colectomy with extensive lysis of adhesions, end ileostomy.   with Dr. Krzysztof Carney for UC.   Admitted 6/20 and discharged on 6/24.      Pain is well controlled with tylenol Q6 and oxycodone Q4. Patient was advised to move the oxycodone to q6 and then move it out to q8 after 1 a couple of days. She requested a refill, her request will be discussed with the team and she will recieve a callback.     Patient is eating and drinking normally. Patient is on a low fiber diet.  Encouraged patient to drink 8-10 glasses of water a day.     Patient has been recording daily ileostomy outputs. Patient has recorded 3 days of outputs these are averaging 750 mL. Advised patient to contact the clinic if outputs are more than 1000 mL daily for two consecutive days or more than 2000 mL in one day or less than 500 mL for two consecutive days.    Patient denies taking medication for bowel function.      Patient denies pouching difficulties    If any pouching difficulties reported, please contact [facility] nurse  to schedule appointment.     Patient does require supplies. Ostomy Supplies    Patient is voiding normally and urine is light in color.    Patient is set up with home care. Patient reports being contacted by the home care service. Patient has been seen by someone from home care.     Patient endorses doing her Lovenox injections daily.     Patient's pathology was reviewed with them.     Patient Denies nausea and vomiting.    Patient Denies any fevers or chills.    Patient's incision are clean, dry, intact. Patient reminded NOT to remove any dressings over their incisions.     Patient is on a activity restriction. Lifting 10 pounds for 6 weeks.     Patient Denies needing any forms completed.     Follow up is set up with Lulú iWseman NP on 7/11 and with Dr. Krzysztof Carney on 7/23.    Encouraged the patient to contact the clinic in the meantime with any fevers, chills, nausea, vomiting, increased colostomy output, no colostomy output, dizziness, lightheadedness, uncontrolled pain, changes to the incisions, or with any questions or concerns.    Patient's questions were answered to their stated satisfaction and they are in agreement with this plan.    JOSEFINA Strickland [phone]  Colon & Rectal Surgery Clinic  [facility]

## 2019-06-28 DIAGNOSIS — G89.18 ACUTE POST-OPERATIVE PAIN: ICD-10-CM

## 2019-06-28 RX ORDER — OXYCODONE HYDROCHLORIDE 5 MG/1
5-10 TABLET ORAL EVERY 6 HOURS PRN
Qty: 20 TABLET | Refills: 0 | Status: SHIPPED | OUTPATIENT
Start: 2019-06-28 | End: 2019-08-24

## 2019-06-28 NOTE — PROGRESS NOTES
Patient was called back and advised she is able to get a refill on her oxycodone. Advised to move it out to every 6 hours and than after another couple of days go to every 8 and then only when pain is more severe. Advised this would be her only refill.

## 2019-07-11 ENCOUNTER — OFFICE VISIT (OUTPATIENT)
Dept: SURGERY | Facility: CLINIC | Age: 34
End: 2019-07-11
Payer: COMMERCIAL

## 2019-07-11 VITALS
HEIGHT: 62 IN | BODY MASS INDEX: 19.88 KG/M2 | OXYGEN SATURATION: 99 % | HEART RATE: 75 BPM | SYSTOLIC BLOOD PRESSURE: 110 MMHG | DIASTOLIC BLOOD PRESSURE: 80 MMHG | WEIGHT: 108 LBS

## 2019-07-11 DIAGNOSIS — K51.00 ULCERATIVE PANCOLITIS WITHOUT COMPLICATION (H): ICD-10-CM

## 2019-07-11 DIAGNOSIS — Z09 FOLLOW-UP EXAMINATION FOLLOWING SURGERY: Primary | ICD-10-CM

## 2019-07-11 ASSESSMENT — MIFFLIN-ST. JEOR: SCORE: 1143.13

## 2019-07-11 ASSESSMENT — PAIN SCALES - GENERAL: PAINLEVEL: NO PAIN (0)

## 2019-07-11 NOTE — PROGRESS NOTES
"Colon and Rectal Surgery Postoperative Clinic Note    RE: Yarelis Penny  : 1985  BLAKE: 2019    Yarelis Penny is a very pleasant 34 year old female with history of ulcerative colitis and primary sclerosing cholangitis s/p living donor liver transplant from her  Ceferino 6/18/15. She has been on immunosuppressive therapy and most recent colonoscopy showed low grade dysplasia on biopsy. She had continued medical therapy for her colitis but wished to start a family and met with Dr. Sophy Mckeon who recommended considering a colectomy prior to pregnancy to avoid pelvic dissection to limit risk of infertility.  She is now status post laparoscopic total abdominal colectomy with extensive lysis of adhesions and end ileostomy on 19 with Dr. Carney.  Her postoperative course was uneventful and she was discharged to home on 19.    FINAL DIAGNOSIS:   COLON, TOTAL ABDOMINAL COLECTOMY:   - Moderate chronic active colitis with cryptitis, crypt abscess formation   and ulceration   - Focal low-grade dysplasia   - No evidence of high-grade dysplasia or invasive malignancy   - Resections margins with mild chronic active colitis   - Appendix with no pathologic diagnosis   - Nine lymph nodes, negative for malignancy (0/9)    Interval history: Yarelis has been doing well. She is only taking occasional tylenol for pain. Stools have been watery to pasty. Pouch seems to be sticking too well and she is trying different products with home care. No nausea or vomiting. Tolerating a regular diet but anxious to add fiber into her diet to try to gain weight.     Physical Examination:   /80 (BP Location: Left arm, Patient Position: Sitting, Cuff Size: Adult Regular)   Pulse 75   Ht 5' 2\"   Wt 108 lb   SpO2 99%   BMI 19.75 kg/m    General: alert, oriented, in no acute distress, sitting comfortably  HEENT: Mucous membranes moist  Abdomen: Soft, nondistended, nontender on palpation.  Incision sites well approximated " with surgical glue in place.  No erythema or drainage.  Stoma well everted with thick stool in the bag and pouch with good seal.  Extremities: Warm, dry, no edema    Assessment/Plan:  34 year old female status post laparoscopic total abdominal colectomy with extensive lysis of adhesions and end ileostomy on 6/20/19 with Dr. Carney.  She is recovering well.  Continue on a low residue diet for another week and then she can slowly add fiber back into her diet but will have to avoid undigestible foods.  No lifting more than 10 pounds for full 6 weeks from surgery.  Continue to work with home care on pouching issues.  She will need her rectum surveyed and she can have this done either with Dr. Mckeon or Dr. Carney.  She will be due for this next February.  She does not feel she needs another follow-up visit with Dr. Carney but we would be happy to see her in follow-up as needed. Patient's questions were answered to her stated satisfaction and she is in agreement with this plan.    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     Esophageal varices (H) 9/08    banded     Heart murmur      History of blood transfusion      Primary sclerosing cholangitis      SBP (spontaneous bacterial peritonitis) (H) 2/24/2015     Ulcerative Colitis     f/b GI       Surgical history:  Past Surgical History:   Procedure Laterality Date     COLONOSCOPY  9/07     COLONOSCOPY N/A 2/26/2015    Procedure: COMBINED COLONOSCOPY, SINGLE OR MULTIPLE BIOPSY/POLYPECTOMY BY BIOPSY;  Surgeon: Mitchel Hoskins Chi, MD;  Location:  GI     COLONOSCOPY N/A 1/12/2016    Procedure: COMBINED COLONOSCOPY, SINGLE OR MULTIPLE BIOPSY/POLYPECTOMY BY BIOPSY;  Surgeon: Rigo Valles MD;  Location:   GI     COLONOSCOPY N/A 4/1/2016    Procedure: COMBINED COLONOSCOPY, SINGLE OR MULTIPLE BIOPSY/POLYPECTOMY BY BIOPSY;  Surgeon: Kareem Solis MD;  Location: [facility]     COLONOSCOPY N/A 9/5/2017    Procedure: COMBINED COLONOSCOPY, SINGLE OR MULTIPLE BIOPSY/POLYPECTOMY BY BIOPSY;  Colonoscopy;  Surgeon: Fuentes Johnson MD;  Location: [facility] GI     COLONOSCOPY N/A 2/25/2019    Procedure: COMBINED COLONOSCOPY, SINGLE OR MULTIPLE BIOPSY/POLYPECTOMY BY BIOPSY;  Surgeon: Sophy Mckeon MD;  [facility]     ENDOSCOPIC RETROGRADE CHOLANGIOPANCREATOGRAM N/A 6/25/2015    Procedure: COMBINED ENDOSCOPIC RETROGRADE CHOLANGIOPANCREATOGRAPHY, PLACE TUBE/STENT;  Surgeon: Landon Quinones MD;  [facility]     ENDOSCOPIC RETROGRADE CHOLANGIOPANCREATOGRAM N/A 6/25/2015    Procedure: COMBINED ENDOSCOPIC RETROGRADE CHOLANGIOPANCREATOGRAPHY, PLACE TUBE/STENT;  Surgeon: Landon Quinones MD;  [facility]     ENDOSCOPIC RETROGRADE CHOLANGIOPANCREATOGRAM N/A 7/2/2015    Procedure: COMBINED ENDOSCOPIC RETROGRADE CHOLANGIOPANCREATOGRAPHY, PLACE TUBE/STENT;  Surgeon: Landon Quinones MD;  [facility]     ENDOSCOPIC RETROGRADE CHOLANGIOPANCREATOGRAM N/A 9/8/2015    Procedure: COMBINED ENDOSCOPIC RETROGRADE CHOLANGIOPANCREATOGRAPHY, REMOVE FOREIGN BODY OR STENT/TUBE;  Surgeon: Landon Quinones MD;  [facility]     ENDOSCOPIC RETROGRADE CHOLANGIOPANCREATOGRAM N/A 12/8/2015    Procedure: ENDOSCOPIC RETROGRADE CHOLANGIOPANCREATOGRAM;  Surgeon: Landon Quinones MD;  [facility]     ENDOSCOPIC RETROGRADE CHOLANGIOPANCREATOGRAM N/A 3/1/2016    Procedure: COMBINED ENDOSCOPIC RETROGRADE CHOLANGIOPANCREATOGRAPHY, REMOVE FOREIGN BODY OR STENT/TUBE;  Surgeon: Landon Quinones MD;  [facility]     ENDOSCOPIC RETROGRADE CHOLANGIOPANCREATOGRAM N/A 3/20/2017    Procedure: COMBINED ENDOSCOPIC RETROGRADE CHOLANGIOPANCREATOGRAPHY, PLACE TUBE/STENT;  Endoscopic Retrograde  Cholangiopancreatogram with Ballon Dilation, Stent Placement;  Surgeon: Guru Brittany Macias MD;  [facility]     ENDOSCOPIC RETROGRADE CHOLANGIOPANCREATOGRAM N/A 4/2/2018    Procedure: COMBINED ENDOSCOPIC RETROGRADE CHOLANGIOPANCREATOGRAPHY, PLACE TUBE/STENT;  Endoscopic Retrograde Cholangiopancreatogram with biliary dilation and stent placement;  Surgeon: Guru Brittany Macias MD;  [facility]     ENDOSCOPIC RETROGRADE CHOLANGIOPANCREATOGRAM N/A 5/7/2018    Procedure: ENDOSCOPIC RETROGRADE CHOLANGIOPANCREATOGRAM;  Endoscopic Retrograde Cholangiopancreatogram ballon dilation stent exchange;  Surgeon: Guru Brittany Macias MD;  [facility]     ENDOSCOPIC RETROGRADE CHOLANGIOPANCREATOGRAPHY, EXCHANGE TUBE/STENT N/A 7/30/2015    Procedure: ENDOSCOPIC RETROGRADE CHOLANGIOPANCREATOGRAPHY, EXCHANGE TUBE/STENT;  Surgeon: Landon Quinones MD;  [facility]     ENDOSCOPIC RETROGRADE CHOLANGIOPANCREATOGRAPHY, EXCHANGE TUBE/STENT N/A 5/15/2017    Procedure: ENDOSCOPIC RETROGRADE CHOLANGIOPANCREATOGRAPHY, EXCHANGE TUBE/STENT;  Endoscopic Retrograde Cholangiopancreatogram with biliary stent exchange and balloon dilation;  Surgeon: Guru Brittany Macias MD;  [facility]     ENDOSCOPIC RETROGRADE CHOLANGIOPANCREATOGRAPHY, EXCHANGE TUBE/STENT N/A 6/25/2018    Procedure: ENDOSCOPIC RETROGRADE CHOLANGIOPANCREATOGRAPHY, EXCHANGE TUBE/STENT;  Endoscopic Retrograde Cholangiopancreatogram with biliary dilation, stone removal and stent exchange;  Surgeon: Guru Brittany Macias MD;  [facility]     ENDOSCOPIC RETROGRADE CHOLANGIOPANCREATOGRAPHY, EXCHANGE TUBE/STENT N/A 7/30/2018    Procedure: ENDOSCOPIC RETROGRADE CHOLANGIOPANCREATOGRAPHY, EXCHANGE TUBE/STENT;  Endoscopic Retrograde Cholangiopancreatogram with Stent Exchange with dilation;  Surgeon: Guru Brittany Macias MD;  [facility]     ENDOSCOPIC RETROGRADE  CHOLANGIOPANCREATOGRAPHY, EXCHANGE TUBE/STENT N/A 9/26/2018    Procedure: ENDOSCOPIC RETROGRADE CHOLANGIOPANCREATOGRAPHY, EXCHANGE TUBE/STENT;  Endoscopic Retrograde Cholangiopancreatogram, with bile duct stent exchanged balloon dilation and balloon sweep of bile duct;  Surgeon: Guru Brittany Macias MD;  [facility]     ESOPHAGOSCOPY, GASTROSCOPY, DUODENOSCOPY (EGD), COMBINED N/A 2/26/2015    Procedure: COMBINED ESOPHAGOSCOPY, GASTROSCOPY, DUODENOSCOPY (EGD);  Surgeon: Mitchel Hoskins Chi, MD;  [facility]     EXPLORE COMMON BILE DUCT N/A 6/25/2015    Procedure: EXPLORE COMMON BILE DUCT;  Surgeon: Tyree Smith MD;  [facility]     ILEOSTOMY N/A 6/20/2019    Procedure: Ileostomy;  Surgeon: Krzysztof Carney MD;  [facility]     LAPAROSCOPIC ASSISTED COLECTOMY N/A 6/20/2019    Procedure: Laparoscopic Total Abdominal Colectomy, lysis of adhesions;  Surgeon: Krzysztof Carney MD;  [facility]     LAPAROTOMY EXPLORATORY N/A 6/25/2015    Procedure: LAPAROTOMY EXPLORATORY;  Surgeon: Tyree Smith MD;  [facility]     PICC INSERTION Right 2/27/2015    4fr SL Valved PICC, 36cm (1cm external) in the R medial brachial vein w/ tip in the low SVC.     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 Possible due to medication. Diflucan discontinued.       Immunosuppressed status (H) 07/09/2015     Priority: Medium     Liver transplant recipient (H) 06/25/2015     Priority: Medium     Autoimmune hepatitis (H)      Priority: Medium     on steroid taper       CARDIOVASCULAR SCREENING; LDL GOAL LESS THAN 160 10/31/2010     Priority: Medium     Ulcerative colitis (H) 03/26/2005     Priority: Medium     Problem list name updated by automated process. Provider to review         Medications:  Current Outpatient Medications   Medication Sig Dispense Refill     acetaminophen (TYLENOL) 500 MG tablet Take 2 tablets (1,000 mg) by mouth 4 times daily 50 tablet 0     calcium carbonate (OS-KERLINE 500 MG Habematolel. CA) 1250 MG tablet Take 1 tablet by mouth every evening       enoxaparin (LOVENOX) 40 MG/0.4ML syringe Inject 0.4 mLs (40 mg) Subcutaneous daily 30 Syringe 0     multivitamin, therapeutic with minerals (MULTI-VITAMIN) TABS tablet Take 1 tablet by mouth every morning       predniSONE (DELTASONE) 5 MG tablet Take 1 tablet (5 mg) by mouth daily 225 tablet 3     tacrolimus (GENERIC EQUIVALENT) 0.5 MG capsule Take 5 capsules (2.5 mg) by mouth every 12 hours 900 capsule 3     ursodiol (ACTIGALL) 250 MG tablet Take 2 tablets (500 mg) by mouth every morning AND 1 tablet (250 mg) every evening. 270 tablet 3     VITAMIN D, CHOLECALCIFEROL, PO Take by mouth every evening       budesonide (UCERIS) 9 MG 24 hr tablet Take 1 tablet (9 mg) by mouth every morning (Patient not taking: Reported on 7/11/2019) 30 tablet 0     enoxaparin (LOVENOX) 40 MG/0.4ML syringe Inject 0.4 mLs (40 mg) Subcutaneous daily (Patient not taking: Reported on 7/11/2019) 29 Syringe 0     GNP ASPIRIN LOW DOSE 81 MG EC tablet Take 1 tablet (81 mg) by mouth daily (Patient not taking: Reported on 7/11/2019) 30 tablet 1     oxyCODONE (ROXICODONE) 5 MG tablet Take 1-2 tablets (5-10 mg) by mouth every 6 hours as needed  "for severe pain (Patient not taking: Reported on 7/11/2019) 20 tablet 0     oxyCODONE (ROXICODONE) 5 MG tablet Take 1 tablet (5 mg) by mouth every 6 hours as needed for pain (Patient not taking: Reported on 7/11/2019) 20 tablet 0     Vedolizumab (ENTYVIO IV) Inject 300 mg into the vein every 28 days         Allergies:  Allergies   Allergen Reactions     Rifampin Other (See Comments)     Kidney failure     Penicillins      Bad hives when she was in college       Family history:  Family History   Problem Relation Age of Onset     Hypertension Mother      Lipids Mother      Sleep Apnea Mother      Hypertension Maternal Grandmother      Alzheimer Disease Maternal Grandmother      Lipids Father      Melanoma No family hx of      Skin Cancer No family hx of      Cancer No family hx of      Diabetes No family hx of        Social history:  Social History     Tobacco Use     Smoking status: Never Smoker     Smokeless tobacco: Never Used   Substance Use Topics     Alcohol use: Yes     Alcohol/week: 0.0 oz     Comment: Every other month has 1 drink     Marital status: .    Nursing Notes:   Barbi Anderson EMT  7/11/2019  7:41 AM  Signed  Chief Complaint   Patient presents with     Surgical Followup     Date of surgery 6/20/2019.       Vitals:    07/11/19 0722   BP: 110/80   BP Location: Left arm   Patient Position: Sitting   Cuff Size: Adult Regular   Pulse: 75   SpO2: 99%   Weight: 108 lb   Height: 5' 2\"       Body mass index is 19.75 kg/m .      PRASAD Whitman                       Total face to face time was 15 minutes, >50% counseling.  This is a postop visit.    KAYLENE Martinez, NP-C  Colon and Rectal Surgery  [facility]    This note was created using speech recognition software and may contain unintended word substitutions.    "

## 2019-07-11 NOTE — NURSING NOTE
"Chief Complaint   Patient presents with     Surgical Followup     Date of surgery 6/20/2019.       Vitals:    07/11/19 0722   BP: 110/80   BP Location: Left arm   Patient Position: Sitting   Cuff Size: Adult Regular   Pulse: 75   SpO2: 99%   Weight: 108 lb   Height: 5' 2\"       Body mass index is 19.75 kg/m .      Barbi Anderson, EMT                      "

## 2019-07-11 NOTE — LETTER
"2019      RE: Yarelis Penny  [address] [facility] [address]       Colon and Rectal Surgery Postoperative Clinic Note    RE: Yarelis Penny  : 1985  BLAKE: 2019    Yarelis Penny is a very pleasant 34 year old female with history of ulcerative colitis and primary sclerosing cholangitis s/p living donor liver transplant from her  Ceferino 6/18/15. She has been on immunosuppressive therapy and most recent colonoscopy showed low grade dysplasia on biopsy. She had continued medical therapy for her colitis but wished to start a family and met with Dr. Sophy Mckeon who recommended considering a colectomy prior to pregnancy to avoid pelvic dissection to limit risk of infertility.  She is now status post laparoscopic total abdominal colectomy with extensive lysis of adhesions and end ileostomy on 19 with Dr. Carney.  Her postoperative course was uneventful and she was discharged to home on 19.    FINAL DIAGNOSIS:   COLON, TOTAL ABDOMINAL COLECTOMY:   - Moderate chronic active colitis with cryptitis, crypt abscess formation   and ulceration   - Focal low-grade dysplasia   - No evidence of high-grade dysplasia or invasive malignancy   - Resections margins with mild chronic active colitis   - Appendix with no pathologic diagnosis   - Nine lymph nodes, negative for malignancy (0/9)    Interval history: Yarelis has been doing well. She is only taking occasional tylenol for pain. Stools have been watery to pasty. Pouch seems to be sticking too well and she is trying different products with home care. No nausea or vomiting. Tolerating a regular diet but anxious to add fiber into her diet to try to gain weight.     Physical Examination:   /80 (BP Location: Left arm, Patient Position: Sitting, Cuff Size: Adult Regular)   Pulse 75   Ht 5' 2\"   Wt 108 lb   SpO2 99%   BMI 19.75 kg/m     General: alert, oriented, in no acute distress, sitting comfortably  HEENT: Mucous membranes " moist  Abdomen: Soft, nondistended, nontender on palpation.  Incision sites well approximated with surgical glue in place.  No erythema or drainage.  Stoma well everted with thick stool in the bag and pouch with good seal.  Extremities: Warm, dry, no edema    Assessment/Plan:  34 year old female status post laparoscopic total abdominal colectomy with extensive lysis of adhesions and end ileostomy on 6/20/19 with Dr. Carney.  She is recovering well.  Continue on a low residue diet for another week and then she can slowly add fiber back into her diet but will have to avoid undigestible foods.  No lifting more than 10 pounds for full 6 weeks from surgery.  Continue to work with home care on pouching issues.  She will need her rectum surveyed and she can have this done either with Dr. Mckeon or Dr. Carney.  She will be due for this next February.  She does not feel she needs another follow-up visit with Dr. Carney but we would be happy to see her in follow-up as needed. Patient's questions were answered to her stated satisfaction and she is in agreement with this plan.    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     Esophageal varices (H) 9/08    banded     Heart murmur      History of blood transfusion      Primary sclerosing cholangitis      SBP (spontaneous bacterial peritonitis) (H) 2/24/2015     Ulcerative Colitis     f/b GI       Surgical history:  Past Surgical History:   Procedure Laterality Date     COLONOSCOPY  9/07     COLONOSCOPY N/A 2/26/2015    Procedure: COMBINED COLONOSCOPY, SINGLE OR MULTIPLE BIOPSY/POLYPECTOMY BY BIOPSY;  Surgeon: Mitchel Hoskins Chi, MD;  Location:  GI     COLONOSCOPY N/A 1/12/2016    Procedure: COMBINED COLONOSCOPY,  SINGLE OR MULTIPLE BIOPSY/POLYPECTOMY BY BIOPSY;  Surgeon: Rigo Valles MD;  Location: [facility]     COLONOSCOPY N/A 4/1/2016    Procedure: COMBINED COLONOSCOPY, SINGLE OR MULTIPLE BIOPSY/POLYPECTOMY BY BIOPSY;  Surgeon: Kareem Solis MD;  Location: [facility]     COLONOSCOPY N/A 9/5/2017    Procedure: COMBINED COLONOSCOPY, SINGLE OR MULTIPLE BIOPSY/POLYPECTOMY BY BIOPSY;  Colonoscopy;  Surgeon: Fuentes Johnson MD;  Location: [facility]     COLONOSCOPY N/A 2/25/2019    Procedure: COMBINED COLONOSCOPY, SINGLE OR MULTIPLE BIOPSY/POLYPECTOMY BY BIOPSY;  Surgeon: Sophy Mckeon MD;  [facility]     ENDOSCOPIC RETROGRADE CHOLANGIOPANCREATOGRAM N/A 6/25/2015    Procedure: COMBINED ENDOSCOPIC RETROGRADE CHOLANGIOPANCREATOGRAPHY, PLACE TUBE/STENT;  Surgeon: Landon Quinones MD;  [facility]     ENDOSCOPIC RETROGRADE CHOLANGIOPANCREATOGRAM N/A 6/25/2015    Procedure: COMBINED ENDOSCOPIC RETROGRADE CHOLANGIOPANCREATOGRAPHY, PLACE TUBE/STENT;  Surgeon: Landon Quinones MD;  [facility]     ENDOSCOPIC RETROGRADE CHOLANGIOPANCREATOGRAM N/A 7/2/2015    Procedure: COMBINED ENDOSCOPIC RETROGRADE CHOLANGIOPANCREATOGRAPHY, PLACE TUBE/STENT;  Surgeon: Landon Quinones MD;  [facility]     ENDOSCOPIC RETROGRADE CHOLANGIOPANCREATOGRAM N/A 9/8/2015    Procedure: COMBINED ENDOSCOPIC RETROGRADE CHOLANGIOPANCREATOGRAPHY, REMOVE FOREIGN BODY OR STENT/TUBE;  Surgeon: Landon Quinones MD;  [facility]     ENDOSCOPIC RETROGRADE CHOLANGIOPANCREATOGRAM N/A 12/8/2015    Procedure: ENDOSCOPIC RETROGRADE CHOLANGIOPANCREATOGRAM;  Surgeon: Landon Quinones MD;  [facility]     ENDOSCOPIC RETROGRADE CHOLANGIOPANCREATOGRAM N/A 3/1/2016    Procedure: COMBINED ENDOSCOPIC RETROGRADE CHOLANGIOPANCREATOGRAPHY, REMOVE FOREIGN BODY OR STENT/TUBE;  Surgeon: Landon Quinones MD;  [facility]     ENDOSCOPIC RETROGRADE CHOLANGIOPANCREATOGRAM N/A 3/20/2017    Procedure: COMBINED  ENDOSCOPIC RETROGRADE CHOLANGIOPANCREATOGRAPHY, PLACE TUBE/STENT;  Endoscopic Retrograde Cholangiopancreatogram with Ballon Dilation, Stent Placement;  Surgeon: Guru Brittany Macias MD;  [facility]     ENDOSCOPIC RETROGRADE CHOLANGIOPANCREATOGRAM N/A 4/2/2018    Procedure: COMBINED ENDOSCOPIC RETROGRADE CHOLANGIOPANCREATOGRAPHY, PLACE TUBE/STENT;  Endoscopic Retrograde Cholangiopancreatogram with biliary dilation and stent placement;  Surgeon: Guru Brittany Macias MD;  [facility]     ENDOSCOPIC RETROGRADE CHOLANGIOPANCREATOGRAM N/A 5/7/2018    Procedure: ENDOSCOPIC RETROGRADE CHOLANGIOPANCREATOGRAM;  Endoscopic Retrograde Cholangiopancreatogram ballon dilation stent exchange;  Surgeon: Guru Brittany Macias MD;  [facility]     ENDOSCOPIC RETROGRADE CHOLANGIOPANCREATOGRAPHY, EXCHANGE TUBE/STENT N/A 7/30/2015    Procedure: ENDOSCOPIC RETROGRADE CHOLANGIOPANCREATOGRAPHY, EXCHANGE TUBE/STENT;  Surgeon: Landon Quinones MD;  [facility]     ENDOSCOPIC RETROGRADE CHOLANGIOPANCREATOGRAPHY, EXCHANGE TUBE/STENT N/A 5/15/2017    Procedure: ENDOSCOPIC RETROGRADE CHOLANGIOPANCREATOGRAPHY, EXCHANGE TUBE/STENT;  Endoscopic Retrograde Cholangiopancreatogram with biliary stent exchange and balloon dilation;  Surgeon: Guru Brittany Macias MD;  [facility]     ENDOSCOPIC RETROGRADE CHOLANGIOPANCREATOGRAPHY, EXCHANGE TUBE/STENT N/A 6/25/2018    Procedure: ENDOSCOPIC RETROGRADE CHOLANGIOPANCREATOGRAPHY, EXCHANGE TUBE/STENT;  Endoscopic Retrograde Cholangiopancreatogram with biliary dilation, stone removal and stent exchange;  Surgeon: Guru Brittany Macias MD;  [facility]     ENDOSCOPIC RETROGRADE CHOLANGIOPANCREATOGRAPHY, EXCHANGE TUBE/STENT N/A 7/30/2018    Procedure: ENDOSCOPIC RETROGRADE CHOLANGIOPANCREATOGRAPHY, EXCHANGE TUBE/STENT;  Endoscopic Retrograde Cholangiopancreatogram with Stent Exchange with dilation;   Surgeon: Guru Birttany Macias MD;  [facility]     ENDOSCOPIC RETROGRADE CHOLANGIOPANCREATOGRAPHY, EXCHANGE TUBE/STENT N/A 9/26/2018    Procedure: ENDOSCOPIC RETROGRADE CHOLANGIOPANCREATOGRAPHY, EXCHANGE TUBE/STENT;  Endoscopic Retrograde Cholangiopancreatogram, with bile duct stent exchanged balloon dilation and balloon sweep of bile duct;  Surgeon: Guru Brittany Macias MD;  [facility]     ESOPHAGOSCOPY, GASTROSCOPY, DUODENOSCOPY (EGD), COMBINED N/A 2/26/2015    Procedure: COMBINED ESOPHAGOSCOPY, GASTROSCOPY, DUODENOSCOPY (EGD);  Surgeon: Mitchel Hoskins Chi, MD;  [facility]     EXPLORE COMMON BILE DUCT N/A 6/25/2015    Procedure: EXPLORE COMMON BILE DUCT;  Surgeon: Tyree Smith MD;  [facility]     ILEOSTOMY N/A 6/20/2019    Procedure: Ileostomy;  Surgeon: Krzysztof Carney MD;  [facility]     LAPAROSCOPIC ASSISTED COLECTOMY N/A 6/20/2019    Procedure: Laparoscopic Total Abdominal Colectomy, lysis of adhesions;  Surgeon: Krzysztof Carney MD;  [facility]     LAPAROTOMY EXPLORATORY N/A 6/25/2015    Procedure: LAPAROTOMY EXPLORATORY;  Surgeon: Tyree Smith MD;  [facility]     PICC INSERTION Right 2/27/2015    4fr SL Valved PICC, 36cm (1cm external) in the R medial brachial vein w/ tip in the low SVC.     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 Possible due to medication. Diflucan discontinued.       Immunosuppressed status (H) 07/09/2015     Priority: Medium     Liver transplant recipient (H) 06/25/2015     Priority: Medium     Autoimmune hepatitis (H)      Priority: Medium     on steroid taper       CARDIOVASCULAR SCREENING; LDL GOAL LESS THAN 160 10/31/2010     Priority: Medium     Ulcerative colitis (H) 03/26/2005     Priority: Medium     Problem list name updated by automated process. Provider to review         Medications:  Current Outpatient Medications   Medication Sig Dispense Refill     acetaminophen (TYLENOL) 500 MG tablet Take 2 tablets (1,000 mg) by mouth 4 times daily 50 tablet 0     calcium carbonate (OS-KERLINE 500 MG [place]. [place]) 1250 MG tablet Take 1 tablet by mouth every evening       enoxaparin (LOVENOX) 40 MG/0.4ML syringe Inject 0.4 mLs (40 mg) Subcutaneous daily 30 Syringe 0     multivitamin, therapeutic with minerals (MULTI-VITAMIN) TABS tablet Take 1 tablet by mouth every morning       predniSONE (DELTASONE) 5 MG tablet Take 1 tablet (5 mg) by mouth daily 225 tablet 3     tacrolimus (GENERIC EQUIVALENT) 0.5 MG capsule Take 5 capsules (2.5 mg) by mouth every 12 hours 900 capsule 3     ursodiol (ACTIGALL) 250 MG tablet Take 2 tablets (500 mg) by mouth every morning AND 1 tablet (250 mg) every evening. 270 tablet 3     VITAMIN D, CHOLECALCIFEROL, PO Take by mouth every evening       budesonide (UCERIS) 9 MG 24 hr tablet Take 1 tablet (9 mg) by mouth every morning (Patient not taking: Reported on 7/11/2019) 30 tablet 0     enoxaparin (LOVENOX) 40 MG/0.4ML syringe Inject 0.4 mLs (40 mg) Subcutaneous daily (Patient not taking: Reported on 7/11/2019) 29 Syringe 0     GNP ASPIRIN LOW DOSE 81 MG EC tablet Take 1 tablet (81 mg) by mouth daily (Patient not taking: Reported on 7/11/2019) 30 tablet 1      "oxyCODONE (ROXICODONE) 5 MG tablet Take 1-2 tablets (5-10 mg) by mouth every 6 hours as needed for severe pain (Patient not taking: Reported on 7/11/2019) 20 tablet 0     oxyCODONE (ROXICODONE) 5 MG tablet Take 1 tablet (5 mg) by mouth every 6 hours as needed for pain (Patient not taking: Reported on 7/11/2019) 20 tablet 0     Vedolizumab (ENTYVIO IV) Inject 300 mg into the vein every 28 days         Allergies:  Allergies   Allergen Reactions     Rifampin Other (See Comments)     Kidney failure     Penicillins      Bad hives when she was in college       Family history:  Family History   Problem Relation Age of Onset     Hypertension Mother      Lipids Mother      Sleep Apnea Mother      Hypertension Maternal Grandmother      Alzheimer Disease Maternal Grandmother      Lipids Father      Melanoma No family hx of      Skin Cancer No family hx of      Cancer No family hx of      Diabetes No family hx of        Social history:  Social History     Tobacco Use     Smoking status: Never Smoker     Smokeless tobacco: Never Used   Substance Use Topics     Alcohol use: Yes     Alcohol/week: 0.0 oz     Comment: Every other month has 1 drink     Marital status: .    Nursing Notes:   Barbi Anderson EMT  7/11/2019  7:41 AM  Signed  Chief Complaint   Patient presents with     Surgical Followup     Date of surgery 6/20/2019.       Vitals:    07/11/19 0722   BP: 110/80   BP Location: Left arm   Patient Position: Sitting   Cuff Size: Adult Regular   Pulse: 75   SpO2: 99%   Weight: 108 lb   Height: 5' 2\"       Body mass index is 19.75 kg/m .      PRASAD Whitman                       Total face to face time was 15 minutes, >50% counseling.  This is a postop visit.    KAYLENE Martinez, NP-C  Colon and Rectal Surgery  [facility]    This note was created using speech recognition software and may contain unintended word substitutions.      KAYLENE Martinez " CNP

## 2019-07-26 ENCOUNTER — TELEPHONE (OUTPATIENT)
Dept: TRANSPLANT | Facility: CLINIC | Age: 34
End: 2019-07-26

## 2019-08-08 ENCOUNTER — TELEPHONE (OUTPATIENT)
Dept: MATERNAL FETAL MEDICINE | Facility: CLINIC | Age: 34
End: 2019-08-08

## 2019-08-08 ENCOUNTER — OFFICE VISIT (OUTPATIENT)
Dept: INTERNAL MEDICINE | Facility: CLINIC | Age: 34
End: 2019-08-08
Payer: COMMERCIAL

## 2019-08-08 VITALS
HEART RATE: 83 BPM | OXYGEN SATURATION: 98 % | WEIGHT: 109.4 LBS | BODY MASS INDEX: 20.13 KG/M2 | DIASTOLIC BLOOD PRESSURE: 74 MMHG | HEIGHT: 62 IN | SYSTOLIC BLOOD PRESSURE: 110 MMHG | RESPIRATION RATE: 16 BRPM

## 2019-08-08 DIAGNOSIS — H65.191 OTHER NON-RECURRENT ACUTE NONSUPPURATIVE OTITIS MEDIA OF RIGHT EAR: Primary | ICD-10-CM

## 2019-08-08 RX ORDER — DOXYCYCLINE 100 MG/1
100 CAPSULE ORAL 2 TIMES DAILY
Qty: 14 CAPSULE | Refills: 0 | Status: SHIPPED | OUTPATIENT
Start: 2019-08-08 | End: 2019-08-15

## 2019-08-08 ASSESSMENT — PAIN SCALES - GENERAL: PAINLEVEL: MILD PAIN (3)

## 2019-08-08 ASSESSMENT — MIFFLIN-ST. JEOR: SCORE: 1149.62

## 2019-08-08 NOTE — PATIENT INSTRUCTIONS
[facility] Medication Refill Request Information:  * Please contact your pharmacy regarding ANY request for medication refills.  ** [facility] Prescription Fax = [phone]  * Please allow 3 business days for routine medication refills.  * Please allow 5 business days for controlled substance medication refills.     [facility] Test Result notification information:  *You will be notified with in 7-10 days of your appointment day regarding the results of your test.  If you are on MyChart you will be notified as soon as the provider has reviewed the results and signed off on them.

## 2019-08-08 NOTE — PROGRESS NOTES
ASSESSMENT/PLAN:  Right acute otitis media - allergic to penicillin, and azithromycin interacts with tacrolimus, so will give doxycycline.  Recommend tylenol for pain.    Benjy Miller MD, FACP      Chief complaint:  Yarelis Penny is a 34 year old female presents for   Chief Complaint   Patient presents with     Otalgia     Right sided, 2-3 days        SUBJECTIVE:  Starting about 4 days ago she has right sided ear pain.  It was wet at first but is done now.  There is pain with pressure.  The pain has gotten worse over the 4 days.  No prior history.  She did swim up to her knees but not with her head in the water.    Medications and allergies were reviewed by me today.     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 Possible due to medication. Diflucan discontinued.       Immunosuppressed status (H) 07/09/2015     Priority: Medium     Liver transplant recipient (H) 06/25/2015     Priority: Medium     Autoimmune hepatitis (H)      Priority: Medium     on steroid taper       CARDIOVASCULAR SCREENING; LDL GOAL LESS THAN 160 10/31/2010     Priority: Medium     Ulcerative colitis (H) 03/26/2005     Priority: Medium     Problem list name updated by automated process. Provider to review         PHYSICAL EXAM:  /74 (BP Location: Right arm, Patient Position: Sitting, Cuff  "Size: Adult Regular)   Pulse 83   Resp 16   Ht 1.575 m (5' 2.01\")   Wt 49.6 kg (109 lb 6.4 oz)   LMP 07/30/2019   SpO2 98%   BMI 20.00 kg/m    Constitutional: no distress, comfortable, pleasant   Ears, Nose and Throat: tympanic membrane on the right is bulging with redness, there is sign of rupture in the distant past with new skin growth but no clear signs of recent rupture, both TMs show white scarring  Lymphatic: no cervical lymphadenopathy                "

## 2019-08-08 NOTE — NURSING NOTE
Chief Complaint   Patient presents with     Otalgia     Right sided, 2-3 days       Heather Smyth, EMT

## 2019-08-08 NOTE — TELEPHONE ENCOUNTER
Yarelsi called today wondering if she needs to have another consult with MFM prior to becoming pregnant. After reviewed consult, instructed Yarelis to call [facility] to establish OBGYN care and they will refer her to MFM if another consult is needed. Phone number to [facility] given. Pt verbalized understanding and agrees with plan.   Milka Corona RN

## 2019-08-09 ENCOUNTER — PATIENT OUTREACH (OUTPATIENT)
Dept: GASTROENTEROLOGY | Facility: CLINIC | Age: 34
End: 2019-08-09

## 2019-08-09 DIAGNOSIS — K51.00 ULCERATIVE PANCOLITIS (H): Primary | ICD-10-CM

## 2019-08-09 NOTE — PROGRESS NOTES
Called pt to discuss  maternal fetal medicine. Pt had colectomy in June. insturcted to o wait 2 months after surgery  To start conception.  Referral placed and email to the team.   Pt aware she will be called and does not receive a call by late next week to call me back

## 2019-08-13 DIAGNOSIS — Z31.69 ENCOUNTER FOR PRECONCEPTION CONSULTATION: Primary | ICD-10-CM

## 2019-08-19 ASSESSMENT — ANXIETY QUESTIONNAIRES
1. FEELING NERVOUS, ANXIOUS, OR ON EDGE: NOT AT ALL
GAD7 TOTAL SCORE: 0
2. NOT BEING ABLE TO STOP OR CONTROL WORRYING: NOT AT ALL
3. WORRYING TOO MUCH ABOUT DIFFERENT THINGS: NOT AT ALL
4. TROUBLE RELAXING: NOT AT ALL
7. FEELING AFRAID AS IF SOMETHING AWFUL MIGHT HAPPEN: NOT AT ALL
7. FEELING AFRAID AS IF SOMETHING AWFUL MIGHT HAPPEN: NOT AT ALL
GAD7 TOTAL SCORE: 0
6. BECOMING EASILY ANNOYED OR IRRITABLE: NOT AT ALL
5. BEING SO RESTLESS THAT IT IS HARD TO SIT STILL: NOT AT ALL

## 2019-08-19 ASSESSMENT — ENCOUNTER SYMPTOMS
SORE THROAT: 0
NECK MASS: 0
SINUS CONGESTION: 0
SMELL DISTURBANCE: 0
TROUBLE SWALLOWING: 0
SINUS PAIN: 0
TASTE DISTURBANCE: 0
HOARSE VOICE: 0

## 2019-08-20 ASSESSMENT — ANXIETY QUESTIONNAIRES: GAD7 TOTAL SCORE: 0

## 2019-08-21 ENCOUNTER — OFFICE VISIT (OUTPATIENT)
Dept: OBGYN | Facility: CLINIC | Age: 34
End: 2019-08-21
Payer: COMMERCIAL

## 2019-08-21 VITALS
DIASTOLIC BLOOD PRESSURE: 70 MMHG | WEIGHT: 109.6 LBS | HEIGHT: 62 IN | HEART RATE: 80 BPM | BODY MASS INDEX: 20.17 KG/M2 | SYSTOLIC BLOOD PRESSURE: 110 MMHG

## 2019-08-21 DIAGNOSIS — K94.19 ALTERED BOWEL ELIMINATION DUE TO INTESTINAL OSTOMY (H): Primary | ICD-10-CM

## 2019-08-21 DIAGNOSIS — Z31.69 ENCOUNTER FOR PRECONCEPTION CONSULTATION: Primary | ICD-10-CM

## 2019-08-21 ASSESSMENT — MIFFLIN-ST. JEOR: SCORE: 1150.55

## 2019-08-21 ASSESSMENT — ANXIETY QUESTIONNAIRES
2. NOT BEING ABLE TO STOP OR CONTROL WORRYING: NOT AT ALL
1. FEELING NERVOUS, ANXIOUS, OR ON EDGE: NOT AT ALL
3. WORRYING TOO MUCH ABOUT DIFFERENT THINGS: NOT AT ALL
5. BEING SO RESTLESS THAT IT IS HARD TO SIT STILL: NOT AT ALL
GAD7 TOTAL SCORE: 0
6. BECOMING EASILY ANNOYED OR IRRITABLE: NOT AT ALL
7. FEELING AFRAID AS IF SOMETHING AWFUL MIGHT HAPPEN: NOT AT ALL

## 2019-08-21 ASSESSMENT — PATIENT HEALTH QUESTIONNAIRE - PHQ9
5. POOR APPETITE OR OVEREATING: NOT AT ALL
SUM OF ALL RESPONSES TO PHQ QUESTIONS 1-9: 0

## 2019-08-24 ENCOUNTER — OFFICE VISIT (OUTPATIENT)
Dept: URGENT CARE | Facility: URGENT CARE | Age: 34
End: 2019-08-24
Payer: COMMERCIAL

## 2019-08-24 VITALS
WEIGHT: 104.5 LBS | HEART RATE: 64 BPM | SYSTOLIC BLOOD PRESSURE: 114 MMHG | DIASTOLIC BLOOD PRESSURE: 81 MMHG | BODY MASS INDEX: 19.11 KG/M2 | TEMPERATURE: 98 F

## 2019-08-24 DIAGNOSIS — D84.9 IMMUNOCOMPROMISED STATE (H): ICD-10-CM

## 2019-08-24 DIAGNOSIS — H10.9 CONJUNCTIVITIS OF LEFT EYE, UNSPECIFIED CONJUNCTIVITIS TYPE: Primary | ICD-10-CM

## 2019-08-24 PROCEDURE — 99213 OFFICE O/P EST LOW 20 MIN: CPT | Performed by: FAMILY MEDICINE

## 2019-08-24 RX ORDER — POLYMYXIN B SULFATE AND TRIMETHOPRIM 1; 10000 MG/ML; [USP'U]/ML
1-2 SOLUTION OPHTHALMIC EVERY 4 HOURS
Qty: 10 ML | Refills: 0 | Status: SHIPPED | OUTPATIENT
Start: 2019-08-24 | End: 2021-03-29

## 2019-08-24 RX ORDER — POLYMYXIN B SULFATE AND TRIMETHOPRIM 1; 10000 MG/ML; [USP'U]/ML
1-2 SOLUTION OPHTHALMIC EVERY 4 HOURS
Qty: 10 ML | Refills: 0 | Status: SHIPPED | OUTPATIENT
Start: 2019-08-24 | End: 2019-08-24

## 2019-08-24 NOTE — PROGRESS NOTES
SUBJECTIVE:   34 year old female with burning, redness, discharge and mattering in left eye for 2 days.  No other symptoms.  No significant prior ophthalmological history. No change in visual acuity, no photophobia, no severe eye pain.    The patient has a history of immune suppresion. No rash nor uri symptoms. No allergy symptoms does not recall and foreign body no illness exposure.     OBJECTIVE:   Patient appears well, vitals signs are normal. Eyes: left eye with findings of typical conjunctivitis noted; erythema and minimaldischarge. PERRLA, no foreign body noted. No periorbital cellulitis. The corneas are clear and fundi normal. Visual acuity normal.     ASSESSMENT:   Conjunctivitis - possibly bacterial in immuncompromised aptient    PLAN:   Antibiotic drops per order. Hygiene discussed.to eye specialist if other symptoms develop such as rash photophobia, vision change or if not responding

## 2019-08-26 NOTE — PROGRESS NOTES
[facility]  Gynecology Visit    CC: establish care, preconception counseling     HPI:    Yarelis Penny is a 34 year old P0 here to establish care. Patient has past medical history notable for ulcerative colitis that was did not respond to medications. Due to continue low grade dysplasia patient underwent colectomy. She also is the recipient of a living donor liver transplant secondary to autoimmune hepatitis. Currently taking tacrolimus and prednisone. History of cholestasis of transplanted liver. Patient previously seen by MFM in 3/7/19 who at that time recommended proceeding with colectomy prior to pregnancy. Patient has now underwent this procedure and has recovered. She overall has been doing very well. She has been learning how to live with colectomy. She is here today to establish care. She and her  Ceferino are hoping to get pregnant soon. She reports no changes in  Immunosuppressive therapy.     Obstetrics History:  - Never pregnant     Gynecologic History:  - LMP: Patient's last menstrual period was 07/30/2019 (approximate).  - Last Pap: NILM, HPV: negative (12/4/18)  - Denies any history of abnormal pap smears  - Denies prior cervical surgery or procedures  - Denies any history of frequent vaginal infections or STIs  - Menses: cycles q28, lasting 5-7 days, with light to moderate flow, no clots, minimal cramps  - Contraception: condoms   - Sexual Activity: one male partner-     Past Medical History:  - Autoimmune hepatitis s/p living donor transplant  - Ulcerative colitis s/p colectomy   - History of CKD  - History of CMV    Past Surgical History:  Past Surgical History:   Procedure Laterality Date     CHOLECYSTECTOMY       COLONOSCOPY  9/07     COLONOSCOPY N/A 2/26/2015    Procedure: COMBINED COLONOSCOPY, SINGLE OR MULTIPLE BIOPSY/POLYPECTOMY BY BIOPSY;  Surgeon: Mtichel Hoskins Chi, MD;  Location:  GI     COLONOSCOPY N/A 1/12/2016    Procedure: COMBINED COLONOSCOPY, SINGLE OR MULTIPLE  BIOPSY/POLYPECTOMY BY BIOPSY;  Surgeon: Rigo Valles MD;  Location: [facility]     COLONOSCOPY N/A 4/1/2016    Procedure: COMBINED COLONOSCOPY, SINGLE OR MULTIPLE BIOPSY/POLYPECTOMY BY BIOPSY;  Surgeon: Kaerem Solis MD;  Location: [facility]     COLONOSCOPY N/A 9/5/2017    Procedure: COMBINED COLONOSCOPY, SINGLE OR MULTIPLE BIOPSY/POLYPECTOMY BY BIOPSY;  Colonoscopy;  Surgeon: Fuentes Johnson MD;  Location: [facility]     COLONOSCOPY N/A 2/25/2019    Procedure: COMBINED COLONOSCOPY, SINGLE OR MULTIPLE BIOPSY/POLYPECTOMY BY BIOPSY;  Surgeon: Sophy Mckeon MD;  [facility]     ENDOSCOPIC RETROGRADE CHOLANGIOPANCREATOGRAM N/A 6/25/2015    Procedure: COMBINED ENDOSCOPIC RETROGRADE CHOLANGIOPANCREATOGRAPHY, PLACE TUBE/STENT;  Surgeon: Landon Quinones MD;  [facility]     ENDOSCOPIC RETROGRADE CHOLANGIOPANCREATOGRAM N/A 6/25/2015    Procedure: COMBINED ENDOSCOPIC RETROGRADE CHOLANGIOPANCREATOGRAPHY, PLACE TUBE/STENT;  Surgeon: Landon Quinones MD;  [facility]     ENDOSCOPIC RETROGRADE CHOLANGIOPANCREATOGRAM N/A 7/2/2015    Procedure: COMBINED ENDOSCOPIC RETROGRADE CHOLANGIOPANCREATOGRAPHY, PLACE TUBE/STENT;  Surgeon: Landon Quinones MD;  [facility]     ENDOSCOPIC RETROGRADE CHOLANGIOPANCREATOGRAM N/A 9/8/2015    Procedure: COMBINED ENDOSCOPIC RETROGRADE CHOLANGIOPANCREATOGRAPHY, REMOVE FOREIGN BODY OR STENT/TUBE;  Surgeon: Landon Quinones MD;  [facility]     ENDOSCOPIC RETROGRADE CHOLANGIOPANCREATOGRAM N/A 12/8/2015    Procedure: ENDOSCOPIC RETROGRADE CHOLANGIOPANCREATOGRAM;  Surgeon: Landon Quinones MD;  [facility]     ENDOSCOPIC RETROGRADE CHOLANGIOPANCREATOGRAM N/A 3/1/2016    Procedure: COMBINED ENDOSCOPIC RETROGRADE CHOLANGIOPANCREATOGRAPHY, REMOVE FOREIGN BODY OR STENT/TUBE;  Surgeon: Landon Quinones MD;  [facility]     ENDOSCOPIC RETROGRADE CHOLANGIOPANCREATOGRAM N/A 3/20/2017    Procedure: COMBINED ENDOSCOPIC RETROGRADE  CHOLANGIOPANCREATOGRAPHY, PLACE TUBE/STENT;  Endoscopic Retrograde Cholangiopancreatogram with Ballon Dilation, Stent Placement;  Surgeon: Guru Brittany Macias MD;  [facility]     ENDOSCOPIC RETROGRADE CHOLANGIOPANCREATOGRAM N/A 4/2/2018    Procedure: COMBINED ENDOSCOPIC RETROGRADE CHOLANGIOPANCREATOGRAPHY, PLACE TUBE/STENT;  Endoscopic Retrograde Cholangiopancreatogram with biliary dilation and stent placement;  Surgeon: Guru Brittany Macias MD;  [facility]     ENDOSCOPIC RETROGRADE CHOLANGIOPANCREATOGRAM N/A 5/7/2018    Procedure: ENDOSCOPIC RETROGRADE CHOLANGIOPANCREATOGRAM;  Endoscopic Retrograde Cholangiopancreatogram ballon dilation stent exchange;  Surgeon: Guru Brittany Macias MD;  [facility]     ENDOSCOPIC RETROGRADE CHOLANGIOPANCREATOGRAPHY, EXCHANGE TUBE/STENT N/A 7/30/2015    Procedure: ENDOSCOPIC RETROGRADE CHOLANGIOPANCREATOGRAPHY, EXCHANGE TUBE/STENT;  Surgeon: Landon Quinones MD;  [facility]     ENDOSCOPIC RETROGRADE CHOLANGIOPANCREATOGRAPHY, EXCHANGE TUBE/STENT N/A 5/15/2017    Procedure: ENDOSCOPIC RETROGRADE CHOLANGIOPANCREATOGRAPHY, EXCHANGE TUBE/STENT;  Endoscopic Retrograde Cholangiopancreatogram with biliary stent exchange and balloon dilation;  Surgeon: Guru Brittany Macias MD;  [facility]     ENDOSCOPIC RETROGRADE CHOLANGIOPANCREATOGRAPHY, EXCHANGE TUBE/STENT N/A 6/25/2018    Procedure: ENDOSCOPIC RETROGRADE CHOLANGIOPANCREATOGRAPHY, EXCHANGE TUBE/STENT;  Endoscopic Retrograde Cholangiopancreatogram with biliary dilation, stone removal and stent exchange;  Surgeon: Guru Brittany Macias MD;  [facility]     ENDOSCOPIC RETROGRADE CHOLANGIOPANCREATOGRAPHY, EXCHANGE TUBE/STENT N/A 7/30/2018    Procedure: ENDOSCOPIC RETROGRADE CHOLANGIOPANCREATOGRAPHY, EXCHANGE TUBE/STENT;  Endoscopic Retrograde Cholangiopancreatogram with Stent Exchange with dilation;  Surgeon: Guru Colleen  Brittany Vail MD;  [facility]     ENDOSCOPIC RETROGRADE CHOLANGIOPANCREATOGRAPHY, EXCHANGE TUBE/STENT N/A 9/26/2018    Procedure: ENDOSCOPIC RETROGRADE CHOLANGIOPANCREATOGRAPHY, EXCHANGE TUBE/STENT;  Endoscopic Retrograde Cholangiopancreatogram, with bile duct stent exchanged balloon dilation and balloon sweep of bile duct;  Surgeon: Guru Brittany Macias MD;  [facility]     ESOPHAGOSCOPY, GASTROSCOPY, DUODENOSCOPY (EGD), COMBINED N/A 2/26/2015    Procedure: COMBINED ESOPHAGOSCOPY, GASTROSCOPY, DUODENOSCOPY (EGD);  Surgeon: Mitchel Hoskins Chi, MD;  [facility]     EXPLORE COMMON BILE DUCT N/A 6/25/2015    Procedure: EXPLORE COMMON BILE DUCT;  Surgeon: Tyree Smith MD;  [facility]     HERNIA REPAIR      During transplant     ILEOSTOMY N/A 6/20/2019    Procedure: Ileostomy;  Surgeon: Krzysztof Carney MD;  [facility]     LAPAROSCOPIC ASSISTED COLECTOMY N/A 6/20/2019    Procedure: Laparoscopic Total Abdominal Colectomy, lysis of adhesions;  Surgeon: Krzysztof Carney MD;  [facility]     LAPAROTOMY EXPLORATORY N/A 6/25/2015    Procedure: LAPAROTOMY EXPLORATORY;  Surgeon: Tyree Smith MD;  [facility]     PICC INSERTION Right 2/27/2015    4fr SL Valved PICC, 36cm (1cm external) in the R medial brachial vein w/ tip in the low SVC.     SIGMOIDOSCOPY FLEXIBLE N/A 4/27/2016    Procedure: SIGMOIDOSCOPY FLEXIBLE;  Surgeon: Jose Nielson MD;  [facility]     TRANSPLANT LIVER RECIPIENT LIVING UNRELATED N/A 6/18/2015    Procedure: TRANSPLANT LIVER RECIPIENT LIVING UNRELATED;  Surgeon: Tyree Smith MD;  [facility]     UPPER GI ENDOSCOPY         Current Medications:  Prior to Admission medications    Medication Sig Last Dose Taking? Auth Provider   acetaminophen (TYLENOL) 500 MG tablet Take 2 tablets (1,000 mg) by mouth 4 times daily  Patient not taking: Reported on 8/24/2019 Not Taking  Krzysztof Carney MD   calcium carbonate (OS-KERLINE 500 MG [place]. [place]) 1250 MG  "tablet Take 1 tablet by mouth every evening Taking  Reported, Patient   multivitamin, therapeutic with minerals (MULTI-VITAMIN) TABS tablet Take 1 tablet by mouth every morning Taking  Reported, Patient   order for DME Increase the amount to 10 per month  --2\" barrier ring #5190 Taking  Krzysztof Carney MD   predniSONE (DELTASONE) 5 MG tablet Take 1 tablet (5 mg) by mouth daily Taking  Fuentes Johnson MD   tacrolimus (GENERIC EQUIVALENT) 0.5 MG capsule Take 5 capsules (2.5 mg) by mouth every 12 hours Taking  Daniela Enriquez MD   trimethoprim-polymyxin b (POLYTRIM) 35076-3.1 UNIT/ML-% ophthalmic solution Place 1-2 drops Into the left eye every 4 hours   Nestor Flannery MD   ursodiol (ACTIGALL) 250 MG tablet Take 2 tablets (500 mg) by mouth every morning AND 1 tablet (250 mg) every evening. Taking  Daniela Enriquez MD   VITAMIN D, CHOLECALCIFEROL, PO Take by mouth every evening Taking  Reported, Patient     Allergies:  Rifampin and Penicillins    Social History:   Social History     Socioeconomic History     Marital status:      Spouse name: Ceferino     Number of children: 0     Years of education: Not on file     Highest education level: Not on file   Occupational History     Occupation: unemployed   Social Needs     Financial resource strain: Not on file     Food insecurity:     Worry: Not on file     Inability: Not on file     Transportation needs:     Medical: Not on file     Non-medical: Not on file   Tobacco Use     Smoking status: Never Smoker     Smokeless tobacco: Never Used   Substance and Sexual Activity     Alcohol use: Yes     Alcohol/week: 0.0 oz     Comment: Every other month has 1 drink     Drug use: No     Sexual activity: Yes     Partners: Male     Birth control/protection: Condom, Natural Family Planning     Comment: denies pregnancy   Lifestyle     Physical activity:     Days per week: Not on file     Minutes per session: Not on file     Stress: Not on file " "  Relationships     Social connections:     Talks on phone: Not on file     Gets together: Not on file     Attends [employer] service: Not on file     Active member of club or organization: Not on file     Attends meetings of clubs or organizations: Not on file     Relationship status: Not on file     Intimate partner violence:     Fear of current or ex partner: Not on file     Emotionally abused: Not on file     Physically abused: Not on file     Forced sexual activity: Not on file   Other Topics Concern      Service No     Blood Transfusions No     Caffeine Concern No     Occupational Exposure No     Hobby Hazards No     Sleep Concern Yes     Stress Concern No     Weight Concern No     Special Diet No     Back Care No     Exercise No     Bike Helmet No     Comment: n/a     Seat Belt Yes     Self-Exams No     Parent/sibling w/ CABG, MI or angioplasty before 65F 55M? No   Social History Narrative    no pets at home, no recent known sick contacts. She spends a lot of her time at the fitness center.        Family History:  Family History   Problem Relation Age of Onset     Hypertension Mother      Lipids Mother      Sleep Apnea Mother      Hypertension Maternal Grandmother              Alzheimer Disease Maternal Grandmother      Lipids Father      Melanoma No family hx of      Skin Cancer No family hx of      Cancer No family hx of      Diabetes No family hx of        ROS:  10-point ROS negative except as in HPI       Physical Exam  /70   Pulse 80   Ht 1.575 m (5' 2.01\")   Wt 49.7 kg (109 lb 9.6 oz)   LMP 2019 (Approximate)   BMI 20.04 kg/m    Gen: Well-appearing, NAD  HEENT: Normocephalic, atraumatic  CV:  RRR  Pulm: Non labored respirations   Abd: Soft,  non-distended  Ext: No LE edema, extremities warm and well perfused    I have reviewed labs and imaging    Assessment/Plan  Yarelis GUILLEN Zohaib is a 34 year old P0 here to establish care. Past medical history notable for ulcerative colitis " s/p colectomy. S/p living donor liver transplant secondary to autoimmune hepatitis. Currently taking tacrolimus and prednisone. History of cholestasis of transplanted liver. Previously met with [facility] for preconception counseling    Ulcerative Colitis:  - Patient previously counseled at [facility] visit 3/2019. Recommendation at that time was to postpone childbearing until disease is under better control. Patient is now s/p colectomy. She will need rectal exam 2020, but otherwise disease is now controlled. Given this it is reasonable to try get pregnant. If the disease is in remission during pregnancy UC should not have a detrimental effect on pregnancy outcome.      #Autoimmune hepatitis with resultant liver transplant  - Discussed the risks of history of liver transplant in pregnancy. Successful outcomes for pregnancy can be expected by these women, although they are at increased risk of preeclampsia,  birth, and low birth-weight and small for gestational age infants.    - Liver transplant recipients with biopsy-proven acute rejection during pregnancy are at greater risk for poor outcomes and recurrent rejection episodes.  Pregnancy itself does not seem to impair graft function or accelerate graft rejection if the patient is adequately immunosuppressed.    - Therapy such as cyclosporine and tacrolimus do not appear to be teratogenic, and breastfeeding is advocated.  Careful monitoring of plasma levels is advised because of the physiologic changes in pregnancy that can alter the pharmacokinetics of immunosuppressive therapy.  Malabsorption due to hyperemesis gravidarum may decrease plasma levels of the drug.      -- Recommended starting low dose 81 mg ASA at 12 weeks  -- Discussed importance of folic acid supplementation prior to conception  -- Follow up with [facility] on 10/3/19 for preconception visit  -- Offered referral to genetic counseling, patient unsure at this time   -- Reviewed zika precautions      Patient staffed with Dr. Arvind Navarrete MD  OB/GYN Resident, PGY-3    The Patient was seen in [facility] by JIMENEZ NAVARREET.  I reviewed the history & exam. Assessment and plan were jointly made.    Mey Samuels MD

## 2019-09-09 ENCOUNTER — TELEPHONE (OUTPATIENT)
Dept: SURGERY | Facility: CLINIC | Age: 34
End: 2019-09-09

## 2019-09-09 NOTE — TELEPHONE ENCOUNTER
[facility] Call Center    Phone Message    May a detailed message be left on voicemail: yes    Reason for Call: Other: donald from [facility] is calling to go over pts plan of care, please call her back at [phone] thanks     Action Taken: Message routed to:  Other: surgery

## 2019-09-17 DIAGNOSIS — Z94.4 LIVER REPLACED BY TRANSPLANT (H): ICD-10-CM

## 2019-09-17 DIAGNOSIS — Z13.220 LIPID SCREENING: ICD-10-CM

## 2019-10-01 ENCOUNTER — PRE VISIT (OUTPATIENT)
Dept: MATERNAL FETAL MEDICINE | Facility: CLINIC | Age: 34
End: 2019-10-01

## 2019-10-01 ENCOUNTER — TELEPHONE (OUTPATIENT)
Dept: TRANSPLANT | Facility: CLINIC | Age: 34
End: 2019-10-01

## 2019-10-01 DIAGNOSIS — Z94.4 LIVER REPLACED BY TRANSPLANT (H): ICD-10-CM

## 2019-10-01 RX ORDER — URSODIOL 250 MG/1
TABLET, FILM COATED ORAL
Qty: 270 TABLET | Refills: 3 | Status: SHIPPED | OUTPATIENT
Start: 2019-10-01 | End: 2020-09-22

## 2019-10-01 NOTE — TELEPHONE ENCOUNTER
Patient Call: Medication Refill        Pharmacy Name: [facility] [address]    Name of Medication: Ursodiol 250mg  Dose: 2 tabs in the AM and 1 tab in the PM  When will the patient be out of this medication?: Less than 24 hours (Arely PANDA, then page if no answer)

## 2019-10-03 ENCOUNTER — OFFICE VISIT (OUTPATIENT)
Dept: MATERNAL FETAL MEDICINE | Facility: CLINIC | Age: 34
End: 2019-10-03
Attending: INTERNAL MEDICINE
Payer: COMMERCIAL

## 2019-10-03 VITALS
WEIGHT: 109.2 LBS | SYSTOLIC BLOOD PRESSURE: 109 MMHG | HEART RATE: 77 BPM | DIASTOLIC BLOOD PRESSURE: 77 MMHG | BODY MASS INDEX: 19.97 KG/M2

## 2019-10-03 DIAGNOSIS — Z31.69 ENCOUNTER FOR PRECONCEPTION CONSULTATION: ICD-10-CM

## 2019-10-03 DIAGNOSIS — K75.4 AUTOIMMUNE HEPATITIS (H): ICD-10-CM

## 2019-10-03 DIAGNOSIS — D12.6 DYSPLASIA OF COLON: Primary | ICD-10-CM

## 2019-10-03 DIAGNOSIS — K51.018 ULCERATIVE PANCOLITIS WITH OTHER COMPLICATION (H): Primary | ICD-10-CM

## 2019-10-03 NOTE — PROGRESS NOTES
Pt presents to [facility] for preconception consult due to s/p colectomy wanting to attempt pregnancy in near future. Pt states she is feeling well at this time. Pt met with Dr. Romano and Dr. Ta. See note in epic for today's discussion and recommendations. Pt also met with Dr. Wilkerson with GI for consult. See note. Questions answered. Discharged stable without further follow up [facility] at this time. Cora Westfall RN

## 2019-10-03 NOTE — PROGRESS NOTES
Maternal-Fetal Medicine Consultation    Referral: Dear Dr. Wilkerson,  Thank you for referring Ms. Penny for a Maternal-Fetal Medicine consultation.    HPI: Yarelis is a pleasant 34 year-old  referred for a Maternal-Fetal Medicine preconception consultation in regards to her medical history of ulcerative colitis and autoimmune hepatitis. Yarelis met with us back in  (review the full consultation).     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 She is s/p living donor liver transplant from her  [facility] 6/18/15 and is on immunosuppression (tacrolimus). In 2013 she presented with ESTUARDO (creatinine was 3.3).  She underwent a kidney biopsy at [facility] on 01/15/2013 which showed acute on chronic interstitial nephritis and mesangial proliferative GN with C3 deposition.  At that time she had normal serum complement levels and negative ANCA. Her creatinine then trended downward and she did not require further dialysis. Her last Cr: 0.8 on 19.    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 She has completed her IV ganciclovir regimen. Repeat CMV DNA levels were undetected. She has iron deficiency anemia with last H&H being 8.2/28.1 on 19.    She had plans for pregnancy for  a while now and was counseled that she should be endoscopically quiescent prior to that. In  she had a colonoscopy with biopsy which showed low grade dysplasia with plan to undergo colectomy.     On 19 she underwent hand-assisted total abdominal colectomy with end ileostomy, pathology results were:  ( Moderate chronic active colitis with cryptitis, crypt abscess formation and ulceration. Focal low-grade dysplasia. No evidence of high-grade dysplasia or invasive malignancy. Resections margins with mild  chronic active colitis. Appendix with no pathologic diagnosis. Nine lymph nodes, negative for malignancy). OP report was reviewed and there was lots of intra-abdominal adhesions noted. Rectus was not resected.     MFWILMER in March counseled her to await surgery and post-operative recovery prior to attempt conception. GI and rectal surgery team recommended a period of 2 months post-op recovery.    Today she has states She is doing well with no complaints.     Past Medical History:   Past Medical History:   Diagnosis Date     Acute kidney injury (H) 4/28/2016     Anemia, iron deficiency 8/29/2017     Autoimmune hepatitis (H) 2012    on steroid taper     Cholangitis      CKD (chronic kidney disease) 2012    biopsy 2012: TIN, patchy fibrosis     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     Esophageal varices (H) 9/08    banded     Heart murmur      History of blood transfusion      Primary sclerosing cholangitis      SBP (spontaneous bacterial peritonitis) (H) 2/24/2015     Ulcerative Colitis     f/b GI       Past Surgical History:   Past Surgical History:   Procedure Laterality Date     CHOLECYSTECTOMY       COLONOSCOPY  9/07     COLONOSCOPY N/A 2/26/2015    Procedure: COMBINED COLONOSCOPY, SINGLE OR MULTIPLE BIOPSY/POLYPECTOMY BY BIOPSY;  Surgeon: Mitchel Hoskins Chi, MD;  Location: [facility]     COLONOSCOPY N/A 1/12/2016    Procedure: COMBINED COLONOSCOPY, SINGLE OR MULTIPLE BIOPSY/POLYPECTOMY BY BIOPSY;  Surgeon: Rigo Valles MD;  Location: [facility]     COLONOSCOPY N/A 4/1/2016    Procedure: COMBINED COLONOSCOPY, SINGLE OR MULTIPLE BIOPSY/POLYPECTOMY BY BIOPSY;  Surgeon: Kareem Solis MD;  Location: [facility]     COLONOSCOPY N/A 9/5/2017    Procedure: COMBINED COLONOSCOPY, SINGLE OR MULTIPLE BIOPSY/POLYPECTOMY BY BIOPSY;  Colonoscopy;  Surgeon: Fuentes Johnson MD;  Location: [facility]     COLONOSCOPY N/A 2/25/2019    Procedure: COMBINED COLONOSCOPY, SINGLE OR MULTIPLE BIOPSY/POLYPECTOMY BY BIOPSY;  Surgeon: Sophy Mckeon  MD;  [facility]     ENDOSCOPIC RETROGRADE CHOLANGIOPANCREATOGRAM N/A 6/25/2015    Procedure: COMBINED ENDOSCOPIC RETROGRADE CHOLANGIOPANCREATOGRAPHY, PLACE TUBE/STENT;  Surgeon: Landon Quinones MD;  [facility]     ENDOSCOPIC RETROGRADE CHOLANGIOPANCREATOGRAM N/A 6/25/2015    Procedure: COMBINED ENDOSCOPIC RETROGRADE CHOLANGIOPANCREATOGRAPHY, PLACE TUBE/STENT;  Surgeon: Landon Quinones MD;  [facility]     ENDOSCOPIC RETROGRADE CHOLANGIOPANCREATOGRAM N/A 7/2/2015    Procedure: COMBINED ENDOSCOPIC RETROGRADE CHOLANGIOPANCREATOGRAPHY, PLACE TUBE/STENT;  Surgeon: Landon Quinones MD;  [facility]     ENDOSCOPIC RETROGRADE CHOLANGIOPANCREATOGRAM N/A 9/8/2015    Procedure: COMBINED ENDOSCOPIC RETROGRADE CHOLANGIOPANCREATOGRAPHY, REMOVE FOREIGN BODY OR STENT/TUBE;  Surgeon: Landon Quinones MD;  [facility]     ENDOSCOPIC RETROGRADE CHOLANGIOPANCREATOGRAM N/A 12/8/2015    Procedure: ENDOSCOPIC RETROGRADE CHOLANGIOPANCREATOGRAM;  Surgeon: Landon Quinones MD;  [facility]     ENDOSCOPIC RETROGRADE CHOLANGIOPANCREATOGRAM N/A 3/1/2016    Procedure: COMBINED ENDOSCOPIC RETROGRADE CHOLANGIOPANCREATOGRAPHY, REMOVE FOREIGN BODY OR STENT/TUBE;  Surgeon: Landon Quinones MD;  [facility]     ENDOSCOPIC RETROGRADE CHOLANGIOPANCREATOGRAM N/A 3/20/2017    Procedure: COMBINED ENDOSCOPIC RETROGRADE CHOLANGIOPANCREATOGRAPHY, PLACE TUBE/STENT;  Endoscopic Retrograde Cholangiopancreatogram with Ballon Dilation, Stent Placement;  Surgeon: Guru Brittany Macias MD;  [facility]     ENDOSCOPIC RETROGRADE CHOLANGIOPANCREATOGRAM N/A 4/2/2018    Procedure: COMBINED ENDOSCOPIC RETROGRADE CHOLANGIOPANCREATOGRAPHY, PLACE TUBE/STENT;  Endoscopic Retrograde Cholangiopancreatogram with biliary dilation and stent placement;  Surgeon: Guru Brittany Macias MD;  [facility]     ENDOSCOPIC RETROGRADE CHOLANGIOPANCREATOGRAM N/A 5/7/2018    Procedure:  ENDOSCOPIC RETROGRADE CHOLANGIOPANCREATOGRAM;  Endoscopic Retrograde Cholangiopancreatogram ballon dilation stent exchange;  Surgeon: Guru Brittany Macias MD;  [facility]     ENDOSCOPIC RETROGRADE CHOLANGIOPANCREATOGRAPHY, EXCHANGE TUBE/STENT N/A 7/30/2015    Procedure: ENDOSCOPIC RETROGRADE CHOLANGIOPANCREATOGRAPHY, EXCHANGE TUBE/STENT;  Surgeon: Landon Quinones MD;  [facility]     ENDOSCOPIC RETROGRADE CHOLANGIOPANCREATOGRAPHY, EXCHANGE TUBE/STENT N/A 5/15/2017    Procedure: ENDOSCOPIC RETROGRADE CHOLANGIOPANCREATOGRAPHY, EXCHANGE TUBE/STENT;  Endoscopic Retrograde Cholangiopancreatogram with biliary stent exchange and balloon dilation;  Surgeon: Guru Brittany Macias MD;  [facility]     ENDOSCOPIC RETROGRADE CHOLANGIOPANCREATOGRAPHY, EXCHANGE TUBE/STENT N/A 6/25/2018    Procedure: ENDOSCOPIC RETROGRADE CHOLANGIOPANCREATOGRAPHY, EXCHANGE TUBE/STENT;  Endoscopic Retrograde Cholangiopancreatogram with biliary dilation, stone removal and stent exchange;  Surgeon: Guru Brittany Macias MD;  [facility]     ENDOSCOPIC RETROGRADE CHOLANGIOPANCREATOGRAPHY, EXCHANGE TUBE/STENT N/A 7/30/2018    Procedure: ENDOSCOPIC RETROGRADE CHOLANGIOPANCREATOGRAPHY, EXCHANGE TUBE/STENT;  Endoscopic Retrograde Cholangiopancreatogram with Stent Exchange with dilation;  Surgeon: Guru Brittany Macias MD;  [facility]     ENDOSCOPIC RETROGRADE CHOLANGIOPANCREATOGRAPHY, EXCHANGE TUBE/STENT N/A 9/26/2018    Procedure: ENDOSCOPIC RETROGRADE CHOLANGIOPANCREATOGRAPHY, EXCHANGE TUBE/STENT;  Endoscopic Retrograde Cholangiopancreatogram, with bile duct stent exchanged balloon dilation and balloon sweep of bile duct;  Surgeon: Guru Brittany Macias MD;  [facility]     ESOPHAGOSCOPY, GASTROSCOPY, DUODENOSCOPY (EGD), COMBINED N/A 2/26/2015    Procedure: COMBINED ESOPHAGOSCOPY, GASTROSCOPY, DUODENOSCOPY (EGD);  Surgeon: Mitchel Hoskins Chi, MD;   [facility]     EXPLORE COMMON BILE DUCT N/A 6/25/2015    Procedure: EXPLORE COMMON BILE DUCT;  Surgeon: Tyree Smith MD;  [facility]     HERNIA REPAIR      During transplant     ILEOSTOMY N/A 6/20/2019    Procedure: Ileostomy;  Surgeon: Krzysztof Carney MD;  [facility]     LAPAROSCOPIC ASSISTED COLECTOMY N/A 6/20/2019    Procedure: Laparoscopic Total Abdominal Colectomy, lysis of adhesions;  Surgeon: Krzysztof Carney MD;  [facility]     LAPAROTOMY EXPLORATORY N/A 6/25/2015    Procedure: LAPAROTOMY EXPLORATORY;  Surgeon: Tyree Smith MD;  [facility]     PICC INSERTION Right 2/27/2015    4fr SL Valved PICC, 36cm (1cm external) in the R medial brachial vein w/ tip in the low SVC.     SIGMOIDOSCOPY FLEXIBLE N/A 4/27/2016    Procedure: SIGMOIDOSCOPY FLEXIBLE;  Surgeon: Jose Nielson MD;  [facility]     TRANSPLANT LIVER RECIPIENT LIVING UNRELATED N/A 6/18/2015    Procedure: TRANSPLANT LIVER RECIPIENT LIVING UNRELATED;  Surgeon: Tyree Smith MD;  [facility]     UPPER GI ENDOSCOPY       Obstetric History: Nulliparous  Gynecological History: Denies h/o STI, fibroids, or abn pap  Medications:   Current Outpatient Medications   Medication     calcium carbonate (OS-KERLINE 500 MG [place]. [place]) 1250 MG tablet     multivitamin, therapeutic with minerals (MULTI-VITAMIN) TABS tablet     predniSONE (DELTASONE) 5 MG tablet     tacrolimus (GENERIC EQUIVALENT) 0.5 MG capsule     ursodiol (ACTIGALL) 250 MG tablet     VITAMIN D, CHOLECALCIFEROL, PO     acetaminophen (TYLENOL) 500 MG tablet     order for DME     trimethoprim-polymyxin b (POLYTRIM) 79294-9.1 UNIT/ML-% ophthalmic solution     No current facility-administered medications for this visit.      Allergy:   Allergies   Allergen Reactions     Rifampin Other (See Comments)     Kidney failure     Penicillins      Bad hives when she was in college     Social History: denies tobacco, ETOH, or illicit drug use   Family History:   Family History    Problem Relation Age of Onset     Hypertension Mother      Lipids Mother      Sleep Apnea Mother      Hypertension Maternal Grandmother              Alzheimer Disease Maternal Grandmother      Lipids Father      Melanoma No family hx of      Skin Cancer No family hx of      Cancer No family hx of      Diabetes No family hx of        Impression:  At today s visit we discussed with Yarelis her complex medical history in the setting of her desire for pregnancy. She was counseled by MARCIN, GI, and rectal surgery to undergo colectomy prior to pregnancy given the pathology of low grade dysplasia on colonoscopically guided biopsies. Today she is 3 months post-operative with no operative related complications.     # History of autoimmune hepatitis, primary sclerosing cholangitis, varices, SBP. S/P live donor hepatic transplant on immunosuppression:  We discussed that pregnancy outcomes for both the mother and infant in liver transplant recipients are generally good with live birth rate of 73%, but there is an increased incidence of  delivery (30-40%), hypertension/preeclampsia (14-33%), fetal growth restriction (15-19%), gestational diabetes, and  delivery (30-48%). he higher risk of  birth appears to be due in part to episodes of graft rejection and early onset preeclampsia.  Pregnancy induced hypertension and preeclampsia may be related to an increased incidence of baseline renal dysfunction in these women and use of immunosuppressive therapy. The risk appears to be higher with the use of cyclosporine A than with tacrolimus, but the number of reported patients is small. Gestational diabetes is likely provoked by chronic prednisone administration. A slightly increased risk of teratogenicity has been suggested with standard immunosuppressive regimens including tacrolimu, but the magnitude of risk is uncertain.     # History of ulcerative colitis. s/p total colectomy with end ileostomy:  We  discussed that there are numerous reports in the literature about pregnancy in patients with ulcerative colitis, however only few reports are available about pregnancy in patients who have undergone surgery for UC.     We counseled Yarelis that Overall fertility in patients with quiescent ulcerative colitis has been reported as normal compared with the general population. Few old reports (Shayan et al 1999 and 2002) have suggested that fertility may be decreased after surgery for ulcerative colitis witht suggested theory being adhesions formation especially after rectal removal, however, more new ones specifically for laparoscopic colectomy have suggested improved fertility outcomes for this population (Jacqueline et al 2012, Anitha et al 2019).     Other studies have shown that pregnancy does not affect pouch function. Daytime and nighttime stool frequency was identical. Vaginal delivery did not cause increased fecal incontinence or other problems with the pouch.  We have discussed that despite her having laparoscopic surgery and rectal preservation, knowing that there are extensive intra-abdominal adhesions put her at risk for infertility, bowel obstruction, and complicated surgeries like  section.     #History of CMV viremia. We emphasized what have been discussed in the prior MFM consultation that although prior CMV infection provides immunity, however recurrent infection with different strains can occur in 0.5-2% of women with prior infection in which only <1% of fetuses will be symptomatic at birth. In pregnancy no workup is recommended unless CMV associated US findings. If congenital CMV is suspected then recommend antibody, avidity testing, and amniocentesis per clinical scenario and lab findings.     At the conclusion of our discussion we have made the following recommendations:    PRECONCEPTION:  - Folic acid of minimum 400 mcg for 1 to 3 months prior to pregnancy  - May proceed with attempting pregnancy.    - If pregnancy was not successful despite unprotected intercourse for 6 months then recommend SUPRIYA referral.  - Continue current medications use.    ANTEPARTUM:  - General OB to be the primary prenatal care provider   - Baseline urine protein creatinine ratio  - Tacrolimus level monthly  - Hepatic panel every trimester  - Iron replacement. Monitor CBC frequently.  - Monitor for signs/symptoms of  labor, HTN, and preeclampsia  - Early gestational diabetes screening in the 1st trimester given steroid use. If normal testing then repeat at 24-28 weeks  - Start low dose aspirin at 12 weeks gestation until delivery to reduce the risk of developing preeclampsia  - Early dating scan at 6-9 weeks  - Discuss and offer genetic screening  - 1st trimester fetal scan   - Comprehensive fetal survey at 18-20 weeks, followed by serial fetal growth assessments every 4 weeks  - Antepartum surveillance via BPP, NST, or modified BPP weekly starting at 32 weeks.  - CMV testing and management pending ultrasound findings if suggestive of congenital infection.  -Timing of delivery pending maternal, and fetal status  - Vaginal delivery is the preferred mode.  delivery should be reserved for obstetrical indications.  - If  delivery is indicated then anticipate intra-abdominal adhesions. Might consider having Surgery team available.  - Stress dose of steroids given chronic steroid use during delivery.     POSTPARTUM:  Breastfeeding is encouraged and not contraindicated  Careful attention to postpartum immunosuppressant levels.     Yarelis expressed full understanding of the above-mentioned discussion and plan. All questions answered and concerns addressed.    A copy of this consultation will be sent to your office.     Thank you for the opportunity to participate in the care of this patient.  If you have questions, regarding today s evaluation or if we can be of further service, please contact the Maternal-Fetal Medicine  [facility].    I served as a scribe for Dr. Cely Romano MD  Maternal-Fetal Medicine fellow   Date: October 3, 2019    Total time spent in face-to-face counseling was 15 minutes (>50% for medical management discussion and plan oc care). A copy of this consult was sent to your office.    This note, as scribed, accurately reflects the examination, my impressions and plan as discussed with the patient.    Damion Ta MD  Maternal-Fetal Medicine

## 2019-10-03 NOTE — PATIENT INSTRUCTIONS
PLAN  ---You are ready to attempt conception!  ---Will consider IVF in 6 months or so, if you don't get pregnant before then  ---Flex sig in Nov with me for surveillance

## 2019-10-03 NOTE — PROGRESS NOTES
[facility]       PATIENT: Yarelis Penny    MRN: [id number]    Date of Birth 1985    Tel: [phone] (home)     PCP: Dr. Erica Lara     HPI: Ms. Penny is a 33 year old female here for preconception counseling in the setting of PSC associated IBD treated with vedolizumab.  She also has PSC/AIH and is status post living donor liver transplant 2015, currently on tacrolimus and prednisone, complicated by anastomotic bile leak with anastomotic strictures status post multiple ERCPs.  I am managing her IBD, Dr. Daniela Enriquez is managing her liver transplant and Dr. Guru Macias is managing her biliary stricture.    Most recently, Yarelis was on vedolizumab every 4 weeks, but continued to have active disease and a colonoscopy showed LGD on a random biopsy. On 6/2019 she underwent a total abdominal lap colectomy with end ileostomy.  Pathology from the colectomy specimen showed LGD without HGD or invasive cancer. She presents today to [facility] for preconception counseling.     She manages her bag very well and finds it convenient. She empties it 4 times per day. No blood in the stool or stoma issues. She changes her bag twice per week. During the night she empties her bag 1-2 times, when she wakes up to urinate. No issues or concerns with her bag or bowel habits.       Yarelis and her  have started to attempt to conceive x 1 month. Currently using a couple apps to help track ovulation.     Noteworthy diet history- healthy diet in general    UC history  Age at diagnosis: 18  Macroscopic extent of disease varied over the years, most recently ileocolonic involvement with rectal sparing    Constitutional symptoms:  Fever NO  Weight loss NO    Other GI symptoms present none    Total number of IBD surgeries (except perianal): none    Remaining bowel: distal rectum   Small bowel entire  Ileocecal valve present No   Current Pouch  NO  Current Stoma  YES    Current IBD  Medications:  -none    Past IBD Medications: VDZ, Prednisone, Pentasa, Asacol, azathioprine (mainly for liver disease), Uceris- stopped March 2018      Past Medical History:   Diagnosis Date     Acute kidney injury (H) 4/28/2016     Anemia, iron deficiency 8/29/2017     Autoimmune hepatitis (H) 2012    on steroid taper     Cholangitis      CKD (chronic kidney disease) 2012    biopsy 2012: TIN, patchy fibrosis     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     Esophageal varices (H) 9/08    banded     Heart murmur      History of blood transfusion      Primary sclerosing cholangitis      SBP (spontaneous bacterial peritonitis) (H) 2/24/2015     Ulcerative Colitis     f/b GI      Past Surgical History:   Procedure Laterality Date     CHOLECYSTECTOMY       COLONOSCOPY  9/07     COLONOSCOPY N/A 2/26/2015    Procedure: COMBINED COLONOSCOPY, SINGLE OR MULTIPLE BIOPSY/POLYPECTOMY BY BIOPSY;  Surgeon: Mitchel Hoskins Chi, MD;  Location: [facility]     COLONOSCOPY N/A 1/12/2016    Procedure: COMBINED COLONOSCOPY, SINGLE OR MULTIPLE BIOPSY/POLYPECTOMY BY BIOPSY;  Surgeon: Rigo Valles MD;  Location: [facility]     COLONOSCOPY N/A 4/1/2016    Procedure: COMBINED COLONOSCOPY, SINGLE OR MULTIPLE BIOPSY/POLYPECTOMY BY BIOPSY;  Surgeon: Kareem Solis MD;  Location: [facility]     COLONOSCOPY N/A 9/5/2017    Procedure: COMBINED COLONOSCOPY, SINGLE OR MULTIPLE BIOPSY/POLYPECTOMY BY BIOPSY;  Colonoscopy;  Surgeon: Fuentes Johnson MD;  Location: [facility]     COLONOSCOPY N/A 2/25/2019    Procedure: COMBINED COLONOSCOPY, SINGLE OR MULTIPLE BIOPSY/POLYPECTOMY BY BIOPSY;  Surgeon: Sophy Mckeon MD;  [facility]     ENDOSCOPIC RETROGRADE CHOLANGIOPANCREATOGRAM N/A 6/25/2015    Procedure: COMBINED ENDOSCOPIC RETROGRADE CHOLANGIOPANCREATOGRAPHY, PLACE TUBE/STENT;  Surgeon: Landon Quinones MD;  [facility]     ENDOSCOPIC RETROGRADE CHOLANGIOPANCREATOGRAM N/A 6/25/2015    Procedure: COMBINED ENDOSCOPIC RETROGRADE  CHOLANGIOPANCREATOGRAPHY, PLACE TUBE/STENT;  Surgeon: Landon Quinones MD;  [facility]     ENDOSCOPIC RETROGRADE CHOLANGIOPANCREATOGRAM N/A 7/2/2015    Procedure: COMBINED ENDOSCOPIC RETROGRADE CHOLANGIOPANCREATOGRAPHY, PLACE TUBE/STENT;  Surgeon: Landon Quinones MD;  [facility]     ENDOSCOPIC RETROGRADE CHOLANGIOPANCREATOGRAM N/A 9/8/2015    Procedure: COMBINED ENDOSCOPIC RETROGRADE CHOLANGIOPANCREATOGRAPHY, REMOVE FOREIGN BODY OR STENT/TUBE;  Surgeon: Landon Quinones MD;  [facility]     ENDOSCOPIC RETROGRADE CHOLANGIOPANCREATOGRAM N/A 12/8/2015    Procedure: ENDOSCOPIC RETROGRADE CHOLANGIOPANCREATOGRAM;  Surgeon: Landon Quinones MD;  [facility]     ENDOSCOPIC RETROGRADE CHOLANGIOPANCREATOGRAM N/A 3/1/2016    Procedure: COMBINED ENDOSCOPIC RETROGRADE CHOLANGIOPANCREATOGRAPHY, REMOVE FOREIGN BODY OR STENT/TUBE;  Surgeon: Landon Quinones MD;  [facility]     ENDOSCOPIC RETROGRADE CHOLANGIOPANCREATOGRAM N/A 3/20/2017    Procedure: COMBINED ENDOSCOPIC RETROGRADE CHOLANGIOPANCREATOGRAPHY, PLACE TUBE/STENT;  Endoscopic Retrograde Cholangiopancreatogram with Ballon Dilation, Stent Placement;  Surgeon: Guru Brittany Macias MD;  [facility]     ENDOSCOPIC RETROGRADE CHOLANGIOPANCREATOGRAM N/A 4/2/2018    Procedure: COMBINED ENDOSCOPIC RETROGRADE CHOLANGIOPANCREATOGRAPHY, PLACE TUBE/STENT;  Endoscopic Retrograde Cholangiopancreatogram with biliary dilation and stent placement;  Surgeon: Guru Brittany Macias MD;  [facility]     ENDOSCOPIC RETROGRADE CHOLANGIOPANCREATOGRAM N/A 5/7/2018    Procedure: ENDOSCOPIC RETROGRADE CHOLANGIOPANCREATOGRAM;  Endoscopic Retrograde Cholangiopancreatogram ballon dilation stent exchange;  Surgeon: Guru Brittany Macias MD;  [facility]     ENDOSCOPIC RETROGRADE CHOLANGIOPANCREATOGRAPHY, EXCHANGE TUBE/STENT N/A 7/30/2015    Procedure: ENDOSCOPIC RETROGRADE  CHOLANGIOPANCREATOGRAPHY, EXCHANGE TUBE/STENT;  Surgeon: Landon Quinones MD;  [facility]     ENDOSCOPIC RETROGRADE CHOLANGIOPANCREATOGRAPHY, EXCHANGE TUBE/STENT N/A 5/15/2017    Procedure: ENDOSCOPIC RETROGRADE CHOLANGIOPANCREATOGRAPHY, EXCHANGE TUBE/STENT;  Endoscopic Retrograde Cholangiopancreatogram with biliary stent exchange and balloon dilation;  Surgeon: Guru Brittany Macias MD;  [facility]     ENDOSCOPIC RETROGRADE CHOLANGIOPANCREATOGRAPHY, EXCHANGE TUBE/STENT N/A 6/25/2018    Procedure: ENDOSCOPIC RETROGRADE CHOLANGIOPANCREATOGRAPHY, EXCHANGE TUBE/STENT;  Endoscopic Retrograde Cholangiopancreatogram with biliary dilation, stone removal and stent exchange;  Surgeon: Guru Brittany Macias MD;  [facility]     ENDOSCOPIC RETROGRADE CHOLANGIOPANCREATOGRAPHY, EXCHANGE TUBE/STENT N/A 7/30/2018    Procedure: ENDOSCOPIC RETROGRADE CHOLANGIOPANCREATOGRAPHY, EXCHANGE TUBE/STENT;  Endoscopic Retrograde Cholangiopancreatogram with Stent Exchange with dilation;  Surgeon: Guru Brittany Macias MD;  [facility]     ENDOSCOPIC RETROGRADE CHOLANGIOPANCREATOGRAPHY, EXCHANGE TUBE/STENT N/A 9/26/2018    Procedure: ENDOSCOPIC RETROGRADE CHOLANGIOPANCREATOGRAPHY, EXCHANGE TUBE/STENT;  Endoscopic Retrograde Cholangiopancreatogram, with bile duct stent exchanged balloon dilation and balloon sweep of bile duct;  Surgeon: Guru Brittany Macias MD;  [facility]     ESOPHAGOSCOPY, GASTROSCOPY, DUODENOSCOPY (EGD), COMBINED N/A 2/26/2015    Procedure: COMBINED ESOPHAGOSCOPY, GASTROSCOPY, DUODENOSCOPY (EGD);  Surgeon: Mitchel Hoskins Chi, MD;  [facility]     EXPLORE COMMON BILE DUCT N/A 6/25/2015    Procedure: EXPLORE COMMON BILE DUCT;  Surgeon: Tyree Smith MD;  [facility]     HERNIA REPAIR      During transplant     ILEOSTOMY N/A 6/20/2019    Procedure: Ileostomy;  Surgeon: Krzysztof Carney MD;  [facility]     LAPAROSCOPIC ASSISTED  COLECTOMY N/A 2019    Procedure: Laparoscopic Total Abdominal Colectomy, lysis of adhesions;  Surgeon: Krzysztof Carney MD;  [facility]     LAPAROTOMY EXPLORATORY N/A 2015    Procedure: LAPAROTOMY EXPLORATORY;  Surgeon: Tyree Smith MD;  [facility]     PICC INSERTION Right 2015    4fr SL Valved PICC, 36cm (1cm external) in the R medial brachial vein w/ tip in the low SVC.     SIGMOIDOSCOPY FLEXIBLE N/A 2016    Procedure: SIGMOIDOSCOPY FLEXIBLE;  Surgeon: Jose Nielson MD;  [facility]     TRANSPLANT LIVER RECIPIENT LIVING UNRELATED N/A 2015    Procedure: TRANSPLANT LIVER RECIPIENT LIVING UNRELATED;  Surgeon: Tyree Smith MD;  [facility]     UPPER GI ENDOSCOPY       Social History     Tobacco Use     Smoking status: Never Smoker     Smokeless tobacco: Never Used   Substance Use Topics     Alcohol use: Yes     Alcohol/week: 0.0 standard drinks     Comment: Every other month has 1 drink     Family History   Problem Relation Age of Onset     Hypertension Mother      Lipids Mother      Sleep Apnea Mother      Hypertension Maternal Grandmother              Alzheimer Disease Maternal Grandmother      Lipids Father      Melanoma No family hx of      Skin Cancer No family hx of      Cancer No family hx of      Diabetes No family hx of      Allergies   Allergen Reactions     Rifampin Other (See Comments)     Kidney failure     Penicillins      Bad hives when she was in college      Outpatient Encounter Medications as of 10/3/2019   Medication Sig Dispense Refill     acetaminophen (TYLENOL) 500 MG tablet Take 2 tablets (1,000 mg) by mouth 4 times daily (Patient not taking: Reported on 2019) 50 tablet 0     calcium carbonate (OS-KERLINE 500 MG [place]) 1250 MG tablet Take 1 tablet by mouth every evening       [] doxycycline hyclate (VIBRAMYCIN) 100 MG capsule Take 1 capsule (100 mg) by mouth 2 times daily for 7 days 14 capsule 0     multivitamin, therapeutic  "with minerals (MULTI-VITAMIN) TABS tablet Take 1 tablet by mouth every morning       order for DME Increase the amount to 10 per month  --2\" barrier ring #[id number] 10 each 0     predniSONE (DELTASONE) 5 MG tablet Take 1 tablet (5 mg) by mouth daily 225 tablet 3     tacrolimus (GENERIC EQUIVALENT) 0.5 MG capsule Take 5 capsules (2.5 mg) by mouth every 12 hours 900 capsule 3     trimethoprim-polymyxin b (POLYTRIM) 87430-1.1 UNIT/ML-% ophthalmic solution Place 1-2 drops Into the left eye every 4 hours 10 mL 0     ursodiol (ACTIGALL) 250 MG tablet Take 2 tablets (500 mg) by mouth every morning AND 1 tablet (250 mg) every evening. 270 tablet 3     VITAMIN D, CHOLECALCIFEROL, PO Take by mouth every evening       No facility-administered encounter medications on file as of 10/3/2019.       NSAID  NO    Review of Systems  Complete 10 System ROS performed. All are negative except as documented below, in the HPI, or in patient questionnaire from today's visit.    1) Constitutional: No fevers, chills, night sweats or malaise, weight loss or gain  2) Skin: No rash  3) Pulmonary: No wheeze, SOB, cough, sputum or hemoptysis  4) Cardiovascular: No Chest pain or palpitations  5) Genitourinary: No blood in urine or dysuria  6) Endocrine: No increased sweating, hunger, thirst or thyroid problems  7) Hematologic: No bruising and easy bleeding  8) Musculoskeletal: no new pain in joints or limitation in ROM  9) Neurologic: No dizziness, paresthesias or weakness or falls  10) Psychiatric:  not depressed/anxious, no sleep problems    PHYSICAL EXAM  Vitals reviewed and stable.   Gen: NAD  HEENT: EOMI, anicteric  Neck: supple  Lymph: no cervical LAD  CV: RRR  Pulm: non labored  Abd: well-healed surgical scars, soft, non tender, non distended. Stoma in R abdomen with minimal stool output.  Skin: no rash, no jaundice  MSK: normal gait  Neuro: AAOX3  Psych: normal affect    DATA:  Reviewed in detail past documentation, medications and prior workup " available in electronic health records or through outside records.    PERTINENT STUDIES:  Most recent CBC:  WBC   Date Value Ref Range Status   06/21/2019 18.2 (H) 4.0 - 11.0 10e9/L Final     Hemoglobin   Date Value Ref Range Status   06/24/2019 8.1 (L) 11.7 - 15.7 g/dL Final     Platelet Count   Date Value Ref Range Status   06/21/2019 537 (H) 150 - 450 10e9/L Final   Most recent coag:  INR   Date Value Ref Range Status   06/23/2019 1.02 0.86 - 1.14 Final   Most recent hepatic panel:  AST   Date Value Ref Range Status   06/23/2019 19 0 - 45 U/L Final     ALT   Date Value Ref Range Status   06/23/2019 26 0 - 50 U/L Final     Bilirubin Conjugated   Date Value Ref Range Status   09/22/2006 0.0 0.0 - 0.3 mg/dL Final      Bilirubin Total   Date Value Ref Range Status   06/23/2019 0.2 0.2 - 1.3 mg/dL Final     Albumin   Date Value Ref Range Status   06/23/2019 2.1 (L) 3.4 - 5.0 g/dL Final     Alkaline Phosphatase   Date Value Ref Range Status   06/23/2019 85 40 - 150 U/L Final     Creatinine   Date Value Ref Range Status   06/23/2019 0.81 0.52 - 1.04 mg/dL Final     Tuberculosis: Negative 12/5/16    DRUG MONITORING  TPMT enzyme activity: 18.5 12/6/16    6-TGN/6-MMPN levels: 203/257 12/6/16    Biologic concentration:  4/18/17 Vedolizumab level 34.1, no ATI  5/2/17 Vedolizumab level 3.9, no ATI (decreased interval to q4 week dosing)  10/25/17 Vedolizumab level 11, no ATI (no change in therapy-- met with surgeon to discuss what surgical options might look like)    Endoscopy:   Earliest colonoscopies in 2006 show right greater than left inflammation with some inflammation in the ileum as well.    Followup colonoscopy in 2007 showed mainly diffuse colonic inflammation.    Colonoscopies in 2015 again showed more chronic colitis with no significant ileal involvement.    Colonoscopies in 3/2016 again showed a right-sided and some ileal inflammation with ulceration, but biopsy showed CMV.    Colonoscopy 11/2016 showed severe  inflammation from 20 cm from the anus all of the way to the ileum as far as could be evaluated.  Biopsies showed chronic active ileitis and chronic active colitis in every sample.  There was no evidence of CMV or PTLD. Rare SHENA-KACIE positive cells seen in ileum.  Colonoscopy 9/2017 showed Abad 2 inflammation in the right colon and abad one in the left colon with relative rectal sparing.  Patulous IC valve with some backwash ileitis.  Biopsies negative for lymphoma, CMP and EBV biopsies with mild  chronic active ileitis and mild chronic active colitis throughout, including rectum.  Ileocolonoscopy 2/2019 showed Abad 2/3 pan colitis with relative rectal sparing + ileitis. Biopsies showed LGD at 40 cm.     Imaging:  MRE 12/2016:    1.  Diffuse colonic wall thickening and enhancement with loss of  haustral pattern. Wall thickening and enhancement of the terminal  ileum. Findings consistent with acute on chronic changes related to  ulcerative colitis.    IMPRESSION    # Preconception counseling  # s/p abdominal colectomy with end ileostomy for LGD  # Need for rectal surveillance   # PSC/AIH s/p liver transplant     Ms. Penny is a 33 year old here with PSC-associated IBD, now s/p lap abdominal colectomy with end ileostomy 6/2019, with rectum still in place. She presents with her  for preconception counseling.     Of note, she had dense pelvic adhesions during her lap colectomy of unclear etiology. We did discuss that this may affect her fertility and therefore, initiation of IVF work up may be reasonable after 6 months of unsuccessful attempted conception.     On a different note, given history of LGD, will plan for rectal surveillance with biopsies in November.  If biopsies are negative, may increase surveillance interval to 1 year.     She will contact me when she gets pregnant.     Sophy Mckeon MD   of Medicine  Division of Gastroenterology, Hepatology and Nutrition  [facility]  [place]    Thank you for this consultation.  It was a pleasure to participate in the care of this patient; please contact us with any further questions.  I spent a total of 30 minutes, face to face, was spent with this patient, >50% of which was counseling regarding the above delineated issues.

## 2019-10-16 DIAGNOSIS — Z13.220 LIPID SCREENING: ICD-10-CM

## 2019-10-16 DIAGNOSIS — Z94.4 LIVER REPLACED BY TRANSPLANT (H): ICD-10-CM

## 2019-10-16 LAB
ALBUMIN SERPL-MCNC: 3.6 G/DL (ref 3.4–5)
ALBUMIN UR-MCNC: 30 MG/DL
ALP SERPL-CCNC: 134 U/L (ref 40–150)
ALT SERPL W P-5'-P-CCNC: 61 U/L (ref 0–50)
ANION GAP SERPL CALCULATED.3IONS-SCNC: 6 MMOL/L (ref 3–14)
APPEARANCE UR: ABNORMAL
AST SERPL W P-5'-P-CCNC: 36 U/L (ref 0–45)
BILIRUB DIRECT SERPL-MCNC: 0.1 MG/DL (ref 0–0.2)
BILIRUB SERPL-MCNC: 0.3 MG/DL (ref 0.2–1.3)
BILIRUB UR QL STRIP: NEGATIVE
BUN SERPL-MCNC: 25 MG/DL (ref 7–30)
CALCIUM SERPL-MCNC: 9.1 MG/DL (ref 8.5–10.1)
CHLORIDE SERPL-SCNC: 111 MMOL/L (ref 94–109)
CHOLEST SERPL-MCNC: 171 MG/DL
CO2 SERPL-SCNC: 23 MMOL/L (ref 20–32)
COLOR UR AUTO: YELLOW
CREAT SERPL-MCNC: 0.9 MG/DL (ref 0.52–1.04)
CREAT UR-MCNC: 180 MG/DL
ERYTHROCYTE [DISTWIDTH] IN BLOOD BY AUTOMATED COUNT: 17.1 % (ref 10–15)
GFR SERPL CREATININE-BSD FRML MDRD: 83 ML/MIN/{1.73_M2}
GLUCOSE SERPL-MCNC: 85 MG/DL (ref 70–99)
GLUCOSE UR STRIP-MCNC: NEGATIVE MG/DL
GRAN CASTS #/AREA URNS LPF: 16 /LPF
HCT VFR BLD AUTO: 44.1 % (ref 35–47)
HDLC SERPL-MCNC: 78 MG/DL
HGB BLD-MCNC: 14.1 G/DL (ref 11.7–15.7)
HGB UR QL STRIP: ABNORMAL
HYALINE CASTS #/AREA URNS LPF: 5 /LPF (ref 0–2)
KETONES UR STRIP-MCNC: NEGATIVE MG/DL
LDLC SERPL CALC-MCNC: 68 MG/DL
LEUKOCYTE ESTERASE UR QL STRIP: NEGATIVE
MCH RBC QN AUTO: 26.8 PG (ref 26.5–33)
MCHC RBC AUTO-ENTMCNC: 32 G/DL (ref 31.5–36.5)
MCV RBC AUTO: 84 FL (ref 78–100)
MUCOUS THREADS #/AREA URNS LPF: PRESENT /LPF
NITRATE UR QL: NEGATIVE
NONHDLC SERPL-MCNC: 93 MG/DL
PH UR STRIP: 5 PH (ref 5–7)
PLATELET # BLD AUTO: 445 10E9/L (ref 150–450)
POTASSIUM SERPL-SCNC: 3.7 MMOL/L (ref 3.4–5.3)
PROT SERPL-MCNC: 7.9 G/DL (ref 6.8–8.8)
PROT UR-MCNC: 0.46 G/L
PROT/CREAT 24H UR: 0.26 G/G CR (ref 0–0.2)
RBC # BLD AUTO: 5.27 10E12/L (ref 3.8–5.2)
RBC #/AREA URNS AUTO: 50 /HPF (ref 0–2)
SODIUM SERPL-SCNC: 140 MMOL/L (ref 133–144)
SOURCE: ABNORMAL
SP GR UR STRIP: 1.01 (ref 1–1.03)
SQUAMOUS #/AREA URNS AUTO: 2 /HPF (ref 0–1)
TACROLIMUS BLD-MCNC: 5.2 UG/L (ref 5–15)
TME LAST DOSE: 2130 H
TRIGL SERPL-MCNC: 124 MG/DL
UROBILINOGEN UR STRIP-MCNC: 0 MG/DL (ref 0–2)
WBC # BLD AUTO: 12.2 10E9/L (ref 4–11)
WBC #/AREA URNS AUTO: 5 /HPF (ref 0–5)

## 2019-11-04 ENCOUNTER — HEALTH MAINTENANCE LETTER (OUTPATIENT)
Age: 34
End: 2019-11-04

## 2019-11-14 ENCOUNTER — ALLIED HEALTH/NURSE VISIT (OUTPATIENT)
Dept: NURSING | Facility: CLINIC | Age: 34
End: 2019-11-14
Payer: COMMERCIAL

## 2019-11-14 DIAGNOSIS — Z23 NEED FOR PROPHYLACTIC VACCINATION AND INOCULATION AGAINST INFLUENZA: Primary | ICD-10-CM

## 2019-11-14 PROCEDURE — 90471 IMMUNIZATION ADMIN: CPT

## 2019-11-14 PROCEDURE — 90686 IIV4 VACC NO PRSV 0.5 ML IM: CPT

## 2019-11-14 PROCEDURE — 99207 ZZC NO CHARGE NURSE ONLY: CPT

## 2019-12-02 DIAGNOSIS — K51.90 UC (ULCERATIVE COLITIS) (H): ICD-10-CM

## 2019-12-02 DIAGNOSIS — Z94.4 LIVER REPLACED BY TRANSPLANT (H): ICD-10-CM

## 2019-12-02 RX ORDER — PREDNISONE 5 MG/1
5 TABLET ORAL DAILY
Qty: 90 TABLET | Refills: 3 | Status: SHIPPED | OUTPATIENT
Start: 2019-12-02 | End: 2020-11-24

## 2019-12-23 DIAGNOSIS — Z93.2 S/P ILEOSTOMY (H): Primary | ICD-10-CM

## 2019-12-23 NOTE — PROGRESS NOTES
Patient no longer continues on Entyvio infusions following surgery.  plan discontinued from patients chart

## 2020-01-23 ENCOUNTER — MEDICAL CORRESPONDENCE (OUTPATIENT)
Dept: HEALTH INFORMATION MANAGEMENT | Facility: CLINIC | Age: 35
End: 2020-01-23

## 2020-02-18 DIAGNOSIS — Z94.4 S/P LIVER TRANSPLANT (H): ICD-10-CM

## 2020-02-18 RX ORDER — TACROLIMUS 0.5 MG/1
CAPSULE ORAL
Qty: 900 CAPSULE | Refills: 12 | Status: SHIPPED | OUTPATIENT
Start: 2020-02-18 | End: 2021-01-18

## 2020-03-19 ENCOUNTER — TELEPHONE (OUTPATIENT)
Dept: GASTROENTEROLOGY | Facility: CLINIC | Age: 35
End: 2020-03-19

## 2020-03-19 NOTE — TELEPHONE ENCOUNTER
I called Yarelis to see how she is doing.   She skipped her menstruation last month, but did have a normal menses about 1 week ago.   Several pregnancy tests were negative.   She and her  (Ceferino) have been trying to get pregnant for 6 months and would like a referral to [facility]. This has been with the assistance of body temp checks and ovulation self-testing.   I will contact [facility] to see if a referral can be placed.     Otherwise, Yarelis is doing well. Her stoma is functioning well and she is happy with it.

## 2020-06-11 ENCOUNTER — MEDICAL CORRESPONDENCE (OUTPATIENT)
Dept: HEALTH INFORMATION MANAGEMENT | Facility: CLINIC | Age: 35
End: 2020-06-11

## 2020-06-15 ENCOUNTER — MEDICAL CORRESPONDENCE (OUTPATIENT)
Dept: HEALTH INFORMATION MANAGEMENT | Facility: CLINIC | Age: 35
End: 2020-06-15

## 2020-06-15 DIAGNOSIS — Z13.220 LIPID SCREENING: ICD-10-CM

## 2020-06-15 DIAGNOSIS — Z94.4 LIVER REPLACED BY TRANSPLANT (H): ICD-10-CM

## 2020-06-15 DIAGNOSIS — Z00.00 GENERAL MEDICAL EXAM: ICD-10-CM

## 2020-06-15 DIAGNOSIS — Z11.3 SCREENING EXAMINATION FOR VENEREAL DISEASE: ICD-10-CM

## 2020-06-15 DIAGNOSIS — Z31.41 FERTILITY TESTING: Primary | ICD-10-CM

## 2020-06-15 DIAGNOSIS — Z00.00 ROUTINE GENERAL MEDICAL EXAMINATION AT A HEALTH CARE FACILITY: ICD-10-CM

## 2020-06-15 LAB
ABO + RH BLD: NORMAL
ABO + RH BLD: NORMAL
ALBUMIN SERPL-MCNC: 3.7 G/DL (ref 3.4–5)
ALP SERPL-CCNC: 97 U/L (ref 40–150)
ALT SERPL W P-5'-P-CCNC: 43 U/L (ref 0–50)
ANION GAP SERPL CALCULATED.3IONS-SCNC: 6 MMOL/L (ref 3–14)
AST SERPL W P-5'-P-CCNC: 24 U/L (ref 0–45)
BILIRUB DIRECT SERPL-MCNC: <0.1 MG/DL (ref 0–0.2)
BILIRUB SERPL-MCNC: 0.3 MG/DL (ref 0.2–1.3)
BLD GP AB SCN SERPL QL: NORMAL
BLOOD BANK CMNT PATIENT-IMP: NORMAL
BUN SERPL-MCNC: 27 MG/DL (ref 7–30)
CALCIUM SERPL-MCNC: 8.8 MG/DL (ref 8.5–10.1)
CHLORIDE SERPL-SCNC: 112 MMOL/L (ref 94–109)
CHOLEST SERPL-MCNC: 171 MG/DL
CO2 SERPL-SCNC: 21 MMOL/L (ref 20–32)
CREAT SERPL-MCNC: 0.97 MG/DL (ref 0.52–1.04)
ERYTHROCYTE [DISTWIDTH] IN BLOOD BY AUTOMATED COUNT: 14.7 % (ref 10–15)
ESTRADIOL SERPL-MCNC: 48 PG/ML
FSH SERPL-ACNC: 8.8 IU/L
GFR SERPL CREATININE-BSD FRML MDRD: 76 ML/MIN/{1.73_M2}
GLUCOSE SERPL-MCNC: 73 MG/DL (ref 70–99)
HBA1C MFR BLD: 5.1 % (ref 0–5.6)
HCT VFR BLD AUTO: 42.6 % (ref 35–47)
HDLC SERPL-MCNC: 85 MG/DL
HGB BLD-MCNC: 13.4 G/DL (ref 11.7–15.7)
LDLC SERPL CALC-MCNC: 65 MG/DL
LH SERPL-ACNC: 39.2 IU/L
MCH RBC QN AUTO: 28.7 PG (ref 26.5–33)
MCHC RBC AUTO-ENTMCNC: 31.5 G/DL (ref 31.5–36.5)
MCV RBC AUTO: 91 FL (ref 78–100)
NONHDLC SERPL-MCNC: 86 MG/DL
PLATELET # BLD AUTO: 381 10E9/L (ref 150–450)
POTASSIUM SERPL-SCNC: 3.8 MMOL/L (ref 3.4–5.3)
PROGEST SERPL-MCNC: <0.2 NG/ML
PROT SERPL-MCNC: 7.8 G/DL (ref 6.8–8.8)
RBC # BLD AUTO: 4.67 10E12/L (ref 3.8–5.2)
SODIUM SERPL-SCNC: 139 MMOL/L (ref 133–144)
SPECIMEN EXP DATE BLD: NORMAL
TACROLIMUS BLD-MCNC: 5.5 UG/L (ref 5–15)
TME LAST DOSE: NORMAL H
TRIGL SERPL-MCNC: 107 MG/DL
TSH SERPL DL<=0.005 MIU/L-ACNC: 3.71 MU/L (ref 0.4–4)
WBC # BLD AUTO: 8.3 10E9/L (ref 4–11)

## 2020-06-16 LAB
C TRACH DNA SPEC QL NAA+PROBE: NEGATIVE
CMV IGM SERPL QL IA: <0.2 AI (ref 0–0.8)
DEPRECATED CALCIDIOL+CALCIFEROL SERPL-MC: 44 UG/L (ref 20–75)
DHEA-S SERPL-MCNC: <15 UG/DL (ref 35–430)
HBV CORE AB SERPL QL IA: NONREACTIVE
HBV SURFACE AG SERPL QL IA: NONREACTIVE
HCV AB SERPL QL IA: NONREACTIVE
HIV 1+2 AB+HIV1 P24 AG SERPL QL IA: NONREACTIVE
N GONORRHOEA DNA SPEC QL NAA+PROBE: NEGATIVE
RUBV IGG SERPL IA-ACNC: 6 IU/ML
SHBG SERPL-SCNC: 41 NMOL/L (ref 30–135)
SPECIMEN SOURCE: NORMAL
SPECIMEN SOURCE: NORMAL
T PALLIDUM AB SER QL: NONREACTIVE
TESTOST FREE SERPL-MCNC: 0.35 NG/DL (ref 0.13–0.92)
TESTOST SERPL-MCNC: 23 NG/DL (ref 8–60)
VZV IGG SER QL IA: 6.8 AI (ref 0–0.8)

## 2020-06-17 LAB
HBV DNA SERPL NAA+PROBE-ACNC: NORMAL [IU]/ML
HBV DNA SERPL NAA+PROBE-LOG IU: NORMAL {LOG_IU}/ML
HTLV I+II AB PATRN SER IB-IMP: NEGATIVE
MIS SERPL-MCNC: 6.2 NG/ML (ref 0.18–11.71)

## 2020-06-22 LAB — 17OHP SERPL-MCNC: 74 NG/DL

## 2020-06-23 ENCOUNTER — TELEPHONE (OUTPATIENT)
Dept: MATERNAL FETAL MEDICINE | Facility: CLINIC | Age: 35
End: 2020-06-23

## 2020-06-23 NOTE — TELEPHONE ENCOUNTER
Call to jose e to regarding preconception referral. Pt has been seen by MFM 3/7/19 and 10/3/19. Pt states no changes in health in past 8 months. Will review with MD. Virgie Bertrand RN

## 2020-06-23 NOTE — TELEPHONE ENCOUNTER
Reviewed previous preconception consult with JOSE SILVER to CCRM from [employer] signed. Pt ok for MFM to send preconception consult to [facility]. RN notified [facility] as well.    Virgie Bertrand RN

## 2020-07-06 ENCOUNTER — TELEPHONE (OUTPATIENT)
Dept: TRANSPLANT | Facility: CLINIC | Age: 35
End: 2020-07-06

## 2020-07-06 NOTE — TELEPHONE ENCOUNTER
Patient Call:   Recently has a test  To see if she had immunity to the measles.  She does not.  Was wondering if she can receive the measles vaccination??       Call back needed? Yes    Return Call Needed  Same as documented in contacts section  When to return call?: Greater than one day: Route standard priority

## 2020-07-28 ENCOUNTER — MEDICAL CORRESPONDENCE (OUTPATIENT)
Dept: HEALTH INFORMATION MANAGEMENT | Facility: CLINIC | Age: 35
End: 2020-07-28

## 2020-07-31 ENCOUNTER — TELEPHONE (OUTPATIENT)
Dept: TRANSPLANT | Facility: CLINIC | Age: 35
End: 2020-07-31

## 2020-07-31 DIAGNOSIS — Z32.00 PREGNANCY EXAMINATION OR TEST, PREGNANCY UNCONFIRMED: Primary | ICD-10-CM

## 2020-07-31 LAB — B-HCG SERPL-ACNC: 2 IU/L (ref 0–5)

## 2020-08-06 ENCOUNTER — TRANSFERRED RECORDS (OUTPATIENT)
Dept: HEALTH INFORMATION MANAGEMENT | Facility: CLINIC | Age: 35
End: 2020-08-06

## 2020-08-13 ENCOUNTER — VIRTUAL VISIT (OUTPATIENT)
Dept: OBGYN | Facility: CLINIC | Age: 35
End: 2020-08-13
Attending: OBSTETRICS & GYNECOLOGY
Payer: COMMERCIAL

## 2020-08-13 DIAGNOSIS — Z76.89 ENCOUNTER TO ESTABLISH CARE: Primary | ICD-10-CM

## 2020-08-13 DIAGNOSIS — N97.9 FEMALE INFERTILITY: ICD-10-CM

## 2020-08-13 NOTE — PROGRESS NOTES
"[facility]  DOS: 8/13/20    SUBJECTIVE     Yarelis Penny is a 35 year old female who is being evaluated via a billable telephone visit.    Patient opted to conduct today's return visit via telephone secondary to the COVID-19 pandemic vs. an in person visit to the clinic.    I spoke with: Yarelis Penny    The patient has been notified of following:   \"This telephone visit will be conducted via a call between you and your physician/provider. We have found that certain health care needs can be provided without the need for a physical exam.  This service lets us provide the care you need with a short phone conversation.  If a prescription is necessary we can send it directly to your pharmacy.  If lab work is needed we can place an order for that and you can then stop by our lab to have the test done at a later time.  If during the course of the call the physician/provider feels a telephone visit is not appropriate, you will not be charged for this service.\"       Phone call start: 152pm  Phone call end: 202pm  Phone call duration:  10 minutes     CC: [facility] fertility clinic wanted her to establish care with an Ob/Gyn     HPI:    Yarelis Penny is a 35 year old P0 here to establish care.     Had a colectomy in June and waited a few months to try to get pregnant. Started trying to get pregnant in September. Was told to try for 6 months and then would be referred to fertility doctor. Started seeing fertility doctors end of May/June. Going to try IUI.      Obstetrics History:  - Never pregnant      Gynecologic History:  - LMP: Patient's last menstrual period was 7/19-23rd  - Last Pap: NILM, HPV: negative (12/4/18)  - Thinks she may have had an abnormal pap, repeated the pap and it was fine  - Denies prior cervical surgery or procedures  - Denies any history of frequent vaginal infections or STIs  - Menses: at beginning of year seemed to be more than 45 days apart, in June first fertility doctor started medroxyprogesterone " and now feels they are just over 30 days apart, lasting 5 days, with light to moderate flow, no clots, minimal cramps  - Contraception: denies ever taking birth control, just used condoms   - Sexual Activity: one male partner-      Past Medical History:  - Autoimmune hepatitis s/p living donor transplant  - Ulcerative colitis s/p colectomy   - History of CKD  - History of CMV     Past Medical History:   Diagnosis Date     Acute kidney injury (H) 4/28/2016     Anemia, iron deficiency 8/29/2017     Autoimmune hepatitis (H) 2012    on steroid taper     Cholangitis      CKD (chronic kidney disease) 2012    biopsy 2012: TIN, patchy fibrosis     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     Esophageal varices (H) 9/08    banded     Heart murmur      History of blood transfusion      Primary sclerosing cholangitis      SBP (spontaneous bacterial peritonitis) (H) 2/24/2015     Ulcerative Colitis     f/b GI     Past Surgical History:   Procedure Laterality Date     CHOLECYSTECTOMY       COLONOSCOPY  9/07     COLONOSCOPY N/A 2/26/2015    Procedure: COMBINED COLONOSCOPY, SINGLE OR MULTIPLE BIOPSY/POLYPECTOMY BY BIOPSY;  Surgeon: Mitchel Hoskins Chi, MD;  [facility]     COLONOSCOPY N/A 1/12/2016    Procedure: COMBINED COLONOSCOPY, SINGLE OR MULTIPLE BIOPSY/POLYPECTOMY BY BIOPSY;  Surgeon: Rigo Valles MD;  [facility]     COLONOSCOPY N/A 4/1/2016    Procedure: COMBINED COLONOSCOPY, SINGLE OR MULTIPLE BIOPSY/POLYPECTOMY BY BIOPSY;  Surgeon: Kareem Solis MD;  [facility]     COLONOSCOPY N/A 9/5/2017    Procedure: COMBINED COLONOSCOPY, SINGLE OR MULTIPLE BIOPSY/POLYPECTOMY BY BIOPSY;  Colonoscopy;  Surgeon: Fuentes Johnson MD;  [facility]     COLONOSCOPY N/A 2/25/2019    Procedure: COMBINED COLONOSCOPY, SINGLE OR MULTIPLE BIOPSY/POLYPECTOMY BY BIOPSY;  Surgeon: Sophy Mckeon MD;  [facility]     ENDOSCOPIC RETROGRADE CHOLANGIOPANCREATOGRAM N/A 6/25/2015    Procedure: COMBINED ENDOSCOPIC  RETROGRADE CHOLANGIOPANCREATOGRAPHY, PLACE TUBE/STENT;  Surgeon: Landon Quinones MD;  [facility]     ENDOSCOPIC RETROGRADE CHOLANGIOPANCREATOGRAM N/A 6/25/2015    Procedure: COMBINED ENDOSCOPIC RETROGRADE CHOLANGIOPANCREATOGRAPHY, PLACE TUBE/STENT;  Surgeon: Landon Quinones MD;  [facility]     ENDOSCOPIC RETROGRADE CHOLANGIOPANCREATOGRAM N/A 7/2/2015    Procedure: COMBINED ENDOSCOPIC RETROGRADE CHOLANGIOPANCREATOGRAPHY, PLACE TUBE/STENT;  Surgeon: Landon Quinones MD;  [facility]     ENDOSCOPIC RETROGRADE CHOLANGIOPANCREATOGRAM N/A 9/8/2015    Procedure: COMBINED ENDOSCOPIC RETROGRADE CHOLANGIOPANCREATOGRAPHY, REMOVE FOREIGN BODY OR STENT/TUBE;  Surgeon: Landon Quinones MD;  [facility]     ENDOSCOPIC RETROGRADE CHOLANGIOPANCREATOGRAM N/A 12/8/2015    Procedure: ENDOSCOPIC RETROGRADE CHOLANGIOPANCREATOGRAM;  Surgeon: Landon Quinones MD;  [facility]     ENDOSCOPIC RETROGRADE CHOLANGIOPANCREATOGRAM N/A 3/1/2016    Procedure: COMBINED ENDOSCOPIC RETROGRADE CHOLANGIOPANCREATOGRAPHY, REMOVE FOREIGN BODY OR STENT/TUBE;  Surgeon: Landon Quinones MD;  [facility]     ENDOSCOPIC RETROGRADE CHOLANGIOPANCREATOGRAM N/A 3/20/2017    Procedure: COMBINED ENDOSCOPIC RETROGRADE CHOLANGIOPANCREATOGRAPHY, PLACE TUBE/STENT;  Endoscopic Retrograde Cholangiopancreatogram with Ballon Dilation, Stent Placement;  Surgeon: Guru Brittany Macias MD;  [facility]     ENDOSCOPIC RETROGRADE CHOLANGIOPANCREATOGRAM N/A 4/2/2018    Procedure: COMBINED ENDOSCOPIC RETROGRADE CHOLANGIOPANCREATOGRAPHY, PLACE TUBE/STENT;  Endoscopic Retrograde Cholangiopancreatogram with biliary dilation and stent placement;  Surgeon: Guru Brittany Macias MD;  [facility]     ENDOSCOPIC RETROGRADE CHOLANGIOPANCREATOGRAM N/A 5/7/2018    Procedure: ENDOSCOPIC RETROGRADE CHOLANGIOPANCREATOGRAM;  Endoscopic Retrograde Cholangiopancreatogram ballon dilation stent  exchange;  Surgeon: Guru Brittany Macias MD;  [facility]     ENDOSCOPIC RETROGRADE CHOLANGIOPANCREATOGRAPHY, EXCHANGE TUBE/STENT N/A 7/30/2015    Procedure: ENDOSCOPIC RETROGRADE CHOLANGIOPANCREATOGRAPHY, EXCHANGE TUBE/STENT;  Surgeon: Landon Quinones MD;  [facility]     ENDOSCOPIC RETROGRADE CHOLANGIOPANCREATOGRAPHY, EXCHANGE TUBE/STENT N/A 5/15/2017    Procedure: ENDOSCOPIC RETROGRADE CHOLANGIOPANCREATOGRAPHY, EXCHANGE TUBE/STENT;  Endoscopic Retrograde Cholangiopancreatogram with biliary stent exchange and balloon dilation;  Surgeon: Guru Brittany Macias MD;  [facility]     ENDOSCOPIC RETROGRADE CHOLANGIOPANCREATOGRAPHY, EXCHANGE TUBE/STENT N/A 6/25/2018    Procedure: ENDOSCOPIC RETROGRADE CHOLANGIOPANCREATOGRAPHY, EXCHANGE TUBE/STENT;  Endoscopic Retrograde Cholangiopancreatogram with biliary dilation, stone removal and stent exchange;  Surgeon: Guru Brittany Macias MD;  [facility]     ENDOSCOPIC RETROGRADE CHOLANGIOPANCREATOGRAPHY, EXCHANGE TUBE/STENT N/A 7/30/2018    Procedure: ENDOSCOPIC RETROGRADE CHOLANGIOPANCREATOGRAPHY, EXCHANGE TUBE/STENT;  Endoscopic Retrograde Cholangiopancreatogram with Stent Exchange with dilation;  Surgeon: Guru Brittany Macias MD;  [facility]     ENDOSCOPIC RETROGRADE CHOLANGIOPANCREATOGRAPHY, EXCHANGE TUBE/STENT N/A 9/26/2018    Procedure: ENDOSCOPIC RETROGRADE CHOLANGIOPANCREATOGRAPHY, EXCHANGE TUBE/STENT;  Endoscopic Retrograde Cholangiopancreatogram, with bile duct stent exchanged balloon dilation and balloon sweep of bile duct;  Surgeon: Guru Brittany Macias MD;  [facility]     ESOPHAGOSCOPY, GASTROSCOPY, DUODENOSCOPY (EGD), COMBINED N/A 2/26/2015    Procedure: COMBINED ESOPHAGOSCOPY, GASTROSCOPY, DUODENOSCOPY (EGD);  Surgeon: Mitchel Hoskins Chi, MD;  [facility]     EXPLORE COMMON BILE DUCT N/A 6/25/2015    Procedure: EXPLORE COMMON BILE DUCT;  Surgeon:  "Tyree Smith MD;  [facility]     HERNIA REPAIR      During transplant     ILEOSTOMY N/A 6/20/2019    Procedure: Ileostomy;  Surgeon: Krzysztof Carney MD;  [facility]     LAPAROSCOPIC ASSISTED COLECTOMY N/A 6/20/2019    Procedure: Laparoscopic Total Abdominal Colectomy, lysis of adhesions;  Surgeon: Krzysztof Carney MD;  [facility]     LAPAROTOMY EXPLORATORY N/A 6/25/2015    Procedure: LAPAROTOMY EXPLORATORY;  Surgeon: Tyree Smith MD;  [facility]     PICC INSERTION Right 2/27/2015    4fr SL Valved PICC, 36cm (1cm external) in the R medial brachial vein w/ tip in the low SVC.     SIGMOIDOSCOPY FLEXIBLE N/A 4/27/2016    Procedure: SIGMOIDOSCOPY FLEXIBLE;  Surgeon: Jose Nielson MD;  [facility]     TRANSPLANT LIVER RECIPIENT LIVING UNRELATED N/A 6/18/2015    Procedure: TRANSPLANT LIVER RECIPIENT LIVING UNRELATED;  Surgeon: Tyree Smith MD;  [facility]     UPPER GI ENDOSCOPY       Current Outpatient Medications   Medication Instructions     acetaminophen (TYLENOL) 1,000 mg, Oral, 4 TIMES DAILY     calcium carbonate (OS-KERLINE 500 MG [place]. [place]) 1250 MG tablet 1 tablet, Oral, EVERY EVENING     multivitamin, therapeutic with minerals (MULTI-VITAMIN) TABS tablet 1 tablet, Oral, EVERY MORNING     order for DME Increase the amount to 10 per month  --2\" barrier ring #7805     order for DME [employer] item #s   14271 and 65904     predniSONE (DELTASONE) 5 mg, Oral, DAILY     tacrolimus (GENERIC EQUIVALENT) 0.5 MG capsule TAKE 5 CAPSULES (2.5 MG) EVERY 12 HOURS     trimethoprim-polymyxin b (POLYTRIM) 06121-6.1 UNIT/ML-% ophthalmic solution 1-2 drops, Left Eye, EVERY 4 HOURS     ursodiol (ACTIGALL) 250 MG tablet Take 2 tablets (500 mg) by mouth every morning AND 1 tablet (250 mg) every evening.     VITAMIN D, CHOLECALCIFEROL, PO Oral, EVERY EVENING     ROS:  10-point ROS negative except as in HPI      Assessment/Plan  Yarelis Penny is a 35 year old P0 here to establish care. Past medical " history notable for ulcerative colitis s/p colectomy. S/p living donor liver transplant secondary to autoimmune hepatitis. Here to establish care.    # Infertility  -- following with [facility], planning IUI  -- we are happy to take care of her for her prenatal care     Patient staffed with Dr. Arvind Bustos MD  Obstetrics and Gynecology PGY-2  2:04 PM 8/13/2020       I have reviewed the patient's history, course, and findings from today. We have jointly made the assessment and plan. I was present for the entire phone call.   Mey Samuels MD

## 2020-08-13 NOTE — LETTER
"8/13/2020       RE: Yarelis Penny  [address] [facility] [address]     Dear Colleague,    Thank you for referring your patient, Yarelis Penny, to the [facility] at [facility]. Please see a copy of my visit note below.    [facility]  DOS: 8/13/20    SUBJECTIVE     Yarelis Penny is a 35 year old female who is being evaluated via a billable telephone visit.    Patient opted to conduct today's return visit via telephone secondary to the COVID-19 pandemic vs. an in person visit to the clinic.    I spoke with: Yarelis Penny    The patient has been notified of following:   \"This telephone visit will be conducted via a call between you and your physician/provider. We have found that certain health care needs can be provided without the need for a physical exam.  This service lets us provide the care you need with a short phone conversation.  If a prescription is necessary we can send it directly to your pharmacy.  If lab work is needed we can place an order for that and you can then stop by our lab to have the test done at a later time.  If during the course of the call the physician/provider feels a telephone visit is not appropriate, you will not be charged for this service.\"       Phone call start: 152pm  Phone call end: 202pm  Phone call duration:  10 minutes     CC: [facility] fertility clinic wanted her to establish care with an Ob/Gyn     HPI:    Yarelis Penny is a 35 year old P0 here to establish care.     Had a colectomy in June and waited a few months to try to get pregnant. Started trying to get pregnant in September. Was told to try for 6 months and then would be referred to fertility doctor. Started seeing fertility doctors end of May/June. Going to try IUI.      Obstetrics History:  - Never pregnant      Gynecologic History:  - LMP: Patient's last menstrual period was 7/19-23rd  - Last Pap: NILM, HPV: negative (12/4/18)  - Thinks she may have " had an abnormal pap, repeated the pap and it was fine  - Denies prior cervical surgery or procedures  - Denies any history of frequent vaginal infections or STIs  - Menses: at beginning of year seemed to be more than 45 days apart, in [place] first fertility doctor started medroxyprogesterone and now feels they are just over 30 days apart, lasting 5 days, with light to moderate flow, no clots, minimal cramps  - Contraception: denies ever taking birth control, just used condoms   - Sexual Activity: one male partner-      Past Medical History:  - Autoimmune hepatitis s/p living donor transplant  - Ulcerative colitis s/p colectomy   - History of CKD  - History of CMV     Past Medical History:   Diagnosis Date     Acute kidney injury (H) 4/28/2016     Anemia, iron deficiency 8/29/2017     Autoimmune hepatitis (H) 2012    on steroid taper     Cholangitis      CKD (chronic kidney disease) 2012    biopsy 2012: TIN, patchy fibrosis     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     Esophageal varices (H) 9/08    banded     Heart murmur      History of blood transfusion      Primary sclerosing cholangitis      SBP (spontaneous bacterial peritonitis) (H) 2/24/2015     Ulcerative Colitis     f/b GI     Past Surgical History:   Procedure Laterality Date     CHOLECYSTECTOMY       COLONOSCOPY  9/07     COLONOSCOPY N/A 2/26/2015    Procedure: COMBINED COLONOSCOPY, SINGLE OR MULTIPLE BIOPSY/POLYPECTOMY BY BIOPSY;  Surgeon: Mitchel Hoskins Chi, MD;  [facility]     COLONOSCOPY N/A 1/12/2016    Procedure: COMBINED COLONOSCOPY, SINGLE OR MULTIPLE BIOPSY/POLYPECTOMY BY BIOPSY;  Surgeon: Rigo Valles MD;  [facility]     COLONOSCOPY N/A 4/1/2016    Procedure: COMBINED COLONOSCOPY, SINGLE OR MULTIPLE BIOPSY/POLYPECTOMY BY BIOPSY;  Surgeon: Kareem Solis MD;  [facility]     COLONOSCOPY N/A 9/5/2017    Procedure: COMBINED COLONOSCOPY, SINGLE OR MULTIPLE BIOPSY/POLYPECTOMY BY BIOPSY;  Colonoscopy;  Surgeon: Fuentes Johnson  MD Erasto;  [facility]     COLONOSCOPY N/A 2/25/2019    Procedure: COMBINED COLONOSCOPY, SINGLE OR MULTIPLE BIOPSY/POLYPECTOMY BY BIOPSY;  Surgeon: Sophy Mckeon MD;  [facility]     ENDOSCOPIC RETROGRADE CHOLANGIOPANCREATOGRAM N/A 6/25/2015    Procedure: COMBINED ENDOSCOPIC RETROGRADE CHOLANGIOPANCREATOGRAPHY, PLACE TUBE/STENT;  Surgeon: Landon Qiunones MD;  [facility]     ENDOSCOPIC RETROGRADE CHOLANGIOPANCREATOGRAM N/A 6/25/2015    Procedure: COMBINED ENDOSCOPIC RETROGRADE CHOLANGIOPANCREATOGRAPHY, PLACE TUBE/STENT;  Surgeon: Landon Quinones MD;  [facility]     ENDOSCOPIC RETROGRADE CHOLANGIOPANCREATOGRAM N/A 7/2/2015    Procedure: COMBINED ENDOSCOPIC RETROGRADE CHOLANGIOPANCREATOGRAPHY, PLACE TUBE/STENT;  Surgeon: Landon Quinones MD;  [facility]     ENDOSCOPIC RETROGRADE CHOLANGIOPANCREATOGRAM N/A 9/8/2015    Procedure: COMBINED ENDOSCOPIC RETROGRADE CHOLANGIOPANCREATOGRAPHY, REMOVE FOREIGN BODY OR STENT/TUBE;  Surgeon: Landon Quinones MD;  [facility]     ENDOSCOPIC RETROGRADE CHOLANGIOPANCREATOGRAM N/A 12/8/2015    Procedure: ENDOSCOPIC RETROGRADE CHOLANGIOPANCREATOGRAM;  Surgeon: Landon Quinones MD;  [facility]     ENDOSCOPIC RETROGRADE CHOLANGIOPANCREATOGRAM N/A 3/1/2016    Procedure: COMBINED ENDOSCOPIC RETROGRADE CHOLANGIOPANCREATOGRAPHY, REMOVE FOREIGN BODY OR STENT/TUBE;  Surgeon: Landon Quinones MD;  [facility]     ENDOSCOPIC RETROGRADE CHOLANGIOPANCREATOGRAM N/A 3/20/2017    Procedure: COMBINED ENDOSCOPIC RETROGRADE CHOLANGIOPANCREATOGRAPHY, PLACE TUBE/STENT;  Endoscopic Retrograde Cholangiopancreatogram with Ballon Dilation, Stent Placement;  Surgeon: Guru Brittany Macias MD;  [facility]     ENDOSCOPIC RETROGRADE CHOLANGIOPANCREATOGRAM N/A 4/2/2018    Procedure: COMBINED ENDOSCOPIC RETROGRADE CHOLANGIOPANCREATOGRAPHY, PLACE TUBE/STENT;  Endoscopic Retrograde Cholangiopancreatogram with biliary  dilation and stent placement;  Surgeon: Guru Brittany Macias MD;  [facility]     ENDOSCOPIC RETROGRADE CHOLANGIOPANCREATOGRAM N/A 5/7/2018    Procedure: ENDOSCOPIC RETROGRADE CHOLANGIOPANCREATOGRAM;  Endoscopic Retrograde Cholangiopancreatogram ballon dilation stent exchange;  Surgeon: Guru Brittany Macias MD;  [facility]     ENDOSCOPIC RETROGRADE CHOLANGIOPANCREATOGRAPHY, EXCHANGE TUBE/STENT N/A 7/30/2015    Procedure: ENDOSCOPIC RETROGRADE CHOLANGIOPANCREATOGRAPHY, EXCHANGE TUBE/STENT;  Surgeon: Landon Quinones MD;  [facility]     ENDOSCOPIC RETROGRADE CHOLANGIOPANCREATOGRAPHY, EXCHANGE TUBE/STENT N/A 5/15/2017    Procedure: ENDOSCOPIC RETROGRADE CHOLANGIOPANCREATOGRAPHY, EXCHANGE TUBE/STENT;  Endoscopic Retrograde Cholangiopancreatogram with biliary stent exchange and balloon dilation;  Surgeon: Guru Brittany Macias MD;  [facility]     ENDOSCOPIC RETROGRADE CHOLANGIOPANCREATOGRAPHY, EXCHANGE TUBE/STENT N/A 6/25/2018    Procedure: ENDOSCOPIC RETROGRADE CHOLANGIOPANCREATOGRAPHY, EXCHANGE TUBE/STENT;  Endoscopic Retrograde Cholangiopancreatogram with biliary dilation, stone removal and stent exchange;  Surgeon: Guru Brittany Macias MD;  [facility]     ENDOSCOPIC RETROGRADE CHOLANGIOPANCREATOGRAPHY, EXCHANGE TUBE/STENT N/A 7/30/2018    Procedure: ENDOSCOPIC RETROGRADE CHOLANGIOPANCREATOGRAPHY, EXCHANGE TUBE/STENT;  Endoscopic Retrograde Cholangiopancreatogram with Stent Exchange with dilation;  Surgeon: Guru Brittany Macias MD;  [facility]     ENDOSCOPIC RETROGRADE CHOLANGIOPANCREATOGRAPHY, EXCHANGE TUBE/STENT N/A 9/26/2018    Procedure: ENDOSCOPIC RETROGRADE CHOLANGIOPANCREATOGRAPHY, EXCHANGE TUBE/STENT;  Endoscopic Retrograde Cholangiopancreatogram, with bile duct stent exchanged balloon dilation and balloon sweep of bile duct;  Surgeon: Guru Brittany Macias MD;  [facility]      "ESOPHAGOSCOPY, GASTROSCOPY, DUODENOSCOPY (EGD), COMBINED N/A 2/26/2015    Procedure: COMBINED ESOPHAGOSCOPY, GASTROSCOPY, DUODENOSCOPY (EGD);  Surgeon: Mitchel Hoskins Chi, MD;  [facility]     EXPLORE COMMON BILE DUCT N/A 6/25/2015    Procedure: EXPLORE COMMON BILE DUCT;  Surgeon: Tyree Smith MD;  [facility]     HERNIA REPAIR      During transplant     ILEOSTOMY N/A 6/20/2019    Procedure: Ileostomy;  Surgeon: Krzysztof Carney MD;  [facility]     LAPAROSCOPIC ASSISTED COLECTOMY N/A 6/20/2019    Procedure: Laparoscopic Total Abdominal Colectomy, lysis of adhesions;  Surgeon: Krzysztof Carney MD;  [facility]     LAPAROTOMY EXPLORATORY N/A 6/25/2015    Procedure: LAPAROTOMY EXPLORATORY;  Surgeon: Tyree Smith MD;  [facility]     PICC INSERTION Right 2/27/2015    4fr SL Valved PICC, 36cm (1cm external) in the R medial brachial vein w/ tip in the low SVC.     SIGMOIDOSCOPY FLEXIBLE N/A 4/27/2016    Procedure: SIGMOIDOSCOPY FLEXIBLE;  Surgeon: Jose Nielson MD;  [facility]     TRANSPLANT LIVER RECIPIENT LIVING UNRELATED N/A 6/18/2015    Procedure: TRANSPLANT LIVER RECIPIENT LIVING UNRELATED;  Surgeon: Tyree Smith MD;  [facility]     UPPER GI ENDOSCOPY       Current Outpatient Medications   Medication Instructions     acetaminophen (TYLENOL) 1,000 mg, Oral, 4 TIMES DAILY     calcium carbonate (OS-KERLINE 500 MG [place]. [place]) 1250 MG tablet 1 tablet, Oral, EVERY EVENING     multivitamin, therapeutic with minerals (MULTI-VITAMIN) TABS tablet 1 tablet, Oral, EVERY MORNING     order for DME Increase the amount to 10 per month  --2\" barrier ring #3475     order for DME [employer] item #s   75023 and 99474     predniSONE (DELTASONE) 5 mg, Oral, DAILY     tacrolimus (GENERIC EQUIVALENT) 0.5 MG capsule TAKE 5 CAPSULES (2.5 MG) EVERY 12 HOURS     trimethoprim-polymyxin b (POLYTRIM) 16206-1.1 UNIT/ML-% ophthalmic solution 1-2 drops, Left Eye, EVERY 4 HOURS     ursodiol (ACTIGALL) 250 MG tablet " Take 2 tablets (500 mg) by mouth every morning AND 1 tablet (250 mg) every evening.     VITAMIN D, CHOLECALCIFEROL, PO Oral, EVERY EVENING     ROS:  10-point ROS negative except as in HPI      Assessment/Plan  Yarelis Penny is a 35 year old P0 here to establish care. Past medical history notable for ulcerative colitis s/p colectomy. S/p living donor liver transplant secondary to autoimmune hepatitis. Here to establish care.    # Infertility  -- following with [facility], planning IUI  -- we are happy to take care of her for her prenatal care     Patient staffed with Dr. Arvind Bustos MD  Obstetrics and Gynecology PGY-2  2:04 PM 8/13/2020       I have reviewed the patient's history, course, and findings from today. We have jointly made the assessment and plan. I was present for the entire phone call.   Mey Samuels MD

## 2020-08-27 DIAGNOSIS — Z94.4 LIVER REPLACED BY TRANSPLANT (H): ICD-10-CM

## 2020-08-27 DIAGNOSIS — Z13.220 LIPID SCREENING: ICD-10-CM

## 2020-09-14 ENCOUNTER — VIRTUAL VISIT (OUTPATIENT)
Dept: GASTROENTEROLOGY | Facility: CLINIC | Age: 35
End: 2020-09-14
Attending: INTERNAL MEDICINE
Payer: COMMERCIAL

## 2020-09-14 DIAGNOSIS — B27.00 EBV (EPSTEIN-BARR VIRUS) VIREMIA: Primary | ICD-10-CM

## 2020-09-14 DIAGNOSIS — Z94.4 LIVER REPLACED BY TRANSPLANT (H): Primary | ICD-10-CM

## 2020-09-14 RX ORDER — LEVOTHYROXINE SODIUM 25 UG/1
TABLET ORAL
Status: ON HOLD | COMMUNITY
Start: 2020-08-31 | End: 2021-11-07

## 2020-09-14 ASSESSMENT — PAIN SCALES - GENERAL: PAINLEVEL: NO PAIN (0)

## 2020-09-14 NOTE — LETTER
"    9/14/2020         RE: Yarelis Penny  [address] [facility] [address]        Dear Colleague,    Thank you for referring your patient, Yarelis Penny, to the [facility] HEPATOLOGY. Please see a copy of my visit note below.    Yarelis Penny is a 35 year old female who is being evaluated via a billable video visit.      The patient has been notified of following:   \"This video visit will be conducted via a call between you and your physician/provider. We have found that certain health care needs can be provided without the need for an in-person physical exam.  This service lets us provide the care you need with a video conversation.  If a prescription is necessary we can send it directly to your pharmacy.  If lab work is needed we can place an order for that and you can then stop by our lab to have the test done at a later time. Video visits are billed at different rates depending on your insurance coverage.  Please reach out to your insurance provider with any questions.  If during the course of the call the physician/provider feels a video visit is not appropriate, you will not be charged for this service.\"   Patient has given verbal consent for Video visit? Yes      Type of service:  Video Visit  Video Start Time: 9:05  Video End Time: 9:30  Patient location: home  Will anyone else be joining your video visit? No  {If patient encounters technical issues they should call [phone] :[phone]  Distant Location (provider location):  [facility] HEPATOLOGY   Mode of Communication:  Video Conference via [employer]  I have reviewed and updated the patient's Past Medical History, Social History, Family History and Medication List.    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  Graft function is good. Normal LFTs. Labs every 3 mo if not pregnant. If she becomes pregnant, would monitor monthly. Due for labs now. Ordered.  IS Continue tac/pred. " Last tac level 5.5 at goal  Biliary stricture In past  This has resolved. Last ERCP 9/26/18  UC s/p total colectomy with ileostomy    Derm seeing regularly   Reproductive wants to become pregnant.  We have discussed this over the last couple of years. She is connected with [facility], Ob/gynand reproductive medicine. She has started letrozole recently. Drug interaction run for tac and letrozole: no intereaction identified. Asked her to stay in touch with us and call if drug regimen changes and/or she becomes pregnant.  Thyroid Has been levothyroxine prescribed in June by the [facility] ([facility]) and she is uncertain if she needs this. Asked her to discuss with Dr. Randall, reproductive medicine.     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 No EBV or visit with Dr. Lopez for 2 y. Dr. Lopez rec annual follow-up. Asked Yarelis to make appt with Dr. Lopez.  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 Will recheck.     RTC 1 y.    Daniela Enriquez MD  Hepatology/ Liver Transplant  [facility]  =================================================================  SUBJECTIVE  35 y F s/p LDLT () for PSC/AIH 6/18/15. I have not seen her for 2 years.     UC, s/p total colectomy with ileostomy on 6/20/19. Path showed focal low-grade dysplasia. She has been seen here by Juanis Johnson and Mike, last visits almost 2 y ago.    Fertility She is trying to get pregnant. She was advised last year to wait until recovery post-op from colectomy.  She had a phone visit with OBGyn 8/13/20. She is following with reporductive medicine and was recommended to start letrozole recently. She saw another fertility expert and IUI was suggested. She is having a visit with fertility medicine today and may plan IUI. [facility] Dr. 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 No testing or follow up for this for 2 y.     IS: Prograf. Pred 5. Also on conner.  LABS: Up to date. Aphos 169.  Tbili 0.4.    Lab Test 06/15/20  1044   PROTTOTAL 7.8   ALBUMIN 3.7   BILITOTAL 0.3   ALKPHOS 97   AST 24   ALT 43     REJECTION: None  BILIARY ISSUES: BIle duct leak and stricture. Last ERCP 9/26/18  -Two previously well positioned stents were removed from the biliary tree.   - Prior biliary endoscopic sphincterotomy appeared open and selective biliary cannulation was performed, stent related debris was removed   - Previous stricture appears to be completely improved. The site of the previous stricture was gently dilated using 6-8 Elation balloon   - A 7 Fr by 7 cm Loza single pigtailed stent was placed   Recommendation:       - Observe patient in same day observation unit for possible discharge same day.   - Confirm spontaneous stent passage by performing a flat and upright abdominal x-ray in 4 weeks. Stent is most likely expected to pass spontaneously. If present will need an upper endoscopy for stent removal   - Will recommend to recheck LFTs in transplant clinic and contact us if it is elevated    STENT: Original out. ERCP placed stent out on AXR 1-0/24/18  KIDNEY FUNCTION:   Creatinine   Date Value Ref Range Status   06/15/2020 0.97 0.52 - 1.04 mg/dL Final     BP: Good. No meds.     SOC: She is isolating at home.  ROS 10 point ROS neg other than the symptoms noted above in the HPI.  Exam  Gen Alert pleasant NAD  Resp No difficulty breathing. No cough  Skin No Jaundice  Eyes No icterus  Neuro GUEVARA  MSK no muscle wasting  Psyche Pleasant, appropriate. Well groomed.        Again, thank you for allowing me to participate in the care of your patient.        Sincerely,        Daniela Enriquez MD

## 2020-09-14 NOTE — PROGRESS NOTES
"Yarelis Penny is a 35 year old female who is being evaluated via a billable video visit.      The patient has been notified of following:   \"This video visit will be conducted via a call between you and your physician/provider. We have found that certain health care needs can be provided without the need for an in-person physical exam.  This service lets us provide the care you need with a video conversation.  If a prescription is necessary we can send it directly to your pharmacy.  If lab work is needed we can place an order for that and you can then stop by our lab to have the test done at a later time. Video visits are billed at different rates depending on your insurance coverage.  Please reach out to your insurance provider with any questions.  If during the course of the call the physician/provider feels a video visit is not appropriate, you will not be charged for this service.\"   Patient has given verbal consent for Video visit? Yes      Type of service:  Video Visit  Video Start Time: 9:05  Video End Time: 9:30  Patient location: home  Will anyone else be joining your video visit? No  {If patient encounters technical issues they should call [phone] :3  Distant Location (provider location):  [facility] HEPATOLOGY   Mode of Communication:  Video Conference via [employer]  I have reviewed and updated the patient's Past Medical History, Social History, Family History and Medication List.    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  Graft function is good. Normal LFTs. Labs every 3 mo if not pregnant. If she becomes pregnant, would monitor monthly. Due for labs now. Ordered.  IS Continue tac/pred. Last tac level 5.5 at goal  Biliary stricture In past  This has resolved. Last ERCP 9/26/18  UC s/p total colectomy with ileostomy    Derm seeing regularly   Reproductive wants to become pregnant.  We have discussed this over the last " couple of years. She is connected with [facility], Ob/gynand reproductive medicine. She has started letrozole recently. Drug interaction run for tac and letrozole: no intereaction identified. Asked her to stay in touch with us and call if drug regimen changes and/or she becomes pregnant.  Thyroid Has been levothyroxine prescribed in June by the [facility] ([facility]) and she is uncertain if she needs this. Asked her to discuss with Dr. Randall, reproductive medicine.     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 No EBV or visit with Dr. Lopez for 2 y. Dr. Lopez rec annual follow-up. Asked Yarelis to make appt with Dr. Lopez.  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 Will recheck.     RTC 1 y.    Daniela Enriquez MD  Hepatology/ Liver Transplant  [facility]  =================================================================  SUBJECTIVE  35 y F s/p LDLT () for PSC/AIH 6/18/15. I have not seen her for 2 years.     UC, s/p total colectomy with ileostomy on 6/20/19. Path showed focal low-grade dysplasia. She has been seen here by Juanis Johnson and Mike, last visits almost 2 y ago.    Fertility She is trying to get pregnant. She was advised last year to wait until recovery post-op from colectomy.  She had a phone visit with OBGyn 8/13/20. She is following with reporductive medicine and was recommended to start letrozole recently. She saw another fertility expert and IUI was suggested. She is having a visit with fertility medicine today and may plan IUI. [facility] Dr. 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 No testing or follow up for this for 2 y.     IS: Prograf. Pred 5. Also on conner.  LABS: Up to date. Aphos 169. Tbili 0.4.    Lab Test 06/15/20  1044   PROTTOTAL 7.8   ALBUMIN 3.7   BILITOTAL 0.3   ALKPHOS 97   AST 24   ALT 43     REJECTION: None  BILIARY ISSUES: BIle duct leak and stricture. Last ERCP 9/26/18  -Two previously well positioned  stents were removed from the biliary tree.   - Prior biliary endoscopic sphincterotomy appeared open and selective biliary cannulation was performed, stent related debris was removed   - Previous stricture appears to be completely improved. The site of the previous stricture was gently dilated using 6-8 Elation balloon   - A 7 Fr by 7 cm Loza single pigtailed stent was placed   Recommendation:       - Observe patient in same day observation unit for possible discharge same day.   - Confirm spontaneous stent passage by performing a flat and upright abdominal x-ray in 4 weeks. Stent is most likely expected to pass spontaneously. If present will need an upper endoscopy for stent removal   - Will recommend to recheck LFTs in transplant clinic and contact us if it is elevated    STENT: Original out. ERCP placed stent out on AXR 1-0/24/18  KIDNEY FUNCTION:   Creatinine   Date Value Ref Range Status   06/15/2020 0.97 0.52 - 1.04 mg/dL Final     BP: Good. No meds.     SOC: She is isolating at home.  ROS 10 point ROS neg other than the symptoms noted above in the HPI.  Exam  Gen Alert pleasant NAD  Resp No difficulty breathing. No cough  Skin No Jaundice  Eyes No icterus  Neuro GUEVARA  MSK no muscle wasting  Psyche Pleasant, appropriate. Well groomed.

## 2020-09-14 NOTE — PROGRESS NOTES
Left voicemail for patient to call back to set up telemedicine visit, will call again before appointment time.  Ela Thomson CMA on 9/14/2020 at 8:53 AM

## 2020-09-14 NOTE — PROGRESS NOTES
"Yarelis Penny is a 35 year old female who is being evaluated via a billable video visit.      The patient has been notified of following:     \"This video visit will be conducted via a call between you and your physician/provider. We have found that certain health care needs can be provided without the need for an in-person physical exam.  This service lets us provide the care you need with a video conversation.  If a prescription is necessary we can send it directly to your pharmacy.  If lab work is needed we can place an order for that and you can then stop by our lab to have the test done at a later time.    Video visits are billed at different rates depending on your insurance coverage.  Please reach out to your insurance provider with any questions.    If during the course of the call the physician/provider feels a video visit is not appropriate, you will not be charged for this service.\"    Patient has given verbal consent for Video visit? Yes  How would you like to obtain your AVS? MyChart  If you are dropped from the video visit, the video invite should be resent to: Text to cell phone: [phone]  Will anyone else be joining your video visit? No  {If patient encounters technical issues they should call [phone] :[id number]}      Video-Visit Details    Type of service:  Video Visit    Video Start Time: {video visit start/end time:152948}  Video End Time: {video visit start/end time:152948}    Originating Location (pt. Location): {video visit patient location:623841::\"Home\"}    Distant Location (provider location):  [facility] HEPATOLOGY     Platform used for Video Visit: {Virtual Visit Platforms:307694::\"iiko\"}    {signature options:611139}        "

## 2020-09-16 ENCOUNTER — MYC MEDICAL ADVICE (OUTPATIENT)
Dept: DERMATOLOGY | Facility: CLINIC | Age: 35
End: 2020-09-16

## 2020-09-17 ENCOUNTER — TELEPHONE (OUTPATIENT)
Dept: TRANSPLANT | Facility: CLINIC | Age: 35
End: 2020-09-17

## 2020-09-17 NOTE — TELEPHONE ENCOUNTER
Patient Call: Voicemail  Date/Time: 091720 10:02 am  Reason for call: Patient has some questions about another appointment and update on taking a medication, please call her at [phone]

## 2020-09-17 NOTE — TELEPHONE ENCOUNTER
"Pt has an appt on 09/21/20, requesting in person appt. Informed pt mychart message from clinic  \"With these photos, we can see if your appointment needs to be changed into an in person visit instead of starting with a virtual visit\"  Pt still insisting. Call back to discuss.   "

## 2020-09-17 NOTE — TELEPHONE ENCOUNTER
Spoke with Yarelis. She is starting therapy that she talked to DR Enriquez about, Letrozole.     Yarelis prefers to post pone her derm appt as it's virtual and she prefers an in person skin check.     SHe will make a lab appt for checking EBV and CMV and post tx labs.

## 2020-09-21 ENCOUNTER — OFFICE VISIT (OUTPATIENT)
Dept: DERMATOLOGY | Facility: CLINIC | Age: 35
End: 2020-09-21
Payer: COMMERCIAL

## 2020-09-21 DIAGNOSIS — L81.4 SOLAR LENTIGO: ICD-10-CM

## 2020-09-21 DIAGNOSIS — D22.71 MELANOCYTIC NEVUS OF RIGHT LOWER EXTREMITY: Primary | ICD-10-CM

## 2020-09-21 DIAGNOSIS — Z94.4 HISTORY OF LIVER TRANSPLANT (H): ICD-10-CM

## 2020-09-21 ASSESSMENT — PAIN SCALES - GENERAL: PAINLEVEL: NO PAIN (0)

## 2020-09-21 NOTE — PROGRESS NOTES
[facility] Dermatology Note      Dermatology Problem List:  1. Liver transplant on tacrolimus and prednisone  2. Melanocytic nevus on the right heel (photo in chart)  3. Numerous melanocytic nevi on glabrous surfaces    CC:   Chief Complaint   Patient presents with     Derm Problem     Yarelis is here for a transplant skin check, states no concerns.          Encounter Date: Sep 21, 2020    History of Present Illness:  Ms. Yarelis Penny is a 35 year old female who a skin check.    Right heel - brown mole - present for years but perhaps slightly darker recently   - a few other moles on the hands and feet - brown, no changes noticed   - no symptoms with any of these lesions    Has history of sun exposure in past - now wears sunscreen consistently since transplant   - practices sun avoidance    Past Medical History:   Past Medical History:   Diagnosis Date     Acute kidney injury (H) 4/28/2016     Anemia, iron deficiency 8/29/2017     Autoimmune hepatitis (H) 2012    on steroid taper     Cholangitis      CKD (chronic kidney disease) 2012    biopsy 2012: TIN, patchy fibrosis     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     Esophageal varices (H) 9/08    banded     Heart murmur      History of blood transfusion      Primary sclerosing cholangitis      SBP (spontaneous bacterial peritonitis) (H) 2/24/2015     Ulcerative Colitis     f/b GI     Past Surgical History:   Procedure Laterality Date     CHOLECYSTECTOMY       COLONOSCOPY  9/07     COLONOSCOPY N/A 2/26/2015    Procedure: COMBINED COLONOSCOPY, SINGLE OR MULTIPLE BIOPSY/POLYPECTOMY BY BIOPSY;  Surgeon: Mitchel Hoskins Chi, MD;  Location:  GI     COLONOSCOPY N/A 1/12/2016    Procedure: COMBINED COLONOSCOPY, SINGLE OR MULTIPLE BIOPSY/POLYPECTOMY BY BIOPSY;  Surgeon: Rigo Valles MD;  Location:  GI     COLONOSCOPY N/A 4/1/2016    Procedure: COMBINED COLONOSCOPY, SINGLE OR MULTIPLE BIOPSY/POLYPECTOMY BY BIOPSY;  Surgeon: Kareem Solis MD;  Location:   GI     COLONOSCOPY N/A 9/5/2017    Procedure: COMBINED COLONOSCOPY, SINGLE OR MULTIPLE BIOPSY/POLYPECTOMY BY BIOPSY;  Colonoscopy;  Surgeon: Fuentes Johnson MD;  [facility]     COLONOSCOPY N/A 2/25/2019    Procedure: COMBINED COLONOSCOPY, SINGLE OR MULTIPLE BIOPSY/POLYPECTOMY BY BIOPSY;  Surgeon: Sophy Mckeon MD;  [facility]     ENDOSCOPIC RETROGRADE CHOLANGIOPANCREATOGRAM N/A 6/25/2015    Procedure: COMBINED ENDOSCOPIC RETROGRADE CHOLANGIOPANCREATOGRAPHY, PLACE TUBE/STENT;  Surgeon: Landon Quinones MD;  [facility]     ENDOSCOPIC RETROGRADE CHOLANGIOPANCREATOGRAM N/A 6/25/2015    Procedure: COMBINED ENDOSCOPIC RETROGRADE CHOLANGIOPANCREATOGRAPHY, PLACE TUBE/STENT;  Surgeon: Landon Quinones MD;  [facility]     ENDOSCOPIC RETROGRADE CHOLANGIOPANCREATOGRAM N/A 7/2/2015    Procedure: COMBINED ENDOSCOPIC RETROGRADE CHOLANGIOPANCREATOGRAPHY, PLACE TUBE/STENT;  Surgeon: Landon Quinones MD;  [facility]     ENDOSCOPIC RETROGRADE CHOLANGIOPANCREATOGRAM N/A 9/8/2015    Procedure: COMBINED ENDOSCOPIC RETROGRADE CHOLANGIOPANCREATOGRAPHY, REMOVE FOREIGN BODY OR STENT/TUBE;  Surgeon: Landon Quinones MD;  [facility]     ENDOSCOPIC RETROGRADE CHOLANGIOPANCREATOGRAM N/A 12/8/2015    Procedure: ENDOSCOPIC RETROGRADE CHOLANGIOPANCREATOGRAM;  Surgeon: Landon Quinones MD;  [facility]     ENDOSCOPIC RETROGRADE CHOLANGIOPANCREATOGRAM N/A 3/1/2016    Procedure: COMBINED ENDOSCOPIC RETROGRADE CHOLANGIOPANCREATOGRAPHY, REMOVE FOREIGN BODY OR STENT/TUBE;  Surgeon: Landon Quinones MD;  [facility]     ENDOSCOPIC RETROGRADE CHOLANGIOPANCREATOGRAM N/A 3/20/2017    Procedure: COMBINED ENDOSCOPIC RETROGRADE CHOLANGIOPANCREATOGRAPHY, PLACE TUBE/STENT;  Endoscopic Retrograde Cholangiopancreatogram with Ballon Dilation, Stent Placement;  Surgeon: Guru Brittany Macias MD;  [facility]     ENDOSCOPIC RETROGRADE CHOLANGIOPANCREATOGRAM N/A  4/2/2018    Procedure: COMBINED ENDOSCOPIC RETROGRADE CHOLANGIOPANCREATOGRAPHY, PLACE TUBE/STENT;  Endoscopic Retrograde Cholangiopancreatogram with biliary dilation and stent placement;  Surgeon: Guru Brittany Macias MD;  [facility]     ENDOSCOPIC RETROGRADE CHOLANGIOPANCREATOGRAM N/A 5/7/2018    Procedure: ENDOSCOPIC RETROGRADE CHOLANGIOPANCREATOGRAM;  Endoscopic Retrograde Cholangiopancreatogram ballon dilation stent exchange;  Surgeon: Guru Brittany Macias MD;  [facility]     ENDOSCOPIC RETROGRADE CHOLANGIOPANCREATOGRAPHY, EXCHANGE TUBE/STENT N/A 7/30/2015    Procedure: ENDOSCOPIC RETROGRADE CHOLANGIOPANCREATOGRAPHY, EXCHANGE TUBE/STENT;  Surgeon: Landon Quinones MD;  [facility]     ENDOSCOPIC RETROGRADE CHOLANGIOPANCREATOGRAPHY, EXCHANGE TUBE/STENT N/A 5/15/2017    Procedure: ENDOSCOPIC RETROGRADE CHOLANGIOPANCREATOGRAPHY, EXCHANGE TUBE/STENT;  Endoscopic Retrograde Cholangiopancreatogram with biliary stent exchange and balloon dilation;  Surgeon: Guru Brittany Macias MD;  [facility]     ENDOSCOPIC RETROGRADE CHOLANGIOPANCREATOGRAPHY, EXCHANGE TUBE/STENT N/A 6/25/2018    Procedure: ENDOSCOPIC RETROGRADE CHOLANGIOPANCREATOGRAPHY, EXCHANGE TUBE/STENT;  Endoscopic Retrograde Cholangiopancreatogram with biliary dilation, stone removal and stent exchange;  Surgeon: Guru Brittany Macias MD;  [facility]     ENDOSCOPIC RETROGRADE CHOLANGIOPANCREATOGRAPHY, EXCHANGE TUBE/STENT N/A 7/30/2018    Procedure: ENDOSCOPIC RETROGRADE CHOLANGIOPANCREATOGRAPHY, EXCHANGE TUBE/STENT;  Endoscopic Retrograde Cholangiopancreatogram with Stent Exchange with dilation;  Surgeon: Guru Brittany Macias MD;  [facility]     ENDOSCOPIC RETROGRADE CHOLANGIOPANCREATOGRAPHY, EXCHANGE TUBE/STENT N/A 9/26/2018    Procedure: ENDOSCOPIC RETROGRADE CHOLANGIOPANCREATOGRAPHY, EXCHANGE TUBE/STENT;  Endoscopic Retrograde  Cholangiopancreatogram, with bile duct stent exchanged balloon dilation and balloon sweep of bile duct;  Surgeon: Guru Brittany Macias MD;  [facility]     ESOPHAGOSCOPY, GASTROSCOPY, DUODENOSCOPY (EGD), COMBINED N/A 2015    Procedure: COMBINED ESOPHAGOSCOPY, GASTROSCOPY, DUODENOSCOPY (EGD);  Surgeon: Mitchel Hoskins Chi, MD;  [facility]     EXPLORE COMMON BILE DUCT N/A 2015    Procedure: EXPLORE COMMON BILE DUCT;  Surgeon: Tyree Smith MD;  [facility]     HERNIA REPAIR      During transplant     ILEOSTOMY N/A 2019    Procedure: Ileostomy;  Surgeon: Krzysztof Carney MD;  [facility]     LAPAROSCOPIC ASSISTED COLECTOMY N/A 2019    Procedure: Laparoscopic Total Abdominal Colectomy, lysis of adhesions;  Surgeon: Krzysztof Carney MD;  [facility]     LAPAROTOMY EXPLORATORY N/A 2015    Procedure: LAPAROTOMY EXPLORATORY;  Surgeon: Tyree Smith MD;  [facility]     PICC INSERTION Right 2015    4fr SL Valved PICC, 36cm (1cm external) in the R medial brachial vein w/ tip in the low SVC.     SIGMOIDOSCOPY FLEXIBLE N/A 2016    Procedure: SIGMOIDOSCOPY FLEXIBLE;  Surgeon: Jose Nielson MD;  [facility]     TRANSPLANT LIVER RECIPIENT LIVING UNRELATED N/A 2015    Procedure: TRANSPLANT LIVER RECIPIENT LIVING UNRELATED;  Surgeon: Tyree Smith MD;  [facility]     UPPER GI ENDOSCOPY         Social History:   reports that she has never smoked. She has never used smokeless tobacco. She reports current alcohol use. She reports that she does not use drugs.    Family History:  Family History   Problem Relation Age of Onset     Hypertension Mother      Lipids Mother      Sleep Apnea Mother      Hypertension Maternal Grandmother              Alzheimer Disease Maternal Grandmother      Lipids Father      Melanoma No family hx of      Skin Cancer No family hx of      Cancer No family hx of      Diabetes No family hx of   "      Medications:  Current Outpatient Medications   Medication Sig Dispense Refill     calcium carbonate (OS-KERLINE 500 MG [place]. [place]) 1250 MG tablet Take 1 tablet by mouth every evening       levothyroxine (SYNTHROID/LEVOTHROID) 25 MCG tablet        multivitamin, therapeutic with minerals (MULTI-VITAMIN) TABS tablet Take 1 tablet by mouth every morning       order for DME [employer] item #s   29672 and 11382 30 Bag 3     order for DME Increase the amount to 10 per month  --2\" barrier ring #7805 10 each 0     predniSONE (DELTASONE) 5 MG tablet Take 1 tablet (5 mg) by mouth daily 90 tablet 3     tacrolimus (GENERIC EQUIVALENT) 0.5 MG capsule TAKE 5 CAPSULES (2.5 MG) EVERY 12 HOURS 900 capsule 12     ursodiol (ACTIGALL) 250 MG tablet Take 2 tablets (500 mg) by mouth every morning AND 1 tablet (250 mg) every evening. 270 tablet 3     VITAMIN D, CHOLECALCIFEROL, PO Take by mouth every evening       acetaminophen (TYLENOL) 500 MG tablet Take 2 tablets (1,000 mg) by mouth 4 times daily (Patient not taking: Reported on 8/24/2019) 50 tablet 0     trimethoprim-polymyxin b (POLYTRIM) 64625-7.1 UNIT/ML-% ophthalmic solution Place 1-2 drops Into the left eye every 4 hours (Patient not taking: Reported on 9/14/2020) 10 mL 0     Allergies   Allergen Reactions     Rifampin Other (See Comments)     Kidney failure     Penicillins      Bad hives when she was in college         Review of Systems:  - As per HPI  - Constitutional: The patient denies fatigue, fevers, chills, unintended weight loss, and night sweats.  - Skin: As above in HPI. No additional skin concerns.    Physical exam:  Vitals: There were no vitals taken for this visit.  GEN: This is a well developed, well-nourished female in no acute distress, in a pleasant mood.    SKIN: Dotson phototype II  - Total skin excluding the undergarment areas was performed. The exam included the head/face, neck, both arms, chest, back, abdomen, both legs, digits and/or nails.   - Multiple " sharply demarcated brown macules on the palms and soles  - brown macule on the right medial heel  - tan macules on the face with admixed telangiectasias  - No other lesions of concern on areas examined.     Impression/Plan:  1. Multiple melanocytic nevi: including banal-appearing melanocytic nevus on the right heel. Also multiple nevi on glabrous surfaces which are banal in appearance. Discussed continued follow up on these lesions. Discussed sun protective behaviors including OTC spf 30+ sunscreen use and sun avoidance strategies.    2. History of liver transplant. Discussed sun protective behaviors including OTC spf 30+ sunscreen use and sun avoidance strategies.    Follow-up in 6 months, earlier for new or changing lesions.       Staff Involved:  Staff Only    Colt Ng MD   of Dermatology  Department of Dermatology  [facility]

## 2020-09-21 NOTE — LETTER
9/21/2020       RE: Yarelis Penny  [address] [facility] [id number]     Dear Colleague,    Thank you for referring your patient, Yarelis Penny, to the [facility] DERMATOLOGY at [facility]. Please see a copy of my visit note below.    [facility] Dermatology Note      Dermatology Problem List:  1. Liver transplant on tacrolimus and prednisone  2. Melanocytic nevus on the right heel (photo in chart)  3. Numerous melanocytic nevi on glabrous surfaces    CC:   Chief Complaint   Patient presents with     Derm Problem     Yarelis is here for a transplant skin check, states no concerns.          Encounter Date: Sep 21, 2020    History of Present Illness:  Ms. Yarelis Penny is a 35 year old female who a skin check.    Right heel - brown mole - present for years but perhaps slightly darker recently   - a few other moles on the hands and feet - brown, no changes noticed   - no symptoms with any of these lesions    Has history of sun exposure in past - now wears sunscreen consistently since transplant   - practices sun avoidance    Past Medical History:   Past Medical History:   Diagnosis Date     Acute kidney injury (H) 4/28/2016     Anemia, iron deficiency 8/29/2017     Autoimmune hepatitis (H) 2012    on steroid taper     Cholangitis      CKD (chronic kidney disease) 2012    biopsy 2012: TIN, patchy fibrosis     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     Esophageal varices (H) 9/08    banded     Heart murmur      History of blood transfusion      Primary sclerosing cholangitis      SBP (spontaneous bacterial peritonitis) (H) 2/24/2015     Ulcerative Colitis     f/b GI     Past Surgical History:   Procedure Laterality Date     CHOLECYSTECTOMY       COLONOSCOPY  9/07     COLONOSCOPY N/A 2/26/2015    Procedure: COMBINED COLONOSCOPY, SINGLE OR MULTIPLE BIOPSY/POLYPECTOMY BY BIOPSY;  Surgeon: Mitchel Hoskins Chi, MD;  Location:  GI     COLONOSCOPY N/A 1/12/2016    Procedure: COMBINED  COLONOSCOPY, SINGLE OR MULTIPLE BIOPSY/POLYPECTOMY BY BIOPSY;  Surgeon: Rigo Valles MD;  [facility]     COLONOSCOPY N/A 4/1/2016    Procedure: COMBINED COLONOSCOPY, SINGLE OR MULTIPLE BIOPSY/POLYPECTOMY BY BIOPSY;  Surgeon: Kareem Solis MD;  [facility]     COLONOSCOPY N/A 9/5/2017    Procedure: COMBINED COLONOSCOPY, SINGLE OR MULTIPLE BIOPSY/POLYPECTOMY BY BIOPSY;  Colonoscopy;  Surgeon: Fuentes Johnson MD;  [facility]     COLONOSCOPY N/A 2/25/2019    Procedure: COMBINED COLONOSCOPY, SINGLE OR MULTIPLE BIOPSY/POLYPECTOMY BY BIOPSY;  Surgeon: Sophy Mckeon MD;  [facility]     ENDOSCOPIC RETROGRADE CHOLANGIOPANCREATOGRAM N/A 6/25/2015    Procedure: COMBINED ENDOSCOPIC RETROGRADE CHOLANGIOPANCREATOGRAPHY, PLACE TUBE/STENT;  Surgeon: Landon Quinones MD;  [facility]     ENDOSCOPIC RETROGRADE CHOLANGIOPANCREATOGRAM N/A 6/25/2015    Procedure: COMBINED ENDOSCOPIC RETROGRADE CHOLANGIOPANCREATOGRAPHY, PLACE TUBE/STENT;  Surgeon: Landon Quinones MD;  [facility]     ENDOSCOPIC RETROGRADE CHOLANGIOPANCREATOGRAM N/A 7/2/2015    Procedure: COMBINED ENDOSCOPIC RETROGRADE CHOLANGIOPANCREATOGRAPHY, PLACE TUBE/STENT;  Surgeon: Landon Quinones MD;  [facility]     ENDOSCOPIC RETROGRADE CHOLANGIOPANCREATOGRAM N/A 9/8/2015    Procedure: COMBINED ENDOSCOPIC RETROGRADE CHOLANGIOPANCREATOGRAPHY, REMOVE FOREIGN BODY OR STENT/TUBE;  Surgeon: Landon Quinones MD;  [facility]     ENDOSCOPIC RETROGRADE CHOLANGIOPANCREATOGRAM N/A 12/8/2015    Procedure: ENDOSCOPIC RETROGRADE CHOLANGIOPANCREATOGRAM;  Surgeon: Landon Quinones MD;  [facility]     ENDOSCOPIC RETROGRADE CHOLANGIOPANCREATOGRAM N/A 3/1/2016    Procedure: COMBINED ENDOSCOPIC RETROGRADE CHOLANGIOPANCREATOGRAPHY, REMOVE FOREIGN BODY OR STENT/TUBE;  Surgeon: Landon Quinones MD;  [facility]     ENDOSCOPIC RETROGRADE CHOLANGIOPANCREATOGRAM N/A 3/20/2017    Procedure:  COMBINED ENDOSCOPIC RETROGRADE CHOLANGIOPANCREATOGRAPHY, PLACE TUBE/STENT;  Endoscopic Retrograde Cholangiopancreatogram with Ballon Dilation, Stent Placement;  Surgeon: Guru Brittany Macias MD;  [facility]     ENDOSCOPIC RETROGRADE CHOLANGIOPANCREATOGRAM N/A 4/2/2018    Procedure: COMBINED ENDOSCOPIC RETROGRADE CHOLANGIOPANCREATOGRAPHY, PLACE TUBE/STENT;  Endoscopic Retrograde Cholangiopancreatogram with biliary dilation and stent placement;  Surgeon: Guru Brittany Macias MD;  [facility]     ENDOSCOPIC RETROGRADE CHOLANGIOPANCREATOGRAM N/A 5/7/2018    Procedure: ENDOSCOPIC RETROGRADE CHOLANGIOPANCREATOGRAM;  Endoscopic Retrograde Cholangiopancreatogram ballon dilation stent exchange;  Surgeon: Guru Brittany Macias MD;  [facility]     ENDOSCOPIC RETROGRADE CHOLANGIOPANCREATOGRAPHY, EXCHANGE TUBE/STENT N/A 7/30/2015    Procedure: ENDOSCOPIC RETROGRADE CHOLANGIOPANCREATOGRAPHY, EXCHANGE TUBE/STENT;  Surgeon: Landon Quinones MD;  [facility]     ENDOSCOPIC RETROGRADE CHOLANGIOPANCREATOGRAPHY, EXCHANGE TUBE/STENT N/A 5/15/2017    Procedure: ENDOSCOPIC RETROGRADE CHOLANGIOPANCREATOGRAPHY, EXCHANGE TUBE/STENT;  Endoscopic Retrograde Cholangiopancreatogram with biliary stent exchange and balloon dilation;  Surgeon: Guru Brittany Macias MD;  [facility]     ENDOSCOPIC RETROGRADE CHOLANGIOPANCREATOGRAPHY, EXCHANGE TUBE/STENT N/A 6/25/2018    Procedure: ENDOSCOPIC RETROGRADE CHOLANGIOPANCREATOGRAPHY, EXCHANGE TUBE/STENT;  Endoscopic Retrograde Cholangiopancreatogram with biliary dilation, stone removal and stent exchange;  Surgeon: Guru Brittany Macias MD;  [facility]     ENDOSCOPIC RETROGRADE CHOLANGIOPANCREATOGRAPHY, EXCHANGE TUBE/STENT N/A 7/30/2018    Procedure: ENDOSCOPIC RETROGRADE CHOLANGIOPANCREATOGRAPHY, EXCHANGE TUBE/STENT;  Endoscopic Retrograde Cholangiopancreatogram with Stent Exchange with dilation;   Surgeon: Guru Brittany Macias MD;  [facility]     ENDOSCOPIC RETROGRADE CHOLANGIOPANCREATOGRAPHY, EXCHANGE TUBE/STENT N/A 9/26/2018    Procedure: ENDOSCOPIC RETROGRADE CHOLANGIOPANCREATOGRAPHY, EXCHANGE TUBE/STENT;  Endoscopic Retrograde Cholangiopancreatogram, with bile duct stent exchanged balloon dilation and balloon sweep of bile duct;  Surgeon: Guru Brittany Macias MD;  [facility]     ESOPHAGOSCOPY, GASTROSCOPY, DUODENOSCOPY (EGD), COMBINED N/A 2/26/2015    Procedure: COMBINED ESOPHAGOSCOPY, GASTROSCOPY, DUODENOSCOPY (EGD);  Surgeon: Mitchle Hoskins Chi, MD;  [facility]     EXPLORE COMMON BILE DUCT N/A 6/25/2015    Procedure: EXPLORE COMMON BILE DUCT;  Surgeon: Tyree Smith MD;  [facility]     HERNIA REPAIR      During transplant     ILEOSTOMY N/A 6/20/2019    Procedure: Ileostomy;  Surgeon: Krzysztof Carney MD;  [facility]     LAPAROSCOPIC ASSISTED COLECTOMY N/A 6/20/2019    Procedure: Laparoscopic Total Abdominal Colectomy, lysis of adhesions;  Surgeon: Krzysztof Carney MD;  [facility]     LAPAROTOMY EXPLORATORY N/A 6/25/2015    Procedure: LAPAROTOMY EXPLORATORY;  Surgeon: Tyree Smith MD;  [facility]     PICC INSERTION Right 2/27/2015    4fr SL Valved PICC, 36cm (1cm external) in the R medial brachial vein w/ tip in the low SVC.     SIGMOIDOSCOPY FLEXIBLE N/A 4/27/2016    Procedure: SIGMOIDOSCOPY FLEXIBLE;  Surgeon: Jose Nielson MD;  [facility]     TRANSPLANT LIVER RECIPIENT LIVING UNRELATED N/A 6/18/2015    Procedure: TRANSPLANT LIVER RECIPIENT LIVING UNRELATED;  Surgeon: Tyree Smith MD;  [facility]     UPPER GI ENDOSCOPY         Social History:   reports that she has never smoked. She has never used smokeless tobacco. She reports current alcohol use. She reports that she does not use drugs.    Family History:  Family History   Problem Relation Age of Onset     Hypertension Mother      Lipids Mother      Sleep Apnea  "Mother      Hypertension Maternal Grandmother              Alzheimer Disease Maternal Grandmother      Lipids Father      Melanoma No family hx of      Skin Cancer No family hx of      Cancer No family hx of      Diabetes No family hx of        Medications:  Current Outpatient Medications   Medication Sig Dispense Refill     calcium carbonate (OS-KERLINE 500 MG [place]. [place]) 1250 MG tablet Take 1 tablet by mouth every evening       levothyroxine (SYNTHROID/LEVOTHROID) 25 MCG tablet        multivitamin, therapeutic with minerals (MULTI-VITAMIN) TABS tablet Take 1 tablet by mouth every morning       order for DME [employer] item #s   89058 and 24529 30 Bag 3     order for DME Increase the amount to 10 per month  --2\" barrier ring #7806 10 each 0     predniSONE (DELTASONE) 5 MG tablet Take 1 tablet (5 mg) by mouth daily 90 tablet 3     tacrolimus (GENERIC EQUIVALENT) 0.5 MG capsule TAKE 5 CAPSULES (2.5 MG) EVERY 12 HOURS 900 capsule 12     ursodiol (ACTIGALL) 250 MG tablet Take 2 tablets (500 mg) by mouth every morning AND 1 tablet (250 mg) every evening. 270 tablet 3     VITAMIN D, CHOLECALCIFEROL, PO Take by mouth every evening       acetaminophen (TYLENOL) 500 MG tablet Take 2 tablets (1,000 mg) by mouth 4 times daily (Patient not taking: Reported on 2019) 50 tablet 0     trimethoprim-polymyxin b (POLYTRIM) 26583-3.1 UNIT/ML-% ophthalmic solution Place 1-2 drops Into the left eye every 4 hours (Patient not taking: Reported on 2020) 10 mL 0     Allergies   Allergen Reactions     Rifampin Other (See Comments)     Kidney failure     Penicillins      Bad hives when she was in college         Review of Systems:  - As per HPI  - Constitutional: The patient denies fatigue, fevers, chills, unintended weight loss, and night sweats.  - Skin: As above in HPI. No additional skin concerns.    Physical exam:  Vitals: There were no vitals taken for this visit.  GEN: This is a well developed, well-nourished female in no " acute distress, in a pleasant mood.    SKIN: Dotson phototype II  - Total skin excluding the undergarment areas was performed. The exam included the head/face, neck, both arms, chest, back, abdomen, both legs, digits and/or nails.   - Multiple sharply demarcated brown macules on the palms and soles  - brown macule on the right medial heel  - tan macules on the face with admixed telangiectasias  - No other lesions of concern on areas examined.     Impression/Plan:  1. Multiple melanocytic nevi: including banal-appearing melanocytic nevus on the right heel. Also multiple nevi on glabrous surfaces which are banal in appearance. Discussed continued follow up on these lesions. Discussed sun protective behaviors including OTC spf 30+ sunscreen use and sun avoidance strategies.    2. History of liver transplant. Discussed sun protective behaviors including OTC spf 30+ sunscreen use and sun avoidance strategies.    Follow-up in 6 months, earlier for new or changing lesions.       Staff Involved:  Staff Only    Colt Ng MD   of Dermatology  Department of Dermatology  [facility]

## 2020-09-21 NOTE — NURSING NOTE
Dermatology Rooming Note    Yarelis Penny's goals for this visit include:   Chief Complaint   Patient presents with     Derm Problem     Yarelis is here for a transplant skin check, states no concerns.        Steph Ariza LPN

## 2020-09-22 DIAGNOSIS — Z94.4 LIVER REPLACED BY TRANSPLANT (H): ICD-10-CM

## 2020-09-22 DIAGNOSIS — Z94.4 S/P LIVER TRANSPLANT (H): ICD-10-CM

## 2020-09-22 RX ORDER — URSODIOL 250 MG/1
TABLET, FILM COATED ORAL
Qty: 270 TABLET | Refills: 3 | Status: SHIPPED | OUTPATIENT
Start: 2020-09-22 | End: 2021-09-28

## 2020-09-24 DIAGNOSIS — Z94.4 LIVER REPLACED BY TRANSPLANT (H): ICD-10-CM

## 2020-09-24 DIAGNOSIS — Z13.220 LIPID SCREENING: ICD-10-CM

## 2020-09-24 LAB
ALBUMIN SERPL-MCNC: 3.6 G/DL (ref 3.4–5)
ALP SERPL-CCNC: 94 U/L (ref 40–150)
ALT SERPL W P-5'-P-CCNC: 42 U/L (ref 0–50)
ANION GAP SERPL CALCULATED.3IONS-SCNC: 7 MMOL/L (ref 3–14)
AST SERPL W P-5'-P-CCNC: 26 U/L (ref 0–45)
BILIRUB DIRECT SERPL-MCNC: 0.1 MG/DL (ref 0–0.2)
BILIRUB SERPL-MCNC: 0.5 MG/DL (ref 0.2–1.3)
BUN SERPL-MCNC: 25 MG/DL (ref 7–30)
CALCIUM SERPL-MCNC: 9.4 MG/DL (ref 8.5–10.1)
CHLORIDE SERPL-SCNC: 107 MMOL/L (ref 94–109)
CO2 SERPL-SCNC: 22 MMOL/L (ref 20–32)
CREAT SERPL-MCNC: 1.11 MG/DL (ref 0.52–1.04)
ERYTHROCYTE [DISTWIDTH] IN BLOOD BY AUTOMATED COUNT: 14.6 % (ref 10–15)
GFR SERPL CREATININE-BSD FRML MDRD: 64 ML/MIN/{1.73_M2}
GLUCOSE SERPL-MCNC: 92 MG/DL (ref 70–99)
HCT VFR BLD AUTO: 45.1 % (ref 35–47)
HGB BLD-MCNC: 14.3 G/DL (ref 11.7–15.7)
MCH RBC QN AUTO: 28.7 PG (ref 26.5–33)
MCHC RBC AUTO-ENTMCNC: 31.7 G/DL (ref 31.5–36.5)
MCV RBC AUTO: 91 FL (ref 78–100)
PLATELET # BLD AUTO: 388 10E9/L (ref 150–450)
POTASSIUM SERPL-SCNC: 3.8 MMOL/L (ref 3.4–5.3)
PROT SERPL-MCNC: 7.9 G/DL (ref 6.8–8.8)
RBC # BLD AUTO: 4.98 10E12/L (ref 3.8–5.2)
SODIUM SERPL-SCNC: 136 MMOL/L (ref 133–144)
TACROLIMUS BLD-MCNC: 5.5 UG/L (ref 5–15)
TME LAST DOSE: NORMAL H
WBC # BLD AUTO: 11.6 10E9/L (ref 4–11)

## 2020-09-25 LAB
CMV DNA SPEC NAA+PROBE-ACNC: NORMAL [IU]/ML
CMV DNA SPEC NAA+PROBE-LOG#: NORMAL {LOG_IU}/ML
EBV DNA # SPEC NAA+PROBE: ABNORMAL {COPIES}/ML
EBV DNA SPEC NAA+PROBE-LOG#: 4.8 {LOG_COPIES}/ML
SPECIMEN SOURCE: NORMAL

## 2020-10-26 ENCOUNTER — TELEPHONE (OUTPATIENT)
Dept: TRANSPLANT | Facility: CLINIC | Age: 35
End: 2020-10-26

## 2020-10-26 ENCOUNTER — MYC MEDICAL ADVICE (OUTPATIENT)
Dept: GASTROENTEROLOGY | Facility: CLINIC | Age: 35
End: 2020-10-26

## 2020-10-26 NOTE — LETTER
Date: 2020    Re: Yarelis Zohaib  : 1985    To:    Whom It May Concern      It is ok for Yarelis to undergo MAC anesthesia for IVF.    Sincerely,    .

## 2020-10-26 NOTE — TELEPHONE ENCOUNTER
Spoke to pt who states that the fertility clinic would like perform IVF (invetro) procedure under MAC anesthesia. The clinic will be performing the pre-op but would like to know if pt can have MAC anesthesia safely with liver transplant. Please advise.

## 2020-10-26 NOTE — TELEPHONE ENCOUNTER
Patient Call: General  Route to LPN    Reason for call: connect with pt regarding going threw IVF, needs a letter of clearance for anesthesia.    Call back needed? Yes    Return Call Needed  Same as documented in contacts section  When to return call?: Greater than one day: Route standard priority

## 2020-10-27 NOTE — TELEPHONE ENCOUNTER
Left a message for pt stating it is ok for MAC anesthesia and to call with confirmation or questions.

## 2020-11-09 ENCOUNTER — TRANSFERRED RECORDS (OUTPATIENT)
Dept: HEALTH INFORMATION MANAGEMENT | Facility: CLINIC | Age: 35
End: 2020-11-09

## 2020-11-22 ENCOUNTER — HEALTH MAINTENANCE LETTER (OUTPATIENT)
Age: 35
End: 2020-11-22

## 2020-11-24 DIAGNOSIS — Z94.4 LIVER REPLACED BY TRANSPLANT (H): ICD-10-CM

## 2020-11-24 DIAGNOSIS — K51.90 UC (ULCERATIVE COLITIS) (H): ICD-10-CM

## 2020-11-24 RX ORDER — PREDNISONE 5 MG/1
5 TABLET ORAL DAILY
Qty: 90 TABLET | Refills: 3 | Status: SHIPPED | OUTPATIENT
Start: 2020-11-24 | End: 2021-12-01

## 2020-11-24 NOTE — TELEPHONE ENCOUNTER
Provider Call: Medication Refill  Route to LPN  Pharmacy Name: [facility]  Pharmacy Location: mail order  Name of Medication: Prednisone Dose: 5 mg  90 day with refills   When will the patient be out of this medication?: Less than 3 days (Route high priority)  Callback needed? No

## 2021-01-13 DIAGNOSIS — Z13.220 LIPID SCREENING: ICD-10-CM

## 2021-01-13 DIAGNOSIS — Z94.4 LIVER REPLACED BY TRANSPLANT (H): ICD-10-CM

## 2021-01-13 LAB
ALBUMIN SERPL-MCNC: 3.7 G/DL (ref 3.4–5)
ALP SERPL-CCNC: 105 U/L (ref 40–150)
ALT SERPL W P-5'-P-CCNC: 42 U/L (ref 0–50)
ANION GAP SERPL CALCULATED.3IONS-SCNC: 8 MMOL/L (ref 3–14)
AST SERPL W P-5'-P-CCNC: 27 U/L (ref 0–45)
BILIRUB DIRECT SERPL-MCNC: 0.1 MG/DL (ref 0–0.2)
BILIRUB SERPL-MCNC: 0.5 MG/DL (ref 0.2–1.3)
BUN SERPL-MCNC: 31 MG/DL (ref 7–30)
CALCIUM SERPL-MCNC: 9.2 MG/DL (ref 8.5–10.1)
CHLORIDE SERPL-SCNC: 112 MMOL/L (ref 94–109)
CO2 SERPL-SCNC: 17 MMOL/L (ref 20–32)
CREAT SERPL-MCNC: 1.27 MG/DL (ref 0.52–1.04)
ERYTHROCYTE [DISTWIDTH] IN BLOOD BY AUTOMATED COUNT: 14.5 % (ref 10–15)
GFR SERPL CREATININE-BSD FRML MDRD: 54 ML/MIN/{1.73_M2}
GLUCOSE SERPL-MCNC: 93 MG/DL (ref 70–99)
HCT VFR BLD AUTO: 40.1 % (ref 35–47)
HGB BLD-MCNC: 13.2 G/DL (ref 11.7–15.7)
MCH RBC QN AUTO: 28.8 PG (ref 26.5–33)
MCHC RBC AUTO-ENTMCNC: 32.9 G/DL (ref 31.5–36.5)
MCV RBC AUTO: 88 FL (ref 78–100)
PLATELET # BLD AUTO: 416 10E9/L (ref 150–450)
POTASSIUM SERPL-SCNC: 3.9 MMOL/L (ref 3.4–5.3)
PROT SERPL-MCNC: 8.1 G/DL (ref 6.8–8.8)
RBC # BLD AUTO: 4.58 10E12/L (ref 3.8–5.2)
SODIUM SERPL-SCNC: 137 MMOL/L (ref 133–144)
TACROLIMUS BLD-MCNC: 4.6 UG/L (ref 5–15)
TME LAST DOSE: ABNORMAL H
WBC # BLD AUTO: 12.7 10E9/L (ref 4–11)

## 2021-01-13 PROCEDURE — 80197 ASSAY OF TACROLIMUS: CPT | Performed by: INTERNAL MEDICINE

## 2021-01-13 PROCEDURE — 36415 COLL VENOUS BLD VENIPUNCTURE: CPT | Performed by: INTERNAL MEDICINE

## 2021-01-13 PROCEDURE — 85027 COMPLETE CBC AUTOMATED: CPT | Performed by: INTERNAL MEDICINE

## 2021-01-13 PROCEDURE — 80048 BASIC METABOLIC PNL TOTAL CA: CPT | Performed by: INTERNAL MEDICINE

## 2021-01-13 PROCEDURE — 80076 HEPATIC FUNCTION PANEL: CPT | Performed by: INTERNAL MEDICINE

## 2021-01-17 DIAGNOSIS — Z94.4 S/P LIVER TRANSPLANT (H): ICD-10-CM

## 2021-01-18 RX ORDER — TACROLIMUS 0.5 MG/1
CAPSULE ORAL
Qty: 900 CAPSULE | Refills: 3 | Status: SHIPPED | OUTPATIENT
Start: 2021-01-18 | End: 2021-04-22

## 2021-01-19 ENCOUNTER — TELEPHONE (OUTPATIENT)
Dept: TRANSPLANT | Facility: CLINIC | Age: 36
End: 2021-01-19

## 2021-01-19 ENCOUNTER — TELEPHONE (OUTPATIENT)
Dept: NEPHROLOGY | Facility: CLINIC | Age: 36
End: 2021-01-19

## 2021-01-19 NOTE — TELEPHONE ENCOUNTER
Patient Call: General  Route to LPN    Reason for call: connect with pt regarding taking baby aspire     Call back needed? Yes    Return Call Needed  Same as documented in contacts section  When to return call?: Greater than one day: Route standard priority

## 2021-01-22 ENCOUNTER — HOSPITAL ENCOUNTER (OUTPATIENT)
Dept: MRI IMAGING | Facility: CLINIC | Age: 36
Discharge: HOME | End: 2021-01-22
Attending: OBSTETRICS & GYNECOLOGY | Admitting: OBSTETRICS & GYNECOLOGY
Payer: COMMERCIAL

## 2021-01-22 DIAGNOSIS — D27.9 BENIGN NEOPLASM OF OVARY: ICD-10-CM

## 2021-01-22 PROCEDURE — 255N000002 HC RX 255 OP 636: Performed by: OBSTETRICS & GYNECOLOGY

## 2021-01-22 PROCEDURE — 72197 MRI PELVIS W/O & W/DYE: CPT | Mod: 26 | Performed by: RADIOLOGY

## 2021-01-22 PROCEDURE — A9585 GADOBUTROL INJECTION: HCPCS | Performed by: OBSTETRICS & GYNECOLOGY

## 2021-01-22 PROCEDURE — 72197 MRI PELVIS W/O & W/DYE: CPT

## 2021-01-22 RX ORDER — GADOBUTROL 604.72 MG/ML
7.5 INJECTION INTRAVENOUS ONCE
Status: COMPLETED | OUTPATIENT
Start: 2021-01-22 | End: 2021-01-22

## 2021-01-22 RX ADMIN — GADOBUTROL 5 ML: 604.72 INJECTION INTRAVENOUS at 14:12

## 2021-01-25 NOTE — PROGRESS NOTES
PRIMARY CARE CENTER                Yarelis Penny is 35 year old female with PMHx PCS/autoimmune hepatitis in 2015 on chronic immunosuppression with tacrolimus and prednisone, ulcerative colitis s/p total colectomy with ileostomy in 2019, CMV colitis and EBV viremia  here at the request of Dr. Dr. Rajiv Reynoso for cardiovascular, pulmonary, and perioperative risk assessment prior to surgery. The intended surgical procedure is IVF egg retrieval. A copy of this note will be sent to the surgeon.    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 [place]) 1250 MG tablet     levothyroxine (SYNTHROID/LEVOTHROID) 25 MCG tablet     multivitamin, therapeutic with minerals (MULTI-VITAMIN) TABS tablet     order for DME     order for DME     predniSONE (DELTASONE) 5 MG tablet     tacrolimus (GENERIC EQUIVALENT) 0.5 MG capsule     ursodiol (ACTIGALL) 250 MG tablet     VITAMIN D, CHOLECALCIFEROL, PO     acetaminophen (TYLENOL) 500 MG tablet     trimethoprim-polymyxin b (POLYTRIM) 80634-5.1 UNIT/ML-% ophthalmic solution     No current facility-administered medications for this visit.      Immunization History   Administered Date(s) Administered     HepB 07/31/2000, 09/06/2000, 02/09/2001     Hib (PRP-T) 04/24/2015     Influenza (H1N1) 01/13/2010     Influenza (IIV3) PF 09/02/2010, 10/01/2010     Influenza Vaccine IM > 6 months Valent IIV4  11/24/2016, 12/04/2018, 11/14/2019     Meningococcal (Menomune ) 06/02/2015     Pneumo Conj 13-V (2010&after) 05/11/2016     Pneumococcal 23 valent 04/24/2015     TD (ADULT, 7+) 12/01/2001     TDAP Vaccine (Boostrix) 05/11/2016     Twinrix A/B 04/24/2015, 06/02/2015       This is a LOW risk surgery.      HPI:   Reason for surgery:      Yarelis has a complex medical history with inability to conceive for the last 6 months. She has previously been seen by MFM with recommendations to attempt pregnancy via intercourse for six months with SUPRIYA referral if unsuccessful. She endorses some increased duration of menstrual cycles and some possible prior diagnosis of PCOS. Otherwise denies menstrual concerns.     Cardiovascular Risk:  The patient does not have chest pain with exertion. This patient ambulates without assist. without chest pain. She IS able to climb a flight of stairs without chest pain.    Shedoes not have a history of known cardiac disease and arrhythmia.   The patient does not have a history of stroke, and does not have a history of valvular disease.    Pulmonary Risk:  In terms of risk factors for pulmonary complications, the patient does not have a history of Asthma    Perioperative Complications:  The patient does not have a history of bleeding or clotting problems in the past. The patient has not had complications from past surgeries.  The patient does not have a family history of any anesthesia or surgical complications.      ROS:  Constitutional: no fevers, night sweats or unintentional weight change   Eyes:  no vision change, diplopia or red eyes   Cardiovascular:  no chest pain, palpitations, or pain with walking, no orthopnea or PND   Respiratory:  no dyspnea, cough, shortness of breath or wheezing   GI:  no nausea, vomiting, diarrhea or constipation, no abdominal pain   :  no change in urine, no dysuria or hematuria  Musculoskeletal:  no joint or muscle pain or swelling   Integumentary: no concerning  "lesions or moles   Neuro: no loss of strength or sensation, no numbness or tingling, no tremor, no dizziness, no headache   Heme/Lymph:  no concerning bumps, no bleeding problems     PHYSICAL EXAM:  /79 (BP Location: Right arm, Patient Position: Sitting, Cuff Size: Adult Regular)   Pulse 88   Ht 1.585 m (5' 2.4\")   Wt 54 kg (119 lb)   SpO2 100%   BMI 21.49 kg/m      Wt Readings from Last 1 Encounters:   01/26/21 54 kg (119 lb)       Constitutional: no distress, comfortable, pleasant     Eyes: anicteric, normal extra-ocular movements     Ears, Nose and Throat: tympanic membranes clear, nose clear and free of lesions, throat clear, neck supple with full range of motion, no thyromegaly     Cardiovascular: regular rate and rhythm, normal S1 and S2, no murmurs, rubs or gallops    Respiratory: clear to auscultation bilaterally, no wheezes or crackles     Gastrointestinal: positive bowel sounds, nontender      Musculoskeletal: full range of motion, no edema     Skin: no concerning lesions, no jaundice      Psychological: affect appropriate         A/P:    The patient with   Past Medical History:   Diagnosis Date     Acute kidney injury (H) 4/28/2016     Anemia, iron deficiency 8/29/2017     Autoimmune hepatitis (H) 2012    on steroid taper     Cholangitis      CKD (chronic kidney disease) 2012    biopsy 2012: TIN, patchy fibrosis     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 The patient is at LOW risk for cardiovascular complications and at LOW risk for pulmonary complications of this LOW risk surgery.      1. Preoperative examination  - Basic metabolic panel; Future    2. Acute kidney injury (H)  Slight increase in Cr at 1.27 on last check " this month. Repeat BMP ordered prior to procedure.  - Basic metabolic panel; Future    3. Chronic immunosuppression, on steroids  Has previously tolerated procedures without evidence of adrenal insufficiency requiring increased steroid dosing. Have deferred increased steroid dosing prior to procedure in absence of significant suspicion for suppression of endogenous adrenal function.       Cardiovascular: EKG was not indicated based on risk assessment.       Please contact our office if there are any further questions or information required about this patient. Patient and plan staffed with the attending, Dr. Parikh.    Marielena Mota MD          Answers for HPI/ROS submitted by the patient on 1/24/2021   General Symptoms: No  Skin Symptoms: No  HENT Symptoms: No  EYE SYMPTOMS: No  HEART SYMPTOMS: No  LUNG SYMPTOMS: No  INTESTINAL SYMPTOMS: No  URINARY SYMPTOMS: No  GYNECOLOGIC SYMPTOMS: No  BREAST SYMPTOMS: No  SKELETAL SYMPTOMS: No  BLOOD SYMPTOMS: No  NERVOUS SYSTEM SYMPTOMS: No  MENTAL HEALTH SYMPTOMS: No

## 2021-01-26 ENCOUNTER — OFFICE VISIT (OUTPATIENT)
Dept: INTERNAL MEDICINE | Facility: CLINIC | Age: 36
End: 2021-01-26
Payer: COMMERCIAL

## 2021-01-26 ENCOUNTER — RECORDS - HEALTHEAST (OUTPATIENT)
Dept: LAB | Facility: CLINIC | Age: 36
End: 2021-01-26

## 2021-01-26 VITALS
HEIGHT: 62 IN | OXYGEN SATURATION: 100 % | HEART RATE: 88 BPM | SYSTOLIC BLOOD PRESSURE: 114 MMHG | DIASTOLIC BLOOD PRESSURE: 79 MMHG | WEIGHT: 119 LBS | BODY MASS INDEX: 21.9 KG/M2

## 2021-01-26 DIAGNOSIS — Z01.818 PREOPERATIVE EXAMINATION: Primary | ICD-10-CM

## 2021-01-26 DIAGNOSIS — N17.9 ACUTE KIDNEY INJURY (H): ICD-10-CM

## 2021-01-26 LAB — ESTRADIOL SERPL-MCNC: 850 PG/ML

## 2021-01-26 PROCEDURE — 99213 OFFICE O/P EST LOW 20 MIN: CPT | Mod: GC | Performed by: STUDENT IN AN ORGANIZED HEALTH CARE EDUCATION/TRAINING PROGRAM

## 2021-01-26 ASSESSMENT — MIFFLIN-ST. JEOR: SCORE: 1194.38

## 2021-01-26 NOTE — NURSING NOTE
Chief Complaint   Patient presents with     Pre-Op Exam     pre op 2.1.21       Nirmala Barrera MA, at 9:12 AM on 1/26/2021.

## 2021-01-26 NOTE — LETTER
1/26/2021      RE: Yarelis Penny  [address] [facility] [address]                            PRIMARY CARE CENTER                Yaerlis Penny is 35 year old female with PMHx PCS/autoimmune hepatitis in 2015 on chronic immunosuppression with tacrolimus and prednisone, ulcerative colitis s/p total colectomy with ileostomy in 2019, CMV colitis and EBV viremia  here at the request of Dr. Dr. Rajiv Reynoso for cardiovascular, pulmonary, and perioperative risk assessment prior to surgery. The intended surgical procedure is IVF egg retrieval. A copy of this note will be sent to the surgeon.    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 [place]) 1250 MG tablet     levothyroxine (SYNTHROID/LEVOTHROID) 25 MCG tablet     multivitamin, therapeutic with minerals (MULTI-VITAMIN) TABS tablet     order for DME     order for DME     predniSONE (DELTASONE) 5 MG tablet     tacrolimus (GENERIC EQUIVALENT) 0.5 MG capsule     ursodiol (ACTIGALL) 250 MG tablet     VITAMIN D, CHOLECALCIFEROL, PO     acetaminophen (TYLENOL) 500 MG tablet     trimethoprim-polymyxin b (POLYTRIM) 35746-7.1 UNIT/ML-% ophthalmic solution     No current facility-administered medications for this visit.      Immunization History   Administered Date(s) Administered     HepB 07/31/2000, 09/06/2000, 02/09/2001     Hib (PRP-T) 04/24/2015     Influenza (H1N1) 01/13/2010     Influenza  (IIV3) PF 09/02/2010, 10/01/2010     Influenza Vaccine IM > 6 months Valent IIV4 11/24/2016, 12/04/2018, 11/14/2019     Meningococcal (Menomune ) 06/02/2015     Pneumo Conj 13-V (2010&after) 05/11/2016     Pneumococcal 23 valent 04/24/2015     TD (ADULT, 7+) 12/01/2001     TDAP Vaccine (Boostrix) 05/11/2016     Twinrix A/B 04/24/2015, 06/02/2015       This is a LOW risk surgery.      HPI:   Reason for surgery:      Yarelis has a complex medical history with inability to conceive for the last 6 months. She has previously been seen by MFM with recommendations to attempt pregnancy via intercourse for six months with SUPRIYA referral if unsuccessful. She endorses some increased duration of menstrual cycles and some possible prior diagnosis of PCOS. Otherwise denies menstrual concerns.     Cardiovascular Risk:  The patient does not have chest pain with exertion. This patient ambulates without assist. without chest pain. She IS able to climb a flight of stairs without chest pain.    Shedoes not have a history of known cardiac disease and arrhythmia.   The patient does not have a history of stroke, and does not have a history of valvular disease.    Pulmonary Risk:  In terms of risk factors for pulmonary complications, the patient does not have a history of Asthma    Perioperative Complications:  The patient does not have a history of bleeding or clotting problems in the past. The patient has not had complications from past surgeries.  The patient does not have a family history of any anesthesia or surgical complications.      ROS:  Constitutional: no fevers, night sweats or unintentional weight change   Eyes:  no vision change, diplopia or red eyes   Cardiovascular:  no chest pain, palpitations, or pain with walking, no orthopnea or PND   Respiratory:  no dyspnea, cough, shortness of breath or wheezing   GI:  no nausea, vomiting, diarrhea or constipation, no abdominal pain   :  no change in urine, no dysuria or  "hematuria  Musculoskeletal:  no joint or muscle pain or swelling   Integumentary: no concerning lesions or moles   Neuro: no loss of strength or sensation, no numbness or tingling, no tremor, no dizziness, no headache   Heme/Lymph:  no concerning bumps, no bleeding problems     PHYSICAL EXAM:  /79 (BP Location: Right arm, Patient Position: Sitting, Cuff Size: Adult Regular)   Pulse 88   Ht 1.585 m (5' 2.4\")   Wt 54 kg (119 lb)   SpO2 100%   BMI 21.49 kg/m      Wt Readings from Last 1 Encounters:   01/26/21 54 kg (119 lb)       Constitutional: no distress, comfortable, pleasant     Eyes: anicteric, normal extra-ocular movements     Ears, Nose and Throat: tympanic membranes clear, nose clear and free of lesions, throat clear, neck supple with full range of motion, no thyromegaly     Cardiovascular: regular rate and rhythm, normal S1 and S2, no murmurs, rubs or gallops    Respiratory: clear to auscultation bilaterally, no wheezes or crackles     Gastrointestinal: positive bowel sounds, nontender      Musculoskeletal: full range of motion, no edema     Skin: no concerning lesions, no jaundice      Psychological: affect appropriate         A/P:    The patient with   Past Medical History:   Diagnosis Date     Acute kidney injury (H) 4/28/2016     Anemia, iron deficiency 8/29/2017     Autoimmune hepatitis (H) 2012    on steroid taper     Cholangitis      CKD (chronic kidney disease) 2012    biopsy 2012: TIN, patchy fibrosis     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 The patient is at LOW risk for cardiovascular complications and at LOW risk for pulmonary complications of this LOW risk surgery.      1. Preoperative examination  - Basic " metabolic panel; Future    2. Acute kidney injury (H)  Slight increase in Cr at 1.27 on last check this month. Repeat BMP ordered prior to procedure.  - Basic metabolic panel; Future    3. Chronic immunosuppression, on steroids  Has previously tolerated procedures without evidence of adrenal insufficiency requiring increased steroid dosing. Have deferred increased steroid dosing prior to procedure in absence of significant suspicion for suppression of endogenous adrenal function.       Cardiovascular: EKG was not indicated based on risk assessment.       Please contact our office if there are any further questions or information required about this patient. Patient and plan staffed with the attending, Dr. Parikh.    Marielena Mota MD          Answers for HPI/ROS submitted by the patient on 1/24/2021   General Symptoms: No  Skin Symptoms: No  HENT Symptoms: No  EYE SYMPTOMS: No  HEART SYMPTOMS: No  LUNG SYMPTOMS: No  INTESTINAL SYMPTOMS: No  URINARY SYMPTOMS: No  GYNECOLOGIC SYMPTOMS: No  BREAST SYMPTOMS: No  SKELETAL SYMPTOMS: No  BLOOD SYMPTOMS: No  NERVOUS SYSTEM SYMPTOMS: No  MENTAL HEALTH SYMPTOMS: No      Attestation signed by Pierre Parikh MD at 2/1/2021  4:55 PM:  Pt was seen and examined with Dr. Mota.  I agree with her documentation as noted above.    My additional comments: None    Pierre Parikh MD, MD      Surgeon: Dr. Rajiv Reynoso  Fax number for Pre Op Evaluation:  [facility] of the surgery[facility].  Date of the surgery: 2.1.21  Procedure: IVF egg retrievel  History of reaction to anesthesia? : NO      Marielena Mota MD

## 2021-01-26 NOTE — PROGRESS NOTES
Surgeon: Dr. Rajiv Reynoso  Fax number for Pre Op Evaluation:  Location of the surgery: [facility].  Date of the surgery: 2.1.21  Procedure: IVF egg retrievel  History of reaction to anesthesia? : NO

## 2021-02-01 ENCOUNTER — TELEPHONE (OUTPATIENT)
Dept: INTERNAL MEDICINE | Facility: CLINIC | Age: 36
End: 2021-02-01

## 2021-02-01 NOTE — TELEPHONE ENCOUNTER
Health Call Center    Phone Message    May a detailed message be left on voicemail: yes     Reason for Call: Other: Please send pre-op notes to Dr. Dedra HARPER. Patient said the fax number should be on the form that was given to the doctor.      Patient also said Dr. Mota was supposed to complete a form and put it in patient's MyChart. Patient said Dr. Reynoso and patient will need these forms by today.    Please call patient if there's any questions.   Action Taken: Message routed to:  [facility] ([facility]    Travel Screening: Not Applicable

## 2021-02-01 NOTE — TELEPHONE ENCOUNTER
Pt is requesting these to be completed today. Notified pt of 24-48 hour turn around time responding to message.

## 2021-02-02 ENCOUNTER — TELEPHONE (OUTPATIENT)
Dept: TRANSPLANT | Facility: CLINIC | Age: 36
End: 2021-02-02

## 2021-02-02 NOTE — TELEPHONE ENCOUNTER
Pre op was signed by precepting provider. No fax on pre op to be able to fax pre op anywhere. If patient needs pre-op faxed. Will need fax number to be able to fax pre-op from 1/26/21. Elissa Ramsey LPN 2/2/2021 8:08 AM

## 2021-02-02 NOTE — TELEPHONE ENCOUNTER
Spoke with patient about creatinine. Pt was to repeat creatinine per PCP. Pt felt it was due to her changing to a powder drink and so she has stopped that. Pt feels great. Does not feel dehydrated. No diarrhea. Pt to call back with any issues or changes.

## 2021-02-05 NOTE — PROGRESS NOTES
"Patient calls in with symptoms. Patient states that she is having 4 loose stools a day with no blood, but abdominal discomfort.  She states that she can \"feel everything moving through her bowels\"    Patient received her Entyvio infusion today with labs. Will update Dr Mckeon and update patient on the  plan of care moving forward. .   " D: 66 year old female PMH 

## 2021-02-18 ENCOUNTER — TELEPHONE (OUTPATIENT)
Dept: TRANSPLANT | Facility: CLINIC | Age: 36
End: 2021-02-18

## 2021-02-18 NOTE — TELEPHONE ENCOUNTER
Pt calls to ask if it is OK to take valium pre procedure for invetro. It would be 5mg. I thought it was ok but making sure.

## 2021-02-22 ENCOUNTER — TRANSFERRED RECORDS (OUTPATIENT)
Dept: HEALTH INFORMATION MANAGEMENT | Facility: CLINIC | Age: 36
End: 2021-02-22

## 2021-02-25 ENCOUNTER — MEDICAL CORRESPONDENCE (OUTPATIENT)
Dept: HEALTH INFORMATION MANAGEMENT | Facility: CLINIC | Age: 36
End: 2021-02-25

## 2021-02-25 ENCOUNTER — TELEPHONE (OUTPATIENT)
Dept: PLASTIC SURGERY | Facility: CLINIC | Age: 36
End: 2021-02-25
Payer: COMMERCIAL

## 2021-02-25 NOTE — TELEPHONE ENCOUNTER
Pt called [facility] explaining that she was having trouble getting a call back from anyone at [facility] regarding setting up a hysteroscopy. Pt was able to schedule a regular consultation appointment, but pt doesn't believe that a consultation is needed because she was referred for hysteroscopy by another doctor. Please call pt back to discuss requirements for hysteroscopy, and schedule pt for hysteroscopy if applicable.     Rudy Yusuf  Intake and Referral   [facility]  [phone]

## 2021-02-26 ENCOUNTER — TELEPHONE (OUTPATIENT)
Dept: OBGYN | Facility: CLINIC | Age: 36
End: 2021-02-26

## 2021-03-02 ENCOUNTER — OFFICE VISIT (OUTPATIENT)
Dept: OBGYN | Facility: CLINIC | Age: 36
End: 2021-03-02
Attending: OBSTETRICS & GYNECOLOGY
Payer: COMMERCIAL

## 2021-03-02 ENCOUNTER — PREP FOR PROCEDURE (OUTPATIENT)
Dept: OBGYN | Facility: CLINIC | Age: 36
End: 2021-03-02

## 2021-03-02 VITALS
DIASTOLIC BLOOD PRESSURE: 82 MMHG | WEIGHT: 119.6 LBS | HEART RATE: 80 BPM | SYSTOLIC BLOOD PRESSURE: 122 MMHG | BODY MASS INDEX: 22.01 KG/M2 | HEIGHT: 62 IN

## 2021-03-02 DIAGNOSIS — N84.0 UTERINE POLYP: Primary | ICD-10-CM

## 2021-03-02 PROCEDURE — 99213 OFFICE O/P EST LOW 20 MIN: CPT | Performed by: OBSTETRICS & GYNECOLOGY

## 2021-03-02 PROCEDURE — G0463 HOSPITAL OUTPT CLINIC VISIT: HCPCS

## 2021-03-02 RX ORDER — ACETAMINOPHEN 325 MG/1
975 TABLET ORAL ONCE
Status: CANCELLED | OUTPATIENT
Start: 2021-03-02 | End: 2021-03-02

## 2021-03-02 ASSESSMENT — MIFFLIN-ST. JEOR: SCORE: 1197.1

## 2021-03-02 NOTE — PROGRESS NOTES
Pre op orders placed    Lin Armendariz MD, FACOG  (she/her/hers)    Department of Ob/Gyn/Women's Health  [employer]  [facility]  [address] [place] [address]. [place] [address]  [email or web address]  p. [phone]  f. [phone]

## 2021-03-02 NOTE — LETTER
3/2/2021       RE: Yarelis Penny  [address] [facility] [address]     Dear Colleague,    Thank you for referring your patient, Yarelis Penny, to the [facility] Counselor at [facility]. Please see a copy of my visit note below.    CC:  Consult from Dedra Reynoso for hysteroscopy.    HPI:  Yarelis is a   with complex medical history. S/p egg retrieval for IVF.  Had SHG, with concern for intrauterine polyp/adhesions.  Patient's last menstrual period was 2021.      Patients records are available and reviewed at today's visit.    Past GYN history:  No STD history and infertility x 6+ months.     Past Medical History:   Diagnosis Date     Acute kidney injury (H) 2016     Anemia, iron deficiency 2017     Autoimmune hepatitis (H)     on steroid taper     Cholangitis      CKD (chronic kidney disease) 2012    biopsy 2012: TIN, patchy fibrosis     CMV colitis (H) 2016     Esophageal varices (H)     banded     Heart murmur      History of blood transfusion      Primary sclerosing cholangitis      SBP (spontaneous bacterial peritonitis) (H) 2015     Ulcerative Colitis     f/b GI       Past Surgical History:   Procedure Laterality Date     CHOLECYSTECTOMY       COLONOSCOPY       COLONOSCOPY N/A 2015    Procedure: COMBINED COLONOSCOPY, SINGLE OR MULTIPLE BIOPSY/POLYPECTOMY BY BIOPSY;  Surgeon: Mitchel Hoskins Chi, MD;  Location:  GI     COLONOSCOPY N/A 2016    Procedure: COMBINED COLONOSCOPY, SINGLE OR MULTIPLE BIOPSY/POLYPECTOMY BY BIOPSY;  Surgeon: Rigo Valles MD;  [facility]     COLONOSCOPY N/A 2016    Procedure: COMBINED COLONOSCOPY, SINGLE OR MULTIPLE BIOPSY/POLYPECTOMY BY BIOPSY;  Surgeon: Kareem Solis MD;  Location: [facility] GI     COLONOSCOPY N/A 2017    Procedure: COMBINED COLONOSCOPY, SINGLE OR MULTIPLE BIOPSY/POLYPECTOMY BY BIOPSY;  Colonoscopy;  Surgeon: Alex  Fuentes Maurer MD;  [facility]     COLONOSCOPY N/A 2/25/2019    Procedure: COMBINED COLONOSCOPY, SINGLE OR MULTIPLE BIOPSY/POLYPECTOMY BY BIOPSY;  Surgeon: Sophy Mckeon MD;  [facility]     ENDOSCOPIC RETROGRADE CHOLANGIOPANCREATOGRAM N/A 6/25/2015    Procedure: COMBINED ENDOSCOPIC RETROGRADE CHOLANGIOPANCREATOGRAPHY, PLACE TUBE/STENT;  Surgeon: Landon Quinones MD;  [facility]     ENDOSCOPIC RETROGRADE CHOLANGIOPANCREATOGRAM N/A 6/25/2015    Procedure: COMBINED ENDOSCOPIC RETROGRADE CHOLANGIOPANCREATOGRAPHY, PLACE TUBE/STENT;  Surgeon: Landon Quinones MD;  [facility]     ENDOSCOPIC RETROGRADE CHOLANGIOPANCREATOGRAM N/A 7/2/2015    Procedure: COMBINED ENDOSCOPIC RETROGRADE CHOLANGIOPANCREATOGRAPHY, PLACE TUBE/STENT;  Surgeon: Landon Quinones MD;  [facility]     ENDOSCOPIC RETROGRADE CHOLANGIOPANCREATOGRAM N/A 9/8/2015    Procedure: COMBINED ENDOSCOPIC RETROGRADE CHOLANGIOPANCREATOGRAPHY, REMOVE FOREIGN BODY OR STENT/TUBE;  Surgeon: Landon Quinones MD;  [facility]     ENDOSCOPIC RETROGRADE CHOLANGIOPANCREATOGRAM N/A 12/8/2015    Procedure: ENDOSCOPIC RETROGRADE CHOLANGIOPANCREATOGRAM;  Surgeon: Landon Quinones MD;  [facility]     ENDOSCOPIC RETROGRADE CHOLANGIOPANCREATOGRAM N/A 3/1/2016    Procedure: COMBINED ENDOSCOPIC RETROGRADE CHOLANGIOPANCREATOGRAPHY, REMOVE FOREIGN BODY OR STENT/TUBE;  Surgeon: Landon Quinones MD;  [facility]     ENDOSCOPIC RETROGRADE CHOLANGIOPANCREATOGRAM N/A 3/20/2017    Procedure: COMBINED ENDOSCOPIC RETROGRADE CHOLANGIOPANCREATOGRAPHY, PLACE TUBE/STENT;  Endoscopic Retrograde Cholangiopancreatogram with Ballon Dilation, Stent Placement;  Surgeon: Guru Brittany Macias MD;  [facility]     ENDOSCOPIC RETROGRADE CHOLANGIOPANCREATOGRAM N/A 4/2/2018    Procedure: COMBINED ENDOSCOPIC RETROGRADE CHOLANGIOPANCREATOGRAPHY, PLACE TUBE/STENT;  Endoscopic Retrograde Cholangiopancreatogram with  biliary dilation and stent placement;  Surgeon: Guru Brittany Macias MD;  [facility]     ENDOSCOPIC RETROGRADE CHOLANGIOPANCREATOGRAM N/A 5/7/2018    Procedure: ENDOSCOPIC RETROGRADE CHOLANGIOPANCREATOGRAM;  Endoscopic Retrograde Cholangiopancreatogram ballon dilation stent exchange;  Surgeon: Guru Brittany Macias MD;  [facility]     ENDOSCOPIC RETROGRADE CHOLANGIOPANCREATOGRAPHY, EXCHANGE TUBE/STENT N/A 7/30/2015    Procedure: ENDOSCOPIC RETROGRADE CHOLANGIOPANCREATOGRAPHY, EXCHANGE TUBE/STENT;  Surgeon: Landon Quinones MD;  [facility]     ENDOSCOPIC RETROGRADE CHOLANGIOPANCREATOGRAPHY, EXCHANGE TUBE/STENT N/A 5/15/2017    Procedure: ENDOSCOPIC RETROGRADE CHOLANGIOPANCREATOGRAPHY, EXCHANGE TUBE/STENT;  Endoscopic Retrograde Cholangiopancreatogram with biliary stent exchange and balloon dilation;  Surgeon: Guru Brittany Macias MD;  [facility]     ENDOSCOPIC RETROGRADE CHOLANGIOPANCREATOGRAPHY, EXCHANGE TUBE/STENT N/A 6/25/2018    Procedure: ENDOSCOPIC RETROGRADE CHOLANGIOPANCREATOGRAPHY, EXCHANGE TUBE/STENT;  Endoscopic Retrograde Cholangiopancreatogram with biliary dilation, stone removal and stent exchange;  Surgeon: Guru Brittany Macias MD;  [facility]     ENDOSCOPIC RETROGRADE CHOLANGIOPANCREATOGRAPHY, EXCHANGE TUBE/STENT N/A 7/30/2018    Procedure: ENDOSCOPIC RETROGRADE CHOLANGIOPANCREATOGRAPHY, EXCHANGE TUBE/STENT;  Endoscopic Retrograde Cholangiopancreatogram with Stent Exchange with dilation;  Surgeon: Guru Brittany Macias MD;  [facility]     ENDOSCOPIC RETROGRADE CHOLANGIOPANCREATOGRAPHY, EXCHANGE TUBE/STENT N/A 9/26/2018    Procedure: ENDOSCOPIC RETROGRADE CHOLANGIOPANCREATOGRAPHY, EXCHANGE TUBE/STENT;  Endoscopic Retrograde Cholangiopancreatogram, with bile duct stent exchanged balloon dilation and balloon sweep of bile duct;  Surgeon: Guru Brittany Macias MD;  [facility]     ESOPHAGOSCOPY, GASTROSCOPY, DUODENOSCOPY (EGD), COMBINED N/A 2015    Procedure: COMBINED ESOPHAGOSCOPY, GASTROSCOPY, DUODENOSCOPY (EGD);  Surgeon: Mitchel Hoskins Chi, MD;  [facility]     EXPLORE COMMON BILE DUCT N/A 2015    Procedure: EXPLORE COMMON BILE DUCT;  Surgeon: Tyree Smith MD;  [facility]     HERNIA REPAIR      During transplant     ILEOSTOMY N/A 2019    Procedure: Ileostomy;  Surgeon: Krzysztof Carney MD;  [facility]     LAPAROSCOPIC ASSISTED COLECTOMY N/A 2019    Procedure: Laparoscopic Total Abdominal Colectomy, lysis of adhesions;  Surgeon: Krzysztof Carney MD;  [facility]     LAPAROTOMY EXPLORATORY N/A 2015    Procedure: LAPAROTOMY EXPLORATORY;  Surgeon: Tyree Smith MD;  [facility]     PICC INSERTION Right 2015    4fr SL Valved PICC, 36cm (1cm external) in the R medial brachial vein w/ tip in the low SVC.     SIGMOIDOSCOPY FLEXIBLE N/A 2016    Procedure: SIGMOIDOSCOPY FLEXIBLE;  Surgeon: Jose Nielson MD;  [facility]     TRANSPLANT LIVER RECIPIENT LIVING UNRELATED N/A 2015    Procedure: TRANSPLANT LIVER RECIPIENT LIVING UNRELATED;  Surgeon: Tyree Smith MD;  [facility]     UPPER GI ENDOSCOPY         Family History   Problem Relation Age of Onset     Hypertension Mother      Lipids Mother      Sleep Apnea Mother      Hypertension Maternal Grandmother              Alzheimer Disease Maternal Grandmother      Lipids Father      Melanoma No family hx of      Skin Cancer No family hx of      Cancer No family hx of      Diabetes No family hx of        Allergies: Rifampin and Penicillins    Current Outpatient Medications   Medication Sig Dispense Refill     acetaminophen (TYLENOL) 500 MG tablet Take 2 tablets (1,000 mg) by mouth 4 times daily (Patient not taking: Reported on 2019) 50 tablet 0     calcium carbonate (OS-KERLINE 500 MG [place]. [place]) 1250 MG tablet Take 1 tablet by mouth every evening        "levothyroxine (SYNTHROID/LEVOTHROID) 25 MCG tablet        multivitamin, therapeutic with minerals (MULTI-VITAMIN) TABS tablet Take 1 tablet by mouth every morning       order for DME Col[employer] item #s   07406 and 62334 30 Bag 3     order for DME Increase the amount to 10 per month  --2\" barrier ring #7805 10 each 0     predniSONE (DELTASONE) 5 MG tablet Take 1 tablet (5 mg) by mouth daily 90 tablet 3     tacrolimus (GENERIC EQUIVALENT) 0.5 MG capsule TAKE 5 CAPSULES (2.5 MG) EVERY 12 HOURS 900 capsule 3     trimethoprim-polymyxin b (POLYTRIM) 75743-5.1 UNIT/ML-% ophthalmic solution Place 1-2 drops Into the left eye every 4 hours (Patient not taking: Reported on 9/14/2020) 10 mL 0     ursodiol (ACTIGALL) 250 MG tablet Take 2 tablets (500 mg) by mouth every morning AND 1 tablet (250 mg) every evening. 270 tablet 3     VITAMIN D, CHOLECALCIFEROL, PO Take by mouth every evening         ROS:  C: NEGATIVE for fever, chills, change in weight  I: NEGATIVE for worrisome rashes, moles or lesions  E: NEGATIVE for vision changes or irritation  E/M: NEGATIVE for ear, mouth and throat problems  R: NEGATIVE for significant cough or SOB  CV: NEGATIVE for chest pain, palpitations or peripheral edema  GI: NEGATIVE for nausea, abdominal pain, heartburn, or change in bowel habits  : NEGATIVE for frequency, dysuria, hematuria, vaginal discharge  M: NEGATIVE for significant arthralgias or myalgia  N: NEGATIVE for weakness, dizziness or paresthesias  E: NEGATIVE for temperature intolerance, skin/hair changes  P: NEGATIVE for changes in mood or affect    EXAM:  Blood pressure 122/82, pulse 80, height 1.585 m (5' 2.4\"), weight 54.3 kg (119 lb 9.6 oz), last menstrual period 02/16/2021, not currently breastfeeding.   BMI= Body mass index is 21.6 kg/m .  General - pleasant female in no acute distress.    ASSESSMENT/PLAN:  Infertility and possible intrauterine polyp.      Plan to proceed with hysteroscopy, polypectomy. Risks benefits and " alternatives were discussed with patient and she agreed to proceed.  H and P will be updated on AM of surgery as she had one for Egg retrieval end of January.       Letter will be sent to the referring provider.      Lin Armendariz MD, FACOG  (she/her/hers)    Department of Ob/Gyn/Women's Health  [employer] [facility]  [place] [facility]  [address] [place] [address]  [email or web address]  p. [phone]  f. [phone]

## 2021-03-02 NOTE — PROGRESS NOTES
CC:  Consult from Dedra Reynoso for hysteroscopy.    HPI:  Yarelis is a   with complex medical history. S/p egg retrieval for IVF.  Had SHG, with concern for intrauterine polyp/adhesions.  Patient's last menstrual period was 2021.      Patients records are available and reviewed at today's visit.    Past GYN history:  No STD history and infertility x 6+ months.     Past Medical History:   Diagnosis Date     Acute kidney injury (H) 2016     Anemia, iron deficiency 2017     Autoimmune hepatitis (H)     on steroid taper     Cholangitis      CKD (chronic kidney disease)     biopsy 2012: TIN, patchy fibrosis     CMV colitis (H) 2016     Esophageal varices (H)     banded     Heart murmur      History of blood transfusion      Primary sclerosing cholangitis      SBP (spontaneous bacterial peritonitis) (H) 2015     Ulcerative Colitis     f/b GI       Past Surgical History:   Procedure Laterality Date     CHOLECYSTECTOMY       COLONOSCOPY       COLONOSCOPY N/A 2015    Procedure: COMBINED COLONOSCOPY, SINGLE OR MULTIPLE BIOPSY/POLYPECTOMY BY BIOPSY;  Surgeon: Mitchel Hoskins Chi, MD;  [facility]     COLONOSCOPY N/A 2016    Procedure: COMBINED COLONOSCOPY, SINGLE OR MULTIPLE BIOPSY/POLYPECTOMY BY BIOPSY;  Surgeon: Rigo Valles MD;  [facility]     COLONOSCOPY N/A 2016    Procedure: COMBINED COLONOSCOPY, SINGLE OR MULTIPLE BIOPSY/POLYPECTOMY BY BIOPSY;  Surgeon: Kareem Solis MD;  [facility]     COLONOSCOPY N/A 2017    Procedure: COMBINED COLONOSCOPY, SINGLE OR MULTIPLE BIOPSY/POLYPECTOMY BY BIOPSY;  Colonoscopy;  Surgeon: Fuentes Johnson MD;  [facility]     COLONOSCOPY N/A 2019    Procedure: COMBINED COLONOSCOPY, SINGLE OR MULTIPLE BIOPSY/POLYPECTOMY BY BIOPSY;  Surgeon: Sophy Mckeon MD;  [facility]     ENDOSCOPIC RETROGRADE CHOLANGIOPANCREATOGRAM N/A 2015    Procedure: COMBINED ENDOSCOPIC RETROGRADE  CHOLANGIOPANCREATOGRAPHY, PLACE TUBE/STENT;  Surgeon: Landon Quinones MD;  [facility]     ENDOSCOPIC RETROGRADE CHOLANGIOPANCREATOGRAM N/A 6/25/2015    Procedure: COMBINED ENDOSCOPIC RETROGRADE CHOLANGIOPANCREATOGRAPHY, PLACE TUBE/STENT;  Surgeon: Landon Quinones MD;  [facility]     ENDOSCOPIC RETROGRADE CHOLANGIOPANCREATOGRAM N/A 7/2/2015    Procedure: COMBINED ENDOSCOPIC RETROGRADE CHOLANGIOPANCREATOGRAPHY, PLACE TUBE/STENT;  Surgeon: Landon Quinones MD;  [facility]     ENDOSCOPIC RETROGRADE CHOLANGIOPANCREATOGRAM N/A 9/8/2015    Procedure: COMBINED ENDOSCOPIC RETROGRADE CHOLANGIOPANCREATOGRAPHY, REMOVE FOREIGN BODY OR STENT/TUBE;  Surgeon: Landon Quinones MD;  [facility]     ENDOSCOPIC RETROGRADE CHOLANGIOPANCREATOGRAM N/A 12/8/2015    Procedure: ENDOSCOPIC RETROGRADE CHOLANGIOPANCREATOGRAM;  Surgeon: Landon Quinones MD;  [facility]     ENDOSCOPIC RETROGRADE CHOLANGIOPANCREATOGRAM N/A 3/1/2016    Procedure: COMBINED ENDOSCOPIC RETROGRADE CHOLANGIOPANCREATOGRAPHY, REMOVE FOREIGN BODY OR STENT/TUBE;  Surgeon: Landon Quinones MD;  [facility]     ENDOSCOPIC RETROGRADE CHOLANGIOPANCREATOGRAM N/A 3/20/2017    Procedure: COMBINED ENDOSCOPIC RETROGRADE CHOLANGIOPANCREATOGRAPHY, PLACE TUBE/STENT;  Endoscopic Retrograde Cholangiopancreatogram with Ballon Dilation, Stent Placement;  Surgeon: Guru Brittany Macias MD;  [facility]     ENDOSCOPIC RETROGRADE CHOLANGIOPANCREATOGRAM N/A 4/2/2018    Procedure: COMBINED ENDOSCOPIC RETROGRADE CHOLANGIOPANCREATOGRAPHY, PLACE TUBE/STENT;  Endoscopic Retrograde Cholangiopancreatogram with biliary dilation and stent placement;  Surgeon: Guru Brittany Macias MD;  [facility]     ENDOSCOPIC RETROGRADE CHOLANGIOPANCREATOGRAM N/A 5/7/2018    Procedure: ENDOSCOPIC RETROGRADE CHOLANGIOPANCREATOGRAM;  Endoscopic Retrograde Cholangiopancreatogram ballon dilation stent exchange;   Surgeon: Guru Brittany Macias MD;  [facility]     ENDOSCOPIC RETROGRADE CHOLANGIOPANCREATOGRAPHY, EXCHANGE TUBE/STENT N/A 7/30/2015    Procedure: ENDOSCOPIC RETROGRADE CHOLANGIOPANCREATOGRAPHY, EXCHANGE TUBE/STENT;  Surgeon: Landon Quinones MD;  [facility]     ENDOSCOPIC RETROGRADE CHOLANGIOPANCREATOGRAPHY, EXCHANGE TUBE/STENT N/A 5/15/2017    Procedure: ENDOSCOPIC RETROGRADE CHOLANGIOPANCREATOGRAPHY, EXCHANGE TUBE/STENT;  Endoscopic Retrograde Cholangiopancreatogram with biliary stent exchange and balloon dilation;  Surgeon: Guru Brittany Macias MD;  [facility]     ENDOSCOPIC RETROGRADE CHOLANGIOPANCREATOGRAPHY, EXCHANGE TUBE/STENT N/A 6/25/2018    Procedure: ENDOSCOPIC RETROGRADE CHOLANGIOPANCREATOGRAPHY, EXCHANGE TUBE/STENT;  Endoscopic Retrograde Cholangiopancreatogram with biliary dilation, stone removal and stent exchange;  Surgeon: Guru Brittany Macias MD;  [facility]     ENDOSCOPIC RETROGRADE CHOLANGIOPANCREATOGRAPHY, EXCHANGE TUBE/STENT N/A 7/30/2018    Procedure: ENDOSCOPIC RETROGRADE CHOLANGIOPANCREATOGRAPHY, EXCHANGE TUBE/STENT;  Endoscopic Retrograde Cholangiopancreatogram with Stent Exchange with dilation;  Surgeon: Guru Brittany Macias MD;  [facility]     ENDOSCOPIC RETROGRADE CHOLANGIOPANCREATOGRAPHY, EXCHANGE TUBE/STENT N/A 9/26/2018    Procedure: ENDOSCOPIC RETROGRADE CHOLANGIOPANCREATOGRAPHY, EXCHANGE TUBE/STENT;  Endoscopic Retrograde Cholangiopancreatogram, with bile duct stent exchanged balloon dilation and balloon sweep of bile duct;  Surgeon: Guru Brittany Macias MD;  [facility]     ESOPHAGOSCOPY, GASTROSCOPY, DUODENOSCOPY (EGD), COMBINED N/A 2/26/2015    Procedure: COMBINED ESOPHAGOSCOPY, GASTROSCOPY, DUODENOSCOPY (EGD);  Surgeon: Mitchel Hoskins Chi, MD;  [facility]     EXPLORE COMMON BILE DUCT N/A 6/25/2015    Procedure: EXPLORE COMMON BILE DUCT;  Surgeon: Luis  "MD Tyree;  [facility]     HERNIA REPAIR      During transplant     ILEOSTOMY N/A 2019    Procedure: Ileostomy;  Surgeon: Krzysztof Carney MD;  [facility]     LAPAROSCOPIC ASSISTED COLECTOMY N/A 2019    Procedure: Laparoscopic Total Abdominal Colectomy, lysis of adhesions;  Surgeon: Krzysztof Carney MD;  [facility]     LAPAROTOMY EXPLORATORY N/A 2015    Procedure: LAPAROTOMY EXPLORATORY;  Surgeon: Tyree Smith MD;  [facility]     PICC INSERTION Right 2015    4fr SL Valved PICC, 36cm (1cm external) in the R medial brachial vein w/ tip in the low SVC.     SIGMOIDOSCOPY FLEXIBLE N/A 2016    Procedure: SIGMOIDOSCOPY FLEXIBLE;  Surgeon: Jose Nielson MD;  [facility]     TRANSPLANT LIVER RECIPIENT LIVING UNRELATED N/A 2015    Procedure: TRANSPLANT LIVER RECIPIENT LIVING UNRELATED;  Surgeon: Tyree Smith MD;  [facility]     UPPER GI ENDOSCOPY         Family History   Problem Relation Age of Onset     Hypertension Mother      Lipids Mother      Sleep Apnea Mother      Hypertension Maternal Grandmother              Alzheimer Disease Maternal Grandmother      Lipids Father      Melanoma No family hx of      Skin Cancer No family hx of      Cancer No family hx of      Diabetes No family hx of        Allergies: Rifampin and Penicillins    Current Outpatient Medications   Medication Sig Dispense Refill     acetaminophen (TYLENOL) 500 MG tablet Take 2 tablets (1,000 mg) by mouth 4 times daily (Patient not taking: Reported on 2019) 50 tablet 0     calcium carbonate (OS-KERLINE 500 MG [place]. [place]) 1250 MG tablet Take 1 tablet by mouth every evening       levothyroxine (SYNTHROID/LEVOTHROID) 25 MCG tablet        multivitamin, therapeutic with minerals (MULTI-VITAMIN) TABS tablet Take 1 tablet by mouth every morning       order for DME [employer] item #s   97323 and 66275 30 Bag 3     order for DME Increase the amount to 10 per month  --2\" barrier ring #3003 10 " "each 0     predniSONE (DELTASONE) 5 MG tablet Take 1 tablet (5 mg) by mouth daily 90 tablet 3     tacrolimus (GENERIC EQUIVALENT) 0.5 MG capsule TAKE 5 CAPSULES (2.5 MG) EVERY 12 HOURS 900 capsule 3     trimethoprim-polymyxin b (POLYTRIM) 04699-3.1 UNIT/ML-% ophthalmic solution Place 1-2 drops Into the left eye every 4 hours (Patient not taking: Reported on 9/14/2020) 10 mL 0     ursodiol (ACTIGALL) 250 MG tablet Take 2 tablets (500 mg) by mouth every morning AND 1 tablet (250 mg) every evening. 270 tablet 3     VITAMIN D, CHOLECALCIFEROL, PO Take by mouth every evening         ROS:  C: NEGATIVE for fever, chills, change in weight  I: NEGATIVE for worrisome rashes, moles or lesions  E: NEGATIVE for vision changes or irritation  E/M: NEGATIVE for ear, mouth and throat problems  R: NEGATIVE for significant cough or SOB  CV: NEGATIVE for chest pain, palpitations or peripheral edema  GI: NEGATIVE for nausea, abdominal pain, heartburn, or change in bowel habits  : NEGATIVE for frequency, dysuria, hematuria, vaginal discharge  M: NEGATIVE for significant arthralgias or myalgia  N: NEGATIVE for weakness, dizziness or paresthesias  E: NEGATIVE for temperature intolerance, skin/hair changes  P: NEGATIVE for changes in mood or affect    EXAM:  Blood pressure 122/82, pulse 80, height 1.585 m (5' 2.4\"), weight 54.3 kg (119 lb 9.6 oz), last menstrual period 02/16/2021, not currently breastfeeding.   BMI= Body mass index is 21.6 kg/m .  General - pleasant female in no acute distress.    ASSESSMENT/PLAN:  Infertility and possible intrauterine polyp.      Plan to proceed with hysteroscopy, polypectomy. Risks benefits and alternatives were discussed with patient and she agreed to proceed.  H and P will be updated on AM of surgery as she had one for Egg retrieval end of January.       Letter will be sent to the referring provider.      Lin Armendariz MD, FACOG  (she/her/hers)    Department of Ob/Gyn/Women's " Health  [facility]  [place] [facility]  [address] [place] [address]  [email or web address]  p. [phone]  f. [phone]

## 2021-03-03 ENCOUNTER — TELEPHONE (OUTPATIENT)
Dept: OBGYN | Facility: CLINIC | Age: 36
End: 2021-03-03

## 2021-03-03 DIAGNOSIS — Z11.59 ENCOUNTER FOR SCREENING FOR OTHER VIRAL DISEASES: ICD-10-CM

## 2021-03-03 PROBLEM — N84.0 UTERINE POLYP: Status: ACTIVE | Noted: 2021-03-03

## 2021-03-08 DIAGNOSIS — Z11.59 ENCOUNTER FOR SCREENING FOR OTHER VIRAL DISEASES: ICD-10-CM

## 2021-03-08 LAB
LABORATORY COMMENT REPORT: NORMAL
SARS-COV-2 RNA RESP QL NAA+PROBE: NEGATIVE
SARS-COV-2 RNA RESP QL NAA+PROBE: NORMAL
SPECIMEN SOURCE: NORMAL
SPECIMEN SOURCE: NORMAL

## 2021-03-08 PROCEDURE — U0005 INFEC AGEN DETEC AMPLI PROBE: HCPCS | Performed by: OBSTETRICS & GYNECOLOGY

## 2021-03-08 PROCEDURE — U0003 INFECTIOUS AGENT DETECTION BY NUCLEIC ACID (DNA OR RNA); SEVERE ACUTE RESPIRATORY SYNDROME CORONAVIRUS 2 (SARS-COV-2) (CORONAVIRUS DISEASE [COVID-19]), AMPLIFIED PROBE TECHNIQUE, MAKING USE OF HIGH THROUGHPUT TECHNOLOGIES AS DESCRIBED BY CMS-2020-01-R: HCPCS | Performed by: OBSTETRICS & GYNECOLOGY

## 2021-03-11 NOTE — OP NOTE
[facility]  Operative Note    Date: 03/11/21    Preoperative diagnosis:  Intrauterine polyp    Postoperative diagnosis:  Same    Procedure:  Hysteroscopy, dilation and curettage, lysis of uterine adhesions    Surgeon:  Lin Armendariz MD    Assistant: Cresencio Penny MD PGY-1    Anesthesia:  MAC    Specimens:  Endometrial curettings    Complications: None apparent    EBL:  5 cc    IVF:  500 cc crystalloid     UOP:  Not measured    Fluid deficit:  145 cc    Findings:  Normal bimanual exam, anteverted uterus. No adnexal masses palpable. Normal uterine cavity with proliferative endometrium. Large endocervical polyp. No other abnormal structures fibroids identified. Normal tubal ostia bilaterally.    Indications:  Yarelis Penny is a 35 year old G0 presents for hysteroscopy, D&C in the setting of desired fertility s/p HSG with concern for intrauterine polyp.  Risks, benefits, and alternatives to the procedure were discussed with the patient who elected to proceed.  All questions were answered and an informed consent was obtained.    Procedure:  The patient was taken to the operating room where she underwent MAC anesthesia without difficulty.  She was placed in a dorsal lithotomy position using yellow fin stirrups.  The patient was examined for the above noted findings and then prepped and draped in the usual sterile fashion.  A safety timeout was performed.  A medium graves speculum was inserted into the vagina.  The anterior lip of the cervix was grasped with a single tooth tenaculum was placed.  A total of 20 cc of 1% lidocaine plain was then injected at 4 o'clock and 8 o'clock positions of the cervicovaginal junction. The endocervical canal was serially dilated to 6.5 mm using Hegar dilators.  The hysteroscope was inserted without difficulty with the above findings noted.  Visualization was achieved using saline as a distending medium.  The tissue was sent to pathology.   All instruments  were then removed.  The tenaculum was removed from the cervix and the puncture sites were hemostatic.  The patient was repositioned to the supine position.  The patient tolerated the procedure well and was taken to the recovery room in stable condition.  All sponge, lap, and needle counts were correct x2. Dr. Armendariz was scrubbed and present for the entire procedure.    Cresencio Penny MD  OBGYN PGY-1    Staff:  I was scrubbed and present for entire case and agree w/ above note.    Lin Armendariz MD

## 2021-03-12 ENCOUNTER — ANESTHESIA (OUTPATIENT)
Dept: SURGERY | Facility: CLINIC | Age: 36
End: 2021-03-12
Payer: COMMERCIAL

## 2021-03-12 ENCOUNTER — HOSPITAL ENCOUNTER (OUTPATIENT)
Facility: CLINIC | Age: 36
Discharge: HOME | End: 2021-03-12
Attending: OBSTETRICS & GYNECOLOGY | Admitting: OBSTETRICS & GYNECOLOGY
Payer: COMMERCIAL

## 2021-03-12 ENCOUNTER — ANESTHESIA EVENT (OUTPATIENT)
Dept: SURGERY | Facility: CLINIC | Age: 36
End: 2021-03-12
Payer: COMMERCIAL

## 2021-03-12 VITALS
BODY MASS INDEX: 21.75 KG/M2 | WEIGHT: 118.17 LBS | OXYGEN SATURATION: 98 % | HEART RATE: 74 BPM | DIASTOLIC BLOOD PRESSURE: 84 MMHG | HEIGHT: 62 IN | TEMPERATURE: 97.7 F | SYSTOLIC BLOOD PRESSURE: 109 MMHG | RESPIRATION RATE: 16 BRPM

## 2021-03-12 DIAGNOSIS — N84.0 UTERINE POLYP: ICD-10-CM

## 2021-03-12 LAB
ABO + RH BLD: NORMAL
ABO + RH BLD: NORMAL
B-HCG SERPL-ACNC: 2 IU/L (ref 0–5)
BLD GP AB SCN SERPL QL: NORMAL
BLOOD BANK CMNT PATIENT-IMP: NORMAL
CREAT SERPL-MCNC: 1.18 MG/DL (ref 0.52–1.04)
GFR SERPL CREATININE-BSD FRML MDRD: 59 ML/MIN/{1.73_M2}
GLUCOSE BLDC GLUCOMTR-MCNC: 92 MG/DL (ref 70–99)
HGB BLD-MCNC: 14 G/DL (ref 11.7–15.7)
POTASSIUM SERPL-SCNC: 3.8 MMOL/L (ref 3.4–5.3)
SPECIMEN EXP DATE BLD: NORMAL

## 2021-03-12 PROCEDURE — 84702 CHORIONIC GONADOTROPIN TEST: CPT | Performed by: ANESTHESIOLOGY

## 2021-03-12 PROCEDURE — 710N000012 HC RECOVERY PHASE 2, PER MINUTE: Performed by: OBSTETRICS & GYNECOLOGY

## 2021-03-12 PROCEDURE — 360N000076 HC SURGERY LEVEL 3, PER MIN: Performed by: OBSTETRICS & GYNECOLOGY

## 2021-03-12 PROCEDURE — 84132 ASSAY OF SERUM POTASSIUM: CPT | Performed by: ANESTHESIOLOGY

## 2021-03-12 PROCEDURE — 86850 RBC ANTIBODY SCREEN: CPT | Performed by: ANESTHESIOLOGY

## 2021-03-12 PROCEDURE — 250N000009 HC RX 250: Performed by: OBSTETRICS & GYNECOLOGY

## 2021-03-12 PROCEDURE — 250N000013 HC RX MED GY IP 250 OP 250 PS 637: Performed by: OBSTETRICS & GYNECOLOGY

## 2021-03-12 PROCEDURE — 250N000011 HC RX IP 250 OP 636: Performed by: NURSE ANESTHETIST, CERTIFIED REGISTERED

## 2021-03-12 PROCEDURE — 999N000141 HC STATISTIC PRE-PROCEDURE NURSING ASSESSMENT: Performed by: OBSTETRICS & GYNECOLOGY

## 2021-03-12 PROCEDURE — 258N000003 HC RX IP 258 OP 636: Performed by: NURSE ANESTHETIST, CERTIFIED REGISTERED

## 2021-03-12 PROCEDURE — 86901 BLOOD TYPING SEROLOGIC RH(D): CPT | Performed by: ANESTHESIOLOGY

## 2021-03-12 PROCEDURE — 272N000001 HC OR GENERAL SUPPLY STERILE: Performed by: OBSTETRICS & GYNECOLOGY

## 2021-03-12 PROCEDURE — 88305 TISSUE EXAM BY PATHOLOGIST: CPT | Mod: TC | Performed by: OBSTETRICS & GYNECOLOGY

## 2021-03-12 PROCEDURE — 36415 COLL VENOUS BLD VENIPUNCTURE: CPT | Performed by: ANESTHESIOLOGY

## 2021-03-12 PROCEDURE — 82565 ASSAY OF CREATININE: CPT | Performed by: ANESTHESIOLOGY

## 2021-03-12 PROCEDURE — 88305 TISSUE EXAM BY PATHOLOGIST: CPT | Mod: 26 | Performed by: PATHOLOGY

## 2021-03-12 PROCEDURE — 370N000017 HC ANESTHESIA TECHNICAL FEE, PER MIN: Performed by: OBSTETRICS & GYNECOLOGY

## 2021-03-12 PROCEDURE — 85018 HEMOGLOBIN: CPT | Performed by: ANESTHESIOLOGY

## 2021-03-12 PROCEDURE — 86900 BLOOD TYPING SEROLOGIC ABO: CPT | Performed by: ANESTHESIOLOGY

## 2021-03-12 PROCEDURE — 82962 GLUCOSE BLOOD TEST: CPT

## 2021-03-12 PROCEDURE — 250N000009 HC RX 250: Performed by: NURSE ANESTHETIST, CERTIFIED REGISTERED

## 2021-03-12 RX ORDER — ONDANSETRON 2 MG/ML
INJECTION INTRAMUSCULAR; INTRAVENOUS PRN
Status: DISCONTINUED | OUTPATIENT
Start: 2021-03-12 | End: 2021-03-12

## 2021-03-12 RX ORDER — LIDOCAINE 40 MG/G
CREAM TOPICAL
Status: DISCONTINUED | OUTPATIENT
Start: 2021-03-12 | End: 2021-03-12 | Stop reason: HOSPADM

## 2021-03-12 RX ORDER — LIDOCAINE HYDROCHLORIDE 20 MG/ML
INJECTION, SOLUTION INFILTRATION; PERINEURAL PRN
Status: DISCONTINUED | OUTPATIENT
Start: 2021-03-12 | End: 2021-03-12

## 2021-03-12 RX ORDER — FENTANYL CITRATE 50 UG/ML
INJECTION, SOLUTION INTRAMUSCULAR; INTRAVENOUS PRN
Status: DISCONTINUED | OUTPATIENT
Start: 2021-03-12 | End: 2021-03-12

## 2021-03-12 RX ORDER — NALOXONE HYDROCHLORIDE 0.4 MG/ML
0.2 INJECTION, SOLUTION INTRAMUSCULAR; INTRAVENOUS; SUBCUTANEOUS
Status: DISCONTINUED | OUTPATIENT
Start: 2021-03-12 | End: 2021-03-12 | Stop reason: HOSPADM

## 2021-03-12 RX ORDER — PROPOFOL 10 MG/ML
INJECTION, EMULSION INTRAVENOUS CONTINUOUS PRN
Status: DISCONTINUED | OUTPATIENT
Start: 2021-03-12 | End: 2021-03-12

## 2021-03-12 RX ORDER — MEPERIDINE HYDROCHLORIDE 25 MG/ML
12.5 INJECTION INTRAMUSCULAR; INTRAVENOUS; SUBCUTANEOUS
Status: DISCONTINUED | OUTPATIENT
Start: 2021-03-12 | End: 2021-03-12 | Stop reason: HOSPADM

## 2021-03-12 RX ORDER — ACETAMINOPHEN 325 MG/1
975 TABLET ORAL ONCE
Status: COMPLETED | OUTPATIENT
Start: 2021-03-12 | End: 2021-03-12

## 2021-03-12 RX ORDER — NALOXONE HYDROCHLORIDE 0.4 MG/ML
0.4 INJECTION, SOLUTION INTRAMUSCULAR; INTRAVENOUS; SUBCUTANEOUS
Status: DISCONTINUED | OUTPATIENT
Start: 2021-03-12 | End: 2021-03-12 | Stop reason: HOSPADM

## 2021-03-12 RX ORDER — SODIUM CHLORIDE, SODIUM LACTATE, POTASSIUM CHLORIDE, CALCIUM CHLORIDE 600; 310; 30; 20 MG/100ML; MG/100ML; MG/100ML; MG/100ML
INJECTION, SOLUTION INTRAVENOUS CONTINUOUS
Status: DISCONTINUED | OUTPATIENT
Start: 2021-03-12 | End: 2021-03-12 | Stop reason: HOSPADM

## 2021-03-12 RX ORDER — LIDOCAINE HYDROCHLORIDE 10 MG/ML
INJECTION, SOLUTION INFILTRATION; PERINEURAL PRN
Status: DISCONTINUED | OUTPATIENT
Start: 2021-03-12 | End: 2021-03-12 | Stop reason: HOSPADM

## 2021-03-12 RX ORDER — ONDANSETRON 2 MG/ML
4 INJECTION INTRAMUSCULAR; INTRAVENOUS EVERY 30 MIN PRN
Status: DISCONTINUED | OUTPATIENT
Start: 2021-03-12 | End: 2021-03-12 | Stop reason: HOSPADM

## 2021-03-12 RX ORDER — ONDANSETRON 4 MG/1
4 TABLET, ORALLY DISINTEGRATING ORAL EVERY 30 MIN PRN
Status: DISCONTINUED | OUTPATIENT
Start: 2021-03-12 | End: 2021-03-12 | Stop reason: HOSPADM

## 2021-03-12 RX ORDER — FENTANYL CITRATE 50 UG/ML
25-50 INJECTION, SOLUTION INTRAMUSCULAR; INTRAVENOUS
Status: DISCONTINUED | OUTPATIENT
Start: 2021-03-12 | End: 2021-03-12 | Stop reason: HOSPADM

## 2021-03-12 RX ORDER — PROPOFOL 10 MG/ML
INJECTION, EMULSION INTRAVENOUS PRN
Status: DISCONTINUED | OUTPATIENT
Start: 2021-03-12 | End: 2021-03-12

## 2021-03-12 RX ORDER — DEXAMETHASONE SODIUM PHOSPHATE 4 MG/ML
INJECTION, SOLUTION INTRA-ARTICULAR; INTRALESIONAL; INTRAMUSCULAR; INTRAVENOUS; SOFT TISSUE PRN
Status: DISCONTINUED | OUTPATIENT
Start: 2021-03-12 | End: 2021-03-12

## 2021-03-12 RX ORDER — FENTANYL CITRATE 50 UG/ML
25-50 INJECTION, SOLUTION INTRAMUSCULAR; INTRAVENOUS EVERY 5 MIN PRN
Status: DISCONTINUED | OUTPATIENT
Start: 2021-03-12 | End: 2021-03-12 | Stop reason: HOSPADM

## 2021-03-12 RX ORDER — SODIUM CHLORIDE, SODIUM LACTATE, POTASSIUM CHLORIDE, CALCIUM CHLORIDE 600; 310; 30; 20 MG/100ML; MG/100ML; MG/100ML; MG/100ML
INJECTION, SOLUTION INTRAVENOUS CONTINUOUS PRN
Status: DISCONTINUED | OUTPATIENT
Start: 2021-03-12 | End: 2021-03-12

## 2021-03-12 RX ADMIN — PROPOFOL 150 MCG/KG/MIN: 10 INJECTION, EMULSION INTRAVENOUS at 11:05

## 2021-03-12 RX ADMIN — LIDOCAINE HYDROCHLORIDE 40 MG: 20 INJECTION, SOLUTION INFILTRATION; PERINEURAL at 11:05

## 2021-03-12 RX ADMIN — ONDANSETRON 4 MG: 2 INJECTION INTRAMUSCULAR; INTRAVENOUS at 11:11

## 2021-03-12 RX ADMIN — PROPOFOL 80 MG: 10 INJECTION, EMULSION INTRAVENOUS at 11:05

## 2021-03-12 RX ADMIN — DEXAMETHASONE SODIUM PHOSPHATE 4 MG: 4 INJECTION, SOLUTION INTRAMUSCULAR; INTRAVENOUS at 11:08

## 2021-03-12 RX ADMIN — MIDAZOLAM 2 MG: 1 INJECTION INTRAMUSCULAR; INTRAVENOUS at 10:57

## 2021-03-12 RX ADMIN — FENTANYL CITRATE 50 MCG: 50 INJECTION, SOLUTION INTRAMUSCULAR; INTRAVENOUS at 11:15

## 2021-03-12 RX ADMIN — SODIUM CHLORIDE, POTASSIUM CHLORIDE, SODIUM LACTATE AND CALCIUM CHLORIDE: 600; 310; 30; 20 INJECTION, SOLUTION INTRAVENOUS at 10:57

## 2021-03-12 RX ADMIN — PROPOFOL 30 MG: 10 INJECTION, EMULSION INTRAVENOUS at 11:15

## 2021-03-12 RX ADMIN — ACETAMINOPHEN 975 MG: 325 TABLET, FILM COATED ORAL at 10:03

## 2021-03-12 ASSESSMENT — MIFFLIN-ST. JEOR: SCORE: 1190.63

## 2021-03-12 ASSESSMENT — ENCOUNTER SYMPTOMS
APNEA: 0
ROS SKIN COMMENTS: NO ACUTE ISSUES

## 2021-03-12 NOTE — H&P
[facility]  OB History and Physical      Yarelis Penny MRN# [id number]   Age: 35 year old YOB: 1985     CC:  Hysteroscopy, D&C, lysis of uterine adhesions    HPI:  Yarelis Penny is a 35 year old G0, who presents for lysis of uterine adhesion via hysteroscopy.  Patient had an HSG with concern for polyp/adhesions. Patient desires fertility and is s/p egg retrieval for IVF. She has a complex medical history including primary sclerosing cholangitis s/p liver transplant and colectomy.    Prenatal Labs:   Lab Results   Component Value Date    ABO A 03/12/2021    RH Pos 03/12/2021    AS Neg 03/12/2021    HEPBANG Nonreactive 06/15/2020    CHPCRT Negative 06/15/2020    GCPCRT Negative 06/15/2020    TREPAB Negative 06/19/2012    HGB 14.0 03/12/2021    HIV Negative 06/19/2012     OB History  OB History   No obstetric history on file.       PMHx:   Past Medical History:   Diagnosis Date     Acute kidney injury (H) 4/28/2016     Anemia, iron deficiency 8/29/2017     Autoimmune hepatitis (H) 2012    on steroid taper     Cholangitis      CKD (chronic kidney disease) 2012    biopsy 2012: TIN, patchy fibrosis     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     Esophageal varices (H) 9/08    banded     Heart murmur      History of blood transfusion      Primary sclerosing cholangitis      SBP (spontaneous bacterial peritonitis) (H) 2/24/2015     Ulcerative Colitis     f/b GI     PSHx:   Past Surgical History:   Procedure Laterality Date     CHOLECYSTECTOMY       COLONOSCOPY  9/07     COLONOSCOPY N/A 2/26/2015    Procedure: COMBINED COLONOSCOPY, SINGLE OR MULTIPLE BIOPSY/POLYPECTOMY BY BIOPSY;  Surgeon: Mitchel Hoskins Chi, MD;  [facility]     COLONOSCOPY N/A 1/12/2016    Procedure: COMBINED COLONOSCOPY, SINGLE OR MULTIPLE BIOPSY/POLYPECTOMY BY BIOPSY;  Surgeon: Rigo Valles MD;  [facility]     COLONOSCOPY N/A 4/1/2016    Procedure: COMBINED COLONOSCOPY, SINGLE OR MULTIPLE BIOPSY/POLYPECTOMY BY BIOPSY;   Surgeon: aKreem Solis MD;  [facility]     COLONOSCOPY N/A 9/5/2017    Procedure: COMBINED COLONOSCOPY, SINGLE OR MULTIPLE BIOPSY/POLYPECTOMY BY BIOPSY;  Colonoscopy;  Surgeon: Fuentes Johnson MD;  [facility]     COLONOSCOPY N/A 2/25/2019    Procedure: COMBINED COLONOSCOPY, SINGLE OR MULTIPLE BIOPSY/POLYPECTOMY BY BIOPSY;  Surgeon: Sophy Mckeon MD;  [facility]     ENDOSCOPIC RETROGRADE CHOLANGIOPANCREATOGRAM N/A 6/25/2015    Procedure: COMBINED ENDOSCOPIC RETROGRADE CHOLANGIOPANCREATOGRAPHY, PLACE TUBE/STENT;  Surgeon: Landon Quinones MD;  [facility]     ENDOSCOPIC RETROGRADE CHOLANGIOPANCREATOGRAM N/A 6/25/2015    Procedure: COMBINED ENDOSCOPIC RETROGRADE CHOLANGIOPANCREATOGRAPHY, PLACE TUBE/STENT;  Surgeon: Landon Quinones MD;  [facility]     ENDOSCOPIC RETROGRADE CHOLANGIOPANCREATOGRAM N/A 7/2/2015    Procedure: COMBINED ENDOSCOPIC RETROGRADE CHOLANGIOPANCREATOGRAPHY, PLACE TUBE/STENT;  Surgeon: Landon Quinones MD;  [facility]     ENDOSCOPIC RETROGRADE CHOLANGIOPANCREATOGRAM N/A 9/8/2015    Procedure: COMBINED ENDOSCOPIC RETROGRADE CHOLANGIOPANCREATOGRAPHY, REMOVE FOREIGN BODY OR STENT/TUBE;  Surgeon: Landon Quinones MD;  [facility]     ENDOSCOPIC RETROGRADE CHOLANGIOPANCREATOGRAM N/A 12/8/2015    Procedure: ENDOSCOPIC RETROGRADE CHOLANGIOPANCREATOGRAM;  Surgeon: Landon Quinones MD;  [facility]     ENDOSCOPIC RETROGRADE CHOLANGIOPANCREATOGRAM N/A 3/1/2016    Procedure: COMBINED ENDOSCOPIC RETROGRADE CHOLANGIOPANCREATOGRAPHY, REMOVE FOREIGN BODY OR STENT/TUBE;  Surgeon: Landon Quinones MD;  [facility]     ENDOSCOPIC RETROGRADE CHOLANGIOPANCREATOGRAM N/A 3/20/2017    Procedure: COMBINED ENDOSCOPIC RETROGRADE CHOLANGIOPANCREATOGRAPHY, PLACE TUBE/STENT;  Endoscopic Retrograde Cholangiopancreatogram with Ballon Dilation, Stent Placement;  Surgeon: Guru Brittany Macias MD;  [facility]      ENDOSCOPIC RETROGRADE CHOLANGIOPANCREATOGRAM N/A 4/2/2018    Procedure: COMBINED ENDOSCOPIC RETROGRADE CHOLANGIOPANCREATOGRAPHY, PLACE TUBE/STENT;  Endoscopic Retrograde Cholangiopancreatogram with biliary dilation and stent placement;  Surgeon: Guru Brittany Macias MD;  [facility]     ENDOSCOPIC RETROGRADE CHOLANGIOPANCREATOGRAM N/A 5/7/2018    Procedure: ENDOSCOPIC RETROGRADE CHOLANGIOPANCREATOGRAM;  Endoscopic Retrograde Cholangiopancreatogram ballon dilation stent exchange;  Surgeon: Guru Brittany Macias MD;  [facility]     ENDOSCOPIC RETROGRADE CHOLANGIOPANCREATOGRAPHY, EXCHANGE TUBE/STENT N/A 7/30/2015    Procedure: ENDOSCOPIC RETROGRADE CHOLANGIOPANCREATOGRAPHY, EXCHANGE TUBE/STENT;  Surgeon: Landon Quinones MD;  [facility]     ENDOSCOPIC RETROGRADE CHOLANGIOPANCREATOGRAPHY, EXCHANGE TUBE/STENT N/A 5/15/2017    Procedure: ENDOSCOPIC RETROGRADE CHOLANGIOPANCREATOGRAPHY, EXCHANGE TUBE/STENT;  Endoscopic Retrograde Cholangiopancreatogram with biliary stent exchange and balloon dilation;  Surgeon: Guru Brittany Macias MD;  [facility]     ENDOSCOPIC RETROGRADE CHOLANGIOPANCREATOGRAPHY, EXCHANGE TUBE/STENT N/A 6/25/2018    Procedure: ENDOSCOPIC RETROGRADE CHOLANGIOPANCREATOGRAPHY, EXCHANGE TUBE/STENT;  Endoscopic Retrograde Cholangiopancreatogram with biliary dilation, stone removal and stent exchange;  Surgeon: Guru Brittany Macias MD;  [facility]     ENDOSCOPIC RETROGRADE CHOLANGIOPANCREATOGRAPHY, EXCHANGE TUBE/STENT N/A 7/30/2018    Procedure: ENDOSCOPIC RETROGRADE CHOLANGIOPANCREATOGRAPHY, EXCHANGE TUBE/STENT;  Endoscopic Retrograde Cholangiopancreatogram with Stent Exchange with dilation;  Surgeon: Guru Brittany Macias MD;  [facility]     ENDOSCOPIC RETROGRADE CHOLANGIOPANCREATOGRAPHY, EXCHANGE TUBE/STENT N/A 9/26/2018    Procedure: ENDOSCOPIC RETROGRADE CHOLANGIOPANCREATOGRAPHY, EXCHANGE  TUBE/STENT;  Endoscopic Retrograde Cholangiopancreatogram, with bile duct stent exchanged balloon dilation and balloon sweep of bile duct;  Surgeon: Guru Brittany Macias MD;  [facility]     ESOPHAGOSCOPY, GASTROSCOPY, DUODENOSCOPY (EGD), COMBINED N/A 2015    Procedure: COMBINED ESOPHAGOSCOPY, GASTROSCOPY, DUODENOSCOPY (EGD);  Surgeon: Mitchel Hoskins Chi, MD;  [facility]     EXPLORE COMMON BILE DUCT N/A 2015    Procedure: EXPLORE COMMON BILE DUCT;  Surgeon: Tyree Smith MD;  [facility]     HERNIA REPAIR      During transplant     ILEOSTOMY N/A 2019    Procedure: Ileostomy;  Surgeon: Krzysztof Carney MD;  [facility]     LAPAROSCOPIC ASSISTED COLECTOMY N/A 2019    Procedure: Laparoscopic Total Abdominal Colectomy, lysis of adhesions;  Surgeon: Krzysztof Carney MD;  [facility]     LAPAROTOMY EXPLORATORY N/A 2015    Procedure: LAPAROTOMY EXPLORATORY;  Surgeon: Tyree Smith MD;  [facility]     PICC INSERTION Right 2015    4fr SL Valved PICC, 36cm (1cm external) in the R medial brachial vein w/ tip in the low SVC.     SIGMOIDOSCOPY FLEXIBLE N/A 2016    Procedure: SIGMOIDOSCOPY FLEXIBLE;  Surgeon: Jose Nielson MD;  [facility]     TRANSPLANT LIVER RECIPIENT LIVING UNRELATED N/A 2015    Procedure: TRANSPLANT LIVER RECIPIENT LIVING UNRELATED;  Surgeon: Tyree Smith MD;  [facility]     UPPER GI ENDOSCOPY       Meds:  Tacrolimus 0.5mg daily  Prendnisone 5 mg daily  Synthroid 25mg daily       Allergies:    Allergies   Allergen Reactions     Rifampin Other (See Comments)     Kidney failure     Penicillins      Bad hives when she was in college      FmHx:   Family History   Problem Relation Age of Onset     Hypertension Mother      Lipids Mother      Sleep Apnea Mother      Hypertension Maternal Grandmother              Alzheimer Disease Maternal Grandmother      Lipids Father      Melanoma No family hx of      Skin  "Cancer No family hx of      Cancer No family hx of      Diabetes No family hx of        ROS:   Complete 10-point ROS negative except as noted in HPI.  PE:  Vit:   Patient Vitals for the past 4 hrs:   BP Temp Temp src Pulse Resp SpO2 Height Weight   03/12/21 0923 (!) 117/90 98.4  F (36.9  C) Oral 74 16 100 % 1.585 m (5' 2.4\") 53.6 kg (118 lb 2.7 oz)      Gen: Well-appearing, NAD, comfortable   CV: RRR, no mrg   Pulm: CTAB, no wheezes or crackles   Abd: Soft, gravid, non-tender   Ext:  LE edema b/l    Assessment:  Yarelis Penny is a 35 year old G0 who presents for hysteroscopy, D&C, lysis of uterine adhesions.    Plan  - NPO since midnight  - To OR  - Patient can be discharged to home after procedure.     The patient was discussed with Dr. Armendariz who is in agreement with the treatment plan.    Cresencio Penny MD  Obstetrics, Gynecology, and Women's Health  PGY1    [facility] staff:  Appreciate note by Dr. Penny.  I have seen and examined the patient without the resident. I have reviewed, edited, and agree with the note.        Lin Armendariz MD, FACOG  3/12/2021  1:04 PM        "

## 2021-03-12 NOTE — DISCHARGE INSTRUCTIONS
Same-Day Surgery   Adult Discharge Orders & Instructions     For 24 hours after surgery:  1. Get plenty of rest.  A responsible adult must stay with you for at least 24 hours after you leave the hospital.   2. Pain medication can slow your reflexes. Do not drive or use heavy equipment.  If you have weakness or tingling, don't drive or use heavy equipment until this feeling goes away.  3. Mixing alcohol and pain medication can cause dizziness and slow your breathing. It can even be fatal. Do not drink alcohol while taking pain medication.  4. Avoid strenuous or risky activities.  Ask for help when climbing stairs.   5. You may feel lightheaded.  If so, sit for a few minutes before standing.  Have someone help you get up.   6. If you have nausea (feel sick to your stomach), drink only clear liquids such as apple juice, ginger ale, broth or 7-Up.  Rest may also help.  Be sure to drink enough fluids.  Move to a regular diet as you feel able. Take pain medications with a small amount of solid food, such as toast or crackers, to avoid nausea.   7. A slight fever is normal. Call the doctor if your fever is over 100 F (37.7 C) (taken under the tongue) or lasts longer than 24 hours.  8. You may have a dry mouth, muscle aches, trouble sleeping or a sore throat.  These symptoms should go away after 24 hours.  9. Do not make important or legal decisions.   Pain Management:      1. Take pain medication (if prescribed) for pain as directed by your physician.        2. WARNING: If the pain medication you have been prescribed contains Tylenol  (acetaminophen), DO NOT take additional doses of Tylenol (acetaminophen).     Call your doctor for any of the followin.  Signs of infection (fever, growing tenderness at the surgery site, severe pain, a large amount of drainage or bleeding, foul-smelling drainage, redness, swelling).    2.  It has been over 8 to 10 hours since surgery and you are still not able to urinate (pee).    3.   Headache for over 24 hours.    4.  Numbness, tingling or weakness the day after surgery (if you had spinal anesthesia).  To contact a doctor, call _____________________________________ or:      [phone] and ask for the Resident On Call for:          __________________________________________ (answered 24 hours a day)      Emergency Department:  [facility]: [phone]  [place] [facility]: [phone]               Rev. 10/2014   What happens after hysteroscopy?    You may have cramps and bleeding for 24 hours after the procedure. This is normal. Use pads instead of tampons.    Do not douche or use tampons until your health care provider says it s OK.    Do not use any vaginal medicines until you are told it s OK.    Ask your health care provider when it s OK to have sex again.  When should I call my health care provider?  Call your health care provider if you have:    Heavy bleeding (more than 1 pad an hour for 2 or more hours)    A fever over 100.4 F (38.0 C)    Increasing abdominal pain or tenderness    Foul-smelling discharge  Follow-up care  Schedule a follow-up visit with your health care provider. Based on the results of your test, you may need more treatment. Be sure to follow instructions and keep your appointments.    Important numbers  [facility] ([address]) [employer]: [phone]   [facility] ([address]) : [phone]      2740-1504 [employer]. [address] [place] [address], [place] [address]. All rights reserved. This information is not intended as a substitute for professional medical care. Always follow your healthcare professional's instructions.

## 2021-03-12 NOTE — ANESTHESIA PREPROCEDURE EVALUATION
Anesthesia Pre-Procedure Evaluation    Patient: Yarelis Penny   MRN:     [id number] Gender:   female   Age:    35 year old :      1985        Preoperative Diagnosis: Uterine polyp [N84.0]   Procedure(s):  HYSTEROSCOPY, DIAGNOSTIC  LYSIS, ADHESIONS, LAPAROSCOPIC ,  possible polypectomy or lysis of adhesions     LABS:  CBC:   Lab Results   Component Value Date    WBC 12.7 (H) 2021    WBC 11.6 (H) 2020    HGB 14.0 2021    HGB 13.2 2021    HCT 40.1 2021    HCT 45.1 2020     2021     2020     BMP:   Lab Results   Component Value Date     2021     2020    POTASSIUM 3.8 2021    POTASSIUM 3.9 2021    CHLORIDE 112 (H) 2021    CHLORIDE 107 2020    CO2 17 (L) 2021    CO2 22 2020    BUN 31 (H) 2021    BUN 25 2020    CR 1.18 (H) 2021    CR 1.27 (H) 2021    GLC 93 2021    GLC 92 2020     COAGS:   Lab Results   Component Value Date    PTT 56 (H) 2016    INR 1.02 2019    FIBR 325 2019     POC:   Lab Results   Component Value Date    BGM 92 2021    HCG Negative 2019    HCGS Negative 2018     OTHER:   Lab Results   Component Value Date    PH 7.42 2015    LACT 1.4 2016    A1C 5.1 06/15/2020    KERLINE 9.2 2021    PHOS 3.9 2018    MAG 1.6 2019    ALBUMIN 3.7 2021    PROTTOTAL 8.1 2021    ALT 42 2021    AST 27 2021     (H) 2016    ALKPHOS 105 2021    BILITOTAL 0.5 2021    LIPASE 270 2018    AMYLASE 124 (H) 2018    TAWNYA 40 (H) 2012    TSH 3.71 06/15/2020    CRP 5.7 2019    SED 46 (H) 2019        Preop Vitals    BP Readings from Last 3 Encounters:   21 (!) 117/90   21 122/82   21 114/79    Pulse Readings from Last 3 Encounters:   21 74   21 80   21 88      Resp Readings from Last 3 Encounters:  "  03/12/21 16   08/08/19 16   06/24/19 16    SpO2 Readings from Last 3 Encounters:   03/12/21 100%   01/26/21 100%   08/08/19 98%      Temp Readings from Last 1 Encounters:   03/12/21 36.9  C (98.4  F) (Oral)    Ht Readings from Last 1 Encounters:   03/12/21 1.585 m (5' 2.4\")      Wt Readings from Last 1 Encounters:   03/12/21 53.6 kg (118 lb 2.7 oz)    Estimated body mass index is 21.34 kg/m  as calculated from the following:    Height as of this encounter: 1.585 m (5' 2.4\").    Weight as of this encounter: 53.6 kg (118 lb 2.7 oz).     LDA:  Peripheral IV 03/12/21 Left Lower forearm (Active)   Site Assessment WDL 03/12/21 1018   Line Status Saline locked 03/12/21 1018   Phlebitis Scale 0-->no symptoms 03/12/21 1018   Number of days: 0       Ileostomy (Active)   Stomal Appliance 2 piece 03/12/21 1000   Stoma Assessment [place] 03/12/21 1000   Peristomal Assessment Intact 03/12/21 1000   Number of days: 0        Past Medical History:   Diagnosis Date     Acute kidney injury (H) 4/28/2016     Anemia, iron deficiency 8/29/2017     Autoimmune hepatitis (H) 2012    on steroid taper     Cholangitis      CKD (chronic kidney disease) 2012    biopsy 2012: TIN, patchy fibrosis     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     Esophageal varices (H) 9/08    banded     Heart murmur      History of blood transfusion      Primary sclerosing cholangitis      SBP (spontaneous bacterial peritonitis) (H) 2/24/2015     Ulcerative Colitis     f/b GI      Past Surgical History:   Procedure Laterality Date     CHOLECYSTECTOMY       COLONOSCOPY  9/07     COLONOSCOPY N/A 2/26/2015    Procedure: COMBINED COLONOSCOPY, SINGLE OR MULTIPLE BIOPSY/POLYPECTOMY BY BIOPSY;  Surgeon: Mitchel Hoskins Chi, MD;  [facility]     COLONOSCOPY N/A 1/12/2016    Procedure: COMBINED COLONOSCOPY, SINGLE OR MULTIPLE BIOPSY/POLYPECTOMY BY BIOPSY;  Surgeon: Rigo Valles MD;  [facility]     COLONOSCOPY N/A 4/1/2016    Procedure: COMBINED COLONOSCOPY, SINGLE OR " MULTIPLE BIOPSY/POLYPECTOMY BY BIOPSY;  Surgeon: Kareem Solis MD;  [facility]     COLONOSCOPY N/A 9/5/2017    Procedure: COMBINED COLONOSCOPY, SINGLE OR MULTIPLE BIOPSY/POLYPECTOMY BY BIOPSY;  Colonoscopy;  Surgeon: Fuentes Johnson MD;  [facility]     COLONOSCOPY N/A 2/25/2019    Procedure: COMBINED COLONOSCOPY, SINGLE OR MULTIPLE BIOPSY/POLYPECTOMY BY BIOPSY;  Surgeon: Sophy Mckeon MD;  [facility]     ENDOSCOPIC RETROGRADE CHOLANGIOPANCREATOGRAM N/A 6/25/2015    Procedure: COMBINED ENDOSCOPIC RETROGRADE CHOLANGIOPANCREATOGRAPHY, PLACE TUBE/STENT;  Surgeon: Landon Quinones MD;  [facility]     ENDOSCOPIC RETROGRADE CHOLANGIOPANCREATOGRAM N/A 6/25/2015    Procedure: COMBINED ENDOSCOPIC RETROGRADE CHOLANGIOPANCREATOGRAPHY, PLACE TUBE/STENT;  Surgeon: Landon Quinones MD;  [facility]     ENDOSCOPIC RETROGRADE CHOLANGIOPANCREATOGRAM N/A 7/2/2015    Procedure: COMBINED ENDOSCOPIC RETROGRADE CHOLANGIOPANCREATOGRAPHY, PLACE TUBE/STENT;  Surgeon: Landon Quinones MD;  [facility]     ENDOSCOPIC RETROGRADE CHOLANGIOPANCREATOGRAM N/A 9/8/2015    Procedure: COMBINED ENDOSCOPIC RETROGRADE CHOLANGIOPANCREATOGRAPHY, REMOVE FOREIGN BODY OR STENT/TUBE;  Surgeon: Landon Quinones MD;  [facility]     ENDOSCOPIC RETROGRADE CHOLANGIOPANCREATOGRAM N/A 12/8/2015    Procedure: ENDOSCOPIC RETROGRADE CHOLANGIOPANCREATOGRAM;  Surgeon: Landon Quinones MD;  [facility]     ENDOSCOPIC RETROGRADE CHOLANGIOPANCREATOGRAM N/A 3/1/2016    Procedure: COMBINED ENDOSCOPIC RETROGRADE CHOLANGIOPANCREATOGRAPHY, REMOVE FOREIGN BODY OR STENT/TUBE;  Surgeon: Landon Quinones MD;  [facility]     ENDOSCOPIC RETROGRADE CHOLANGIOPANCREATOGRAM N/A 3/20/2017    Procedure: COMBINED ENDOSCOPIC RETROGRADE CHOLANGIOPANCREATOGRAPHY, PLACE TUBE/STENT;  Endoscopic Retrograde Cholangiopancreatogram with Ballon Dilation, Stent Placement;  Surgeon: Guru Colleen  Brittany Vail MD;  [facility]     ENDOSCOPIC RETROGRADE CHOLANGIOPANCREATOGRAM N/A 4/2/2018    Procedure: COMBINED ENDOSCOPIC RETROGRADE CHOLANGIOPANCREATOGRAPHY, PLACE TUBE/STENT;  Endoscopic Retrograde Cholangiopancreatogram with biliary dilation and stent placement;  Surgeon: Guru Brittany Macias MD;  [facility]     ENDOSCOPIC RETROGRADE CHOLANGIOPANCREATOGRAM N/A 5/7/2018    Procedure: ENDOSCOPIC RETROGRADE CHOLANGIOPANCREATOGRAM;  Endoscopic Retrograde Cholangiopancreatogram ballon dilation stent exchange;  Surgeon: Guru Brittany Macias MD;  [facility]     ENDOSCOPIC RETROGRADE CHOLANGIOPANCREATOGRAPHY, EXCHANGE TUBE/STENT N/A 7/30/2015    Procedure: ENDOSCOPIC RETROGRADE CHOLANGIOPANCREATOGRAPHY, EXCHANGE TUBE/STENT;  Surgeon: Landon Quinones MD;  [facility]     ENDOSCOPIC RETROGRADE CHOLANGIOPANCREATOGRAPHY, EXCHANGE TUBE/STENT N/A 5/15/2017    Procedure: ENDOSCOPIC RETROGRADE CHOLANGIOPANCREATOGRAPHY, EXCHANGE TUBE/STENT;  Endoscopic Retrograde Cholangiopancreatogram with biliary stent exchange and balloon dilation;  Surgeon: Guru Brittany Macias MD;  [facility]     ENDOSCOPIC RETROGRADE CHOLANGIOPANCREATOGRAPHY, EXCHANGE TUBE/STENT N/A 6/25/2018    Procedure: ENDOSCOPIC RETROGRADE CHOLANGIOPANCREATOGRAPHY, EXCHANGE TUBE/STENT;  Endoscopic Retrograde Cholangiopancreatogram with biliary dilation, stone removal and stent exchange;  Surgeon: Guru Brittany Macias MD;  [facility]     ENDOSCOPIC RETROGRADE CHOLANGIOPANCREATOGRAPHY, EXCHANGE TUBE/STENT N/A 7/30/2018    Procedure: ENDOSCOPIC RETROGRADE CHOLANGIOPANCREATOGRAPHY, EXCHANGE TUBE/STENT;  Endoscopic Retrograde Cholangiopancreatogram with Stent Exchange with dilation;  Surgeon: Guru Brittany Macias MD;  [facility]     ENDOSCOPIC RETROGRADE CHOLANGIOPANCREATOGRAPHY, EXCHANGE TUBE/STENT N/A 9/26/2018    Procedure: ENDOSCOPIC RETROGRADE  CHOLANGIOPANCREATOGRAPHY, EXCHANGE TUBE/STENT;  Endoscopic Retrograde Cholangiopancreatogram, with bile duct stent exchanged balloon dilation and balloon sweep of bile duct;  Surgeon: Guru Brittany Macias MD;  [facility]     ESOPHAGOSCOPY, GASTROSCOPY, DUODENOSCOPY (EGD), COMBINED N/A 2/26/2015    Procedure: COMBINED ESOPHAGOSCOPY, GASTROSCOPY, DUODENOSCOPY (EGD);  Surgeon: Mitchel Hoskins Chi, MD;  [facility]     EXPLORE COMMON BILE DUCT N/A 6/25/2015    Procedure: EXPLORE COMMON BILE DUCT;  Surgeon: Tyree Smith MD;  [facility]     HERNIA REPAIR      During transplant     ILEOSTOMY N/A 6/20/2019    Procedure: Ileostomy;  Surgeon: Krzysztof Carney MD;  [facility]     LAPAROSCOPIC ASSISTED COLECTOMY N/A 6/20/2019    Procedure: Laparoscopic Total Abdominal Colectomy, lysis of adhesions;  Surgeon: Krzysztof Carney MD;  [facility]     LAPAROTOMY EXPLORATORY N/A 6/25/2015    Procedure: LAPAROTOMY EXPLORATORY;  Surgeon: Tyree Smith MD;  [facility]     PICC INSERTION Right 2/27/2015    4fr SL Valved PICC, 36cm (1cm external) in the R medial brachial vein w/ tip in the low SVC.     SIGMOIDOSCOPY FLEXIBLE N/A 4/27/2016    Procedure: SIGMOIDOSCOPY FLEXIBLE;  Surgeon: Jose Nielson MD;  [facility]     TRANSPLANT LIVER RECIPIENT LIVING UNRELATED N/A 6/18/2015    Procedure: TRANSPLANT LIVER RECIPIENT LIVING UNRELATED;  Surgeon: Tyree Smith MD;  [facility]     UPPER GI ENDOSCOPY        Allergies   Allergen Reactions     Rifampin Other (See Comments)     Kidney failure     Penicillins      Bad hives when she was in [facility]        Anesthesia Evaluation    ROS/Med Hx    No history of anesthetic complications  (-) malignant hyperthermia and tuberculosis  Comments: Met with Yarelis - she is NPO except for her meds and she has taken her prednisone today along with the other immunosuppressive meds for her liver transplant for primary sclerosing cholangitis. Liver function  is satisfactory and she has not needed biliary procedures for quite some time; she has had a colectomy for her ulcerative colitis and has an ileostomy. She denies hypertension. She indicates that she has done well with anesthesia cares.    Cardiovascular Findings   Comments: stable    Neuro Findings   Comments: No acute issues    Pulmonary Findings   (-) asthma and apnea  Comments: Non smoker    HENT Findings - negative HENT ROS    Skin Findings   Comments: No acute issues      GI/Hepatic/Renal Findings   (+) liver disease and renal disease  (-) GERD    Endocrine/Metabolic Findings   (+) chronic steroid use and adrenal disease      Genetic/Syndrome Findings - negative genetics/syndromes ROS    Hematology/Oncology Findings - negative hematology/oncology ROS  Hematopoietic stem cell transplant:       Additional Notes  Allergies:   -- Rifampin -- Other (See Comments)    --  Kidney failure   -- Penicillins     --  Bad hives when she was in college    Current Facility-Administered Medications:  lactated ringers infusion  lidocaine (LMX4) cream  lidocaine 1 % 0.1-1 mL  PRE OP antibiotics NOT needed for this surgical procedure  sodium chloride (PF) 0.9% PF flush 3 mL  sodium chloride (PF) 0.9% PF flush 3 mL    Medications Prior to Admission:  acetaminophen (TYLENOL) 500 MG tablet, Take 2 tablets (1,000 mg) by mouth 4 times daily, Disp: 50 tablet, Rfl: 0, Past Month at Unknown time  calcium carbonate (OS-KERLINE 500 MG [place]. [place]) 1250 MG tablet, Take 1 tablet by mouth every evening, Disp: , Rfl: , 3/11/2021 at 1900   levothyroxine (SYNTHROID/LEVOTHROID) 25 MCG tablet, , Disp: , Rfl: , 3/12/2021 at 0800   multivitamin, therapeutic with minerals (MULTI-VITAMIN) TABS tablet, Take 1 tablet by mouth every morning, Disp: , Rfl: , 3/11/2021 at 1900   predniSONE (DELTASONE) 5 MG tablet, Take 1 tablet (5 mg) by mouth daily, Disp: 90 tablet, Rfl: 3, 3/12/2021 at 0815   tacrolimus (GENERIC EQUIVALENT) 0.5 MG capsule, TAKE 5 CAPSULES (2.5  "MG) EVERY 12 HOURS, Disp: 900 capsule, Rfl: 3, 3/12/2021 at 0800   ursodiol (ACTIGALL) 250 MG tablet, Take 2 tablets (500 mg) by mouth every morning AND 1 tablet (250 mg) every evening., Disp: 270 tablet, Rfl: 3, 3/12/2021 at 0800   VITAMIN D, CHOLECALCIFEROL, PO, Take by mouth every evening, Disp: , Rfl: , 3/11/2021 at 1900   order for DME, [employer] item #s   00051 and 12530, Disp: 30 Bag, Rfl: 3  order for DME, Increase the amount to 10 per month  --2\" barrier ring #[id number], Disp: 10 each, Rfl: 0  trimethoprim-polymyxin b (POLYTRIM) 71489-5.1 UNIT/ML-% ophthalmic solution, Place 1-2 drops Into the left eye every 4 hours (Patient not taking: Reported on 9/14/2020), Disp: 10 mL, Rfl: 0, More than a month at Unknown time            PHYSICAL EXAM:   Mental Status/Neuro: A/A/O   Airway: Facies: Feasible  Mallampati: I  Mouth/Opening: Full  TM distance: > 6 cm  Neck ROM: Full   Respiratory: Auscultation: CTAB     Resp. Rate: Normal     Resp. Effort: Normal      CV: Rhythm: Regular  Rate: Age appropriate  Heart: Normal Sounds  Edema: None   Comments: Dentition - no acute issues                     Anesthesia Plan    ASA Status:  3   NPO Status:  NPO Appropriate    Anesthesia Type: MAC.   Induction: Intravenous, Propofol.   Maintenance: TIVA.        Consents    Anesthesia Plan(s) and associated risks, benefits, and realistic alternatives discussed. Questions answered and patient/representative(s) expressed understanding.     - Discussed with:  Patient      - Extended Intubation/Ventilatory Support Discussed: No.      - Patient is DNR/DNI Status: No    Use of blood products discussed: No .     Postoperative Care    Pain management: IV analgesics.   PONV prophylaxis: Ondansetron (or other 5HT-3), Dexamethasone or Solumedrol     Comments:    Yarelis requests anesthesia. Procedures and risks explained. She understood and consented. Qs answered.          Evan Ramírez MD  "

## 2021-03-12 NOTE — ANESTHESIA POSTPROCEDURE EVALUATION
Patient: Yarelis Penny    Procedure(s):  HYSTEROSCOPY, DIAGNOSTIC  LYSIS, ADHESIONS    Diagnosis:Uterine polyp [N84.0]  Diagnosis Additional Information: No value filed.    Anesthesia Type:  MAC    Note:  Disposition: Outpatient   Postop Pain Control: Uneventful            Sign Out: Well controlled pain   PONV: No   Neuro/Psych: Uneventful            Sign Out: Acceptable/Baseline neuro status   Airway/Respiratory: Uneventful            Sign Out: Acceptable/Baseline resp. status   CV/Hemodynamics: Uneventful            Sign Out: Acceptable CV status   Other NRE: NONE   DID A NON-ROUTINE EVENT OCCUR? No    Event details/Postop Comments:  Awakening satisfactorily; strong; breathing well; oriented; comfortable; no complaints or complications;          Last vitals:  Vitals:    03/12/21 1136 03/12/21 1145 03/12/21 1200   BP: 102/74 103/81 109/84   Pulse:  77 74   Resp: 16 16 16   Temp: 36.6  C (97.9  F)  36.5  C (97.7  F)   SpO2: 100% 97% 98%       Last vitals prior to Anesthesia Care Transfer:  CRNA VITALS  3/12/2021 1102 - 3/12/2021 1202      3/12/2021             Resp Rate (observed):  (!) 1          Electronically Signed By: Evan Ramírez MD  March 12, 2021  2:13 PM

## 2021-03-12 NOTE — ANESTHESIA CARE TRANSFER NOTE
Patient: Yarelis Penny    Procedure(s):  HYSTEROSCOPY, DIAGNOSTIC  LYSIS, ADHESIONS    Diagnosis: Uterine polyp [N84.0]  Diagnosis Additional Information: No value filed.    Anesthesia Type:   MAC     Note:    Oropharynx: oropharynx clear of all foreign objects  Level of Consciousness: awake  Oxygen Supplementation: room air    Independent Airway: airway patency satisfactory and stable  Dentition: dentition unchanged  Vital Signs Stable: post-procedure vital signs reviewed and stable  Report to RN Given: handoff report given  Patient transferred to: Phase II    Handoff Report: Identifed the Patient, Identified the Reponsible Provider, Reviewed the pertinent medical history, Discussed the surgical course, Reviewed Intra-OP anesthesia mangement and issues during anesthesia, Set expectations for post-procedure period and Allowed opportunity for questions and acknowledgement of understanding      Vitals: (Last set prior to Anesthesia Care Transfer)  CRNA VITALS  3/12/2021 1102 - 3/12/2021 1137      3/12/2021             Resp Rate (observed):  (!) 1        Electronically Signed By: KAYLENE Glass CRNA  March 12, 2021  11:37 AM

## 2021-03-16 ENCOUNTER — IMMUNIZATION (OUTPATIENT)
Dept: NURSING | Facility: CLINIC | Age: 36
End: 2021-03-16
Payer: COMMERCIAL

## 2021-03-16 LAB — COPATH REPORT: NORMAL

## 2021-03-16 PROCEDURE — 0001A PR COVID VAC PFIZER DIL RECON 30 MCG/0.3 ML IM: CPT

## 2021-03-16 PROCEDURE — 91300 PR COVID VAC PFIZER DIL RECON 30 MCG/0.3 ML IM: CPT

## 2021-03-29 ENCOUNTER — OFFICE VISIT (OUTPATIENT)
Dept: OBGYN | Facility: CLINIC | Age: 36
End: 2021-03-29
Attending: OBSTETRICS & GYNECOLOGY
Payer: COMMERCIAL

## 2021-03-29 VITALS
WEIGHT: 115 LBS | SYSTOLIC BLOOD PRESSURE: 122 MMHG | BODY MASS INDEX: 20.76 KG/M2 | DIASTOLIC BLOOD PRESSURE: 88 MMHG | HEART RATE: 74 BPM

## 2021-03-29 DIAGNOSIS — N84.0 UTERINE POLYP: Primary | ICD-10-CM

## 2021-03-29 PROCEDURE — 99212 OFFICE O/P EST SF 10 MIN: CPT | Performed by: OBSTETRICS & GYNECOLOGY

## 2021-03-29 PROCEDURE — G0463 HOSPITAL OUTPT CLINIC VISIT: HCPCS

## 2021-03-29 ASSESSMENT — ANXIETY QUESTIONNAIRES
3. WORRYING TOO MUCH ABOUT DIFFERENT THINGS: NOT AT ALL
7. FEELING AFRAID AS IF SOMETHING AWFUL MIGHT HAPPEN: NOT AT ALL
1. FEELING NERVOUS, ANXIOUS, OR ON EDGE: NOT AT ALL
GAD7 TOTAL SCORE: 0
2. NOT BEING ABLE TO STOP OR CONTROL WORRYING: NOT AT ALL
5. BEING SO RESTLESS THAT IT IS HARD TO SIT STILL: NOT AT ALL
6. BECOMING EASILY ANNOYED OR IRRITABLE: NOT AT ALL

## 2021-03-29 ASSESSMENT — PATIENT HEALTH QUESTIONNAIRE - PHQ9
5. POOR APPETITE OR OVEREATING: NOT AT ALL
SUM OF ALL RESPONSES TO PHQ QUESTIONS 1-9: 0

## 2021-03-29 ASSESSMENT — PAIN SCALES - GENERAL: PAINLEVEL: NO PAIN (0)

## 2021-03-29 NOTE — LETTER
3/29/2021       RE: Yarelis Penny  [address] [facility] [address]     Dear Colleague,    Thank you for referring your patient, Yarelis Penny, to the [facility] [place] at [facility]. Please see a copy of my visit note below.    Gyn Clinic Postoperative Visit Note  3/29/2021    S: Yarelis Penny is a 35 year old No obstetric history on file. here for post-operative visit following hysteroscopy with polypectomy on 3/12.   She reports feeling well.  No pain and minimal bleeding. Surprised there was not scar tissue in uterus.    O:   /88   Pulse 74   Wt 52.2 kg (115 lb)   LMP 02/16/2021   Breastfeeding No   BMI 20.76 kg/m    Exam: reviewed pathology and surgical photos.   Gen; AA and  nad  Non labored breathing.     Labs:   Hemoglobin   Date Value Ref Range Status   03/12/2021 14.0 11.7 - 15.7 g/dL Final   ]    Pathology:   Copath Report   Date Value Ref Range Status   03/12/2021   Final    Patient Name: YARELIS PENNY  MR#: [id number]  Specimen #: [id number]  Collected: 3/12/2021  Received: 3/12/2021  Reported: 3/16/2021 21:51  Ordering Phy(s): KRISTA SERRANO    For improved result formatting, select 'View Enhanced Report Format' under   Linked Documents section.    SPECIMEN(S):  Endometrial curettings    FINAL DIAGNOSIS:  Endometrium, curettings:  -Fragments of endometrial polyp  -Proliferative endometrium  -Negative for hyperplasia, atypia or malignancy    I have personally reviewed all specimens and/or slides, including the   listed special stains, and used them  with my medical judgement to determine or confirm the final diagnosis.    Electronically signed out by:  Selena Farris M.D, [facility]    CLINICAL HISTORY:  The patient is a 35-year-old woman with a uterine polyp.  Procedure:   Hysteroscopy, dilation and curettage,  lysis of uterine adhesions.    GROSS:  The specimen is received in formalin with proper  "patient identification,   labeled \"endometrial curettings\".  The specimen consists of a 2.0 x 1.5 x 0.3 cm aggregate of pink-tan soft   tissue fragments. The specimen is  wrapped and entirely submitted in A1. (Dictated by: CHINO Sparks   3/12/2021 02:41 PM)    MICROSCOPIC:  Microscopic examination is performed.    The technical component of this testing was completed at the [facility], with the professional component performed   at the [facility], [address] [place] [phone] ([phone])    CPT Codes:  A: 36456-VP1    COLLECTION SITE:  Client: [facility]  Location: [facility] (B)       ]    A: 35 year old female PO x 2 wks, s/p hysteroscopy/polypectomy.  Doing well, no concerns.     P:   Ok to proceed with RMIA as planned. Recommend records to Dr. Reynoso for review.    Lin Armendariz MD, FACOG  (she/her/hers)    Department of Ob/Gyn/Women's Health  [facility]  [place] [facility]  [address] [place], [place] [address] [address]  [email or web address]  p. [phone]  f. [phone]      "

## 2021-03-29 NOTE — PROGRESS NOTES
"Gyn Clinic Postoperative Visit Note  3/29/2021    S: Yarelis Cabrera is a 35 year old No obstetric history on file. here for post-operative visit following hysteroscopy with polypectomy on 3/12.   She reports feeling well.  No pain and minimal bleeding. Surprised there was not scar tissue in uterus.    O:   /88   Pulse 74   Wt 52.2 kg (115 lb)   LMP 02/16/2021   Breastfeeding No   BMI 20.76 kg/m    Exam: reviewed pathology and surgical photos.   Gen; AA and  nad  Non labored breathing.     Labs:   Hemoglobin   Date Value Ref Range Status   03/12/2021 14.0 11.7 - 15.7 g/dL Final   ]    Pathology:   Copath Report   Date Value Ref Range Status   03/12/2021   Final    Patient Name: YARELIS CABRERA  MR#: [id number]  Specimen #: [id number]  Collected: 3/12/2021  Received: 3/12/2021  Reported: 3/16/2021 21:51  Ordering Phy(s): KRISTA SERRANO    For improved result formatting, select 'View Enhanced Report Format' under   Linked Documents section.    SPECIMEN(S):  Endometrial curettings    FINAL DIAGNOSIS:  Endometrium, curettings:  -Fragments of endometrial polyp  -Proliferative endometrium  -Negative for hyperplasia, atypia or malignancy    I have personally reviewed all specimens and/or slides, including the   listed special stains, and used them  with my medical judgement to determine or confirm the final diagnosis.    Electronically signed out by:  Selena Farris M.D, [facility]    CLINICAL HISTORY:  The patient is a 35-year-old woman with a uterine polyp.  Procedure:   Hysteroscopy, dilation and curettage,  lysis of uterine adhesions.    GROSS:  The specimen is received in formalin with proper patient identification,   labeled \"endometrial curettings\".  The specimen consists of a 2.0 x 1.5 x 0.3 cm aggregate of pink-tan soft   tissue fragments. The specimen is  wrapped and entirely submitted in A1. (Dictated by: CHINO Sparks   3/12/2021 02:41 PM)    MICROSCOPIC:  Microscopic examination is " performed.    The technical component of this testing was completed at the [facility], with the professional component performed   at the [facility], [address] [place] [address],   [place] [address] ([phone])    CPT Codes:  A: 27259-AX3    COLLECTION SITE:  Client: [facility]  Location: [facility] (B)       ]    A: 35 year old female PO x 2 wks, s/p hysteroscopy/polypectomy.  Doing well, no concerns.     P:   Ok to proceed with RMIA as planned. Recommend records to Dr. Reynoso for review.    Lin Armendariz MD, FACOG  (she/her/hers)    Department of Ob/Gyn/Women's Health  [facility]  [place] [facility]  [address] [place] [address]. [place] [address]  [email or web address]  p. [phone]  f. [phone]

## 2021-03-29 NOTE — NURSING NOTE
Chief Complaint   Patient presents with     Surgical Followup     Hysterscopy for Uterine polyp 3/12/2021   Philomena Kearney LPN

## 2021-03-30 ASSESSMENT — ANXIETY QUESTIONNAIRES: GAD7 TOTAL SCORE: 0

## 2021-04-06 ENCOUNTER — IMMUNIZATION (OUTPATIENT)
Dept: NURSING | Facility: CLINIC | Age: 36
End: 2021-04-06
Attending: INTERNAL MEDICINE
Payer: COMMERCIAL

## 2021-04-06 PROCEDURE — 91300 PR COVID VAC PFIZER DIL RECON 30 MCG/0.3 ML IM: CPT

## 2021-04-06 PROCEDURE — 0002A PR COVID VAC PFIZER DIL RECON 30 MCG/0.3 ML IM: CPT

## 2021-04-14 ENCOUNTER — TRANSFERRED RECORDS (OUTPATIENT)
Dept: HEALTH INFORMATION MANAGEMENT | Facility: CLINIC | Age: 36
End: 2021-04-14

## 2021-04-22 DIAGNOSIS — Z94.4 S/P LIVER TRANSPLANT (H): ICD-10-CM

## 2021-04-22 RX ORDER — TACROLIMUS 0.5 MG/1
CAPSULE ORAL
Qty: 900 CAPSULE | Refills: 3 | Status: SHIPPED | OUTPATIENT
Start: 2021-04-22 | End: 2021-08-16

## 2021-04-22 NOTE — TELEPHONE ENCOUNTER
Patient Call: Medication Refill      Pharmacy Name: [facility] [place] [facility]    Name of Medication: Tacrolimus 0.5mg   When will the patient be out of this medication?: Less than 3 days (Route high priority)

## 2021-05-21 ENCOUNTER — ANCILLARY PROCEDURE (OUTPATIENT)
Dept: ULTRASOUND IMAGING | Facility: CLINIC | Age: 36
End: 2021-05-21
Attending: OBSTETRICS & GYNECOLOGY
Payer: COMMERCIAL

## 2021-05-21 ENCOUNTER — TRANSCRIBE ORDERS (OUTPATIENT)
Dept: MATERNAL FETAL MEDICINE | Facility: CLINIC | Age: 36
End: 2021-05-21

## 2021-05-21 ENCOUNTER — OFFICE VISIT (OUTPATIENT)
Dept: OBGYN | Facility: CLINIC | Age: 36
End: 2021-05-21
Attending: OBSTETRICS & GYNECOLOGY
Payer: COMMERCIAL

## 2021-05-21 ENCOUNTER — PATIENT OUTREACH (OUTPATIENT)
Dept: GASTROENTEROLOGY | Facility: CLINIC | Age: 36
End: 2021-05-21

## 2021-05-21 VITALS
HEIGHT: 62 IN | DIASTOLIC BLOOD PRESSURE: 77 MMHG | HEART RATE: 65 BPM | BODY MASS INDEX: 21.12 KG/M2 | WEIGHT: 114.8 LBS | SYSTOLIC BLOOD PRESSURE: 119 MMHG

## 2021-05-21 DIAGNOSIS — Z36.89 ENCOUNTER FOR FETAL ANATOMIC SURVEY: ICD-10-CM

## 2021-05-21 DIAGNOSIS — O09.91 HRP (HIGH RISK PREGNANCY), FIRST TRIMESTER: Primary | ICD-10-CM

## 2021-05-21 DIAGNOSIS — Z94.4 LIVER TRANSPLANT RECIPIENT (H): ICD-10-CM

## 2021-05-21 DIAGNOSIS — Z13.71 SCREENING FOR GENETIC DISEASE CARRIER STATUS: ICD-10-CM

## 2021-05-21 DIAGNOSIS — Z34.91 VIABLE PREGNANCY IN FIRST TRIMESTER: ICD-10-CM

## 2021-05-21 DIAGNOSIS — O26.90 PREGNANCY RELATED CONDITION, ANTEPARTUM: Primary | ICD-10-CM

## 2021-05-21 DIAGNOSIS — O09.90 HRP (HIGH RISK PREGNANCY), UNSPECIFIED TRIMESTER: ICD-10-CM

## 2021-05-21 DIAGNOSIS — K51.018 ULCERATIVE PANCOLITIS WITH OTHER COMPLICATION (H): ICD-10-CM

## 2021-05-21 LAB
PROT UR-MCNC: 0.18 G/L
PROT/CREAT 24H UR: 0.29 G/G CR (ref 0–0.2)

## 2021-05-21 PROCEDURE — 76801 OB US < 14 WKS SINGLE FETUS: CPT | Mod: 26 | Performed by: OBSTETRICS & GYNECOLOGY

## 2021-05-21 PROCEDURE — 86900 BLOOD TYPING SEROLOGIC ABO: CPT | Performed by: ADVANCED PRACTICE MIDWIFE

## 2021-05-21 PROCEDURE — 87086 URINE CULTURE/COLONY COUNT: CPT | Performed by: ADVANCED PRACTICE MIDWIFE

## 2021-05-21 PROCEDURE — 99213 OFFICE O/P EST LOW 20 MIN: CPT | Mod: 25 | Performed by: ADVANCED PRACTICE MIDWIFE

## 2021-05-21 PROCEDURE — 84156 ASSAY OF PROTEIN URINE: CPT | Performed by: ADVANCED PRACTICE MIDWIFE

## 2021-05-21 PROCEDURE — 76801 OB US < 14 WKS SINGLE FETUS: CPT

## 2021-05-21 PROCEDURE — G0463 HOSPITAL OUTPT CLINIC VISIT: HCPCS | Mod: 25

## 2021-05-21 SDOH — SOCIAL STABILITY: SOCIAL NETWORK: HOW OFTEN DO YOU GET TOGETHER WITH FRIENDS OR RELATIVES?: THREE TIMES A WEEK

## 2021-05-21 SDOH — SOCIAL STABILITY: SOCIAL NETWORK: HOW OFTEN DO YOU ATTEND CHURCH OR RELIGIOUS SERVICES?: MORE THAN 4 TIMES PER YEAR

## 2021-05-21 SDOH — HEALTH STABILITY: PHYSICAL HEALTH: ON AVERAGE, HOW MANY MINUTES DO YOU ENGAGE IN EXERCISE AT THIS LEVEL?: 30 MIN

## 2021-05-21 SDOH — HEALTH STABILITY: MENTAL HEALTH
STRESS IS WHEN SOMEONE FEELS TENSE, NERVOUS, ANXIOUS, OR CAN'T SLEEP AT NIGHT BECAUSE THEIR MIND IS TROUBLED. HOW STRESSED ARE YOU?: ONLY A LITTLE

## 2021-05-21 SDOH — HEALTH STABILITY: PHYSICAL HEALTH: ON AVERAGE, HOW MANY DAYS PER WEEK DO YOU ENGAGE IN MODERATE TO STRENUOUS EXERCISE (LIKE A BRISK WALK)?: 3 DAYS

## 2021-05-21 SDOH — SOCIAL STABILITY: SOCIAL INSECURITY
WITHIN THE LAST YEAR, HAVE YOU BEEN KICKED, HIT, SLAPPED, OR OTHERWISE PHYSICALLY HURT BY YOUR PARTNER OR EX-PARTNER?: NOT ASKED

## 2021-05-21 SDOH — SOCIAL STABILITY: SOCIAL NETWORK: HOW OFTEN DO YOU ATTENT MEETINGS OF THE CLUB OR ORGANIZATION YOU BELONG TO?: NEVER

## 2021-05-21 SDOH — SOCIAL STABILITY: SOCIAL NETWORK: ARE YOU MARRIED, WIDOWED, DIVORCED, SEPARATED, NEVER MARRIED, OR LIVING WITH A PARTNER?: MARRIED

## 2021-05-21 SDOH — SOCIAL STABILITY: SOCIAL INSECURITY: WITHIN THE LAST YEAR, HAVE YOU BEEN AFRAID OF YOUR PARTNER OR EX-PARTNER?: NOT ASKED

## 2021-05-21 SDOH — SOCIAL STABILITY: SOCIAL INSECURITY
WITHIN THE LAST YEAR, HAVE YOU BEEN HUMILIATED OR EMOTIONALLY ABUSED IN OTHER WAYS BY YOUR PARTNER OR EX-PARTNER?: NOT ASKED

## 2021-05-21 SDOH — SOCIAL STABILITY: SOCIAL INSECURITY
WITHIN THE LAST YEAR, HAVE TO BEEN RAPED OR FORCED TO HAVE ANY KIND OF SEXUAL ACTIVITY BY YOUR PARTNER OR EX-PARTNER?: NOT ASKED

## 2021-05-21 SDOH — SOCIAL STABILITY: SOCIAL NETWORK: IN A TYPICAL WEEK, HOW MANY TIMES DO YOU TALK ON THE PHONE WITH FAMILY, FRIENDS, OR NEIGHBORS?: THREE TIMES A WEEK

## 2021-05-21 SDOH — SOCIAL STABILITY: SOCIAL NETWORK
DO YOU BELONG TO ANY CLUBS OR ORGANIZATIONS SUCH AS CHURCH GROUPS UNIONS, FRATERNAL OR ATHLETIC GROUPS, OR SCHOOL GROUPS?: NO

## 2021-05-21 ASSESSMENT — PAIN SCALES - GENERAL: PAINLEVEL: NO PAIN (0)

## 2021-05-21 ASSESSMENT — PATIENT HEALTH QUESTIONNAIRE - PHQ9: SUM OF ALL RESPONSES TO PHQ QUESTIONS 1-9: 0

## 2021-05-21 ASSESSMENT — MIFFLIN-ST. JEOR: SCORE: 1170.36

## 2021-05-21 NOTE — PROGRESS NOTES
"[facility] OB Intake note  Subjective   36 year old femalepresents to clinic for initiation of OB care. Patient's last menstrual period was 2021.  at Unknown by Estimated Date of Delivery: Dec 31, 2021 based on LMP. Reviewed dating ultrasound. Pregnancy is planned.    Partner name - Ceferino.      Symptoms since LMP include None.       - Genetic/Infection questionnaire completed, risks include unremarkable. Pt  does not have a recent known exposure to Parvo or CMV so IgG/IgM testing WILL NOT be ordered.     Have you traveled during the pregnancy?No  Have your sexual partner(s) travelled during the pregnancy?No      - Current Medications    Current Outpatient Medications   Medication Sig Dispense Refill     calcium carbonate (OS-KERLINE 500 MG [place]) 1250 MG tablet Take 1 tablet by mouth every evening       levothyroxine (SYNTHROID/LEVOTHROID) 25 MCG tablet        order for DME Coloplast item #s   52156 and 75476 30 Bag 3     order for DME Increase the amount to 10 per month  --2\" barrier ring #[id number] 10 each 0     predniSONE (DELTASONE) 5 MG tablet Take 1 tablet (5 mg) by mouth daily 90 tablet 3     tacrolimus (GENERIC EQUIVALENT) 0.5 MG capsule TAKE 5 CAPSULES (2.5 MG) EVERY 12 HOURS 900 capsule 3     ursodiol (ACTIGALL) 250 MG tablet Take 2 tablets (500 mg) by mouth every morning AND 1 tablet (250 mg) every evening. 270 tablet 3     VITAMIN D, CHOLECALCIFEROL, PO Take by mouth every evening     -Iron supplement Qday      - Co-morbids    Past Medical History:   Diagnosis Date     Acute kidney injury (H) 2016     Anemia, iron deficiency 2017    On iron supplementation     Autoimmune hepatitis (H)     on steroid taper     Cholangitis      CKD (chronic kidney disease)     biopsy 2012: TIN, patchy fibrosis. Dialysis in 2013 x2 months.     CMV colitis (H) 2016     Esophageal varices (H) 2008    banded     Heart murmur      History of blood transfusion      Primary sclerosing cholangitis  "     SBP (spontaneous bacterial peritonitis) (H) 2015     Ulcerative Colitis     f/b GI     - Risk for GDM : steroid use so  WILL have an early GCT and Hgb A1C    - High risk factors for Pre E-  Renal disease and Autoimmune disease (systematic lupus erythematosus, antiphospholipid syndrome       - Moderate risk factor for Pre E Age =35 years or older   Meets one high risk factors or one of the moderate risk facrtors  so WILL consider starting low dose aspirin (81mg) starting between 12 and 28 weeks to prevent early onset preeclampsia    - The patient  does not have a history of spontaneous  birth so  WILL NOT consider progesterone starting at 16-20 weeks and/or serial transvaginal cervical length ultrasounds from 16-24 weeks.     -The patient does have a history of immunosuppresion or HIV so Toxoplasma IgG/IgM WILL be ordered.           PERSONAL/SOCIAL HISTORY    lives with their spouse.  Employment: Unemployed   Her partner works as a public accounting.   History of anxiety or depression  None  Additional items: None    Objective  -VS: reviewed and within normal limits   -General appearance: no acute distress, patient is comfortable   NEUROLOGICAL/PSYCHIATRIC   - Orientated x3,   -Mood and affect: : normal     Assessment/Plan  Yarelis was seen today for prenatal care.    Diagnoses and all orders for this visit:    HRP (high risk pregnancy), first trimester    Other orders  -     25- OH-Vitamin D  -     ABO/Rh Type and Screen  -     CBC with Platelets Differential  -     Hepatitis B Surface Antigen  -     HIV Antigen Antibody Combo  -     Rubella Antibody IgG Quantitative  -     Treponema Abs w Reflex to RPR and Titer  -     Urine Culture Aerobic Bacterial  -     Hepatitis C antibody  -     Hepatitis B Surface Antibody        36 year old  Unknown weeks of pregnancy with CALEB of Dec 31, 2021 by LMP of Patient's last menstrual period was 2021.. Ultrasound confirms.   Outpatient Encounter  "Medications as of 5/21/2021   Medication Sig Dispense Refill     calcium carbonate (OS-KERLINE 500 MG [place]. [place]) 1250 MG tablet Take 1 tablet by mouth every evening       levothyroxine (SYNTHROID/LEVOTHROID) 25 MCG tablet        order for DME Coloplast item #s   [id number] and [id number] 30 Bag 3     order for DME Increase the amount to 10 per month  --2\" barrier ring #[id number] 10 each 0     predniSONE (DELTASONE) 5 MG tablet Take 1 tablet (5 mg) by mouth daily 90 tablet 3     tacrolimus (GENERIC EQUIVALENT) 0.5 MG capsule TAKE 5 CAPSULES (2.5 MG) EVERY 12 HOURS 900 capsule 3     ursodiol (ACTIGALL) 250 MG tablet Take 2 tablets (500 mg) by mouth every morning AND 1 tablet (250 mg) every evening. 270 tablet 3     VITAMIN D, CHOLECALCIFEROL, PO Take by mouth every evening       [DISCONTINUED] multivitamin, therapeutic with minerals (MULTI-VITAMIN) TABS tablet Take 1 tablet by mouth every morning       No facility-administered encounter medications on file as of 5/21/2021.       Orders Placed This Encounter   Procedures     25- OH-Vitamin D     CBC with Platelets Differential     Hepatitis B Surface Antigen     HIV Antigen Antibody Combo     Rubella Antibody IgG Quantitative     Treponema Abs w Reflex to RPR and Titer     Hepatitis C antibody     Hepatitis B Surface Antibody     Hepatic Panel     Protein  random urine with Creat Ratio     Hgb A1c     Toxoplasma gondii abys IgG and IgM     [facility]ETAL [facility] CTR REFERRAL-PREGNANCY     ABO/Rh Type and Screen                 Orders Placed This Encounter   Procedures     25- OH-Vitamin D     CBC with Platelets Differential     Hepatitis B Surface Antigen     HIV Antigen Antibody Combo     Rubella Antibody IgG Quantitative     Treponema Abs w Reflex to RPR and Titer     Hepatitis C antibody     Hepatitis B Surface Antibody     ABO/Rh Type and Screen         - Oriented to Practice, types of care, and how to reach a provider.  Pt prefers MD team  - Patient received 1st trimester new OB education " packet complete with aide of The Expectant Family booklet including information on genetic screening test options.  - Patient desires 1st trimester screening and desires level II ultrasound which were ordered.  - Educational handout on the prevention of infections diseases during pregnancy provided.  - Patient was encouraged to start prenatal vitamins as tolerated.    - Patient was sent to lab for routine OB labs including Toxoplasma IgG/IgM, Urine Pr/Cr ratio, Hepatic panel, Hgb A1c.   - Reviewed risk for diabetes in pregnancy, pt agrees to early 1 hour  plan for NOB visit.  - Reviewed recommendation for low dose aspirin daily to prevent pre eclampsia, pt agrees, follow up at NOB visit.   - Pregnancy concerns to be addressed by provider at new OB exam include: solid organ transplant, Ulcerative colitis..    Pt to RTO for NOB visit in 4 weeks and prn if questions or concerns    KAYLENE Garcia CNM

## 2021-05-21 NOTE — PROGRESS NOTES
Received a call from [facility] on this paitent.  Patient is 8 weeks pregnant and has a history of UC and liver transplant.  [facility] physician asking for the patient to be seen in next M/GI clinic with Dr Mckeon.  Patient overbooked in the June 3rd clinic at 1:30.

## 2021-05-21 NOTE — LETTER
"2021       RE: Yarelis Penny  [address] [facility] [address]     Dear Colleague,    Thank you for referring your patient, Yarelis Penny, to the [facility] [place] at [facility]. Please see a copy of my visit note below.    [facility] OB Intake note  Subjective   36 year old femalepresents to clinic for initiation of OB care. Patient's last menstrual period was 2021.  at Unknown by Estimated Date of Delivery: Dec 31, 2021 based on LMP. Reviewed dating ultrasound. Pregnancy is planned.    Partner name - Ceferino.      Symptoms since LMP include None.       - Genetic/Infection questionnaire completed, risks include unremarkable. Pt  does not have a recent known exposure to Parvo or CMV so IgG/IgM testing WILL NOT be ordered.     Have you traveled during the pregnancy?No  Have your sexual partner(s) travelled during the pregnancy?No      - Current Medications    Current Outpatient Medications   Medication Sig Dispense Refill     calcium carbonate (OS-KERLINE 500 MG [place]. [place]) 1250 MG tablet Take 1 tablet by mouth every evening       levothyroxine (SYNTHROID/LEVOTHROID) 25 MCG tablet        order for DME Coloplast item #s   [id number] and [id number] 30 Bag 3     order for DME Increase the amount to 10 per month  --2\" barrier ring #780 10 each 0     predniSONE (DELTASONE) 5 MG tablet Take 1 tablet (5 mg) by mouth daily 90 tablet 3     tacrolimus (GENERIC EQUIVALENT) 0.5 MG capsule TAKE 5 CAPSULES (2.5 MG) EVERY 12 HOURS 900 capsule 3     ursodiol (ACTIGALL) 250 MG tablet Take 2 tablets (500 mg) by mouth every morning AND 1 tablet (250 mg) every evening. 270 tablet 3     VITAMIN D, CHOLECALCIFEROL, PO Take by mouth every evening     -Iron supplement Qday      - Co-morbids    Past Medical History:   Diagnosis Date     Acute kidney injury (H) 2016     Anemia, iron deficiency 2017    On iron supplementation     Autoimmune hepatitis (H)  "    on steroid taper     Cholangitis      CKD (chronic kidney disease) 2012    biopsy 2012: TIN, patchy fibrosis. Dialysis in 2013 x2 months.     CMV colitis (H) 2016     Esophageal varices (H) 2008    banded     Heart murmur      History of blood transfusion      Primary sclerosing cholangitis      SBP (spontaneous bacterial peritonitis) (H) 2015     Ulcerative Colitis     f/b GI     - Risk for GDM : steroid use so  WILL have an early GCT and Hgb A1C    - High risk factors for Pre E-  Renal disease and Autoimmune disease (systematic lupus erythematosus, antiphospholipid syndrome       - Moderate risk factor for Pre E Age =35 years or older   Meets one high risk factors or one of the moderate risk facrtors  so WILL consider starting low dose aspirin (81mg) starting between 12 and 28 weeks to prevent early onset preeclampsia    - The patient  does not have a history of spontaneous  birth so  WILL NOT consider progesterone starting at 16-20 weeks and/or serial transvaginal cervical length ultrasounds from 16-24 weeks.     -The patient does have a history of immunosuppresion or HIV so Toxoplasma IgG/IgM WILL be ordered.           PERSONAL/SOCIAL HISTORY    lives with their spouse.  Employment: Unemployed   Her partner works as a public accounting.   History of anxiety or depression  None  Additional items: None    Objective  -VS: reviewed and within normal limits   -General appearance: no acute distress, patient is comfortable   NEUROLOGICAL/PSYCHIATRIC   - Orientated x3,   -Mood and affect: : normal     Assessment/Plan  Yarelis was seen today for prenatal care.    Diagnoses and all orders for this visit:    HRP (high risk pregnancy), first trimester    Other orders  -     25- OH-Vitamin D  -     ABO/Rh Type and Screen  -     CBC with Platelets Differential  -     Hepatitis B Surface Antigen  -     HIV Antigen Antibody Combo  -     Rubella Antibody IgG Quantitative  -     Treponema Abs w  "Reflex to RPR and Titer  -     Urine Culture Aerobic Bacterial  -     Hepatitis C antibody  -     Hepatitis B Surface Antibody        36 year old  Unknown weeks of pregnancy with CALEB of Dec 31, 2021 by LMP of Patient's last menstrual period was 2021.. Ultrasound confirms.   Outpatient Encounter Medications as of 2021   Medication Sig Dispense Refill     calcium carbonate (OS-KERLINE 500 MG [place]. [place]) 1250 MG tablet Take 1 tablet by mouth every evening       levothyroxine (SYNTHROID/LEVOTHROID) 25 MCG tablet        order for DME Coloplast item #s   66529 and 43866 30 Bag 3     order for DME Increase the amount to 10 per month  --2\" barrier ring #7805 10 each 0     predniSONE (DELTASONE) 5 MG tablet Take 1 tablet (5 mg) by mouth daily 90 tablet 3     tacrolimus (GENERIC EQUIVALENT) 0.5 MG capsule TAKE 5 CAPSULES (2.5 MG) EVERY 12 HOURS 900 capsule 3     ursodiol (ACTIGALL) 250 MG tablet Take 2 tablets (500 mg) by mouth every morning AND 1 tablet (250 mg) every evening. 270 tablet 3     VITAMIN D, CHOLECALCIFEROL, PO Take by mouth every evening       [DISCONTINUED] multivitamin, therapeutic with minerals (MULTI-VITAMIN) TABS tablet Take 1 tablet by mouth every morning       No facility-administered encounter medications on file as of 2021.       Orders Placed This Encounter   Procedures     25- OH-Vitamin D     CBC with Platelets Differential     Hepatitis B Surface Antigen     HIV Antigen Antibody Combo     Rubella Antibody IgG Quantitative     Treponema Abs w Reflex to RPR and Titer     Hepatitis C antibody     Hepatitis B Surface Antibody     Hepatic Panel     Protein  random urine with Creat Ratio     Hgb A1c     Toxoplasma gondii abys IgG and IgM     MAT FETAL MED CTR REFERRAL-PREGNANCY     ABO/Rh Type and Screen                 Orders Placed This Encounter   Procedures     25- OH-Vitamin D     CBC with Platelets Differential     Hepatitis B Surface Antigen     HIV Antigen Antibody Combo     Rubella " Antibody IgG Quantitative     Treponema Abs w Reflex to RPR and Titer     Hepatitis C antibody     Hepatitis B Surface Antibody     ABO/Rh Type and Screen         - Oriented to Practice, types of care, and how to reach a provider.  Pt prefers MD team  - Patient received 1st trimester new OB education packet complete with aide of The Expectant Family booklet including information on genetic screening test options.  - Patient desires 1st trimester screening and desires level II ultrasound which were ordered.  - Educational handout on the prevention of infections diseases during pregnancy provided.  - Patient was encouraged to start prenatal vitamins as tolerated.    - Patient was sent to lab for routine OB labs including Toxoplasma IgG/IgM, Urine Pr/Cr ratio, Hepatic panel, Hgb A1c.   - Reviewed risk for diabetes in pregnancy, pt agrees to early 1 hour  plan for NOB visit.  - Reviewed recommendation for low dose aspirin daily to prevent pre eclampsia, pt agrees, follow up at NOB visit.   - Pregnancy concerns to be addressed by provider at new OB exam include: solid organ transplant, Ulcerative colitis..    Pt to RTO for NOB visit in 4 weeks and prn if questions or concerns    KAYLENE Garcia CNM

## 2021-05-22 LAB
BACTERIA SPEC CULT: NORMAL
Lab: NORMAL
SPECIMEN SOURCE: NORMAL

## 2021-05-24 ENCOUNTER — TRANSFERRED RECORDS (OUTPATIENT)
Dept: HEALTH INFORMATION MANAGEMENT | Facility: CLINIC | Age: 36
End: 2021-05-24

## 2021-05-25 DIAGNOSIS — O09.91 HRP (HIGH RISK PREGNANCY), FIRST TRIMESTER: ICD-10-CM

## 2021-05-25 DIAGNOSIS — Z94.4 LIVER TRANSPLANT RECIPIENT (H): ICD-10-CM

## 2021-05-25 DIAGNOSIS — Z13.220 LIPID SCREENING: ICD-10-CM

## 2021-05-25 DIAGNOSIS — Z94.4 LIVER REPLACED BY TRANSPLANT (H): ICD-10-CM

## 2021-05-25 LAB
ALBUMIN SERPL-MCNC: 3.5 G/DL (ref 3.4–5)
ALBUMIN UR-MCNC: 30 MG/DL
ALP SERPL-CCNC: 75 U/L (ref 40–150)
ALT SERPL W P-5'-P-CCNC: 36 U/L (ref 0–50)
ANION GAP SERPL CALCULATED.3IONS-SCNC: 6 MMOL/L (ref 3–14)
APPEARANCE UR: CLEAR
AST SERPL W P-5'-P-CCNC: 17 U/L (ref 0–45)
BACTERIA #/AREA URNS HPF: ABNORMAL /HPF
BASOPHILS # BLD AUTO: 0.1 10E9/L (ref 0–0.2)
BASOPHILS NFR BLD AUTO: 0.3 %
BILIRUB DIRECT SERPL-MCNC: 0.1 MG/DL (ref 0–0.2)
BILIRUB SERPL-MCNC: 0.4 MG/DL (ref 0.2–1.3)
BILIRUB UR QL STRIP: NEGATIVE
BUN SERPL-MCNC: 20 MG/DL (ref 7–30)
CALCIUM SERPL-MCNC: 9.8 MG/DL (ref 8.5–10.1)
CHLORIDE SERPL-SCNC: 106 MMOL/L (ref 94–109)
CHOLEST SERPL-MCNC: 151 MG/DL
CO2 SERPL-SCNC: 23 MMOL/L (ref 20–32)
COLOR UR AUTO: YELLOW
CREAT SERPL-MCNC: 1.21 MG/DL (ref 0.52–1.04)
DIFFERENTIAL METHOD BLD: ABNORMAL
EOSINOPHIL # BLD AUTO: 0.3 10E9/L (ref 0–0.7)
EOSINOPHIL NFR BLD AUTO: 2.2 %
ERYTHROCYTE [DISTWIDTH] IN BLOOD BY AUTOMATED COUNT: 13.5 % (ref 10–15)
GFR SERPL CREATININE-BSD FRML MDRD: 57 ML/MIN/{1.73_M2}
GLUCOSE SERPL-MCNC: 87 MG/DL (ref 70–99)
GLUCOSE UR STRIP-MCNC: NEGATIVE MG/DL
HBA1C MFR BLD: 5.4 % (ref 0–5.6)
HCT VFR BLD AUTO: 36.4 % (ref 35–47)
HDLC SERPL-MCNC: 84 MG/DL
HGB BLD-MCNC: 12.2 G/DL (ref 11.7–15.7)
HGB UR QL STRIP: ABNORMAL
HYALINE CASTS #/AREA URNS LPF: ABNORMAL /LPF
KETONES UR STRIP-MCNC: NEGATIVE MG/DL
LDLC SERPL CALC-MCNC: 38 MG/DL
LEUKOCYTE ESTERASE UR QL STRIP: NEGATIVE
LYMPHOCYTES # BLD AUTO: 3.9 10E9/L (ref 0.8–5.3)
LYMPHOCYTES NFR BLD AUTO: 26 %
MCH RBC QN AUTO: 29.3 PG (ref 26.5–33)
MCHC RBC AUTO-ENTMCNC: 33.5 G/DL (ref 31.5–36.5)
MCV RBC AUTO: 88 FL (ref 78–100)
MONOCYTES # BLD AUTO: 1.4 10E9/L (ref 0–1.3)
MONOCYTES NFR BLD AUTO: 9.4 %
NEUTROPHILS # BLD AUTO: 9.4 10E9/L (ref 1.6–8.3)
NEUTROPHILS NFR BLD AUTO: 62.1 %
NITRATE UR QL: NEGATIVE
NON-SQ EPI CELLS #/AREA URNS LPF: ABNORMAL /LPF
NONHDLC SERPL-MCNC: 67 MG/DL
PH UR STRIP: 5 PH (ref 5–7)
PLATELET # BLD AUTO: 376 10E9/L (ref 150–450)
POTASSIUM SERPL-SCNC: 4.1 MMOL/L (ref 3.4–5.3)
PROT SERPL-MCNC: 7.6 G/DL (ref 6.8–8.8)
PROT UR-MCNC: 0.34 G/L
PROT/CREAT 24H UR: 0.14 G/G CR (ref 0–0.2)
RBC # BLD AUTO: 4.16 10E12/L (ref 3.8–5.2)
RBC #/AREA URNS AUTO: ABNORMAL /HPF
SODIUM SERPL-SCNC: 135 MMOL/L (ref 133–144)
SOURCE: ABNORMAL
SP GR UR STRIP: 1.02 (ref 1–1.03)
TACROLIMUS BLD-MCNC: 4.7 UG/L (ref 5–15)
TME LAST DOSE: ABNORMAL H
TRIGL SERPL-MCNC: 147 MG/DL
UROBILINOGEN UR STRIP-ACNC: 0.2 EU/DL (ref 0.2–1)
WBC # BLD AUTO: 15.1 10E9/L (ref 4–11)
WBC #/AREA URNS AUTO: ABNORMAL /HPF

## 2021-05-25 PROCEDURE — 80197 ASSAY OF TACROLIMUS: CPT | Performed by: INTERNAL MEDICINE

## 2021-05-25 PROCEDURE — 86778 TOXOPLASMA ANTIBODY IGM: CPT | Mod: 90 | Performed by: ADVANCED PRACTICE MIDWIFE

## 2021-05-25 PROCEDURE — 84156 ASSAY OF PROTEIN URINE: CPT | Performed by: INTERNAL MEDICINE

## 2021-05-25 PROCEDURE — 86900 BLOOD TYPING SEROLOGIC ABO: CPT | Performed by: ADVANCED PRACTICE MIDWIFE

## 2021-05-25 PROCEDURE — 99000 SPECIMEN HANDLING OFFICE-LAB: CPT | Performed by: ADVANCED PRACTICE MIDWIFE

## 2021-05-25 PROCEDURE — 85025 COMPLETE CBC W/AUTO DIFF WBC: CPT | Performed by: ADVANCED PRACTICE MIDWIFE

## 2021-05-25 PROCEDURE — 86850 RBC ANTIBODY SCREEN: CPT | Performed by: ADVANCED PRACTICE MIDWIFE

## 2021-05-25 PROCEDURE — 86777 TOXOPLASMA ANTIBODY: CPT | Mod: 90 | Performed by: ADVANCED PRACTICE MIDWIFE

## 2021-05-25 PROCEDURE — 87340 HEPATITIS B SURFACE AG IA: CPT | Performed by: ADVANCED PRACTICE MIDWIFE

## 2021-05-25 PROCEDURE — 87389 HIV-1 AG W/HIV-1&-2 AB AG IA: CPT | Performed by: ADVANCED PRACTICE MIDWIFE

## 2021-05-25 PROCEDURE — 87086 URINE CULTURE/COLONY COUNT: CPT | Performed by: ADVANCED PRACTICE MIDWIFE

## 2021-05-25 PROCEDURE — 83036 HEMOGLOBIN GLYCOSYLATED A1C: CPT | Performed by: ADVANCED PRACTICE MIDWIFE

## 2021-05-25 PROCEDURE — 86901 BLOOD TYPING SEROLOGIC RH(D): CPT | Performed by: ADVANCED PRACTICE MIDWIFE

## 2021-05-25 PROCEDURE — 82306 VITAMIN D 25 HYDROXY: CPT | Performed by: ADVANCED PRACTICE MIDWIFE

## 2021-05-25 PROCEDURE — 80061 LIPID PANEL: CPT | Performed by: INTERNAL MEDICINE

## 2021-05-25 PROCEDURE — 86803 HEPATITIS C AB TEST: CPT | Performed by: ADVANCED PRACTICE MIDWIFE

## 2021-05-25 PROCEDURE — 80076 HEPATIC FUNCTION PANEL: CPT | Performed by: INTERNAL MEDICINE

## 2021-05-25 PROCEDURE — 86780 TREPONEMA PALLIDUM: CPT | Mod: 90 | Performed by: ADVANCED PRACTICE MIDWIFE

## 2021-05-25 PROCEDURE — 36415 COLL VENOUS BLD VENIPUNCTURE: CPT | Performed by: INTERNAL MEDICINE

## 2021-05-25 PROCEDURE — 86706 HEP B SURFACE ANTIBODY: CPT | Performed by: ADVANCED PRACTICE MIDWIFE

## 2021-05-25 PROCEDURE — 86762 RUBELLA ANTIBODY: CPT | Performed by: ADVANCED PRACTICE MIDWIFE

## 2021-05-25 PROCEDURE — 80048 BASIC METABOLIC PNL TOTAL CA: CPT | Performed by: INTERNAL MEDICINE

## 2021-05-25 PROCEDURE — 81001 URINALYSIS AUTO W/SCOPE: CPT | Performed by: INTERNAL MEDICINE

## 2021-05-26 LAB
ABO + RH BLD: NORMAL
ABO + RH BLD: NORMAL
BACTERIA SPEC CULT: NORMAL
BLD GP AB SCN SERPL QL: NORMAL
BLOOD BANK CMNT PATIENT-IMP: NORMAL
DEPRECATED CALCIDIOL+CALCIFEROL SERPL-MC: 50 UG/L (ref 20–75)
HBV SURFACE AB SERPL IA-ACNC: 2.07 M[IU]/ML
HBV SURFACE AG SERPL QL IA: NONREACTIVE
HCV AB SERPL QL IA: NONREACTIVE
HIV 1+2 AB+HIV1 P24 AG SERPL QL IA: NONREACTIVE
Lab: NORMAL
RUBV IGG SERPL IA-ACNC: 5 IU/ML
SPECIMEN EXP DATE BLD: NORMAL
SPECIMEN SOURCE: NORMAL
T PALLIDUM AB SER QL: NONREACTIVE

## 2021-05-27 LAB
T GONDII IGG SER-ACNC: <3 IU/ML
T GONDII IGM SER-ACNC: <3 AU/ML

## 2021-06-01 ENCOUNTER — PRE VISIT (OUTPATIENT)
Dept: MATERNAL FETAL MEDICINE | Facility: CLINIC | Age: 36
End: 2021-06-01

## 2021-06-03 ENCOUNTER — OFFICE VISIT (OUTPATIENT)
Dept: MATERNAL FETAL MEDICINE | Facility: CLINIC | Age: 36
End: 2021-06-03
Attending: ADVANCED PRACTICE MIDWIFE
Payer: COMMERCIAL

## 2021-06-03 ENCOUNTER — HOSPITAL ENCOUNTER (OUTPATIENT)
Dept: ULTRASOUND IMAGING | Facility: CLINIC | Age: 36
End: 2021-06-03
Attending: ADVANCED PRACTICE MIDWIFE
Payer: COMMERCIAL

## 2021-06-03 VITALS
HEART RATE: 78 BPM | RESPIRATION RATE: 20 BRPM | SYSTOLIC BLOOD PRESSURE: 107 MMHG | DIASTOLIC BLOOD PRESSURE: 77 MMHG | OXYGEN SATURATION: 99 % | BODY MASS INDEX: 20.33 KG/M2 | WEIGHT: 112.6 LBS

## 2021-06-03 DIAGNOSIS — O09.90 HRP (HIGH RISK PREGNANCY), UNSPECIFIED TRIMESTER: ICD-10-CM

## 2021-06-03 DIAGNOSIS — O26.90 PREGNANCY RELATED CONDITION, ANTEPARTUM: ICD-10-CM

## 2021-06-03 DIAGNOSIS — O26.90 PREGNANCY RELATED CONDITION, ANTEPARTUM: Primary | ICD-10-CM

## 2021-06-03 DIAGNOSIS — Z94.4 STATUS POST LIVER TRANSPLANTATION (H): ICD-10-CM

## 2021-06-03 DIAGNOSIS — O09.511 AMA (ADVANCED MATERNAL AGE) PRIMIGRAVIDA 35+, FIRST TRIMESTER: ICD-10-CM

## 2021-06-03 DIAGNOSIS — K51.018 ULCERATIVE PANCOLITIS WITH OTHER COMPLICATION (H): ICD-10-CM

## 2021-06-03 DIAGNOSIS — O26.831 RENAL DISEASE DURING PREGNANCY IN FIRST TRIMESTER: ICD-10-CM

## 2021-06-03 PROCEDURE — 99214 OFFICE O/P EST MOD 30 MIN: CPT | Mod: 25 | Performed by: OBSTETRICS & GYNECOLOGY

## 2021-06-03 PROCEDURE — 76801 OB US < 14 WKS SINGLE FETUS: CPT

## 2021-06-03 PROCEDURE — 76801 OB US < 14 WKS SINGLE FETUS: CPT | Mod: 26 | Performed by: OBSTETRICS & GYNECOLOGY

## 2021-06-03 PROCEDURE — G0463 HOSPITAL OUTPT CLINIC VISIT: HCPCS | Mod: 25

## 2021-06-03 PROCEDURE — 99215 OFFICE O/P EST HI 40 MIN: CPT | Performed by: INTERNAL MEDICINE

## 2021-06-03 ASSESSMENT — PATIENT HEALTH QUESTIONNAIRE - PHQ9: SUM OF ALL RESPONSES TO PHQ QUESTIONS 1-9: 0

## 2021-06-03 ASSESSMENT — PAIN SCALES - GENERAL: PAINLEVEL: NO PAIN (0)

## 2021-06-03 NOTE — PATIENT INSTRUCTIONS
PLAN  ---Flex sig after delivery for dysplasia screening  ---Would benefit from having a colorectal surgeon on standby at the time of delivery, given surgical history.

## 2021-06-03 NOTE — NURSING NOTE
Yarelis here for 1st OBV, 1st tri U/S, MFM consult and GI consult. Pt also had GC consult. Pt reports no FM yet, denies ctx, denies SRom, and denies vag bleeding. Pt was given new OB folder with information and phone numbers. See consult note from Dr. Ta, Dr. Izaguirre, and Dr. Mckeon. Pt will become MFM pt. Pt to return in 3 weeks for NT and obv. Pt will have 1hr GCT at that time. Pt will decide in 3 weeks if she wants genetic screening done. Pt left amb and stable. Virgie Zimmer RN

## 2021-06-03 NOTE — PROGRESS NOTES
[facility]  Genetic Counseling Consult    Patient: Yarelis Penny YOB: 1985   Date of Service: 21      Yarelis Penny was seen at [facility] for genetic consultation to discuss the options for screening and testing for fetal chromosome abnormalities.  The indication for genetic counseling is advanced maternal age. She was accompanied by her partner, Ceferino.        Impression/Plan:   1.  Yarelis had a genetic counseling session only. We discussed options for aneuploidy testing. They are considering NIPT after a nuchal translucency ultrasound which is scheduled for 2021.     2.  Maternal serum AFP (single marker screen) is recommended after 15 weeks to screen for open neural tube defects.     3.  An 18-20 week comprehensive ultrasound is standard of care for all women 35 or older at delivery.    4. Yarelis had a [facility] consultation today. Please see that note for more details.     Pregnancy History:   /Parity:      Age at Delivery: 36 year old  CALEB: 2021, by Last Menstrual Period  Gestational Age: 9w6d  This pregnancy was conceived through in vitro fertilization (IVF). We discussed the following:    Transfer date: 2021    Number of transferred embryos: 1    Use of egg or sperm donor: No     Age of egg retrieval: 35    Fresh or frozen transfer/ 3 or 5 day: 5 day frozen       Preimplantation genetic testing (PGT): Not done      Fetal echocardiogram will be recommended and scheduled after the 18-20 week fetal anatomy ultrasound    The average pregnancy has a 0.5-1.0% chance of a congenital heart defect. However, studies suggest an increased chance for a heart defect for IVF pregnancies, with estimates ranging from 1-3.3%. Fetal echocardiograms are typically performed at 20 to 24 weeks gestation.      Medical History:   Please see Yarelis's [facility] consult note for more details.        Of note, Yarelis received a liver  transplant from her  in . This will affect not ordering sex chromosomes on NIPT as well as not using a SNP platform NIPT.   Family History:   A three-generation pedigree was obtained, and is scanned under the  Media  tab.   The following significant findings were reported by Yarelis:    The father of the pregnancy, Ceferino, 36, is healthy      Ceferino's maternal first cousin has Down syndrome  We discussed that about 95% of cases with Down syndrome are sporadic, meaning they only affect that pregnancy and there is not family history. This type of Down syndrome is caused by trisomy 21 due to non-disjunction. We also explained there is a less common type of Down syndrome that can be inherited and could put family members at an increased risk for Down syndrome. This type of Down syndrome is caused by a chromosomal translocation in which a part of chromosome 21 is attached to another chromosome.   Ceferino reports he twenty nine other healthy cousins. Therefore, this case seems isolated and this history is unlikely to increase risks for Yarelis and Ceferino     Otherwise, the reported family history is negative for multiple miscarriages, stillbirths, birth defects, mental retardation, known genetic conditions, and consanguinity.       Carrier Screening:   The patient reports that she and the father of the pregnancy have  ancestry:      Cystic fibrosis is an autosomal recessive genetic condition that occurs with increased frequency in individuals of  ancestry and carrier screening for this condition is available.  In addition,  screening in the [facility] includes cystic fibrosis.      Expanded carrier screening for mutations in a large panel of genes associated with autosomal recessive conditions including cystic fibrosis, spinal muscular atrophy, and others, is now available.      The patient has had previous carrier screening as part of a IVF workup, the results of which were negative per patient  report.  A copy of the report was not available for our review today.       Risk Assessment for Chromosome Conditions:   We explained that the risk for fetal chromosome abnormalities increases with maternal age. We discussed specific features of common chromosome abnormalities, including Down syndrome, trisomy 13, trisomy 18, and sex chromosome trisomies. Yarelis was 35 at the time of egg retrieval:      At age 35 at midtrimester, the risk to have a baby with Down syndrome is 1 in 274.     At age 35 at midtrimester, the risk to have a baby with any chromosome abnormality is 1 in 135.     Testing Options:   We discussed the following options:   First trimester screening    First trimester ultrasound with nuchal translucency and nasal bone assessments, maternal plasma hCG, KIRA-A, and AFP measurement    Screens for fetal trisomy 21, trisomy 13, and trisomy 18    Cannot screen for open neural tube defects; maternal serum AFP after 15 weeks is recommended     Non-invasive Prenatal Testing (NIPT)    Maternal plasma cell-free DNA testing; first trimester ultrasound with nuchal translucency and nasal bone assessment is recommended, when appropriate    Screens for fetal trisomy 21, trisomy 13, trisomy 18, and sex chromosome aneuploidy    Cannot screen for open neural tube defects; maternal serum AFP after 15 weeks is recommended    Due to the history of a male related liver donor, sex chromosome analysis will not be recommended and a SNP platform NIPT will not be recommended.      Chorionic villus sampling (CVS)    Invasive procedure typically performed in the first trimester by which placental villi are obtained for the purpose of chromosome analysis and/or other prenatal genetic analysis    Diagnostic results; >99% sensitivity for fetal chromosome abnormalities    Cannot test for open neural tube defects; maternal serum AFP after 15 weeks is recommended     Genetic Amniocentesis    Invasive procedure typically performed in  the second trimester by which amniotic fluid is obtained for the purpose of chromosome analysis and/or other prenatal genetic analysis    Diagnostic results; >99% sensitivity for fetal chromosome abnormalities    AFAFP measurement tests for open neural tube defects     Comprehensive (Level II) ultrasound: Detailed ultrasound performed between 18-22 weeks gestation to screen for major birth defects and markers for aneuploidy.    We reviewed the benefits and limitations of this testing.  Screening tests provide a risk assessment specific to the pregnancy for certain fetal chromosome abnormalities, but cannot definitively diagnose or exclude a fetal chromosome abnormality.  Follow-up genetic counseling and consideration of diagnostic testing is recommended with any abnormal screening result.     Diagnostic tests carry inherent risks- including risk of miscarriage- that require careful consideration.  These tests can detect fetal chromosome abnormalities with greater than 99% certainty.  Results can be compromised by maternal cell contamination or mosaicism, and are limited by the resolution of cytogenetic G-banding technology.  There is no screening nor diagnostic test that can detect all forms of birth defects or mental disability.     It was a pleasure to be involved with Yarelis s care. Face-to-face time of the meeting was 45 minutes.    Ayaka Diaz MS, [facility]  Licensed Genetic Counselor   [facility]  Maternal Fetal Medicine  [email or web address]  Office: [phone]  Pager [phone]  MFM: [phone]   Fax: [phone]

## 2021-06-03 NOTE — PROGRESS NOTES
Maternal-Fetal Medicine   New OB Visit    DATE: 21    Yarelis Penny  : 1985  MRN: [id number]    HPI:  Yarelis Penny is a 36 year old  at 9w6d by ETD presenting to establish prenatal care. This is a high risk pregnancy due to history of liver transplant (2/2 primary sclerosing cholangitis and autoimmune hepatitis), ulcerative colitis s/p colectomy, history of chronic kidney disease, history of CMV colitis and viremia, EBV viremia, and AMA. She has had 2 prior preconception consults with M in 2019 and 2019.     This pregnancy was achieved with IVF, at [facility]. She also had a hysteroscopy/polypectomy in 2021.    Today she is feeling well. Denies vaginal bleeding, pelvic pain/cramping, nausea, vomiting. She is taking prenatal vitamins and ASA.    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 More details below.    Liver Tx:  She is s/p living donor liver transplant from her  [facility] 6/18/15 and is on immunosuppression (tacrolimus and predisone). She is also taking ursidiol owing to the fact that this is a living donor transplant. She follows with Dr. Daniela Enriquez of hepatology/liver transplant at the . She was last seen 20. At that time her graft function was good with normal LFTs. Her last Tacrolimus level was 4.7 on 21.    UC:  Prior to this pregnancy she was counseled that she should be endoscopically quiescent prior to trying for pregnancy. She had an ileocolonoscopy 2019 that showed pan colitis and ileitis, with biopsies showing low grade dysplasia. She underwent a  hand-assisted total abdominal colectomy with end ileostomy and rectal sparing on 6/20/2019, with plan for rectal resection after childbearing. OP note reviewed and significant adhesion burden noted. Pathology did not show high grade dysplasia or active malignancy. She follows with Sophy Mckeon at the . She was last seen 10/3/19. At that time her ostomy was functioning well. She was not taking any medications for UC. She recommended to have a rectal biopsy in Nov 2020 (with q3y monitoring if neg) but this has not happened.    CMV:  She has a history of CMV viremia and CMV colitis that was detected on biopsies obtained during colonoscopy in 2018. She completed an IV ganciclovir regimen. CMV DNA levels were undetectable in 2018 and again on 9/24/20,    EBV:  She also has a history of EBV viremia. She follows with Dr. Joya Lopez of Transplant ID at the . She is recommended to see Dr. Lopez yearly. She was last seen 12/15/18. Her EBV DNA level was 89,000. Her most recent EBV DNA level was 60,000 on 9/24/20.    Hx of CKD:  In January 2013 she presented with ESTUARDO (creatinine was 3.3). She underwent a kidney biopsy at [facility] on 01/15/2013 which showed acute on chronic interstitial nephritis and mesangial proliferative GN with C3 deposition. At that time she had normal serum complement levels and negative ANCA. She briefly required dialysis and there was discussion to list her for a kidney transplant (as well as liver), but her creatinine trended downward and she did not require further dialysis. Since that time her baseline creatinine has been about 0.9 to 1. However, she has had several elevations over the past few years, most notably to 3.07 in 2016. Currently her Cr is elevated to 1.21 and has been elevated above 1.1 since Sept 2020. I do not see any nephrology visits since 2013. She thinks her elevated Cr is due to dehydration.    Abnormal TSH:  She reports a mildly elevated TSH when she was undergoing  IVF with recommendation to start Levothyroxine per her [facility] team. She is taking 25mcg daily. These labs are not seen in faxed records.    Obstetrics History:  OB History    Para Term  AB Living   1 0 0 0 0 0   SAB TAB Ectopic Multiple Live Births   0 0 0 0 0      # Outcome Date GA Lbr Tex/2nd Weight Sex Delivery Anes PTL Lv   1 Current              Gynecologic History:  - Last Pap: NILM, HPV: Neg (2018)  - History of hysteroscopy/polypectomy in , otherwise no gyn procedures or cervical surgeries    Past Medical History:  Past Medical History:   Diagnosis Date     Acute kidney injury (H) 2016     Anemia, iron deficiency 2017    On iron supplementation     Autoimmune hepatitis (H)     on steroid taper     Cholangitis      CKD (chronic kidney disease)     biopsy : TIN, patchy fibrosis. Dialysis in 2013 x2 months.     CMV colitis (H) 2016     Esophageal varices (H) 2008    banded     Heart murmur      History of blood transfusion      Primary sclerosing cholangitis      SBP (spontaneous bacterial peritonitis) (H) 2015     Ulcerative Colitis     f/b GI       Past Surgical History:  Past Surgical History:   Procedure Laterality Date     CHOLECYSTECTOMY       COLONOSCOPY  2007     COLONOSCOPY N/A 2015    Procedure: COMBINED COLONOSCOPY, SINGLE OR MULTIPLE BIOPSY/POLYPECTOMY BY BIOPSY;  Surgeon: Mitchel Hoskins Chi, MD;  Location:  GI     COLONOSCOPY N/A 2016    Procedure: COMBINED COLONOSCOPY, SINGLE OR MULTIPLE BIOPSY/POLYPECTOMY BY BIOPSY;  Surgeon: Rigo Valles MD;  Location:  GI     COLONOSCOPY N/A 2016    Procedure: COMBINED COLONOSCOPY, SINGLE OR MULTIPLE BIOPSY/POLYPECTOMY BY BIOPSY;  Surgeon: Kareem Solis MD;  Location:  GI     COLONOSCOPY N/A 2017    Procedure: COMBINED COLONOSCOPY, SINGLE OR MULTIPLE BIOPSY/POLYPECTOMY BY BIOPSY;  Colonoscopy;  Surgeon: Fuentes Johnson MD;  Location:   GI     COLONOSCOPY N/A 02/25/2019    Procedure: COMBINED COLONOSCOPY, SINGLE OR MULTIPLE BIOPSY/POLYPECTOMY BY BIOPSY;  Surgeon: Sophy Mckeon MD;  [facility]     DILATION AND CURETTAGE, HYSTEROSCOPY DIAGNOSTIC, COMBINED N/A 03/12/2021    Procedure: HYSTEROSCOPY, DIAGNOSTIC;  Surgeon: Lin Armendariz MD;  Location: [facility]     ENDOSCOPIC RETROGRADE CHOLANGIOPANCREATOGRAM N/A 06/25/2015    Procedure: COMBINED ENDOSCOPIC RETROGRADE CHOLANGIOPANCREATOGRAPHY, PLACE TUBE/STENT;  Surgeon: Landon Quinones MD;  [facility]     ENDOSCOPIC RETROGRADE CHOLANGIOPANCREATOGRAM N/A 06/25/2015    Procedure: COMBINED ENDOSCOPIC RETROGRADE CHOLANGIOPANCREATOGRAPHY, PLACE TUBE/STENT;  Surgeon: Landon Quinones MD;  [facility]     ENDOSCOPIC RETROGRADE CHOLANGIOPANCREATOGRAM N/A 07/02/2015    Procedure: COMBINED ENDOSCOPIC RETROGRADE CHOLANGIOPANCREATOGRAPHY, PLACE TUBE/STENT;  Surgeon: Landon Quinones MD;  [facility]     ENDOSCOPIC RETROGRADE CHOLANGIOPANCREATOGRAM N/A 09/08/2015    Procedure: COMBINED ENDOSCOPIC RETROGRADE CHOLANGIOPANCREATOGRAPHY, REMOVE FOREIGN BODY OR STENT/TUBE;  Surgeon: Landon Quinones MD;  [facility]     ENDOSCOPIC RETROGRADE CHOLANGIOPANCREATOGRAM N/A 12/08/2015    Procedure: ENDOSCOPIC RETROGRADE CHOLANGIOPANCREATOGRAM;  Surgeon: Landon Quinones MD;  [facility]     ENDOSCOPIC RETROGRADE CHOLANGIOPANCREATOGRAM N/A 03/01/2016    Procedure: COMBINED ENDOSCOPIC RETROGRADE CHOLANGIOPANCREATOGRAPHY, REMOVE FOREIGN BODY OR STENT/TUBE;  Surgeon: Landon Quinones MD;  [facility]     ENDOSCOPIC RETROGRADE CHOLANGIOPANCREATOGRAM N/A 03/20/2017    Procedure: COMBINED ENDOSCOPIC RETROGRADE CHOLANGIOPANCREATOGRAPHY, PLACE TUBE/STENT;  Endoscopic Retrograde Cholangiopancreatogram with Ballon Dilation, Stent Placement;  Surgeon: Guru Brittany Macias MD;  [facility]     ENDOSCOPIC RETROGRADE CHOLANGIOPANCREATOGRAM N/A  04/02/2018    Procedure: COMBINED ENDOSCOPIC RETROGRADE CHOLANGIOPANCREATOGRAPHY, PLACE TUBE/STENT;  Endoscopic Retrograde Cholangiopancreatogram with biliary dilation and stent placement;  Surgeon: Guru Brittany Macias MD;  [facility]     ENDOSCOPIC RETROGRADE CHOLANGIOPANCREATOGRAM N/A 05/07/2018    Procedure: ENDOSCOPIC RETROGRADE CHOLANGIOPANCREATOGRAM;  Endoscopic Retrograde Cholangiopancreatogram ballon dilation stent exchange;  Surgeon: Guru Brittany Macias MD;  [facility]     ENDOSCOPIC RETROGRADE CHOLANGIOPANCREATOGRAPHY, EXCHANGE TUBE/STENT N/A 07/30/2015    Procedure: ENDOSCOPIC RETROGRADE CHOLANGIOPANCREATOGRAPHY, EXCHANGE TUBE/STENT;  Surgeon: Landon Quinones MD;  [facility]     ENDOSCOPIC RETROGRADE CHOLANGIOPANCREATOGRAPHY, EXCHANGE TUBE/STENT N/A 05/15/2017    Procedure: ENDOSCOPIC RETROGRADE CHOLANGIOPANCREATOGRAPHY, EXCHANGE TUBE/STENT;  Endoscopic Retrograde Cholangiopancreatogram with biliary stent exchange and balloon dilation;  Surgeon: Guru Brittany Macias MD;  [facility]     ENDOSCOPIC RETROGRADE CHOLANGIOPANCREATOGRAPHY, EXCHANGE TUBE/STENT N/A 06/25/2018    Procedure: ENDOSCOPIC RETROGRADE CHOLANGIOPANCREATOGRAPHY, EXCHANGE TUBE/STENT;  Endoscopic Retrograde Cholangiopancreatogram with biliary dilation, stone removal and stent exchange;  Surgeon: Guru Brittany Macias MD;  [facility]     ENDOSCOPIC RETROGRADE CHOLANGIOPANCREATOGRAPHY, EXCHANGE TUBE/STENT N/A 07/30/2018    Procedure: ENDOSCOPIC RETROGRADE CHOLANGIOPANCREATOGRAPHY, EXCHANGE TUBE/STENT;  Endoscopic Retrograde Cholangiopancreatogram with Stent Exchange with dilation;  Surgeon: Guru Brittany Macias MD;  [facility]     ENDOSCOPIC RETROGRADE CHOLANGIOPANCREATOGRAPHY, EXCHANGE TUBE/STENT N/A 09/26/2018    Procedure: ENDOSCOPIC RETROGRADE CHOLANGIOPANCREATOGRAPHY, EXCHANGE TUBE/STENT;  Endoscopic Retrograde  Cholangiopancreatogram, with bile duct stent exchanged balloon dilation and balloon sweep of bile duct;  Surgeon: Guru Brittany Macias MD;  [facility]     ESOPHAGOSCOPY, GASTROSCOPY, DUODENOSCOPY (EGD), COMBINED N/A 02/26/2015    Procedure: COMBINED ESOPHAGOSCOPY, GASTROSCOPY, DUODENOSCOPY (EGD);  Surgeon: Mitchel Hoskins Chi, MD;  [facility]     EXPLORE COMMON BILE DUCT N/A 06/25/2015    Procedure: EXPLORE COMMON BILE DUCT;  Surgeon: Tyree Smith MD;  [facility]     HERNIA REPAIR  2015    During transplant     ILEOSTOMY N/A 06/20/2019    Procedure: Ileostomy;  Surgeon: Krzysztof Carney MD;  [facility]     LAPAROSCOPIC ASSISTED COLECTOMY N/A 06/20/2019    Procedure: Laparoscopic Total Abdominal Colectomy, lysis of adhesions;  Surgeon: Krzysztof Carney MD;  [facility]     LAPAROSCOPIC LYSIS ADHESIONS N/A 03/12/2021    Procedure: LYSIS, ADHESIONS;  Surgeon: Lin Armendariz MD;  [facility]     LAPAROTOMY EXPLORATORY N/A 06/25/2015    Procedure: LAPAROTOMY EXPLORATORY;  Surgeon: Tyree Smith MD;  [facility]     PICC INSERTION Right 02/27/2015    4fr SL Valved PICC, 36cm (1cm external) in the R medial brachial vein w/ tip in the low SVC.     SIGMOIDOSCOPY FLEXIBLE N/A 04/27/2016    Procedure: SIGMOIDOSCOPY FLEXIBLE;  Surgeon: Jose Nielson MD;  [facility]     TRANSPLANT LIVER RECIPIENT LIVING UNRELATED N/A 06/18/2015    Procedure: TRANSPLANT LIVER RECIPIENT LIVING UNRELATED;  Surgeon: Tyree Smith MD;  [facility]     UPPER GI ENDOSCOPY         Current Medications:  Prior to Admission medications    Medication Sig Last Dose Taking? Auth Provider   calcium carbonate (OS-KERLINE 500 MG [email or web address]) 1250 MG tablet Take 1 tablet by mouth every evening   Reported, Patient   levothyroxine (SYNTHROID/LEVOTHROID) 25 MCG tablet    Reported, Patient   order for DME [employer] item #s   [id number] and [id number]   Krzysztof Carney MD   order for DME Increase the amount to  "10 per month  --2\" barrier ring #5920   Krzysztof Carney MD   predniSONE (DELTASONE) 5 MG tablet Take 1 tablet (5 mg) by mouth daily   Daniela Enriquez MD   tacrolimus (GENERIC EQUIVALENT) 0.5 MG capsule TAKE 5 CAPSULES (2.5 MG) EVERY 12 HOURS   Daniela Enriquez MD   ursodiol (ACTIGALL) 250 MG tablet Take 2 tablets (500 mg) by mouth every morning AND 1 tablet (250 mg) every evening.   Daniela Enriquez MD   VITAMIN D, CHOLECALCIFEROL, PO Take by mouth every evening   Reported, Patient   ASA    Allergies:  Rifampin and Penicillins    Social history:   Social History     Tobacco Use     Smoking status: Never Smoker     Smokeless tobacco: Never Used   Substance Use Topics     Alcohol use: Not Currently     Alcohol/week: 0.0 standard drinks     Comment: Every other month has 1 drink     Drug use: No     Family History:  Family History   Problem Relation Age of Onset     Hypertension Mother      Lipids Mother      Sleep Apnea Mother      Heart Disease Mother      Hypertension Maternal Grandmother              Alzheimer Disease Maternal Grandmother      Lipids Father      Heart Disease Maternal Grandfather 40     Heart Disease Paternal Grandfather      Melanoma No family hx of      Skin Cancer No family hx of      Cancer No family hx of      Diabetes No family hx of      ROS:  10-point ROS negative except as in HPI     PHYSICAL EXAM:  /77   Pulse 78   Resp 20   Wt 51.1 kg (112 lb 9.6 oz)   LMP 2021   SpO2 99%   BMI 20.33 kg/m      General:  Alert, no distress   HEENT:  Normocephalic, without obvious abnormality   Pulmonary:  Non-labored breathing on room air, clear to auscultation bilaterally   Cardiovascular:  Regular rate and rhythm, no murmur   Extremities:  No edema    Psychiatric: Appropriate mood and affect     Current weight: 112 lbs 9.6 oz  TWG: -0.635 kg (-1 lb 6.4 oz)     Labs:    Lab Results   Component Value Date    ABO A 2021    RH Pos " 05/25/2021    AS Neg 05/25/2021    HEPBANG Nonreactive 05/25/2021    CHPCRT Negative 06/15/2020    GCPCRT Negative 06/15/2020    TREPAB Negative 06/19/2012    HGB 12.2 05/25/2021    HIV Negative 06/19/2012     Lab Results   Component Value Date    PAP NIL 12/04/2018     Liver Function Studies -   Recent Labs   Lab Test 05/25/21  1102   PROTTOTAL 7.6   ALBUMIN 3.5   BILITOTAL 0.4   ALKPHOS 75   AST 17   ALT 36     Last Comprehensive Metabolic Panel:  Sodium   Date Value Ref Range Status   05/25/2021 135 133 - 144 mmol/L Final     Potassium   Date Value Ref Range Status   05/25/2021 4.1 3.4 - 5.3 mmol/L Final     Chloride   Date Value Ref Range Status   05/25/2021 106 94 - 109 mmol/L Final     Carbon Dioxide   Date Value Ref Range Status   05/25/2021 23 20 - 32 mmol/L Final     Anion Gap   Date Value Ref Range Status   05/25/2021 6 3 - 14 mmol/L Final     Glucose   Date Value Ref Range Status   05/25/2021 87 70 - 99 mg/dL Final     Urea Nitrogen   Date Value Ref Range Status   05/25/2021 20 7 - 30 mg/dL Final     Creatinine   Date Value Ref Range Status   05/25/2021 1.21 (H) 0.52 - 1.04 mg/dL Final     GFR Estimate   Date Value Ref Range Status   05/25/2021 57 (L) >60 mL/min/[1.73_m2] Final     Comment:     Non  GFR Calc  Starting 12/18/2018, serum creatinine based estimated GFR (eGFR) will be   calculated using the Chronic Kidney Disease Epidemiology Collaboration   (CKD-EPI) equation.       Calcium   Date Value Ref Range Status   05/25/2021 9.8 8.5 - 10.1 mg/dL Final     CBC RESULTS:   Recent Labs   Lab Test 05/25/21  1106   WBC 15.1*   RBC 4.16   HGB 12.2   HCT 36.4   MCV 88   MCH 29.3   MCHC 33.5   RDW 13.5        Lab Results   Component Value Date    CR 1.21 05/25/2021    CR 1.18 03/12/2021    CR 1.27 01/13/2021    CR 1.11 09/24/2020    CR 0.97 06/15/2020    CR 0.90 10/16/2019    CR 0.81 06/23/2019    CR 1.00 06/21/2019    CR 0.85 06/12/2019    CR 0.98 12/22/2018    CR 0.93 09/26/2018    CR  1.01 2018    CR 1.00 2018    CR 0.99 2018    CR 0.87 2018    CR 1.00 2018    CR 0.90 03/15/2018    CR 0.95 2018    CR 0.90 2017    CR 0.90 2017     Tacro (21): 4.7    CMV DNA (20): Not detected  EBV DNA (20): 60,803    Genetic Testing:   Plan for NIPT    Ultrasounds:   See Imaging tab for result of today's US    ASSESSMENT:  Yarelis Penny is a 36 year old  at 9w6d by ETD presenting to establish prenatal care. This is a high risk pregnancy due to history of living donor liver transplant (2/2 primary sclerosing cholangitis and autoimmune hepatitis), ulcerative colitis s/p colectomy, history of chronic kidney disease, history of CMV colitis and viremia, history of EBV viremia, and AMA.     PLAN:    #Liver transplant, living donor  #Autoimmune hepatitis  #Primary sclerosing cholangitis  Yarelis's graft function is good with ongoing normal LFTs. We discussed that pregnancy outcomes for both the mother and infant in liver transplant recipients are generally good with live birth rate of 73%, but there is an increased incidence of  delivery (30-40%), hypertension/preeclampsia (14-33%), fetal growth restriction (15-19%), gestational diabetes, and  delivery (30-48%). The higher risk of  birth appears to be due in part to episodes of graft rejection and early onset preeclampsia. Pregnancy induced hypertension and preeclampsia may be related to an increased incidence of baseline renal dysfunction in these women and use of immunosuppressive therapy. The risk appears to be higher with the use of cyclosporine A than with tacrolimus, but the number of reported patients is small. A slightly increased risk of teratogenicity has been suggested with standard immunosuppressive regimens including tacrolimus, but the magnitude of risk is uncertain.     Low dose Prednisone in pregnancy should be continued as its benefits outweigh the risks. However,  gestational diabetes is likely provoked by chronic prednisone administration. Long-term Prednisone use (usually at doses greater than 30 mg/ day) has been associated with low birth babies and osteoporosis. Very small amounts of Prednisone cross the placenta and its use has not been associated with increased incidence of congenital malformations. Recommend calcium supplements to counter act the osteoporotic effects of Prednisone. Prednisone is excreted into breastmilk but is considered comptabile with breasfeeding by the American Academy of Pediatrics. Some experts recommend dumping milk for four hours after a dose of prednisone greater than 20 mg.    - Close follow-up with transplant hepatology  - Hepatic panel every trimester, completed, notable for elevated cr (see below)  - Baseline urine protein creatinine ratio, completed  - Tacrolimus level monthly, titration per hepatology  - Continue Prednisone 5mg daily, titration per hepatology  - Continue Ursidiol 500mg PM and 250mg PM, titration per hepatology  - Continue low dose aspirin at 12 for preeclampsia prevention  - Early gestational diabetes screening given steroid use. Will complete at next visit. If normal testing then repeat at 24-28 weeks  - Monitor for signs/symptoms of  labor, HTN, preeclampsia, and intrahepatic cholestasis of pregnancy  - 1st trimester fetal scan, scheduled for   - Comprehensive fetal survey at 18-20 weeks, followed by serial fetal growth assessments every 4 weeks  - Antepartum surveillance via BPP, NST, or modified BPP weekly starting at 32 weeks.  - CMV testing and management pending ultrasound findings if suggestive of congenital infection.  - Timing of delivery pending maternal, and fetal status  - Vaginal delivery is the preferred mode.  delivery should be reserved for obstetrical indications.  - If  delivery is indicated then anticipate intra-abdominal adhesions. Might consider having Surgery team  available.  - Consider stress dose of steroids at delivery given chronic steroid use during delivery.      POSTPARTUM:  - Breastfeeding is encouraged and not contraindicated  - Careful attention to postpartum immunosuppressant levels.     #Ulcerative Colitis  #Colectomy with ileostomy  #Intrabdominal adhesions  The course of ulcerative colitis (UC) during pregnancy is related to the disease status at the time of conception.  Approximately one third of women will experience a relapse.  If a patient is in remission at conception or achieves remission during pregnancy it is likely she will stay in remission.  Women who have active disease at the time of conception may have worsening disease over the course of pregnancy and are likely to have active disease throughout pregnancy.  Given s/p colectomy do expect disease to remain quiet during pregnancy though a rectal flair would be possible as rectum was not resected.    Depending on the maternal level of disease some data indicates an increased risk of low birth weight or  birth.  If the disease in remission during pregnancy UC should not have a detrimental effect on pregnancy outcome.   If severe disease is encountered in pregnancy, the pregnancy outcome is less predictable, with potential maternal and fetal adverse events.      There is no contraindication to vaginal delivery in patients with UC.  If patient requires a  delivery for obstetric indications she should be counseled on her risk of bowel complications, such as bowel obstruction, which is a not infrequent complication of  delivery in these patients. Reviewed that in her case these risks may be higher given her history of extensive intra-abdominal adhesions at the time of colectomy.    - Close follow-up with gastroenterology  - Gastroenterology to notify colorectal surgery (Dr. Carney) that patient is now pregnant  - Needs rectal biopsy for history of low grade colonic dysplasia, to be  arranged by GI  - If initiation of UC medications indicated should discuss between GI and MFM  - Close monitoring for any rectal symptoms or changes during the course of her pregnancy  - Comprehensive ultrasound at 18-20 weeks  - Growth ultrasounds at 28 and 34 weeks  - Consider delivery at 39 weeks  - If delivery by  consider having colorectal surgery on standby    #Hx CKD  Yarelis has a history of CKD with a 2013 biopsy showing chronic interstitial nephritis and mesangial proliferative GN with C3 deposition. She was briefly on dialysis and was considered for kidney transplant but subsequently improved. Her kidney function has been fluctuating over the past several years but was largely normal. However, she now has persistent creatinine elevation, which started before pregnancy. We discussed that pregnancy may have significant negative effects on renal disease and can change kidney function both temporarily and permanently.  Increases in proteinuria occur in about 50% of pregnancies, hypertension develops or worsens in about 25% of pregnancies which may lead to maternal injury such as stroke, or poor fetal outcome.  Women with nephrotic syndrome may develop worsening edema as the pregnancy progresses, however this should resolve with delivery.  Permanent decline in renal function occurs in up to 10% of pregnancies affected by mild renal disease (creatinine <1.5 mg/dL); women with hypertension seem to be at increased risk.  In women with moderate renal insufficiency (creatinine 1.5-2.9 mg/dL), as many as one third experience irreversible damage, and as many as 10% may develop end-stage renal disease (ESRD).  Co-existing hypertension will increase this likelihood.      Pregnancies in women with preexisting renal disease are significantly more likely to develop gestational hypertension, preeclampsia, eclampsia, or to die.   Increased risk of poor fetal outcomes such as  delivery, fetal growth restriction,  and fetal death are significant.  Again, these risks are increased in women with uncontrolled hypertension.    - Referral to nephrology  - Increased frequency of prenatal visits: every 2-4 weeks until the third trimester, then weekly thereafter  - Continue of low-dose ASA for preeclampsia prophylaxis  - Early detection and treatment of asymptomatic bacteriuria with urine culture at least every trimester  - Assessment of renal function every 4-8 weeks  - Monitor for signs and symptoms of preeclampsia  - Monthly ultrasound for fetal growth after initial anatomy scan at 18 weeks  - Weekly  surveillance with BPP (or NST and MVP) at 32 weeks  gestation  - Delivery is indicated based on secondary sequelae (e.g. hypertension, preeclampsia, fetal growth restriction, fetal distress) and underlying disease; generally 37 to 39 weeks  gestation so as to avoid irreversible renal injury  -  reserved for obstetric indications    #Hx CMV viremia  #Hx CMV colitis  Discussed that although prior CMV infection provides immunity, recurrent infection with different strains can occur in 0.5-2% of women with prior infection, in which only <1% of fetuses will be symptomatic at birth. In pregnancy no workup is recommended unless CMV associated US findings. If congenital CMV is suspected then recommend antibody, avidity testing, and amniocentesis per clinical scenario and lab findings.     #EBV viremia  EBV generally does not lead to congenital abnormalities among infants of women who developed EBV in pregnancy, though there are isolated cases where congenital anomalies were detected. Although in Yarelis's case viremia is already present, would not expect any higher risk for fetal anomaly. However, Yarelis should continue to follow with her transplant ID doctor to continue to monitor for developing a malignancy given EBV viremia and immunosuppression.    - Recommend that Yarelis make an appointment to see Dr. Lopez for continued  monitoring and for any additional work-up    #Advanced Maternal Age   The risk of fetal aneuploidy increases with age. We reviewed these risks and we discussed the options available for risk assessment for aneuploidy during pregnancy including cell free fetal DNA from maternal serum, first trimester assessment, second trimester assessment, chorionic villus sampling, and amniocentesis.The patient also met with genetic counseling to review these risks. She has not had preimplantation genetic testing with her IVF.    Patients of advanced age are also at increased risks of pregnancy complications, including miscarriage, preeclampsia, abnormalities with placentation, stillbirth, and  delivery.  These risks increase with increasing maternal age and comorbidities.     - The patient has opted for: NIPT, nuchal translucency  - Targeted anatomical ultrasound at 18-20 weeks.    - Consider induction of labor at 39-40 weeks, but no later than 41 weeks.  - Continue low dose ASA for preeclampsia prevention    #IVF Pregnancy  The use of IVF, with and without ICSI, has been associated with an increased risk for congenital anomalies (specifically cardiac), hypertensive disorders, preeclampsia, placental abruption, placenta previa and other placental abnormalities, small for gestational age and  delivery. The use of an egg donor is associated with a further increase in the risk of hypertensive disorders.      While the association between IVF and these adverse outcomes raise some concern, it is important to note that the literature is contradictory on this subject and the chances of having a healthy child conceived with ART are overall extremely high.    - Routine aneuploidy screening regardless of whether PGS is completed (above)  - Comprehensive (Level 2) US with MFM at 18-20 weeks with careful evaluation of the placenta  - Fetal echocardiogram at 22 weeks with pediatric cardiology   - Close monitoring for  preeclampsia  -  testing and timing of delivery per routine obstetric guidelines  - Continue low dose aspirin for preeclampsia prophylaxis    #Elevated TSH  - Continue synthroid for now  - Plan to repeat TSH/T4 at next visit. If TSH normal and T4 normal or upper normal discontinue Synthroid and retest.    #PNC  - First trimester labs: Rh pos, Lata neg, Hgb 12.2, Plts 376, UA/UCx urogenital neil, RPR neg, HIV neg, Rubella non-immune, HepBSAg neg, Hep B non-immune, HepCAb NR, Vit D wnl, GC/CT not collected, VZV immune, A1C 5.4, Toxo IgM/IgG neg   - Pap Smear: 2018 NILM/HPV neg  - Immunizations: no record of influenza, s/p pfizer COVID 3/16/21 and 21, plan for tdap at 28w  - Genetic screening: Plans NIPT  - Recommended Weight Gain: 25-35lbs (BMI 20.33)  - Early GCT at next visit (see above)  - Pre-Eclampsia prophylaxis: Continue ASA 81    RTC in 3 weeks for MEJIA visit and NT US    Patient seen and discussed with Dr. Ta.    Nicho Izaguirre MD MPH  OB/Gyn PGY-2  21 2:33 PM    Physician Attestation   I, Damion Ta MD, saw this patient and agree with the findings and plan of care as documented in the note.      Items personally reviewed/procedural attestation: History, record review, discussion with Dr. Wilkerson (GI Specialist), review of management of care and plan as documented above. Total spent during this office visit was 35 minutes.     Damion Ta MD  Maternal-Fetal Medicine      Damion Ta MD

## 2021-06-03 NOTE — PROGRESS NOTES
[facility]       PATIENT: Yarelis Penny    MRN: [id number]    Date of Birth 1985    Tel: [phone] (home)     PCP: Dr. Erica Lara     HPI: Ms. Penny is a 33 year old female here for preconception counseling in the setting of PSC associated IBD treated with vedolizumab.  She also has PSC/AIH and is status post living donor liver transplant 2015, currently on tacrolimus and prednisone, complicated by anastomotic bile leak with anastomotic strictures status post multiple ERCPs.  I am managing her IBD, Dr. Daniela Enriquez is managing her liver transplant and Dr. Guru Macias is managing her biliary stricture.    Most recently, Yarelis was on vedolizumab every 4 weeks, but continued to have active disease and a colonoscopy showed LGD on a random biopsy. On 6/2019 she underwent a total abdominal lap colectomy with end ileostomy.  Pathology from the colectomy specimen showed LGD without HGD or invasive cancer. She presents today to [facility] for preconception counseling.     She manages her bag very well and finds it convenient. She empties it 4 times per day. No blood in the stool or stoma issues. She changes her bag twice per week. During the night she empties her bag 1-2 times, when she wakes up to urinate. No issues or concerns with her bag or bowel habits.       Yarelis and her  have started to attempt to conceive x 1 month. Currently using a couple apps to help track ovulation.     Noteworthy diet history- healthy diet in general    UC history  Age at diagnosis: 18  Macroscopic extent of disease varied over the years, most recently ileocolonic involvement with rectal sparing    Constitutional symptoms:  Fever NO  Weight loss NO    Other GI symptoms present none    Total number of IBD surgeries (except perianal): none    Remaining bowel: distal rectum   Small bowel entire  Ileocecal valve present No   Current Pouch  NO  Current Stoma  YES    Current IBD  Medications:  -none    Past IBD Medications: VDZ, Prednisone, Pentasa, Asacol, azathioprine (mainly for liver disease), Uceris- stopped March 2018    Interval history, 6/2021  Currently 8 weeks pregnant. 1    Past Medical History:   Diagnosis Date     Acute kidney injury (H) 04/28/2016     Anemia, iron deficiency 08/29/2017    On iron supplementation     Autoimmune hepatitis (H) 2012    on steroid taper     Cholangitis 2003     CKD (chronic kidney disease) 2012    biopsy 2012: TIN, patchy fibrosis. Dialysis in 2013 x2 months.     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     Esophageal varices (H) 09/2008    banded     Heart murmur      History of blood transfusion      Primary sclerosing cholangitis 2003     SBP (spontaneous bacterial peritonitis) (H) 02/24/2015     Ulcerative Colitis     f/b GI      Past Surgical History:   Procedure Laterality Date     CHOLECYSTECTOMY  2015     COLONOSCOPY  09/2007     COLONOSCOPY N/A 02/26/2015    Procedure: COMBINED COLONOSCOPY, SINGLE OR MULTIPLE BIOPSY/POLYPECTOMY BY BIOPSY;  Surgeon: Mitchel Hoskins Chi, MD;  [facility]     COLONOSCOPY N/A 01/12/2016    Procedure: COMBINED COLONOSCOPY, SINGLE OR MULTIPLE BIOPSY/POLYPECTOMY BY BIOPSY;  Surgeon: Rigo Valles MD;  [facility]: [facility]     COLONOSCOPY N/A 04/01/2016    Procedure: COMBINED COLONOSCOPY, SINGLE OR MULTIPLE BIOPSY/POLYPECTOMY BY BIOPSY;  Surgeon: Kareem Solis MD;  [facility]: [facility]     COLONOSCOPY N/A 09/05/2017    Procedure: COMBINED COLONOSCOPY, SINGLE OR MULTIPLE BIOPSY/POLYPECTOMY BY BIOPSY;  Colonoscopy;  Surgeon: Fuentes Johnson MD;  Location: [facility]     COLONOSCOPY N/A 02/25/2019    Procedure: COMBINED COLONOSCOPY, SINGLE OR MULTIPLE BIOPSY/POLYPECTOMY BY BIOPSY;  Surgeon: Sophy Mckeon MD;  [facility]     DILATION AND CURETTAGE, HYSTEROSCOPY DIAGNOSTIC, COMBINED N/A 03/12/2021    Procedure: HYSTEROSCOPY, DIAGNOSTIC;  Surgeon: Lin Armendariz MD;  [facility]     ENDOSCOPIC  RETROGRADE CHOLANGIOPANCREATOGRAM N/A 06/25/2015    Procedure: COMBINED ENDOSCOPIC RETROGRADE CHOLANGIOPANCREATOGRAPHY, PLACE TUBE/STENT;  Surgeon: Landon Quinones MD;  [facility]     ENDOSCOPIC RETROGRADE CHOLANGIOPANCREATOGRAM N/A 06/25/2015    Procedure: COMBINED ENDOSCOPIC RETROGRADE CHOLANGIOPANCREATOGRAPHY, PLACE TUBE/STENT;  Surgeon: Landon Quinones MD;  [facility]     ENDOSCOPIC RETROGRADE CHOLANGIOPANCREATOGRAM N/A 07/02/2015    Procedure: COMBINED ENDOSCOPIC RETROGRADE CHOLANGIOPANCREATOGRAPHY, PLACE TUBE/STENT;  Surgeon: Landon Quinones MD;  [facility]     ENDOSCOPIC RETROGRADE CHOLANGIOPANCREATOGRAM N/A 09/08/2015    Procedure: COMBINED ENDOSCOPIC RETROGRADE CHOLANGIOPANCREATOGRAPHY, REMOVE FOREIGN BODY OR STENT/TUBE;  Surgeon: Landon Quinones MD;  [facility]     ENDOSCOPIC RETROGRADE CHOLANGIOPANCREATOGRAM N/A 12/08/2015    Procedure: ENDOSCOPIC RETROGRADE CHOLANGIOPANCREATOGRAM;  Surgeon: Landon Quinones MD;  [facility]     ENDOSCOPIC RETROGRADE CHOLANGIOPANCREATOGRAM N/A 03/01/2016    Procedure: COMBINED ENDOSCOPIC RETROGRADE CHOLANGIOPANCREATOGRAPHY, REMOVE FOREIGN BODY OR STENT/TUBE;  Surgeon: Landon Quinones MD;  [facility]     ENDOSCOPIC RETROGRADE CHOLANGIOPANCREATOGRAM N/A 03/20/2017    Procedure: COMBINED ENDOSCOPIC RETROGRADE CHOLANGIOPANCREATOGRAPHY, PLACE TUBE/STENT;  Endoscopic Retrograde Cholangiopancreatogram with Ballon Dilation, Stent Placement;  Surgeon: Guru Brittany Macias MD;  [facility]     ENDOSCOPIC RETROGRADE CHOLANGIOPANCREATOGRAM N/A 04/02/2018    Procedure: COMBINED ENDOSCOPIC RETROGRADE CHOLANGIOPANCREATOGRAPHY, PLACE TUBE/STENT;  Endoscopic Retrograde Cholangiopancreatogram with biliary dilation and stent placement;  Surgeon: Guru Brittany Macias MD;  [facility]     ENDOSCOPIC RETROGRADE CHOLANGIOPANCREATOGRAM N/A 05/07/2018    Procedure: ENDOSCOPIC RETROGRADE  CHOLANGIOPANCREATOGRAM;  Endoscopic Retrograde Cholangiopancreatogram ballon dilation stent exchange;  Surgeon: Guru Brittany Macias MD;  [facility]     ENDOSCOPIC RETROGRADE CHOLANGIOPANCREATOGRAPHY, EXCHANGE TUBE/STENT N/A 07/30/2015    Procedure: ENDOSCOPIC RETROGRADE CHOLANGIOPANCREATOGRAPHY, EXCHANGE TUBE/STENT;  Surgeon: Landon Quinones MD;  [facility]     ENDOSCOPIC RETROGRADE CHOLANGIOPANCREATOGRAPHY, EXCHANGE TUBE/STENT N/A 05/15/2017    Procedure: ENDOSCOPIC RETROGRADE CHOLANGIOPANCREATOGRAPHY, EXCHANGE TUBE/STENT;  Endoscopic Retrograde Cholangiopancreatogram with biliary stent exchange and balloon dilation;  Surgeon: Guru Brittany Macias MD;  [facility]     ENDOSCOPIC RETROGRADE CHOLANGIOPANCREATOGRAPHY, EXCHANGE TUBE/STENT N/A 06/25/2018    Procedure: ENDOSCOPIC RETROGRADE CHOLANGIOPANCREATOGRAPHY, EXCHANGE TUBE/STENT;  Endoscopic Retrograde Cholangiopancreatogram with biliary dilation, stone removal and stent exchange;  Surgeon: Guru Brittany Macias MD;  [facility]     ENDOSCOPIC RETROGRADE CHOLANGIOPANCREATOGRAPHY, EXCHANGE TUBE/STENT N/A 07/30/2018    Procedure: ENDOSCOPIC RETROGRADE CHOLANGIOPANCREATOGRAPHY, EXCHANGE TUBE/STENT;  Endoscopic Retrograde Cholangiopancreatogram with Stent Exchange with dilation;  Surgeon: Guru Brittany Macias MD;  [facility]     ENDOSCOPIC RETROGRADE CHOLANGIOPANCREATOGRAPHY, EXCHANGE TUBE/STENT N/A 09/26/2018    Procedure: ENDOSCOPIC RETROGRADE CHOLANGIOPANCREATOGRAPHY, EXCHANGE TUBE/STENT;  Endoscopic Retrograde Cholangiopancreatogram, with bile duct stent exchanged balloon dilation and balloon sweep of bile duct;  Surgeon: Guru Brittany Macias MD;  [facility]     ESOPHAGOSCOPY, GASTROSCOPY, DUODENOSCOPY (EGD), COMBINED N/A 02/26/2015    Procedure: COMBINED ESOPHAGOSCOPY, GASTROSCOPY, DUODENOSCOPY (EGD);  Surgeon: Mitchel Hoskins Chi, MD;  Location:  GI      EXPLORE COMMON BILE DUCT N/A 2015    Procedure: EXPLORE COMMON BILE DUCT;  Surgeon: Tyree Smith MD;  [facility]     HERNIA REPAIR      During transplant     ILEOSTOMY N/A 2019    Procedure: Ileostomy;  Surgeon: Krzysztof Carney MD;  [facility]     LAPAROSCOPIC ASSISTED COLECTOMY N/A 2019    Procedure: Laparoscopic Total Abdominal Colectomy, lysis of adhesions;  Surgeon: Krzysztof Carney MD;  [facility]     LAPAROSCOPIC LYSIS ADHESIONS N/A 2021    Procedure: LYSIS, ADHESIONS;  Surgeon: Lin Armendariz MD;  [facility]     LAPAROTOMY EXPLORATORY N/A 2015    Procedure: LAPAROTOMY EXPLORATORY;  Surgeon: Tyree Smith MD;  [facility]     PICC INSERTION Right 2015    4fr SL Valved PICC, 36cm (1cm external) in the R medial brachial vein w/ tip in the low SVC.     SIGMOIDOSCOPY FLEXIBLE N/A 2016    Procedure: SIGMOIDOSCOPY FLEXIBLE;  Surgeon: Jose Nielson MD;  [facility]     TRANSPLANT LIVER RECIPIENT LIVING UNRELATED N/A 2015    Procedure: TRANSPLANT LIVER RECIPIENT LIVING UNRELATED;  Surgeon: Tyree Smith MD;  [facility]     UPPER GI ENDOSCOPY       Social History     Tobacco Use     Smoking status: Never Smoker     Smokeless tobacco: Never Used   Substance Use Topics     Alcohol use: Not Currently     Alcohol/week: 0.0 standard drinks     Comment: Every other month has 1 drink     Family History   Problem Relation Age of Onset     Hypertension Mother      Lipids Mother      Sleep Apnea Mother      Heart Disease Mother      Hypertension Maternal Grandmother              Alzheimer Disease Maternal Grandmother      Lipids Father      Heart Disease Maternal Grandfather 40     Heart Disease Paternal Grandfather      Melanoma No family hx of      Skin Cancer No family hx of      Cancer No family hx of      Diabetes No family hx of      Allergies   Allergen Reactions     Rifampin Other (See Comments)      "Kidney failure     Penicillins      Bad hives when she was in [facility]      Outpatient Encounter Medications as of 6/3/2021   Medication Sig Dispense Refill     calcium carbonate (OS-KERLINE 500 MG [place]. [place]) 1250 MG tablet Take 1 tablet by mouth every evening       levothyroxine (SYNTHROID/LEVOTHROID) 25 MCG tablet        order for DME [employer] item #s   94899 and 11845 30 Bag 3     order for DME Increase the amount to 10 per month  --2\" barrier ring #7805 10 each 0     predniSONE (DELTASONE) 5 MG tablet Take 1 tablet (5 mg) by mouth daily 90 tablet 3     tacrolimus (GENERIC EQUIVALENT) 0.5 MG capsule TAKE 5 CAPSULES (2.5 MG) EVERY 12 HOURS 900 capsule 3     ursodiol (ACTIGALL) 250 MG tablet Take 2 tablets (500 mg) by mouth every morning AND 1 tablet (250 mg) every evening. 270 tablet 3     VITAMIN D, CHOLECALCIFEROL, PO Take by mouth every evening       No facility-administered encounter medications on file as of 6/3/2021.       NSAID  NO    Review of Systems  Complete 10 System ROS performed. All are negative except as documented below, in the HPI, or in patient questionnaire from today's visit.    1) Constitutional: No fevers, chills, night sweats or malaise, weight loss or gain  2) Skin: No rash  3) Pulmonary: No wheeze, SOB, cough, sputum or hemoptysis  4) Cardiovascular: No Chest pain or palpitations  5) Genitourinary: No blood in urine or dysuria  6) Endocrine: No increased sweating, hunger, thirst or thyroid problems  7) Hematologic: No bruising and easy bleeding  8) Musculoskeletal: no new pain in joints or limitation in ROM  9) Neurologic: No dizziness, paresthesias or weakness or falls  10) Psychiatric:  not depressed/anxious, no sleep problems    PHYSICAL EXAM  Vitals reviewed and stable.   Gen: NAD  HEENT: EOMI, anicteric  Neck: supple  Lymph: no cervical LAD  CV: RRR  Pulm: non labored  Abd: well-healed surgical scars, soft, non tender, non distended. Stoma in R abdomen with minimal stool output.  Skin: no " rash, no jaundice  MSK: normal gait  Neuro: AAOX3  Psych: normal affect    DATA:  Reviewed in detail past documentation, medications and prior workup available in electronic health records or through outside records.    PERTINENT STUDIES:  Most recent CBC:  WBC   Date Value Ref Range Status   05/25/2021 15.1 (H) 4.0 - 11.0 10e9/L Final     Hemoglobin   Date Value Ref Range Status   05/25/2021 12.2 11.7 - 15.7 g/dL Final     Platelet Count   Date Value Ref Range Status   05/25/2021 376 150 - 450 10e9/L Final   Most recent coag:  INR   Date Value Ref Range Status   06/23/2019 1.02 0.86 - 1.14 Final   Most recent hepatic panel:  AST   Date Value Ref Range Status   05/25/2021 17 0 - 45 U/L Final     ALT   Date Value Ref Range Status   05/25/2021 36 0 - 50 U/L Final     Bilirubin Conjugated   Date Value Ref Range Status   09/22/2006 0.0 0.0 - 0.3 mg/dL Final      Bilirubin Total   Date Value Ref Range Status   05/25/2021 0.4 0.2 - 1.3 mg/dL Final     Albumin   Date Value Ref Range Status   05/25/2021 3.5 3.4 - 5.0 g/dL Final     Alkaline Phosphatase   Date Value Ref Range Status   05/25/2021 75 40 - 150 U/L Final     Creatinine   Date Value Ref Range Status   05/25/2021 1.21 (H) 0.52 - 1.04 mg/dL Final     Tuberculosis: Negative 12/5/16    DRUG MONITORING  TPMT enzyme activity: 18.5 12/6/16    6-TGN/6-MMPN levels: 203/257 12/6/16    Biologic concentration:  4/18/17 Vedolizumab level 34.1, no ATI  5/2/17 Vedolizumab level 3.9, no ATI (decreased interval to q4 week dosing)  10/25/17 Vedolizumab level 11, no ATI (no change in therapy-- met with surgeon to discuss what surgical options might look like)    Endoscopy:   Earliest colonoscopies in 2006 show right greater than left inflammation with some inflammation in the ileum as well.    Followup colonoscopy in 2007 showed mainly diffuse colonic inflammation.    Colonoscopies in 2015 again showed more chronic colitis with no significant ileal involvement.    Colonoscopies in  3/2016 again showed a right-sided and some ileal inflammation with ulceration, but biopsy showed CMV.    Colonoscopy 11/2016 showed severe inflammation from 20 cm from the anus all of the way to the ileum as far as could be evaluated.  Biopsies showed chronic active ileitis and chronic active colitis in every sample.  There was no evidence of CMV or PTLD. Rare SHENA-KACIE positive cells seen in ileum.  Colonoscopy 9/2017 showed Abad 2 inflammation in the right colon and abad one in the left colon with relative rectal sparing.  Patulous IC valve with some backwash ileitis.  Biopsies negative for lymphoma, CMP and EBV biopsies with mild  chronic active ileitis and mild chronic active colitis throughout, including rectum.  Ileocolonoscopy 2/2019 showed Abad 2/3 pan colitis with relative rectal sparing + ileitis. Biopsies showed LGD at 40 cm.     Imaging:  MRE 12/2016:    1.  Diffuse colonic wall thickening and enhancement with loss of  haustral pattern. Wall thickening and enhancement of the terminal  ileum. Findings consistent with acute on chronic changes related to  ulcerative colitis.    IMPRESSION    # Preconception counseling  # s/p abdominal colectomy with end ileostomy for LGD  # Need for rectal surveillance   # PSC/AIH s/p liver transplant     Ms. Penny is a 33 year old here with PSC-associated IBD, now s/p lap abdominal colectomy with end ileostomy 6/2019, with rectum still in place. She presents with her  for preconception counseling.     Of note, she had dense pelvic adhesions during her lap colectomy of unclear etiology. We did discuss that this may affect her fertility and therefore, initiation of IVF work up may be reasonable after 6 months of unsuccessful attempted conception.     On a different note, given history of LGD, will plan for rectal surveillance with biopsies in November.  If biopsies are negative, may increase surveillance interval to 1 year.     She will contact me when she gets  pregnant.     Sophy Mckeon MD   of Medicine  Division of Gastroenterology, Hepatology and Nutrition  [facility]    Thank you for this consultation.  It was a pleasure to participate in the care of this patient; please contact us with any further questions.  I spent a total of 30 minutes, face to face, was spent with this patient, >50% of which was counseling regarding the above delineated issues.

## 2021-06-03 NOTE — PROGRESS NOTES
[facility] -- IPREPP       PATIENT: Yarelis Penny    MRN: [id number]    Date of Birth 1985    Tel: [phone] (home)     PCP: Dr. Erica Lara     HPI: Ms. Penny is a 33 year old female with PSC associated IBD.  She also has PSC/AIH and is status post living donor liver transplant 2015, currently on tacrolimus and prednisone, complicated by anastomotic bile leak with anastomotic strictures status post multiple ERCPs.  I am managing her IBD, Dr. Daniela Enriquez is managing her liver transplant and Dr. Guru Macias is managing her biliary stricture.    Most recently, Yarelis was on vedolizumab every 4 weeks, but continued to have active disease and a colonoscopy showed LGD on a random biopsy. On 6/2019 she underwent a total abdominal lap colectomy with end ileostomy.  Pathology from the colectomy specimen showed LGD without HGD or invasive cancer.     She manages her bag very well and finds it convenient. She empties it 4 times per day. No blood in the stool or stoma issues. She changes her bag twice per week. During the night she empties her bag 1-2 times, when she wakes up to urinate. No issues or concerns with her bag or bowel habits.       Yarelis and her  have started to attempt to conceive x 1 month. Currently using a couple apps to help track ovulation.     Noteworthy diet history- healthy diet in general    UC history  Age at diagnosis: 18  Macroscopic extent of disease varied over the years, most recently ileocolonic involvement with rectal sparing    Constitutional symptoms:  Fever NO  Weight loss NO    Other GI symptoms present none    Total number of IBD surgeries (except perianal): none    Remaining bowel: distal rectum   Small bowel entire  Ileocecal valve present No   Current Pouch  NO  Current Stoma  YES    Current IBD Medications:  -none    Past IBD Medications: VDZ, Prednisone, Pentasa, Asacol, azathioprine (mainly for liver disease), Uceris- stopped March  2018    Interval history, 6/2021 -IPREPP  Currently 8 weeks pregnant. Underwent IUI initially. Then underwent egg retrieval in Jan.  Underwent hysteroscopy and uterine polyp removal. Transfer occurred 8 weeks ago and has been successful!     No issues with stoma. Occasionally has rectal pressure without discharge; no major concerns or discomfort.     On tacro, prednisone, ursodiol. Also on levothyroxine, iron, calcium and prenatal.      Empties bag 4 times per day. No blood. No abd pain.     Past Medical History:   Diagnosis Date     Acute kidney injury (H) 04/28/2016     Anemia, iron deficiency 08/29/2017    On iron supplementation     Autoimmune hepatitis (H) 2012    on steroid taper     Cholangitis 2003     CKD (chronic kidney disease) 2012    biopsy 2012: TIN, patchy fibrosis. Dialysis in 2013 x2 months.     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     Esophageal varices (H) 09/2008    banded     Heart murmur      History of blood transfusion      Primary sclerosing cholangitis 2003     SBP (spontaneous bacterial peritonitis) (H) 02/24/2015     Ulcerative Colitis     f/b GI      Past Surgical History:   Procedure Laterality Date     CHOLECYSTECTOMY  2015     COLONOSCOPY  09/2007     COLONOSCOPY N/A 02/26/2015    Procedure: COMBINED COLONOSCOPY, SINGLE OR MULTIPLE BIOPSY/POLYPECTOMY BY BIOPSY;  Surgeon: Mitchel Hoskins Chi, MD;  [facility]     COLONOSCOPY N/A 01/12/2016    Procedure: COMBINED COLONOSCOPY, SINGLE OR MULTIPLE BIOPSY/POLYPECTOMY BY BIOPSY;  Surgeon: Rigo Valles MD;  [facility]     COLONOSCOPY N/A 04/01/2016    Procedure: COMBINED COLONOSCOPY, SINGLE OR MULTIPLE BIOPSY/POLYPECTOMY BY BIOPSY;  Surgeon: Kareem Solis MD;  [facility]     COLONOSCOPY N/A 09/05/2017    Procedure: COMBINED COLONOSCOPY, SINGLE OR MULTIPLE BIOPSY/POLYPECTOMY BY BIOPSY;  Colonoscopy;  Surgeon: Fuentes Johnson MD;  [facility]     COLONOSCOPY N/A 02/25/2019    Procedure: COMBINED COLONOSCOPY,  SINGLE OR MULTIPLE BIOPSY/POLYPECTOMY BY BIOPSY;  Surgeon: Sophy Mckeon MD;  [facility]     DILATION AND CURETTAGE, HYSTEROSCOPY DIAGNOSTIC, COMBINED N/A 03/12/2021    Procedure: HYSTEROSCOPY, DIAGNOSTIC;  Surgeon: Lin Armendariz MD;  [facility]     ENDOSCOPIC RETROGRADE CHOLANGIOPANCREATOGRAM N/A 06/25/2015    Procedure: COMBINED ENDOSCOPIC RETROGRADE CHOLANGIOPANCREATOGRAPHY, PLACE TUBE/STENT;  Surgeon: Landon Quinones MD;  [facility]     ENDOSCOPIC RETROGRADE CHOLANGIOPANCREATOGRAM N/A 06/25/2015    Procedure: COMBINED ENDOSCOPIC RETROGRADE CHOLANGIOPANCREATOGRAPHY, PLACE TUBE/STENT;  Surgeon: Landon Quinones MD;  [facility]     ENDOSCOPIC RETROGRADE CHOLANGIOPANCREATOGRAM N/A 07/02/2015    Procedure: COMBINED ENDOSCOPIC RETROGRADE CHOLANGIOPANCREATOGRAPHY, PLACE TUBE/STENT;  Surgeon: Landon Quinones MD;  [facility]     ENDOSCOPIC RETROGRADE CHOLANGIOPANCREATOGRAM N/A 09/08/2015    Procedure: COMBINED ENDOSCOPIC RETROGRADE CHOLANGIOPANCREATOGRAPHY, REMOVE FOREIGN BODY OR STENT/TUBE;  Surgeon: Landon Quinones MD;  [facility]     ENDOSCOPIC RETROGRADE CHOLANGIOPANCREATOGRAM N/A 12/08/2015    Procedure: ENDOSCOPIC RETROGRADE CHOLANGIOPANCREATOGRAM;  Surgeon: Landon Quinones MD;  [facility]     ENDOSCOPIC RETROGRADE CHOLANGIOPANCREATOGRAM N/A 03/01/2016    Procedure: COMBINED ENDOSCOPIC RETROGRADE CHOLANGIOPANCREATOGRAPHY, REMOVE FOREIGN BODY OR STENT/TUBE;  Surgeon: Landon Quinones MD;  [facility]     ENDOSCOPIC RETROGRADE CHOLANGIOPANCREATOGRAM N/A 03/20/2017    Procedure: COMBINED ENDOSCOPIC RETROGRADE CHOLANGIOPANCREATOGRAPHY, PLACE TUBE/STENT;  Endoscopic Retrograde Cholangiopancreatogram with Ballon Dilation, Stent Placement;  Surgeon: Guru Brittany Macias MD;  [facility]     ENDOSCOPIC RETROGRADE CHOLANGIOPANCREATOGRAM N/A 04/02/2018    Procedure: COMBINED ENDOSCOPIC RETROGRADE  CHOLANGIOPANCREATOGRAPHY, PLACE TUBE/STENT;  Endoscopic Retrograde Cholangiopancreatogram with biliary dilation and stent placement;  Surgeon: Guru Brittany Macias MD;  [facility]     ENDOSCOPIC RETROGRADE CHOLANGIOPANCREATOGRAM N/A 05/07/2018    Procedure: ENDOSCOPIC RETROGRADE CHOLANGIOPANCREATOGRAM;  Endoscopic Retrograde Cholangiopancreatogram ballon dilation stent exchange;  Surgeon: Guru Brittany Macias MD;  [facility]     ENDOSCOPIC RETROGRADE CHOLANGIOPANCREATOGRAPHY, EXCHANGE TUBE/STENT N/A 07/30/2015    Procedure: ENDOSCOPIC RETROGRADE CHOLANGIOPANCREATOGRAPHY, EXCHANGE TUBE/STENT;  Surgeon: Landon Quinones MD;  [facility]     ENDOSCOPIC RETROGRADE CHOLANGIOPANCREATOGRAPHY, EXCHANGE TUBE/STENT N/A 05/15/2017    Procedure: ENDOSCOPIC RETROGRADE CHOLANGIOPANCREATOGRAPHY, EXCHANGE TUBE/STENT;  Endoscopic Retrograde Cholangiopancreatogram with biliary stent exchange and balloon dilation;  Surgeon: Guru Brittany Macias MD;  [facility]     ENDOSCOPIC RETROGRADE CHOLANGIOPANCREATOGRAPHY, EXCHANGE TUBE/STENT N/A 06/25/2018    Procedure: ENDOSCOPIC RETROGRADE CHOLANGIOPANCREATOGRAPHY, EXCHANGE TUBE/STENT;  Endoscopic Retrograde Cholangiopancreatogram with biliary dilation, stone removal and stent exchange;  Surgeon: Guru Brittany Macias MD;  [facility]     ENDOSCOPIC RETROGRADE CHOLANGIOPANCREATOGRAPHY, EXCHANGE TUBE/STENT N/A 07/30/2018    Procedure: ENDOSCOPIC RETROGRADE CHOLANGIOPANCREATOGRAPHY, EXCHANGE TUBE/STENT;  Endoscopic Retrograde Cholangiopancreatogram with Stent Exchange with dilation;  Surgeon: Guru Brittany Macias MD;  [facility]     ENDOSCOPIC RETROGRADE CHOLANGIOPANCREATOGRAPHY, EXCHANGE TUBE/STENT N/A 09/26/2018    Procedure: ENDOSCOPIC RETROGRADE CHOLANGIOPANCREATOGRAPHY, EXCHANGE TUBE/STENT;  Endoscopic Retrograde Cholangiopancreatogram, with bile duct stent exchanged balloon  dilation and balloon sweep of bile duct;  Surgeon: Guru Brittany Macias MD;  [facility]     ESOPHAGOSCOPY, GASTROSCOPY, DUODENOSCOPY (EGD), COMBINED N/A 2015    Procedure: COMBINED ESOPHAGOSCOPY, GASTROSCOPY, DUODENOSCOPY (EGD);  Surgeon: Mitchel Hoskins Chi, MD;  [facility]     EXPLORE COMMON BILE DUCT N/A 2015    Procedure: EXPLORE COMMON BILE DUCT;  Surgeon: Tyree Smith MD;  [facility]     HERNIA REPAIR      During transplant     ILEOSTOMY N/A 2019    Procedure: Ileostomy;  Surgeon: Krzysztof Carney MD;  [facility]     LAPAROSCOPIC ASSISTED COLECTOMY N/A 2019    Procedure: Laparoscopic Total Abdominal Colectomy, lysis of adhesions;  Surgeon: Krzysztof Carney MD;  [facility]     LAPAROSCOPIC LYSIS ADHESIONS N/A 2021    Procedure: LYSIS, ADHESIONS;  Surgeon: Lin Armendariz MD;  [facility]     LAPAROTOMY EXPLORATORY N/A 2015    Procedure: LAPAROTOMY EXPLORATORY;  Surgeon: Tyree Smith MD;  [facility]     PICC INSERTION Right 2015    4fr SL Valved PICC, 36cm (1cm external) in the R medial brachial vein w/ tip in the low SVC.     SIGMOIDOSCOPY FLEXIBLE N/A 2016    Procedure: SIGMOIDOSCOPY FLEXIBLE;  Surgeon: Jose Nielson MD;  [facility]     TRANSPLANT LIVER RECIPIENT LIVING UNRELATED N/A 2015    Procedure: TRANSPLANT LIVER RECIPIENT LIVING UNRELATED;  Surgeon: Tyree Smith MD;  [facility]     UPPER GI ENDOSCOPY       Social History     Tobacco Use     Smoking status: Never Smoker     Smokeless tobacco: Never Used   Substance Use Topics     Alcohol use: Not Currently     Alcohol/week: 0.0 standard drinks     Comment: Every other month has 1 drink     Family History   Problem Relation Age of Onset     Hypertension Mother      Lipids Mother      Sleep Apnea Mother      Heart Disease Mother      Hypertension Maternal Grandmother              Alzheimer Disease Maternal  "Grandmother      Lipids Father      Heart Disease Maternal Grandfather 40     Heart Disease Paternal Grandfather      Melanoma No family hx of      Skin Cancer No family hx of      Cancer No family hx of      Diabetes No family hx of      Allergies   Allergen Reactions     Rifampin Other (See Comments)     Kidney failure     Penicillins      Bad hives when she was in [facility]      Outpatient Encounter Medications as of 6/3/2021   Medication Sig Dispense Refill     calcium carbonate (OS-KERLINE 500 MG [place]. [place]) 1250 MG tablet Take 1 tablet by mouth every evening       levothyroxine (SYNTHROID/LEVOTHROID) 25 MCG tablet        order for DME [employer] item #s   08987 and 24847 30 Bag 3     order for DME Increase the amount to 10 per month  --2\" barrier ring #7805 10 each 0     predniSONE (DELTASONE) 5 MG tablet Take 1 tablet (5 mg) by mouth daily 90 tablet 3     tacrolimus (GENERIC EQUIVALENT) 0.5 MG capsule TAKE 5 CAPSULES (2.5 MG) EVERY 12 HOURS 900 capsule 3     ursodiol (ACTIGALL) 250 MG tablet Take 2 tablets (500 mg) by mouth every morning AND 1 tablet (250 mg) every evening. 270 tablet 3     VITAMIN D, CHOLECALCIFEROL, PO Take by mouth every evening       No facility-administered encounter medications on file as of 6/3/2021.       NSAID  NO    Review of Systems  Complete 10 System ROS performed. All are negative except as documented below, in the HPI, or in patient questionnaire from today's visit.    1) Constitutional: No fevers, chills, night sweats or malaise, weight loss or gain  2) Skin: No rash  3) Pulmonary: No wheeze, SOB, cough, sputum or hemoptysis  4) Cardiovascular: No Chest pain or palpitations  5) Genitourinary: No blood in urine or dysuria  6) Endocrine: No increased sweating, hunger, thirst or thyroid problems  7) Hematologic: No bruising and easy bleeding  8) Musculoskeletal: no new pain in joints or limitation in ROM  9) Neurologic: No dizziness, paresthesias or weakness or falls  10) Psychiatric:  " not depressed/anxious, no sleep problems    PHYSICAL EXAM    General appearance  Healthy appearing adult, in no acute distress     Eyes  Sclera anicteric  Pupils round and reactive to light     Ears, nose, mouth and throat  No obvious external lesions of ears and nose  Hearing intact     Neck  Symmetric  No obvious external lesions     Respiratory  Normal respiration, no use of accessory muscles      MSK  Gait normal     Skin  No rashes or jaundice      Psychiatric  Oriented to person, place and time  Appropriate mood and affect.       DATA:  Reviewed in detail past documentation, medications and prior workup available in electronic health records or through outside records.    PERTINENT STUDIES:  Most recent CBC:  WBC   Date Value Ref Range Status   05/25/2021 15.1 (H) 4.0 - 11.0 10e9/L Final     Hemoglobin   Date Value Ref Range Status   05/25/2021 12.2 11.7 - 15.7 g/dL Final     Platelet Count   Date Value Ref Range Status   05/25/2021 376 150 - 450 10e9/L Final   Most recent coag:  INR   Date Value Ref Range Status   06/23/2019 1.02 0.86 - 1.14 Final   Most recent hepatic panel:  AST   Date Value Ref Range Status   05/25/2021 17 0 - 45 U/L Final     ALT   Date Value Ref Range Status   05/25/2021 36 0 - 50 U/L Final     Bilirubin Conjugated   Date Value Ref Range Status   09/22/2006 0.0 0.0 - 0.3 mg/dL Final      Bilirubin Total   Date Value Ref Range Status   05/25/2021 0.4 0.2 - 1.3 mg/dL Final     Albumin   Date Value Ref Range Status   05/25/2021 3.5 3.4 - 5.0 g/dL Final     Alkaline Phosphatase   Date Value Ref Range Status   05/25/2021 75 40 - 150 U/L Final     Creatinine   Date Value Ref Range Status   05/25/2021 1.21 (H) 0.52 - 1.04 mg/dL Final     Tuberculosis: Negative 12/5/16    DRUG MONITORING  TPMT enzyme activity: 18.5 12/6/16    6-TGN/6-MMPN levels: 203/257 12/6/16    Biologic concentration:  4/18/17 Vedolizumab level 34.1, no ATI  5/2/17 Vedolizumab level 3.9, no ATI (decreased interval to q4 week  dosing)  10/25/17 Vedolizumab level 11, no ATI (no change in therapy-- met with surgeon to discuss what surgical options might look like)    Endoscopy:   Earliest colonoscopies in 2006 show right greater than left inflammation with some inflammation in the ileum as well.    Followup colonoscopy in 2007 showed mainly diffuse colonic inflammation.    Colonoscopies in 2015 again showed more chronic colitis with no significant ileal involvement.    Colonoscopies in 3/2016 again showed a right-sided and some ileal inflammation with ulceration, but biopsy showed CMV.    Colonoscopy 11/2016 showed severe inflammation from 20 cm from the anus all of the way to the ileum as far as could be evaluated.  Biopsies showed chronic active ileitis and chronic active colitis in every sample.  There was no evidence of CMV or PTLD. Rare SHENA-KACIE positive cells seen in ileum.  Colonoscopy 9/2017 showed Abad 2 inflammation in the right colon and abad one in the left colon with relative rectal sparing.  Patulous IC valve with some backwash ileitis.  Biopsies negative for lymphoma, CMP and EBV biopsies with mild  chronic active ileitis and mild chronic active colitis throughout, including rectum.  Ileocolonoscopy 2/2019 showed Abad 2/3 pan colitis with relative rectal sparing + ileitis. Biopsies showed LGD at 40 cm.     Imaging:  MRE 12/2016:    1.  Diffuse colonic wall thickening and enhancement with loss of  haustral pattern. Wall thickening and enhancement of the terminal  ileum. Findings consistent with acute on chronic changes related to  ulcerative colitis.    IMPRESSION    # Pregnant, 8 weeks  # s/p abdominal colectomy with end ileostomy for LGD  # Need for rectal surveillance   # PSC/AIH s/p liver transplant     Ms. Penny is a 36 year old here with PSC-associated IBD s/p lap abdominal colectomy with end ileostomy 6/2019, with rectum still in place. She is now 8 weeks pregnant after successful embryo transfer.     Today we  discussed various topics, including controlling IBD during pregnancy, breastfeeding, vaccines and  care, nutrition and mode of delivery. I think it is reasonable to try for vaginal delivery. I do recommend having a colorectal surgeon on standby at the time of delivery, given her abdominal surgical history.     On a different note, given history of LGD, she is at increased risk for colon cancer and has not had a flex sig since her surgery. Will plan for a flex sig soon after delivery.      Sophy Mckeon MD   of Medicine  Division of Gastroenterology, Hepatology and Nutrition  [facility]    Thank you for this consultation.  It was a pleasure to participate in the care of this patient; please contact us with any further questions.  I spent a total of 40 minutes, face to face, was spent with this patient, >50% of which was counseling regarding the above delineated issues.

## 2021-06-24 ENCOUNTER — OFFICE VISIT (OUTPATIENT)
Dept: MATERNAL FETAL MEDICINE | Facility: CLINIC | Age: 36
End: 2021-06-24
Attending: OBSTETRICS & GYNECOLOGY
Payer: COMMERCIAL

## 2021-06-24 ENCOUNTER — HOSPITAL ENCOUNTER (OUTPATIENT)
Dept: ULTRASOUND IMAGING | Facility: CLINIC | Age: 36
End: 2021-06-24
Attending: OBSTETRICS & GYNECOLOGY
Payer: COMMERCIAL

## 2021-06-24 VITALS
RESPIRATION RATE: 20 BRPM | DIASTOLIC BLOOD PRESSURE: 74 MMHG | OXYGEN SATURATION: 99 % | SYSTOLIC BLOOD PRESSURE: 117 MMHG | HEART RATE: 82 BPM

## 2021-06-24 VITALS — SYSTOLIC BLOOD PRESSURE: 117 MMHG | OXYGEN SATURATION: 98 % | DIASTOLIC BLOOD PRESSURE: 74 MMHG | HEART RATE: 82 BPM

## 2021-06-24 DIAGNOSIS — O09.529 HIGH-RISK PREGNANCY, ELDERLY MULTIGRAVIDA, UNSPECIFIED TRIMESTER: ICD-10-CM

## 2021-06-24 DIAGNOSIS — Z13.1 SCREENING FOR DIABETES MELLITUS: ICD-10-CM

## 2021-06-24 DIAGNOSIS — O09.511 ADVANCED MATERNAL AGE, PRIMIGRAVIDA IN FIRST TRIMESTER, ANTEPARTUM: ICD-10-CM

## 2021-06-24 DIAGNOSIS — Z36.9 PRENATAL SCREENING ENCOUNTER: ICD-10-CM

## 2021-06-24 DIAGNOSIS — O26.90 PREGNANCY RELATED CONDITION, ANTEPARTUM: ICD-10-CM

## 2021-06-24 DIAGNOSIS — Z94.4 STATUS POST LIVER TRANSPLANTATION (H): ICD-10-CM

## 2021-06-24 DIAGNOSIS — O09.811 PREGNANCY RESULTING FROM IN VITRO FERTILIZATION IN FIRST TRIMESTER: ICD-10-CM

## 2021-06-24 DIAGNOSIS — O09.90 HRP (HIGH RISK PREGNANCY), UNSPECIFIED TRIMESTER: ICD-10-CM

## 2021-06-24 DIAGNOSIS — Z3A.12 12 WEEKS GESTATION OF PREGNANCY: ICD-10-CM

## 2021-06-24 DIAGNOSIS — Z36.9 PRENATAL SCREENING ENCOUNTER: Primary | ICD-10-CM

## 2021-06-24 DIAGNOSIS — O09.91 SUPERVISION OF HIGH RISK PREGNANCY IN FIRST TRIMESTER: Primary | ICD-10-CM

## 2021-06-24 LAB
B-HCG SERPL-ACNC: ABNORMAL IU/L (ref 0–5)
GLUCOSE 1H P 50 G GLC PO SERPL-MCNC: 145 MG/DL (ref 60–129)
MISCELLANEOUS TEST: NORMAL
RESEARCH KIT COLLECTION: NORMAL
T4 FREE SERPL-MCNC: 1.27 NG/DL (ref 0.76–1.46)
TSH SERPL DL<=0.005 MIU/L-ACNC: 0.4 MU/L (ref 0.4–4)

## 2021-06-24 PROCEDURE — 76813 OB US NUCHAL MEAS 1 GEST: CPT | Mod: 26 | Performed by: OBSTETRICS & GYNECOLOGY

## 2021-06-24 PROCEDURE — 84443 ASSAY THYROID STIM HORMONE: CPT | Performed by: ADVANCED PRACTICE MIDWIFE

## 2021-06-24 PROCEDURE — 84439 ASSAY OF FREE THYROXINE: CPT | Performed by: ADVANCED PRACTICE MIDWIFE

## 2021-06-24 PROCEDURE — G0463 HOSPITAL OUTPT CLINIC VISIT: HCPCS | Mod: 25

## 2021-06-24 PROCEDURE — 999N000069 HC STATISTIC GENETIC COUNSELING, < 16 MIN: Performed by: GENETIC COUNSELOR, MS

## 2021-06-24 PROCEDURE — 76813 OB US NUCHAL MEAS 1 GEST: CPT

## 2021-06-24 PROCEDURE — 999N001121 HC STATISTIC RESEARCH KIT COLLECTION: Performed by: OBSTETRICS & GYNECOLOGY

## 2021-06-24 PROCEDURE — 87491 CHLMYD TRACH DNA AMP PROBE: CPT | Performed by: ADVANCED PRACTICE MIDWIFE

## 2021-06-24 PROCEDURE — 99001 SPECIMEN HANDLING PT-LAB: CPT | Performed by: OBSTETRICS & GYNECOLOGY

## 2021-06-24 PROCEDURE — 99213 OFFICE O/P EST LOW 20 MIN: CPT | Mod: 25 | Performed by: ADVANCED PRACTICE MIDWIFE

## 2021-06-24 PROCEDURE — 36415 COLL VENOUS BLD VENIPUNCTURE: CPT | Performed by: ADVANCED PRACTICE MIDWIFE

## 2021-06-24 PROCEDURE — 84702 CHORIONIC GONADOTROPIN TEST: CPT | Performed by: OBSTETRICS & GYNECOLOGY

## 2021-06-24 PROCEDURE — 82951 GLUCOSE TOLERANCE TEST (GTT): CPT | Performed by: ADVANCED PRACTICE MIDWIFE

## 2021-06-24 PROCEDURE — 87591 N.GONORRHOEAE DNA AMP PROB: CPT | Performed by: ADVANCED PRACTICE MIDWIFE

## 2021-06-24 PROCEDURE — 82950 GLUCOSE TEST: CPT | Performed by: ADVANCED PRACTICE MIDWIFE

## 2021-06-24 ASSESSMENT — PAIN SCALES - GENERAL
PAINLEVEL: NO PAIN (0)
PAINLEVEL: NO PAIN (0)

## 2021-06-24 NOTE — PROGRESS NOTES
"Maternal fetal Medicine OB Follow up visit.     Yarelis Penny  : 1985  MRN: [id number]    CC: OB Follow-up    Subjective:  Yarelis Penny is a 36 year old  at 12w6d presenting for routine OB follow-up. Today, she is here with her mother, Otis and reports feeling well. Has not had any nausea or vomiting. Feeling stable from a UC standpoint.     Patient also denies any recent fevers/chills, headaches or changes in vision, RUQ pain, nausea or vomiting, constipation, diarrhea or other systemic symptoms.      OB Hx:  OB History    Para Term  AB Living   1 0 0 0 0 0   SAB TAB Ectopic Multiple Live Births   0 0 0 0 0      # Outcome Date GA Lbr Tex/2nd Weight Sex Delivery Anes PTL Lv   1 Current                  Objective:  /74   Pulse 82   LMP 2021   SpO2 98%     Gen: alert, oriented, NAD  Skin: warm, dry, intact  Respiratory: breathing unlabored, no SOB  Abdominal: gravid, non-tender  Pelvic: deferred  Extremities: WNL  Psych: mood WNL, behavior WNL      OB Ultrasound:  Please see \"imaging\" tab under chart review for today's ultrasound results.      Assessment/Plan:  36 year old  at 12w6d here for follow OB visit.    Pregnancy has been complicated by:   Hx living donor liver transplant  Autoimmune hepatitis  Primary sclerosing cholangitis  Ulcerative colitis s/p colectomy with ileostomy  Chronic kidney disease  Hx CMV colitis and viremia  EBV viremia  Advanced maternal age  IVF pregnancy        #Liver transplant, living donor:  #Autoimmune hepatitis:  #Primary sclerosing cholangitis:  #Hx of CKD:  - Follows closely with Dr. Daniela Enriquez of hepatology/liver transplant.  - Continue taking tacrolimus, prednisone, and ursodiol  - Last tacro level on 21 at 4.7.   - Hepatic panel every trimester. Baseline HELLP labs WNL.   - Creatinine elevated since 2020 at 1.21. Nephrology referral placed, although Yarelis is not sure she plans to follow up as she feels her transplant team " manages her labs appropriately.  - daily ASA for preeclampsia ppx  - Early detection and treatment of asymptomatic bacteriuria with urine culture at least every trimester  - Assessment of renal function every 4-8 weeks    #Ulcerative Colitis:  #Colectomy with ileostomy:  #Intrabdominal adhesions:  - Follows closely with Dr. Sophy Mckeon. If initiation of UC medications indicated should discuss between GI and MFM  - Empties colostomy bag ~4x per day  - Recommend a flex sig shortly after delivery      #Routine PNC:  - Prenatal labs:  Rh: +  antibody: neg   HepB/HIV/RPR/HepC: nonreactive   GC/CT: negative   Rubella: iimune     GCT: failed early 1-hr. Plan for 3-hr.   UC: mixed neil  - Pap smear: UTD  - TSH/T4 normal today. Could discontinue synthroid if desired  - Immunizations:  Tdap and flu as appropriate; Assess Covid and Hep B at next visit as patient is not immune to Hep B  - Genetic screening: low-risk NIPT; undecided on MSAFP  - OB visits: every 2-4 weeks until the third trimester, then weekly thereafter    # Surveillance:  - 18-20wk comprehensive US  - Serial growth US after comp  - Fetal echo aroud 22 weeks for IVF  - Weekly BPPs at 32 weeks    #Delivery planning:  - Delivery is indicated based on secondary sequelae (e.g. hypertension, preeclampsia, fetal growth restriction, fetal distress) and underlying disease; generally 37 to 39 weeks  gestation so as to avoid irreversible renal injury      RTC in 3 weeks    20 minutes spent on the date of the encounter, doing chart review, history and exam, documentation and further activities as noted.      Genie Penny CNM on 2021 at 10:42 AM

## 2021-06-24 NOTE — PROGRESS NOTES
[facility]  Genetic Counseling Consult    Patient: Yarelis Penny YOB: 1985   Date of Service: 6/24/21      Yarelis Penny was seen at [facility] for abbreviated genetic consultation to discuss the option of NIPT for screening for fetal chromosome abnormalities in the current pregnancy. The indication for genetic counseling is AMA at the time of egg retrieval for IVF resulting in the current pregnancy and the patient's history of liver transplant. Yarelis's mother, Gene, accompanied her to today's visit.        Impression/Plan:   1.  Yarelis had a blood draw for NIPT (NIPS test through [facility]).  Results are expected within 7-10 days, and will be available in rag & bone.  We will contact her to discuss the results, and a copy will be forwarded to the office of the referring OB provider. In the event we are unable to contact the patient once results become available, she has requested we leave a detailed voicemail fully disclosing the results at her mobile telephone number. Because of the patient's history of a male related liver donor, sex chromosome analysis was not ordered on [employer]'s massively parallel sequencing NIPT platform. Yarelis consented to screen for Trisomy 21, Trisomy 13, and Trisomy 18 in the current pregnancy, only.     2.  Yarelis had an NT ultrasound today. Please see the ultrasound report for additional details.     3.  Yarelis had an OB visit today. Please see Genie Penny CNM's documentation for additional details.     4.  Maternal serum AFP (single marker screen) is recommended after 15 weeks to screen for open neural tube defects. A quad screen should not be performed.    5.  An 18-20 week comprehensive ultrasound is standard of care for all women 35 or older at delivery.    Pregnancy History:   The patient has a detailed genetic counseling consult with Ayaka Diaz MS, [facility] on 6/3/21. The following information was taken  "from Yarelis's previous genetic counseling documentation:     /Parity:     Age at Delivery: 36 year old  CALEB: 2021, by Last Menstrual Period  Gestational Age: 12w6d    \"This pregnancy was conceived through in vitro fertilization (IVF). We discussed the following:    Transfer date: 2021    Number of transferred embryos: 1    Use of egg or sperm donor: No     Age of egg retrieval: 35    Fresh or frozen transfer/ 3 or 5 day: 5 day frozen        Preimplantation genetic testing (PGT): Not done       Fetal echocardiogram will be recommended and scheduled after the 18-20 week fetal anatomy ultrasound  The average pregnancy has a 0.5-1.0% chance of a congenital heart defect. However, studies suggest an increased chance for a heart defect for IVF pregnancies, with estimates ranging from 1-3.3%. Fetal echocardiograms are typically performed at 20 to 24 weeks gestation.\"     Medical History:   Yarelis received a liver transplant in  from her . We reviewed that this will affected the accuracy of screening for sex chromosome aneuploidy (SCA) in the current pregnancy on an NIPT testing platform. Therefore, we recommend foregoing including SCA with NIPT for Yarelis. Yarelis verbalized understanding and was agreeable to this plan today.        Family History:   A three-generation pedigree was obtained on 6/3/2021, and is scanned under the  Media  tab.   \"The following significant findings were reported by Yarelis:    The father of the pregnancy, Chloé De La Vega, is healthy       Ceferino's maternal first cousin has Down syndrome    We discussed that about 95% of cases with Down syndrome are sporadic, meaning they only affect that pregnancy and there is not family history. This type of Down syndrome is caused by trisomy 21 due to non-disjunction. We also explained there is a less common type of Down syndrome that can be inherited and could put family members at an increased risk for Down syndrome. This type of Down syndrome is " "caused by a chromosomal translocation in which a part of chromosome 21 is attached to another chromosome.     Ceferino reports he twenty nine other healthy cousins. Therefore, this case seems isolated and this history is unlikely to increase risks for Yarelis and Ceferino\"      Otherwise, the reported family history is negative for multiple miscarriages, stillbirths, birth defects, intellectual disabilities, known genetic conditions, and consanguinity.       Carrier Screening:   Below is information regarding carrier screening taken from the patient's previous genetic counseling consult:     \"The patient reports that she and the father of the pregnancy have  ancestry:      Cystic fibrosis is an autosomal recessive genetic condition that occurs with increased frequency in individuals of  ancestry and carrier screening for this condition is available.  In addition,  screening in the [facility] includes cystic fibrosis.       Expanded carrier screening for mutations in a large panel of genes associated with autosomal recessive conditions including cystic fibrosis, spinal muscular atrophy, and others, is now available.       The patient has had previous carrier screening as part of a IVF workup, the results of which were negative per patient report.  A copy of the report was not available for our review today.\"      Risk Assessment for Chromosome Conditions:   Today, we explained that the risk for fetal chromosome abnormalities increases with maternal age. We discussed specific features of common chromosome abnormalities, including Down syndrome, trisomy 13, trisomy 18, and sex chromosome trisomies. Because the patient underwent egg retrieval at age 35, all embryos had the following risks to present wtih chromosome anomalies:       At age 35 at delivery, the risk to have a baby with Down syndrome is 1 in 385.     At age 35 at delivery, the risk to have a baby with any chromosome abnormality is 1 in " 202.        Testing Options:   We discussed the following options:     Non-invasive Prenatal Testing (NIPT)    Maternal plasma cell-free DNA testing; first trimester ultrasound with nuchal translucency and nasal bone assessment is recommended, when appropriate    Screens for fetal trisomy 21, trisomy 13, trisomy 18, and sex chromosome aneuploidy    Cannot screen for open neural tube defects; maternal serum AFP after 15 weeks is recommended     Genetic Amniocentesis    Invasive procedure typically performed in the second trimester by which amniotic fluid is obtained for the purpose of chromosome analysis and/or other prenatal genetic analysis    Diagnostic results; >99% sensitivity for fetal chromosome abnormalities    AFAFP measurement tests for open neural tube defects    We reviewed the benefits and limitations of this testing.  Screening tests provide a risk assessment specific to the pregnancy for certain fetal chromosome abnormalities, but cannot definitively diagnose or exclude a fetal chromosome abnormality.  Follow-up genetic counseling and consideration of diagnostic testing is recommended with any abnormal screening result.     Diagnostic tests carry inherent risks- including risk of miscarriage- that require careful consideration.  These tests can detect fetal chromosome abnormalities with greater than 99% certainty.  Results can be compromised by maternal cell contamination or mosaicism, and are limited by the resolution of cytogenetic G-banding technology.  There is no screening nor diagnostic test that can detect all forms of birth defects or mental disability.     It was a pleasure to be involved with Yarelis s care. Face-to-face time of the meeting was 15 minutes.    Melvina Patiño MS, [facility]  Genetic Counselor  [facility]  Maternal Fetal Medicine  Ph: [phone] ([place])  Ph: [phone] (Health system)

## 2021-06-24 NOTE — NURSING NOTE
Yarelis here for NT, NIPT due to preg c/b s/p liver transplant and CKD. Pt reports no FM yet, denies ctx, denies SRoM, and denies vag bleeding.  Yarelis had 1 hr GCT done today. Pt also had thyroid labs drawn and baseline prenatal  gonorrhea and chlamydia labs.  Genie Penny in to talk with pt. Pt to lab. Pt to come back in 3 weeks for f/u obv and 6 weeks for follow-up obv and comp U/S. Pt had nephrology consult placed due to CKD in pregnancy. Pt was called regarding failed 1 hour GCT. Order placed for 3 hour GTT. Pt left amb and stable. Virgie Zimmer RN

## 2021-06-24 NOTE — PROGRESS NOTES
Please see ultrasound report under Imaging tab for details of today's ultrasound.    Fariha Marie MD  Maternal-Fetal Medicine

## 2021-06-25 ENCOUNTER — TELEPHONE (OUTPATIENT)
Dept: MATERNAL FETAL MEDICINE | Facility: CLINIC | Age: 36
End: 2021-06-25

## 2021-06-25 LAB
C TRACH DNA SPEC QL NAA+PROBE: NEGATIVE
N GONORRHOEA DNA SPEC QL NAA+PROBE: NEGATIVE
SPECIMEN SOURCE: NORMAL
SPECIMEN SOURCE: NORMAL

## 2021-06-25 NOTE — TELEPHONE ENCOUNTER
Message left with Yarelis with results from GCT, notified to call back to [facility] to schedule 3 hour GTT.

## 2021-06-30 ENCOUNTER — TELEPHONE (OUTPATIENT)
Dept: MATERNAL FETAL MEDICINE | Facility: CLINIC | Age: 36
End: 2021-06-30

## 2021-06-30 DIAGNOSIS — O09.90 HRP (HIGH RISK PREGNANCY), UNSPECIFIED TRIMESTER: ICD-10-CM

## 2021-06-30 LAB — LAB SCANNED RESULT: NORMAL

## 2021-06-30 NOTE — TELEPHONE ENCOUNTER
June 30, 2021    I spoke with Yarelis regarding her NIPT results.     [facility] NIPS screening results indicate NO ANEUPLOIDY DETECTED for chromosomes 21, 18, or 13. Due to the patient's history of liver transplant from her , sex chromosome aneuploidy assessment was NOT performed on Yarelis's NIPS sample.    These results put the patient's current pregnancy at low risk for Down syndrome, trisomy 18, trisomy 13 and sex chromosome abnormalities.This test is reported to have the following sensitivities: Down syndrome- 99.99%, trisomy 18- 99.99%, trisomy 13- 99.99%, XX sex chromosomes- 98.33%, XY sex chromosomes- 99.99%.  Although these results are reassuring, this does not replace a standard chromosome analysis from a chorionic villus sampling or amniocentesis. The patient has declined proceeding with diagnostic invasive prenatal testing at this time.    MSAFP is the appropriate second trimester screening test for open neural tube defects; the maternal quad screen is not recommended. Her results are available in her Epic chart for review and will be forwarded to her primary OB.       Melvina Patiño MS, [facility]  Genetic Counselor  [facility]  Maternal Fetal Medicine  Ph: [phone]

## 2021-07-02 DIAGNOSIS — Z13.1 SCREENING FOR DIABETES MELLITUS: ICD-10-CM

## 2021-07-02 PROCEDURE — 36415 COLL VENOUS BLD VENIPUNCTURE: CPT | Performed by: ADVANCED PRACTICE MIDWIFE

## 2021-07-02 PROCEDURE — 82952 GTT-ADDED SAMPLES: CPT | Performed by: ADVANCED PRACTICE MIDWIFE

## 2021-07-02 PROCEDURE — 82951 GLUCOSE TOLERANCE TEST (GTT): CPT | Performed by: ADVANCED PRACTICE MIDWIFE

## 2021-07-03 LAB
GLUCOSE 1H P 100 G GLC PO SERPL-MCNC: 202 MG/DL (ref 60–179)
GLUCOSE 2H P 100 G GLC PO SERPL-MCNC: 188 MG/DL (ref 60–154)
GLUCOSE 3H P 100 G GLC PO SERPL-MCNC: 165 MG/DL (ref 60–139)
GLUCOSE P FAST SERPL-MCNC: 78 MG/DL (ref 60–94)

## 2021-07-05 ENCOUNTER — TELEPHONE (OUTPATIENT)
Dept: MATERNAL FETAL MEDICINE | Facility: CLINIC | Age: 36
End: 2021-07-05

## 2021-07-05 DIAGNOSIS — O24.410 DIET CONTROLLED GESTATIONAL DIABETES MELLITUS (GDM) IN SECOND TRIMESTER: Primary | ICD-10-CM

## 2021-07-05 RX ORDER — GLUCOSAMINE HCL/CHONDROITIN SU 500-400 MG
CAPSULE ORAL
Qty: 100 EACH | Refills: 3 | Status: SHIPPED | OUTPATIENT
Start: 2021-07-05 | End: 2022-04-05

## 2021-07-05 RX ORDER — LANCETS
EACH MISCELLANEOUS
Qty: 100 EACH | Refills: 6 | Status: SHIPPED | OUTPATIENT
Start: 2021-07-05

## 2021-07-05 NOTE — TELEPHONE ENCOUNTER
Spoke with patient about results of GTT indicating early onset GDM of pregnancy. Reviewed physiology of diabetes in pregnancy. Patient does not have history of glucose instability, but does have increased risk factors for GDM. We reviewed that I would place a referral of diabetic educator and testing supplies. We briefly reviewed how to use the glucometer, but did say the pharmacist should review instructions on how to use it. We reviewed for the remainder of her pregnancy she will need to check her blood sugar QID and write down her values. We reviewed blood sugar parameters of </= 95 fasting and </=140 1-hr postprandial or </=120 2-hr postprandial. I have also sent these parameters to Yarelis in a [employer] message as well.     Genie Penny CNM on 7/5/2021 at 10:31 AM

## 2021-07-08 ENCOUNTER — PATIENT OUTREACH (OUTPATIENT)
Dept: EDUCATION SERVICES | Facility: CLINIC | Age: 36
End: 2021-07-08
Attending: ADVANCED PRACTICE MIDWIFE
Payer: COMMERCIAL

## 2021-07-08 DIAGNOSIS — O24.410 DIET CONTROLLED GESTATIONAL DIABETES MELLITUS (GDM) IN SECOND TRIMESTER: ICD-10-CM

## 2021-07-08 PROCEDURE — G0108 DIAB MANAGE TRN  PER INDIV: HCPCS

## 2021-07-08 NOTE — PROGRESS NOTES
Diabetes Self-Management Education & Support      SUBJECTIVE/OBJECTIVE:  Presents for education related to gestational diabetes.    Accompanied by: Self  Diabetes management related comments/concerns: if I can have honey on my breakfast  Gestational weeks: 14w6d  Had any babies over 9 lbs: No  Previously had Gestational Diabetes: No    Cultural Influences/Ethnic Background:  Choose not to answer    Estimated Date of Delivery: Dec 31, 2021    1 hour OGTT  Lab Results   Component Value Date    GLU1 145 (H) 2021       3 hour OGTT    Fasting  Lab Results   Component Value Date    GLF 78 2021       1 hour  Lab Results   Component Value Date    GL1 202 (H) 2021       2 hour  Lab Results   Component Value Date    GL2 188 (H) 2021       3 hour  Lab Results   Component Value Date    GL3 165 (H) 2021       Lifestyle and Health Behaviors:  Exercise:: Yes  Days per week of moderate to strenuous exercise (like a brisk walk): 7  On average, minutes per day of exercise at this level: 30  How intense was your typical exercise? : Moderate (like brisk walking)  Exercise Minutes per Week: 210  Meal planning/habits: Avoiding sweets  Meals include: Breakfast, Dinner, Afternoon Snack  Breakfast: egg, waffles with honey on it  Dinner: meat, salad, cauliflower  Snacks: after breakfast - carb source and protein shake , bedtime -- rice krispie bar  Other: 1-2 sodas per week  Beverages: Water, Coffee, Soda  Biggest challenges to healthy eating: None  Pre- vitamin?: Yes  Supplements?: No  Experiencing nausea?: No  Experiencing heartburn?: No    Healthy Coping:  Emotional response to diabetes: Ready to learn  Stage of change: PREPARATION (Decided to change - considering how)    Current Management:  Taking medications for gestational diabetes?: No    ASSESSMENT:  Yarelis has a history of a liver transplant, and is on daily prednisone. Now at 14 weeks gestation, showed elevations on 3 hour OGTT. She also has an  ostomy bag, and usually prefers to wait to eat until later in the day so that her ostomy bag doesn't fill as she does her workouts.     Encouraged her to start testing at 11:30am her fasting reading (before she eats breakfast), and if that is <95, I am ok with her continuing to eat this late. If, however she is >95, she is ok with eating earlier if needed. Since she is 14 weeks currently, I did tell her it was ok not to focus on the morning and afternoon snack if she is not hungry for them, as long as ketones are not forming. Did discuss with her to test before lunch and dinner given that she is on prednisone, as it might help us give the right insulin if needed (NPH vs meal time).     INTERVENTION:  Patient was already provided a meter by clinic OB, and started testing yesterday.    Educational topics covered today:  GDM diagnosis, pathophysiology, Risks and Complications of GDM, Means of controlling GDM, Using a Blood Glucose Monitor, Blood Glucose Goals, Logging and Interpreting Glucose Results, Ketone Testing, When to Call a Diabetes Educator or OB Provider, Healthy Eating During Pregnancy, Counting Carbohydrates, Meal Planning for GDM, and Physical Activity    Educational materials provided today:   [facility] Understanding Gestational Diabetes  GDM Log Book  Sharps Disposal  Care After Delivery    Pt verbalized understanding of concepts discussed and recommendations provided today.     PLAN:  Check glucose 4-6 times daily, before breakfast and 1 hour after each meal.     Check Ketones daily for one week, if negative, reduce testing to once a week.     Physical activity recommended: as able.    Meal plan: 2-3 carbs at breakfast, 3-4 carbs at lunch, 3-4 carbs at supper, 1-2 carbs at 3 snacks a day.  Follow consistent CHO meal plan, eat CHO and protein/fat at all meals/snacks.    Call/e-mail/[employer] message diabetes educator if 3 or more blood sugars are above the goal in 1 week, if ketones are positive, or with  questions/concerns.    TARA Gil  Time Spent: 48:50 minutes  Encounter Type: Individual    Any diabetes medication dose changes were made via the CDE Protocol and Collaborative Practice Agreement with the patient's OB/GYN provider. A copy of this encounter was shared with the provider.     normal...

## 2021-07-10 NOTE — PROGRESS NOTES
"Maternal fetal Medicine OB Follow up visit.     Yarelis Penny  : 1985  MRN: [id number]    CC: OB Follow-up    Subjective:  Yarelis Penny is a 36 year old  at 15w4d presenting for routine OB follow-up.     She is overall doing well today and has no obstetric complaints. Denies abdominal pain, cramping, vaginal bleeding.     She states that she has been trying to get adjusted to having gestational diabetes. She has been checking her glucose values and notes fluctuations based upon what she is eating. She normally likes to snack and eat small amounts throughout the day and so it has been an adjustment to have to check glucose values postprandial.     Home glucose values:  Fastin-91 (0/6 elevated)  1 hr post-breakfast 104-126 (0/6 elevated)  1 hr post-lunch 104-151 (2/6 elevated)  1 hr post-dinner  (1/6 elevated)    Objective:  /68   Pulse 74   Resp 20   Wt 50.9 kg (112 lb 4.8 oz)   LMP 2021   SpO2 98%   BMI 20.28 kg/m      Gen: alert, oriented, NAD  Skin: warm, dry, intact  Respiratory: breathing unlabored, no SOB  Abdominal: soft, non-tender, ostomy in place  Extremities: WNL  Psych: mood WNL, behavior WNL    OB Ultrasound:  Please see \"imaging\" tab under chart review for today's ultrasound results.    Assessment/Plan:  36 year old  at 15w4d by IVF here for follow OB visit.    Liver transplant, living donor  Autoimmune hepatitis  Primary sclerosing cholangitis  - Follows closely with Dr. Daniela Enriquez of hepatology/liver transplant.  - Continue taking tacrolimus, prednisone, and ursodiol  - Last tacro level on 21 at 4.7. Monthly Tacro levels were recommended and patient is overdue. Will plan to reach out to her transplant coordinator at the  to have this drawn as soon as possible.   - Hepatic panel every trimester.     Hx of CKD  - Creatinine elevated since 2020 at 1.21. Nephrology referral placed, although Yarelis is not sure she plans to follow up as she feels " her transplant team manages her labs appropriately.  - Assessment of renal function every 4-8 weeks, last . Will plan to add on BMP to be drawn at the same time as her tacro level.   - continue daily ASA for preeclampsia prophylaxis  - Early detection and treatment of asymptomatic bacteriuria with urine culture at least every trimester  - Baseline HELLP labs WNL.     Ulcerative Colitis:  Colectomy with ileostomy  Intrabdominal adhesions  - Follows closely with Dr. Sophy Mckeon  - Empties Ostomy bag ~4x per day  - Flex sig previously recommended shortly after delivery    GDMA1  - Glucose values overall in goal and commended patient for diligence in checking and documenting glucose values  - Discussed that maintaining glucose values can be challenging, especially as pregnancy progresses due to human placental lactogen. Additionally, Yarelis is on daily prednisone, which can also cause hyperglycemia.  - Continue close monitoring with diabetes educator    Routine PNC  - Prenatal labs:  Rh: +  antibody: neg   HepB/HIV/RPR/HepC: nonreactive   GC/CT: negative   Rubella: iimune     UC: mixed neil  - Pap smear: UTD  - Immunizations:  Tdap and flu as appropriate. S/p COVID vaccination in April. Hep B non-immune. Spoke with transplant team and they note that she has received several boosters, but still non-immune and likely does not mount a response. Thus, will not plan to vaccinate.   - Genetic screening: low-risk NIPT; Discussed MSAFP, though patient would like to discuss with her partner prior to proceeding.   - OB visits: every 2-4 weeks until the third trimester, then weekly thereafter     Surveillance:  - 18-20 wk comprehensive US  - Serial growth US after comp  - Fetal echo 22 weeks for IVF  - Weekly BPPs at 32 weeks    Delivery planning:  - Delivery is indicated based on secondary sequelae (e.g. hypertension, preeclampsia, fetal growth restriction, fetal distress) and underlying disease; generally 37 to 39  weeks  gestation so as to avoid irreversible renal injury    RTC in 4 weeks.     Seen and discussed with Dr. Gomez.     Padmaja Trevino MD  Maternal-Fetal Medicine Fellow    Physician Attestation   I, Virgie Gomez MD, saw this patient and agree with the findings and plan of care as documented in the note.      Items personally reviewed/procedural attestation: vitals and labs.    I spent 13 minutes total time during today's visit:   3 minutes reviewing medical record, 8 minutes seeing patient and 2 minutes documenting care.      Virgie Gomez MD

## 2021-07-13 ENCOUNTER — HOSPITAL ENCOUNTER (OUTPATIENT)
Dept: ULTRASOUND IMAGING | Facility: CLINIC | Age: 36
Discharge: HOME | End: 2021-07-13
Attending: OBSTETRICS & GYNECOLOGY | Admitting: OBSTETRICS & GYNECOLOGY
Payer: COMMERCIAL

## 2021-07-13 ENCOUNTER — OFFICE VISIT (OUTPATIENT)
Dept: MATERNAL FETAL MEDICINE | Facility: CLINIC | Age: 36
End: 2021-07-13
Attending: OBSTETRICS & GYNECOLOGY
Payer: COMMERCIAL

## 2021-07-13 VITALS
SYSTOLIC BLOOD PRESSURE: 102 MMHG | BODY MASS INDEX: 20.28 KG/M2 | OXYGEN SATURATION: 98 % | DIASTOLIC BLOOD PRESSURE: 68 MMHG | WEIGHT: 112.3 LBS | HEART RATE: 74 BPM | RESPIRATION RATE: 20 BRPM

## 2021-07-13 DIAGNOSIS — O09.529 HIGH-RISK PREGNANCY, ELDERLY MULTIGRAVIDA, UNSPECIFIED TRIMESTER: ICD-10-CM

## 2021-07-13 PROCEDURE — 76815 OB US LIMITED FETUS(S): CPT

## 2021-07-13 PROCEDURE — 76815 OB US LIMITED FETUS(S): CPT | Mod: 26 | Performed by: OBSTETRICS & GYNECOLOGY

## 2021-07-13 PROCEDURE — G0463 HOSPITAL OUTPT CLINIC VISIT: HCPCS

## 2021-07-13 PROCEDURE — 99212 OFFICE O/P EST SF 10 MIN: CPT | Mod: 25 | Performed by: OBSTETRICS & GYNECOLOGY

## 2021-07-13 ASSESSMENT — PAIN SCALES - GENERAL: PAINLEVEL: NO PAIN (0)

## 2021-07-13 NOTE — NURSING NOTE
Yarelis here for preg c/b s/p liver transplant and UC. Pt reports no FM yet, denies ctx, denies SROM, and denies vag bleeding. Pt asked questions about how often she could have deli meat. Pt has GDM and is following diet at this time.  Limited U/S done today as FHT's couldn't be auscultated and U/S was WNL. Dr. Gomez and Dr. Colónif in to give U/S results and do OBV. Writer will follow up with coordinator from Nephrology. Pt left amb and stable. Virgie Zimmer RN

## 2021-07-18 ENCOUNTER — MYC MEDICAL ADVICE (OUTPATIENT)
Dept: EDUCATION SERVICES | Facility: CLINIC | Age: 36
End: 2021-07-18

## 2021-07-19 ENCOUNTER — VIRTUAL VISIT (OUTPATIENT)
Dept: EDUCATION SERVICES | Facility: CLINIC | Age: 36
End: 2021-07-19
Payer: COMMERCIAL

## 2021-07-19 DIAGNOSIS — O24.410 DIET CONTROLLED GESTATIONAL DIABETES MELLITUS (GDM) IN SECOND TRIMESTER: Primary | ICD-10-CM

## 2021-07-19 PROCEDURE — 99207 PR NO CHARGE LOS: CPT | Mod: 95

## 2021-07-19 PROCEDURE — 98967 PH1 ASSMT&MGMT NQHP 11-20: CPT | Mod: 95

## 2021-07-19 NOTE — Clinical Note
FRANCOIS I added her to your GDM schedule.  I figure she'll need 2 week follow ups until potential insulin is needed especially since shes more complicated.

## 2021-07-19 NOTE — PROGRESS NOTES
Diabetes Self-Management Education & Support    SUBJECTIVE/OBJECTIVE:  Presents for education related to gestational diabetes.    Accompanied by: Self  Diabetes management related comments/concerns: A few elevated readings  Gestational weeks: 16w3d  Had any babies over 9 lbs: No  Previously had Gestational Diabetes: No    Cultural Influences/Ethnic Background:  Choose not to answer    LMP 2021       Wt Readings from Last 10 Encounters:   21 50.9 kg (112 lb 4.8 oz)   21 51.1 kg (112 lb 9.6 oz)   21 52.1 kg (114 lb 12.8 oz)   21 52.2 kg (115 lb)   21 53.6 kg (118 lb 2.7 oz)   21 54.3 kg (119 lb 9.6 oz)   21 54 kg (119 lb)   10/03/19 49.5 kg (109 lb 3.2 oz)   19 47.4 kg (104 lb 8 oz)   19 49.7 kg (109 lb 9.6 oz)   16w weeks gestation.    Estimated Date of Delivery: Dec 31, 2021    Blood Glucose/Ketone Log:       Lifestyle and Health Behaviors:  Exercise:: Yes  Days per week of moderate to strenuous exercise (like a brisk walk): 7  On average, minutes per day of exercise at this level: 30  How intense was your typical exercise? : Moderate (like brisk walking)  Exercise Minutes per Week: 210  Meal planning/habits: Avoiding sweets  Meals include: Breakfast, Dinner, Afternoon Snack  Breakfast: egg, waffles with honey on it  Dinner: meat, salad, cauliflower  Snacks: after breakfast - carb source and protein shake , bedtime -- rice krispie bar  Other: 1-2 sodas per week  Beverages: Water, Coffee, Soda  How many servings of fruits/vegetables per day: 4  Biggest challenges to healthy eating: None  Pre-sindhu vitamin?: Yes  Supplements?: No  Experiencing nausea?: No  Experiencing heartburn?: No    Healthy Coping:  Emotional response to diabetes: Ready to learn  Stage of change: PREPARATION (Decided to change - considering how)    Current Management:  Taking medications for gestational diabetes?: No  Difficulty affording diabetes medication?: No  Difficulty affording  diabetes testing supplies?: No    ASSESSMENT:  Ketones: negative.   Fasting blood glucoses: 100% in target.  After breakfast: 100% in target.  Before lunch: 100% in target  After lunch: 78% in target.  Before dinner: 67% in target  After dinner: 78% in target.    Patient is satisfied with current eating plan.  She has switched to whole wheat bagels for breakfast which is working better for her.  A few elevated meals due to limited fiber and movement with simple sugars or white flour.  Post meal readings improved after switching to a corn tortilla or more complex meal.  Some pre dinner elevations, but nothing consistent that appears to be influenced by the prednisone.  Typical lunch protein shake is 24g protein plus 8-11g of collagen, and usually 40g total carbohydrate.     INTERVENTION:  Discussed increasing carbohydrates at lunch if she would usually eat more, and importance of fiber with meals even if total carbohydrate appears to be within recommended 45-60g. Discussed examples of meals that cause highs and how to plan for them.     Educational topics covered today:  What to expect after delivery, Future testing for Type 2 diabetes (2 hour OGTT at 6 week post-partum check-up and annual fasting blood glucose level), Risk of GDM and planning ahead for future pregnancies, Recommended lifestyle interventions for reducing the risk of Type 2 Diabetes, When to Call a Diabetes Educator or OB Provider    Educational Materials provided today:  Elie Preventing Diabetes    PLAN:  Check glucose 4-6 times daily.  Check ketones once a week when readings are consistently negative.  Continue with recommended physical activity.  Continue to follow recommended meal plan: 30g carbs at breakfast, 45-60g carbs at lunch, 45-60g carbs at supper, 15-30g carbs at snacks.  Follow consistent CHO meal plan, eat CHO and protein/fat at all meals/snacks.    Call/e-mail/Protek-dor message diabetes educator if 3 or more blood sugars are above  the goal in 1 week or if ketones are positive.    2 week follow up scheduled at the start of growth hormone influence.     Natalya Campoverde MS, RD, LD, CDE  Time Spent: 24 minutes  Encounter Type: Individual    Any diabetes medication dose changes were made via the CDE Protocol and Collaborative Practice Agreement with the patient's OB/GYN provider. A copy of this encounter was shared with the provider.

## 2021-07-21 ENCOUNTER — MYC MEDICAL ADVICE (OUTPATIENT)
Dept: EDUCATION SERVICES | Facility: CLINIC | Age: 36
End: 2021-07-21

## 2021-07-21 DIAGNOSIS — O24.410 DIET CONTROLLED GESTATIONAL DIABETES MELLITUS (GDM) IN SECOND TRIMESTER: Primary | ICD-10-CM

## 2021-07-22 ENCOUNTER — TELEPHONE (OUTPATIENT)
Dept: MATERNAL FETAL MEDICINE | Facility: CLINIC | Age: 36
End: 2021-07-22

## 2021-07-22 RX ORDER — BLOOD-GLUCOSE METER
1 EACH MISCELLANEOUS DAILY
Qty: 1 KIT | Refills: 0 | Status: SHIPPED | OUTPATIENT
Start: 2021-07-22 | End: 2022-04-05

## 2021-07-22 RX ORDER — LANCETS 33 GAUGE
1 EACH MISCELLANEOUS
Qty: 100 EACH | Refills: 4 | Status: SHIPPED | OUTPATIENT
Start: 2021-07-22 | End: 2022-04-05

## 2021-07-22 RX ORDER — BLOOD SUGAR DIAGNOSTIC
STRIP MISCELLANEOUS
Qty: 300 STRIP | Refills: 3 | Status: SHIPPED | OUTPATIENT
Start: 2021-07-22 | End: 2022-04-05

## 2021-07-22 NOTE — TELEPHONE ENCOUNTER
Pt calling requesting help getting glucose test strips filled, box is qty of 50, Rx written for 100/fill with refills but pt states she needs more than that and insurance is denying refill before she runs out (next Tuesday by her calculation). Planned to initiate a PA, on chart review diabetic ed already has PA in process. Communicated with pt that she will call back if the new Rx from diabetic ed does not work. Pt TANYA.

## 2021-07-22 NOTE — TELEPHONE ENCOUNTER
PA Initiation    Medication: blood glucose (CONTOUR NEXT TEST) test strip  Insurance Company: [employer] - Phone [phone] Fax [phone]  Pharmacy Filling the Rx: [id number] - [place], [place] - [address] AT [facility] [address]  Filling Pharmacy Phone: [phone]  Filling Pharmacy Fax: [phone]  Start Date: 7/22/2021

## 2021-07-22 NOTE — TELEPHONE ENCOUNTER
The patient's insurance will cover FREESTYLE LITE STRIPS 100'S and ONE TOUCH VERIO STRIPS 100'S. Does the patient have to have Contour Next strips?    Please adviseCamelia Prior Authorization Team  Phone: [phone]

## 2021-07-22 NOTE — TELEPHONE ENCOUNTER
Patient sent a [employer] message that she needs a PA to allow for >3 test strips per day. Patient is pregnant with GDM and chronic steroid doses, and likely needs 4-6x/day testing for this pregnancy. Forwarding to PA pool for assistance.    Thanks!  TARA Gil [facility]

## 2021-07-22 NOTE — TELEPHONE ENCOUNTER
PRIOR AUTHORIZATION DENIED    Medication: blood glucose (CONTOUR NEXT TEST) test strip- DENIED    Denial Date: 7/22/2021    Denial Rational:  MUST TRY AT LEAST ONE PREFERRED ALTERNATIVE.          Appeal Information:  SEE FAX    Central Prior Authorization Team  Phone: [phone]

## 2021-07-22 NOTE — TELEPHONE ENCOUNTER
Interesting! We can certainly order this instead, but she is currently using Contour. If she can test 4-6x/day with this, I can order this instead for patient, just let me know!    TARA Gil [facility]

## 2021-07-28 ENCOUNTER — LAB (OUTPATIENT)
Dept: LAB | Facility: CLINIC | Age: 36
End: 2021-07-28
Payer: COMMERCIAL

## 2021-07-28 DIAGNOSIS — Z13.220 LIPID SCREENING: ICD-10-CM

## 2021-07-28 DIAGNOSIS — Z94.4 LIVER REPLACED BY TRANSPLANT (H): ICD-10-CM

## 2021-07-28 DIAGNOSIS — O09.529 HIGH-RISK PREGNANCY, ELDERLY MULTIGRAVIDA, UNSPECIFIED TRIMESTER: ICD-10-CM

## 2021-07-28 LAB
ERYTHROCYTE [DISTWIDTH] IN BLOOD BY AUTOMATED COUNT: 14.7 % (ref 10–15)
HCT VFR BLD AUTO: 31.5 % (ref 35–47)
HGB BLD-MCNC: 10.5 G/DL (ref 11.7–15.7)
MCH RBC QN AUTO: 29.7 PG (ref 26.5–33)
MCHC RBC AUTO-ENTMCNC: 33.3 G/DL (ref 31.5–36.5)
MCV RBC AUTO: 89 FL (ref 78–100)
PLATELET # BLD AUTO: 326 10E3/UL (ref 150–450)
RBC # BLD AUTO: 3.53 10E6/UL (ref 3.8–5.2)
TACROLIMUS BLD-MCNC: <3 UG/L (ref 5–15)
TME LAST DOSE: ABNORMAL H
TME LAST DOSE: ABNORMAL H
WBC # BLD AUTO: 10.3 10E3/UL (ref 4–11)

## 2021-07-28 PROCEDURE — 82248 BILIRUBIN DIRECT: CPT

## 2021-07-28 PROCEDURE — 36415 COLL VENOUS BLD VENIPUNCTURE: CPT

## 2021-07-28 PROCEDURE — 80053 COMPREHEN METABOLIC PANEL: CPT

## 2021-07-28 PROCEDURE — 80197 ASSAY OF TACROLIMUS: CPT

## 2021-07-28 PROCEDURE — 85027 COMPLETE CBC AUTOMATED: CPT

## 2021-07-29 ENCOUNTER — TELEPHONE (OUTPATIENT)
Dept: TRANSPLANT | Facility: CLINIC | Age: 36
End: 2021-07-29

## 2021-07-29 LAB
ALBUMIN SERPL-MCNC: 2.9 G/DL (ref 3.4–5)
ALP SERPL-CCNC: 56 U/L (ref 40–150)
ALT SERPL W P-5'-P-CCNC: 23 U/L (ref 0–50)
ANION GAP SERPL CALCULATED.3IONS-SCNC: 7 MMOL/L (ref 3–14)
AST SERPL W P-5'-P-CCNC: 14 U/L (ref 0–45)
BILIRUB DIRECT SERPL-MCNC: <0.1 MG/DL (ref 0–0.2)
BILIRUB SERPL-MCNC: 0.3 MG/DL (ref 0.2–1.3)
BUN SERPL-MCNC: 19 MG/DL (ref 7–30)
CALCIUM SERPL-MCNC: 9.7 MG/DL (ref 8.5–10.1)
CHLORIDE BLD-SCNC: 106 MMOL/L (ref 94–109)
CO2 SERPL-SCNC: 21 MMOL/L (ref 20–32)
CREAT SERPL-MCNC: 1.11 MG/DL (ref 0.52–1.04)
GFR SERPL CREATININE-BSD FRML MDRD: 64 ML/MIN/1.73M2
GLUCOSE BLD-MCNC: 99 MG/DL (ref 70–99)
POTASSIUM BLD-SCNC: 4 MMOL/L (ref 3.4–5.3)
PROT SERPL-MCNC: 6.7 G/DL (ref 6.8–8.8)
SODIUM SERPL-SCNC: 134 MMOL/L (ref 133–144)

## 2021-07-29 NOTE — TELEPHONE ENCOUNTER
Spoke with patient. She had a 13.5 hour trough. No dose  Change at this time. Will do 12 hour level with next lab draw.

## 2021-08-02 ENCOUNTER — VIRTUAL VISIT (OUTPATIENT)
Dept: EDUCATION SERVICES | Facility: CLINIC | Age: 36
End: 2021-08-02
Payer: COMMERCIAL

## 2021-08-02 DIAGNOSIS — O24.410 DIET CONTROLLED GESTATIONAL DIABETES MELLITUS (GDM) IN SECOND TRIMESTER: Primary | ICD-10-CM

## 2021-08-02 NOTE — PROGRESS NOTES
Diabetes and Pregnancy Follow-up  Type of Service: Telephone Visit    How would patient like to obtain AVS? Not needed    Subjective/Objective:    Yarelis Penny was called for a scheduled BG review. Last date of communication was: 7/19/2021.    Gestational diabetes is being managed with diet and activity    Taking diabetes medications: no    Estimated Date of Delivery: Dec 31, 2021    Blood Glucose/Ketone Log:        Assessment:    Ketones: na.   Fasting blood glucoses: 100% in target.  After breakfast: 90% in target.  After lunch: 75% in target.  After dinner: 77% in target.    Plan/Response:  No changes in the patient's current treatment plan - patient did not answer x2 call attempts. Will leave a [employer] message response.    Cece Obrien RD LD [facility]ES  Time Spent: na minutes    Any diabetes medication dose changes were made via the CDE Protocol and Collaborative Practice Agreement with the patient's OB/GYN provider. A copy of this encounter was shared with the provider.

## 2021-08-10 ENCOUNTER — VIRTUAL VISIT (OUTPATIENT)
Dept: GASTROENTEROLOGY | Facility: CLINIC | Age: 36
End: 2021-08-10
Attending: INTERNAL MEDICINE
Payer: COMMERCIAL

## 2021-08-10 DIAGNOSIS — B27.00 EBV (EPSTEIN-BARR VIRUS) VIREMIA: Primary | ICD-10-CM

## 2021-08-10 DIAGNOSIS — Z94.4 LIVER TRANSPLANT RECIPIENT (H): ICD-10-CM

## 2021-08-10 DIAGNOSIS — Z13.220 LIPID SCREENING: ICD-10-CM

## 2021-08-10 DIAGNOSIS — Z94.4 LIVER TRANSPLANT RECIPIENT (H): Primary | ICD-10-CM

## 2021-08-10 DIAGNOSIS — Z94.4 LIVER REPLACED BY TRANSPLANT (H): ICD-10-CM

## 2021-08-10 DIAGNOSIS — O09.90 HRP (HIGH RISK PREGNANCY), UNSPECIFIED TRIMESTER: ICD-10-CM

## 2021-08-10 DIAGNOSIS — K75.4 AUTOIMMUNE HEPATITIS (H): ICD-10-CM

## 2021-08-10 DIAGNOSIS — N18.2 CKD (CHRONIC KIDNEY DISEASE) STAGE 2, GFR 60-89 ML/MIN: ICD-10-CM

## 2021-08-10 PROCEDURE — 99214 OFFICE O/P EST MOD 30 MIN: CPT | Mod: 95 | Performed by: INTERNAL MEDICINE

## 2021-08-10 RX ORDER — VITAMIN A ACETATE, .BETA.-CAROTENE, ASCORBIC ACID, CHOLECALCIFEROL, .ALPHA.-TOCOPHEROL ACETATE, DL-, THIAMINE MONONITRATE, RIBOFLAVIN, NIACINAMIDE, PYRIDOXINE HYDROCHLORIDE, FOLIC ACID, CYANOCOBALAMIN, CALCIUM CARBONATE, FERROUS FUMARATE, ZINC OXIDE, AND CUPRIC OXIDE 2000; 2000; 120; 400; 22; 1.84; 3; 20; 10; 1; 12; 200; 27; 25; 2 [IU]/1; [IU]/1; MG/1; [IU]/1; MG/1; MG/1; MG/1; MG/1; MG/1; MG/1; UG/1; MG/1; MG/1; MG/1; MG/1
1 TABLET ORAL DAILY
COMMUNITY
End: 2022-04-05

## 2021-08-10 ASSESSMENT — PAIN SCALES - GENERAL: PAINLEVEL: NO PAIN (0)

## 2021-08-10 NOTE — LETTER
8/10/2021         RE: Yarelis Penny  [address] [facility] [address]        Dear Colleague,    Thank you for referring your patient, Yarelis Penny, to the [facility] [place]. Please see a copy of my visit note below.    =  [facility]  LIVER TRANSPLANT CLINIC  VIDEO VISIT     A/P  36 Y F s/p LDLT 6/18/15 for PCS/autoimmune hepatitis 6/18/15. She is currently pregnant, EDC 12/31/21  Graft function is good. Normal LFTs. Labs every month now with pregnancy.  IS Continue tac/pred. Last tac level <3 but may not have been accurate. Will recheck this week.  CKD stage 2 Baseline creat was 0.9-1. Now in last year 1-1.1. Discussed no NSAIDS, hydration and following tac levels.     Pregnancy, high risk 2nd trimester WIll deliver at [place]. Following with [facility]    Biliary stricture In past  This has resolved. Last ERCP 9/26/18  UC s/p total colectomy with ileostomy. Asked her to check in with MELANY Chiu with their clinic was 2018.    Derm seeing regularly     Thyroid Has been levothyroxine prescribed in June by the [facility] fertility clinic ([facility]).    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 No EBV or visit with Dr. Lopez for 2 y. Dr. Lopez rec annual follow-up. Previously  asked Yarelis to make appt with Dr. Lopez. None done. Will check EBV on next labs.  CMV colitis neg CMV on last check 9/2020. Will recheck.     RTC 3 mo    Daniela Enriquez MD  Hepatology/ Liver Transplant  [facility]  =================================================================  SUBJECTIVE  Ms. Penny is a 36 Y F s/p LDLT () for PSC/AIH 6/18/15.     She is currently pregnant 19 weeks, EDC 12/31/21. Pregnancy achieved through IVF. Everything is going well. She has developed gestational diabetes.    She is concerned about her stoma, it has some lighter pink. No pain, no bleeding. This has been for about 6 months.    UC s/p total colectomy with ileostomy on 6/20/19. Path showed focal  low-grade dysplasia. She has been seen here by Juanis Johnson and Mike, last visits 2018.    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 No testing or follow up for this for 2 y.     IS: Prograf. Pred 5. Also on conner.  LABS: Up to date. Aphos 169. Tbili 0.4.  Liver Function Studies - Recent Labs   Lab Test 07/28/21  1129   PROTTOTAL 6.7*   ALBUMIN 2.9*   BILITOTAL 0.3   ALKPHOS 56   AST 14   ALT 23     CBC RESULTS: Recent Labs   Lab Test 07/28/21  1129   WBC 10.3   RBC 3.53*   HGB 10.5*   HCT 31.5*   MCV 89   MCH 29.7   MCHC 33.3   RDW 14.7          REJECTION: None  BILIARY ISSUES: BIle duct leak and stricture. Last ERCP 9/26/18  -Two previously well positioned stents were removed from the biliary tree.   - Prior biliary endoscopic sphincterotomy appeared open and selective biliary cannulation was performed, stent related debris was removed   - Previous stricture appears to be completely improved. The site of the previous stricture was gently dilated using 6-8 Elation balloon   - A 7 Fr by 7 cm Loza single pigtailed stent was placed   Recommendation:       - Observe patient in same day observation unit for possible discharge same day.   - Confirm spontaneous stent passage by performing a flat and upright abdominal x-ray in 4 weeks. Stent is most likely expected to pass spontaneously. If present will need an upper endoscopy for stent removal   - Will recommend to recheck LFTs in transplant clinic and contact us if it is elevated    STENT: Original out. ERCP placed stent out on AXR 1-0/24/18  KIDNEY FUNCTION:   Creatinine   Date Value Ref Range Status   07/28/2021 1.11 (H) 0.52 - 1.04 mg/dL Final   05/25/2021 1.21 (H) 0.52 - 1.04 mg/dL Final     BP: Good. No meds.     SOC: She is isolating at home.  ROS 10 point ROS neg other than the symptoms noted above in the HPI.  Exam  Gen Alert pleasant NAD  Resp No difficulty breathing. No cough  Skin No Jaundice  Eyes No icterus  Neuro  ISMAEL MACKAY no muscle wasting  Psyche Pleasant, appropriate. Well groomed.          Again, thank you for allowing me to participate in the care of your patient.        Sincerely,        Daniela Enriquez MD

## 2021-08-10 NOTE — PROGRESS NOTES
=  [facility]  LIVER TRANSPLANT CLINIC  VIDEO VISIT     A/P  36 Y F s/p LDLT 6/18/15 for PCS/autoimmune hepatitis 6/18/15. She is currently pregnant, EDC 12/31/21  Graft function is good. Normal LFTs. Labs every month now with pregnancy.  IS Continue tac/pred. Last tac level <3 but may not have been accurate. Will recheck this week.  CKD stage 2 Baseline creat was 0.9-1. Now in last year 1-1.1. Discussed no NSAIDS, hydration and following tac levels.     Pregnancy, high risk 2nd trimester WIll deliver at [place]. Following with [facility]    Biliary stricture In past  This has resolved. Last ERCP 9/26/18  UC s/p total colectomy with ileostomy. Asked her to check in with MELANY Chiu with their clinic was 2018.    Derm seeing regularly     Thyroid Has been levothyroxine prescribed in June by [facility] ([facility]).    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 No EBV or visit with Dr. Lopez for 2 y. Dr. Lopez rec annual follow-up. Previously  asked Yarelis to make appt with Dr. Lopez. None done. Will check EBV on next labs.  CMV colitis neg CMV on last check 9/2020. Will recheck.     RTC 3 mo    Daniela Enriquez MD  Hepatology/ Liver Transplant  [facility]  =================================================================  SUBJECTIVE  Ms. Penny is a 36 Y F s/p LDLT () for PSC/AIH 6/18/15.     She is currently pregnant 19 weeks, EDC 12/31/21. Pregnancy achieved through IVF. Everything is going well. She has developed gestational diabetes.    She is concerned about her stoma, it has some lighter pink. No pain, no bleeding. This has been for about 6 months.    UC s/p total colectomy with ileostomy on 6/20/19. Path showed focal low-grade dysplasia. She has been seen here by Juanis Johnson and Mike, last visits 2018.    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 No testing or follow up for this for 2 y.     IS: Prograf. Pred 5. Also on conner.  LABS: Up to date.  Aphos 169. Tbili 0.4.  Liver Function Studies - Recent Labs   Lab Test 07/28/21  1129   PROTTOTAL 6.7*   ALBUMIN 2.9*   BILITOTAL 0.3   ALKPHOS 56   AST 14   ALT 23     CBC RESULTS: Recent Labs   Lab Test 07/28/21  1129   WBC 10.3   RBC 3.53*   HGB 10.5*   HCT 31.5*   MCV 89   MCH 29.7   MCHC 33.3   RDW 14.7          REJECTION: None  BILIARY ISSUES: BIle duct leak and stricture. Last ERCP 9/26/18  -Two previously well positioned stents were removed from the biliary tree.   - Prior biliary endoscopic sphincterotomy appeared open and selective biliary cannulation was performed, stent related debris was removed   - Previous stricture appears to be completely improved. The site of the previous stricture was gently dilated using 6-8 Elation balloon   - A 7 Fr by 7 cm Loza single pigtailed stent was placed   Recommendation:       - Observe patient in same day observation unit for possible discharge same day.   - Confirm spontaneous stent passage by performing a flat and upright abdominal x-ray in 4 weeks. Stent is most likely expected to pass spontaneously. If present will need an upper endoscopy for stent removal   - Will recommend to recheck LFTs in transplant clinic and contact us if it is elevated    STENT: Original out. ERCP placed stent out on AXR 1-0/24/18  KIDNEY FUNCTION:   Creatinine   Date Value Ref Range Status   07/28/2021 1.11 (H) 0.52 - 1.04 mg/dL Final   05/25/2021 1.21 (H) 0.52 - 1.04 mg/dL Final     BP: Good. No meds.     SOC: She is isolating at home.  ROS 10 point ROS neg other than the symptoms noted above in the HPI.  Exam  Gen Alert pleasant NAD  Resp No difficulty breathing. No cough  Skin No Jaundice  Eyes No icterus  Neuro GUEVARA  MSK no muscle wasting  Psyche Pleasant, appropriate. Well groomed.

## 2021-08-10 NOTE — PROGRESS NOTES
"Yarelis is a 36 year old who is being evaluated via a billable video visit.      How would you like to obtain your AVS? MyChart  If the video visit is dropped, the invitation should be resent by: Text to cell phone: [phone]  Will anyone else be joining your video visit? No  {If patient encounters technical issues they should call [phone] :132696}    Video Start Time: {video visit start/end time for provider to select:152948}  Video-Visit Details    Type of service:  Video Visit    Video End Time:{video visit start/end time for provider to select:152948}    Originating Location (pt. Location): {video visit patient location:536071::\"Home\"}    Distant Location (provider location):  [facility]PATOLOGY [facility] [place]     Platform used for Video Visit: {Virtual Visit Platforms:937061::\"[employer]\"}    "

## 2021-08-12 ENCOUNTER — HOSPITAL ENCOUNTER (OUTPATIENT)
Dept: ULTRASOUND IMAGING | Facility: CLINIC | Age: 36
End: 2021-08-12
Attending: OBSTETRICS & GYNECOLOGY
Payer: COMMERCIAL

## 2021-08-12 ENCOUNTER — OFFICE VISIT (OUTPATIENT)
Dept: MATERNAL FETAL MEDICINE | Facility: CLINIC | Age: 36
End: 2021-08-12
Attending: OBSTETRICS & GYNECOLOGY
Payer: COMMERCIAL

## 2021-08-12 VITALS
HEART RATE: 73 BPM | BODY MASS INDEX: 20.84 KG/M2 | WEIGHT: 115.4 LBS | SYSTOLIC BLOOD PRESSURE: 114 MMHG | OXYGEN SATURATION: 99 % | DIASTOLIC BLOOD PRESSURE: 78 MMHG

## 2021-08-12 DIAGNOSIS — O09.529 HIGH-RISK PREGNANCY, ELDERLY MULTIGRAVIDA, UNSPECIFIED TRIMESTER: Primary | ICD-10-CM

## 2021-08-12 DIAGNOSIS — N88.3 SHORT CERVIX: ICD-10-CM

## 2021-08-12 DIAGNOSIS — O24.410 DIET CONTROLLED GESTATIONAL DIABETES MELLITUS (GDM), ANTEPARTUM: ICD-10-CM

## 2021-08-12 DIAGNOSIS — O09.90 HRP (HIGH RISK PREGNANCY), UNSPECIFIED TRIMESTER: ICD-10-CM

## 2021-08-12 DIAGNOSIS — O09.529 HIGH-RISK PREGNANCY, ELDERLY MULTIGRAVIDA, UNSPECIFIED TRIMESTER: ICD-10-CM

## 2021-08-12 PROCEDURE — 76817 TRANSVAGINAL US OBSTETRIC: CPT | Mod: 26 | Performed by: OBSTETRICS & GYNECOLOGY

## 2021-08-12 PROCEDURE — 76817 TRANSVAGINAL US OBSTETRIC: CPT

## 2021-08-12 PROCEDURE — 99213 OFFICE O/P EST LOW 20 MIN: CPT | Mod: 25 | Performed by: OBSTETRICS & GYNECOLOGY

## 2021-08-12 PROCEDURE — G0463 HOSPITAL OUTPT CLINIC VISIT: HCPCS | Mod: 25

## 2021-08-12 PROCEDURE — 76811 OB US DETAILED SNGL FETUS: CPT | Mod: 26 | Performed by: OBSTETRICS & GYNECOLOGY

## 2021-08-12 ASSESSMENT — PAIN SCALES - GENERAL: PAINLEVEL: NO PAIN (0)

## 2021-08-12 NOTE — PROGRESS NOTES
"Maternal fetal Medicine OB Follow up visit.     Yarelis Penny  : 1985  MRN: [id number]    CC: OB Follow-up    Subjective:  Yarelis Penny is a 36 year old  at 19w6d presenting for routine OB follow-up.     She is working closely with her diabetes educator to tailor her diet and control her blood sugars.    Fasting blood glucoses: range 72-86. 100% in target.  After breakfast: . 90% in target.  After lunch: . 75% in target.  After dinner: . 77% in target.    She feels as though she is identifying triggers for her high blood sugars and is working on changing her diet.  She otherwise feels well and has no complaints.  She denies regular contractions, leakage of fluid, or vaginal bleeding. She denies any other systemic symptoms.  Thinks she is starting to feel baby movement.    Objective:  /78   Pulse 73   Wt 52.3 kg (115 lb 6.4 oz)   LMP 2021   SpO2 99%   BMI 20.84 kg/m      Gen: NAD, A&Ox3  CV: Regular rate   Pulm: Normal respiratory effort  Abdominal: Gravid  Ext: no edema, non-tender    SSE: Normal external genitalia. Vaginal vault with physiological discharge. Normal appearing cervix which appears physically closed.  Cervix is closed on digital examination.    Imaging:  Please see \"Imaging\" tab under \"Chart Review\" for details of today's US at the [facility].    Assessment/Plan:  Yarelis Penny is a 36 year old  at 19w6d by IVF here for follow OB visit.     Liver transplant, living donor  Autoimmune hepatitis  Primary sclerosing cholangitis  History of Biliary stricture  - Follows with Dr. Daniela Enriquez of hepatology/liver transplant. Graft function has been normal.  Last visit 8/10/21.  - Continues on tacrolimus, prednisone, and ursodiol.  - Last tacro level on 21 at <3.0. Plan for repeat tomorrow, transplant team to follow.  If appropriate, continue monthly Tacro levels.  - Hepatic panel every trimester, last .     Hx of CKD  - Creatinine " elevated since 2020 at 1.21. Nephrology referral placed, however she has not completed as transplant is also following creatinine.  - Assessment of renal function every 4-8 weeks, last .  - Continue daily ASA for preeclampsia prophylaxis.  - Early detection and treatment of asymptomatic bacteriuria with urine culture at least every trimester.  - Baseline HELLP labs WNL.      Ulcerative Colitis:  Colectomy with ileostomy  Intrabdominal adhesions  - Follows closely with Dr. Sophy Mckeon  - Empties Ostomy bag ~4x per day  - Flex sig previously recommended shortly after delivery     GDMA1  - Follows with diabetes educator, last visit . Continuing to work on her diet control with their assistance.   - Discussed that maintaining glucose values can be challenging, especially as pregnancy progresses due to human placental lactogen and that medication management may ultimately be required.  - Due to early gestational diabetes and IVF diagnosis will plan on fetal echocardiogram with pediatric cardiology.    Cervical mucus  - Ultrasound today with evidence of mucous within the cervical canal causing the appearance of dilation.  - Examination today reassuring. Plan for one week follow-up to ensure stability.     Routine PNC  - Prenatal labs:   Rh: +  antibody: neg               HepB/HIV/RPR/HepC: nonreactive               GC/CT: negative               Rubella: iimune                 UC: mixed neil  - Pap smear: UTD  - Immunizations:  Tdap and flu as appropriate. S/p COVID vaccination in April. Hep B non-immune - s/p multiple boosters without mounted immunity, thus will not plan to repeat at this time.  - Genetic screening: low-risk NIPT  - OB visits: every 2-4 weeks until the third trimester, then weekly thereafter      Surveillance:  - Fetal echo with pediatric cardiology  - Serial growth US after comp  - Fetal echo 22 weeks for IVF  - Weekly BPPs at 32 weeks     Delivery planning:  - Delivery is  indicated based on secondary sequelae (e.g. hypertension, preeclampsia, fetal growth restriction, fetal distress) and underlying disease; generally 37 to 39 weeks  gestation so as to avoid irreversible renal injury     RTC in 1 week for TV; 4 weeks for OBV/Growth/Fetal Echo.    Patient seen and discussed with Dr. Cely Shell MD  Maternal Fetal Medicine Fellow  8/12/2021 8:58 AM      Physician Attestation   I, Damion Ta MD, saw this patient and agree with the findings and plan of care as documented in the note.      Items personally reviewed/procedural attestation: History, ultrasound, exam and plan of care. A total time spent in chart review, ultrasound review, and counseling was 20 minutes.     Damion Ta MD

## 2021-08-12 NOTE — NURSING NOTE
Yarelis here for comp and f/u obv due to preg c/b GDM at 14 weeks, CKD, and s/p liver transplant at age 30. Pt reports +FM, denies ctx, denies SROM, and denies vag bleeding.  Pt reports fasting BS 75-80, 1 hour post breakfast 120, 1 hour post lunch 120-130, and after dinner 120-130 with a few 140. Pt reports she is due for her tacrolimus level and will have that done tomorrow.  Dr. Shell and Dr. Ta in to see pt.Difficult to evaluate cervix so TV was done. Pt had SVE done and cervix was not dilated. Pt to come back in 1 week for TV. Pt to come back in 4 weeks for f/u comp and f/u OBV and 4-5 weeks for fetal echo with peds cards due to GDM diagnosed at 14 weeks. Pt left amb and stable. Virgie Zimmer RN

## 2021-08-13 ENCOUNTER — LAB (OUTPATIENT)
Dept: LAB | Facility: CLINIC | Age: 36
End: 2021-08-13
Payer: COMMERCIAL

## 2021-08-13 DIAGNOSIS — Z94.4 LIVER TRANSPLANT RECIPIENT (H): ICD-10-CM

## 2021-08-13 DIAGNOSIS — B27.00 EBV (EPSTEIN-BARR VIRUS) VIREMIA: ICD-10-CM

## 2021-08-13 LAB
TACROLIMUS BLD-MCNC: <3 UG/L (ref 5–15)
TME LAST DOSE: ABNORMAL H
TME LAST DOSE: ABNORMAL H

## 2021-08-13 PROCEDURE — 87799 DETECT AGENT NOS DNA QUANT: CPT

## 2021-08-13 PROCEDURE — 80197 ASSAY OF TACROLIMUS: CPT

## 2021-08-13 PROCEDURE — 36415 COLL VENOUS BLD VENIPUNCTURE: CPT

## 2021-08-14 LAB — CMV DNA SPEC NAA+PROBE-ACNC: NOT DETECTED IU/ML

## 2021-08-16 ENCOUNTER — TELEPHONE (OUTPATIENT)
Dept: TRANSPLANT | Facility: CLINIC | Age: 36
End: 2021-08-16

## 2021-08-16 DIAGNOSIS — Z94.4 LIVER TRANSPLANT RECIPIENT (H): Primary | ICD-10-CM

## 2021-08-16 DIAGNOSIS — Z94.4 S/P LIVER TRANSPLANT (H): ICD-10-CM

## 2021-08-16 LAB
EBV DNA COPIES/ML, INSTRUMENT: ABNORMAL COPIES/ML
EBV DNA SPEC NAA+PROBE-LOG#: 4.5 {LOG_COPIES}/ML

## 2021-08-16 RX ORDER — TACROLIMUS 0.5 MG/1
0.5 CAPSULE ORAL 2 TIMES DAILY
Qty: 60 CAPSULE | Refills: 3 | COMMUNITY
Start: 2021-08-16 | End: 2022-04-05 | Stop reason: DRUGHIGH

## 2021-08-16 RX ORDER — TACROLIMUS 1 MG/1
3 CAPSULE ORAL EVERY 12 HOURS
Qty: 540 CAPSULE | Refills: 3 | Status: SHIPPED | OUTPATIENT
Start: 2021-08-16 | End: 2022-04-05 | Stop reason: DRUGHIGH

## 2021-08-16 NOTE — TELEPHONE ENCOUNTER
ISSUE:   Tacrolimus IR level < 3 on 8/13, goal 3-5, dose 2.5 mg BID.    PLAN:   Please call patient and confirm this was an accurate 12-hour trough. Verify Tacrolimus IR dose 2.5 mg BID. Confirm no new medications or illness. Confirm no missed doses. If accurate trough and accurate dose, increase Tacrolimus IR dose to 3 mg BID and repeat labs in 1 month.    OUTCOME:   Left message with patient,to confirm if accurate trough level and current dose 2.5mg BID. Patient to call back to confirm details.    Pt called back to confirm accurate trough and dose change to 3 mg BID and to repeat labs in 1 month. Orders sent to preferred pharmacy for dose change and lab for repeat labs. Patient voiced understanding of plan.

## 2021-08-16 NOTE — TELEPHONE ENCOUNTER
Spoke with patient. See other encounter regarding tacrolimus.  Pt prefers 1 mg capsules instead 0.5 with increased dose

## 2021-08-19 ENCOUNTER — OFFICE VISIT (OUTPATIENT)
Dept: MATERNAL FETAL MEDICINE | Facility: CLINIC | Age: 36
End: 2021-08-19
Attending: OBSTETRICS & GYNECOLOGY
Payer: COMMERCIAL

## 2021-08-19 ENCOUNTER — HOSPITAL ENCOUNTER (OUTPATIENT)
Dept: ULTRASOUND IMAGING | Facility: CLINIC | Age: 36
End: 2021-08-19
Attending: OBSTETRICS & GYNECOLOGY
Payer: COMMERCIAL

## 2021-08-19 DIAGNOSIS — N88.3 SHORT CERVIX: Primary | ICD-10-CM

## 2021-08-19 DIAGNOSIS — N88.3 SHORT CERVIX: ICD-10-CM

## 2021-08-19 PROCEDURE — 76817 TRANSVAGINAL US OBSTETRIC: CPT

## 2021-08-19 PROCEDURE — 76817 TRANSVAGINAL US OBSTETRIC: CPT | Mod: 26 | Performed by: OBSTETRICS & GYNECOLOGY

## 2021-08-19 NOTE — PROGRESS NOTES
"Please see \"Imaging\" tab under \"Chart Review\" for details of today's US at the [facility].    Damion Ta MD  Maternal-Fetal Medicine      "

## 2021-08-24 ENCOUNTER — LAB (OUTPATIENT)
Dept: LAB | Facility: CLINIC | Age: 36
End: 2021-08-24
Payer: COMMERCIAL

## 2021-08-24 DIAGNOSIS — Z13.220 LIPID SCREENING: ICD-10-CM

## 2021-08-24 DIAGNOSIS — Z94.4 LIVER TRANSPLANT RECIPIENT (H): ICD-10-CM

## 2021-08-24 DIAGNOSIS — Z94.4 LIVER REPLACED BY TRANSPLANT (H): ICD-10-CM

## 2021-08-24 LAB
ALBUMIN SERPL-MCNC: 2.9 G/DL (ref 3.4–5)
ALP SERPL-CCNC: 54 U/L (ref 40–150)
ALT SERPL W P-5'-P-CCNC: 27 U/L (ref 0–50)
ANION GAP SERPL CALCULATED.3IONS-SCNC: 8 MMOL/L (ref 3–14)
AST SERPL W P-5'-P-CCNC: 17 U/L (ref 0–45)
BILIRUB DIRECT SERPL-MCNC: 0.1 MG/DL (ref 0–0.2)
BILIRUB SERPL-MCNC: 0.3 MG/DL (ref 0.2–1.3)
BUN SERPL-MCNC: 21 MG/DL (ref 7–30)
CALCIUM SERPL-MCNC: 9.9 MG/DL (ref 8.5–10.1)
CHLORIDE BLD-SCNC: 109 MMOL/L (ref 94–109)
CO2 SERPL-SCNC: 21 MMOL/L (ref 20–32)
CREAT SERPL-MCNC: 1.21 MG/DL (ref 0.52–1.04)
ERYTHROCYTE [DISTWIDTH] IN BLOOD BY AUTOMATED COUNT: 14.9 % (ref 10–15)
GFR SERPL CREATININE-BSD FRML MDRD: 58 ML/MIN/1.73M2
GLUCOSE BLD-MCNC: 74 MG/DL (ref 70–99)
HCT VFR BLD AUTO: 33.1 % (ref 35–47)
HGB BLD-MCNC: 10.7 G/DL (ref 11.7–15.7)
MCH RBC QN AUTO: 30 PG (ref 26.5–33)
MCHC RBC AUTO-ENTMCNC: 32.3 G/DL (ref 31.5–36.5)
MCV RBC AUTO: 93 FL (ref 78–100)
PLATELET # BLD AUTO: 365 10E3/UL (ref 150–450)
POTASSIUM BLD-SCNC: 3.8 MMOL/L (ref 3.4–5.3)
PROT SERPL-MCNC: 6.9 G/DL (ref 6.8–8.8)
RBC # BLD AUTO: 3.57 10E6/UL (ref 3.8–5.2)
SODIUM SERPL-SCNC: 138 MMOL/L (ref 133–144)
TACROLIMUS BLD-MCNC: 3.5 UG/L (ref 5–15)
TME LAST DOSE: ABNORMAL H
TME LAST DOSE: ABNORMAL H
WBC # BLD AUTO: 12.9 10E3/UL (ref 4–11)

## 2021-08-24 PROCEDURE — 36415 COLL VENOUS BLD VENIPUNCTURE: CPT

## 2021-08-24 PROCEDURE — 80053 COMPREHEN METABOLIC PANEL: CPT

## 2021-08-24 PROCEDURE — 80197 ASSAY OF TACROLIMUS: CPT

## 2021-08-24 PROCEDURE — 82248 BILIRUBIN DIRECT: CPT

## 2021-08-24 PROCEDURE — 85027 COMPLETE CBC AUTOMATED: CPT

## 2021-09-08 ENCOUNTER — HOSPITAL ENCOUNTER (OUTPATIENT)
Dept: ULTRASOUND IMAGING | Facility: CLINIC | Age: 36
End: 2021-09-08
Attending: OBSTETRICS & GYNECOLOGY
Payer: COMMERCIAL

## 2021-09-08 ENCOUNTER — OFFICE VISIT (OUTPATIENT)
Dept: MATERNAL FETAL MEDICINE | Facility: CLINIC | Age: 36
End: 2021-09-08
Attending: OBSTETRICS & GYNECOLOGY
Payer: COMMERCIAL

## 2021-09-08 VITALS
SYSTOLIC BLOOD PRESSURE: 115 MMHG | OXYGEN SATURATION: 100 % | HEART RATE: 79 BPM | WEIGHT: 117.5 LBS | TEMPERATURE: 98 F | BODY MASS INDEX: 21.22 KG/M2 | RESPIRATION RATE: 16 BRPM | DIASTOLIC BLOOD PRESSURE: 75 MMHG

## 2021-09-08 DIAGNOSIS — O09.529 HIGH-RISK PREGNANCY, ELDERLY MULTIGRAVIDA, UNSPECIFIED TRIMESTER: ICD-10-CM

## 2021-09-08 PROCEDURE — 59025 FETAL NON-STRESS TEST: CPT | Mod: 26 | Performed by: OBSTETRICS & GYNECOLOGY

## 2021-09-08 PROCEDURE — 76820 UMBILICAL ARTERY ECHO: CPT | Mod: 26 | Performed by: OBSTETRICS & GYNECOLOGY

## 2021-09-08 PROCEDURE — G0463 HOSPITAL OUTPT CLINIC VISIT: HCPCS | Mod: 25

## 2021-09-08 PROCEDURE — 76816 OB US FOLLOW-UP PER FETUS: CPT

## 2021-09-08 PROCEDURE — 99213 OFFICE O/P EST LOW 20 MIN: CPT | Mod: 25 | Performed by: OBSTETRICS & GYNECOLOGY

## 2021-09-08 PROCEDURE — 76816 OB US FOLLOW-UP PER FETUS: CPT | Mod: 26 | Performed by: OBSTETRICS & GYNECOLOGY

## 2021-09-08 NOTE — NURSING NOTE
Pt here for f/u comp and OBV d/t pregnancy c/b GDM, CKD, s/p liver transplant, and ulcerative colitiis. Pt reports +FM, denies cramping, LOF and vaginal bleeding. Pt reports tacrolimus recently increase by 0.5mg d/t low levels, otherwise no changes or concerns today. F/u comp today, see note. Dr. Howard in to see patient. Second trimester urine culture ordered, patient will give with next lab visit. Plan for weekly UAR and NST w/ BP check. F/u comp+growth and OBV in three weeks. Patient planning on getting covid booster. Patient discharge ambulatory.    BGs:  Fastin-80  Breakfast: 100-125  Lunch: 110-130  Dinner: 110-130

## 2021-09-08 NOTE — PROGRESS NOTES
"Maternal fetal Medicine OB Follow up visit.      Yarelis Penny  : 1985  MRN: [id number]     CC: OB Follow-up     Subjective:  Yarelis Penny is a 36 year old  at 23w5d presenting for routine OB follow-up.      She is working closely with her diabetes educator and her blood sugar control has been excellent. Her tacrolimus was recently increased to 3 mg twice daily after trough level was sub-therapeutic. She is doing well and has no concerns today.  She denies regular contractions, leakage of fluid, or vaginal bleeding.      Objective:  /75   Pulse 79   Temp 98  F (36.7  C)   Resp 16   Wt 53.3 kg (117 lb 8 oz)   LMP 2021   SpO2 100%   BMI 21.22 kg/m       Gen: NAD, A&Ox3  CV: Regular rate   Pulm: Normal respiratory effort  Abdominal: Gravid  Ext: no edema, non-tender     Imaging:  Please see \"Imaging\" tab under \"Chart Review\" for details of today's US at the [facility].     Assessment/Plan:  Yarelis Penny is a 36 year old  at 23w5d by IVF here for follow OB visit.     Liver transplant, living donor  Autoimmune hepatitis  Primary sclerosing cholangitis  History of Biliary stricture  - Follows with Dr. Daniela Enriquez of hepatology/liver transplant. Graft function has been normal.  Last visit 8/10/21.  - Continues on tacrolimus, prednisone, and ursodiol.  - Last tacro level on 21 after tacrolimus was increased to 3 mg BID. Plan to repeat level in one month.  - Hepatic panel at least every trimester, or more frequently at the discretion of GI, last 21.     Hx of CKD  - Creatinine elevated since 2020 at 1.21. Nephrology referral placed, however she has not completed as transplant is also following creatinine.  - Assessment of renal function every 4-8 weeks, last 21 and was stable at 1.21 mg/dL.  - Continue daily ASA for preeclampsia prophylaxis.  - Early detection and treatment of asymptomatic bacteriuria with urine culture at least every trimester. Ordered " placed for second trimester urine culture today and Yarelis will complete this the next time that you go to lab.  - Baseline HELLP labs WNL.      Ulcerative Colitis:  Colectomy with ileostomy  Intrabdominal adhesions  - Follows closely with Dr. Sophy Mckeon  - Empties Ostomy bag ~4x per day  - Flex sig previously recommended shortly after delivery    Fetal Growth Restriction  -Weekly NST with UA Doppler and assessment of amniotic fluid volume  -Every 3 week assessments of fetal growth     GDMA1  - Follows with diabetes educator, last visit . Continuing to work on her diet control with their assistance.   - Discussed that maintaining glucose values can be challenging, especially as pregnancy progresses due to human placental lactogen and that medication management may ultimately be required.     Routine PNC  - Prenatal labs:   Rh: +  antibody: neg               HepB/HIV/RPR/HepC: nonreactive               GC/CT: negative               Rubella: iimune                 UC: mixed neil  - Pap smear: UTD  - Immunizations:  Tdap and flu as appropriate. S/p COVID vaccination in April. Will plan to schedule COVID booster now. Hep B non-immune - s/p multiple boosters without mounted immunity, thus will not plan to repeat at this time.  - Genetic screening: low-risk NIPT  - OB visits: every 2-4 weeks until the third trimester, then weekly thereafter      Surveillance:  - Fetal echo with pediatric cardiology scheduled 9/15/2021  - Every 3 week assessment of fetal growth  - Begin weekly NST with amniotic fluid and UA Doppler assessment at this time  - Add weekly BPPs at 32 weeks     Delivery planning:  - Delivery is indicated based on secondary sequelae (e.g. hypertension, preeclampsia, fetal growth restriction, fetal distress) and underlying disease; generally 37 to 39 weeks  gestation so as to avoid irreversible renal injury.     RTC weekly for NST/limited/UAR; 3 weeks for OBV/Growth.     Dana Howard,  MD  Specialist in Maternal-Fetal Medicine      I spent 5 minutes prior to the visit preparing to see the patient (reviewing medical records and tests).  I spent 15 minutes face-face-to-face with the patient during the visit with the majority (>50%) spent on counseling and coordination of care with the patient and/or family members.  I spent 5 minutes after the visit with the patient documenting the visit in the electronic health record and/or communicating with other health care professionals and/or care coordination.  Total time spent on today s date of service: 25 minutes.

## 2021-09-13 ENCOUNTER — TELEPHONE (OUTPATIENT)
Dept: MATERNAL FETAL MEDICINE | Facility: CLINIC | Age: 36
End: 2021-09-13

## 2021-09-13 DIAGNOSIS — O36.5990 IUGR (INTRAUTERINE GROWTH RESTRICTION) AFFECTING CARE OF MOTHER: Primary | ICD-10-CM

## 2021-09-13 NOTE — TELEPHONE ENCOUNTER
Writer called and spoke with Yarelis regarding apts this week on 9/15 and 9/16. Pt would like to come on both days. Pt scheduled weekly apts. Virgie Zimmer RN

## 2021-09-15 ENCOUNTER — OFFICE VISIT (OUTPATIENT)
Dept: CARDIOLOGY | Facility: CLINIC | Age: 36
End: 2021-09-15
Payer: COMMERCIAL

## 2021-09-15 ENCOUNTER — HOSPITAL ENCOUNTER (OUTPATIENT)
Dept: CARDIOLOGY | Facility: CLINIC | Age: 36
Discharge: HOME | End: 2021-09-15
Attending: OBSTETRICS & GYNECOLOGY | Admitting: OBSTETRICS & GYNECOLOGY
Payer: COMMERCIAL

## 2021-09-15 DIAGNOSIS — O35.BXX0 FETAL CARDIAC DISEASE AFFECTING PREGNANCY, SINGLE OR UNSPECIFIED FETUS: Primary | ICD-10-CM

## 2021-09-15 DIAGNOSIS — O24.410 DIET CONTROLLED GESTATIONAL DIABETES MELLITUS (GDM), ANTEPARTUM: ICD-10-CM

## 2021-09-15 PROCEDURE — 99202 OFFICE O/P NEW SF 15 MIN: CPT | Mod: 25 | Performed by: PEDIATRICS

## 2021-09-15 PROCEDURE — 93325 DOPPLER ECHO COLOR FLOW MAPG: CPT

## 2021-09-15 PROCEDURE — 76827 ECHO EXAM OF FETAL HEART: CPT | Mod: 26 | Performed by: PEDIATRICS

## 2021-09-15 PROCEDURE — 93325 DOPPLER ECHO COLOR FLOW MAPG: CPT | Mod: 26 | Performed by: PEDIATRICS

## 2021-09-15 PROCEDURE — 76825 ECHO EXAM OF FETAL HEART: CPT | Mod: 26 | Performed by: PEDIATRICS

## 2021-09-15 NOTE — PROGRESS NOTES
[facility]   [facility] Fetal Consult Note    Patient:  Yarelis Penny MRN:  [id number]   YOB: 1985 Age:  36 year old   Date of Visit:  9/15/2021 PCP:  No Ref-Primary, Physician     Dear Doctor,     I had the pleasure of seeing Yarelis Penny at the [facility] on 9/15/2021 in fetal cardiology consultation for fetal echocardiogram results. She presented today accompanied by herself. As you know, she is a 36 year old  at 24w5d who presented for fetal echocardiogram today because of gestational diabetes mellitus and IVF pregnancy.    I performed and interpreted the fetal echocardiogram today, which demonstrated normal fetal cardiac anatomy. Normal fetal intracardiac connections. Normal right and left ventricular size and function. Fetal heart rate is regular at 146 bpm. No hydrops.     I reviewed the echo findings today with Yarelis Penny. She is aware that the study was within normal limits with no major cardiac abnormalities. She is aware of the general limitations of fetal echocardiography. No additional fetal echocardiograms are recommended. No  cardiac follow-up is required.     Thank you for allowing me to participate in Yarelis's care. Please do not hesitate to contact me with questions or concerns.    This visit was separate from the performance and interpretation of the ultrasound. The majority of the time (>50%) was spent in counseling and coordination of care. I spent approximately 15 minutes in face-to-face time reviewing the above considerations.    Kamila Leggett M.D.  Pediatric Cardiology  [address], 5th floor, [facility] [address]  Phone [phone]  Fax [phone]

## 2021-09-16 ENCOUNTER — OFFICE VISIT (OUTPATIENT)
Dept: MATERNAL FETAL MEDICINE | Facility: CLINIC | Age: 36
End: 2021-09-16
Attending: OBSTETRICS & GYNECOLOGY
Payer: COMMERCIAL

## 2021-09-16 ENCOUNTER — HOSPITAL ENCOUNTER (OUTPATIENT)
Dept: ULTRASOUND IMAGING | Facility: CLINIC | Age: 36
End: 2021-09-16
Attending: OBSTETRICS & GYNECOLOGY
Payer: COMMERCIAL

## 2021-09-16 DIAGNOSIS — O09.529 HIGH-RISK PREGNANCY, ELDERLY MULTIGRAVIDA, UNSPECIFIED TRIMESTER: ICD-10-CM

## 2021-09-16 DIAGNOSIS — O36.5990 IUGR (INTRAUTERINE GROWTH RESTRICTION) AFFECTING CARE OF MOTHER: ICD-10-CM

## 2021-09-16 PROCEDURE — 76820 UMBILICAL ARTERY ECHO: CPT | Mod: 26 | Performed by: OBSTETRICS & GYNECOLOGY

## 2021-09-16 PROCEDURE — 59025 FETAL NON-STRESS TEST: CPT

## 2021-09-16 PROCEDURE — 59025 FETAL NON-STRESS TEST: CPT | Mod: 26 | Performed by: OBSTETRICS & GYNECOLOGY

## 2021-09-16 PROCEDURE — 76815 OB US LIMITED FETUS(S): CPT | Mod: 26 | Performed by: OBSTETRICS & GYNECOLOGY

## 2021-09-16 PROCEDURE — 76820 UMBILICAL ARTERY ECHO: CPT

## 2021-09-16 NOTE — NURSING NOTE
Yarelis here for CTG/NsT due to preg c/b s/p liver transplant, colectomy, and FGR Pt reports +FM, denies ctx, denies sroM, and denies vag bleeding. Yarelis had NST done and had some small decels, but UAR WNL. Pt to come back next week for CTG/NST. Pt left amb and stable. Virgie Zimmer RN

## 2021-09-16 NOTE — PROGRESS NOTES
"Please see \"Imaging\" tab under Chart Review for full details.    Cyn Brower MD  Maternal Fetal Medicine    "

## 2021-09-18 ENCOUNTER — HEALTH MAINTENANCE LETTER (OUTPATIENT)
Age: 36
End: 2021-09-18

## 2021-09-23 ENCOUNTER — OFFICE VISIT (OUTPATIENT)
Dept: MATERNAL FETAL MEDICINE | Facility: CLINIC | Age: 36
End: 2021-09-23
Attending: OBSTETRICS & GYNECOLOGY
Payer: COMMERCIAL

## 2021-09-23 ENCOUNTER — HOSPITAL ENCOUNTER (OUTPATIENT)
Dept: ULTRASOUND IMAGING | Facility: CLINIC | Age: 36
End: 2021-09-23
Attending: OBSTETRICS & GYNECOLOGY
Payer: COMMERCIAL

## 2021-09-23 DIAGNOSIS — O09.90 HRP (HIGH RISK PREGNANCY), UNSPECIFIED TRIMESTER: ICD-10-CM

## 2021-09-23 DIAGNOSIS — O36.5990 IUGR (INTRAUTERINE GROWTH RESTRICTION) AFFECTING CARE OF MOTHER: ICD-10-CM

## 2021-09-23 DIAGNOSIS — O36.5920 POOR FETAL GROWTH AFFECTING MANAGEMENT OF MOTHER IN SECOND TRIMESTER, SINGLE OR UNSPECIFIED FETUS: Primary | ICD-10-CM

## 2021-09-23 PROCEDURE — 59025 FETAL NON-STRESS TEST: CPT | Mod: 59

## 2021-09-23 PROCEDURE — 76820 UMBILICAL ARTERY ECHO: CPT

## 2021-09-23 PROCEDURE — 59025 FETAL NON-STRESS TEST: CPT | Mod: 26 | Performed by: OBSTETRICS & GYNECOLOGY

## 2021-09-23 PROCEDURE — 76820 UMBILICAL ARTERY ECHO: CPT | Mod: 26 | Performed by: OBSTETRICS & GYNECOLOGY

## 2021-09-23 PROCEDURE — 76815 OB US LIMITED FETUS(S): CPT | Mod: 26 | Performed by: OBSTETRICS & GYNECOLOGY

## 2021-09-23 NOTE — PROGRESS NOTES
"Please see \"Imaging\" tab under \"Chart Review\" for details of today's visit.    Efrain Bill    "

## 2021-09-28 DIAGNOSIS — Z94.4 LIVER REPLACED BY TRANSPLANT (H): ICD-10-CM

## 2021-09-28 RX ORDER — URSODIOL 250 MG/1
TABLET, FILM COATED ORAL
Qty: 270 TABLET | Refills: 3 | Status: ON HOLD | OUTPATIENT
Start: 2021-09-28 | End: 2021-11-07

## 2021-09-29 ENCOUNTER — OFFICE VISIT (OUTPATIENT)
Dept: MATERNAL FETAL MEDICINE | Facility: CLINIC | Age: 36
End: 2021-09-29
Attending: OBSTETRICS & GYNECOLOGY
Payer: COMMERCIAL

## 2021-09-29 ENCOUNTER — HOSPITAL ENCOUNTER (OUTPATIENT)
Dept: ULTRASOUND IMAGING | Facility: CLINIC | Age: 36
End: 2021-09-29
Attending: OBSTETRICS & GYNECOLOGY
Payer: COMMERCIAL

## 2021-09-29 VITALS
HEART RATE: 81 BPM | WEIGHT: 120.9 LBS | BODY MASS INDEX: 21.83 KG/M2 | OXYGEN SATURATION: 98 % | SYSTOLIC BLOOD PRESSURE: 120 MMHG | RESPIRATION RATE: 20 BRPM | DIASTOLIC BLOOD PRESSURE: 88 MMHG

## 2021-09-29 DIAGNOSIS — O36.5920 POOR FETAL GROWTH AFFECTING MANAGEMENT OF MOTHER IN SECOND TRIMESTER, SINGLE OR UNSPECIFIED FETUS: ICD-10-CM

## 2021-09-29 DIAGNOSIS — Z94.4 STATUS POST LIVER TRANSPLANTATION (H): ICD-10-CM

## 2021-09-29 DIAGNOSIS — O09.92 SUPERVISION OF HIGH RISK PREGNANCY IN SECOND TRIMESTER: Primary | ICD-10-CM

## 2021-09-29 DIAGNOSIS — O24.410 DIET CONTROLLED GESTATIONAL DIABETES MELLITUS (GDM), ANTEPARTUM: ICD-10-CM

## 2021-09-29 DIAGNOSIS — O09.529 HIGH-RISK PREGNANCY, ELDERLY MULTIGRAVIDA, UNSPECIFIED TRIMESTER: ICD-10-CM

## 2021-09-29 DIAGNOSIS — Z90.49 HISTORY OF COLECTOMY: ICD-10-CM

## 2021-09-29 PROCEDURE — 76818 FETAL BIOPHYS PROFILE W/NST: CPT | Mod: 26 | Performed by: OBSTETRICS & GYNECOLOGY

## 2021-09-29 PROCEDURE — 76816 OB US FOLLOW-UP PER FETUS: CPT | Mod: 26 | Performed by: OBSTETRICS & GYNECOLOGY

## 2021-09-29 PROCEDURE — 99212 OFFICE O/P EST SF 10 MIN: CPT | Mod: 25 | Performed by: ADVANCED PRACTICE MIDWIFE

## 2021-09-29 PROCEDURE — 59025 FETAL NON-STRESS TEST: CPT | Mod: 59

## 2021-09-29 PROCEDURE — 76818 FETAL BIOPHYS PROFILE W/NST: CPT

## 2021-09-29 PROCEDURE — G0463 HOSPITAL OUTPT CLINIC VISIT: HCPCS | Mod: 25

## 2021-09-29 PROCEDURE — 76820 UMBILICAL ARTERY ECHO: CPT

## 2021-09-29 PROCEDURE — 76820 UMBILICAL ARTERY ECHO: CPT | Mod: 26 | Performed by: OBSTETRICS & GYNECOLOGY

## 2021-09-29 PROCEDURE — 76816 OB US FOLLOW-UP PER FETUS: CPT

## 2021-09-29 ASSESSMENT — PAIN SCALES - GENERAL: PAINLEVEL: NO PAIN (0)

## 2021-09-29 NOTE — NURSING NOTE
Yarelis here for f/u comp/NST/CGT and f/u obv due to preg c/b liver transplant, total colectomy and f/u obv. Pt reports +FM, denies ctx, denies SRoM, and denies vag bleeding.   Yarelis had reassuring, but not reactive NsT. BPP done and was 8/8 today. Dr. Gomez and Genie Penny in to talk with pt. Pt to come 2x weekly for CTG/NST for severe IUgR AC less than 1% and EFW 1%. Pt has OBV every 2 weeks. Growth U/S in 3 weeks. Pt was tearful. Pt scheduled apts and left amb and stable. Virgie Zimmer RN

## 2021-09-29 NOTE — PROGRESS NOTES
"Maternal fetal Medicine OB Follow up visit.     Yarelis Penny  : 1985  MRN: [id number]    CC: OB Follow-up    Subjective:  Yarelis Penny is a 36 year old  at 26w5d presenting for routine OB follow-up. Today, she is feeling well. Offers no pregnancy concerns at this time. Denies regular, painful contractions, denies loss of fluid or vaginal bleeding.  Reports fetal movement. Does report occasional discomfort at her stoma site with certain fetal positions. No concerns with     Patient also denies any recent fevers/chills, headaches or changes in vision, RUQ pain, nausea or vomiting, constipation, diarrhea or other systemic symptoms. She was planning to get her monthly labs drawn yesterday, but was unable to - does intend to get them drawn this week.     She has been continuing to check her blood sugar daily. Reports her fastings are between 70-80 and she notes one recently elevated postprandial after she ate pizza, however does not have an actual glucose log to review in our visit today. Has not met with diabetic ed recently and does not think there were plans for follow up, but is open to another appointment.    OB Hx:  OB History    Para Term  AB Living   1 0 0 0 0 0   SAB TAB Ectopic Multiple Live Births   0 0 0 0 0      # Outcome Date GA Lbr Tex/2nd Weight Sex Delivery Anes PTL Lv   1 Current                  Objective:  /88   Pulse 81   Resp 20   Wt 54.8 kg (120 lb 14.4 oz)   LMP 2021   SpO2 98%   BMI 21.83 kg/m      Gen: alert, oriented, NAD  Skin: warm, dry, intact  Respiratory: breathing unalbore, no SOB  Abdominal: gravid, non-tender  Pelvic: deferred  Extremities: WNL  Psych: mood WNL, behavior WNL      OB Ultrasound:  Please see \"imaging\" tab under chart review for today's ultrasound results.      Assessment/Plan:  36 year old  at 26w5d here for follow OB visit.    Pregnancy has been complicated by:   Maternal dx:  Hx living donor liver transplant  Autoimmune " hepatitis  Primary sclerosing cholangitis  Ulcerative colitis s/p colectomy with ileostomy  Chronic kidney disease  Hx CMV colitis and viremia  EBV viremia  Advanced maternal age  IVF pregnancy    Fetal dx:  - Severe FGR     #Liver transplant, living donor:  #Autoimmune hepatitis:  #Primary sclerosing cholangitis:  #History of Biliary stricture:  - Follows with Dr. Daniela Enriquez of hepatology/liver transplant. Graft function has been normal.  Next visit 21. Planning to get monthly hepatic panel and tacrolimus levels later this week.   - Continues on tacrolimus 3mg BID, prednisone, and ursodiol.  - Hepatic panel at least every trimester, or more frequently at the discretion of GI, last 21. Standing orders per GI.    #CKD Stage II:  - Declined nephrology referral as her transplant team has been following her creatinine   - Daily ASA for preeclampsia ppx.  - Urine cultures every trimester. Order placed and can be completed with next lab draw later this week.  - Baseline HELLP labs WNL    #Ulcerative Colitis:  #Colectomy with ileostomy:  #Intrabdominal adhesions:  - Follows with Dr. Sophy Mckeon, but has not been seen since becoming pregnant  - Empties Ostomy bag ~4x per day  - Flex sig previously recommended shortly after delivery     #Routine PNC:  - Prenatal labs:  Rh: +  antibody: neg               HepB/HIV/RPR/HepC: nonreactive               GC/CT: negative               Rubella: iimune                 GCT: failed - Diet controlled               UC: mixed neil  - Pap smear: UTD  - Immunizations: s/p Covid x2; Offer flu and Tdap at next visit. Hep B non-immune but has received multiple boosters and never developed full immunity  - Genetic screening: low-risk NIPT  - OB visits: every 2-4 weeks until the third trimester, then weekly thereafter     # Surveillance:  #Severe FGR:  -Weekly NST with UA Doppler and assessment of amniotic fluid volume  - Every 3 week assessments of fetal growth  -  Normal fetal echo  - Weekly BPPs at 32 weeks     #Delivery planning:  - Delivery is indicated based on secondary sequelae (e.g. hypertension, preeclampsia, fetal growth restriction, fetal distress) and underlying disease; generally 37 to 39 weeks  gestation so as to avoid irreversible renal injury  - Consider stress dose of steroids at delivery given chronic steroid use during delivery.        15 minutes spent on the date of the encounter, doing chart review, history and exam, documentation and further activities as noted.      Genie Penny CNM on 9/29/2021 at 12:24 PM

## 2021-10-01 ENCOUNTER — OFFICE VISIT (OUTPATIENT)
Dept: MATERNAL FETAL MEDICINE | Facility: CLINIC | Age: 36
End: 2021-10-01
Attending: ADVANCED PRACTICE MIDWIFE
Payer: COMMERCIAL

## 2021-10-01 ENCOUNTER — HOSPITAL ENCOUNTER (OUTPATIENT)
Dept: ULTRASOUND IMAGING | Facility: CLINIC | Age: 36
End: 2021-10-01
Attending: ADVANCED PRACTICE MIDWIFE
Payer: COMMERCIAL

## 2021-10-01 DIAGNOSIS — O09.529 HIGH-RISK PREGNANCY, ELDERLY MULTIGRAVIDA, UNSPECIFIED TRIMESTER: ICD-10-CM

## 2021-10-01 DIAGNOSIS — O09.90 HRP (HIGH RISK PREGNANCY), UNSPECIFIED TRIMESTER: ICD-10-CM

## 2021-10-01 DIAGNOSIS — O36.5920 POOR FETAL GROWTH AFFECTING MANAGEMENT OF MOTHER IN SECOND TRIMESTER, SINGLE OR UNSPECIFIED FETUS: Primary | ICD-10-CM

## 2021-10-01 PROCEDURE — 76818 FETAL BIOPHYS PROFILE W/NST: CPT | Mod: 26 | Performed by: OBSTETRICS & GYNECOLOGY

## 2021-10-01 PROCEDURE — 59025 FETAL NON-STRESS TEST: CPT | Mod: 59

## 2021-10-01 PROCEDURE — 76819 FETAL BIOPHYS PROFIL W/O NST: CPT

## 2021-10-01 PROCEDURE — 76815 OB US LIMITED FETUS(S): CPT | Mod: 26 | Performed by: OBSTETRICS & GYNECOLOGY

## 2021-10-01 PROCEDURE — 76820 UMBILICAL ARTERY ECHO: CPT | Mod: 26 | Performed by: OBSTETRICS & GYNECOLOGY

## 2021-10-01 NOTE — NURSING NOTE
NST Performed due to FGR.  Dr. Ta reviewed efm tracing. See NST/BPP Doc Flowsheet tab. Pt scheduled follow up prior to leaving, NST/UAR 2x/week, growth q 3 weeks, Ob visit q 2 weeks.    Virgie Bertrand RN

## 2021-10-01 NOTE — PROGRESS NOTES
"Please see \"Imaging\" tab under \"Chart Review\" for details of today's US at the [facility].    Damion Ta MD  Maternal-Fetal Medicine      "

## 2021-10-05 ENCOUNTER — OFFICE VISIT (OUTPATIENT)
Dept: MATERNAL FETAL MEDICINE | Facility: CLINIC | Age: 36
End: 2021-10-05
Attending: ADVANCED PRACTICE MIDWIFE
Payer: COMMERCIAL

## 2021-10-05 ENCOUNTER — HOSPITAL ENCOUNTER (OUTPATIENT)
Dept: ULTRASOUND IMAGING | Facility: CLINIC | Age: 36
End: 2021-10-05
Attending: ADVANCED PRACTICE MIDWIFE
Payer: COMMERCIAL

## 2021-10-05 DIAGNOSIS — O36.5990 PREGNANCY AFFECTED BY FETAL GROWTH RESTRICTION: ICD-10-CM

## 2021-10-05 DIAGNOSIS — O09.529 HIGH-RISK PREGNANCY, ELDERLY MULTIGRAVIDA, UNSPECIFIED TRIMESTER: ICD-10-CM

## 2021-10-05 PROCEDURE — 76820 UMBILICAL ARTERY ECHO: CPT | Mod: 26 | Performed by: OBSTETRICS & GYNECOLOGY

## 2021-10-05 PROCEDURE — 76819 FETAL BIOPHYS PROFIL W/O NST: CPT | Mod: 26 | Performed by: OBSTETRICS & GYNECOLOGY

## 2021-10-05 PROCEDURE — 76819 FETAL BIOPHYS PROFIL W/O NST: CPT

## 2021-10-05 PROCEDURE — 76815 OB US LIMITED FETUS(S): CPT | Mod: 26 | Performed by: OBSTETRICS & GYNECOLOGY

## 2021-10-05 PROCEDURE — 59025 FETAL NON-STRESS TEST: CPT | Mod: 59

## 2021-10-05 NOTE — PROGRESS NOTES
Please see the imaging tab for details of the ultrasound performed today.    Dana Howard MD  Specialist in Maternal-Fetal Medicine

## 2021-10-05 NOTE — NURSING NOTE
Yarelis here for CTG/NST due to preg c/b FGR. Pt reports +FM, denies ctx, denies SROM, and denies vag bleeding. Pt had NST and was reassuring, but not reactive due to difficult to monitor due to colectomy. BPP 8/8 today. Dr. Howard read NST. Pt scheduled Friday. Pt left amb and stable. Virgie Zimmer RN

## 2021-10-06 ENCOUNTER — LAB (OUTPATIENT)
Dept: LAB | Facility: CLINIC | Age: 36
End: 2021-10-06
Payer: COMMERCIAL

## 2021-10-06 DIAGNOSIS — Z94.4 LIVER TRANSPLANT RECIPIENT (H): ICD-10-CM

## 2021-10-06 DIAGNOSIS — Z94.4 LIVER REPLACED BY TRANSPLANT (H): ICD-10-CM

## 2021-10-06 DIAGNOSIS — Z13.220 LIPID SCREENING: ICD-10-CM

## 2021-10-06 LAB
ALBUMIN SERPL-MCNC: 2.5 G/DL (ref 3.4–5)
ALP SERPL-CCNC: 81 U/L (ref 40–150)
ALT SERPL W P-5'-P-CCNC: 24 U/L (ref 0–50)
ANION GAP SERPL CALCULATED.3IONS-SCNC: 8 MMOL/L (ref 3–14)
AST SERPL W P-5'-P-CCNC: 17 U/L (ref 0–45)
BILIRUB DIRECT SERPL-MCNC: <0.1 MG/DL (ref 0–0.2)
BILIRUB SERPL-MCNC: 0.2 MG/DL (ref 0.2–1.3)
BUN SERPL-MCNC: 30 MG/DL (ref 7–30)
CALCIUM SERPL-MCNC: 10.2 MG/DL (ref 8.5–10.1)
CHLORIDE BLD-SCNC: 111 MMOL/L (ref 94–109)
CO2 SERPL-SCNC: 18 MMOL/L (ref 20–32)
CREAT SERPL-MCNC: 1.24 MG/DL (ref 0.52–1.04)
ERYTHROCYTE [DISTWIDTH] IN BLOOD BY AUTOMATED COUNT: 14.5 % (ref 10–15)
GFR SERPL CREATININE-BSD FRML MDRD: 56 ML/MIN/1.73M2
GLUCOSE BLD-MCNC: 80 MG/DL (ref 70–99)
HCT VFR BLD AUTO: 37.5 % (ref 35–47)
HGB BLD-MCNC: 12.6 G/DL (ref 11.7–15.7)
MCH RBC QN AUTO: 30.2 PG (ref 26.5–33)
MCHC RBC AUTO-ENTMCNC: 33.6 G/DL (ref 31.5–36.5)
MCV RBC AUTO: 90 FL (ref 78–100)
PLATELET # BLD AUTO: 356 10E3/UL (ref 150–450)
POTASSIUM BLD-SCNC: 4.2 MMOL/L (ref 3.4–5.3)
PROT SERPL-MCNC: 7 G/DL (ref 6.8–8.8)
RBC # BLD AUTO: 4.17 10E6/UL (ref 3.8–5.2)
SODIUM SERPL-SCNC: 137 MMOL/L (ref 133–144)
TACROLIMUS BLD-MCNC: 6 UG/L (ref 5–15)
TME LAST DOSE: NORMAL H
TME LAST DOSE: NORMAL H
WBC # BLD AUTO: 14.3 10E3/UL (ref 4–11)

## 2021-10-06 PROCEDURE — 80197 ASSAY OF TACROLIMUS: CPT

## 2021-10-06 PROCEDURE — 85027 COMPLETE CBC AUTOMATED: CPT

## 2021-10-06 PROCEDURE — 36415 COLL VENOUS BLD VENIPUNCTURE: CPT

## 2021-10-06 PROCEDURE — 82248 BILIRUBIN DIRECT: CPT

## 2021-10-06 PROCEDURE — 80053 COMPREHEN METABOLIC PANEL: CPT

## 2021-10-07 ENCOUNTER — VIRTUAL VISIT (OUTPATIENT)
Dept: EDUCATION SERVICES | Facility: CLINIC | Age: 36
End: 2021-10-07
Payer: COMMERCIAL

## 2021-10-07 DIAGNOSIS — O24.410 DIET CONTROLLED GESTATIONAL DIABETES MELLITUS (GDM) IN SECOND TRIMESTER: Primary | ICD-10-CM

## 2021-10-07 PROCEDURE — 98967 PH1 ASSMT&MGMT NQHP 11-20: CPT | Mod: 95 | Performed by: DIETITIAN, REGISTERED

## 2021-10-07 NOTE — PROGRESS NOTES
Diabetes and Pregnancy Follow-up  Type of Service: Telephone Visit    How would patient like to obtain AVS? Not needed    Subjective/Objective:    Yarelis Penny was called for a scheduled BG review. Last date of communication was: 8/2/2021.    Gestational diabetes is being managed with diet and activity    Taking diabetes medications: no    Estimated Date of Delivery: Dec 31, 2021    Blood Glucose/Ketone Log:    Date Ketones Fasting Post Breakfast Post Lunch Post Supper   10/1  75 131 - -   10/2  72 103 - 110   10/3  74 96 - 118   10/4  79 120 - 96   10/5  - 87 - 99   10/6  75 172 - 99   10/7  70 120       Breakfast -eggs, 2 eggos with honey every day, but sometimes she gets a blood sugar spike    Sometimes forget to test lunch, also doesn't at a whole lot at that time of day      Assessment:    Ketones: na.   Fasting blood glucoses: 100% in target.  After breakfast: 85% in target.  After lunch: x% in target.  After dinner: 100% in target.    Plan/Response:  No changes in the patient's current treatment plan.  Follow-up in 1 week.    TARA Gil [facility] [place] [place]  Time Spent: 11:53 minutes    Any diabetes medication dose changes were made via the CDE Protocol and Collaborative Practice Agreement with the patient's OB/GYN provider. A copy of this encounter was shared with the provider.

## 2021-10-08 ENCOUNTER — OFFICE VISIT (OUTPATIENT)
Dept: MATERNAL FETAL MEDICINE | Facility: CLINIC | Age: 36
End: 2021-10-08
Attending: ADVANCED PRACTICE MIDWIFE
Payer: COMMERCIAL

## 2021-10-08 ENCOUNTER — HOSPITAL ENCOUNTER (OUTPATIENT)
Dept: ULTRASOUND IMAGING | Facility: CLINIC | Age: 36
End: 2021-10-08
Attending: ADVANCED PRACTICE MIDWIFE
Payer: COMMERCIAL

## 2021-10-08 DIAGNOSIS — O09.529 HIGH-RISK PREGNANCY, ELDERLY MULTIGRAVIDA, UNSPECIFIED TRIMESTER: ICD-10-CM

## 2021-10-08 PROCEDURE — 76818 FETAL BIOPHYS PROFILE W/NST: CPT | Mod: 26 | Performed by: OBSTETRICS & GYNECOLOGY

## 2021-10-08 PROCEDURE — 76818 FETAL BIOPHYS PROFILE W/NST: CPT

## 2021-10-08 PROCEDURE — 76820 UMBILICAL ARTERY ECHO: CPT | Mod: 26 | Performed by: OBSTETRICS & GYNECOLOGY

## 2021-10-08 NOTE — PROGRESS NOTES
Pt presents to [facility] for assessment and evaluation of her pregnancy due to hx of transplant and fgr. Here for her 2x week testing. Pt had Nst done. See flow sheet. Us done and reviewed by Dr. Gomez. See epic for today's results. Pt states she is feeling well today. No complaints to offer at this time. +fm, no lof or bleeding. No contractions. Pt states she has follow up set for next week. Knows when to come in or call with further concerns or questions.Cora Westfall RN

## 2021-10-12 ENCOUNTER — HOSPITAL ENCOUNTER (OUTPATIENT)
Dept: ULTRASOUND IMAGING | Facility: CLINIC | Age: 36
End: 2021-10-12
Attending: ADVANCED PRACTICE MIDWIFE
Payer: COMMERCIAL

## 2021-10-12 ENCOUNTER — OFFICE VISIT (OUTPATIENT)
Dept: MATERNAL FETAL MEDICINE | Facility: CLINIC | Age: 36
End: 2021-10-12
Attending: ADVANCED PRACTICE MIDWIFE
Payer: COMMERCIAL

## 2021-10-12 DIAGNOSIS — L29.9 ITCHING: ICD-10-CM

## 2021-10-12 DIAGNOSIS — O09.529 HIGH-RISK PREGNANCY, ELDERLY MULTIGRAVIDA, UNSPECIFIED TRIMESTER: ICD-10-CM

## 2021-10-12 DIAGNOSIS — O36.5930 POOR FETAL GROWTH AFFECTING MANAGEMENT OF MOTHER IN THIRD TRIMESTER, SINGLE OR UNSPECIFIED FETUS: Primary | ICD-10-CM

## 2021-10-12 LAB
ALBUMIN SERPL-MCNC: 2.5 G/DL (ref 3.4–5)
ALP SERPL-CCNC: 88 U/L (ref 40–150)
ALT SERPL W P-5'-P-CCNC: 26 U/L (ref 0–50)
AST SERPL W P-5'-P-CCNC: 24 U/L (ref 0–45)
BILIRUB DIRECT SERPL-MCNC: 0.1 MG/DL (ref 0–0.2)
BILIRUB SERPL-MCNC: 0.3 MG/DL (ref 0.2–1.3)
PROT SERPL-MCNC: 7 G/DL (ref 6.8–8.8)

## 2021-10-12 PROCEDURE — 82239 BILE ACIDS TOTAL: CPT | Performed by: OBSTETRICS & GYNECOLOGY

## 2021-10-12 PROCEDURE — 36415 COLL VENOUS BLD VENIPUNCTURE: CPT | Performed by: OBSTETRICS & GYNECOLOGY

## 2021-10-12 PROCEDURE — 76819 FETAL BIOPHYS PROFIL W/O NST: CPT

## 2021-10-12 PROCEDURE — 76815 OB US LIMITED FETUS(S): CPT | Mod: 26 | Performed by: OBSTETRICS & GYNECOLOGY

## 2021-10-12 PROCEDURE — 76820 UMBILICAL ARTERY ECHO: CPT | Mod: 26 | Performed by: OBSTETRICS & GYNECOLOGY

## 2021-10-12 PROCEDURE — 80076 HEPATIC FUNCTION PANEL: CPT | Performed by: OBSTETRICS & GYNECOLOGY

## 2021-10-12 PROCEDURE — 76818 FETAL BIOPHYS PROFILE W/NST: CPT | Mod: 26 | Performed by: OBSTETRICS & GYNECOLOGY

## 2021-10-12 NOTE — NURSING NOTE
Yarelis here for NST and limited with UAR due to preg c/b FGR. Pt has NST done and is equivical not reactive. Pt had BPP done and was 8/8 Dr. Keller reviewed NST and U/S. Pt at the end of visit reports itching to her palms of feet and hands for past few days. Pt to lab for bile acids and hepatic panel. Pt left amb and stable. Virgie Zimmer RN

## 2021-10-12 NOTE — PROGRESS NOTES
Please see full imaging report from [employer] program under imaging tab.      Patient reports itching to nursing team at end of visit. Will add LFTs and bile acids to be drawn today.     Ya Mcfadden MD  Maternal Fetal Medicine

## 2021-10-13 LAB — BILE AC SERPL-SCNC: 18 UMOL/L

## 2021-10-15 ENCOUNTER — HOSPITAL ENCOUNTER (OUTPATIENT)
Dept: ULTRASOUND IMAGING | Facility: CLINIC | Age: 36
End: 2021-10-15
Attending: ADVANCED PRACTICE MIDWIFE
Payer: COMMERCIAL

## 2021-10-15 ENCOUNTER — OFFICE VISIT (OUTPATIENT)
Dept: MATERNAL FETAL MEDICINE | Facility: CLINIC | Age: 36
End: 2021-10-15
Attending: ADVANCED PRACTICE MIDWIFE
Payer: COMMERCIAL

## 2021-10-15 ENCOUNTER — TELEPHONE (OUTPATIENT)
Dept: MATERNAL FETAL MEDICINE | Facility: CLINIC | Age: 36
End: 2021-10-15

## 2021-10-15 ENCOUNTER — HOSPITAL ENCOUNTER (OUTPATIENT)
Facility: CLINIC | Age: 36
Discharge: HOME | End: 2021-10-15
Attending: OBSTETRICS & GYNECOLOGY | Admitting: OBSTETRICS & GYNECOLOGY
Payer: COMMERCIAL

## 2021-10-15 VITALS
SYSTOLIC BLOOD PRESSURE: 133 MMHG | OXYGEN SATURATION: 97 % | RESPIRATION RATE: 16 BRPM | WEIGHT: 123.4 LBS | BODY MASS INDEX: 22.28 KG/M2 | DIASTOLIC BLOOD PRESSURE: 92 MMHG | HEART RATE: 66 BPM

## 2021-10-15 VITALS — SYSTOLIC BLOOD PRESSURE: 125 MMHG | DIASTOLIC BLOOD PRESSURE: 85 MMHG

## 2021-10-15 DIAGNOSIS — O14.93 PRE-ECLAMPSIA IN THIRD TRIMESTER: ICD-10-CM

## 2021-10-15 DIAGNOSIS — O09.90 HRP (HIGH RISK PREGNANCY), UNSPECIFIED TRIMESTER: ICD-10-CM

## 2021-10-15 DIAGNOSIS — O09.529 HIGH-RISK PREGNANCY, ELDERLY MULTIGRAVIDA, UNSPECIFIED TRIMESTER: ICD-10-CM

## 2021-10-15 DIAGNOSIS — O36.5990 PREGNANCY AFFECTED BY FETAL GROWTH RESTRICTION: Primary | ICD-10-CM

## 2021-10-15 DIAGNOSIS — O14.93 PRE-ECLAMPSIA IN THIRD TRIMESTER: Primary | ICD-10-CM

## 2021-10-15 PROBLEM — Z36.89 ENCOUNTER FOR TRIAGE IN PREGNANT PATIENT: Status: ACTIVE | Noted: 2021-10-15

## 2021-10-15 LAB
ABO/RH(D): NORMAL
ALT SERPL W P-5'-P-CCNC: 31 U/L (ref 0–50)
ANTIBODY SCREEN: NEGATIVE
AST SERPL W P-5'-P-CCNC: 23 U/L (ref 0–45)
CREAT SERPL-MCNC: 1.35 MG/DL (ref 0.52–1.04)
CREAT UR-MCNC: 126 MG/DL
ERYTHROCYTE [DISTWIDTH] IN BLOOD BY AUTOMATED COUNT: 14.1 % (ref 10–15)
GFR SERPL CREATININE-BSD FRML MDRD: 51 ML/MIN/1.73M2
HCT VFR BLD AUTO: 37.5 % (ref 35–47)
HGB BLD-MCNC: 12.8 G/DL (ref 11.7–15.7)
MCH RBC QN AUTO: 30.8 PG (ref 26.5–33)
MCHC RBC AUTO-ENTMCNC: 34.1 G/DL (ref 31.5–36.5)
MCV RBC AUTO: 90 FL (ref 78–100)
PLATELET # BLD AUTO: 360 10E3/UL (ref 150–450)
PROT UR-MCNC: 1.78 G/L
PROT/CREAT 24H UR: 1.41 G/G CR (ref 0–0.2)
RBC # BLD AUTO: 4.16 10E6/UL (ref 3.8–5.2)
SPECIMEN EXPIRATION DATE: NORMAL
WBC # BLD AUTO: 12.5 10E3/UL (ref 4–11)

## 2021-10-15 PROCEDURE — 85014 HEMATOCRIT: CPT | Performed by: OBSTETRICS & GYNECOLOGY

## 2021-10-15 PROCEDURE — 59025 FETAL NON-STRESS TEST: CPT | Mod: 26 | Performed by: OBSTETRICS & GYNECOLOGY

## 2021-10-15 PROCEDURE — 76815 OB US LIMITED FETUS(S): CPT

## 2021-10-15 PROCEDURE — 76820 UMBILICAL ARTERY ECHO: CPT | Mod: 26 | Performed by: OBSTETRICS & GYNECOLOGY

## 2021-10-15 PROCEDURE — G0463 HOSPITAL OUTPT CLINIC VISIT: HCPCS | Mod: 25

## 2021-10-15 PROCEDURE — 84460 ALANINE AMINO (ALT) (SGPT): CPT | Performed by: OBSTETRICS & GYNECOLOGY

## 2021-10-15 PROCEDURE — 59025 FETAL NON-STRESS TEST: CPT

## 2021-10-15 PROCEDURE — 999N000105 HC STATISTIC NO DOCUMENTATION TO SUPPORT CHARGE

## 2021-10-15 PROCEDURE — 86901 BLOOD TYPING SEROLOGIC RH(D): CPT | Performed by: OBSTETRICS & GYNECOLOGY

## 2021-10-15 PROCEDURE — 84450 TRANSFERASE (AST) (SGOT): CPT | Performed by: OBSTETRICS & GYNECOLOGY

## 2021-10-15 PROCEDURE — 84156 ASSAY OF PROTEIN URINE: CPT | Performed by: OBSTETRICS & GYNECOLOGY

## 2021-10-15 PROCEDURE — 36415 COLL VENOUS BLD VENIPUNCTURE: CPT | Performed by: OBSTETRICS & GYNECOLOGY

## 2021-10-15 PROCEDURE — 76815 OB US LIMITED FETUS(S): CPT | Mod: 26 | Performed by: OBSTETRICS & GYNECOLOGY

## 2021-10-15 PROCEDURE — 82565 ASSAY OF CREATININE: CPT | Performed by: OBSTETRICS & GYNECOLOGY

## 2021-10-15 RX ORDER — LORAZEPAM 2 MG/ML
2 INJECTION INTRAMUSCULAR
Status: DISCONTINUED | OUTPATIENT
Start: 2021-10-15 | End: 2021-10-15 | Stop reason: HOSPADM

## 2021-10-15 RX ORDER — MAGNESIUM SULFATE HEPTAHYDRATE 40 MG/ML
4 INJECTION, SOLUTION INTRAVENOUS
Status: DISCONTINUED | OUTPATIENT
Start: 2021-10-15 | End: 2021-10-15 | Stop reason: HOSPADM

## 2021-10-15 RX ORDER — MAGNESIUM SULFATE HEPTAHYDRATE 500 MG/ML
10 INJECTION, SOLUTION INTRAMUSCULAR; INTRAVENOUS
Status: DISCONTINUED | OUTPATIENT
Start: 2021-10-15 | End: 2021-10-15 | Stop reason: HOSPADM

## 2021-10-15 RX ORDER — LIDOCAINE 40 MG/G
CREAM TOPICAL
Status: DISCONTINUED | OUTPATIENT
Start: 2021-10-15 | End: 2021-10-15 | Stop reason: HOSPADM

## 2021-10-15 RX ORDER — MAGNESIUM SULFATE HEPTAHYDRATE 40 MG/ML
2 INJECTION, SOLUTION INTRAVENOUS
Status: DISCONTINUED | OUTPATIENT
Start: 2021-10-15 | End: 2021-10-15 | Stop reason: HOSPADM

## 2021-10-15 ASSESSMENT — PAIN SCALES - GENERAL: PAINLEVEL: NO PAIN (0)

## 2021-10-15 NOTE — PROGRESS NOTES
Triage Progress Note    CC: Rule out pre-eclampsia    S: Yarelis Penny is a 36 year old old  at 29w0d by 7w4d US who presents for the above complaint.     She denies vaginal bleeding or LOF and is feeling normal fetal movement. She denies headaches, changes in vision, SOB, RUQ pain.     Her pregnancy has been complicated by:   - Hx liver transplant on steroids, autoimmune hepatitis, primary sclerosinc cholangitis   - CKD  - GDMA1  - FGR   - In vitro fertilization  - Advanced maternal age       OB history:  OB History    Para Term  AB Living   1 0 0 0 0 0   SAB TAB Ectopic Multiple Live Births   0 0 0 0 0      # Outcome Date GA Lbr Tex/2nd Weight Sex Delivery Anes PTL Lv   1 Current                Past Medical History:   Diagnosis Date     Acute kidney injury (H) 2016     Anemia, iron deficiency 2017    On iron supplementation     Autoimmune hepatitis (H)     on steroid taper     Cholangitis      CKD (chronic kidney disease)     biopsy 2012: TIN, patchy fibrosis. Dialysis in 2013 x2 months.     CMV colitis (H) 2016     Esophageal varices (H) 2008    banded     Heart murmur      History of blood transfusion      Primary sclerosing cholangitis      SBP (spontaneous bacterial peritonitis) (H) 2015     Ulcerative Colitis     f/b GI       Past Surgical History:   Procedure Laterality Date     CHOLECYSTECTOMY       COLONOSCOPY  2007     COLONOSCOPY N/A 2015    Procedure: COMBINED COLONOSCOPY, SINGLE OR MULTIPLE BIOPSY/POLYPECTOMY BY BIOPSY;  Surgeon: Mitchel Hoskins Chi, MD;  Location:  GI     COLONOSCOPY N/A 2016    Procedure: COMBINED COLONOSCOPY, SINGLE OR MULTIPLE BIOPSY/POLYPECTOMY BY BIOPSY;  Surgeon: Rigo Valles MD;  Location:  GI     COLONOSCOPY N/A 2016    Procedure: COMBINED COLONOSCOPY, SINGLE OR MULTIPLE BIOPSY/POLYPECTOMY BY BIOPSY;  Surgeon: Kareem Solis MD;  Location:  GI     COLONOSCOPY N/A 2017     Procedure: COMBINED COLONOSCOPY, SINGLE OR MULTIPLE BIOPSY/POLYPECTOMY BY BIOPSY;  Colonoscopy;  Surgeon: Fuentes Johnson MD;  [facility]     COLONOSCOPY N/A 02/25/2019    Procedure: COMBINED COLONOSCOPY, SINGLE OR MULTIPLE BIOPSY/POLYPECTOMY BY BIOPSY;  Surgeon: Sophy Mckeon MD;  [facility]     DILATION AND CURETTAGE, HYSTEROSCOPY DIAGNOSTIC, COMBINED N/A 03/12/2021    Procedure: HYSTEROSCOPY, DIAGNOSTIC;  Surgeon: Lin Armendariz MD;  [facility]     ENDOSCOPIC RETROGRADE CHOLANGIOPANCREATOGRAM N/A 06/25/2015    Procedure: COMBINED ENDOSCOPIC RETROGRADE CHOLANGIOPANCREATOGRAPHY, PLACE TUBE/STENT;  Surgeon: Landon Quinones MD;  [facility]     ENDOSCOPIC RETROGRADE CHOLANGIOPANCREATOGRAM N/A 06/25/2015    Procedure: COMBINED ENDOSCOPIC RETROGRADE CHOLANGIOPANCREATOGRAPHY, PLACE TUBE/STENT;  Surgeon: Landon Quinones MD;  [facility]     ENDOSCOPIC RETROGRADE CHOLANGIOPANCREATOGRAM N/A 07/02/2015    Procedure: COMBINED ENDOSCOPIC RETROGRADE CHOLANGIOPANCREATOGRAPHY, PLACE TUBE/STENT;  Surgeon: Landon Quinones MD;  [facility]     ENDOSCOPIC RETROGRADE CHOLANGIOPANCREATOGRAM N/A 09/08/2015    Procedure: COMBINED ENDOSCOPIC RETROGRADE CHOLANGIOPANCREATOGRAPHY, REMOVE FOREIGN BODY OR STENT/TUBE;  Surgeon: Landon Quinones MD;  [facility]     ENDOSCOPIC RETROGRADE CHOLANGIOPANCREATOGRAM N/A 12/08/2015    Procedure: ENDOSCOPIC RETROGRADE CHOLANGIOPANCREATOGRAM;  Surgeon: Landon Quinones MD;  [facility]     ENDOSCOPIC RETROGRADE CHOLANGIOPANCREATOGRAM N/A 03/01/2016    Procedure: COMBINED ENDOSCOPIC RETROGRADE CHOLANGIOPANCREATOGRAPHY, REMOVE FOREIGN BODY OR STENT/TUBE;  Surgeon: Landon Quinones MD;  [facility]     ENDOSCOPIC RETROGRADE CHOLANGIOPANCREATOGRAM N/A 03/20/2017    Procedure: COMBINED ENDOSCOPIC RETROGRADE CHOLANGIOPANCREATOGRAPHY, PLACE TUBE/STENT;  Endoscopic Retrograde Cholangiopancreatogram with Ballon  Dilation, Stent Placement;  Surgeon: Guru Brittany Macias MD;  [facility]     ENDOSCOPIC RETROGRADE CHOLANGIOPANCREATOGRAM N/A 04/02/2018    Procedure: COMBINED ENDOSCOPIC RETROGRADE CHOLANGIOPANCREATOGRAPHY, PLACE TUBE/STENT;  Endoscopic Retrograde Cholangiopancreatogram with biliary dilation and stent placement;  Surgeon: Guru Brittany Macias MD;  [facility]     ENDOSCOPIC RETROGRADE CHOLANGIOPANCREATOGRAM N/A 05/07/2018    Procedure: ENDOSCOPIC RETROGRADE CHOLANGIOPANCREATOGRAM;  Endoscopic Retrograde Cholangiopancreatogram ballon dilation stent exchange;  Surgeon: Guru Brittany Macias MD;  [facility]     ENDOSCOPIC RETROGRADE CHOLANGIOPANCREATOGRAPHY, EXCHANGE TUBE/STENT N/A 07/30/2015    Procedure: ENDOSCOPIC RETROGRADE CHOLANGIOPANCREATOGRAPHY, EXCHANGE TUBE/STENT;  Surgeon: Landon Quinones MD;  [facility]     ENDOSCOPIC RETROGRADE CHOLANGIOPANCREATOGRAPHY, EXCHANGE TUBE/STENT N/A 05/15/2017    Procedure: ENDOSCOPIC RETROGRADE CHOLANGIOPANCREATOGRAPHY, EXCHANGE TUBE/STENT;  Endoscopic Retrograde Cholangiopancreatogram with biliary stent exchange and balloon dilation;  Surgeon: Guru Brittany Macias MD;  [facility]     ENDOSCOPIC RETROGRADE CHOLANGIOPANCREATOGRAPHY, EXCHANGE TUBE/STENT N/A 06/25/2018    Procedure: ENDOSCOPIC RETROGRADE CHOLANGIOPANCREATOGRAPHY, EXCHANGE TUBE/STENT;  Endoscopic Retrograde Cholangiopancreatogram with biliary dilation, stone removal and stent exchange;  Surgeon: Guru Brittany Macias MD;  [facility]     ENDOSCOPIC RETROGRADE CHOLANGIOPANCREATOGRAPHY, EXCHANGE TUBE/STENT N/A 07/30/2018    Procedure: ENDOSCOPIC RETROGRADE CHOLANGIOPANCREATOGRAPHY, EXCHANGE TUBE/STENT;  Endoscopic Retrograde Cholangiopancreatogram with Stent Exchange with dilation;  Surgeon: Guru Brittany Macias MD;  [facility]     ENDOSCOPIC RETROGRADE CHOLANGIOPANCREATOGRAPHY,  EXCHANGE TUBE/STENT N/A 09/26/2018    Procedure: ENDOSCOPIC RETROGRADE CHOLANGIOPANCREATOGRAPHY, EXCHANGE TUBE/STENT;  Endoscopic Retrograde Cholangiopancreatogram, with bile duct stent exchanged balloon dilation and balloon sweep of bile duct;  Surgeon: Guru Brittany Macias MD;  [facility]     ESOPHAGOSCOPY, GASTROSCOPY, DUODENOSCOPY (EGD), COMBINED N/A 02/26/2015    Procedure: COMBINED ESOPHAGOSCOPY, GASTROSCOPY, DUODENOSCOPY (EGD);  Surgeon: Mitchel Hoskins Chi, MD;  [facility]     EXPLORE COMMON BILE DUCT N/A 06/25/2015    Procedure: EXPLORE COMMON BILE DUCT;  Surgeon: Tryee Smith MD;  [facility]     HERNIA REPAIR  2015    During transplant     ILEOSTOMY N/A 06/20/2019    Procedure: Ileostomy;  Surgeon: Krzysztof Carney MD;  [facility]     LAPAROSCOPIC ASSISTED COLECTOMY N/A 06/20/2019    Procedure: Laparoscopic Total Abdominal Colectomy, lysis of adhesions;  Surgeon: Krzysztof Carney MD;  [facility]     LAPAROSCOPIC LYSIS ADHESIONS N/A 03/12/2021    Procedure: LYSIS, ADHESIONS;  Surgeon: Lin Armendariz MD;  [facility]     LAPAROTOMY EXPLORATORY N/A 06/25/2015    Procedure: LAPAROTOMY EXPLORATORY;  Surgeon: Tyree Smith MD;  [facility]     PICC INSERTION Right 02/27/2015    4fr SL Valved PICC, 36cm (1cm external) in the R medial brachial vein w/ tip in the low SVC.     SIGMOIDOSCOPY FLEXIBLE N/A 04/27/2016    Procedure: SIGMOIDOSCOPY FLEXIBLE;  Surgeon: Jose Nielson MD;  [facility]     TRANSPLANT LIVER RECIPIENT LIVING UNRELATED N/A 06/18/2015    Procedure: TRANSPLANT LIVER RECIPIENT LIVING UNRELATED;  Surgeon: Tyree Smith MD;  [facility]     UPPER GI ENDOSCOPY           Current Facility-Administered Medications:      lidocaine (LMX4) cream, , Topical, Q1H PRN, Cyn Brower MD     lidocaine 1 % 0.1-1 mL, 0.1-1 mL, Other, Q1H PRN, Cyn Brower MD     LORazepam (ATIVAN) injection 2 mg, 2 mg, Intravenous, Q3 Min PRN, Cyn Brower MD      magnesium sulfate 2 g in water intermittent infusion, 2 g, Intravenous, Once PRN seizures, Cyn Brower MD     magnesium sulfate 4 g in 100 mL sterile water (premade), 4 g, Intravenous, Once PRN seizures, Cyn Brower MD     magnesium sulfate injection 10 g, 10 g, Intramuscular, Once PRN, Cyn Brower MD     sodium chloride (PF) 0.9% PF flush 3 mL, 3 mL, Intracatheter, Q8H, Cyn Brower MD     sodium chloride (PF) 0.9% PF flush 3 mL, 3 mL, Intracatheter, q1 min prn, Cyn Brower MD       Allergies   Allergen Reactions     Rifampin Other (See Comments)     Kidney failure     Penicillins      Bad hives when she was in college       Social History     Socioeconomic History     Marital status:      Spouse name: Ceferino     Number of children: 0     Years of education: Not on file     Highest education level: Not on file   Occupational History     Occupation: unemployed   Tobacco Use     Smoking status: Never Smoker     Smokeless tobacco: Never Used   Substance and Sexual Activity     Alcohol use: Not Currently     Alcohol/week: 0.0 standard drinks     Comment: Every other month has 1 drink     Drug use: No     Sexual activity: Yes     Partners: Male     Birth control/protection: None   Other Topics Concern      Service No     Blood Transfusions No     Caffeine Concern No     Occupational Exposure No     Hobby Hazards No     Sleep Concern Yes     Stress Concern No     Weight Concern No     Special Diet No     Back Care No     Exercise No     Bike Helmet No     Comment: n/a     Seat Belt Yes     Self-Exams No     Parent/sibling w/ CABG, MI or angioplasty before 65F 55M? No   Social History Narrative    no pets at home, no recent known sick contacts. She spends a lot of her time at the fitness center.         How much exercise per week? 1-5 times a week  walking too    How much calcium per day? supplements       How much caffeine per day? 10 oz half  calf    How much vitamin D per day? In supplements    Do you/your  family wear seatbelts?  Yes    Do you/your family use safety helmets? Yes    Do you/your family use sunscreen? Yes    Do you/your family keep firearms in the home? No    Do you/your family have a smoke detector(s)? Yes        Reviewed  cmckim lpn  21     Social Determinants of Health     Financial Resource Strain:      Difficulty of Paying Living Expenses:    Food Insecurity:      Worried About Running Out of Food in the Last Year:      Ran Out of Food in the Last Year:    Transportation Needs:      Lack of Transportation (Medical):      Lack of Transportation (Non-Medical):    Physical Activity: Insufficiently Active     Days of Exercise per Week: 3 days     Minutes of Exercise per Session: 30 min   Stress: No Stress Concern Present     Feeling of Stress : Only a little   Social Connections: Moderately Integrated     Frequency of Communication with Friends and Family: Three times a week     Frequency of Social Gatherings with Friends and Family: Three times a week     Attends Jew Services: More than 4 times per year     Active Member of Clubs or Organizations: No     Attends Club or Organization Meetings: Never     Marital Status:    Intimate Partner Violence: Unknown     Fear of Current or Ex-Partner: Not asked     Emotionally Abused: Not asked     Physically Abused: Not asked     Sexually Abused: Not asked     O:  Vitals:    10/15/21 1255 10/15/21 1310 10/15/21 1325 10/15/21 1345   BP: 125/89 118/83 122/86 125/85     General: alert, oriented female, resting in bed in NAD  CV: No increased work of breathing on room air     FHT: 130 BPM, + accels, no decels, moderate variability  0 contractions    Labs/imaging:  Platelets: 360  Creatinine: 1.35 (1.24 10/6/2021, 1.11 2021)  AST: 23  ALT: 31  Protein:Creatinine ratio: 1.41 (0.29 2021)    Assessment and Plan:  36 year old  at 29w0d by 7w4d US presenting to rule out pre-eclampsia.    #Pre-eclampsia without severe features  /95,  133/92. Creatinine 1.35 (1.11 at baseline), UPC 1.41 (0.29 5/21). Platelets, AST, ALT WNL.   - With elevated UPC from baseline, even in setting of CKD with elevated Cr, can make diagnosis of preeclampsia without severe features. Without severe range blood pressures, symptoms of severe features or lab findings to suggest severe features, safe to discharge at this time.  - Weekly creatinine, platelets, AST, ALT, and UPC.   - BP checks at twice weekly BPPs and once daily at home  - No indication to start antihypertensives at this time - will start antihypertensives if increasing UPC or worsening blood pressure  - Comprehensive fetal survey at 18-20 weeks, followed by serial fetal growth assessments every 4 weeks   - Discussed return precautions. Call nurse line or present to triage for headache that does not go away with Tylenol, flashes in the vision, right upper quadrant pain, and new shortness of breath.     # Ulcerative colitis, primary sclerosing cholangitis s/p living donor transplant  S/p total abdominal colectomy with end ileostomy 6/20/2019   - Continue tacrolimus     # CKD   bx 1/15/2013 showed acute on chronic insterstitial nephritis and mesangial proliferative GN with C3 deposition     #FWB  - Severe FGR  - Reactive and reassuring for gestational age    Dispo: Home  Patient discussed with Dr. Brower.    Guerda Knapp, MS4  [facility]    Resident Attestation   I, Cresencio Penny, was present with the medical student who participated in the service and in the documentation of the note.  I have verified the history and personally performed the physical exam and medical decision making.  I agree with the assessment and plan of care as documented in the note.  I have reviewed the note and made changes as necessary.      Cresencio Penny MD  Obstetrics, Gynecology, and Women's Health  PGY2  2:16 PM 10/15/2021

## 2021-10-15 NOTE — DISCHARGE SUMMARY
Data: [facility] at [facility].   Reason for maternal/fetal assessment per patient is Rule Out Pre-eclampsia  . Patient is a . Prenatal record reviewed.      OB History    Para Term  AB Living   1 0 0 0 0 0   SAB TAB Ectopic Multiple Live Births   0 0 0 0 0      # Outcome Date GA Lbr Tex/2nd Weight Sex Delivery Anes PTL Lv   1 Current               Medical History:   Past Medical History:   Diagnosis Date     Acute kidney injury (H) 2016     Anemia, iron deficiency 2017    On iron supplementation     Autoimmune hepatitis (H)     on steroid taper     Cholangitis      CKD (chronic kidney disease)     biopsy : TIN, patchy fibrosis. Dialysis in 2013 x2 months.     CMV colitis (H) 2016     Esophageal varices (H) 2008    banded     Heart murmur      History of blood transfusion      Primary sclerosing cholangitis      SBP (spontaneous bacterial peritonitis) (H) 2015     Ulcerative Colitis     f/b GI   . Gestational Age 29w0d. VSS. Cervix: not examined.  Fetal movement present. Patient denies cramping, backache, vaginal discharge, pelvic pressure, UTI symptoms, GI problems, bloody show, vaginal bleeding, edema, headache, visual disturbances, epigastric or URQ pain, abdominal pain, rupture of membranes. Support persons present.  Action: Verbal consent for EFM. Triage assessment completed. EFM applied for fetal wellbeing. Uterine assessment WDL. Fetal assessment: Presumed adequate fetal oxygenation documented (see flow record). Patient education pamphlets given on fetal movement counts and pre eclampsia . Patient instructed to report change in fetal movement, vaginal leaking of fluid or bleeding, abdominal pain, or any concerns related to the pregnancy to her nurse/physician.   Response: Dr. Penny and Dr Brower informed of pt arrival for pre e eval. Plan per provider is discharge home after serial BPs and pre eclampsia labs resulted. Patient  verbalized understanding of education and verbalized agreement with plan. Discharged ambulatory at 1430.

## 2021-10-15 NOTE — TELEPHONE ENCOUNTER
Follow up phone call to Yarelis since triage discharge today. Reviewed plan of care with MFM. Pt will go to lab on Tuesday before MFM appt to have labs drawn.       Virgie Bertrand RN

## 2021-10-15 NOTE — NURSING NOTE
Yarelis presents to [facility] for NST/UAR due to FGR. Pregnancy c/b liver transplant, CKD, GDMA1. Fetal monitoring and US reviewed by Dr. Bill. Dr. Bill reviewed recent labs, bile acids, ursodiol dose. Pt denies further itching. Pt denies HA, N/V, RUQ pain, vaginal bleeding, LOF, cramping or contractions. BP elevated x2 today. Dr. Bill met with patient to discuss POC. Pt accompanied to L&D for preeclampsia evaluation. Charge RN and Dr. Brower notified prior to transport.     Virgie Bertrand RN

## 2021-10-15 NOTE — DISCHARGE INSTRUCTIONS
Discharge Instruction for Undelivered Patients      You were seen for: Pre Eclampsia Evaluation  We Consulted: Dr Penny and Dr Brower  You had (Test or Medicine):     Diet:   Drink 8 to 12 glasses of liquids (milk, juice, water) every day.  You may eat meals and snacks.     Activity:  Call your doctor or nurse midwife if your baby is moving less than usual.     Call your provider if you notice:  Swelling in your face or increased swelling in your hands or legs.  Headaches that are not relieved by Tylenol (acetaminophen).  Changes in your vision (blurring: seeing spots or stars.)  Nausea (sick to your stomach) and vomiting (throwing up).   Weight gain of 5 pounds or more per week.  Heartburn that doesn't go away.  Signs of bladder infection: pain when you urinate (use the toilet), need to go more often and more urgently.  The bag of henriquez (rupture of membranes) breaks, or you notice leaking in your underwear.  Bright red blood in your underwear.  Abdominal (lower belly) or stomach pain.  For first baby: Contractions (tightening) less than 5 minutes apart for one hour or more.  Second (plus) baby: Contractions (tightening) less than 10 minutes apart and getting stronger.  *If less than 34 weeks: Contractions (tightening) more than 6 times in one hour.  Increase or change in vaginal discharge (note the color and amount)  Other:     Follow-up:  As scheduled in the clinic

## 2021-10-15 NOTE — PROGRESS NOTES
Pt arrived from clinic for pre eclampsia eval. VSS, labs sent and pending. FHR reactive. Denies any PTL symptoms. Provider notified of patient arrival.

## 2021-10-19 ENCOUNTER — OFFICE VISIT (OUTPATIENT)
Dept: MATERNAL FETAL MEDICINE | Facility: CLINIC | Age: 36
End: 2021-10-19
Attending: OBSTETRICS & GYNECOLOGY
Payer: COMMERCIAL

## 2021-10-19 ENCOUNTER — HOSPITAL ENCOUNTER (OUTPATIENT)
Dept: ULTRASOUND IMAGING | Facility: CLINIC | Age: 36
End: 2021-10-19
Attending: OBSTETRICS & GYNECOLOGY
Payer: COMMERCIAL

## 2021-10-19 ENCOUNTER — LAB (OUTPATIENT)
Dept: LAB | Facility: CLINIC | Age: 36
End: 2021-10-19
Attending: ADVANCED PRACTICE MIDWIFE
Payer: COMMERCIAL

## 2021-10-19 ENCOUNTER — OFFICE VISIT (OUTPATIENT)
Dept: MATERNAL FETAL MEDICINE | Facility: CLINIC | Age: 36
End: 2021-10-19
Attending: ADVANCED PRACTICE MIDWIFE
Payer: COMMERCIAL

## 2021-10-19 VITALS
WEIGHT: 122.6 LBS | DIASTOLIC BLOOD PRESSURE: 85 MMHG | HEART RATE: 90 BPM | SYSTOLIC BLOOD PRESSURE: 122 MMHG | BODY MASS INDEX: 22.14 KG/M2 | RESPIRATION RATE: 16 BRPM | OXYGEN SATURATION: 97 %

## 2021-10-19 DIAGNOSIS — O36.5990 PREGNANCY AFFECTED BY FETAL GROWTH RESTRICTION: ICD-10-CM

## 2021-10-19 DIAGNOSIS — O26.643 CHOLESTASIS DURING PREGNANCY IN THIRD TRIMESTER: ICD-10-CM

## 2021-10-19 DIAGNOSIS — Z94.4 STATUS POST LIVER TRANSPLANTATION (H): ICD-10-CM

## 2021-10-19 DIAGNOSIS — O14.93 PRE-ECLAMPSIA IN THIRD TRIMESTER: ICD-10-CM

## 2021-10-19 DIAGNOSIS — O09.92 SUPERVISION OF HIGH RISK PREGNANCY IN SECOND TRIMESTER: Primary | ICD-10-CM

## 2021-10-19 DIAGNOSIS — O09.90 HRP (HIGH RISK PREGNANCY), UNSPECIFIED TRIMESTER: ICD-10-CM

## 2021-10-19 DIAGNOSIS — Z90.49 HISTORY OF COLECTOMY: ICD-10-CM

## 2021-10-19 DIAGNOSIS — O36.5930 POOR FETAL GROWTH AFFECTING MANAGEMENT OF MOTHER IN THIRD TRIMESTER, SINGLE OR UNSPECIFIED FETUS: Primary | ICD-10-CM

## 2021-10-19 DIAGNOSIS — N18.9 CHRONIC KIDNEY DISEASE, UNSPECIFIED CKD STAGE: ICD-10-CM

## 2021-10-19 LAB
ALT SERPL W P-5'-P-CCNC: 30 U/L (ref 0–50)
AST SERPL W P-5'-P-CCNC: 25 U/L (ref 0–45)
CREAT SERPL-MCNC: 1.5 MG/DL (ref 0.52–1.04)
CREAT UR-MCNC: 202 MG/DL
GFR SERPL CREATININE-BSD FRML MDRD: 45 ML/MIN/1.73M2
PLATELET # BLD AUTO: 392 10E3/UL (ref 150–450)
PROT UR-MCNC: 2.15 G/L
PROT/CREAT 24H UR: 1.06 G/G CR (ref 0–0.2)

## 2021-10-19 PROCEDURE — 84460 ALANINE AMINO (ALT) (SGPT): CPT

## 2021-10-19 PROCEDURE — 90686 IIV4 VACC NO PRSV 0.5 ML IM: CPT

## 2021-10-19 PROCEDURE — G0008 ADMIN INFLUENZA VIRUS VAC: HCPCS

## 2021-10-19 PROCEDURE — 76819 FETAL BIOPHYS PROFIL W/O NST: CPT

## 2021-10-19 PROCEDURE — 76816 OB US FOLLOW-UP PER FETUS: CPT

## 2021-10-19 PROCEDURE — 76820 UMBILICAL ARTERY ECHO: CPT | Mod: 26 | Performed by: OBSTETRICS & GYNECOLOGY

## 2021-10-19 PROCEDURE — 84156 ASSAY OF PROTEIN URINE: CPT

## 2021-10-19 PROCEDURE — 82565 ASSAY OF CREATININE: CPT

## 2021-10-19 PROCEDURE — 76816 OB US FOLLOW-UP PER FETUS: CPT | Mod: 26 | Performed by: OBSTETRICS & GYNECOLOGY

## 2021-10-19 PROCEDURE — 59025 FETAL NON-STRESS TEST: CPT

## 2021-10-19 PROCEDURE — 76818 FETAL BIOPHYS PROFILE W/NST: CPT | Mod: 26 | Performed by: OBSTETRICS & GYNECOLOGY

## 2021-10-19 PROCEDURE — G0463 HOSPITAL OUTPT CLINIC VISIT: HCPCS | Mod: 25

## 2021-10-19 PROCEDURE — 84450 TRANSFERASE (AST) (SGOT): CPT

## 2021-10-19 PROCEDURE — 36415 COLL VENOUS BLD VENIPUNCTURE: CPT

## 2021-10-19 PROCEDURE — 99212 OFFICE O/P EST SF 10 MIN: CPT | Mod: 25 | Performed by: ADVANCED PRACTICE MIDWIFE

## 2021-10-19 PROCEDURE — 85049 AUTOMATED PLATELET COUNT: CPT

## 2021-10-19 PROCEDURE — 250N000011 HC RX IP 250 OP 636

## 2021-10-19 RX ORDER — URSODIOL 500 MG/1
500 TABLET, FILM COATED ORAL
Qty: 180 TABLET | Refills: 0 | Status: ON HOLD | OUTPATIENT
Start: 2021-10-19 | End: 2021-11-07

## 2021-10-19 NOTE — PROGRESS NOTES
"Maternal fetal Medicine OB Follow up visit.     Yarelis Penny  : 1985  MRN: [id number]    CC: OB Follow-up    Subjective:  Yarelis Penny is a 36 year old  at 29w4d presenting for routine OB follow-up. Today, she is here with her  and reports feeling well. Does endorse a return of itching, mostly on her palms and soles of feet, but a small area across her abdomen as well.     Reports her blood sugars have been well controlled. See note from RD on 10/7/21.    Patient denies regular, painful contractions, denies loss of fluid or vaginal bleeding.  Reports fetal movement.      Patient also denies any recent fevers/chills, headaches or changes in vision, RUQ pain, nausea or vomiting, constipation, diarrhea or other systemic symptoms.      She and her  have questions about whether or not they should take birth/ classes. She also has questions about her current medications and lactation.     OB Hx:  OB History    Para Term  AB Living   1 0 0 0 0 0   SAB TAB Ectopic Multiple Live Births   0 0 0 0 0      # Outcome Date GA Lbr Tex/2nd Weight Sex Delivery Anes PTL Lv   1 Current                  Objective:  /85 (BP Location: Right arm, Patient Position: Chair)   Pulse 90   Resp 16   Wt 55.6 kg (122 lb 9.6 oz)   LMP 2021   SpO2 97%   BMI 22.14 kg/m      Gen: alert, oriented, NAD  Skin: warm, dry, intact  Respiratory: breathing unlabored, no SOB  Abdominal: gravid, non-tender  Pelvic: deferred  Extremities: WNL  Psych: mood WNL, behavior WNL      OB Ultrasound:  Please see \"imaging\" tab under chart review for today's ultrasound results.      Assessment/Plan:  36 year old  at 29w4d here for follow OB visit.    Pregnancy has been complicated by:   Maternal dx:  Hx living donor liver transplant  Autoimmune hepatitis  Primary sclerosing cholangitis  Ulcerative colitis s/p colectomy with ileostomy  Chronic kidney disease  Hx CMV colitis and viremia  EBV " viremia  Advanced maternal age  IVF pregnancy  Elevated bile acids and itching - cholestasis of pregnancy vs liver disease     Fetal dx:  - Severe FGR      #Liver transplant, living donor:  #Autoimmune hepatitis:  #Primary sclerosing cholangitis:  #History of Biliary stricture:  - Follows with Dr. Daniela Enriquez of hepatology/liver transplant. Graft function has been normal.  Next visit 11/5/21.   - Continues on tacrolimus 3mg BID, prednisone, and ursodiol.  - Hepatic panel at least every trimester, or more frequently at the discretion of GI, last 8/24/21. Standing orders per GI.    #Elevated bile acids/ithcing:  - Reviewed elevated bile acid levels and itching in pregnancy and difficulty in determining cholestasis of pregnancy vs underlying liver disease. However, we will plan to manage her pregnancy as if affected by cholestasis.   - Will increase current ursodiol dose to 500mg TID.  - Will recheck bile acids at 34 weeks.     #CKD Stage II:  - Declined nephrology referral as her transplant team has been following her creatinine   - Urine cultures every trimester. Currently pending.    #Preeclampsia w/o SF:  - Weekly HELLP labs drawn today. Overall stable, but slight increase in Cr. 1.35-->1.50 today     #Ulcerative Colitis:  #Colectomy with ileostomy:  #Intrabdominal adhesions:  - Follows with Dr. Sophy Mckeon, but has not been seen since becoming pregnant  - Empties Ostomy bag ~4x per day  - Flex sig previously recommended shortly after delivery     #Routine PNC:  - Prenatal labs:  Rh: +  antibody: neg               HepB/HIV/RPR/HepC: nonreactive               GC/CT: negative               Rubella: iimune                 GCT/GTT: failed - Diet controlled               UC: mixed neil  - Pap smear: UTD  - Immunizations: s/p Covid x2; Flu shot today. Tdap at next visit. Hep B non-immune but has received multiple boosters and never developed full immunity  - Genetic screening: low-risk NIPT  - OB visits: every 2-4  weeks until the third trimester, then weekly thereafter  - Reviewed locations for birth and  classes.     # Surveillance:  #Severe FGR:  -Weekly NST with UA Doppler and assessment of amniotic fluid volume  - Every 3 week assessments of fetal growth  - Normal fetal echo  - Twice weekly BPPs at 32 weeks     #Delivery planning:  - Delivery is recommended at 37 weeks in the setting of severe FGR. Could occur sooner if clinically indicated pending patient and fetal status.  - Consider stress dose of steroids at delivery given chronic steroid use during delivery.    - Feeding: Desires to breastfeed. Referral place to Virgie EmanuelD to review current medications and lactation.  - Contraception: Needs to be discussed.      15 minutes spent on the date of the encounter, doing chart review, history and exam, documentation and further activities as noted.      Genie Penny CNM on 10/19/2021 at 2:06 PM

## 2021-10-19 NOTE — PROGRESS NOTES
Please see full imaging report from [employer] program under imaging tab.      Ya Mcfadden MD  Maternal Fetal Medicine

## 2021-10-22 ENCOUNTER — HOSPITAL ENCOUNTER (OUTPATIENT)
Dept: ULTRASOUND IMAGING | Facility: CLINIC | Age: 36
End: 2021-10-22
Attending: OBSTETRICS & GYNECOLOGY
Payer: COMMERCIAL

## 2021-10-22 ENCOUNTER — OFFICE VISIT (OUTPATIENT)
Dept: MATERNAL FETAL MEDICINE | Facility: CLINIC | Age: 36
End: 2021-10-22
Attending: OBSTETRICS & GYNECOLOGY
Payer: COMMERCIAL

## 2021-10-22 VITALS
OXYGEN SATURATION: 98 % | DIASTOLIC BLOOD PRESSURE: 93 MMHG | SYSTOLIC BLOOD PRESSURE: 130 MMHG | RESPIRATION RATE: 16 BRPM | HEART RATE: 68 BPM

## 2021-10-22 DIAGNOSIS — O36.5990 PREGNANCY AFFECTED BY FETAL GROWTH RESTRICTION: ICD-10-CM

## 2021-10-22 DIAGNOSIS — O14.93 PRE-ECLAMPSIA IN THIRD TRIMESTER: ICD-10-CM

## 2021-10-22 DIAGNOSIS — D84.9 IMMUNOCOMPROMISED STATE (H): ICD-10-CM

## 2021-10-22 DIAGNOSIS — O09.90 HRP (HIGH RISK PREGNANCY), UNSPECIFIED TRIMESTER: ICD-10-CM

## 2021-10-22 PROCEDURE — 76819 FETAL BIOPHYS PROFIL W/O NST: CPT

## 2021-10-22 PROCEDURE — 76818 FETAL BIOPHYS PROFILE W/NST: CPT | Mod: 26 | Performed by: OBSTETRICS & GYNECOLOGY

## 2021-10-22 PROCEDURE — 59025 FETAL NON-STRESS TEST: CPT | Mod: 59

## 2021-10-22 PROCEDURE — 76820 UMBILICAL ARTERY ECHO: CPT | Mod: 26 | Performed by: OBSTETRICS & GYNECOLOGY

## 2021-10-22 NOTE — PROGRESS NOTES
Please see full imaging report from [employer] program under imaging tab.    Thank-you for referring your patient for  testing. She is an M OB patient and is being seen today for fetal surveillance and BP check. She had an OB visit on Tuesday with increased creatinine (from 1.1 to 1.5) but otherwise stable labs. Blood pressure today is 130/93.     She will continue with twice weekly surveillance (Tues/Fri) and weekly OB visit with labs on .     If her creatinine is continuing to risk on Tuesday, we will need to discuss the possibility of late  delivery (34 weeks). She has follow up with her transplant team scheduled as well.     Return to primary provider for continued prenatal care.    If you have questions regarding today's evaluation or if we can be of further service, please contact the [facility].    Ya Mcfadden MD  Maternal Fetal Medicine

## 2021-10-22 NOTE — NURSING NOTE
Yarelis seen in clinic today for NST/BPP/BP check at 30w0d gestation due to pregnancy c/b liver transplant, CKD, cholestasis, preeclampsia, GDM, FGR (see report/notes). /93. Pt wondering if BP on Fridays are higher than Tuesday due to not sleeping well Thursday nights after bowling. Fasting BG today 70. Pt states itching has improved since Tuesday 10/19 and increasing ursodiol. Pt denies bldg/lof/change in discharge/contractions/headache/vision changes/chest pain/SOB/edema. Reviewed s/sx of preeclampsia and phone numbers for MFM RN coordinator and [facility]. Dr. Mcfadden reviewed BP, NST, and BPP. Plan: return Tuesday 10/26 as scheduled. Pt discharged stable and ambulatory.       Virgie Bertrand RN

## 2021-10-26 ENCOUNTER — OFFICE VISIT (OUTPATIENT)
Dept: MATERNAL FETAL MEDICINE | Facility: CLINIC | Age: 36
End: 2021-10-26
Attending: ADVANCED PRACTICE MIDWIFE
Payer: COMMERCIAL

## 2021-10-26 ENCOUNTER — LAB (OUTPATIENT)
Dept: LAB | Facility: CLINIC | Age: 36
End: 2021-10-26
Attending: OBSTETRICS & GYNECOLOGY
Payer: COMMERCIAL

## 2021-10-26 ENCOUNTER — OFFICE VISIT (OUTPATIENT)
Dept: MATERNAL FETAL MEDICINE | Facility: CLINIC | Age: 36
End: 2021-10-26
Attending: OBSTETRICS & GYNECOLOGY
Payer: COMMERCIAL

## 2021-10-26 ENCOUNTER — HOSPITAL ENCOUNTER (OUTPATIENT)
Dept: ULTRASOUND IMAGING | Facility: CLINIC | Age: 36
End: 2021-10-26
Attending: OBSTETRICS & GYNECOLOGY
Payer: COMMERCIAL

## 2021-10-26 VITALS
WEIGHT: 125.1 LBS | DIASTOLIC BLOOD PRESSURE: 86 MMHG | RESPIRATION RATE: 20 BRPM | BODY MASS INDEX: 22.59 KG/M2 | HEART RATE: 70 BPM | SYSTOLIC BLOOD PRESSURE: 120 MMHG | OXYGEN SATURATION: 98 %

## 2021-10-26 DIAGNOSIS — O14.93 PRE-ECLAMPSIA IN THIRD TRIMESTER: Primary | ICD-10-CM

## 2021-10-26 DIAGNOSIS — O36.5990 PREGNANCY AFFECTED BY FETAL GROWTH RESTRICTION: ICD-10-CM

## 2021-10-26 DIAGNOSIS — O14.93 PRE-ECLAMPSIA IN THIRD TRIMESTER: ICD-10-CM

## 2021-10-26 DIAGNOSIS — O09.529 HIGH-RISK PREGNANCY, ELDERLY MULTIGRAVIDA, UNSPECIFIED TRIMESTER: ICD-10-CM

## 2021-10-26 LAB
ALT SERPL W P-5'-P-CCNC: 27 U/L (ref 0–50)
AST SERPL W P-5'-P-CCNC: 25 U/L (ref 0–45)
CREAT SERPL-MCNC: 1.46 MG/DL (ref 0.52–1.04)
CREAT UR-MCNC: 208 MG/DL
GFR SERPL CREATININE-BSD FRML MDRD: 46 ML/MIN/1.73M2
PLATELET # BLD AUTO: 363 10E3/UL (ref 150–450)
PROT UR-MCNC: 2.44 G/L
PROT/CREAT 24H UR: 1.17 G/G CR (ref 0–0.2)

## 2021-10-26 PROCEDURE — G0463 HOSPITAL OUTPT CLINIC VISIT: HCPCS | Mod: 25

## 2021-10-26 PROCEDURE — 85049 AUTOMATED PLATELET COUNT: CPT

## 2021-10-26 PROCEDURE — 87086 URINE CULTURE/COLONY COUNT: CPT | Performed by: ADVANCED PRACTICE MIDWIFE

## 2021-10-26 PROCEDURE — 99212 OFFICE O/P EST SF 10 MIN: CPT | Mod: 25 | Performed by: OBSTETRICS & GYNECOLOGY

## 2021-10-26 PROCEDURE — 82565 ASSAY OF CREATININE: CPT

## 2021-10-26 PROCEDURE — 84156 ASSAY OF PROTEIN URINE: CPT

## 2021-10-26 PROCEDURE — 76820 UMBILICAL ARTERY ECHO: CPT | Mod: 26 | Performed by: OBSTETRICS & GYNECOLOGY

## 2021-10-26 PROCEDURE — 76818 FETAL BIOPHYS PROFILE W/NST: CPT | Mod: 26 | Performed by: OBSTETRICS & GYNECOLOGY

## 2021-10-26 PROCEDURE — 84450 TRANSFERASE (AST) (SGOT): CPT

## 2021-10-26 PROCEDURE — 90471 IMMUNIZATION ADMIN: CPT

## 2021-10-26 PROCEDURE — 90715 TDAP VACCINE 7 YRS/> IM: CPT

## 2021-10-26 PROCEDURE — 250N000011 HC RX IP 250 OP 636

## 2021-10-26 PROCEDURE — 84460 ALANINE AMINO (ALT) (SGPT): CPT

## 2021-10-26 PROCEDURE — 36415 COLL VENOUS BLD VENIPUNCTURE: CPT

## 2021-10-26 PROCEDURE — 76819 FETAL BIOPHYS PROFIL W/O NST: CPT

## 2021-10-26 ASSESSMENT — PAIN SCALES - GENERAL: PAINLEVEL: NO PAIN (0)

## 2021-10-26 NOTE — PROGRESS NOTES
"Maternal Fetal Medicine OB Follow up visit    Yarelis Penny  : 1985  MRN: [id number]    CC: OB Follow-up    Subjective:  Yarelis Penny is a 36 year old  at 30w4d presenting for routine OB follow-up.    She has no obstetric complaints. Reports fetal movement. Patient denies regular, painful contractions, denies loss of fluid or vaginal bleeding.     Patient also denies any headaches, vision changes, chest pain, dyspnea, RUQ/epigastric pain.     Notes that her itching is improved on Ursodiol 500 mg TID.     Has been checking her sugars 3 times daily; fasting and 1 hour post-breakfast and 1-hour post-dinner. She states that almost all of her values are in goal, except after she eats pizza. Needs to send in her log with diabetic education.     OB Hx:  OB History    Para Term  AB Living   1 0 0 0 0 0   SAB TAB Ectopic Multiple Live Births   0 0 0 0 0      # Outcome Date GA Lbr Tex/2nd Weight Sex Delivery Anes PTL Lv   1 Current                Objective:  /86   Pulse 70   Resp 20   Wt 56.7 kg (125 lb 1.6 oz)   LMP 2021   SpO2 98%   BMI 22.59 kg/m       Gen: alert, oriented, NAD  Skin: warm, dry, intact  Respiratory: breathing unlabored, no SOB  Abdominal: gravid, non-tender  Pelvic: deferred  Extremities:wnl    Component      Latest Ref Rng & Units 10/26/2021   Platelet Count      150 - 450 10e3/uL 363     Component      Latest Ref Rng & Units 10/26/2021   ALT      0 - 50 U/L 27   AST      0 - 45 U/L 25     Component      Latest Ref Rng & Units 10/19/2021 10/26/2021   Creatinine      0.52 - 1.04 mg/dL 1.50 (H) 1.46 (H)       OB Ultrasound:  Please see \"imaging\" tab under chart review for today's ultrasound results.      Assessment/Plan:  36 year old  at 30w4d by IVF here for follow OB visit.    Pregnancy has been complicated by:   Maternal dx:  Hx living donor liver transplant  Autoimmune hepatitis  Primary sclerosing cholangitis  Ulcerative colitis s/p colectomy with " ileostomy  Chronic kidney disease  Hx CMV colitis and viremia  EBV viremia  Advanced maternal age  IVF pregnancy  Elevated bile acids and itching - cholestasis of pregnancy vs liver disease     Fetal dx:  - Severe FGR      Liver transplant, living donor  Autoimmune hepatitis  Primary sclerosing cholangitis  History of Biliary stricture  - Follows with Dr. Daniela Enriquez of hepatology/liver transplant. Graft function has been normal.  Next visit 11/5/21.   - Continues on tacrolimus 3mg BID, prednisone, and ursodiol.  - Hepatic panel at least every trimester, or more frequently at the discretion of GI, last 8/24/21. Standing orders per GI.    Elevated bile acids/pruritis  - Reviewed elevated bile acid levels and itching in pregnancy and difficulty in determining cholestasis of pregnancy vs underlying liver disease. However, we will plan to manage her pregnancy as if affected by cholestasis.   - Continue Ursodiol 500mg TID  - Will recheck bile acids at 33-34 weeks for delivery planning, if not already delivered    CKD Stage II  - Declined nephrology referral as her transplant team has been following her creatinine   - Urine cultures every trimester. Collected today.    Preeclampsia w/o SF:  - Weekly HELLP labs drawn today. Overall stable, but slight increase in Cr 1.35>1.50>1.46.  - No symptoms of severe features  - Strict precautions reviewed     Gestational diabetes, diet controlled   - Per report, values within goal  - Follow-up with diabetic education     Ulcerative Colitis:  Colectomy with ileostomy:  Intrabdominal adhesions:  - Follows with Dr. Sophy Mckeon, but has not been seen since becoming pregnant  - Empties Ostomy bag ~4x per day  - Flex sig previously recommended shortly after delivery     Routine PNC:  - Prenatal labs:  Rh: +  antibody: neg               HepB/HIV/RPR/HepC: nonreactive               GC/CT: negative               Rubella: imune                 UC: mixed neil  - Pap smear: UTD  -  Immunizations: s/p Covid x2  Pfizer 3/16/21 and 21. Discussed patient is eligible and recommended to receive booster; patient planning to schedule. S/p flu shot 10/19. S/p Tdap today. Hep B non-immune but has received multiple boosters and never developed full immunity  - Genetic screening: low-risk NIPT      Surveillance:  Severe FGR:  -Weekly NST with UA Doppler and assessment of amniotic fluid volume  - Every 3 week assessments of fetal growth  - Normal fetal echo  - Twice weekly BPPs     Delivery planning:  - Discussed that based on ACOG CO #831, Medically indicated late  and early term deliveries, delivery between 34w0d - 37w0d should be considered (given severe FGR in presence of preeclampsia, with concern for cholestasis). Discussed that patient may develop severe features of preeclampsia necessitating even earlier delivery and patient demonstrates understanding.   - Consider stress dose of steroids at delivery given chronic steroid use during delivery.    - Feeding: Desires to breastfeed. Referral previously placed to Virgie Parnell PharmD to review current medications and lactation.  - Contraception: Discussed contraception with patient. Based on CDC medical eligibility criteria, for uncomplicated solid organ transplant, Copper or Mirena IUD or progesterone hormonal options are Category 2. Would recommend reviewing with transplant hepatology prior to administering.    RTC for OB visits weekly     Seen and discussed with Dr. Brower.    Padmaja Trevino MD  Maternal-Fetal Medicine Fellow

## 2021-10-26 NOTE — NURSING NOTE
Yarelis here for NST, BPP with UaR and follow-up obv due to preg c/b h/o liver transplant, colectomy, and severe IUGR. Pt reports +FM, denies ctx, denies sRoM, and denies vag bleeding.    Yarelis reports that she is feeling well.  Pt was given TDAP today.  Pt made future apts.    Pt will be back on Friday.  Pt has weekly preeclampsia labs drawn.      Dr. Brower into see pt . Pt left amb and stable. Virgie Zimmer RN

## 2021-10-27 LAB — BACTERIA UR CULT: NORMAL

## 2021-10-28 ENCOUNTER — TELEPHONE (OUTPATIENT)
Dept: MATERNAL FETAL MEDICINE | Facility: CLINIC | Age: 36
End: 2021-10-28

## 2021-10-28 ENCOUNTER — LAB (OUTPATIENT)
Dept: URGENT CARE | Facility: URGENT CARE | Age: 36
End: 2021-10-28
Attending: OBSTETRICS & GYNECOLOGY
Payer: COMMERCIAL

## 2021-10-28 DIAGNOSIS — Z20.822 ENCOUNTER FOR LABORATORY TESTING FOR COVID-19 VIRUS: Primary | ICD-10-CM

## 2021-10-28 DIAGNOSIS — O09.90 HRP (HIGH RISK PREGNANCY), UNSPECIFIED TRIMESTER: ICD-10-CM

## 2021-10-28 DIAGNOSIS — Z20.822 ENCOUNTER FOR LABORATORY TESTING FOR COVID-19 VIRUS: ICD-10-CM

## 2021-10-28 PROCEDURE — U0003 INFECTIOUS AGENT DETECTION BY NUCLEIC ACID (DNA OR RNA); SEVERE ACUTE RESPIRATORY SYNDROME CORONAVIRUS 2 (SARS-COV-2) (CORONAVIRUS DISEASE [COVID-19]), AMPLIFIED PROBE TECHNIQUE, MAKING USE OF HIGH THROUGHPUT TECHNOLOGIES AS DESCRIBED BY CMS-2020-01-R: HCPCS

## 2021-10-28 PROCEDURE — U0005 INFEC AGEN DETEC AMPLI PROBE: HCPCS

## 2021-10-28 NOTE — TELEPHONE ENCOUNTER
Returned call to Yarelis regarding cold symptoms for past 2 days. Yarelis reports runny nose, sneezing, head congestion. Denies fever, cough or sore throat. + FM, denies contractions, vaginal bleeding, or LOF. BP at home today 115/74. No s/sx of preeclampsia. Pt wondering what she can take for cold symptom relief.  Advised patient she can take tylenol and mucinex. Order placed for covid test as well. Pt should call back with worsening symptoms.       Vrigie Bertrand RN

## 2021-10-29 ENCOUNTER — HOSPITAL ENCOUNTER (OUTPATIENT)
Dept: ULTRASOUND IMAGING | Facility: CLINIC | Age: 36
End: 2021-10-29
Attending: OBSTETRICS & GYNECOLOGY
Payer: COMMERCIAL

## 2021-10-29 ENCOUNTER — OFFICE VISIT (OUTPATIENT)
Dept: MATERNAL FETAL MEDICINE | Facility: CLINIC | Age: 36
End: 2021-10-29
Attending: OBSTETRICS & GYNECOLOGY
Payer: COMMERCIAL

## 2021-10-29 VITALS
SYSTOLIC BLOOD PRESSURE: 133 MMHG | HEART RATE: 76 BPM | BODY MASS INDEX: 22.53 KG/M2 | DIASTOLIC BLOOD PRESSURE: 89 MMHG | WEIGHT: 124.8 LBS | OXYGEN SATURATION: 97 %

## 2021-10-29 DIAGNOSIS — O36.5990 PREGNANCY AFFECTED BY FETAL GROWTH RESTRICTION: Primary | ICD-10-CM

## 2021-10-29 DIAGNOSIS — O36.5990 PREGNANCY AFFECTED BY FETAL GROWTH RESTRICTION: ICD-10-CM

## 2021-10-29 DIAGNOSIS — O14.93 PRE-ECLAMPSIA IN THIRD TRIMESTER: ICD-10-CM

## 2021-10-29 LAB
CYCLE THRESHOLD (CT): 21.2
SARS-COV-2 RNA RESP QL NAA+PROBE: POSITIVE

## 2021-10-29 PROCEDURE — 76818 FETAL BIOPHYS PROFILE W/NST: CPT | Mod: 26 | Performed by: OBSTETRICS & GYNECOLOGY

## 2021-10-29 PROCEDURE — 76819 FETAL BIOPHYS PROFIL W/O NST: CPT

## 2021-10-29 PROCEDURE — 76820 UMBILICAL ARTERY ECHO: CPT | Mod: 26 | Performed by: OBSTETRICS & GYNECOLOGY

## 2021-10-29 NOTE — PROGRESS NOTES
Pt presents to [facility] for assessment and evaluation of her pregnancy due to FGR, preeclampsia, and hx of transplant. Pt continues to come for her 2x week testing. Pt had called yesterday and complained of cold like symptoms and had gone for covid test yesterday. Did not have her results prior to coming in for her apt today. Pt states she has been taking mucinex for her headache and stuffiness and has been feeling better. No sob, fever or chills. No blurry vision, RUQ pain, N/V. States +fm, no lof or bleeding. Had NST done and baseline of 130's. While being taken for her us her covid resulted as POSITIVE. Pt and  aware.  states he will go get tested and they will follow the guidelines for quarantine. Us done today under covid protocol and reviewed by Dr. Collins. See epic for today's findings. Pt will continue to come to [facility] for her 2x week testing as it is considered crucial to her care. Pt aware she needs to alert  when she checks in and will be roomed right away. Pt also given a home pule oximeter to take and use. Aware of when to come in or call with declining symptoms or concerns. Questions answered today. Discharged stable at this time. Cora Westfall RN

## 2021-10-31 NOTE — PROGRESS NOTES
"Please see \"Imaging\" tab under \"Chart Review\" for details of today's US.    Margie Collins, DO    "

## 2021-11-01 NOTE — PROGRESS NOTES
"Maternal fetal Medicine OB Follow up visit.      Yarleis Penny  : 1985  MRN: [id number]     CC: OB Follow-up     Subjective:  Yarelis Penny is a 36 year old  at 31w4d presenting for routine OB follow-up. Yarelis has noted a significant increase in pruritis over the last 24 hours. The pruritis is impacting her sleep and making everything else less tolerable. Her COVID-19 test did return positive on 10/28/21. She had onset of URI symptoms on 10/26/2021.    Patient denies regular, painful contractions, denies loss of fluid or vaginal bleeding.  Reports fetal movement.       Patient  also denies any headaches or changes in vision, RUQ pain, nausea or vomiting, constipation, diarrhea or other systemic symptoms.       OB Hx:                   OB History    Para Term  AB Living   1 0 0 0 0 0   SAB TAB Ectopic Multiple Live Births      0 0 0 0 0          # Outcome Date GA Lbr Tex/2nd Weight Sex Delivery Anes PTL Lv   1 Current                              Objective:  BP (!) 122/90   Pulse 82   Resp 20   LMP 2021   SpO2 96%   Gen: alert, teary  Skin: warm, dry, intact  Respiratory: breathing unlabored  Abdominal: gravid, non-tender  Extremities: WNL    OB Ultrasound:  Please see \"imaging\" tab under chart review for today's ultrasound results.     Assessment/Plan:  36 year old  at 31w4d here for follow OB visit.     Pregnancy has been complicated by:   Maternal dx:  Hx living donor liver transplant  Autoimmune hepatitis  Primary sclerosing cholangitis  Ulcerative colitis s/p colectomy with ileostomy  Chronic kidney disease  Hx CMV colitis and viremia  EBV viremia  Advanced maternal age  IVF pregnancy  Elevated bile acids and itching - cholestasis of pregnancy vs liver disease  Preeclampsia without severe features  COVID-19 infection (10/26/21)     Fetal dx:  - Severe FGR      #Liver transplant, living donor:  #Autoimmune hepatitis:  #Primary sclerosing cholangitis:  #History of Biliary " stricture:  - Follows with Dr. Daniela Enriquez of hepatology/liver transplant. Graft function has been normal.  Next visit 11/5/21.   - Continues on tacrolimus 3mg BID, prednisone, and ursodiol.  - Hepatic panel at least every trimester, or more frequently at the discretion of GI. Standing orders per GI.     #Elevated bile acids/ithcing:  - Reviewed elevated bile acid levels and itching in pregnancy and difficulty in determining cholestasis of pregnancy vs underlying liver disease. However, we will plan to manage her pregnancy as if affected by cholestasis.   - Ursodiol dose currently at 500mg TID.  - Bile acids drawn today and are pending      #CKD Stage II:  - Declined nephrology referral as her transplant team has been following her creatinine   - Urine cultures every trimester  - Creatinine today 2.02 mg/dL, increased from her baseline (see below)     #Preeclampsia w/o SF:  - Weekly HELLP labs drawn today. Creatinine continues to increase in Cr. 1.35-->1.50-->2.02 md/dL today. Potential etiologies for increased creatinine include preeclampsia with SF with renal involvement, drug related such as tacrolimus or less likely Ursodiol, or prerenal due to recent illness.  - Recommend admission for further evaluation for preeclampsia, tacrolimus level, and administration of a course of betamethasone. We discussed that if creatine is twice her baseline and felt to be secondary to preeclampsia that delivery would be recommended.     #Ulcerative Colitis:  #Colectomy with ileostomy:  #Intrabdominal adhesions:  - Follows with Dr. Sophy Mckeon, but has not been seen since becoming pregnant  - Empties Ostomy bag ~4x per day  - Flex sig previously recommended shortly after delivery     #Routine PNC:  - Prenatal labs:  Rh: +  antibody: neg               HepB/HIV/RPR/HepC: nonreactive               GC/CT: negative               Rubella: iimune                 GCT/GTT: failed - Diet controlled               UC: mixed neil  - Pap  smear: UTD  - Immunizations: s/p Covid x2; s/p Flu shot. Tdap 10/26/2021. Hep B non-immune but has received multiple boosters and never developed full immunity  - Genetic screening: low-risk NIPT  - OB visits: weekly thereafter     # Surveillance:  #Severe FGR:  -Twice weekly NST with UA Doppler and assessment of amniotic fluid volume and BPP  - Every 3 week assessments of fetal growth  - Normal fetal echo    #Delivery planning:  - Delivery is recommended at 37 weeks in the setting of severe FGR. Could occur sooner if clinically indicated pending patient and fetal status.  - Consider stress dose of steroids at delivery given chronic steroid use during delivery.    - Feeding: Desires to breastfeed. Referral place to Virgie Parnell PharmD to review current medications and lactation.  - Contraception: Needs to be discussed.     15 minutes spent on the date of the encounter, doing chart review, history and exam, documentation and further activities as noted.     Dana Howard MD  Specialist in Maternal-Fetal Medicine

## 2021-11-02 ENCOUNTER — LAB (OUTPATIENT)
Dept: LAB | Facility: CLINIC | Age: 36
End: 2021-11-02
Attending: OBSTETRICS & GYNECOLOGY
Payer: COMMERCIAL

## 2021-11-02 ENCOUNTER — HOSPITAL ENCOUNTER (OUTPATIENT)
Dept: ULTRASOUND IMAGING | Facility: CLINIC | Age: 36
End: 2021-11-02
Attending: OBSTETRICS & GYNECOLOGY
Payer: COMMERCIAL

## 2021-11-02 ENCOUNTER — OFFICE VISIT (OUTPATIENT)
Dept: MATERNAL FETAL MEDICINE | Facility: CLINIC | Age: 36
End: 2021-11-02
Attending: ADVANCED PRACTICE MIDWIFE
Payer: COMMERCIAL

## 2021-11-02 ENCOUNTER — OFFICE VISIT (OUTPATIENT)
Dept: MATERNAL FETAL MEDICINE | Facility: CLINIC | Age: 36
End: 2021-11-02
Attending: OBSTETRICS & GYNECOLOGY
Payer: COMMERCIAL

## 2021-11-02 ENCOUNTER — HOSPITAL ENCOUNTER (INPATIENT)
Facility: CLINIC | Age: 36
LOS: 4 days | Discharge: HOME HEALTH | End: 2021-11-07
Attending: OBSTETRICS & GYNECOLOGY | Admitting: OBSTETRICS & GYNECOLOGY
Payer: COMMERCIAL

## 2021-11-02 VITALS
RESPIRATION RATE: 20 BRPM | DIASTOLIC BLOOD PRESSURE: 90 MMHG | HEART RATE: 82 BPM | OXYGEN SATURATION: 96 % | SYSTOLIC BLOOD PRESSURE: 122 MMHG

## 2021-11-02 DIAGNOSIS — O14.93 PRE-ECLAMPSIA IN THIRD TRIMESTER: ICD-10-CM

## 2021-11-02 DIAGNOSIS — O26.833 RENAL INSUFFICIENCY AFFECTING PREGNANCY IN THIRD TRIMESTER: ICD-10-CM

## 2021-11-02 DIAGNOSIS — O09.529 HIGH-RISK PREGNANCY, ELDERLY MULTIGRAVIDA, UNSPECIFIED TRIMESTER: ICD-10-CM

## 2021-11-02 DIAGNOSIS — O09.90 HRP (HIGH RISK PREGNANCY), UNSPECIFIED TRIMESTER: ICD-10-CM

## 2021-11-02 DIAGNOSIS — Z94.4 LIVER REPLACED BY TRANSPLANT (H): ICD-10-CM

## 2021-11-02 DIAGNOSIS — O36.5990 PREGNANCY AFFECTED BY FETAL GROWTH RESTRICTION: ICD-10-CM

## 2021-11-02 DIAGNOSIS — Z13.220 LIPID SCREENING: ICD-10-CM

## 2021-11-02 DIAGNOSIS — I10 HYPERTENSION, UNSPECIFIED TYPE: ICD-10-CM

## 2021-11-02 DIAGNOSIS — Z94.4 LIVER TRANSPLANT RECIPIENT (H): ICD-10-CM

## 2021-11-02 DIAGNOSIS — O09.90 HRP (HIGH RISK PREGNANCY), UNSPECIFIED TRIMESTER: Primary | ICD-10-CM

## 2021-11-02 DIAGNOSIS — N28.9 RENAL INSUFFICIENCY AFFECTING PREGNANCY IN THIRD TRIMESTER: ICD-10-CM

## 2021-11-02 LAB
ABO/RH(D): NORMAL
ALBUMIN SERPL-MCNC: 2.3 G/DL (ref 3.4–5)
ALBUMIN UR-MCNC: 100 MG/DL
ALP SERPL-CCNC: 139 U/L (ref 40–150)
ALT SERPL W P-5'-P-CCNC: 25 U/L (ref 0–50)
ANION GAP SERPL CALCULATED.3IONS-SCNC: 10 MMOL/L (ref 3–14)
ANTIBODY SCREEN: NEGATIVE
APPEARANCE UR: ABNORMAL
APTT PPP: 34 SECONDS (ref 22–38)
AST SERPL W P-5'-P-CCNC: 23 U/L (ref 0–45)
BACTERIA #/AREA URNS HPF: ABNORMAL /HPF
BILIRUB DIRECT SERPL-MCNC: <0.1 MG/DL (ref 0–0.2)
BILIRUB SERPL-MCNC: 0.2 MG/DL (ref 0.2–1.3)
BILIRUB UR QL STRIP: NEGATIVE
BUN SERPL-MCNC: 53 MG/DL (ref 7–30)
CALCIUM SERPL-MCNC: 8.8 MG/DL (ref 8.5–10.1)
CHLORIDE BLD-SCNC: 111 MMOL/L (ref 94–109)
CO2 SERPL-SCNC: 14 MMOL/L (ref 20–32)
COLOR UR AUTO: YELLOW
CREAT SERPL-MCNC: 2.02 MG/DL (ref 0.52–1.04)
CREAT UR-MCNC: 169 MG/DL
ERYTHROCYTE [DISTWIDTH] IN BLOOD BY AUTOMATED COUNT: 14.1 % (ref 10–15)
FIBRINOGEN PPP-MCNC: 485 MG/DL (ref 170–490)
GFR SERPL CREATININE-BSD FRML MDRD: 31 ML/MIN/1.73M2
GLUCOSE BLD-MCNC: 79 MG/DL (ref 70–99)
GLUCOSE BLDC GLUCOMTR-MCNC: 109 MG/DL (ref 70–99)
GLUCOSE BLDC GLUCOMTR-MCNC: 194 MG/DL (ref 70–99)
GLUCOSE BLDC GLUCOMTR-MCNC: 236 MG/DL (ref 70–99)
GLUCOSE UR STRIP-MCNC: NEGATIVE MG/DL
HCT VFR BLD AUTO: 42.2 % (ref 35–47)
HGB BLD-MCNC: 13.8 G/DL (ref 11.7–15.7)
HGB UR QL STRIP: ABNORMAL
INR PPP: 0.8 (ref 0.86–1.14)
KETONES UR STRIP-MCNC: NEGATIVE MG/DL
LEUKOCYTE ESTERASE UR QL STRIP: ABNORMAL
MCH RBC QN AUTO: 30 PG (ref 26.5–33)
MCHC RBC AUTO-ENTMCNC: 32.7 G/DL (ref 31.5–36.5)
MCV RBC AUTO: 92 FL (ref 78–100)
MUCOUS THREADS #/AREA URNS LPF: PRESENT /LPF
NITRATE UR QL: NEGATIVE
PH UR STRIP: 5.5 [PH] (ref 5–7)
PLATELET # BLD AUTO: 312 10E3/UL (ref 150–450)
POTASSIUM BLD-SCNC: 4.4 MMOL/L (ref 3.4–5.3)
PROT SERPL-MCNC: 7.1 G/DL (ref 6.8–8.8)
PROT UR-MCNC: 1.78 G/L
PROT/CREAT 24H UR: 1.05 G/G CR (ref 0–0.2)
RBC # BLD AUTO: 4.6 10E6/UL (ref 3.8–5.2)
RBC URINE: 2 /HPF
SODIUM SERPL-SCNC: 135 MMOL/L (ref 133–144)
SP GR UR STRIP: 1.01 (ref 1–1.03)
SPECIMEN EXPIRATION DATE: NORMAL
SQUAMOUS EPITHELIAL: 36 /HPF
T PALLIDUM AB SER QL: NONREACTIVE
UROBILINOGEN UR STRIP-MCNC: NORMAL MG/DL
WBC # BLD AUTO: 10.5 10E3/UL (ref 4–11)
WBC URINE: 9 /HPF

## 2021-11-02 PROCEDURE — G0463 HOSPITAL OUTPT CLINIC VISIT: HCPCS | Mod: 25

## 2021-11-02 PROCEDURE — 87086 URINE CULTURE/COLONY COUNT: CPT

## 2021-11-02 PROCEDURE — 36415 COLL VENOUS BLD VENIPUNCTURE: CPT | Performed by: OBSTETRICS & GYNECOLOGY

## 2021-11-02 PROCEDURE — 258N000003 HC RX IP 258 OP 636: Performed by: OBSTETRICS & GYNECOLOGY

## 2021-11-02 PROCEDURE — 36415 COLL VENOUS BLD VENIPUNCTURE: CPT | Performed by: STUDENT IN AN ORGANIZED HEALTH CARE EDUCATION/TRAINING PROGRAM

## 2021-11-02 PROCEDURE — 80048 BASIC METABOLIC PNL TOTAL CA: CPT

## 2021-11-02 PROCEDURE — 76819 FETAL BIOPHYS PROFIL W/O NST: CPT

## 2021-11-02 PROCEDURE — 99223 1ST HOSP IP/OBS HIGH 75: CPT | Mod: 25 | Performed by: OBSTETRICS & GYNECOLOGY

## 2021-11-02 PROCEDURE — 82962 GLUCOSE BLOOD TEST: CPT

## 2021-11-02 PROCEDURE — 85730 THROMBOPLASTIN TIME PARTIAL: CPT | Performed by: STUDENT IN AN ORGANIZED HEALTH CARE EDUCATION/TRAINING PROGRAM

## 2021-11-02 PROCEDURE — 250N000012 HC RX MED GY IP 250 OP 636 PS 637: Performed by: STUDENT IN AN ORGANIZED HEALTH CARE EDUCATION/TRAINING PROGRAM

## 2021-11-02 PROCEDURE — 82040 ASSAY OF SERUM ALBUMIN: CPT

## 2021-11-02 PROCEDURE — 85610 PROTHROMBIN TIME: CPT | Performed by: STUDENT IN AN ORGANIZED HEALTH CARE EDUCATION/TRAINING PROGRAM

## 2021-11-02 PROCEDURE — 81001 URINALYSIS AUTO W/SCOPE: CPT

## 2021-11-02 PROCEDURE — 86900 BLOOD TYPING SEROLOGIC ABO: CPT | Performed by: STUDENT IN AN ORGANIZED HEALTH CARE EDUCATION/TRAINING PROGRAM

## 2021-11-02 PROCEDURE — 96361 HYDRATE IV INFUSION ADD-ON: CPT

## 2021-11-02 PROCEDURE — 36415 COLL VENOUS BLD VENIPUNCTURE: CPT

## 2021-11-02 PROCEDURE — 80197 ASSAY OF TACROLIMUS: CPT | Performed by: OBSTETRICS & GYNECOLOGY

## 2021-11-02 PROCEDURE — G0378 HOSPITAL OBSERVATION PER HR: HCPCS

## 2021-11-02 PROCEDURE — 59025 FETAL NON-STRESS TEST: CPT | Mod: 26 | Performed by: OBSTETRICS & GYNECOLOGY

## 2021-11-02 PROCEDURE — 85027 COMPLETE CBC AUTOMATED: CPT

## 2021-11-02 PROCEDURE — 250N000012 HC RX MED GY IP 250 OP 636 PS 637: Performed by: OBSTETRICS & GYNECOLOGY

## 2021-11-02 PROCEDURE — 250N000013 HC RX MED GY IP 250 OP 250 PS 637: Performed by: STUDENT IN AN ORGANIZED HEALTH CARE EDUCATION/TRAINING PROGRAM

## 2021-11-02 PROCEDURE — 76818 FETAL BIOPHYS PROFILE W/NST: CPT | Mod: 26 | Performed by: OBSTETRICS & GYNECOLOGY

## 2021-11-02 PROCEDURE — 96372 THER/PROPH/DIAG INJ SC/IM: CPT | Performed by: STUDENT IN AN ORGANIZED HEALTH CARE EDUCATION/TRAINING PROGRAM

## 2021-11-02 PROCEDURE — 86780 TREPONEMA PALLIDUM: CPT | Performed by: STUDENT IN AN ORGANIZED HEALTH CARE EDUCATION/TRAINING PROGRAM

## 2021-11-02 PROCEDURE — 84156 ASSAY OF PROTEIN URINE: CPT

## 2021-11-02 PROCEDURE — 76820 UMBILICAL ARTERY ECHO: CPT | Mod: 26 | Performed by: OBSTETRICS & GYNECOLOGY

## 2021-11-02 PROCEDURE — 96374 THER/PROPH/DIAG INJ IV PUSH: CPT

## 2021-11-02 PROCEDURE — 250N000013 HC RX MED GY IP 250 OP 250 PS 637: Performed by: OBSTETRICS & GYNECOLOGY

## 2021-11-02 PROCEDURE — 250N000011 HC RX IP 250 OP 636: Performed by: STUDENT IN AN ORGANIZED HEALTH CARE EDUCATION/TRAINING PROGRAM

## 2021-11-02 PROCEDURE — 85384 FIBRINOGEN ACTIVITY: CPT | Performed by: STUDENT IN AN ORGANIZED HEALTH CARE EDUCATION/TRAINING PROGRAM

## 2021-11-02 RX ORDER — CALCIUM CARBONATE 500(1250)
1 TABLET ORAL EVERY EVENING
Status: DISCONTINUED | OUTPATIENT
Start: 2021-11-02 | End: 2021-11-04

## 2021-11-02 RX ORDER — CALCIUM CARBONATE 500(1250)
1 TABLET ORAL EVERY EVENING
Status: DISCONTINUED | OUTPATIENT
Start: 2021-11-02 | End: 2021-11-02 | Stop reason: RX

## 2021-11-02 RX ORDER — ONDANSETRON 4 MG/1
4 TABLET, ORALLY DISINTEGRATING ORAL EVERY 6 HOURS PRN
Status: DISCONTINUED | OUTPATIENT
Start: 2021-11-02 | End: 2021-11-04

## 2021-11-02 RX ORDER — URSODIOL 300 MG/1
300 CAPSULE ORAL 2 TIMES DAILY
Status: DISCONTINUED | OUTPATIENT
Start: 2021-11-02 | End: 2021-11-04

## 2021-11-02 RX ORDER — BETAMETHASONE SODIUM PHOSPHATE AND BETAMETHASONE ACETATE 3; 3 MG/ML; MG/ML
12 INJECTION, SUSPENSION INTRA-ARTICULAR; INTRALESIONAL; INTRAMUSCULAR; SOFT TISSUE EVERY 24 HOURS
Status: COMPLETED | OUTPATIENT
Start: 2021-11-02 | End: 2021-11-03

## 2021-11-02 RX ORDER — PRENATAL VIT/IRON FUM/FOLIC AC 27MG-0.8MG
1 TABLET ORAL DAILY
Status: DISCONTINUED | OUTPATIENT
Start: 2021-11-02 | End: 2021-11-07 | Stop reason: HOSPADM

## 2021-11-02 RX ORDER — HYDROXYZINE HYDROCHLORIDE 10 MG/1
10 TABLET, FILM COATED ORAL 3 TIMES DAILY PRN
Status: DISCONTINUED | OUTPATIENT
Start: 2021-11-02 | End: 2021-11-04

## 2021-11-02 RX ORDER — PREDNISONE 5 MG/1
5 TABLET ORAL DAILY
Status: DISCONTINUED | OUTPATIENT
Start: 2021-11-03 | End: 2021-11-07 | Stop reason: HOSPADM

## 2021-11-02 RX ORDER — NICOTINE POLACRILEX 4 MG
15-30 LOZENGE BUCCAL
Status: DISCONTINUED | OUTPATIENT
Start: 2021-11-02 | End: 2021-11-04

## 2021-11-02 RX ORDER — DEXTROSE MONOHYDRATE 25 G/50ML
25-50 INJECTION, SOLUTION INTRAVENOUS
Status: DISCONTINUED | OUTPATIENT
Start: 2021-11-02 | End: 2021-11-04

## 2021-11-02 RX ORDER — LIDOCAINE 40 MG/G
CREAM TOPICAL
Status: DISCONTINUED | OUTPATIENT
Start: 2021-11-02 | End: 2021-11-04

## 2021-11-02 RX ORDER — ONDANSETRON 2 MG/ML
4 INJECTION INTRAMUSCULAR; INTRAVENOUS EVERY 6 HOURS PRN
Status: DISCONTINUED | OUTPATIENT
Start: 2021-11-02 | End: 2021-11-04

## 2021-11-02 RX ORDER — PROCHLORPERAZINE 25 MG
25 SUPPOSITORY, RECTAL RECTAL EVERY 12 HOURS PRN
Status: DISCONTINUED | OUTPATIENT
Start: 2021-11-02 | End: 2021-11-02

## 2021-11-02 RX ORDER — PROCHLORPERAZINE MALEATE 10 MG
10 TABLET ORAL EVERY 6 HOURS PRN
Status: DISCONTINUED | OUTPATIENT
Start: 2021-11-02 | End: 2021-11-02

## 2021-11-02 RX ORDER — TACROLIMUS 1 MG/1
3 CAPSULE ORAL EVERY 12 HOURS
Status: COMPLETED | OUTPATIENT
Start: 2021-11-02 | End: 2021-11-02

## 2021-11-02 RX ORDER — ACETAMINOPHEN 325 MG/1
650 TABLET ORAL EVERY 4 HOURS PRN
Status: DISCONTINUED | OUTPATIENT
Start: 2021-11-02 | End: 2021-11-04

## 2021-11-02 RX ORDER — DIPHENHYDRAMINE HCL 25 MG
25 CAPSULE ORAL EVERY 6 HOURS PRN
Status: DISCONTINUED | OUTPATIENT
Start: 2021-11-02 | End: 2021-11-04

## 2021-11-02 RX ORDER — DIPHENHYDRAMINE HYDROCHLORIDE 50 MG/ML
25 INJECTION INTRAMUSCULAR; INTRAVENOUS EVERY 6 HOURS PRN
Status: DISCONTINUED | OUTPATIENT
Start: 2021-11-02 | End: 2021-11-04

## 2021-11-02 RX ORDER — SODIUM CHLORIDE 9 MG/ML
INJECTION, SOLUTION INTRAVENOUS CONTINUOUS
Status: DISCONTINUED | OUTPATIENT
Start: 2021-11-02 | End: 2021-11-03

## 2021-11-02 RX ORDER — TACROLIMUS 1 MG/1
3 CAPSULE ORAL EVERY 12 HOURS
Status: DISCONTINUED | OUTPATIENT
Start: 2021-11-02 | End: 2021-11-02

## 2021-11-02 RX ORDER — PANTOPRAZOLE SODIUM 20 MG/1
40 TABLET, DELAYED RELEASE ORAL
Status: DISCONTINUED | OUTPATIENT
Start: 2021-11-03 | End: 2021-11-04

## 2021-11-02 RX ADMIN — CALCIUM 500 MG: 500 TABLET ORAL at 22:27

## 2021-11-02 RX ADMIN — TACROLIMUS 3 MG: 1 CAPSULE ORAL at 21:02

## 2021-11-02 RX ADMIN — URSODIOL 300 MG: 300 CAPSULE ORAL at 21:02

## 2021-11-02 RX ADMIN — INSULIN ASPART 2 UNITS: 100 INJECTION, SOLUTION INTRAVENOUS; SUBCUTANEOUS at 23:51

## 2021-11-02 RX ADMIN — DIPHENHYDRAMINE HYDROCHLORIDE 25 MG: 50 INJECTION, SOLUTION INTRAMUSCULAR; INTRAVENOUS at 15:57

## 2021-11-02 RX ADMIN — SODIUM CHLORIDE: 9 INJECTION, SOLUTION INTRAVENOUS at 16:00

## 2021-11-02 RX ADMIN — PRENATAL VITAMINS-IRON FUMARATE 27 MG IRON-FOLIC ACID 0.8 MG TABLET 1 TABLET: at 21:01

## 2021-11-02 RX ADMIN — BETAMETHASONE SODIUM PHOSPHATE AND BETAMETHASONE ACETATE 12 MG: 3; 3 INJECTION, SUSPENSION INTRA-ARTICULAR; INTRALESIONAL; INTRAMUSCULAR at 16:00

## 2021-11-02 RX ADMIN — SODIUM CHLORIDE: 9 INJECTION, SOLUTION INTRAVENOUS at 23:45

## 2021-11-02 RX ADMIN — DIPHENHYDRAMINE HYDROCHLORIDE 25 MG: 25 CAPSULE ORAL at 23:45

## 2021-11-02 ASSESSMENT — ACTIVITIES OF DAILY LIVING (ADL)
NUMBER_OF_TIMES_PATIENT_HAS_FALLEN_WITHIN_LAST_SIX_MONTHS: 0
TOILETING_ISSUES: NO
FALL_HISTORY_WITHIN_LAST_SIX_MONTHS: NO

## 2021-11-02 ASSESSMENT — PAIN SCALES - GENERAL: PAINLEVEL: NO PAIN (0)

## 2021-11-02 ASSESSMENT — MIFFLIN-ST. JEOR: SCORE: 1209.34

## 2021-11-02 NOTE — H&P
"  [facility]  OB History and Physical      Yarelis Penny MRN# [id number]   Age: 36 year old YOB: 1985     CC: high creatinine, preeclampsia    HPI:  Ms. Yarelis Penny is a 36 year old  at 31w4d by IVF DOC who presents today from clinic after labs were notable for an elevated creatinine of 2.02 in the setting of previously diagnosed preeclampsia. She is also COVID + as of 10/28, symptoms started 10/27. Pt states her COVID symptoms have been mild including fatigue and runny nose, but denies any SOB, headache, cough, or sore throat. She does think she has been drinking less fluids, normally is very diligent about this to ensure flow is adequate for her colostomy bag. She was vaccinated previously, second dose of Pfizer on , was planning to get the booster next week. Her main concern today is her itching, stating it has gotten worse and the ursodiol is no longer helping. She is mostly itchy over her upper chest and back, also noticed a few \"red spots\" over her abdomen and upper chest yesterday where she has been the most itchy. She otherwise has felt well, denies any headaches, vision changes, abdominal pain, contractions, vaginal bleeding, or loss of fluid. She endorses normal fetal movement. She states she takes her blood sugars TID and they have all been within normal range. Has also been taking her blood pressure and SpO2 at home and reports these have been normal.     Pregnancy Complications:  - H/o living donor liver transplant in 2015  - Autoimmune hepatitis  - Primary sclerosing cholangitis  - Ulcerative colitis s/p colectomy and ileostomy   - CKD after receiving rifampin after her liver transplant; did receive dialysis for 1 month but reports no known issues with her kidneys after that time  - H/o CMV colitis and viremia  - AMA  - IVF pregnancy  - Cholestasis   - Diet-controlled GDM  - Severe IUGR    Prenatal Labs:   Lab Results   Component Value Date    ABO A " 2021    RH Pos 2021    AS Negative 10/15/2021    HEPBANG Nonreactive 2021    CHPCRT Negative 2021    GCPCRT Negative 2021    TREPAB Negative 2012    HGB 13.8 2021    HIV Negative 2012       GBS Status: Unknown    No results found for: GBS    Ultrasounds:    MFM U/S (21):   - Cardiac activity present.  bpm.  - Fetal movements visualized.  - Presentation cephalic.  - Placenta Posterior, rt lateral, No Previa, > 2 cm from internal os.  - Umbilical cord 3 vessel cord.  - Amniotic fluid normal, MVP 4.7 cm  - BPP: 10/10    MFM U/S Comprehensive (10/19/21 at 29w4d):  - Cardiac activity present.  bpm.  - Fetal movements present.  - Presentation breech.  - Placenta Posterior, rt lateral, No Previa, > 2 cm from internal os.  - Umbilical cord 3 vessel cord.  - Amniotic fluid Amount of AF: normal. MVP 4.0 cm.  - EFW 2%ile (2 lb 6 oz)   - BPP 10/10  - None of the anomalies commonly detected by U/S were evident in the limited fetal anatomic survey   - Normal umbilical artery doppler waveform     OB History  OB History    Para Term  AB Living   1 0 0 0 0 0   SAB TAB Ectopic Multiple Live Births   0 0 0 0 0      # Outcome Date GA Lbr Tex/2nd Weight Sex Delivery Anes PTL Lv   1 Current              PMHx:   Past Medical History:   Diagnosis Date     Acute kidney injury (H) 2016     Anemia, iron deficiency 2017    On iron supplementation     Autoimmune hepatitis (H)     on steroid taper     Cholangitis      CKD (chronic kidney disease) 2012    biopsy 2012: TIN, patchy fibrosis. Dialysis in 2013 x2 months.     CMV colitis (H) 2016     Esophageal varices (H) 2008    banded     Heart murmur      History of blood transfusion      Primary sclerosing cholangitis      SBP (spontaneous bacterial peritonitis) (H) 2015     Ulcerative Colitis     f/b GI     PSHx:   Past Surgical History:   Procedure Laterality Date      CHOLECYSTECTOMY  2015     COLONOSCOPY  09/2007     COLONOSCOPY N/A 02/26/2015    Procedure: COMBINED COLONOSCOPY, SINGLE OR MULTIPLE BIOPSY/POLYPECTOMY BY BIOPSY;  Surgeon: Mitchel Hoskins Chi, MD;  Location: [facility]     COLONOSCOPY N/A 01/12/2016    Procedure: COMBINED COLONOSCOPY, SINGLE OR MULTIPLE BIOPSY/POLYPECTOMY BY BIOPSY;  Surgeon: Rigo Valles MD;  Location: [facility]     COLONOSCOPY N/A 04/01/2016    Procedure: COMBINED COLONOSCOPY, SINGLE OR MULTIPLE BIOPSY/POLYPECTOMY BY BIOPSY;  Surgeon: Kareem Solis MD;  Location: [facility]     COLONOSCOPY N/A 09/05/2017    Procedure: COMBINED COLONOSCOPY, SINGLE OR MULTIPLE BIOPSY/POLYPECTOMY BY BIOPSY;  Colonoscopy;  Surgeon: Fuentes Johnson MD;  Location: [facility]     COLONOSCOPY N/A 02/25/2019    Procedure: COMBINED COLONOSCOPY, SINGLE OR MULTIPLE BIOPSY/POLYPECTOMY BY BIOPSY;  Surgeon: Sophy Mckeon MD;  [facility]     DILATION AND CURETTAGE, HYSTEROSCOPY DIAGNOSTIC, COMBINED N/A 03/12/2021    Procedure: HYSTEROSCOPY, DIAGNOSTIC;  Surgeon: Lin Armendariz MD;  [facility]: [facility]     ENDOSCOPIC RETROGRADE CHOLANGIOPANCREATOGRAM N/A 06/25/2015    Procedure: COMBINED ENDOSCOPIC RETROGRADE CHOLANGIOPANCREATOGRAPHY, PLACE TUBE/STENT;  Surgeon: Landon Quinones MD;  [facility]     ENDOSCOPIC RETROGRADE CHOLANGIOPANCREATOGRAM N/A 06/25/2015    Procedure: COMBINED ENDOSCOPIC RETROGRADE CHOLANGIOPANCREATOGRAPHY, PLACE TUBE/STENT;  Surgeon: Landon Quinones MD;  [facility]     ENDOSCOPIC RETROGRADE CHOLANGIOPANCREATOGRAM N/A 07/02/2015    Procedure: COMBINED ENDOSCOPIC RETROGRADE CHOLANGIOPANCREATOGRAPHY, PLACE TUBE/STENT;  Surgeon: Landon Quinones MD;  [facility]     ENDOSCOPIC RETROGRADE CHOLANGIOPANCREATOGRAM N/A 09/08/2015    Procedure: COMBINED ENDOSCOPIC RETROGRADE CHOLANGIOPANCREATOGRAPHY, REMOVE FOREIGN BODY OR STENT/TUBE;  Surgeon: Landon Quinones MD;  [facility]     ENDOSCOPIC RETROGRADE  CHOLANGIOPANCREATOGRAM N/A 12/08/2015    Procedure: ENDOSCOPIC RETROGRADE CHOLANGIOPANCREATOGRAM;  Surgeon: Landon Quinones MD;  [facility]     ENDOSCOPIC RETROGRADE CHOLANGIOPANCREATOGRAM N/A 03/01/2016    Procedure: COMBINED ENDOSCOPIC RETROGRADE CHOLANGIOPANCREATOGRAPHY, REMOVE FOREIGN BODY OR STENT/TUBE;  Surgeon: Landon Quinones MD;  [facility]     ENDOSCOPIC RETROGRADE CHOLANGIOPANCREATOGRAM N/A 03/20/2017    Procedure: COMBINED ENDOSCOPIC RETROGRADE CHOLANGIOPANCREATOGRAPHY, PLACE TUBE/STENT;  Endoscopic Retrograde Cholangiopancreatogram with Ballon Dilation, Stent Placement;  Surgeon: Guru Brittany Macias MD;  [facility]     ENDOSCOPIC RETROGRADE CHOLANGIOPANCREATOGRAM N/A 04/02/2018    Procedure: COMBINED ENDOSCOPIC RETROGRADE CHOLANGIOPANCREATOGRAPHY, PLACE TUBE/STENT;  Endoscopic Retrograde Cholangiopancreatogram with biliary dilation and stent placement;  Surgeon: Guru Brittany Macias MD;  [facility]     ENDOSCOPIC RETROGRADE CHOLANGIOPANCREATOGRAM N/A 05/07/2018    Procedure: ENDOSCOPIC RETROGRADE CHOLANGIOPANCREATOGRAM;  Endoscopic Retrograde Cholangiopancreatogram ballon dilation stent exchange;  Surgeon: Guru Brittany Macias MD;  [facility]     ENDOSCOPIC RETROGRADE CHOLANGIOPANCREATOGRAPHY, EXCHANGE TUBE/STENT N/A 07/30/2015    Procedure: ENDOSCOPIC RETROGRADE CHOLANGIOPANCREATOGRAPHY, EXCHANGE TUBE/STENT;  Surgeon: Landon Quinones MD;  [facility]     ENDOSCOPIC RETROGRADE CHOLANGIOPANCREATOGRAPHY, EXCHANGE TUBE/STENT N/A 05/15/2017    Procedure: ENDOSCOPIC RETROGRADE CHOLANGIOPANCREATOGRAPHY, EXCHANGE TUBE/STENT;  Endoscopic Retrograde Cholangiopancreatogram with biliary stent exchange and balloon dilation;  Surgeon: Guru Brittany Macias MD;  [facility]     ENDOSCOPIC RETROGRADE CHOLANGIOPANCREATOGRAPHY, EXCHANGE TUBE/STENT N/A 06/25/2018    Procedure: ENDOSCOPIC RETROGRADE  CHOLANGIOPANCREATOGRAPHY, EXCHANGE TUBE/STENT;  Endoscopic Retrograde Cholangiopancreatogram with biliary dilation, stone removal and stent exchange;  Surgeon: Guru Brittany Macias MD;  [facility]     ENDOSCOPIC RETROGRADE CHOLANGIOPANCREATOGRAPHY, EXCHANGE TUBE/STENT N/A 07/30/2018    Procedure: ENDOSCOPIC RETROGRADE CHOLANGIOPANCREATOGRAPHY, EXCHANGE TUBE/STENT;  Endoscopic Retrograde Cholangiopancreatogram with Stent Exchange with dilation;  Surgeon: Guru Brittany Macias MD;  [facility]     ENDOSCOPIC RETROGRADE CHOLANGIOPANCREATOGRAPHY, EXCHANGE TUBE/STENT N/A 09/26/2018    Procedure: ENDOSCOPIC RETROGRADE CHOLANGIOPANCREATOGRAPHY, EXCHANGE TUBE/STENT;  Endoscopic Retrograde Cholangiopancreatogram, with bile duct stent exchanged balloon dilation and balloon sweep of bile duct;  Surgeon: Guru Brittany Macias MD;  [facility]     ESOPHAGOSCOPY, GASTROSCOPY, DUODENOSCOPY (EGD), COMBINED N/A 02/26/2015    Procedure: COMBINED ESOPHAGOSCOPY, GASTROSCOPY, DUODENOSCOPY (EGD);  Surgeon: Mitchel Hoskins Chi, MD;  [facility]     EXPLORE COMMON BILE DUCT N/A 06/25/2015    Procedure: EXPLORE COMMON BILE DUCT;  Surgeon: Tyree Smith MD;  [facility]     HERNIA REPAIR  2015    During transplant     ILEOSTOMY N/A 06/20/2019    Procedure: Ileostomy;  Surgeon: Krzysztof Carney MD;  [facility]     LAPAROSCOPIC ASSISTED COLECTOMY N/A 06/20/2019    Procedure: Laparoscopic Total Abdominal Colectomy, lysis of adhesions;  Surgeon: Krzysztof Carney MD;  [facility]     LAPAROSCOPIC LYSIS ADHESIONS N/A 03/12/2021    Procedure: LYSIS, ADHESIONS;  Surgeon: Lin Armendariz MD;  [facility]     LAPAROTOMY EXPLORATORY N/A 06/25/2015    Procedure: LAPAROTOMY EXPLORATORY;  Surgeon: Tyree Smith MD;  [facility]     PICC INSERTION Right 02/27/2015    4fr SL Valved PICC, 36cm (1cm external) in the R medial brachial vein w/ tip in the low SVC.      "SIGMOIDOSCOPY FLEXIBLE N/A 04/27/2016    Procedure: SIGMOIDOSCOPY FLEXIBLE;  Surgeon: Jose Nielson MD;  [facility]     TRANSPLANT LIVER RECIPIENT LIVING UNRELATED N/A 06/18/2015    Procedure: TRANSPLANT LIVER RECIPIENT LIVING UNRELATED;  Surgeon: Tyree Smith MD;  [facility]     UPPER GI ENDOSCOPY       Meds:   Medications Prior to Admission   Medication Sig Dispense Refill Last Dose     acetone urine (KETOSTIX) test strip Test once daily 25 strip 12 11/2/2021 at Unknown time     alcohol swab prep pads Use to swab area of injection/robbie as directed. 100 each 3 11/2/2021 at Unknown time     blood glucose (CONTOUR NEXT TEST) test strip Use to test blood sugar 4 times daily or as directed. 200 strip 3 Unknown at Unknown time     blood glucose (NO BRAND SPECIFIED) test strip Use to test blood sugar 4 times daily or as directed. To accompany: Blood Glucose Monitor Brands: per insurance. 100 strip 6 Unknown at Unknown time     blood glucose (ONETOUCH VERIO IQ) test strip Use to test blood sugar 6 times daily or as directed. 300 strip 3 11/2/2021 at Unknown time     blood glucose monitoring (NO BRAND SPECIFIED) meter device kit Use to test blood sugar 4 times daily or as directed. Preferred blood glucose meter OR supplies to accompany: Blood Glucose Monitor Brands: per insurance. 1 kit 0 11/2/2021 at Unknown time     Blood Glucose Monitoring Suppl (ONETOUCH VERIO FLEX SYSTEM) w/Device KIT 1 each daily 1 kit 0 Unknown at Unknown time     calcium carbonate (OS-KERLINE 500 MG [place]. [place]) 1250 MG tablet Take 1 tablet by mouth every evening   11/1/2021 at 2000     levothyroxine (SYNTHROID/LEVOTHROID) 25 MCG tablet    More than a month at Unknown time     OneTouch Delica Lancets 33G MISC 1 each 6 times daily 100 each 4 11/2/2021 at Unknown time     order for DME [employer] item #s   [id number] and [id number] 30 Bag 3 Unknown at Unknown time     order for DME Increase the amount to 10 per month  --2\" barrier ring #[id number] 10 each 0 " "Unknown at Unknown time     predniSONE (DELTASONE) 5 MG tablet Take 1 tablet (5 mg) by mouth daily 90 tablet 3 11/1/2021 at 0900     Prenatal Vit-Fe Fumarate-FA (PNV PRENATAL PLUS MULTIVITAMIN) 27-1 MG TABS per tablet Take 1 tablet by mouth daily   11/1/2021 at 2100     tacrolimus (GENERIC EQUIVALENT) 0.5 MG capsule Take 1 capsule (0.5 mg) by mouth 2 times daily Dose changes 60 capsule 3 Unknown at Unknown time     tacrolimus (GENERIC EQUIVALENT) 1 MG capsule Take 3 capsules (3 mg) by mouth every 12 hours 540 capsule 3 11/2/2021 at 0800     thin (NO BRAND SPECIFIED) lancets Use with lanceting device. To accompany: Blood Glucose Monitor Brands: per insurance. 100 each 6 Unknown at Unknown time     ursodiol (ACTIGALL) 250 MG tablet Take 2 tablets (500 mg) by mouth every morning AND 1 tablet (250 mg) every evening. 270 tablet 3 Unknown at Unknown time     ursodiol (ACTIGALL) 500 MG tablet Take 1 tablet (500 mg) by mouth 3 times daily 180 tablet 0 Unknown at Unknown time     VITAMIN D, CHOLECALCIFEROL, PO Take by mouth every evening    Unknown at Unknown time       Allergies:    Allergies   Allergen Reactions     Rifampin Other (See Comments)     Kidney failure     Penicillins      Bad hives when she was in college        SocHx: She has not used any tobacco, alcohol, or other drug use during this pregnancy. She lives with her  who is FOB and supportive.     ROS:  Complete 10-point ROS negative except as noted in HPI.    PE:  Vit:   Patient Vitals for the past 4 hrs:   BP Temp Temp src Pulse Resp SpO2 Height Weight   11/02/21 1259 -- -- -- -- -- 97 % -- --   11/02/21 1222 132/88 98.8  F (37.1  C) Oral 72 18 -- 1.575 m (5' 2\") 56.6 kg (124 lb 12.8 oz)        Gen: Well-appearing, NAD, comfortable   CV: Regular rate and rhythm, no murmurs appreciated  Pulm: Nonlabored breathing on room air  Abd: Soft, gravid, non-tender  Ext: no LE edema b/l    FHT: Baseline 145, moderate variability, + accelerations, no " decelerations   Abbott: 0 contractions in 10 minutes      Labs/Imaging:    Component       Creatinine   Latest Ref Rng & Units       0.52 - 1.04 mg/dL   2020       1.11 (H)   2021       1.27 (H)   3/12/2021       1.18 (H)   2021       1.21 (H)   2021       1.11 (H)   2021       1.21 (H)   10/6/2021       1.24 (H)   10/15/2021       1.35 (H)   10/19/2021       1.50 (H)   10/26/2021       1.46 (H)   2021       2.02 (H)       21: 10/10 BPP with MVP 4.7cm, cephalic, posterior placenta    Last growth US 10/19/21: EFW 4%ile, 2%ile    Assessment and Plan:  Ms. Yarelis Penny is a 36 year old  at 31w4d by IVF DOC admitted for elevated creatinine in the setting of known preeclampsia, severe IUGR, and current COVID-19 infection. She was diagnosed with preeclampsia on 10/15 based on blood pressure criteria and UPC of 1.41. On lab re-check today, her creatinine was noted to be 2.02; was previously 1.46 on 10/26. Given this increase, do want to r/o of severe features of preeclampsia. However, pt does have h/o CKD stage 2, UA today showing bacteruria, pyuria, +LE but negative nitrites, as well as reported decreased fluid intake since her COVID infection with BUN 53 suggesting dehydration, which all could also be contributing to her elevation in creatinine. She overall is stable; normotensive, and asymptomatic from her preeclampsia. FHT have been reactive and reassuring, BPP today was also 10/10.    Pregnancy otherwise complicated by diet-controlled GDMA1, h/o liver transplant 2/2 primary sclerosing cholangitis and autoimmune hepatitis, elevated bile acids and pruritis (cholestasis of pregnancy vs liver disease), ulcerative colitis s/p lap colectomy w/ end ileostomy, dense pelvic adhesions, h/o CMV and EBV viremia, IVF pregnancy, AMA, Rubella nonimmune, severe FGR.     Preeclampsia  R/o PreE with severe features  Elevated creatinine  H/o CKD stage 2   - Normotensive to mildly hypertensive since  admission, asymptomatic   - Elevated creatinine of 2.02 today (previously 1.46 on 10/26, baseline 1.1-1.25).  Not yet 2x baseline to meet criteria for severe feature of preeclampsia.                  - elevated >3 in 2013  - Serial BP monitoring   - IV Antihypertensives prn for sustained severe range blood pressures (>160/>110)  - Labs: HELLP labs daily, repeat UPC today (1.05 at OSH 11/02)  - Daily weights, strict I&Os   - Nephrology consult ordered, appreciate recs    COVID-19 infection  - Test + 10/28, symptom onset 10/27  - Pt with mild symptoms (fatigue, rhinorrhea) but is overall stable without hypoxia  - Will continue to monitor vital signs   - Started 0.9%  ml/hr   - Lovenox prn    H/o lap colectomy w/ end ileostomy   Dense pelvic adhesions  Ulcerative colitis   - Discussed with patient that if she were to need a C/S a vertical midline incision would be recommended given her adhesions and surgical hx, she understood and agreed with this plan  - Discussed with colorectal surg, appreciate coordination of care. They are agreeable to be present in OR if patient ends up having C/S    Gestational Diabetes - diet-controlled   -   - patient currently checking blood glucose TID, reports normal   - blood glucose monitoring qid     Cholestasis of pregnancy   S/p live donor liver transplant (2015)  Autoimmune hepatitis  Primary sclerosing cholangitis  - Elevated bile acid levels of 18 on 10/12/21, pt continues to have pruritis despite treatment with Ursodiol 500 g TID. Difficult to determine whether this is cholestasis vs liver disease; however, will plan to manage her pregnancy as if affected by cholestasis.   - Decrease PTA Ursodiol 500mg TID to 300 mg BID d/t elevated creatinine  - Atarax prn for pruritis   - Continue PTA 3 mg BID tacrolimus and 5 mg prednisone daily              - due to have tacrolimus level checked today (ord'd)  - Will recheck bile acids in 1 week   - INR, aPTT, fibrinogen ordered  today, pending     AMA  - low-risk NIPT    PNC  - Hep B non-immune bu has received multiple boosters & never developed full immunity  - HIV/RPR/Hep C non-reactive  - GC/CT neg  - genetic screening: low risk NIPT  - Rh +, Rubella nonimmune, , GBS unknown   - s/p TDAP 10/26  - s/p flu shot 10/19  - s/p COVID x2 Pfizer 21 & 21    Severe FGR  FWB  - s/p BMZ x1 on  @ 1430, will plan for 2nd dose tomorrow   - EFW 2%ile, AC 4%ile (U/S on 10/19)  - FHT have been reactive and reassuring  - TID NSTs, twice weekly BPPs  - 10/10 BPP ()  - Pres: cephalic by MFM U/S 21  - No evidence of SROM at this time   - NICU consult prn  - Intrauterine resuscitative measures prn  - Mode of delivery: Discussed with patient that prior to IOL will do a CST; if negative, will try for vaginal delivery but did discuss risks given FGR that baby may not tolerate labor well and conversion to a C/S may be necessary. If CST is -, will plan for a C/S with a midline vertical incision with Colorectal surg present in OR given surgical history and known dense pelvic adhesions. Pt was agreeable with this plan.     The patient was discussed with Dr. Bill who is in agreement with the treatment plan.    Shayna Lara, MS4    Physician Attestation   I, Efrain Bill MD, saw this patient with the resident and agree with the resident/fellow's findings and plan of care as documented in the note.      I personally reviewed vital signs, medications, labs, imaging and EFM.    Key findings: In summary, Yarelis Penny is a  at 31w4d admitted with elevated serum creatinine in context of chronic renal disease, s/p liver transplant for AI hepatitis and PSC, GDM, preeclampsia without severe features, fetal growth restriction and recent COVID-19 infection (no severe symptoms).  We reviewed the possible etiologies of the acute on chronic renal insufficiency, including dehydration due to current viral illness, preeclampsia, drug  mediated (will check tacro level tonight), or that this rise is due to worsening HTN.  Will plan to gently hydrate IV, check tacro level and reduce dose of ursodiol, as rare cases of renal insufficiency related to this have been reported.  Will then add atarax for symptomatic treatment of IHCP.  We have notified colorectal surgery of Yarelis's possible need for delivery via CS and would request their assistance with laparotomy if possible.  Recommend vertical midline incision if CS indicated.  Will plan Nephrology consult as well to aid in evaluating Yarelis's increasing serum creatinine. Plan reviewed in detail with the patient and she expressed agreement.      Efrain Bill MD  Date of Service (when I saw the patient): 21    Time Spent on this Encounter   I spent 80 minutes on the unit/floor managing the care of Yarelis Penny. Over 50% of my time was spent on the following:   - Counseling the patient and/or family regarding: diagnostic results, prognosis, risks and benefits of treatment options, recommended follow-up and medical compliance  - Coordination of care with the: nurse and patinet    In summary, Yarelis Penny is a  at 31w4d admitted with elevated serum creatinine in context of chronic renal disease, s/p liver transplant for AI hepatitis and PSC, GDM, preeclampsia without severe features, fetal growth restriction and recent COVID-19 infection (no severe symptoms).  We reviewed the possible etiologies of the acute on chronic renal insufficiency, including dehydration due to current viral illness, preeclampsia, drug mediated (will check tacro level tonight), or that this rise is due to worsening HTN.  Will plan to gently hydrate IV, check tacro level and reduce dose of ursodiol, as rare cases of renal insufficiency related to this have been reported.  Will then add atarax for symptomatic treatment of IHCP.  We have notified colorectal surgery of Yarelis's possible need for delivery via CS and would request  their assistance with laparotomy if possible.  Recommend vertical midline incision if CS indicated.  Will plan Nephrology consult as well to aid in evaluating Yarelis's increasing serum creatinine. We reviewed that if serum creatinine continued to increase, delivery would be recommended as this likely represents a severe feature of preeclampsia.  Will plan to recheck in AM.  Plan reviewed in detail with the patient and she expressed agreement.        Efrain Bill MD

## 2021-11-02 NOTE — NURSING NOTE
Yarelis here for s/p liver transplant, CKD and cholestasis of pregnancy. Pt reports +FM, denies ctx, denies SRom, and denies vag bleeding.    Pt tested +Covid on 10/29. Pt has some nasal congestion symptoms today, but no other s/s. Pt was vaccinated against Covid.   Covid precautions taken with patient care.    Pt had reactive NST, Pt had U/S WNL today.  Pt had elevated creatinine noted on labs today.    Dr. Howard in to talk with pt. It was recommended that pt go to L&D for evaluation.    Report called to L&D and transport took pt to the hospital.    Pt left amb and stable. Virgie Zimmer RN

## 2021-11-02 NOTE — DISCHARGE SUMMARY
[facility] Discharge Summary    Yarelis Penny MRN# [id number]   Age: 36 year old YOB: 1985     Date of Admission:  2021  Date of Discharge:  21   Admitting Physician:  Efrain Bill MD  Discharge Physician:  Cyn Brower MD     Admission Diagnosis:  - Intrauterine pregnancy at 31w4d  - IVF pregnancy  - Preeclampsia w/o SF  - CKD stage II w/ proteinuria  - Hyperkal w/ met acidosis 2/2 tacro toxicity  - H/o liver transplant  - autoimmune hepatitis  - Primary sclerosing cholangitis  - possible cholestasis of pregnancy vs liver dz  - Ulcerative colitis  - h/o lsc colectomy w/ end ileostomy  - GDMA1  - COVID positive  - Rubella NI    Discharge Diagnosis:  - Cat II FHT  - metabolic acidosis and hyperkalemia resolving     Procedures:    Primary low-transverse  section with double layer uterine closure via vertical miline incision    Consultations:    Colorectal surgery  Nephrology  Lactation  SW  NICU    Medications prior to admission:  Medications Prior to Admission   Medication Sig Dispense Refill Last Dose     alcohol swab prep pads Use to swab area of injection/robbie as directed. 100 each 3 2021 at Unknown time     blood glucose (CONTOUR NEXT TEST) test strip Use to test blood sugar 4 times daily or as directed. 200 strip 3 Unknown at Unknown time     blood glucose (NO BRAND SPECIFIED) test strip Use to test blood sugar 4 times daily or as directed. To accompany: Blood Glucose Monitor Brands: per insurance. 100 strip 6 Unknown at Unknown time     blood glucose (ONETOUCH VERIO IQ) test strip Use to test blood sugar 6 times daily or as directed. 300 strip 3 2021 at Unknown time     blood glucose monitoring (NO BRAND SPECIFIED) meter device kit Use to test blood sugar 4 times daily or as directed. Preferred blood glucose meter OR supplies to accompany: Blood Glucose Monitor Brands: per insurance. 1 kit 0 2021 at Unknown time     Blood Glucose  "Monitoring Suppl (ONETOUCH VERIO FLEX SYSTEM) w/Device KIT 1 each daily 1 kit 0 Unknown at Unknown time     calcium carbonate (OS-KERLINE 500 MG [place]. [place]) 1250 MG tablet Take 1 tablet by mouth every evening   2021 at 2000     OneTouch Delica Lancets 33G MISC 1 each 6 times daily 100 each 4 2021 at Unknown time     order for DME Coloplast item #s   [id number] and [id number] 30 Bag 3 Unknown at Unknown time     order for DME Increase the amount to 10 per month  --2\" barrier ring #[id number] 10 each 0 Unknown at Unknown time     predniSONE (DELTASONE) 5 MG tablet Take 1 tablet (5 mg) by mouth daily 90 tablet 3 2021 at 0900     Prenatal Vit-Fe Fumarate-FA (PNV PRENATAL PLUS MULTIVITAMIN) 27-1 MG TABS per tablet Take 1 tablet by mouth daily   2021 at 2100     tacrolimus (GENERIC EQUIVALENT) 0.5 MG capsule Take 1 capsule (0.5 mg) by mouth 2 times daily Dose changes 60 capsule 3 Unknown at Unknown time     tacrolimus (GENERIC EQUIVALENT) 1 MG capsule Take 3 capsules (3 mg) by mouth every 12 hours 540 capsule 3 2021 at 0800     thin (NO BRAND SPECIFIED) lancets Use with lanceting device. To accompany: Blood Glucose Monitor Brands: per insurance. 100 each 6 Unknown at Unknown time     VITAMIN D, CHOLECALCIFEROL, PO Take by mouth every evening    Unknown at Unknown time     [DISCONTINUED] levothyroxine (SYNTHROID/LEVOTHROID) 25 MCG tablet    More than a month at Unknown time     [DISCONTINUED] ursodiol (ACTIGALL) 250 MG tablet Take 2 tablets (500 mg) by mouth every morning AND 1 tablet (250 mg) every evening. 270 tablet 3 Unknown at Unknown time     [DISCONTINUED] ursodiol (ACTIGALL) 500 MG tablet Take 1 tablet (500 mg) by mouth 3 times daily 180 tablet 0 Unknown at Unknown time     Brief History of Presentation: Ms. Yarelis Penny is a 36 year old  at 31w4d by IVF DOC who presented on  from clinic after labs were notable for an elevated creatinine of 2.02 in the setting of previously diagnosed preeclampsia. She " "was also COVID + as of 10/28, symptoms started 10/27. Pt stated her COVID symptoms had been mild including fatigue and runny nose, but denied any SOB, headache, cough, or sore throat. She does think she had been drinking less fluids, normally is very diligent about this to ensure flow is adequate for her colostomy bag. She was vaccinated previously, second dose of Pfizer on 04/26, was planning to get the booster next week. Her main concern at presentation was her itching, stating it has gotten worse and the ursodiol is no longer helping. She was mostly itchy over her upper chest and back, but has also noticed a few \"red spots\" over her abdomen and upper chest yesterday where she has been the most itchy. She otherwise had felt well, denying any headaches, vision changes, abdominal pain, contractions, vaginal bleeding, or loss of fluid. She endorsed normal fetal movement. She stated she monitors her blood sugars TID and they had all been within normal range PTA. Had also been taking her blood pressure and SpO2 at home and reported these have been normal. Pregnancy otherwise complicated by h/o living donor liver transplant in 2015 secondary to autoimmune hepatitis and primary sclerosing cholangitis, ulcerative colitis s/p colectomy and ileostomy, CKD after receiving rifampin prior to liver transplant, h/o CMV colitis and viremia, AMA, IVF pregnancy, elevated bile acids and pruritis (cholestasis vs liver dz), and diet-controlled GDM.     Hospital Course: On admission patient was stable, normotensive and asymptomatic from her preeclampsia. She was only having mild symptoms of fatigue and rhinorrhea from her COVID-19. Her ursodiol was decreased from 500 mg TID to 300 mg BID given her elevated creatinine. Colorectal surgery and nephrology were also consulted. FHT was reacting and reassuring on admission. She was given BMZ at 1435 on HD#1. On HD#2, pt's Cr 2.02 > 1.82 > 1.70 after treatment with mIVF. Her tacrolimus level " came back at 8.5, nephrology and transplant team recommended continuing on 2mg BID with follow-up levels in 2 days. Pt remained stable, normotensive and without hypoxia from her COVID-19 infection. However, she did have some hyperglycemia which was expected after receiving BMZ and was started on an insulin sliding scale. She had BMZ x2 on HD#2 at 1435. On HD#3, her FHT became cat II. Therefore, delivery was recommended.     Findings:   1. No recto-fascial adhesions. No intraabdominal adhesions at site of entry.  2. Clear amniotic fluid  3. Liveborn female infant in cephalic presentation. Apgars and weight not yet recorded.  4. Arterial cord pH 7.19, base deficit 10.1. Venous cord pH 7.20, base deficit 11.0.  5. Normal uterus, fallopian tubes, and ovaries. Well healed end ileostomy without evidence of significant adhesions in right upper abdomen around site.  Small filmy adhesion in right lower quadrant.     EBL 429mL    Post-op course:  She was continued on tacrolimus 2mg BID. Endocrine continued following for glucose recommendations. She had metabolic acidosis and hyperkalemia noted on admit, per nephrology was started on bicarb 650 mg TID. This was suspected to be 2/2 to tacrolimus toxicity. Her K+ and NAGMA resolved on HD#5. She was stable and meeting all of her post-partum goals on PPD#2. She was pumping with plans to breastfeed once back with baby. She remained asymptomatic from her COVID infection and was able to be out of isolation on HD#6 and go see her baby.   On day of discharge, she was tolerating PO, ambulating without any issues, voiding spontaneously, passing flatus, lochia within normal limits. Pain was well controlled. She was deemed stable for discharge on POD#3. She was rubella NI, so MMR was ordered. Hemoglobin was stable.     Discharge/Disposition:  Yarelis Penny was discharged to home in stable condition with the following instructions/medications:  1) Call for temperature > 100.4, bright red  vaginal bleeding >1 pad an hour x 2 hours, foul smelling vaginal discharge, pain not controlled by usual oral pain meds, persistent nausea and vomiting not controlled on medications, drainage or redness from incision site  2) She declined contraception but was counseled on inter pregnancy interval spacing.   3) For feeding she decided to breastfeed.  4) She was instructed to follow-up with her primary OB in 6 weeks for a routine postpartum visit and 1 week BP check. Continue follow-up with nephrology, transplant team, PCP, and endocrinology.  5) Discharge activity:  No heavy lifting >15 lbs or strenuous activity for 6 weeks, pelvic rest for 6 weeks, no driving or operating machinery while on narcotics.    Discharge Medications:     Review of your medicines      START taking      Dose / Directions   acetaminophen 325 MG tablet  Commonly known as: TYLENOL  Used for:  delivery delivered      Dose: 650 mg  Take 2 tablets (650 mg) by mouth every 6 hours  Quantity: 45 tablet  Refills: 0     enoxaparin ANTICOAGULANT 40 MG/0.4ML syringe  Commonly known as: LOVENOX  Used for:  delivery delivered      Dose: 40 mg  Inject 0.4 mLs (40 mg) Subcutaneous every 24 hours  Quantity: 12 mL  Refills: 0     labetalol 200 MG tablet  Commonly known as: NORMODYNE  Used for: Hypertension, unspecified type      Dose: 200 mg  Take 1 tablet (200 mg) by mouth every 12 hours  Quantity: 120 tablet  Refills: 1     lanolin ointment  Used for:  delivery delivered      Apply topically every hour as needed for dry skin (soreness)  Quantity: 30 g  Refills: 0     oxyCODONE 5 MG tablet  Commonly known as: ROXICODONE  Used for:  delivery delivered      Dose: 5 mg  Take 1 tablet (5 mg) by mouth every 6 hours as needed for pain  Quantity: 12 tablet  Refills: 0     SENNA-docusate sodium 8.6-50 MG tablet  Commonly known as: SENNA S  Used for:  delivery delivered      Dose: 1 tablet  Take 1 tablet by mouth At  "Bedtime  Quantity: 30 tablet  Refills: 0        CONTINUE these medicines which have NOT CHANGED      Dose / Directions   alcohol swab prep pads  Used for: Diet controlled gestational diabetes mellitus (GDM) in second trimester      Use to swab area of injection/robbie as directed.  Quantity: 100 each  Refills: 3     * blood glucose monitoring meter device kit  Commonly known as: NO BRAND SPECIFIED  Used for: Diet controlled gestational diabetes mellitus (GDM) in second trimester      Use to test blood sugar 4 times daily or as directed. Preferred blood glucose meter OR supplies to accompany: Blood Glucose Monitor Brands: per insurance.  Quantity: 1 kit  Refills: 0     * OneTouch Verio Flex System w/Device Kit  Used for: Diet controlled gestational diabetes mellitus (GDM) in second trimester      Dose: 1 each  1 each daily  Quantity: 1 kit  Refills: 0     * blood glucose test strip  Commonly known as: NO BRAND SPECIFIED  Used for: Diet controlled gestational diabetes mellitus (GDM) in second trimester      Use to test blood sugar 4 times daily or as directed. To accompany: Blood Glucose Monitor Brands: per insurance.  Quantity: 100 strip  Refills: 6     * Contour Next Test test strip  Used for: Diet controlled gestational diabetes mellitus (GDM) in second trimester  Generic drug: blood glucose      Use to test blood sugar 4 times daily or as directed.  Quantity: 200 strip  Refills: 3     * ONETOUCH VERIO IQ test strip  Used for: Diet controlled gestational diabetes mellitus (GDM) in second trimester  Generic drug: blood glucose      Use to test blood sugar 6 times daily or as directed.  Quantity: 300 strip  Refills: 3     calcium carbonate 500 MG tablet  Commonly known as: OS-KERLINE      Dose: 1 tablet  Take 1 tablet by mouth every evening  Refills: 0     order for DME  Used for: Altered bowel elimination due to intestinal ostomy (H)      Increase the amount to 10 per month  --2\" barrier ring #[id number]  Quantity: 10 " each  Refills: 0     order for DME  Used for: S/P ileostomy (H)      Coloplast item #s   [id number] and [id number]  Quantity: 30 Bag  Refills: 3     PNV Prenatal Plus Multivitamin 27-1 MG Tabs per tablet      Dose: 1 tablet  Take 1 tablet by mouth daily  Refills: 0     predniSONE 5 MG tablet  Commonly known as: DELTASONE  Used for: UC (ulcerative colitis) (H), Liver replaced by transplant (H)      Dose: 5 mg  Take 1 tablet (5 mg) by mouth daily  Quantity: 90 tablet  Refills: 3     * tacrolimus 1 MG capsule  Commonly known as: GENERIC EQUIVALENT  Used for: Liver transplant recipient (H)      Dose: 3 mg  Take 3 capsules (3 mg) by mouth every 12 hours  Quantity: 540 capsule  Refills: 3     * tacrolimus 0.5 MG capsule  Commonly known as: GENERIC EQUIVALENT  Used for: S/P liver transplant (H)      Dose: 0.5 mg  Take 1 capsule (0.5 mg) by mouth 2 times daily Dose changes  Quantity: 60 capsule  Refills: 3     * thin lancets  Commonly known as: NO BRAND SPECIFIED  Used for: Diet controlled gestational diabetes mellitus (GDM) in second trimester      Use with lanceting device. To accompany: Blood Glucose Monitor Brands: per insurance.  Quantity: 100 each  Refills: 6     * OneTouch Delica Lancets 33G Misc  Used for: Diet controlled gestational diabetes mellitus (GDM) in second trimester      Dose: 1 each  1 each 6 times daily  Quantity: 100 each  Refills: 4     VITAMIN D (CHOLECALCIFEROL) PO      Take by mouth every evening  Refills: 0         * This list has 9 medication(s) that are the same as other medications prescribed for you. Read the directions carefully, and ask your doctor or other care provider to review them with you.            STOP taking    levothyroxine 25 MCG tablet  Commonly known as: SYNTHROID/LEVOTHROID        ursodiol 250 MG tablet  Commonly known as: ACTIGALL        ursodiol 500 MG tablet  Commonly known as: ACTIGALL              Where to get your medicines      These medications were sent to [facility]  [place], [place] - [address]  [address] [facility], [facility] [address]    Phone: [phone]     acetaminophen 325 MG tablet    enoxaparin ANTICOAGULANT 40 MG/0.4ML syringe    lanolin ointment    SENNA-docusate sodium 8.6-50 MG tablet     These medications were sent to [id number] - [place], [place] - [address] AT [facility] [address]  [address], [place] [address]    Phone: [phone]     labetalol 200 MG tablet     Some of these will need a paper prescription and others can be bought over the counter. Ask your nurse if you have questions.    Bring a paper prescription for each of these medications    oxyCODONE 5 MG tablet         Zuleika Bradley MD   OBGYN PGY3  11/07/21 2:25 PM

## 2021-11-02 NOTE — PROVIDER NOTIFICATION
11/02/21 1246   Provider Notification   Provider Name/Title Dr. Bustos   Method of Notification Electronic Page   Request Evaluate in Person   Notification Reason Patient Arrived   G3 notified that patient here, VSS, will await MD response.

## 2021-11-03 PROBLEM — N28.9: Status: ACTIVE | Noted: 2021-11-03

## 2021-11-03 PROBLEM — O26.839: Status: ACTIVE | Noted: 2021-11-03

## 2021-11-03 LAB
ALBUMIN SERPL-MCNC: 1.9 G/DL (ref 3.4–5)
ALBUMIN SERPL-MCNC: 2.1 G/DL (ref 3.4–5)
ALP SERPL-CCNC: 125 U/L (ref 40–150)
ALP SERPL-CCNC: 125 U/L (ref 40–150)
ALT SERPL W P-5'-P-CCNC: 22 U/L (ref 0–50)
ALT SERPL W P-5'-P-CCNC: 22 U/L (ref 0–50)
ANION GAP SERPL CALCULATED.3IONS-SCNC: 11 MMOL/L (ref 3–14)
ANION GAP SERPL CALCULATED.3IONS-SCNC: 6 MMOL/L (ref 3–14)
AST SERPL W P-5'-P-CCNC: 21 U/L (ref 0–45)
AST SERPL W P-5'-P-CCNC: 25 U/L (ref 0–45)
BILIRUB SERPL-MCNC: 0.1 MG/DL (ref 0.2–1.3)
BILIRUB SERPL-MCNC: <0.1 MG/DL (ref 0.2–1.3)
BUN SERPL-MCNC: 48 MG/DL (ref 7–30)
BUN SERPL-MCNC: 51 MG/DL (ref 7–30)
CALCIUM SERPL-MCNC: 7.8 MG/DL (ref 8.5–10.1)
CALCIUM SERPL-MCNC: 7.9 MG/DL (ref 8.5–10.1)
CHLORIDE BLD-SCNC: 110 MMOL/L (ref 94–109)
CHLORIDE BLD-SCNC: 114 MMOL/L (ref 94–109)
CO2 SERPL-SCNC: 15 MMOL/L (ref 20–32)
CO2 SERPL-SCNC: 16 MMOL/L (ref 20–32)
CREAT SERPL-MCNC: 1.7 MG/DL (ref 0.52–1.04)
CREAT SERPL-MCNC: 1.82 MG/DL (ref 0.52–1.04)
ERYTHROCYTE [DISTWIDTH] IN BLOOD BY AUTOMATED COUNT: 14 % (ref 10–15)
GFR SERPL CREATININE-BSD FRML MDRD: 35 ML/MIN/1.73M2
GFR SERPL CREATININE-BSD FRML MDRD: 38 ML/MIN/1.73M2
GFR SERPL CREATININE-BSD FRML MDRD: 38 ML/MIN/1.73M2
GLUCOSE BLD-MCNC: 162 MG/DL (ref 70–99)
GLUCOSE BLD-MCNC: 94 MG/DL (ref 70–99)
GLUCOSE BLDC GLUCOMTR-MCNC: 100 MG/DL (ref 70–99)
GLUCOSE BLDC GLUCOMTR-MCNC: 102 MG/DL (ref 70–99)
GLUCOSE BLDC GLUCOMTR-MCNC: 109 MG/DL (ref 70–99)
GLUCOSE BLDC GLUCOMTR-MCNC: 114 MG/DL (ref 70–99)
GLUCOSE BLDC GLUCOMTR-MCNC: 125 MG/DL (ref 70–99)
GLUCOSE BLDC GLUCOMTR-MCNC: 129 MG/DL (ref 70–99)
GLUCOSE BLDC GLUCOMTR-MCNC: 155 MG/DL (ref 70–99)
GLUCOSE BLDC GLUCOMTR-MCNC: 168 MG/DL (ref 70–99)
HCT VFR BLD AUTO: 38.7 % (ref 35–47)
HGB BLD-MCNC: 12.8 G/DL (ref 11.7–15.7)
HOLD SPECIMEN: NORMAL
KAPPA LC FREE SER-MCNC: 8.84 MG/DL (ref 0.33–1.94)
KAPPA LC FREE/LAMBDA FREE SER NEPH: 1.5 {RATIO} (ref 0.26–1.65)
LAMBDA LC FREE SERPL-MCNC: 5.91 MG/DL (ref 0.57–2.63)
MCH RBC QN AUTO: 30 PG (ref 26.5–33)
MCHC RBC AUTO-ENTMCNC: 33.1 G/DL (ref 31.5–36.5)
MCV RBC AUTO: 91 FL (ref 78–100)
PLATELET # BLD AUTO: 285 10E3/UL (ref 150–450)
POTASSIUM BLD-SCNC: 5 MMOL/L (ref 3.4–5.3)
POTASSIUM BLD-SCNC: 5.8 MMOL/L (ref 3.4–5.3)
PROT SERPL-MCNC: 5.6 G/DL (ref 6.8–8.8)
PROT SERPL-MCNC: 5.6 G/DL (ref 6.8–8.8)
RBC # BLD AUTO: 4.26 10E6/UL (ref 3.8–5.2)
SODIUM SERPL-SCNC: 135 MMOL/L (ref 133–144)
SODIUM SERPL-SCNC: 137 MMOL/L (ref 133–144)
TACROLIMUS BLD-MCNC: 8.5 UG/L (ref 5–15)
TACROLIMUS BLD-MCNC: 9.7 UG/L (ref 5–15)
TME LAST DOSE: NORMAL H
TOTAL PROTEIN SERUM FOR ELP: 5.6 G/DL (ref 6.8–8.8)
WBC # BLD AUTO: 8.2 10E3/UL (ref 4–11)

## 2021-11-03 PROCEDURE — 84155 ASSAY OF PROTEIN SERUM: CPT | Performed by: STUDENT IN AN ORGANIZED HEALTH CARE EDUCATION/TRAINING PROGRAM

## 2021-11-03 PROCEDURE — 84165 PROTEIN E-PHORESIS SERUM: CPT | Mod: 26 | Performed by: PATHOLOGY

## 2021-11-03 PROCEDURE — 99231 SBSQ HOSP IP/OBS SF/LOW 25: CPT | Performed by: OBSTETRICS & GYNECOLOGY

## 2021-11-03 PROCEDURE — 36415 COLL VENOUS BLD VENIPUNCTURE: CPT | Performed by: STUDENT IN AN ORGANIZED HEALTH CARE EDUCATION/TRAINING PROGRAM

## 2021-11-03 PROCEDURE — 250N000013 HC RX MED GY IP 250 OP 250 PS 637: Performed by: STUDENT IN AN ORGANIZED HEALTH CARE EDUCATION/TRAINING PROGRAM

## 2021-11-03 PROCEDURE — 96372 THER/PROPH/DIAG INJ SC/IM: CPT | Performed by: STUDENT IN AN ORGANIZED HEALTH CARE EDUCATION/TRAINING PROGRAM

## 2021-11-03 PROCEDURE — 84155 ASSAY OF PROTEIN SERUM: CPT | Performed by: OBSTETRICS & GYNECOLOGY

## 2021-11-03 PROCEDURE — 99222 1ST HOSP IP/OBS MODERATE 55: CPT | Performed by: PHYSICIAN ASSISTANT

## 2021-11-03 PROCEDURE — 250N000013 HC RX MED GY IP 250 OP 250 PS 637: Performed by: OBSTETRICS & GYNECOLOGY

## 2021-11-03 PROCEDURE — 120N000002 HC R&B MED SURG/OB UMMC

## 2021-11-03 PROCEDURE — 86335 IMMUNFIX E-PHORSIS/URINE/CSF: CPT | Mod: TC | Performed by: STUDENT IN AN ORGANIZED HEALTH CARE EDUCATION/TRAINING PROGRAM

## 2021-11-03 PROCEDURE — 82962 GLUCOSE BLOOD TEST: CPT

## 2021-11-03 PROCEDURE — 83883 ASSAY NEPHELOMETRY NOT SPEC: CPT | Performed by: STUDENT IN AN ORGANIZED HEALTH CARE EDUCATION/TRAINING PROGRAM

## 2021-11-03 PROCEDURE — 84166 PROTEIN E-PHORESIS/URINE/CSF: CPT | Mod: 26 | Performed by: PATHOLOGY

## 2021-11-03 PROCEDURE — 96374 THER/PROPH/DIAG INJ IV PUSH: CPT

## 2021-11-03 PROCEDURE — 84450 TRANSFERASE (AST) (SGOT): CPT | Performed by: OBSTETRICS & GYNECOLOGY

## 2021-11-03 PROCEDURE — 80197 ASSAY OF TACROLIMUS: CPT | Performed by: OBSTETRICS & GYNECOLOGY

## 2021-11-03 PROCEDURE — 36415 COLL VENOUS BLD VENIPUNCTURE: CPT | Performed by: OBSTETRICS & GYNECOLOGY

## 2021-11-03 PROCEDURE — 86334 IMMUNOFIX E-PHORESIS SERUM: CPT | Mod: 26 | Performed by: PATHOLOGY

## 2021-11-03 PROCEDURE — 85027 COMPLETE CBC AUTOMATED: CPT | Performed by: STUDENT IN AN ORGANIZED HEALTH CARE EDUCATION/TRAINING PROGRAM

## 2021-11-03 PROCEDURE — 84165 PROTEIN E-PHORESIS SERUM: CPT | Mod: TC | Performed by: PATHOLOGY

## 2021-11-03 PROCEDURE — 86335 IMMUNFIX E-PHORSIS/URINE/CSF: CPT | Mod: 26 | Performed by: PATHOLOGY

## 2021-11-03 PROCEDURE — G0378 HOSPITAL OBSERVATION PER HR: HCPCS

## 2021-11-03 PROCEDURE — 84166 PROTEIN E-PHORESIS/URINE/CSF: CPT | Mod: TC | Performed by: PATHOLOGY

## 2021-11-03 PROCEDURE — G0463 HOSPITAL OUTPT CLINIC VISIT: HCPCS

## 2021-11-03 PROCEDURE — 96361 HYDRATE IV INFUSION ADD-ON: CPT

## 2021-11-03 PROCEDURE — 99223 1ST HOSP IP/OBS HIGH 75: CPT | Mod: GC | Performed by: INTERNAL MEDICINE

## 2021-11-03 PROCEDURE — 250N000012 HC RX MED GY IP 250 OP 636 PS 637: Performed by: OBSTETRICS & GYNECOLOGY

## 2021-11-03 PROCEDURE — 250N000011 HC RX IP 250 OP 636: Performed by: STUDENT IN AN ORGANIZED HEALTH CARE EDUCATION/TRAINING PROGRAM

## 2021-11-03 PROCEDURE — 96360 HYDRATION IV INFUSION INIT: CPT

## 2021-11-03 PROCEDURE — 86334 IMMUNOFIX E-PHORESIS SERUM: CPT | Mod: TC | Performed by: STUDENT IN AN ORGANIZED HEALTH CARE EDUCATION/TRAINING PROGRAM

## 2021-11-03 PROCEDURE — 250N000012 HC RX MED GY IP 250 OP 636 PS 637: Performed by: PHYSICIAN ASSISTANT

## 2021-11-03 PROCEDURE — 258N000003 HC RX IP 258 OP 636: Performed by: OBSTETRICS & GYNECOLOGY

## 2021-11-03 PROCEDURE — 80053 COMPREHEN METABOLIC PANEL: CPT | Performed by: STUDENT IN AN ORGANIZED HEALTH CARE EDUCATION/TRAINING PROGRAM

## 2021-11-03 RX ORDER — TACROLIMUS 1 MG/1
2 CAPSULE ORAL ONCE
Status: COMPLETED | OUTPATIENT
Start: 2021-11-03 | End: 2021-11-03

## 2021-11-03 RX ADMIN — SODIUM CHLORIDE: 9 INJECTION, SOLUTION INTRAVENOUS at 17:31

## 2021-11-03 RX ADMIN — SODIUM CHLORIDE: 9 INJECTION, SOLUTION INTRAVENOUS at 08:03

## 2021-11-03 RX ADMIN — PANTOPRAZOLE SODIUM 40 MG: 20 TABLET, DELAYED RELEASE ORAL at 08:00

## 2021-11-03 RX ADMIN — INSULIN ASPART 3 UNITS: 100 INJECTION, SOLUTION INTRAVENOUS; SUBCUTANEOUS at 19:06

## 2021-11-03 RX ADMIN — BETAMETHASONE SODIUM PHOSPHATE AND BETAMETHASONE ACETATE 12 MG: 3; 3 INJECTION, SUSPENSION INTRA-ARTICULAR; INTRALESIONAL; INTRAMUSCULAR at 16:28

## 2021-11-03 RX ADMIN — INSULIN ASPART 1 UNITS: 100 INJECTION, SOLUTION INTRAVENOUS; SUBCUTANEOUS at 21:06

## 2021-11-03 RX ADMIN — CALCIUM 500 MG: 500 TABLET ORAL at 20:43

## 2021-11-03 RX ADMIN — DIPHENHYDRAMINE HYDROCHLORIDE 25 MG: 25 CAPSULE ORAL at 23:37

## 2021-11-03 RX ADMIN — URSODIOL 300 MG: 300 CAPSULE ORAL at 19:58

## 2021-11-03 RX ADMIN — TACROLIMUS 2 MG: 1 CAPSULE ORAL at 20:43

## 2021-11-03 RX ADMIN — URSODIOL 300 MG: 300 CAPSULE ORAL at 09:13

## 2021-11-03 RX ADMIN — PREDNISONE 5 MG: 5 TABLET ORAL at 09:13

## 2021-11-03 RX ADMIN — PRENATAL VITAMINS-IRON FUMARATE 27 MG IRON-FOLIC ACID 0.8 MG TABLET 1 TABLET: at 20:43

## 2021-11-03 ASSESSMENT — MIFFLIN-ST. JEOR: SCORE: 1218.41

## 2021-11-03 NOTE — PROGRESS NOTES
Md at bedside, reviewing lab results assessing patient and reviewing plan of care. Patient given time to ask questions, agrees with plan of care.

## 2021-11-03 NOTE — CONSULTS
"NEW INPATIENT DIABETES MANAGEMENT CONSULT  Yarelis Penny  Age: 36 year old  MRN # [id number]   YOB: 1985    Chief Complaint: acute renal insufficiency  Reason for Consult: \"GDM s/p BMZ\"  Consulting Provider: Efrain Bill MD    History of Present Illness:   Yarelis is a 36 year old female currently 31w 4d pregnant who was admitted 11/2/21 with acute kidney injury superimposed on previously diagnosed pre-eclampsia. She has a complicated medical history including liver transplant for autoimmune hepatitis in 2015, primary sclerosing cholangitis, ulcerative colitis s/p colectomy and ileostomy, and hx CMV colitis and viremia. This is an IVF pregnancy, further complicated as above by pre-eclampsia without severe features, severe intrauterine growth retardation, and diet controlled gestational diabetes.     She was also recently diagnosed with COVID 19 on 10/28/12; she is fully vaccinated but had not yet received her booster. She is stable from a respiratory standpoint.     Admitted for r/o pre-eclampsia with severe features and assessment of renal function. Bacteriuria and pyuria without nitrites on UA, and lower fluid intake since COVID 19 infection. Nephrology is consulted.      She received Betamethasone 12 mg, first dose 11/2 at 1600 and second dose is due this afternoon. She was started on AC/HS moderate intensity sliding scale >140 by primary team yesterday. Fasting BG this AM 94. Prior to receiving BMZ, BG were well controlled.         Diabetes history:  -no pre-pregnancy history of diabetes  -diagnosed 7/2/21 with 2 hour OGTT (78>202>188>165)  -she follows with RD/CDE team for BG monitoring. Per last visit 10/7, her BG were remaining well controlled fasting (70's), with only 1 value out of 12 postprandials was over target of 140.  -Yarelis reported rare BG >160, only once every 10 days, usually pizza is the culprit. She typically eats a small lunch, and doesn't/forgets to check her BG post-lunch. " "    Other Active Medical Problems: hx liver transplant, ESTUARDO, recent COVID infection (vaccinated), hx autoimmune hepatitis and PSC  Diabetes Mellitus Type: GDM, diet controlled.   Duration:  GDM was diagnosed 7/2021  Prior to Admission Diabetes Regimen:      Frequency of checks: 4-5 times daily (fasting, pre-meal, 1 hr PP)  Diet controlled  Usual BG control PTA: last A1c was 5/2021, 5.4%  Usual Diabetes Care Provider: RD/OLGA at [facility]   Primary Care Provider: Physician No Ref-Primary  Factors Impacting IP Glucose Control: betamethasone   Current Diet: Orders Placed This Encounter      Regular Diet Adult Other - please comment     10 point ROS completed with pertinent positives and negatives noted in the HPI  Past medical, family and social histories are reviewed and updated.    Social History    Tobacco: nonsmoker    Alcohol: no    Marital Status:  (Ceferino)    Place of Residence: [place], [place]    Family History   no family history of diabetes     Physical Exam   /74   Pulse 72   Temp 98.3  F (36.8  C) (Oral)   Resp 16   Ht 1.575 m (5' 2\")   Wt 57.5 kg (126 lb 12.8 oz)   LMP 03/26/2021   SpO2 97%   BMI 23.19 kg/m    General: pleasant, in no distress over the phone   HEENT: hearing intact to conversational volume.   Lungs: unlabored respiration, no cough  Mental status:  alert, oriented to self, place, time  Psych:   calm and appropriate interaction   Remainder of physical exam deferred, due to special isolation- COVID 19 +    Most Recent Laboratory Tests:  Recent Labs   Lab 11/03/21  0643   HGB 12.8     No results for input(s): A1C in the last 168 hours.  Recent Labs   Lab 11/03/21  1058   CR 1.70*     Recent Labs   Lab 11/03/21  1431 11/03/21  1058 11/03/21  1048 11/03/21  0918 11/03/21  0704 11/03/21  0643   * 162* 168* 100* 102* 94       Assessment:   1) Gestational Diabetes- diet controlled  2) Steroid induced hyperglycemia (S/P betamethasone 11/2-11/3) "     Experiencing postprandial hyperglycemia 2/2 betamethasone. Fasting BG are reasonably controlled at this time.     Plan:    -monitor need for basal insulin initiation   -Novolog 1:15g CHO coverage added with meals and snacks    -Novolog sliding scale adjusted to pregnancy targets (1 unit per 50/>100 AC and >120 HS)   -BG monitoring TID AC, 1 hour PP, HS, 0200   -hypoglycemia protocol   -recommend consistent diet with carb counting protocol   -diabetes education needs will be assessed closer to discharge- pending length of hospital stay, may not require teaching or insulin, once steroid effect wanes.    -on discharge, will recommend outpatient follow up with [facility] RD/CDE.    Discussed plan of care with patient, nursing, and primary team paged.   Thank you for this consult; Inpatient Diabetes will continue to follow.     To contact Endocrine Diabetes service:   From 8AM-4PM: page inpatient diabetes provider that is following the patient  For questions or updates from 4PM-8AM: page the diabetes job code for on call fellow: [phone]    80 minutes spent on the date of the encounter doing chart review, history and exam, documentation and further activities per the note      Over 50% of my time on the unit was spent counseling the patient and/or coordinating care regarding acute hyperglycemia management.  See note for details.    Philomena Smyth PA-C  Inpatient Diabetes Management Service  Pager [phone]

## 2021-11-03 NOTE — PROGRESS NOTES
[facility]  MATERNAL CHILD HEALTH   SOCIAL WORK PROGRESS NOTE      DATA:   Received order for SW to see to complete antepartum psychosocial assessment.  Given patient's covid positive status,  SW contacted patient via phone this morning to assess needs and to offer support.     Patient is 36 year-old Yarelis Penny.  She and her , Ceferino Penny, have been  for 8 years and they live in [facility] [place].  They had a long journey of infertility that included trying to conceive, referrals to specialists and fertility testing, and an attempt with IUI.  They were able to achieve this pregnancy with their first attempt at IVF.  They know this baby is a girl.  They have chosen a name for her but await her arrival to share this.      Yarelis describes a strong support system that includes extended family and friends.  She describes her relationship with her parents and Ceferino's parents as very close.      Yarelis is not employed. She discontinued working in 2014 in the midst of her health complications and her desire to limit stress in her life.  She now looks forward to being a full-time, stay at home parent.    Ceferino is a CPA and works for [employer].  He is offered a 6 week paid parent leave that he may use intermittently.  He is also able to do some work remotely and is here with Yarelis now.      Yarelis has [place] insurance coverage through Ceferino's employer.  Baby will be added to this policy upon her birth.  TITO shared information about options for PCP for baby in the future.  Yarelis and Ceferino feel well-prepared for baby.  They have had 2 baby showers and the nursery is all ready.    Yarelis denies any mental health history.      INTERVENTION:   Antepartum psychosocial assessment completed via phone.    Provided supportive counseling related to Yarelis's pregnancy complications, her antepartum admission, and impending pre-term delivery.    Provided education about pregnancy mood and anxiety  disorders.  Encouraged Yarelis to utilize social work for ongoing support.    Shared information about the NICU and hospital parking passes.    ASSESSMENT:   Yarelis verbalizes she is coping well.  She had been counseled about the potential for pre-term delivery and felt somewhat prepared for this experience.  She is resilient and taking this in stride.      No psychosocial concerns are identified in this initial assessment.     PLAN:   SW will follow along throughout Yarelis's pregnancy journey to assist with any needs that develop and to offer support.       TIGIST Hodgson [facility]  Clinical   Maternal Child Health  Phone:  [phone]  Pager:  [phone]

## 2021-11-03 NOTE — PROGRESS NOTES
Antepartum Progress Note  DOS: 11/03/21  MRN: [id number]    S: Pt overall feeling well today. Continues to have minimal COVID symptoms, only reports fatigue today. Says the prn atarax has helped a lot with her pruritis. Denies any headaches, vision changes, chest pain, or shortness of breath. No LOF or vaginal bleeding. Did feel 1 contraction overnight that was mildly uncomfortable but says whenever baby moves near her colostomy bag she feels a similar pressure so isn't sure if it was a contraction. Endorses normal fetal movement. Was a little worried about her blood glucose being higher, was reassured that this was an expected increase after receiving betamethasone today. No other acute concerns.    O:   Vitals:    11/03/21 0200 11/03/21 0414 11/03/21 0553 11/03/21 0800   BP:  124/76  129/84   Pulse:       Resp:  16  16   Temp:  98.8  F (37.1  C)  98.2  F (36.8  C)   TempSrc:  Oral  Oral   SpO2: 96% 98%  97%   Weight:   57.5 kg (126 lb 12.8 oz)    Height:           Gen: NAD   Resp: normal inspiratory effort, nonlabored breathing on RA  CV: regular rate, well-perfused  Abdomen: soft, gravid, non-tender  Extremities: no lower extremity edema b/l     Labs/imaging:     Component      Latest Ref Rng & Units 11/2/2021 11/3/2021 11/3/2021 11/3/2021          11:50 PM  7:04 AM  9:18 AM 10:48 AM   GLUCOSE BY METER POCT      70 - 99 mg/dL 236 (H) 102 (H) 100 (H) 168 (H)     Component      Latest Ref Rng & Units 11/3/2021             Sodium      133 - 144 mmol/L 135   Potassium      3.4 - 5.3 mmol/L 5.8 (H)   Chloride      94 - 109 mmol/L 114 (H)   Carbon Dioxide      20 - 32 mmol/L 15 (L)   Anion Gap      3 - 14 mmol/L 6   Urea Nitrogen      7 - 30 mg/dL 51 (H)   Creatinine      0.52 - 1.04 mg/dL 1.82 (H)   Calcium      8.5 - 10.1 mg/dL 7.8 (L)   Glucose      70 - 99 mg/dL 94   Alkaline Phosphatase      40 - 150 U/L 125   AST      0 - 45 U/L 21   ALT      0 - 50 U/L 22   Protein Total      6.8 - 8.8 g/dL 5.6 (L)   Albumin       3.4 - 5.0 g/dL 1.9 (L)   Bilirubin Total      0.2 - 1.3 mg/dL 0.1 (L)   GFR Estimate      >60 mL/min/1.73m2 35 (L)     Component      Latest Ref Rng & Units 11/3/2021             WBC      4.0 - 11.0 10e3/uL 8.2   RBC Count      3.80 - 5.20 10e6/uL 4.26   Hemoglobin      11.7 - 15.7 g/dL 12.8   Hematocrit      35.0 - 47.0 % 38.7   MCV      78 - 100 fL 91   MCH      26.5 - 33.0 pg 30.0   MCHC      31.5 - 36.5 g/dL 33.1   RDW      10.0 - 15.0 % 14.0   Platelet Count      150 - 450 10e3/uL 285     Component      Latest Ref Rng & Units 11/3/2021          10:58 AM   Sodium      133 - 144 mmol/L 137   Potassium      3.4 - 5.3 mmol/L 5.0   Chloride      94 - 109 mmol/L 110 (H)   Carbon Dioxide      20 - 32 mmol/L    Anion Gap      3 - 14 mmol/L    Glucose      70 - 99 mg/dL    Urea Nitrogen      7 - 30 mg/dL    Creatinine      0.52 - 1.04 mg/dL    GFR Estimate      >60 mL/min/1.73m2    Calcium      8.5 - 10.1 mg/dL      Tacrolimus level: pending    Assessment and Plan:  36 year old  at 31w4d by IVF DOC on HD#2 admitted for elevated creatinine in the setting of known preeclampsia, severe IUGR, and current COVID-19 infection. She was diagnosed with preeclampsia on 10/15 based on blood pressure criteria and UPC of 1.41. On lab re-check , her creatinine was noted to be 2.02; was previously 1.46 on 10/26. After getting fluids since admission, patient's creatinine had improved to 1.82 on . K+ was noted to be elevated at 5.8 on , but re-check 4 hours later showed K+ of 5. Nephrology is following this case, labs and tacrolimus level pending. At this time, pt has not met criteria for preeclampsia with severe features; possible that creatinine was elevated from her tacrolimus dose or dehydration from COVID-19 infection.HELLP labs wnl again today. Pt remains overall stable; normotensive, and asymptomatic from her preeclampsia. FHT have been reactive and reassuring.     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     Preeclampsia  R/o PreE with severe features  Elevated creatinine  H/o CKD stage 2   - Normotensive to mildly hypertensive since admission, asymptomatic   - Creatinine has improved 2.02 >> 1.82 today with fluids (previously 1.46 on 10/26, baseline 1.1-1.25).  Not yet 2x baseline to meet criteria for severe features of preeclampsia.                  - elevated >3 in 2013  - Serial BP monitoring   - IV Antihypertensives prn for sustained severe range blood pressures (>160/>110)  - Labs: HELLP labs daily  - Daily weights, strict I&Os   - Nephrology following this case, appreciate recs     COVID-19 infection  - Test + 10/28, symptom onset 10/27  - Pt with mild symptoms (fatigue, rhinorrhea) but is overall stable without hypoxia  - Will continue to monitor vital signs   - Receiving continuous 0.9%  ml/hr   - Lovenox prn     H/o lap colectomy w/ end ileostomy   Dense pelvic adhesions  Ulcerative colitis   - Discussed with patient that if she were to need a C/S a vertical midline incision would be recommended given her adhesions and surgical hx, she understood and agreed with this plan  - Discussed with colorectal surg, appreciate coordination of care. They are agreeable to be present in OR if patient ends up having C/S     Gestational Diabetes - diet-controlled   -   - has had elevated blood glucose readings since admission, highest 236. Did expect an increase given she received betamethasone yesterday.   - started on insulin sliding scale evening 11/02   - blood glucose monitoring qid      Cholestasis of pregnancy   S/p live donor liver transplant (2015)  Autoimmune  hepatitis  Primary sclerosing cholangitis  - Elevated bile acid levels of 18 on 10/12/21, pt continues to have pruritis despite treatment with Ursodiol 500 g TID. Difficult to determine whether this is cholestasis vs liver disease; however, will plan to manage her pregnancy as if affected by cholestasis.   - Decrease PTA Ursodiol 500mg TID to 300 mg BID d/t elevated creatinine  - Atarax prn for pruritis   - Continue PTA 3 mg BID tacrolimus and 5 mg prednisone daily              - tacrolimus level checked this morning, results pending (morning tacrolimus dose was held for this)   - Will recheck bile acids in 1 week   - INR, aPTT, fibrinogen wnl 11/02     AMA  - low-risk NIPT     PNC  - Hep B non-immune bu has received multiple boosters & never developed full immunity  - HIV/RPR/Hep C non-reactive  - GC/CT neg  - genetic screening: low risk NIPT  - Rh +, Rubella nonimmune, , GBS unknown   - s/p TDAP 10/26  - s/p flu shot 10/19  - s/p COVID x2 Pfizer 03/16/21 & 04/26/21  - SW consult ordered, seeing patient today      Severe FGR  FWB  - s/p BMZ x1 on 11/2 @ 1430, will have 2nd dose today @ 1430  - EFW 2%ile, AC 4%ile (U/S on 10/19)  - FHT have been reactive and reassuring  - TID NSTs, twice weekly BPPs  - 10/10 BPP with MVP 4.7 cm, posterior placenta (11/02)  - Pres: cephalic by MFM U/S 11/02/21  - No evidence of SROM at this time   - NICU consult prn  - Intrauterine resuscitative measures prn  - Mode of delivery: Discussed with patient that prior to IOL will do a CST; if negative, will try for vaginal delivery but did discuss risks given FGR that baby may not tolerate labor well and conversion to a C/S may be necessary. If CST is -, will plan for a C/S with a midline vertical incision with Colorectal surg present in OR given surgical history and known dense pelvic adhesions. Pt was agreeable with this plan.     Dispo: Barriers to discharge at this time include ensuring her creatinine continues to decrease, also  managing her hyperglycemia. Anticipate potential discharge in 1-2 days also pending any recommendations from nephrology.      The patient was discussed with Dr. Bill who is in agreement with the treatment plan.    Shayna Lara, MS4    Physician Attestation   I, Efrain Bill, was present with the medical/DOTTY student who participated in the service and in the documentation of the note.  I have verified the history and personally performed the physical exam and medical decision making.  I agree with the assessment and plan of care as documented in the note.      I personally reviewed vital signs, medications, labs, imaging and EFM.    In summary, Yarelis Penny is a  at 31w5d admitted with elevated serum creatinine in context of chronic renal disease, s/p liver transplant for AI hepatitis and PSC, GDM, preeclampsia without severe features, fetal growth restriction and recent COVID-19 infection (no severe symptoms).   Creatinine  Improved today, Nephrology consult pending today.  Continue symptomatic treatment of IHCP.  We have notified colorectal surgery of Yarelis's possible need for delivery via CS and would request their assistance with laparotomy if possible.  Recommend vertical midline incision if CS indicated.  Plan reviewed in detail with the patient and she expressed agreement.       Efrain Bill MD  Date of Service (when I saw the patient): 21    Time Spent on this Encounter   I spent 15 minutes on the unit/floor managing the care of Yarelis Penny. Over 50% of my time was spent on the following:   - Counseling the patient and/or family regarding: diagnostic results, prognosis, risks and benefits of treatment options and medical compliance  - Coordination of care with the: nurse and patient    In summary, Yarelis Penny is a  at 31w5d admitted with elevated serum creatinine in context of chronic renal disease, s/p liver transplant for AI hepatitis and PSC, GDM, preeclampsia without severe  features, fetal growth restriction and recent COVID-19 infection (no severe symptoms).   Creatinine  Improved today, Nephrology consult pending today.  Continue symptomatic treatment of IHCP.  We have notified colorectal surgery of Yarelis's possible need for delivery via CS and would request their assistance with laparotomy if possible.  Recommend vertical midline incision if CS indicated.  Plan reviewed in detail with the patient and she expressed agreement.      Efrain Bill MD

## 2021-11-03 NOTE — PROVIDER NOTIFICATION
11/02/21 2054   Provider Notification   Provider Name/Title G3 Chetan   Method of Notification At Bedside   Notification Reason Other (Comment)   Notified provider patient blood glucose 194 and G3 Chetan ordered patient to have sliding scale insulin orders. Also provider reviewed consent form for c section and blood transfusion.

## 2021-11-03 NOTE — PLAN OF CARE
Data: Blood pressures within parameters. Signs and symptoms of pre-eclampsia not present, no LOF or vaginal bleeding. Afebrile. Denies pain or pruritis today.  Fetal assessment Appropriate for Gestational Age. She reports increased sneezing and rhinorrea since covid diagnosis, as well as low energy.  Using SCD's at night, up in room during the day. GDM diet controlled. BG's elevated due to betamethasone.    Interventions: Monitor blood pressures and indicators of pre-eclampsia every 4 hours. Continue uterine/fetal assessment TID. Unrestricted activity activity.  labor preventive measures encouraged.   Encourage active range of motion and frequent position changes.  BG's AC, 1hr pp and HS, CHO coverage and correction scale.     Plan: Continue expectant management. Observe for and notify care provider of indicators of worsening pre-eclampsia or signs of fetal/maternal compromise.

## 2021-11-03 NOTE — PLAN OF CARE
IV infusing, patient states is comfortable and denies feeling uterine contractions, VSS, see flow sheet for FHR and contraction pattern. Will continue to monitor and will notify provider is there is a change in status.

## 2021-11-03 NOTE — PROVIDER NOTIFICATION
11/02/21 1450   Provider Notification   Provider Name/Title Dr. Bill   Method of Notification At Bedside   Request Evaluate in Person   Notification Reason Status Update   MD at bedside discussing repeat labs and medications, will order Betamethasone x2 course, and antihistamines and Actigall for patient's itching.  Plan per MD for VS q 4hr, continuous pulse oximetry, qshift EFM and toco, and Blood Glucoses QID- FBS and 1 hr PP. Pt verbalizing agreement with plan.

## 2021-11-03 NOTE — PLAN OF CARE
Patient VSS and afebrile this evening. Reports no headache, blurred vision, N/V, upper epigastric pain or swelling. Patient reports no contractions, fluid leaking, or bleeding. FHR mod variability with no decels and toco quiet. Patient has colostomy that is clean dry and intact. Up and able to ambulate in room independently. Voiding well and IV infusing. Monitoring blood glucose levels fasting and 1 HR post prandial. Elevated blood sugar this evening and provider ordered sliding scale for bedtime.  Ceferino at bedside periodically throughout evening.

## 2021-11-03 NOTE — PLAN OF CARE
Yarelis was able to comfortably rest overnight. VSS, afebrile. Denies feeling any pain, bleeding, LOF, and s/s labor. Denies HA, changed in vision, RUQ. See flowsheet for FHT and Uterine contraction documentation. NS infusing at 125 mL/hr. Voiding adequately without difficulty. Up ad cassius. Colostomy is intact, draining brown loose stool. Up ad cassius. Will continue to monitor closely.

## 2021-11-03 NOTE — PROVIDER NOTIFICATION
11/03/21 0704   Provider Notification   Provider Name/Title Cristina GUADALUPE   Method of Notification Electronic Page   Request Evaluate in Person   Notification Reason Uterine Activity   Pt had contraction x6 the past 1 hr. She is feeling some tightening. Pt voided and orally hydrating. FHT with moderate variability and no decel. Extended monitoring?  Response- Dr Bill recommend continuous monitoring.

## 2021-11-03 NOTE — CONSULTS
Nephrology Initial Consult  November 3, 2021      Yarelis Penny MRN:[id number] YOB: 1985  Date of Admission:2021  Primary care provider: No Ref-Primary, Physician  Requesting physician: Efrain Bill MD    ASSESSMENT AND RECOMMENDATIONS:   37 Y/O F  at 31w4d liver transplant secondary to PSC status post liver transplant in , ulcerative colitis s/p colectomy and ileostomy, CKD stage 2 had interstitial nephritis leading to ESTUARDO requiring dialysis for 1 month.  ESTUARDO likely secondary to CNI toxicity complicated by hyperkalemia  -baseine creatinine of 1.0-1.1    ESTUARDO on CKDstage 2  likely secondary to CNI toxicity:  Patient reports that she was on tacrolimus level of 2.5 mg twice daily but was recently increased to 3 mg twice daily.  She does have hyperkalemia and an ESTUARDO.  Level of tacrolimus was 8.5 before the evening dose and in the morning it was 9.7.  Patient did not report any tremors. her morning dose was held.  Her baseline creatinine is 1.0-1.1.  Protein to creatinine ratio of 1.05.in  patient was seen by nephrology and at that time had a Biopsy biopsied at [facility] on 1/15/2013 which revealed acute on chronic interstitial nephritis, mesangial proliferative GN with C3 deposition (however with normal complement so doubt C3 glomerulopathy).  Had negative ANCA as well.  Has a moderate amount of fibrosis suggesting a fair bit of chronicity.  She's was treated with steroids for the AIN, and required dialysis for 1 month  -Please consult transplant hepatology for management of tacrolimus  -Reduced the dose of tonight tacrolimus to 2 mg  -Follow-up on the BMP  -Tacrolimus level order for am  -Normal kappa lambda ratio  -Order immunofixation and electrophoresis  -Given decent urine output it would be all right to hold IV fluid as she is able to drink fluid    Hyperkalemia:  Patient had a potassium of 5.8 probably due to tacrolimus toxicity.  The repeat 1 went down to 5.0 after holding  the dose of tacrolimus management of tacrolimus as above.  Follow-up on BMP   recommendations were communicated to primary team via     Non anion gap metabolic acidosis likely secondary to ESTUARDO:  Patient has bicarb of 16  -Order VBG for am  and if the pH is low consider starting bicarb 650 mg 3 times daily    Pyuria:  Awaiting culture result if the bacteria count greater than 100,000 and patient would need to be treated for asymptomatic bacteriuria given that she is pregnant      Seen and discussed with Dr. Irwin Parsons MD   [phone]    I have seen and examined this patient with the resident.  This note reflects our joint assessment and plan.     Shayy Gayle MD     REASON FOR CONSULT: ESTUARDO    HISTORY OF PRESENT ILLNESS:  Admitting provider and nursing notes reviewed  Yarelis Penny is a 36 year old F  at 31w4d liver transplant secondary to PSC status post liver transplant in , ulcerative colitis s/p colectomy and ileostomy, CKD stage 2 had interstitial nephritis leading to ESTUARDO requiring dialysis for 1 month.  ESTUARDO likely secondary to CNI toxicity complicated by hyperkalemia her baseline creatinine of 1.0-1.1.  Patient was admitted for concern of having  preeclampsia but it looks like her blood pressure has been normal and this appears to be more like a tacrolimus toxicity than preeclampsia.  On 10/27 she started having some nasal congestion and was found to have be Covid +on 10/28, she is vaccinated with Covid and was planning to take her booster dose this Friday.  Patient reports normal output from her ileostomy and reports taking ample water.  She does not report any swelling in the leg hematuria or foaminess in the urine.      Patient tells me that her blood pressure at home is in 120s to 130s and is not currently on any medication for hypertension.  Patient does report that recently her tacrolimus was increased from 2.5 to 3 mg and feels like her creatinine has recently been going up and was  2.02 at admission but has trended down to 1.7 by holding 1 dose of tacrolimus and given fluids.  Patient was also found to be hyperkalemic in the morning but it went down.  She reports making decent amount of urine and reports drinking plenty of water.  Denies any shortness of breath nausea vomiting headache chest pain.    Off note in 2013 patient was seen by nephrology and at that time had a Biopsy biopsied at [facility] on 1/15/2013 which revealed acute on chronic interstitial nephritis, mesangial proliferative GN with C3 deposition (however with normal complement so doubt C3 glomerulopathy).  Had negative ANCA as well.  Has a moderate amount of fibrosis suggesting a fair bit of chronicity.  She's was being treated with steroids for the AIN, and required dialysis for 1 month  PAST MEDICAL HISTORY:    Past Medical History:   Diagnosis Date    Acute kidney injury (H) 04/28/2016    Anemia, iron deficiency 08/29/2017    On iron supplementation    Autoimmune hepatitis (H) 2012    on steroid taper    Cholangitis 2003    CKD (chronic kidney disease) 2012    biopsy 2012: TIN, patchy fibrosis. Dialysis in 2013 x2 months.    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    Esophageal varices (H) 09/2008    banded    Heart murmur     History of blood transfusion     Primary sclerosing cholangitis 2003    SBP (spontaneous bacterial peritonitis) (H) 02/24/2015    Ulcerative Colitis     f/b GI       Past Surgical History:   Procedure Laterality Date    CHOLECYSTECTOMY  2015    COLONOSCOPY  09/2007    COLONOSCOPY N/A 02/26/2015    Procedure: COMBINED COLONOSCOPY, SINGLE OR MULTIPLE BIOPSY/POLYPECTOMY BY BIOPSY;  Surgeon: Mitchel Hoskins Chi, MD;  [facility]    COLONOSCOPY N/A 01/12/2016    Procedure: COMBINED COLONOSCOPY, SINGLE OR MULTIPLE BIOPSY/POLYPECTOMY BY BIOPSY;  Surgeon: Rigo Valles MD;  [facility]    COLONOSCOPY N/A 04/01/2016    Procedure: COMBINED COLONOSCOPY, SINGLE OR MULTIPLE BIOPSY/POLYPECTOMY BY BIOPSY;  Surgeon:  Kareem Solis MD;  [facility]    COLONOSCOPY N/A 09/05/2017    Procedure: COMBINED COLONOSCOPY, SINGLE OR MULTIPLE BIOPSY/POLYPECTOMY BY BIOPSY;  Colonoscopy;  Surgeon: Fuentes Johnson MD;  [facility]    COLONOSCOPY N/A 02/25/2019    Procedure: COMBINED COLONOSCOPY, SINGLE OR MULTIPLE BIOPSY/POLYPECTOMY BY BIOPSY;  Surgeon: Sophy Mckeon MD;  [facility]    DILATION AND CURETTAGE, HYSTEROSCOPY DIAGNOSTIC, COMBINED N/A 03/12/2021    Procedure: HYSTEROSCOPY, DIAGNOSTIC;  Surgeon: Lin Armendariz MD;  [facility]    ENDOSCOPIC RETROGRADE CHOLANGIOPANCREATOGRAM N/A 06/25/2015    Procedure: COMBINED ENDOSCOPIC RETROGRADE CHOLANGIOPANCREATOGRAPHY, PLACE TUBE/STENT;  Surgeon: Landon Quinones MD;  [facility]    ENDOSCOPIC RETROGRADE CHOLANGIOPANCREATOGRAM N/A 06/25/2015    Procedure: COMBINED ENDOSCOPIC RETROGRADE CHOLANGIOPANCREATOGRAPHY, PLACE TUBE/STENT;  Surgeon: Landon Quinones MD;  [facility]    ENDOSCOPIC RETROGRADE CHOLANGIOPANCREATOGRAM N/A 07/02/2015    Procedure: COMBINED ENDOSCOPIC RETROGRADE CHOLANGIOPANCREATOGRAPHY, PLACE TUBE/STENT;  Surgeon: Landon Quinones MD;  [facility]    ENDOSCOPIC RETROGRADE CHOLANGIOPANCREATOGRAM N/A 09/08/2015    Procedure: COMBINED ENDOSCOPIC RETROGRADE CHOLANGIOPANCREATOGRAPHY, REMOVE FOREIGN BODY OR STENT/TUBE;  Surgeon: Landon Quinones MD;  [facility]    ENDOSCOPIC RETROGRADE CHOLANGIOPANCREATOGRAM N/A 12/08/2015    Procedure: ENDOSCOPIC RETROGRADE CHOLANGIOPANCREATOGRAM;  Surgeon: Landon Quinones MD;  [facility]    ENDOSCOPIC RETROGRADE CHOLANGIOPANCREATOGRAM N/A 03/01/2016    Procedure: COMBINED ENDOSCOPIC RETROGRADE CHOLANGIOPANCREATOGRAPHY, REMOVE FOREIGN BODY OR STENT/TUBE;  Surgeon: Landon Quinones MD;  [facility]    ENDOSCOPIC RETROGRADE CHOLANGIOPANCREATOGRAM N/A 03/20/2017    Procedure: COMBINED ENDOSCOPIC RETROGRADE CHOLANGIOPANCREATOGRAPHY, PLACE  TUBE/STENT;  Endoscopic Retrograde Cholangiopancreatogram with Ballon Dilation, Stent Placement;  Surgeon: Guru Brittany Macias MD;  [facility]    ENDOSCOPIC RETROGRADE CHOLANGIOPANCREATOGRAM N/A 04/02/2018    Procedure: COMBINED ENDOSCOPIC RETROGRADE CHOLANGIOPANCREATOGRAPHY, PLACE TUBE/STENT;  Endoscopic Retrograde Cholangiopancreatogram with biliary dilation and stent placement;  Surgeon: Guru Brittany Macias MD;  [facility]    ENDOSCOPIC RETROGRADE CHOLANGIOPANCREATOGRAM N/A 05/07/2018    Procedure: ENDOSCOPIC RETROGRADE CHOLANGIOPANCREATOGRAM;  Endoscopic Retrograde Cholangiopancreatogram ballon dilation stent exchange;  Surgeon: Guru Brittany Macias MD;  [facility]    ENDOSCOPIC RETROGRADE CHOLANGIOPANCREATOGRAPHY, EXCHANGE TUBE/STENT N/A 07/30/2015    Procedure: ENDOSCOPIC RETROGRADE CHOLANGIOPANCREATOGRAPHY, EXCHANGE TUBE/STENT;  Surgeon: Landon Quinones MD;  [facility]    ENDOSCOPIC RETROGRADE CHOLANGIOPANCREATOGRAPHY, EXCHANGE TUBE/STENT N/A 05/15/2017    Procedure: ENDOSCOPIC RETROGRADE CHOLANGIOPANCREATOGRAPHY, EXCHANGE TUBE/STENT;  Endoscopic Retrograde Cholangiopancreatogram with biliary stent exchange and balloon dilation;  Surgeon: Guru Brittany Macias MD;  [facility]    ENDOSCOPIC RETROGRADE CHOLANGIOPANCREATOGRAPHY, EXCHANGE TUBE/STENT N/A 06/25/2018    Procedure: ENDOSCOPIC RETROGRADE CHOLANGIOPANCREATOGRAPHY, EXCHANGE TUBE/STENT;  Endoscopic Retrograde Cholangiopancreatogram with biliary dilation, stone removal and stent exchange;  Surgeon: Guru Brittany Macias MD;  [facility]    ENDOSCOPIC RETROGRADE CHOLANGIOPANCREATOGRAPHY, EXCHANGE TUBE/STENT N/A 07/30/2018    Procedure: ENDOSCOPIC RETROGRADE CHOLANGIOPANCREATOGRAPHY, EXCHANGE TUBE/STENT;  Endoscopic Retrograde Cholangiopancreatogram with Stent Exchange with dilation;  Surgeon: Guru Brittany Macias MD;   [facility]    ENDOSCOPIC RETROGRADE CHOLANGIOPANCREATOGRAPHY, EXCHANGE TUBE/STENT N/A 09/26/2018    Procedure: ENDOSCOPIC RETROGRADE CHOLANGIOPANCREATOGRAPHY, EXCHANGE TUBE/STENT;  Endoscopic Retrograde Cholangiopancreatogram, with bile duct stent exchanged balloon dilation and balloon sweep of bile duct;  Surgeon: Guru Brittany Macias MD;  [facility]    ESOPHAGOSCOPY, GASTROSCOPY, DUODENOSCOPY (EGD), COMBINED N/A 02/26/2015    Procedure: COMBINED ESOPHAGOSCOPY, GASTROSCOPY, DUODENOSCOPY (EGD);  Surgeon: Mitchel Hoskins Chi, MD;  [facility]    EXPLORE COMMON BILE DUCT N/A 06/25/2015    Procedure: EXPLORE COMMON BILE DUCT;  Surgeon: Tyree Smith MD;  [facility]    HERNIA REPAIR  2015    During transplant    ILEOSTOMY N/A 06/20/2019    Procedure: Ileostomy;  Surgeon: Krzysztof Carney MD;  [facility]    LAPAROSCOPIC ASSISTED COLECTOMY N/A 06/20/2019    Procedure: Laparoscopic Total Abdominal Colectomy, lysis of adhesions;  Surgeon: Krzysztof Carney MD;  [facility]    LAPAROSCOPIC LYSIS ADHESIONS N/A 03/12/2021    Procedure: LYSIS, ADHESIONS;  Surgeon: Lin Armendariz MD;  [facility]    LAPAROTOMY EXPLORATORY N/A 06/25/2015    Procedure: LAPAROTOMY EXPLORATORY;  Surgeon: Tyree Smith MD;  [facility]    PICC INSERTION Right 02/27/2015    4fr SL Valved PICC, 36cm (1cm external) in the R medial brachial vein w/ tip in the low SVC.    SIGMOIDOSCOPY FLEXIBLE N/A 04/27/2016    Procedure: SIGMOIDOSCOPY FLEXIBLE;  Surgeon: Jose Nielson MD;  [facility]    TRANSPLANT LIVER RECIPIENT LIVING UNRELATED N/A 06/18/2015    Procedure: TRANSPLANT LIVER RECIPIENT LIVING UNRELATED;  Surgeon: Tyree Smith MD;  [facility]    UPPER GI ENDOSCOPY          MEDICATIONS:  PTA Meds  Prior to Admission medications    Medication Sig Last Dose Taking? Auth Provider   alcohol swab prep pads Use to swab area of injection/robbie as directed. 11/2/2021 at Unknown time Yes  "Genie Penny CNM   blood glucose (CONTOUR NEXT TEST) test strip Use to test blood sugar 4 times daily or as directed. Unknown at Unknown time Yes Genie Penny CNM   blood glucose (NO BRAND SPECIFIED) test strip Use to test blood sugar 4 times daily or as directed. To accompany: Blood Glucose Monitor Brands: per insurance. Unknown at Unknown time Yes Genie Penny CNM   blood glucose (ONETOUCH VERIO IQ) test strip Use to test blood sugar 6 times daily or as directed. 11/2/2021 at Unknown time Yes Genie Penny CNM   blood glucose monitoring (NO BRAND SPECIFIED) meter device kit Use to test blood sugar 4 times daily or as directed. Preferred blood glucose meter OR supplies to accompany: Blood Glucose Monitor Brands: per insurance. 11/2/2021 at Unknown time Yes Genie Penny CNM   Blood Glucose Monitoring Suppl (ONETOUCH VERIO FLEX SYSTEM) w/Device KIT 1 each daily Unknown at Unknown time Yes Genie Penny CNM   calcium carbonate (OS-KERLINE 500 MG [place]. [place]) 1250 MG tablet Take 1 tablet by mouth every evening 11/1/2021 at 2000 Yes Reported, Patient   levothyroxine (SYNTHROID/LEVOTHROID) 25 MCG tablet  More than a month at Unknown time Yes Reported, Patient   OneTouch Delica Lancets 33G MISC 1 each 6 times daily 11/2/2021 at Unknown time Yes Genie Penny CNM   order for DME [employer] item #s   84329 and 80352 Unknown at Unknown time Yes Krzysztof Carney MD   order for DME Increase the amount to 10 per month  --2\" barrier ring #9331 Unknown at Unknown time Yes Krzysztof Carney MD   predniSONE (DELTASONE) 5 MG tablet Take 1 tablet (5 mg) by mouth daily 11/1/2021 at 0900 Yes Daniela Enriquez MD   Prenatal Vit-Fe Fumarate-FA (PNV PRENATAL PLUS MULTIVITAMIN) 27-1 MG TABS per tablet Take 1 tablet by mouth daily 11/1/2021 at 2100 Yes Reported, Patient   tacrolimus (GENERIC EQUIVALENT) 0.5 MG capsule Take 1 capsule (0.5 mg) by mouth 2 times daily Dose changes " Unknown at Unknown time Yes Daniela Enriquez MD   tacrolimus (GENERIC EQUIVALENT) 1 MG capsule Take 3 capsules (3 mg) by mouth every 12 hours 11/2/2021 at 0800 Yes Daniela Enriquez MD   thin (NO BRAND SPECIFIED) lancets Use with lanceting device. To accompany: Blood Glucose Monitor Brands: per insurance. Unknown at Unknown time Yes Genie Penny CNM   ursodiol (ACTIGALL) 250 MG tablet Take 2 tablets (500 mg) by mouth every morning AND 1 tablet (250 mg) every evening. Unknown at Unknown time Yes Daniela Enriquez MD   ursodiol (ACTIGALL) 500 MG tablet Take 1 tablet (500 mg) by mouth 3 times daily Unknown at Unknown time Yes Genie Penny CNM   VITAMIN D, CHOLECALCIFEROL, PO Take by mouth every evening  Unknown at Unknown time Yes Reported, Patient      Current Meds   calcium carbonate 500 mg (elemental)  1 tablet Oral QPM    insulin aspart   Subcutaneous TID AC    insulin aspart  1-4 Units Subcutaneous TID AC    insulin aspart  1-4 Units Subcutaneous At Bedtime    pantoprazole  40 mg Oral QAM AC    PNV Prenatal Plus Multivitamin  1 tablet Oral Daily    predniSONE  5 mg Oral Daily    sodium chloride (PF)  3 mL Intracatheter Q8H    tacrolimus  2 mg Oral Once    ursodiol  300 mg Oral BID     Infusion Meds   - MEDICATION INSTRUCTIONS -         ALLERGIES:    Allergies   Allergen Reactions    Rifampin Other (See Comments)     Kidney failure    Penicillins      Bad hives when she was in college       REVIEW OF SYSTEMS:  A comprehensive of systems was negative except as noted above.    SOCIAL HISTORY:   Social History     Socioeconomic History    Marital status:      Spouse name: Ceferino    Number of children: 0    Years of education: Not on file    Highest education level: Not on file   Occupational History    Occupation: unemployed   Tobacco Use    Smoking status: Never Smoker    Smokeless tobacco: Never Used   Substance and Sexual Activity    Alcohol use: Not  Currently     Alcohol/week: 0.0 standard drinks     Comment: Every other month has 1 drink    Drug use: No    Sexual activity: Yes     Partners: Male     Birth control/protection: None   Other Topics Concern     Service No    Blood Transfusions No    Caffeine Concern No    Occupational Exposure No    Hobby Hazards No    Sleep Concern Yes    Stress Concern No    Weight Concern No    Special Diet No    Back Care No    Exercise No    Bike Helmet No     Comment: n/a    Seat Belt Yes    Self-Exams No    Parent/sibling w/ CABG, MI or angioplasty before 65F 55M? No   Social History Narrative    no pets at home, no recent known sick contacts. She spends a lot of her time at the fitness center.         How much exercise per week? 1-5 times a week  walking too    How much calcium per day? supplements       How much caffeine per day? 10 oz half  calf    How much vitamin D per day? In supplements    Do you/your family wear seatbelts?  Yes    Do you/your family use safety helmets? Yes    Do you/your family use sunscreen? Yes    Do you/your family keep firearms in the home? No    Do you/your family have a smoke detector(s)? Yes        Reviewed  cmckim lpn  5-21-21     Social Determinants of Health     Financial Resource Strain:     Difficulty of Paying Living Expenses:    Food Insecurity:     Worried About Running Out of Food in the Last Year:     Ran Out of Food in the Last Year:    Transportation Needs:     Lack of Transportation (Medical):     Lack of Transportation (Non-Medical):    Physical Activity: Insufficiently Active    Days of Exercise per Week: 3 days    Minutes of Exercise per Session: 30 min   Stress: No Stress Concern Present    Feeling of Stress : Only a little   Social Connections: Moderately Integrated    Frequency of Communication with Friends and Family: Three times a week    Frequency of Social Gatherings with Friends and Family: Three times a week    Attends Zoroastrian Services: More than 4 times per  "year    Active Member of Clubs or Organizations: No    Attends Club or Organization Meetings: Never    Marital Status:    Intimate Partner Violence: Unknown    Fear of Current or Ex-Partner: Not asked    Emotionally Abused: Not asked    Physically Abused: Not asked    Sexually Abused: Not asked         FAMILY MEDICAL HISTORY:   Family History   Problem Relation Age of Onset    Hypertension Mother     Lipids Mother     Sleep Apnea Mother     Heart Disease Mother     Hypertension Maternal Grandmother             Alzheimer Disease Maternal Grandmother     Lipids Father     Heart Disease Maternal Grandfather 40    Heart Disease Paternal Grandfather     Melanoma No family hx of     Skin Cancer No family hx of     Cancer No family hx of     Diabetes No family hx of        PHYSICAL EXAM:   Temp  Av.7  F (37.1  C)  Min: 98.2  F (36.8  C)  Max: 99.1  F (37.3  C)      Pulse  Av.9  Min: 66  Max: 90 Resp  Av.6  Min: 16  Max: 20  SpO2  Av.2 %  Min: 96 %  Max: 98 %       /84   Pulse 72   Temp 98.2  F (36.8  C) (Oral)   Resp 16   Ht 1.575 m (5' 2\")   Wt 57.5 kg (126 lb 12.8 oz)   LMP 2021   SpO2 97%   BMI 23.19 kg/m     Date 21 0700 - 21 0659   Shift 7990-3582 1097-4356 0983-2260 24 Hour Total   INTAKE   P.O. 1200   1200   I.V. 666.67   666.67   Shift Total(mL/kg) 1866.67(32.46)   1866.67(32.46)   OUTPUT   Urine 1375 550  1925   Stool 425   425   Shift Total(mL/kg) 1800(31.3) 550(9.56)  2350(40.86)   Weight (kg) 57.52 57.52 57.52 57.52      Admit Weight: 56.6 kg (124 lb 12.8 oz)     GENERAL APPEARANCE: No distress, Patient is  awake  EYES: No scleral icterus, pupils equal  Endo: No goiter, No moon facies  Lymphatics: no cervical or supraclavicular LAD  Pulmonary: lungs clear to auscultation with equal breath sounds bilaterally, No clubbing  CV: regular rhythm, normal rate, no rub   - JVD -ve   - Edema -ve  GI: soft, nontender, normal bowel sounds  MS: no evidence " of inflammation in joints, no muscle tenderness  : No suarez  SKIN: no rash, warm, dry, no cyanosis  NEURO: face symmetric, No asterixis     LABS:   CMP  Recent Labs   Lab 11/03/21  1544 11/03/21  1431 11/03/21  1058 11/03/21  1048 11/03/21  0704 11/03/21  0643 11/02/21  1841 11/02/21  0939   NA  --   --  137  --   --  135  --  135   POTASSIUM  --   --  5.0  --   --  5.8*  --  4.4   CHLORIDE  --   --  110*  --   --  114*  --  111*   CO2  --   --  16*  --   --  15*  --  14*   ANIONGAP  --   --  11  --   --  6  --  10   * 109* 162* 168*   < > 94   < > 79   BUN  --   --  48*  --   --  51*  --  53*   CR  --   --  1.70*  --   --  1.82*  --  2.02*   GFRESTIMATED  --   --  38*  38*  --   --  35*  --  31*   KERLINE  --   --  7.9*  --   --  7.8*  --  8.8   PROTTOTAL  --   --  5.6*  --   --  5.6*  --  7.1   ALBUMIN  --   --  2.1*  --   --  1.9*  --  2.3*   BILITOTAL  --   --  <0.1*  --   --  0.1*  --  0.2   ALKPHOS  --   --  125  --   --  125  --  139   AST  --   --  25  --   --  21  --  23   ALT  --   --  22  --   --  22  --  25    < > = values in this interval not displayed.     CBC  Recent Labs   Lab 11/03/21  0643 11/02/21  0939   HGB 12.8 13.8   WBC 8.2 10.5   RBC 4.26 4.60   HCT 38.7 42.2   MCV 91 92   MCH 30.0 30.0   MCHC 33.1 32.7   RDW 14.0 14.1    312     INR  Recent Labs   Lab 11/02/21  1435   INR 0.80*   PTT 34     ABGNo lab results found in last 7 days.   URINE STUDIES  Recent Labs   Lab Test 11/02/21  0943 05/25/21  1111 10/16/19  0903 12/22/18  0850   COLOR Yellow Yellow Yellow Yellow   APPEARANCE Slightly Cloudy* Clear Slightly Cloudy Slightly Cloudy   URINEGLC Negative Negative Negative Negative   URINEBILI Negative Negative Negative Negative   URINEKETONE Negative Negative Negative Negative   SG 1.015 1.025 1.015 1.015   UBLD Small* Moderate* Large* Moderate*   URINEPH 5.5 5.0 5.0 5.0   PROTEIN 100 * 30* 30* 30*   UROBILINOGEN  --  0.2  --   --    NITRITE Negative Negative Negative Negative    LEUKEST Large* Negative Negative Negative   RBCU 2 5-10* 50* 3*   WBCU 9* 0 - 5 5 1     Recent Labs   Lab Test 11/02/21  0943 10/26/21  0935 10/19/21  1018 10/15/21  1211 05/25/21  1111 05/21/21  1130 10/16/19  0903 12/22/18  0850 04/14/16  1510   UTPG 1.05* 1.17* 1.06* 1.41* 0.14 0.29* 0.26* 0.34* 0.38*     PTH  No lab results found.  IRON STUDIES  Recent Labs   Lab Test 10/25/17  1400 05/02/17  0730 04/29/16  0838 04/20/16  0913 07/05/15  0604 02/25/15  0122   IRON 19* 12* 41  --  23* 13*    322 137*  --  70* 161*   IRONSAT 6* 4* 30  --  33 8*   CHERRI 28 6*  --  267* 432* 115       IMAGING:  All imaging studies reviewed by me.     Danielito Parsons MD

## 2021-11-04 ENCOUNTER — MEDICAL CORRESPONDENCE (OUTPATIENT)
Dept: HEALTH INFORMATION MANAGEMENT | Facility: CLINIC | Age: 36
End: 2021-11-04
Payer: COMMERCIAL

## 2021-11-04 ENCOUNTER — ANESTHESIA (OUTPATIENT)
Dept: SURGERY | Facility: CLINIC | Age: 36
End: 2021-11-04
Payer: COMMERCIAL

## 2021-11-04 ENCOUNTER — ANESTHESIA EVENT (OUTPATIENT)
Dept: SURGERY | Facility: CLINIC | Age: 36
End: 2021-11-04
Payer: COMMERCIAL

## 2021-11-04 ENCOUNTER — ANCILLARY PROCEDURE (OUTPATIENT)
Dept: ULTRASOUND IMAGING | Facility: CLINIC | Age: 36
End: 2021-11-04
Attending: ANESTHESIOLOGY
Payer: COMMERCIAL

## 2021-11-04 LAB
ALBUMIN MFR UR ELPH: 75.4 %
ALBUMIN SERPL ELPH-MCNC: 2.6 G/DL (ref 3.7–5.1)
ALBUMIN SERPL-MCNC: 1.8 G/DL (ref 3.4–5)
ALP SERPL-CCNC: 117 U/L (ref 40–150)
ALPHA1 GLOB MFR UR ELPH: 1.7 %
ALPHA1 GLOB SERPL ELPH-MCNC: 0.4 G/DL (ref 0.2–0.4)
ALPHA2 GLOB MFR UR ELPH: 2.9 %
ALPHA2 GLOB SERPL ELPH-MCNC: 0.8 G/DL (ref 0.5–0.9)
ALT SERPL W P-5'-P-CCNC: 24 U/L (ref 0–50)
ANION GAP SERPL CALCULATED.3IONS-SCNC: 4 MMOL/L (ref 3–14)
ANION GAP SERPL CALCULATED.3IONS-SCNC: 8 MMOL/L (ref 3–14)
AST SERPL W P-5'-P-CCNC: 22 U/L (ref 0–45)
B-GLOBULIN MFR UR ELPH: 10.2 %
B-GLOBULIN SERPL ELPH-MCNC: 1 G/DL (ref 0.6–1)
BACTERIA UR CULT: NORMAL
BASE EXCESS BLDV CALC-SCNC: -10.8 MMOL/L (ref -7.7–1.9)
BILIRUB SERPL-MCNC: 0.2 MG/DL (ref 0.2–1.3)
BUN SERPL-MCNC: 38 MG/DL (ref 7–30)
BUN SERPL-MCNC: 43 MG/DL (ref 7–30)
CALCIUM SERPL-MCNC: 7.3 MG/DL (ref 8.5–10.1)
CALCIUM SERPL-MCNC: 8.2 MG/DL (ref 8.5–10.1)
CHLORIDE BLD-SCNC: 114 MMOL/L (ref 94–109)
CHLORIDE BLD-SCNC: 115 MMOL/L (ref 94–109)
CO2 SERPL-SCNC: 14 MMOL/L (ref 20–32)
CO2 SERPL-SCNC: 17 MMOL/L (ref 20–32)
CREAT SERPL-MCNC: 1.53 MG/DL (ref 0.52–1.04)
CREAT SERPL-MCNC: 1.65 MG/DL (ref 0.52–1.04)
ERYTHROCYTE [DISTWIDTH] IN BLOOD BY AUTOMATED COUNT: 14.3 % (ref 10–15)
GAMMA GLOB MFR UR ELPH: 9.8 %
GAMMA GLOB SERPL ELPH-MCNC: 0.9 G/DL (ref 0.7–1.6)
GFR SERPL CREATININE-BSD FRML MDRD: 40 ML/MIN/1.73M2
GFR SERPL CREATININE-BSD FRML MDRD: 43 ML/MIN/1.73M2
GLUCOSE BLD-MCNC: 113 MG/DL (ref 70–99)
GLUCOSE BLD-MCNC: 136 MG/DL (ref 70–99)
GLUCOSE BLDC GLUCOMTR-MCNC: 113 MG/DL (ref 70–99)
GLUCOSE BLDC GLUCOMTR-MCNC: 115 MG/DL (ref 70–99)
GLUCOSE BLDC GLUCOMTR-MCNC: 115 MG/DL (ref 70–99)
GLUCOSE BLDC GLUCOMTR-MCNC: 119 MG/DL (ref 70–99)
GLUCOSE BLDC GLUCOMTR-MCNC: 140 MG/DL (ref 70–99)
HCO3 BLDV-SCNC: 14 MMOL/L (ref 21–28)
HCT VFR BLD AUTO: 39.9 % (ref 35–47)
HGB BLD-MCNC: 13.3 G/DL (ref 11.7–15.7)
M PROTEIN MFR UR ELPH: 0 %
M PROTEIN SERPL ELPH-MCNC: 0 G/DL
MCH RBC QN AUTO: 30.3 PG (ref 26.5–33)
MCHC RBC AUTO-ENTMCNC: 33.3 G/DL (ref 31.5–36.5)
MCV RBC AUTO: 91 FL (ref 78–100)
O2/TOTAL GAS SETTING VFR VENT: 21 %
PCO2 BLDV: 26 MM HG (ref 40–50)
PH BLDV: 7.33 [PH] (ref 7.32–7.43)
PLATELET # BLD AUTO: 295 10E3/UL (ref 150–450)
PO2 BLDV: 23 MM HG (ref 25–47)
POTASSIUM BLD-SCNC: 5.2 MMOL/L (ref 3.4–5.3)
POTASSIUM BLD-SCNC: 5.4 MMOL/L (ref 3.4–5.3)
PROT ELPH PNL UR ELPH: NORMAL
PROT PATTERN SERPL ELPH-IMP: ABNORMAL
PROT PATTERN SERPL IFE-IMP: NORMAL
PROT PATTERN UR ELPH-IMP: ABNORMAL
PROT SERPL-MCNC: 6 G/DL (ref 6.8–8.8)
RBC # BLD AUTO: 4.39 10E6/UL (ref 3.8–5.2)
SODIUM SERPL-SCNC: 135 MMOL/L (ref 133–144)
SODIUM SERPL-SCNC: 137 MMOL/L (ref 133–144)
TACROLIMUS BLD-MCNC: 6.5 UG/L (ref 5–15)
TME LAST DOSE: NORMAL H
TME LAST DOSE: NORMAL H
WBC # BLD AUTO: 16.4 10E3/UL (ref 4–11)

## 2021-11-04 PROCEDURE — 999N000141 HC STATISTIC PRE-PROCEDURE NURSING ASSESSMENT: Performed by: OBSTETRICS & GYNECOLOGY

## 2021-11-04 PROCEDURE — 250N000011 HC RX IP 250 OP 636: Performed by: ANESTHESIOLOGY

## 2021-11-04 PROCEDURE — 88307 TISSUE EXAM BY PATHOLOGIST: CPT | Mod: TC | Performed by: STUDENT IN AN ORGANIZED HEALTH CARE EDUCATION/TRAINING PROGRAM

## 2021-11-04 PROCEDURE — 250N000013 HC RX MED GY IP 250 OP 250 PS 637: Performed by: OBSTETRICS & GYNECOLOGY

## 2021-11-04 PROCEDURE — 99233 SBSQ HOSP IP/OBS HIGH 50: CPT | Performed by: PHYSICIAN ASSISTANT

## 2021-11-04 PROCEDURE — 250N000012 HC RX MED GY IP 250 OP 636 PS 637: Performed by: OBSTETRICS & GYNECOLOGY

## 2021-11-04 PROCEDURE — 120N000002 HC R&B MED SURG/OB UMMC

## 2021-11-04 PROCEDURE — 360N000076 HC SURGERY LEVEL 3, PER MIN: Performed by: OBSTETRICS & GYNECOLOGY

## 2021-11-04 PROCEDURE — 250N000012 HC RX MED GY IP 250 OP 636 PS 637: Performed by: STUDENT IN AN ORGANIZED HEALTH CARE EDUCATION/TRAINING PROGRAM

## 2021-11-04 PROCEDURE — 88307 TISSUE EXAM BY PATHOLOGIST: CPT | Mod: 26 | Performed by: PATHOLOGY

## 2021-11-04 PROCEDURE — 272N000001 HC OR GENERAL SUPPLY STERILE: Performed by: OBSTETRICS & GYNECOLOGY

## 2021-11-04 PROCEDURE — 82247 BILIRUBIN TOTAL: CPT | Performed by: OBSTETRICS & GYNECOLOGY

## 2021-11-04 PROCEDURE — 250N000011 HC RX IP 250 OP 636: Performed by: STUDENT IN AN ORGANIZED HEALTH CARE EDUCATION/TRAINING PROGRAM

## 2021-11-04 PROCEDURE — 36415 COLL VENOUS BLD VENIPUNCTURE: CPT | Performed by: OBSTETRICS & GYNECOLOGY

## 2021-11-04 PROCEDURE — 84155 ASSAY OF PROTEIN SERUM: CPT | Performed by: OBSTETRICS & GYNECOLOGY

## 2021-11-04 PROCEDURE — 250N000009 HC RX 250: Performed by: STUDENT IN AN ORGANIZED HEALTH CARE EDUCATION/TRAINING PROGRAM

## 2021-11-04 PROCEDURE — 80197 ASSAY OF TACROLIMUS: CPT | Performed by: STUDENT IN AN ORGANIZED HEALTH CARE EDUCATION/TRAINING PROGRAM

## 2021-11-04 PROCEDURE — 710N000010 HC RECOVERY PHASE 1, LEVEL 2, PER MIN: Performed by: OBSTETRICS & GYNECOLOGY

## 2021-11-04 PROCEDURE — 85014 HEMATOCRIT: CPT | Performed by: OBSTETRICS & GYNECOLOGY

## 2021-11-04 PROCEDURE — 250N000013 HC RX MED GY IP 250 OP 250 PS 637: Performed by: STUDENT IN AN ORGANIZED HEALTH CARE EDUCATION/TRAINING PROGRAM

## 2021-11-04 PROCEDURE — 258N000003 HC RX IP 258 OP 636: Performed by: OBSTETRICS & GYNECOLOGY

## 2021-11-04 PROCEDURE — 59514 CESAREAN DELIVERY ONLY: CPT | Mod: GC | Performed by: OBSTETRICS & GYNECOLOGY

## 2021-11-04 PROCEDURE — 82803 BLOOD GASES ANY COMBINATION: CPT | Performed by: OBSTETRICS & GYNECOLOGY

## 2021-11-04 PROCEDURE — 370N000017 HC ANESTHESIA TECHNICAL FEE, PER MIN: Performed by: OBSTETRICS & GYNECOLOGY

## 2021-11-04 PROCEDURE — 258N000003 HC RX IP 258 OP 636: Performed by: STUDENT IN AN ORGANIZED HEALTH CARE EDUCATION/TRAINING PROGRAM

## 2021-11-04 PROCEDURE — C9290 INJ, BUPIVACAINE LIPOSOME: HCPCS | Performed by: ANESTHESIOLOGY

## 2021-11-04 PROCEDURE — 258N000003 HC RX IP 258 OP 636

## 2021-11-04 RX ORDER — OXYTOCIN/0.9 % SODIUM CHLORIDE 30/500 ML
340 PLASTIC BAG, INJECTION (ML) INTRAVENOUS CONTINUOUS PRN
Status: DISCONTINUED | OUTPATIENT
Start: 2021-11-04 | End: 2021-11-07 | Stop reason: HOSPADM

## 2021-11-04 RX ORDER — MISOPROSTOL 200 UG/1
800 TABLET ORAL
Status: DISCONTINUED | OUTPATIENT
Start: 2021-11-04 | End: 2021-11-04 | Stop reason: HOSPADM

## 2021-11-04 RX ORDER — CEFAZOLIN SODIUM 2 G/100ML
2 INJECTION, SOLUTION INTRAVENOUS
Status: DISCONTINUED | OUTPATIENT
Start: 2021-11-04 | End: 2021-11-04 | Stop reason: HOSPADM

## 2021-11-04 RX ORDER — OXYCODONE HYDROCHLORIDE 5 MG/1
5 TABLET ORAL EVERY 4 HOURS PRN
Status: DISCONTINUED | OUTPATIENT
Start: 2021-11-04 | End: 2021-11-07 | Stop reason: HOSPADM

## 2021-11-04 RX ORDER — NALBUPHINE HYDROCHLORIDE 10 MG/ML
2.5-5 INJECTION, SOLUTION INTRAMUSCULAR; INTRAVENOUS; SUBCUTANEOUS EVERY 6 HOURS PRN
Status: DISCONTINUED | OUTPATIENT
Start: 2021-11-04 | End: 2021-11-04

## 2021-11-04 RX ORDER — TRANEXAMIC ACID 10 MG/ML
1 INJECTION, SOLUTION INTRAVENOUS EVERY 30 MIN PRN
Status: DISCONTINUED | OUTPATIENT
Start: 2021-11-04 | End: 2021-11-07 | Stop reason: HOSPADM

## 2021-11-04 RX ORDER — DIPHENHYDRAMINE HCL 25 MG
25-50 CAPSULE ORAL EVERY 6 HOURS PRN
Status: DISCONTINUED | OUTPATIENT
Start: 2021-11-04 | End: 2021-11-07 | Stop reason: HOSPADM

## 2021-11-04 RX ORDER — NALOXONE HYDROCHLORIDE 0.4 MG/ML
0.4 INJECTION, SOLUTION INTRAMUSCULAR; INTRAVENOUS; SUBCUTANEOUS
Status: DISCONTINUED | OUTPATIENT
Start: 2021-11-04 | End: 2021-11-04

## 2021-11-04 RX ORDER — ONDANSETRON 2 MG/ML
4 INJECTION INTRAMUSCULAR; INTRAVENOUS EVERY 30 MIN PRN
Status: CANCELLED | OUTPATIENT
Start: 2021-11-04

## 2021-11-04 RX ORDER — CEFAZOLIN SODIUM 2 G/100ML
2 INJECTION, SOLUTION INTRAVENOUS SEE ADMIN INSTRUCTIONS
Status: DISCONTINUED | OUTPATIENT
Start: 2021-11-04 | End: 2021-11-04 | Stop reason: HOSPADM

## 2021-11-04 RX ORDER — OXYTOCIN 10 [USP'U]/ML
10 INJECTION, SOLUTION INTRAMUSCULAR; INTRAVENOUS
Status: DISCONTINUED | OUTPATIENT
Start: 2021-11-04 | End: 2021-11-04 | Stop reason: HOSPADM

## 2021-11-04 RX ORDER — OXYCODONE HYDROCHLORIDE 5 MG/1
5 TABLET ORAL EVERY 4 HOURS PRN
Status: CANCELLED | OUTPATIENT
Start: 2021-11-04

## 2021-11-04 RX ORDER — OXYTOCIN/0.9 % SODIUM CHLORIDE 30/500 ML
340 PLASTIC BAG, INJECTION (ML) INTRAVENOUS CONTINUOUS PRN
Status: DISCONTINUED | OUTPATIENT
Start: 2021-11-04 | End: 2021-11-04 | Stop reason: HOSPADM

## 2021-11-04 RX ORDER — ACETAMINOPHEN 325 MG/1
650 TABLET ORAL EVERY 6 HOURS
Status: DISCONTINUED | OUTPATIENT
Start: 2021-11-04 | End: 2021-11-07 | Stop reason: HOSPADM

## 2021-11-04 RX ORDER — LIDOCAINE 40 MG/G
CREAM TOPICAL
Status: DISCONTINUED | OUTPATIENT
Start: 2021-11-04 | End: 2021-11-07 | Stop reason: HOSPADM

## 2021-11-04 RX ORDER — CITRIC ACID/SODIUM CITRATE 334-500MG
30 SOLUTION, ORAL ORAL ONCE
Status: DISCONTINUED | OUTPATIENT
Start: 2021-11-04 | End: 2021-11-04

## 2021-11-04 RX ORDER — AMOXICILLIN 250 MG
1 CAPSULE ORAL 2 TIMES DAILY
Status: DISCONTINUED | OUTPATIENT
Start: 2021-11-04 | End: 2021-11-07 | Stop reason: HOSPADM

## 2021-11-04 RX ORDER — HYDROCORTISONE 2.5 %
CREAM (GRAM) TOPICAL 3 TIMES DAILY PRN
Status: DISCONTINUED | OUTPATIENT
Start: 2021-11-04 | End: 2021-11-07 | Stop reason: HOSPADM

## 2021-11-04 RX ORDER — ONDANSETRON 4 MG/1
4 TABLET, ORALLY DISINTEGRATING ORAL EVERY 6 HOURS PRN
Status: DISCONTINUED | OUTPATIENT
Start: 2021-11-04 | End: 2021-11-04

## 2021-11-04 RX ORDER — OXYTOCIN 10 [USP'U]/ML
10 INJECTION, SOLUTION INTRAMUSCULAR; INTRAVENOUS
Status: DISCONTINUED | OUTPATIENT
Start: 2021-11-04 | End: 2021-11-07 | Stop reason: HOSPADM

## 2021-11-04 RX ORDER — MISOPROSTOL 200 UG/1
400 TABLET ORAL
Status: DISCONTINUED | OUTPATIENT
Start: 2021-11-04 | End: 2021-11-07 | Stop reason: HOSPADM

## 2021-11-04 RX ORDER — NALOXONE HYDROCHLORIDE 0.4 MG/ML
0.2 INJECTION, SOLUTION INTRAMUSCULAR; INTRAVENOUS; SUBCUTANEOUS
Status: DISCONTINUED | OUTPATIENT
Start: 2021-11-04 | End: 2021-11-07 | Stop reason: HOSPADM

## 2021-11-04 RX ORDER — MORPHINE SULFATE 1 MG/ML
150 INJECTION, SOLUTION EPIDURAL; INTRATHECAL; INTRAVENOUS ONCE
Status: COMPLETED | OUTPATIENT
Start: 2021-11-04 | End: 2021-11-04

## 2021-11-04 RX ORDER — OXYTOCIN/0.9 % SODIUM CHLORIDE 30/500 ML
PLASTIC BAG, INJECTION (ML) INTRAVENOUS CONTINUOUS PRN
Status: DISCONTINUED | OUTPATIENT
Start: 2021-11-04 | End: 2021-11-04

## 2021-11-04 RX ORDER — SODIUM CHLORIDE, SODIUM LACTATE, POTASSIUM CHLORIDE, CALCIUM CHLORIDE 600; 310; 30; 20 MG/100ML; MG/100ML; MG/100ML; MG/100ML
INJECTION, SOLUTION INTRAVENOUS CONTINUOUS
Status: CANCELLED | OUTPATIENT
Start: 2021-11-04

## 2021-11-04 RX ORDER — BUPIVACAINE HYDROCHLORIDE 7.5 MG/ML
INJECTION, SOLUTION INTRASPINAL PRN
Status: DISCONTINUED | OUTPATIENT
Start: 2021-11-04 | End: 2021-11-04

## 2021-11-04 RX ORDER — NALOXONE HYDROCHLORIDE 0.4 MG/ML
0.2 INJECTION, SOLUTION INTRAMUSCULAR; INTRAVENOUS; SUBCUTANEOUS
Status: DISCONTINUED | OUTPATIENT
Start: 2021-11-04 | End: 2021-11-04

## 2021-11-04 RX ORDER — FENTANYL CITRATE 50 UG/ML
10 INJECTION, SOLUTION INTRAMUSCULAR; INTRAVENOUS ONCE
Status: COMPLETED | OUTPATIENT
Start: 2021-11-04 | End: 2021-11-04

## 2021-11-04 RX ORDER — NICOTINE POLACRILEX 4 MG
15-30 LOZENGE BUCCAL
Status: DISCONTINUED | OUTPATIENT
Start: 2021-11-04 | End: 2021-11-06

## 2021-11-04 RX ORDER — METHYLERGONOVINE MALEATE 0.2 MG/ML
200 INJECTION INTRAVENOUS
Status: DISCONTINUED | OUTPATIENT
Start: 2021-11-04 | End: 2021-11-05

## 2021-11-04 RX ORDER — CEFAZOLIN SODIUM 2 G/100ML
INJECTION, SOLUTION INTRAVENOUS PRN
Status: DISCONTINUED | OUTPATIENT
Start: 2021-11-04 | End: 2021-11-04

## 2021-11-04 RX ORDER — DEXTROSE MONOHYDRATE 25 G/50ML
25-50 INJECTION, SOLUTION INTRAVENOUS
Status: DISCONTINUED | OUTPATIENT
Start: 2021-11-04 | End: 2021-11-06

## 2021-11-04 RX ORDER — BISACODYL 10 MG
10 SUPPOSITORY, RECTAL RECTAL DAILY PRN
Status: DISCONTINUED | OUTPATIENT
Start: 2021-11-06 | End: 2021-11-05

## 2021-11-04 RX ORDER — NICOTINE POLACRILEX 4 MG
15-30 LOZENGE BUCCAL
Status: DISCONTINUED | OUTPATIENT
Start: 2021-11-04 | End: 2021-11-05

## 2021-11-04 RX ORDER — ONDANSETRON 4 MG/1
4 TABLET, ORALLY DISINTEGRATING ORAL EVERY 6 HOURS PRN
Status: DISCONTINUED | OUTPATIENT
Start: 2021-11-04 | End: 2021-11-07 | Stop reason: HOSPADM

## 2021-11-04 RX ORDER — ONDANSETRON 2 MG/ML
4 INJECTION INTRAMUSCULAR; INTRAVENOUS EVERY 6 HOURS PRN
Status: DISCONTINUED | OUTPATIENT
Start: 2021-11-04 | End: 2021-11-07 | Stop reason: HOSPADM

## 2021-11-04 RX ORDER — OXYTOCIN/0.9 % SODIUM CHLORIDE 30/500 ML
100-340 PLASTIC BAG, INJECTION (ML) INTRAVENOUS CONTINUOUS PRN
Status: DISCONTINUED | OUTPATIENT
Start: 2021-11-04 | End: 2021-11-07 | Stop reason: HOSPADM

## 2021-11-04 RX ORDER — LIDOCAINE 40 MG/G
CREAM TOPICAL
Status: DISCONTINUED | OUTPATIENT
Start: 2021-11-04 | End: 2021-11-04 | Stop reason: HOSPADM

## 2021-11-04 RX ORDER — NALOXONE HYDROCHLORIDE 0.4 MG/ML
0.4 INJECTION, SOLUTION INTRAMUSCULAR; INTRAVENOUS; SUBCUTANEOUS
Status: DISCONTINUED | OUTPATIENT
Start: 2021-11-04 | End: 2021-11-07 | Stop reason: HOSPADM

## 2021-11-04 RX ORDER — MODIFIED LANOLIN
OINTMENT (GRAM) TOPICAL
Status: DISCONTINUED | OUTPATIENT
Start: 2021-11-04 | End: 2021-11-07 | Stop reason: HOSPADM

## 2021-11-04 RX ORDER — CITRIC ACID/SODIUM CITRATE 334-500MG
SOLUTION, ORAL ORAL
Status: DISCONTINUED
Start: 2021-11-04 | End: 2021-11-04 | Stop reason: HOSPADM

## 2021-11-04 RX ORDER — AMOXICILLIN 250 MG
2 CAPSULE ORAL 2 TIMES DAILY
Status: DISCONTINUED | OUTPATIENT
Start: 2021-11-04 | End: 2021-11-07 | Stop reason: HOSPADM

## 2021-11-04 RX ORDER — MISOPROSTOL 200 UG/1
800 TABLET ORAL
Status: DISCONTINUED | OUTPATIENT
Start: 2021-11-04 | End: 2021-11-07 | Stop reason: HOSPADM

## 2021-11-04 RX ORDER — CITRIC ACID/SODIUM CITRATE 334-500MG
30 SOLUTION, ORAL ORAL
Status: DISCONTINUED | OUTPATIENT
Start: 2021-11-04 | End: 2021-11-04 | Stop reason: HOSPADM

## 2021-11-04 RX ORDER — FENTANYL CITRATE-0.9 % NACL/PF 10 MCG/ML
100 PLASTIC BAG, INJECTION (ML) INTRAVENOUS EVERY 5 MIN PRN
Status: DISCONTINUED | OUTPATIENT
Start: 2021-11-04 | End: 2021-11-04

## 2021-11-04 RX ORDER — TACROLIMUS 1 MG/1
2 CAPSULE ORAL 2 TIMES DAILY
Status: DISCONTINUED | OUTPATIENT
Start: 2021-11-04 | End: 2021-11-07 | Stop reason: HOSPADM

## 2021-11-04 RX ORDER — METOCLOPRAMIDE HYDROCHLORIDE 5 MG/ML
10 INJECTION INTRAMUSCULAR; INTRAVENOUS EVERY 6 HOURS PRN
Status: DISCONTINUED | OUTPATIENT
Start: 2021-11-04 | End: 2021-11-05

## 2021-11-04 RX ORDER — SODIUM CHLORIDE, SODIUM LACTATE, POTASSIUM CHLORIDE, CALCIUM CHLORIDE 600; 310; 30; 20 MG/100ML; MG/100ML; MG/100ML; MG/100ML
INJECTION, SOLUTION INTRAVENOUS
Status: DISCONTINUED
Start: 2021-11-04 | End: 2021-11-04 | Stop reason: WASHOUT

## 2021-11-04 RX ORDER — DEXTROSE, SODIUM CHLORIDE, SODIUM LACTATE, POTASSIUM CHLORIDE, AND CALCIUM CHLORIDE 5; .6; .31; .03; .02 G/100ML; G/100ML; G/100ML; G/100ML; G/100ML
INJECTION, SOLUTION INTRAVENOUS CONTINUOUS
Status: DISCONTINUED | OUTPATIENT
Start: 2021-11-04 | End: 2021-11-05

## 2021-11-04 RX ORDER — SODIUM CHLORIDE 9 MG/ML
INJECTION, SOLUTION INTRAVENOUS
Status: DISCONTINUED
Start: 2021-11-04 | End: 2021-11-04 | Stop reason: HOSPADM

## 2021-11-04 RX ORDER — TRANEXAMIC ACID 10 MG/ML
INJECTION, SOLUTION INTRAVENOUS
Status: DISCONTINUED
Start: 2021-11-04 | End: 2021-11-04 | Stop reason: HOSPADM

## 2021-11-04 RX ORDER — CEFAZOLIN SODIUM 2 G/100ML
INJECTION, SOLUTION INTRAVENOUS
Status: DISCONTINUED
Start: 2021-11-04 | End: 2021-11-04 | Stop reason: HOSPADM

## 2021-11-04 RX ORDER — ONDANSETRON 2 MG/ML
4 INJECTION INTRAMUSCULAR; INTRAVENOUS EVERY 6 HOURS PRN
Status: DISCONTINUED | OUTPATIENT
Start: 2021-11-04 | End: 2021-11-04

## 2021-11-04 RX ORDER — BUPIVACAINE HYDROCHLORIDE 2.5 MG/ML
INJECTION, SOLUTION EPIDURAL; INFILTRATION; INTRACAUDAL
Status: COMPLETED | OUTPATIENT
Start: 2021-11-04 | End: 2021-11-04

## 2021-11-04 RX ORDER — METOCLOPRAMIDE 10 MG/1
10 TABLET ORAL EVERY 6 HOURS PRN
Status: DISCONTINUED | OUTPATIENT
Start: 2021-11-04 | End: 2021-11-05

## 2021-11-04 RX ORDER — TRANEXAMIC ACID 10 MG/ML
1 INJECTION, SOLUTION INTRAVENOUS EVERY 30 MIN PRN
Status: DISCONTINUED | OUTPATIENT
Start: 2021-11-04 | End: 2021-11-04 | Stop reason: HOSPADM

## 2021-11-04 RX ORDER — MISOPROSTOL 200 UG/1
400 TABLET ORAL
Status: DISCONTINUED | OUTPATIENT
Start: 2021-11-04 | End: 2021-11-04 | Stop reason: HOSPADM

## 2021-11-04 RX ORDER — CARBOPROST TROMETHAMINE 250 UG/ML
250 INJECTION, SOLUTION INTRAMUSCULAR
Status: DISCONTINUED | OUTPATIENT
Start: 2021-11-04 | End: 2021-11-07 | Stop reason: HOSPADM

## 2021-11-04 RX ORDER — PROCHLORPERAZINE MALEATE 10 MG
10 TABLET ORAL EVERY 6 HOURS PRN
Status: DISCONTINUED | OUTPATIENT
Start: 2021-11-04 | End: 2021-11-05

## 2021-11-04 RX ORDER — SODIUM CHLORIDE, SODIUM LACTATE, POTASSIUM CHLORIDE, CALCIUM CHLORIDE 600; 310; 30; 20 MG/100ML; MG/100ML; MG/100ML; MG/100ML
INJECTION, SOLUTION INTRAVENOUS CONTINUOUS PRN
Status: DISCONTINUED | OUTPATIENT
Start: 2021-11-04 | End: 2021-11-04

## 2021-11-04 RX ORDER — DEXTROSE MONOHYDRATE 25 G/50ML
25-50 INJECTION, SOLUTION INTRAVENOUS
Status: DISCONTINUED | OUTPATIENT
Start: 2021-11-04 | End: 2021-11-05

## 2021-11-04 RX ORDER — SODIUM CHLORIDE, SODIUM LACTATE, POTASSIUM CHLORIDE, CALCIUM CHLORIDE 600; 310; 30; 20 MG/100ML; MG/100ML; MG/100ML; MG/100ML
INJECTION, SOLUTION INTRAVENOUS CONTINUOUS
Status: DISCONTINUED | OUTPATIENT
Start: 2021-11-04 | End: 2021-11-04 | Stop reason: HOSPADM

## 2021-11-04 RX ORDER — METHYLERGONOVINE MALEATE 0.2 MG/ML
200 INJECTION INTRAVENOUS
Status: DISCONTINUED | OUTPATIENT
Start: 2021-11-04 | End: 2021-11-04 | Stop reason: HOSPADM

## 2021-11-04 RX ORDER — SIMETHICONE 80 MG
80 TABLET,CHEWABLE ORAL 4 TIMES DAILY PRN
Status: DISCONTINUED | OUTPATIENT
Start: 2021-11-04 | End: 2021-11-07 | Stop reason: HOSPADM

## 2021-11-04 RX ORDER — PREDNISONE 5 MG/1
5 TABLET ORAL DAILY
Status: DISCONTINUED | OUTPATIENT
Start: 2021-11-04 | End: 2021-11-04

## 2021-11-04 RX ORDER — PROCHLORPERAZINE 25 MG
25 SUPPOSITORY, RECTAL RECTAL EVERY 12 HOURS PRN
Status: DISCONTINUED | OUTPATIENT
Start: 2021-11-04 | End: 2021-11-05

## 2021-11-04 RX ORDER — ONDANSETRON 4 MG/1
4 TABLET, ORALLY DISINTEGRATING ORAL EVERY 30 MIN PRN
Status: CANCELLED | OUTPATIENT
Start: 2021-11-04

## 2021-11-04 RX ORDER — ACETAMINOPHEN 325 MG/1
975 TABLET ORAL ONCE
Status: DISCONTINUED | OUTPATIENT
Start: 2021-11-04 | End: 2021-11-04 | Stop reason: HOSPADM

## 2021-11-04 RX ORDER — CARBOPROST TROMETHAMINE 250 UG/ML
250 INJECTION, SOLUTION INTRAMUSCULAR
Status: DISCONTINUED | OUTPATIENT
Start: 2021-11-04 | End: 2021-11-04 | Stop reason: HOSPADM

## 2021-11-04 RX ADMIN — SODIUM CHLORIDE: 9 INJECTION, SOLUTION INTRAVENOUS at 08:53

## 2021-11-04 RX ADMIN — SODIUM CHLORIDE 500 ML: 9 INJECTION, SOLUTION INTRAVENOUS at 06:45

## 2021-11-04 RX ADMIN — PREDNISONE 5 MG: 5 TABLET ORAL at 12:47

## 2021-11-04 RX ADMIN — TACROLIMUS 2 MG: 1 CAPSULE ORAL at 12:48

## 2021-11-04 RX ADMIN — SODIUM CHLORIDE, POTASSIUM CHLORIDE, SODIUM LACTATE AND CALCIUM CHLORIDE: 600; 310; 30; 20 INJECTION, SOLUTION INTRAVENOUS at 09:14

## 2021-11-04 RX ADMIN — ENOXAPARIN SODIUM 40 MG: 40 INJECTION SUBCUTANEOUS at 22:13

## 2021-11-04 RX ADMIN — MORPHINE SULFATE 0.15 MCG: 1 INJECTION EPIDURAL; INTRATHECAL; INTRAVENOUS at 09:23

## 2021-11-04 RX ADMIN — OXYTOCIN-SODIUM CHLORIDE 0.9% IV SOLN 30 UNIT/500ML 300 ML/HR: 30-0.9/5 SOLUTION at 09:45

## 2021-11-04 RX ADMIN — BUPIVACAINE 20 ML: 13.3 INJECTION, SUSPENSION, LIPOSOMAL INFILTRATION at 10:09

## 2021-11-04 RX ADMIN — Medication 2 G: at 09:30

## 2021-11-04 RX ADMIN — PHENYLEPHRINE HYDROCHLORIDE 100 MCG: 10 INJECTION INTRAVENOUS at 09:30

## 2021-11-04 RX ADMIN — TACROLIMUS 2 MG: 1 CAPSULE ORAL at 20:09

## 2021-11-04 RX ADMIN — DIPHENHYDRAMINE HYDROCHLORIDE 50 MG: 25 CAPSULE ORAL at 18:21

## 2021-11-04 RX ADMIN — BUPIVACAINE HYDROCHLORIDE IN DEXTROSE 1.6 ML: 7.5 INJECTION, SOLUTION SUBARACHNOID at 09:23

## 2021-11-04 RX ADMIN — Medication 50 MCG/MIN: at 09:25

## 2021-11-04 RX ADMIN — DIPHENHYDRAMINE HYDROCHLORIDE 25 MG: 50 INJECTION, SOLUTION INTRAMUSCULAR; INTRAVENOUS at 11:29

## 2021-11-04 RX ADMIN — ACETAMINOPHEN 650 MG: 325 TABLET, FILM COATED ORAL at 18:28

## 2021-11-04 RX ADMIN — PRENATAL VITAMINS-IRON FUMARATE 27 MG IRON-FOLIC ACID 0.8 MG TABLET 1 TABLET: at 20:15

## 2021-11-04 RX ADMIN — ACETAMINOPHEN 650 MG: 325 TABLET, FILM COATED ORAL at 12:46

## 2021-11-04 RX ADMIN — BUPIVACAINE HYDROCHLORIDE 20 ML: 2.5 INJECTION, SOLUTION EPIDURAL; INFILTRATION; INTRACAUDAL at 10:09

## 2021-11-04 RX ADMIN — FENTANYL CITRATE 10 MCG: 50 INJECTION, SOLUTION INTRAMUSCULAR; INTRAVENOUS at 09:23

## 2021-11-04 ASSESSMENT — MIFFLIN-ST. JEOR: SCORE: 1225.21

## 2021-11-04 ASSESSMENT — ACTIVITIES OF DAILY LIVING (ADL)
ADLS_ACUITY_SCORE: 4

## 2021-11-04 NOTE — PROGRESS NOTES
Received order for SW to see for psychosocial assessment.    SW already involved.  Psychosocial assessment completed via phone with patient on 11-3-21.  Please see notes for details.      Will continue to follow along throughout baby's NICU admission to assist with needs and to offer support.

## 2021-11-04 NOTE — ANESTHESIA POSTPROCEDURE EVALUATION
Patient: Yarelis Penny    Procedure: Procedure(s):   SECTION       Diagnosis:Treatment [Z51.89]  Diagnosis Additional Information: No value filed.    Anesthesia Type:  Spinal    Note:  Disposition: Inpatient   Postop Pain Control: Uneventful            Sign Out: Well controlled pain   PONV: No   Neuro/Psych: Uneventful            Sign Out: Acceptable/Baseline neuro status   Airway/Respiratory: Uneventful            Sign Out: Acceptable/Baseline resp. status   CV/Hemodynamics: Uneventful            Sign Out: Acceptable CV status; No obvious hypovolemia; No obvious fluid overload   Other NRE: NONE   DID A NON-ROUTINE EVENT OCCUR? No           Last vitals:  Vitals Value Taken Time   /83 21 1142   Temp 36.7  C (98  F) 21 1111   Pulse 77 21 1111   Resp 16 21 1125   SpO2 100 % 21 1141   Vitals shown include unvalidated device data.    Electronically Signed By: Cyn Burton MD  2021  11:46 AM

## 2021-11-04 NOTE — PROVIDER NOTIFICATION
11/04/21 0638   Provider Notification   Provider Name/Title Dr. Cancino   Method of Notification Phone   Request Evaluate - Remote   Notification Reason Status Update  (fetal strip minimal variability, possible decels)   Dr. Cancino called, RN reported change in fetal baseline from previous monitoring and minimal variability with possible decels. Telephone order for 500 NS bolus once, see MAR

## 2021-11-04 NOTE — PLAN OF CARE
Patients blood pressures have remained mildly elevated. All other vital signs stable. Declines headache, dizziness, and blurred vision. Fundus is firm with scant amount of bleeding. Ileostomy is draining small amounts of dark stool. Patient has been able to void twice since suarez removal with adequate output. Patient changed ileostomy bag this evening. Ileostomy has had dark liquid stool output overnight totaling 150 mL out. Is taking tylenol for pain, knows that she has oxycodone available if needed. Blood sugars have been monitored this evening, patient has not needed insulin coverage. Incision is covered and dressing remains clean, dry, and intact. Pumping for baby in the NICU, getting drops at times. lovenox was given this evening. Will continue to monitor closely and assist patient as needed.

## 2021-11-04 NOTE — ANESTHESIA PROCEDURE NOTES
Intrathecal injection Procedure Note    Pre-Procedure   Staff -        Anesthesiologist:  Cyn Burton MD       Resident/Fellow: Jake Spicer MD       Performed By: resident       Location: OR       Pre-Anesthestic Checklist: patient identified, IV checked, risks and benefits discussed, informed consent, monitors and equipment checked, pre-op evaluation, at physician/surgeon's request and post-op pain management  Timeout:       Correct Patient: Yes        Correct Procedure: Yes        Correct Site: Yes        Correct Position: Yes   Procedure Documentation  Procedure: intrathecal injection       Patient Position: sitting       Patient Prep/Sterile Barriers: sterile gloves, mask, patient draped       Skin prep: Chloraprep       Insertion Site: L3-4. (midline approach).       Needle Gauge: 25.        Needle Length (Inches): 3.5        Spinal Needle Type: Pencan       Introducer used       Introducer: 20 G       # of attempts: 1 and  # of redirects:  0    Assessment/Narrative         Paresthesias: No.       CSF fluid: clear.      Opening pressure was cmH2O while  Sitting.       Comments:  150 mcg morphine, 10 mcg fentanyl, 1.6ml bupivicaine.

## 2021-11-04 NOTE — OP NOTE
[facility]  Full Operative Progress Note     Surgery Date:  2021    Surgeon:  Efrain Bill MD    Assistants:  Camila Shell MFM Fellow    Krzysztof Benavides, Colorectal Fellow    Son Evans, PGY-2      Pre-op Diagnosis:         1. Intrauterine pregnancy at 31w6d                                         2. Preeclampsia without severe features                                         3. Persistent Category II FHT RFD                                         4. COVID-19 Infection                                         5. Gestational diabetes                                         6. Ulcerative colitis, history of lap colectomy w/end ileostomy                                         7. Autoimmune hepatitis/Primary sclerosing cholangitis s/p liver transplant     Post-op Diagnosis:       1. Same                                          2. Liveborn female infant     Procedure:  Primary low-transverse  section with double layer uterine closure via vertical miline incision    Anesthesia: Spinal    QBL:  425 mL    IVF:  500 mL crystalloid    UOP:  300 mL clear urine at the end of the case    Drains: Walter Catheter     Specimens:  Cord gases, placenta    Complications: None apparent    Indications:   Yarelis Penny is a 36 year old  at 31w6d by ETD (IVF) admitted for rule out preeclampsia due to newly elevated creatinine in the setting of severe FGR and recent COVID-19 infection.  Her pregnancy is otherwise complicated by: by diet-controlled GDMA1, h/o liver transplant 2/2 primary sclerosing cholangitis and autoimmune hepatitis, elevated bile acids and pruritis (cholestasis of pregnancy vs liver disease), ulcerative colitis s/p lap colectomy w/ end ileostomy, dense pelvic adhesions, h/o CMV and EBV viremia, IVF pregnancy, AMA, Rubella nonimmune, severe FGR. After three days of monitoring, the fetus developed a category II FHT and thus delivery was recommended.  The risks, benefits,  and alternatives of  section were discussed with the patient, and she agreed to proceed.     Findings:   1. No recto-fascial adhesions. No intraabdominal adhesions at site of entry.  2. Clear amniotic fluid  3. Liveborn female infant in cephalic presentation. Apgars and weight not yet recorded.  4. Arterial cord pH 7.19, base deficit 10.1.  5. Normal uterus, fallopian tubes, and ovaries. Well healed end ileostomy without evidence of significant adhesions in right upper abdomen around site.  Small filmy adhesion in right lower quadrant.    Procedure Details:   The patient was brought to the OR, where adequate spinal anesthesia was administered.  She was placed in the dorsal supine position with a slight leftward tilt. A surgical time out was performed. Ileostomy bag was removed, prepped with betadine and covered with 4x4 by colorectal fellow, Krzysztof Benavides.  Next, the entire was prepped and draped in the usual fashion.  Next, a midline vertical skin incision was then made from the level of pubic symphysis to 2 cm below the umbilicus. Incision was performed sharply and carried through the subcutaneous layer sharply. Fascia was incised and extended superiorly and inferiorly with a combination of scalpel and electrocautery. Peritoneum was then elevated and entered bluntly. Peritoneal incision was extended without any injury to nearby structures. At this point, exploration of the pelvis was performed. The bladder blade was placed. The vesicouterine peritoneum was incised in the midline, and the incision was extended laterally with the Metzenbaum scissors. A bladder flap was created digitally and the bladder blade was replaced. A transverse hysterotomy was made with the scalpel in the lower uterine segment, and the incision was extended with digital pressure. The infant was noted to be in the cephalic position, and was delivered atraumatically. The shoulders delivered easily.No nuchal cord was noted. The cord was  doubly clamped and cut, and the infant was handed off to the awaiting NICU staff. A segment of cord was cut and sent for cord gases. The placenta was delivered with gentle traction on the umbilical cord and uterine massage. The uterus was kept in situ and cleared of all clots and debris. Uterine tone was noted to be firm with 20 units of pitocin given through the running IV and uterine massage.  The hysterotomy was closed with a running locked suture of 0 Vicryl, incorporating small bleeding vasculature bilaterally.  The hysterotomy was then imbricated using an 0 Vicryl suture. The hysterotomy was noted to be hemostatic. The pericolic gutters were and cleared of all clots and debris. The hysterotomy was reexamined and noted to be hemostatic.  A small filmy adhesion was noted in the right lower quadrant and this was taken down via electrocautery.  The fascia and rectus muscles were examined and areas of oozing were controlled with electrocautery. The fascia was closed with a running 0 PDS suture. The subcutaneous tissue was irrigated and areas of oozing were controlled with electrocautery. The subcutaneous tissue was closed with 3-0 plain. The skin was closed with 4-0 monocryl and covered with a sterile dressing.    All sponge, needle, and instrument counts were correct. The patient tolerated the procedure well, and was transferred to recovery in stable condition. Dr. Bill was present and scrubbed for the entirety of the procedure.     Camila Shell MD  Maternal Fetal Medicine Fellow  11/4/2021 2:31 PM      Physician Attestation   I was present for the entire procedure between opening and closing.    Efrain Bill  Date of Service (when I saw the patient): 11/4/21

## 2021-11-04 NOTE — ANESTHESIA PREPROCEDURE EVALUATION
Anesthesia Pre-Procedure Evaluation    Patient: Yarelis Penny   MRN: [id number] : 1985        Preoperative Diagnosis: * No surgery found *    Procedure :           Past Medical History:   Diagnosis Date     Acute kidney injury (H) 2016     Anemia, iron deficiency 2017    On iron supplementation     Autoimmune hepatitis (H)     on steroid taper     Cholangitis      CKD (chronic kidney disease)     biopsy 2012: TIN, patchy fibrosis. Dialysis in 2013 x2 months.     CMV colitis (H) 2016     Esophageal varices (H) 2008    banded     Heart murmur      History of blood transfusion      Primary sclerosing cholangitis      SBP (spontaneous bacterial peritonitis) (H) 2015     Ulcerative Colitis     f/b GI      Past Surgical History:   Procedure Laterality Date     CHOLECYSTECTOMY       COLONOSCOPY  2007     COLONOSCOPY N/A 2015    Procedure: COMBINED COLONOSCOPY, SINGLE OR MULTIPLE BIOPSY/POLYPECTOMY BY BIOPSY;  Surgeon: Mitchel Hoskins Chi, MD;  Location: [facility]     COLONOSCOPY N/A 2016    Procedure: COMBINED COLONOSCOPY, SINGLE OR MULTIPLE BIOPSY/POLYPECTOMY BY BIOPSY;  Surgeon: Rigo Valles MD;  Location: [facility]     COLONOSCOPY N/A 2016    Procedure: COMBINED COLONOSCOPY, SINGLE OR MULTIPLE BIOPSY/POLYPECTOMY BY BIOPSY;  Surgeon: Kareem Solis MD;  [facility]: [facility]     COLONOSCOPY N/A 2017    Procedure: COMBINED COLONOSCOPY, SINGLE OR MULTIPLE BIOPSY/POLYPECTOMY BY BIOPSY;  Colonoscopy;  Surgeon: Fuentes Johnson MD;  Location: [facility]     COLONOSCOPY N/A 2019    Procedure: COMBINED COLONOSCOPY, SINGLE OR MULTIPLE BIOPSY/POLYPECTOMY BY BIOPSY;  Surgeon: Sophy Mckeon MD;  [facility]     DILATION AND CURETTAGE, HYSTEROSCOPY DIAGNOSTIC, COMBINED N/A 2021    Procedure: HYSTEROSCOPY, DIAGNOSTIC;  Surgeon: Lin Armendariz MD;  [facility]     ENDOSCOPIC RETROGRADE CHOLANGIOPANCREATOGRAM N/A  06/25/2015    Procedure: COMBINED ENDOSCOPIC RETROGRADE CHOLANGIOPANCREATOGRAPHY, PLACE TUBE/STENT;  Surgeon: Landon Quinones MD;  [facility]     ENDOSCOPIC RETROGRADE CHOLANGIOPANCREATOGRAM N/A 06/25/2015    Procedure: COMBINED ENDOSCOPIC RETROGRADE CHOLANGIOPANCREATOGRAPHY, PLACE TUBE/STENT;  Surgeon: Landon Quinones MD;  [facility]     ENDOSCOPIC RETROGRADE CHOLANGIOPANCREATOGRAM N/A 07/02/2015    Procedure: COMBINED ENDOSCOPIC RETROGRADE CHOLANGIOPANCREATOGRAPHY, PLACE TUBE/STENT;  Surgeon: Landon Quinones MD;  [facility]     ENDOSCOPIC RETROGRADE CHOLANGIOPANCREATOGRAM N/A 09/08/2015    Procedure: COMBINED ENDOSCOPIC RETROGRADE CHOLANGIOPANCREATOGRAPHY, REMOVE FOREIGN BODY OR STENT/TUBE;  Surgeon: Landon Quinones MD;  [facility]     ENDOSCOPIC RETROGRADE CHOLANGIOPANCREATOGRAM N/A 12/08/2015    Procedure: ENDOSCOPIC RETROGRADE CHOLANGIOPANCREATOGRAM;  Surgeon: Landon Quinones MD;  [facility]     ENDOSCOPIC RETROGRADE CHOLANGIOPANCREATOGRAM N/A 03/01/2016    Procedure: COMBINED ENDOSCOPIC RETROGRADE CHOLANGIOPANCREATOGRAPHY, REMOVE FOREIGN BODY OR STENT/TUBE;  Surgeon: Landon Quinones MD;  [facility]     ENDOSCOPIC RETROGRADE CHOLANGIOPANCREATOGRAM N/A 03/20/2017    Procedure: COMBINED ENDOSCOPIC RETROGRADE CHOLANGIOPANCREATOGRAPHY, PLACE TUBE/STENT;  Endoscopic Retrograde Cholangiopancreatogram with Ballon Dilation, Stent Placement;  Surgeon: Guru Brittany Macias MD;  [facility]     ENDOSCOPIC RETROGRADE CHOLANGIOPANCREATOGRAM N/A 04/02/2018    Procedure: COMBINED ENDOSCOPIC RETROGRADE CHOLANGIOPANCREATOGRAPHY, PLACE TUBE/STENT;  Endoscopic Retrograde Cholangiopancreatogram with biliary dilation and stent placement;  Surgeon: Guru Brittany Macias MD;  [facility]     ENDOSCOPIC RETROGRADE CHOLANGIOPANCREATOGRAM N/A 05/07/2018    Procedure: ENDOSCOPIC RETROGRADE CHOLANGIOPANCREATOGRAM;  Endoscopic  Retrograde Cholangiopancreatogram ballon dilation stent exchange;  Surgeon: Guru Brittany Macias MD;  [facility]     ENDOSCOPIC RETROGRADE CHOLANGIOPANCREATOGRAPHY, EXCHANGE TUBE/STENT N/A 07/30/2015    Procedure: ENDOSCOPIC RETROGRADE CHOLANGIOPANCREATOGRAPHY, EXCHANGE TUBE/STENT;  Surgeon: Landon Quinones MD;  [facility]     ENDOSCOPIC RETROGRADE CHOLANGIOPANCREATOGRAPHY, EXCHANGE TUBE/STENT N/A 05/15/2017    Procedure: ENDOSCOPIC RETROGRADE CHOLANGIOPANCREATOGRAPHY, EXCHANGE TUBE/STENT;  Endoscopic Retrograde Cholangiopancreatogram with biliary stent exchange and balloon dilation;  Surgeon: Guru Brittany Macias MD;  [facility]     ENDOSCOPIC RETROGRADE CHOLANGIOPANCREATOGRAPHY, EXCHANGE TUBE/STENT N/A 06/25/2018    Procedure: ENDOSCOPIC RETROGRADE CHOLANGIOPANCREATOGRAPHY, EXCHANGE TUBE/STENT;  Endoscopic Retrograde Cholangiopancreatogram with biliary dilation, stone removal and stent exchange;  Surgeon: Guru Brittany Macias MD;  [facility]     ENDOSCOPIC RETROGRADE CHOLANGIOPANCREATOGRAPHY, EXCHANGE TUBE/STENT N/A 07/30/2018    Procedure: ENDOSCOPIC RETROGRADE CHOLANGIOPANCREATOGRAPHY, EXCHANGE TUBE/STENT;  Endoscopic Retrograde Cholangiopancreatogram with Stent Exchange with dilation;  Surgeon: Guru Brittany Macias MD;  [facility]     ENDOSCOPIC RETROGRADE CHOLANGIOPANCREATOGRAPHY, EXCHANGE TUBE/STENT N/A 09/26/2018    Procedure: ENDOSCOPIC RETROGRADE CHOLANGIOPANCREATOGRAPHY, EXCHANGE TUBE/STENT;  Endoscopic Retrograde Cholangiopancreatogram, with bile duct stent exchanged balloon dilation and balloon sweep of bile duct;  Surgeon: Guru Brittany Macias MD;  [facility]     ESOPHAGOSCOPY, GASTROSCOPY, DUODENOSCOPY (EGD), COMBINED N/A 02/26/2015    Procedure: COMBINED ESOPHAGOSCOPY, GASTROSCOPY, DUODENOSCOPY (EGD);  Surgeon: Mitchel Hoskins Chi, MD;  [facility]     EXPLORE COMMON BILE DUCT N/A  06/25/2015    Procedure: EXPLORE COMMON BILE DUCT;  Surgeon: Tyree Smith MD;  [facility]     HERNIA REPAIR  2015    During transplant     ILEOSTOMY N/A 06/20/2019    Procedure: Ileostomy;  Surgeon: Krzysztof Carney MD;  [facility]     LAPAROSCOPIC ASSISTED COLECTOMY N/A 06/20/2019    Procedure: Laparoscopic Total Abdominal Colectomy, lysis of adhesions;  Surgeon: Krzysztof Carney MD;  [facility]     LAPAROSCOPIC LYSIS ADHESIONS N/A 03/12/2021    Procedure: LYSIS, ADHESIONS;  Surgeon: Lin Armendariz MD;  [facility]     LAPAROTOMY EXPLORATORY N/A 06/25/2015    Procedure: LAPAROTOMY EXPLORATORY;  Surgeon: Tyree Smith MD;  [facility]     PICC INSERTION Right 02/27/2015    4fr SL Valved PICC, 36cm (1cm external) in the R medial brachial vein w/ tip in the low SVC.     SIGMOIDOSCOPY FLEXIBLE N/A 04/27/2016    Procedure: SIGMOIDOSCOPY FLEXIBLE;  Surgeon: Jose Nielson MD;  [facility]     TRANSPLANT LIVER RECIPIENT LIVING UNRELATED N/A 06/18/2015    Procedure: TRANSPLANT LIVER RECIPIENT LIVING UNRELATED;  Surgeon: Tyree Smith MD;  [facility]     UPPER GI ENDOSCOPY        Allergies   Allergen Reactions     Rifampin Other (See Comments)     Kidney failure     Penicillins      Bad hives when she was in college      Social History     Tobacco Use     Smoking status: Never Smoker     Smokeless tobacco: Never Used   Substance Use Topics     Alcohol use: Not Currently     Alcohol/week: 0.0 standard drinks     Comment: Every other month has 1 drink      Wt Readings from Last 1 Encounters:   11/04/21 58.2 kg (128 lb 4.8 oz)        Anesthesia Evaluation            ROS/MED HX  ENT/Pulmonary: Comment: COVID positive 10/28/2021      Neurologic:       Cardiovascular:       METS/Exercise Tolerance:     Hematologic:     (+) anemia,     Musculoskeletal:       GI/Hepatic: Comment: Autoimmune hepatitis   Esophageal varices (H) 09/2008  banded  Primary sclerosing cholangitis   SBP  (spontaneous bacterial peritonitis) (H)   Ulcerative Colitis  s/p colectomy and ileostomy   TRANSPLANT LIVER RECIPIENT LIVING UNRELATED N/A 06/18/2015  Procedure: TRANSPLANT LIVER RECIPIENT LIVING UNRELATED;  Surgeon: Tyree Smith MD;  [facility]  Ulcerative colitis, s/p total colectomy    (+) Inflammatory bowel disease, liver disease,     Renal/Genitourinary:     (+) renal disease,     Endo:  - neg endo ROS     Psychiatric/Substance Use:  - neg psychiatric ROS     Infectious Disease:       Malignancy:       Other:            Physical Exam    Airway        Mallampati: II   TM distance: > 3 FB   Neck ROM: full   Mouth opening: > 3 cm    Respiratory Devices and Support         Dental  no notable dental history         Cardiovascular          Rhythm and rate: regular and normal   (+) murmur       Pulmonary           breath sounds clear to auscultation           OUTSIDE LABS:  CBC:   Lab Results   Component Value Date    WBC 8.2 11/03/2021    WBC 10.5 11/02/2021    HGB 12.8 11/03/2021    HGB 13.8 11/02/2021    HCT 38.7 11/03/2021    HCT 42.2 11/02/2021     11/03/2021     11/02/2021     BMP:   Lab Results   Component Value Date     11/03/2021     11/03/2021    POTASSIUM 5.0 11/03/2021    POTASSIUM 5.8 (H) 11/03/2021    CHLORIDE 110 (H) 11/03/2021    CHLORIDE 114 (H) 11/03/2021    CO2 16 (L) 11/03/2021    CO2 15 (L) 11/03/2021    BUN 48 (H) 11/03/2021    BUN 51 (H) 11/03/2021    CR 1.70 (H) 11/03/2021    CR 1.82 (H) 11/03/2021     (H) 11/04/2021     (H) 11/03/2021     COAGS:   Lab Results   Component Value Date    PTT 34 11/02/2021    INR 0.80 (L) 11/02/2021    FIBR 485 11/02/2021     POC:   Lab Results   Component Value Date    BGM 92 03/12/2021    HCG Negative 06/20/2019    HCGS Negative 05/07/2018     HEPATIC:   Lab Results   Component Value Date    ALBUMIN 2.1 (L) 11/03/2021    PROTTOTAL 5.6 (L) 11/03/2021    ALT 22 11/03/2021    AST 25 11/03/2021     (H)  04/27/2016    ALKPHOS 125 11/03/2021    BILITOTAL <0.1 (L) 11/03/2021    TAWNYA 40 (H) 06/23/2012     OTHER:   Lab Results   Component Value Date    PH 7.42 06/25/2015    LACT 1.4 12/09/2016    A1C 5.4 05/25/2021    KERLINE 7.9 (L) 11/03/2021    PHOS 3.9 04/12/2018    MAG 1.6 06/21/2019    LIPASE 270 12/22/2018    AMYLASE 124 (H) 12/22/2018    TSH 0.40 06/24/2021    T4 1.27 06/24/2021    CRP 5.7 04/16/2019    SED 46 (H) 04/16/2019       Anesthesia Plan    ASA Status:  3, emergent       Anesthesia Type: Spinal.         Techniques and Equipment:     - Lines/Monitors: 2nd IV     Consents    Anesthesia Plan(s) and associated risks, benefits, and realistic alternatives discussed. Questions answered and patient/representative(s) expressed understanding.     - Discussed with:  Patient      - Specific Concerns: Discussed about the risks of spinal and TAP block.     - Extended Intubation/Ventilatory Support Discussed: Yes.      - Patient is DNR/DNI Status: No    Use of blood products discussed: Yes.     - Discussed with: Patient.     Postoperative Care    Pain management: IV analgesics, Multi-modal analgesia.   PONV prophylaxis: Ondansetron (or other 5HT-3)     Comments:                Salvatore Gaston Junior, MD

## 2021-11-04 NOTE — PROVIDER NOTIFICATION
11/04/21 1617   Provider Notification   Provider Name/Title Dr. Evans   Method of Notification Electronic Page   Request Evaluate-Remote   Notification Reason Other  (see note)   FYI: patients blood pressure is 143/103. pulse 76, respirations 16. declines symptoms.

## 2021-11-04 NOTE — PROVIDER NOTIFICATION
RN applied EFM, possible decel at beginning of strip followed by minimal variability and baseline change to 155. Fetal baseline returned to 125 with moderate variability and accels present.      11/04/21 0700   Uterine Activity Assessment   Method per patient report;external tocotransducer   Contraction Frequency (Minutes) irregular w/ irritability   Contraction Duration (seconds)    Contraction Intensity mild by palpation   Uterine Resting Tone soft by palpation   Fetal Assessment   Fetal HR Assessment Method external US   Fetal HR (beats/min) 125  (baseline change to 155 from 8831-3080)   Fetal Heart Baseline Rate normal range   Fetal HR Variability minimal (detectable, amplitude less than or equal to 5 bpm)   Fetal HR Accelerations greater than/equal to 10 bpm (32 wks gest or less);lasts at least 10 seconds (32 wks gest or less)   Fetal HR Decelerations other (see comments)   RN Strip Review Continuous

## 2021-11-04 NOTE — ANESTHESIA PROCEDURE NOTES
TAP Procedure Note    Pre-Procedure   Staff -        Anesthesiologist:  Cyn Burton MD       Resident/Fellow: Jake Spicer MD       Performed By: resident       Location: OR       Pre-Anesthestic Checklist: patient identified, IV checked, site marked, risks and benefits discussed, informed consent, monitors and equipment checked, pre-op evaluation, at physician/surgeon's request and post-op pain management  Timeout:       Correct Patient: Yes        Correct Procedure: Yes        Correct Site: Yes        Correct Position: Yes        Correct Laterality: Yes        Site Marked: Yes  Procedure Documentation  Procedure: TAP       Laterality: bilateral       Patient Position: sitting       Skin prep: Betadine       Insertion Site: T10-11.       Needle Gauge: 20.        Needle Length (millimeters): 100        Ultrasound guided       1. Ultrasound was used to identify targeted nerve, plexus, vascular marker, or fascial plane and place a needle adjacent to it in real-time.       2. Ultrasound was used to visualize the spread of anesthetic in close proximity to the above referenced structure.    Assessment/Narrative         The placement was negative for: blood aspirated, painful injection and site bleeding       Paresthesias: No.      Bolus given via needle. No blood aspirated via catheter.        Secured via.        Insertion/Infusion Method: Single Shot       Complications: none    Medication(s) Administered   Bupivacaine 0.25% PF (Infiltration), 20 mL  Bupivacaine liposome (Exparel) 1.3% LA inj susp (Infiltration), 20 mL

## 2021-11-04 NOTE — PROVIDER NOTIFICATION
11/04/21 1743   Provider Notification   Provider Name/Title Dr. Evans    Method of Notification Electronic Page   Request Evaluate-Remote   Notification Reason Other  (see note)   FYI: patients most recent blood pressure was 148/105. declines pre-E symptoms.

## 2021-11-04 NOTE — PROVIDER NOTIFICATION
11/04/21 1302   Provider Notification   Provider Name/Title Dr MARIANELA Shell   Method of Notification In Department   Request Evaluate-Remote   Notification Reason Vital Signs Change     Dr Shell notified of diastolic BP trending up during recovery period. Running 88-93. HR stable. Denies symptoms of pre-e. fuddus and lochia WDL. Will transfer to [facility] now.

## 2021-11-04 NOTE — ANESTHESIA CARE TRANSFER NOTE
Patient: Yarelis Penny    Procedure: Procedure(s):   SECTION       Diagnosis: Treatment [Z51.89]  Diagnosis Additional Information: No value filed.    Anesthesia Type:   Spinal     Note:    Oropharynx: oropharynx clear of all foreign objects  Level of Consciousness: awake      Independent Airway: airway patency satisfactory and stable  Dentition: dentition unchanged  Vital Signs Stable: post-procedure vital signs reviewed and stable  Report to RN Given: handoff report given  Patient transferred to: Labor and Delivery  Comments: VSS. Patient denies n/v, or pain. All questions answered to RN.   Handoff Report: Identifed the Patient, Identified the Reponsible Provider, Reviewed the pertinent medical history, Discussed the surgical course, Reviewed Intra-OP anesthesia mangement and issues during anesthesia, Set expectations for post-procedure period and Allowed opportunity for questions and acknowledgement of understanding      Vitals:  Vitals Value Taken Time   BP     Temp     Pulse     Resp     SpO2         Electronically Signed By: Jake Spicer MD  2021  11:26 AM

## 2021-11-04 NOTE — PROGRESS NOTES
IP Diabetes Management  Daily Note           Assessment and Plan:   HPI:Yarelis is a 36 year old female currently 31w 4d pregnant who was admitted 21 with acute kidney injury superimposed on previously diagnosed pre-eclampsia. She has a complicated medical history including liver transplant for autoimmune hepatitis in , primary sclerosing cholangitis, ulcerative colitis s/p colectomy and ileostomy, and hx CMV colitis and viremia. This is an IVF pregnancy, further complicated as above by pre-eclampsia without severe features, severe intrauterine growth retardation, and diet controlled gestational diabetes. She was also recently diagnosed with COVID 19 on 10/28/12; she is fully vaccinated but had not yet received her booster. She is stable from a respiratory standpoint.        Assessment:   1) Gestational Diabetes- diet controlled  2) Steroid induced hyperglycemia (S/P betamethasone -11/3)     Plan:    -will not plan to order basal insulin today   -Novolog increased to 1:12g CHO coverage with meals and snacks/supplements, however we will likely be able to reduce or discontinue following delivery.    -Novolog moderate intensity sliding scale >100 TID AC and >120 HS- adjusted to non pregnant targets (>140 AC and >200 HS) following delivery.   -BG monitoring TID AC, HS, 0200   -hypoglycemia protocol   -carb counting protocol    Outpatient follow up: with PCP for 6 wk postpartum follow up GTT  Plan discussed with patient, bedside RN, and primary team paged.        Interval History and Assessment: interval glucose trend reviewed:  BG have improved with addition of mealtime aspart yesterday; 1 hr PP was 155. Fasting BG slightly above target this AM, 115>113.     Late decelerations on fetal tracing and will be going for emergent  today. Will be complicated delivery due to hx abdominal surgery; CRS to be present.     We will continue to follow; anticipate insulin needs will cease following delivery, but  "perhaps not immediately, with BMZ still on board.     Nephrology felt ESTUARDO related to CNI toxicity. Also recommended treating asx bacteruria.     Current nutritional intake and type: None    Steroid planning: s/p BMZ 11/2-11/3.     PTA Diabetes Regimen:   Frequency of checks: 4-5 times daily (fasting, pre-meal, 1 hr PP)  Diet controlled    Discharge Planning: TBD post delivery           Diabetes History:   Type of Diabetes: Gestational Diabetes Mellitus  Lab Results   Component Value Date    A1C 5.4 05/25/2021    A1C 5.1 06/15/2020    A1C 4.3 05/28/2013              Review of Systems:     The Review of Systems is negative other than noted in the Interval History.           Medications:     Current Facility-Administered Medications   Medication     acetaminophen (TYLENOL) tablet 650 mg     bupivacaine liposome (EXPAREL) LA inj was given in the infiltration site to produce post-op analgesia. Duration of action is up to 72 hours. Other \"martir\" meds should not be given for 96 hours except for lidocaine 4% patch. This is for INFORMATION ONLY.     calcium carbonate 500 mg (elemental) (OSCAL) tablet 500 mg     glucose gel 15-30 g    Or     dextrose 50 % injection 25-50 mL    Or     glucagon injection 1 mg     glucose gel 15-30 g    Or     dextrose 50 % injection 25-50 mL    Or     glucagon injection 1 mg     diphenhydrAMINE (BENADRYL) capsule 25 mg    Or     diphenhydrAMINE (BENADRYL) injection 25 mg     hydrOXYzine (ATARAX) tablet 10 mg     insulin aspart (NovoLOG) injection (RAPID ACTING)     insulin aspart (NovoLOG) injection (RAPID ACTING)     insulin aspart (NovoLOG) injection (RAPID ACTING)     lactated ringers BOLUS 1,000 mL    Or     lactated ringers BOLUS 500 mL     lactated ringers BOLUS 250 mL     lidocaine (LMX4) cream     lidocaine 1 % 0.1-1 mL     medication instruction     nalbuphine (NUBAIN) injection 2.5-5 mg     naloxone (NARCAN) injection 0.2 mg    Or     naloxone (NARCAN) injection 0.4 mg    Or     " "naloxone (NARCAN) injection 0.2 mg    Or     naloxone (NARCAN) injection 0.4 mg     No Tdap Needed - Assessment: Patient does not need Tdap vaccine     ondansetron (ZOFRAN-ODT) ODT tab 4 mg    Or     ondansetron (ZOFRAN) injection 4 mg     ondansetron (ZOFRAN-ODT) ODT tab 4 mg    Or     ondansetron (ZOFRAN) injection 4 mg     Opioid plan postpartum - medication instruction     oxytocin (PITOCIN) 30 units in 500 mL 0.9% NaCl infusion     oxytocin (PITOCIN) injection 10 Units     pantoprazole (PROTONIX) EC tablet 40 mg     phenylephrine (ALPESH-SYNEPHRINE) injection 100 mcg     predniSONE (DELTASONE) tablet 5 mg     predniSONE (DELTASONE) tablet 5 mg     prenatal multivitamin w/iron per tablet 1 tablet     sodium chloride (PF) 0.9% PF flush 3 mL     sodium chloride (PF) 0.9% PF flush 3 mL     sodium citrate-citric acid (BICITRA) solution 30 mL     tacrolimus (GENERIC EQUIVALENT) capsule 2 mg     ursodiol (ACTIGALL) capsule 300 mg            Physical Exam:    /82   Pulse 77   Temp 98  F (36.7  C) (Oral)   Resp 16   Ht 1.575 m (5' 2\")   Wt 58.2 kg (128 lb 4.8 oz)   LMP 03/26/2021   SpO2 99%   BMI 23.47 kg/m    Pt not seen today/in OR and post op status       Data:     Recent Labs   Lab 11/04/21  1145 11/04/21  0823 11/04/21  0819 11/04/21  0610 11/03/21  2326 11/03/21  1953   * 113* 113* 115* 129* 155*     Lab Results   Component Value Date    WBC 16.4 (H) 11/04/2021    WBC 8.2 11/03/2021    WBC 10.5 11/02/2021    HGB 13.3 11/04/2021    HGB 12.8 11/03/2021    HGB 13.8 11/02/2021    HCT 39.9 11/04/2021    HCT 38.7 11/03/2021    HCT 42.2 11/02/2021    MCV 91 11/04/2021    MCV 91 11/03/2021    MCV 92 11/02/2021     11/04/2021     11/03/2021     11/02/2021     Lab Results   Component Value Date     11/04/2021     11/03/2021     11/03/2021    POTASSIUM 5.4 (H) 11/04/2021    POTASSIUM 5.0 11/03/2021    POTASSIUM 5.8 (H) 11/03/2021    CHLORIDE 115 (H) 11/04/2021    " CHLORIDE 110 (H) 11/03/2021    CHLORIDE 114 (H) 11/03/2021    CO2 14 (L) 11/04/2021    CO2 16 (L) 11/03/2021    CO2 15 (L) 11/03/2021     (H) 11/04/2021     (H) 11/04/2021     (H) 11/04/2021     Lab Results   Component Value Date    BUN 48 (H) 11/03/2021    BUN 51 (H) 11/03/2021    BUN 53 (H) 11/02/2021     Lab Results   Component Value Date    TSH 0.40 06/24/2021    TSH 3.71 06/15/2020    TSH 3.69 06/19/2012     Lab Results   Component Value Date    AST 22 11/04/2021    AST 25 11/03/2021    AST 21 11/03/2021    ALT 24 11/04/2021    ALT 22 11/03/2021    ALT 22 11/03/2021     (H) 04/27/2016     (H) 06/19/2012    ALKPHOS 117 11/04/2021    ALKPHOS 125 11/03/2021    ALKPHOS 125 11/03/2021       35 minutes spent on the date of the encounter doing chart review, history and exam, documentation and further activities per the note      Over 50% of my time on the unit was spent counseling the patient and/or coordinating care regarding acute hyperglycemia management.  See note for details.    To contact Endocrine Diabetes service:   From 8AM-4PM: page inpatient diabetes provider that is following the patient  For questions or updates from 4PM-8AM: page the diabetes job code for on call fellow: [phone]    Philomena Smyth PA-C  Inpatient Diabetes Management Service  Pager [phone]

## 2021-11-04 NOTE — PROGRESS NOTES
dis  Nephrology Progress Note  2021         Assessment & Recommendations:   35 Y/O F  at 31w4d liver transplant secondary to PSC status post liver transplant in , ulcerative colitis s/p colectomy and ileostomy, CKD stage 2 had interstitial nephritis leading to ESTUARDO requiring dialysis for 1 month.  ESTUARDO likely secondary to CNI toxicity complicated by hyperkalemia  -baseine creatinine of 1.0-1.1     ESTUARDO on CKDstage 2  likely secondary to CNI toxicity:  Patient reports that she was on tacrolimus level of 2.5 mg twice daily but was recently increased to 3 mg twice daily.  She does have hyperkalemia and an ESTUARDO.  Level of tacrolimus was 8.5 before the evening dose and in the morning it was 9.7.  Patient did not report any tremors. her morning dose was held.  Her baseline creatinine is 1.0-1.1.  Protein to creatinine ratio of 1.05.in  patient was seen by nephrology and at that time had a Biopsy biopsied at [facility] on 1/15/2013 which revealed acute on chronic interstitial nephritis, mesangial proliferative GN with C3 deposition (however with normal complement so doubt C3 glomerulopathy).  Had negative ANCA as well.  Has a moderate amount of fibrosis suggesting a fair bit of chronicity.  She's was treated with steroids for the AIN, and required dialysis for 1 month  -Please consult transplant hepatology for management of tacrolimus  -Continue with tacrolimus to 2 mg as per transplant coordinator  -Follow-up on the BMP  -Tacrolimus level order for am  -Normal kappa lambda ratio  -no monoclonal antibody seen  -Given decent urine output it would be all right to hold IV fluid as she is able to drink fluid     Hyperkalemia:  Patient had a potassium of 5.4 probably due to tacrolimus toxicity.    -Repeat BMP if potassium>5.9 please call nephrology  -if her bicarb is less than 15 can give Bicarb to shift potassium  Follow-up on BMP   recommendations were communicated to primary team via      Non anion gap metabolic  "acidosis likely secondary to ESTUARDO:  Patient has bicarb of 14  -Recheck bicarb level  - start bicarb 650 mg 3 times daily     Pyuria:  Awaiting culture result        discussed with Dr. Irwin Parsons MD   [phone]    I have discussed this pt with the fellow.  This note reflects our joint assessment and plan.     Shayy Gayle MD     Interval History :   Nursing and provider notes from last 24 hours reviewed.  Chart reviewed as patient is on [facility]. Patient delivered her baby      Physical Exam:   I/O last 3 completed shifts:  In: 3232.08 [P.O.:2030; I.V.:702.08; IV Piggyback:500]  Out: 4254 [Urine:3525; Stool:300; Blood:429]   BP (!) 143/103   Pulse 73   Temp 98.8  F (37.1  C) (Oral)   Resp 16   Ht 1.575 m (5' 2\")   Wt 58.2 kg (128 lb 4.8 oz)   LMP 03/26/2021   SpO2 100%   BMI 23.47 kg/m       Did not do examination as patient is on Tile    Labs:   All labs reviewed by me  Electrolytes/Renal - Recent Labs   Lab Test 11/04/21  1145 11/04/21  0823 11/04/21  0819 11/03/21  1431 11/03/21  1058 11/03/21  0704 11/03/21  0643 06/23/19  0732 06/21/19  0734 05/07/18  1002 04/12/18  0810 04/02/18  0829 03/15/18  0910 01/18/18  0926 01/18/18  0926   NA  --   --  137  --  137  --  135   < > 139   < > 140   < > 136   < > 141   POTASSIUM  --   --  5.4*  --  5.0  --  5.8*   < > 4.2   < > 4.0   < > 4.2   < > 3.8   CHLORIDE  --   --  115*  --  110*  --  114*   < > 107   < > 107   < > 107   < > 111*   CO2  --   --  14*  --  16*  --  15*   < > 27   < > 24   < > 22   < > 22   BUN  --   --  43*  --  48*  --  51*   < > 18   < > 23   < > 33*   < > 18   CR  --   --  1.65*  --  1.70*  --  1.82*   < > 1.00   < > 0.87   < > 0.90   < > 0.95   * 113* 113*   < > 162*   < > 94   < > 89   < > 78   < > 119*   < > 78   KERLINE  --   --  8.2*  --  7.9*  --  7.8*   < > 8.0*   < > 9.3   < > 9.1   < > 7.9*   MAG  --   --   --   --   --   --   --   --  1.6  --  1.2*  --  1.6   < > 1.2*   PHOS  --   --   --   --   --   --   " "--   --   --   --  3.9  --  2.6  --  3.0    < > = values in this interval not displayed.       CBC -   Recent Labs   Lab Test 11/04/21  0819 11/03/21  0643 11/02/21  0939   WBC 16.4* 8.2 10.5   HGB 13.3 12.8 13.8    285 312       LFTs -   Recent Labs   Lab Test 11/04/21  0819 11/03/21  1058 11/03/21  0643   ALKPHOS 117 125 125   BILITOTAL 0.2 <0.1* 0.1*   ALT 24 22 22   AST 22 25 21   PROTTOTAL 6.0* 5.6* 5.6*   ALBUMIN 1.8* 2.1* 1.9*       Iron Panel -   Recent Labs   Lab Test 10/25/17  1400 05/02/17  0730 05/02/17  0730 04/29/16  0838 04/20/16  0913   IRON 19*  --  12* 41  --    IRONSAT 6*  --  4* 30  --    CHERRI 28   < > 6*  --    < >    < > = values in this interval not displayed.         Imaging:  All imaging studies reviewed by me.     Current Medications:    acetaminophen  650 mg Oral Q6H     insulin aspart  1-7 Units Subcutaneous TID AC     insulin aspart  1-5 Units Subcutaneous At Bedtime     predniSONE  5 mg Oral Daily     prenatal multivitamin w/iron  1 tablet Oral Daily     senna-docusate  1 tablet Oral BID    Or     senna-docusate  2 tablet Oral BID     sodium chloride (PF)  3 mL Intracatheter Q8H     tacrolimus  2 mg Oral BID       bupivacaine liposome (EXPAREL) LA inj was given in the infiltration site to produce post-op analgesia. Duration of action is up to 72 hours. Other \"martir\" meds should not be given for 96 hours except for lidocaine 4% patch. This is for INFORMATION ONLY.       dextrose 5% lactated ringers       - MEDICATION INSTRUCTIONS -       - MEDICATION INSTRUCTIONS -       oxytocin in 0.9% NaCl       oxytocin in 0.9% NaCl       Danielito Parsons MD    "

## 2021-11-04 NOTE — PROVIDER NOTIFICATION
11/04/21 0757   Provider Notification   Provider Name/Title Dr Bill   Method of Notification In Department   Request Evaluate in Person   Notification Reason Variability Change;Fetal Baseline Change;Uterine Activity;Other (Comment)     Minimal variability, increasing baseline from 125-145. Late decels and VD noted. Pt received 500mL fluid bolus, repositioned.

## 2021-11-04 NOTE — PROGRESS NOTES
Patient arrived to [facility] unit via zoom cart at 1312,with belongings, accompanied by spouse/ significant other, with infant in NICU. Received report from Vicki PAIGE RN and checked bands. Unit and room orientation completd. Call light given and within arms reach; no concerns present at this time. Continue with plan of care.

## 2021-11-04 NOTE — PROGRESS NOTES
Brief MFM Progress Note:  Alerted that fetal heart tracing has substantially changed from previous.  Fetal heart rate now with minimal variability, and late deceleration noted.  Discussed change in status with patient and recommendations to proceed with  section with the assistance of colorectal surgery.  Reviewed risks to  section, including bleeding, infection, and injury to surrounding organs.  She is aware of the plan to proceed with a vertical midline incision due to her past surgical history.  All questions answered.    Camila Shell MD  Maternal Fetal Medicine Fellow  2021 8:21 AM

## 2021-11-04 NOTE — PLAN OF CARE
Data: Yarelis Penny transferred to [id number] via cart at 1307. Baby admitted to NICU.  Action: Receiving unit notified of transfer: Yes. Patient and family notified of room change. Report given to Florinda GUTIERREZ RN at 1315. Belongings sent to receiving unit. Accompanied by Registered Nurse. Oriented patient to surroundings. Call light within reach.   Response: Patient tolerated transfer and is stable.

## 2021-11-04 NOTE — BRIEF OP NOTE
[facility]   Brief Operative Note     Surgery Date: 2021    Surgeon:  Efrain Pascual MD    Assistants:  MARCIN Pham Fellow    Son Evans, PGY-2    Pre-op Diagnosis:  1. Intrauterine pregnancy at 31w6d     2. Preeclampsia without severe features     3. Category II FHT RFD     4. COVID-19 Infection     5. Gestational diabetes     6. Ulcerative colitis, history of lap colectomy w/end ileostomy     7. Autoimmune hepatitis/Primary sclerosing cholangitis s/p liver transplant    Post-op Diagnosis:  1. Same      2. Liveborn female infant     Procedure:  Primary low-transverse  section with double layer uterine closure via vertical midline incision    Anesthesia: Spinal    QBL:  410 mL    IVF:  500 mL crystalloid    UOP:  300 mL clear urine at the end of the case    Drains: Walter Catheter     Specimens:  Placenta, Cord gas    Complications: None apparent    Findings:   1. No recto-fascial adhesions. No intraabdominal adhesions at site of entry.  2. Clear amniotic fluid  3. Liveborn female infant in cephalic presentation. Apgars and weight not yet recorded.  4. Arterial cord pH 7.19, base deficit 10.1. Venous cord pH 7.20, base deficit 11.0.  5. Normal uterus, fallopian tubes, and ovaries. Well healed end ileostomy without evidence of significant adhesions in right upper abdomen around site.    Disposition:  Transferred in stable condition to JANIE Shell MD  Maternal Fetal Medicine Fellow  2021 10:45 AM

## 2021-11-04 NOTE — PROGRESS NOTES
Antepartum Progress Note  DOS: 21  MRN: [id number]    S: Pt overall feeling well today.  States baby has been a bit less active this AM.  . No other acute concerns.    O:   Vitals:    21 0613 21 0739 21 0749 21 0800   BP: 126/83  119/75    Pulse: 65 79 60    Resp: 16 16     Temp: 98.2  F (36.8  C)  98  F (36.7  C)    TempSrc: Oral  Oral    SpO2:  100% 99% 99%   Weight:       Height:         Gen: NAD   Abdomen: soft, gravid, non-tender    EFM: 130's, mainly minimal variability with variable decelerations, late deceleration    Assessment and Plan:  36 year old  at 31w4d by IVF DOC on HD#3 admitted for elevated creatinine in the setting of known preeclampsia, severe IUGR, and current COVID-19 infection.  Pregnancy otherwise complicated by diet-controlled GDMA1, h/o liver transplant 2/2 primary sclerosing cholangitis and autoimmune hepatitis, elevated bile acids and pruritis (cholestasis of pregnancy vs liver disease), ulcerative colitis s/p lap colectomy w/ end ileostomy, dense pelvic adhesions, h/o CMV and EBV viremia, IVF pregnancy, AMA, Rubella nonimmune, severe FGR.     Non-reassuring FHR monitoring this AM despite position changes.  No acute maternal factors identified that can be modified to alter fetal status.      Given persistent Category II FHR tracing, delivery via CS is recommended at this time.  Plan vertical ML incision given prior abdominal surgeries.  Will request Colorectal surgery presence given prior adhesions noted at surgery.    Preeclampsia  R/o PreE with severe features  Elevated creatinine  H/o CKD stage 2   - Normotensive to mildly hypertensive since admission, asymptomatic   - Serial BP monitoring   - IV Antihypertensives prn for sustained severe range blood pressures (>160/>110)  - Daily weights, strict I&Os   - Nephrology following this case, appreciate recs     COVID-19 infection  - Test + 10/28, symptom onset 10/27  - Pt with mild symptoms (fatigue,  rhinorrhea) but is overall stable without hypoxia  - Will continue to monitor vital signs   - Receiving continuous 0.9%  ml/hr   - Lovenox 12-24 hours post-partum     Gestational Diabetes - diet-controlled   -   - blood glucose monitoring qid      Cholestasis of pregnancy   S/p live donor liver transplant ()  Autoimmune hepatitis  Primary sclerosing cholangitis  - Decrease tacrolimus to 2 mg BID starting today and recheck trough in 2 days per Yarelis's transplant team  - Atarax prn for pruritis     Efrain Bill MD    Time Spent on this Encounter   I spent 50 minutes on the unit/floor managing the care of Yarelis Penny. Over 50% of my time was spent on the following:   - Counseling the patient and/or family regarding: diagnostic results, prognosis, risks and benefits of treatment options and medical compliance  - Coordination of care with the: nurse and patient    In summary, Yarelis Penny is a  at 31w6d admitted with elevated serum creatinine in context of chronic renal disease, s/p liver transplant for AI hepatitis and PSC, GDM, preeclampsia without severe features, fetal growth restriction and recent COVID-19 infection (no severe symptoms).   Given changes in fetal status with now persistent Category II FHR tracing, recommend proceeding with delivery this AM via CS.  Informed consent has been obtained.  Patient in agreement with this plan.    Efrain Bill MD

## 2021-11-04 NOTE — PROVIDER NOTIFICATION
21 0809   Provider Notification   Provider Name/Title Dr Bill, Dr Shell   Method of Notification At Bedside     MDs to bedside to evaluate FHR tracing. Discuss plan of care with pt. Given change in fetal status (minimal variability, late and VD decels not improved with conservative measures), c/s recommended. Pt consented to primary  section at 0813. Consents verified. Pt spouse called and coming for surgery. Surgery prepped completed. Left L&D for OR 20 on 3rd floor at 0905.

## 2021-11-04 NOTE — PROVIDER NOTIFICATION
11/04/21 1845   Provider Notification   Provider Name/Title Dr. Benton   Method of Notification Electronic Page   Request Evaluate-Remote   Notification Reason Other  (see note)   Patients most recent Blood pressure was 149/104. per orders I need to page with all blood pressures over 140/90. would you like me to continue this or would you like to change parameters?

## 2021-11-05 LAB
ALT SERPL W P-5'-P-CCNC: 20 U/L (ref 0–50)
AST SERPL W P-5'-P-CCNC: 26 U/L (ref 0–45)
CREAT SERPL-MCNC: 1.46 MG/DL (ref 0.52–1.04)
ERYTHROCYTE [DISTWIDTH] IN BLOOD BY AUTOMATED COUNT: 14.5 % (ref 10–15)
GFR SERPL CREATININE-BSD FRML MDRD: 46 ML/MIN/1.73M2
GLUCOSE BLDC GLUCOMTR-MCNC: 152 MG/DL (ref 70–99)
GLUCOSE BLDC GLUCOMTR-MCNC: 87 MG/DL (ref 70–99)
GLUCOSE BLDC GLUCOMTR-MCNC: 98 MG/DL (ref 70–99)
GLUCOSE BLDC GLUCOMTR-MCNC: 99 MG/DL (ref 70–99)
HCT VFR BLD AUTO: 32.7 % (ref 35–47)
HGB BLD-MCNC: 10.7 G/DL (ref 11.7–15.7)
MCH RBC QN AUTO: 30.7 PG (ref 26.5–33)
MCHC RBC AUTO-ENTMCNC: 32.7 G/DL (ref 31.5–36.5)
MCV RBC AUTO: 94 FL (ref 78–100)
PLATELET # BLD AUTO: 241 10E3/UL (ref 150–450)
RBC # BLD AUTO: 3.49 10E6/UL (ref 3.8–5.2)
TACROLIMUS BLD-MCNC: 6 UG/L (ref 5–15)
TME LAST DOSE: NORMAL H
TME LAST DOSE: NORMAL H
WBC # BLD AUTO: 22.1 10E3/UL (ref 4–11)

## 2021-11-05 PROCEDURE — 36415 COLL VENOUS BLD VENIPUNCTURE: CPT | Performed by: STUDENT IN AN ORGANIZED HEALTH CARE EDUCATION/TRAINING PROGRAM

## 2021-11-05 PROCEDURE — 80197 ASSAY OF TACROLIMUS: CPT | Performed by: STUDENT IN AN ORGANIZED HEALTH CARE EDUCATION/TRAINING PROGRAM

## 2021-11-05 PROCEDURE — 250N000012 HC RX MED GY IP 250 OP 636 PS 637: Performed by: STUDENT IN AN ORGANIZED HEALTH CARE EDUCATION/TRAINING PROGRAM

## 2021-11-05 PROCEDURE — 99233 SBSQ HOSP IP/OBS HIGH 50: CPT | Performed by: PHYSICIAN ASSISTANT

## 2021-11-05 PROCEDURE — 250N000013 HC RX MED GY IP 250 OP 250 PS 637: Performed by: STUDENT IN AN ORGANIZED HEALTH CARE EDUCATION/TRAINING PROGRAM

## 2021-11-05 PROCEDURE — 120N000002 HC R&B MED SURG/OB UMMC

## 2021-11-05 PROCEDURE — 250N000011 HC RX IP 250 OP 636: Performed by: STUDENT IN AN ORGANIZED HEALTH CARE EDUCATION/TRAINING PROGRAM

## 2021-11-05 PROCEDURE — 84460 ALANINE AMINO (ALT) (SGPT): CPT | Performed by: STUDENT IN AN ORGANIZED HEALTH CARE EDUCATION/TRAINING PROGRAM

## 2021-11-05 PROCEDURE — 250N000012 HC RX MED GY IP 250 OP 636 PS 637: Performed by: OBSTETRICS & GYNECOLOGY

## 2021-11-05 PROCEDURE — 250N000013 HC RX MED GY IP 250 OP 250 PS 637: Performed by: OBSTETRICS & GYNECOLOGY

## 2021-11-05 PROCEDURE — 82565 ASSAY OF CREATININE: CPT | Performed by: STUDENT IN AN ORGANIZED HEALTH CARE EDUCATION/TRAINING PROGRAM

## 2021-11-05 PROCEDURE — 85027 COMPLETE CBC AUTOMATED: CPT | Performed by: STUDENT IN AN ORGANIZED HEALTH CARE EDUCATION/TRAINING PROGRAM

## 2021-11-05 PROCEDURE — 84450 TRANSFERASE (AST) (SGOT): CPT | Performed by: STUDENT IN AN ORGANIZED HEALTH CARE EDUCATION/TRAINING PROGRAM

## 2021-11-05 PROCEDURE — 99231 SBSQ HOSP IP/OBS SF/LOW 25: CPT | Mod: GC | Performed by: OBSTETRICS & GYNECOLOGY

## 2021-11-05 RX ORDER — LABETALOL 200 MG/1
200 TABLET, FILM COATED ORAL EVERY 12 HOURS SCHEDULED
Status: DISCONTINUED | OUTPATIENT
Start: 2021-11-05 | End: 2021-11-07 | Stop reason: HOSPADM

## 2021-11-05 RX ORDER — SODIUM BICARBONATE 650 MG/1
650 TABLET ORAL 3 TIMES DAILY
Status: DISCONTINUED | OUTPATIENT
Start: 2021-11-05 | End: 2021-11-07 | Stop reason: HOSPADM

## 2021-11-05 RX ORDER — SODIUM CHLORIDE, SODIUM LACTATE, POTASSIUM CHLORIDE, CALCIUM CHLORIDE 600; 310; 30; 20 MG/100ML; MG/100ML; MG/100ML; MG/100ML
10-125 INJECTION, SOLUTION INTRAVENOUS CONTINUOUS
Status: DISCONTINUED | OUTPATIENT
Start: 2021-11-05 | End: 2021-11-05

## 2021-11-05 RX ORDER — LABETALOL HYDROCHLORIDE 5 MG/ML
20-80 INJECTION, SOLUTION INTRAVENOUS EVERY 10 MIN PRN
Status: DISCONTINUED | OUTPATIENT
Start: 2021-11-05 | End: 2021-11-07 | Stop reason: HOSPADM

## 2021-11-05 RX ORDER — HYDRALAZINE HYDROCHLORIDE 20 MG/ML
10 INJECTION INTRAMUSCULAR; INTRAVENOUS
Status: DISCONTINUED | OUTPATIENT
Start: 2021-11-05 | End: 2021-11-07 | Stop reason: HOSPADM

## 2021-11-05 RX ADMIN — SODIUM BICARBONATE 650 MG TABLET 650 MG: at 09:00

## 2021-11-05 RX ADMIN — DIPHENHYDRAMINE HYDROCHLORIDE 50 MG: 25 CAPSULE ORAL at 14:47

## 2021-11-05 RX ADMIN — OXYCODONE HYDROCHLORIDE 5 MG: 5 TABLET ORAL at 07:41

## 2021-11-05 RX ADMIN — OXYCODONE HYDROCHLORIDE 5 MG: 5 TABLET ORAL at 16:38

## 2021-11-05 RX ADMIN — DIPHENHYDRAMINE HYDROCHLORIDE 50 MG: 25 CAPSULE ORAL at 21:27

## 2021-11-05 RX ADMIN — OXYCODONE HYDROCHLORIDE 5 MG: 5 TABLET ORAL at 11:52

## 2021-11-05 RX ADMIN — LABETALOL HYDROCHLORIDE 200 MG: 200 TABLET, FILM COATED ORAL at 19:59

## 2021-11-05 RX ADMIN — TACROLIMUS 2 MG: 1 CAPSULE ORAL at 20:00

## 2021-11-05 RX ADMIN — SODIUM BICARBONATE 650 MG TABLET 650 MG: at 14:48

## 2021-11-05 RX ADMIN — ENOXAPARIN SODIUM 40 MG: 40 INJECTION SUBCUTANEOUS at 21:16

## 2021-11-05 RX ADMIN — DIPHENHYDRAMINE HYDROCHLORIDE 50 MG: 25 CAPSULE ORAL at 01:12

## 2021-11-05 RX ADMIN — PREDNISONE 5 MG: 5 TABLET ORAL at 09:00

## 2021-11-05 RX ADMIN — SODIUM BICARBONATE 650 MG TABLET 650 MG: at 19:59

## 2021-11-05 RX ADMIN — ACETAMINOPHEN 650 MG: 325 TABLET, FILM COATED ORAL at 07:41

## 2021-11-05 RX ADMIN — ACETAMINOPHEN 650 MG: 325 TABLET, FILM COATED ORAL at 01:12

## 2021-11-05 RX ADMIN — PRENATAL VITAMINS-IRON FUMARATE 27 MG IRON-FOLIC ACID 0.8 MG TABLET 1 TABLET: at 21:08

## 2021-11-05 RX ADMIN — DIPHENHYDRAMINE HYDROCHLORIDE 50 MG: 25 CAPSULE ORAL at 07:41

## 2021-11-05 RX ADMIN — ACETAMINOPHEN 650 MG: 325 TABLET, FILM COATED ORAL at 21:08

## 2021-11-05 RX ADMIN — TACROLIMUS 2 MG: 1 CAPSULE ORAL at 07:41

## 2021-11-05 RX ADMIN — ACETAMINOPHEN 650 MG: 325 TABLET, FILM COATED ORAL at 14:48

## 2021-11-05 RX ADMIN — OXYCODONE HYDROCHLORIDE 5 MG: 5 TABLET ORAL at 21:10

## 2021-11-05 ASSESSMENT — MIFFLIN-ST. JEOR: SCORE: 1217.96

## 2021-11-05 NOTE — DISCHARGE INSTRUCTIONS
Your blood sugars have returned to normal following delivery; you do not need to routinely check your blood sugars or take mediations for blood sugar management postpartum.     It is recommended that you have a repeat glucose tolerance test about 6 weeks postpartum, to ensure no ongoing issues with how your body manages sugar. This can be done through your primary doctor, or with maternal fetal medicine/OB. Continue to aim to follow a consistent carb/healthy diet, and increase activity as tolerated. Breastfeeding also helps to keep blood sugars under control following delivery    If you have  questions or concerns regarding your blood sugars or insulin, you may contact [phone] (the main hospital ). Ask to speak with the endocrinologist on call.      Thank you for letting the Diabetes Management Team be involved in your care!    Postop  Birth Instructions    Activity       Do not lift more than 10 pounds for 6 weeks after surgery.  Ask family and friends for help when you need it.    No driving until you have stopped taking your pain medications (usually two weeks after surgery).    No heavy exercise or activity for 6 weeks.  Don't do anything that will put a strain on your surgery site.    Don't strain when using the toilet.  Your care team may prescribe a stool softener if you have problems with your bowel movements.     To care for your incision:       Keep the incision clean and dry.    Do not soak your incision in water. No swimming or hot tubs until it has fully healed. You may soak in the bathtub if the water level is below your incision.    Do not use peroxide, gel, cream, lotion, or ointment on your incision.    Adjust your clothes to avoid pressure on your surgery site (check the elastic in your underwear for example).     You may see a small amount of clear or pink drainage and this is normal.  Check with your health care provider:       If the drainage increases or has an odor.    If  the incision reddens, you have swelling, or develop a rash.    If you have increased pain and the medicine we prescribed doesn't help.    If you have a fever above 100.4 F (38 C) with or without chills when placing thermometer under your tongue.   The area around your incision (surgery wound), will feel numb.  This is normal. The numbness should go away in less than a year.     Keep your hands clean:  Always wash your hands before touching your incision (surgery wound). This helps reduce your risk of infection. If your hands aren't dirty, you may use an alcohol hand-rub to clean your hands. Keep your nails clean and short.    Call your healthcare provider if you have any of these symptoms:       You soak a sanitary pad with blood within 1 hour, or you see blood clots larger than a golf ball.    Bleeding that lasts more than 6 weeks.    Vaginal discharge that smells bad.    Severe pain, cramping or tenderness in your lower belly area.    A need to urinate more frequently (use the toilet more often), more urgently (use the toilet very quickly), or it burns when you urinate.    Nausea and vomiting.    Redness, swelling or pain around a vein in your leg.    Problems breastfeeding or a red or painful area on your breast.    Chest pain and cough or are gasping for air.    Problems with coping with sadness, anxiety or depression. If you have concerns about hurting yourself or the baby, call your provider immediately.      You have questions or concerns after you return home.

## 2021-11-05 NOTE — PLAN OF CARE
Blood pressure remained elevated. Other vitals stable.   Lungs clear. Glucose checks-WDL. No coverage needed.  Voiding good amounts. Passing flatus.   Pt denies HA, visual disturbances & URQ discomfort.   I&O adequate. Pt asymptomatic with COVID-19.   Pt is pumping for her NICU baby.   Pt reports pain is mostly relieved with Meds & walking.   Will continue on supportive cares.

## 2021-11-05 NOTE — PLAN OF CARE
BP's my shift 126/85 and 137/97 (Dr. Benton made aware on rounds), other VSS.  Denies s/s of pre-e.  Also, denies s/s of covid.  Incentive spirometer given with instruction.  Postpartum assessment WNL, maintained strict I and O, daily weight down almost 2#.  Minimal pain reported over night, requiring only scheduled tylenol.  Also, taking benadryl Q6 for itching.  Vertical abdominal incision clean, dry and intact.  Assisted x1 one, per pt request, with emptying of her ileostomy.  Pt very quiet overnight, very polite and appropriate.  Enjoyed watching  infant in NICU via ipad webcam.   , Ceferino, slept at home last night and joined patient again early this morning.  Pt denies questions at this time, continue with plan of care.

## 2021-11-05 NOTE — CONSULTS
Diabetes CNS consult received for Yarelis Penny, age 36, currently 31w 4d pregnant who was admitted 11/2/21 with acute kidney injury superimposed on previously diagnosed pre-eclampsia. She has a complicated medical history including liver transplant for autoimmune hepatitis in 2015, primary sclerosing cholangitis, ulcerative colitis s/p colectomy and ileostomy, and hx CMV colitis and viremia. This is an IVF pregnancy, further complicated as above by pre-eclampsia without severe features, severe intrauterine growth retardation, and diet controlled gestational diabetes. She was also recently diagnosed with COVID 19 on 10/28/12; she is fully vaccinated but had not yet received her booster. She is stable from a respiratory standpoint. Now POD#1 s/p PLTCS for cat II.    Pregnancy complicated by GDM, MNT controlled, and s/p Betamethasone 11/2-11/3.  Currently has an order for rapid-acting insulin as correction if needed, but doubtful.  Inpatient Diabetes Service has seen patient.      Results for YARELIS PENNY (MRN [id number]) as of 11/5/2021 15:30   Ref. Range 11/4/2021 18:24 11/4/2021 22:15 11/5/2021 06:42 11/5/2021 07:49 11/5/2021 11:51   GLUCOSE BY METER POCT Latest Ref Range: 70 - 99 mg/dL 119 (H) 140 (H)  87 98     Patient will need 2 hour glucose challenge test at 6 week postpartum visit.    KAYLENE Engle  Diabetes Clinical Nurse Specialist/[facility]  [phone]

## 2021-11-05 NOTE — PROVIDER NOTIFICATION
11/05/21 1715   Provider Notification   Provider Name/Title Cristina, CLEO   Method of Notification Electronic Page   Notification Reason Vital Signs Change  (FYI: BP systolic 160)     Just FYI: pt had BP of 160/97 - recheck 30 min later was 147/97

## 2021-11-05 NOTE — LACTATION NOTE
"This note was copied from a baby's chart.  D:  I spoke to Yarelis via phone, Joanie is her first baby. She has a history of a liver transplant and colectomy/ileostomy secondary to ulcerative colitis; medications include lovenox (Hale L2), insulin (Hale L1), prednisone (Hale L1), tacrolimus (Hale L3), benadryl (Hale L2). She has no history of breast/chest surgery or trauma.  She has already started to pump, getting drops.   I:  Her nurse gave her the folder of introductory materials and went I over pumping guidelines via phone.  I reviewed physiology of colostrum and milk production, pumping guidelines, and I pointed out the log and encouraged her to use it. We talked about precautions with Covid 19 and pumping.  I explained how to access the videos \"Hand Expression\" and \"Maximizing Milk Production\"; as well as other helpful books and websites.   We discussed hands-on pumping techniques and usefulness of a hands-free pumping bra.  We discussed skin to skin holding and how to reach your breastfeeding goals.  We talked about medications during breastfeeding.  She verbalized understanding via teachback.  I advised her to call her insurance company about pump coverage.    A:  Mom has information she needs to initiate her supply.   P: Will continue to provide lactation support.    MALINDA Aldana, RN, IBCLC            "

## 2021-11-05 NOTE — PROGRESS NOTES
Post Partum Progress Note    Subjective:  Patient is doing well, pain well controlled. Tolerating PO, ambulating without any issues, voiding spontaneously, lochia within normal limits. She is reporting normal ostomy output. Denies any fever, chills, SOB, chest pain, N/V, headache, dizziness. Planning on breastfeeding. Undecided about birth control.     Objective:  Patient Vitals for the past 24 hrs:   BP Temp Temp src Pulse Resp SpO2 Weight   11/05/21 0546 -- -- -- -- -- -- 57.5 kg (126 lb 11.2 oz)   11/05/21 0533 (!) 137/97 98.6  F (37  C) Oral 61 15 100 % --   11/05/21 0110 126/85 98.6  F (37  C) Oral 61 16 100 % --   11/04/21 2208 (!) 131/98 98.2  F (36.8  C) Oral 61 16 99 % --   11/04/21 1818 (!) 149/104 -- -- 65 16 100 % --   11/04/21 1707 (!) 148/105 -- -- 64 16 100 % --   11/04/21 1600 (!) 143/103 98.8  F (37.1  C) Oral 73 16 100 % --   11/04/21 1500 (!) 139/106 98  F (36.7  C) Oral 67 16 100 % --   11/04/21 1400 (!) 137/104 98.1  F (36.7  C) Oral 71 18 100 % --   11/04/21 1326 (!) 136/99 98.1  F (36.7  C) Oral 78 18 100 % --   11/04/21 1300 130/88 -- -- -- -- -- --   11/04/21 1255 (!) 129/93 -- -- -- -- 99 % --   11/04/21 1250 -- -- -- -- -- 100 % --   11/04/21 1240 (!) 130/92 97.3  F (36.3  C) Axillary 69 18 100 % --   11/04/21 1225 (!) 126/90 -- -- -- 16 99 % --   11/04/21 1210 (!) 139/90 -- -- -- 18 100 % --   11/04/21 1155 115/80 -- -- 70 18 99 % --   11/04/21 1140 126/83 -- -- 81 -- 100 % --   11/04/21 1125 127/82 -- -- -- 16 99 % --   11/04/21 1111 115/68 98  F (36.7  C) Oral 77 15 100 % --   11/04/21 0800 -- -- -- -- -- 99 % --   11/04/21 0749 119/75 98  F (36.7  C) Oral 60 -- 99 % --   11/04/21 0739 -- -- -- 79 16 100 % --       General: NAD, resting comfortably  Resp: Normal rate and effort on room air  Cv: well perfused  Abd:  Soft, nontender, nondistended, fundus firm below the umbilicus  Incision: no erythema, no drainage, no induration  Ext:  No edema in bilateral LE      Intake/Output Summary  (Last 24 hours) at 2021 0649  Last data filed at 2021 0546  Gross per 24 hour   Intake 2440 ml   Output 3804 ml   Net -1364 ml     Ostomy: 350 mL yesterday, 150 mL since midnight    HGB  Recent Labs   Lab 21  0819 21  0643 21  0939   HGB 13.3 12.8 13.8       Assessment and Plan:  36 year old old  POD#1 s/p PLTCS for cat II. Doing well, starting to meet post-op goals.    # Postpartum Care  - Pain: Tylenol, oxycodone PRN.  - Heme: QBL 429mL > AM Hemoglobin pending. Asymptomatic from blood loss anemia; will discharge with oral iron if <10.   - GI: Regular diet. Bowel regimen. Antiemetics PRN. Tolerating PO  - : voiding spontaneously  - Rh positive, Rubella non-immune, MMR ordered  - breastfeeding  - undecided birth control  - PPx: Encourage ambulation, IS, SCDs while confined to bed, lovenox    # Pre-E w/o SF (elevated Cr) vs CKD  - no symptoms of pre-E w/ SF. Continue monitoring closely  - BP in high mild range. Per Dr. Bill, will hold off on starting BP meds at this time  - if BP >160/110, will treat with IV antihypertensives  - strict I/O, daily weights  - 1 week BP check     # CKD stage with ESTUARDO, possibly related to tacrolimus toxicity  - nephrology following (see their note for more details). Appreciate recommendations.   - sodium bicarb 650 mg 3 times/day  - per transplant team, continue tacrolimus 2mg BID. Will follow-up level in ~2 days    # Cholestasis of pregnancy vs hx of autoimmune hepatitis   # S/p live donor liver transplant ()  # Primary sclerosing cholangitis  # ulcerative colitis s/p colectomy w/ end ileostomy  - transplant team following, appreciate recommendations  - tacrolimus 2mg BID. Follow-up level tomorrow (trough)  - continue follow-up as outpatient    # GDMA1  - fasting blood glucose pending  - s/p endocrinology consult. Sliding scale ordered    # COVID-19 infection  - Tested positive 10/28, symptom onset 10/27  - Pt with mild symptoms (fatigue,  rhinorrhea) but is overall stable without hypoxia  - Will continue to monitor vital signs   - lovenox ppx postpartum    Anticipate discharge to home POD#3-4    To Benton MD  OB/GYN PGY-3  21 6:49 AM    Physician Attestation   I, Efrain Bill MD, saw this patient with the resident and agree with the resident/fellow's findings and plan of care as documented in the note.      I personally reviewed vital signs, medications and labs.    Key findings: In summary, Yarelis Penny is a  POD #1 s/p primary CS due to non-reassuring FHR tracing.  Yarelis is recovering well after CS and her baby is doing well in NICU.  She will clear her COVID precautions on .  Will plan repeat assessment of tacrolimus level tomorrow AM.    Efrain Bill MD  Date of Service (when I saw the patient): 21

## 2021-11-05 NOTE — PROGRESS NOTES
dis  Nephrology Progress Note  2021         Assessment & Recommendations:   35 Y/O F  at 31w4d liver transplant secondary to PSC status post liver transplant in , ulcerative colitis s/p colectomy and ileostomy, CKD stage 2 had interstitial nephritis leading to ESTUARDO requiring dialysis for 1 month.  ESTUARDO likely secondary to CNI toxicity complicated by hyperkalemia  -baseine creatinine of 1.0-1.1     ESTUARDO on CKDstage 2  likely secondary to CNI toxicity:  Patient reports that she was on tacrolimus level of 2.5 mg twice daily but was recently increased to 3 mg twice daily.  She does have hyperkalemia and an ESTUARDO.  Level of tacrolimus was 8.5 before the evening dose and in the morning it was 9.7.  Patient did not report any tremors. her morning dose was held.  Her baseline creatinine is 1.0-1.1.  Protein to creatinine ratio of 1.05.in  patient was seen by nephrology and at that time had a Biopsy biopsied at [facility] on 1/15/2013 which revealed acute on chronic interstitial nephritis, mesangial proliferative GN with C3 deposition (however with normal complement so doubt C3 glomerulopathy).  Had negative ANCA as well.  Has a moderate amount of fibrosis suggesting a fair bit of chronicity.  She's was treated with steroids for the AIN, and required dialysis for 1 month  -Please consult transplant hepatology for management of tacrolimus  -Continue with tacrolimus to 2 mg as per transplant coordinator  -Follow-up on the BMP  -Normal kappa lambda ratio  -no monoclonal antibody seen  -follow up with nephrology as outpatient. Nephrology will sign off, if any further concern feel free to contact.     Hyperkalemia:(resolving)  Patient had a potassium of 5.8 now 5.2 probably due to tacrolimus toxicity.       recommendations were communicated to primary team via note     Non anion gap metabolic acidosis likely secondary to ESTUARDO:  Patient has bicarb of 17  - start bicarb 650 mg 3 times daily         discussed with   "Irwin Parsons MD   [phone]         Interval History :   Nursing and provider notes from last 24 hours reviewed.  Chart reviewed as patient is on [employer].      Physical Exam:   I/O last 3 completed shifts:  In: 2310 [P.O.:2310]  Out: 4650 [Urine:4200; Stool:450]   BP (!) 147/97   Pulse 61   Temp 98.7  F (37.1  C) (Oral)   Resp 16   Ht 1.575 m (5' 2\")   Wt 57.5 kg (126 lb 11.2 oz)   LMP 03/26/2021   SpO2 100%   Breastfeeding Unknown   BMI 23.17 kg/m       Did not do examination as patient is on [email or web address]    Labs:   All labs reviewed by me  Electrolytes/Renal -   Recent Labs   Lab Test 11/05/21  1151 11/05/21  0749 11/05/21  0642 11/04/21  2215 11/04/21  1824 11/04/21  1756 11/04/21  0823 11/04/21  0819 11/03/21  1431 11/03/21  1058 06/23/19  0732 06/21/19  0734 05/07/18  1002 04/12/18  0810 04/02/18  0829 03/15/18  0910 01/18/18  0926 01/18/18  0926   NA  --   --   --   --   --  135  --  137  --  137   < > 139   < > 140   < > 136   < > 141   POTASSIUM  --   --   --   --   --  5.2  --  5.4*  --  5.0   < > 4.2   < > 4.0   < > 4.2   < > 3.8   CHLORIDE  --   --   --   --   --  114*  --  115*  --  110*   < > 107   < > 107   < > 107   < > 111*   CO2  --   --   --   --   --  17*  --  14*  --  16*   < > 27   < > 24   < > 22   < > 22   BUN  --   --   --   --   --  38*  --  43*  --  48*   < > 18   < > 23   < > 33*   < > 18   CR  --   --  1.46*  --   --  1.53*  --  1.65*   < > 1.70*   < > 1.00   < > 0.87   < > 0.90   < > 0.95   GLC 98 87  --  140*   < > 136*   < > 113*   < > 162*   < > 89   < > 78   < > 119*   < > 78   KERLINE  --   --   --   --   --  7.3*  --  8.2*  --  7.9*   < > 8.0*   < > 9.3   < > 9.1   < > 7.9*   MAG  --   --   --   --   --   --   --   --   --   --   --  1.6  --  1.2*  --  1.6   < > 1.2*   PHOS  --   --   --   --   --   --   --   --   --   --   --   --   --  3.9  --  2.6  --  3.0    < > = values in this interval not displayed.       CBC -   Recent Labs   Lab Test 11/05/21  0642 " "11/04/21  0819 11/03/21  0643   WBC 22.1* 16.4* 8.2   HGB 10.7* 13.3 12.8    295 285       LFTs -   Recent Labs   Lab Test 11/05/21  0642 11/04/21  0819 11/03/21  1058 11/03/21  0643 11/03/21  0643   ALKPHOS  --  117 125  --  125   BILITOTAL  --  0.2 <0.1*  --  0.1*   ALT 20 24 22   < > 22   AST 26 22 25   < > 21   PROTTOTAL  --  6.0* 5.6*  --  5.6*   ALBUMIN  --  1.8* 2.1*  --  1.9*    < > = values in this interval not displayed.       Iron Panel -   Recent Labs   Lab Test 10/25/17  1400 05/02/17  0730 05/02/17  0730 04/29/16  0838 04/20/16  0913   IRON 19*  --  12* 41  --    IRONSAT 6*  --  4* 30  --    CHERRI 28   < > 6*  --    < >    < > = values in this interval not displayed.         Imaging:  All imaging studies reviewed by me.     Current Medications:    acetaminophen  650 mg Oral Q6H     enoxaparin ANTICOAGULANT  40 mg Subcutaneous Q24H     insulin aspart  1-7 Units Subcutaneous TID AC     insulin aspart  1-5 Units Subcutaneous At Bedtime     predniSONE  5 mg Oral Daily     prenatal multivitamin w/iron  1 tablet Oral Daily     senna-docusate  1 tablet Oral BID    Or     senna-docusate  2 tablet Oral BID     sodium bicarbonate  650 mg Oral TID     sodium chloride (PF)  3 mL Intracatheter Q8H     tacrolimus  2 mg Oral BID       bupivacaine liposome (EXPAREL) LA inj was given in the infiltration site to produce post-op analgesia. Duration of action is up to 72 hours. Other \"martir\" meds should not be given for 96 hours except for lidocaine 4% patch. This is for INFORMATION ONLY.       dextrose 5% lactated ringers       - MEDICATION INSTRUCTIONS -       - MEDICATION INSTRUCTIONS -       oxytocin in 0.9% NaCl       oxytocin in 0.9% NaCl       Danielito Parsons MD     I have discussed this patient with the resident.  We did not see her. This note reflects our joint assessment and plan.     Shayy Gayle MD  "

## 2021-11-06 LAB
ALBUMIN SERPL-MCNC: 1.5 G/DL (ref 3.4–5)
ALP SERPL-CCNC: 90 U/L (ref 40–150)
ALT SERPL W P-5'-P-CCNC: 19 U/L (ref 0–50)
ANION GAP SERPL CALCULATED.3IONS-SCNC: 8 MMOL/L (ref 3–14)
AST SERPL W P-5'-P-CCNC: 23 U/L (ref 0–45)
BILIRUB SERPL-MCNC: 0.1 MG/DL (ref 0.2–1.3)
BUN SERPL-MCNC: 28 MG/DL (ref 7–30)
CALCIUM SERPL-MCNC: 8 MG/DL (ref 8.5–10.1)
CHLORIDE BLD-SCNC: 111 MMOL/L (ref 94–109)
CO2 SERPL-SCNC: 22 MMOL/L (ref 20–32)
CREAT SERPL-MCNC: 1.41 MG/DL (ref 0.52–1.04)
GFR SERPL CREATININE-BSD FRML MDRD: 48 ML/MIN/1.73M2
GLUCOSE BLD-MCNC: 76 MG/DL (ref 70–99)
GLUCOSE BLDC GLUCOMTR-MCNC: 108 MG/DL (ref 70–99)
POTASSIUM BLD-SCNC: 3.8 MMOL/L (ref 3.4–5.3)
PROT SERPL-MCNC: 5.1 G/DL (ref 6.8–8.8)
SODIUM SERPL-SCNC: 141 MMOL/L (ref 133–144)
TACROLIMUS BLD-MCNC: 4.3 UG/L (ref 5–15)
TME LAST DOSE: ABNORMAL H
TME LAST DOSE: ABNORMAL H

## 2021-11-06 PROCEDURE — 80053 COMPREHEN METABOLIC PANEL: CPT | Performed by: OBSTETRICS & GYNECOLOGY

## 2021-11-06 PROCEDURE — 120N000002 HC R&B MED SURG/OB UMMC

## 2021-11-06 PROCEDURE — 250N000012 HC RX MED GY IP 250 OP 636 PS 637: Performed by: OBSTETRICS & GYNECOLOGY

## 2021-11-06 PROCEDURE — 250N000013 HC RX MED GY IP 250 OP 250 PS 637: Performed by: OBSTETRICS & GYNECOLOGY

## 2021-11-06 PROCEDURE — 36415 COLL VENOUS BLD VENIPUNCTURE: CPT | Performed by: OBSTETRICS & GYNECOLOGY

## 2021-11-06 PROCEDURE — 250N000011 HC RX IP 250 OP 636: Performed by: STUDENT IN AN ORGANIZED HEALTH CARE EDUCATION/TRAINING PROGRAM

## 2021-11-06 PROCEDURE — 80197 ASSAY OF TACROLIMUS: CPT | Performed by: STUDENT IN AN ORGANIZED HEALTH CARE EDUCATION/TRAINING PROGRAM

## 2021-11-06 PROCEDURE — 250N000013 HC RX MED GY IP 250 OP 250 PS 637: Performed by: STUDENT IN AN ORGANIZED HEALTH CARE EDUCATION/TRAINING PROGRAM

## 2021-11-06 PROCEDURE — 250N000012 HC RX MED GY IP 250 OP 636 PS 637: Performed by: STUDENT IN AN ORGANIZED HEALTH CARE EDUCATION/TRAINING PROGRAM

## 2021-11-06 RX ADMIN — SODIUM BICARBONATE 650 MG TABLET 650 MG: at 08:29

## 2021-11-06 RX ADMIN — LABETALOL HYDROCHLORIDE 200 MG: 200 TABLET, FILM COATED ORAL at 20:27

## 2021-11-06 RX ADMIN — PRENATAL VITAMINS-IRON FUMARATE 27 MG IRON-FOLIC ACID 0.8 MG TABLET 1 TABLET: at 20:27

## 2021-11-06 RX ADMIN — PREDNISONE 5 MG: 5 TABLET ORAL at 08:30

## 2021-11-06 RX ADMIN — SODIUM BICARBONATE 650 MG TABLET 650 MG: at 20:26

## 2021-11-06 RX ADMIN — OXYCODONE HYDROCHLORIDE 5 MG: 5 TABLET ORAL at 09:24

## 2021-11-06 RX ADMIN — ACETAMINOPHEN 650 MG: 325 TABLET, FILM COATED ORAL at 16:44

## 2021-11-06 RX ADMIN — ACETAMINOPHEN 650 MG: 325 TABLET, FILM COATED ORAL at 04:09

## 2021-11-06 RX ADMIN — ENOXAPARIN SODIUM 40 MG: 40 INJECTION SUBCUTANEOUS at 20:27

## 2021-11-06 RX ADMIN — LABETALOL HYDROCHLORIDE 200 MG: 200 TABLET, FILM COATED ORAL at 08:30

## 2021-11-06 RX ADMIN — ACETAMINOPHEN 650 MG: 325 TABLET, FILM COATED ORAL at 23:21

## 2021-11-06 RX ADMIN — TACROLIMUS 2 MG: 1 CAPSULE ORAL at 08:29

## 2021-11-06 RX ADMIN — TACROLIMUS 2 MG: 1 CAPSULE ORAL at 20:20

## 2021-11-06 RX ADMIN — OXYCODONE HYDROCHLORIDE 5 MG: 5 TABLET ORAL at 04:09

## 2021-11-06 RX ADMIN — OXYCODONE HYDROCHLORIDE 5 MG: 5 TABLET ORAL at 20:26

## 2021-11-06 RX ADMIN — SODIUM BICARBONATE 650 MG TABLET 650 MG: at 14:35

## 2021-11-06 RX ADMIN — DIPHENHYDRAMINE HYDROCHLORIDE 50 MG: 25 CAPSULE ORAL at 04:10

## 2021-11-06 RX ADMIN — OXYCODONE HYDROCHLORIDE 5 MG: 5 TABLET ORAL at 14:33

## 2021-11-06 RX ADMIN — ACETAMINOPHEN 650 MG: 325 TABLET, FILM COATED ORAL at 10:36

## 2021-11-06 ASSESSMENT — MIFFLIN-ST. JEOR: SCORE: 1207.13

## 2021-11-06 NOTE — LACTATION NOTE
This note was copied from a baby's chart.  Dropped off belly band for mom to use as hands free pumping bra with [facility] RN. Discussed using belly band with mom on phone call yesterday. Will continue to follow.

## 2021-11-06 NOTE — PLAN OF CARE
VSS and postpartum assessments WDL, ex ongoing hypertension; MD ordered BID Labetalol. One dose given on this shift prior to vitals, so effect yet to be seen.    Blood sugar checks were ACHS (per orders, they are able to be discontinued due to being in 70-99 range for 24 hrs) but bedtime BG check was 152. Of note, the patient had eaten pizza about an hour prior to the reading, which may have skewed the results.    Up ad cassius with steady gait and independent with cares. Infant, Allegra in the NICU (but due to covid precautions (which are in place until 11/7) she is not able to visit her in person; she does have an iPad at her bedside that is always displaying Allegra in real time, and she watches it frequently.  Pain managed with tylenol and oxycodone.  PIV in right, flushes well.  Ceferino is present and very supportive.  Will continue with postpartum cares and education per plan of care.

## 2021-11-06 NOTE — PLAN OF CARE
Data: Vital signs normal except BP been on and off slightly elevated. Postpartum checks WDL  Patient eating and drinking normally. Patient able to empty bladder independently and is up ambulating.  Patient performing self cares and is able to pump for infant in NICU.   Action: Patient medicated during the shift for pain with Tylenol and Oxycodone with relief after 1 hour. Taking Benadryl for itching. Patient education done see education record.  Response: Support persons  present.    Plan: Continue with the plan of care / Maintained on  especial precaution due to +Covid.

## 2021-11-06 NOTE — PROGRESS NOTES
Post Partum Progress Note 11/06/21    Subjective: Patient is doing well, pain well controlled. Tolerating PO, ambulating without any issues, voiding spontaneously, lochia within normal limits. She is reporting normal ostomy output. Denies any fever, chills, SOB, chest pain, N/V, headache, dizziness. Planning on breastfeeding. Undecided about birth control. Excited to be able to see her baby tomorrow.     Objective:  Patient Vitals for the past 24 hrs:   BP Temp Temp src Pulse Resp Weight   11/06/21 0830 111/85 -- -- -- -- --   11/06/21 0819 111/85 98.1  F (36.7  C) Oral 77 16 --   11/06/21 0422 -- -- -- -- -- 56.4 kg (124 lb 5 oz)   11/06/21 0409 (!) 128/95 99  F (37.2  C) Oral 68 17 --   11/05/21 2359 123/82 98.7  F (37.1  C) Oral 69 16 --   11/05/21 2012 (!) 138/98 -- -- 62 -- --   11/05/21 1638 (!) 147/97 -- -- 61 -- --   11/05/21 1554 (!) 160/97 98.7  F (37.1  C) Oral 64 17 --   11/05/21 1152 (!) 132/95 97.8  F (36.6  C) Oral 74 16 --     General: NAD, resting comfortably  Resp: Normal rate and effort on room air  Cv: well perfused  Abd:  Soft, nontender, nondistended, fundus firm below the umbilicus  Incision: no erythema, no drainage, no induration  Ext: No edema in bilateral LE    Intake/Output 11/05:  Intake: 3050 mL  Output: 4825 mL (83.91 mL/kg)   Net: -1775 mL  Since 0000 today: 1400 mL output (24.82 mL/kg)     Labs:  Component      Latest Ref Rng & Units 11/6/2021             Sodium      133 - 144 mmol/L 141   Potassium      3.4 - 5.3 mmol/L 3.8   Chloride      94 - 109 mmol/L 111 (H)   Carbon Dioxide      20 - 32 mmol/L 22   Anion Gap      3 - 14 mmol/L 8   Urea Nitrogen      7 - 30 mg/dL 28   Creatinine      0.52 - 1.04 mg/dL 1.41 (H)   Calcium      8.5 - 10.1 mg/dL 8.0 (L)   Glucose      70 - 99 mg/dL 76   Alkaline Phosphatase      40 - 150 U/L 90   AST      0 - 45 U/L 23   ALT      0 - 50 U/L 19   Protein Total      6.8 - 8.8 g/dL 5.1 (L)   Albumin      3.4 - 5.0 g/dL 1.5 (L)   Bilirubin Total       0.2 - 1.3 mg/dL 0.1 (L)   GFR Estimate      >60 mL/min/1.73m2 48 (L)   Cr trend: 1.65 > 1.53 > 1.46 > 1.41   Urine Culture: >10-50,000 CFU/mL of mixed urogenital neil    Assessment and Plan: 36 year old old  POD#2 s/p PLTCS for cat II. Doing well, meeting post-op goals.    Postpartum Care  - Pain: Tylenol, oxycodone PRN.   - Heme: QBL 429mL > AM Hemoglobin 10.7. Asymptomatic from blood loss anemia; will discharge with oral iron if <10.   - GI: Regular diet. Bowel regimen. Antiemetics PRN. Tolerating PO  - : voiding spontaneously  - Rh positive, Rubella non-immune, MMR ordered  - breastfeeding  - undecided birth control  - PPx: Encourage ambulation, IS, SCDs while confined to bed, lovenox    Pre-E w/o SF (elevated Cr) vs CKD  - no symptoms of pre-E w/ SF. Continue monitoring closely  - BP in high mild range.Will hold off on starting BP meds at this time.  - if BP >160/>110, will treat with prn IV antihypertensives  - strict I/O, daily weights  - 1 week BP check     CKD stage 2 with ESTUARDO, possibly related to tacrolimus toxicity  - Cr has continued to decrease (1.65 > 1.53 > 1.46 > 1.41 today)  - nephrology following (see their note for more details). Appreciate recommendations.   - sodium bicarb 650 mg 3 times/day  - per transplant team, continue tacrolimus 2mg BID.     Cholestasis of pregnancy vs hx of autoimmune hepatitis   S/p live donor liver transplant (2015)  Primary sclerosing cholangitis  Ulcerative colitis s/p colectomy w/ end ileostomy  - transplant team following, appreciate recommendations  - tacrolimus 2mg BID. Follow-up level today (trough) - pending.  - continue follow-up as outpatient    GDMA1  - fasting blood glucose pending  - s/p endocrinology consult. Sliding scale ordered    COVID-19 infection  - Tested positive 10/28, symptom onset 10/27  - Pt currently asymptomatic, has never required oxygen supplementation   - Will continue to monitor vital signs   - lovenox ppx postpartum    Dispo:  Anticipate discharge to home POD#3-4, currently stable and doing well.     I discussed this patient's case with Dr. Brower who is in agreement with this plan.     Shayna Lara, MS4    Physician Attestation   I, Cyn Brower, was present with the medical/DOTTY student who participated in the service and in the documentation of the note.  I have verified the history and personally performed the physical exam and medical decision making.  I agree with the assessment and plan of care as documented in the note.      I personally reviewed vital signs, medications and labs.    Yarelis is feeling well.  Meeting postoperative goals.  One day left of quarantine and will be able to see infant tomorrow in NICU.  Plan for discharge tomorrow so long as blood pressures remain stable    Cyn Brower MD  Date of Service (when I saw the patient): 11/06/21

## 2021-11-06 NOTE — PLAN OF CARE
Stable postpartum, pain well managed with medication. Pumping and collecting a small amount of breast milk. Independent with self cares.

## 2021-11-06 NOTE — PROVIDER NOTIFICATION
11/06/21 1800   Provider Notification   Provider Name/Title Sissy Spicer G3   Method of Notification Electronic Page   Notification Reason   (stop BG checks or continue?)     Previous RN said BG checks were discontinued for this pt, but orders are still there. Should we keep checking them? Thank you!    ---  G3 called back and said BG checks can be discontinued. She canceled the orders.

## 2021-11-07 VITALS
WEIGHT: 124.38 LBS | SYSTOLIC BLOOD PRESSURE: 125 MMHG | HEIGHT: 62 IN | DIASTOLIC BLOOD PRESSURE: 98 MMHG | BODY MASS INDEX: 22.89 KG/M2 | TEMPERATURE: 98.9 F | OXYGEN SATURATION: 100 % | RESPIRATION RATE: 16 BRPM | HEART RATE: 101 BPM

## 2021-11-07 LAB
ANION GAP SERPL CALCULATED.3IONS-SCNC: 4 MMOL/L (ref 3–14)
BUN SERPL-MCNC: 23 MG/DL (ref 7–30)
CALCIUM SERPL-MCNC: 8.1 MG/DL (ref 8.5–10.1)
CHLORIDE BLD-SCNC: 110 MMOL/L (ref 94–109)
CO2 SERPL-SCNC: 23 MMOL/L (ref 20–32)
CREAT SERPL-MCNC: 1.36 MG/DL (ref 0.52–1.04)
GFR SERPL CREATININE-BSD FRML MDRD: 50 ML/MIN/1.73M2
GLUCOSE BLD-MCNC: 82 MG/DL (ref 70–99)
HOLD SPECIMEN: NORMAL
POTASSIUM BLD-SCNC: 4.2 MMOL/L (ref 3.4–5.3)
SODIUM SERPL-SCNC: 137 MMOL/L (ref 133–144)
TACROLIMUS BLD-MCNC: 4.2 UG/L (ref 5–15)
TME LAST DOSE: ABNORMAL H
TME LAST DOSE: ABNORMAL H

## 2021-11-07 PROCEDURE — 250N000012 HC RX MED GY IP 250 OP 636 PS 637: Performed by: OBSTETRICS & GYNECOLOGY

## 2021-11-07 PROCEDURE — 250N000013 HC RX MED GY IP 250 OP 250 PS 637: Performed by: STUDENT IN AN ORGANIZED HEALTH CARE EDUCATION/TRAINING PROGRAM

## 2021-11-07 PROCEDURE — 80048 BASIC METABOLIC PNL TOTAL CA: CPT | Performed by: STUDENT IN AN ORGANIZED HEALTH CARE EDUCATION/TRAINING PROGRAM

## 2021-11-07 PROCEDURE — 80197 ASSAY OF TACROLIMUS: CPT | Performed by: STUDENT IN AN ORGANIZED HEALTH CARE EDUCATION/TRAINING PROGRAM

## 2021-11-07 PROCEDURE — 36415 COLL VENOUS BLD VENIPUNCTURE: CPT | Performed by: OBSTETRICS & GYNECOLOGY

## 2021-11-07 PROCEDURE — 36415 COLL VENOUS BLD VENIPUNCTURE: CPT | Performed by: STUDENT IN AN ORGANIZED HEALTH CARE EDUCATION/TRAINING PROGRAM

## 2021-11-07 PROCEDURE — 250N000012 HC RX MED GY IP 250 OP 636 PS 637: Performed by: STUDENT IN AN ORGANIZED HEALTH CARE EDUCATION/TRAINING PROGRAM

## 2021-11-07 RX ORDER — LABETALOL 200 MG/1
200 TABLET, FILM COATED ORAL EVERY 12 HOURS
Qty: 120 TABLET | Refills: 1 | Status: SHIPPED | OUTPATIENT
Start: 2021-11-07 | End: 2022-07-22

## 2021-11-07 RX ORDER — OXYCODONE HYDROCHLORIDE 5 MG/1
5 TABLET ORAL EVERY 6 HOURS PRN
Qty: 12 TABLET | Refills: 0 | Status: SHIPPED | OUTPATIENT
Start: 2021-11-07 | End: 2021-11-10

## 2021-11-07 RX ORDER — SENNA AND DOCUSATE SODIUM 50; 8.6 MG/1; MG/1
1 TABLET, FILM COATED ORAL AT BEDTIME
Qty: 30 TABLET | Refills: 0 | Status: SHIPPED | OUTPATIENT
Start: 2021-11-07 | End: 2022-04-05

## 2021-11-07 RX ORDER — MODIFIED LANOLIN
OINTMENT (GRAM) TOPICAL
Qty: 30 G | Refills: 0 | Status: SHIPPED | OUTPATIENT
Start: 2021-11-07 | End: 2022-07-22

## 2021-11-07 RX ORDER — ACETAMINOPHEN 325 MG/1
650 TABLET ORAL EVERY 6 HOURS
Qty: 45 TABLET | Refills: 0 | Status: SHIPPED | OUTPATIENT
Start: 2021-11-07 | End: 2024-08-09

## 2021-11-07 RX ADMIN — SODIUM BICARBONATE 650 MG TABLET 650 MG: at 14:06

## 2021-11-07 RX ADMIN — SODIUM BICARBONATE 650 MG TABLET 650 MG: at 08:45

## 2021-11-07 RX ADMIN — ACETAMINOPHEN 650 MG: 325 TABLET, FILM COATED ORAL at 08:45

## 2021-11-07 RX ADMIN — DOCUSATE SODIUM AND SENNOSIDES 1 TABLET: 8.6; 5 TABLET ORAL at 08:45

## 2021-11-07 RX ADMIN — LABETALOL HYDROCHLORIDE 200 MG: 200 TABLET, FILM COATED ORAL at 08:45

## 2021-11-07 RX ADMIN — TACROLIMUS 2 MG: 1 CAPSULE ORAL at 08:45

## 2021-11-07 RX ADMIN — PREDNISONE 5 MG: 5 TABLET ORAL at 08:45

## 2021-11-07 ASSESSMENT — MIFFLIN-ST. JEOR: SCORE: 1207.41

## 2021-11-07 NOTE — PROGRESS NOTES
Pt slated to discharge soon but does not have labetalol ordered which she had been on inpatient. Do you still want her to take it at home? Dr. Bradley/TERESA text paged and updated.

## 2021-11-07 NOTE — PLAN OF CARE
VSS. Afebrile. Up ad cassius. Down to NICU often to see baby. Pumping and getting about 15cc at a time.  Rental pump given to pt. PIV discontinued and taken out. Discharge instructions and meds reviewed and given to pt. Labetelol discharge medication called into pt's [facility] for  later today. Also pt's tacrolimus level was ordered as a standing order by mistake. Spoke with lab and states will be able to run lab add on for tomorrow morning. Pt instructed to touch base with transplant team tomorrow for results so meds can be adjusted as needed. Discharged to home with .         Late discharge. Pt went to NICU at 1200 and did not return to unit until 1400. Pt discharge meds did not get onto unit until 1500. Pt discharged at 1500 after received discharge meds.

## 2021-11-07 NOTE — PLAN OF CARE
VSS and postpartum assessments WDL, ex ongoing mildly high blood pressured (mainly just diastolic). Up ad cassius with steady gait and independent with cares. Pumping on cue and getting ~5-7mL per pumping. They have been unable to visit their infant in the NICU, but the covid precautions end at 0000 this evening, so the are both very excited to visit  Pain managed with tylenol and oxycodone.  PIV in right; flushes well.  Ceferino is present and very supportive. Will continue with postpartum cares and education per plan of care.

## 2021-11-07 NOTE — LACTATION NOTE
This note was copied from a baby's chart.  D/I: Supportive check in with Yarelis in person (admission done on phone originally). Brief review of lactation admission information, she has been using a nursing bra to hold on pump parts and did receive the belly band yesterday. She is pumping every 3hours during the day, every 4hours at night getting 12-15ml per pumping. Pumping has been comfortable for her; we reviewed when to switch to maintain on pump. She was holding Joanie skin to skin for the first time (with bra on), I encouraged her to hold next time without a bra to optimize skin to skin. She is hoping to discharge to home today. Both parents very excited to be able to spend time with Joanie.   A: Brief review, milk supply starting to come in.   P: Will continue to provide lactation support.    MALINDA Aldana, RN, IBCLC

## 2021-11-07 NOTE — PLAN OF CARE
Data: Postpartum checks WDL, BP still slightly elevated.   Patient eating and drinking normally. Patient able to empty bladder independently and is up ambulating.  Patient performing self cares and is able to visit infant in NICU after midnight. Pumping independently.  Action: Patient medicated during the shift for pain with Tylenol with relief after 1 hour. Patient education done see education record.  Response:  Support persons  present.    Plan: Continue with the plan of care

## 2021-11-07 NOTE — PROGRESS NOTES
Post Partum Progress Note 21    Subjective: Patient is doing well, pain well controlled. Tolerating PO, ambulating without any issues, voiding spontaneously, lochia within normal limits. Meeting all post op milestones.  Ready for discharge    Objective:  Patient Vitals for the past 24 hrs:   BP Temp Temp src Pulse Resp Weight   21 0844 (!) 125/98 -- -- 101 16 --   21 0400 (!) 139/98 99.6  F (37.6  C) Oral 87 16 56.4 kg (124 lb 6 oz)   21 2321 (!) 140/93 99.4  F (37.4  C) Oral 82 14 --   21 2027 (!) 121/94 -- -- 87 -- --   21 1530 (!) 128/91 97.8  F (36.6  C) Oral 77 18 --     General: NAD, resting comfortably  Resp: Normal rate and effort on room air  Cv: well perfused  Abd:  Soft, nontender, nondistended, fundus firm below the umbilicus  Incision: no erythema, no drainage, no induration  Ext: No edema in bilateral LE      Assessment and Plan: 36 year old old  POD#3 s/p PLTCS for cat II. Doing well, meeting post-op goals.    Postpartum Care  - Pain: Tylenol, oxycodone PRN.   - Heme: QBL 429mL > AM Hemoglobin 10.7. Asymptomatic from blood loss anemia; will discharge with oral iron if <10.   - GI: Regular diet. Bowel regimen. Antiemetics PRN. Tolerating PO  - : voiding spontaneously  - Rh positive, Rubella non-immune, MMR ordered  - breastfeeding  - undecided birth control  - PPx: Encourage ambulation, IS, SCDs while confined to bed, lovenox x30 days due to COVID    Pre-E w/o SF (elevated Cr) vs CKD  - no symptoms of pre-E w/ SF. Continue monitoring closely  - BP in high mild range.Will hold off on starting BP meds at this time.  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   - sodium bicarb 650 mg 3 times/day  - per transplant team,  continue tacrolimus 2mg BID.   - will have transplant follow up this week    Cholestasis of pregnancy vs hx of autoimmune hepatitis   S/p live donor liver transplant (2015)  Primary sclerosing cholangitis  Ulcerative colitis s/p colectomy w/ end ileostomy  - tacrolimus 2mg BID.   - continue follow-up as outpatient    COVID-19 infection  - Tested positive 10/28, symptom onset 10/27, now out of window  - Pt currently asymptomatic, has never required oxygen supplementation   - Will continue to monitor vital signs   - lovenox ppx postpartum    Dispo: Discharge today    Cyn Brower MD  Maternal Fetal Medicine  11/7/21  10:44 AM

## 2021-11-08 DIAGNOSIS — O14.93 PRE-ECLAMPSIA IN THIRD TRIMESTER: Primary | ICD-10-CM

## 2021-11-10 ENCOUNTER — OFFICE VISIT (OUTPATIENT)
Dept: MATERNAL FETAL MEDICINE | Facility: CLINIC | Age: 36
End: 2021-11-10
Attending: OBSTETRICS & GYNECOLOGY
Payer: COMMERCIAL

## 2021-11-10 VITALS
DIASTOLIC BLOOD PRESSURE: 97 MMHG | HEART RATE: 84 BPM | OXYGEN SATURATION: 100 % | BODY MASS INDEX: 21.34 KG/M2 | WEIGHT: 116.7 LBS | SYSTOLIC BLOOD PRESSURE: 135 MMHG

## 2021-11-10 DIAGNOSIS — O14.93 PRE-ECLAMPSIA IN THIRD TRIMESTER: Primary | ICD-10-CM

## 2021-11-10 PROCEDURE — G0463 HOSPITAL OUTPT CLINIC VISIT: HCPCS

## 2021-11-10 NOTE — PROGRESS NOTES
[facility] PPV    S: No c/o HA, change in vision of epigastric pain. VB is not excessive    O:  BP (!) 135/97 (BP Location: Left arm, Patient Position: Sitting, Cuff Size: Adult Regular)   Pulse 84   Wt 52.9 kg (116 lb 11.2 oz)   LMP 03/26/2021   SpO2 100%   BMI 21.34 kg/m      A/P:  Follow-up today for BP assessment given recent discharge on 11/8 for delivery with preeclampsia. She is on labetalol 200 bid.   - Patient will begin home BP monitoring and call with BP > 150/100   - Follow-up is planned at [facility] in 4 weeks otherwise.    Damion Ta MD  Maternal-Fetal Medicine

## 2021-11-10 NOTE — PROGRESS NOTES
Pt presents to [facility] for bp pp check. Pt states she is doing well at this time. Pt states she was discharged on Sunday night and was able to see her baby at that time as her Covid quarantine was up then. States her incision is still sore. Is taking tylenol as needed for pain. Incision is CDI. States she is still having a little bit of vaginal bleeding. Is pumping and states she is going to meet with Lactation. Pt BP was labile today. Will continue to take her BP medication. Needs to follow up with transplant. States she will make her apt. Continues on Lovenox for 6 weeks. Knows when to come in or call with further concerns. Pt was seen by Dr. Ta. Will follow up for PPV with either [facility] or with [facility]. Discharged stable at this time.Cora Westfall RN

## 2021-11-11 DIAGNOSIS — Z94.4 LIVER REPLACED BY TRANSPLANT (H): Primary | ICD-10-CM

## 2021-11-11 ASSESSMENT — PATIENT HEALTH QUESTIONNAIRE - PHQ9: SUM OF ALL RESPONSES TO PHQ QUESTIONS 1-9: 0

## 2021-11-15 ENCOUNTER — LAB (OUTPATIENT)
Dept: LAB | Facility: CLINIC | Age: 36
End: 2021-11-15
Payer: COMMERCIAL

## 2021-11-15 ENCOUNTER — TELEPHONE (OUTPATIENT)
Dept: TRANSPLANT | Facility: CLINIC | Age: 36
End: 2021-11-15

## 2021-11-15 DIAGNOSIS — Z94.4 LIVER REPLACED BY TRANSPLANT (H): ICD-10-CM

## 2021-11-15 LAB
ALBUMIN SERPL-MCNC: 2.2 G/DL (ref 3.4–5)
ALP SERPL-CCNC: 271 U/L (ref 40–150)
ALT SERPL W P-5'-P-CCNC: 105 U/L (ref 0–50)
ANION GAP SERPL CALCULATED.3IONS-SCNC: 3 MMOL/L (ref 3–14)
AST SERPL W P-5'-P-CCNC: 68 U/L (ref 0–45)
BILIRUB DIRECT SERPL-MCNC: <0.1 MG/DL (ref 0–0.2)
BILIRUB SERPL-MCNC: 0.2 MG/DL (ref 0.2–1.3)
BUN SERPL-MCNC: 30 MG/DL (ref 7–30)
CALCIUM SERPL-MCNC: 9.2 MG/DL (ref 8.5–10.1)
CHLORIDE BLD-SCNC: 111 MMOL/L (ref 94–109)
CO2 SERPL-SCNC: 23 MMOL/L (ref 20–32)
CREAT SERPL-MCNC: 1.37 MG/DL (ref 0.52–1.04)
ERYTHROCYTE [DISTWIDTH] IN BLOOD BY AUTOMATED COUNT: 14.2 % (ref 10–15)
GFR SERPL CREATININE-BSD FRML MDRD: 50 ML/MIN/1.73M2
GLUCOSE BLD-MCNC: 78 MG/DL (ref 70–99)
HCT VFR BLD AUTO: 37.9 % (ref 35–47)
HGB BLD-MCNC: 12.1 G/DL (ref 11.7–15.7)
MCH RBC QN AUTO: 29.8 PG (ref 26.5–33)
MCHC RBC AUTO-ENTMCNC: 31.9 G/DL (ref 31.5–36.5)
MCV RBC AUTO: 93 FL (ref 78–100)
PLATELET # BLD AUTO: 691 10E3/UL (ref 150–450)
POTASSIUM BLD-SCNC: 4.2 MMOL/L (ref 3.4–5.3)
PROT SERPL-MCNC: 7.1 G/DL (ref 6.8–8.8)
RBC # BLD AUTO: 4.06 10E6/UL (ref 3.8–5.2)
SODIUM SERPL-SCNC: 137 MMOL/L (ref 133–144)
TACROLIMUS BLD-MCNC: 4 UG/L (ref 5–15)
TME LAST DOSE: ABNORMAL H
TME LAST DOSE: ABNORMAL H
WBC # BLD AUTO: 10.2 10E3/UL (ref 4–11)

## 2021-11-15 PROCEDURE — 80053 COMPREHEN METABOLIC PANEL: CPT

## 2021-11-15 PROCEDURE — 85027 COMPLETE CBC AUTOMATED: CPT

## 2021-11-15 PROCEDURE — 80197 ASSAY OF TACROLIMUS: CPT

## 2021-11-15 PROCEDURE — 36415 COLL VENOUS BLD VENIPUNCTURE: CPT

## 2021-11-15 PROCEDURE — 82248 BILIRUBIN DIRECT: CPT

## 2021-11-15 NOTE — TELEPHONE ENCOUNTER
DATE:  11/15/2021     TIME OF RECEIPT FROM LAB:  12:42    ORDERING PROVIDER: Mina    LAB TEST:  platelet    LAB VALUE:  691    RESULTS GIVEN WITH READ-BACK TO (PROVIDER):   Agnieszka Haro    TIME LAB VALUE REPORTED TO PROVIDER:   12:42

## 2021-11-16 ENCOUNTER — TELEPHONE (OUTPATIENT)
Dept: TRANSPLANT | Facility: CLINIC | Age: 36
End: 2021-11-16
Payer: COMMERCIAL

## 2021-11-16 DIAGNOSIS — Z94.4 LIVER REPLACED BY TRANSPLANT (H): Primary | ICD-10-CM

## 2021-11-16 NOTE — TELEPHONE ENCOUNTER
Pt's liver labs elevated.reviewed with dr castillo. Pt to repeat liver labs end of week. Order placed.

## 2021-11-19 ENCOUNTER — LAB (OUTPATIENT)
Dept: LAB | Facility: CLINIC | Age: 36
End: 2021-11-19
Payer: COMMERCIAL

## 2021-11-19 DIAGNOSIS — Z94.4 LIVER REPLACED BY TRANSPLANT (H): ICD-10-CM

## 2021-11-19 LAB
ALBUMIN SERPL-MCNC: 2.4 G/DL (ref 3.4–5)
ALP SERPL-CCNC: 225 U/L (ref 40–150)
ALT SERPL W P-5'-P-CCNC: 107 U/L (ref 0–50)
ANION GAP SERPL CALCULATED.3IONS-SCNC: 7 MMOL/L (ref 3–14)
AST SERPL W P-5'-P-CCNC: 57 U/L (ref 0–45)
BILIRUB DIRECT SERPL-MCNC: <0.1 MG/DL (ref 0–0.2)
BILIRUB SERPL-MCNC: 0.2 MG/DL (ref 0.2–1.3)
BUN SERPL-MCNC: 26 MG/DL (ref 7–30)
CALCIUM SERPL-MCNC: 9.4 MG/DL (ref 8.5–10.1)
CHLORIDE BLD-SCNC: 112 MMOL/L (ref 94–109)
CO2 SERPL-SCNC: 20 MMOL/L (ref 20–32)
CREAT SERPL-MCNC: 1.13 MG/DL (ref 0.52–1.04)
ERYTHROCYTE [DISTWIDTH] IN BLOOD BY AUTOMATED COUNT: 14.1 % (ref 10–15)
GFR SERPL CREATININE-BSD FRML MDRD: 63 ML/MIN/1.73M2
GLUCOSE BLD-MCNC: 72 MG/DL (ref 70–99)
HCT VFR BLD AUTO: 37.6 % (ref 35–47)
HGB BLD-MCNC: 12.1 G/DL (ref 11.7–15.7)
MCH RBC QN AUTO: 29.8 PG (ref 26.5–33)
MCHC RBC AUTO-ENTMCNC: 32.2 G/DL (ref 31.5–36.5)
MCV RBC AUTO: 93 FL (ref 78–100)
PLATELET # BLD AUTO: 680 10E3/UL (ref 150–450)
POTASSIUM BLD-SCNC: 3.6 MMOL/L (ref 3.4–5.3)
PROT SERPL-MCNC: 6.9 G/DL (ref 6.8–8.8)
RBC # BLD AUTO: 4.06 10E6/UL (ref 3.8–5.2)
SODIUM SERPL-SCNC: 139 MMOL/L (ref 133–144)
TACROLIMUS BLD-MCNC: <3 UG/L (ref 5–15)
TME LAST DOSE: ABNORMAL H
TME LAST DOSE: ABNORMAL H
WBC # BLD AUTO: 10.3 10E3/UL (ref 4–11)

## 2021-11-19 PROCEDURE — 80197 ASSAY OF TACROLIMUS: CPT

## 2021-11-19 PROCEDURE — 82248 BILIRUBIN DIRECT: CPT

## 2021-11-19 PROCEDURE — 36415 COLL VENOUS BLD VENIPUNCTURE: CPT

## 2021-11-19 PROCEDURE — 85027 COMPLETE CBC AUTOMATED: CPT

## 2021-11-19 PROCEDURE — 80053 COMPREHEN METABOLIC PANEL: CPT

## 2021-11-22 ENCOUNTER — TELEPHONE (OUTPATIENT)
Dept: TRANSPLANT | Facility: CLINIC | Age: 36
End: 2021-11-22
Payer: COMMERCIAL

## 2021-11-22 DIAGNOSIS — Z94.4 LIVER REPLACED BY TRANSPLANT (H): Primary | ICD-10-CM

## 2021-11-22 DIAGNOSIS — R79.89 ELEVATED LFTS: ICD-10-CM

## 2021-11-22 NOTE — TELEPHONE ENCOUNTER
Pt's tacro < 3. Pt took at 915 night before. Had drawn at 10 am. Pt currently   taking 2 mg every 12 hours. Pt will increase tacro 3 mg every 12 hours.      Pt itching a few weeks ago, but OB thought it was related to preeclampsia. No itching.     Pt have liver biopsy per dr castillo. Pt aware and will call with date.

## 2021-11-29 ENCOUNTER — TELEPHONE (OUTPATIENT)
Dept: TRANSPLANT | Facility: CLINIC | Age: 36
End: 2021-11-29

## 2021-11-29 ENCOUNTER — HOSPITAL ENCOUNTER (OUTPATIENT)
Facility: CLINIC | Age: 36
End: 2021-11-29
Admitting: INTERNAL MEDICINE
Payer: COMMERCIAL

## 2021-11-29 ENCOUNTER — MYC MEDICAL ADVICE (OUTPATIENT)
Dept: INTERVENTIONAL RADIOLOGY/VASCULAR | Facility: CLINIC | Age: 36
End: 2021-11-29
Payer: COMMERCIAL

## 2021-11-29 ENCOUNTER — LAB (OUTPATIENT)
Dept: LAB | Facility: CLINIC | Age: 36
End: 2021-11-29
Payer: COMMERCIAL

## 2021-11-29 DIAGNOSIS — Z94.4 LIVER REPLACED BY TRANSPLANT (H): ICD-10-CM

## 2021-11-29 DIAGNOSIS — R79.89 ELEVATED LFTS: Primary | ICD-10-CM

## 2021-11-29 DIAGNOSIS — Z94.4 LIVER REPLACED BY TRANSPLANT (H): Primary | ICD-10-CM

## 2021-11-29 LAB
ERYTHROCYTE [DISTWIDTH] IN BLOOD BY AUTOMATED COUNT: 13.9 % (ref 10–15)
HCT VFR BLD AUTO: 42.7 % (ref 35–47)
HGB BLD-MCNC: 13.6 G/DL (ref 11.7–15.7)
MCH RBC QN AUTO: 29.9 PG (ref 26.5–33)
MCHC RBC AUTO-ENTMCNC: 31.9 G/DL (ref 31.5–36.5)
MCV RBC AUTO: 94 FL (ref 78–100)
PLATELET # BLD AUTO: 638 10E3/UL (ref 150–450)
RBC # BLD AUTO: 4.55 10E6/UL (ref 3.8–5.2)
WBC # BLD AUTO: 10.9 10E3/UL (ref 4–11)

## 2021-11-29 PROCEDURE — 36415 COLL VENOUS BLD VENIPUNCTURE: CPT

## 2021-11-29 PROCEDURE — 80053 COMPREHEN METABOLIC PANEL: CPT

## 2021-11-29 PROCEDURE — 85027 COMPLETE CBC AUTOMATED: CPT

## 2021-11-29 PROCEDURE — 82248 BILIRUBIN DIRECT: CPT

## 2021-11-29 RX ORDER — URSODIOL 250 MG/1
TABLET, FILM COATED ORAL
Qty: 90 TABLET | Refills: 11 | Status: SHIPPED | OUTPATIENT
Start: 2021-11-29 | End: 2022-12-01

## 2021-11-29 NOTE — TELEPHONE ENCOUNTER
Per dr castillo patient to restart conner. Pt to repeat labs. Pt to have MRCP, but biopsy set up.     Pt aware and thankful for change in plan. Pt prefers to have least invasive testing done to rule out other things before going to biospy.

## 2021-11-29 NOTE — TELEPHONE ENCOUNTER
Pt feels this is biopsy to jump the gun.  Pt would prefer to MRCP or less invasive or start ursodiol again.  Pt was taking 2,400 mg tylenol in 24 hour period after having her child. She has not taken tylenol in a week.    Message to dr castillo.

## 2021-11-29 NOTE — PROGRESS NOTES
Pt's liver biopsy Thursday Dec 2nd at 1230pm. Arrive at 11 am. NPO 6 hours prior. Pt needs a . Pt needs COVID test in next couple days. Left detailed message for patient. And requested call back.

## 2021-11-30 LAB
ALBUMIN SERPL-MCNC: 2.7 G/DL (ref 3.4–5)
ALP SERPL-CCNC: 217 U/L (ref 40–150)
ALT SERPL W P-5'-P-CCNC: 147 U/L (ref 0–50)
ANION GAP SERPL CALCULATED.3IONS-SCNC: 5 MMOL/L (ref 3–14)
AST SERPL W P-5'-P-CCNC: 79 U/L (ref 0–45)
BILIRUB DIRECT SERPL-MCNC: <0.1 MG/DL (ref 0–0.2)
BILIRUB SERPL-MCNC: 0.2 MG/DL (ref 0.2–1.3)
BUN SERPL-MCNC: 24 MG/DL (ref 7–30)
CALCIUM SERPL-MCNC: 9.1 MG/DL (ref 8.5–10.1)
CHLORIDE BLD-SCNC: 110 MMOL/L (ref 94–109)
CO2 SERPL-SCNC: 22 MMOL/L (ref 20–32)
CREAT SERPL-MCNC: 1.17 MG/DL (ref 0.52–1.04)
GFR SERPL CREATININE-BSD FRML MDRD: 60 ML/MIN/1.73M2
GLUCOSE BLD-MCNC: 69 MG/DL (ref 70–99)
POTASSIUM BLD-SCNC: 4.9 MMOL/L (ref 3.4–5.3)
PROT SERPL-MCNC: 7.2 G/DL (ref 6.8–8.8)
SODIUM SERPL-SCNC: 137 MMOL/L (ref 133–144)

## 2021-12-01 DIAGNOSIS — Z94.4 LIVER REPLACED BY TRANSPLANT (H): ICD-10-CM

## 2021-12-01 DIAGNOSIS — K51.90 UC (ULCERATIVE COLITIS) (H): ICD-10-CM

## 2021-12-01 RX ORDER — PREDNISONE 5 MG/1
5 TABLET ORAL DAILY
Qty: 90 TABLET | Refills: 3 | Status: SHIPPED | OUTPATIENT
Start: 2021-12-01 | End: 2022-12-01

## 2021-12-01 RX ORDER — PREDNISONE 5 MG/1
TABLET ORAL
Qty: 90 TABLET | Refills: 3 | OUTPATIENT
Start: 2021-12-01

## 2021-12-02 ENCOUNTER — TELEPHONE (OUTPATIENT)
Dept: TRANSPLANT | Facility: CLINIC | Age: 36
End: 2021-12-02
Payer: COMMERCIAL

## 2021-12-02 DIAGNOSIS — R79.89 ELEVATED LFTS: Primary | ICD-10-CM

## 2021-12-02 NOTE — TELEPHONE ENCOUNTER
Pt preferred to have MRCP first before doing liver biopsy. Pt will repeat hepatic panel and tacro trough.     Pt is set up for MRCPdec 16  and liver biopsy dec 20th. covid order placed. Pt updated with plan.

## 2021-12-07 ENCOUNTER — LAB (OUTPATIENT)
Dept: LAB | Facility: CLINIC | Age: 36
End: 2021-12-07
Payer: COMMERCIAL

## 2021-12-07 DIAGNOSIS — Z94.4 LIVER REPLACED BY TRANSPLANT (H): ICD-10-CM

## 2021-12-07 DIAGNOSIS — R79.89 ELEVATED LFTS: ICD-10-CM

## 2021-12-07 LAB
ERYTHROCYTE [DISTWIDTH] IN BLOOD BY AUTOMATED COUNT: 13.8 % (ref 10–15)
HCT VFR BLD AUTO: 42.3 % (ref 35–47)
HGB BLD-MCNC: 13.6 G/DL (ref 11.7–15.7)
MCH RBC QN AUTO: 29.3 PG (ref 26.5–33)
MCHC RBC AUTO-ENTMCNC: 32.2 G/DL (ref 31.5–36.5)
MCV RBC AUTO: 91 FL (ref 78–100)
PLATELET # BLD AUTO: 477 10E3/UL (ref 150–450)
RBC # BLD AUTO: 4.64 10E6/UL (ref 3.8–5.2)
TACROLIMUS BLD-MCNC: 3.8 UG/L (ref 5–15)
TME LAST DOSE: ABNORMAL H
TME LAST DOSE: ABNORMAL H
WBC # BLD AUTO: 9.1 10E3/UL (ref 4–11)

## 2021-12-07 PROCEDURE — 82248 BILIRUBIN DIRECT: CPT

## 2021-12-07 PROCEDURE — 80197 ASSAY OF TACROLIMUS: CPT

## 2021-12-07 PROCEDURE — 36415 COLL VENOUS BLD VENIPUNCTURE: CPT

## 2021-12-07 PROCEDURE — 85027 COMPLETE CBC AUTOMATED: CPT

## 2021-12-07 PROCEDURE — 80053 COMPREHEN METABOLIC PANEL: CPT

## 2021-12-08 LAB
ALBUMIN SERPL-MCNC: 2.8 G/DL (ref 3.4–5)
ALP SERPL-CCNC: 159 U/L (ref 40–150)
ALT SERPL W P-5'-P-CCNC: 58 U/L (ref 0–50)
ANION GAP SERPL CALCULATED.3IONS-SCNC: 6 MMOL/L (ref 3–14)
AST SERPL W P-5'-P-CCNC: 29 U/L (ref 0–45)
BILIRUB DIRECT SERPL-MCNC: <0.1 MG/DL (ref 0–0.2)
BILIRUB SERPL-MCNC: 0.3 MG/DL (ref 0.2–1.3)
BUN SERPL-MCNC: 23 MG/DL (ref 7–30)
CALCIUM SERPL-MCNC: 8.7 MG/DL (ref 8.5–10.1)
CHLORIDE BLD-SCNC: 110 MMOL/L (ref 94–109)
CO2 SERPL-SCNC: 23 MMOL/L (ref 20–32)
CREAT SERPL-MCNC: 1.23 MG/DL (ref 0.52–1.04)
GFR SERPL CREATININE-BSD FRML MDRD: 57 ML/MIN/1.73M2
GLUCOSE BLD-MCNC: 96 MG/DL (ref 70–99)
POTASSIUM BLD-SCNC: 4.2 MMOL/L (ref 3.4–5.3)
PROT SERPL-MCNC: 7.2 G/DL (ref 6.8–8.8)
SODIUM SERPL-SCNC: 139 MMOL/L (ref 133–144)

## 2021-12-13 ENCOUNTER — LAB (OUTPATIENT)
Dept: LAB | Facility: CLINIC | Age: 36
End: 2021-12-13
Payer: COMMERCIAL

## 2021-12-13 ENCOUNTER — MYC MEDICAL ADVICE (OUTPATIENT)
Dept: INTERVENTIONAL RADIOLOGY/VASCULAR | Facility: CLINIC | Age: 36
End: 2021-12-13

## 2021-12-13 DIAGNOSIS — Z94.4 LIVER REPLACED BY TRANSPLANT (H): ICD-10-CM

## 2021-12-13 LAB
ALBUMIN SERPL-MCNC: 3 G/DL (ref 3.4–5)
ALP SERPL-CCNC: 170 U/L (ref 40–150)
ALT SERPL W P-5'-P-CCNC: 54 U/L (ref 0–50)
AST SERPL W P-5'-P-CCNC: 31 U/L (ref 0–45)
BILIRUB DIRECT SERPL-MCNC: <0.1 MG/DL (ref 0–0.2)
BILIRUB SERPL-MCNC: 0.3 MG/DL (ref 0.2–1.3)
PROT SERPL-MCNC: 7.7 G/DL (ref 6.8–8.8)

## 2021-12-13 PROCEDURE — 80076 HEPATIC FUNCTION PANEL: CPT

## 2021-12-13 PROCEDURE — 36415 COLL VENOUS BLD VENIPUNCTURE: CPT

## 2021-12-14 ENCOUNTER — TELEPHONE (OUTPATIENT)
Dept: RADIOLOGY | Facility: CLINIC | Age: 36
End: 2021-12-14
Payer: COMMERCIAL

## 2021-12-14 NOTE — TELEPHONE ENCOUNTER
[facility] Call Center    Phone Message    May a detailed message be left on voicemail: yes     Reason for Call: Other: Pt called and stated that her labs came back more normal and her drAndre had advised her to postpone her procedure that was scheduled for 12/20/201. Please call pt with further questions. Thank you.     Action Taken: Message routed to:  Other: IR    Travel Screening: Not Applicable

## 2021-12-20 ENCOUNTER — OFFICE VISIT (OUTPATIENT)
Dept: MATERNAL FETAL MEDICINE | Facility: CLINIC | Age: 36
End: 2021-12-20
Attending: OBSTETRICS & GYNECOLOGY
Payer: COMMERCIAL

## 2021-12-20 VITALS
SYSTOLIC BLOOD PRESSURE: 128 MMHG | WEIGHT: 115.9 LBS | OXYGEN SATURATION: 97 % | BODY MASS INDEX: 21.2 KG/M2 | HEART RATE: 84 BPM | DIASTOLIC BLOOD PRESSURE: 92 MMHG

## 2021-12-20 PROCEDURE — G0463 HOSPITAL OUTPT CLINIC VISIT: HCPCS

## 2021-12-20 ASSESSMENT — PAIN SCALES - GENERAL: PAINLEVEL: NO PAIN (0)

## 2021-12-20 NOTE — NURSING NOTE
Yarelis here for PPV due to preg c/b C/S and h/o liver transplant. Pt reports that she is pumping some breast milk, but her milk supply isn't enough. Pt states that she brought Joanie home from the NICU last week. Yarelis reports her  is home for the next 5 weeks for paternity leave to help.    Pt did Yolyn scale and received 0. Pt denies feelings of sadness or depression.    Diane in to talk with pt.  See ppv note.       Labs were placed per Diane to test milk production per lactation recommendations as pt has low milk supply.    Pt left amb and stable. Virgie Zimmer RN

## 2021-12-20 NOTE — PROGRESS NOTES
Assessment:     Normal postpartum recovery  S/P primary LTC/S via vertical midline incision 2/2 Persistent Cat II FHT  S/P Preeclampsia without severe features  history of breastfeeding difficulty- labs ordered today per lactation and patient request  Negative depression screen    Plan:     -Routine postpartum care reviewed.    -Labs for lactation evaluation per patient request collected.  Follow up with result of labs with primary OB provider.  Also advised 2 hour GTT s/p GDMA1, encouraged to have completed at primary OB provider clinic.  -Outpatient lactation resources reviewed including [facility] Lactation consultants  -Reviewed PP depression, anxiety, and psychosis s/sx, self care measures to reduce risk, safety plan and crisis numbers, and when to call for further resources.   -Follow up with transplant clinic as scheduled.  -Follow up with primary OB for ongoing lactation support and 2 hour GTT follow up.      KAYLENE Stephens, BERTIN  20 minutes on the date of the encounter doing chart review, patient visit and documentation        Subjective:      Yarelis Penny is a 36 year old female who presents for a postpartum follow up visit. She is 6 weeks postpartum following a  c/b:  - IVF pregnancy  - Preeclampsia w/o SF  - CKD stage II w/ proteinuria  - Hyperkal w/ met acidosis 2/2 tacro toxicity  - H/o liver transplant  - autoimmune hepatitis  - Primary sclerosing cholangitis  - possible cholestasis of pregnancy vs liver dz  - Ulcerative colitis  - h/o lsc colectomy w/ end ileostomy  - GDMA1  - COVID positive in .  - Rubella NI   The delivery was at 31+4 gestational weeks. I have fully reviewed the prenatal and intrapartum course. The amount and color of the lochia is appropriate for the duration of recovery. Yarelis feels well and postpartum course has been complicated by low milk supply, NICU stay.. Baby Joanie's  course has been stable. The baby is being fed by pumped breastmilk and  supplement. She denies breast discomfort, however did bring letter from IBCLC outlining lab work requested for evaluation of low milk supply despite pumping regularly.  TSH, HCG, prolactin and testosterone orders placed, Yarelis will return to have labs drawn tomorrow.  Would like to delay 2 hour GTT until she sees her primary OB for follow up. Voiding without difficulty, lochia normal, tolerating normal diet, and passing flatus and normal bowel movements.  Pain is well controlled with current medications. Yarelis  offers no emotional concerns.  Postpartum depression screening score: 0, negative #10. Plans condom for birth control. Pt identifies family and friends as support system.    The following portions of the patient's history were reviewed and updated as appropriate: allergies, current medications and problem list    Review of Systems  General:  Denies problem  Eyes: Denies problem  Ears/Nose/Throat: Denies problem  Cardiovascular: Denies problem  Respiratory:  Denies problem  Gastrointestinal:  Denies problem, Genitourinary: Denies problem  Musculoskeletal:  Denies problem  Skin: Denies problem  Neurologic: Denies problem  Psychiatric: Denies problem  Endocrine: Denies problem  Heme/Lymphatic: Denies problem   Allergic/Immunologic: Denies problem       Objective:      /92  P 84  Wt 115.9 lbs    Physical Exam:  General Appearance: Alert, cooperative, no distress, appears stated age  Skin: Skin color, texture, turgor normal, no rashes or lesions.  Throat: Lips, mucosa, and tongue normal; teeth and gums normal  HEENT: grossly normal; otoscopic and opthalmic exam not performed.   Neck: Supple, symmetrical, trachea midline, no adenopathy  Respiratory: Normal respiratory effort  Cardiovascular: No peripheral edema.  Breasts: No breast masses, tenderness, asymmetry, or nipple discharge.  Abdomen: ostomy bag intact, vertical midline incision appears dry and intact, without erythema or ecchymosis. Non tender with  palpation.   Pelvic:deferred  Musculoskeletal: No digital cyanosis. Normal gait and station. Moves all limbs freely.  Extremities: Extremities normal, atraumatic, no cyanosis or edema  Neuro: Cranial nerves II-XII grossly intact.  Psych: Intact judgment and insight. OX3 and conversational.

## 2021-12-21 ENCOUNTER — LAB (OUTPATIENT)
Dept: LAB | Facility: CLINIC | Age: 36
End: 2021-12-21
Payer: COMMERCIAL

## 2021-12-21 DIAGNOSIS — Z94.4 LIVER REPLACED BY TRANSPLANT (H): ICD-10-CM

## 2021-12-21 LAB
ALBUMIN SERPL-MCNC: 3.3 G/DL (ref 3.4–5)
ALP SERPL-CCNC: 169 U/L (ref 40–150)
ALT SERPL W P-5'-P-CCNC: 62 U/L (ref 0–50)
ANION GAP SERPL CALCULATED.3IONS-SCNC: 8 MMOL/L (ref 3–14)
AST SERPL W P-5'-P-CCNC: 33 U/L (ref 0–45)
B-HCG SERPL-ACNC: 6 IU/L (ref 0–5)
BILIRUB DIRECT SERPL-MCNC: <0.1 MG/DL (ref 0–0.2)
BILIRUB SERPL-MCNC: 0.3 MG/DL (ref 0.2–1.3)
BUN SERPL-MCNC: 29 MG/DL (ref 7–30)
CALCIUM SERPL-MCNC: 10 MG/DL (ref 8.5–10.1)
CHLORIDE BLD-SCNC: 107 MMOL/L (ref 94–109)
CO2 SERPL-SCNC: 23 MMOL/L (ref 20–32)
CREAT SERPL-MCNC: 1.11 MG/DL (ref 0.52–1.04)
ERYTHROCYTE [DISTWIDTH] IN BLOOD BY AUTOMATED COUNT: 13.3 % (ref 10–15)
GFR SERPL CREATININE-BSD FRML MDRD: 66 ML/MIN/1.73M2
GLUCOSE BLD-MCNC: 85 MG/DL (ref 70–99)
HCT VFR BLD AUTO: 43.5 % (ref 35–47)
HGB BLD-MCNC: 13.9 G/DL (ref 11.7–15.7)
MCH RBC QN AUTO: 29.3 PG (ref 26.5–33)
MCHC RBC AUTO-ENTMCNC: 32 G/DL (ref 31.5–36.5)
MCV RBC AUTO: 92 FL (ref 78–100)
PLATELET # BLD AUTO: 535 10E3/UL (ref 150–450)
POTASSIUM BLD-SCNC: 3.7 MMOL/L (ref 3.4–5.3)
PROLACTIN SERPL-MCNC: 328 UG/L (ref 3–27)
PROT SERPL-MCNC: 7.8 G/DL (ref 6.8–8.8)
RBC # BLD AUTO: 4.75 10E6/UL (ref 3.8–5.2)
SODIUM SERPL-SCNC: 138 MMOL/L (ref 133–144)
TACROLIMUS BLD-MCNC: 4.9 UG/L (ref 5–15)
TME LAST DOSE: ABNORMAL H
TME LAST DOSE: ABNORMAL H
TSH SERPL DL<=0.005 MIU/L-ACNC: 4.02 MU/L (ref 0.4–4)
WBC # BLD AUTO: 11.3 10E3/UL (ref 4–11)

## 2021-12-21 PROCEDURE — 84702 CHORIONIC GONADOTROPIN TEST: CPT

## 2021-12-21 PROCEDURE — 36415 COLL VENOUS BLD VENIPUNCTURE: CPT

## 2021-12-21 PROCEDURE — 85027 COMPLETE CBC AUTOMATED: CPT

## 2021-12-21 PROCEDURE — 84146 ASSAY OF PROLACTIN: CPT

## 2021-12-21 PROCEDURE — 84443 ASSAY THYROID STIM HORMONE: CPT

## 2021-12-21 PROCEDURE — 80053 COMPREHEN METABOLIC PANEL: CPT

## 2021-12-21 PROCEDURE — 80197 ASSAY OF TACROLIMUS: CPT

## 2021-12-21 PROCEDURE — 84403 ASSAY OF TOTAL TESTOSTERONE: CPT

## 2021-12-21 PROCEDURE — 82248 BILIRUBIN DIRECT: CPT

## 2021-12-22 ENCOUNTER — TELEPHONE (OUTPATIENT)
Dept: TRANSPLANT | Facility: CLINIC | Age: 36
End: 2021-12-22
Payer: COMMERCIAL

## 2021-12-22 LAB — TESTOST SERPL-MCNC: 6 NG/DL (ref 8–60)

## 2021-12-22 NOTE — TELEPHONE ENCOUNTER
Pt was not to cancel biopsy. Pt to have biopsy reset. Pt aware. Pt declined any appt until after jan 3rd. Message left for IR.

## 2021-12-23 DIAGNOSIS — Z11.59 ENCOUNTER FOR SCREENING FOR OTHER VIRAL DISEASES: ICD-10-CM

## 2021-12-27 DIAGNOSIS — R79.89 ELEVATED LFTS: Primary | ICD-10-CM

## 2021-12-27 DIAGNOSIS — Z94.4 LIVER REPLACED BY TRANSPLANT (H): ICD-10-CM

## 2021-12-28 ENCOUNTER — MYC MEDICAL ADVICE (OUTPATIENT)
Dept: INTERVENTIONAL RADIOLOGY/VASCULAR | Facility: CLINIC | Age: 36
End: 2021-12-28
Payer: COMMERCIAL

## 2022-01-04 ENCOUNTER — APPOINTMENT (OUTPATIENT)
Dept: MEDSURG UNIT | Facility: CLINIC | Age: 37
End: 2022-01-04
Attending: INTERNAL MEDICINE
Payer: COMMERCIAL

## 2022-01-04 ENCOUNTER — APPOINTMENT (OUTPATIENT)
Dept: INTERVENTIONAL RADIOLOGY/VASCULAR | Facility: CLINIC | Age: 37
End: 2022-01-04
Attending: INTERNAL MEDICINE
Payer: COMMERCIAL

## 2022-01-04 ENCOUNTER — HOSPITAL ENCOUNTER (OUTPATIENT)
Facility: CLINIC | Age: 37
Discharge: HOME | End: 2022-01-04
Attending: INTERNAL MEDICINE | Admitting: INTERNAL MEDICINE
Payer: COMMERCIAL

## 2022-01-04 VITALS
HEART RATE: 80 BPM | SYSTOLIC BLOOD PRESSURE: 130 MMHG | RESPIRATION RATE: 16 BRPM | DIASTOLIC BLOOD PRESSURE: 100 MMHG | WEIGHT: 115 LBS | BODY MASS INDEX: 20.38 KG/M2 | OXYGEN SATURATION: 96 % | TEMPERATURE: 98.2 F | HEIGHT: 63 IN

## 2022-01-04 DIAGNOSIS — Z94.4 LIVER REPLACED BY TRANSPLANT (H): ICD-10-CM

## 2022-01-04 DIAGNOSIS — R79.89 ELEVATED LFTS: ICD-10-CM

## 2022-01-04 LAB
ANION GAP SERPL CALCULATED.3IONS-SCNC: 6 MMOL/L (ref 3–14)
APTT PPP: 30 SECONDS (ref 22–38)
B-HCG SERPL-ACNC: 3 IU/L (ref 0–5)
BUN SERPL-MCNC: 20 MG/DL (ref 7–30)
CALCIUM SERPL-MCNC: 9.2 MG/DL (ref 8.5–10.1)
CHLORIDE BLD-SCNC: 112 MMOL/L (ref 94–109)
CO2 SERPL-SCNC: 22 MMOL/L (ref 20–32)
CREAT SERPL-MCNC: 1.02 MG/DL (ref 0.52–1.04)
ERYTHROCYTE [DISTWIDTH] IN BLOOD BY AUTOMATED COUNT: 13.5 % (ref 10–15)
GFR SERPL CREATININE-BSD FRML MDRD: 73 ML/MIN/1.73M2
GLUCOSE BLD-MCNC: 84 MG/DL (ref 70–99)
HCT VFR BLD AUTO: 43.3 % (ref 35–47)
HGB BLD-MCNC: 13.7 G/DL (ref 11.7–15.7)
INR PPP: 0.93 (ref 0.85–1.15)
MCH RBC QN AUTO: 28.9 PG (ref 26.5–33)
MCHC RBC AUTO-ENTMCNC: 31.6 G/DL (ref 31.5–36.5)
MCV RBC AUTO: 91 FL (ref 78–100)
PLATELET # BLD AUTO: 493 10E3/UL (ref 150–450)
POTASSIUM BLD-SCNC: 3.9 MMOL/L (ref 3.4–5.3)
RBC # BLD AUTO: 4.74 10E6/UL (ref 3.8–5.2)
SODIUM SERPL-SCNC: 140 MMOL/L (ref 133–144)
WBC # BLD AUTO: 11.6 10E3/UL (ref 4–11)

## 2022-01-04 PROCEDURE — 999N000133 HC STATISTIC PP CARE STAGE 2

## 2022-01-04 PROCEDURE — 250N000009 HC RX 250

## 2022-01-04 PROCEDURE — 88313 SPECIAL STAINS GROUP 2: CPT | Mod: TC,59 | Performed by: INTERNAL MEDICINE

## 2022-01-04 PROCEDURE — 85027 COMPLETE CBC AUTOMATED: CPT | Performed by: NURSE PRACTITIONER

## 2022-01-04 PROCEDURE — 999N000142 HC STATISTIC PROCEDURE PREP ONLY

## 2022-01-04 PROCEDURE — 84702 CHORIONIC GONADOTROPIN TEST: CPT | Mod: GZ | Performed by: NURSE PRACTITIONER

## 2022-01-04 PROCEDURE — 76942 ECHO GUIDE FOR BIOPSY: CPT | Mod: 26 | Performed by: PHYSICIAN ASSISTANT

## 2022-01-04 PROCEDURE — 99152 MOD SED SAME PHYS/QHP 5/>YRS: CPT

## 2022-01-04 PROCEDURE — 47000 NEEDLE BIOPSY OF LIVER PERQ: CPT | Performed by: PHYSICIAN ASSISTANT

## 2022-01-04 PROCEDURE — 85730 THROMBOPLASTIN TIME PARTIAL: CPT | Performed by: NURSE PRACTITIONER

## 2022-01-04 PROCEDURE — 99152 MOD SED SAME PHYS/QHP 5/>YRS: CPT | Performed by: PHYSICIAN ASSISTANT

## 2022-01-04 PROCEDURE — 258N000003 HC RX IP 258 OP 636: Performed by: NURSE PRACTITIONER

## 2022-01-04 PROCEDURE — 88313 SPECIAL STAINS GROUP 2: CPT | Mod: 26 | Performed by: PATHOLOGY

## 2022-01-04 PROCEDURE — 85610 PROTHROMBIN TIME: CPT | Performed by: NURSE PRACTITIONER

## 2022-01-04 PROCEDURE — 36415 COLL VENOUS BLD VENIPUNCTURE: CPT | Performed by: NURSE PRACTITIONER

## 2022-01-04 PROCEDURE — 88307 TISSUE EXAM BY PATHOLOGIST: CPT | Mod: 26 | Performed by: PATHOLOGY

## 2022-01-04 PROCEDURE — 272N000505 IR LIVER BIOPSY PERCUTANEOUS

## 2022-01-04 PROCEDURE — 250N000011 HC RX IP 250 OP 636

## 2022-01-04 PROCEDURE — 80048 BASIC METABOLIC PNL TOTAL CA: CPT | Performed by: NURSE PRACTITIONER

## 2022-01-04 RX ORDER — NALOXONE HYDROCHLORIDE 0.4 MG/ML
0.2 INJECTION, SOLUTION INTRAMUSCULAR; INTRAVENOUS; SUBCUTANEOUS
Status: DISCONTINUED | OUTPATIENT
Start: 2022-01-04 | End: 2022-01-04 | Stop reason: HOSPADM

## 2022-01-04 RX ORDER — NALOXONE HYDROCHLORIDE 0.4 MG/ML
0.4 INJECTION, SOLUTION INTRAMUSCULAR; INTRAVENOUS; SUBCUTANEOUS
Status: DISCONTINUED | OUTPATIENT
Start: 2022-01-04 | End: 2022-01-04 | Stop reason: HOSPADM

## 2022-01-04 RX ORDER — FENTANYL CITRATE 50 UG/ML
25-50 INJECTION, SOLUTION INTRAMUSCULAR; INTRAVENOUS EVERY 5 MIN PRN
Status: DISCONTINUED | OUTPATIENT
Start: 2022-01-04 | End: 2022-01-04 | Stop reason: HOSPADM

## 2022-01-04 RX ORDER — LIDOCAINE 40 MG/G
CREAM TOPICAL
Status: DISCONTINUED | OUTPATIENT
Start: 2022-01-04 | End: 2022-01-04 | Stop reason: HOSPADM

## 2022-01-04 RX ORDER — SODIUM CHLORIDE 9 MG/ML
INJECTION, SOLUTION INTRAVENOUS CONTINUOUS
Status: DISCONTINUED | OUTPATIENT
Start: 2022-01-04 | End: 2022-01-04 | Stop reason: HOSPADM

## 2022-01-04 RX ORDER — FLUMAZENIL 0.1 MG/ML
0.2 INJECTION, SOLUTION INTRAVENOUS
Status: DISCONTINUED | OUTPATIENT
Start: 2022-01-04 | End: 2022-01-04 | Stop reason: HOSPADM

## 2022-01-04 RX ORDER — FERROUS SULFATE 325(65) MG
325 TABLET ORAL
COMMUNITY
End: 2024-08-09

## 2022-01-04 RX ADMIN — SODIUM CHLORIDE: 9 INJECTION, SOLUTION INTRAVENOUS at 10:04

## 2022-01-04 RX ADMIN — FENTANYL CITRATE 50 MCG: 50 INJECTION INTRAMUSCULAR; INTRAVENOUS at 11:21

## 2022-01-04 RX ADMIN — LIDOCAINE HYDROCHLORIDE 5 ML: 10 INJECTION, SOLUTION EPIDURAL; INFILTRATION; INTRACAUDAL; PERINEURAL at 11:25

## 2022-01-04 RX ADMIN — MIDAZOLAM 1 MG: 1 INJECTION INTRAMUSCULAR; INTRAVENOUS at 11:21

## 2022-01-04 ASSESSMENT — MIFFLIN-ST. JEOR: SCORE: 1180.77

## 2022-01-04 NOTE — DISCHARGE INSTRUCTIONS
[facility]    Interventional Radiology  Patient Instructions Following Transplanted Liver Biopsy    AFTER YOU GO HOME  ? If you were given sedation DO NOT drive or operate machinery at home or at work for at least 24 hours  ? DO relax and take it easy for 48 hours, no strenuous activity for 24 hours  ? DO drink plenty of fluids  ? DO resume your regular diet, unless otherwise instructed by your Primary Physician  ? Keep the dressing dry and in place for 24 hours.  ? DO NOT SMOKE FOR AT LEAST 24 HOURS, if you have been given any medications that were to help you relax or sedate you during your procedure  ? DO NOT drink alcoholic beverages the day of your procedure  ? DO NOT do any strenuous exercise or lifting (> 10 lbs) for at least 7 days following your procedure  ? DO NOT take a bath or shower for at least 12 hours following your procedure  ? Remove dressing after shower the next day. Replace with Band aid for 2 days.  Never leave a wet dressing in place.  ? DO NOT make any important or legal decisions for 24 hours following your procedure  ? There should be minimum drainage from the biopsy site    CALL THE PHYSICIAN IF:  ? You start bleeding from the procedure site.  If you do start to bleed from that site, lie down flat and hold pressure on the site for a minimum of 10 minutes.  Your physician will tell you if you need to return to the hospital  ? You develop nausea or vomiting  ? You have excessive swelling, redness, or tenderness at the site  ? You have drainage that looks like it is infected.  ? You experience severe pain  ? You develop hives or a rash or unexplained itching  ? You develop shortness of breath  ? You develop a temperature of 101 degrees F or greater      [facility] INTERVENTIONAL RADIOLOGY DEPARTMENT  Procedure Physician:         ALEJANDRO Domingo PA-C                                   Date of procedure: January 4, 2022  Telephone Numbers: [phone] Monday-Friday 8:00 am to 4:30  pm  [phone] After 4:30 pm Monday-Friday, Weekends & Holidays.   Ask for the Interventional Radiologist on call.  Someone is on call 24 hrs/day  [facility] toll free number: [phone] Monday-Friday 8:00 am to 4:30 pm  [facility] Emergency Dept: [phone]

## 2022-01-04 NOTE — PROGRESS NOTES
Returned from IR to 2A post procedure.  Denies pain.  Puncture site mid upper abd C/D/I.  Mother in room with her.  Provided with po food & fluids.

## 2022-01-04 NOTE — PROGRESS NOTES
Prepped for liver biopsy.  Denies pain.  Mother waiting in room with her.  H & P current.  Consent signed.  Labs pending.

## 2022-01-04 NOTE — IR NOTE
Patient Name: Yarelis Penny  Medical Record Number: [id number]  Today's Date: 1/4/2022    Procedure: image guided liver biopsy  Proceduralist: Nicho MAGANA  Pathology present: no    Procedure Start: 11:22  Procedure end: 11:28  Sedation medications administered: 50 mcg fentanyl, 1 mg versed     Report given to: 2A JOSEFINA Suarez  : none    Other Notes: Pt arrived to IR room 6 from . Consent reviewed. Pt denies any questions or concerns regarding procedure. Pt positioned supine and monitored per protocol. Pt tolerated procedure without any noted complications. Pt transferred back to . 2 core samples sent to pathology.

## 2022-01-04 NOTE — PROGRESS NOTES
Patient tolerated recovery stage well. VSS, mid upper abd puncture site clean/dry/intact, no hematoma, and denies pain. Patient tolerated PO food and fluids. Teaching was done and discharge instructions were given. Patient ambulated, voided, and PIV was removed. Patient discharged from the hospital via wheel chair to home with her mother.

## 2022-01-04 NOTE — PROCEDURES
[facility]    Procedure: IR LIVER BIOPSY PERCUTANEOUS    Date/Time: 1/4/2022 11:37 AM  Performed by: Ramakrishna Domingo PA-C  Authorized by: Ramakrishna Domingo PA-C       UNIVERSAL PROTOCOL   Site Marked: No  Prior Images Obtained and Reviewed:  Yes  Required items: Required blood products, implants, devices and special equipment available    Patient identity confirmed:  Verbally with patient, arm band, provided demographic data and hospital-assigned identification number  Patient was reevaluated immediately before administering moderate or deep sedation or anesthesia  Confirmation Checklist:  Patient's identity using two indicators, relevant allergies, procedure was appropriate and matched the consent or emergent situation and correct equipment/implants were available  Time out: Immediately prior to the procedure a time out was called    Universal Protocol: the Joint Commission Universal Protocol was followed    Preparation: Patient was prepped and draped in usual sterile fashion       ANESTHESIA    Anesthesia: Local infiltration  Local Anesthetic:  Lidocaine 1% without epinephrine  Anesthetic Total (mL):  5      SEDATION  Patient Sedated: Yes    Sedation Type:  Moderate (conscious) sedation  Sedation:  Fentanyl and midazolam  Vital signs: Vital signs monitored during sedation    See dictated procedure note for full details.  Findings: 50 mcg and 1 mg, 10 minutes, tolerated well    Specimens: core needle biopsy specimens sent for pathological analysis    Complications: None    Condition: Stable    Plan: 2 hour monitored bedrest. Biopsy results pending.      PROCEDURE  Describe Procedure: Random transplant liver biopsy. 3 cores sent to pathology. Gelfoam in tract.  Patient Tolerance:  Patient tolerated the procedure well with no immediate complications  Length of time physician/provider present for 1:1 monitoring during sedation: 10

## 2022-01-04 NOTE — PRE-PROCEDURE
GENERAL PRE-PROCEDURE:   Procedure:  Image guided liver biopsy  Date/Time:  1/4/2022 10:23 AM    Verbal consent obtained?: Yes    Written consent obtained?: Yes    Risks and benefits: Risks, benefits and alternatives were discussed    Consent given by:  Patient  Patient states understanding of procedure being performed: Yes    Patient's understanding of procedure matches consent: Yes    Procedure consent matches procedure scheduled: Yes    Expected level of sedation:  Moderate  Appropriately NPO:  Yes  ASA Class:  2  Mallampati  :  Grade 2- soft palate, base of uvula, tonsillar pillars, and portion of posterior pharyngeal wall visible  Lungs:  Lungs clear with good breath sounds bilaterally  Heart:  Normal heart sounds and rate  History & Physical reviewed:  History and physical reviewed and no updates needed  Statement of review:  I have reviewed the lab findings, diagnostic data, medications, and the plan for sedation

## 2022-01-06 LAB
PATH REPORT.COMMENTS IMP SPEC: NORMAL
PATH REPORT.FINAL DX SPEC: NORMAL
PATH REPORT.GROSS SPEC: NORMAL
PATH REPORT.MICROSCOPIC SPEC OTHER STN: NORMAL
PATH REPORT.RELEVANT HX SPEC: NORMAL
PHOTO IMAGE: NORMAL

## 2022-01-08 ENCOUNTER — HEALTH MAINTENANCE LETTER (OUTPATIENT)
Age: 37
End: 2022-01-08

## 2022-01-11 ENCOUNTER — TELEPHONE (OUTPATIENT)
Dept: TRANSPLANT | Facility: CLINIC | Age: 37
End: 2022-01-11
Payer: COMMERCIAL

## 2022-01-11 DIAGNOSIS — Z94.4 LIVER REPLACED BY TRANSPLANT (H): Primary | ICD-10-CM

## 2022-02-04 ENCOUNTER — LAB (OUTPATIENT)
Dept: LAB | Facility: CLINIC | Age: 37
End: 2022-02-04
Payer: COMMERCIAL

## 2022-02-04 DIAGNOSIS — Z94.4 LIVER TRANSPLANT RECIPIENT (H): ICD-10-CM

## 2022-02-04 DIAGNOSIS — Z13.220 LIPID SCREENING: ICD-10-CM

## 2022-02-04 DIAGNOSIS — Z94.4 LIVER REPLACED BY TRANSPLANT (H): ICD-10-CM

## 2022-02-04 LAB
ALBUMIN SERPL-MCNC: 3.3 G/DL (ref 3.4–5)
ALP SERPL-CCNC: 136 U/L (ref 40–150)
ALT SERPL W P-5'-P-CCNC: 58 U/L (ref 0–50)
ANION GAP SERPL CALCULATED.3IONS-SCNC: 3 MMOL/L (ref 3–14)
AST SERPL W P-5'-P-CCNC: 29 U/L (ref 0–45)
BILIRUB DIRECT SERPL-MCNC: <0.1 MG/DL (ref 0–0.2)
BILIRUB SERPL-MCNC: 0.4 MG/DL (ref 0.2–1.3)
BUN SERPL-MCNC: 26 MG/DL (ref 7–30)
CALCIUM SERPL-MCNC: 9.6 MG/DL (ref 8.5–10.1)
CHLORIDE BLD-SCNC: 112 MMOL/L (ref 94–109)
CO2 SERPL-SCNC: 25 MMOL/L (ref 20–32)
CREAT SERPL-MCNC: 1.2 MG/DL (ref 0.52–1.04)
ERYTHROCYTE [DISTWIDTH] IN BLOOD BY AUTOMATED COUNT: 13.4 % (ref 10–15)
GFR SERPL CREATININE-BSD FRML MDRD: 60 ML/MIN/1.73M2
GLUCOSE BLD-MCNC: 89 MG/DL (ref 70–99)
HCT VFR BLD AUTO: 45.4 % (ref 35–47)
HGB BLD-MCNC: 14.4 G/DL (ref 11.7–15.7)
MCH RBC QN AUTO: 28 PG (ref 26.5–33)
MCHC RBC AUTO-ENTMCNC: 31.7 G/DL (ref 31.5–36.5)
MCV RBC AUTO: 88 FL (ref 78–100)
PLATELET # BLD AUTO: 458 10E3/UL (ref 150–450)
POTASSIUM BLD-SCNC: 3.9 MMOL/L (ref 3.4–5.3)
PROT SERPL-MCNC: 7.8 G/DL (ref 6.8–8.8)
RBC # BLD AUTO: 5.14 10E6/UL (ref 3.8–5.2)
SODIUM SERPL-SCNC: 140 MMOL/L (ref 133–144)
TACROLIMUS BLD-MCNC: 5.4 UG/L (ref 5–15)
TME LAST DOSE: NORMAL H
TME LAST DOSE: NORMAL H
WBC # BLD AUTO: 8.5 10E3/UL (ref 4–11)

## 2022-02-04 PROCEDURE — 85027 COMPLETE CBC AUTOMATED: CPT

## 2022-02-04 PROCEDURE — 80053 COMPREHEN METABOLIC PANEL: CPT

## 2022-02-04 PROCEDURE — 80197 ASSAY OF TACROLIMUS: CPT

## 2022-02-04 PROCEDURE — 36415 COLL VENOUS BLD VENIPUNCTURE: CPT

## 2022-02-04 PROCEDURE — 82248 BILIRUBIN DIRECT: CPT

## 2022-02-14 ENCOUNTER — VIRTUAL VISIT (OUTPATIENT)
Dept: GASTROENTEROLOGY | Facility: CLINIC | Age: 37
End: 2022-02-14
Attending: INTERNAL MEDICINE
Payer: COMMERCIAL

## 2022-02-14 DIAGNOSIS — Z94.4 LIVER REPLACED BY TRANSPLANT (H): ICD-10-CM

## 2022-02-14 PROCEDURE — 99215 OFFICE O/P EST HI 40 MIN: CPT | Mod: 95 | Performed by: INTERNAL MEDICINE

## 2022-02-14 NOTE — PROGRESS NOTES
"Yarelis is a 36 year old who is being evaluated via a billable video visit.      How would you like to obtain your AVS? MyChart  If the video visit is dropped, the invitation should be resent by: Send to e-mail at: [email or web address]  Will anyone else be joining your video visit? No  {If patient encounters technical issues they should call [phone] :[id number]}    Video Start Time: {video visit start/end time for provider to select:152948}  Video-Visit Details    Type of service:  Video Visit    Video End Time:{video visit start/end time for provider to select:152948}    Originating Location (pt. Location): {video visit patient location:782700::\"Home\"}    Distant Location (provider location):  [facility] [place]     Platform used for Video Visit: {Virtual Visit Platforms:978000::\"Nanostellar\"}    "

## 2022-02-14 NOTE — PROGRESS NOTES
=  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 Delivered her first child 3 mo ago at 31s4d, with preeclampsia. C/f cholestasis of pregnancy, w pruritus. Liver tests elevated post-partum, unclear reason. LFTs improved without intervention. Bx delayed at pt preference, Done at 2 mo post-partum, and without inflammation but did show zone 3 fibrosis, mild-mod. Uncertain significance/etiology. She had Covid post delivery. I think the elevated LFTs are unrelated to the fibrosis seen on bx. LFTs may have been due to holding conner, or due to Covid.       Graft function  LFTs coming down still not at previous baseline. Would like to see labs in 1 mo. Etiology for elevation unclear, as is cause of zone 3 fibrosis (mild-mod). Will continue watching LFTs. Consider reassessing fibrosis in ~1 y, pending course.    IS Continue tac/pred. Repeat in 1 mo  CKD stage 2 Baseline creat was 0.9-1. Now in last year 1-1.2. Discussed no NSAIDS, hydration and following tac levels.    Biliary stricture In past  This has resolved. Last ERCP 9/26/18  UC s/p total colectomy with ileostomy. Asked her to check in with MELANY Chiu with their clinic was 2018.    Derm seeing regularly     Thyroid Has been levothyroxine prescribed in [facility] ([facility]    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 No EBV or visit with Dr. Lopez for 2 y. Dr. Lopez rec annual follow-up. Previously  asked Yarelis to make appt with Dr. Lopez. None done. Will check EBV on next labs.  CMV colitis neg CMV on last check 8/13/21. Will recheck.     Covid tested positive for Covid post-partum. Has had 2 shots and will get booster at 90 days post testing    RTC 6 mo    Daneila Enriquez MD  Hepatology/ Liver Transplant  [facility]  =================================================================  SUBJECTIVE  Ms. Penny is a 36 Y F s/p LDLT () for PSC/AIH  6/18/15.     Delivered baby 11/4/21 by Csection at 31w4d for pre-eclampsia. Things are going well. She had a lot of pain after and she was taking a lot of acetaminophen and needed oxycodone as well. Prior to delivery she developed itching and her ob/gyn increased her conner (tripled it). They then stopped if for a few weeks. No changes in her tac. Levels were monitored throughout. No changes in her prednisone.    She had elevated liver tests post-delivery, ALT up to 105, AST 68  when they were previously 19, 23 and 90. MRCP and bx were recommended. Yarelis requested to hold off. She never did have MRCP as ordered on 11/29/21.    Liver biopsy 1/4/22 read by Dr. Cook  Relatively unremarkable portal tracts and bile ducts with no significant inflammation or bile duct injury.  Trichrome and reticulin stain show mild to moderate subsinusoidal fibrosis in the pericentral location. Overall the biopsy do not show any evidence of acute cellular rejection or recurrent biliary or hepatocellular disease.  There is moderate subsinusoidal zone 3 fibrosis of uncertain etiology.  Clinical correlation is recommended.    UC s/p total colectomy with ileostomy on 6/20/19. Path showed focal low-grade dysplasia. She has been seen here by Juanis Johnson and Mike, last visits 2018.    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      IS: Prograf. Pred 5. Also on conner.  LABS: Up to date.      Lab Test 02/04/22  0935   PROTTOTAL 7.8   ALBUMIN 3.3*   BILITOTAL 0.4   ALKPHOS 136   AST 29   ALT 58*     Lab Test 02/04/22  0936   WBC 8.5   RBC 5.14   HGB 14.4   HCT 45.4   MCV 88   MCH 28.0   MCHC 31.7   RDW 13.4   *     REJECTION: None  BILIARY ISSUES: BIle duct leak and stricture. Last ERCP 9/26/18  -Two previously well positioned stents were removed from the biliary tree.   - Prior biliary endoscopic sphincterotomy appeared open and selective biliary cannulation was performed, stent related debris was removed    - Previous stricture appears to be completely improved. The site of the previous stricture was gently dilated using 6-8 Elation balloon   - A 7 Fr by 7 cm Loza single pigtailed stent was placed   Recommendation:       - Observe patient in same day observation unit for possible discharge same day.   - Confirm spontaneous stent passage by performing a flat and upright abdominal x-ray in 4 weeks. Stent is most likely expected to pass spontaneously. If present will need an upper endoscopy for stent removal   - Will recommend to recheck LFTs in transplant clinic and contact us if it is elevated    STENT: Original out. ERCP placed stent out on AXR 1-0/24/18  KIDNEY FUNCTION:   Creatinine   Date Value Ref Range Status   02/04/2022 1.20 (H) 0.52 - 1.04 mg/dL Final   05/25/2021 1.21 (H) 0.52 - 1.04 mg/dL Final     BP: Good. No meds.     SOC: She is isolating at home.  ROS 10 point ROS neg other than the symptoms noted above in the HPI.  Exam  Gen Alert pleasant NAD  Resp No difficulty breathing. No cough  Skin No Jaundice  Eyes No icterus  Neuro GUEVARA  MSK no muscle wasting  Psyche Pleasant, appropriate. Well groomed.      Yarelis Penny is a 36 year old female who is being evaluated via a billable video visit.    Video-Visit Details  Type of service:  Video Visit  Video Start Time: 1106  Video End Time: 1129  Originating Location (pt. Location):home  Distant Location (provider location):  [facility] HEPATOLOGY CLINIC [place]      Platform used for Video Visit: [place] or [employer]

## 2022-02-14 NOTE — LETTER
2/14/2022         RE: Yarelis Penny  [address] [facility] [address]        Dear Colleague,    Thank you for referring your patient, Yarelis Penny, to the [facility]PAT[facility] [place]. Please see a copy of my visit note below.    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 Delivered her first child 3 mo ago at 31s4d, with preeclampsia. C/f cholestasis of pregnancy, w pruritus. Liver tests elevated post-partum, unclear reason. LFTs improved without intervention. Bx delayed at pt preference, Done at 2 mo post-partum, and without inflammation but did show zone 3 fibrosis, mild-mod. Uncertain significance/etiology. She had Covid post delivery. I think the elevated LFTs are unrelated to the fibrosis seen on bx. LFTs may have been due to holding conner, or due to Covid.       Graft function  LFTs coming down still not at previous baseline. Would like to see labs in 1 mo. Etiology for elevation unclear, as is cause of zone 3 fibrosis (mild-mod). Will continue watching LFTs. Consider reassessing fibrosis in ~1 y, pending course.    IS Continue tac/pred. Repeat in 1 mo  CKD stage 2 Baseline creat was 0.9-1. Now in last year 1-1.2. Discussed no NSAIDS, hydration and following tac levels.    Biliary stricture In past  This has resolved. Last ERCP 9/26/18  UC s/p total colectomy with ileostomy. Asked her to check in with MELANY Chiu with their clinic was 2018.    Derm seeing regularly     Thyroid Has been levothyroxine prescribed in June by the [facility] ([facility]    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 No EBV or visit with Dr. Lopez for 2 y. Dr. Lopez rec annual follow-up. Previously  asked Yarelis to make appt with Dr. Lopez. None done. Will check EBV on next labs.  CMV colitis neg CMV on last check 8/13/21. Will recheck.     Covid tested positive for Covid post-partum. Has had 2 shots and will  get booster at 90 days post testing    RTC 6 mo    Daniela Enriquez MD  Hepatology/ Liver Transplant  [facility]  =================================================================  SUBJECTIVE  Ms. Penny is a 36 Y F s/p LDLT () for PSC/AIH 6/18/15.     Delivered baby 11/4/21 by Csection at 31w4d for pre-eclampsia. Things are going well. She had a lot of pain after and she was taking a lot of acetaminophen and needed oxycodone as well. Prior to delivery she developed itching and her ob/gyn increased her conner (tripled it). They then stopped if for a few weeks. No changes in her tac. Levels were monitored throughout. No changes in her prednisone.    She had elevated liver tests post-delivery, ALT up to 105, AST 68  when they were previously 19, 23 and 90. MRCP and bx were recommended. Yarelis requested to hold off. She never did have MRCP as ordered on 11/29/21.    Liver biopsy 1/4/22 read by Dr. Cook  Relatively unremarkable portal tracts and bile ducts with no significant inflammation or bile duct injury.  Trichrome and reticulin stain show mild to moderate subsinusoidal fibrosis in the pericentral location. Overall the biopsy do not show any evidence of acute cellular rejection or recurrent biliary or hepatocellular disease.  There is moderate subsinusoidal zone 3 fibrosis of uncertain etiology.  Clinical correlation is recommended.    UC s/p total colectomy with ileostomy on 6/20/19. Path showed focal low-grade dysplasia. She has been seen here by Juanis Johnson and Mike, last visits 2018.    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      IS: Prograf. Pred 5. Also on conner.  LABS: Up to date.      Lab Test 02/04/22  0935   PROTTOTAL 7.8   ALBUMIN 3.3*   BILITOTAL 0.4   ALKPHOS 136   AST 29   ALT 58*     Lab Test 02/04/22  0936   WBC 8.5   RBC 5.14   HGB 14.4   HCT 45.4   MCV 88   MCH 28.0   MCHC 31.7   RDW 13.4   *     REJECTION: None  BILIARY ISSUES: BIle  duct leak and stricture. Last ERCP 9/26/18  -Two previously well positioned stents were removed from the biliary tree.   - Prior biliary endoscopic sphincterotomy appeared open and selective biliary cannulation was performed, stent related debris was removed   - Previous stricture appears to be completely improved. The site of the previous stricture was gently dilated using 6-8 Elation balloon   - A 7 Fr by 7 cm Loza single pigtailed stent was placed   Recommendation:       - Observe patient in same day observation unit for possible discharge same day.   - Confirm spontaneous stent passage by performing a flat and upright abdominal x-ray in 4 weeks. Stent is most likely expected to pass spontaneously. If present will need an upper endoscopy for stent removal   - Will recommend to recheck LFTs in transplant clinic and contact us if it is elevated    STENT: Original out. ERCP placed stent out on AXR 1-0/24/18  KIDNEY FUNCTION:   Creatinine   Date Value Ref Range Status   02/04/2022 1.20 (H) 0.52 - 1.04 mg/dL Final   05/25/2021 1.21 (H) 0.52 - 1.04 mg/dL Final     BP: Good. No meds.     SOC: She is isolating at home.  ROS 10 point ROS neg other than the symptoms noted above in the HPI.  Exam  Gen Alert pleasant NAD  Resp No difficulty breathing. No cough  Skin No Jaundice  Eyes No icterus  Neuro GUEVARA  MSK no muscle wasting  Psyche Pleasant, appropriate. Well groomed.      Yarelis Penny is a 36 year old female who is being evaluated via a billable video visit.    Video-Visit Details  Type of service:  Video Visit  Video Start Time:  Video End Time:  Originating Location (pt. Location):home  Distant Location (provider location):  [facility] HEPATOLOGY CLINIC [place]      Platform used for Video Visit: [place] or [place]            Again, thank you for allowing me to participate in the care of your patient.        Sincerely,        Daniela Enriquez MD

## 2022-02-14 NOTE — LETTER
Date:February 14, 2022      Patient was self referred, no letter generated. Do not send.        [facility] Health Information

## 2022-03-02 ENCOUNTER — IMMUNIZATION (OUTPATIENT)
Dept: NURSING | Facility: CLINIC | Age: 37
End: 2022-03-02
Payer: COMMERCIAL

## 2022-03-02 PROCEDURE — 91305 COVID-19,PF,PFIZER (12+ YRS): CPT

## 2022-03-02 PROCEDURE — 0053A COVID-19,PF,PFIZER (12+ YRS): CPT

## 2022-03-10 ENCOUNTER — MEDICAL CORRESPONDENCE (OUTPATIENT)
Dept: HEALTH INFORMATION MANAGEMENT | Facility: CLINIC | Age: 37
End: 2022-03-10
Payer: COMMERCIAL

## 2022-04-04 ENCOUNTER — LAB (OUTPATIENT)
Dept: LAB | Facility: CLINIC | Age: 37
End: 2022-04-04
Payer: COMMERCIAL

## 2022-04-04 DIAGNOSIS — Z94.4 LIVER TRANSPLANT RECIPIENT (H): ICD-10-CM

## 2022-04-04 DIAGNOSIS — Z94.4 LIVER REPLACED BY TRANSPLANT (H): ICD-10-CM

## 2022-04-04 DIAGNOSIS — Z13.220 LIPID SCREENING: ICD-10-CM

## 2022-04-04 LAB
ALBUMIN SERPL-MCNC: 3.2 G/DL (ref 3.4–5)
ALP SERPL-CCNC: 101 U/L (ref 40–150)
ALT SERPL W P-5'-P-CCNC: 48 U/L (ref 0–50)
ANION GAP SERPL CALCULATED.3IONS-SCNC: 5 MMOL/L (ref 3–14)
AST SERPL W P-5'-P-CCNC: 28 U/L (ref 0–45)
BILIRUB DIRECT SERPL-MCNC: <0.1 MG/DL (ref 0–0.2)
BILIRUB SERPL-MCNC: 0.3 MG/DL (ref 0.2–1.3)
BUN SERPL-MCNC: 19 MG/DL (ref 7–30)
CALCIUM SERPL-MCNC: 9.6 MG/DL (ref 8.5–10.1)
CHLORIDE BLD-SCNC: 112 MMOL/L (ref 94–109)
CO2 SERPL-SCNC: 22 MMOL/L (ref 20–32)
CREAT SERPL-MCNC: 1.12 MG/DL (ref 0.52–1.04)
ERYTHROCYTE [DISTWIDTH] IN BLOOD BY AUTOMATED COUNT: 14.9 % (ref 10–15)
GFR SERPL CREATININE-BSD FRML MDRD: 65 ML/MIN/1.73M2
GLUCOSE BLD-MCNC: 107 MG/DL (ref 70–99)
HCT VFR BLD AUTO: 42.3 % (ref 35–47)
HGB BLD-MCNC: 13.8 G/DL (ref 11.7–15.7)
MCH RBC QN AUTO: 27.8 PG (ref 26.5–33)
MCHC RBC AUTO-ENTMCNC: 32.6 G/DL (ref 31.5–36.5)
MCV RBC AUTO: 85 FL (ref 78–100)
PLATELET # BLD AUTO: 441 10E3/UL (ref 150–450)
POTASSIUM BLD-SCNC: 3.7 MMOL/L (ref 3.4–5.3)
PROT SERPL-MCNC: 7.6 G/DL (ref 6.8–8.8)
RBC # BLD AUTO: 4.97 10E6/UL (ref 3.8–5.2)
SODIUM SERPL-SCNC: 139 MMOL/L (ref 133–144)
TACROLIMUS BLD-MCNC: 3.5 UG/L (ref 5–15)
TME LAST DOSE: ABNORMAL H
TME LAST DOSE: ABNORMAL H
WBC # BLD AUTO: 10.9 10E3/UL (ref 4–11)

## 2022-04-04 PROCEDURE — 85027 COMPLETE CBC AUTOMATED: CPT

## 2022-04-04 PROCEDURE — 80053 COMPREHEN METABOLIC PANEL: CPT

## 2022-04-04 PROCEDURE — 36415 COLL VENOUS BLD VENIPUNCTURE: CPT

## 2022-04-04 PROCEDURE — 80197 ASSAY OF TACROLIMUS: CPT

## 2022-04-04 PROCEDURE — 82248 BILIRUBIN DIRECT: CPT

## 2022-04-04 PROCEDURE — 87799 DETECT AGENT NOS DNA QUANT: CPT

## 2022-04-05 DIAGNOSIS — Z94.4 LIVER TRANSPLANTED (H): Primary | ICD-10-CM

## 2022-04-05 LAB
CMV DNA SPEC NAA+PROBE-ACNC: NOT DETECTED IU/ML
EBV DNA COPIES/ML, INSTRUMENT: ABNORMAL COPIES/ML
EBV DNA SPEC NAA+PROBE-LOG#: 4.7 {LOG_COPIES}/ML

## 2022-04-05 RX ORDER — TACROLIMUS 1 MG/1
CAPSULE ORAL
Qty: 450 CAPSULE | Refills: 3 | Status: SHIPPED | OUTPATIENT
Start: 2022-04-05 | End: 2023-04-21

## 2022-04-05 NOTE — TELEPHONE ENCOUNTER
ISSUE:   Tacrolimus IR level 3.5 on 4/4, goal 4-5, dose 2 mg po bid    PLAN:   Please call patient and confirm this was an accurate 12-hour trough. Verify Tacrolimus IR dose.  We will need to increase her dose slightly. Confirm no new medications or illness. Confirm no missed doses. If accurate trough and accurate dose, increase Tacrolimus IR dose to 2.5 mg BID and repeat labs in 2-3 mos2    OUTCOME:   Spoke with patient, they confirm accurate trough level and current dose 2 mg BID. Patient confirmed dose change to 3  mg  Am, 2 mg pm and to repeat labs in 2-3  months. Orders sent to preferred pharmacy for dose change and lab for repeat labs. Patient voiced understanding of plan.     Johnna Navas LPN

## 2022-04-05 NOTE — TELEPHONE ENCOUNTER
Pt just got a 90 day supply of 1mg capsules from [facility]. Anyway you can alter the dose by using the 1mg capsules? Other wise it will take a good week before [facility] will get her new dose to her. Thank you.

## 2022-04-08 DIAGNOSIS — B27.90 EBV INFECTION: Primary | ICD-10-CM

## 2022-06-23 DIAGNOSIS — Z94.4 LIVER TRANSPLANTED (H): Primary | ICD-10-CM

## 2022-07-18 ENCOUNTER — LAB (OUTPATIENT)
Dept: LAB | Facility: CLINIC | Age: 37
End: 2022-07-18
Payer: COMMERCIAL

## 2022-07-18 DIAGNOSIS — Z94.4 LIVER TRANSPLANT RECIPIENT (H): ICD-10-CM

## 2022-07-18 DIAGNOSIS — Z94.4 LIVER REPLACED BY TRANSPLANT (H): ICD-10-CM

## 2022-07-18 DIAGNOSIS — Z13.220 LIPID SCREENING: ICD-10-CM

## 2022-07-18 LAB
ALBUMIN SERPL-MCNC: 3.1 G/DL (ref 3.4–5)
ALP SERPL-CCNC: 64 U/L (ref 40–150)
ALT SERPL W P-5'-P-CCNC: 36 U/L (ref 0–50)
ANION GAP SERPL CALCULATED.3IONS-SCNC: 7 MMOL/L (ref 3–14)
AST SERPL W P-5'-P-CCNC: 19 U/L (ref 0–45)
BILIRUB DIRECT SERPL-MCNC: 0.1 MG/DL (ref 0–0.2)
BILIRUB SERPL-MCNC: 0.3 MG/DL (ref 0.2–1.3)
BUN SERPL-MCNC: 23 MG/DL (ref 7–30)
CALCIUM SERPL-MCNC: 9.1 MG/DL (ref 8.5–10.1)
CHLORIDE BLD-SCNC: 111 MMOL/L (ref 94–109)
CO2 SERPL-SCNC: 21 MMOL/L (ref 20–32)
CREAT SERPL-MCNC: 1.12 MG/DL (ref 0.52–1.04)
ERYTHROCYTE [DISTWIDTH] IN BLOOD BY AUTOMATED COUNT: 14.4 % (ref 10–15)
GFR SERPL CREATININE-BSD FRML MDRD: 65 ML/MIN/1.73M2
GLUCOSE BLD-MCNC: 89 MG/DL (ref 70–99)
HCT VFR BLD AUTO: 40.6 % (ref 35–47)
HGB BLD-MCNC: 13.3 G/DL (ref 11.7–15.7)
MCH RBC QN AUTO: 28.9 PG (ref 26.5–33)
MCHC RBC AUTO-ENTMCNC: 32.8 G/DL (ref 31.5–36.5)
MCV RBC AUTO: 88 FL (ref 78–100)
PLATELET # BLD AUTO: 423 10E3/UL (ref 150–450)
POTASSIUM BLD-SCNC: 4.4 MMOL/L (ref 3.4–5.3)
PROT SERPL-MCNC: 6.8 G/DL (ref 6.8–8.8)
RBC # BLD AUTO: 4.6 10E6/UL (ref 3.8–5.2)
SODIUM SERPL-SCNC: 139 MMOL/L (ref 133–144)
TACROLIMUS BLD-MCNC: 5.2 UG/L (ref 5–15)
TME LAST DOSE: NORMAL H
TME LAST DOSE: NORMAL H
WBC # BLD AUTO: 11 10E3/UL (ref 4–11)

## 2022-07-18 PROCEDURE — 82248 BILIRUBIN DIRECT: CPT

## 2022-07-18 PROCEDURE — 36415 COLL VENOUS BLD VENIPUNCTURE: CPT

## 2022-07-18 PROCEDURE — 80197 ASSAY OF TACROLIMUS: CPT

## 2022-07-18 PROCEDURE — 85027 COMPLETE CBC AUTOMATED: CPT

## 2022-07-18 PROCEDURE — 80053 COMPREHEN METABOLIC PANEL: CPT

## 2022-07-22 ENCOUNTER — VIRTUAL VISIT (OUTPATIENT)
Dept: GASTROENTEROLOGY | Facility: CLINIC | Age: 37
End: 2022-07-22
Attending: INTERNAL MEDICINE
Payer: COMMERCIAL

## 2022-07-22 DIAGNOSIS — Z94.4 LIVER TRANSPLANTED (H): ICD-10-CM

## 2022-07-22 DIAGNOSIS — T86.49 CHOLESTASIS OF TRANSPLANTED LIVER (H): Primary | ICD-10-CM

## 2022-07-22 DIAGNOSIS — K75.4 AUTOIMMUNE HEPATITIS (H): ICD-10-CM

## 2022-07-22 DIAGNOSIS — K83.1 CHOLESTASIS OF TRANSPLANTED LIVER (H): Primary | ICD-10-CM

## 2022-07-22 DIAGNOSIS — Z94.4 LIVER TRANSPLANT RECIPIENT (H): ICD-10-CM

## 2022-07-22 PROCEDURE — 99214 OFFICE O/P EST MOD 30 MIN: CPT | Mod: 95 | Performed by: INTERNAL MEDICINE

## 2022-07-22 PROCEDURE — G0463 HOSPITAL OUTPT CLINIC VISIT: HCPCS | Mod: PN,RTG | Performed by: INTERNAL MEDICINE

## 2022-07-22 ASSESSMENT — PAIN SCALES - GENERAL: PAINLEVEL: NO PAIN (0)

## 2022-07-22 NOTE — PROGRESS NOTES
=  [facility]  LIVER TRANSPLANT CLINIC  VIDEO VISIT     A/P  Ms. 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     Delivered her first child 12/2021 at 31s4d, with preeclampsia. C/f cholestasis of pregnancy, w pruritus. Liver tests elevated post-partum, unclear reason. LFTs improved without intervention. Bx delayed at pt preference, eventually done at 2 mo post-partum: without inflammation but did show zone 3 fibrosis, mild-mod. Uncertain significance/etiology. She had Covid post delivery. I think the elevated LFTs are unrelated to the fibrosis seen on bx. LFTs may have been due to holding conner, or due to Covid.     Graft function  LFTs now at previous baseline. Etiology for elevation unclear, as is cause of zone 3 fibrosis (mild-mod). Will continue watching LFTs. Consider reassessing fibrosis in ~1 y, pending course.    IS Continue tac/pred.   CKD stage 2 Baseline creat was 0.9-1. Now in last year 1-1.2. Discussed no NSAIDS, hydration and following tac levels.  HTN off labetolol. Rec she check BP at home from time to time.  Biliary stricture This has resolved. Last ERCP 9/26/18  UC s/p total colectomy with ileostomy. Asked her to check in with MELANY Chiu with their clinic was 2018.    Derm seeing regularly     Thyroid Had been levothyroxine prescribed in June by the [facility] fertility clinic ([facility]).    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 No EBV or visit with Dr. Lopez for 2 y. Dr. Lopez rec annual follow-up. Previously  asked Yarelis to make appt with Dr. Lopez. None done. Will check EBV on next labs.  CMV colitis neg CMV on last check 8/13/21. Will recheck.   Iron deficiency She can stop iron. Check iron levels on next labs.    Covid tested positive for Covid post-partum. Has had 2 shots and booster    RTC 1 y    Daniela Enriquez MD  Hepatology/ Liver Transplant  [facility]  =================================================================  SUBJECTIVE  Ms. Penny  is a 36 Y F s/p LDLT () for PSC/AIH 6/18/15.     She is doing well since our last visit. She has had her period twice a month for the last 2 months.     She was on labetolol for a period of time for HTN post-partum. Now off. She does not measure her BP at home.    Delivered baby 11/4/21 by Csection at 31w4d for pre-eclampsia. Things are going well. She had a lot of pain after and she was taking a lot of acetaminophen and needed oxycodone as well. Prior to delivery she developed itching and her ob/gyn increased her conner (tripled it). They then stopped if for a few weeks. No changes in her tac. Levels were monitored throughout. No changes in her prednisone.    She had elevated liver tests post-delivery, ALT up to 105, AST 68  when they were previously 19, 23 and 90. MRCP and bx were recommended. Yarelis requested to hold off. She never did have MRCP as ordered on 11/29/21.    Liver biopsy 1/4/22 read by Dr. Cook  Relatively unremarkable portal tracts and bile ducts with no significant inflammation or bile duct injury.  Trichrome and reticulin stain show mild to moderate subsinusoidal fibrosis in the pericentral location. Overall the biopsy do not show any evidence of acute cellular rejection or recurrent biliary or hepatocellular disease.  There is moderate subsinusoidal zone 3 fibrosis of uncertain etiology.  Clinical correlation is recommended.    UC s/p total colectomy with ileostomy on 6/20/19. Path showed focal low-grade dysplasia. She has been seen here by Juanis Johnson and Mike, last visits 2018.    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      IS: Prograf 3/2. Pred 5. Also on conner.  LABS: Up to date.   Liver Function Studies - Recent Labs   Lab Test 07/18/22  0853   PROTTOTAL 6.8   ALBUMIN 3.1*   BILITOTAL 0.3   ALKPHOS 64   AST 19   ALT 36       REJECTION: None  BILIARY ISSUES: BIle duct leak and stricture. Last ERCP 9/26/18  -Two previously well positioned stents were  removed from the biliary tree.   - Prior biliary endoscopic sphincterotomy appeared open and selective biliary cannulation was performed, stent related debris was removed   - Previous stricture appears to be completely improved. The site of the previous stricture was gently dilated using 6-8 Elation balloon   - A 7 Fr by 7 cm Loza single pigtailed stent was placed   Recommendation:       - Observe patient in same day observation unit for possible discharge same day.   - Confirm spontaneous stent passage by performing a flat and upright abdominal x-ray in 4 weeks. Stent is most likely expected to pass spontaneously. If present will need an upper endoscopy for stent removal   - Will recommend to recheck LFTs in transplant clinic and contact us if it is elevated    STENT: Original out. ERCP placed stent out on AXR 1-0/24/18  KIDNEY FUNCTION:   Creatinine   Date Value Ref Range Status   07/18/2022 1.12 (H) 0.52 - 1.04 mg/dL Final   05/25/2021 1.21 (H) 0.52 - 1.04 mg/dL Final     BP: Good. No meds.     SOC: No alcohol.  ROS 10 point ROS neg other than the symptoms noted above in the HPI.  Exam  Gen Alert pleasant NAD  Resp No difficulty breathing. No cough  Skin No Jaundice  Eyes No icterus  Neuro GUEVARA  MSK no muscle wasting  Psyche Pleasant, appropriate. Well groomed.      Yarelis Penny is a 36 year old female who is being evaluated via a billable video visit.    Video-Visit Details  Type of service:  Video Visit  Video Start Time: 908  Video End Time:  930  Originating Location (pt. Location):home  Distant Location (provider location):  [facility] HEPATOLOGY CLINIC [place]      Platform used for Video Visit: [employer] or [employer]

## 2022-07-22 NOTE — LETTER
7/22/2022         RE: Yarelis Penny  [address] [facility] [address]        Dear Colleague,    Thank you for referring your patient, Yarelis Penny, to the [facility] HEPATOLOGY CLINIC [place]. Please see a copy of my visit note below.    =  [facility]  LIVER TRANSPLANT CLINIC  VIDEO VISIT     A/P  Ms. 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     Delivered her first child 12/2021 at 31s4d, with preeclampsia. C/f cholestasis of pregnancy, w pruritus. Liver tests elevated post-partum, unclear reason. LFTs improved without intervention. Bx delayed at pt preference, eventually done at 2 mo post-partum: without inflammation but did show zone 3 fibrosis, mild-mod. Uncertain significance/etiology. She had Covid post delivery. I think the elevated LFTs are unrelated to the fibrosis seen on bx. LFTs may have been due to holding conner, or due to Covid.     Graft function  LFTs now at previous baseline. Etiology for elevation unclear, as is cause of zone 3 fibrosis (mild-mod). Will continue watching LFTs. Consider reassessing fibrosis in ~1 y, pending course.    IS Continue tac/pred.   CKD stage 2 Baseline creat was 0.9-1. Now in last year 1-1.2. Discussed no NSAIDS, hydration and following tac levels.  HTN off labetolol. Rec she check BP at home from time to time.  Biliary stricture This has resolved. Last ERCP 9/26/18  UC s/p total colectomy with ileostomy. Asked her to check in with MELANY Chiu with their clinic was 2018.    Derm seeing regularly     Thyroid Had been levothyroxine prescribed in June by the [facility] ([facility]).    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 No EBV or visit with Dr. Lopez for 2 y. Dr. Lopez rec annual follow-up. Previously  asked Yarelis to make appt with Dr. Lopez. None done. Will check EBV on next labs.  CMV colitis neg CMV on last check 8/13/21. Will recheck.   Iron deficiency She can stop iron. Check iron levels on  next labs.    Covid tested positive for Covid post-partum. Has had 2 shots and booster    RTC 1 y    Daniela Enriquez MD  Hepatology/ Liver Transplant  [facility]  =================================================================  SUBJECTIVE  Ms. Penny is a 36 Y F s/p LDLT () for PSC/AIH 6/18/15.     She is doing well since our last visit. She has had her period twice a month for the last 2 months.     She was on labetolol for a period of time for HTN post-partum. Now off. She does not measure her BP at home.    Delivered baby 11/4/21 by Csection at 31w4d for pre-eclampsia. Things are going well. She had a lot of pain after and she was taking a lot of acetaminophen and needed oxycodone as well. Prior to delivery she developed itching and her ob/gyn increased her conner (tripled it). They then stopped if for a few weeks. No changes in her tac. Levels were monitored throughout. No changes in her prednisone.    She had elevated liver tests post-delivery, ALT up to 105, AST 68  when they were previously 19, 23 and 90. MRCP and bx were recommended. Yarelis requested to hold off. She never did have MRCP as ordered on 11/29/21.    Liver biopsy 1/4/22 read by Dr. Cook  Relatively unremarkable portal tracts and bile ducts with no significant inflammation or bile duct injury.  Trichrome and reticulin stain show mild to moderate subsinusoidal fibrosis in the pericentral location. Overall the biopsy do not show any evidence of acute cellular rejection or recurrent biliary or hepatocellular disease.  There is moderate subsinusoidal zone 3 fibrosis of uncertain etiology.  Clinical correlation is recommended.    UC s/p total colectomy with ileostomy on 6/20/19. Path showed focal low-grade dysplasia. She has been seen here by Juanis Johnson and Mike, last visits 2018.    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      IS: Prograf 3/2. Pred 5. Also on conner.  LABS: Up to date.   Liver  Function Studies - Recent Labs   Lab Test 07/18/22  0853   PROTTOTAL 6.8   ALBUMIN 3.1*   BILITOTAL 0.3   ALKPHOS 64   AST 19   ALT 36       REJECTION: None  BILIARY ISSUES: BIle duct leak and stricture. Last ERCP 9/26/18  -Two previously well positioned stents were removed from the biliary tree.   - Prior biliary endoscopic sphincterotomy appeared open and selective biliary cannulation was performed, stent related debris was removed   - Previous stricture appears to be completely improved. The site of the previous stricture was gently dilated using 6-8 Elation balloon   - A 7 Fr by 7 cm Loza single pigtailed stent was placed   Recommendation:       - Observe patient in same day observation unit for possible discharge same day.   - Confirm spontaneous stent passage by performing a flat and upright abdominal x-ray in 4 weeks. Stent is most likely expected to pass spontaneously. If present will need an upper endoscopy for stent removal   - Will recommend to recheck LFTs in transplant clinic and contact us if it is elevated    STENT: Original out. ERCP placed stent out on AXR 1-0/24/18  KIDNEY FUNCTION:   Creatinine   Date Value Ref Range Status   07/18/2022 1.12 (H) 0.52 - 1.04 mg/dL Final   05/25/2021 1.21 (H) 0.52 - 1.04 mg/dL Final     BP: Good. No meds.     SOC: No alcohol.  ROS 10 point ROS neg other than the symptoms noted above in the HPI.  Exam  Gen Alert pleasant NAD  Resp No difficulty breathing. No cough  Skin No Jaundice  Eyes No icterus  Neuro GUEVARA  MSK no muscle wasting  Psyche Pleasant, appropriate. Well groomed.      Yarelis Penny is a 36 year old female who is being evaluated via a billable video visit.    Video-Visit Details  Type of service:  Video Visit  Video Start Time: 908  Video End Time:  930  Originating Location (pt. Location):home  Distant Location (provider location):  [facility] HEPATOLOGY CLINIC [place]      Platform used for Video Visit: [employer] or [employer]            Again,  thank you for allowing me to participate in the care of your patient.        Sincerely,        Daniela Enriquez MD

## 2022-07-22 NOTE — PROGRESS NOTES
"Yarelis is a 37 year old who is being evaluated via a billable video visit.      How would you like to obtain your AVS? MyChart  If the video visit is dropped, the invitation should be resent by: Text to cell phone: [phone]  Will anyone else be joining your video visit? No  {If patient encounters technical issues they should call [phone] :[id number]}      Video-Visit Details    Video Start Time: {video visit start/end time for provider to select:983025}    Type of service:  Video Visit    Video End Time:{video visit start/end time for provider to select:580555}    Originating Location (pt. Location): {video visit patient location:980457::\"Home\"}    Distant Location (provider location):  [facility] HEPATOLOGY CLINIC [place]     Platform used for Video Visit: {Virtual Visit Platforms:521282::\"[employer]"}    Leigha Augustin CMA"

## 2022-07-22 NOTE — LETTER
Date:July 22, 2022      Patient was self referred, no letter generated. Do not send.        [facility] Health Information

## 2022-07-29 NOTE — DISCHARGE INSTRUCTIONS
[facility]  Same-Day Surgery   Adult Discharge Orders & Instructions     For 24 hours after surgery    1. Get plenty of rest.  A responsible adult must stay with you for at least 24 hours after you leave the hospital.   2. Do not drive or use heavy equipment.  If you have weakness or tingling, don't drive or use heavy equipment until this feeling goes away.  3. Do not drink alcohol.  4. Avoid strenuous or risky activities.  Ask for help when climbing stairs.   5. You may feel lightheaded.  IF so, sit for a few minutes before standing.  Have someone help you get up.   6. If you have nausea (feel sick to your stomach): Drink only clear liquids such as apple juice, ginger ale, broth or 7-Up.  Rest may also help.  Be sure to drink enough fluids.  Move to a regular diet as you feel able.  7. You may have a slight fever. Call the doctor if your fever is over 100 F (37.7 C) (taken under the tongue) or lasts longer than 24 hours.  8. You may have a dry mouth, a sore throat, muscle aches or trouble sleeping.  These should go away after 24 hours.  9. Do not make important or legal decisions.   Call your doctor for any of the followin.  Signs of infection (fever, growing tenderness at the surgery site, a large amount of drainage or bleeding, severe pain, foul-smelling drainage, redness, swelling).    2. It has been over 8 to 10 hours since surgery and you are still not able to urinate (pass water).    3.  Headache for over 24 hours.    4.  Numbness, tingling or weakness the day after surgery (if you had spinal anesthesia).  To contact a doctor, call __Dr. Guru Macias @ [phone]__ or:        [phone] and ask for the resident on call for   Gastroenterology (answered 24 hours a day)      Emergency Department:    [facility]: [phone]       (TTY for hearing impaired: [phone])         80

## 2022-08-19 ENCOUNTER — IMMUNIZATION (OUTPATIENT)
Dept: NURSING | Facility: CLINIC | Age: 37
End: 2022-08-19
Payer: COMMERCIAL

## 2022-08-19 DIAGNOSIS — Z94.4 LIVER REPLACED BY TRANSPLANT (H): Primary | ICD-10-CM

## 2022-08-19 DIAGNOSIS — Z13.220 LIPID SCREENING: ICD-10-CM

## 2022-08-19 PROCEDURE — 0054A COVID-19,PF,PFIZER (12+ YRS): CPT

## 2022-08-19 PROCEDURE — 91305 COVID-19,PF,PFIZER (12+ YRS): CPT

## 2022-09-21 ENCOUNTER — OFFICE VISIT (OUTPATIENT)
Dept: OBGYN | Facility: CLINIC | Age: 37
End: 2022-09-21
Attending: OBSTETRICS & GYNECOLOGY
Payer: COMMERCIAL

## 2022-09-21 VITALS
SYSTOLIC BLOOD PRESSURE: 134 MMHG | WEIGHT: 112 LBS | DIASTOLIC BLOOD PRESSURE: 86 MMHG | BODY MASS INDEX: 19.84 KG/M2 | HEART RATE: 84 BPM

## 2022-09-21 DIAGNOSIS — N93.9 ABNORMAL UTERINE BLEEDING (AUB): Primary | ICD-10-CM

## 2022-09-21 PROCEDURE — G0463 HOSPITAL OUTPT CLINIC VISIT: HCPCS

## 2022-09-21 PROCEDURE — 99213 OFFICE O/P EST LOW 20 MIN: CPT | Mod: GC | Performed by: OBSTETRICS & GYNECOLOGY

## 2022-09-21 NOTE — LETTER
Date:September 26, 2022      Patient was self referred, no letter generated. Do not send.        [facility] Health Information

## 2022-09-21 NOTE — LETTER
2022       RE: Yarelis Penny  [address] [facility] [address]     Dear Colleague,    Thank you for referring your patient, Yarelis Penny, to the [facility] WOMEN'S CLINIC [place] at [facility]. Please see a copy of my visit note below.    [facility]  Gynecology Visit    HPI:    Yarelis Penny is a 37 year old  w/ complex medical history, including s/p liver transplant, here for irregular menstrual bleeding.    The patient has a history of a vaginal delivery in 2021. She had irregular periods prior to pregnancy that normalized with bleeding for 5-6 days after she was started on medroxyprogesterone for fertility. After her baby was delivered, her periods resumed this April/May and have been irregular. Initially, her periods were irregular heavy with bleeding for about a week in May when she had 2 periods. Then she had a period about 1 month later in  with a normal amount of bleeding followed by another period about 10-15 days after. Her last menstrual period was in August where she bled for about a week and then continued spotting for a few weeks, which stopped about a week ago. Not currently having any bleeding or spotting. She notes she has a history of iron deficiency anemia and was previously on iron supplement but stopped per liver doctor with the plan for follow-up labs in October. Denies abnormal vaginal discharge, dysuria, hematuria, pelvic pain, fatigue, dizziness, lightheadedness.     Hx of endometrial polyp s/p hysteroscopic D&C in 2021 with benign surgical pathology. Last TVUS prior to vaginal delivery in 2021. Last Hgb 13.3 (2022).      GYN History  - LMP: Patient's last menstrual period was 2022.  - Pap Smears: last 2018 NILM, HPV negative  - Contraception: condoms, hx of infertility  - Sexual Activity/Concerns: none  - Hx STIs/UTIs: none    OBHx  OB History    Para Term  AB Living   1  1 0 1 0 1   SAB IAB Ectopic Multiple Live Births   0 0 0 0 1      # Outcome Date GA Lbr Tex/2nd Weight Sex Delivery Anes PTL Lv   1  21 31w6d  1.15 kg (2 lb 8.6 oz) F CS-LTranv Spinal  KIM      Name: DAVID CABRERA-GINA      Apgar1: 7  Apgar5: 8     Past Medical History:   Diagnosis Date     Acute kidney injury (H) 2016     Anemia, iron deficiency 2017    On iron supplementation     Autoimmune hepatitis (H)     on steroid taper     Cholangitis      CKD (chronic kidney disease)     biopsy : TIN, patchy fibrosis. Dialysis in 2013 x2 months.     CMV colitis (H) 2016     Esophageal varices (H) 2008    banded     Heart murmur      History of blood transfusion      Primary sclerosing cholangitis      SBP (spontaneous bacterial peritonitis) (H) 2015     Ulcerative Colitis     f/b GI     Past Surgical History:   Procedure Laterality Date      SECTION N/A 2021    Procedure:  SECTION;  Surgeon: Efrain Bill MD;  [facility]     CHOLECYSTECTOMY       COLONOSCOPY  2007     COLONOSCOPY N/A 2015    Procedure: COMBINED COLONOSCOPY, SINGLE OR MULTIPLE BIOPSY/POLYPECTOMY BY BIOPSY;  Surgeon: Mitchel Hoskins Chi, MD;  [facility]     COLONOSCOPY N/A 2016    Procedure: COMBINED COLONOSCOPY, SINGLE OR MULTIPLE BIOPSY/POLYPECTOMY BY BIOPSY;  Surgeon: Rigo Valles MD;  [facility]     COLONOSCOPY N/A 2016    Procedure: COMBINED COLONOSCOPY, SINGLE OR MULTIPLE BIOPSY/POLYPECTOMY BY BIOPSY;  Surgeon: Kareem Solis MD;  [facility]     COLONOSCOPY N/A 2017    Procedure: COMBINED COLONOSCOPY, SINGLE OR MULTIPLE BIOPSY/POLYPECTOMY BY BIOPSY;  Colonoscopy;  Surgeon: Fuentes Johnson MD;  [facility]     COLONOSCOPY N/A 2019    Procedure: COMBINED COLONOSCOPY, SINGLE OR MULTIPLE BIOPSY/POLYPECTOMY BY BIOPSY;  Surgeon: Sophy Mckeon MD;  [facility]     DILATION AND CURETTAGE,  HYSTEROSCOPY DIAGNOSTIC, COMBINED N/A 03/12/2021    Procedure: HYSTEROSCOPY, DIAGNOSTIC;  Surgeon: Lin Armendariz MD;  [facility]     ENDOSCOPIC RETROGRADE CHOLANGIOPANCREATOGRAM N/A 06/25/2015    Procedure: COMBINED ENDOSCOPIC RETROGRADE CHOLANGIOPANCREATOGRAPHY, PLACE TUBE/STENT;  Surgeon: Landon Quinones MD;  [facility]     ENDOSCOPIC RETROGRADE CHOLANGIOPANCREATOGRAM N/A 06/25/2015    Procedure: COMBINED ENDOSCOPIC RETROGRADE CHOLANGIOPANCREATOGRAPHY, PLACE TUBE/STENT;  Surgeon: Landon Quinones MD;  [facility]     ENDOSCOPIC RETROGRADE CHOLANGIOPANCREATOGRAM N/A 07/02/2015    Procedure: COMBINED ENDOSCOPIC RETROGRADE CHOLANGIOPANCREATOGRAPHY, PLACE TUBE/STENT;  Surgeon: Landon Quinones MD;  [facility]     ENDOSCOPIC RETROGRADE CHOLANGIOPANCREATOGRAM N/A 09/08/2015    Procedure: COMBINED ENDOSCOPIC RETROGRADE CHOLANGIOPANCREATOGRAPHY, REMOVE FOREIGN BODY OR STENT/TUBE;  Surgeon: Landon Quinones MD;  [facility]     ENDOSCOPIC RETROGRADE CHOLANGIOPANCREATOGRAM N/A 12/08/2015    Procedure: ENDOSCOPIC RETROGRADE CHOLANGIOPANCREATOGRAM;  Surgeon: Landon Quinones MD;  [facility]     ENDOSCOPIC RETROGRADE CHOLANGIOPANCREATOGRAM N/A 03/01/2016    Procedure: COMBINED ENDOSCOPIC RETROGRADE CHOLANGIOPANCREATOGRAPHY, REMOVE FOREIGN BODY OR STENT/TUBE;  Surgeon: Landon Quinones MD;  [facility]     ENDOSCOPIC RETROGRADE CHOLANGIOPANCREATOGRAM N/A 03/20/2017    Procedure: COMBINED ENDOSCOPIC RETROGRADE CHOLANGIOPANCREATOGRAPHY, PLACE TUBE/STENT;  Endoscopic Retrograde Cholangiopancreatogram with Ballon Dilation, Stent Placement;  Surgeon: Guru Brittany Macias MD;  [facility]     ENDOSCOPIC RETROGRADE CHOLANGIOPANCREATOGRAM N/A 04/02/2018    Procedure: COMBINED ENDOSCOPIC RETROGRADE CHOLANGIOPANCREATOGRAPHY, PLACE TUBE/STENT;  Endoscopic Retrograde Cholangiopancreatogram with biliary dilation and stent placement;  Surgeon:  Guru Brittany Macias MD;  [facility]     ENDOSCOPIC RETROGRADE CHOLANGIOPANCREATOGRAM N/A 05/07/2018    Procedure: ENDOSCOPIC RETROGRADE CHOLANGIOPANCREATOGRAM;  Endoscopic Retrograde Cholangiopancreatogram ballon dilation stent exchange;  Surgeon: Guru Brittany Macias MD;  [facility]     ENDOSCOPIC RETROGRADE CHOLANGIOPANCREATOGRAPHY, EXCHANGE TUBE/STENT N/A 07/30/2015    Procedure: ENDOSCOPIC RETROGRADE CHOLANGIOPANCREATOGRAPHY, EXCHANGE TUBE/STENT;  Surgeon: Landon Quinones MD;  [facility]     ENDOSCOPIC RETROGRADE CHOLANGIOPANCREATOGRAPHY, EXCHANGE TUBE/STENT N/A 05/15/2017    Procedure: ENDOSCOPIC RETROGRADE CHOLANGIOPANCREATOGRAPHY, EXCHANGE TUBE/STENT;  Endoscopic Retrograde Cholangiopancreatogram with biliary stent exchange and balloon dilation;  Surgeon: Guru Brittany Macias MD;  [facility]     ENDOSCOPIC RETROGRADE CHOLANGIOPANCREATOGRAPHY, EXCHANGE TUBE/STENT N/A 06/25/2018    Procedure: ENDOSCOPIC RETROGRADE CHOLANGIOPANCREATOGRAPHY, EXCHANGE TUBE/STENT;  Endoscopic Retrograde Cholangiopancreatogram with biliary dilation, stone removal and stent exchange;  Surgeon: Guru Brittany Macias MD;  [facility]     ENDOSCOPIC RETROGRADE CHOLANGIOPANCREATOGRAPHY, EXCHANGE TUBE/STENT N/A 07/30/2018    Procedure: ENDOSCOPIC RETROGRADE CHOLANGIOPANCREATOGRAPHY, EXCHANGE TUBE/STENT;  Endoscopic Retrograde Cholangiopancreatogram with Stent Exchange with dilation;  Surgeon: Guru Brittany Macias MD;  [facility]     ENDOSCOPIC RETROGRADE CHOLANGIOPANCREATOGRAPHY, EXCHANGE TUBE/STENT N/A 09/26/2018    Procedure: ENDOSCOPIC RETROGRADE CHOLANGIOPANCREATOGRAPHY, EXCHANGE TUBE/STENT;  Endoscopic Retrograde Cholangiopancreatogram, with bile duct stent exchanged balloon dilation and balloon sweep of bile duct;  Surgeon: Guru Brittany Macias MD;  [facility]     ESOPHAGOSCOPY, GASTROSCOPY,  DUODENOSCOPY (EGD), COMBINED N/A 02/26/2015    Procedure: COMBINED ESOPHAGOSCOPY, GASTROSCOPY, DUODENOSCOPY (EGD);  Surgeon: Mitchel Hoskins Chi, MD;  [facility]     EXPLORE COMMON BILE DUCT N/A 06/25/2015    Procedure: EXPLORE COMMON BILE DUCT;  Surgeon: Tyree Smith MD;  [facility]     HERNIA REPAIR  2015    During transplant     ILEOSTOMY N/A 06/20/2019    Procedure: Ileostomy;  Surgeon: Krzysztof Carney MD;  [facility]     IR LIVER BIOPSY PERCUTANEOUS  1/4/2022     LAPAROSCOPIC ASSISTED COLECTOMY N/A 06/20/2019    Procedure: Laparoscopic Total Abdominal Colectomy, lysis of adhesions;  Surgeon: Krzysztof Carney MD;  [facility]     LAPAROSCOPIC LYSIS ADHESIONS N/A 03/12/2021    Procedure: LYSIS, ADHESIONS;  Surgeon: Lin Armendariz MD;  [facility]     LAPAROTOMY EXPLORATORY N/A 06/25/2015    Procedure: LAPAROTOMY EXPLORATORY;  Surgeon: Tyree Smith MD;  [facility]     PICC INSERTION Right 02/27/2015    4fr SL Valved PICC, 36cm (1cm external) in the R medial brachial vein w/ tip in the low SVC.     SIGMOIDOSCOPY FLEXIBLE N/A 04/27/2016    Procedure: SIGMOIDOSCOPY FLEXIBLE;  Surgeon: Jose Nielson MD;  [facility]     TRANSPLANT LIVER RECIPIENT LIVING UNRELATED N/A 06/18/2015    Procedure: TRANSPLANT LIVER RECIPIENT LIVING UNRELATED;  Surgeon: Tyree Smith MD;  [facility]     UPPER GI ENDOSCOPY         Current Outpatient Medications:      acetaminophen (TYLENOL) 325 MG tablet, Take 2 tablets (650 mg) by mouth every 6 hours, Disp: 45 tablet, Rfl: 0     calcium carbonate (OS-KERLINE 500 MG [place]. [place]) 1250 MG tablet, Take 1 tablet by mouth every evening, Disp: , Rfl:      ferrous sulfate (FEROSUL) 325 (65 Fe) MG tablet, Take 325 mg by mouth daily (with breakfast), Disp: , Rfl:      Multiple Vitamin (MULTIVITAMIN PO), Take 1 tablet by mouth daily, Disp: , Rfl:      predniSONE (DELTASONE) 5 MG tablet, Take 1 tablet (5 mg) by mouth daily, Disp: 90 tablet, Rfl: 3      "ursodiol (ACTIGALL) 250 MG tablet, Take 2 tablets (500 mg) by mouth every morning AND 1 tablet (250 mg) every evening., Disp: 90 tablet, Rfl: 11     VITAMIN D, CHOLECALCIFEROL, PO, Take by mouth every evening , Disp: , Rfl:      order for DME, [employer] item #s   20437 and 33737, Disp: 30 Bag, Rfl: 3     order for DME, Increase the amount to 10 per month  --2\" barrier ring #[id number], Disp: 10 each, Rfl: 0     tacrolimus (GENERIC EQUIVALENT) 1 MG capsule, Take 3 capsules (3 mg) by mouth every morning AND 2 capsules (2 mg) every evening., Disp: 450 capsule, Rfl: 3     thin (NO BRAND SPECIFIED) lancets, Use with lanceting device. To accompany: Blood Glucose Monitor Brands: per insurance., Disp: 100 each, Rfl: 6    Allergies   Allergen Reactions     Rifampin Other (See Comments)     Kidney failure     Penicillins      Bad hives when she was in college     Family History   Problem Relation Age of Onset     Hypertension Mother      Lipids Mother      Sleep Apnea Mother      Heart Disease Mother      Hypertension Maternal Grandmother              Alzheimer Disease Maternal Grandmother      Lipids Father      Heart Disease Maternal Grandfather 40     Heart Disease Paternal Grandfather      Melanoma No family hx of      Skin Cancer No family hx of      Cancer No family hx of      Diabetes No family hx of      Social History     Socioeconomic History     Marital status:      Spouse name: Ceferino     Number of children: 0     Years of education: Not on file     Highest education level: Not on file   Occupational History     Occupation: unemployed   Tobacco Use     Smoking status: Never Smoker     Smokeless tobacco: Never Used   Substance and Sexual Activity     Alcohol use: Not Currently     Alcohol/week: 0.0 standard drinks     Comment: Every other month has 1 drink     Drug use: No     Sexual activity: Yes     Partners: Male     Birth control/protection: None   Other Topics Concern      Service No     Blood " Transfusions No     Caffeine Concern No     Occupational Exposure No     Hobby Hazards No     Sleep Concern Yes     Stress Concern No     Weight Concern No     Special Diet No     Back Care No     Exercise No     Bike Helmet No     Comment: n/a     Seat Belt Yes     Self-Exams No     Parent/sibling w/ CABG, MI or angioplasty before 65F 55M? No   Social History Narrative    no pets at home, no recent known sick contacts. She spends a lot of her time at the fitness center.         How much exercise per week? 1-5 times a week  walking too    How much calcium per day? supplements       How much caffeine per day? 10 oz half  calf    How much vitamin D per day? In supplements    Do you/your family wear seatbelts?  Yes    Do you/your family use safety helmets? Yes    Do you/your family use sunscreen? Yes    Do you/your family keep firearms in the home? No    Do you/your family have a smoke detector(s)? Yes        Reviewed  [facility]  [facility]     Social Determinants of Health     Financial Resource Strain: Not on file   Food Insecurity: Not on file   Transportation Needs: Not on file   Physical Activity: Not on file   Stress: Not on file   Social Connections: Not on file   Intimate Partner Violence: Unknown     Fear of Current or Ex-Partner: Not asked     Emotionally Abused: Not asked     Physically Abused: Not asked     Sexually Abused: Not asked   Housing Stability: Not on file     ROS: 10-Point ROS negative except as noted in HPI    Physical Exam  /86   Pulse 84   Wt 50.8 kg (112 lb)   LMP 2022   Breastfeeding No   BMI 19.84 kg/m    Gen: Well-appearing, NAD  HEENT: Normocephalic, atraumatic  CV:  Warm, well perfused  Pulm: Normal respiratory effort and rate  Ext: No LE edema    Assessment/Plan:  Yarelis Penny is a 37 year old  w/ complex medical history, including s/p liver transplant, here for abnormal uterine bleeding.    Abnormal uterine bleeding (AUB)  Hx of endometrial polyp s/p  hysteroscopic D&C in 03/2021 with benign surgical pathology. Last TVUS prior to vaginal delivery in 12/2021.  - order pelvic US today to evaluate for structural causes, such as recurrent polyp  - order TSH to ensure thyroid function is normal  - patient not interested in starting hormonal contraceptive at this time to help regulate periods; can consider progesterone-only options in the future per the patient's preference    Return to clinic as need if bleeding worsens or becomes bothersome.    Staffed with Dr. Armendariz.     Kourtney Pan MD  PGY-1 OBGYN Resident  9/21/2022 1:53 PM     [facility] staff:  Appreciate note by Dr. Pan.  I have seen and examined the patient with the resident. I have reviewed, edited, and agree with the note.        Lin Armendariz MD, FACOG          Again, thank you for allowing me to participate in the care of your patient.      Sincerely,    Lin Armendariz MD

## 2022-09-21 NOTE — ASSESSMENT & PLAN NOTE
Hx of endometrial polyp s/p hysteroscopic D&C in 03/2021 with benign surgical pathology. Last TVUS prior to vaginal delivery in 12/2021.  - order pelvic US today to evaluate for structural causes, such as recurrent polyp  - order TSH to ensure thyroid function is normal  - patient not interested in starting hormonal contraceptive at this time to help regulate periods; can consider progesterone-only options in the future per the patient's preference

## 2022-09-21 NOTE — PROGRESS NOTES
[facility]  Gynecology Visit    HPI:    Yarelis Penny is a 37 year old  w/ complex medical history, including s/p liver transplant, here for irregular menstrual bleeding.    The patient has a history of a vaginal delivery in 2021. She had irregular periods prior to pregnancy that normalized with bleeding for 5-6 days after she was started on medroxyprogesterone for fertility. After her baby was delivered, her periods resumed this April/May and have been irregular. Initially, her periods were irregular heavy with bleeding for about a week in May when she had 2 periods. Then she had a period about 1 month later in  with a normal amount of bleeding followed by another period about 10-15 days after. Her last menstrual period was in August where she bled for about a week and then continued spotting for a few weeks, which stopped about a week ago. Not currently having any bleeding or spotting. She notes she has a history of iron deficiency anemia and was previously on iron supplement but stopped per liver doctor with the plan for follow-up labs in October. Denies abnormal vaginal discharge, dysuria, hematuria, pelvic pain, fatigue, dizziness, lightheadedness.     Hx of endometrial polyp s/p hysteroscopic D&C in 2021 with benign surgical pathology. Last TVUS prior to vaginal delivery in 2021. Last Hgb 13.3 (2022).      GYN History  - LMP: Patient's last menstrual period was 2022.  - Pap Smears: last 2018 NILM, HPV negative  - Contraception: condoms, hx of infertility  - Sexual Activity/Concerns: none  - Hx STIs/UTIs: none    OBHx  OB History    Para Term  AB Living   1 1 0 1 0 1   SAB IAB Ectopic Multiple Live Births   0 0 0 0 1      # Outcome Date GA Lbr Tex/2nd Weight Sex Delivery Anes PTL Lv   1  21 31w6d  1.15 kg (2 lb 8.6 oz) F CS-LTranv Spinal  KIM      Name: DEBORAHFEMALE-YARELIS      Apgar1: 7  Apgar5: 8     Past Medical History:   Diagnosis Date     Acute  kidney injury (H) 2016     Anemia, iron deficiency 2017    On iron supplementation     Autoimmune hepatitis (H)     on steroid taper     Cholangitis      CKD (chronic kidney disease) 2012    biopsy 2012: TIN, patchy fibrosis. Dialysis in 2013 x2 months.     CMV colitis (H) 2016     Esophageal varices (H) 2008    banded     Heart murmur      History of blood transfusion      Primary sclerosing cholangitis      SBP (spontaneous bacterial peritonitis) (H) 2015     Ulcerative Colitis     f/b GI     Past Surgical History:   Procedure Laterality Date      SECTION N/A 2021    Procedure:  SECTION;  Surgeon: Efrain Bill MD;  [facility]     CHOLECYSTECTOMY       COLONOSCOPY  2007     COLONOSCOPY N/A 2015    Procedure: COMBINED COLONOSCOPY, SINGLE OR MULTIPLE BIOPSY/POLYPECTOMY BY BIOPSY;  Surgeon: Mitchel Hoskins Chi, MD;  [facility]     COLONOSCOPY N/A 2016    Procedure: COMBINED COLONOSCOPY, SINGLE OR MULTIPLE BIOPSY/POLYPECTOMY BY BIOPSY;  Surgeon: Rigo Valles MD;  [facility]     COLONOSCOPY N/A 2016    Procedure: COMBINED COLONOSCOPY, SINGLE OR MULTIPLE BIOPSY/POLYPECTOMY BY BIOPSY;  Surgeon: Kareem Solis MD;  [facility]     COLONOSCOPY N/A 2017    Procedure: COMBINED COLONOSCOPY, SINGLE OR MULTIPLE BIOPSY/POLYPECTOMY BY BIOPSY;  Colonoscopy;  Surgeon: Fuentes Johnson MD;  [facility]     COLONOSCOPY N/A 2019    Procedure: COMBINED COLONOSCOPY, SINGLE OR MULTIPLE BIOPSY/POLYPECTOMY BY BIOPSY;  Surgeon: Sophy Mckeon MD;  [facility]     DILATION AND CURETTAGE, HYSTEROSCOPY DIAGNOSTIC, COMBINED N/A 2021    Procedure: HYSTEROSCOPY, DIAGNOSTIC;  Surgeon: Lin Armendariz MD;  [facility]     ENDOSCOPIC RETROGRADE CHOLANGIOPANCREATOGRAM N/A 2015    Procedure: COMBINED ENDOSCOPIC RETROGRADE CHOLANGIOPANCREATOGRAPHY, PLACE TUBE/STENT;  Surgeon:  Landon Quinones MD;  [facility]     ENDOSCOPIC RETROGRADE CHOLANGIOPANCREATOGRAM N/A 06/25/2015    Procedure: COMBINED ENDOSCOPIC RETROGRADE CHOLANGIOPANCREATOGRAPHY, PLACE TUBE/STENT;  Surgeon: Landon Quinones MD;  [facility]     ENDOSCOPIC RETROGRADE CHOLANGIOPANCREATOGRAM N/A 07/02/2015    Procedure: COMBINED ENDOSCOPIC RETROGRADE CHOLANGIOPANCREATOGRAPHY, PLACE TUBE/STENT;  Surgeon: Landon Quinones MD;  [facility]     ENDOSCOPIC RETROGRADE CHOLANGIOPANCREATOGRAM N/A 09/08/2015    Procedure: COMBINED ENDOSCOPIC RETROGRADE CHOLANGIOPANCREATOGRAPHY, REMOVE FOREIGN BODY OR STENT/TUBE;  Surgeon: Landon Quinones MD;  [facility]     ENDOSCOPIC RETROGRADE CHOLANGIOPANCREATOGRAM N/A 12/08/2015    Procedure: ENDOSCOPIC RETROGRADE CHOLANGIOPANCREATOGRAM;  Surgeon: Landon Quinones MD;  [facility]     ENDOSCOPIC RETROGRADE CHOLANGIOPANCREATOGRAM N/A 03/01/2016    Procedure: COMBINED ENDOSCOPIC RETROGRADE CHOLANGIOPANCREATOGRAPHY, REMOVE FOREIGN BODY OR STENT/TUBE;  Surgeon: Landon Quinones MD;  [facility]     ENDOSCOPIC RETROGRADE CHOLANGIOPANCREATOGRAM N/A 03/20/2017    Procedure: COMBINED ENDOSCOPIC RETROGRADE CHOLANGIOPANCREATOGRAPHY, PLACE TUBE/STENT;  Endoscopic Retrograde Cholangiopancreatogram with Ballon Dilation, Stent Placement;  Surgeon: Guru Brittany Macias MD;  [facility]     ENDOSCOPIC RETROGRADE CHOLANGIOPANCREATOGRAM N/A 04/02/2018    Procedure: COMBINED ENDOSCOPIC RETROGRADE CHOLANGIOPANCREATOGRAPHY, PLACE TUBE/STENT;  Endoscopic Retrograde Cholangiopancreatogram with biliary dilation and stent placement;  Surgeon: Guru Brittany Macias MD;  [facility]     ENDOSCOPIC RETROGRADE CHOLANGIOPANCREATOGRAM N/A 05/07/2018    Procedure: ENDOSCOPIC RETROGRADE CHOLANGIOPANCREATOGRAM;  Endoscopic Retrograde Cholangiopancreatogram ballon dilation stent exchange;  Surgeon: Guru Brtitany Macias,  MD;  [facility]     ENDOSCOPIC RETROGRADE CHOLANGIOPANCREATOGRAPHY, EXCHANGE TUBE/STENT N/A 07/30/2015    Procedure: ENDOSCOPIC RETROGRADE CHOLANGIOPANCREATOGRAPHY, EXCHANGE TUBE/STENT;  Surgeon: Landon Quinones MD;  [facility]     ENDOSCOPIC RETROGRADE CHOLANGIOPANCREATOGRAPHY, EXCHANGE TUBE/STENT N/A 05/15/2017    Procedure: ENDOSCOPIC RETROGRADE CHOLANGIOPANCREATOGRAPHY, EXCHANGE TUBE/STENT;  Endoscopic Retrograde Cholangiopancreatogram with biliary stent exchange and balloon dilation;  Surgeon: Guru Brittany Macias MD;  [facility]     ENDOSCOPIC RETROGRADE CHOLANGIOPANCREATOGRAPHY, EXCHANGE TUBE/STENT N/A 06/25/2018    Procedure: ENDOSCOPIC RETROGRADE CHOLANGIOPANCREATOGRAPHY, EXCHANGE TUBE/STENT;  Endoscopic Retrograde Cholangiopancreatogram with biliary dilation, stone removal and stent exchange;  Surgeon: Guru Brittany Macias MD;  [facility]     ENDOSCOPIC RETROGRADE CHOLANGIOPANCREATOGRAPHY, EXCHANGE TUBE/STENT N/A 07/30/2018    Procedure: ENDOSCOPIC RETROGRADE CHOLANGIOPANCREATOGRAPHY, EXCHANGE TUBE/STENT;  Endoscopic Retrograde Cholangiopancreatogram with Stent Exchange with dilation;  Surgeon: Guru Brittany Macias MD;  [facility]     ENDOSCOPIC RETROGRADE CHOLANGIOPANCREATOGRAPHY, EXCHANGE TUBE/STENT N/A 09/26/2018    Procedure: ENDOSCOPIC RETROGRADE CHOLANGIOPANCREATOGRAPHY, EXCHANGE TUBE/STENT;  Endoscopic Retrograde Cholangiopancreatogram, with bile duct stent exchanged balloon dilation and balloon sweep of bile duct;  Surgeon: Guru Brittany Macias MD;  [facility]     ESOPHAGOSCOPY, GASTROSCOPY, DUODENOSCOPY (EGD), COMBINED N/A 02/26/2015    Procedure: COMBINED ESOPHAGOSCOPY, GASTROSCOPY, DUODENOSCOPY (EGD);  Surgeon: Mitchel Hoskins Chi, MD;  [facility]     EXPLORE COMMON BILE DUCT N/A 06/25/2015    Procedure: EXPLORE COMMON BILE DUCT;  Surgeon: Tyree Smith MD;  [facility]     HERNIA  REPAIR  2015    During transplant     ILEOSTOMY N/A 06/20/2019    Procedure: Ileostomy;  Surgeon: Krzysztof Carney MD;  [facility]     IR LIVER BIOPSY PERCUTANEOUS  1/4/2022     LAPAROSCOPIC ASSISTED COLECTOMY N/A 06/20/2019    Procedure: Laparoscopic Total Abdominal Colectomy, lysis of adhesions;  Surgeon: Krzysztof Carney MD;  [facility]     LAPAROSCOPIC LYSIS ADHESIONS N/A 03/12/2021    Procedure: LYSIS, ADHESIONS;  Surgeon: Lin Armendariz MD;  [facility]     LAPAROTOMY EXPLORATORY N/A 06/25/2015    Procedure: LAPAROTOMY EXPLORATORY;  Surgeon: Tyree Smith MD;  [facility]     PICC INSERTION Right 02/27/2015    4fr SL Valved PICC, 36cm (1cm external) in the R medial brachial vein w/ tip in the low SVC.     SIGMOIDOSCOPY FLEXIBLE N/A 04/27/2016    Procedure: SIGMOIDOSCOPY FLEXIBLE;  Surgeon: Jose Nielson MD;  [facility]     TRANSPLANT LIVER RECIPIENT LIVING UNRELATED N/A 06/18/2015    Procedure: TRANSPLANT LIVER RECIPIENT LIVING UNRELATED;  Surgeon: Tyree Smith MD;  [facility]     UPPER GI ENDOSCOPY         Current Outpatient Medications:      acetaminophen (TYLENOL) 325 MG tablet, Take 2 tablets (650 mg) by mouth every 6 hours, Disp: 45 tablet, Rfl: 0     calcium carbonate (OS-KERLINE 500 MG [place]) 1250 MG tablet, Take 1 tablet by mouth every evening, Disp: , Rfl:      ferrous sulfate (FEROSUL) 325 (65 Fe) MG tablet, Take 325 mg by mouth daily (with breakfast), Disp: , Rfl:      Multiple Vitamin (MULTIVITAMIN PO), Take 1 tablet by mouth daily, Disp: , Rfl:      predniSONE (DELTASONE) 5 MG tablet, Take 1 tablet (5 mg) by mouth daily, Disp: 90 tablet, Rfl: 3     ursodiol (ACTIGALL) 250 MG tablet, Take 2 tablets (500 mg) by mouth every morning AND 1 tablet (250 mg) every evening., Disp: 90 tablet, Rfl: 11     VITAMIN D, CHOLECALCIFEROL, PO, Take by mouth every evening , Disp: , Rfl:      order for DME, [employer] item #s   [id number] and [id number], Disp: 30 Bag, Rfl: 3     order  "for DME, Increase the amount to 10 per month  --2\" barrier ring #[id number], Disp: 10 each, Rfl: 0     tacrolimus (GENERIC EQUIVALENT) 1 MG capsule, Take 3 capsules (3 mg) by mouth every morning AND 2 capsules (2 mg) every evening., Disp: 450 capsule, Rfl: 3     thin (NO BRAND SPECIFIED) lancets, Use with lanceting device. To accompany: Blood Glucose Monitor Brands: per insurance., Disp: 100 each, Rfl: 6    Allergies   Allergen Reactions     Rifampin Other (See Comments)     Kidney failure     Penicillins      Bad hives when she was in college     Family History   Problem Relation Age of Onset     Hypertension Mother      Lipids Mother      Sleep Apnea Mother      Heart Disease Mother      Hypertension Maternal Grandmother              Alzheimer Disease Maternal Grandmother      Lipids Father      Heart Disease Maternal Grandfather 40     Heart Disease Paternal Grandfather      Melanoma No family hx of      Skin Cancer No family hx of      Cancer No family hx of      Diabetes No family hx of      Social History     Socioeconomic History     Marital status:      Spouse name: Ceferino     Number of children: 0     Years of education: Not on file     Highest education level: Not on file   Occupational History     Occupation: unemployed   Tobacco Use     Smoking status: Never Smoker     Smokeless tobacco: Never Used   Substance and Sexual Activity     Alcohol use: Not Currently     Alcohol/week: 0.0 standard drinks     Comment: Every other month has 1 drink     Drug use: No     Sexual activity: Yes     Partners: Male     Birth control/protection: None   Other Topics Concern      Service No     Blood Transfusions No     Caffeine Concern No     Occupational Exposure No     Hobby Hazards No     Sleep Concern Yes     Stress Concern No     Weight Concern No     Special Diet No     Back Care No     Exercise No     Bike Helmet No     Comment: n/a     Seat Belt Yes     Self-Exams No     Parent/sibling w/ CABG, MI " or angioplasty before 65F 55M? No   Social History Narrative    no pets at home, no recent known sick contacts. She spends a lot of her time at the fitness center.         How much exercise per week? 1-5 times a week  walking too    How much calcium per day? supplements       How much caffeine per day? 10 oz half  calf    How much vitamin D per day? In supplements    Do you/your family wear seatbelts?  Yes    Do you/your family use safety helmets? Yes    Do you/your family use sunscreen? Yes    Do you/your family keep firearms in the home? No    Do you/your family have a smoke detector(s)? Yes        Reviewed  [facility]  21     Social Determinants of Health     Financial Resource Strain: Not on file   Food Insecurity: Not on file   Transportation Needs: Not on file   Physical Activity: Not on file   Stress: Not on file   Social Connections: Not on file   Intimate Partner Violence: Unknown     Fear of Current or Ex-Partner: Not asked     Emotionally Abused: Not asked     Physically Abused: Not asked     Sexually Abused: Not asked   Housing Stability: Not on file     ROS: 10-Point ROS negative except as noted in HPI    Physical Exam  /86   Pulse 84   Wt 50.8 kg (112 lb)   LMP 2022   Breastfeeding No   BMI 19.84 kg/m    Gen: Well-appearing, NAD  HEENT: Normocephalic, atraumatic  CV:  Warm, well perfused  Pulm: Normal respiratory effort and rate  Ext: No LE edema    Assessment/Plan:  Yarelis Penny is a 37 year old  w/ complex medical history, including s/p liver transplant, here for abnormal uterine bleeding.    Abnormal uterine bleeding (AUB)  Hx of endometrial polyp s/p hysteroscopic D&C in 2021 with benign surgical pathology. Last TVUS prior to vaginal delivery in 2021.  - order pelvic US today to evaluate for structural causes, such as recurrent polyp  - order TSH to ensure thyroid function is normal  - patient not interested in starting hormonal contraceptive at this time to help  regulate periods; can consider progesterone-only options in the future per the patient's preference    Return to clinic as need if bleeding worsens or becomes bothersome.    Staffed with Dr. Armendariz.     Kourtney Pan MD  PGY-1 OBGYN Resident  9/21/2022 1:53 PM     [facility] staff:  Appreciate note by Dr. Pan.  I have seen and examined the patient with the resident. I have reviewed, edited, and agree with the note.        Lin Armendariz MD, FACOG

## 2022-09-21 NOTE — NURSING NOTE
Chief Complaint   Patient presents with     RECHECK     C/O irregular periiods   Philomena Kearney LPN

## 2022-10-03 ENCOUNTER — LAB (OUTPATIENT)
Dept: LAB | Facility: CLINIC | Age: 37
End: 2022-10-03
Payer: COMMERCIAL

## 2022-10-03 DIAGNOSIS — N93.9 ABNORMAL UTERINE BLEEDING (AUB): ICD-10-CM

## 2022-10-03 DIAGNOSIS — T86.49 CHOLESTASIS OF TRANSPLANTED LIVER (H): ICD-10-CM

## 2022-10-03 DIAGNOSIS — Z94.4 LIVER REPLACED BY TRANSPLANT (H): ICD-10-CM

## 2022-10-03 DIAGNOSIS — Z94.4 LIVER TRANSPLANT RECIPIENT (H): ICD-10-CM

## 2022-10-03 DIAGNOSIS — Z13.220 LIPID SCREENING: ICD-10-CM

## 2022-10-03 DIAGNOSIS — K75.4 AUTOIMMUNE HEPATITIS (H): ICD-10-CM

## 2022-10-03 DIAGNOSIS — K83.1 CHOLESTASIS OF TRANSPLANTED LIVER (H): ICD-10-CM

## 2022-10-03 LAB
ALBUMIN SERPL-MCNC: 3.2 G/DL (ref 3.4–5)
ALP SERPL-CCNC: 70 U/L (ref 40–150)
ALT SERPL W P-5'-P-CCNC: 30 U/L (ref 0–50)
ANION GAP SERPL CALCULATED.3IONS-SCNC: 6 MMOL/L (ref 3–14)
AST SERPL W P-5'-P-CCNC: 22 U/L (ref 0–45)
BILIRUB DIRECT SERPL-MCNC: <0.1 MG/DL (ref 0–0.2)
BILIRUB SERPL-MCNC: 0.3 MG/DL (ref 0.2–1.3)
BUN SERPL-MCNC: 23 MG/DL (ref 7–30)
CALCIUM SERPL-MCNC: 9.4 MG/DL (ref 8.5–10.1)
CHLORIDE BLD-SCNC: 110 MMOL/L (ref 94–109)
CHOLEST SERPL-MCNC: 181 MG/DL
CO2 SERPL-SCNC: 22 MMOL/L (ref 20–32)
CREAT SERPL-MCNC: 1.14 MG/DL (ref 0.52–1.04)
ERYTHROCYTE [DISTWIDTH] IN BLOOD BY AUTOMATED COUNT: 13.8 % (ref 10–15)
FASTING STATUS PATIENT QL REPORTED: NORMAL
FERRITIN SERPL-MCNC: 51 NG/ML (ref 12–150)
GFR SERPL CREATININE-BSD FRML MDRD: 63 ML/MIN/1.73M2
GLUCOSE BLD-MCNC: 99 MG/DL (ref 70–99)
HCT VFR BLD AUTO: 41.5 % (ref 35–47)
HDLC SERPL-MCNC: 87 MG/DL
HGB BLD-MCNC: 13.5 G/DL (ref 11.7–15.7)
IRON BINDING CAPACITY (ROCHE): 247 UG/DL (ref 240–430)
IRON SATN MFR SERPL: 27 % (ref 15–46)
IRON SERPL-MCNC: 66 UG/DL (ref 37–145)
LDLC SERPL CALC-MCNC: 67 MG/DL
MCH RBC QN AUTO: 28.5 PG (ref 26.5–33)
MCHC RBC AUTO-ENTMCNC: 32.5 G/DL (ref 31.5–36.5)
MCV RBC AUTO: 88 FL (ref 78–100)
NONHDLC SERPL-MCNC: 94 MG/DL
PLATELET # BLD AUTO: 481 10E3/UL (ref 150–450)
POTASSIUM BLD-SCNC: 4.2 MMOL/L (ref 3.4–5.3)
PROT SERPL-MCNC: 7 G/DL (ref 6.8–8.8)
RBC # BLD AUTO: 4.73 10E6/UL (ref 3.8–5.2)
SODIUM SERPL-SCNC: 138 MMOL/L (ref 133–144)
TACROLIMUS BLD-MCNC: 5.2 UG/L (ref 5–15)
TME LAST DOSE: NORMAL H
TME LAST DOSE: NORMAL H
TRIGL SERPL-MCNC: 134 MG/DL
TSH SERPL DL<=0.005 MIU/L-ACNC: 3 MU/L (ref 0.4–4)
WBC # BLD AUTO: 9 10E3/UL (ref 4–11)

## 2022-10-03 PROCEDURE — 80053 COMPREHEN METABOLIC PANEL: CPT

## 2022-10-03 PROCEDURE — 80061 LIPID PANEL: CPT

## 2022-10-03 PROCEDURE — 83540 ASSAY OF IRON: CPT

## 2022-10-03 PROCEDURE — 87799 DETECT AGENT NOS DNA QUANT: CPT

## 2022-10-03 PROCEDURE — 82248 BILIRUBIN DIRECT: CPT

## 2022-10-03 PROCEDURE — 83550 IRON BINDING TEST: CPT

## 2022-10-03 PROCEDURE — 84443 ASSAY THYROID STIM HORMONE: CPT

## 2022-10-03 PROCEDURE — 80197 ASSAY OF TACROLIMUS: CPT

## 2022-10-03 PROCEDURE — 82728 ASSAY OF FERRITIN: CPT

## 2022-10-03 PROCEDURE — 85027 COMPLETE CBC AUTOMATED: CPT

## 2022-10-03 PROCEDURE — 36415 COLL VENOUS BLD VENIPUNCTURE: CPT

## 2022-10-04 DIAGNOSIS — Z94.4 LIVER REPLACED BY TRANSPLANT (H): ICD-10-CM

## 2022-10-04 DIAGNOSIS — B27.90 EBV INFECTION: Primary | ICD-10-CM

## 2022-10-04 LAB
EBV DNA COPIES/ML, INSTRUMENT: ABNORMAL COPIES/ML
EBV DNA SPEC NAA+PROBE-LOG#: 4.8 {LOG_COPIES}/ML

## 2022-10-07 ENCOUNTER — PATIENT OUTREACH (OUTPATIENT)
Dept: GASTROENTEROLOGY | Facility: CLINIC | Age: 37
End: 2022-10-07

## 2022-10-07 DIAGNOSIS — K51.90 ULCERATIVE COLITIS (H): Primary | ICD-10-CM

## 2022-10-07 NOTE — TELEPHONE ENCOUNTER
Patient had total  Abdominal colectomy  2021   Due for dyplasia screening   Order for flex sig to be scheduled with Dr Mckeon   Left a message for patient about flex sig   Also my chart message

## 2022-10-11 ENCOUNTER — TELEPHONE (OUTPATIENT)
Dept: GASTROENTEROLOGY | Facility: CLINIC | Age: 37
End: 2022-10-11

## 2022-10-11 NOTE — TELEPHONE ENCOUNTER
Screening Questions  BLUE  KIND OF PREP RED  LOCATION [review exclusion criteria] GREEN  SEDATION TYPE        Y Are you active on mychart?       Sophy Mckeon MD in [facility] Ordering/Referring Provider?        BCBS What type of coverage do you have?      N Have you had a positive covid test in the last 90 days?     20.4 1. BMI  [BMI 40+ - review exclusion criteria]    Y  2. Are you able to give consent for your medical care? [IF NO,RN REVIEW]        N  3. Are you taking any prescription pain medications on a routine schedule?        3a. EXTENDED PREP What kind of prescription?     N 4. Do you have any chemical dependencies such as alcohol, street drugs, or methadone?    N 5. Do you have any history of post-traumatic stress syndrome, severe anxiety or history of psychosis?      **If yes 3- 5 , please schedule with MAC sedation.**          IF YES TO ANY 6 - 10 - HOSPITAL SETTING ONLY.     N 6.   Do you need assistance transferring?     N 7.   Have you had a heart or lung transplant?    N 8.   Are you currently on dialysis?   N 9.   Do you use daily home oxygen?   N 10. Do you take nitroglycerin?   10a.  If yes, how often?     11. [FEMALES]  N Are you currently pregnant?    11a.  If yes, how many weeks? [ Greater than 12 weeks, OR NEEDED]    N 12. Do you have Pulmonary Hypertension? *NEED PAC APPT AT [facility]*     N 13. [review exclusion criteria]  Do you have any implantable devices in your body (pacemaker, defib, LVAD)?    N 14. In the past 6 months, have you had any heart related issues including cardiomyopathy or heart attack?     14a.  If yes, did it require cardiac stenting if so when?     N 15. Have you had a stroke or Transient ischemic attack (TIA - aka  mini stroke ) within 6 months?      N 16. Do you have mod to severe Obstructive Sleep Apnea?  [Hospital only - Ok at [place]]    N 17. Do you have SEVERE AND UNCONTROLLED asthma? *NEED PAC APPT AT [facility]*     N 18. Are you currently taking  "any blood thinners?     18a. If yes, inform patient to \"follow up w/ ordering provider for bridging instructions.\"    N 19. Do you take the medication Phentermine?    19a. If yes, \"Hold for 7 days before procedure.  Please consult your prescribing provider if you have questions about holding this medication.\"     N  20. Do you have chronic kidney disease?      N  21. Do you have a diagnosis of diabetes?     N  22. On a regular basis do you go 3-5 days between bowel movements?      23. Preferred LOCAL Pharmacy for Pre Prescription    [ LIST ONLY ONE PHARMACY]        [id number] - [place], [place] - [address] AT [facility] [address]        - CLOSING REMINDERS -    Informed patient they will need an adult    Cannot take any type of public or medical transportation alone    Conscious Sedation- Needs  for 6 hours after the procedure       MAC/General-Needs  for 24 hours after procedure    Pre-Procedure Covid test to be completed [[facility] PCR Testing Required]    Confirmed Nurse will call to complete assessment       - SCHEDULING DETAILS -     Mike  Surgeon    01/30/23  Date of Procedure  FLEXIBLE SIGMOIDOSCOPY [Flex Sig]  Type of Procedure Scheduled   [facility] Location  Which Colonoscopy Prep was Sent?     CS Sedation Type     N PAC / Pre-op Required         Additional comments:            "

## 2022-10-12 ENCOUNTER — ANCILLARY PROCEDURE (OUTPATIENT)
Dept: ULTRASOUND IMAGING | Facility: CLINIC | Age: 37
End: 2022-10-12
Attending: OBSTETRICS & GYNECOLOGY
Payer: COMMERCIAL

## 2022-10-12 DIAGNOSIS — N93.9 ABNORMAL UTERINE BLEEDING (AUB): ICD-10-CM

## 2022-10-12 PROCEDURE — 76830 TRANSVAGINAL US NON-OB: CPT

## 2022-10-12 PROCEDURE — 76830 TRANSVAGINAL US NON-OB: CPT | Mod: 26 | Performed by: OBSTETRICS & GYNECOLOGY

## 2022-11-14 NOTE — PROGRESS NOTES
"Anesthesia Transfer of Care Note    Patient: Aguilar Rowe    Procedure(s) Performed: Procedure(s) (LRB):  CARPECTOMY (Right)    Patient location: PACU    Anesthesia Type: general    Transport from OR: Transported from OR on room air with adequate spontaneous ventilation    Post pain: adequate analgesia    Post assessment: no apparent anesthetic complications and tolerated procedure well    Post vital signs: stable    Level of consciousness: awake, alert and oriented    Nausea/Vomiting: no nausea/vomiting    Complications: none    Transfer of care protocol was followed      Last vitals:   Visit Vitals  /75 (BP Location: Left arm, Patient Position: Lying)   Pulse 67   Temp 36.5 °C (97.7 °F) (Temporal)   Resp 16   Ht 6' 1" (1.854 m)   Wt 88.1 kg (194 lb 3.6 oz)   SpO2 95%   BMI 25.62 kg/m²     " Pt called and left a voice mail message that there was a problem with her prednisone taper prescription.  Called Jessica and a verbal prescription given.  Pharmacist  said might need a override as too early for refill. Let her know this was a prednisone taper and increase in dosage.  Will call back if issue. Pt called and informed. l

## 2022-11-20 ENCOUNTER — HEALTH MAINTENANCE LETTER (OUTPATIENT)
Age: 37
End: 2022-11-20

## 2022-12-01 DIAGNOSIS — K51.90 UC (ULCERATIVE COLITIS) (H): ICD-10-CM

## 2022-12-01 DIAGNOSIS — Z94.4 LIVER REPLACED BY TRANSPLANT (H): ICD-10-CM

## 2022-12-01 RX ORDER — URSODIOL 250 MG/1
TABLET, FILM COATED ORAL
Qty: 270 TABLET | Refills: 3 | Status: SHIPPED | OUTPATIENT
Start: 2022-12-01 | End: 2023-10-09

## 2022-12-01 RX ORDER — PREDNISONE 5 MG/1
5 TABLET ORAL DAILY
Qty: 90 TABLET | Refills: 3 | Status: SHIPPED | OUTPATIENT
Start: 2022-12-01 | End: 2023-10-09

## 2022-12-07 ENCOUNTER — IMMUNIZATION (OUTPATIENT)
Dept: NURSING | Facility: CLINIC | Age: 37
End: 2022-12-07
Payer: COMMERCIAL

## 2022-12-07 PROCEDURE — 90471 IMMUNIZATION ADMIN: CPT

## 2022-12-07 PROCEDURE — 90686 IIV4 VACC NO PRSV 0.5 ML IM: CPT

## 2023-01-16 ENCOUNTER — TELEPHONE (OUTPATIENT)
Dept: SURGERY | Facility: AMBULATORY SURGERY CENTER | Age: 38
End: 2023-01-16

## 2023-01-16 NOTE — TELEPHONE ENCOUNTER
Writer left staff message for  and Norma Walker RN regarding whether enemas should be done in rectum, in stoma, or in both.     Patient scheduled for Flexible sigmoidoscopy  on 1/30/2023.     Discuss Covid policy.     Pre op exam scheduled: N/A    Arrival time: 0800    [facility] [address] [place] [address], [address] Floor, [place] [phone]    Sedation type: Conscious sedation     Anticoagulations? No    Electronic implanted devices? No    Diabetic? No    Indication for procedure: dysplasia, UC    Bowel prep recommendation: Enemas    Prep instructions sent via [employer] Bowel prep script sent to      Pre visit planning completed.    Lupis Enriquez, JOSEFINA  Endoscopy Procedure Pre Assessment RN

## 2023-01-17 NOTE — TELEPHONE ENCOUNTER
Pre assessment questions completed for upcoming Flexible sigmoidoscopy  procedure scheduled on 1/30/2023    Writer advised pt that enemas are to be done through the rectum per     COVID policy reviewed.     Reviewed procedural arrival time 0800 and facility location [facility]; [address] [place] [address], [address] Floor, [place] [address]    Designated  policy reviewed. Instructed to have someone stay 6 hours post procedure.     Patient verbalized understanding and had no questions or concerns at this time.    Lupis Enriquez RN  Endoscopy Procedure Pre Assessment RN

## 2023-01-30 ENCOUNTER — HOSPITAL ENCOUNTER (OUTPATIENT)
Facility: AMBULATORY SURGERY CENTER | Age: 38
Discharge: HOME | End: 2023-01-30
Attending: INTERNAL MEDICINE
Payer: COMMERCIAL

## 2023-01-30 ENCOUNTER — ANESTHESIA (OUTPATIENT)
Dept: SURGERY | Facility: AMBULATORY SURGERY CENTER | Age: 38
End: 2023-01-30
Payer: COMMERCIAL

## 2023-01-30 ENCOUNTER — ANESTHESIA EVENT (OUTPATIENT)
Dept: SURGERY | Facility: AMBULATORY SURGERY CENTER | Age: 38
End: 2023-01-30
Payer: COMMERCIAL

## 2023-01-30 VITALS
HEART RATE: 75 BPM | BODY MASS INDEX: 19.84 KG/M2 | DIASTOLIC BLOOD PRESSURE: 91 MMHG | HEIGHT: 63 IN | TEMPERATURE: 98.3 F | RESPIRATION RATE: 18 BRPM | SYSTOLIC BLOOD PRESSURE: 131 MMHG | OXYGEN SATURATION: 98 % | WEIGHT: 112 LBS

## 2023-01-30 VITALS — HEART RATE: 80 BPM

## 2023-01-30 DIAGNOSIS — K51.018 ULCERATIVE PANCOLITIS WITH OTHER COMPLICATION (H): Primary | ICD-10-CM

## 2023-01-30 LAB
FLEXIBLE SIGMOIDOSCOPY: NORMAL
HCG UR QL: NEGATIVE
INTERNAL QC OK POCT: NORMAL
POCT KIT EXPIRATION DATE: NORMAL
POCT KIT LOT NUMBER: NORMAL

## 2023-01-30 PROCEDURE — 88305 TISSUE EXAM BY PATHOLOGIST: CPT | Mod: TC | Performed by: INTERNAL MEDICINE

## 2023-01-30 PROCEDURE — 45331 SIGMOIDOSCOPY AND BIOPSY: CPT | Mod: 33

## 2023-01-30 PROCEDURE — 81025 URINE PREGNANCY TEST: CPT | Performed by: PATHOLOGY

## 2023-01-30 PROCEDURE — 88305 TISSUE EXAM BY PATHOLOGIST: CPT | Mod: 26 | Performed by: PATHOLOGY

## 2023-01-30 RX ORDER — ONDANSETRON 2 MG/ML
4 INJECTION INTRAMUSCULAR; INTRAVENOUS
Status: DISCONTINUED | OUTPATIENT
Start: 2023-01-30 | End: 2023-01-31 | Stop reason: HOSPADM

## 2023-01-30 RX ORDER — LIDOCAINE 40 MG/G
CREAM TOPICAL
Status: DISCONTINUED | OUTPATIENT
Start: 2023-01-30 | End: 2023-01-31 | Stop reason: HOSPADM

## 2023-01-30 RX ORDER — PROCHLORPERAZINE MALEATE 10 MG
10 TABLET ORAL EVERY 6 HOURS PRN
Status: CANCELLED | OUTPATIENT
Start: 2023-01-30

## 2023-01-30 RX ORDER — FLUMAZENIL 0.1 MG/ML
0.2 INJECTION, SOLUTION INTRAVENOUS
Status: CANCELLED | OUTPATIENT
Start: 2023-01-30 | End: 2023-01-31

## 2023-01-30 RX ORDER — PROPOFOL 10 MG/ML
INJECTION, EMULSION INTRAVENOUS CONTINUOUS PRN
Status: DISCONTINUED | OUTPATIENT
Start: 2023-01-30 | End: 2023-01-30

## 2023-01-30 RX ORDER — SODIUM CHLORIDE, SODIUM LACTATE, POTASSIUM CHLORIDE, CALCIUM CHLORIDE 600; 310; 30; 20 MG/100ML; MG/100ML; MG/100ML; MG/100ML
INJECTION, SOLUTION INTRAVENOUS CONTINUOUS PRN
Status: DISCONTINUED | OUTPATIENT
Start: 2023-01-30 | End: 2023-01-30

## 2023-01-30 RX ORDER — NALOXONE HYDROCHLORIDE 0.4 MG/ML
0.4 INJECTION, SOLUTION INTRAMUSCULAR; INTRAVENOUS; SUBCUTANEOUS
Status: CANCELLED | OUTPATIENT
Start: 2023-01-30

## 2023-01-30 RX ORDER — ONDANSETRON 4 MG/1
4 TABLET, ORALLY DISINTEGRATING ORAL EVERY 6 HOURS PRN
Status: CANCELLED | OUTPATIENT
Start: 2023-01-30

## 2023-01-30 RX ORDER — NALOXONE HYDROCHLORIDE 0.4 MG/ML
0.2 INJECTION, SOLUTION INTRAMUSCULAR; INTRAVENOUS; SUBCUTANEOUS
Status: CANCELLED | OUTPATIENT
Start: 2023-01-30

## 2023-01-30 RX ORDER — LIDOCAINE HYDROCHLORIDE 20 MG/ML
INJECTION, SOLUTION INFILTRATION; PERINEURAL PRN
Status: DISCONTINUED | OUTPATIENT
Start: 2023-01-30 | End: 2023-01-30

## 2023-01-30 RX ORDER — PROPOFOL 10 MG/ML
INJECTION, EMULSION INTRAVENOUS PRN
Status: DISCONTINUED | OUTPATIENT
Start: 2023-01-30 | End: 2023-01-30

## 2023-01-30 RX ORDER — ONDANSETRON 2 MG/ML
4 INJECTION INTRAMUSCULAR; INTRAVENOUS EVERY 6 HOURS PRN
Status: CANCELLED | OUTPATIENT
Start: 2023-01-30

## 2023-01-30 RX ADMIN — LIDOCAINE HYDROCHLORIDE 60 MG: 20 INJECTION, SOLUTION INFILTRATION; PERINEURAL at 09:09

## 2023-01-30 RX ADMIN — PROPOFOL 50 MG: 10 INJECTION, EMULSION INTRAVENOUS at 09:12

## 2023-01-30 RX ADMIN — PROPOFOL 150 MCG/KG/MIN: 10 INJECTION, EMULSION INTRAVENOUS at 09:09

## 2023-01-30 RX ADMIN — SODIUM CHLORIDE, SODIUM LACTATE, POTASSIUM CHLORIDE, CALCIUM CHLORIDE: 600; 310; 30; 20 INJECTION, SOLUTION INTRAVENOUS at 09:05

## 2023-01-30 NOTE — ANESTHESIA POSTPROCEDURE EVALUATION
Patient: Yarelis Penny    Procedure: Procedure(s):  SIGMOIDOSCOPY, FLEXIBLE WITH BIOPSIES       Anesthesia Type:  MAC    Note:  Disposition: Outpatient   Postop Pain Control: Uneventful            Sign Out: Well controlled pain   PONV: No   Neuro/Psych: Uneventful            Sign Out: Acceptable/Baseline neuro status   Airway/Respiratory: Uneventful            Sign Out: Acceptable/Baseline resp. status   CV/Hemodynamics: Uneventful            Sign Out: Acceptable CV status; No obvious hypovolemia; No obvious fluid overload   Other NRE: NONE   DID A NON-ROUTINE EVENT OCCUR? No           Last vitals:  Vitals Value Taken Time   /91 01/30/23 0940   Temp 36.8  C (98.3  F) 01/30/23 0940   Pulse 75 01/30/23 0940   Resp 18 01/30/23 0940   SpO2 98 % 01/30/23 0940       Electronically Signed By: Efrem Cohen MD  January 30, 2023  10:15 AM

## 2023-01-30 NOTE — ANESTHESIA CARE TRANSFER NOTE
Patient: Yarelis Penny    Procedure: Procedure(s):  SIGMOIDOSCOPY, FLEXIBLE WITH BIOPSIES       Diagnosis: Ulcerative colitis with complication, unspecified location (H) [K51.919]  Diagnosis Additional Information: No value filed.    Anesthesia Type:   MAC     Note:    Oropharynx: spontaneously breathing  Level of Consciousness: awake  Oxygen Supplementation: room air    Independent Airway: airway patency satisfactory and stable  Dentition: dentition unchanged  Vital Signs Stable: post-procedure vital signs reviewed and stable  Report to RN Given: handoff report given  Patient transferred to: Phase II    Handoff Report: Identifed the Patient, Identified the Reponsible Provider, Reviewed the pertinent medical history, Discussed the surgical course, Reviewed Intra-OP anesthesia mangement and issues during anesthesia, Set expectations for post-procedure period and Allowed opportunity for questions and acknowledgement of understanding      Vitals:  Vitals Value Taken Time   /88 01/30/23 0925   Temp 36.6  C (97.8  F) 01/30/23 0925   Pulse 77 01/30/23 0925   Resp 18 01/30/23 0925   SpO2 98 % 01/30/23 0925       Electronically Signed By: KAYLENE Bond CRNA  January 30, 2023  9:30 AM

## 2023-01-30 NOTE — H&P
Yarelis WILMER Zohaib  [id number]  female  37 year old      Reason for procedure/surgery: ulcerative colitis    Patient Active Problem List   Diagnosis     Ulcerative colitis (H)     CARDIOVASCULAR SCREENING; LDL GOAL LESS THAN 160     Autoimmune hepatitis (H)     Liver transplant recipient (H)     Leakage of common bile duct of transplanted liver (H)     Cholestasis of transplanted liver (H)     Immunosuppressed status (H)     Elevated alkaline phosphatase level     EBV (Jun-Barr virus) viremia     History of cytomegalovirus infection     Uterine polyp     HRP (high risk pregnancy), unspecified trimester     Encounter for triage in pregnant patient     Renal insufficiency affecting pregnancy     Abnormal uterine bleeding (AUB)       Past Surgical History:    Past Surgical History:   Procedure Laterality Date      SECTION N/A 2021    Procedure:  SECTION;  Surgeon: Efrain Bill MD;  [facility]     CHOLECYSTECTOMY       COLONOSCOPY  2007     COLONOSCOPY N/A 2015    Procedure: COMBINED COLONOSCOPY, SINGLE OR MULTIPLE BIOPSY/POLYPECTOMY BY BIOPSY;  Surgeon: Mitchel Hoskins Chi, MD;  [facility]     COLONOSCOPY N/A 2016    Procedure: COMBINED COLONOSCOPY, SINGLE OR MULTIPLE BIOPSY/POLYPECTOMY BY BIOPSY;  Surgeon: Rigo Valles MD;  [facility]     COLONOSCOPY N/A 2016    Procedure: COMBINED COLONOSCOPY, SINGLE OR MULTIPLE BIOPSY/POLYPECTOMY BY BIOPSY;  Surgeon: Kareem Solis MD;  [facility]     COLONOSCOPY N/A 2017    Procedure: COMBINED COLONOSCOPY, SINGLE OR MULTIPLE BIOPSY/POLYPECTOMY BY BIOPSY;  Colonoscopy;  Surgeon: Fuentes Johnson MD;  [facility]     COLONOSCOPY N/A 2019    Procedure: COMBINED COLONOSCOPY, SINGLE OR MULTIPLE BIOPSY/POLYPECTOMY BY BIOPSY;  Surgeon: Sophy Mckeon MD;  [facility]     DILATION AND CURETTAGE, HYSTEROSCOPY DIAGNOSTIC, COMBINED N/A 2021    Procedure: HYSTEROSCOPY,  DIAGNOSTIC;  Surgeon: Lin Armendariz MD;  [facility]     ENDOSCOPIC RETROGRADE CHOLANGIOPANCREATOGRAM N/A 06/25/2015    Procedure: COMBINED ENDOSCOPIC RETROGRADE CHOLANGIOPANCREATOGRAPHY, PLACE TUBE/STENT;  Surgeon: Landon Quinones MD;  [facility]     ENDOSCOPIC RETROGRADE CHOLANGIOPANCREATOGRAM N/A 06/25/2015    Procedure: COMBINED ENDOSCOPIC RETROGRADE CHOLANGIOPANCREATOGRAPHY, PLACE TUBE/STENT;  Surgeon: Landon Quinones MD;  [facility]     ENDOSCOPIC RETROGRADE CHOLANGIOPANCREATOGRAM N/A 07/02/2015    Procedure: COMBINED ENDOSCOPIC RETROGRADE CHOLANGIOPANCREATOGRAPHY, PLACE TUBE/STENT;  Surgeon: Landon Quinones MD;  [facility]     ENDOSCOPIC RETROGRADE CHOLANGIOPANCREATOGRAM N/A 09/08/2015    Procedure: COMBINED ENDOSCOPIC RETROGRADE CHOLANGIOPANCREATOGRAPHY, REMOVE FOREIGN BODY OR STENT/TUBE;  Surgeon: Landon Quinones MD;  [facility]     ENDOSCOPIC RETROGRADE CHOLANGIOPANCREATOGRAM N/A 12/08/2015    Procedure: ENDOSCOPIC RETROGRADE CHOLANGIOPANCREATOGRAM;  Surgeon: Landon Quinones MD;  [facility]     ENDOSCOPIC RETROGRADE CHOLANGIOPANCREATOGRAM N/A 03/01/2016    Procedure: COMBINED ENDOSCOPIC RETROGRADE CHOLANGIOPANCREATOGRAPHY, REMOVE FOREIGN BODY OR STENT/TUBE;  Surgeon: Landon Quinones MD;  [facility]     ENDOSCOPIC RETROGRADE CHOLANGIOPANCREATOGRAM N/A 03/20/2017    Procedure: COMBINED ENDOSCOPIC RETROGRADE CHOLANGIOPANCREATOGRAPHY, PLACE TUBE/STENT;  Endoscopic Retrograde Cholangiopancreatogram with Ballon Dilation, Stent Placement;  Surgeon: Guru Brittany Macias MD;  [facility]     ENDOSCOPIC RETROGRADE CHOLANGIOPANCREATOGRAM N/A 04/02/2018    Procedure: COMBINED ENDOSCOPIC RETROGRADE CHOLANGIOPANCREATOGRAPHY, PLACE TUBE/STENT;  Endoscopic Retrograde Cholangiopancreatogram with biliary dilation and stent placement;  Surgeon: Guru Brittany Macias MD;  [facility]     ENDOSCOPIC  RETROGRADE CHOLANGIOPANCREATOGRAM N/A 05/07/2018    Procedure: ENDOSCOPIC RETROGRADE CHOLANGIOPANCREATOGRAM;  Endoscopic Retrograde Cholangiopancreatogram ballon dilation stent exchange;  Surgeon: Guru Brittany Macias MD;  [facility]     ENDOSCOPIC RETROGRADE CHOLANGIOPANCREATOGRAPHY, EXCHANGE TUBE/STENT N/A 07/30/2015    Procedure: ENDOSCOPIC RETROGRADE CHOLANGIOPANCREATOGRAPHY, EXCHANGE TUBE/STENT;  Surgeon: Landon Quinones MD;  [facility]     ENDOSCOPIC RETROGRADE CHOLANGIOPANCREATOGRAPHY, EXCHANGE TUBE/STENT N/A 05/15/2017    Procedure: ENDOSCOPIC RETROGRADE CHOLANGIOPANCREATOGRAPHY, EXCHANGE TUBE/STENT;  Endoscopic Retrograde Cholangiopancreatogram with biliary stent exchange and balloon dilation;  Surgeon: Guru Brittany Macias MD;  [facility]     ENDOSCOPIC RETROGRADE CHOLANGIOPANCREATOGRAPHY, EXCHANGE TUBE/STENT N/A 06/25/2018    Procedure: ENDOSCOPIC RETROGRADE CHOLANGIOPANCREATOGRAPHY, EXCHANGE TUBE/STENT;  Endoscopic Retrograde Cholangiopancreatogram with biliary dilation, stone removal and stent exchange;  Surgeon: Guru Brittany Macias MD;  [facility]     ENDOSCOPIC RETROGRADE CHOLANGIOPANCREATOGRAPHY, EXCHANGE TUBE/STENT N/A 07/30/2018    Procedure: ENDOSCOPIC RETROGRADE CHOLANGIOPANCREATOGRAPHY, EXCHANGE TUBE/STENT;  Endoscopic Retrograde Cholangiopancreatogram with Stent Exchange with dilation;  Surgeon: Guru Brittany Macias MD;  [facility]     ENDOSCOPIC RETROGRADE CHOLANGIOPANCREATOGRAPHY, EXCHANGE TUBE/STENT N/A 09/26/2018    Procedure: ENDOSCOPIC RETROGRADE CHOLANGIOPANCREATOGRAPHY, EXCHANGE TUBE/STENT;  Endoscopic Retrograde Cholangiopancreatogram, with bile duct stent exchanged balloon dilation and balloon sweep of bile duct;  Surgeon: Guru Brittany Macias MD;  [facility]     ESOPHAGOSCOPY, GASTROSCOPY, DUODENOSCOPY (EGD), COMBINED N/A 02/26/2015    Procedure: COMBINED  ESOPHAGOSCOPY, GASTROSCOPY, DUODENOSCOPY (EGD);  Surgeon: Mitchel Hoskins Chi, MD;  [facility]     EXPLORE COMMON BILE DUCT N/A 06/25/2015    Procedure: EXPLORE COMMON BILE DUCT;  Surgeon: Tyree Smith MD;  [facility]     HERNIA REPAIR  2015    During transplant     ILEOSTOMY N/A 06/20/2019    Procedure: Ileostomy;  Surgeon: Krzysztof Carney MD;  [facility]     IR LIVER BIOPSY PERCUTANEOUS  1/4/2022     LAPAROSCOPIC ASSISTED COLECTOMY N/A 06/20/2019    Procedure: Laparoscopic Total Abdominal Colectomy, lysis of adhesions;  Surgeon: Krzysztof Carney MD;  [facility]     LAPAROSCOPIC LYSIS ADHESIONS N/A 03/12/2021    Procedure: LYSIS, ADHESIONS;  Surgeon: Lin Armendariz MD;  [facility]     LAPAROTOMY EXPLORATORY N/A 06/25/2015    Procedure: LAPAROTOMY EXPLORATORY;  Surgeon: Tyree Smith MD;  [facility]     PICC INSERTION Right 02/27/2015    4fr SL Valved PICC, 36cm (1cm external) in the R medial brachial vein w/ tip in the low SVC.     SIGMOIDOSCOPY FLEXIBLE N/A 04/27/2016    Procedure: SIGMOIDOSCOPY FLEXIBLE;  Surgeon: Jose Nielson MD;  [facility]     TRANSPLANT LIVER RECIPIENT LIVING UNRELATED N/A 06/18/2015    Procedure: TRANSPLANT LIVER RECIPIENT LIVING UNRELATED;  Surgeon: Tyree Smith MD;  [facility]     UPPER GI ENDOSCOPY         Past Medical History:   Past Medical History:   Diagnosis Date     Acute kidney injury (H) 04/28/2016     Anemia, iron deficiency 08/29/2017    On iron supplementation     Autoimmune hepatitis (H) 2012    on steroid taper     Cholangitis 2003     CKD (chronic kidney disease) 2012    biopsy 2012: TIN, patchy fibrosis. Dialysis in 2013 x2 months.     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     Esophageal varices (H) 09/2008    banded     Heart murmur      History of blood transfusion      Primary sclerosing cholangitis 2003     SBP (spontaneous bacterial peritonitis) (H) 02/24/2015     Ulcerative Colitis     f/b GI       Social History:  "  Social History     Tobacco Use     Smoking status: Never     Smokeless tobacco: Never   Substance Use Topics     Alcohol use: Not Currently     Alcohol/week: 0.0 standard drinks     Comment: Every other month has 1 drink       Family History:   Family History   Problem Relation Age of Onset     Hypertension Mother      Lipids Mother      Sleep Apnea Mother      Heart Disease Mother      Hypertension Maternal Grandmother              Alzheimer Disease Maternal Grandmother      Lipids Father      Heart Disease Maternal Grandfather 40     Heart Disease Paternal Grandfather      Melanoma No family hx of      Skin Cancer No family hx of      Cancer No family hx of      Diabetes No family hx of        Allergies:   Allergies   Allergen Reactions     Rifampin Other (See Comments)     Kidney failure     Penicillins      Bad hives when she was in college       Active Medications:   Current Outpatient Medications   Medication Sig Dispense Refill     calcium carbonate (OS-KERLINE 500 MG [place]. [place]) 1250 MG tablet Take 1 tablet by mouth every evening       Multiple Vitamin (MULTIVITAMIN PO) Take 1 tablet by mouth daily       predniSONE (DELTASONE) 5 MG tablet Take 1 tablet (5 mg) by mouth daily 90 tablet 3     tacrolimus (GENERIC EQUIVALENT) 1 MG capsule Take 3 capsules (3 mg) by mouth every morning AND 2 capsules (2 mg) every evening. 450 capsule 3     ursodiol (ACTIGALL) 250 MG tablet Take 2 tablets (500 mg) by mouth every morning AND 1 tablet (250 mg) every evening. 270 tablet 3     acetaminophen (TYLENOL) 325 MG tablet Take 2 tablets (650 mg) by mouth every 6 hours 45 tablet 0     ferrous sulfate (FEROSUL) 325 (65 Fe) MG tablet Take 325 mg by mouth daily (with breakfast)       order for DME [employer] item #s   30257 and [id number] 30 Bag 3     order for DME Increase the amount to 10 per month  --2\" barrier ring #4485 10 each 0     thin (NO BRAND SPECIFIED) lancets Use with lanceting device. To accompany: Blood Glucose Monitor " "Brands: per insurance. 100 each 6     VITAMIN D, CHOLECALCIFEROL, PO Take by mouth every evening          Systemic Review:   CONSTITUTIONAL: NEGATIVE for fever, chills, change in weight  ENT/MOUTH: NEGATIVE for ear, mouth and throat problems  RESP: NEGATIVE for significant cough or SOB  CV: NEGATIVE for chest pain, palpitations or peripheral edema    Physical Examination:   Vital Signs: BP (!) 134/104 (BP Location: Right arm)   Pulse 83   Temp 97.9  F (36.6  C) (Temporal)   Resp 18   Ht 1.6 m (5' 3\")   Wt 50.8 kg (112 lb)   SpO2 99%   BMI 19.84 kg/m    GENERAL: healthy, alert and no distress  NECK: no adenopathy, no asymmetry, masses, or scars  RESP: lungs clear to auscultation - no rales, rhonchi or wheezes  CV: regular rate and rhythm, normal S1 S2, no S3 or S4, no murmur, click or rub, no peripheral edema and peripheral pulses strong  ABDOMEN: soft, nontender, no hepatosplenomegaly, no masses and bowel sounds normal  MS: no gross musculoskeletal defects noted, no edema    Plan: Appropriate to proceed as scheduled.      Sophy Mckeon MD  1/30/2023    PCP:  No Ref-Primary, Physician    "

## 2023-01-30 NOTE — ANESTHESIA PREPROCEDURE EVALUATION
Anesthesia Pre-Procedure Evaluation    Patient: Yarelis Penny   MRN: [id number] : 1985        Procedure : Procedure(s):  SIGMOIDOSCOPY, FLEXIBLE          Past Medical History:   Diagnosis Date     Acute kidney injury (H) 2016     Anemia, iron deficiency 2017    On iron supplementation     Autoimmune hepatitis (H)     on steroid taper     Cholangitis      CKD (chronic kidney disease)     biopsy 2012: TIN, patchy fibrosis. Dialysis in 2013 x2 months.     CMV colitis (H) 2016     Esophageal varices (H) 2008    banded     Heart murmur      History of blood transfusion      Primary sclerosing cholangitis      SBP (spontaneous bacterial peritonitis) (H) 2015     Ulcerative Colitis     f/b GI      Past Surgical History:   Procedure Laterality Date      SECTION N/A 2021    Procedure:  SECTION;  Surgeon: Efrain Bill MD;  [facility]     CHOLECYSTECTOMY       COLONOSCOPY  2007     COLONOSCOPY N/A 2015    Procedure: COMBINED COLONOSCOPY, SINGLE OR MULTIPLE BIOPSY/POLYPECTOMY BY BIOPSY;  Surgeon: Mitchel Hoskins Chi, MD;  [facility]     COLONOSCOPY N/A 2016    Procedure: COMBINED COLONOSCOPY, SINGLE OR MULTIPLE BIOPSY/POLYPECTOMY BY BIOPSY;  Surgeon: Rigo Valles MD;  [facility]     COLONOSCOPY N/A 2016    Procedure: COMBINED COLONOSCOPY, SINGLE OR MULTIPLE BIOPSY/POLYPECTOMY BY BIOPSY;  Surgeon: Kareem Solis MD;  [facility]     COLONOSCOPY N/A 2017    Procedure: COMBINED COLONOSCOPY, SINGLE OR MULTIPLE BIOPSY/POLYPECTOMY BY BIOPSY;  Colonoscopy;  Surgeon: Fuentes Johnson MD;  [facility]     COLONOSCOPY N/A 2019    Procedure: COMBINED COLONOSCOPY, SINGLE OR MULTIPLE BIOPSY/POLYPECTOMY BY BIOPSY;  Surgeon: Sophy Mckeon MD;  [facility]     DILATION AND CURETTAGE, HYSTEROSCOPY DIAGNOSTIC, COMBINED N/A 2021    Procedure: HYSTEROSCOPY, DIAGNOSTIC;  Surgeon:  Lin Armendariz MD;  [facility]     ENDOSCOPIC RETROGRADE CHOLANGIOPANCREATOGRAM N/A 06/25/2015    Procedure: COMBINED ENDOSCOPIC RETROGRADE CHOLANGIOPANCREATOGRAPHY, PLACE TUBE/STENT;  Surgeon: Landon Quinones MD;  [facility]     ENDOSCOPIC RETROGRADE CHOLANGIOPANCREATOGRAM N/A 06/25/2015    Procedure: COMBINED ENDOSCOPIC RETROGRADE CHOLANGIOPANCREATOGRAPHY, PLACE TUBE/STENT;  Surgeon: Landon Quinones MD;  [facility]     ENDOSCOPIC RETROGRADE CHOLANGIOPANCREATOGRAM N/A 07/02/2015    Procedure: COMBINED ENDOSCOPIC RETROGRADE CHOLANGIOPANCREATOGRAPHY, PLACE TUBE/STENT;  Surgeon: Landon Quinones MD;  [facility]     ENDOSCOPIC RETROGRADE CHOLANGIOPANCREATOGRAM N/A 09/08/2015    Procedure: COMBINED ENDOSCOPIC RETROGRADE CHOLANGIOPANCREATOGRAPHY, REMOVE FOREIGN BODY OR STENT/TUBE;  Surgeon: Landon Quinones MD;  [facility]     ENDOSCOPIC RETROGRADE CHOLANGIOPANCREATOGRAM N/A 12/08/2015    Procedure: ENDOSCOPIC RETROGRADE CHOLANGIOPANCREATOGRAM;  Surgeon: Landon Quinones MD;  [facility]     ENDOSCOPIC RETROGRADE CHOLANGIOPANCREATOGRAM N/A 03/01/2016    Procedure: COMBINED ENDOSCOPIC RETROGRADE CHOLANGIOPANCREATOGRAPHY, REMOVE FOREIGN BODY OR STENT/TUBE;  Surgeon: Landon Quinones MD;  [facility]     ENDOSCOPIC RETROGRADE CHOLANGIOPANCREATOGRAM N/A 03/20/2017    Procedure: COMBINED ENDOSCOPIC RETROGRADE CHOLANGIOPANCREATOGRAPHY, PLACE TUBE/STENT;  Endoscopic Retrograde Cholangiopancreatogram with Ballon Dilation, Stent Placement;  Surgeon: Guru Brittany Macias MD;  [facility]     ENDOSCOPIC RETROGRADE CHOLANGIOPANCREATOGRAM N/A 04/02/2018    Procedure: COMBINED ENDOSCOPIC RETROGRADE CHOLANGIOPANCREATOGRAPHY, PLACE TUBE/STENT;  Endoscopic Retrograde Cholangiopancreatogram with biliary dilation and stent placement;  Surgeon: Guru Brittany Macias MD;  [facility]     ENDOSCOPIC RETROGRADE  CHOLANGIOPANCREATOGRAM N/A 05/07/2018    Procedure: ENDOSCOPIC RETROGRADE CHOLANGIOPANCREATOGRAM;  Endoscopic Retrograde Cholangiopancreatogram ballon dilation stent exchange;  Surgeon: Guru Brittany Macias MD;  [facility]     ENDOSCOPIC RETROGRADE CHOLANGIOPANCREATOGRAPHY, EXCHANGE TUBE/STENT N/A 07/30/2015    Procedure: ENDOSCOPIC RETROGRADE CHOLANGIOPANCREATOGRAPHY, EXCHANGE TUBE/STENT;  Surgeon: Landon Quinones MD;  [facility]     ENDOSCOPIC RETROGRADE CHOLANGIOPANCREATOGRAPHY, EXCHANGE TUBE/STENT N/A 05/15/2017    Procedure: ENDOSCOPIC RETROGRADE CHOLANGIOPANCREATOGRAPHY, EXCHANGE TUBE/STENT;  Endoscopic Retrograde Cholangiopancreatogram with biliary stent exchange and balloon dilation;  Surgeon: Guru Brittany Macias MD;  [facility]     ENDOSCOPIC RETROGRADE CHOLANGIOPANCREATOGRAPHY, EXCHANGE TUBE/STENT N/A 06/25/2018    Procedure: ENDOSCOPIC RETROGRADE CHOLANGIOPANCREATOGRAPHY, EXCHANGE TUBE/STENT;  Endoscopic Retrograde Cholangiopancreatogram with biliary dilation, stone removal and stent exchange;  Surgeon: Guru Brittany Macias MD;  [facility]     ENDOSCOPIC RETROGRADE CHOLANGIOPANCREATOGRAPHY, EXCHANGE TUBE/STENT N/A 07/30/2018    Procedure: ENDOSCOPIC RETROGRADE CHOLANGIOPANCREATOGRAPHY, EXCHANGE TUBE/STENT;  Endoscopic Retrograde Cholangiopancreatogram with Stent Exchange with dilation;  Surgeon: Guru Brittany Macias MD;  [facility]     ENDOSCOPIC RETROGRADE CHOLANGIOPANCREATOGRAPHY, EXCHANGE TUBE/STENT N/A 09/26/2018    Procedure: ENDOSCOPIC RETROGRADE CHOLANGIOPANCREATOGRAPHY, EXCHANGE TUBE/STENT;  Endoscopic Retrograde Cholangiopancreatogram, with bile duct stent exchanged balloon dilation and balloon sweep of bile duct;  Surgeon: Guru Brittany Macias MD;  [facility]     ESOPHAGOSCOPY, GASTROSCOPY, DUODENOSCOPY (EGD), COMBINED N/A 02/26/2015    Procedure: COMBINED ESOPHAGOSCOPY,  GASTROSCOPY, DUODENOSCOPY (EGD);  Surgeon: Mitchel Hoskins Chi, MD;  [facility]     EXPLORE COMMON BILE DUCT N/A 06/25/2015    Procedure: EXPLORE COMMON BILE DUCT;  Surgeon: Tyree Smith MD;  [facility]     HERNIA REPAIR  2015    During transplant     ILEOSTOMY N/A 06/20/2019    Procedure: Ileostomy;  Surgeon: Krzysztof Carney MD;  [facility]     IR LIVER BIOPSY PERCUTANEOUS  1/4/2022     LAPAROSCOPIC ASSISTED COLECTOMY N/A 06/20/2019    Procedure: Laparoscopic Total Abdominal Colectomy, lysis of adhesions;  Surgeon: Krzysztof Carney MD;  [facility]     LAPAROSCOPIC LYSIS ADHESIONS N/A 03/12/2021    Procedure: LYSIS, ADHESIONS;  Surgeon: Lin Armendariz MD;  [facility]     LAPAROTOMY EXPLORATORY N/A 06/25/2015    Procedure: LAPAROTOMY EXPLORATORY;  Surgeon: Tyree Smith MD;  [facility]     PICC INSERTION Right 02/27/2015    4fr SL Valved PICC, 36cm (1cm external) in the R medial brachial vein w/ tip in the low SVC.     SIGMOIDOSCOPY FLEXIBLE N/A 04/27/2016    Procedure: SIGMOIDOSCOPY FLEXIBLE;  Surgeon: Jose Nielson MD;  [facility]     TRANSPLANT LIVER RECIPIENT LIVING UNRELATED N/A 06/18/2015    Procedure: TRANSPLANT LIVER RECIPIENT LIVING UNRELATED;  Surgeon: Tyree Smith MD;  [facility]     UPPER GI ENDOSCOPY        Allergies   Allergen Reactions     Rifampin Other (See Comments)     Kidney failure     Penicillins      Bad hives when she was in college      Social History     Tobacco Use     Smoking status: Never     Smokeless tobacco: Never   Substance Use Topics     Alcohol use: Not Currently     Alcohol/week: 0.0 standard drinks     Comment: Every other month has 1 drink      Wt Readings from Last 1 Encounters:   01/30/23 50.8 kg (112 lb)        Anesthesia Evaluation   Pt has had prior anesthetic.     No history of anesthetic complications       ROS/MED HX  ENT/Pulmonary:       Neurologic:       Cardiovascular:       METS/Exercise Tolerance:      Hematologic:     (+) history of blood transfusion,     Musculoskeletal:       GI/Hepatic: Comment: Autoimmune hepatitis, PSC s/p Liver transplant 2015.      (+) Inflammatory bowel disease, hepatitis type Other, liver disease,     Renal/Genitourinary:     (+) renal disease, type: CRI,     Endo:       Psychiatric/Substance Use:       Infectious Disease:       Malignancy:       Other:            Physical Exam    Airway        Mallampati: II   TM distance: > 3 FB   Neck ROM: full   Mouth opening: > 3 cm    Respiratory Devices and Support         Dental           Cardiovascular             Pulmonary                   OUTSIDE LABS:  CBC:   Lab Results   Component Value Date    WBC 9.0 10/03/2022    WBC 11.0 07/18/2022    HGB 13.5 10/03/2022    HGB 13.3 07/18/2022    HCT 41.5 10/03/2022    HCT 40.6 07/18/2022     (H) 10/03/2022     07/18/2022     BMP:   Lab Results   Component Value Date     10/03/2022     07/18/2022    POTASSIUM 4.2 10/03/2022    POTASSIUM 4.4 07/18/2022    CHLORIDE 110 (H) 10/03/2022    CHLORIDE 111 (H) 07/18/2022    CO2 22 10/03/2022    CO2 21 07/18/2022    BUN 23 10/03/2022    BUN 23 07/18/2022    CR 1.14 (H) 10/03/2022    CR 1.12 (H) 07/18/2022    GLC 99 10/03/2022    GLC 89 07/18/2022     COAGS:   Lab Results   Component Value Date    PTT 30 01/04/2022    INR 0.93 01/04/2022    FIBR 485 11/02/2021     POC:   Lab Results   Component Value Date    BGM 92 03/12/2021    HCG Negative 01/30/2023    HCGS Negative 05/07/2018     HEPATIC:   Lab Results   Component Value Date    ALBUMIN 3.2 (L) 10/03/2022    PROTTOTAL 7.0 10/03/2022    ALT 30 10/03/2022    AST 22 10/03/2022     (H) 04/27/2016    ALKPHOS 70 10/03/2022    BILITOTAL 0.3 10/03/2022    TAWNYA 40 (H) 06/23/2012     OTHER:   Lab Results   Component Value Date    PH 7.42 06/25/2015    LACT 1.4 12/09/2016    A1C 5.4 05/25/2021    KERLINE 9.4 10/03/2022    PHOS 3.9 04/12/2018    MAG 1.6 06/21/2019    LIPASE 270 12/22/2018     AMYLASE 124 (H) 12/22/2018    TSH 3.00 10/03/2022    T4 1.27 06/24/2021    CRP 5.7 04/16/2019    SED 46 (H) 04/16/2019       Anesthesia Plan    ASA Status:  3   NPO Status:  NPO Appropriate    Anesthesia Type: MAC.     - Reason for MAC: straight local not clinically adequate   Induction: Intravenous, Propofol.   Maintenance: TIVA.        Consents    Anesthesia Plan(s) and associated risks, benefits, and realistic alternatives discussed. Questions answered and patient/representative(s) expressed understanding.    - Discussed:     - Discussed with:  Patient      - Extended Intubation/Ventilatory Support Discussed: No.      - Patient is DNR/DNI Status: No         Postoperative Care       PONV prophylaxis: Background Propofol Infusion     Comments:                Efrem Cohen MD

## 2023-01-30 NOTE — PROGRESS NOTES
Received verbal orders from Dr. Mckeon for this patient to see colorectal surgery to discuss proctectomy. Order placed appropriately.

## 2023-02-01 NOTE — TELEPHONE ENCOUNTER
Diagnosis, Referred by & from: Proctectomy s/p flex sig   Appt date: 4/25/2023   NOTES STATUS DETAILS   OFFICE NOTE from referring provider N/A    OFFICE NOTE from other specialist [facility]:  9/21/22 - OBGYN OV with Dr. Armendariz    [facility]:  7/11/19 - CR OV with Lulú Wiseman NP  6/10/19 - WOUND OV with Deidra Newell RN  4/30/19, 12/5/17 - CR OV with Dr. Carney  12/18/18 - GI OV with Dr. Enriquez  11/6/18 - GI OV with Dr. Johnson   DISCHARGE SUMMARY from hospital Internal [facility]:  6/20/19 - Admission with Dr. Carney  12/9/16 - Admission with Dr. Marley   DISCHARGE REPORT from the ER N/A    OPERATIVE REPORT Internal [facility]:  3/12/21 - OP Note for HYSTEROSCOPY with Dr. Armendariz  6/20/19 - OP Note for LAPAROSCOPIC TOTAL ABDOMINAL COLECTOMY, LYSIS OF ADHESIONS, ILEOSTOMY with Dr. Carney  6/25/15 - OP Note for LAPAROTOMY, ABDOMINAL WASHOUT with Dr. Cordero  6/18/15 - OP Note for LIVER TRANSPLANT with Dr. Guillen   MEDICATION LIST Internal    LABS     BIOPSIES/PATHOLOGY RELATED TO DIAGNOSIS Internal [facility]:  1/30/23 - Rectum Biopsy (Case: [id number])  6/20/19 - Colon Biopsy (Case: [id number])  2/25/19 - Colon Biopsy (Case: [id number])  9/12/18 - Colon Biopsy (Case: [id number])  9/5/17 - Colon Biopsy (Case: [id number])  * Additional Biopsies in Epic   DIAGNOSTIC PROCEDURES     COLONOSCOPY Received / Internal [facility]:  2/25/19 - Colonoscopy  9/5/17 - Colonoscopy    [facility]:  9/12/18 - Colonoscopy   FLEX SIGMOIDOSCOPY Internal [facility]:  1/30/23 - Flexible Sigmoidoscopy   ERCP [facility]:  9/26/18 - ERCP  6/25/18 - ERCP  5/7/18 - ERCP  4/2/18 - ERCP   IMAGING (DISC & REPORT)      MRI [facility]:  1/22/21 - MRI Pelvis  3/21/18 - MRI Abdomen MRCP   XRAY Internal [facility]:  10/24/18 - XR Abdomen  4/18/18 - XR Abdomen   ULTRASOUND  (ENDOANAL/ENDORECTAL) [facility]:  10/12/22  US Pelvic

## 2023-04-14 ENCOUNTER — OFFICE VISIT (OUTPATIENT)
Dept: FAMILY MEDICINE | Facility: CLINIC | Age: 38
End: 2023-04-14
Payer: COMMERCIAL

## 2023-04-14 ENCOUNTER — ANCILLARY PROCEDURE (OUTPATIENT)
Dept: GENERAL RADIOLOGY | Facility: CLINIC | Age: 38
End: 2023-04-14
Attending: STUDENT IN AN ORGANIZED HEALTH CARE EDUCATION/TRAINING PROGRAM
Payer: COMMERCIAL

## 2023-04-14 VITALS
TEMPERATURE: 98.6 F | DIASTOLIC BLOOD PRESSURE: 88 MMHG | RESPIRATION RATE: 15 BRPM | HEIGHT: 64 IN | WEIGHT: 109 LBS | HEART RATE: 87 BPM | SYSTOLIC BLOOD PRESSURE: 126 MMHG | BODY MASS INDEX: 18.61 KG/M2 | OXYGEN SATURATION: 98 %

## 2023-04-14 DIAGNOSIS — M79.672 LEFT FOOT PAIN: Primary | ICD-10-CM

## 2023-04-14 DIAGNOSIS — M79.672 LEFT FOOT PAIN: ICD-10-CM

## 2023-04-14 PROCEDURE — 99203 OFFICE O/P NEW LOW 30 MIN: CPT | Performed by: STUDENT IN AN ORGANIZED HEALTH CARE EDUCATION/TRAINING PROGRAM

## 2023-04-14 PROCEDURE — 73630 X-RAY EXAM OF FOOT: CPT | Mod: TC | Performed by: RADIOLOGY

## 2023-04-14 ASSESSMENT — PATIENT HEALTH QUESTIONNAIRE - PHQ9
SUM OF ALL RESPONSES TO PHQ QUESTIONS 1-9: 0
SUM OF ALL RESPONSES TO PHQ QUESTIONS 1-9: 0

## 2023-04-14 ASSESSMENT — PAIN SCALES - GENERAL: PAINLEVEL: EXTREME PAIN (8)

## 2023-04-14 NOTE — PROGRESS NOTES
Assessment & Plan     Left foot pain  New foot pain that alternates between toes and bottom of foot. Foot exam normal. Could be due to shoes-did get new slippers. Recommend epsom salt baths, tylenol prn, avoiding tight shoes. Could try insoles. XR today. Podiatry referral.  - Orthopedic  Referral; Future  - XR Foot Left G/E 3 Views; Future    Zaid Jane, DO  [facility]    Reynold Santoyo is a 37 year old, presenting for the following health issues:  Pain (Pain in foot - for about 2 weeks on and off - Left )        4/14/2023    10:34 AM   Additional Questions   Roomed by Venessa Joyce     Pain    History of Present Illness       Reason for visit:  Intermittent pain in my left foot  Symptom onset:  1-2 weeks ago  Symptoms include:  Pain  Symptom intensity:  Moderate  Symptom progression:  Staying the same  Had these symptoms before:  No  What makes it worse:  Longer periods on my feet  What makes it better:  Ice    She eats 2-3 servings of fruits and vegetables daily.She consumes 0 sweetened beverage(s) daily.She exercises with enough effort to increase her heart rate 20 to 29 minutes per day.  She exercises with enough effort to increase her heart rate 5 days per week.   She is taking medications regularly.    Today's PHQ-9         PHQ-9 Total Score: 0    PHQ-9 Q9 Thoughts of better off dead/self-harm past 2 weeks :   Not at all         2 weeks ago  4nd toe  L foot  Painful  No bruises or injury  Tylenol-mostly helps  Using cream w/lidocaine-some relief  Also on bottom of foot, jumps around to different toes  New slippers (stopped wearing those 1 week ago)  No new tennis shoes      Review of Systems   Constitutional, HEENT, cardiovascular, pulmonary, gi and gu systems are negative, except as otherwise noted.      Objective    BP (!) 132/94 (BP Location: Right arm, Patient Position: Sitting, Cuff Size: Adult Regular)   Pulse 87   Temp 98.6  F (37  C) (Temporal)   Resp  "15   Ht 1.626 m (5' 4\")   Wt 49.4 kg (109 lb)   LMP 03/17/2023   SpO2 98%   BMI 18.71 kg/m    Body mass index is 18.71 kg/m .     Physical Exam  Musculoskeletal:      Left foot: Normal range of motion. No deformity, bunion or foot drop.   Feet:      Left foot:      Skin integrity: No ulcer, blister, skin breakdown, erythema, warmth, callus or dry skin.      Toenail Condition: Left toenails are normal.      Comments: No joint swelling. Tenderness over 2nd PIP joint with flexion of toe.    Achilles, heel, talus non-tender.             "

## 2023-04-15 ENCOUNTER — HEALTH MAINTENANCE LETTER (OUTPATIENT)
Age: 38
End: 2023-04-15

## 2023-04-21 DIAGNOSIS — Z94.4 LIVER TRANSPLANTED (H): ICD-10-CM

## 2023-04-21 RX ORDER — TACROLIMUS 1 MG/1
CAPSULE ORAL
Qty: 450 CAPSULE | Refills: 0 | Status: SHIPPED | OUTPATIENT
Start: 2023-04-21 | End: 2023-07-19

## 2023-04-21 NOTE — TELEPHONE ENCOUNTER
DIAGNOSIS: Left foot pain [M79.672]  - Primary      APPOINTMENT DATE: 04/25/2023   NOTES STATUS DETAILS   OFFICE NOTE from referring provider Internal 04/14/2023 - Zaid Jane DO - [facility]   MEDICATION LIST Internal    XRAYS (IMAGES & REPORTS) Internal 04/14/2023 - LT Foot

## 2023-04-25 ENCOUNTER — PRE VISIT (OUTPATIENT)
Dept: SURGERY | Facility: CLINIC | Age: 38
End: 2023-04-25

## 2023-04-25 ENCOUNTER — PRE VISIT (OUTPATIENT)
Dept: ORTHOPEDICS | Facility: CLINIC | Age: 38
End: 2023-04-25

## 2023-05-02 ENCOUNTER — LAB (OUTPATIENT)
Dept: LAB | Facility: CLINIC | Age: 38
End: 2023-05-02
Payer: COMMERCIAL

## 2023-05-02 DIAGNOSIS — Z94.4 LIVER TRANSPLANT RECIPIENT (H): ICD-10-CM

## 2023-05-02 DIAGNOSIS — K83.1 CHOLESTASIS OF TRANSPLANTED LIVER (H): ICD-10-CM

## 2023-05-02 DIAGNOSIS — Z94.4 LIVER TRANSPLANTED (H): ICD-10-CM

## 2023-05-02 DIAGNOSIS — Z94.4 LIVER REPLACED BY TRANSPLANT (H): ICD-10-CM

## 2023-05-02 DIAGNOSIS — T86.49 CHOLESTASIS OF TRANSPLANTED LIVER (H): ICD-10-CM

## 2023-05-02 DIAGNOSIS — Z94.4 LIVER TRANSPLANTED (H): Primary | ICD-10-CM

## 2023-05-02 DIAGNOSIS — N93.9 ABNORMAL UTERINE BLEEDING (AUB): ICD-10-CM

## 2023-05-02 DIAGNOSIS — K75.4 AUTOIMMUNE HEPATITIS (H): ICD-10-CM

## 2023-05-02 LAB
ALBUMIN MFR UR ELPH: 210 MG/DL (ref 1–14)
ALBUMIN SERPL BCG-MCNC: 3.9 G/DL (ref 3.5–5.2)
ALBUMIN UR-MCNC: >=300 MG/DL
ALP SERPL-CCNC: 98 U/L (ref 35–104)
ALT SERPL W P-5'-P-CCNC: 33 U/L (ref 10–35)
ANION GAP SERPL CALCULATED.3IONS-SCNC: 11 MMOL/L (ref 7–15)
APPEARANCE UR: CLEAR
AST SERPL W P-5'-P-CCNC: 32 U/L (ref 10–35)
BACTERIA #/AREA URNS HPF: ABNORMAL /HPF
BILIRUB DIRECT SERPL-MCNC: <0.2 MG/DL (ref 0–0.3)
BILIRUB SERPL-MCNC: 0.3 MG/DL
BILIRUB UR QL STRIP: NEGATIVE
BUN SERPL-MCNC: 22.6 MG/DL (ref 6–20)
CALCIUM SERPL-MCNC: 10 MG/DL (ref 8.6–10)
CHLORIDE SERPL-SCNC: 109 MMOL/L (ref 98–107)
COLOR UR AUTO: YELLOW
CREAT SERPL-MCNC: 1.48 MG/DL (ref 0.51–0.95)
CREAT UR-MCNC: 253 MG/DL
DEPRECATED HCO3 PLAS-SCNC: 21 MMOL/L (ref 22–29)
ERYTHROCYTE [DISTWIDTH] IN BLOOD BY AUTOMATED COUNT: 14 % (ref 10–15)
GFR SERPL CREATININE-BSD FRML MDRD: 46 ML/MIN/1.73M2
GLUCOSE SERPL-MCNC: 90 MG/DL (ref 70–99)
GLUCOSE UR STRIP-MCNC: NEGATIVE MG/DL
GRAN CASTS #/AREA URNS LPF: ABNORMAL /LPF
HCT VFR BLD AUTO: 41.4 % (ref 35–47)
HGB BLD-MCNC: 13.4 G/DL (ref 11.7–15.7)
HGB UR QL STRIP: ABNORMAL
KETONES UR STRIP-MCNC: NEGATIVE MG/DL
LEUKOCYTE ESTERASE UR QL STRIP: NEGATIVE
MCH RBC QN AUTO: 28.2 PG (ref 26.5–33)
MCHC RBC AUTO-ENTMCNC: 32.4 G/DL (ref 31.5–36.5)
MCV RBC AUTO: 87 FL (ref 78–100)
NITRATE UR QL: NEGATIVE
PH UR STRIP: 5 [PH] (ref 5–7)
PLATELET # BLD AUTO: 460 10E3/UL (ref 150–450)
POTASSIUM SERPL-SCNC: 4.5 MMOL/L (ref 3.4–5.3)
PROT SERPL-MCNC: 7.3 G/DL (ref 6.4–8.3)
PROT/CREAT 24H UR: 0.83 MG/MG CR (ref 0–0.2)
RBC # BLD AUTO: 4.75 10E6/UL (ref 3.8–5.2)
RBC #/AREA URNS AUTO: ABNORMAL /HPF
SODIUM SERPL-SCNC: 141 MMOL/L (ref 136–145)
SP GR UR STRIP: >=1.03 (ref 1–1.03)
SQUAMOUS #/AREA URNS AUTO: ABNORMAL /LPF
TACROLIMUS BLD-MCNC: 4.9 UG/L (ref 5–15)
TME LAST DOSE: ABNORMAL H
TME LAST DOSE: ABNORMAL H
UROBILINOGEN UR STRIP-ACNC: 0.2 E.U./DL
WBC # BLD AUTO: 8.9 10E3/UL (ref 4–11)
WBC #/AREA URNS AUTO: ABNORMAL /HPF

## 2023-05-02 PROCEDURE — 36415 COLL VENOUS BLD VENIPUNCTURE: CPT

## 2023-05-02 PROCEDURE — 82248 BILIRUBIN DIRECT: CPT

## 2023-05-02 PROCEDURE — 84156 ASSAY OF PROTEIN URINE: CPT

## 2023-05-02 PROCEDURE — 80197 ASSAY OF TACROLIMUS: CPT

## 2023-05-02 PROCEDURE — 80053 COMPREHEN METABOLIC PANEL: CPT

## 2023-05-02 PROCEDURE — 85027 COMPLETE CBC AUTOMATED: CPT

## 2023-05-02 PROCEDURE — 81001 URINALYSIS AUTO W/SCOPE: CPT

## 2023-05-03 ENCOUNTER — TELEPHONE (OUTPATIENT)
Dept: TRANSPLANT | Facility: CLINIC | Age: 38
End: 2023-05-03
Payer: COMMERCIAL

## 2023-05-03 NOTE — TELEPHONE ENCOUNTER
Patient Call: General  Route to LPN    Reason for call: PT called in to return coordinator, coord could not take call as she was on the line with another pt. Please  call and needs a call back     Call back needed? Yes    Return Call Needed  Same as documented in contacts section  When to return call?: Greater than one day: Route standard priority

## 2023-05-03 NOTE — TELEPHONE ENCOUNTER
Spoke with Yarelis. She reports that she is getting over a cold.she was taking dayquil and night quil and feels like she was a little dehydrated. No urinary urgency, frequency or burning. She will complete urine culture.

## 2023-05-04 NOTE — TELEPHONE ENCOUNTER
Diagnosis, Referred by & from: Proctectomy s/p flex sig   Appt date: 8/1/2023   NOTES STATUS DETAILS   OFFICE NOTE from referring provider N/A    OFFICE NOTE from other specialist [facility]:  9/21/22 - OBGYN OV with Dr. Armendariz     [facility]:  7/11/19 - CR OV with Lulú Wiseman NP  6/10/19 - WOUND OV with Deidra Newell RN  4/30/19, 12/5/17 - CR OV with Dr. Carney  12/18/18 - GI OV with Dr. Enriquez  11/6/18 - GI OV with Dr. Johnson   DISCHARGE SUMMARY from hospital Internal [facility]:  6/20/19 - Admission with Dr. Carney  12/9/16 - Admission with Dr. Marley   DISCHARGE REPORT from the ER N/A    OPERATIVE REPORT Internal [facility]:  3/12/21 - OP Note for HYSTEROSCOPY with Dr. Armendariz  6/20/19 - OP Note for LAPAROSCOPIC TOTAL ABDOMINAL COLECTOMY, LYSIS OF ADHESIONS, ILEOSTOMY with Dr. Carney  6/25/15 - OP Note for LAPAROTOMY, ABDOMINAL WASHOUT with Dr. Cordero  6/18/15 - OP Note for LIVER TRANSPLANT with Dr. Guillen   MEDICATION LIST Internal    LABS     BIOPSIES/PATHOLOGY RELATED TO DIAGNOSIS Internal [facility]:  1/30/23 - Rectum Biopsy (Case: [id number])  6/20/19 - Colon Biopsy (Case: [id number])  2/25/19 - Colon Biopsy (Case: [id number])  9/12/18 - Colon Biopsy (Case: [id number])  9/5/17 - Colon Biopsy (Case: [id number])  * Additional Biopsies in Epic   DIAGNOSTIC PROCEDURES     COLONOSCOPY Received / Internal [facility]:  2/25/19 - Colonoscopy  9/5/17 - Colonoscopy     [facility]:  9/12/18 - Colonoscopy   FLEX SIGMOIDOSCOPY Internal [facility]:  1/30/23 - Flexible Sigmoidoscopy   ERCP Internal [facility]:  9/26/18 - ERCP  6/25/18 - ERCP  5/7/18 - ERCP  4/2/18 - ERCP   IMAGING (DISC & REPORT)      MRI Internal [facility]:  1/22/21 - MRI Pelvis  3/21/18 - MRI Abdomen MRCP   XRAY Internal [facility]:  10/24/18 - XR Abdomen  4/18/18 - XR Abdomen   ULTRASOUND  (ENDOANAL/ENDORECTAL) Internal [facility]:  10/12/22  US Pelvic

## 2023-05-10 ENCOUNTER — APPOINTMENT (OUTPATIENT)
Dept: LAB | Facility: CLINIC | Age: 38
End: 2023-05-10
Payer: COMMERCIAL

## 2023-05-10 PROCEDURE — 87086 URINE CULTURE/COLONY COUNT: CPT

## 2023-05-12 LAB — BACTERIA UR CULT: NORMAL

## 2023-06-14 DIAGNOSIS — Z94.4 LIVER REPLACED BY TRANSPLANT (H): Primary | ICD-10-CM

## 2023-07-19 DIAGNOSIS — Z94.4 LIVER TRANSPLANTED (H): ICD-10-CM

## 2023-07-19 RX ORDER — TACROLIMUS 1 MG/1
CAPSULE ORAL
Qty: 450 CAPSULE | Refills: 3 | Status: SHIPPED | OUTPATIENT
Start: 2023-07-19 | End: 2024-01-29

## 2023-08-01 ENCOUNTER — PRE VISIT (OUTPATIENT)
Dept: SURGERY | Facility: CLINIC | Age: 38
End: 2023-08-01

## 2023-09-19 ENCOUNTER — MYC MEDICAL ADVICE (OUTPATIENT)
Dept: TRANSPLANT | Facility: CLINIC | Age: 38
End: 2023-09-19
Payer: COMMERCIAL

## 2023-10-09 DIAGNOSIS — K51.90 UC (ULCERATIVE COLITIS) (H): ICD-10-CM

## 2023-10-09 DIAGNOSIS — Z94.4 LIVER REPLACED BY TRANSPLANT (H): ICD-10-CM

## 2023-10-09 RX ORDER — PREDNISONE 5 MG/1
5 TABLET ORAL DAILY
Qty: 90 TABLET | Refills: 3 | Status: SHIPPED | OUTPATIENT
Start: 2023-10-09 | End: 2024-08-09

## 2023-10-09 RX ORDER — URSODIOL 250 MG/1
TABLET, FILM COATED ORAL
Qty: 270 TABLET | Refills: 3 | Status: SHIPPED | OUTPATIENT
Start: 2023-10-09 | End: 2024-08-12

## 2023-10-15 ENCOUNTER — TELEPHONE (OUTPATIENT)
Dept: GASTROENTEROLOGY | Facility: CLINIC | Age: 38
End: 2023-10-15
Payer: COMMERCIAL

## 2023-10-15 NOTE — TELEPHONE ENCOUNTER
Left Voicemail (1st Attempt) for the patient to call back and schedule the following:    Appointment type: RLT  Provider: TATA  Return date: MARCH 2024  Specialty phone number: [phone]  Additional appointment(s) needed: NA  Additonal Notes: RESCHEDULE NEEDED.

## 2023-10-17 ENCOUNTER — OFFICE VISIT (OUTPATIENT)
Dept: FAMILY MEDICINE | Facility: CLINIC | Age: 38
End: 2023-10-17
Attending: INTERNAL MEDICINE
Payer: COMMERCIAL

## 2023-10-17 DIAGNOSIS — Z94.4 LIVER REPLACED BY TRANSPLANT (H): ICD-10-CM

## 2023-10-17 DIAGNOSIS — L81.4 LENTIGINES: Primary | ICD-10-CM

## 2023-10-17 DIAGNOSIS — D22.9 MULTIPLE BENIGN NEVI: ICD-10-CM

## 2023-10-17 PROCEDURE — 99213 OFFICE O/P EST LOW 20 MIN: CPT | Performed by: DERMATOLOGY

## 2023-10-17 RX ORDER — TRETINOIN 0.25 MG/G
CREAM TOPICAL
Qty: 45 G | Refills: 11 | Status: SHIPPED | OUTPATIENT
Start: 2023-10-17

## 2023-10-17 NOTE — PATIENT INSTRUCTIONS
Topical Retinoids    What are topical retinoids?  These are medicines that are related to Vitamin A. They are used on the skin.  Retin-A , Renova , Differin , and Tazorac  are brand names.  Come in creams and clear gels  Used to treat skin conditions like pimples (acne), face wrinkling, or dark-colored sunspots    How do I use these medicines?  Wash face and let dry for 15 to 30 minutes.  Use a large pea-size amount of medicine to cover the whole face. Do not put on close to the eyes and lips. Rub in gently.   Start by using every other day. If you have no irritation after a few days, start to use it daily.   You might have too much irritation with daily use. Use it less often until the irritation goes away. Then try to increase slowly to daily use.   Irritation improves over time.  You may use moisturizer if your skin becomes dry. Look for  non-comedogenic  (non-pore plugging) and oil free products.     What are the side effects?  Dryness   Peeling and flaking   Irritation of the skin   Possible increased chance of sunburns. Protect your skin from sunlight. Wear a hat and use a sunscreen with SPF 30 or higher. Your sunscreen should have both UVA and UVB (broad-spectrum) protection.    Who should I call with questions?  [facility]: [phone]   [facility]: [phone]  For urgent needs outside of business hours call the [facility] at [phone] and ask for the dermatology resident on call         Checking for Skin Cancer  You can help find cancer early by checking your skin each month. There are 3 main kinds of skin cancer: melanoma, basal cell carcinoma, and squamous cell carcinoma. Doing monthly skin checks is the best way to find new marks, sores, or skin changes. Follow these instructions for checking your skin.   The ABCDEs of checking moles for melanoma   Check your moles or growths for signs of melanoma using ABCDE:   Asymmetry: The  sides of the mole or growth don t match.  Border: The edges are ragged, notched, or blurred.  Color: The color within the mole or growth varies. It could be black, brown, tan, white, or shades of red, gray, or blue.  Diameter: The mole or growth is larger than   inch or 6 mm (size of a pencil eraser).  Evolving: The size, shape, texture, or color of the mole or growth is changing.     ABCDE's of moles on light skin.        ABCDE's of moles on dark skin may be harder to identify.     Checking for other types of skin cancer  Basal cell carcinoma or squamous cell carcinoma cause symptoms like:     A spot or mole that looks different from all other marks on your skin  Changes in how an area feels, such as itching, tenderness, or pain  Changes in the skin's surface, such as oozing, bleeding, or scaliness  A sore that doesn't heal  New swelling, redness, or spread of color beyond the border of a mole    Who s at risk?  Anyone of any skin color can get skin cancer. But you're at greater risk if you have:   Fair skin that freckles easily and burns instead of tanning  Light-colored or red hair  Light-colored eyes  Many moles or abnormal moles on your skin  A long history of unprotected exposure to sunlight or tanning beds  A history of many blistering sunburns as a child or teen  A family history of skin cancer  Been exposed to radiation or chemicals  A weakened immune system  Been exposed to arsenic  If you've had skin cancer in the past, you're at high risk of having it again.   How to check your skin  Do your monthly skin checkups in front of a full-length mirror. Use a room with good lighting so it's easier to see. Use a hand mirror to look at hard-to-see places like your buttocks and back. You can also have a trusted friend or family member help you with these checks. Check every part of your body, including your:   Head (ears, face, neck, and scalp)  Torso (front, back, sides, and under breasts)  Arms (tops,  undersides, and armpits)  Hands (palms, backs, and fingers, including under the nails)  Lower back, buttocks, and genitals  Legs (front, back, and sides)  Feet (tops, soles, toes, including under the nails, and between toes)  Watch for new spots on your skin or a spot that's changing in color, shape, size.   If you have a lot of moles, take digital photos of them each month. Make sure to take photos both up close and from a distance. These can help you see if any moles change over time.   Know your skin  Most skin changes aren't cancer. But if you see any changes in your skin, call your healthcare provider right away. Only they can tell you if a change is a problem. If you have skin cancer, seeing your provider can be the first step to getting the treatment that could save your life.   Les last reviewed this educational content on 10/1/2021    1239-9797 [employer]. All rights reserved. This information is not intended as a substitute for professional medical care. Always follow your healthcare professional's instructions.

## 2023-10-17 NOTE — PROGRESS NOTES
[facility] Dermatology Note  Encounter Date: Oct 17, 2023  Office Visit     Dermatology Problem List:  # Liver transplant 2015  # Melanocytic nevus on the right heel (photo in chart)  # Numerous melanocytic nevi on glabrous surfaces  # Verruca plantaris, resolved  - S/p cryo, efudex, wart stick    Soc hx: will be celebrating daughter's 2nd birthday soon!  ____________________________________________    Assessment & Plan:    # Multiple benign nevi.   - Monitor for ABCDEs of melanoma   - Continue sun protection - recommend SPF 30 or higher with frequent application   - Return sooner if noticing changing or symptomatic lesions    # Solar lentigines   - Start tretinoin 0.025% cream qHS. Counseled on side effects of xerosis. Recommended to start every other night, then increase to nightly as tolerated. Follow with non-comedogenic moisturizer.  - Cosmetic derm referral    # History of organ transplant. Patient aware that she is at higher risk for cutaneous malignancy given history of transplant. Monitor for changing or symptomatic lesions. Continue frequent skin checks and photoprotection.    Procedures Performed:   None.    Follow-up: 1 year(s) in-person, or earlier for new or changing lesions    Staff and Scribe:     I, Zuleika Caraballo, am serving as a scribe to document services personally performed by Dr. Hiral Tran, based on data collection and the provider's statements to me.       Provider Disclosure:   The documentation recorded by the scribe accurately reflects the services I personally performed and the decisions made by me.    Hiral Tran MD    Department of Dermatology  [facility]: Phone: [phone], Fax: [phone]  10/22/2023     ____________________________________________    CC: Skin Check (FBSC)    HPI:  Ms. Yarelis Penny is a(n) 38 year old female who presents today as a  return patient for FBSE.      Patient is otherwise feeling well, without additional skin concerns.    Labs Reviewed:  N/A    Physical Exam:  Vitals: There were no vitals taken for this visit.  SKIN: Total skin excluding the undergarment areas was performed. The exam included the head/face, neck, both arms, chest, back, abdomen, both legs, digits and/or nails.   - There is a 3 mm brown macule R 4th plantar toe, dermoscopy with parallel furrow pattern and centrally more homogenous.  - Multiple regular brown pigmented macules and papules are identified on the trunk and extremities.   - Scattered brown macules on sun exposed areas.  - No other lesions of concern on areas examined.     Medications:  Current Outpatient Medications   Medication    acetaminophen (TYLENOL) 325 MG tablet    calcium carbonate (OS-KERLINE 500 MG [place]. [place]) 1250 MG tablet    ferrous sulfate (FEROSUL) 325 (65 Fe) MG tablet    Multiple Vitamin (MULTIVITAMIN PO)    order for DME    order for DME    predniSONE (DELTASONE) 5 MG tablet    tacrolimus (GENERIC EQUIVALENT) 1 MG capsule    thin (NO BRAND SPECIFIED) lancets    tretinoin (RETIN-A) 0.025 % external cream    ursodiol (ACTIGALL) 250 MG tablet    VITAMIN D, CHOLECALCIFEROL, PO     No current facility-administered medications for this visit.      Past Medical History:   Patient Active Problem List   Diagnosis    Ulcerative colitis (H)    CARDIOVASCULAR SCREENING; LDL GOAL LESS THAN 160    Autoimmune hepatitis (H)    Liver transplant recipient (H)    Leakage of common bile duct of transplanted liver (H)    Cholestasis of transplanted liver (H)    Immunosuppressed status (H24)    Elevated alkaline phosphatase level    EBV (Jun-Barr virus) viremia    History of cytomegalovirus infection    Uterine polyp    HRP (high risk pregnancy), unspecified trimester    Encounter for triage in pregnant patient    Renal insufficiency affecting pregnancy    Abnormal uterine bleeding (AUB)     Past Medical History:    Diagnosis Date    Acute kidney injury (H24) 04/28/2016    Anemia, iron deficiency 08/29/2017    On iron supplementation    Autoimmune hepatitis (H) 2012    on steroid taper    Cholangitis (H28) 2003    CKD (chronic kidney disease) 2012    biopsy 2012: TIN, patchy fibrosis. Dialysis in 2013 x2 months.    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

## 2023-10-17 NOTE — LETTER
10/17/2023         RE: Yarelis Penny  [address] [place] [address]        Dear Colleague,    [facility] Please see a copy of my visit note below.    [facility] Dermatology Note  Encounter Date: Oct 17, 2023  Office Visit     Dermatology Problem List:  # Liver transplant 2015  # Melanocytic nevus on the right heel (photo in chart)  # Numerous melanocytic nevi on glabrous surfaces  # Verruca plantaris, resolved  - S/p cryo, efudex, wart stick    Soc hx: will be celebrating daughter's 2nd birthday soon!  ____________________________________________    Assessment & Plan:    # Multiple benign nevi.   - Monitor for ABCDEs of melanoma   - Continue sun protection - recommend SPF 30 or higher with frequent application   - Return sooner if noticing changing or symptomatic lesions    # Solar lentigines   - Start tretinoin 0.025% cream qHS. Counseled on side effects of xerosis. Recommended to start every other night, then increase to nightly as tolerated. Follow with non-comedogenic moisturizer.  - Cosmetic derm referral    # History of organ transplant. Patient aware that she is at higher risk for cutaneous malignancy given history of transplant. Monitor for changing or symptomatic lesions. Continue frequent skin checks and photoprotection.    Procedures Performed:   None.    Follow-up: 1 year(s) in-person, or earlier for new or changing lesions    Staff and Scribe:     I, Zuleika Caraballo, am serving as a scribe to document services personally performed by Dr. Hiral Tran, based on data collection and the provider's statements to me.       Provider Disclosure:   The documentation recorded by the scribe accurately reflects the services I personally performed and the decisions made by me.    Hiral Tran MD    Department of Dermatology  [facility]  [facility]: Phone: [phone], Fax: [phone]  10/22/2023     ____________________________________________    CC: Skin Check (FBSC)    HPI:  Ms. Yarelis Penny is a(n) 38 year old female who presents today as a return patient for FBSE.      Patient is otherwise feeling well, without additional skin concerns.    Labs Reviewed:  N/A    Physical Exam:  Vitals: There were no vitals taken for this visit.  SKIN: Total skin excluding the undergarment areas was performed. The exam included the head/face, neck, both arms, chest, back, abdomen, both legs, digits and/or nails.   - There is a 3 mm brown macule R 4th plantar toe, dermoscopy with parallel furrow pattern and centrally more homogenous.  - Multiple regular brown pigmented macules and papules are identified on the trunk and extremities.   - Scattered brown macules on sun exposed areas.  - No other lesions of concern on areas examined.     Medications:  Current Outpatient Medications   Medication     acetaminophen (TYLENOL) 325 MG tablet     calcium carbonate (OS-KERLINE 500 MG [place]. [place]) 1250 MG tablet     ferrous sulfate (FEROSUL) 325 (65 Fe) MG tablet     Multiple Vitamin (MULTIVITAMIN PO)     order for DME     order for DME     predniSONE (DELTASONE) 5 MG tablet     tacrolimus (GENERIC EQUIVALENT) 1 MG capsule     thin (NO BRAND SPECIFIED) lancets     tretinoin (RETIN-A) 0.025 % external cream     ursodiol (ACTIGALL) 250 MG tablet     VITAMIN D, CHOLECALCIFEROL, PO     No current facility-administered medications for this visit.      Past Medical History:   Patient Active Problem List   Diagnosis     Ulcerative colitis (H)     CARDIOVASCULAR SCREENING; LDL GOAL LESS THAN 160     Autoimmune hepatitis (H)     Liver transplant recipient (H)     Leakage of common bile duct of transplanted liver (H)     Cholestasis of transplanted liver (H)     Immunosuppressed status (H24)     Elevated alkaline phosphatase  level     EBV (Jun-Barr virus) viremia     History of cytomegalovirus infection     Uterine polyp     HRP (high risk pregnancy), unspecified trimester     Encounter for triage in pregnant patient     Renal insufficiency affecting pregnancy     Abnormal uterine bleeding (AUB)     Past Medical History:   Diagnosis Date     Acute kidney injury (H24) 04/28/2016     Anemia, iron deficiency 08/29/2017    On iron supplementation     Autoimmune hepatitis (H) 2012    on steroid taper     Cholangitis (H28) 2003     CKD (chronic kidney disease) 2012    biopsy 2012: TIN, patchy fibrosis. Dialysis in 2013 x2 months.     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      Again, thank you for allowing me to participate in the care of your patient.        Sincerely,        Hiral Tran MD

## 2023-10-18 NOTE — PROGRESS NOTES
Colon and Rectal Surgery Clinic Note    RE: Yarelis Penny.  : 1985.  BLAKE: 10/24/2023.    Reason for visit: proctectomy.    HPI: Yarelis Penny is a 38 year old female who presents today to discuss a proctectomy. 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 Carney. She also has a history of bile duct stricture. Last ERCP was 2018 where her previous stents were removed and the stricture was gently dilated and a shital pigtailed stent was placed. Her last flexible sigmoidoscopy was 2023 with Dr. Mckeon which demonstrated a mild anal stricture that was dilated. Biopsy negative for disease.     She is currently on prednisone.     Today Yarelis feels fine. No active GI complaints. She wanted to discuss the role of proctectomy in the management of her disease.     Flexible Sigmoidoscopy (2023):  Findings:       The digital rectal exam findings included a mild anal stricture that was        gently digitally dilated.        Normal mucosa was found up to the extent of the exam -- 10 cm proximal        to the anus. Biopsies were taken with a cold forceps for histology.                                                                                    Impression:    - Anal stricture found on digital rectal exam.                          - Normal mucosa at 10 cm proximal to the anus.                          Biopsied.       Surgical Pathology (2023):  RECTUM, BIOPSY:  Colonic mucosa with no evidence of cryptitis (Adrianne grade 0), dysplasia, or other histologic abnormality     Medical history:  Iron deficiency anemia   Autoimmune hepatitis  Cholangitis   CKD  CMV colitis   Esophageal varices s/p banding   Primary sclerosing cholangitis   SBP  UC    Surgical history:  Cholecystectomy in   TAC with end  "ileostomy in 2019 with Dr Carney   Liver transplant in 2015     Family history:  Family History   Problem Relation Age of Onset    Hypertension Mother     Lipids Mother     Sleep Apnea Mother     Heart Disease Mother     Hypertension Maternal Grandmother             Alzheimer Disease Maternal Grandmother     Lipids Father     Heart Disease Maternal Grandfather 40    Heart Disease Paternal Grandfather     Melanoma No family hx of     Skin Cancer No family hx of     Cancer No family hx of     Diabetes No family hx of        Medications:  Current Outpatient Medications   Medication Sig Dispense Refill    acetaminophen (TYLENOL) 325 MG tablet Take 2 tablets (650 mg) by mouth every 6 hours 45 tablet 0    calcium carbonate (OS-KERLINE 500 MG [place]. [place]) 1250 MG tablet Take 1 tablet by mouth every evening      ferrous sulfate (FEROSUL) 325 (65 Fe) MG tablet Take 325 mg by mouth daily (with breakfast)      Multiple Vitamin (MULTIVITAMIN PO) Take 1 tablet by mouth daily      order for DME Coloplast item #s   84838 and 45565 30 Bag 3    order for DME Increase the amount to 10 per month  --2\" barrier ring #6885 10 each 0    predniSONE (DELTASONE) 5 MG tablet Take 1 tablet (5 mg) by mouth daily 90 tablet 3    tacrolimus (GENERIC EQUIVALENT) 1 MG capsule Take 3 capsules (3 mg) by mouth every morning AND 2 capsules (2 mg) every evening. 450 capsule 3    thin (NO BRAND SPECIFIED) lancets Use with lanceting device. To accompany: Blood Glucose Monitor Brands: per insurance. 100 each 6    tretinoin (RETIN-A) 0.025 % external cream Apply pea-sized amount to face at bedtime. Start every other night for 1-2 weeks, then increase to nightly as tolerated. 45 g 11    ursodiol (ACTIGALL) 250 MG tablet Take 2 tablets (500 mg) by mouth every morning AND 1 tablet (250 mg) every evening. 270 tablet 3    VITAMIN D, CHOLECALCIFEROL, PO Take by mouth every evening          Allergies:  Allergies   Allergen Reactions    Rifampin Other (See " Comments)     Kidney failure    Penicillins      Bad hives when she was in college       Social history:   Social History     Tobacco Use    Smoking status: Never    Smokeless tobacco: Never   Substance Use Topics    Alcohol use: Not Currently     Alcohol/week: 0.0 standard drinks of alcohol     Comment: Every other month has 1 drink     Marital status: .    ROS:  A complete review of systems was performed with the patient and all systems negative except as per HPI.    Physical Examination:  Exam was chaperoned by PRASAD Hutchins-P  There were no vitals taken for this visit.  General: Well hydrated. No acute distress.  CV: RRR  Lung: Non-labored breathing on RA  Abdomen: Soft, NT, well healed incisions, ileostomy in place. No inguinal adenopathy palpated.      ASSESSMENT    This is a 38 year old F s/p TAC with EI and liver txp for PSC, now here to discuss proctectomy. She is thinking of a second pregnancy, thus I would advise to wait for surgical intervention until after childbearing age. We also discussed the pros and cons of undergoing a completion proctectomy. Give her extensive past surgical history, I am worried the risks would outweigh the benefits. We also talked about the importance of active surveillance. She agreed on monitoring for now and returning at a later time if needed.    All pertinent labs and imaging were personally reviewed by me.      PLAN  - RTC prn    60 minutes spent on the date of the encounter doing chart review, history and exam, imaging review, documentation and further activities as noted above.      Talha Schroeder MD    Division of Colon and Rectal Surgery  [facility]    Referring Provider:  Sophy Mckeon MD  [address] [address]     Primary Care Provider:  No Ref-Primary, Physician

## 2023-10-19 ENCOUNTER — IMMUNIZATION (OUTPATIENT)
Dept: NURSING | Facility: CLINIC | Age: 38
End: 2023-10-19

## 2023-10-19 PROCEDURE — 91320 SARSCV2 VAC 30MCG TRS-SUC IM: CPT

## 2023-10-19 PROCEDURE — 90686 IIV4 VACC NO PRSV 0.5 ML IM: CPT

## 2023-10-19 PROCEDURE — 90480 ADMN SARSCOV2 VAC 1/ONLY CMP: CPT

## 2023-10-19 PROCEDURE — 90471 IMMUNIZATION ADMIN: CPT

## 2023-10-20 ENCOUNTER — TELEPHONE (OUTPATIENT)
Dept: GASTROENTEROLOGY | Facility: CLINIC | Age: 38
End: 2023-10-20
Payer: COMMERCIAL

## 2023-10-20 NOTE — TELEPHONE ENCOUNTER
Patient Contacted   Appointment type: RLT  Provider: TATA  Return date: 1/30/24  Specialty phone number: [phone]  Additional appointment(s) needed: LAB  Additonal Notes: RESCHEDULED.

## 2023-10-24 ENCOUNTER — OFFICE VISIT (OUTPATIENT)
Dept: SURGERY | Facility: CLINIC | Age: 38
End: 2023-10-24
Attending: INTERNAL MEDICINE
Payer: COMMERCIAL

## 2023-10-24 VITALS — OXYGEN SATURATION: 98 % | DIASTOLIC BLOOD PRESSURE: 95 MMHG | SYSTOLIC BLOOD PRESSURE: 157 MMHG | HEART RATE: 84 BPM

## 2023-10-24 DIAGNOSIS — K51.919 ULCERATIVE COLITIS WITH COMPLICATION, UNSPECIFIED LOCATION (H): Primary | ICD-10-CM

## 2023-10-24 DIAGNOSIS — K51.018 ULCERATIVE PANCOLITIS WITH OTHER COMPLICATION (H): ICD-10-CM

## 2023-10-24 DIAGNOSIS — N18.9 CHRONIC KIDNEY DISEASE, UNSPECIFIED CKD STAGE: ICD-10-CM

## 2023-10-24 DIAGNOSIS — Z94.4 LIVER TRANSPLANT RECIPIENT (H): ICD-10-CM

## 2023-10-24 PROCEDURE — 99205 OFFICE O/P NEW HI 60 MIN: CPT | Performed by: SURGERY

## 2023-10-24 ASSESSMENT — PAIN SCALES - GENERAL: PAINLEVEL: NO PAIN (0)

## 2023-10-24 NOTE — NURSING NOTE
Chief Complaint   Patient presents with    Consult       Vitals:    10/24/23 1059   BP: (!) 157/95   BP Location: Left arm   Patient Position: Sitting   Cuff Size: Adult Regular   Pulse: 84   SpO2: 98%       There is no height or weight on file to calculate BMI.    Roby Abraham EMT-P

## 2023-10-24 NOTE — LETTER
10/24/2023       RE: Yarelis Penny  [address] [address]       Dear Colleague,    Thank you for referring your patient, Yarelis Penny, to the [facility] [place] at [facility]. Please see a copy of my visit note below.    Colon and Rectal Surgery Clinic Note    RE: Yarelis Penny.  : 1985.  BLAKE: 10/24/2023.    Reason for visit: proctectomy.    HPI: Yarelis Penny is a 38 year old female who presents today to discuss a proctectomy. She has a past medical history of iron deficiency anemia, CKD, CMV colitis, esophageal varices s/p banding, primary sclerosing cholangitis s/p living doner liver transplant from her  [place] in , SBP, and UC s/p TAC with end ileostomy in 2019 with Dr. Carney. She also has a history of bile duct stricture. Last ERCP was  where her previous stents were removed and the stricture was gently dilated and a shital pigtailed stent was placed. Her last flexible sigmoidoscopy was 2023 with Dr. Mckeon which demonstrated a mild anal stricture that was dilated. Biopsy negative for disease.     She is currently on prednisone.     Today Yarelis feels fine. No active GI complaints. She wanted to discuss the role of proctectomy in the management of her disease.     Flexible Sigmoidoscopy (2023):  Findings:       The digital rectal exam findings included a mild anal stricture that was        gently digitally dilated.        Normal mucosa was found up to the extent of the exam -- 10 cm proximal        to the anus. Biopsies were taken with a cold forceps for histology.                                                                                    Impression:    - Anal stricture found on digital rectal exam.                          - Normal mucosa at 10 cm proximal to the anus.                          Biopsied.       Surgical Pathology (2023):  RECTUM, BIOPSY:  Colonic  "mucosa with no evidence of cryptitis (Adrianne grade 0), dysplasia, or other histologic abnormality     Medical history:  Iron deficiency anemia   Autoimmune hepatitis  Cholangitis   CKD  CMV colitis   Esophageal varices s/p banding   Primary sclerosing cholangitis   SBP  UC    Surgical history:  Cholecystectomy in 2015  TAC with end ileostomy in 2019 with Dr Carney   Liver transplant in      Family history:  Family History   Problem Relation Age of Onset    Hypertension Mother     Lipids Mother     Sleep Apnea Mother     Heart Disease Mother     Hypertension Maternal Grandmother             Alzheimer Disease Maternal Grandmother     Lipids Father     Heart Disease Maternal Grandfather 40    Heart Disease Paternal Grandfather     Melanoma No family hx of     Skin Cancer No family hx of     Cancer No family hx of     Diabetes No family hx of        Medications:  Current Outpatient Medications   Medication Sig Dispense Refill    acetaminophen (TYLENOL) 325 MG tablet Take 2 tablets (650 mg) by mouth every 6 hours 45 tablet 0    calcium carbonate (OS-KERLINE 500 MG [place]. [place]) 1250 MG tablet Take 1 tablet by mouth every evening      ferrous sulfate (FEROSUL) 325 (65 Fe) MG tablet Take 325 mg by mouth daily (with breakfast)      Multiple Vitamin (MULTIVITAMIN PO) Take 1 tablet by mouth daily      order for DME Col[employer] item #s   07838 and 17386 30 Bag 3    order for DME Increase the amount to 10 per month  --2\" barrier ring #7805 10 each 0    predniSONE (DELTASONE) 5 MG tablet Take 1 tablet (5 mg) by mouth daily 90 tablet 3    tacrolimus (GENERIC EQUIVALENT) 1 MG capsule Take 3 capsules (3 mg) by mouth every morning AND 2 capsules (2 mg) every evening. 450 capsule 3    thin (NO BRAND SPECIFIED) lancets Use with lanceting device. To accompany: Blood Glucose Monitor Brands: per insurance. 100 each 6    tretinoin (RETIN-A) 0.025 % external cream Apply pea-sized amount to face at bedtime. Start every other night for 1-2 " weeks, then increase to nightly as tolerated. 45 g 11    ursodiol (ACTIGALL) 250 MG tablet Take 2 tablets (500 mg) by mouth every morning AND 1 tablet (250 mg) every evening. 270 tablet 3    VITAMIN D, CHOLECALCIFEROL, PO Take by mouth every evening          Allergies:  Allergies   Allergen Reactions    Rifampin Other (See Comments)     Kidney failure    Penicillins      Bad hives when she was in college       Social history:   Social History     Tobacco Use    Smoking status: Never    Smokeless tobacco: Never   Substance Use Topics    Alcohol use: Not Currently     Alcohol/week: 0.0 standard drinks of alcohol     Comment: Every other month has 1 drink     Marital status: .    ROS:  A complete review of systems was performed with the patient and all systems negative except as per HPI.    Physical Examination:  Exam was chaperoned by PRASAD Hutchins-P  There were no vitals taken for this visit.  General: Well hydrated. No acute distress.  CV: RRR  Lung: Non-labored breathing on RA  Abdomen: Soft, NT, well healed incisions, ileostomy in place. No inguinal adenopathy palpated.      ASSESSMENT    This is a 38 year old F s/p TAC with EI and liver txp for PSC, now here to discuss proctectomy. She is thinking of a second pregnancy, thus I would advise to wait for surgical intervention until after childbearing age. We also discussed the pros and cons of undergoing a completion proctectomy. Give her extensive past surgical history, I am worried the risks would outweigh the benefits. We also talked about the importance of active surveillance. She agreed on monitoring for now and returning at a later time if needed.    All pertinent labs and imaging were personally reviewed by me.      PLAN  - RTC prn    60 minutes spent on the date of the encounter doing chart review, history and exam, imaging review, documentation and further activities as noted above.      Referring Provider:  Sohpy Mckeon MD  [address] [address]  [place] [address]     Primary Care Provider:  No Ref-Primary, Physician      Again, thank you for allowing me to participate in the care of your patient.      Sincerely,    Talha Schroeder MD

## 2024-01-23 ENCOUNTER — LAB (OUTPATIENT)
Dept: LAB | Facility: CLINIC | Age: 39
End: 2024-01-23
Payer: COMMERCIAL

## 2024-01-23 DIAGNOSIS — Z94.4 LIVER REPLACED BY TRANSPLANT (H): ICD-10-CM

## 2024-01-23 LAB
ALBUMIN SERPL BCG-MCNC: 3.9 G/DL (ref 3.5–5.2)
ALP SERPL-CCNC: 133 U/L (ref 40–150)
ALT SERPL W P-5'-P-CCNC: NORMAL U/L
ANION GAP SERPL CALCULATED.3IONS-SCNC: 11 MMOL/L (ref 7–15)
AST SERPL W P-5'-P-CCNC: NORMAL U/L
BILIRUB DIRECT SERPL-MCNC: NORMAL MG/DL
BILIRUB SERPL-MCNC: 0.2 MG/DL
BUN SERPL-MCNC: 39.4 MG/DL (ref 6–20)
CALCIUM SERPL-MCNC: 9.7 MG/DL (ref 8.6–10)
CHLORIDE SERPL-SCNC: 106 MMOL/L (ref 98–107)
CREAT SERPL-MCNC: 1.91 MG/DL (ref 0.51–0.95)
DEPRECATED HCO3 PLAS-SCNC: 19 MMOL/L (ref 22–29)
EGFRCR SERPLBLD CKD-EPI 2021: 34 ML/MIN/1.73M2
ERYTHROCYTE [DISTWIDTH] IN BLOOD BY AUTOMATED COUNT: 14 % (ref 10–15)
GLUCOSE SERPL-MCNC: 93 MG/DL (ref 70–99)
HCT VFR BLD AUTO: 44.4 % (ref 35–47)
HGB BLD-MCNC: 14.3 G/DL (ref 11.7–15.7)
MCH RBC QN AUTO: 28 PG (ref 26.5–33)
MCHC RBC AUTO-ENTMCNC: 32.2 G/DL (ref 31.5–36.5)
MCV RBC AUTO: 87 FL (ref 78–100)
PLATELET # BLD AUTO: 451 10E3/UL (ref 150–450)
POTASSIUM SERPL-SCNC: 4 MMOL/L (ref 3.4–5.3)
PROT SERPL-MCNC: 7.6 G/DL (ref 6.4–8.3)
RBC # BLD AUTO: 5.1 10E6/UL (ref 3.8–5.2)
SODIUM SERPL-SCNC: 136 MMOL/L (ref 135–145)
TACROLIMUS BLD-MCNC: 6.2 UG/L (ref 5–15)
TME LAST DOSE: NORMAL H
TME LAST DOSE: NORMAL H
WBC # BLD AUTO: 11 10E3/UL (ref 4–11)

## 2024-01-23 PROCEDURE — 36415 COLL VENOUS BLD VENIPUNCTURE: CPT

## 2024-01-23 PROCEDURE — 84075 ASSAY ALKALINE PHOSPHATASE: CPT

## 2024-01-23 PROCEDURE — 80197 ASSAY OF TACROLIMUS: CPT

## 2024-01-23 PROCEDURE — 85027 COMPLETE CBC AUTOMATED: CPT

## 2024-01-23 PROCEDURE — 82040 ASSAY OF SERUM ALBUMIN: CPT

## 2024-01-23 PROCEDURE — 84155 ASSAY OF PROTEIN SERUM: CPT

## 2024-01-23 PROCEDURE — 82247 BILIRUBIN TOTAL: CPT

## 2024-01-23 PROCEDURE — 80048 BASIC METABOLIC PNL TOTAL CA: CPT

## 2024-01-29 ENCOUNTER — TELEPHONE (OUTPATIENT)
Dept: TRANSPLANT | Facility: CLINIC | Age: 39
End: 2024-01-29
Payer: COMMERCIAL

## 2024-01-29 DIAGNOSIS — Z94.4 LIVER TRANSPLANTED (H): ICD-10-CM

## 2024-01-29 RX ORDER — TACROLIMUS 1 MG/1
CAPSULE ORAL
Qty: 360 CAPSULE | Refills: 3 | Status: SHIPPED | OUTPATIENT
Start: 2024-01-29 | End: 2024-08-09

## 2024-01-29 NOTE — TELEPHONE ENCOUNTER
Pt's creatinine increased. Tacro slightly above goal. Pt has had a head cold and was taking dayquil and nyquil and not drinking enough fluids.Pt's 2 year old was also ill. Pt is feeling better now.     ISSUE:   Tacrolimus IR level 6.2 on 1/23, goal 3-5, dose 3 mg Am and 2 mg PM.    PLAN:   Call Patientand confirm this was an accurate 12-hour trough.   Verify Tacrolimus IR dose 3 mg AM and 2 mg PM.   Confirm no new medications or illness.   Confirm no missed doses.   If accurate trough and accurate dose, decrease Tacrolimus IR dose to 2 mg BID     Repeat labs in 1-2 months.  *If > 50% change in immunosuppression dose, repeat labs in 1 week.     OUTCOME:   Spoke with Patient, they confirm accurate trough level and current dose 3 mg AM and 2 mg PM.   Patientconfirmed dose change to 2 mg BID.  Patientagreed to repeat labs in 1-2 months.   Orders sent to preferred pharmacy for dose change and lab for repeat labs.   Patientvoiced understanding of plan.

## 2024-05-06 ENCOUNTER — TELEPHONE (OUTPATIENT)
Dept: GASTROENTEROLOGY | Facility: CLINIC | Age: 39
End: 2024-05-06
Payer: COMMERCIAL

## 2024-06-21 DIAGNOSIS — Z94.4 LIVER REPLACED BY TRANSPLANT (H): Primary | ICD-10-CM

## 2024-06-22 ENCOUNTER — HEALTH MAINTENANCE LETTER (OUTPATIENT)
Age: 39
End: 2024-06-22

## 2024-06-28 ENCOUNTER — TRANSFERRED RECORDS (OUTPATIENT)
Dept: HEALTH INFORMATION MANAGEMENT | Facility: CLINIC | Age: 39
End: 2024-06-28
Payer: COMMERCIAL

## 2024-06-28 ENCOUNTER — MEDICAL CORRESPONDENCE (OUTPATIENT)
Dept: HEALTH INFORMATION MANAGEMENT | Facility: CLINIC | Age: 39
End: 2024-06-28
Payer: COMMERCIAL

## 2024-07-08 ENCOUNTER — TRANSCRIBE ORDERS (OUTPATIENT)
Dept: MATERNAL FETAL MEDICINE | Facility: CLINIC | Age: 39
End: 2024-07-08
Payer: COMMERCIAL

## 2024-07-08 DIAGNOSIS — Z31.69 ENCOUNTER FOR PRECONCEPTION CONSULTATION: Primary | ICD-10-CM

## 2024-07-23 NOTE — PROGRESS NOTES
Nursing Note  Yarelis Penny presents today to [facility] for: No chief complaint on file.    During today's [facility] appointment, orders from Dr. Johnson were completed.  Frequency: every 8 weeks    Progress note:  Patient identification verified by name and date of birth.  Assessment completed.  Vitals recorded in Doc Flowsheets.  Patient was provided with education regarding infusion and possible side effects.  Patient verbalized understanding.      needed: No  Premedications: were not ordered.  Infusion Rates: infusion given over approximately 30 minutes.  Approximate Infusion length:1 1/2 hours.   Labs: were drawn per orders.   Vascular access: peripheral IV placed today.  Treatment Conditions: patient denies fever, chills, signs of infection, recent illness, on antibiotics, productive cough or elevated temperature.  Patient tolerated infusion: well.    Discharge Plan:   Follow up plan of care with: ongoing infusions at [facility].  Discharge instructions were reviewed with patient.  Patient/representative verbalized understanding of discharge instructions and all questions answered.  Patient discharged from [facility] in stable condition.    Sharda Ariza RN    Administrations This Visit     0.9% sodium chloride BOLUS     Admin Date Action Dose Route Administered By             03/24/2017 New Bag 50 mL Intravenous Sharda Ariza RN                    vedolizumab (ENTYVIO) 300 mg in NaCl 0.9 % 280 mL infusion     Admin Date Action Dose Rate Route Administered By          03/24/2017 New Bag 300 mg 560 mL/hr Intravenous Sharda Ariza RN                         /77  Pulse 112  Temp 98  F (36.7  C) (Oral)  Resp 16  Wt 49.6 kg (109 lb 6.4 oz)  LMP 02/01/2017  SpO2 100%  BMI 20.01 kg/m2       Who is calling: Patient  Reason for call: Other: DME Compression stockings  Explanation/Symptoms: Patient calling at his last office visit dr sung told patient to wear his compression stockings. Patient states the stockings are too tight. Patient would like a prescription for a new pair of stockings above the knee. Patient did not know what compression he is currently wearing. Patient would like to  the prescription. Please call when ready.  Recently seen for this problem by any provider: Yes  Pharmacy selected: No   needed: No    PSR offered appointment:No  Patient refused appointment: No    Best callback number: [phone]   Okay for detailed message: Yes

## 2024-08-02 ENCOUNTER — LAB (OUTPATIENT)
Dept: LAB | Facility: CLINIC | Age: 39
End: 2024-08-02
Payer: COMMERCIAL

## 2024-08-02 DIAGNOSIS — Z94.4 LIVER REPLACED BY TRANSPLANT (H): ICD-10-CM

## 2024-08-02 LAB
ALBUMIN MFR UR ELPH: 109 MG/DL
ALBUMIN SERPL BCG-MCNC: 3.6 G/DL (ref 3.5–5.2)
ALBUMIN UR-MCNC: 100 MG/DL
ALP SERPL-CCNC: 62 U/L (ref 40–150)
ALT SERPL W P-5'-P-CCNC: 24 U/L (ref 0–50)
ANION GAP SERPL CALCULATED.3IONS-SCNC: 9 MMOL/L (ref 7–15)
APPEARANCE UR: ABNORMAL
AST SERPL W P-5'-P-CCNC: 35 U/L (ref 0–45)
BACTERIA #/AREA URNS HPF: ABNORMAL /HPF
BILIRUB DIRECT SERPL-MCNC: NORMAL MG/DL
BILIRUB SERPL-MCNC: 0.3 MG/DL
BILIRUB UR QL STRIP: NEGATIVE
BUN SERPL-MCNC: 33.7 MG/DL (ref 6–20)
CALCIUM SERPL-MCNC: 9.3 MG/DL (ref 8.8–10.4)
CHLORIDE SERPL-SCNC: 108 MMOL/L (ref 98–107)
CHOLEST SERPL-MCNC: 200 MG/DL
COLOR UR AUTO: YELLOW
CREAT SERPL-MCNC: 1.78 MG/DL (ref 0.51–0.95)
CREAT UR-MCNC: 125 MG/DL
EGFRCR SERPLBLD CKD-EPI 2021: 37 ML/MIN/1.73M2
ERYTHROCYTE [DISTWIDTH] IN BLOOD BY AUTOMATED COUNT: 15.3 % (ref 10–15)
FASTING STATUS PATIENT QL REPORTED: YES
FASTING STATUS PATIENT QL REPORTED: YES
GLUCOSE SERPL-MCNC: 84 MG/DL (ref 70–99)
GLUCOSE UR STRIP-MCNC: NEGATIVE MG/DL
HCO3 SERPL-SCNC: 21 MMOL/L (ref 22–29)
HCT VFR BLD AUTO: 39 % (ref 35–47)
HDLC SERPL-MCNC: 101 MG/DL
HGB BLD-MCNC: 12.8 G/DL (ref 11.7–15.7)
HGB UR QL STRIP: ABNORMAL
KETONES UR STRIP-MCNC: NEGATIVE MG/DL
LDLC SERPL CALC-MCNC: 76 MG/DL
LEUKOCYTE ESTERASE UR QL STRIP: NEGATIVE
MAGNESIUM SERPL-MCNC: 2 MG/DL (ref 1.7–2.3)
MCH RBC QN AUTO: 29.2 PG (ref 26.5–33)
MCHC RBC AUTO-ENTMCNC: 32.8 G/DL (ref 31.5–36.5)
MCV RBC AUTO: 89 FL (ref 78–100)
NITRATE UR QL: NEGATIVE
NONHDLC SERPL-MCNC: 99 MG/DL
PH UR STRIP: 5.5 [PH] (ref 5–7)
PHOSPHATE SERPL-MCNC: 3.4 MG/DL (ref 2.5–4.5)
PLATELET # BLD AUTO: 367 10E3/UL (ref 150–450)
POTASSIUM SERPL-SCNC: 4.2 MMOL/L (ref 3.4–5.3)
PROT SERPL-MCNC: 6.7 G/DL (ref 6.4–8.3)
PROT/CREAT 24H UR: 0.87 MG/MG CR (ref 0–0.2)
RBC # BLD AUTO: 4.39 10E6/UL (ref 3.8–5.2)
RBC #/AREA URNS AUTO: ABNORMAL /HPF
SODIUM SERPL-SCNC: 138 MMOL/L (ref 135–145)
SP GR UR STRIP: >=1.03 (ref 1–1.03)
SQUAMOUS #/AREA URNS AUTO: ABNORMAL /LPF
TACROLIMUS BLD-MCNC: 3.2 UG/L (ref 5–15)
TME LAST DOSE: ABNORMAL H
TME LAST DOSE: ABNORMAL H
TRIGL SERPL-MCNC: 117 MG/DL
UROBILINOGEN UR STRIP-ACNC: 0.2 E.U./DL
WBC # BLD AUTO: 12.9 10E3/UL (ref 4–11)
WBC #/AREA URNS AUTO: ABNORMAL /HPF

## 2024-08-02 PROCEDURE — 81001 URINALYSIS AUTO W/SCOPE: CPT

## 2024-08-02 PROCEDURE — 36415 COLL VENOUS BLD VENIPUNCTURE: CPT

## 2024-08-02 PROCEDURE — 84100 ASSAY OF PHOSPHORUS: CPT

## 2024-08-02 PROCEDURE — 83735 ASSAY OF MAGNESIUM: CPT

## 2024-08-02 PROCEDURE — 80061 LIPID PANEL: CPT

## 2024-08-02 PROCEDURE — 84156 ASSAY OF PROTEIN URINE: CPT

## 2024-08-02 PROCEDURE — 80053 COMPREHEN METABOLIC PANEL: CPT

## 2024-08-02 PROCEDURE — 80197 ASSAY OF TACROLIMUS: CPT

## 2024-08-02 PROCEDURE — 85027 COMPLETE CBC AUTOMATED: CPT

## 2024-08-09 ENCOUNTER — VIRTUAL VISIT (OUTPATIENT)
Dept: GASTROENTEROLOGY | Facility: CLINIC | Age: 39
End: 2024-08-09
Attending: INTERNAL MEDICINE
Payer: COMMERCIAL

## 2024-08-09 VITALS — WEIGHT: 107 LBS | BODY MASS INDEX: 18.37 KG/M2

## 2024-08-09 DIAGNOSIS — Z94.4 LIVER REPLACED BY TRANSPLANT (H): ICD-10-CM

## 2024-08-09 DIAGNOSIS — K51.90 UC (ULCERATIVE COLITIS) (H): ICD-10-CM

## 2024-08-09 DIAGNOSIS — Z94.4 LIVER TRANSPLANTED (H): ICD-10-CM

## 2024-08-09 DIAGNOSIS — B27.00 EBV (EPSTEIN-BARR VIRUS) VIREMIA: Primary | ICD-10-CM

## 2024-08-09 PROCEDURE — 99215 OFFICE O/P EST HI 40 MIN: CPT | Mod: 95 | Performed by: INTERNAL MEDICINE

## 2024-08-09 RX ORDER — TACROLIMUS 1 MG/1
CAPSULE ORAL
Qty: 360 CAPSULE | Refills: 3 | Status: SHIPPED | OUTPATIENT
Start: 2024-08-09

## 2024-08-09 RX ORDER — PREDNISONE 5 MG/1
5 TABLET ORAL DAILY
Qty: 90 TABLET | Refills: 3 | Status: SHIPPED | OUTPATIENT
Start: 2024-08-09 | End: 2024-08-12

## 2024-08-09 ASSESSMENT — PAIN SCALES - GENERAL: PAINLEVEL: NO PAIN (0)

## 2024-08-09 NOTE — NURSING NOTE
Current patient location: [address] [place] [id number]    Is the patient currently in the state of [place]? YES    Visit mode:VIDEO    If the visit is dropped, the patient can be reconnected by: VIDEO VISIT: Text to cell phone:   Telephone Information:   Mobile [phone]       Will anyone else be joining the visit? NO  (If patient encounters technical issues they should call [phone])    How would you like to obtain your AVS? MyChart    Are changes needed to the allergy or medication list? No    Are refills needed on medications prescribed by this physician? NO    Rooming Documentation:  Assigned questionnaire(s) completed      Reason for visit: Video Visit (Recheck)    Denise KHAN

## 2024-08-09 NOTE — PROGRESS NOTES
"Virtual Visit Details    Type of service:  Video Visit     Originating Location (pt. Location): {video visit patient location:740304::\"Home\"}  {PROVIDER LOCATION On-site should be selected for visits conducted from your clinic location or adjoining [facility] hospital, academic office, or other nearby [facility] building. Off-site should be selected for all other provider locations, including home:219375}  Distant Location (provider location):  {virtual location provider:[id number]}  Platform used for Video Visit: {Virtual Visit Platforms:867422::\"Raynforest\"}  "

## 2024-08-09 NOTE — LETTER
8/9/2024      Yarelis Penny  [address] [address]      Dear Colleague,    Thank you for referring your patient, Yarelis Penny, to the [facility]PATOLOGY [facility] [place]. Please see a copy of my visit note below.    =  [facility]  LIVER TRANSPLANT CLINIC  VIDEO VISIT     A/P  Ms. Penny is a 39 Y F s/p LDLT 6/18/15 for PCS/autoimmune hepatitis 6/18/15.     Summary of today's visit  EBV Viremia, due for check (ordered)  Hematuria, ongoing.   CKD, worsened.  nephrology and urology consults placed  3.   Needs renewal of IS meds  4.   Leukocytosis on last labs, recheck in a couple weeks (ordered)  5.   Follow up labs for nutritional parameters as well as TSH (ordered)  6.   She is wanting to have another pregnancy. She will see M in November, Indeed she is high risk (see below) but I do not have any new recommendations or reason she cannot pursue pregnancy, pending M opinion.    Graft function  LFTs now at previous baseline. Etiology for elevation post partum unclear, as is cause of zone 3 fibrosis (mild-mod). Will continue watching LFTs.   IS 2/2 Continue tac/pred. Last level 3.2.    CKD stage 2 Baseline creat was 0.9-1. Now in last year 1.7-1.9 Discussed no NSAIDS, hydration and following tac levels.  Hematuria   Have referred her to urology and nephrology    HTN off labetolol. Rec she check BP at home from time to time.  Biliary stricture This has resolved. Last ERCP 9/26/18  UC s/p total colectomy with ileostomy. Asked her to check in with Dr. Mckeon.    Leukocytosis Will recheck in a couple weeks    Blood sugar She did have gestational diabetes. She did not have to be on medications    Derm seeing regularly     Thyroid Had been levothyroxine prescribed by [facility] ([facility]). 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 No EBV or visit with Dr. Lopez for 2 y. Dr. Lopez rec annual follow-up. Previously  asked Yarelis to make appt with   Young. None done. Will check EBV on next labs.  CMV colitis neg CMV on last check 8/13/21. Will recheck.   Iron deficiency She is off iron. Check iron levels on next labs.    Fertility Ok to proceed with a pregnancy and with IVF transfer. Delivered her first child 12/2021 at 31s4d, with preeclampsia. C/f cholestasis of pregnancy, w pruritus. Liver tests elevated post-partum, unclear reason. LFTs improved without intervention. Bx delayed at pt preference, eventually done at 2 mo post-partum: without inflammation but did show zone 3 fibrosis, mild-mod. Uncertain significance/etiology. She had Covid post delivery. I think the elevated LFTs are unrelated to the fibrosis seen on bx. LFTs may have been due to holding conner, or due to Covid.     Cancer screening Discussed staying on top of screening, charles skin cancer      RTC 1 y    Daniela Enriquez MD  Hepatology/ Liver Transplant  [facility]  =================================================================  SUBJECTIVE  Ms. Penny is a 39 Y F s/p LDLT () for PSC/AIH 6/18/15.     She is doing well since our last visit. She is thinking again about an IVF transfer. She will see [facility] end of November.    She was on labetolol for a period of time for HTN post-partum. Now off. She does not measure her BP at home.    Delivered baby 11/4/21 by Csection at 31w4d for pre-eclampsia. Things are going well. She had a lot of pain after and she was taking a lot of acetaminophen and needed oxycodone as well. Prior to delivery she developed itching and her ob/gyn increased her conner (tripled it). They then stopped if for a few weeks. No changes in her tac. Levels were monitored throughout. No changes in her prednisone.    She had elevated liver tests post-delivery, ALT up to 105, AST 68  when they were previously 19, 23 and 90. MRCP and bx were recommended. Yarelis requested to hold off. She never did have MRCP as ordered on 11/29/21.    Liver biopsy 1/4/22 read by   Joey  Relatively unremarkable portal tracts and bile ducts with no significant inflammation or bile duct injury.  Trichrome and reticulin stain show mild to moderate subsinusoidal fibrosis in the pericentral location. Overall the biopsy do not show any evidence of acute cellular rejection or recurrent biliary or hepatocellular disease.  There is moderate subsinusoidal zone 3 fibrosis of uncertain etiology.  Clinical correlation is recommended.    UC s/p total colectomy with ileostomy on 6/20/19. Path showed focal low-grade dysplasia. She has been seen here by Juanis Johnson and Mike, last visits 2018.    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      IS: Prograf 3/2. Pred 5. Also on conner.  LABS: Up to date.     Liver Function Studies -   Recent Labs   Lab Test 08/02/24  0920   PROTTOTAL 6.7   ALBUMIN 3.6   BILITOTAL 0.3   ALKPHOS 62   AST 35   ALT 24     CBC RESULTS:   Recent Labs   Lab Test 08/02/24  0920   WBC 12.9*   RBC 4.39   HGB 12.8   HCT 39.0   MCV 89   MCH 29.2   MCHC 32.8   RDW 15.3*          Liver Function Studies - Recent Labs   Lab Test 07/18/22  0853   PROTTOTAL 6.8   ALBUMIN 3.1*   BILITOTAL 0.3   ALKPHOS 64   AST 19   ALT 36       REJECTION: None  BILIARY ISSUES: BIle duct leak and stricture. Last ERCP 9/26/18  -Two previously well positioned stents were removed from the biliary tree.   - Prior biliary endoscopic sphincterotomy appeared open and selective biliary cannulation was performed, stent related debris was removed   - Previous stricture appears to be completely improved. The site of the previous stricture was gently dilated using 6-8 Elation balloon   - A 7 Fr by 7 cm Loza single pigtailed stent was placed   Recommendation:       - Observe patient in same day observation unit for possible discharge same day.   - Confirm spontaneous stent passage by performing a flat and upright abdominal x-ray in 4 weeks. Stent is most likely expected to pass  spontaneously. If present will need an upper endoscopy for stent removal   - Will recommend to recheck LFTs in transplant clinic and contact us if it is elevated    STENT: Original out. ERCP placed stent out on AXR 1-0/24/18  KIDNEY FUNCTION:   Creatinine   Date Value Ref Range Status   08/02/2024 1.78 (H) 0.51 - 0.95 mg/dL Final   05/25/2021 1.21 (H) 0.52 - 1.04 mg/dL Final     BP: Good. No meds.     SOC: No alcohol.  ROS 10 point ROS neg other than the symptoms noted above in the HPI.  Exam  Gen Alert pleasant NAD  Resp No difficulty breathing. No cough  Skin No Jaundice  Eyes No icterus  Neuro GUEVARA  MSK no muscle wasting  Psyche Pleasant, appropriate. Well groomed.      Yarelis Penny is a 39 year old female who is being evaluated via a billable video visit.    Video-Visit Details  Type of service:  Video Visit  Video Start Time: 837  Video End Time:  930  Originating Location (pt. Location):home  Distant Location (provider location):  [facility] HEPATOLOGY CLINIC [place]      Platform used for Video Visit: [employer] Engine or Grid[employer]          Again, thank you for allowing me to participate in the care of your patient.        Sincerely,        Daniela Enriquez MD

## 2024-08-09 NOTE — PROGRESS NOTES
=  [facility]  LIVER TRANSPLANT CLINIC  VIDEO VISIT     A/P  Ms. Penny is a 39 Y F s/p LDLT 6/18/15 for PCS/autoimmune hepatitis 6/18/15.     Summary of today's visit  EBV Viremia, due for check (ordered)  Hematuria, ongoing.   CKD, worsened.  nephrology and urology consults placed  3.   Needs renewal of IS meds  4.   Leukocytosis on last labs, recheck in a couple weeks (ordered)  5.   Follow up labs for nutritional parameters as well as TSH (ordered)  6.   She is wanting to have another pregnancy. She will see [facility] in November, Indeed she is high risk (see below) but I do not have any new recommendations or reason she cannot pursue pregnancy, pending [facility] opinion.    Graft function  LFTs now at previous baseline. Etiology for elevation post partum unclear, as is cause of zone 3 fibrosis (mild-mod). Will continue watching LFTs.   IS 2/2 Continue tac/pred. Last level 3.2.    CKD stage 2 Baseline creat was 0.9-1. Now in last year 1.7-1.9 Discussed no NSAIDS, hydration and following tac levels.  Hematuria   Have referred her to urology and nephrology    HTN off labetolol. Rec she check BP at home from time to time.  Biliary stricture This has resolved. Last ERCP 9/26/18  UC s/p total colectomy with ileostomy. Asked her to check in with Dr. Mckeon.    Leukocytosis Will recheck in a couple weeks    Blood sugar She did have gestational diabetes. She did not have to be on medications    Derm seeing regularly     Thyroid Had been levothyroxine prescribed [facility] ([facility] 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 No EBV or visit with Dr. Lopez for 2 y. Dr. Lopez rec annual follow-up. Previously  asked Yarelis to make appt with Dr. Lopez. None done. Will check EBV on next labs.  CMV colitis neg CMV on last check 8/13/21. Will recheck.   Iron deficiency She is off iron. Check iron levels on next labs.    Fertility Ok to proceed with a pregnancy and with IVF transfer. Delivered her  first child 12/2021 at 31s4d, with preeclampsia. C/f cholestasis of pregnancy, w pruritus. Liver tests elevated post-partum, unclear reason. LFTs improved without intervention. Bx delayed at pt preference, eventually done at 2 mo post-partum: without inflammation but did show zone 3 fibrosis, mild-mod. Uncertain significance/etiology. She had Covid post delivery. I think the elevated LFTs are unrelated to the fibrosis seen on bx. LFTs may have been due to holding conner, or due to Covid.     Cancer screening Discussed staying on top of screening, charles skin cancer      RTC 1 y    Daniela Enriquez MD  Hepatology/ Liver Transplant  [facility]  =================================================================  SUBJECTIVE  Ms. Penny is a 39 Y F s/p LDLT () for PSC/AIH 6/18/15.     She is doing well since our last visit. She is thinking again about an IVF transfer. She will see M end of November.    She was on labetolol for a period of time for HTN post-partum. Now off. She does not measure her BP at home.    Delivered baby 11/4/21 by Csection at 31w4d for pre-eclampsia. Things are going well. She had a lot of pain after and she was taking a lot of acetaminophen and needed oxycodone as well. Prior to delivery she developed itching and her ob/gyn increased her conner (tripled it). They then stopped if for a few weeks. No changes in her tac. Levels were monitored throughout. No changes in her prednisone.    She had elevated liver tests post-delivery, ALT up to 105, AST 68  when they were previously 19, 23 and 90. MRCP and bx were recommended. Yarelis requested to hold off. She never did have MRCP as ordered on 11/29/21.    Liver biopsy 1/4/22 read by Dr. Cook  Relatively unremarkable portal tracts and bile ducts with no significant inflammation or bile duct injury.  Trichrome and reticulin stain show mild to moderate subsinusoidal fibrosis in the pericentral location. Overall the biopsy do not show any  evidence of acute cellular rejection or recurrent biliary or hepatocellular disease.  There is moderate subsinusoidal zone 3 fibrosis of uncertain etiology.  Clinical correlation is recommended.    UC s/p total colectomy with ileostomy on 6/20/19. Path showed focal low-grade dysplasia. She has been seen here by Juanis Johnson and Mike, last visits 2018.    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      IS: Prograf 3/2. Pred 5. Also on conner.  LABS: Up to date.     Liver Function Studies -   Recent Labs   Lab Test 08/02/24  0920   PROTTOTAL 6.7   ALBUMIN 3.6   BILITOTAL 0.3   ALKPHOS 62   AST 35   ALT 24     CBC RESULTS:   Recent Labs   Lab Test 08/02/24  0920   WBC 12.9*   RBC 4.39   HGB 12.8   HCT 39.0   MCV 89   MCH 29.2   MCHC 32.8   RDW 15.3*          Liver Function Studies - Recent Labs   Lab Test 07/18/22  0853   PROTTOTAL 6.8   ALBUMIN 3.1*   BILITOTAL 0.3   ALKPHOS 64   AST 19   ALT 36       REJECTION: None  BILIARY ISSUES: BIle duct leak and stricture. Last ERCP 9/26/18  -Two previously well positioned stents were removed from the biliary tree.   - Prior biliary endoscopic sphincterotomy appeared open and selective biliary cannulation was performed, stent related debris was removed   - Previous stricture appears to be completely improved. The site of the previous stricture was gently dilated using 6-8 Elation balloon   - A 7 Fr by 7 cm Loza single pigtailed stent was placed   Recommendation:       - Observe patient in same day observation unit for possible discharge same day.   - Confirm spontaneous stent passage by performing a flat and upright abdominal x-ray in 4 weeks. Stent is most likely expected to pass spontaneously. If present will need an upper endoscopy for stent removal   - Will recommend to recheck LFTs in transplant clinic and contact us if it is elevated    STENT: Original out. ERCP placed stent out on AXR 1-0/24/18  KIDNEY FUNCTION:   Creatinine   Date  Value Ref Range Status   08/02/2024 1.78 (H) 0.51 - 0.95 mg/dL Final   05/25/2021 1.21 (H) 0.52 - 1.04 mg/dL Final     BP: Good. No meds.     SOC: No alcohol.  ROS 10 point ROS neg other than the symptoms noted above in the HPI.  Exam  Gen Alert pleasant NAD  Resp No difficulty breathing. No cough  Skin No Jaundice  Eyes No icterus  Neuro GUEVARA  MSK no muscle wasting  Psyche Pleasant, appropriate. Well groomed.      Yarelis Penny is a 39 year old female who is being evaluated via a billable video visit.    Video-Visit Details  Type of service:  Video Visit  Video Start Time: 837  Video End Time:  930  Originating Location (pt. Location):home  Distant Location (provider location):  [facility] HEPATOLOGY CLINIC [place]      Platform used for Video Visit: [employer]

## 2024-08-12 DIAGNOSIS — K51.90 UC (ULCERATIVE COLITIS) (H): ICD-10-CM

## 2024-08-12 DIAGNOSIS — Z94.4 LIVER REPLACED BY TRANSPLANT (H): Primary | ICD-10-CM

## 2024-08-12 DIAGNOSIS — Z94.4 LIVER TRANSPLANTED (H): ICD-10-CM

## 2024-08-12 RX ORDER — URSODIOL 250 MG/1
TABLET, FILM COATED ORAL
Qty: 270 TABLET | Refills: 3 | Status: SHIPPED | OUTPATIENT
Start: 2024-08-12

## 2024-08-12 RX ORDER — PREDNISONE 5 MG/1
5 TABLET ORAL DAILY
Qty: 90 TABLET | Refills: 3 | Status: SHIPPED | OUTPATIENT
Start: 2024-08-12

## 2024-08-30 ENCOUNTER — LAB (OUTPATIENT)
Dept: LAB | Facility: CLINIC | Age: 39
End: 2024-08-30
Payer: COMMERCIAL

## 2024-08-30 DIAGNOSIS — B27.00 EBV (EPSTEIN-BARR VIRUS) VIREMIA: ICD-10-CM

## 2024-08-30 DIAGNOSIS — Z94.4 LIVER REPLACED BY TRANSPLANT (H): ICD-10-CM

## 2024-08-30 LAB
EBV DNA SERPL NAA+PROBE-ACNC: 68 IU/ML
EBV DNA SERPL NAA+PROBE-LOG#: 1.8 {LOG_COPIES}/ML
ERYTHROCYTE [DISTWIDTH] IN BLOOD BY AUTOMATED COUNT: 14.5 % (ref 10–15)
FERRITIN SERPL-MCNC: 49 NG/ML (ref 6–175)
HCT VFR BLD AUTO: 41.9 % (ref 35–47)
HGB BLD-MCNC: 13.7 G/DL (ref 11.7–15.7)
IRON BINDING CAPACITY (ROCHE): 266 UG/DL (ref 240–430)
IRON SATN MFR SERPL: 32 % (ref 15–46)
IRON SERPL-MCNC: 84 UG/DL (ref 37–145)
MCH RBC QN AUTO: 29 PG (ref 26.5–33)
MCHC RBC AUTO-ENTMCNC: 32.7 G/DL (ref 31.5–36.5)
MCV RBC AUTO: 89 FL (ref 78–100)
PLATELET # BLD AUTO: 455 10E3/UL (ref 150–450)
RBC # BLD AUTO: 4.73 10E6/UL (ref 3.8–5.2)
TSH SERPL DL<=0.005 MIU/L-ACNC: 3.75 UIU/ML (ref 0.3–4.2)
WBC # BLD AUTO: 12.9 10E3/UL (ref 4–11)

## 2024-08-30 PROCEDURE — 83550 IRON BINDING TEST: CPT

## 2024-08-30 PROCEDURE — 82728 ASSAY OF FERRITIN: CPT

## 2024-08-30 PROCEDURE — 83540 ASSAY OF IRON: CPT

## 2024-08-30 PROCEDURE — 36415 COLL VENOUS BLD VENIPUNCTURE: CPT

## 2024-08-30 PROCEDURE — 87799 DETECT AGENT NOS DNA QUANT: CPT

## 2024-08-30 PROCEDURE — 99000 SPECIMEN HANDLING OFFICE-LAB: CPT

## 2024-08-30 PROCEDURE — 85027 COMPLETE CBC AUTOMATED: CPT

## 2024-08-30 PROCEDURE — 84630 ASSAY OF ZINC: CPT | Mod: 90

## 2024-08-30 PROCEDURE — 84443 ASSAY THYROID STIM HORMONE: CPT

## 2024-08-31 LAB — ZINC SERPL-MCNC: 73.1 UG/DL

## 2024-09-10 ENCOUNTER — TELEPHONE (OUTPATIENT)
Dept: TRANSPLANT | Facility: CLINIC | Age: 39
End: 2024-09-10
Payer: COMMERCIAL

## 2024-09-10 DIAGNOSIS — Z94.4 LIVER TRANSPLANT RECIPIENT (H): Primary | ICD-10-CM

## 2024-10-08 DIAGNOSIS — Z94.4 LIVER TRANSPLANT RECIPIENT (H): Primary | ICD-10-CM

## 2024-10-14 NOTE — PROGRESS NOTES
[facility] Dermatology Note  Encounter Date: Oct 15, 2024  Office Visit     Dermatology Problem List:  # Liver transplant 2015  # Melanocytic nevus on the right heel (photo in chart)  # Numerous melanocytic nevi on glabrous surfaces  # Verruca plantaris, resolved  - S/p cryo, efudex, wart stick    # LTM   - R anterior thigh, 2.5mm light brown macule, with surrounding hypopigmentation, unclear if halo nevus or coincident lentigo    Soc hx: 3 year old daughter.    ____________________________________________    Assessment & Plan:    # LTM   - R anterior thigh, 2.5mm light brown macule, with surrounding hypopigmentation, unclear if halo nevus or lentigo occurring in background of idiopathic guttate hypomelanosis; favor the latter, overall seems reassuring  - photo obtained but did not save, will ask patient to take a photo on her phone  - recheck 6 months    # Benign lesions - SKs, cherry angiomas, lentigenes.  - No treatment required     # Multiple benign nevi.   - continue to monitor nevi on feet; one on R 4th plantar toe possibly increased by 0.5 mm but suspect within margin of error for measurement and appears similar to photo from 2018; other acral nevi also seem similar to 2018 as well  - Monitor for ABCDEs of melanoma   - Continue sun protection - recommend SPF 30 or higher with frequent application   - Return sooner if noticing changing or symptomatic lesions     # History of organ transplant. Patient aware that she is at higher risk for cutaneous malignancy given history of transplant. Monitor for changing or symptomatic lesions. Continue frequent skin checks and photoprotection.    Procedures Performed:   None.    Follow-up: 6 month in-person, or earlier for new or changing lesions    Staff and Scribe:     Scribe Disclosure:   Ayaka LYONS, am serving as a scribe; to document services personally performed by Hiral Tran MD -based on data collection and the provider's statements  "to me.     Provider Disclosure:   The documentation recorded by the scribe accurately reflects the services I personally performed and the decisions made by me.    Hiral Tran MD    Department of Dermatology  [facility]: Phone: [phone], Fax: [phone]  10/21/2024     ____________________________________________    CC: Skin Check (No concerns)    HPI:  Ms. Yarelis Penny is a(n) 39 year old female who presents today as a return patient for FBSE.    The patient denies any fhx of melanoma.     Patient is otherwise feeling well, without additional skin concerns.    Labs Reviewed:  N/A    Physical exam:  Vitals: There were no vitals taken for this visit.  GEN: This is a well developed, well-nourished female in no acute distress, in a pleasant mood.    SKIN: Total skin excluding the undergarment areas was performed. The exam included the head/face, neck, both arms, chest, back, abdomen, both legs, digits and/or nails.   - R anterior thigh, 2.5mm light brown macule, with surrounding hypopigmented macules; other scattered hypopigmented macules on thighs  - Multiple regular brown pigmented macules and papules are identified on the trunk and extremities. .   - Scattered brown macules on sun exposed areas.  - R plantar 4th toe, 3.5 mm x 3.5mm medium brown macule, dermoscopy with with parallel furrow pattern and centrally more homogenous, other even brown macules on feet bilaterally similar to 2018  - No other lesions of concern on areas examined.       Medications:  Current Outpatient Medications   Medication Sig Dispense Refill    calcium carbonate (OS-KERLINE 500 MG [place]. [place]) 1250 MG tablet Take 1 tablet by mouth every evening      Multiple Vitamin (MULTIVITAMIN PO) Take 1 tablet by mouth daily      order for DME Coloplast item #s   09248 and 36304 30 Bag 3    order for DME Increase the amount to 10 per month  --2\" barrier " ring #[id number] 10 each 0    predniSONE (DELTASONE) 5 MG tablet Take 1 tablet (5 mg) by mouth daily 90 tablet 3    tacrolimus (GENERIC EQUIVALENT) 1 MG capsule Take 2 capsules (2 mg) by mouth every morning AND 2 capsules (2 mg) every evening. 360 capsule 3    thin (NO BRAND SPECIFIED) lancets Use with lanceting device. To accompany: Blood Glucose Monitor Brands: per insurance. 100 each 6    tretinoin (RETIN-A) 0.025 % external cream Apply pea-sized amount to face at bedtime. Start every other night for 1-2 weeks, then increase to nightly as tolerated. 45 g 11    ursodiol (ACTIGALL) 250 MG tablet Take 2 tablets (500 mg) by mouth every morning AND 1 tablet (250 mg) every evening. 270 tablet 3     No current facility-administered medications for this visit.      Past Medical History:   Patient Active Problem List   Diagnosis    Ulcerative colitis (H)    CARDIOVASCULAR SCREENING; LDL GOAL LESS THAN 160    Autoimmune hepatitis (H)    Liver transplant recipient (H)    Leakage of common bile duct of transplanted liver (H)    Cholestasis of transplanted liver (H)    Immunosuppressed status (H)    Elevated alkaline phosphatase level    EBV (Jun-Barr virus) viremia    History of cytomegalovirus infection    Uterine polyp    HRP (high risk pregnancy), unspecified trimester    Encounter for triage in pregnant patient    Renal insufficiency affecting pregnancy    Abnormal uterine bleeding (AUB)     Past Medical History:   Diagnosis Date    Acute kidney injury (H) 04/28/2016    Anemia, iron deficiency 08/29/2017    On iron supplementation    Autoimmune hepatitis (H) 2012    on steroid taper    Cholangitis (H) 2003    CKD (chronic kidney disease) 2012    biopsy 2012: TIN, patchy fibrosis. Dialysis in 2013 x2 months.    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    Esophageal varices (H) 09/2008    banded    Heart murmur     History of blood transfusion     Primary sclerosing cholangitis (H) 2003    SBP (spontaneous bacterial peritonitis) (H)  02/24/2015    Ulcerative Colitis     f/b GI        CC Daniela Enriquez MD  [address] [facility] [address]  [place] [address] on close of this encounter.

## 2024-10-15 ENCOUNTER — OFFICE VISIT (OUTPATIENT)
Dept: DERMATOLOGY | Facility: CLINIC | Age: 39
End: 2024-10-15
Payer: COMMERCIAL

## 2024-10-15 DIAGNOSIS — D18.01 CHERRY ANGIOMA: ICD-10-CM

## 2024-10-15 DIAGNOSIS — D22.9 MULTIPLE BENIGN NEVI: ICD-10-CM

## 2024-10-15 DIAGNOSIS — L82.1 SEBORRHEIC KERATOSIS: ICD-10-CM

## 2024-10-15 DIAGNOSIS — Z94.4 HISTORY OF LIVER TRANSPLANT (H): ICD-10-CM

## 2024-10-15 DIAGNOSIS — L81.4 LENTIGINES: ICD-10-CM

## 2024-10-15 DIAGNOSIS — L81.4 SOLAR LENTIGO: ICD-10-CM

## 2024-10-15 DIAGNOSIS — Z12.83 SKIN CANCER SCREENING: Primary | ICD-10-CM

## 2024-10-15 PROCEDURE — 99213 OFFICE O/P EST LOW 20 MIN: CPT | Performed by: DERMATOLOGY

## 2024-10-15 NOTE — PATIENT INSTRUCTIONS
Checking for Skin Cancer  You can help find cancer early by checking your skin each month. There are 3 main kinds of skin cancer: melanoma, basal cell carcinoma, and squamous cell carcinoma. Doing monthly skin checks is the best way to find new marks, sores, or skin changes. Follow these instructions for checking your skin.   The ABCDEs of checking moles for melanoma   Check your moles or growths for signs of melanoma using ABCDE:   Asymmetry: The sides of the mole or growth don t match.  Border: The edges are ragged, notched, or blurred.  Color: The color within the mole or growth varies. It could be black, brown, tan, white, or shades of red, gray, or blue.  Diameter: The mole or growth is larger than   inch or 6 mm (size of a pencil eraser).  Evolving: The size, shape, texture, or color of the mole or growth is changing.     ABCDE's of moles on light skin.        ABCDE's of moles on dark skin may be harder to identify.     Checking for other types of skin cancer  Basal cell carcinoma or squamous cell carcinoma cause symptoms like:     A spot or mole that looks different from all other marks on your skin  Changes in how an area feels, such as itching, tenderness, or pain  Changes in the skin's surface, such as oozing, bleeding, or scaliness  A sore that doesn't heal  New swelling, redness, or spread of color beyond the border of a mole    Who s at risk?  Anyone of any skin color can get skin cancer. But you're at greater risk if you have:   Fair skin that freckles easily and burns instead of tanning  Light-colored or red hair  Light-colored eyes  Many moles or abnormal moles on your skin  A long history of unprotected exposure to sunlight or tanning beds  A history of many blistering sunburns as a child or teen  A family history of skin cancer  Been exposed to radiation or chemicals  A weakened immune system  Been exposed to arsenic  If you've had skin cancer in the past, you're at high risk of having it again.    How to check your skin  Do your monthly skin checkups in front of a full-length mirror. Use a room with good lighting so it's easier to see. Use a hand mirror to look at hard-to-see places like your buttocks and back. You can also have a trusted friend or family member help you with these checks. Check every part of your body, including your:   Head (ears, face, neck, and scalp)  Torso (front, back, sides, and under breasts)  Arms (tops, undersides, and armpits)  Hands (palms, backs, and fingers, including under the nails)  Lower back, buttocks, and genitals  Legs (front, back, and sides)  Feet (tops, soles, toes, including under the nails, and between toes)  Watch for new spots on your skin or a spot that's changing in color, shape, size.   If you have a lot of moles, take digital photos of them each month. Make sure to take photos both up close and from a distance. These can help you see if any moles change over time.   Know your skin  Most skin changes aren't cancer. But if you see any changes in your skin, call your healthcare provider right away. Only they can tell you if a change is a problem. If you have skin cancer, seeing your provider can be the first step to getting the treatment that could save your life.   [employer] last reviewed this educational content on 10/1/2021    0216-8814 [employer]. All rights reserved. This information is not intended as a substitute for professional medical care. Always follow your healthcare professional's instructions.         Proper skin care from [facility]:    -Eliminate harsh soaps as they strip the natural oils from the skin, often resulting in dry itchy skin ( i.e. Dial, Zest, Romanian Spring)  -Use mild soaps such as Cetaphil or Dove Sensitive Skin in the shower. You do not need to use soap on arms, legs, and trunk every time you shower unless visibly soiled.   -Avoid hot or cold showers.  -After showering, lightly dry off and apply  moisturizing within 2-3 minutes. This will help trap moisture in the skin.   -Aggressive use of a moisturizer at least 1-2 times a day to the entire body (including -Vanicream, Cetaphil, Aquaphor or Cerave) and moisturize hands after every washing.  -We recommend using moisturizers that come in a tub that needs to be scooped out, not a pump. This has more of an oil base. It will hold moisture in your skin much better than a water base moisturizer. The above recommended are non-pore clogging.      Wear a sunscreen with at least SPF 30 on your face, ears, neck and V of the chest daily. Wear sunscreen on other areas of the body if those areas are exposed to the sun throughout the day. Sunscreens can contain physical and/or chemical blockers. Physical blockers are less likely to clog pores, these include zinc oxide and titanium dioxide. Reapply every two hour and after swimming.     Sunscreen examples: https://www.ewg.org/sunscreen/    UV radiation  UVA radiation remains constant throughout the day and throughout the year. It is a longer wavelength than UVB and therefore penetrates deeper into the skin leading to immediate and delayed tanning, photoaging, and skin cancer. 70-80% of UVA and UVB radiation occurs between the hours of 10am-2pm.  UVB radiation  UVB radiation causes the most harmful effects and is more significant during the summer months. However, snow and ice can reflect UVB radiation leading to skin damage during the winter months as well. UVB radiation is responsible for tanning, burning, inflammation, delayed erythema (pinkness), pigmentation (brown spots), and skin cancer.     I recommend self monthly full body exams and yearly full body exams with a dermatology provider. If you develop a new or changing lesion please follow up for examination. Most skin cancers are pink and scaly or pink and pearly. However, we do see blue/brown/black skin cancers.  Consider the ABCDEs of melanoma when giving yourself  your monthly full body exam ( don't forget the groin, buttocks, feet, toes, etc). A-asymmetry, B-borders, C-color, D-diameter, E-elevation or evolving. If you see any of these changes please follow up in clinic. If you cannot see your back I recommend purchasing a hand held mirror to use with a larger wall mirror.       Checking for Skin Cancer  You can find cancer early by checking your skin each month. There are 3 kinds of skin cancer. They are melanoma, basal cell carcinoma, and squamous cell carcinoma. Doing monthly skin checks is the best way to find new marks or skin changes. Follow the instructions below for checking your skin.   The ABCDEs of checking moles for melanoma   Check your moles or growths for signs of melanoma using ABCDE:   Asymmetry: the sides of the mole or growth don t match  Border: the edges are ragged, notched, or blurred  Color: the color within the mole or growth varies  Diameter: the mole or growth is larger than 6 mm (size of a pencil eraser)  Evolving: the size, shape, or color of the mole or growth is changing (evolving is not shown in the images below)    Checking for other types of skin cancer  Basal cell carcinoma or squamous cell carcinoma have symptoms such as:     A spot or mole that looks different from all other marks on your skin  Changes in how an area feels, such as itching, tenderness, or pain  Changes in the skin's surface, such as oozing, bleeding, or scaliness  A sore that does not heal  New swelling or redness beyond the border of a mole    Who s at risk?  Anyone can get skin cancer. But you are at greater risk if you have:   Fair skin, light-colored hair, or light-colored eyes  Many moles or abnormal moles on your skin  A history of sunburns from sunlight or tanning beds  A family history of skin cancer  A history of exposure to radiation or chemicals  A weakened immune system  If you have had skin cancer in the past, you are at risk for recurring skin cancer.   How  to check your skin  Do your monthly skin checkups in front of a full-length mirror. Check all parts of your body, including your:   Head (ears, face, neck, and scalp)  Torso (front, back, and sides)  Arms (tops, undersides, upper, and lower armpits)  Hands (palms, backs, and fingers, including under the nails)  Buttocks and genitals  Legs (front, back, and sides)  Feet (tops, soles, toes, including under the nails, and between toes)  If you have a lot of moles, take digital photos of them each month. Make sure to take photos both up close and from a distance. These can help you see if any moles change over time.   Most skin changes are not cancer. But if you see any changes in your skin, call your doctor right away. Only he or she can diagnose a problem. If you have skin cancer, seeing your doctor can be the first step toward getting the treatment that could save your life.   [employer] last reviewed this educational content on 4/1/2019 2000-2020 [employer]. [address] [place] [address]. All rights reserved. This information is not intended as a substitute for professional medical care. Always follow your healthcare professional's instructions.       When should I call my doctor?  If you are worsening or not improving, please, contact us or seek urgent care as noted below.     Who should I call with questions (adults)?    [facility] [phone]  For urgent needs outside of business hours call the [facility] at [phone] and ask for the dermatology resident on call to be paged  If this is a medical emergency and you are unable to reach an ER, Call [phone]      If you need a prescription refill, please contact your pharmacy. Refills are approved or denied by our Physicians during normal business hours, Monday through Fridays  Per office policy, refills will not be granted if you have not been seen within the past year (or sooner depending on your  child's condition)

## 2024-10-15 NOTE — LETTER
10/15/2024      Yarelis Penny  [address]  [place] [address]      Dear Colleague,    Thank you for referring your patient, Yarelis Penny, to the [facility]. Please see a copy of my visit note below.    [facility] Dermatology Note  Encounter Date: Oct 15, 2024  Office Visit     Dermatology Problem List:  # Liver transplant 2015  # Melanocytic nevus on the right heel (photo in chart)  # Numerous melanocytic nevi on glabrous surfaces  # Verruca plantaris, resolved  - S/p cryo, efudex, wart stick    # LTM   - R anterior thigh, 2.5mm light brown macule, with surrounding hypopigmentation, unclear if halo nevus or coincident lentigo    Soc hx: 3 year old daughter.    ____________________________________________    Assessment & Plan:    # LTM   - R anterior thigh, 2.5mm light brown macule, with surrounding hypopigmentation, unclear if halo nevus or lentigo occurring in background of idiopathic guttate hypomelanosis; favor the latter, overall seems reassuring  - photo obtained but did not save, will ask patient to take a photo on her phone  - recheck 6 months    # Benign lesions - SKs, cherry angiomas, lentigenes.  - No treatment required     # Multiple benign nevi.   - continue to monitor nevi on feet; one on R 4th plantar toe possibly increased by 0.5 mm but suspect within margin of error for measurement and appears similar to photo from 2018; other acral nevi also seem similar to 2018 as well  - Monitor for ABCDEs of melanoma   - Continue sun protection - recommend SPF 30 or higher with frequent application   - Return sooner if noticing changing or symptomatic lesions     # History of organ transplant. Patient aware that she is at higher risk for cutaneous malignancy given history of transplant. Monitor for changing or symptomatic lesions. Continue frequent skin checks and photoprotection.    Procedures Performed:   None.    Follow-up: 6 month in-person, or  earlier for new or changing lesions    Staff and Scribe:     Scribe Disclosure:   I, Ayaka Luceroonough, am serving as a scribe; to document services personally performed by Hiral Tran MD -based on data collection and the provider's statements to me.     Provider Disclosure:   The documentation recorded by the scribe accurately reflects the services I personally performed and the decisions made by me.    Hiral Tran MD    Department of Dermatology  [facility]: Phone: [phone], Fax: [phone]  10/21/2024     ____________________________________________    CC: Skin Check (No concerns)    HPI:  Ms. Yarelis Penny is a(n) 39 year old female who presents today as a return patient for FBSE.    The patient denies any fhx of melanoma.     Patient is otherwise feeling well, without additional skin concerns.    Labs Reviewed:  N/A    Physical exam:  Vitals: There were no vitals taken for this visit.  GEN: This is a well developed, well-nourished female in no acute distress, in a pleasant mood.    SKIN: Total skin excluding the undergarment areas was performed. The exam included the head/face, neck, both arms, chest, back, abdomen, both legs, digits and/or nails.   - R anterior thigh, 2.5mm light brown macule, with surrounding hypopigmented macules; other scattered hypopigmented macules on thighs  - Multiple regular brown pigmented macules and papules are identified on the trunk and extremities. .   - Scattered brown macules on sun exposed areas.  - R plantar 4th toe, 3.5 mm x 3.5mm medium brown macule, dermoscopy with with parallel furrow pattern and centrally more homogenous, other even brown macules on feet bilaterally similar to 2018  - No other lesions of concern on areas examined.       Medications:  Current Outpatient Medications   Medication Sig Dispense Refill     calcium carbonate (OS-KERLINE 500 MG [place]. [place]) 1250  "MG tablet Take 1 tablet by mouth every evening       Multiple Vitamin (MULTIVITAMIN PO) Take 1 tablet by mouth daily       order for DME Coloplast item #s   87617 and 70251 30 Bag 3     order for DME Increase the amount to 10 per month  --2\" barrier ring #[id number] 10 each 0     predniSONE (DELTASONE) 5 MG tablet Take 1 tablet (5 mg) by mouth daily 90 tablet 3     tacrolimus (GENERIC EQUIVALENT) 1 MG capsule Take 2 capsules (2 mg) by mouth every morning AND 2 capsules (2 mg) every evening. 360 capsule 3     thin (NO BRAND SPECIFIED) lancets Use with lanceting device. To accompany: Blood Glucose Monitor Brands: per insurance. 100 each 6     tretinoin (RETIN-A) 0.025 % external cream Apply pea-sized amount to face at bedtime. Start every other night for 1-2 weeks, then increase to nightly as tolerated. 45 g 11     ursodiol (ACTIGALL) 250 MG tablet Take 2 tablets (500 mg) by mouth every morning AND 1 tablet (250 mg) every evening. 270 tablet 3     No current facility-administered medications for this visit.      Past Medical History:   Patient Active Problem List   Diagnosis     Ulcerative colitis (H)     CARDIOVASCULAR SCREENING; LDL GOAL LESS THAN 160     Autoimmune hepatitis (H)     Liver transplant recipient (H)     Leakage of common bile duct of transplanted liver (H)     Cholestasis of transplanted liver (H)     Immunosuppressed status (H)     Elevated alkaline phosphatase level     EBV (Jun-Barr virus) viremia     History of cytomegalovirus infection     Uterine polyp     HRP (high risk pregnancy), unspecified trimester     Encounter for triage in pregnant patient     Renal insufficiency affecting pregnancy     Abnormal uterine bleeding (AUB)     Past Medical History:   Diagnosis Date     Acute kidney injury (H) 04/28/2016     Anemia, iron deficiency 08/29/2017    On iron supplementation     Autoimmune hepatitis (H) 2012    on steroid taper     Cholangitis (H) 2003     CKD (chronic kidney disease) 2012    biopsy " 2012: TIN, patchy fibrosis. Dialysis in 2013 x2 months.     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      Again, thank you for allowing me to participate in the care of your patient.        Sincerely,        Hiral Tran MD

## 2024-10-25 ENCOUNTER — PRE VISIT (OUTPATIENT)
Dept: MATERNAL FETAL MEDICINE | Facility: CLINIC | Age: 39
End: 2024-10-25
Payer: COMMERCIAL

## 2024-11-04 ENCOUNTER — OFFICE VISIT (OUTPATIENT)
Dept: MATERNAL FETAL MEDICINE | Facility: CLINIC | Age: 39
End: 2024-11-04
Attending: OBSTETRICS & GYNECOLOGY
Payer: COMMERCIAL

## 2024-11-04 VITALS
SYSTOLIC BLOOD PRESSURE: 157 MMHG | RESPIRATION RATE: 16 BRPM | OXYGEN SATURATION: 98 % | DIASTOLIC BLOOD PRESSURE: 103 MMHG | HEART RATE: 73 BPM

## 2024-11-04 DIAGNOSIS — Z31.69 ENCOUNTER FOR PRECONCEPTION CONSULTATION: ICD-10-CM

## 2024-11-04 DIAGNOSIS — O09.529 AMA (ADVANCED MATERNAL AGE) MULTIGRAVIDA 35+, UNSPECIFIED TRIMESTER: Primary | ICD-10-CM

## 2024-11-04 PROCEDURE — 999N000069 HC STATISTIC GENETIC COUNSELING, < 16 MIN

## 2024-11-04 PROCEDURE — 99213 OFFICE O/P EST LOW 20 MIN: CPT | Performed by: OBSTETRICS & GYNECOLOGY

## 2024-11-04 PROCEDURE — 99417 PROLNG OP E/M EACH 15 MIN: CPT | Performed by: OBSTETRICS & GYNECOLOGY

## 2024-11-04 PROCEDURE — 99215 OFFICE O/P EST HI 40 MIN: CPT | Performed by: OBSTETRICS & GYNECOLOGY

## 2024-11-04 NOTE — PROGRESS NOTES
"[facility]  Genetic Counseling Consult    Patient:  Yarelis Penny YOB: 1985   Date of Service:  24   MRN: [id number]    Yarelis was seen at the [facility] for preconception genetic consultation. The indication for genetic counseling is advanced maternal age and personal medical history. The patient was accompanied to this visit by their partner, Ceferino.    The session was conducted in English.      IMPRESSION/ PLAN   During today's [facility] visit, Yarelis had a genetic counseling session and a Maternal Fetal Medicine consultation. Please see separate consult documentation.     PREGNANCY HISTORY   /Parity:      Yarelis s pregnancy history is significant for: Previous daughter was born via IVF. Experienced GDM, HTN, PreE and FGR for the pregnancy but her daughter is 3 years old now and healthy.   MEDICAL HISTORY   Yarelis has a personal history of a liver transplant, total colectomy with end ileostomy, stage 2 kidney disease. Yarelis reported she is feeling good and healthy.        FAMILY HISTORY   A three-generation pedigree was obtained today and is scanned under the \"Media\" tab in Epic. The family history was reported by Yarelis and their partner.    The following significant findings were reported today:   Yarelis has two maternal uncles with a history of heart bypass surgery in their 50's and 60's and Yarelis's mother has atrial fibrillation.   Yarelis's partner has a maternal first cousin born with Down syndrome.   Yarelis's partner had a maternal grandmother pass of metastatic lung cancer in her 50's.    Family history of heart disease    We reviewed that heart disease is relatively common in the general population. Heart disease is often multifactorial with many possible underlying factors contributing to heart disease. It is unknown if the relative's heart disease could have an underlying genetic predisposition. These are third-degree " "relatives to the fetus, so recurrence risk is likely not elevated over the general population risk.     Family history of Down syndrome    Over 95% of cases of Down syndrome result from trisomy 21 caused by nondisjunction.  Rarely, Down syndrome occurs due to a translocation involving chromosome 21, which, if carried by other family members, puts them at increased chance to have a baby with Down syndrome. This is likely a sporadic case in Ceferino's first cousin, given the family history that is negative for multiple losses/infertility/other chromosome conditions and the age at delivery of this individual's mother, however a familial translocation cannot be entirely excluded.    Family history of cancer    We discussed the family history of lung cancer briefly. Cancer most often occurs by chance, however some families seem to develop cancer more frequently than expected. Everyone has a risk to develop cancer, but individuals may be at an increased risk to develop cancer based on their family history. We discussed that lung cancer is rare and can be associated with inherited cancer predisposition syndromes. Genetic counseling is available for cancer syndromes. Cancer family history, even without genetic testing, can change cancer screening recommendations for family members and aid in insurance coverage for access to them as well. The most informative individuals to complete cancer genetic counseling and genetic testing are those with a personal history of cancer or those closely related to the affected individuals.    If the family wants more information they can contact the [facility] Cancer Risk Management Program ([phone]). Physicians can also make referrals at [email or web address] or, if within the [employer] system, through Epic referral for \"Cancer Risk Mgmt/Cancer Genetic Counseling\"     Indications to consider the program include a personal or family " history of:   Breast cancer before age 50   Multiple close relatives with breast cancer  Triple negative breast cancer at any age  Ovarian cancer at any age  Male breast cancer  Ashkenazi Denominational ancestry and breast, pancreatic, ovarian cancer, or prostate cancer with a Uvaldo score of 7+ at any age    Indications to consider the program include a personal or family history of:  Colon or rectal cancer before age 50  Endometrial cancer before age 50  20+ adenomatous polyps over lifetime  Multiple close relatives with colon or endometrial cancer     Indications to consider the program include a personal or immediate family history of:  Two or more melanomas  Two or more pancreatic cancers  Two or more sarcomas or brain tumors  Medullary thyroid cancer, pheochromocytoma, adrenal or kidney cancer  Retinoblastoma    Indications to consider the program include a personal history of:  Lobular carcinoma in situ  Atypical ductal hyperplasia  Ulcerative colitis  Other cancer risk factors, such as radiation exposure    Yarelis and her partner were encouraged to discuss this family history with their medical providers to ensure that screening begins at an appropriate age.     Otherwise, the reported family history is unremarkable for multiple miscarriages, stillbirths, birth defects, intellectual disabilities, known genetic conditions, and consanguinity.       CARRIER SCREENING   Expanded carrier screening is available to screen for autosomal recessive conditions and X-linked conditions in a large list of genes. Autosomal recessive conditions happen when a mutation has been inherited from the egg and sperm and include conditions like cystic fibrosis, thalassemia, hearing loss, spinal muscular atrophy, and more. X-linked conditions happen when a mutation has been inherited from the egg and include conditions like fragile X syndrome. Vander[place] screening was also reviewed. About [place] Vander[place] Screening    The patient declined the carrier  screening options. Yarelis and her partner believe that they had previous carrier screening done before their IVF process with their first child but records were not available for review at this appointment. They are aware the option will remain, and they can contact us if they would like to pursue additional screening. We discussed the following:   Carrier screening does not test a pregnancy but gives a risk assessment for the pregnancy and future pregnancies to have the condition  There are different size panels or list of conditions for carrier screening.  Some conditions cause health problems for carriers  Carrier screening does not test for all genetic and health conditions or risk factors  There are limitations to current technology and results may be updated at a later date  The results typically take 2-3 weeks. They will be available in EPIC and routed to the referring OB provider. The patient can view them in tic and the lab's patient portal.  The patient's partner also declined carrier screening options at this time.  If an individual is a carrier, family members could be as well. The patient is encouraged to share this information with relatives.         RISK ASSESSMENT FOR CHROMOSOME CONDITIONS   We explained that the risk for fetal chromosome abnormalities increases with maternal age. We discussed specific features of common chromosome abnormalities, including Down syndrome, trisomy 13, trisomy 18, and sex chromosome trisomies. We discussed the following risks for future pregnancies     At age 39 at midtrimester, the risk to have a baby with Down syndrome is 1 in 98.   At age 39 at midtrimester, the risk to have a baby with any chromosome abnormality is 1 in 51.     Yarelis reportedly had her eggs retrieved for IVF at age 36. At age 36 at midtrimester, the risk to have a baby with Down syndrome is 1 in 216.   At age 36 at midtrimester, the risk to have a baby with any chromosome abnormality is 1 in 105.      GENETIC TESTING OPTIONS   We discussed genetic testing during a pregnancy includes screening and diagnostic procedures.     Screening tests are non-invasive which means no risk to the pregnancy and includes ultrasounds and blood work. The benefits and limitations of screening were reviewed. Screening tests provide a risk assessment (chance) specific to the pregnancy for certain fetal chromosome abnormalities but cannot definitively diagnose or exclude a fetal chromosome abnormality. Follow-up genetic counseling and consideration of diagnostic testing is recommended with any abnormal screening result. Diagnostic testing during a pregnancy is more certain and can test for more conditions. However, the tests do have a risk of miscarriage that requires careful consideration. These tests can detect fetal chromosome abnormalities with greater than 99% certainty. These tests can detect fetal chromosome abnormalities with greater than 99% certainty. Results can be compromised by maternal cell contamination or mosaicism and are limited by the resolution of current genetic testing technology.     There is no screening or diagnostic test that detects all forms of birth defects or intellectual disability.     We discussed the currently available options for future pregnancies. Due to advancements there may be new or different options available at the time of a pregnancy. A new genetic counseling encounter in that pregnancy could review the current options and updates.     We discussed the following screening options:   Non-invasive prenatal testing (NIPT)  Also called cell-free DNA screening because it detects chromosomes from the placenta in the pregnant person's blood  Can be done any time after 10 weeks gestation  Screens for trisomy 21, trisomy 18, trisomy 13, and sex chromosome aneuploidies  Cannot screen for open neural tube defects, maternal serum AFP after 15 weeks is recommended    We discussed the following  ultrasound options:  Nuchal translucency (NT) ultrasound  Ultrasound between [id number] that includes nuchal translucency measurement and nasal bone assessments  Nuchal translucency refers to the space at the back of the neck where fluid builds up. All babies at this stage have fluid and there is only concern if there is too much fluid  Nasal bone refers to the small bone in the nose. There is concern for conditions like Down syndrome if the bone cannot be seen at all  This ultrasound can be done as part of first trimester screening, at the same time as another screen (NIPT), at the same time as a CVS, or if the patients does not want genetic screening.  Markers on ultrasound detects about 70% of pregnancies with aneuploidy  Abnormalities on NT ultrasound can also increase the risk for a birth defect, like a heart defect  Comprehensive level II ultrasound (Fetal Anatomy Ultrasound)  Ultrasound done between 18-20 weeks gestation  Screens for major birth defects and markers for aneuploidy (like trisomy 21 and trisomy 18)  Includes looking at the fetus/baby's growth, heart, organs (stomach, kidneys), placenta, and amniotic fluid  Fetal Echocardiogram  Ultrasound done between 22-24 weeks gestation  Screen for heart defects  Recommended if there are concerns about the heart or other indications like IVF pregnancy or maternal diabetes    We discussed the following diagnostic options:   Chorionic villus sampling (CVS)  Invasive diagnostic procedure done between 10w0d and 13w6d  The procedure collects a small sample from the placenta for the purpose of chromosomal testing and/or other genetic testing  Diagnostic result; more than 99% sensitivity for fetal chromosome abnormalities  Cannot screen for open neural tube defects, maternal serum AFP after 15 weeks is recommended  Amniocentesis  Invasive diagnostic procedure done after 15 weeks gestation  The procedure collects a small sample of amniotic fluid for the purpose of  chromosomal testing and/or other genetic testing  Diagnostic result; more than 99% sensitivity for fetal chromosome abnormalities  Testing for AFP in the amniotic fluid can test for open neural tube defects      It was a pleasure to be involved with Yarelis s care. Face-to-face time of the meeting was 30 minutes.    Kan Almonte MS,   Board Certified Genetic Counselor Intern  [facility]  Maternal Fetal Medicine  Office: [phone]  [facility]: [phone]   Fax: [phone]  [facility]    Patient seen, evaluated and discussed with the genetic counseling intern. I have verified the content of the note, which accurately reflects my assessment of the patient and the plan of care.    Supervising Genetic Counselor  Karis Garber MS, [facility]  Maternal Fetal Medicine  [email or web address]  [phone]

## 2024-11-04 NOTE — NURSING NOTE
Yarelis and Ceferino, are here for [facility] preconception consult related to history of AMA, H/o liver transplant 2015, UC (s/p total abdominal colectomy with end ileostomy 2019), Stage II kidney disease, h/o PreE, h/o GDM. Medications and allergies reviewed. Dr. Gomez met with patient to discuss plan, see note. Patient discharged stable and ambulatory.

## 2024-11-04 NOTE — PROGRESS NOTES
Maternal Fetal Medicine Center  [address] [place] [address] S [address], [place] [address]  Main: [phone], Fax: [phone]       Referring Provider:  Edgardo PINEDA     Yarelis Penny is a 39 year old   here for [facility] prepregnancy consultation due to  history of preeclampsia with severe features.     Her medical history is significant for history of  living donor liver transplant in 2015 secondary to autoimmune hepatitis and primary sclerosing cholangitis, ulcerative colitis s/p colectomy and ileostomy, CKD after receiving rifampin prior to liver transplant, h/o CMV colitis and viremia.     In her last pregnancy in , she was followed by the [facility] team.  Her pregnancy was complicated by GDMA1, possible cholestasis (vs worsening liver function) requiring ursodiol, severe fetal growth restriction, and eventual development of preeclampsia.     Ultimately, she was admitted at 31w3d and diagnosed with preeclampsia due to newly elevated creatinine (2.0 from baseline of ~ 1.1) in the setting of severe FGR and a recent COVID-19 diagnosis. She received BMTZ and IVF and improvement of creatinine to 1.7.  During her admission, fetal monitoring revealed category II FHT and delivery was recommended by C section at that time.  Post partum, her creatine returned to near baseline (~1.2).   She reports that she has been doing well since. She continues to follow with her transplant hepatology team.   She currently takes tacrolimus.  Her daughter Joanie just turned 3 and she is interested pursuing embryo transfer.      Of note, following birth in 2021, her creatinine had normalized to 1.2 with an eGFR of 60 in 2022.  However, over the past 1+ year, there has been an increase in creatinine:    2022 1.20 eGFR 60  2023 1.48 GFP 46  2024 1.91 GFR 34  2024 1.78 GFR 37    She has been referred to nephrology and urology for further evaluation but hasn't yet followed up with their team.      She is here today  because she is interested pursuing future pregnancy and is interested in approval for IVF         OB History    Para Term  AB Living   1 1 0 1 0 1   SAB IAB Ectopic Multiple Live Births   0 0 0 0 1      # Outcome Date GA Lbr Tex/2nd Weight Sex Type Anes PTL Lv   1  21 31w6d  1.15 kg (2 lb 8.6 oz) F CS-LTranv Spinal  KIM      Name: DAVID CABRERA-GINA      Apgar1: 7  Apgar5: 8         Past Medical History     Past Medical History:   Diagnosis Date    Acute kidney injury (H) 2016    Anemia, iron deficiency 2017    On iron supplementation    Autoimmune hepatitis (H)     on steroid taper    Cholangitis (H)     CKD (chronic kidney disease)     biopsy : TIN, patchy fibrosis. Dialysis in 2013 x2 months.    CMV colitis (H) 2016    Esophageal varices (H) 2008    banded    Heart murmur     History of blood transfusion     Primary sclerosing cholangitis (H)     SBP (spontaneous bacterial peritonitis) (H) 2015    Ulcerative Colitis     f/b GI       Past Surgical History   Liver Transplant   C section   Colectomy/Ileostomy   Cholecystectomy     Multiple sigmoidoscopy/ colonoscopy/ERCP     Medication List     Prior to Admission medications    Medication Sig Last Dose Taking? Auth Provider Long Term End Date   calcium carbonate (OS-KERLINE 500 MG [place]. [place]) 1250 MG tablet Take 1 tablet by mouth every evening  Yes Reported, Patient     Multiple Vitamin (MULTIVITAMIN PO) Take 1 tablet by mouth daily  Yes Reported, Patient     predniSONE (DELTASONE) 5 MG tablet Take 1 tablet (5 mg) by mouth daily  Yes Daniela Enriquez MD     tacrolimus (GENERIC EQUIVALENT) 1 MG capsule Take 2 capsules (2 mg) by mouth every morning AND 2 capsules (2 mg) every evening.  Yes Daniela Enriquez MD     ursodiol (ACTIGALL) 250 MG tablet Take 2 tablets (500 mg) by mouth every morning AND 1 tablet (250 mg) every evening.  Yes Daniela Enriquez MD  "No    order for DME [employer] item #s   85853 and [id number]   Krzysztof Carney MD     order for DME Increase the amount to 10 per month  --2\" barrier ring #[id number]   Krzysztof Carney MD     thin (NO BRAND SPECIFIED) lancets Use with lanceting device. To accompany: Blood Glucose Monitor Brands: per insurance.   Genie Penny CNM     tretinoin (RETIN-A) 0.025 % external cream Apply pea-sized amount to face at bedtime. Start every other night for 1-2 weeks, then increase to nightly as tolerated.   Hiral Tran MD         Allergies   Rifampin and Penicillins    Physical Exam   BP (!) 157/103 (BP Location: Left arm, Patient Position: Sitting, Cuff Size: Adult Small)   Pulse 73   Resp 16   LMP 10/27/2024 (Approximate)   SpO2 98%     Gen: NAD    Labs and Other Pertinent Evaluation      Most recent creatinine 1.78 2024  Normal AST/ALT/bilirubin   Most recent A1c 5.1% in       Assessment/Counseling     Yarelis Penny is a 39 year old  here for [facility] pre-conception consultation regarding history of severe fetal growth restriction, severe preeclampsia in the context of maternal chronic renal disease, s/p liver transplant for autoimmune hepatitis, colectomy due to UC.      ###History of preeclampsia and severe fetal growth restriction in context of liver transplant and potential worsening of chronic kidney disease.     We discussed that this history places her future pregnancy at increased risk of complications including recurrence of fetal growth restriction, need for early birth, and preeclampsia early in pregnancy.  We discussed that these risks are likely amplified by  worsening creatinine and blood pressure over the last year.  We also discussed that her maternal age would also increase these pregnancy specific risks.  Beyond preeclampsia and early birth risks, there is a risk of worsening renal function (based on stage 3 kidney disease, this is ~50%) that could result in need for dialysis.   There is " also risk of worsening liver function.  We discussed that these risks need to balanced with her desire for future pregnancy.  We did discuss that options for a gestational carrier could also be discussed with her IVF team.  If she opts to proceed with pregnancy, would recommend that she establish prenatal care with Maternal-Fetal Medicine early in pregnancy.      We discussed that prior to embryo transfer, we would recommend further work up with nephrology to assess for potential causes and opportunities to improve renal function prior to pregnancy.      We discussed also that we would recommend treatment of blood pressure to maintain a goal BP <130/90 prior to pregnancy given her history of transplant.  She notes that she is very anxious today.  Chart review does reveal multiple elevated BPs.  Medications that are safe to use in pregnancy include labetalol and nifedipine.  We would not recommend starting ACE inhibitors at this time due to fetal risk     Recommendations     Preconception Recommendations:   -Specific Preconception Considerations:   -Please follow up with nephrology as previously referred for workup of worsening creatinine of last 1 year and consideration of starting anti-hypertensive medication with goal prior to pregnancy of <130/90.      When this is completed, Ms. Zohaib will call our clinic and Dr. Gomez will be happy to facilitate a follow up discussion/visit as indicated prior to her pursuing embryo transfer.      Specific Pregnancy Recommendations:   Maternal antepartum management  -Recommend establishing OB care early in pregnancy with MFM team  -Will need to continue with transplant team throughout pregnancy  -Genetic counseling recommended in pregnancy, plan would be to use 36 year old eggs.       Medications Considerations   - Tacrolimus should be continued in pregnancy as recommended by transplant team.  Additional monitoring of levels is recommended in pregnancy.   -Prenatal vitamin  recommended.   -If antihypertensive medications indicated prior to pregnancy, recommend labetalol or nifedipine.     -Would recommend aspirin 81 mg daily in pregnancy to reduce pre-eclampsia risk    Ultrasound surveillance:   - Early ultrasound to confirm dating  -First trimester screening ultrasound with genetic counseling   -Detailed US with MFM at 18-20 weeks  -Fetal Echocardiogram at 22-24 weeks due to planned IVF pregnancy   -Growth Ultrasounds every 4 weeks   - Testing with weekly BPP starting at 32 weeks    Timing and mode of delivery  -However, specific timing recommendations will depend on pregnancy course.  Would not continue beyond 39 weeks  -Has a history of 1 prior C section.  Would likely recommend repeat C section given history of multiple abdominal surgeries.      At the end of our discussion, Ms. Penny indicated frustration with recommendations for additional renal work up prior to pregnancy since she does not want to delay a potential embryo transfer any more.  She understands that there are risks to a future pregnancy. I discussed that I would work to facilitate care as much as feasible in the future.       Thank you for allowing us to participate in the care of your patient. Please do not hesitate to contact us if you have further questions regarding the management of your patient.     Virgie Gomez MD PhD  Maternal Fetal Medicine     I spent a total of 75 minutes on the date of this encounter including preparing to see the patient (reviewing medical records/tests), counseling and discussing the plan of care, documenting the visit in the electronic medical record, and communicating with other health care professionals and/or care coordination.

## 2024-11-05 ENCOUNTER — MYC REFILL (OUTPATIENT)
Dept: FAMILY MEDICINE | Facility: CLINIC | Age: 39
End: 2024-11-05
Payer: COMMERCIAL

## 2024-11-05 DIAGNOSIS — L81.4 LENTIGINES: ICD-10-CM

## 2024-11-05 RX ORDER — TRETINOIN 0.25 MG/G
CREAM TOPICAL
Qty: 45 G | Refills: 11 | Status: SHIPPED | OUTPATIENT
Start: 2024-11-05

## 2024-11-05 NOTE — TELEPHONE ENCOUNTER
Prescription approved per [facility] Refill Protocol.    Sara RUIZ RN  [facility]at[facility] [place]  [phone]

## 2024-11-11 DIAGNOSIS — Z94.4 LIVER REPLACED BY TRANSPLANT (H): ICD-10-CM

## 2024-11-11 DIAGNOSIS — Z94.4 LIVER TRANSPLANTED (H): ICD-10-CM

## 2024-11-11 RX ORDER — URSODIOL 250 MG/1
TABLET, FILM COATED ORAL
Qty: 90 TABLET | Refills: 1 | Status: SHIPPED | OUTPATIENT
Start: 2024-11-11

## 2024-11-23 ENCOUNTER — VIRTUAL VISIT (OUTPATIENT)
Dept: URGENT CARE | Facility: CLINIC | Age: 39
End: 2024-11-23
Payer: COMMERCIAL

## 2024-11-23 DIAGNOSIS — J01.90 ACUTE NON-RECURRENT SINUSITIS, UNSPECIFIED LOCATION: Primary | ICD-10-CM

## 2024-11-23 PROCEDURE — 99213 OFFICE O/P EST LOW 20 MIN: CPT | Mod: 95

## 2024-11-23 RX ORDER — DOXYCYCLINE HYCLATE 100 MG
100 TABLET ORAL 2 TIMES DAILY
Qty: 14 TABLET | Refills: 0 | Status: SHIPPED | OUTPATIENT
Start: 2024-11-23 | End: 2024-11-30

## 2024-11-23 NOTE — PROGRESS NOTES
Yarelis is a 39 year old who is being evaluated via a billable video visit.          Assessment & Plan     Acute non-recurrent sinusitis, unspecified location  Recent GFR 37    - doxycycline hyclate (VIBRA-TABS) 100 MG tablet; Take 1 tablet (100 mg) by mouth 2 times daily for 7 days.      No follow-ups on file.    Subjective   Yarelis is a 39 year old, presenting for the following health issues:  No chief complaint on file.    HPI     Yarelis presents with 8 days of postnasal drainage, cough and sinus pain and pressure.  Started running a fever today.  Is using Tylenol and DayQuil to help with symptom management.  Has a history of renal insufficiency, recent GFR was 37.        Review of Systems  Constitutional, HEENT, cardiovascular, pulmonary, gi and gu systems are negative, except as otherwise noted.      Objective           Vitals:  No vitals were obtained today due to virtual visit.    Physical Exam   GENERAL: alert and no distress  RESP: No audible wheeze, cough, or visible cyanosis.    PSYCH: Appropriate affect, tone, and pace of words          Video-Visit Details    Type of service:  Video Visit   Originating Location (pt. Location): Home    Distant Location (provider location):  Off-site  Platform used for Video Visit: Mary  Signed Electronically by: [facility]

## 2024-12-31 DIAGNOSIS — Z94.4 LIVER REPLACED BY TRANSPLANT (H): ICD-10-CM

## 2024-12-31 DIAGNOSIS — K51.90 UC (ULCERATIVE COLITIS) (H): ICD-10-CM

## 2024-12-31 DIAGNOSIS — Z94.4 LIVER TRANSPLANTED (H): ICD-10-CM

## 2024-12-31 RX ORDER — PREDNISONE 5 MG/1
5 TABLET ORAL DAILY
Qty: 90 TABLET | Refills: 3 | Status: SHIPPED | OUTPATIENT
Start: 2024-12-31

## 2024-12-31 RX ORDER — URSODIOL 250 MG/1
TABLET, FILM COATED ORAL
Qty: 270 TABLET | Refills: 3 | Status: SHIPPED | OUTPATIENT
Start: 2024-12-31

## 2025-01-02 NOTE — TELEPHONE ENCOUNTER
Pt did cancel MRI and liver biopsy herself. Reviewed labs with dr castillo. She is recommending liver biopsy to rule out rejection and auto immune re occurance. Left message for patient to return call to discuss.   Subjective   Chief Complaint: Cough and Fever.  HPI  Brigitte is a 5 y.o. female who presents for Cough and Fever, who is accompanied by her mother.    There has been a 7 day history of cough and congestion.  There has been a fever during this illness which seems to have just started yesterday.  There has not been vomiting or diarrhea.  Brigitte has not been able to sleep as well as normal due to these symptoms.      Prior to this she developed a stomach virus before Christmas.      Review of Systems    Objective     Pulse 118   Temp 36.6 °C (97.9 °F)   Wt 17.3 kg   SpO2 97%     Physical Exam  Vitals and nursing note reviewed. Exam conducted with a chaperone present.   Constitutional:       General: She is active.   HENT:      Head: Normocephalic and atraumatic.      Right Ear: Tympanic membrane, ear canal and external ear normal.      Left Ear: Tympanic membrane, ear canal and external ear normal.      Nose: Nose normal.      Mouth/Throat:      Mouth: Mucous membranes are moist.   Eyes:      Extraocular Movements: Extraocular movements intact.      Conjunctiva/sclera: Conjunctivae normal.      Pupils: Pupils are equal, round, and reactive to light.   Cardiovascular:      Rate and Rhythm: Normal rate and regular rhythm.      Pulses: Normal pulses.      Heart sounds: Normal heart sounds. No murmur heard.  Pulmonary:      Effort: Pulmonary effort is normal.      Breath sounds: No stridor. Examination of the left-lower field reveals rales. Rales present.   Musculoskeletal:      Cervical back: Normal range of motion and neck supple.   Lymphadenopathy:      Cervical: No cervical adenopathy.   Neurological:      Mental Status: She is alert.         Assessment/Plan   Problem List Items Addressed This Visit       Pneumonia of left lower lobe due to infectious organism - Primary    Relevant Medications    amoxicillin-pot clavulanate (Augmentin ES-600) 600-42.9 mg/5 mL suspension

## 2025-01-09 NOTE — CONFIDENTIAL NOTE
DIAGNOSIS:   Liver replaced by transplant. Chronic Kidney Disease. Proteinuria.    DATE RECEIVED: 03.27.2025     NOTES STATUS DETAILS   OFFICE NOTE from referring provider Internal 08.09.2024 Daniela Enriquez MD    OFFICE NOTE from other specialist  Internal 10.24.2023   Talha Schroeder MD      MEDICATION LIST Internal    LABS     CBC Internal 08.30.2024   CMP Internal 08.30.2024   BMP Internal 08.02.2024   UA Internal 08.02.2024   URINE PROTEIN Internal 08.02.2024

## 2025-01-23 NOTE — NURSING NOTE
Swab positive for bacteria.  Prescription for ointment sent to the pharmacy.  Apply ointment to rectum 3 times per day for 10 days. Yarelis here with her  for NST/CTG and OBV due to preg c/b h/o liver transplant and FGR Pt reports +FM, denies ctx, denies SRoM, and denies vag bleeding.  Yarelis reports some itching again off and on. Yarelis had NST done today. Pt had U/S today. Genie Penny in to do OBV. Pt had flu vaccine today. Pt will have TDAP vaccine next week. Pt has 2x weekly BPP/NsT/CTG and left amb and stable. Virgie Zimmer RN

## 2025-01-31 ENCOUNTER — TELEPHONE (OUTPATIENT)
Dept: TRANSPLANT | Facility: CLINIC | Age: 40
End: 2025-01-31

## 2025-01-31 NOTE — TELEPHONE ENCOUNTER
Spoke with Yarelis. Creatinine increased.    No urinary symptoms, no urgency, frequencing, pain or burning.    Pt been drinking fluids. No  change in bowel habits.

## 2025-02-07 PROBLEM — Z93.2 ILEOSTOMY STATUS (H): Status: ACTIVE | Noted: 2025-02-07

## 2025-02-07 PROBLEM — K50.118 CROHN'S DISEASE OF LARGE INTESTINE WITH OTHER COMPLICATION (H): Status: RESOLVED | Noted: 2025-02-07 | Resolved: 2025-02-07

## 2025-02-07 PROBLEM — K50.118 CROHN'S DISEASE OF LARGE INTESTINE WITH OTHER COMPLICATION (H): Status: ACTIVE | Noted: 2025-02-07

## 2025-02-13 ENCOUNTER — VIRTUAL VISIT (OUTPATIENT)
Dept: URGENT CARE | Facility: CLINIC | Age: 40
End: 2025-02-13
Payer: COMMERCIAL

## 2025-02-13 DIAGNOSIS — Z94.4 LIVER TRANSPLANT RECIPIENT (H): ICD-10-CM

## 2025-02-13 DIAGNOSIS — J01.40 ACUTE NON-RECURRENT PANSINUSITIS: Primary | ICD-10-CM

## 2025-02-13 RX ORDER — DOXYCYCLINE 100 MG/1
100 CAPSULE ORAL 2 TIMES DAILY
Qty: 14 CAPSULE | Refills: 0 | Status: SHIPPED | OUTPATIENT
Start: 2025-02-13 | End: 2025-02-20

## 2025-02-13 NOTE — PATIENT INSTRUCTIONS
Doxycycline 100mg twice daily for 7 days    Flonase (fluticasone) 2 sprays in each nostril daily until symptoms resolve, then continue 1 spray in each nostril for at least 5 more days.  Take an NSAID such as ibuprofen or naproxen as needed for pain.  May use netti pot with bottled or distilled water and saline packets to flush sinuses.  Sudafed (pseudoephedrine) behind the pharmacist counter for 3-5 days helps relieve congestion.  Afrin (oxymetazoline) nasal spray twice daily for 3 days. Stop after 3 days.  Mucinex (guiafenesin) thins mucus and may help it to loosen more quickly  Saline drops or nasal sprays may loosen mucus.  Sit in the bathroom with the door closed and hot shower running to loosen mucus.    Contact primary care clinic if you do not have any relief from your symptoms after 10 days.  Present to emergency room for significantly increasing pain, persistent high fever >102F, swelling/redness around your eyes, changes in your vision or ability to move your eyes, altered mental status or a severe headache.

## 2025-02-13 NOTE — PROGRESS NOTES
Assessment & Plan     Acute non-recurrent pansinusitis  - doxycycline hyclate (VIBRAMYCIN) 100 MG capsule; Take 1 capsule (100 mg) by mouth 2 times daily for 7 days.    Liver transplant recipient (H)    Given severe and worsening facial pain and pressure, will treat for secondary bacterial sinusitis following viral influenza A infection. Symptomatic measures discussed including fluids, rest, ibuprofen.    Return in about 1 week (around 2/20/2025) for visit with primary care provider if not improving.     Adelia Felix PA-C  [facility]    Subjective   Yarelis Penny is a 39 year old who presents for the following health issues    HPI    Symptom onset: 1 week ago, influenza A positive  Started and finished Tamiflu  Current symptoms: severe facial pain and pressure, nasal congestion  SOB with activity  Fever at onset, not recently  Medication review complete.    Review of Systems   ROS negative except as stated above.      Objective    Physical Exam   GENERAL: Healthy, alert and no distress  EYES: Eyes grossly normal to inspection.  No discharge or erythema, or obvious scleral/conjunctival abnormalities.  HENT: Normal cephalic/atraumatic.  External ears, nose and mouth without ulcers or lesions.  No nasal drainage visible.  RESP: No audible cough wheeze or visible cyanosis.  No visible retractions or increased work of breathing.    SKIN: Visible skin clear. No significant rash, abnormal pigmentation or lesions.  NEURO: Cranial nerves grossly intact.  Mentation and speech appropriate for age.  PSYCH: Mentation appears normal, affect normal/bright, judgement and insight intact, normal speech and appearance well-groomed.    Type of service:  Video Visit  Video Start Time: 8:40AM  Video End Time: 8:49AM  Originating Location (pt. Location): Home  Distant Location (provider location):  [facility]  Platform used for Video Visit:  Mary    No results found for any visits on 02/13/25.

## 2025-02-17 ENCOUNTER — VIRTUAL VISIT (OUTPATIENT)
Dept: UROLOGY | Facility: CLINIC | Age: 40
End: 2025-02-17
Payer: COMMERCIAL

## 2025-02-17 VITALS — WEIGHT: 100 LBS | BODY MASS INDEX: 17.72 KG/M2 | HEIGHT: 63 IN

## 2025-02-17 DIAGNOSIS — R31.29 MICROSCOPIC HEMATURIA: Primary | ICD-10-CM

## 2025-02-17 DIAGNOSIS — Z94.4 LIVER TRANSPLANT RECIPIENT (H): ICD-10-CM

## 2025-02-17 PROCEDURE — 98001 SYNCH AUDIO-VIDEO NEW LOW 30: CPT | Performed by: PHYSICIAN ASSISTANT

## 2025-02-17 ASSESSMENT — PAIN SCALES - GENERAL: PAINLEVEL_OUTOF10: NO PAIN (0)

## 2025-02-17 NOTE — PATIENT INSTRUCTIONS
- Urine cytology to look for abnormal cells. Please make an appointment at your local [place] lab to leave a urine sample ([phone]).  - CT scan of the kidneys. You can call [phone] to schedule this.  - My office may call you to arrange a cystoscopy pending CT and urine results.    CYSTOSCOPY    What is a Cystoscopy?  This is a procedure done to check for problems inside the bladder.  Problems may include polyps (growths), tumors, inflammation (swelling and redness) and other concerns.    The Urologist inserts a thin tube (called a cystoscope) into the bladder.  The tube is about the size of a pencil.  We will give you numbing medicine to reduce the pain or discomfort you may feel.    The Urologist will be able to see inside the bladder by filling the bladder with water.  The water makes it easier to see any problems that may be present. You will have a sense to need to urinate and this is normal.       How should I get ready for the exam?  Nothing to do to prepare. You may eat normally the day of the exam. There is no sedation, so you may drive yourself to and from if you can drive.       Please tell your doctor if:  You have a history of urinary tract infections.  You know that you have a tumor in your bladder.  You have bleeding problems.  You have any allergies.  You are or may be pregnant.      What happens after the exam?  You may go back to your normal diet and activity as you feel ready.    For the next two days after the exam, you may notice:  Some blood in your urine.  Some burning when you urinate (use the toilet).  An urge to urinate more often.  Bladder spasms.    These are normal after the procedure. They should go away on their own after a day or two.      You can help to relieve the above listed symptoms by:  Drinking 6 to 8 large glasses of water each day (includes drinks at meals).  This will help clear the urine.  Take warm baths to relieve pain and bladder spasms.  Do not add  anything to the bath water.  You may take Tylenol (acetaminophen) per label instructions for discomfort.

## 2025-02-17 NOTE — NURSING NOTE
Current patient location: [address] [place] [id number]    Is the patient currently in the state of [place]? YES    Visit mode: VIDEO    If the visit is dropped, the patient can be reconnected by:VIDEO VISIT: Send to e-mail at: [email or web address]    Will anyone else be joining the visit? NO  (If patient encounters technical issues they should call [phone]    Are changes needed to the allergy or medication list? No    Are refills needed on medications prescribed by this physician? NO    Rooming Documentation:  Questionnaire(s) completed    Reason for visit: Consult    Bobbilynn Grossaint VVF

## 2025-02-17 NOTE — LETTER
2/17/2025       RE: Yarelis Penny  [address] [facility] [address]     Dear Colleague,    Thank you for referring your patient, Yarelis Penny, to the [facility] at [facility]. Please see a copy of my visit note below.    Video-Visit Details    Type of service:  Video Visit     Originating Location (pt. Location): Home    Distant Location (provider location):  On-site  Platform used for Video Visit: Mary  Start time: 11:26am  End time:  11:37am    CC: Hematuria.    HPI: It is a pleasure to see Ms. Yarelis Penny, a pleasant 39 year old female, asked to be seen in consultation by Dr. Enriquez of transplant for evaluation of intermittent micro hematuria.     The patient denies  gross hematuria.  Ms. Penny voids without difficulty.  She currently denies any dysuria, pyuria, hesitancy, intermittency, feelings of incomplete emptying, or any recent history of urinary tract infections or stones.    Mom and dad both have hx of kidney stones.     Hematuria Risk Factors:  Age >40: no     Smoking history: non-smoker and no second hand smoke exposure  Occupational exposure to chemicals or dyes (ie, benzenes, aromatic amines): no  History of urologic disorder or disease: no  History of irritative voiding symptoms: no  History of urinary tract infection: no  Analgesic abuse: no  History of pelvic irradiation: no    Past Medical History:   Diagnosis Date     Acute kidney injury 04/28/2016     Anemia, iron deficiency 08/29/2017    On iron supplementation     Autoimmune hepatitis (H) 2012    on steroid taper     Cholangitis (H) 2003     CKD (chronic kidney disease) 2012    biopsy 2012: TIN, patchy fibrosis. Dialysis in 2013 x2 months.     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     Esophageal varices (H) 09/2008    banded     Heart murmur      History of blood transfusion      Primary sclerosing cholangitis (H) 2003     SBP (spontaneous bacterial peritonitis)  (H) 2015     Ulcerative Colitis     f/b GI     Past Surgical History:   Procedure Laterality Date      SECTION N/A 2021    Procedure:  SECTION;  Surgeon: Efrain Bill MD;  [facility]     CHOLECYSTECTOMY       COLONOSCOPY  2007     COLONOSCOPY N/A 2015    Procedure: COMBINED COLONOSCOPY, SINGLE OR MULTIPLE BIOPSY/POLYPECTOMY BY BIOPSY;  Surgeon: Mitchel Hoskins Chi, MD;  [facility]     COLONOSCOPY N/A 2016    Procedure: COMBINED COLONOSCOPY, SINGLE OR MULTIPLE BIOPSY/POLYPECTOMY BY BIOPSY;  Surgeon: Rigo Valles MD;  [facility]     COLONOSCOPY N/A 2016    Procedure: COMBINED COLONOSCOPY, SINGLE OR MULTIPLE BIOPSY/POLYPECTOMY BY BIOPSY;  Surgeon: Kareem Solis MD;  [facility]     COLONOSCOPY N/A 2017    Procedure: COMBINED COLONOSCOPY, SINGLE OR MULTIPLE BIOPSY/POLYPECTOMY BY BIOPSY;  Colonoscopy;  Surgeon: Fuentes Johnson MD;  [facility]     COLONOSCOPY N/A 2019    Procedure: COMBINED COLONOSCOPY, SINGLE OR MULTIPLE BIOPSY/POLYPECTOMY BY BIOPSY;  Surgeon: Sophy Mckeon MD;  [facility]     DILATION AND CURETTAGE, HYSTEROSCOPY DIAGNOSTIC, COMBINED N/A 2021    Procedure: HYSTEROSCOPY, DIAGNOSTIC;  Surgeon: Lin Armendariz MD;  [facility]     ENDOSCOPIC RETROGRADE CHOLANGIOPANCREATOGRAM N/A 2015    Procedure: COMBINED ENDOSCOPIC RETROGRADE CHOLANGIOPANCREATOGRAPHY, PLACE TUBE/STENT;  Surgeon: Landon Quinones MD;  [facility]     ENDOSCOPIC RETROGRADE CHOLANGIOPANCREATOGRAM N/A 2015    Procedure: COMBINED ENDOSCOPIC RETROGRADE CHOLANGIOPANCREATOGRAPHY, PLACE TUBE/STENT;  Surgeon: Landon Quinones MD;  [facility]     ENDOSCOPIC RETROGRADE CHOLANGIOPANCREATOGRAM N/A 2015    Procedure: COMBINED ENDOSCOPIC RETROGRADE CHOLANGIOPANCREATOGRAPHY, PLACE TUBE/STENT;  Surgeon: Landon Quinones MD;  [facility]     ENDOSCOPIC RETROGRADE  CHOLANGIOPANCREATOGRAM N/A 09/08/2015    Procedure: COMBINED ENDOSCOPIC RETROGRADE CHOLANGIOPANCREATOGRAPHY, REMOVE FOREIGN BODY OR STENT/TUBE;  Surgeon: Landon Quinones MD;  [facility]     ENDOSCOPIC RETROGRADE CHOLANGIOPANCREATOGRAM N/A 12/08/2015    Procedure: ENDOSCOPIC RETROGRADE CHOLANGIOPANCREATOGRAM;  Surgeon: Landon Quinones MD;  [facility]     ENDOSCOPIC RETROGRADE CHOLANGIOPANCREATOGRAM N/A 03/01/2016    Procedure: COMBINED ENDOSCOPIC RETROGRADE CHOLANGIOPANCREATOGRAPHY, REMOVE FOREIGN BODY OR STENT/TUBE;  Surgeon: Landon Quinones MD;  [facility]     ENDOSCOPIC RETROGRADE CHOLANGIOPANCREATOGRAM N/A 03/20/2017    Procedure: COMBINED ENDOSCOPIC RETROGRADE CHOLANGIOPANCREATOGRAPHY, PLACE TUBE/STENT;  Endoscopic Retrograde Cholangiopancreatogram with Ballon Dilation, Stent Placement;  Surgeon: Guru Brittany Macias MD;  [facility]     ENDOSCOPIC RETROGRADE CHOLANGIOPANCREATOGRAM N/A 04/02/2018    Procedure: COMBINED ENDOSCOPIC RETROGRADE CHOLANGIOPANCREATOGRAPHY, PLACE TUBE/STENT;  Endoscopic Retrograde Cholangiopancreatogram with biliary dilation and stent placement;  Surgeon: Guru Brittany Macias MD;  [facility]     ENDOSCOPIC RETROGRADE CHOLANGIOPANCREATOGRAM N/A 05/07/2018    Procedure: ENDOSCOPIC RETROGRADE CHOLANGIOPANCREATOGRAM;  Endoscopic Retrograde Cholangiopancreatogram ballon dilation stent exchange;  Surgeon: Guru Brittany Macias MD;  [facility]     ENDOSCOPIC RETROGRADE CHOLANGIOPANCREATOGRAPHY, EXCHANGE TUBE/STENT N/A 07/30/2015    Procedure: ENDOSCOPIC RETROGRADE CHOLANGIOPANCREATOGRAPHY, EXCHANGE TUBE/STENT;  Surgeon: Landon Quinones MD;  [facility]     ENDOSCOPIC RETROGRADE CHOLANGIOPANCREATOGRAPHY, EXCHANGE TUBE/STENT N/A 05/15/2017    Procedure: ENDOSCOPIC RETROGRADE CHOLANGIOPANCREATOGRAPHY, EXCHANGE TUBE/STENT;  Endoscopic Retrograde Cholangiopancreatogram with biliary stent  exchange and balloon dilation;  Surgeon: Guru Brittany Macias MD;  [facility]     ENDOSCOPIC RETROGRADE CHOLANGIOPANCREATOGRAPHY, EXCHANGE TUBE/STENT N/A 06/25/2018    Procedure: ENDOSCOPIC RETROGRADE CHOLANGIOPANCREATOGRAPHY, EXCHANGE TUBE/STENT;  Endoscopic Retrograde Cholangiopancreatogram with biliary dilation, stone removal and stent exchange;  Surgeon: Guru Brittany Macias MD;  [facility]     ENDOSCOPIC RETROGRADE CHOLANGIOPANCREATOGRAPHY, EXCHANGE TUBE/STENT N/A 07/30/2018    Procedure: ENDOSCOPIC RETROGRADE CHOLANGIOPANCREATOGRAPHY, EXCHANGE TUBE/STENT;  Endoscopic Retrograde Cholangiopancreatogram with Stent Exchange with dilation;  Surgeon: Guru Brittany Macias MD;  [facility]     ENDOSCOPIC RETROGRADE CHOLANGIOPANCREATOGRAPHY, EXCHANGE TUBE/STENT N/A 09/26/2018    Procedure: ENDOSCOPIC RETROGRADE CHOLANGIOPANCREATOGRAPHY, EXCHANGE TUBE/STENT;  Endoscopic Retrograde Cholangiopancreatogram, with bile duct stent exchanged balloon dilation and balloon sweep of bile duct;  Surgeon: Guru Brittany Macias MD;  [facility]     ESOPHAGOSCOPY, GASTROSCOPY, DUODENOSCOPY (EGD), COMBINED N/A 02/26/2015    Procedure: COMBINED ESOPHAGOSCOPY, GASTROSCOPY, DUODENOSCOPY (EGD);  Surgeon: Mitchel Hoskins Chi, MD;  [facility]     EXPLORE COMMON BILE DUCT N/A 06/25/2015    Procedure: EXPLORE COMMON BILE DUCT;  Surgeon: Tyree Smith MD;  [facility]     HERNIA REPAIR  2015    During transplant     ILEOSTOMY N/A 06/20/2019    Procedure: Ileostomy;  Surgeon: Krzysztof Carney MD;  [facility]     IR LIVER BIOPSY PERCUTANEOUS  1/4/2022     LAPAROSCOPIC ASSISTED COLECTOMY N/A 06/20/2019    Procedure: Laparoscopic Total Abdominal Colectomy, lysis of adhesions;  Surgeon: Krzysztof Carney MD;  [facility]     LAPAROSCOPIC LYSIS ADHESIONS N/A 03/12/2021    Procedure: LYSIS, ADHESIONS;  Surgeon: Lin Armendariz MD;  [facility]  "    LAPAROTOMY EXPLORATORY N/A 06/25/2015    Procedure: LAPAROTOMY EXPLORATORY;  Surgeon: Tyree Smith MD;  [facility]     PICC INSERTION Right 02/27/2015    4fr SL Valved PICC, 36cm (1cm external) in the R medial brachial vein w/ tip in the low SVC.     SIGMOIDOSCOPY FLEXIBLE N/A 04/27/2016    Procedure: SIGMOIDOSCOPY FLEXIBLE;  Surgeon: Jose Nielson MD;  [facility]     SIGMOIDOSCOPY FLEXIBLE N/A 1/30/2023    Procedure: SIGMOIDOSCOPY, FLEXIBLE WITH BIOPSIES;  Surgeon: Sophy Mckeon MD;  [facility]     TRANSPLANT LIVER RECIPIENT LIVING UNRELATED N/A 06/18/2015    Procedure: TRANSPLANT LIVER RECIPIENT LIVING UNRELATED;  Surgeon: Tyree Smith MD;  [facility]     UPPER GI ENDOSCOPY       Current Outpatient Medications   Medication Sig Dispense Refill     doxycycline hyclate (VIBRAMYCIN) 100 MG capsule Take 1 capsule (100 mg) by mouth 2 times daily for 7 days. 14 capsule 0     Multiple Vitamin (MULTIVITAMIN PO) Take 1 tablet by mouth daily       order for DME [employer] item #s   31101 and 12067 30 Bag 3     order for DME Increase the amount to 10 per month  --2\" barrier ring #7805 10 each 0     predniSONE (DELTASONE) 5 MG tablet Take 1 tablet (5 mg) by mouth daily. 90 tablet 3     tacrolimus (GENERIC EQUIVALENT) 1 MG capsule Take 2 capsules (2 mg) by mouth every morning AND 2 capsules (2 mg) every evening. 360 capsule 3     thin (NO BRAND SPECIFIED) lancets Use with lanceting device. To accompany: Blood Glucose Monitor Brands: per insurance. 100 each 6     tretinoin (RETIN-A) 0.025 % external cream Apply pea-sized amount to face at bedtime. Start every other night for 1-2 weeks, then increase to nightly as tolerated. 45 g 11     ursodiol (ACTIGALL) 250 MG tablet Take 2 tablets (500 mg) by mouth every morning AND 1 tablet (250 mg) every evening. 270 tablet 3     Allergies   Allergen Reactions     Rifampin Other (See Comments)     Kidney failure     Penicillins      Bad hives when she " was in college     FAMILY HISTORY: There is no reported history of genitourinary carcinoma.  There is history of nephrolithiasis.      Social History     Socioeconomic History     Marital status:      Spouse name: Ceferino     Number of children: 0     Years of education: Not on file     Highest education level: Not on file   Occupational History     Occupation: unemployed   Tobacco Use     Smoking status: Never     Smokeless tobacco: Never   Vaping Use     Vaping status: Never Used   Substance and Sexual Activity     Alcohol use: Not Currently     Alcohol/week: 0.0 standard drinks of alcohol     Comment: Every other month has 1 drink     Drug use: No     Sexual activity: Yes     Partners: Male     Birth control/protection: None   Other Topics Concern      Service No     Blood Transfusions No     Caffeine Concern No     Occupational Exposure No     Hobby Hazards No     Sleep Concern Yes     Stress Concern No     Weight Concern No     Special Diet No     Back Care No     Exercise No     Bike Helmet No     Comment: n/a     Seat Belt Yes     Self-Exams No     Parent/sibling w/ CABG, MI or angioplasty before 65F 55M? No   Social History Narrative    no pets at home, no recent known sick contacts. She spends a lot of her time at the fitness center.         How much exercise per week? 1-5 times a week  walking too    How much calcium per day? supplements       How much caffeine per day? 10 oz half  calf    How much vitamin D per day? In supplements    Do you/your family wear seatbelts?  Yes    Do you/your family use safety helmets? Yes    Do you/your family use sunscreen? Yes    Do you/your family keep firearms in the home? No    Do you/your family have a smoke detector(s)? Yes        [facility]     Social Drivers of Health     Financial Resource Strain: Not on file   Food Insecurity: Not on file   Transportation Needs: Not on file   Physical Activity: Insufficiently Active (5/21/2021)     "Exercise Vital Sign      Days of Exercise per Week: 3 days      Minutes of Exercise per Session: 30 min   Stress: No Stress Concern Present (5/21/2021)    [employer] - Occupational Stress Questionnaire      Feeling of Stress : Only a little   Social Connections: Moderately Integrated (5/21/2021)    Social Connection and Isolation Panel [NHANES]      Frequency of Communication with Friends and Family: Three times a week      Frequency of Social Gatherings with Friends and Family: Three times a week      Attends Yarsani Services: More than 4 times per year      Active Member of Clubs or Organizations: No      Attends Club or Organization Meetings: Never      Marital Status:    Interpersonal Safety: Unknown (5/21/2021)    Humiliation, Afraid, Rape, and Kick questionnaire      Fear of Current or Ex-Partner: Not asked      Emotionally Abused: Not asked      Physically Abused: Not asked      Sexually Abused: Not asked   Housing Stability: Not on file       PHYSICAL EXAM:   Vitals:    02/17/25 1115   Weight: 45.4 kg (100 lb)   Height: 1.6 m (5' 3\")     PSYCH: NAD  EYES: EOMI  MOUTH: MMM  NEURO: AAO x3    Lab on 01/31/2025   Component Date Value Ref Range Status     Sodium 01/31/2025 137  135 - 145 mmol/L Final     Potassium 01/31/2025 4.0  3.4 - 5.3 mmol/L Final     Chloride 01/31/2025 107  98 - 107 mmol/L Final     Carbon Dioxide (CO2) 01/31/2025 18 (L)  22 - 29 mmol/L Final     Anion Gap 01/31/2025 12  7 - 15 mmol/L Final     Urea Nitrogen 01/31/2025 37.6 (H)  6.0 - 20.0 mg/dL Final     Creatinine 01/31/2025 2.51 (H)  0.51 - 0.95 mg/dL Final     GFR Estimate 01/31/2025 24 (L)  >60 mL/min/1.73m2 Final    eGFR calculated using 2021 CKD-EPI equation.     Calcium 01/31/2025 9.5  8.8 - 10.4 mg/dL Final     Glucose 01/31/2025 109 (H)  70 - 99 mg/dL Final     WBC Count 01/31/2025 11.5 (H)  4.0 - 11.0 10e3/uL Final     RBC Count 01/31/2025 4.55  3.80 - 5.20 10e6/uL Final     Hemoglobin " 01/31/2025 13.3  11.7 - 15.7 g/dL Final     Hematocrit 01/31/2025 39.0  35.0 - 47.0 % Final     MCV 01/31/2025 86  78 - 100 fL Final     MCH 01/31/2025 29.2  26.5 - 33.0 pg Final     MCHC 01/31/2025 34.1  31.5 - 36.5 g/dL Final     RDW 01/31/2025 14.7  10.0 - 15.0 % Final     Platelet Count 01/31/2025 467 (H)  150 - 450 10e3/uL Final     Protein Total 01/31/2025 7.3  6.4 - 8.3 g/dL Final     Albumin 01/31/2025 3.7  3.5 - 5.2 g/dL Final     Bilirubin Total 01/31/2025 0.3  <=1.2 mg/dL Final     Alkaline Phosphatase 01/31/2025 73  40 - 150 U/L Final     AST 01/31/2025 23  0 - 45 U/L Final     ALT 01/31/2025 14  0 - 50 U/L Final     Bilirubin Direct 01/31/2025 <0.20  0.00 - 0.30 mg/dL Final     Tacrolimus by Tandem Mass Spectrom* 01/31/2025 3.2 (L)  5.0 - 15.0 ug/L Final    Comment: Tacrolimus Reference Range (ug/L):    Kidney Transplant:  Pediatric  0-3 months post transplant: 10-12  3-6 months post transplant: 8-10  6-12 months post transplant: 6-8  >12 months post transplant: 4-7    Adult  0-6 months post transplant: 8-10  6-12 months post transplant: 6-8  >12 months post transplant: 4-6  >5 years post transplant: 3-5    Heart Transplant:  Pediatric  0-12 months post transplant: 10-15  >12 months post transplant: 5-10    Adult  0-3 months post transplant: 10-15  3-6 months post transplant: 8-12  6-12 months post transplant: 6-12  >12 months post transplant: 6-10    Lung Transplant:  0-12 months post transplant: 10-15  >12 months post transplant: 8-12    Liver Transplant:  Pediatric  0-3 months post transplant: 10-15  3-6 months post transplant: 8-10  6 months-5 years post transplant: 6-8   >5 years post transplant: 1-3    Adult  0-3 months post transplant: 10-12  3-6 months post transplant: 8-10  >6 months post transplant: 6-8    Pancreas Transplant:  0-6                            months post transplant: 8-10  >6 months post transplant: 5-8     Tacrolimus Last Dose Date 01/31/2025 1/30/2025   Final     Tacrolimus  Last Dose Time 01/31/2025  9:00 PM   Final       IMAGING:   Results for orders placed or performed in visit on 04/14/23   XR Foot Left G/E 3 Views    Narrative    XR FOOT LEFT G/E 3 VIEWS 4/14/2023 11:19 AM     HISTORY: Left foot pain    COMPARISON: None.      Impression    IMPRESSION: No fractures are identified. Normal joint spacing and  alignment. The soft tissues are normal.     ISELA HERNANDEZ MD         SYSTEM ID:  [id number]     *Note: Due to a large number of results and/or encounters for the requested time period, some results have not been displayed. A complete set of results can be found in Results Review.       ASSESSMENT and PLAN:    39 year old female with intermediate risk micro hematuria (previously low risk, recurrent, no prior eval and >3 RBCs/HPF).  The differential diagnosis at this point includes stone disease, infection, vaginal contaminant, urothelial malignancy, renal disorder versus another yet unknown diagnosis.    At this time, recommend proceeding with comprehensive hematuria evaluation to include:  - Urine cytology to look for cells concerning for malignancy.  - CT noncontrast (Cr >2) for upper tract imaging.  - Has Neph appt in ~6 wks.   - Likely needs Cystoscopy pending CT/cytology results.    Thank you for allowing me to participate in Ms. Penny's care. I will keep you updated of her progress, but please do not hesitate to contact me with any questions.    Selena Garcia PA-C  [facility]  30 minutes spent on the date of the encounter doing chart review, review of outside records, review of test results, interpretation of tests, patient visit and documentation.       Again, thank you for allowing me to participate in the care of your patient.      Sincerely,    Selena Garcia PA-C, PA-C

## 2025-02-17 NOTE — PROGRESS NOTES
Video-Visit Details    Type of service:  Video Visit     Originating Location (pt. Location): Home    Distant Location (provider location):  On-site  Platform used for Video Visit: Mary  Start time: 11:26am  End time:  11:37am    CC: Hematuria.    HPI: It is a pleasure to see Ms. Yarelis Penny, a pleasant 39 year old female, asked to be seen in consultation by Dr. Enriquez of transplant for evaluation of intermittent micro hematuria.     The patient denies  gross hematuria.  Ms. Penny voids without difficulty.  She currently denies any dysuria, pyuria, hesitancy, intermittency, feelings of incomplete emptying, or any recent history of urinary tract infections or stones.    Mom and dad both have hx of kidney stones.     Hematuria Risk Factors:  Age >40: no     Smoking history: non-smoker and no second hand smoke exposure  Occupational exposure to chemicals or dyes (ie, benzenes, aromatic amines): no  History of urologic disorder or disease: no  History of irritative voiding symptoms: no  History of urinary tract infection: no  Analgesic abuse: no  History of pelvic irradiation: no    Past Medical History:   Diagnosis Date    Acute kidney injury 2016    Anemia, iron deficiency 2017    On iron supplementation    Autoimmune hepatitis (H) 2012    on steroid taper    Cholangitis (H)     CKD (chronic kidney disease)     biopsy 2012: TIN, patchy fibrosis. Dialysis in 2013 x2 months.    CMV colitis (H) 2016    Esophageal varices (H) 2008    banded    Heart murmur     History of blood transfusion     Primary sclerosing cholangitis (H)     SBP (spontaneous bacterial peritonitis) (H) 2015    Ulcerative Colitis     f/b GI     Past Surgical History:   Procedure Laterality Date     SECTION N/A 2021    Procedure:  SECTION;  Surgeon: Efrain Bill MD;  [facility]    CHOLECYSTECTOMY      COLONOSCOPY  2007    COLONOSCOPY N/A 2015    Procedure:  COMBINED COLONOSCOPY, SINGLE OR MULTIPLE BIOPSY/POLYPECTOMY BY BIOPSY;  Surgeon: Mitchel Hoskins Chi, MD;  [facility]    COLONOSCOPY N/A 01/12/2016    Procedure: COMBINED COLONOSCOPY, SINGLE OR MULTIPLE BIOPSY/POLYPECTOMY BY BIOPSY;  Surgeon: Rigo Valles MD;  [facility]    COLONOSCOPY N/A 04/01/2016    Procedure: COMBINED COLONOSCOPY, SINGLE OR MULTIPLE BIOPSY/POLYPECTOMY BY BIOPSY;  Surgeon: Kareem Solis MD;  [facility]    COLONOSCOPY N/A 09/05/2017    Procedure: COMBINED COLONOSCOPY, SINGLE OR MULTIPLE BIOPSY/POLYPECTOMY BY BIOPSY;  Colonoscopy;  Surgeon: Fuentes Johnson MD;  [facility]    COLONOSCOPY N/A 02/25/2019    Procedure: COMBINED COLONOSCOPY, SINGLE OR MULTIPLE BIOPSY/POLYPECTOMY BY BIOPSY;  Surgeon: Sophy Mckeon MD;  [facility]    DILATION AND CURETTAGE, HYSTEROSCOPY DIAGNOSTIC, COMBINED N/A 03/12/2021    Procedure: HYSTEROSCOPY, DIAGNOSTIC;  Surgeon: Lin Armendariz MD;  [facility]    ENDOSCOPIC RETROGRADE CHOLANGIOPANCREATOGRAM N/A 06/25/2015    Procedure: COMBINED ENDOSCOPIC RETROGRADE CHOLANGIOPANCREATOGRAPHY, PLACE TUBE/STENT;  Surgeon: Landon Quinones MD;  [facility]    ENDOSCOPIC RETROGRADE CHOLANGIOPANCREATOGRAM N/A 06/25/2015    Procedure: COMBINED ENDOSCOPIC RETROGRADE CHOLANGIOPANCREATOGRAPHY, PLACE TUBE/STENT;  Surgeon: Landon Quinones MD;  [facility]    ENDOSCOPIC RETROGRADE CHOLANGIOPANCREATOGRAM N/A 07/02/2015    Procedure: COMBINED ENDOSCOPIC RETROGRADE CHOLANGIOPANCREATOGRAPHY, PLACE TUBE/STENT;  Surgeon: Landon Quinones MD;  [facility]    ENDOSCOPIC RETROGRADE CHOLANGIOPANCREATOGRAM N/A 09/08/2015    Procedure: COMBINED ENDOSCOPIC RETROGRADE CHOLANGIOPANCREATOGRAPHY, REMOVE FOREIGN BODY OR STENT/TUBE;  Surgeon: Landon Quinones MD;  [facility]    ENDOSCOPIC RETROGRADE CHOLANGIOPANCREATOGRAM N/A 12/08/2015    Procedure: ENDOSCOPIC RETROGRADE CHOLANGIOPANCREATOGRAM;  Surgeon:  Landon Quinones MD;  [facility]    ENDOSCOPIC RETROGRADE CHOLANGIOPANCREATOGRAM N/A 03/01/2016    Procedure: COMBINED ENDOSCOPIC RETROGRADE CHOLANGIOPANCREATOGRAPHY, REMOVE FOREIGN BODY OR STENT/TUBE;  Surgeon: Landon Quinones MD;  [facility]    ENDOSCOPIC RETROGRADE CHOLANGIOPANCREATOGRAM N/A 03/20/2017    Procedure: COMBINED ENDOSCOPIC RETROGRADE CHOLANGIOPANCREATOGRAPHY, PLACE TUBE/STENT;  Endoscopic Retrograde Cholangiopancreatogram with Ballon Dilation, Stent Placement;  Surgeon: Guru Brittany Macias MD;  [facility]    ENDOSCOPIC RETROGRADE CHOLANGIOPANCREATOGRAM N/A 04/02/2018    Procedure: COMBINED ENDOSCOPIC RETROGRADE CHOLANGIOPANCREATOGRAPHY, PLACE TUBE/STENT;  Endoscopic Retrograde Cholangiopancreatogram with biliary dilation and stent placement;  Surgeon: Guru Brittany Macias MD;  [facility]    ENDOSCOPIC RETROGRADE CHOLANGIOPANCREATOGRAM N/A 05/07/2018    Procedure: ENDOSCOPIC RETROGRADE CHOLANGIOPANCREATOGRAM;  Endoscopic Retrograde Cholangiopancreatogram ballon dilation stent exchange;  Surgeon: Guru Brittany Macias MD;  [facility]    ENDOSCOPIC RETROGRADE CHOLANGIOPANCREATOGRAPHY, EXCHANGE TUBE/STENT N/A 07/30/2015    Procedure: ENDOSCOPIC RETROGRADE CHOLANGIOPANCREATOGRAPHY, EXCHANGE TUBE/STENT;  Surgeon: Lanodn Quinones MD;  [facility]    ENDOSCOPIC RETROGRADE CHOLANGIOPANCREATOGRAPHY, EXCHANGE TUBE/STENT N/A 05/15/2017    Procedure: ENDOSCOPIC RETROGRADE CHOLANGIOPANCREATOGRAPHY, EXCHANGE TUBE/STENT;  Endoscopic Retrograde Cholangiopancreatogram with biliary stent exchange and balloon dilation;  Surgeon: Guru Brittany Macias MD;  [facility]    ENDOSCOPIC RETROGRADE CHOLANGIOPANCREATOGRAPHY, EXCHANGE TUBE/STENT N/A 06/25/2018    Procedure: ENDOSCOPIC RETROGRADE CHOLANGIOPANCREATOGRAPHY, EXCHANGE TUBE/STENT;  Endoscopic Retrograde Cholangiopancreatogram with biliary dilation, stone  removal and stent exchange;  Surgeon: Guru Brittany Macias MD;  [facility]    ENDOSCOPIC RETROGRADE CHOLANGIOPANCREATOGRAPHY, EXCHANGE TUBE/STENT N/A 07/30/2018    Procedure: ENDOSCOPIC RETROGRADE CHOLANGIOPANCREATOGRAPHY, EXCHANGE TUBE/STENT;  Endoscopic Retrograde Cholangiopancreatogram with Stent Exchange with dilation;  Surgeon: Guru Brittany Macias MD;  [facility]    ENDOSCOPIC RETROGRADE CHOLANGIOPANCREATOGRAPHY, EXCHANGE TUBE/STENT N/A 09/26/2018    Procedure: ENDOSCOPIC RETROGRADE CHOLANGIOPANCREATOGRAPHY, EXCHANGE TUBE/STENT;  Endoscopic Retrograde Cholangiopancreatogram, with bile duct stent exchanged balloon dilation and balloon sweep of bile duct;  Surgeon: Guru Brittany Macias MD;  [facility]    ESOPHAGOSCOPY, GASTROSCOPY, DUODENOSCOPY (EGD), COMBINED N/A 02/26/2015    Procedure: COMBINED ESOPHAGOSCOPY, GASTROSCOPY, DUODENOSCOPY (EGD);  Surgeon: Mitchel Hoskins Chi, MD;  [facility]    EXPLORE COMMON BILE DUCT N/A 06/25/2015    Procedure: EXPLORE COMMON BILE DUCT;  Surgeon: Tyree Smith MD;  [facility]    HERNIA REPAIR  2015    During transplant    ILEOSTOMY N/A 06/20/2019    Procedure: Ileostomy;  Surgeon: Krzysztof Carney MD;  [facility]    IR LIVER BIOPSY PERCUTANEOUS  1/4/2022    LAPAROSCOPIC ASSISTED COLECTOMY N/A 06/20/2019    Procedure: Laparoscopic Total Abdominal Colectomy, lysis of adhesions;  Surgeon: Krzysztof Carney MD;  [facility]    LAPAROSCOPIC LYSIS ADHESIONS N/A 03/12/2021    Procedure: LYSIS, ADHESIONS;  Surgeon: Lin Armendariz MD;  [facility]    LAPAROTOMY EXPLORATORY N/A 06/25/2015    Procedure: LAPAROTOMY EXPLORATORY;  Surgeon: Tyree Smith MD;  [facility]    PICC INSERTION Right 02/27/2015    4fr SL Valved PICC, 36cm (1cm external) in the R medial brachial vein w/ tip in the low SVC.    SIGMOIDOSCOPY FLEXIBLE N/A 04/27/2016    Procedure: SIGMOIDOSCOPY FLEXIBLE;  Surgeon:  "Jose Nielson MD;  [facility]    SIGMOIDOSCOPY FLEXIBLE N/A 1/30/2023    Procedure: SIGMOIDOSCOPY, FLEXIBLE WITH BIOPSIES;  Surgeon: Sophy Mckeon MD;  [facility]    TRANSPLANT LIVER RECIPIENT LIVING UNRELATED N/A 06/18/2015    Procedure: TRANSPLANT LIVER RECIPIENT LIVING UNRELATED;  Surgeon: Tyree Smith MD;  [facility]    UPPER GI ENDOSCOPY       Current Outpatient Medications   Medication Sig Dispense Refill    doxycycline hyclate (VIBRAMYCIN) 100 MG capsule Take 1 capsule (100 mg) by mouth 2 times daily for 7 days. 14 capsule 0    Multiple Vitamin (MULTIVITAMIN PO) Take 1 tablet by mouth daily      order for DME [employer] item #s   91102 and [id number] 30 Bag 3    order for DME Increase the amount to 10 per month  --2\" barrier ring #8985 10 each 0    predniSONE (DELTASONE) 5 MG tablet Take 1 tablet (5 mg) by mouth daily. 90 tablet 3    tacrolimus (GENERIC EQUIVALENT) 1 MG capsule Take 2 capsules (2 mg) by mouth every morning AND 2 capsules (2 mg) every evening. 360 capsule 3    thin (NO BRAND SPECIFIED) lancets Use with lanceting device. To accompany: Blood Glucose Monitor Brands: per insurance. 100 each 6    tretinoin (RETIN-A) 0.025 % external cream Apply pea-sized amount to face at bedtime. Start every other night for 1-2 weeks, then increase to nightly as tolerated. 45 g 11    ursodiol (ACTIGALL) 250 MG tablet Take 2 tablets (500 mg) by mouth every morning AND 1 tablet (250 mg) every evening. 270 tablet 3     Allergies   Allergen Reactions    Rifampin Other (See Comments)     Kidney failure    Penicillins      Bad hives when she was in college     FAMILY HISTORY: There is no reported history of genitourinary carcinoma.  There is history of nephrolithiasis.      Social History     Socioeconomic History    Marital status:      Spouse name: Ceferino    Number of children: 0    Years of education: Not on file    Highest education level: Not on file   Occupational History    Occupation: " unemployed   Tobacco Use    Smoking status: Never    Smokeless tobacco: Never   Vaping Use    Vaping status: Never Used   Substance and Sexual Activity    Alcohol use: Not Currently     Alcohol/week: 0.0 standard drinks of alcohol     Comment: Every other month has 1 drink    Drug use: No    Sexual activity: Yes     Partners: Male     Birth control/protection: None   Other Topics Concern     Service No    Blood Transfusions No    Caffeine Concern No    Occupational Exposure No    Hobby Hazards No    Sleep Concern Yes    Stress Concern No    Weight Concern No    Special Diet No    Back Care No    Exercise No    Bike Helmet No     Comment: n/a    Seat Belt Yes    Self-Exams No    Parent/sibling w/ CABG, MI or angioplasty before 65F 55M? No   Social History Narrative    no pets at home, no recent known sick contacts. She spends a lot of her time at the fitness center.         How much exercise per week? 1-5 times a week  walking too    How much calcium per day? supplements       How much caffeine per day? 10 oz half  calf    How much vitamin D per day? In supplements    Do you/your family wear seatbelts?  Yes    Do you/your family use safety helmets? Yes    Do you/your family use sunscreen? Yes    Do you/your family keep firearms in the home? No    Do you/your family have a smoke detector(s)? Yes        Reviewed  cmckim lpn  5-21-21     Social Drivers of Health     Financial Resource Strain: Not on file   Food Insecurity: Not on file   Transportation Needs: Not on file   Physical Activity: Insufficiently Active (5/21/2021)    Exercise Vital Sign     Days of Exercise per Week: 3 days     Minutes of Exercise per Session: 30 min   Stress: No Stress Concern Present (5/21/2021)    [employer] - Occupational Stress Questionnaire     Feeling of Stress : Only a little   Social Connections: Moderately Integrated (5/21/2021)    Social Connection and Isolation Panel [NHANES]     Frequency of  "Communication with Friends and Family: Three times a week     Frequency of Social Gatherings with Friends and Family: Three times a week     Attends Restoration Services: More than 4 times per year     Active Member of Clubs or Organizations: No     Attends Club or Organization Meetings: Never     Marital Status:    Interpersonal Safety: Unknown (5/21/2021)    Humiliation, Afraid, Rape, and Kick questionnaire     Fear of Current or Ex-Partner: Not asked     Emotionally Abused: Not asked     Physically Abused: Not asked     Sexually Abused: Not asked   Housing Stability: Not on file       PHYSICAL EXAM:   Vitals:    02/17/25 1115   Weight: 45.4 kg (100 lb)   Height: 1.6 m (5' 3\")     PSYCH: NAD  EYES: EOMI  MOUTH: MMM  NEURO: AAO x3    Lab on 01/31/2025   Component Date Value Ref Range Status    Sodium 01/31/2025 137  135 - 145 mmol/L Final    Potassium 01/31/2025 4.0  3.4 - 5.3 mmol/L Final    Chloride 01/31/2025 107  98 - 107 mmol/L Final    Carbon Dioxide (CO2) 01/31/2025 18 (L)  22 - 29 mmol/L Final    Anion Gap 01/31/2025 12  7 - 15 mmol/L Final    Urea Nitrogen 01/31/2025 37.6 (H)  6.0 - 20.0 mg/dL Final    Creatinine 01/31/2025 2.51 (H)  0.51 - 0.95 mg/dL Final    GFR Estimate 01/31/2025 24 (L)  >60 mL/min/1.73m2 Final    eGFR calculated using 2021 CKD-EPI equation.    Calcium 01/31/2025 9.5  8.8 - 10.4 mg/dL Final    Glucose 01/31/2025 109 (H)  70 - 99 mg/dL Final    WBC Count 01/31/2025 11.5 (H)  4.0 - 11.0 10e3/uL Final    RBC Count 01/31/2025 4.55  3.80 - 5.20 10e6/uL Final    Hemoglobin 01/31/2025 13.3  11.7 - 15.7 g/dL Final    Hematocrit 01/31/2025 39.0  35.0 - 47.0 % Final    MCV 01/31/2025 86  78 - 100 fL Final    MCH 01/31/2025 29.2  26.5 - 33.0 pg Final    MCHC 01/31/2025 34.1  31.5 - 36.5 g/dL Final    RDW 01/31/2025 14.7  10.0 - 15.0 % Final    Platelet Count 01/31/2025 467 (H)  150 - 450 10e3/uL Final    Protein Total 01/31/2025 7.3  6.4 - 8.3 g/dL Final    Albumin 01/31/2025 3.7  3.5 - 5.2 " g/dL Final    Bilirubin Total 01/31/2025 0.3  <=1.2 mg/dL Final    Alkaline Phosphatase 01/31/2025 73  40 - 150 U/L Final    AST 01/31/2025 23  0 - 45 U/L Final    ALT 01/31/2025 14  0 - 50 U/L Final    Bilirubin Direct 01/31/2025 <0.20  0.00 - 0.30 mg/dL Final    Tacrolimus by Tandem Mass Spectrom* 01/31/2025 3.2 (L)  5.0 - 15.0 ug/L Final    Comment: Tacrolimus Reference Range (ug/L):    Kidney Transplant:  Pediatric  0-3 months post transplant: 10-12  3-6 months post transplant: 8-10  6-12 months post transplant: 6-8  >12 months post transplant: 4-7    Adult  0-6 months post transplant: 8-10  6-12 months post transplant: 6-8  >12 months post transplant: 4-6  >5 years post transplant: 3-5    Heart Transplant:  Pediatric  0-12 months post transplant: 10-15  >12 months post transplant: 5-10    Adult  0-3 months post transplant: 10-15  3-6 months post transplant: 8-12  6-12 months post transplant: 6-12  >12 months post transplant: 6-10    Lung Transplant:  0-12 months post transplant: 10-15  >12 months post transplant: 8-12    Liver Transplant:  Pediatric  0-3 months post transplant: 10-15  3-6 months post transplant: 8-10  6 months-5 years post transplant: 6-8   >5 years post transplant: 1-3    Adult  0-3 months post transplant: 10-12  3-6 months post transplant: 8-10  >6 months post transplant: 6-8    Pancreas Transplant:  0-6                            months post transplant: 8-10  >6 months post transplant: 5-8    Tacrolimus Last Dose Date 01/31/2025 1/30/2025   Final    Tacrolimus Last Dose Time 01/31/2025  9:00 PM   Final       IMAGING:   Results for orders placed or performed in visit on 04/14/23   XR Foot Left G/E 3 Views    Narrative    XR FOOT LEFT G/E 3 VIEWS 4/14/2023 11:19 AM     HISTORY: Left foot pain    COMPARISON: None.      Impression    IMPRESSION: No fractures are identified. Normal joint spacing and  alignment. The soft tissues are normal.     ISELA HERNANDEZ MD         SYSTEM ID:  [id number]      *Note: Due to a large number of results and/or encounters for the requested time period, some results have not been displayed. A complete set of results can be found in Results Review.       ASSESSMENT and PLAN:    39 year old female with intermediate risk micro hematuria (previously low risk, recurrent, no prior eval and >3 RBCs/HPF).  The differential diagnosis at this point includes stone disease, infection, vaginal contaminant, urothelial malignancy, renal disorder versus another yet unknown diagnosis.    At this time, recommend proceeding with comprehensive hematuria evaluation to include:  - Urine cytology to look for cells concerning for malignancy.  - CT noncontrast (Cr >2) for upper tract imaging.  - Has Neph appt in ~6 wks.   - Likely needs Cystoscopy pending CT/cytology results.    Thank you for allowing me to participate in Ms. Penny's care. I will keep you updated of her progress, but please do not hesitate to contact me with any questions.    Selena Garcia PA-C  [facility]  30 minutes spent on the date of the encounter doing chart review, review of outside records, review of test results, interpretation of tests, patient visit and documentation.

## 2025-03-04 ENCOUNTER — HOSPITAL ENCOUNTER (OUTPATIENT)
Dept: CT IMAGING | Facility: CLINIC | Age: 40
Discharge: HOME | End: 2025-03-04
Attending: PHYSICIAN ASSISTANT
Payer: COMMERCIAL

## 2025-03-04 DIAGNOSIS — R31.29 MICROSCOPIC HEMATURIA: ICD-10-CM

## 2025-03-04 PROCEDURE — 74176 CT ABD & PELVIS W/O CONTRAST: CPT

## 2025-03-20 DIAGNOSIS — O26.833 RENAL INSUFFICIENCY AFFECTING PREGNANCY IN THIRD TRIMESTER: Primary | ICD-10-CM

## 2025-03-20 DIAGNOSIS — N28.9 RENAL INSUFFICIENCY AFFECTING PREGNANCY IN THIRD TRIMESTER: Primary | ICD-10-CM

## 2025-03-25 ENCOUNTER — DOCUMENTATION ONLY (OUTPATIENT)
Dept: NEPHROLOGY | Facility: CLINIC | Age: 40
End: 2025-03-25
Payer: COMMERCIAL

## 2025-03-25 DIAGNOSIS — R79.89 ELEVATED SERUM CREATININE: Primary | ICD-10-CM

## 2025-03-27 ENCOUNTER — PATIENT OUTREACH (OUTPATIENT)
Dept: NEPHROLOGY | Facility: CLINIC | Age: 40
End: 2025-03-27

## 2025-03-27 ENCOUNTER — OFFICE VISIT (OUTPATIENT)
Dept: NEPHROLOGY | Facility: CLINIC | Age: 40
End: 2025-03-27
Attending: INTERNAL MEDICINE
Payer: COMMERCIAL

## 2025-03-27 ENCOUNTER — PRE VISIT (OUTPATIENT)
Dept: NEPHROLOGY | Facility: CLINIC | Age: 40
End: 2025-03-27

## 2025-03-27 VITALS
BODY MASS INDEX: 18.25 KG/M2 | TEMPERATURE: 98.7 F | DIASTOLIC BLOOD PRESSURE: 98 MMHG | WEIGHT: 103 LBS | HEART RATE: 86 BPM | OXYGEN SATURATION: 99 % | SYSTOLIC BLOOD PRESSURE: 145 MMHG

## 2025-03-27 DIAGNOSIS — N28.89 MEDULLARY CALCIFICATION OF KIDNEY: Primary | ICD-10-CM

## 2025-03-27 DIAGNOSIS — R79.89 ELEVATED SERUM CREATININE: Primary | ICD-10-CM

## 2025-03-27 DIAGNOSIS — I12.9 HYPERTENSION, RENAL: ICD-10-CM

## 2025-03-27 DIAGNOSIS — Z94.4 LIVER REPLACED BY TRANSPLANT (H): ICD-10-CM

## 2025-03-27 RX ORDER — LISINOPRIL 5 MG/1
5 TABLET ORAL DAILY
Qty: 90 TABLET | Refills: 3 | Status: SHIPPED | OUTPATIENT
Start: 2025-03-27

## 2025-03-27 NOTE — PATIENT INSTRUCTIONS
It was a pleasure taking care of you today.  I've included a brief summary of our discussion and care plan from today's visit below.  Please review this information with your primary care provider.     My recommendations are summarized as follows:  -will contact you about a kidney biopsy and the 24 hours urine collection for stone analysis  -will start a new medication called lisinopril 5 mg daily for your high blood pressure. It is contraindicated in pregnancy  -Please check your blood pressure twice daily  at home and send me the readings in 3 weeks  -Low salt diet 2 grams per day  -Advise to use contraception if not planning pregnancy  -Please do urine and blood tests in 1 month    Who do I call with any questions after my visit?  Please be in touch if there are any further questions that arise following today's visit.  There are multiple ways to contact your nephrology care team.       During business hours, you may reach your Nephrology Care Team or schedule or reschedule an appointment or lab at [phone].       If you need to schedule imaging, please call [phone].   To schedule a COVID test, please call [phone].     You can always send a secure message through [employer]. [employer] messages are answered by your nurse or doctor typically within 24-48 hours. Please allow extra time on weekends and holidays.       For urgent/emergent questions after business hours, you may reach the on-call Nephrology Fellow by contacting the [facility]  at [phone].     How will I get the results of any tests ordered?    You will receive all of your results.  If you have signed up for [employer], any tests ordered at your visit will be available to you once resulted on [employer]. Typically the physician reviews them and may or may not make further recommendations. If there are urgent results that require a change in your care plan, your physician or nurse will call you to discuss the next steps. If  you are not on MyChart, a letter may be generated and mailed to you with your results.

## 2025-03-27 NOTE — PROGRESS NOTES
I was asked to see this patient by:  Dr. Daniela Enriquez MD  [address]  [place] [address]      CC: CKD 3b    HPI: Yarelis Penny is a 39 year old female  who presents for evaluation of CKD3b.  She is here by herself.    Her medical history significant for autoimmune hepatitis and primary sclerosing cholangitis status post living donor liver transplant [her ] in 2015, ulcerative colitis exposed to a colectomy end ileostomy in 2018, history of CMV colitis and viremia, history of severe preeclampsia with her pregnancy in 2021.    She is here today for evaluation of CKD.  She was planning to pursue embryo transfer but she is so MTM recently who was not in favor at this point.      She had 1 pregnancy which was in 2021 and that was complicated by severe preeclampsia [needed elevated creatinine to from a baseline of 1.1] and severe fetal growth restriction.  Her pregnancy was also complicated by gestational diabetes mellitus and possible cholestasis versus worsening liver function requiring ursodiol.  Ultimately she was admitted at 31w3d. She received BMTZ and IVF and improvement of creatinine to 1.7.  During her admission, fetal monitoring revealed category II FHT and delivery was recommended by C section at that time.  Post partum, her creatine returned to near baseline (~1.2).   She reports that she has been doing well since. She continues to follow with her transplant hepatology team.   She currently takes tacrolimus.     Her blood pressure in clinic is elevated but she is not on any antihypertensive medications.  She notes that her blood pressure has been worsening over the past year.    She was seen by urology recently for microscopic intermittent hematuria.  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    Overall, she feels well.  No specific complaint during this admission.  She denies any lower extremity edema or any urinary symptoms.  She denies increased output of  "her ileostomy.  She denies any nausea or vomiting or and states.  She denies any chest pain or shortness of breath.  She denies any skin rash, sinus pain, joint pain or change in weight.    Obstetric hx:[id number]  Pregnancy- delivered No 2021, 32 weeks, severe preeclamsia, growing normal, small baby    Social history: She is  and lives with her  in a temporary, close to her parents.  Her daughter Joanie was born in .  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  She does not smoke or drink.  She has 1 sister and 1 nephew and 1 niece.    Family history significant for kidney stones in both parents.  No CKD otherwise    Allergies   Allergen Reactions    Rifampin Other (See Comments)     Kidney failure    Penicillins      Bad hives when she was in college       Current Outpatient Medications   Medication Sig Dispense Refill    Multiple Vitamin (MULTIVITAMIN PO) Take 1 tablet by mouth daily      order for DME [employer] item #s   45520 and 01416 30 Bag 3    order for DME Increase the amount to 10 per month  --2\" barrier ring #7805 10 each 0    predniSONE (DELTASONE) 5 MG tablet Take 1 tablet (5 mg) by mouth daily. 90 tablet 3    tacrolimus (GENERIC EQUIVALENT) 1 MG capsule Take 2 capsules (2 mg) by mouth every morning AND 2 capsules (2 mg) every evening. 360 capsule 3    thin (NO BRAND SPECIFIED) lancets Use with lanceting device. To accompany: Blood Glucose Monitor Brands: per insurance. 100 each 6    tretinoin (RETIN-A) 0.025 % external cream Apply pea-sized amount to face at bedtime. Start every other night for 1-2 weeks, then increase to nightly as tolerated. 45 g 11    ursodiol (ACTIGALL) 250 MG tablet Take 2 tablets (500 mg) by mouth every morning AND 1 tablet (250 mg) every evening. 270 tablet 3     No current facility-administered medications for this visit.       Past Medical History:   Diagnosis Date    Acute kidney injury 2016    Anemia, iron " deficiency 2017    On iron supplementation    Autoimmune hepatitis (H)     on steroid taper    Cholangitis (H)     CKD (chronic kidney disease) 2012    biopsy 2012: TIN, patchy fibrosis. Dialysis in 2013 x2 months.    CMV colitis (H) 2016    Esophageal varices (H) 2008    banded    Heart murmur     History of blood transfusion     Primary sclerosing cholangitis (H)     SBP (spontaneous bacterial peritonitis) (H) 2015    Ulcerative Colitis     f/b GI       Past Surgical History:   Procedure Laterality Date     SECTION N/A 2021    Procedure:  SECTION;  Surgeon: Efrain Bill MD;  [facility]    CHOLECYSTECTOMY      COLONOSCOPY  2007    COLONOSCOPY N/A 2015    Procedure: COMBINED COLONOSCOPY, SINGLE OR MULTIPLE BIOPSY/POLYPECTOMY BY BIOPSY;  Surgeon: Mitchel Hoskins Chi, MD;  [facility]    COLONOSCOPY N/A 2016    Procedure: COMBINED COLONOSCOPY, SINGLE OR MULTIPLE BIOPSY/POLYPECTOMY BY BIOPSY;  Surgeon: Rigo Valles MD;  [facility]    COLONOSCOPY N/A 2016    Procedure: COMBINED COLONOSCOPY, SINGLE OR MULTIPLE BIOPSY/POLYPECTOMY BY BIOPSY;  Surgeon: Kareem Solis MD;  [facility]    COLONOSCOPY N/A 2017    Procedure: COMBINED COLONOSCOPY, SINGLE OR MULTIPLE BIOPSY/POLYPECTOMY BY BIOPSY;  Colonoscopy;  Surgeon: Fuentes Johnson MD;  [facility]    COLONOSCOPY N/A 2019    Procedure: COMBINED COLONOSCOPY, SINGLE OR MULTIPLE BIOPSY/POLYPECTOMY BY BIOPSY;  Surgeon: Sophy Mckeon MD;  [facility]    DILATION AND CURETTAGE, HYSTEROSCOPY DIAGNOSTIC, COMBINED N/A 2021    Procedure: HYSTEROSCOPY, DIAGNOSTIC;  Surgeon: Lin Armendariz MD;  [facility]    ENDOSCOPIC RETROGRADE CHOLANGIOPANCREATOGRAM N/A 2015    Procedure: COMBINED ENDOSCOPIC RETROGRADE CHOLANGIOPANCREATOGRAPHY, PLACE TUBE/STENT;  Surgeon: Landon Quinones MD;  [facility]    ENDOSCOPIC  RETROGRADE CHOLANGIOPANCREATOGRAM N/A 06/25/2015    Procedure: COMBINED ENDOSCOPIC RETROGRADE CHOLANGIOPANCREATOGRAPHY, PLACE TUBE/STENT;  Surgeon: Landon Quinones MD;  [facility]    ENDOSCOPIC RETROGRADE CHOLANGIOPANCREATOGRAM N/A 07/02/2015    Procedure: COMBINED ENDOSCOPIC RETROGRADE CHOLANGIOPANCREATOGRAPHY, PLACE TUBE/STENT;  Surgeon: Landon Quinones MD;  [facility]    ENDOSCOPIC RETROGRADE CHOLANGIOPANCREATOGRAM N/A 09/08/2015    Procedure: COMBINED ENDOSCOPIC RETROGRADE CHOLANGIOPANCREATOGRAPHY, REMOVE FOREIGN BODY OR STENT/TUBE;  Surgeon: Landon Quinones MD;  [facility]    ENDOSCOPIC RETROGRADE CHOLANGIOPANCREATOGRAM N/A 12/08/2015    Procedure: ENDOSCOPIC RETROGRADE CHOLANGIOPANCREATOGRAM;  Surgeon: Landon Quinones MD;  [facility]    ENDOSCOPIC RETROGRADE CHOLANGIOPANCREATOGRAM N/A 03/01/2016    Procedure: COMBINED ENDOSCOPIC RETROGRADE CHOLANGIOPANCREATOGRAPHY, REMOVE FOREIGN BODY OR STENT/TUBE;  Surgeon: Landon Quinones MD;  [facility]    ENDOSCOPIC RETROGRADE CHOLANGIOPANCREATOGRAM N/A 03/20/2017    Procedure: COMBINED ENDOSCOPIC RETROGRADE CHOLANGIOPANCREATOGRAPHY, PLACE TUBE/STENT;  Endoscopic Retrograde Cholangiopancreatogram with Ballon Dilation, Stent Placement;  Surgeon: Guru Brittany Macias MD;  [facility]    ENDOSCOPIC RETROGRADE CHOLANGIOPANCREATOGRAM N/A 04/02/2018    Procedure: COMBINED ENDOSCOPIC RETROGRADE CHOLANGIOPANCREATOGRAPHY, PLACE TUBE/STENT;  Endoscopic Retrograde Cholangiopancreatogram with biliary dilation and stent placement;  Surgeon: Guru Brittany Macias MD;  [facility]    ENDOSCOPIC RETROGRADE CHOLANGIOPANCREATOGRAM N/A 05/07/2018    Procedure: ENDOSCOPIC RETROGRADE CHOLANGIOPANCREATOGRAM;  Endoscopic Retrograde Cholangiopancreatogram ballon dilation stent exchange;  Surgeon: Guru Brittany Macias MD;  [facility]    ENDOSCOPIC RETROGRADE  CHOLANGIOPANCREATOGRAPHY, EXCHANGE TUBE/STENT N/A 07/30/2015    Procedure: ENDOSCOPIC RETROGRADE CHOLANGIOPANCREATOGRAPHY, EXCHANGE TUBE/STENT;  Surgeon: Landon Quinones MD;  [facility]    ENDOSCOPIC RETROGRADE CHOLANGIOPANCREATOGRAPHY, EXCHANGE TUBE/STENT N/A 05/15/2017    Procedure: ENDOSCOPIC RETROGRADE CHOLANGIOPANCREATOGRAPHY, EXCHANGE TUBE/STENT;  Endoscopic Retrograde Cholangiopancreatogram with biliary stent exchange and balloon dilation;  Surgeon: Guru Brittany Macias MD;  [facility]    ENDOSCOPIC RETROGRADE CHOLANGIOPANCREATOGRAPHY, EXCHANGE TUBE/STENT N/A 06/25/2018    Procedure: ENDOSCOPIC RETROGRADE CHOLANGIOPANCREATOGRAPHY, EXCHANGE TUBE/STENT;  Endoscopic Retrograde Cholangiopancreatogram with biliary dilation, stone removal and stent exchange;  Surgeon: Guru Brittany Macias MD;  [facility]    ENDOSCOPIC RETROGRADE CHOLANGIOPANCREATOGRAPHY, EXCHANGE TUBE/STENT N/A 07/30/2018    Procedure: ENDOSCOPIC RETROGRADE CHOLANGIOPANCREATOGRAPHY, EXCHANGE TUBE/STENT;  Endoscopic Retrograde Cholangiopancreatogram with Stent Exchange with dilation;  Surgeon: Guru Brittany Macias MD;  [facility]    ENDOSCOPIC RETROGRADE CHOLANGIOPANCREATOGRAPHY, EXCHANGE TUBE/STENT N/A 09/26/2018    Procedure: ENDOSCOPIC RETROGRADE CHOLANGIOPANCREATOGRAPHY, EXCHANGE TUBE/STENT;  Endoscopic Retrograde Cholangiopancreatogram, with bile duct stent exchanged balloon dilation and balloon sweep of bile duct;  Surgeon: Guru Brittany Macias MD;  [facility]    ESOPHAGOSCOPY, GASTROSCOPY, DUODENOSCOPY (EGD), COMBINED N/A 02/26/2015    Procedure: COMBINED ESOPHAGOSCOPY, GASTROSCOPY, DUODENOSCOPY (EGD);  Surgeon: Mitchel Hoskins Chi, MD;  [facility]    EXPLORE COMMON BILE DUCT N/A 06/25/2015    Procedure: EXPLORE COMMON BILE DUCT;  Surgeon: Tyree Smith MD;  [facility]    HERNIA REPAIR  2015    During transplant    ILEOSTOMY N/A  2019    Procedure: Ileostomy;  Surgeon: Krzysztof Carney MD;  [facility]    IR LIVER BIOPSY PERCUTANEOUS  2022    LAPAROSCOPIC ASSISTED COLECTOMY N/A 2019    Procedure: Laparoscopic Total Abdominal Colectomy, lysis of adhesions;  Surgeon: Krzysztof Carney MD;  [facility]    LAPAROSCOPIC LYSIS ADHESIONS N/A 2021    Procedure: LYSIS, ADHESIONS;  Surgeon: Lin Armendariz MD;  [facility]    LAPAROTOMY EXPLORATORY N/A 2015    Procedure: LAPAROTOMY EXPLORATORY;  Surgeon: Tyree Smith MD;  [facility]    PICC INSERTION Right 2015    4fr SL Valved PICC, 36cm (1cm external) in the R medial brachial vein w/ tip in the low SVC.    SIGMOIDOSCOPY FLEXIBLE N/A 2016    Procedure: SIGMOIDOSCOPY FLEXIBLE;  Surgeon: Jose Nielson MD;  [facility]    SIGMOIDOSCOPY FLEXIBLE N/A 2023    Procedure: SIGMOIDOSCOPY, FLEXIBLE WITH BIOPSIES;  Surgeon: Sophy Mckeon MD;  [facility]    TRANSPLANT LIVER RECIPIENT LIVING UNRELATED N/A 2015    Procedure: TRANSPLANT LIVER RECIPIENT LIVING UNRELATED;  Surgeon: Tyree Smith MD;  [facility]    UPPER GI ENDOSCOPY         Social History     Tobacco Use    Smoking status: Never    Smokeless tobacco: Never   Vaping Use    Vaping status: Never Used   Substance Use Topics    Alcohol use: Not Currently     Alcohol/week: 0.0 standard drinks of alcohol     Comment: Every other month has 1 drink    Drug use: No       Family History   Problem Relation Age of Onset    Hypertension Mother     Lipids Mother     Sleep Apnea Mother     Heart Disease Mother     Hypertension Maternal Grandmother             Alzheimer Disease Maternal Grandmother     Lipids Father     Heart Disease Maternal Grandfather 40    Heart Disease Paternal Grandfather     Melanoma No family hx of     Skin Cancer No family hx of     Cancer No family hx of     Diabetes No family hx of        ROS: A 12 system review of systems was  negative other than noted here or above.     Exam:  BP (!) 145/98   Pulse 86   Temp 98.7  F (37.1  C)   Wt 46.7 kg (103 lb)   SpO2 99%   BMI 18.25 kg/m    BP Readings from Last 6 Encounters:   03/27/25 (!) 145/98   11/04/24 (!) 157/103   10/24/23 (!) 157/95   04/14/23 126/88   01/30/23 (!) 131/91   09/21/22 134/86     GENERAL APPEARANCE: alert and no distress  EYES: PERRL  HENT: mouth without ulcers or lesions  NECK: supple, no adenopathy  RESP: lungs clear to auscultation - no rales, rhonchi or wheezes  CV: regular rhythm, normal rate, no rub   ABDOMEN:  soft, nontender, no HSM or masses and bowel sounds normal  MS: extremities normal- no gross deformities noted, no evidence of inflammation in joints, no muscle tenderness  SKIN: no rash  NEURO: Normal strength and tone, sensory exam grossly normal, mentation intact and speech normal  PSYCH: mentation appears normal. and affect normal/bright    Results: Reviewed in details with the patient    Assessment/Plan:  Mrs. Penny is a 39 year old female with autoimmune hepatitis and primary sclerosing cholangitis status post living donor liver transplant [her ] in 2015, ulcerative colitis S/P  colectomy and ileostomy in 2018, history of CMV colitis and viremia, history of severe preeclampsia with her only pregnancy in 2021, who is being seen nephrology clinic today for CKD 3B. She was planning to pursue embryo transfer but she is so MTM recently who was not in favor at this point.      Problem #1 proteinuric CKD 3B:  She has sustained several acute kidney injury over the past several years since her liver) on the 15th most of the time her creatinine will go down to a baseline.  Her most recent creatinine in 2022 was 1 mg/dL and since then it has been steadily going up.  Her most recent creatinine today is 2.1 mg/dL with an EGFR of 30 mL/min.  It is also noted that she has had proteinuria around 0.8 g obtained summary of 2024 when her creatinine started  worsening and her most recent creatinine today is.  She did have some intermittent hematuria over the years but her most recent UA is negative.  She had a recent CT scan recently which showed medullary nephro calcinosis but no signs of obstruction and no definite stones.  - I assume that because of her chronic kidney disease multifactorial including residual scarring from previous ESTUARDO, medullary nephrocalcinosis, nephrotoxicity from tacrolimus... However, over the past 2 years she has rather a rapid decline in her GFR was worsening her proteinuria.  That together with intermittent hematuria might suggest some glomerular pathology like IgA nephropathy.  - I did discuss with her today that the gold standard to diagnose the cause of her CKD is by doing a kidney biopsy to rule out other concomitant causes that can cause worsening proteinuria and CKD.  Of note she had absence of fibrosis and also at risk of increased calcium oxalate stones.  - The risks and benefits of a kidney biopsy was explained in details to her.  The risk of significant bleeding requiring transfusion or surgical intervention is around 1 in 600.  She is agreeable to the biopsy and we shall proceed with that.  She is currently not on any blood thinners.  We will have her blood pressure control before her anticipate biopsy.  -Started today on lisinopril 5 mg daily to be titrated up.  - She might benefit from adding SGLT2 inhibitor at a later stage.    Problem #2 family-planning:  We discussed in details today if she decide to pursue embryo transfer at this point she would be high risk pregnancy given her reduced GFR of 30 mg together with her proteinuria 2 g/g, in addition to her hypertension.  We did discuss today that there is a significant increased risk of worsening kidney function and the small risk of needing hemodialysis during pregnancy that might be extended to be on pregnancy.  I did also explained that if she required hemodialysis during  pregnancy that would be a 5-6 times per week.   We also discussed today that she is at increased risk for preeclampsia given her advanced kidney disease as well as her history of severe preeclampsia before.  On the other hand, we did discuss that if she decides to pursue pregnancy, she will need to be on aspirin for preeclampsia prophylaxis and we will have to reduce her proteinuria by lisinopril or any other RAASi until the day of the implantation.  She does not seem to be interested in pursuing pregnancy at this point.  She is rather concerned about the high risk especially with her 3-year-old daughter.  I did ask her to discuss all this information with her .  She is currently not using any contraception.  Though her risk of becoming pregnant is very low, She is not zero.  I emphasized that if she decided not to pursue any pregnancy, then she will need to be on effective    Problem #3 hypertension secondary to chronic kidney disease:  Her blood pressure has been recently elevated and this seems to be concomitant with the worsening in her kidney function.  She is not on any medications at the moment.  Good blood pressure is 120/80.  - Started on lisinopril 10 mg daily.  The patient was instructed to measure blood pressure twice daily and send me a message in 3 weeks.  I do expect that she might need a higher dose.  - I did discuss with her today that is expected to see a small rise in creatinine with lisinopril up to 30% and that is very acceptable    Problem #4 medullary nephrocalcinosis:  Given her history of ulcerative colitis, she is at increased risk of calcium oxalate as stones.  Of note.  He has a parathyroid hormone of 99 back in 2000 and with no repeat.  Nonetheless her calcium and phosphorus are within normal limits currently.  - I will do a 24-hour urine collection to check for urinary calcium [[facility]]  - Will check vitamin D and PTH hormone.    Problem #5 risk for osteoporosis:  Given her  long-term use of prednisone and tacrolimus together with a previous reading of a PTH of 99 and nephrocalcinosis, I ordered for her today a DEXA scan to look for osteopenia or osteoporosis.    The total time of this encounter amounted to ***minutes on the day of the encounter 3/27/2025. This time included face to face time spent with the patient, preparatory work and chart review, ordering tests, and performing post visit documentation.    The longitudinal plan of care for this patient was addressed during this visit. Due to the added complexity in care, I will continue to support the patient with subsequent management of this condition(s) and with the ongoing continuity of care of this condition(s).     *This dictation was prepared in part using Dragon recognition software.  As a result errors may occur. When identified these transcription errors have been corrected.  While every attempt is made to correct errors during dictation, errors may still exist.     Tim Banda MD   [facility]   Department of Medicine   Division of Renal Disease and Hypertension

## 2025-03-27 NOTE — PROGRESS NOTES
Tim Banda MD Forrest, Rebecca J, RN  Can you please help schedule a kidney biopsy by IR? Thanks          Tim Banda MD Forrest, Rebecca J RN  She needs a [facility]. Thanks

## 2025-04-01 ENCOUNTER — MYC MEDICAL ADVICE (OUTPATIENT)
Dept: INTERVENTIONAL RADIOLOGY/VASCULAR | Facility: CLINIC | Age: 40
End: 2025-04-01
Payer: COMMERCIAL

## 2025-04-02 ENCOUNTER — HOSPITAL ENCOUNTER (OUTPATIENT)
Dept: BONE DENSITY | Facility: CLINIC | Age: 40
Discharge: HOME | End: 2025-04-02
Attending: INTERNAL MEDICINE
Payer: COMMERCIAL

## 2025-04-02 DIAGNOSIS — Z94.4 LIVER REPLACED BY TRANSPLANT (H): ICD-10-CM

## 2025-04-02 PROCEDURE — 77080 DXA BONE DENSITY AXIAL: CPT

## 2025-04-03 DIAGNOSIS — M85.80 OSTEOPENIA, UNSPECIFIED LOCATION: Primary | ICD-10-CM

## 2025-04-08 ENCOUNTER — TRANSFERRED RECORDS (OUTPATIENT)
Dept: HEALTH INFORMATION MANAGEMENT | Facility: CLINIC | Age: 40
End: 2025-04-08
Payer: COMMERCIAL

## 2025-04-08 NOTE — TELEPHONE ENCOUNTER
Detailed Voicemail message left with request for patient to contact Interventional Radiology Department and acknowledge understanding of MYCHART instructions that were sent in anticipation of procedure scheduled 4/10/25.      I have specified in VMM that patient should take HTN medications as prescribed on morning of procedure.    IR contact number provided in VMM.        Kelly COPPOLA  Interventional Radiology RN

## 2025-04-10 ENCOUNTER — APPOINTMENT (OUTPATIENT)
Dept: MEDSURG UNIT | Facility: CLINIC | Age: 40
End: 2025-04-10
Attending: RADIOLOGY
Payer: COMMERCIAL

## 2025-04-10 ENCOUNTER — APPOINTMENT (OUTPATIENT)
Dept: INTERVENTIONAL RADIOLOGY/VASCULAR | Facility: CLINIC | Age: 40
End: 2025-04-10
Attending: INTERNAL MEDICINE
Payer: COMMERCIAL

## 2025-04-10 ENCOUNTER — HOSPITAL ENCOUNTER (OUTPATIENT)
Facility: CLINIC | Age: 40
Discharge: HOME | End: 2025-04-10
Attending: RADIOLOGY | Admitting: RADIOLOGY
Payer: COMMERCIAL

## 2025-04-10 VITALS
SYSTOLIC BLOOD PRESSURE: 102 MMHG | WEIGHT: 97.8 LBS | DIASTOLIC BLOOD PRESSURE: 76 MMHG | TEMPERATURE: 98.7 F | OXYGEN SATURATION: 100 % | BODY MASS INDEX: 17.33 KG/M2 | RESPIRATION RATE: 16 BRPM | HEIGHT: 63 IN | HEART RATE: 76 BPM

## 2025-04-10 DIAGNOSIS — R79.89 ELEVATED SERUM CREATININE: ICD-10-CM

## 2025-04-10 LAB
HCG UR QL: NEGATIVE
INR PPP: 0.91 (ref 0.85–1.15)

## 2025-04-10 PROCEDURE — 76942 ECHO GUIDE FOR BIOPSY: CPT | Mod: 26 | Performed by: RADIOLOGY

## 2025-04-10 PROCEDURE — 85610 PROTHROMBIN TIME: CPT | Performed by: PHYSICIAN ASSISTANT

## 2025-04-10 PROCEDURE — 88305 TISSUE EXAM BY PATHOLOGIST: CPT | Mod: 26 | Performed by: PATHOLOGY

## 2025-04-10 PROCEDURE — 99152 MOD SED SAME PHYS/QHP 5/>YRS: CPT

## 2025-04-10 PROCEDURE — 88346 IMFLUOR 1ST 1ANTB STAIN PX: CPT | Mod: TC | Performed by: INTERNAL MEDICINE

## 2025-04-10 PROCEDURE — 76942 ECHO GUIDE FOR BIOPSY: CPT

## 2025-04-10 PROCEDURE — 88346 IMFLUOR 1ST 1ANTB STAIN PX: CPT | Mod: 26 | Performed by: PATHOLOGY

## 2025-04-10 PROCEDURE — 50200 RENAL BIOPSY PERQ: CPT | Mod: RT | Performed by: RADIOLOGY

## 2025-04-10 PROCEDURE — 88313 SPECIAL STAINS GROUP 2: CPT | Mod: 26 | Performed by: PATHOLOGY

## 2025-04-10 PROCEDURE — 88350 IMFLUOR EA ADDL 1ANTB STN PX: CPT | Mod: 26 | Performed by: PATHOLOGY

## 2025-04-10 PROCEDURE — 88348 ELECTRON MICROSCOPY DX: CPT | Mod: 26 | Performed by: PATHOLOGY

## 2025-04-10 PROCEDURE — 36415 COLL VENOUS BLD VENIPUNCTURE: CPT | Performed by: PHYSICIAN ASSISTANT

## 2025-04-10 PROCEDURE — 250N000009 HC RX 250: Mod: JW | Performed by: STUDENT IN AN ORGANIZED HEALTH CARE EDUCATION/TRAINING PROGRAM

## 2025-04-10 PROCEDURE — 250N000011 HC RX IP 250 OP 636: Performed by: STUDENT IN AN ORGANIZED HEALTH CARE EDUCATION/TRAINING PROGRAM

## 2025-04-10 PROCEDURE — 81025 URINE PREGNANCY TEST: CPT | Performed by: PHYSICIAN ASSISTANT

## 2025-04-10 PROCEDURE — 99152 MOD SED SAME PHYS/QHP 5/>YRS: CPT | Mod: GC | Performed by: RADIOLOGY

## 2025-04-10 PROCEDURE — 999N000133 HC STATISTIC PP CARE STAGE 2

## 2025-04-10 PROCEDURE — 999N000142 HC STATISTIC PROCEDURE PREP ONLY

## 2025-04-10 RX ORDER — NALOXONE HYDROCHLORIDE 0.4 MG/ML
0.2 INJECTION, SOLUTION INTRAMUSCULAR; INTRAVENOUS; SUBCUTANEOUS
Status: DISCONTINUED | OUTPATIENT
Start: 2025-04-10 | End: 2025-04-10 | Stop reason: HOSPADM

## 2025-04-10 RX ORDER — FLUMAZENIL 0.1 MG/ML
0.2 INJECTION, SOLUTION INTRAVENOUS
Status: DISCONTINUED | OUTPATIENT
Start: 2025-04-10 | End: 2025-04-10 | Stop reason: HOSPADM

## 2025-04-10 RX ORDER — NALOXONE HYDROCHLORIDE 0.4 MG/ML
0.4 INJECTION, SOLUTION INTRAMUSCULAR; INTRAVENOUS; SUBCUTANEOUS
Status: DISCONTINUED | OUTPATIENT
Start: 2025-04-10 | End: 2025-04-10 | Stop reason: HOSPADM

## 2025-04-10 RX ORDER — FENTANYL CITRATE 50 UG/ML
25-50 INJECTION, SOLUTION INTRAMUSCULAR; INTRAVENOUS EVERY 5 MIN PRN
Status: DISCONTINUED | OUTPATIENT
Start: 2025-04-10 | End: 2025-04-10 | Stop reason: HOSPADM

## 2025-04-10 RX ORDER — LIDOCAINE 40 MG/G
CREAM TOPICAL
Status: DISCONTINUED | OUTPATIENT
Start: 2025-04-10 | End: 2025-04-10 | Stop reason: HOSPADM

## 2025-04-10 RX ADMIN — FENTANYL CITRATE 50 MCG: 50 INJECTION, SOLUTION INTRAMUSCULAR; INTRAVENOUS at 12:28

## 2025-04-10 RX ADMIN — FENTANYL CITRATE 50 MCG: 50 INJECTION, SOLUTION INTRAMUSCULAR; INTRAVENOUS at 12:33

## 2025-04-10 RX ADMIN — LIDOCAINE HYDROCHLORIDE 10 ML: 10 INJECTION, SOLUTION EPIDURAL; INFILTRATION; INTRACAUDAL; PERINEURAL at 12:57

## 2025-04-10 RX ADMIN — MIDAZOLAM 1 MG: 1 INJECTION INTRAMUSCULAR; INTRAVENOUS at 12:33

## 2025-04-10 RX ADMIN — MIDAZOLAM 1 MG: 1 INJECTION INTRAMUSCULAR; INTRAVENOUS at 12:28

## 2025-04-10 ASSESSMENT — ACTIVITIES OF DAILY LIVING (ADL)
ADLS_ACUITY_SCORE: 50

## 2025-04-10 NOTE — IR NOTE
Patient Name: Yarelis Penny  Medical Record Number: [id number]  Today's Date: 4/10/2025    Procedure: Right native renal biopsy  Proceduralist: Dr. Dimas, Dr. Loomis, Dr. Gillette  Pathology present: yes, renal path    Procedure Start: 1228  Procedure end: 1303  Sedation medications administered: Midazolam 2 mg, Fentanyl 100 mcg     Report given to: Kelly SANTO RN  : No    Other Notes: Pt arrived to IR room 6 from . Consent reviewed. Pt denies any questions or concerns regarding procedure. Pt positioned prone and monitored per protocol. Pt tolerated procedure without any noted complications. Pt transferred back to .

## 2025-04-10 NOTE — PROGRESS NOTES
Patient prepped for renal biopsy. VSS. INR and HCG urine test pending. PIV in right upper arm. Appropriately NPO. Has ileostomy.  Ceferino with patient. Needs consent.

## 2025-04-10 NOTE — DISCHARGE INSTRUCTIONS
[facility]    Interventional Radiology  Patient Instructions Following Kidney Biopsy    AFTER YOU GO HOME  If you were given sedation, for the first 24 hours: we recommend that an adult stay with you, DO NOT drive or operate machinery at home or at work, DO NOT smoke, DO NOT make any important or legal decisions.  DO NOT drink alcoholic beverages the day of your procedure  Drink plenty of fluids   Resume your regular diet, unless otherwise instructed by your Primary Physician  Relax and take it easy for 48 hours  DO NOT do any strenuous exercise or lifting (> 10 lbs) for at least 3 days following your procedure  Keep the dressing dry and in place for 24 hours. Replace with Band aid for 2 days.  Never leave a wet dressing in place.  Do not take a shower for at least 12 hours following your procedure. No tub bath, hot tub, or swimming for 5 days.  There should be minimum drainage from the biopsy site    CALL THE PHYSICIAN IF:  You start bleeding from the procedure site.  If you do start to bleed from that site, hold pressure on the site for a minimum of 10 minutes.  Your physician will tell you if you need to return to the hospital  You develop nausea or vomiting  You have excessive swelling, redness, or tenderness at the site  You have drainage that looks like it is infected.  You experience severe pain  You develop hives or a rash or unexplained itching  You develop a temperature of 101 degrees F or greater  You develop bloody clots or red urine after you are discharged    ADDITIONAL INSTRUCTIONS: None    [facility] INTERVENTIONAL RADIOLOGY DEPARTMENT  Procedure Physician:     Dr. Loomis           Date of procedure: April 10, 2025  Telephone Numbers: [phone]    Monday-Friday 7:30 am to 4:00 pm  [phone]  After 4:00 pm Monday-Friday, Weekends & Holidays. Ask for the Interventional Radiologist on call.  Someone is on call 24 hrs/day  [facility] toll free number: [phone] Monday-Friday 8:00 am to  4:30 pm  [facility] Emergency Dept: [phone]

## 2025-04-10 NOTE — PRE-PROCEDURE
GENERAL PRE-PROCEDURE:   Procedure:  Native kidney biopsy nonlesional  Date/Time:  4/10/2025 11:46 AM    Verbal consent obtained?: Yes    Written consent obtained?: Yes    Risks and benefits: Risks, benefits and alternatives were discussed    Consent given by:  Patient  Patient states understanding of procedure being performed: Yes    Patient's understanding of procedure matches consent: Yes    Procedure consent matches procedure scheduled: Yes    Expected level of sedation:  Moderate  Appropriately NPO:  Yes  ASA Class:  1  Mallampati  :  Grade 1- soft palate, uvula, tonsillar pillars, and posterior pharyngeal wall visible  Lungs:  Lungs clear with good breath sounds bilaterally  Heart:  Normal heart sounds and rate  History & Physical reviewed:  History and physical reviewed and no updates needed  Statement of review:  I have reviewed the lab findings, diagnostic data, medications, and the plan for sedation

## 2025-04-10 NOTE — PROCEDURES
[employer]    Procedure: IR Procedure Note    Date/Time: 4/10/2025 1:05 PM    Performed by: Holley Loomis DO  Authorized by: Matt Dimas MD  IR Fellow Physician:    Pre Procedure Diagnosis: medullary nephrocalcinosis  Post Procedure Diagnosis: same    UNIVERSAL PROTOCOL   Site Marked: Yes  Prior Images Obtained and Reviewed:  Yes  Required items: Required blood products, implants, devices and special equipment available    Patient identity confirmed:  Arm band, provided demographic data, hospital-assigned identification number and verbally with patient  Patient was reevaluated immediately before administering moderate or deep sedation or anesthesia  Confirmation Checklist:  Correct equipment/implants were available, procedure was appropriate and matched the consent or emergent situation, relevant allergies and patient's identity using two indicators  Time out: Immediately prior to the procedure a time out was called    Universal Protocol: the Joint Commission Universal Protocol was followed    Preparation: Patient was prepped and draped in usual sterile fashion    ESBL (mL):  1     ANESTHESIA    Anesthesia:  Local infiltration  Local Anesthetic:  Lidocaine 1% without epinephrine  Anesthetic Total (mL):  10      SEDATION  Patient Sedated: Yes    Sedation Type:  Moderate (conscious) sedation  Sedation:  Fentanyl and midazolam  Vital signs: Vital signs monitored during sedation    See dictated procedure note for full details.  Findings: Right native kidney biopsied. 6 Cores obtained. Tract embolized with gelfoam. No immediate complications.     Specimens: core needle biopsy specimens sent for pathological analysis    Procedural Complications: None    Condition: Stable    Plan: -2 hour bedrest      PROCEDURE    Patient Tolerance:  Patient tolerated the procedure well with no immediate complications  Length of time physician/provider present for 1:1 monitoring during  sedation:  23-37 min

## 2025-04-10 NOTE — PROGRESS NOTES
Patient completed bedrest. VSS. Ambulated, voided clear yellow urine. Right flank site intact, no bleeding or swelling, denies pain. Discharge instructions printed and gone over with patient. Escorted out with  who is driving patient home.

## 2025-04-10 NOTE — PROGRESS NOTES
Patient returned to 2A s/p right native kidney biopsy. VSS. Right lower flank procedure site covered with primapore, no bleeding or swelling. Eating and drinking. Bedrest for 2 hours.  Will discharge once meeting criteria.

## 2025-04-15 ENCOUNTER — MYC MEDICAL ADVICE (OUTPATIENT)
Dept: TRANSPLANT | Facility: CLINIC | Age: 40
End: 2025-04-15
Payer: COMMERCIAL

## 2025-04-16 NOTE — TELEPHONE ENCOUNTER
Patient was updated today about her kidney biopsy.    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  Her blood pressure has been around 100/70 mmHg.  She is going to repeat labs next week and based on these labs we will decide whether we need to add Jardiance or another immunomodulatory agent.    Patient is agreeable to the plan.  Tim Banda MD   [facility]   Department of Medicine   Division of Renal Disease and Hypertension

## 2025-04-19 ENCOUNTER — HEALTH MAINTENANCE LETTER (OUTPATIENT)
Age: 40
End: 2025-04-19

## 2025-04-24 ENCOUNTER — TELEPHONE (OUTPATIENT)
Dept: NEPHROLOGY | Facility: CLINIC | Age: 40
End: 2025-04-24
Payer: COMMERCIAL

## 2025-04-24 NOTE — TELEPHONE ENCOUNTER
Patient confirmed scheduled appointment:  Date: 7/9/25  Time: 1:30PM  Visit type: RETURN GLOM  Provider: LENARD  [place] [facility] [place]  Testing/imaging: N/A  Additional notes: N/A

## 2025-04-25 ENCOUNTER — TELEPHONE (OUTPATIENT)
Dept: TRANSPLANT | Facility: CLINIC | Age: 40
End: 2025-04-25

## 2025-04-25 DIAGNOSIS — Z94.4 LIVER TRANSPLANT RECIPIENT (H): Primary | ICD-10-CM

## 2025-04-25 DIAGNOSIS — Z94.4 LIVER TRANSPLANTED (H): ICD-10-CM

## 2025-04-25 NOTE — TELEPHONE ENCOUNTER
ISSUE:   Tacrolimus IR level 2.1 on 4/25, goal 3-5, dose 2 mg BID.    PLAN:   Call Patient and confirm this was an accurate 12-hour trough.   Verify Tacrolimus IR dose 2 mg BID.   Confirm no new medications or or missed doses.   Confirm no new illness / infection / diarrhea.   If accurate trough and accurate dose, increase Tacrolimus IR dose to 2 mg AM, 3 mg PM.     Is this more than a 50% increase or decrease in current IS dose: No    Repeat labs in 2 weeks.    Virgie Mcgowan RN      OUTCOME:   Spoke with Patient, they confirm accurate trough level and current dose 2 mg BID.   Patient confirmed dose change to 2 mg am, 3 mg pm.  Patient agreed to repeat labs in 2 weeks.   Orders sent to preferred pharmacy for dose change and lab for repeat labs.   Patient voiced understanding of plan.       Johnna Navas LPN

## 2025-04-28 RX ORDER — TACROLIMUS 1 MG/1
CAPSULE ORAL
Qty: 450 CAPSULE | Refills: 3 | Status: SHIPPED | OUTPATIENT
Start: 2025-04-28

## 2025-04-29 ENCOUNTER — PATIENT OUTREACH (OUTPATIENT)
Dept: NEPHROLOGY | Facility: CLINIC | Age: 40
End: 2025-04-29
Payer: COMMERCIAL

## 2025-05-07 ENCOUNTER — LAB (OUTPATIENT)
Dept: LAB | Facility: CLINIC | Age: 40
End: 2025-05-07
Payer: COMMERCIAL

## 2025-05-07 ENCOUNTER — RESULTS FOLLOW-UP (OUTPATIENT)
Dept: MULTI SPECIALTY CLINIC | Facility: CLINIC | Age: 40
End: 2025-05-07

## 2025-05-07 DIAGNOSIS — E87.20 METABOLIC ACIDOSIS: ICD-10-CM

## 2025-05-07 LAB
ALBUMIN SERPL BCG-MCNC: 4 G/DL (ref 3.5–5.2)
ANION GAP SERPL CALCULATED.3IONS-SCNC: 13 MMOL/L (ref 7–15)
BUN SERPL-MCNC: 63.1 MG/DL (ref 6–20)
CALCIUM SERPL-MCNC: 9.3 MG/DL (ref 8.8–10.4)
CHLORIDE SERPL-SCNC: 109 MMOL/L (ref 98–107)
CREAT SERPL-MCNC: 2.54 MG/DL (ref 0.51–0.95)
EGFRCR SERPLBLD CKD-EPI 2021: 24 ML/MIN/1.73M2
GLUCOSE SERPL-MCNC: 93 MG/DL (ref 70–99)
HCO3 SERPL-SCNC: 16 MMOL/L (ref 22–29)
PHOSPHATE SERPL-MCNC: 4.9 MG/DL (ref 2.5–4.5)
POTASSIUM SERPL-SCNC: 5.1 MMOL/L (ref 3.4–5.3)
SODIUM SERPL-SCNC: 138 MMOL/L (ref 135–145)

## 2025-05-07 PROCEDURE — 80069 RENAL FUNCTION PANEL: CPT

## 2025-05-07 PROCEDURE — 36415 COLL VENOUS BLD VENIPUNCTURE: CPT

## 2025-05-07 NOTE — PROGRESS NOTES
----- Message from Evie sent at 5/7/2025 11:17 AM CDT -----  Regarding: Call back  Contact: [phone]  Type:  Needs Medical AdviceWho Called: PT Symptoms (please be specific): UTI How long has patient had these symptoms:  1 weekPharmacy name and phone #:  [id number]  [place] - [address]  AT [facility] [address]  Phone: [phone] Fax: [phone] the patient rather a call back or a response via [facility]? Call back Best Call Back Number:  [phone] Additional Information:   IP Diabetes Management  Daily Note           Assessment and Plan:   HPI:Yarelis is a 36 year old female currently 31w 4d pregnant who was admitted 21 with acute kidney injury superimposed on previously diagnosed pre-eclampsia. She has a complicated medical history including liver transplant for autoimmune hepatitis in , primary sclerosing cholangitis, ulcerative colitis s/p colectomy and ileostomy, and hx CMV colitis and viremia. This is an IVF pregnancy, further complicated as above by pre-eclampsia without severe features, severe intrauterine growth retardation, and diet controlled gestational diabetes. She was also recently diagnosed with COVID 19 on 10/28/12; she is fully vaccinated but had not yet received her booster. She is stable from a respiratory standpoint.        Assessment:   1) Gestational Diabetes- diet controlled  2) Steroid induced hyperglycemia (S/P betamethasone -11/3)     Plan:    -Novolog moderate intensity sliding scale >140 TID AC and >200 HS   -BG monitoring TID AC, HS, 0200   -hypoglycemia protocol   -carb counting protocol    For Discharge:    -do not anticipate routine BG monitoring or treatment of hyperglycemia will be needed.   -continue diet modification and increase in activity as tolerated. Pt will be breastfeeding.     Outpatient follow up: with PCP for 6 wk postpartum follow up GTT  Plan discussed with patient, bedside RN, and primary team paged.      DIABETES TEAM TO SIGN OFF TODAY    Interval History and Assessment: interval glucose trend reviewed:  Delivered via  yesterday afternoon.   Having high range BP. Asymptomatic  Monitoring Cr closely.     BG are reasonably controlled for postpartum targets. Carb coverage insulin was discontinued by other provider upon transfer to [facility]. BG trends are as below:         Current nutritional intake and type: Orders Placed This Encounter      Advance Diet as Tolerated: Regular Diet Adult    Steroid planning: s/p ELAINA  "11/2-11/3.     PTA Diabetes Regimen:   Frequency of checks: 4-5 times daily (fasting, pre-meal, 1 hr PP)  Diet controlled    Discharge Planning: TBD post delivery           Diabetes History:   Type of Diabetes: Gestational Diabetes Mellitus  Lab Results   Component Value Date    A1C 5.4 05/25/2021    A1C 5.1 06/15/2020    A1C 4.3 05/28/2013              Review of Systems:     The Review of Systems is negative other than noted in the Interval History.           Medications:     Current Facility-Administered Medications   Medication     acetaminophen (TYLENOL) tablet 650 mg     [START ON 11/6/2021] bisacodyl (DULCOLAX) Suppository 10 mg     bupivacaine liposome (EXPAREL) LA inj was given in the infiltration site to produce post-op analgesia. Duration of action is up to 72 hours. Other \"martir\" meds should not be given for 96 hours except for lidocaine 4% patch. This is for INFORMATION ONLY.     carboprost (HEMABATE) injection 250 mcg     dextrose 5% in lactated ringers infusion     glucose gel 15-30 g    Or     dextrose 50 % injection 25-50 mL    Or     glucagon injection 1 mg     glucose gel 15-30 g    Or     dextrose 50 % injection 25-50 mL    Or     glucagon injection 1 mg     diphenhydrAMINE (BENADRYL) capsule 25-50 mg     enoxaparin ANTICOAGULANT (LOVENOX) injection 40 mg     hydrocortisone 2.5 % cream     insulin aspart (NovoLOG) injection (RAPID ACTING)     insulin aspart (NovoLOG) injection (RAPID ACTING)     lanolin cream     lidocaine (LMX4) cream     lidocaine 1 % 0.1-1 mL     Measles, Mumps & Rubella Vac (MMR) injection 0.5 mL     methylergonovine (METHERGINE) injection 200 mcg     metoclopramide (REGLAN) injection 10 mg    Or     metoclopramide (REGLAN) tablet 10 mg     misoprostol (CYTOTEC) tablet 400 mcg    Or     misoprostol (CYTOTEC) tablet 800 mcg     naloxone (NARCAN) injection 0.2 mg    Or     naloxone (NARCAN) injection 0.4 mg    Or     naloxone (NARCAN) injection 0.2 mg    Or     naloxone (NARCAN) " "injection 0.4 mg     No MMR Needed -  Assessment: Patient does not need MMR vaccine     No Tdap Needed - Assessment: Patient does not need Tdap vaccine     ondansetron (ZOFRAN-ODT) ODT tab 4 mg    Or     ondansetron (ZOFRAN) injection 4 mg     oxyCODONE (ROXICODONE) tablet 5 mg     oxytocin (PITOCIN) 30 units in 500 mL 0.9% NaCl infusion     oxytocin (PITOCIN) 30 units in 500 mL 0.9% NaCl infusion     oxytocin (PITOCIN) injection 10 Units     oxytocin (PITOCIN) injection 10 Units     predniSONE (DELTASONE) tablet 5 mg     prenatal multivitamin w/iron per tablet 1 tablet     prochlorperazine (COMPAZINE) injection 10 mg    Or     prochlorperazine (COMPAZINE) tablet 10 mg    Or     prochlorperazine (COMPAZINE) suppository 25 mg     senna-docusate (SENOKOT-S/PERICOLACE) 8.6-50 MG per tablet 1 tablet    Or     senna-docusate (SENOKOT-S/PERICOLACE) 8.6-50 MG per tablet 2 tablet     simethicone (MYLICON) chewable tablet 80 mg     sodium bicarbonate tablet 650 mg     sodium chloride (PF) 0.9% PF flush 3 mL     sodium chloride (PF) 0.9% PF flush 3 mL     [START ON 11/6/2021] sodium phosphate (FLEET ENEMA) 1 enema     tacrolimus (GENERIC EQUIVALENT) capsule 2 mg     tranexamic acid 1 g in 100 mL NS IV bag (premix)            Physical Exam:    BP (!) 137/97   Pulse 61   Temp 98.6  F (37  C) (Oral)   Resp 15   Ht 1.575 m (5' 2\")   Wt 57.5 kg (126 lb 11.2 oz)   LMP 03/26/2021   SpO2 100%   Breastfeeding Unknown   BMI 23.17 kg/m    General: pleasant, in no distress over the phone  HEENT: hearing intact to conversational volume.  Lungs: unlabored respiration, no cough  Mental status:  alert, oriented to self, place, time  Psych:  calm and appropriate interaction  Remainder of exam not completed due to COVID 19 precautions.          Data:     Recent Labs   Lab 11/05/21  0749 11/04/21  2215 11/04/21  1824 11/04/21  1756 11/04/21  1145 11/04/21  0823   GLC 87 140* 119* 136* 115* 113*     Lab Results   Component Value Date    " WBC 22.1 (H) 11/05/2021    WBC 16.4 (H) 11/04/2021    WBC 8.2 11/03/2021    HGB 10.7 (L) 11/05/2021    HGB 13.3 11/04/2021    HGB 12.8 11/03/2021    HCT 32.7 (L) 11/05/2021    HCT 39.9 11/04/2021    HCT 38.7 11/03/2021    MCV 94 11/05/2021    MCV 91 11/04/2021    MCV 91 11/03/2021     11/05/2021     11/04/2021     11/03/2021     Lab Results   Component Value Date     11/04/2021     11/04/2021     11/03/2021    POTASSIUM 5.2 11/04/2021    POTASSIUM 5.4 (H) 11/04/2021    POTASSIUM 5.0 11/03/2021    CHLORIDE 114 (H) 11/04/2021    CHLORIDE 115 (H) 11/04/2021    CHLORIDE 110 (H) 11/03/2021    CO2 17 (L) 11/04/2021    CO2 14 (L) 11/04/2021    CO2 16 (L) 11/03/2021    GLC 87 11/05/2021     (H) 11/04/2021     (H) 11/04/2021     Lab Results   Component Value Date    BUN 38 (H) 11/04/2021    BUN 43 (H) 11/04/2021    BUN 48 (H) 11/03/2021     Lab Results   Component Value Date    TSH 0.40 06/24/2021    TSH 3.71 06/15/2020    TSH 3.69 06/19/2012     Lab Results   Component Value Date    AST 26 11/05/2021    AST 22 11/04/2021    AST 25 11/03/2021    ALT 20 11/05/2021    ALT 24 11/04/2021    ALT 22 11/03/2021     (H) 04/27/2016     (H) 06/19/2012    ALKPHOS 117 11/04/2021    ALKPHOS 125 11/03/2021    ALKPHOS 125 11/03/2021       35 minutes spent on the date of the encounter doing chart review, history and exam, documentation and further activities per the note      Over 50% of my time on the unit was spent counseling the patient and/or coordinating care regarding acute hyperglycemia management.  See note for details.    To contact Endocrine Diabetes service:   From 8AM-4PM: page inpatient diabetes provider that is following the patient  For questions or updates from 4PM-8AM: page the diabetes job code for on call fellow: [phone]    Philomena Smyth PA-C  Inpatient Diabetes Management Service  Pager [phone]

## 2025-05-09 ENCOUNTER — DOCUMENTATION ONLY (OUTPATIENT)
Dept: NEPHROLOGY | Facility: CLINIC | Age: 40
End: 2025-05-09
Payer: COMMERCIAL

## 2025-05-09 DIAGNOSIS — E87.20 METABOLIC ACIDOSIS: Primary | ICD-10-CM

## 2025-05-16 ENCOUNTER — RESULTS FOLLOW-UP (OUTPATIENT)
Dept: TRANSPLANT | Facility: CLINIC | Age: 40
End: 2025-05-16

## 2025-05-19 RX ORDER — SODIUM BICARBONATE 650 MG/1
650 TABLET ORAL 3 TIMES DAILY
Qty: 270 TABLET | Refills: 3 | Status: SHIPPED | OUTPATIENT
Start: 2025-05-19

## 2025-05-20 ENCOUNTER — OFFICE VISIT (OUTPATIENT)
Dept: GASTROENTEROLOGY | Facility: CLINIC | Age: 40
End: 2025-05-20
Attending: INTERNAL MEDICINE
Payer: COMMERCIAL

## 2025-05-20 ENCOUNTER — MYC MEDICAL ADVICE (OUTPATIENT)
Dept: NEPHROLOGY | Facility: CLINIC | Age: 40
End: 2025-05-20
Payer: COMMERCIAL

## 2025-05-20 VITALS
DIASTOLIC BLOOD PRESSURE: 84 MMHG | OXYGEN SATURATION: 100 % | HEIGHT: 63 IN | HEART RATE: 84 BPM | SYSTOLIC BLOOD PRESSURE: 118 MMHG | BODY MASS INDEX: 18 KG/M2 | WEIGHT: 101.56 LBS

## 2025-05-20 DIAGNOSIS — E87.20 METABOLIC ACIDOSIS: ICD-10-CM

## 2025-05-20 DIAGNOSIS — Z94.4 LIVER TRANSPLANT RECIPIENT (H): ICD-10-CM

## 2025-05-20 PROCEDURE — 99213 OFFICE O/P EST LOW 20 MIN: CPT | Performed by: INTERNAL MEDICINE

## 2025-05-20 RX ORDER — SODIUM BICARBONATE 650 MG/1
650 TABLET ORAL 4 TIMES DAILY
Qty: 270 TABLET | Refills: 3 | Status: SHIPPED | OUTPATIENT
Start: 2025-05-20

## 2025-05-20 ASSESSMENT — PAIN SCALES - GENERAL: PAINLEVEL_OUTOF10: NO PAIN (0)

## 2025-05-20 NOTE — LETTER
5/20/2025      Yarelis Penny  [address] [facility] [address]      Dear Colleague,    Thank you for referring your patient, Yarelis Penny, to the [facility] [place]. Please see a copy of my visit note below.    =  [facility]  LIVER TRANSPLANT CLINIC      A/P  Ms. 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   Main issue discussed today was kidney function and IS.  I will discuss with Dr. Banda the utility of changing her to a non-CNI based regimen. I would switch her to MMF 1000 bid, azathioprine 50 daily at bedtime and prednisone 10 mg if we are to make a switch.    Graft function LFTs now at previous baseline. Etiology for elevation post partum unclear, as is cause of zone 3 fibrosis (mild-mod).   IS tac and pred    CKD stage 3 Baseline creat was 0.9-1 became more elevated in 2024    Biliary stricture This has resolved. Last ERCP 9/26/18  UC s/p total colectomy with ileostomy.    Blood sugar She did have gestational diabetes. She did not have to be on medications    Derm seeing regularly     Thyroid Had been levothyroxine prescribed by the [facility] fertility [facility] ([facility]). 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 Dr. Lopez rec annual follow-up. EBV neg.low at this time.  CMV colitis neg CMV on last check 8/13/21. Will recheck.   Iron deficiency She is off iron. Check iron levels on next labs.    Osteopenia DEXA 4/2025. Will see endocrinology in Feb 2026. Continue Ca.      Return to clinic in 12 months. In person or via video. I let patient know if they would like to see me in person and they are told there are no appointments available or that we are booking out too far, they should send me a message and I will always find a time to see the patient in person if preferred/desired/needed.      Daniela Enriquez MD  Hepatology/ Liver Transplant  [facility]  [place]  =================================================================  SUBJECTIVE  Ms. Penny is a 40 Y F s/p LDLT () for PSC/AIH 6/18/15.     Summary of last visit  EBV Viremia, due for check: EBV quant 68 8/20/24  Hematuria, ongoing.   CKD, worsened.  nephrology and urology consults placed: kidney bx showed IgA nephropathy  3.   Needs renewal of IS meds: done  4.   Leukocytosis on last labs: resolved  5.   Follow up labs for nutritional parameters as well as TSH:normal  6.   She is wanting to have another pregnancy with h/o pre-eclampsia (on labetelol or a period of time for HTN post-partum, now on ACE-I) and severe fetal growth restriction in pg in 2021: Saw ob 11/2024  She had elevated liver tests post-delivery, ALT up to 105, AST 68  when they were previously 19, 23 and 90. MRCP and bx were recommended. Yarelis requested to hold off. She never did have MRCP as ordered on 11/29/21.    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 Banda. Some low BPs. Some dizziness on rare occ.     Liver biopsy 1/4/22 read by Dr. Cook  Relatively unremarkable portal tracts and bile ducts with no significant inflammation or bile duct injury.  Trichrome and reticulin stain show mild to moderate subsinusoidal fibrosis in the pericentral location. Overall the biopsy do not show any evidence of acute cellular rejection or recurrent biliary or hepatocellular disease.  There is moderate subsinusoidal zone 3 fibrosis of uncertain etiology.  Clinical correlation is  recommended.    UC s/p total colectomy with ileostomy on 6/20/19. Path showed focal low-grade dysplasia. She has been seen here by Juanis Johnson and Mike, last visits 2018.    ID  H/O CMV colitis, EBV viremia previously treated with rituximab and UC.      IS: Prograf 3/2. Pred 5. Also on conner.  LABS: Up to date.     Liver Function Studies -   Recent Labs   Lab Test 05/16/25  0951   PROTTOTAL 7.3   ALBUMIN 4.0   BILITOTAL 0.2   ALKPHOS 87   AST 21   ALT 23     CBC RESULTS:   Recent Labs   Lab Test 05/16/25  0952   WBC 9.8   RBC 4.10   HGB 11.7   HCT 36.0   MCV 88   MCH 28.5   MCHC 32.5   RDW 14.4            Liver Function Studies -   Recent Labs   Lab Test 08/02/24  0920   PROTTOTAL 6.7   ALBUMIN 3.6   BILITOTAL 0.3   ALKPHOS 62   AST 35   ALT 24     CBC RESULTS:   Recent Labs   Lab Test 08/02/24  0920   WBC 12.9*   RBC 4.39   HGB 12.8   HCT 39.0   MCV 89   MCH 29.2   MCHC 32.8   RDW 15.3*          Liver Function Studies - Recent Labs   Lab Test 07/18/22  0853   PROTTOTAL 6.8   ALBUMIN 3.1*   BILITOTAL 0.3   ALKPHOS 64   AST 19   ALT 36       REJECTION: None  BILIARY ISSUES: BIle duct leak and stricture. Last ERCP 9/26/18  -Two previously well positioned stents were removed from the biliary tree.   - Prior biliary endoscopic sphincterotomy appeared open and selective biliary cannulation was performed, stent related debris was removed   - Previous stricture appears to be completely improved. The site of the previous stricture was gently dilated using 6-8 Elation balloon   - A 7 Fr by 7 cm Loza single pigtailed stent was placed   Recommendation:       - Observe patient in same day observation unit for possible discharge same day.   - Confirm spontaneous stent passage by performing a flat and upright abdominal x-ray in 4 weeks. Stent is most likely expected to pass spontaneously. If present will need an upper endoscopy for stent removal   - Will recommend to recheck LFTs in transplant clinic and contact  "us if it is elevated    STENT: Original out. ERCP placed stent out on AXR 1-0/24/18  KIDNEY FUNCTION:   Creatinine   Date Value Ref Range Status   05/16/2025 2.61 (H) 0.51 - 0.95 mg/dL Final   05/25/2021 1.21 (H) 0.52 - 1.04 mg/dL Final     BP: see above     SOC: No alcohol.  ROS 10 point ROS neg other than the symptoms noted above in the HPI.  Exam    /84 (BP Location: Right arm, Cuff Size: Adult Large)   Pulse 84   Ht 1.6 m (5' 3\")   Wt 46.1 kg (101 lb 9 oz)   SpO2 100%   BMI 17.99 kg/m      Gen Alert pleasant NAD  Resp No difficulty breathing. No cough  Skin No Jaundice  Eyes No icterus  Neuro GUEVARA  MSK no muscle wasting  Psyche Pleasant, appropriate. Well groomed.      The longitudinal plan of care for the diagnosis(es)/condition(s) as documented were addressed during this visit. Due to the added complexity in care, I will continue to support patient in the subsequent management and with ongoing continuity of care.          Again, thank you for allowing me to participate in the care of your patient.        Sincerely,        Daniela Enriquez MD    Electronically signed"

## 2025-05-20 NOTE — PROGRESS NOTES
=  [facility]  LIVER TRANSPLANT CLINIC      A/P  Ms. 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   Main issue discussed today was kidney function and IS.  I will discuss with Dr. Banda the utility of changing her to a non-CNI based regimen. I would switch her to MMF 1000 bid, azathioprine 50 daily at bedtime and prednisone 10 mg if we are to make a switch.    Graft function LFTs now at previous baseline. Etiology for elevation post partum unclear, as is cause of zone 3 fibrosis (mild-mod).   IS tac and pred    CKD stage 3 Baseline creat was 0.9-1 became more elevated in 2024    Biliary stricture This has resolved. Last ERCP 9/26/18  UC s/p total colectomy with ileostomy.    Blood sugar She did have gestational diabetes. She did not have to be on medications    Derm seeing regularly     Thyroid Had been levothyroxine prescribed by the [facility] ([facility]). 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 Dr. Lopez rec annual follow-up. EBV neg.low at this time.  CMV colitis neg CMV on last check 8/13/21. Will recheck.   Iron deficiency She is off iron. Check iron levels on next labs.    Osteopenia DEXA 4/2025. Will see endocrinology in Feb 2026. Continue Ca.      Return to clinic in 12 months. In person or via video. I let patient know if they would like to see me in person and they are told there are no appointments available or that we are booking out too far, they should send me a message and I will always find a time to see the patient in person if preferred/desired/needed.      Daniela Enriquez MD  Hepatology/ Liver Transplant  [facility] [place]  =================================================================  SUBJECTIVE  Ms. Penny is a 40 Y F s/p LDLT () for PSC/AIH 6/18/15.     Summary of last visit  EBV Viremia, due for check: EBV quant 68 8/20/24  Hematuria, ongoing.   CKD, worsened.  nephrology and urology consults placed: kidney bx  showed IgA nephropathy  3.   Needs renewal of IS meds: done  4.   Leukocytosis on last labs: resolved  5.   Follow up labs for nutritional parameters as well as TSH:normal  6.   She is wanting to have another pregnancy with h/o pre-eclampsia (on labetelol or a period of time for HTN post-partum, now on ACE-I) and severe fetal growth restriction in pg in 2021: Saw ob 11/2024  She had elevated liver tests post-delivery, ALT up to 105, AST 68  when they were previously 19, 23 and 90. MRCP and bx were recommended. Yarelis requested to hold off. She never did have MRCP as ordered on 11/29/21.    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 Banda. Some low BPs. Some dizziness on rare occ.     Liver biopsy 1/4/22 read by Dr. Cook  Relatively unremarkable portal tracts and bile ducts with no significant inflammation or bile duct injury.  Trichrome and reticulin stain show mild to moderate subsinusoidal fibrosis in the pericentral location. Overall the biopsy do not show any evidence of acute cellular rejection or recurrent biliary or hepatocellular disease.  There is moderate subsinusoidal zone 3 fibrosis of uncertain etiology.  Clinical correlation is recommended.    UC s/p total colectomy with ileostomy on 6/20/19. Path showed focal low-grade dysplasia. She has been seen here by Juanis Johnson and Mike, last visits 2018.    ID  H/O CMV colitis, EBV viremia previously treated with rituximab and UC.      IS: Prograf 3/2. Pred 5. Also on conner.  LABS: Up to date.     Liver  Function Studies -   Recent Labs   Lab Test 05/16/25  0951   PROTTOTAL 7.3   ALBUMIN 4.0   BILITOTAL 0.2   ALKPHOS 87   AST 21   ALT 23     CBC RESULTS:   Recent Labs   Lab Test 05/16/25  0952   WBC 9.8   RBC 4.10   HGB 11.7   HCT 36.0   MCV 88   MCH 28.5   MCHC 32.5   RDW 14.4            Liver Function Studies -   Recent Labs   Lab Test 08/02/24  0920   PROTTOTAL 6.7   ALBUMIN 3.6   BILITOTAL 0.3   ALKPHOS 62   AST 35   ALT 24     CBC RESULTS:   Recent Labs   Lab Test 08/02/24  0920   WBC 12.9*   RBC 4.39   HGB 12.8   HCT 39.0   MCV 89   MCH 29.2   MCHC 32.8   RDW 15.3*          Liver Function Studies - Recent Labs   Lab Test 07/18/22  0853   PROTTOTAL 6.8   ALBUMIN 3.1*   BILITOTAL 0.3   ALKPHOS 64   AST 19   ALT 36       REJECTION: None  BILIARY ISSUES: BIle duct leak and stricture. Last ERCP 9/26/18  -Two previously well positioned stents were removed from the biliary tree.   - Prior biliary endoscopic sphincterotomy appeared open and selective biliary cannulation was performed, stent related debris was removed   - Previous stricture appears to be completely improved. The site of the previous stricture was gently dilated using 6-8 Elation balloon   - A 7 Fr by 7 cm Loza single pigtailed stent was placed   Recommendation:       - Observe patient in same day observation unit for possible discharge same day.   - Confirm spontaneous stent passage by performing a flat and upright abdominal x-ray in 4 weeks. Stent is most likely expected to pass spontaneously. If present will need an upper endoscopy for stent removal   - Will recommend to recheck LFTs in transplant clinic and contact us if it is elevated    STENT: Original out. ERCP placed stent out on AXR 1-0/24/18  KIDNEY FUNCTION:   Creatinine   Date Value Ref Range Status   05/16/2025 2.61 (H) 0.51 - 0.95 mg/dL Final   05/25/2021 1.21 (H) 0.52 - 1.04 mg/dL Final     BP: see above     SOC: No alcohol.  ROS 10 point ROS neg other than the symptoms  "noted above in the HPI.  Exam    /84 (BP Location: Right arm, Cuff Size: Adult Large)   Pulse 84   Ht 1.6 m (5' 3\")   Wt 46.1 kg (101 lb 9 oz)   SpO2 100%   BMI 17.99 kg/m      Gen Alert pleasant NAD  Resp No difficulty breathing. No cough  Skin No Jaundice  Eyes No icterus  Neuro GUEVARA  MSK no muscle wasting  Psyche Pleasant, appropriate. Well groomed.      The longitudinal plan of care for the diagnosis(es)/condition(s) as documented were addressed during this visit. Due to the added complexity in care, I will continue to support patient in the subsequent management and with ongoing continuity of care.        "

## 2025-05-20 NOTE — NURSING NOTE
"Chief Complaint   Patient presents with    RECHECK     Follow up      /84 (BP Location: Right arm, Cuff Size: Adult Large)   Pulse 84   Ht 1.6 m (5' 3\")   Wt 46.1 kg (101 lb 9 oz)   SpO2 100%   BMI 17.99 kg/m    Joslyn Holliday on 5/20/2025 at 9:19 AM    "

## 2025-06-03 ENCOUNTER — TELEPHONE (OUTPATIENT)
Dept: TRANSPLANT | Facility: CLINIC | Age: 40
End: 2025-06-03
Payer: COMMERCIAL

## 2025-06-03 NOTE — TELEPHONE ENCOUNTER
Contacted Yarelis to check in on changing medications.   She has not had any side effects and is doing labs on Thursday.

## 2025-06-05 ENCOUNTER — LAB (OUTPATIENT)
Dept: LAB | Facility: CLINIC | Age: 40
End: 2025-06-05
Payer: COMMERCIAL

## 2025-06-05 DIAGNOSIS — Z94.4 LIVER TRANSPLANT RECIPIENT (H): ICD-10-CM

## 2025-06-05 LAB
ERYTHROCYTE [DISTWIDTH] IN BLOOD BY AUTOMATED COUNT: 14.6 % (ref 10–15)
HCT VFR BLD AUTO: 33 % (ref 35–47)
HGB BLD-MCNC: 11 G/DL (ref 11.7–15.7)
MCH RBC QN AUTO: 29.2 PG (ref 26.5–33)
MCHC RBC AUTO-ENTMCNC: 33.3 G/DL (ref 31.5–36.5)
MCV RBC AUTO: 88 FL (ref 78–100)
PLATELET # BLD AUTO: 391 10E3/UL (ref 150–450)
RBC # BLD AUTO: 3.77 10E6/UL (ref 3.8–5.2)
WBC # BLD AUTO: 14.2 10E3/UL (ref 4–11)

## 2025-06-06 ENCOUNTER — RESULTS FOLLOW-UP (OUTPATIENT)
Dept: TRANSPLANT | Facility: CLINIC | Age: 40
End: 2025-06-06

## 2025-06-10 ENCOUNTER — PATIENT OUTREACH (OUTPATIENT)
Dept: NEPHROLOGY | Facility: CLINIC | Age: 40
End: 2025-06-10
Payer: COMMERCIAL

## 2025-06-10 DIAGNOSIS — E87.5 HYPERKALEMIA: Primary | ICD-10-CM

## 2025-06-11 ENCOUNTER — LAB (OUTPATIENT)
Dept: LAB | Facility: CLINIC | Age: 40
End: 2025-06-11
Payer: COMMERCIAL

## 2025-06-11 DIAGNOSIS — Z94.4 LIVER REPLACED BY TRANSPLANT (H): ICD-10-CM

## 2025-06-11 DIAGNOSIS — E87.5 HYPERKALEMIA: ICD-10-CM

## 2025-06-11 LAB
ANION GAP SERPL CALCULATED.3IONS-SCNC: 9 MMOL/L (ref 7–15)
BUN SERPL-MCNC: 42.6 MG/DL (ref 6–20)
CALCIUM SERPL-MCNC: 9.6 MG/DL (ref 8.8–10.4)
CHLORIDE SERPL-SCNC: 108 MMOL/L (ref 98–107)
CREAT SERPL-MCNC: 2.34 MG/DL (ref 0.51–0.95)
EGFRCR SERPLBLD CKD-EPI 2021: 26 ML/MIN/1.73M2
ERYTHROCYTE [DISTWIDTH] IN BLOOD BY AUTOMATED COUNT: 14.5 % (ref 10–15)
GLUCOSE SERPL-MCNC: 101 MG/DL (ref 70–99)
HCO3 SERPL-SCNC: 21 MMOL/L (ref 22–29)
HCT VFR BLD AUTO: 30.7 % (ref 35–47)
HGB BLD-MCNC: 10.2 G/DL (ref 11.7–15.7)
MCH RBC QN AUTO: 29.1 PG (ref 26.5–33)
MCHC RBC AUTO-ENTMCNC: 33.2 G/DL (ref 31.5–36.5)
MCV RBC AUTO: 88 FL (ref 78–100)
PLATELET # BLD AUTO: 388 10E3/UL (ref 150–450)
POTASSIUM SERPL-SCNC: 4.6 MMOL/L (ref 3.4–5.3)
RBC # BLD AUTO: 3.5 10E6/UL (ref 3.8–5.2)
SODIUM SERPL-SCNC: 138 MMOL/L (ref 135–145)
WBC # BLD AUTO: 13.1 10E3/UL (ref 4–11)

## 2025-06-11 PROCEDURE — 82248 BILIRUBIN DIRECT: CPT

## 2025-06-11 PROCEDURE — 80053 COMPREHEN METABOLIC PANEL: CPT

## 2025-06-11 PROCEDURE — 36415 COLL VENOUS BLD VENIPUNCTURE: CPT

## 2025-06-11 PROCEDURE — 85027 COMPLETE CBC AUTOMATED: CPT

## 2025-06-12 ENCOUNTER — RESULTS FOLLOW-UP (OUTPATIENT)
Dept: GASTROENTEROLOGY | Facility: CLINIC | Age: 40
End: 2025-06-12

## 2025-06-12 LAB
ALBUMIN SERPL BCG-MCNC: 3.9 G/DL (ref 3.5–5.2)
ALP SERPL-CCNC: 63 U/L (ref 40–150)
ALT SERPL W P-5'-P-CCNC: 23 U/L (ref 0–50)
AST SERPL W P-5'-P-CCNC: 25 U/L (ref 0–45)
BILIRUB SERPL-MCNC: 0.2 MG/DL
BILIRUBIN DIRECT (ROCHE PRO & PURE): 0.12 MG/DL (ref 0–0.45)
PROT SERPL-MCNC: 7.1 G/DL (ref 6.4–8.3)

## 2025-06-19 ENCOUNTER — LAB (OUTPATIENT)
Dept: LAB | Facility: CLINIC | Age: 40
End: 2025-06-19
Payer: COMMERCIAL

## 2025-06-19 DIAGNOSIS — Z94.4 LIVER REPLACED BY TRANSPLANT (H): ICD-10-CM

## 2025-06-19 LAB
ERYTHROCYTE [DISTWIDTH] IN BLOOD BY AUTOMATED COUNT: 14.5 % (ref 10–15)
HCT VFR BLD AUTO: 31.4 % (ref 35–47)
HGB BLD-MCNC: 10.4 G/DL (ref 11.7–15.7)
MCH RBC QN AUTO: 29.2 PG (ref 26.5–33)
MCHC RBC AUTO-ENTMCNC: 33.1 G/DL (ref 31.5–36.5)
MCV RBC AUTO: 88 FL (ref 78–100)
PLATELET # BLD AUTO: 363 10E3/UL (ref 150–450)
RBC # BLD AUTO: 3.56 10E6/UL (ref 3.8–5.2)
WBC # BLD AUTO: 11.2 10E3/UL (ref 4–11)

## 2025-06-20 ENCOUNTER — RESULTS FOLLOW-UP (OUTPATIENT)
Dept: TRANSPLANT | Facility: CLINIC | Age: 40
End: 2025-06-20

## 2025-06-24 DIAGNOSIS — Z94.4 LIVER REPLACED BY TRANSPLANT (H): ICD-10-CM

## 2025-06-24 DIAGNOSIS — K51.90 UC (ULCERATIVE COLITIS) (H): ICD-10-CM

## 2025-06-24 RX ORDER — AZATHIOPRINE 50 MG/1
50 TABLET ORAL EVERY EVENING
Qty: 90 TABLET | Refills: 3 | Status: SHIPPED | OUTPATIENT
Start: 2025-06-24

## 2025-06-24 RX ORDER — MYCOPHENOLATE MOFETIL 500 MG/1
1000 TABLET ORAL 2 TIMES DAILY
Qty: 360 TABLET | Refills: 3 | Status: SHIPPED | OUTPATIENT
Start: 2025-06-24

## 2025-07-01 ENCOUNTER — MYC MEDICAL ADVICE (OUTPATIENT)
Dept: NEPHROLOGY | Facility: CLINIC | Age: 40
End: 2025-07-01
Payer: COMMERCIAL

## 2025-07-01 DIAGNOSIS — N02.B9 IGA NEPHROPATHY: Primary | ICD-10-CM

## 2025-07-03 ENCOUNTER — LAB (OUTPATIENT)
Dept: LAB | Facility: CLINIC | Age: 40
End: 2025-07-03
Payer: COMMERCIAL

## 2025-07-03 DIAGNOSIS — N02.B9 IGA NEPHROPATHY: ICD-10-CM

## 2025-07-03 DIAGNOSIS — Z94.4 LIVER REPLACED BY TRANSPLANT (H): ICD-10-CM

## 2025-07-03 LAB
ALBUMIN MFR UR ELPH: 148 MG/DL
ALBUMIN SERPL BCG-MCNC: 3.9 G/DL (ref 3.5–5.2)
ALBUMIN UR-MCNC: >=300 MG/DL
ALP SERPL-CCNC: 59 U/L (ref 40–150)
ALT SERPL W P-5'-P-CCNC: 24 U/L (ref 0–50)
ANION GAP SERPL CALCULATED.3IONS-SCNC: 12 MMOL/L (ref 7–15)
APPEARANCE UR: CLEAR
AST SERPL W P-5'-P-CCNC: 23 U/L (ref 0–45)
BACTERIA #/AREA URNS HPF: ABNORMAL /HPF
BASOPHILS # BLD AUTO: 0.1 10E3/UL (ref 0–0.2)
BASOPHILS NFR BLD AUTO: 1 %
BILIRUB SERPL-MCNC: 0.3 MG/DL
BILIRUB UR QL STRIP: NEGATIVE
BILIRUBIN DIRECT (ROCHE PRO & PURE): 0.14 MG/DL (ref 0–0.45)
BUN SERPL-MCNC: 46.1 MG/DL (ref 6–20)
CALCIUM SERPL-MCNC: 8.9 MG/DL (ref 8.8–10.4)
CHLORIDE SERPL-SCNC: 108 MMOL/L (ref 98–107)
COLOR UR AUTO: YELLOW
CREAT SERPL-MCNC: 2.39 MG/DL (ref 0.51–0.95)
CREAT UR-MCNC: 97.9 MG/DL
CREAT UR-MCNC: 97.9 MG/DL
EGFRCR SERPLBLD CKD-EPI 2021: 26 ML/MIN/1.73M2
EOSINOPHIL # BLD AUTO: 0.2 10E3/UL (ref 0–0.7)
EOSINOPHIL NFR BLD AUTO: 2 %
ERYTHROCYTE [DISTWIDTH] IN BLOOD BY AUTOMATED COUNT: 15.1 % (ref 10–15)
GLUCOSE SERPL-MCNC: 81 MG/DL (ref 70–99)
GLUCOSE UR STRIP-MCNC: NEGATIVE MG/DL
GRAN CASTS #/AREA URNS LPF: ABNORMAL /LPF
HCO3 SERPL-SCNC: 17 MMOL/L (ref 22–29)
HCT VFR BLD AUTO: 31.5 % (ref 35–47)
HGB BLD-MCNC: 10.5 G/DL (ref 11.7–15.7)
HGB UR QL STRIP: ABNORMAL
IMM GRANULOCYTES # BLD: 0 10E3/UL
IMM GRANULOCYTES NFR BLD: 0 %
KETONES UR STRIP-MCNC: NEGATIVE MG/DL
LEUKOCYTE ESTERASE UR QL STRIP: NEGATIVE
LYMPHOCYTES # BLD AUTO: 1.1 10E3/UL (ref 0.8–5.3)
LYMPHOCYTES NFR BLD AUTO: 11 %
MAGNESIUM SERPL-MCNC: 1.9 MG/DL (ref 1.7–2.3)
MCH RBC QN AUTO: 29.5 PG (ref 26.5–33)
MCHC RBC AUTO-ENTMCNC: 33.3 G/DL (ref 31.5–36.5)
MCV RBC AUTO: 89 FL (ref 78–100)
MICROALBUMIN UR-MCNC: 1209 MG/L
MICROALBUMIN/CREAT UR: 1234.93 MG/G CR (ref 0–25)
MONOCYTES # BLD AUTO: 0.5 10E3/UL (ref 0–1.3)
MONOCYTES NFR BLD AUTO: 5 %
MUCOUS THREADS #/AREA URNS LPF: PRESENT /LPF
NEUTROPHILS # BLD AUTO: 8.3 10E3/UL (ref 1.6–8.3)
NEUTROPHILS NFR BLD AUTO: 81 %
NITRATE UR QL: NEGATIVE
PH UR STRIP: 5 [PH] (ref 5–7)
PHOSPHATE SERPL-MCNC: 4.2 MG/DL (ref 2.5–4.5)
PLATELET # BLD AUTO: 396 10E3/UL (ref 150–450)
POTASSIUM SERPL-SCNC: 4.3 MMOL/L (ref 3.4–5.3)
PROT SERPL-MCNC: 7 G/DL (ref 6.4–8.3)
PROT/CREAT 24H UR: 1.51 MG/MG CR (ref 0–0.2)
RBC # BLD AUTO: 3.56 10E6/UL (ref 3.8–5.2)
RBC #/AREA URNS AUTO: ABNORMAL /HPF
SODIUM SERPL-SCNC: 137 MMOL/L (ref 135–145)
SP GR UR STRIP: >=1.03 (ref 1–1.03)
SQUAMOUS #/AREA URNS AUTO: ABNORMAL /LPF
UROBILINOGEN UR STRIP-ACNC: 0.2 E.U./DL
WBC # BLD AUTO: 10.2 10E3/UL (ref 4–11)
WBC #/AREA URNS AUTO: ABNORMAL /HPF

## 2025-07-09 ENCOUNTER — OFFICE VISIT (OUTPATIENT)
Dept: NEPHROLOGY | Facility: CLINIC | Age: 40
End: 2025-07-09
Attending: INTERNAL MEDICINE
Payer: COMMERCIAL

## 2025-07-09 VITALS
TEMPERATURE: 98.4 F | BODY MASS INDEX: 18.11 KG/M2 | WEIGHT: 102.2 LBS | HEART RATE: 81 BPM | SYSTOLIC BLOOD PRESSURE: 127 MMHG | OXYGEN SATURATION: 100 % | DIASTOLIC BLOOD PRESSURE: 87 MMHG | HEIGHT: 63 IN

## 2025-07-09 DIAGNOSIS — I12.9 HYPERTENSION, RENAL: ICD-10-CM

## 2025-07-09 DIAGNOSIS — N02.B9 IGA NEPHROPATHY: Primary | ICD-10-CM

## 2025-07-09 DIAGNOSIS — E87.20 METABOLIC ACIDOSIS: ICD-10-CM

## 2025-07-09 PROCEDURE — 3074F SYST BP LT 130 MM HG: CPT | Performed by: INTERNAL MEDICINE

## 2025-07-09 PROCEDURE — 1126F AMNT PAIN NOTED NONE PRSNT: CPT | Performed by: INTERNAL MEDICINE

## 2025-07-09 PROCEDURE — 99213 OFFICE O/P EST LOW 20 MIN: CPT | Performed by: INTERNAL MEDICINE

## 2025-07-09 PROCEDURE — 99215 OFFICE O/P EST HI 40 MIN: CPT | Performed by: INTERNAL MEDICINE

## 2025-07-09 PROCEDURE — 3079F DIAST BP 80-89 MM HG: CPT | Performed by: INTERNAL MEDICINE

## 2025-07-09 PROCEDURE — G2211 COMPLEX E/M VISIT ADD ON: HCPCS | Performed by: INTERNAL MEDICINE

## 2025-07-09 RX ORDER — SODIUM BICARBONATE 650 MG/1
1300 TABLET ORAL 3 TIMES DAILY
Qty: 270 TABLET | Refills: 3 | Status: SHIPPED | OUTPATIENT
Start: 2025-07-09

## 2025-07-09 RX ORDER — LISINOPRIL 5 MG/1
5 TABLET ORAL DAILY
Qty: 90 TABLET | Refills: 3 | Status: SHIPPED | OUTPATIENT
Start: 2025-07-09

## 2025-07-09 ASSESSMENT — PAIN SCALES - GENERAL: PAINLEVEL_OUTOF10: NO PAIN (0)

## 2025-07-09 NOTE — LETTER
7/9/2025       RE: Yarelis Penny  [address]  [place] [address]     Dear Colleague,    Thank you for referring your patient, Yarelis Penny, to the [facility]PHROLOG[facility] [place] [facility]. Please see a copy of my visit note below.    I was asked to see this patient by:  Dr. Daniela Enriquez MD  [address]  [place] [address]    CC:   -CKD 3b likely tacrolimus induced  -IgA nephropathy    HPI: Yarelis Penny is a 39 year old female  who presents for evaluation of CKD3b.  She is here by herself.    Her medical history is significant for autoimmune hepatitis and primary sclerosing cholangitis status post living donor liver transplant [her ] in 2015, ulcerative colitis s/p colectomy and ileostomy in 2018, history of CMV colitis and viremia, history of severe preeclampsia with her pregnancy in 2021.    She is here today for Follow-up on CKD.  She was initially planning to pursue embryo transfer but has declined that endeavor after meeting with [facility]    She had 1 pregnancy in 2021. It was complicated by severe preeclampsia [elevated creatinine to 2 mg/dl from a baseline of 1.1] and severe fetal growth restriction.  Her pregnancy was also complicated by gestational diabetes mellitus and possible cholestasis versus worsening liver function requiring ursodiol.  Ultimately she was admitted at 31w3d. She received BMTZ and IVF with improvement of creatinine to 1.7.  During her admission, fetal monitoring revealed category II FHT and delivery was recommended by C section at that time.  Post partum, her creatinine returned to near baseline (~1.2).  She continues to follow with her transplant hepatology team.      She was seen by urology for microscopic intermittent hematuria.  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    Interval hx:  She underwent a kidney biopsy in April 2025 which showed IgA  "nephropathy.  During her last visit, she was started on lisinopril for proteinuria.  Her blood pressure is at goal. Her tacrolimus was stopped by her hepatologist and she was switched to Imuran and CellCept.  Overall, she feels well.  No specific complaint during this admission.  She denies any lower extremity edema or any urinary symptoms.  She denies increased output of her ileostomy.  She denies any nausea or vomiting or any stones.  She denies any chest pain or shortness of breath.  She denies any skin rash, sinus pain, joint pain or change in weight.    Obstetric hx:[id number]  Pregnancy- delivered 2021, 32 weeks, severe preeclamsia, growing normal, small baby    Social history: She is  and lives with her  in [place], close to her parents.  Her daughter Joanie was born in .  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  She does not smoke or drink.  She has 1 sister and 1 nephew and 1 niece.    Family history significant for kidney stones in both parents.  No CKD otherwise    Allergies   Allergen Reactions     Rifampin Other (See Comments)     Kidney failure     Penicillins      Bad hives when she was in college       Current Outpatient Medications   Medication Sig Dispense Refill     azaTHIOprine (IMURAN) 50 MG tablet Take 1 tablet (50 mg) by mouth every evening. 90 tablet 3     lisinopril (ZESTRIL) 5 MG tablet Take 1 tablet (5 mg) by mouth daily. 90 tablet 3     Multiple Vitamin (MULTIVITAMIN PO) Take 1 tablet by mouth daily       mycophenolate (GENERIC EQUIVALENT) 500 MG tablet Take 2 tablets (1,000 mg) by mouth 2 times daily. 360 tablet 3     order for DME [employer] item #s   [id number] and [id number] 30 Bag 3     order for DME Increase the amount to 10 per month  --2\" barrier ring #[id number] 10 each 0     predniSONE (DELTASONE) 5 MG tablet Take 2 tablets (10 mg) by mouth every morning. 180 tablet 3     sodium bicarbonate 650 MG tablet Take 1 tablet (650 " mg) by mouth 4 times daily. 270 tablet 3     ursodiol (ACTIGALL) 250 MG tablet Take 2 tablets (500 mg) by mouth every morning AND 1 tablet (250 mg) every evening. 270 tablet 3     sodium bicarbonate 650 MG tablet Take 1 tablet (650 mg) by mouth 3 times daily. (Patient not taking: Reported on 2025) 270 tablet 3     tretinoin (RETIN-A) 0.025 % external cream Apply pea-sized amount to face at bedtime. Start every other night for 1-2 weeks, then increase to nightly as tolerated. (Patient not taking: Reported on 2025) 45 g 11     No current facility-administered medications for this visit.       Past Medical History:   Diagnosis Date     Acute kidney injury 2016     Anemia, iron deficiency 2017    On iron supplementation     Autoimmune hepatitis (H)     on steroid taper     Cholangitis (H)      CKD (chronic kidney disease)     biopsy 2012: TIN, patchy fibrosis. Dialysis in 2013 x2 months.     CMV colitis (H) 2016     Esophageal varices (H) 2008    banded     Heart murmur      History of blood transfusion      Primary sclerosing cholangitis (H)      SBP (spontaneous bacterial peritonitis) (H) 2015     Ulcerative Colitis     f/b GI       Past Surgical History:   Procedure Laterality Date      SECTION N/A 2021    Procedure:  SECTION;  Surgeon: Efrain Bill MD;  [facility]     CHOLECYSTECTOMY       COLONOSCOPY  2007     COLONOSCOPY N/A 2015    Procedure: COMBINED COLONOSCOPY, SINGLE OR MULTIPLE BIOPSY/POLYPECTOMY BY BIOPSY;  Surgeon: Mitchel Hoskins Chi, MD;  Location:  GI     COLONOSCOPY N/A 2016    Procedure: COMBINED COLONOSCOPY, SINGLE OR MULTIPLE BIOPSY/POLYPECTOMY BY BIOPSY;  Surgeon: Rigo Valles MD;  [facility]     COLONOSCOPY N/A 2016    Procedure: COMBINED COLONOSCOPY, SINGLE OR MULTIPLE BIOPSY/POLYPECTOMY BY BIOPSY;  Surgeon: Kareem Solis MD;  Location:  GI     COLONOSCOPY N/A  09/05/2017    Procedure: COMBINED COLONOSCOPY, SINGLE OR MULTIPLE BIOPSY/POLYPECTOMY BY BIOPSY;  Colonoscopy;  Surgeon: Fuentes Johnson MD;  [facility]     COLONOSCOPY N/A 02/25/2019    Procedure: COMBINED COLONOSCOPY, SINGLE OR MULTIPLE BIOPSY/POLYPECTOMY BY BIOPSY;  Surgeon: Sophy Mckeon MD;  [facility]     DILATION AND CURETTAGE, HYSTEROSCOPY DIAGNOSTIC, COMBINED N/A 03/12/2021    Procedure: HYSTEROSCOPY, DIAGNOSTIC;  Surgeon: Lin Armendariz MD;  [facility]     ENDOSCOPIC RETROGRADE CHOLANGIOPANCREATOGRAM N/A 06/25/2015    Procedure: COMBINED ENDOSCOPIC RETROGRADE CHOLANGIOPANCREATOGRAPHY, PLACE TUBE/STENT;  Surgeon: Landon Quinones MD;  [facility]     ENDOSCOPIC RETROGRADE CHOLANGIOPANCREATOGRAM N/A 06/25/2015    Procedure: COMBINED ENDOSCOPIC RETROGRADE CHOLANGIOPANCREATOGRAPHY, PLACE TUBE/STENT;  Surgeon: Landon Quinones MD;  [facility]     ENDOSCOPIC RETROGRADE CHOLANGIOPANCREATOGRAM N/A 07/02/2015    Procedure: COMBINED ENDOSCOPIC RETROGRADE CHOLANGIOPANCREATOGRAPHY, PLACE TUBE/STENT;  Surgeon: Landon Quinones MD;  [facility]     ENDOSCOPIC RETROGRADE CHOLANGIOPANCREATOGRAM N/A 09/08/2015    Procedure: COMBINED ENDOSCOPIC RETROGRADE CHOLANGIOPANCREATOGRAPHY, REMOVE FOREIGN BODY OR STENT/TUBE;  Surgeon: Landon Quinones MD;  [facility]     ENDOSCOPIC RETROGRADE CHOLANGIOPANCREATOGRAM N/A 12/08/2015    Procedure: ENDOSCOPIC RETROGRADE CHOLANGIOPANCREATOGRAM;  Surgeon: Landon Quinones MD;  [facility]     ENDOSCOPIC RETROGRADE CHOLANGIOPANCREATOGRAM N/A 03/01/2016    Procedure: COMBINED ENDOSCOPIC RETROGRADE CHOLANGIOPANCREATOGRAPHY, REMOVE FOREIGN BODY OR STENT/TUBE;  Surgeon: Landon Quinones MD;  [facility]     ENDOSCOPIC RETROGRADE CHOLANGIOPANCREATOGRAM N/A 03/20/2017    Procedure: COMBINED ENDOSCOPIC RETROGRADE CHOLANGIOPANCREATOGRAPHY, PLACE TUBE/STENT;  Endoscopic Retrograde Cholangiopancreatogram  with Ballon Dilation, Stent Placement;  Surgeon: Guru Brittany Macias MD;  [facility]     ENDOSCOPIC RETROGRADE CHOLANGIOPANCREATOGRAM N/A 04/02/2018    Procedure: COMBINED ENDOSCOPIC RETROGRADE CHOLANGIOPANCREATOGRAPHY, PLACE TUBE/STENT;  Endoscopic Retrograde Cholangiopancreatogram with biliary dilation and stent placement;  Surgeon: Guru Brittany Macias MD;  [facility]     ENDOSCOPIC RETROGRADE CHOLANGIOPANCREATOGRAM N/A 05/07/2018    Procedure: ENDOSCOPIC RETROGRADE CHOLANGIOPANCREATOGRAM;  Endoscopic Retrograde Cholangiopancreatogram ballon dilation stent exchange;  Surgeon: Guru Brittany Macias MD;  [facility]     ENDOSCOPIC RETROGRADE CHOLANGIOPANCREATOGRAPHY, EXCHANGE TUBE/STENT N/A 07/30/2015    Procedure: ENDOSCOPIC RETROGRADE CHOLANGIOPANCREATOGRAPHY, EXCHANGE TUBE/STENT;  Surgeon: Landon Quinones MD;  [facility]     ENDOSCOPIC RETROGRADE CHOLANGIOPANCREATOGRAPHY, EXCHANGE TUBE/STENT N/A 05/15/2017    Procedure: ENDOSCOPIC RETROGRADE CHOLANGIOPANCREATOGRAPHY, EXCHANGE TUBE/STENT;  Endoscopic Retrograde Cholangiopancreatogram with biliary stent exchange and balloon dilation;  Surgeon: Guru Brittany Macias MD;  [facility]     ENDOSCOPIC RETROGRADE CHOLANGIOPANCREATOGRAPHY, EXCHANGE TUBE/STENT N/A 06/25/2018    Procedure: ENDOSCOPIC RETROGRADE CHOLANGIOPANCREATOGRAPHY, EXCHANGE TUBE/STENT;  Endoscopic Retrograde Cholangiopancreatogram with biliary dilation, stone removal and stent exchange;  Surgeon: Guru Brittany Macias MD;  [facility]     ENDOSCOPIC RETROGRADE CHOLANGIOPANCREATOGRAPHY, EXCHANGE TUBE/STENT N/A 07/30/2018    Procedure: ENDOSCOPIC RETROGRADE CHOLANGIOPANCREATOGRAPHY, EXCHANGE TUBE/STENT;  Endoscopic Retrograde Cholangiopancreatogram with Stent Exchange with dilation;  Surgeon: Guru Brittany Macias MD;  [facility]     ENDOSCOPIC RETROGRADE  CHOLANGIOPANCREATOGRAPHY, EXCHANGE TUBE/STENT N/A 09/26/2018    Procedure: ENDOSCOPIC RETROGRADE CHOLANGIOPANCREATOGRAPHY, EXCHANGE TUBE/STENT;  Endoscopic Retrograde Cholangiopancreatogram, with bile duct stent exchanged balloon dilation and balloon sweep of bile duct;  Surgeon: Guru Brittany Macias MD;  [facility]     ESOPHAGOSCOPY, GASTROSCOPY, DUODENOSCOPY (EGD), COMBINED N/A 02/26/2015    Procedure: COMBINED ESOPHAGOSCOPY, GASTROSCOPY, DUODENOSCOPY (EGD);  Surgeon: Mitchel Hoskins Chi, MD;  [facility]     EXPLORE COMMON BILE DUCT N/A 06/25/2015    Procedure: EXPLORE COMMON BILE DUCT;  Surgeon: Tyree Smith MD;  [facility]     HERNIA REPAIR  2015    During transplant     ILEOSTOMY N/A 06/20/2019    Procedure: Ileostomy;  Surgeon: Krzysztof Carney MD;  [facility]     IR LIVER BIOPSY PERCUTANEOUS  01/04/2022     IR RENAL BIOPSY RIGHT  4/10/2025     LAPAROSCOPIC ASSISTED COLECTOMY N/A 06/20/2019    Procedure: Laparoscopic Total Abdominal Colectomy, lysis of adhesions;  Surgeon: Krzysztof Carney MD;  [facility]     LAPAROSCOPIC LYSIS ADHESIONS N/A 03/12/2021    Procedure: LYSIS, ADHESIONS;  Surgeon: Lin Armendariz MD;  [facility]     LAPAROTOMY EXPLORATORY N/A 06/25/2015    Procedure: LAPAROTOMY EXPLORATORY;  Surgeon: Tyree Smith MD;  [facility]     PICC INSERTION Right 02/27/2015    4fr SL Valved PICC, 36cm (1cm external) in the R medial brachial vein w/ tip in the low SVC.     SIGMOIDOSCOPY FLEXIBLE N/A 04/27/2016    Procedure: SIGMOIDOSCOPY FLEXIBLE;  Surgeon: Jose Nielson MD;  [facility]     SIGMOIDOSCOPY FLEXIBLE N/A 01/30/2023    Procedure: SIGMOIDOSCOPY, FLEXIBLE WITH BIOPSIES;  Surgeon: Sophy Mckeon MD;  [facility]     TRANSPLANT LIVER RECIPIENT LIVING UNRELATED N/A 06/18/2015    Procedure: TRANSPLANT LIVER RECIPIENT LIVING UNRELATED;  Surgeon: Tyree Smith MD;  [facility]     UPPER GI ENDOSCOPY         Social  "History     Tobacco Use     Smoking status: Never     Smokeless tobacco: Never   Vaping Use     Vaping status: Never Used   Substance Use Topics     Alcohol use: Not Currently     Alcohol/week: 0.0 standard drinks of alcohol     Comment: Every other month has 1 drink     Drug use: No       Family History   Problem Relation Age of Onset     Hypertension Mother      Lipids Mother      Sleep Apnea Mother      Heart Disease Mother      Hypertension Maternal Grandmother              Alzheimer Disease Maternal Grandmother      Lipids Father      Heart Disease Maternal Grandfather 40     Heart Disease Paternal Grandfather      Melanoma No family hx of      Skin Cancer No family hx of      Cancer No family hx of      Diabetes No family hx of        ROS: A 12 system review of systems was negative other than noted here or above.     Exam:  /87 (BP Location: Left arm, Patient Position: Sitting, Cuff Size: Adult Small)   Pulse 81   Temp 98.4  F (36.9  C) (Oral)   Ht 1.6 m (5' 3\")   Wt 46.4 kg (102 lb 3.2 oz)   SpO2 100%   BMI 18.10 kg/m    BP Readings from Last 6 Encounters:   25 127/87   25 118/84   04/10/25 102/76   25 (!) 145/98   24 (!) 157/103   10/24/23 (!) 157/95     Wt Readings from Last 4 Encounters:   25 46.4 kg (102 lb 3.2 oz)   25 46.1 kg (101 lb 9 oz)   04/10/25 44.4 kg (97 lb 12.8 oz)   25 46.7 kg (103 lb)     GENERAL APPEARANCE: alert and no distress  EYES: PERRL  HENT: mouth without ulcers or lesions  NECK: supple, no adenopathy  RESP: lungs clear to auscultation - no rales, rhonchi or wheezes  CV: regular rhythm, normal rate, no rub   ABDOMEN:  soft, nontender, no HSM or masses, ileostomy bag in place  MS: extremities normal- no gross deformities noted, no evidence of inflammation in joints, no muscle tenderness  SKIN: no rash  NEURO: Normal strength and tone, sensory exam grossly normal, mentation intact and speech normal  PSYCH: mentation appears " normal. and affect normal/bright  No LE edema    Results: Reviewed in details with the patient    Assessment/Plan:  Mrs. Penny is a 39 year old female with autoimmune hepatitis and primary sclerosing cholangitis status post living donor liver transplant [her ] in 2015, ulcerative colitis S/P  colectomy and ileostomy in 2018, history of CMV colitis and viremia, history of severe preeclampsia with her only pregnancy in 2021, who is being seen nephrology clinic today for CKD 3B and IgA nephropathy.    Problem #1 proteinuric CKD 3B/tacrolimus induced and IgA nephropathy/medullary nephrocalcinosis:  This is likely induced by tacrolimus as well as residual injury from previous ESTUARDO's.  She also have medullary calcinosis.  She had a kidney biopsy for proteinuria, done in April 2025, which showed  IgA nephropathy [M1 E0 S1 T2 C0].  Her most recent creatinine has been around 2.2 mg/dL and her UPCR is slightly down at 1.5 g/g after starting lisinopril.  Her blood pressure is at goal.  She does not have any hematuria.  -After discussion with her hepatologist, she was taken off tacrolimus and she was started on azathioprine and CellCept together with an increase in her prednisone dose to 10 mg daily.  The idea is to preserve her residual kidney function as long as possible.  Seems that her creatinine is slightly better after that switch  -Though her proteinuria is better, the optimal goal is close to 0.5 mg/g.  Recently she was started on CellCept and Imuran. Interestingly some of these medications have also been used in [facility], though proved more effective in  patients. As such, we will wait for another 6 to 8 weeks to see if CellCept and increasing her prednisone dose will help further with her proteinuria.  If her proteinuria is still above 0.5 g/g, then the other alternatives would be changing lisinopril to sparsentan or adding an SGLT2 inhibitor.    Problem #2 family-planning:  She understands that with her  reduced kidney function, this will be high risk pregnancy with significant risk of kidney disease worsening and requiring dialysis during pregnancy or after delivery.  Also her chronic kidney disease puts her at increased risk for preeclampsia and worsening later outcomes.    She does not seem to be interested in pursuing pregnancy at this point.  She is rather concerned about the high risk/intense demand especially with her 3-year-old daughter.    She is currently not using any contraception.  Though her chances of becoming pregnant are low, it is not zero.  I emphasized that if she decided not to pursue any pregnancy, then she will need to be on effective contraception.    Problem #3 hypertension secondary to chronic kidney disease:  Her blood pressure has been at goal after starting lisinopril.  She is currently on 5 mg daily.    Problem #4 medullary nephrocalcinosis:  Given her history of ulcerative colitis, she is at increased risk of calcium oxalate stones.  Of note, she has a parathyroid hormone level of 79 recently, but that is likely secondary to chronic kidney disease, has her urinary calcium levels are actually low.  Her calcium and phosphorus are within normal limits currently.  -Her 24-hour urine collection for stone analysis showed very low urine citrate and low pH of 4.9.  She is currently on sodium bicarbonate tablets and I increased her tablets today to 1300 three times daily.  It also showed a low urine volume and she was instructed to increase her fluid intake to at least 2.5 L/day    Problem #5 risk for osteoporosis:  Her DEXA scan showed osteopenia.  She will need to be on vitamin D supplements.    The total time of this encounter amounted to 50 minutes on the day of the encounter 7/9/2025. This time included face to face time spent with the patient, preparatory work and chart review, ordering tests, and performing post visit documentation.    The longitudinal plan of care for this patient was  addressed during this visit. Due to the added complexity in care, I will continue to support the patient with subsequent management of this condition(s) and with the ongoing continuity of care of this condition(s).     *This dictation was prepared in part using Dragon recognition software.  As a result errors may occur. When identified these transcription errors have been corrected.  While every attempt is made to correct errors during dictation, errors may still exist.     Gunnar Weinberg MD   [facility]   Department of Medicine   Division of Renal Disease and Hypertension             Again, thank you for allowing me to participate in the care of your patient.      Sincerely,    GUNNAR WEINBERG MD

## 2025-07-09 NOTE — Clinical Note
Nothing Urgent.  Kaycee Lundy,  I think it was a good idea to switch her tacrolimus. Her creatinine is slightly better.  Her proteinuria is getting better but not at target.  Interestingly CellCept can help with that.  Do not have any issues with starting her on SGLT2 inhibitors.  I do not have any concerns from my and but just wanted to run it by you.  Thank you so much  Tim

## 2025-07-09 NOTE — NURSING NOTE
"Chief Complaint   Patient presents with    RECHECK     Follow up. PT reports no new or worsening symptoms.        Vitals:    07/09/25 1334   BP: 127/87   BP Location: Left arm   Patient Position: Sitting   Cuff Size: Adult Small   Pulse: 81   Temp: 98.4  F (36.9  C)   TempSrc: Oral   SpO2: 100%   Weight: 46.4 kg (102 lb 3.2 oz)   Height: 1.6 m (5' 3\")       BP Readings from Last 3 Encounters:   07/09/25 127/87   05/20/25 118/84   04/10/25 102/76       /87 (BP Location: Left arm, Patient Position: Sitting, Cuff Size: Adult Small)   Pulse 81   Temp 98.4  F (36.9  C) (Oral)   Ht 1.6 m (5' 3\")   Wt 46.4 kg (102 lb 3.2 oz)   SpO2 100%   BMI 18.10 kg/m       Khushbu Joseph    "

## 2025-07-09 NOTE — PROGRESS NOTES
I was asked to see this patient by:  Dr. 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  She is here by herself.    Her medical history is significant for autoimmune hepatitis and primary sclerosing cholangitis status post living donor liver transplant [her ] in 2015, ulcerative colitis s/p colectomy and ileostomy in 2018, history of CMV colitis and viremia, history of severe preeclampsia with her pregnancy in 2021.    She is here today for Follow-up on CKD.  She was initially planning to pursue embryo transfer but has declined that endeavor after meeting with [facility]    She had 1 pregnancy in 2021. It was complicated by severe preeclampsia [elevated creatinine to 2 mg/dl from a baseline of 1.1] and severe fetal growth restriction.  Her pregnancy was also complicated by gestational diabetes mellitus and possible cholestasis versus worsening liver function requiring ursodiol.  Ultimately she was admitted at 31w3d. She received BMTZ and IVF with improvement of creatinine to 1.7.  During her admission, fetal monitoring revealed category II FHT and delivery was recommended by C section at that time.  Post partum, her creatinine returned to near baseline (~1.2).  She continues to follow with her transplant hepatology team.      She was seen by urology for microscopic intermittent hematuria.  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    Interval hx:  She underwent a kidney biopsy in April 2025 which showed IgA nephropathy.  During her last visit, she was started on lisinopril for proteinuria.  Her blood pressure is at goal. Her tacrolimus was stopped by her hepatologist and she was switched to Imuran and CellCept.  Overall, she feels well.  No specific complaint during this admission.  She denies any lower extremity edema or any  "urinary symptoms.  She denies increased output of her ileostomy.  She denies any nausea or vomiting or any stones.  She denies any chest pain or shortness of breath.  She denies any skin rash, sinus pain, joint pain or change in weight.    Obstetric hx:[id number]  Pregnancy- delivered 2021, 32 weeks, severe preeclamsia, growing normal, small baby    Social history: She is  and lives with her  in [place], close to her parents.  Her daughter Joanie was born in .  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  She does not smoke or drink.  She has 1 sister and 1 nephew and 1 niece.    Family history significant for kidney stones in both parents.  No CKD otherwise    Allergies   Allergen Reactions    Rifampin Other (See Comments)     Kidney failure    Penicillins      Bad hives when she was in college       Current Outpatient Medications   Medication Sig Dispense Refill    azaTHIOprine (IMURAN) 50 MG tablet Take 1 tablet (50 mg) by mouth every evening. 90 tablet 3    lisinopril (ZESTRIL) 5 MG tablet Take 1 tablet (5 mg) by mouth daily. 90 tablet 3    Multiple Vitamin (MULTIVITAMIN PO) Take 1 tablet by mouth daily      mycophenolate (GENERIC EQUIVALENT) 500 MG tablet Take 2 tablets (1,000 mg) by mouth 2 times daily. 360 tablet 3    order for DME [employer] item #s   [id number] and [id number] 30 Bag 3    order for DME Increase the amount to 10 per month  --2\" barrier ring #[id number] 10 each 0    predniSONE (DELTASONE) 5 MG tablet Take 2 tablets (10 mg) by mouth every morning. 180 tablet 3    sodium bicarbonate 650 MG tablet Take 1 tablet (650 mg) by mouth 4 times daily. 270 tablet 3    ursodiol (ACTIGALL) 250 MG tablet Take 2 tablets (500 mg) by mouth every morning AND 1 tablet (250 mg) every evening. 270 tablet 3    sodium bicarbonate 650 MG tablet Take 1 tablet (650 mg) by mouth 3 times daily. (Patient not taking: Reported on 2025) 270 tablet 3    tretinoin " (RETIN-A) 0.025 % external cream Apply pea-sized amount to face at bedtime. Start every other night for 1-2 weeks, then increase to nightly as tolerated. (Patient not taking: Reported on 2025) 45 g 11     No current facility-administered medications for this visit.       Past Medical History:   Diagnosis Date    Acute kidney injury 2016    Anemia, iron deficiency 2017    On iron supplementation    Autoimmune hepatitis (H)     on steroid taper    Cholangitis (H)     CKD (chronic kidney disease)     biopsy 2012: TIN, patchy fibrosis. Dialysis in 2013 x2 months.    CMV colitis (H) 2016    Esophageal varices (H) 2008    banded    Heart murmur     History of blood transfusion     Primary sclerosing cholangitis (H)     SBP (spontaneous bacterial peritonitis) (H) 2015    Ulcerative Colitis     f/b GI       Past Surgical History:   Procedure Laterality Date     SECTION N/A 2021    Procedure:  SECTION;  Surgeon: Efrain Bill MD;  [facility]    CHOLECYSTECTOMY      COLONOSCOPY  2007    COLONOSCOPY N/A 2015    Procedure: COMBINED COLONOSCOPY, SINGLE OR MULTIPLE BIOPSY/POLYPECTOMY BY BIOPSY;  Surgeon: Mitchel Hoskins Chi, MD;  [facility]    COLONOSCOPY N/A 2016    Procedure: COMBINED COLONOSCOPY, SINGLE OR MULTIPLE BIOPSY/POLYPECTOMY BY BIOPSY;  Surgeon: Rigo Valles MD;  [facility]    COLONOSCOPY N/A 2016    Procedure: COMBINED COLONOSCOPY, SINGLE OR MULTIPLE BIOPSY/POLYPECTOMY BY BIOPSY;  Surgeon: Kareem Solis MD;  [facility]    COLONOSCOPY N/A 2017    Procedure: COMBINED COLONOSCOPY, SINGLE OR MULTIPLE BIOPSY/POLYPECTOMY BY BIOPSY;  Colonoscopy;  Surgeon: Fuentes Johnson MD;  [facility]    COLONOSCOPY N/A 2019    Procedure: COMBINED COLONOSCOPY, SINGLE OR MULTIPLE BIOPSY/POLYPECTOMY BY BIOPSY;  Surgeon: Sophy Mckeon MD;  [facility]    DILATION AND CURETTAGE,  HYSTEROSCOPY DIAGNOSTIC, COMBINED N/A 03/12/2021    Procedure: HYSTEROSCOPY, DIAGNOSTIC;  Surgeon: Lin Aremndariz MD;  [facility]    ENDOSCOPIC RETROGRADE CHOLANGIOPANCREATOGRAM N/A 06/25/2015    Procedure: COMBINED ENDOSCOPIC RETROGRADE CHOLANGIOPANCREATOGRAPHY, PLACE TUBE/STENT;  Surgeon: Landon Quinones MD;  [facility]    ENDOSCOPIC RETROGRADE CHOLANGIOPANCREATOGRAM N/A 06/25/2015    Procedure: COMBINED ENDOSCOPIC RETROGRADE CHOLANGIOPANCREATOGRAPHY, PLACE TUBE/STENT;  Surgeon: Landon Quinones MD;  [facility]    ENDOSCOPIC RETROGRADE CHOLANGIOPANCREATOGRAM N/A 07/02/2015    Procedure: COMBINED ENDOSCOPIC RETROGRADE CHOLANGIOPANCREATOGRAPHY, PLACE TUBE/STENT;  Surgeon: Landon Quinones MD;  [facility]    ENDOSCOPIC RETROGRADE CHOLANGIOPANCREATOGRAM N/A 09/08/2015    Procedure: COMBINED ENDOSCOPIC RETROGRADE CHOLANGIOPANCREATOGRAPHY, REMOVE FOREIGN BODY OR STENT/TUBE;  Surgeon: Landon Quinones MD;  [facility]    ENDOSCOPIC RETROGRADE CHOLANGIOPANCREATOGRAM N/A 12/08/2015    Procedure: ENDOSCOPIC RETROGRADE CHOLANGIOPANCREATOGRAM;  Surgeon: Landon Quinones MD;  [facility]    ENDOSCOPIC RETROGRADE CHOLANGIOPANCREATOGRAM N/A 03/01/2016    Procedure: COMBINED ENDOSCOPIC RETROGRADE CHOLANGIOPANCREATOGRAPHY, REMOVE FOREIGN BODY OR STENT/TUBE;  Surgeon: Landon Quinones MD;  [facility]    ENDOSCOPIC RETROGRADE CHOLANGIOPANCREATOGRAM N/A 03/20/2017    Procedure: COMBINED ENDOSCOPIC RETROGRADE CHOLANGIOPANCREATOGRAPHY, PLACE TUBE/STENT;  Endoscopic Retrograde Cholangiopancreatogram with Ballon Dilation, Stent Placement;  Surgeon: Guru Brittany Macias MD;  [facility]    ENDOSCOPIC RETROGRADE CHOLANGIOPANCREATOGRAM N/A 04/02/2018    Procedure: COMBINED ENDOSCOPIC RETROGRADE CHOLANGIOPANCREATOGRAPHY, PLACE TUBE/STENT;  Endoscopic Retrograde Cholangiopancreatogram with biliary dilation and stent placement;  Surgeon:  Guru Brittany Macias MD;  [facility]    ENDOSCOPIC RETROGRADE CHOLANGIOPANCREATOGRAM N/A 05/07/2018    Procedure: ENDOSCOPIC RETROGRADE CHOLANGIOPANCREATOGRAM;  Endoscopic Retrograde Cholangiopancreatogram ballon dilation stent exchange;  Surgeon: Guru Brittany Macias MD;  [facility]    ENDOSCOPIC RETROGRADE CHOLANGIOPANCREATOGRAPHY, EXCHANGE TUBE/STENT N/A 07/30/2015    Procedure: ENDOSCOPIC RETROGRADE CHOLANGIOPANCREATOGRAPHY, EXCHANGE TUBE/STENT;  Surgeon: Landon Quinones MD;  [facility]    ENDOSCOPIC RETROGRADE CHOLANGIOPANCREATOGRAPHY, EXCHANGE TUBE/STENT N/A 05/15/2017    Procedure: ENDOSCOPIC RETROGRADE CHOLANGIOPANCREATOGRAPHY, EXCHANGE TUBE/STENT;  Endoscopic Retrograde Cholangiopancreatogram with biliary stent exchange and balloon dilation;  Surgeon: Guru Brittany Macias MD;  [facility]    ENDOSCOPIC RETROGRADE CHOLANGIOPANCREATOGRAPHY, EXCHANGE TUBE/STENT N/A 06/25/2018    Procedure: ENDOSCOPIC RETROGRADE CHOLANGIOPANCREATOGRAPHY, EXCHANGE TUBE/STENT;  Endoscopic Retrograde Cholangiopancreatogram with biliary dilation, stone removal and stent exchange;  Surgeon: Guru Brittany Macias MD;  [facility]    ENDOSCOPIC RETROGRADE CHOLANGIOPANCREATOGRAPHY, EXCHANGE TUBE/STENT N/A 07/30/2018    Procedure: ENDOSCOPIC RETROGRADE CHOLANGIOPANCREATOGRAPHY, EXCHANGE TUBE/STENT;  Endoscopic Retrograde Cholangiopancreatogram with Stent Exchange with dilation;  Surgeon: Guru Brittany Macias MD;  [facility]    ENDOSCOPIC RETROGRADE CHOLANGIOPANCREATOGRAPHY, EXCHANGE TUBE/STENT N/A 09/26/2018    Procedure: ENDOSCOPIC RETROGRADE CHOLANGIOPANCREATOGRAPHY, EXCHANGE TUBE/STENT;  Endoscopic Retrograde Cholangiopancreatogram, with bile duct stent exchanged balloon dilation and balloon sweep of bile duct;  Surgeon: Guru Brittany Macias MD;  [facility]    ESOPHAGOSCOPY, GASTROSCOPY, DUODENOSCOPY  (EGD), COMBINED N/A 02/26/2015    Procedure: COMBINED ESOPHAGOSCOPY, GASTROSCOPY, DUODENOSCOPY (EGD);  Surgeon: Mitchel Hoskins Chi, MD;  [facility]    EXPLORE COMMON BILE DUCT N/A 06/25/2015    Procedure: EXPLORE COMMON BILE DUCT;  Surgeon: Tyree Smith MD;  [facility]    HERNIA REPAIR  2015    During transplant    ILEOSTOMY N/A 06/20/2019    Procedure: Ileostomy;  Surgeon: Krzysztof Carney MD;  [facility]    IR LIVER BIOPSY PERCUTANEOUS  01/04/2022    IR RENAL BIOPSY RIGHT  4/10/2025    LAPAROSCOPIC ASSISTED COLECTOMY N/A 06/20/2019    Procedure: Laparoscopic Total Abdominal Colectomy, lysis of adhesions;  Surgeon: Krzysztof Carney MD;  [facility]    LAPAROSCOPIC LYSIS ADHESIONS N/A 03/12/2021    Procedure: LYSIS, ADHESIONS;  Surgeon: Lin Armendariz MD;  [facility]    LAPAROTOMY EXPLORATORY N/A 06/25/2015    Procedure: LAPAROTOMY EXPLORATORY;  Surgeon: Tyree Smith MD;  [facility]    PICC INSERTION Right 02/27/2015    4fr SL Valved PICC, 36cm (1cm external) in the R medial brachial vein w/ tip in the low SVC.    SIGMOIDOSCOPY FLEXIBLE N/A 04/27/2016    Procedure: SIGMOIDOSCOPY FLEXIBLE;  Surgeon: Jose Nielson MD;  [facility]    SIGMOIDOSCOPY FLEXIBLE N/A 01/30/2023    Procedure: SIGMOIDOSCOPY, FLEXIBLE WITH BIOPSIES;  Surgeon: Sophy Mckeon MD;  Location: [facility] OR    TRANSPLANT LIVER RECIPIENT LIVING UNRELATED N/A 06/18/2015    Procedure: TRANSPLANT LIVER RECIPIENT LIVING UNRELATED;  Surgeon: Tyree Smith MD;  [facility]    UPPER GI ENDOSCOPY         Social History     Tobacco Use    Smoking status: Never    Smokeless tobacco: Never   Vaping Use    Vaping status: Never Used   Substance Use Topics    Alcohol use: Not Currently     Alcohol/week: 0.0 standard drinks of alcohol     Comment: Every other month has 1 drink    Drug use: No       Family History   Problem Relation Age of Onset    Hypertension Mother     Lipids Mother     Sleep Apnea Mother      "Heart Disease Mother     Hypertension Maternal Grandmother             Alzheimer Disease Maternal Grandmother     Lipids Father     Heart Disease Maternal Grandfather 40    Heart Disease Paternal Grandfather     Melanoma No family hx of     Skin Cancer No family hx of     Cancer No family hx of     Diabetes No family hx of        ROS: A 12 system review of systems was negative other than noted here or above.     Exam:  /87 (BP Location: Left arm, Patient Position: Sitting, Cuff Size: Adult Small)   Pulse 81   Temp 98.4  F (36.9  C) (Oral)   Ht 1.6 m (5' 3\")   Wt 46.4 kg (102 lb 3.2 oz)   SpO2 100%   BMI 18.10 kg/m    BP Readings from Last 6 Encounters:   25 127/87   25 118/84   04/10/25 102/76   25 (!) 145/98   24 (!) 157/103   10/24/23 (!) 157/95     Wt Readings from Last 4 Encounters:   25 46.4 kg (102 lb 3.2 oz)   25 46.1 kg (101 lb 9 oz)   04/10/25 44.4 kg (97 lb 12.8 oz)   25 46.7 kg (103 lb)     GENERAL APPEARANCE: alert and no distress  EYES: PERRL  HENT: mouth without ulcers or lesions  NECK: supple, no adenopathy  RESP: lungs clear to auscultation - no rales, rhonchi or wheezes  CV: regular rhythm, normal rate, no rub   ABDOMEN:  soft, nontender, no HSM or masses, ileostomy bag in place  MS: extremities normal- no gross deformities noted, no evidence of inflammation in joints, no muscle tenderness  SKIN: no rash  NEURO: Normal strength and tone, sensory exam grossly normal, mentation intact and speech normal  PSYCH: mentation appears normal. and affect normal/bright  No LE edema    Results: Reviewed in details with the patient    Assessment/Plan:  Mrs. Penny is a 39 year old female with autoimmune hepatitis and primary sclerosing cholangitis status post living donor liver transplant [her ] in , ulcerative colitis S/P  colectomy and ileostomy in 2018, history of CMV colitis and viremia, history of severe preeclampsia with her only " pregnancy in 2021, who is being seen nephrology clinic today for CKD 3B and IgA nephropathy.    Problem #1 proteinuric CKD 3B/tacrolimus induced and IgA nephropathy/medullary nephrocalcinosis:  This is likely induced by tacrolimus as well as residual injury from previous ESTUARDO's.  She also have medullary calcinosis.  She had a kidney biopsy for proteinuria, done in April 2025, which showed  IgA nephropathy [M1 E0 S1 T2 C0].  Her most recent creatinine has been around 2.2 mg/dL and her UPCR is slightly down at 1.5 g/g after starting lisinopril.  Her blood pressure is at goal.  She does not have any hematuria.  -After discussion with her hepatologist, she was taken off tacrolimus and she was started on azathioprine and CellCept together with an increase in her prednisone dose to 10 mg daily.  The idea is to preserve her residual kidney function as long as possible.  Seems that her creatinine is slightly better after that switch  -Though her proteinuria is better, the optimal goal is close to 0.5 mg/g.  Recently she was started on CellCept and Imuran. Interestingly some of these medications have also been used in [facility], though proved more effective in  patients. As such, we will wait for another 6 to 8 weeks to see if CellCept and increasing her prednisone dose will help further with her proteinuria.  If her proteinuria is still above 0.5 g/g, then the other alternatives would be changing lisinopril to sparsentan or adding an SGLT2 inhibitor.    Problem #2 family-planning:  She understands that with her reduced kidney function, this will be high risk pregnancy with significant risk of kidney disease worsening and requiring dialysis during pregnancy or after delivery.  Also her chronic kidney disease puts her at increased risk for preeclampsia and worsening later outcomes.    She does not seem to be interested in pursuing pregnancy at this point.  She is rather concerned about the high risk/intense demand especially  with her 3-year-old daughter.    She is currently not using any contraception.  Though her chances of becoming pregnant are low, it is not zero.  I emphasized that if she decided not to pursue any pregnancy, then she will need to be on effective contraception.    Problem #3 hypertension secondary to chronic kidney disease:  Her blood pressure has been at goal after starting lisinopril.  She is currently on 5 mg daily.    Problem #4 medullary nephrocalcinosis:  Given her history of ulcerative colitis, she is at increased risk of calcium oxalate stones.  Of note, she has a parathyroid hormone level of 79 recently, but that is likely secondary to chronic kidney disease, has her urinary calcium levels are actually low.  Her calcium and phosphorus are within normal limits currently.  -Her 24-hour urine collection for stone analysis showed very low urine citrate and low pH of 4.9.  She is currently on sodium bicarbonate tablets and I increased her tablets today to 1300 three times daily.  It also showed a low urine volume and she was instructed to increase her fluid intake to at least 2.5 L/day    Problem #5 risk for osteoporosis:  Her DEXA scan showed osteopenia.  She will need to be on vitamin D supplements.    The total time of this encounter amounted to 50 minutes on the day of the encounter 7/9/2025. This time included face to face time spent with the patient, preparatory work and chart review, ordering tests, and performing post visit documentation.    The longitudinal plan of care for this patient was addressed during this visit. Due to the added complexity in care, I will continue to support the patient with subsequent management of this condition(s) and with the ongoing continuity of care of this condition(s).     *This dictation was prepared in part using Dragon recognition software.  As a result errors may occur. When identified these transcription errors have been corrected.  While every attempt is made to  correct errors during dictation, errors may still exist.     Tim Banda MD   [facility]   Department of Medicine   Division of Renal Disease and Hypertension

## 2025-07-09 NOTE — PATIENT INSTRUCTIONS
It was a pleasure taking care of you today.  I've included a brief summary of our discussion and care plan from today's visit below.  Please review this information with your primary care provider.     My recommendations are summarized as follows:  -Will monitor the protein in urine for another 6-8 weeks and then decide on adding any other medications  -repeat labs early September     Who do I call with any questions after my visit?  Please be in touch if there are any further questions that arise following today's visit.  There are multiple ways to contact your nephrology care team.       During business hours, you may reach your Nephrology Care Team or schedule or reschedule an appointment or lab at [phone].       If you need to schedule imaging, please call [phone].   To schedule a COVID test, please call [phone].     You can always send a secure message through HomeTouch. HomeTouch messages are answered by your nurse or doctor typically within 24-48 hours. Please allow extra time on weekends and holidays.       For urgent/emergent questions after business hours, you may reach the on-call Nephrology Fellow by contacting the [facility]  at [phone].     How will I get the results of any tests ordered?    You will receive all of your results.  If you have signed up for HomeTouch, any tests ordered at your visit will be available to you once resulted on Textict. Typically the physician reviews them and may or may not make further recommendations. If there are urgent results that require a change in your care plan, your physician or nurse will call you to discuss the next steps. If you are not on Textict, a letter may be generated and mailed to you with your results.

## 2025-07-12 ENCOUNTER — HEALTH MAINTENANCE LETTER (OUTPATIENT)
Age: 40
End: 2025-07-12

## 2025-07-28 ENCOUNTER — LAB (OUTPATIENT)
Dept: LAB | Facility: CLINIC | Age: 40
End: 2025-07-28
Payer: COMMERCIAL

## 2025-07-28 DIAGNOSIS — Z94.4 LIVER REPLACED BY TRANSPLANT (H): ICD-10-CM

## 2025-07-28 LAB
ERYTHROCYTE [DISTWIDTH] IN BLOOD BY AUTOMATED COUNT: 15.2 % (ref 10–15)
HCT VFR BLD AUTO: 32.6 % (ref 35–47)
HGB BLD-MCNC: 10.5 G/DL (ref 11.7–15.7)
MCH RBC QN AUTO: 28.7 PG (ref 26.5–33)
MCHC RBC AUTO-ENTMCNC: 32.2 G/DL (ref 31.5–36.5)
MCV RBC AUTO: 89 FL (ref 78–100)
PLATELET # BLD AUTO: 360 10E3/UL (ref 150–450)
RBC # BLD AUTO: 3.66 10E6/UL (ref 3.8–5.2)
WBC # BLD AUTO: 11 10E3/UL (ref 4–11)

## 2025-07-28 PROCEDURE — 84156 ASSAY OF PROTEIN URINE: CPT

## 2025-07-28 PROCEDURE — 80061 LIPID PANEL: CPT

## 2025-07-28 PROCEDURE — 36415 COLL VENOUS BLD VENIPUNCTURE: CPT

## 2025-07-28 PROCEDURE — 80053 COMPREHEN METABOLIC PANEL: CPT

## 2025-07-28 PROCEDURE — 85027 COMPLETE CBC AUTOMATED: CPT

## 2025-07-28 PROCEDURE — 82248 BILIRUBIN DIRECT: CPT

## 2025-07-29 LAB
ALBUMIN MFR UR ELPH: 56.7 MG/DL
ALBUMIN SERPL BCG-MCNC: 3.8 G/DL (ref 3.5–5.2)
ALP SERPL-CCNC: 58 U/L (ref 40–150)
ALT SERPL W P-5'-P-CCNC: 22 U/L (ref 0–50)
ANION GAP SERPL CALCULATED.3IONS-SCNC: 12 MMOL/L (ref 7–15)
AST SERPL W P-5'-P-CCNC: 25 U/L (ref 0–45)
BILIRUB SERPL-MCNC: 0.2 MG/DL
BILIRUBIN DIRECT (ROCHE PRO & PURE): 0.1 MG/DL (ref 0–0.45)
BUN SERPL-MCNC: 43.6 MG/DL (ref 6–20)
CALCIUM SERPL-MCNC: 9.4 MG/DL (ref 8.8–10.4)
CHLORIDE SERPL-SCNC: 105 MMOL/L (ref 98–107)
CHOLEST SERPL-MCNC: 216 MG/DL
CREAT SERPL-MCNC: 2.34 MG/DL (ref 0.51–0.95)
CREAT UR-MCNC: 44.5 MG/DL
EGFRCR SERPLBLD CKD-EPI 2021: 26 ML/MIN/1.73M2
FASTING STATUS PATIENT QL REPORTED: ABNORMAL
FASTING STATUS PATIENT QL REPORTED: ABNORMAL
GLUCOSE SERPL-MCNC: 84 MG/DL (ref 70–99)
HCO3 SERPL-SCNC: 20 MMOL/L (ref 22–29)
HDLC SERPL-MCNC: 108 MG/DL
LDLC SERPL CALC-MCNC: 64 MG/DL
NONHDLC SERPL-MCNC: 108 MG/DL
POTASSIUM SERPL-SCNC: 4.7 MMOL/L (ref 3.4–5.3)
PROT SERPL-MCNC: 6.5 G/DL (ref 6.4–8.3)
PROT/CREAT 24H UR: 1.27 MG/MG CR (ref 0–0.2)
SODIUM SERPL-SCNC: 137 MMOL/L (ref 135–145)
TRIGL SERPL-MCNC: 220 MG/DL

## (undated) DEVICE — SOL WATER IRRIG 1000ML BOTTLE 2F7114

## (undated) DEVICE — ENDO SCOPE WARMER SEAL  C3101

## (undated) DEVICE — KIT NEW IMAGE COLOSTOMY/ILEOSTOMY 2 3/4" LF 19354

## (undated) DEVICE — ENDO FUSION OMNI-TOME G31903

## (undated) DEVICE — SU VICRYL 2-0 TIE 12X18" J905T

## (undated) DEVICE — SU VICRYL 0 UR-6 27" J603H

## (undated) DEVICE — ESU GROUND PAD UNIVERSAL W/O CORD

## (undated) DEVICE — WIPES FOLEY CARE SURESTEP PROVON DFC100

## (undated) DEVICE — PREP SKIN SCRUB TRAY 4461A

## (undated) DEVICE — PACK ENDOSCOPY GI CUSTOM UMMC

## (undated) DEVICE — SOL NACL 0.9% IRRIG 1000ML BOTTLE 2F7124

## (undated) DEVICE — CATH RETRIEVAL BALLOON EXTRACTOR PRO RX-S INJ ABOVE 9-12MM

## (undated) DEVICE — ENDO TROCAR SLEEVE KII ADV FIXATION 05X100MM CFS02

## (undated) DEVICE — CATH BALLOON ELATION ESOPH/PYLORIC 6-7-8MMX180CM EPB6

## (undated) DEVICE — WIRE GUIDE 0.025"X270CM SHORT ANG VISIGLIDE 2 G-260-2527A

## (undated) DEVICE — PREP POVIDONE IODINE SOLUTION 10% 4OZ BOTTLE 29906-004

## (undated) DEVICE — DRSG TELFA ISLAND 4X8" 7541

## (undated) DEVICE — ENDO BITE BLOCK ADULT OMNI-BLOC

## (undated) DEVICE — KIT ENDO FIRST STEP DISINFECTANT 200ML W/POUCH EP-4

## (undated) DEVICE — WIRE GUIDE 0.025"X270CM STR VISIGLIDE G-240-2527S

## (undated) DEVICE — SOL WATER IRRIG 3000ML BAG 2B7117

## (undated) DEVICE — SOL NACL 0.9% IRRIG 3000ML BAG 2B7477

## (undated) DEVICE — PREP POVIDONE-IODINE 7.5% SCRUB 4OZ BOTTLE MDS093945

## (undated) DEVICE — TUBING INSUFFLATION W/FILTER 10FT GS1016

## (undated) DEVICE — BIOPSY VALVE BIOSHIELD 00711135

## (undated) DEVICE — PAD CHUX UNDERPAD 30X36" P3036C

## (undated) DEVICE — SU MONOCRYL 0 CTB-1 36" YB946

## (undated) DEVICE — BLADE KNIFE SURG 10 371110

## (undated) DEVICE — TUBING INSUFFLATION W/FILTER CPC TO LUER 620-030-301

## (undated) DEVICE — TAPE CLOTH 3" CARDINAL 3TRCL03

## (undated) DEVICE — STPL RELOAD CONTOUR CUT CVD THICK  CR40G

## (undated) DEVICE — ENDO CATH BALLOON DILATION HURRICANE 08MMX4X180CM M00545940

## (undated) DEVICE — INFLATION DEVICE BIG 60 ENDO-AN6012

## (undated) DEVICE — BLADE CLIPPER SGL USE 9680

## (undated) DEVICE — SUCTION TIP YANKAUER STR K87

## (undated) DEVICE — TUBING SMOKE EVAC PNEUVIEW 9660-XE

## (undated) DEVICE — GLOVE PROTEXIS BLUE W/NEU-THERA 7.0  2D73EB70

## (undated) DEVICE — GOWN IMPERVIOUS BREATHABLE SMART XLG 89045

## (undated) DEVICE — KIT CONNECTOR FOR OLYMPUS ENDOSCOPES DEFENDO 100310

## (undated) DEVICE — LABEL MEDICATION SYSTEM 3303-P

## (undated) DEVICE — SPECIMEN CONTAINER 3OZ W/FORMALIN 59901

## (undated) DEVICE — SU VICRYL 0 CT-1 36" J346H

## (undated) DEVICE — SUCTION MANIFOLD NEPTUNE 2 SYS 4 PORT 0702-020-000

## (undated) DEVICE — ENDO TUBING CO2 SMARTCAP STERILE DISP 100145CO2EXT

## (undated) DEVICE — LINEN GOWN X4 5410

## (undated) DEVICE — SU VICRYL 3-0 SH CR 8X18" J774

## (undated) DEVICE — Device

## (undated) DEVICE — LINEN TOWEL PACK X5 5464

## (undated) DEVICE — SU VICRYL 3-0 SH 27" UND J416H

## (undated) DEVICE — NDL 25GA 1.5" 305127

## (undated) DEVICE — TAPE DURAPORE 3" SILK 1538-3

## (undated) DEVICE — BLADE KNIFE SURG 15 371115

## (undated) DEVICE — GLOVE SENSICARE PI POWDER FREE 7.5 LATEX FREE MSG9075

## (undated) DEVICE — SU PROLENE 3-0 SHDA 36" 8522H

## (undated) DEVICE — CATH TRAY FOLEY SURESTEP 16FR WDRAIN BAG STLK LATEX A300316A

## (undated) DEVICE — WIRE GUIDE 0.025"X270CM ANG VISIGLIDE G-240-2527A

## (undated) DEVICE — SU VICRYL 2-0 SH 27" UND J417H

## (undated) DEVICE — GLOVE PROTEXIS BLUE W/NEU-THERA 7.5  2D73EB75

## (undated) DEVICE — ENDO CAP AND TUBING STERILE FOR ENDOGATOR  100130

## (undated) DEVICE — JELLY LUBRICATING SURGILUBE 2OZ TUBE 0281-0205-02

## (undated) DEVICE — SOL NACL 0.9% INJ 1000ML BAG 2B1324X

## (undated) DEVICE — ENDO FORCEP ENDOJAW BIOPSY 2.8MMX230CM FB-220U

## (undated) DEVICE — SYR 10ML LL W/O NDL 302995

## (undated) DEVICE — SUCTION IRR STRYKERFLOW II W/TIP 250-070-520

## (undated) DEVICE — STENT ZIMMON PANCREAS 7FRX18CM SGL PIGTAIL G24586
Type: IMPLANTABLE DEVICE | Site: BILE DUCT | Status: NON-FUNCTIONAL
Removed: 2018-06-25

## (undated) DEVICE — SU PDS II 0 CTX 60" Z990G

## (undated) DEVICE — LINEN TOWEL PACK X6 WHITE 5487

## (undated) DEVICE — ENDO SNARE POLYPECTOMY OVAL 15MM LOOP SD-240U-15

## (undated) DEVICE — GLOVE PROTEXIS W/NEU-THERA 6.0  2D73TE60

## (undated) DEVICE — PREP CHLORAPREP 26ML TINTED ORANGE  260815

## (undated) DEVICE — SU VICRYL 4-0 PS-2 18" UND J496H

## (undated) DEVICE — DRAPE LEGGINGS CLEAR 8430

## (undated) DEVICE — SU SILK 0 TIE 6X18" A186H

## (undated) DEVICE — SU VICRYL 3-0 SH 27" J316H

## (undated) DEVICE — STPL CONTOUR CUT CVD GREEN CS40G

## (undated) DEVICE — SEAL SET MYOSURE ROD LENS SCOPE SINGLE USE 40-902

## (undated) DEVICE — SU MONOCRYL 4-0 PS-2 18" UND Y496G

## (undated) DEVICE — LINEN ORTHO PACK 5446

## (undated) DEVICE — DRAPE IOBAN INCISE 23X17" 6650EZ

## (undated) DEVICE — ESU GROUND PAD ADULT W/CORD E7507

## (undated) DEVICE — SUCTION MANIFOLD DORNOCH ULTRA CART UL-CL500

## (undated) DEVICE — ESU PENCIL W/HOLSTER E2350H

## (undated) DEVICE — KIT PATIENT POSITIONING PIGAZZI LATEX FREE 40580

## (undated) DEVICE — ENDO DEVICE LOCKING AND BIOPSY CAP M00545261

## (undated) DEVICE — GLOVE PROTEXIS W/NEU-THERA 6.5  2D73TE65

## (undated) DEVICE — DRAPE IOBAN C-SECTION W/POUCH 30X35" 6657

## (undated) DEVICE — ENDO TROCAR FIRST ENTRY KII FIOS Z-THRD 05X100MM CTF03

## (undated) DEVICE — PANTIES MESH LG/XLG 2PK 706M2

## (undated) DEVICE — DRSG BANDAID 3/4X3" FABRIC CURITY LATEX FREE 44100

## (undated) DEVICE — ADH SKIN CLOSURE PREMIERPRO EXOFIN 1.0ML 3470

## (undated) DEVICE — ENDO CATH BALLOON DILATION HURRICANE 06MMX4X180CM M00545920

## (undated) DEVICE — ESU HOLDER LAP INST DISP PURPLE LONG 330MM H-PRO-330

## (undated) DEVICE — ENDO TROCAR BLUNT TIP KII BALLOON 12X130MM C0R50

## (undated) DEVICE — GLOVE PROTEXIS MICRO 7.0  2D73PM70

## (undated) DEVICE — PACK C-SECTION LF PL15OTA83B

## (undated) DEVICE — ESU LIGASURE LAPAROSCOPIC BLUNT TIP SEALER 5MMX37CM LF1837

## (undated) DEVICE — PREP CHLORAPREP 26ML TINTED HI-LITE ORANGE 930815

## (undated) DEVICE — KIT PROCEDURE FLUENT IN/OUT FLOWPAK TISS TRAP FLT-112S

## (undated) DEVICE — SU PROLENE 2-0 SHDA 36" 8523H

## (undated) DEVICE — ENDO FORCEP SPIKED SERRATED SHAFT JUMBO 239CM G56998

## (undated) DEVICE — SU MONOCRYL 4-0 PS-2 27" UND Y426H

## (undated) DEVICE — TAPE MEASURE PAPER 36" LF NI14-1300

## (undated) DEVICE — SU SILK 2-0 TIE 12X30" A305H

## (undated) DEVICE — ENDO GELPORT 100/120MM C8XX2

## (undated) DEVICE — ESU LIGASURE SEALER/DIVIDER RETRACT L-HOOK 37CM LF5637

## (undated) DEVICE — WIRE GUIDE 0.025"X270CM SHORT STR VISIGLIDE 2 G-260-2527S

## (undated) DEVICE — DRAPE U SPLIT 74X120" 29440

## (undated) DEVICE — CATH TRAY FOLEY SURESTEP 16FR W/URNE MTR STLK LATEX A303316A

## (undated) DEVICE — SU SILK 3-0 TIE 12X30" A304H

## (undated) DEVICE — NDL INSUFFLATION 13GA 120MM C2201

## (undated) DEVICE — DRSG STERI STRIP 1/2X4" R1547

## (undated) DEVICE — ENDO TROCAR BLUNT TIP KII BALLOON 12X100MM C0R47

## (undated) DEVICE — ENDO TROCAR SLEEVE KII Z-THREADED 05X100MM CTS02

## (undated) DEVICE — ENDO TROCAR FIRST ENTRY KII FIOS ADV FIX 05X100MM CFF03

## (undated) DEVICE — PACK GOWN 3/PK DISP XL SBA32GPFCB

## (undated) DEVICE — ESU HOLDER LAP INST DISP YELLOW SHORT 250MM H-PRO-250

## (undated) DEVICE — DRSG ABDOMINAL 07 1/2X8" 7197D

## (undated) DEVICE — SU PDS II 0 CTX 36" Z370T

## (undated) DEVICE — PAD PERI INDIV WRAP 11" 2022A

## (undated) DEVICE — ANTIFOG SOLUTION W/FOAM PAD 31142527

## (undated) DEVICE — LINEN TOWEL PACK X30 5481

## (undated) DEVICE — WIPE PAMPERS PREMOIST CLEANSING BABY SENSITIVE 17116

## (undated) RX ORDER — INDOMETHACIN 50 MG/1
SUPPOSITORY RECTAL
Status: DISPENSED
Start: 2018-04-02

## (undated) RX ORDER — FENTANYL CITRATE 50 UG/ML
INJECTION, SOLUTION INTRAMUSCULAR; INTRAVENOUS
Status: DISPENSED
Start: 2018-05-31

## (undated) RX ORDER — FENTANYL CITRATE 50 UG/ML
INJECTION, SOLUTION INTRAMUSCULAR; INTRAVENOUS
Status: DISPENSED
Start: 2021-11-04

## (undated) RX ORDER — FENTANYL CITRATE 50 UG/ML
INJECTION, SOLUTION INTRAMUSCULAR; INTRAVENOUS
Status: DISPENSED
Start: 2019-02-25

## (undated) RX ORDER — FENTANYL CITRATE 50 UG/ML
INJECTION, SOLUTION INTRAMUSCULAR; INTRAVENOUS
Status: DISPENSED
Start: 2018-04-02

## (undated) RX ORDER — FENTANYL CITRATE 50 UG/ML
INJECTION, SOLUTION INTRAMUSCULAR; INTRAVENOUS
Status: DISPENSED
Start: 2018-09-26

## (undated) RX ORDER — IOPAMIDOL 408 MG/ML
INJECTION, SOLUTION INTRAVASCULAR
Status: DISPENSED
Start: 2017-05-15

## (undated) RX ORDER — INDOMETHACIN 50 MG/1
SUPPOSITORY RECTAL
Status: DISPENSED
Start: 2018-09-26

## (undated) RX ORDER — METHYLERGONOVINE MALEATE 0.2 MG/ML
INJECTION INTRAVENOUS
Status: DISPENSED
Start: 2021-11-04

## (undated) RX ORDER — HYDROMORPHONE HYDROCHLORIDE 1 MG/ML
INJECTION, SOLUTION INTRAMUSCULAR; INTRAVENOUS; SUBCUTANEOUS
Status: DISPENSED
Start: 2019-06-20

## (undated) RX ORDER — SIMETHICONE 40MG/0.6ML
SUSPENSION, DROPS(FINAL DOSAGE FORM)(ML) ORAL
Status: DISPENSED
Start: 2017-05-15

## (undated) RX ORDER — FENTANYL CITRATE 50 UG/ML
INJECTION, SOLUTION INTRAMUSCULAR; INTRAVENOUS
Status: DISPENSED
Start: 2018-07-30

## (undated) RX ORDER — ONDANSETRON 2 MG/ML
INJECTION INTRAMUSCULAR; INTRAVENOUS
Status: DISPENSED
Start: 2018-05-07

## (undated) RX ORDER — CEFOTETAN DISODIUM 1 G/10ML
INJECTION, POWDER, FOR SOLUTION INTRAMUSCULAR; INTRAVENOUS
Status: DISPENSED
Start: 2019-06-20

## (undated) RX ORDER — GLYCOPYRROLATE 0.2 MG/ML
INJECTION, SOLUTION INTRAMUSCULAR; INTRAVENOUS
Status: DISPENSED
Start: 2018-07-30

## (undated) RX ORDER — ACETAMINOPHEN 325 MG/1
TABLET ORAL
Status: DISPENSED
Start: 2019-06-20

## (undated) RX ORDER — PROPOFOL 10 MG/ML
INJECTION, EMULSION INTRAVENOUS
Status: DISPENSED
Start: 2018-07-30

## (undated) RX ORDER — SODIUM CHLORIDE, SODIUM LACTATE, POTASSIUM CHLORIDE, CALCIUM CHLORIDE 600; 310; 30; 20 MG/100ML; MG/100ML; MG/100ML; MG/100ML
INJECTION, SOLUTION INTRAVENOUS
Status: DISPENSED
Start: 2019-06-20

## (undated) RX ORDER — INDOMETHACIN 50 MG/1
SUPPOSITORY RECTAL
Status: DISPENSED
Start: 2018-05-07

## (undated) RX ORDER — FENTANYL CITRATE 50 UG/ML
INJECTION, SOLUTION INTRAMUSCULAR; INTRAVENOUS
Status: DISPENSED
Start: 2018-06-25

## (undated) RX ORDER — CARBOPROST TROMETHAMINE 250 UG/ML
INJECTION, SOLUTION INTRAMUSCULAR
Status: DISPENSED
Start: 2021-11-04

## (undated) RX ORDER — MORPHINE SULFATE 1 MG/ML
INJECTION, SOLUTION EPIDURAL; INTRATHECAL; INTRAVENOUS
Status: DISPENSED
Start: 2021-11-04

## (undated) RX ORDER — LIDOCAINE HYDROCHLORIDE 10 MG/ML
INJECTION, SOLUTION EPIDURAL; INFILTRATION; INTRACAUDAL; PERINEURAL
Status: DISPENSED
Start: 2025-04-10

## (undated) RX ORDER — LIDOCAINE HYDROCHLORIDE 10 MG/ML
INJECTION, SOLUTION EPIDURAL; INFILTRATION; INTRACAUDAL; PERINEURAL
Status: DISPENSED
Start: 2018-05-31

## (undated) RX ORDER — LEVOFLOXACIN 5 MG/ML
INJECTION, SOLUTION INTRAVENOUS
Status: DISPENSED
Start: 2018-06-25

## (undated) RX ORDER — INDOMETHACIN 50 MG/1
SUPPOSITORY RECTAL
Status: DISPENSED
Start: 2018-06-25

## (undated) RX ORDER — ONDANSETRON 2 MG/ML
INJECTION INTRAMUSCULAR; INTRAVENOUS
Status: DISPENSED
Start: 2018-06-25

## (undated) RX ORDER — FENTANYL CITRATE 50 UG/ML
INJECTION, SOLUTION INTRAMUSCULAR; INTRAVENOUS
Status: DISPENSED
Start: 2019-06-20

## (undated) RX ORDER — ONDANSETRON 2 MG/ML
INJECTION INTRAMUSCULAR; INTRAVENOUS
Status: DISPENSED
Start: 2018-04-02

## (undated) RX ORDER — INDOMETHACIN 50 MG/1
SUPPOSITORY RECTAL
Status: DISPENSED
Start: 2017-05-15

## (undated) RX ORDER — IOPAMIDOL 510 MG/ML
INJECTION, SOLUTION INTRAVASCULAR
Status: DISPENSED
Start: 2018-09-26

## (undated) RX ORDER — PROPOFOL 10 MG/ML
INJECTION, EMULSION INTRAVENOUS
Status: DISPENSED
Start: 2018-06-25

## (undated) RX ORDER — HYDRALAZINE HYDROCHLORIDE 20 MG/ML
INJECTION INTRAMUSCULAR; INTRAVENOUS
Status: DISPENSED
Start: 2018-07-30

## (undated) RX ORDER — MISOPROSTOL 200 UG/1
TABLET ORAL
Status: DISPENSED
Start: 2021-11-04

## (undated) RX ORDER — FENTANYL CITRATE 50 UG/ML
INJECTION, SOLUTION INTRAMUSCULAR; INTRAVENOUS
Status: DISPENSED
Start: 2017-09-05

## (undated) RX ORDER — IOPAMIDOL 510 MG/ML
INJECTION, SOLUTION INTRAVASCULAR
Status: DISPENSED
Start: 2018-07-30

## (undated) RX ORDER — DIPHENHYDRAMINE HYDROCHLORIDE 50 MG/ML
INJECTION INTRAMUSCULAR; INTRAVENOUS
Status: DISPENSED
Start: 2019-02-25

## (undated) RX ORDER — IOPAMIDOL 510 MG/ML
INJECTION, SOLUTION INTRAVASCULAR
Status: DISPENSED
Start: 2018-06-25

## (undated) RX ORDER — FENTANYL CITRATE 50 UG/ML
INJECTION, SOLUTION INTRAMUSCULAR; INTRAVENOUS
Status: DISPENSED
Start: 2022-01-04

## (undated) RX ORDER — SODIUM CHLORIDE 9 MG/ML
INJECTION, SOLUTION INTRAVENOUS
Status: DISPENSED
Start: 2022-01-04

## (undated) RX ORDER — DEXAMETHASONE SODIUM PHOSPHATE 4 MG/ML
INJECTION, SOLUTION INTRA-ARTICULAR; INTRALESIONAL; INTRAMUSCULAR; INTRAVENOUS; SOFT TISSUE
Status: DISPENSED
Start: 2018-06-25

## (undated) RX ORDER — LIDOCAINE HYDROCHLORIDE 10 MG/ML
INJECTION, SOLUTION EPIDURAL; INFILTRATION; INTRACAUDAL; PERINEURAL
Status: DISPENSED
Start: 2022-01-04

## (undated) RX ORDER — SIMETHICONE 40MG/0.6ML
SUSPENSION, DROPS(FINAL DOSAGE FORM)(ML) ORAL
Status: DISPENSED
Start: 2017-09-05

## (undated) RX ORDER — INDOMETHACIN 50 MG/1
SUPPOSITORY RECTAL
Status: DISPENSED
Start: 2018-07-30

## (undated) RX ORDER — ACETAMINOPHEN 325 MG/1
TABLET ORAL
Status: DISPENSED
Start: 2018-09-26

## (undated) RX ORDER — FENTANYL CITRATE 50 UG/ML
INJECTION, SOLUTION INTRAMUSCULAR; INTRAVENOUS
Status: DISPENSED
Start: 2021-03-12

## (undated) RX ORDER — DIPHENHYDRAMINE HYDROCHLORIDE 50 MG/ML
INJECTION INTRAMUSCULAR; INTRAVENOUS
Status: DISPENSED
Start: 2017-09-05

## (undated) RX ORDER — PROPOFOL 10 MG/ML
INJECTION, EMULSION INTRAVENOUS
Status: DISPENSED
Start: 2018-05-07

## (undated) RX ORDER — SIMETHICONE 20 MG/.3ML
EMULSION ORAL
Status: DISPENSED
Start: 2018-07-30

## (undated) RX ORDER — HYDROMORPHONE HCL/0.9% NACL/PF 0.2MG/0.2
SYRINGE (ML) INTRAVENOUS
Status: DISPENSED
Start: 2017-03-20

## (undated) RX ORDER — DEXAMETHASONE SODIUM PHOSPHATE 4 MG/ML
INJECTION, SOLUTION INTRA-ARTICULAR; INTRALESIONAL; INTRAMUSCULAR; INTRAVENOUS; SOFT TISSUE
Status: DISPENSED
Start: 2018-07-30

## (undated) RX ORDER — ACETAMINOPHEN 325 MG/1
TABLET ORAL
Status: DISPENSED
Start: 2021-03-12

## (undated) RX ORDER — HYDROMORPHONE HYDROCHLORIDE 1 MG/ML
INJECTION, SOLUTION INTRAMUSCULAR; INTRAVENOUS; SUBCUTANEOUS
Status: DISPENSED
Start: 2018-09-26

## (undated) RX ORDER — LIDOCAINE HYDROCHLORIDE 10 MG/ML
INJECTION, SOLUTION EPIDURAL; INFILTRATION; INTRACAUDAL; PERINEURAL
Status: DISPENSED
Start: 2021-03-12

## (undated) RX ORDER — GLYCOPYRROLATE 0.2 MG/ML
INJECTION, SOLUTION INTRAMUSCULAR; INTRAVENOUS
Status: DISPENSED
Start: 2018-06-25

## (undated) RX ORDER — FENTANYL CITRATE 50 UG/ML
INJECTION, SOLUTION INTRAMUSCULAR; INTRAVENOUS
Status: DISPENSED
Start: 2025-04-10

## (undated) RX ORDER — DEXAMETHASONE SODIUM PHOSPHATE 4 MG/ML
INJECTION, SOLUTION INTRA-ARTICULAR; INTRALESIONAL; INTRAMUSCULAR; INTRAVENOUS; SOFT TISSUE
Status: DISPENSED
Start: 2018-05-07

## (undated) RX ORDER — ONDANSETRON 2 MG/ML
INJECTION INTRAMUSCULAR; INTRAVENOUS
Status: DISPENSED
Start: 2018-07-30

## (undated) RX ORDER — ROCURONIUM BROMIDE 50 MG/5 ML
SYRINGE (ML) INTRAVENOUS
Status: DISPENSED
Start: 2018-05-07

## (undated) RX ORDER — SIMETHICONE 20 MG/.3ML
EMULSION ORAL
Status: DISPENSED
Start: 2018-06-25

## (undated) RX ORDER — HYDROMORPHONE HYDROCHLORIDE 1 MG/ML
INJECTION, SOLUTION INTRAMUSCULAR; INTRAVENOUS; SUBCUTANEOUS
Status: DISPENSED
Start: 2017-03-20

## (undated) RX ORDER — GABAPENTIN 300 MG/1
CAPSULE ORAL
Status: DISPENSED
Start: 2018-09-26

## (undated) RX ORDER — DEXAMETHASONE SODIUM PHOSPHATE 4 MG/ML
INJECTION, SOLUTION INTRA-ARTICULAR; INTRALESIONAL; INTRAMUSCULAR; INTRAVENOUS; SOFT TISSUE
Status: DISPENSED
Start: 2018-04-02

## (undated) RX ORDER — SODIUM CHLORIDE 9 MG/ML
INJECTION, SOLUTION INTRAVENOUS
Status: DISPENSED
Start: 2018-05-31

## (undated) RX ORDER — FENTANYL CITRATE 50 UG/ML
INJECTION, SOLUTION INTRAMUSCULAR; INTRAVENOUS
Status: DISPENSED
Start: 2018-05-07

## (undated) RX ORDER — IOPAMIDOL 755 MG/ML
INJECTION, SOLUTION INTRAVASCULAR
Status: DISPENSED
Start: 2017-05-15